# Patient Record
Sex: FEMALE | Race: WHITE
[De-identification: names, ages, dates, MRNs, and addresses within clinical notes are randomized per-mention and may not be internally consistent; named-entity substitution may affect disease eponyms.]

---

## 2017-03-10 PROBLEM — Z00.00 ENCOUNTER FOR PREVENTIVE HEALTH EXAMINATION: Status: ACTIVE | Noted: 2017-03-10

## 2017-03-31 ENCOUNTER — RESULT REVIEW (OUTPATIENT)
Age: 22
End: 2017-03-31

## 2017-03-31 ENCOUNTER — APPOINTMENT (OUTPATIENT)
Dept: ANTEPARTUM | Facility: CLINIC | Age: 22
End: 2017-03-31

## 2017-03-31 VITALS — HEART RATE: 77 BPM | OXYGEN SATURATION: 98 % | TEMPERATURE: 98.4 F

## 2017-03-31 VITALS
WEIGHT: 148.3 LBS | BODY MASS INDEX: 23.83 KG/M2 | HEIGHT: 66 IN | SYSTOLIC BLOOD PRESSURE: 90 MMHG | DIASTOLIC BLOOD PRESSURE: 60 MMHG

## 2017-03-31 DIAGNOSIS — F19.90 OTHER PSYCHOACTIVE SUBSTANCE USE, UNSPECIFIED, UNCOMPLICATED: ICD-10-CM

## 2017-03-31 RX ORDER — AMOXICILLIN 500 MG/1
500 TABLET, FILM COATED ORAL
Qty: 21 | Refills: 0 | Status: COMPLETED | COMMUNITY
Start: 2016-10-27

## 2017-03-31 RX ORDER — METHADONE HYDROCHLORIDE 40 MG/1
TABLET ORAL
Refills: 0 | Status: ACTIVE | COMMUNITY

## 2017-03-31 RX ORDER — IBUPROFEN 800 MG/1
800 TABLET, FILM COATED ORAL
Qty: 21 | Refills: 0 | Status: COMPLETED | COMMUNITY
Start: 2016-10-27

## 2017-04-03 ENCOUNTER — RESULT REVIEW (OUTPATIENT)
Age: 22
End: 2017-04-03

## 2017-04-05 LAB
A VAGINAE DNA VAG NAA+PROBE-LOG#: 7.1
BV SCORE VAG QL NAA+PROBE: ABNORMAL
C GLABRATA DNA VAG QL NAA+PROBE: NOT DETECTED
C PARAP DNA VAG QL NAA+PROBE: NOT DETECTED
C TRACH RRNA SPEC QL NAA+PROBE: NOT DETECTED
C TROPICLS DNA VAG QL NAA+PROBE: NOT DETECTED
CANDIDA DNA VAG QL NAA+PROBE: NOT DETECTED
G VAGINALIS DNA VAG NAA+PROBE-LOG#: >8
LACTOBACILLUS DNA VAG NAA+PROBE-LOG#: NOT DETECTED
MEGASPHAERA SP DNA VAG NAA+PROBE-LOG#: 8
N GONORRHOEA RRNA SPEC QL NAA+PROBE: NOT DETECTED
T VAGINALIS RRNA SPEC QL NAA+PROBE: DETECTED

## 2017-04-07 ENCOUNTER — APPOINTMENT (OUTPATIENT)
Dept: OBGYN | Facility: CLINIC | Age: 22
End: 2017-04-07

## 2017-04-07 ENCOUNTER — APPOINTMENT (OUTPATIENT)
Dept: ANTEPARTUM | Facility: CLINIC | Age: 22
End: 2017-04-07

## 2017-05-12 ENCOUNTER — APPOINTMENT (OUTPATIENT)
Dept: OBGYN | Facility: CLINIC | Age: 22
End: 2017-05-12

## 2017-05-12 ENCOUNTER — APPOINTMENT (OUTPATIENT)
Dept: ANTEPARTUM | Facility: CLINIC | Age: 22
End: 2017-05-12

## 2017-05-12 VITALS — BODY MASS INDEX: 24.21 KG/M2 | SYSTOLIC BLOOD PRESSURE: 100 MMHG | WEIGHT: 150 LBS | DIASTOLIC BLOOD PRESSURE: 60 MMHG

## 2017-05-12 VITALS — TEMPERATURE: 98.2 F | HEART RATE: 80 BPM | OXYGEN SATURATION: 100 %

## 2017-05-12 VITALS — BODY MASS INDEX: 24.04 KG/M2 | WEIGHT: 149.6 LBS | HEIGHT: 66 IN

## 2017-05-12 VITALS — BODY MASS INDEX: 23.95 KG/M2 | HEIGHT: 66 IN | WEIGHT: 149.04 LBS

## 2017-05-12 DIAGNOSIS — A59.9 TRICHOMONIASIS, UNSPECIFIED: ICD-10-CM

## 2017-05-12 LAB
BILIRUB UR QL STRIP: NEGATIVE
BILIRUB UR QL STRIP: NEGATIVE
CLARITY UR: CLEAR
CLARITY UR: CLEAR
COLLECTION METHOD: NORMAL
COLLECTION METHOD: NORMAL
GLUCOSE UR-MCNC: NEGATIVE
GLUCOSE UR-MCNC: NEGATIVE
HCG UR QL: 0.2 EU/DL
HCG UR QL: 0.2 EU/DL
HGB UR QL STRIP.AUTO: NEGATIVE
HGB UR QL STRIP.AUTO: NEGATIVE
KETONES UR-MCNC: NEGATIVE
KETONES UR-MCNC: NEGATIVE
LEUKOCYTE ESTERASE UR QL STRIP: NEGATIVE
LEUKOCYTE ESTERASE UR QL STRIP: NEGATIVE
NITRITE UR QL STRIP: NEGATIVE
NITRITE UR QL STRIP: NEGATIVE
PH UR STRIP: 6
PH UR STRIP: 7.5
PROT UR STRIP-MCNC: NEGATIVE
PROT UR STRIP-MCNC: NEGATIVE
SP GR UR STRIP: 1.02
SP GR UR STRIP: 1.02

## 2017-05-12 RX ORDER — CLINDAMYCIN HYDROCHLORIDE 300 MG/1
300 CAPSULE ORAL
Qty: 28 | Refills: 0 | Status: COMPLETED | COMMUNITY
Start: 2017-04-26

## 2017-05-12 RX ORDER — BUPRENORPHINE HYDROCHLORIDE 8 MG/1
8 TABLET SUBLINGUAL
Qty: 14 | Refills: 0 | Status: COMPLETED | COMMUNITY
Start: 2016-12-05

## 2017-05-12 RX ORDER — METRONIDAZOLE 500 MG/1
500 TABLET ORAL
Qty: 4 | Refills: 0 | Status: DISCONTINUED | COMMUNITY
Start: 2017-04-07 | End: 2017-05-12

## 2017-06-02 ENCOUNTER — INPATIENT (INPATIENT)
Facility: HOSPITAL | Age: 22
LOS: 0 days | Discharge: LEFT BEFORE DISCHARGE | End: 2017-06-03
Attending: INTERNAL MEDICINE

## 2017-06-02 DIAGNOSIS — F31.9 BIPOLAR DISORDER, UNSPECIFIED: ICD-10-CM

## 2017-06-02 DIAGNOSIS — M54.9 DORSALGIA, UNSPECIFIED: ICD-10-CM

## 2017-06-02 DIAGNOSIS — F17.200 NICOTINE DEPENDENCE, UNSPECIFIED, UNCOMPLICATED: ICD-10-CM

## 2017-06-02 DIAGNOSIS — F20.9 SCHIZOPHRENIA, UNSPECIFIED: ICD-10-CM

## 2017-06-02 DIAGNOSIS — F11.20 OPIOID DEPENDENCE, UNCOMPLICATED: ICD-10-CM

## 2017-06-02 DIAGNOSIS — F41.9 ANXIETY DISORDER, UNSPECIFIED: ICD-10-CM

## 2017-06-02 DIAGNOSIS — J45.909 UNSPECIFIED ASTHMA, UNCOMPLICATED: ICD-10-CM

## 2017-06-02 DIAGNOSIS — F13.10 SEDATIVE, HYPNOTIC OR ANXIOLYTIC ABUSE, UNCOMPLICATED: ICD-10-CM

## 2017-06-08 ENCOUNTER — APPOINTMENT (OUTPATIENT)
Dept: ANTEPARTUM | Facility: CLINIC | Age: 22
End: 2017-06-08

## 2017-06-08 VITALS
TEMPERATURE: 97.2 F | HEART RATE: 86 BPM | OXYGEN SATURATION: 100 % | SYSTOLIC BLOOD PRESSURE: 100 MMHG | DIASTOLIC BLOOD PRESSURE: 60 MMHG

## 2017-06-08 VITALS — HEIGHT: 66 IN

## 2017-06-08 LAB
BILIRUB UR QL STRIP: NEGATIVE
CLARITY UR: CLEAR
COLLECTION METHOD: NORMAL
GLUCOSE UR-MCNC: NEGATIVE
HCG UR QL: 0.2 EU/DL
HGB UR QL STRIP.AUTO: NEGATIVE
KETONES UR-MCNC: NEGATIVE
LEUKOCYTE ESTERASE UR QL STRIP: NEGATIVE
NITRITE UR QL STRIP: NEGATIVE
PH UR STRIP: 7.5
PROT UR STRIP-MCNC: NEGATIVE
SP GR UR STRIP: 1.01

## 2017-06-16 ENCOUNTER — APPOINTMENT (OUTPATIENT)
Dept: ANTEPARTUM | Facility: CLINIC | Age: 22
End: 2017-06-16

## 2017-06-16 ENCOUNTER — OUTPATIENT (OUTPATIENT)
Dept: OUTPATIENT SERVICES | Facility: HOSPITAL | Age: 22
LOS: 1 days | Discharge: HOME | End: 2017-06-16

## 2017-06-16 DIAGNOSIS — M54.9 DORSALGIA, UNSPECIFIED: ICD-10-CM

## 2017-06-16 DIAGNOSIS — F13.10 SEDATIVE, HYPNOTIC OR ANXIOLYTIC ABUSE, UNCOMPLICATED: ICD-10-CM

## 2017-06-16 DIAGNOSIS — F20.9 SCHIZOPHRENIA, UNSPECIFIED: ICD-10-CM

## 2017-06-16 DIAGNOSIS — F41.9 ANXIETY DISORDER, UNSPECIFIED: ICD-10-CM

## 2017-06-16 DIAGNOSIS — J45.909 UNSPECIFIED ASTHMA, UNCOMPLICATED: ICD-10-CM

## 2017-06-16 DIAGNOSIS — F11.20 OPIOID DEPENDENCE, UNCOMPLICATED: ICD-10-CM

## 2017-06-16 DIAGNOSIS — F17.200 NICOTINE DEPENDENCE, UNSPECIFIED, UNCOMPLICATED: ICD-10-CM

## 2017-06-16 DIAGNOSIS — F31.9 BIPOLAR DISORDER, UNSPECIFIED: ICD-10-CM

## 2017-06-22 ENCOUNTER — APPOINTMENT (OUTPATIENT)
Dept: ANTEPARTUM | Facility: CLINIC | Age: 22
End: 2017-06-22

## 2017-06-28 DIAGNOSIS — O35.8XX1 MATERNAL CARE FOR OTHER (SUSPECTED) FETAL ABNORMALITY AND DAMAGE, FETUS 1: ICD-10-CM

## 2017-06-28 DIAGNOSIS — O99.322 DRUG USE COMPLICATING PREGNANCY, SECOND TRIMESTER: ICD-10-CM

## 2017-07-01 DIAGNOSIS — B19.20 UNSPECIFIED VIRAL HEPATITIS C WITHOUT HEPATIC COMA: ICD-10-CM

## 2017-07-01 DIAGNOSIS — Z51.89 ENCOUNTER FOR OTHER SPECIFIED AFTERCARE: ICD-10-CM

## 2017-07-01 DIAGNOSIS — F17.210 NICOTINE DEPENDENCE, CIGARETTES, UNCOMPLICATED: ICD-10-CM

## 2017-07-01 DIAGNOSIS — Z33.1 PREGNANT STATE, INCIDENTAL: ICD-10-CM

## 2017-07-01 DIAGNOSIS — F11.20 OPIOID DEPENDENCE, UNCOMPLICATED: ICD-10-CM

## 2017-07-01 DIAGNOSIS — F13.20 SEDATIVE, HYPNOTIC OR ANXIOLYTIC DEPENDENCE, UNCOMPLICATED: ICD-10-CM

## 2017-07-01 DIAGNOSIS — F31.9 BIPOLAR DISORDER, UNSPECIFIED: ICD-10-CM

## 2017-07-26 ENCOUNTER — APPOINTMENT (OUTPATIENT)
Dept: ANTEPARTUM | Facility: CLINIC | Age: 22
End: 2017-07-26

## 2017-07-26 ENCOUNTER — OUTPATIENT (OUTPATIENT)
Dept: OUTPATIENT SERVICES | Facility: HOSPITAL | Age: 22
LOS: 1 days | Discharge: HOME | End: 2017-07-26

## 2017-07-26 VITALS
SYSTOLIC BLOOD PRESSURE: 131 MMHG | DIASTOLIC BLOOD PRESSURE: 67 MMHG | HEIGHT: 66 IN | WEIGHT: 159.8 LBS | BODY MASS INDEX: 25.68 KG/M2

## 2017-07-26 VITALS — TEMPERATURE: 98.4 F | HEART RATE: 100 BPM | OXYGEN SATURATION: 98 %

## 2017-07-26 DIAGNOSIS — F11.20 OPIOID DEPENDENCE, UNCOMPLICATED: ICD-10-CM

## 2017-07-26 DIAGNOSIS — O99.322 DRUG USE COMPLICATING PREGNANCY, SECOND TRIMESTER: ICD-10-CM

## 2017-07-26 DIAGNOSIS — F41.9 ANXIETY DISORDER, UNSPECIFIED: ICD-10-CM

## 2017-07-26 DIAGNOSIS — M54.9 DORSALGIA, UNSPECIFIED: ICD-10-CM

## 2017-07-26 DIAGNOSIS — J45.909 UNSPECIFIED ASTHMA, UNCOMPLICATED: ICD-10-CM

## 2017-07-26 DIAGNOSIS — F13.10 SEDATIVE, HYPNOTIC OR ANXIOLYTIC ABUSE, UNCOMPLICATED: ICD-10-CM

## 2017-07-26 DIAGNOSIS — O99.342 OTHER MENTAL DISORDERS COMPLICATING PREGNANCY, SECOND TRIMESTER: ICD-10-CM

## 2017-07-26 DIAGNOSIS — F17.200 NICOTINE DEPENDENCE, UNSPECIFIED, UNCOMPLICATED: ICD-10-CM

## 2017-07-26 DIAGNOSIS — F31.9 BIPOLAR DISORDER, UNSPECIFIED: ICD-10-CM

## 2017-07-26 DIAGNOSIS — F20.9 SCHIZOPHRENIA, UNSPECIFIED: ICD-10-CM

## 2017-07-26 LAB
BILIRUB UR QL STRIP: NEGATIVE
CLARITY UR: CLEAR
COLLECTION METHOD: NORMAL
GLUCOSE UR-MCNC: NEGATIVE
HCG UR QL: 0.2 EU/DL
HGB UR QL STRIP.AUTO: NEGATIVE
KETONES UR-MCNC: NEGATIVE
LEUKOCYTE ESTERASE UR QL STRIP: NEGATIVE
NITRITE UR QL STRIP: NEGATIVE
PH UR STRIP: 7.5
PROT UR STRIP-MCNC: NEGATIVE
SP GR UR STRIP: 1020
UTERUS FUNDAL HEIGHT TAPE MEASURE: 20

## 2017-07-26 RX ORDER — PRENATAL VIT NO.130/IRON/FOLIC 27MG-0.8MG
27-0.8 TABLET ORAL DAILY
Qty: 30 | Refills: 9 | Status: ACTIVE | COMMUNITY
Start: 2017-07-26 | End: 1900-01-01

## 2017-08-23 ENCOUNTER — APPOINTMENT (OUTPATIENT)
Dept: ANTEPARTUM | Facility: CLINIC | Age: 22
End: 2017-08-23

## 2018-02-26 ENCOUNTER — INPATIENT (INPATIENT)
Facility: HOSPITAL | Age: 23
LOS: 0 days | Discharge: LEFT BEFORE DISCHARGE | End: 2018-02-27
Attending: INTERNAL MEDICINE | Admitting: INTERNAL MEDICINE

## 2018-02-26 VITALS
RESPIRATION RATE: 16 BRPM | WEIGHT: 134.92 LBS | DIASTOLIC BLOOD PRESSURE: 68 MMHG | HEIGHT: 66 IN | TEMPERATURE: 99 F | HEART RATE: 83 BPM | SYSTOLIC BLOOD PRESSURE: 105 MMHG

## 2018-02-26 DIAGNOSIS — F41.1 GENERALIZED ANXIETY DISORDER: ICD-10-CM

## 2018-02-26 DIAGNOSIS — F11.20 OPIOID DEPENDENCE, UNCOMPLICATED: ICD-10-CM

## 2018-02-26 DIAGNOSIS — F31.9 BIPOLAR DISORDER, UNSPECIFIED: ICD-10-CM

## 2018-02-26 DIAGNOSIS — F17.200 NICOTINE DEPENDENCE, UNSPECIFIED, UNCOMPLICATED: ICD-10-CM

## 2018-02-26 LAB
APPEARANCE UR: CLEAR — SIGNIFICANT CHANGE UP
BILIRUB UR-MCNC: NEGATIVE — SIGNIFICANT CHANGE UP
COLOR SPEC: YELLOW — SIGNIFICANT CHANGE UP
DIFF PNL FLD: (no result)
EPI CELLS # UR: (no result) /HPF
GLUCOSE UR QL: NEGATIVE MG/DL — SIGNIFICANT CHANGE UP
HCG UR QL: NEGATIVE — SIGNIFICANT CHANGE UP
KETONES UR-MCNC: NEGATIVE — SIGNIFICANT CHANGE UP
LEUKOCYTE ESTERASE UR-ACNC: NEGATIVE — SIGNIFICANT CHANGE UP
NITRITE UR-MCNC: NEGATIVE — SIGNIFICANT CHANGE UP
PH UR: 7 — SIGNIFICANT CHANGE UP (ref 5–8)
PROT UR-MCNC: NEGATIVE MG/DL — SIGNIFICANT CHANGE UP
RBC CASTS # UR COMP ASSIST: (no result) /HPF
SP GR SPEC: 1.02 — SIGNIFICANT CHANGE UP (ref 1.01–1.03)
UROBILINOGEN FLD QL: 0.2 MG/DL — SIGNIFICANT CHANGE UP (ref 0.2–0.2)
WBC UR QL: SIGNIFICANT CHANGE UP /HPF

## 2018-02-26 RX ORDER — METHADONE HYDROCHLORIDE 40 MG/1
15 TABLET ORAL EVERY 12 HOURS
Qty: 0 | Refills: 0 | Status: DISCONTINUED | OUTPATIENT
Start: 2018-02-27 | End: 2018-02-27

## 2018-02-26 RX ORDER — METHADONE HYDROCHLORIDE 40 MG/1
TABLET ORAL
Qty: 0 | Refills: 0 | Status: DISCONTINUED | OUTPATIENT
Start: 2018-02-27 | End: 2018-02-27

## 2018-02-26 RX ORDER — HYDROXYZINE HCL 10 MG
25 TABLET ORAL EVERY 6 HOURS
Qty: 0 | Refills: 0 | Status: DISCONTINUED | OUTPATIENT
Start: 2018-02-26 | End: 2018-02-27

## 2018-02-26 RX ORDER — METHADONE HYDROCHLORIDE 40 MG/1
10 TABLET ORAL EVERY 12 HOURS
Qty: 0 | Refills: 0 | Status: DISCONTINUED | OUTPATIENT
Start: 2018-02-28 | End: 2018-02-27

## 2018-02-26 RX ORDER — ACETAMINOPHEN 500 MG
650 TABLET ORAL EVERY 6 HOURS
Qty: 0 | Refills: 0 | Status: DISCONTINUED | OUTPATIENT
Start: 2018-02-26 | End: 2018-02-27

## 2018-02-26 RX ORDER — METHADONE HYDROCHLORIDE 40 MG/1
10 TABLET ORAL ONCE
Qty: 0 | Refills: 0 | Status: DISCONTINUED | OUTPATIENT
Start: 2018-02-26 | End: 2018-02-26

## 2018-02-26 RX ORDER — METHADONE HYDROCHLORIDE 40 MG/1
15 TABLET ORAL ONCE
Qty: 0 | Refills: 0 | Status: DISCONTINUED | OUTPATIENT
Start: 2018-02-26 | End: 2018-02-26

## 2018-02-26 RX ORDER — NICOTINE POLACRILEX 2 MG
1 GUM BUCCAL DAILY
Qty: 0 | Refills: 0 | Status: DISCONTINUED | OUTPATIENT
Start: 2018-02-26 | End: 2018-02-27

## 2018-02-26 RX ORDER — GABAPENTIN 400 MG/1
600 CAPSULE ORAL THREE TIMES A DAY
Qty: 0 | Refills: 0 | Status: DISCONTINUED | OUTPATIENT
Start: 2018-02-26 | End: 2018-02-27

## 2018-02-26 RX ORDER — IBUPROFEN 200 MG
400 TABLET ORAL EVERY 6 HOURS
Qty: 0 | Refills: 0 | Status: DISCONTINUED | OUTPATIENT
Start: 2018-02-26 | End: 2018-02-27

## 2018-02-26 RX ORDER — ESCITALOPRAM OXALATE 10 MG/1
10 TABLET, FILM COATED ORAL DAILY
Qty: 0 | Refills: 0 | Status: DISCONTINUED | OUTPATIENT
Start: 2018-02-26 | End: 2018-02-27

## 2018-02-26 RX ORDER — METHADONE HYDROCHLORIDE 40 MG/1
5 TABLET ORAL EVERY 12 HOURS
Qty: 0 | Refills: 0 | Status: CANCELLED | OUTPATIENT
Start: 2018-03-01 | End: 2018-02-27

## 2018-02-26 RX ORDER — TRAZODONE HCL 50 MG
100 TABLET ORAL AT BEDTIME
Qty: 0 | Refills: 0 | Status: DISCONTINUED | OUTPATIENT
Start: 2018-02-26 | End: 2018-02-27

## 2018-02-27 VITALS
HEART RATE: 94 BPM | RESPIRATION RATE: 16 BRPM | TEMPERATURE: 98 F | SYSTOLIC BLOOD PRESSURE: 132 MMHG | DIASTOLIC BLOOD PRESSURE: 57 MMHG

## 2018-02-27 LAB
ALBUMIN SERPL ELPH-MCNC: 4 G/DL — SIGNIFICANT CHANGE UP (ref 3–5.5)
ALP SERPL-CCNC: 62 U/L — SIGNIFICANT CHANGE UP (ref 30–115)
ALT FLD-CCNC: 12 U/L — SIGNIFICANT CHANGE UP (ref 0–41)
AMPHET UR-MCNC: NEGATIVE — SIGNIFICANT CHANGE UP
ANION GAP SERPL CALC-SCNC: 13 MMOL/L — SIGNIFICANT CHANGE UP (ref 7–14)
AST SERPL-CCNC: 20 U/L — SIGNIFICANT CHANGE UP (ref 0–41)
BARBITURATES UR SCN-MCNC: NEGATIVE — SIGNIFICANT CHANGE UP
BENZODIAZ UR-MCNC: POSITIVE
BILIRUB SERPL-MCNC: 0.4 MG/DL — SIGNIFICANT CHANGE UP (ref 0.2–1.2)
BUN SERPL-MCNC: 11 MG/DL — SIGNIFICANT CHANGE UP (ref 10–20)
CALCIUM SERPL-MCNC: 10.1 MG/DL — SIGNIFICANT CHANGE UP (ref 8.5–10.1)
CHLORIDE SERPL-SCNC: 106 MMOL/L — SIGNIFICANT CHANGE UP (ref 98–110)
CO2 SERPL-SCNC: 23 MMOL/L — SIGNIFICANT CHANGE UP (ref 17–32)
COCAINE METAB.OTHER UR-MCNC: POSITIVE
CREAT SERPL-MCNC: 0.6 MG/DL — LOW (ref 0.7–1.5)
DRUG SCREEN 1, URINE RESULT: SIGNIFICANT CHANGE UP
GLUCOSE SERPL-MCNC: 138 MG/DL — HIGH (ref 70–110)
HCT VFR BLD CALC: 43.9 % — SIGNIFICANT CHANGE UP (ref 37–47)
HGB BLD-MCNC: 14.5 G/DL — SIGNIFICANT CHANGE UP (ref 14–18)
MCHC RBC-ENTMCNC: 28 PG — SIGNIFICANT CHANGE UP (ref 27–31)
MCHC RBC-ENTMCNC: 33 G/DL — SIGNIFICANT CHANGE UP (ref 32–37)
MCV RBC AUTO: 84.9 FL — SIGNIFICANT CHANGE UP (ref 81–91)
METHADONE UR-MCNC: NEGATIVE — SIGNIFICANT CHANGE UP
NRBC # BLD: 0 /100 WBCS — SIGNIFICANT CHANGE UP (ref 0–0)
OPIATES UR-MCNC: POSITIVE
PCP UR-MCNC: NEGATIVE — SIGNIFICANT CHANGE UP
PLATELET # BLD AUTO: 194 K/UL — SIGNIFICANT CHANGE UP (ref 130–400)
POTASSIUM SERPL-MCNC: 4.2 MMOL/L — SIGNIFICANT CHANGE UP (ref 3.5–5)
POTASSIUM SERPL-SCNC: 4.2 MMOL/L — SIGNIFICANT CHANGE UP (ref 3.5–5)
PROPOXYPHENE QUALITATIVE URINE RESULT: NEGATIVE — SIGNIFICANT CHANGE UP
PROT SERPL-MCNC: 8 G/DL — SIGNIFICANT CHANGE UP (ref 6–8)
RBC # BLD: 5.17 M/UL — SIGNIFICANT CHANGE UP (ref 4.2–5.4)
RBC # FLD: 12.1 % — SIGNIFICANT CHANGE UP (ref 11.5–14.5)
SODIUM SERPL-SCNC: 142 MMOL/L — SIGNIFICANT CHANGE UP (ref 135–146)
THC UR QL: NEGATIVE — SIGNIFICANT CHANGE UP
WBC # BLD: 5.52 K/UL — SIGNIFICANT CHANGE UP (ref 4.8–10.8)
WBC # FLD AUTO: 5.52 K/UL — SIGNIFICANT CHANGE UP (ref 4.8–10.8)

## 2018-02-27 RX ORDER — METHADONE HYDROCHLORIDE 40 MG/1
5 TABLET ORAL ONCE
Qty: 0 | Refills: 0 | Status: DISCONTINUED | OUTPATIENT
Start: 2018-02-27 | End: 2018-02-27

## 2018-02-27 RX ORDER — HYDROXYZINE HCL 10 MG
1 TABLET ORAL
Qty: 0 | Refills: 0 | COMMUNITY

## 2018-02-27 RX ORDER — TUBERCULIN PURIFIED PROTEIN DERIVATIVE 5 [IU]/.1ML
5 INJECTION, SOLUTION INTRADERMAL ONCE
Qty: 0 | Refills: 0 | Status: COMPLETED | OUTPATIENT
Start: 2018-02-27 | End: 2018-02-27

## 2018-02-27 RX ADMIN — ESCITALOPRAM OXALATE 10 MILLIGRAM(S): 10 TABLET, FILM COATED ORAL at 09:30

## 2018-02-27 RX ADMIN — Medication 100 MILLIGRAM(S): at 20:12

## 2018-02-27 RX ADMIN — METHADONE HYDROCHLORIDE 5 MILLIGRAM(S): 40 TABLET ORAL at 06:15

## 2018-02-27 RX ADMIN — GABAPENTIN 600 MILLIGRAM(S): 400 CAPSULE ORAL at 20:11

## 2018-02-27 RX ADMIN — GABAPENTIN 600 MILLIGRAM(S): 400 CAPSULE ORAL at 13:39

## 2018-02-27 RX ADMIN — METHADONE HYDROCHLORIDE 15 MILLIGRAM(S): 40 TABLET ORAL at 20:11

## 2018-02-27 RX ADMIN — Medication 400 MILLIGRAM(S): at 18:13

## 2018-02-27 RX ADMIN — Medication 25 MILLIGRAM(S): at 20:11

## 2018-02-27 RX ADMIN — METHADONE HYDROCHLORIDE 15 MILLIGRAM(S): 40 TABLET ORAL at 09:30

## 2018-02-27 RX ADMIN — GABAPENTIN 600 MILLIGRAM(S): 400 CAPSULE ORAL at 09:30

## 2018-02-27 RX ADMIN — Medication 0.1 MILLIGRAM(S): at 18:13

## 2018-02-27 RX ADMIN — Medication 1 TABLET(S): at 09:30

## 2018-02-27 RX ADMIN — Medication 1 PATCH: at 09:31

## 2018-02-27 NOTE — CHART NOTE - NSCHARTNOTEFT_GEN_A_CORE
2/27/ : Pt refused PPD placement. Claimed to have received one at her last facility within the last few days. Pt still currently has a mariam from the previous PPD in right forearm with no errythemia or induration.     Josiane Bauer, RRT

## 2018-02-28 LAB
HBV CORE AB SER-ACNC: SIGNIFICANT CHANGE UP
HBV CORE IGM SER-ACNC: SIGNIFICANT CHANGE UP
HBV SURFACE AB SER-ACNC: SIGNIFICANT CHANGE UP
HBV SURFACE AG SER-ACNC: SIGNIFICANT CHANGE UP
HCV AB S/CO SERPL IA: 4.33 S/CO — SIGNIFICANT CHANGE UP
HCV AB SERPL-IMP: (no result)
HIV 1+2 AB+HIV1 P24 AG SERPL QL IA: SIGNIFICANT CHANGE UP

## 2018-03-01 LAB
HCV RNA FLD QL NAA+PROBE: SIGNIFICANT CHANGE UP
HCV RNA SPEC QL PROBE+SIG AMP: SIGNIFICANT CHANGE UP
RPR SERPL-ACNC: SIGNIFICANT CHANGE UP
T PALLIDUM AB TITR SER: POSITIVE
T PALLIDUM IGG SER QL IF: SIGNIFICANT CHANGE UP

## 2018-03-02 DIAGNOSIS — F41.9 ANXIETY DISORDER, UNSPECIFIED: ICD-10-CM

## 2018-03-02 DIAGNOSIS — B19.20 UNSPECIFIED VIRAL HEPATITIS C WITHOUT HEPATIC COMA: ICD-10-CM

## 2018-03-02 DIAGNOSIS — F11.20 OPIOID DEPENDENCE, UNCOMPLICATED: ICD-10-CM

## 2018-03-02 DIAGNOSIS — Z72.0 TOBACCO USE: ICD-10-CM

## 2018-03-02 DIAGNOSIS — F31.9 BIPOLAR DISORDER, UNSPECIFIED: ICD-10-CM

## 2018-03-02 DIAGNOSIS — F11.10 OPIOID ABUSE, UNCOMPLICATED: ICD-10-CM

## 2019-01-07 ENCOUNTER — INPATIENT (INPATIENT)
Facility: HOSPITAL | Age: 24
LOS: 0 days | Discharge: LEFT BEFORE DISCHARGE | End: 2019-01-08
Attending: INTERNAL MEDICINE | Admitting: INTERNAL MEDICINE

## 2019-01-07 VITALS
HEART RATE: 87 BPM | RESPIRATION RATE: 16 BRPM | HEIGHT: 66 IN | WEIGHT: 130.07 LBS | SYSTOLIC BLOOD PRESSURE: 110 MMHG | TEMPERATURE: 98 F | DIASTOLIC BLOOD PRESSURE: 53 MMHG

## 2019-01-07 DIAGNOSIS — F14.10 COCAINE ABUSE, UNCOMPLICATED: ICD-10-CM

## 2019-01-07 DIAGNOSIS — F14.20 COCAINE DEPENDENCE, UNCOMPLICATED: ICD-10-CM

## 2019-01-07 DIAGNOSIS — F11.20 OPIOID DEPENDENCE, UNCOMPLICATED: ICD-10-CM

## 2019-01-07 DIAGNOSIS — F17.200 NICOTINE DEPENDENCE, UNSPECIFIED, UNCOMPLICATED: ICD-10-CM

## 2019-01-07 DIAGNOSIS — B19.20 UNSPECIFIED VIRAL HEPATITIS C WITHOUT HEPATIC COMA: ICD-10-CM

## 2019-01-07 DIAGNOSIS — J45.909 UNSPECIFIED ASTHMA, UNCOMPLICATED: ICD-10-CM

## 2019-01-07 DIAGNOSIS — F41.9 ANXIETY DISORDER, UNSPECIFIED: ICD-10-CM

## 2019-01-07 DIAGNOSIS — F12.20 CANNABIS DEPENDENCE, UNCOMPLICATED: ICD-10-CM

## 2019-01-07 DIAGNOSIS — F10.20 ALCOHOL DEPENDENCE, UNCOMPLICATED: ICD-10-CM

## 2019-01-07 LAB
APPEARANCE UR: CLEAR — SIGNIFICANT CHANGE UP
BACTERIA # UR AUTO: ABNORMAL
BILIRUB UR-MCNC: ABNORMAL
COD CRY URNS QL: ABNORMAL
COLOR SPEC: YELLOW — SIGNIFICANT CHANGE UP
DIFF PNL FLD: ABNORMAL
EPI CELLS # UR: ABNORMAL /HPF
GLUCOSE UR QL: NEGATIVE MG/DL — SIGNIFICANT CHANGE UP
GRAN CASTS # UR COMP ASSIST: NEGATIVE — SIGNIFICANT CHANGE UP
HCG UR QL: NEGATIVE — SIGNIFICANT CHANGE UP
HYALINE CASTS # UR AUTO: NEGATIVE — SIGNIFICANT CHANGE UP
KETONES UR-MCNC: ABNORMAL
LEUKOCYTE ESTERASE UR-ACNC: NEGATIVE — SIGNIFICANT CHANGE UP
NITRITE UR-MCNC: NEGATIVE — SIGNIFICANT CHANGE UP
PH UR: 5.5 — SIGNIFICANT CHANGE UP (ref 5–8)
PROT UR-MCNC: NEGATIVE MG/DL — SIGNIFICANT CHANGE UP
RBC CASTS # UR COMP ASSIST: ABNORMAL /HPF
SP GR SPEC: >=1.03 (ref 1.01–1.03)
TRI-PHOS CRY UR QL COMP ASSIST: NEGATIVE — SIGNIFICANT CHANGE UP
URATE CRY FLD QL MICRO: NEGATIVE — SIGNIFICANT CHANGE UP
UROBILINOGEN FLD QL: 0.2 MG/DL — SIGNIFICANT CHANGE UP (ref 0.2–0.2)
WBC UR QL: SIGNIFICANT CHANGE UP /HPF

## 2019-01-07 RX ORDER — METHADONE HYDROCHLORIDE 40 MG/1
10 TABLET ORAL EVERY 12 HOURS
Qty: 0 | Refills: 0 | Status: DISCONTINUED | OUTPATIENT
Start: 2019-01-08 | End: 2019-01-08

## 2019-01-07 RX ORDER — MAGNESIUM HYDROXIDE 400 MG/1
30 TABLET, CHEWABLE ORAL ONCE
Qty: 0 | Refills: 0 | Status: DISCONTINUED | OUTPATIENT
Start: 2019-01-07 | End: 2019-01-08

## 2019-01-07 RX ORDER — ACETAMINOPHEN 500 MG
650 TABLET ORAL EVERY 4 HOURS
Qty: 0 | Refills: 0 | Status: DISCONTINUED | OUTPATIENT
Start: 2019-01-07 | End: 2019-01-08

## 2019-01-07 RX ORDER — NICOTINE POLACRILEX 2 MG
1 GUM BUCCAL DAILY
Qty: 0 | Refills: 0 | Status: DISCONTINUED | OUTPATIENT
Start: 2019-01-07 | End: 2019-01-08

## 2019-01-07 RX ORDER — TRAZODONE HCL 50 MG
0 TABLET ORAL
Qty: 0 | Refills: 0 | COMMUNITY

## 2019-01-07 RX ORDER — PSEUDOEPHEDRINE HCL 30 MG
60 TABLET ORAL EVERY 6 HOURS
Qty: 0 | Refills: 0 | Status: DISCONTINUED | OUTPATIENT
Start: 2019-01-07 | End: 2019-01-08

## 2019-01-07 RX ORDER — HYDROXYZINE HCL 10 MG
50 TABLET ORAL EVERY 6 HOURS
Qty: 0 | Refills: 0 | Status: DISCONTINUED | OUTPATIENT
Start: 2019-01-07 | End: 2019-01-08

## 2019-01-07 RX ORDER — METHADONE HYDROCHLORIDE 40 MG/1
5 TABLET ORAL EVERY 12 HOURS
Qty: 0 | Refills: 0 | Status: CANCELLED | OUTPATIENT
Start: 2019-01-10 | End: 2019-01-08

## 2019-01-07 RX ORDER — MULTIVIT-MIN/FERROUS GLUCONATE 9 MG/15 ML
1 LIQUID (ML) ORAL DAILY
Qty: 0 | Refills: 0 | Status: DISCONTINUED | OUTPATIENT
Start: 2019-01-07 | End: 2019-01-08

## 2019-01-07 RX ORDER — ESCITALOPRAM OXALATE 10 MG/1
1 TABLET, FILM COATED ORAL
Qty: 0 | Refills: 0 | COMMUNITY

## 2019-01-07 RX ORDER — METHOCARBAMOL 500 MG/1
500 TABLET, FILM COATED ORAL EVERY 6 HOURS
Qty: 0 | Refills: 0 | Status: DISCONTINUED | OUTPATIENT
Start: 2019-01-07 | End: 2019-01-08

## 2019-01-07 RX ORDER — GABAPENTIN 400 MG/1
1 CAPSULE ORAL
Qty: 0 | Refills: 0 | COMMUNITY

## 2019-01-07 RX ORDER — METHADONE HYDROCHLORIDE 40 MG/1
15 TABLET ORAL EVERY 12 HOURS
Qty: 0 | Refills: 0 | Status: DISCONTINUED | OUTPATIENT
Start: 2019-01-07 | End: 2019-01-08

## 2019-01-07 RX ORDER — METHADONE HYDROCHLORIDE 40 MG/1
15 TABLET ORAL ONCE
Qty: 0 | Refills: 0 | Status: DISCONTINUED | OUTPATIENT
Start: 2019-01-07 | End: 2019-01-07

## 2019-01-07 RX ORDER — METHADONE HYDROCHLORIDE 40 MG/1
TABLET ORAL
Qty: 0 | Refills: 0 | Status: DISCONTINUED | OUTPATIENT
Start: 2019-01-07 | End: 2019-01-08

## 2019-01-07 RX ORDER — IBUPROFEN 200 MG
600 TABLET ORAL EVERY 6 HOURS
Qty: 0 | Refills: 0 | Status: DISCONTINUED | OUTPATIENT
Start: 2019-01-07 | End: 2019-01-08

## 2019-01-07 RX ORDER — HYDROXYZINE HCL 10 MG
100 TABLET ORAL AT BEDTIME
Qty: 0 | Refills: 0 | Status: DISCONTINUED | OUTPATIENT
Start: 2019-01-07 | End: 2019-01-08

## 2019-01-07 RX ADMIN — Medication 100 MILLIGRAM(S): at 23:06

## 2019-01-07 RX ADMIN — METHADONE HYDROCHLORIDE 15 MILLIGRAM(S): 40 TABLET ORAL at 18:39

## 2019-01-07 RX ADMIN — METHADONE HYDROCHLORIDE 15 MILLIGRAM(S): 40 TABLET ORAL at 20:54

## 2019-01-07 RX ADMIN — Medication 600 MILLIGRAM(S): at 18:39

## 2019-01-07 RX ADMIN — Medication 50 MILLIGRAM(S): at 18:39

## 2019-01-07 NOTE — H&P ADULT - ATTENDING COMMENTS
Patient interviewed and examined.    Chart reviewed.    PA's H&P noted and modified, as appropriate.    Case discussed on team rounds    Following is my summary of the case.    Admitted for detox: from ____ED, _x__Intake, ____Med/Surg Floor    Alcohol____   Opioid__x___  Benzo_x__ Other_____    Substance amount, duration of use, last usage, and prior attempts at detox or rehabs, are outlined above in the H&P and discussed with patient.    Associated withdrawal symptoms presents.  Comorbid conditions noted. Chronic and Stable.    Past Medical Hx, Psych Hx, family Hx, Social Hx from H&P reviewed and NO changes.    Old medical record and medication Hx. Reviewed    Following items reviewed and addressed:  1. labs  2. EKG  3. Imaging from PACs module    Examination: no change from PA's exam.    Place on following protocol  _____Medically Managed  __X__Medically Supervised    Ciwa_____Librium taper____Ativan taper___Methadone taper__x_ Phenobarb taper____ Suboxone Induction____MMTP____    Narcan Kit Offered    Psych Consult __X__N/A  ___Ordered    Physical Therapy  ___XN/A    ___  Ordered    Aftercare disposition to be addressed by counselors.    Estimated length of stay 3-5 days.

## 2019-01-07 NOTE — H&P ADULT - ASSESSMENT
23y Female presents for detox with continuous Opioid use disorder. Patient admits to abusing heroin 1 gram by IV daily in the past 6 months, reported her last detox was 6 months ago. Patient reports she has DCPNP case from [place] and she is required to go to detox. Patient states she was pregnant and her baby was delivery 2 1/2 months ago. Denies currently on MMTP or Suboxone. Patient c/o feeling anxiety, insomnia, body aches, nausea, poor appetite, hot and chills intermittently.

## 2019-01-07 NOTE — H&P ADULT - HISTORY OF PRESENT ILLNESS
23y Female presents for detox with continuous Opioid use disorder. Patient admits to abusing heroin 1 gram by IV daily in the past 6 months, reported her last detox was 6 months ago. Patient reports she has case of DCPP from [place] and she is required to go to detox. Patient states she was pregnant and her baby was delivery 2 1/2 months ago. Denies currently on MMTP or Suboxone. Patient c/o feeling anxiety, insomnia, body aches, nausea, poor appetite, hot and chills intermittently.  Patient A&Ox3, denies cp, sob, headache, dizziness, bleeding and dysuria. LMP: 5 days ago, , H/O miscarriage x 3.  Patient admits to abusing current substances as follows:  DRUG	AGE OF ONSET (Y.O)	ROUTE	FREQ	AMOUNT	LAST USE	LENGTH OF CURRENT USE	  Heroin	15	IV	Daily	1 gram	17 1 gram	6 months	  THC	8	Smoking 	Rarely	1 hit	17 		  Crack	19	Smoking	Daily	$40	17	1 week	  Klonopin	12	PO	Rarely Once Q 3months	1-2mg	2 days ago		  Denies other drugs abuse							  Last Detox: 6 months ago  Hx of Withdrawal Seizures: Denied  Psyhx: Anxiety and bipolar. H/O Self-Mutilation by cutting wrist. Denied H/O suicidal attempt, only for drawing family attention.   Denies any S/H Ideation or A/V Hallucination  Is patient currently receiving methadone from an MMTP: No  Screening history	Last tested	Result	History of treatment	  HIV	2018	NEG	N/A	  Hepatitis C	2018	NEG	N/A	  Quantiferon GOLD TB test	2018	NEG	N/A	    Immunization	Not Received	Unknown	Received	Date Received 	  Influenza		v			  Pneumococcus		v			  Tetanus	v				  Others					  					  I-Stop:				  This report was requested by: Adarsh Whalen | Reference #: [id number]  There are no results for the search terms that you entered.  Patient Name:	POOJA DIANE	YOB: 1995	  Address:	[address] [place] [address] [place] [id number]	Sex:	Female	  Rx Written	Rx Dispensed	Drug	Strength	Quantity	Days Supply	Prescriber Name	  2018	TRAMADOL HCL 50 MG TABLET		16.0	4	BRENDON BRAGA

## 2019-01-07 NOTE — H&P ADULT - PROBLEM SELECTOR PLAN 1
Admit to detox  \Methadone Taper Protocol  Monitor VS and withdrawal symptoms  supportive care with prn meds  Check Urine Toxicology  Check labs, EKG, Metabolic Panel as routine

## 2019-01-07 NOTE — H&P ADULT - NSHPPHYSICALEXAM_GEN_ALL_CORE
-  Vital Signs:      Temp: 98.5      Pulse: 80        RR:  12      BP:92/70      Physical Exam:              Constitutional: +Anxious A&Ox3, W/N and W/D.  HEENT: NC/AT, PERRLA, EOM Intact, Nares normal, No Sinus tenderness.  Lips, mucosa and tongue normal; Neck supple, No adenopathy  Respiratory: CTAB, no rales, no rhonchi, no wheezes  Cardiovascular: +S1S2, No M/R/G  Gastrointestinal: +BS, soft, non-tender, not distended, No CVAT  Extremities: Atraumatic, no cyanosis, no edema, no calf tenderness,  Vascular: +dorsal pedis and radial pulses, no extremity cyanosis  Neurological: sensation intact, ROM equal B/L, CN II-XII intact, Gait: steady  Skin: no rashes, no lesions, normal turgor, + track marks b/l UE  No Decubiti present  No IV lines present  Rectal/Breasts Exam: Deferred

## 2019-01-08 VITALS
TEMPERATURE: 96 F | SYSTOLIC BLOOD PRESSURE: 108 MMHG | DIASTOLIC BLOOD PRESSURE: 59 MMHG | RESPIRATION RATE: 14 BRPM | HEART RATE: 51 BPM

## 2019-01-08 LAB
AMPHET UR-MCNC: NEGATIVE — SIGNIFICANT CHANGE UP
BARBITURATES UR SCN-MCNC: NEGATIVE — SIGNIFICANT CHANGE UP
BENZODIAZ UR-MCNC: POSITIVE
COCAINE METAB.OTHER UR-MCNC: POSITIVE
DRUG SCREEN 1, URINE RESULT: SIGNIFICANT CHANGE UP
METHADONE UR-MCNC: NEGATIVE — SIGNIFICANT CHANGE UP
OPIATES UR-MCNC: POSITIVE
PCP UR-MCNC: NEGATIVE — SIGNIFICANT CHANGE UP
PROPOXYPHENE QUALITATIVE URINE RESULT: NEGATIVE — SIGNIFICANT CHANGE UP
THC UR QL: POSITIVE

## 2019-01-08 RX ADMIN — Medication 1 TABLET(S): at 09:01

## 2019-01-08 RX ADMIN — Medication 1 PATCH: at 09:06

## 2019-01-08 RX ADMIN — METHADONE HYDROCHLORIDE 15 MILLIGRAM(S): 40 TABLET ORAL at 09:02

## 2019-01-08 NOTE — CHART NOTE - NSCHARTNOTEFT_GEN_A_CORE
Allergies:  buprenorphine (Rash)  Suboxone (Rash)      Diet: Regular    Activity: as tolerated    Follow up with    1. PMD in 2 weeks    Extra Instructions: Go to aftercare      Flu Vaccine given  Yes_____         No______      Diagnosis:  Chemical Dependency   Maintain sobriety  refrain from all use      Patient Signature___________________________________________  Date_________________      Nurse Signature_____________________________________________Date_________________

## 2019-01-10 DIAGNOSIS — F14.19 COCAINE ABUSE WITH UNSPECIFIED COCAINE-INDUCED DISORDER: ICD-10-CM

## 2019-01-10 DIAGNOSIS — F31.9 BIPOLAR DISORDER, UNSPECIFIED: ICD-10-CM

## 2019-01-10 DIAGNOSIS — F11.29 OPIOID DEPENDENCE WITH UNSPECIFIED OPIOID-INDUCED DISORDER: ICD-10-CM

## 2019-01-10 DIAGNOSIS — F11.99 OPIOID USE, UNSPECIFIED WITH UNSPECIFIED OPIOID-INDUCED DISORDER: ICD-10-CM

## 2019-01-10 DIAGNOSIS — J45.909 UNSPECIFIED ASTHMA, UNCOMPLICATED: ICD-10-CM

## 2019-01-10 DIAGNOSIS — F41.8 OTHER SPECIFIED ANXIETY DISORDERS: ICD-10-CM

## 2019-01-10 DIAGNOSIS — Z87.891 PERSONAL HISTORY OF NICOTINE DEPENDENCE: ICD-10-CM

## 2019-01-10 DIAGNOSIS — B19.20 UNSPECIFIED VIRAL HEPATITIS C WITHOUT HEPATIC COMA: ICD-10-CM

## 2019-01-10 DIAGNOSIS — F41.9 ANXIETY DISORDER, UNSPECIFIED: ICD-10-CM

## 2019-01-15 ENCOUNTER — OUTPATIENT (OUTPATIENT)
Dept: OUTPATIENT SERVICES | Facility: HOSPITAL | Age: 24
LOS: 1 days | Discharge: HOME | End: 2019-01-15

## 2019-01-15 DIAGNOSIS — F11.20 OPIOID DEPENDENCE, UNCOMPLICATED: ICD-10-CM

## 2019-01-15 PROBLEM — J45.909 UNSPECIFIED ASTHMA, UNCOMPLICATED: Chronic | Status: ACTIVE | Noted: 2019-01-07

## 2019-01-15 PROBLEM — F41.8 OTHER SPECIFIED ANXIETY DISORDERS: Chronic | Status: ACTIVE | Noted: 2019-01-07

## 2019-01-17 ENCOUNTER — OUTPATIENT (OUTPATIENT)
Dept: OUTPATIENT SERVICES | Facility: HOSPITAL | Age: 24
LOS: 1 days | Discharge: HOME | End: 2019-01-17

## 2019-01-17 DIAGNOSIS — F11.20 OPIOID DEPENDENCE, UNCOMPLICATED: ICD-10-CM

## 2019-05-21 ENCOUNTER — OUTPATIENT (OUTPATIENT)
Dept: OUTPATIENT SERVICES | Facility: HOSPITAL | Age: 24
LOS: 1 days | Discharge: HOME | End: 2019-05-21

## 2019-05-21 DIAGNOSIS — Z00.8 ENCOUNTER FOR OTHER GENERAL EXAMINATION: ICD-10-CM

## 2019-05-22 ENCOUNTER — OUTPATIENT (OUTPATIENT)
Dept: OUTPATIENT SERVICES | Facility: HOSPITAL | Age: 24
LOS: 1 days | Discharge: HOME | End: 2019-05-22

## 2019-05-22 DIAGNOSIS — Z00.8 ENCOUNTER FOR OTHER GENERAL EXAMINATION: ICD-10-CM

## 2019-05-22 DIAGNOSIS — I49.9 CARDIAC ARRHYTHMIA, UNSPECIFIED: ICD-10-CM

## 2019-05-22 LAB
ALBUMIN SERPL ELPH-MCNC: 3.8 G/DL — SIGNIFICANT CHANGE UP (ref 3.5–5.2)
ALP SERPL-CCNC: 266 U/L — HIGH (ref 30–115)
ALT FLD-CCNC: 42 U/L — HIGH (ref 0–41)
ANION GAP SERPL CALC-SCNC: 9 MMOL/L — SIGNIFICANT CHANGE UP (ref 7–14)
APPEARANCE UR: CLEAR — SIGNIFICANT CHANGE UP
AST SERPL-CCNC: 60 U/L — HIGH (ref 0–41)
BACTERIA # UR AUTO: ABNORMAL
BILIRUB SERPL-MCNC: 0.2 MG/DL — SIGNIFICANT CHANGE UP (ref 0.2–1.2)
BILIRUB UR-MCNC: NEGATIVE — SIGNIFICANT CHANGE UP
BUN SERPL-MCNC: 9 MG/DL — LOW (ref 10–20)
CALCIUM SERPL-MCNC: 9.4 MG/DL — SIGNIFICANT CHANGE UP (ref 8.5–10.1)
CHLORIDE SERPL-SCNC: 103 MMOL/L — SIGNIFICANT CHANGE UP (ref 98–110)
CHOLEST SERPL-MCNC: 140 MG/DL — SIGNIFICANT CHANGE UP (ref 100–200)
CO2 SERPL-SCNC: 25 MMOL/L — SIGNIFICANT CHANGE UP (ref 17–32)
COD CRY URNS QL: ABNORMAL
COLOR SPEC: YELLOW — SIGNIFICANT CHANGE UP
CREAT SERPL-MCNC: 0.6 MG/DL — LOW (ref 0.7–1.5)
DIFF PNL FLD: NEGATIVE — SIGNIFICANT CHANGE UP
EPI CELLS # UR: ABNORMAL /HPF
ESTIMATED AVERAGE GLUCOSE: 114 MG/DL — SIGNIFICANT CHANGE UP (ref 68–114)
GLUCOSE SERPL-MCNC: 109 MG/DL — HIGH (ref 70–99)
GLUCOSE UR QL: NEGATIVE MG/DL — SIGNIFICANT CHANGE UP
HBA1C BLD-MCNC: 5.6 % — SIGNIFICANT CHANGE UP (ref 4–5.6)
HCG UR QL: POSITIVE — SIGNIFICANT CHANGE UP
HCT VFR BLD CALC: 36.3 % — LOW (ref 37–47)
HDLC SERPL-MCNC: 30 MG/DL — LOW
HGB BLD-MCNC: 11.6 G/DL — LOW (ref 12–16)
KETONES UR-MCNC: NEGATIVE — SIGNIFICANT CHANGE UP
LEUKOCYTE ESTERASE UR-ACNC: NEGATIVE — SIGNIFICANT CHANGE UP
LIPID PNL WITH DIRECT LDL SERPL: 96 MG/DL — SIGNIFICANT CHANGE UP (ref 4–129)
MAGNESIUM SERPL-MCNC: 1.9 MG/DL — SIGNIFICANT CHANGE UP (ref 1.8–2.4)
MCHC RBC-ENTMCNC: 25.7 PG — LOW (ref 27–31)
MCHC RBC-ENTMCNC: 32 G/DL — SIGNIFICANT CHANGE UP (ref 32–37)
MCV RBC AUTO: 80.5 FL — LOW (ref 81–99)
NITRITE UR-MCNC: NEGATIVE — SIGNIFICANT CHANGE UP
NRBC # BLD: 0 /100 WBCS — SIGNIFICANT CHANGE UP (ref 0–0)
PH UR: 6.5 — SIGNIFICANT CHANGE UP (ref 5–8)
PLATELET # BLD AUTO: 158 K/UL — SIGNIFICANT CHANGE UP (ref 130–400)
POTASSIUM SERPL-MCNC: 4.2 MMOL/L — SIGNIFICANT CHANGE UP (ref 3.5–5)
POTASSIUM SERPL-SCNC: 4.2 MMOL/L — SIGNIFICANT CHANGE UP (ref 3.5–5)
PROT SERPL-MCNC: 7.1 G/DL — SIGNIFICANT CHANGE UP (ref 6–8)
PROT UR-MCNC: 30 MG/DL
RBC # BLD: 4.51 M/UL — SIGNIFICANT CHANGE UP (ref 4.2–5.4)
RBC # FLD: 14.6 % — HIGH (ref 11.5–14.5)
SODIUM SERPL-SCNC: 137 MMOL/L — SIGNIFICANT CHANGE UP (ref 135–146)
SP GR SPEC: 1.02 — SIGNIFICANT CHANGE UP (ref 1.01–1.03)
TOTAL CHOLESTEROL/HDL RATIO MEASUREMENT: 4.7 RATIO — SIGNIFICANT CHANGE UP (ref 4–5.5)
TRIGL SERPL-MCNC: 165 MG/DL — HIGH (ref 10–149)
UROBILINOGEN FLD QL: 0.2 MG/DL — SIGNIFICANT CHANGE UP (ref 0.2–0.2)
WBC # BLD: 3.85 K/UL — LOW (ref 4.8–10.8)
WBC # FLD AUTO: 3.85 K/UL — LOW (ref 4.8–10.8)
WBC UR QL: SIGNIFICANT CHANGE UP /HPF

## 2019-05-23 LAB
HAV IGM SER-ACNC: SIGNIFICANT CHANGE UP
HBV CORE IGM SER-ACNC: SIGNIFICANT CHANGE UP
HBV SURFACE AG SER-ACNC: SIGNIFICANT CHANGE UP
HCG-TM SERPL-ACNC: 9265 MIU/ML — HIGH
HCV AB S/CO SERPL IA: 2.92 S/CO — HIGH (ref 0–0.99)
HCV AB SERPL-IMP: ABNORMAL
HCV RNA FLD QL NAA+PROBE: SIGNIFICANT CHANGE UP
HCV RNA SPEC QL PROBE+SIG AMP: SIGNIFICANT CHANGE UP
T PALLIDUM AB TITR SER: NEGATIVE — SIGNIFICANT CHANGE UP

## 2019-06-10 ENCOUNTER — APPOINTMENT (OUTPATIENT)
Dept: ANTEPARTUM | Facility: CLINIC | Age: 24
End: 2019-06-10
Payer: MEDICAID

## 2019-06-10 ENCOUNTER — NON-APPOINTMENT (OUTPATIENT)
Age: 24
End: 2019-06-10

## 2019-06-10 ENCOUNTER — LABORATORY RESULT (OUTPATIENT)
Age: 24
End: 2019-06-10

## 2019-06-10 ENCOUNTER — RESULT CHARGE (OUTPATIENT)
Age: 24
End: 2019-06-10

## 2019-06-10 ENCOUNTER — OUTPATIENT (OUTPATIENT)
Dept: OUTPATIENT SERVICES | Facility: HOSPITAL | Age: 24
LOS: 1 days | Discharge: HOME | End: 2019-06-10

## 2019-06-10 VITALS
SYSTOLIC BLOOD PRESSURE: 124 MMHG | HEART RATE: 94 BPM | OXYGEN SATURATION: 98 % | DIASTOLIC BLOOD PRESSURE: 69 MMHG | WEIGHT: 139 LBS | TEMPERATURE: 98.3 F

## 2019-06-10 DIAGNOSIS — Z80.3 FAMILY HISTORY OF MALIGNANT NEOPLASM OF BREAST: ICD-10-CM

## 2019-06-10 DIAGNOSIS — Z80.8 FAMILY HISTORY OF MALIGNANT NEOPLASM OF OTHER ORGANS OR SYSTEMS: ICD-10-CM

## 2019-06-10 DIAGNOSIS — Z80.0 FAMILY HISTORY OF MALIGNANT NEOPLASM OF DIGESTIVE ORGANS: ICD-10-CM

## 2019-06-10 DIAGNOSIS — F17.200 NICOTINE DEPENDENCE, UNSPECIFIED, UNCOMPLICATED: ICD-10-CM

## 2019-06-10 DIAGNOSIS — Z34.90 ENCOUNTER FOR SUPERVISION OF NORMAL PREGNANCY, UNSPECIFIED, UNSPECIFIED TRIMESTER: ICD-10-CM

## 2019-06-10 LAB
BILIRUB UR QL STRIP: NEGATIVE
CLARITY UR: CLEAR
COLLECTION METHOD: NORMAL
GLUCOSE UR-MCNC: NEGATIVE
HCG UR QL: 0.2 EU/DL
HGB UR QL STRIP.AUTO: NEGATIVE
KETONES UR-MCNC: NEGATIVE
LEUKOCYTE ESTERASE UR QL STRIP: NEGATIVE
NITRITE UR QL STRIP: NEGATIVE
PH UR STRIP: 6
PROT UR STRIP-MCNC: NORMAL
SP GR UR STRIP: 1.02
URINE ALBUMIN/PROTEIN: NORMAL
URINE GLUCOSE: NEGATIVE
URINE KETONES: NEGATIVE

## 2019-06-10 PROCEDURE — 99215 OFFICE O/P EST HI 40 MIN: CPT | Mod: 25

## 2019-06-10 PROCEDURE — 76817 TRANSVAGINAL US OBSTETRIC: CPT | Mod: 26

## 2019-06-10 RX ORDER — METOCLOPRAMIDE 10 MG/1
10 TABLET ORAL EVERY 6 HOURS
Qty: 4 | Refills: 0 | Status: COMPLETED | COMMUNITY
Start: 2017-04-07 | End: 2019-06-10

## 2019-06-10 RX ORDER — FERROUS SULFATE 325(65) MG
325 (65 FE) TABLET ORAL TWICE DAILY
Qty: 30 | Refills: 6 | Status: COMPLETED | COMMUNITY
Start: 2017-06-08 | End: 2019-06-10

## 2019-06-19 LAB
A VAGINAE DNA VAG QL NAA+PROBE: ABNORMAL
BVAB2 DNA VAG QL NAA+PROBE: ABNORMAL
C KRUSEI DNA VAG QL NAA+PROBE: NEGATIVE
C TRACH RRNA SPEC QL NAA+PROBE: NEGATIVE
MEGA1 DNA VAG QL NAA+PROBE: ABNORMAL
N GONORRHOEA RRNA SPEC QL NAA+PROBE: NEGATIVE
T VAGINALIS RRNA SPEC QL NAA+PROBE: POSITIVE

## 2019-06-21 ENCOUNTER — RESULT REVIEW (OUTPATIENT)
Age: 24
End: 2019-06-21

## 2019-06-25 DIAGNOSIS — F17.200 NICOTINE DEPENDENCE, UNSPECIFIED, UNCOMPLICATED: ICD-10-CM

## 2019-06-25 DIAGNOSIS — Z3A.08 8 WEEKS GESTATION OF PREGNANCY: ICD-10-CM

## 2019-06-25 DIAGNOSIS — B19.20 UNSPECIFIED VIRAL HEPATITIS C WITHOUT HEPATIC COMA: ICD-10-CM

## 2019-06-25 DIAGNOSIS — O99.342 OTHER MENTAL DISORDERS COMPLICATING PREGNANCY, SECOND TRIMESTER: ICD-10-CM

## 2019-06-25 DIAGNOSIS — F31.9 BIPOLAR DISORDER, UNSPECIFIED: ICD-10-CM

## 2019-06-25 DIAGNOSIS — O99.322 DRUG USE COMPLICATING PREGNANCY, SECOND TRIMESTER: ICD-10-CM

## 2019-06-25 DIAGNOSIS — O09.90 SUPERVISION OF HIGH RISK PREGNANCY, UNSPECIFIED, UNSPECIFIED TRIMESTER: ICD-10-CM

## 2019-07-08 ENCOUNTER — OUTPATIENT (OUTPATIENT)
Dept: OUTPATIENT SERVICES | Facility: HOSPITAL | Age: 24
LOS: 1 days | Discharge: HOME | End: 2019-07-08

## 2019-07-08 DIAGNOSIS — I49.9 CARDIAC ARRHYTHMIA, UNSPECIFIED: ICD-10-CM

## 2019-07-09 ENCOUNTER — LABORATORY RESULT (OUTPATIENT)
Age: 24
End: 2019-07-09

## 2019-07-10 ENCOUNTER — APPOINTMENT (OUTPATIENT)
Dept: ANTEPARTUM | Facility: CLINIC | Age: 24
End: 2019-07-10
Payer: MEDICAID

## 2019-07-10 ENCOUNTER — RESULT CHARGE (OUTPATIENT)
Age: 24
End: 2019-07-10

## 2019-07-10 ENCOUNTER — NON-APPOINTMENT (OUTPATIENT)
Age: 24
End: 2019-07-10

## 2019-07-10 ENCOUNTER — OUTPATIENT (OUTPATIENT)
Dept: OUTPATIENT SERVICES | Facility: HOSPITAL | Age: 24
LOS: 1 days | Discharge: HOME | End: 2019-07-10

## 2019-07-10 VITALS
DIASTOLIC BLOOD PRESSURE: 64 MMHG | WEIGHT: 144.25 LBS | TEMPERATURE: 98.9 F | OXYGEN SATURATION: 100 % | SYSTOLIC BLOOD PRESSURE: 130 MMHG | HEART RATE: 108 BPM

## 2019-07-10 DIAGNOSIS — Z36.82 ENCOUNTER FOR ANTENATAL SCREENING FOR NUCHAL TRANSLUCENCY: ICD-10-CM

## 2019-07-10 DIAGNOSIS — Z13.79 ENCOUNTER FOR OTHER SCREENING FOR GENETIC AND CHROMOSOMAL ANOMALIES: ICD-10-CM

## 2019-07-10 LAB
BILIRUB UR QL STRIP: NEGATIVE
CLARITY UR: CLEAR
COLLECTION METHOD: NORMAL
GLUCOSE UR-MCNC: NEGATIVE
HCG UR QL: 0.2 EU/DL
HGB UR QL STRIP.AUTO: NORMAL
KETONES UR-MCNC: NEGATIVE
LEUKOCYTE ESTERASE UR QL STRIP: NEGATIVE
NITRITE UR QL STRIP: NEGATIVE
PH UR STRIP: 7
PROT UR STRIP-MCNC: 1
SCHEDULED VISIT: YES
SP GR UR STRIP: 1.02
URINE ALBUMIN/PROTEIN: 1
URINE GLUCOSE: NEGATIVE
URINE KETONES: NEGATIVE
WEEKS GESTATION: 12.5

## 2019-07-10 PROCEDURE — 99214 OFFICE O/P EST MOD 30 MIN: CPT | Mod: 25

## 2019-07-10 PROCEDURE — 76813 OB US NUCHAL MEAS 1 GEST: CPT | Mod: 26

## 2019-07-10 RX ORDER — METRONIDAZOLE 500 MG/1
500 TABLET ORAL
Qty: 0 | Refills: 0 | Status: COMPLETED | OUTPATIENT
Start: 2019-07-10

## 2019-07-10 RX ADMIN — METRONIDAZOLE 4 MG: 500 TABLET ORAL at 00:00

## 2019-07-11 DIAGNOSIS — Z3A.12 12 WEEKS GESTATION OF PREGNANCY: ICD-10-CM

## 2019-07-11 DIAGNOSIS — O09.90 SUPERVISION OF HIGH RISK PREGNANCY, UNSPECIFIED, UNSPECIFIED TRIMESTER: ICD-10-CM

## 2019-07-11 DIAGNOSIS — O99.342 OTHER MENTAL DISORDERS COMPLICATING PREGNANCY, SECOND TRIMESTER: ICD-10-CM

## 2019-07-11 DIAGNOSIS — O99.322 DRUG USE COMPLICATING PREGNANCY, SECOND TRIMESTER: ICD-10-CM

## 2019-07-11 DIAGNOSIS — F31.9 BIPOLAR DISORDER, UNSPECIFIED: ICD-10-CM

## 2019-07-11 DIAGNOSIS — Z36.3 ENCOUNTER FOR ANTENATAL SCREENING FOR MALFORMATIONS: ICD-10-CM

## 2019-07-11 DIAGNOSIS — F11.10 OPIOID ABUSE, UNCOMPLICATED: ICD-10-CM

## 2019-07-24 ENCOUNTER — APPOINTMENT (OUTPATIENT)
Dept: OBGYN | Facility: CLINIC | Age: 24
End: 2019-07-24

## 2019-08-07 ENCOUNTER — OUTPATIENT (OUTPATIENT)
Dept: OUTPATIENT SERVICES | Facility: HOSPITAL | Age: 24
LOS: 1 days | Discharge: HOME | End: 2019-08-07

## 2019-08-07 ENCOUNTER — NON-APPOINTMENT (OUTPATIENT)
Age: 24
End: 2019-08-07

## 2019-08-07 ENCOUNTER — APPOINTMENT (OUTPATIENT)
Dept: ANTEPARTUM | Facility: CLINIC | Age: 24
End: 2019-08-07
Payer: SELF-PAY

## 2019-08-07 VITALS
HEART RATE: 94 BPM | WEIGHT: 141.06 LBS | SYSTOLIC BLOOD PRESSURE: 130 MMHG | DIASTOLIC BLOOD PRESSURE: 65 MMHG | OXYGEN SATURATION: 100 % | TEMPERATURE: 98 F

## 2019-08-07 DIAGNOSIS — O09.90 SUPERVISION OF HIGH RISK PREGNANCY, UNSPECIFIED, UNSPECIFIED TRIMESTER: ICD-10-CM

## 2019-08-07 LAB
BILIRUB UR QL STRIP: NEGATIVE
CLARITY UR: CLEAR
COLLECTION METHOD: NORMAL
FETAL HEART DESCRIPTION: NORMAL
FETAL HEART RATE (BPM): 148
FETAL MOVEMENT: PRESENT
GLUCOSE UR-MCNC: NEGATIVE
HCG UR QL: 0.2 EU/DL
HGB UR QL STRIP.AUTO: NEGATIVE
KETONES UR-MCNC: NEGATIVE
LEUKOCYTE ESTERASE UR QL STRIP: NEGATIVE
NITRITE UR QL STRIP: NEGATIVE
PH UR STRIP: 5
PROT UR STRIP-MCNC: NEGATIVE
SCHEDULED VISIT: YES
SP GR UR STRIP: 1
URINE ALBUMIN/PROTEIN: NEGATIVE
URINE GLUCOSE: NEGATIVE
URINE KETONES: NEGATIVE
WEEKS GESTATION: 16.5

## 2019-08-07 PROCEDURE — 99214 OFFICE O/P EST MOD 30 MIN: CPT

## 2019-08-07 RX ORDER — PENICILLIN V POTASSIUM 500 MG/1
500 TABLET, FILM COATED ORAL EVERY 6 HOURS
Qty: 28 | Refills: 0 | Status: ACTIVE | COMMUNITY
Start: 2019-08-07 | End: 1900-01-01

## 2019-08-13 DIAGNOSIS — K08.89 OTHER SPECIFIED DISORDERS OF TEETH AND SUPPORTING STRUCTURES: ICD-10-CM

## 2019-08-13 DIAGNOSIS — B19.20 UNSPECIFIED VIRAL HEPATITIS C WITHOUT HEPATIC COMA: ICD-10-CM

## 2019-08-13 DIAGNOSIS — Z3A.16 16 WEEKS GESTATION OF PREGNANCY: ICD-10-CM

## 2019-08-13 DIAGNOSIS — O98.319 OTHER INFECTIONS WITH A PREDOMINANTLY SEXUAL MODE OF TRANSMISSION COMPLICATING PREGNANCY, UNSPECIFIED TRIMESTER: ICD-10-CM

## 2019-08-13 DIAGNOSIS — F31.9 BIPOLAR DISORDER, UNSPECIFIED: ICD-10-CM

## 2019-08-13 DIAGNOSIS — O99.322 DRUG USE COMPLICATING PREGNANCY, SECOND TRIMESTER: ICD-10-CM

## 2019-08-13 DIAGNOSIS — O09.90 SUPERVISION OF HIGH RISK PREGNANCY, UNSPECIFIED, UNSPECIFIED TRIMESTER: ICD-10-CM

## 2019-08-13 DIAGNOSIS — O99.342 OTHER MENTAL DISORDERS COMPLICATING PREGNANCY, SECOND TRIMESTER: ICD-10-CM

## 2019-08-14 LAB
A VAGINAE DNA VAG QL NAA+PROBE: ABNORMAL
BVAB2 DNA VAG QL NAA+PROBE: ABNORMAL
C KRUSEI DNA VAG QL NAA+PROBE: NEGATIVE
C KRUSEI DNA VAG QL NAA+PROBE: POSITIVE
C KRUSEI DNA VAG QL NAA+PROBE: POSITIVE
C TRACH RRNA SPEC QL NAA+PROBE: NEGATIVE
MEGA1 DNA VAG QL NAA+PROBE: ABNORMAL
N GONORRHOEA RRNA SPEC QL NAA+PROBE: NEGATIVE
T VAGINALIS RRNA SPEC QL NAA+PROBE: NEGATIVE

## 2019-09-03 ENCOUNTER — LABORATORY RESULT (OUTPATIENT)
Age: 24
End: 2019-09-03

## 2019-09-03 ENCOUNTER — OUTPATIENT (OUTPATIENT)
Dept: OUTPATIENT SERVICES | Facility: HOSPITAL | Age: 24
LOS: 1 days | Discharge: HOME | End: 2019-09-03

## 2019-09-04 ENCOUNTER — APPOINTMENT (OUTPATIENT)
Dept: OBGYN | Facility: CLINIC | Age: 24
End: 2019-09-04

## 2019-09-04 LAB
APPEARANCE: ABNORMAL
BILIRUBIN URINE: ABNORMAL
BLOOD URINE: NEGATIVE
COLOR: YELLOW
GLUCOSE QUALITATIVE U: NEGATIVE
KETONES URINE: NEGATIVE
LEUKOCYTE ESTERASE URINE: ABNORMAL
NITRITE URINE: NEGATIVE
PH URINE: 7
PROTEIN URINE: ABNORMAL
SPECIFIC GRAVITY URINE: 1.02
UROBILINOGEN URINE: ABNORMAL

## 2019-09-06 ENCOUNTER — OUTPATIENT (OUTPATIENT)
Dept: OUTPATIENT SERVICES | Facility: HOSPITAL | Age: 24
LOS: 1 days | Discharge: HOME | End: 2019-09-06

## 2019-09-06 ENCOUNTER — LABORATORY RESULT (OUTPATIENT)
Age: 24
End: 2019-09-06

## 2019-09-06 ENCOUNTER — APPOINTMENT (OUTPATIENT)
Dept: ANTEPARTUM | Facility: CLINIC | Age: 24
End: 2019-09-06
Payer: MEDICAID

## 2019-09-06 ENCOUNTER — NON-APPOINTMENT (OUTPATIENT)
Age: 24
End: 2019-09-06

## 2019-09-06 VITALS
WEIGHT: 144 LBS | HEART RATE: 98 BPM | SYSTOLIC BLOOD PRESSURE: 123 MMHG | TEMPERATURE: 98.3 F | DIASTOLIC BLOOD PRESSURE: 60 MMHG | OXYGEN SATURATION: 97 %

## 2019-09-06 DIAGNOSIS — Z86.19 PERSONAL HISTORY OF OTHER INFECTIOUS AND PARASITIC DISEASES: ICD-10-CM

## 2019-09-06 DIAGNOSIS — H92.03 OTALGIA, BILATERAL: ICD-10-CM

## 2019-09-06 DIAGNOSIS — K08.89 OTHER SPECIFIED DISORDERS OF TEETH AND SUPPORTING STRUCTURES: ICD-10-CM

## 2019-09-06 LAB
OB COMMENTS: NORMAL
SCHEDULED VISIT: YES
URINE ALBUMIN/PROTEIN: NORMAL
URINE GLUCOSE: NEGATIVE
URINE KETONES: NEGATIVE
WEEKS GESTATION: 21.1

## 2019-09-06 PROCEDURE — 99214 OFFICE O/P EST MOD 30 MIN: CPT | Mod: 25

## 2019-09-06 PROCEDURE — ZZZZZ: CPT

## 2019-09-06 PROCEDURE — 76805 OB US >/= 14 WKS SNGL FETUS: CPT | Mod: 26

## 2019-09-06 RX ORDER — SIMETHICONE 80 MG/1
80 TABLET, CHEWABLE ORAL
Qty: 60 | Refills: 0 | Status: ACTIVE | COMMUNITY
Start: 2019-09-06 | End: 1900-01-01

## 2019-09-06 RX ORDER — DOCUSATE SODIUM 100 MG/1
100 CAPSULE ORAL TWICE DAILY
Qty: 60 | Refills: 2 | Status: ACTIVE | COMMUNITY
Start: 2019-09-06 | End: 1900-01-01

## 2019-09-06 RX ORDER — HYDROCORTISONE 0.5 G/100G
0.5 CREAM TOPICAL TWICE DAILY
Qty: 1 | Refills: 2 | Status: ACTIVE | COMMUNITY
Start: 2019-09-06 | End: 1900-01-01

## 2019-09-11 ENCOUNTER — RESULT REVIEW (OUTPATIENT)
Age: 24
End: 2019-09-11

## 2019-09-11 LAB
ABO + RH PNL BLD: NORMAL
ALBUMIN SERPL ELPH-MCNC: 3.4 G/DL
ALP BLD-CCNC: 612 U/L
ALT SERPL-CCNC: 263 U/L
ANION GAP SERPL CALC-SCNC: 15 MMOL/L
AST SERPL-CCNC: 262 U/L
BACTERIA UR CULT: NORMAL
BASOPHILS # BLD AUTO: 0 K/UL
BASOPHILS NFR BLD AUTO: 0 %
BILIRUB SERPL-MCNC: 1.2 MG/DL
BILIRUB UR QL STRIP: NEGATIVE
BLD GP AB SCN SERPL QL: NORMAL
BUN SERPL-MCNC: 11 MG/DL
CALCIUM SERPL-MCNC: 8.8 MG/DL
CHLORIDE SERPL-SCNC: 96 MMOL/L
CLARITY UR: CLEAR
CO2 SERPL-SCNC: 22 MMOL/L
COLLECTION METHOD: NORMAL
CREAT SERPL-MCNC: 0.6 MG/DL
EOSINOPHIL # BLD AUTO: 0.01 K/UL
EOSINOPHIL NFR BLD AUTO: 0.3 %
GLUCOSE SERPL-MCNC: 110 MG/DL
GLUCOSE UR-MCNC: NEGATIVE
HBV SURFACE AB SER QL: NONREACTIVE
HBV SURFACE AG SER QL: NONREACTIVE
HCG UR QL: 0.2 EU/DL
HCT VFR BLD CALC: 28.6 %
HCV AB SER QL: ABNORMAL
HCV S/CO RATIO: 2.65 S/CO
HGB A MFR BLD: 97.1 %
HGB A2 MFR BLD: 2.9 %
HGB BLD-MCNC: 8.9 G/DL
HGB FRACT BLD-IMP: NORMAL
HGB UR QL STRIP.AUTO: NEGATIVE
HIV1+2 AB SPEC QL IA.RAPID: NONREACTIVE
IMM GRANULOCYTES NFR BLD AUTO: 0.6 %
KETONES UR-MCNC: NEGATIVE
LEAD BLD-MCNC: <1 UG/DL
LEUKOCYTE ESTERASE UR QL STRIP: NEGATIVE
LYMPHOCYTES # BLD AUTO: 0.84 K/UL
LYMPHOCYTES NFR BLD AUTO: 26.5 %
MAN DIFF?: NORMAL
MCHC RBC-ENTMCNC: 25.8 PG
MCHC RBC-ENTMCNC: 31.1 G/DL
MCV RBC AUTO: 82.9 FL
MEV IGG FLD QL IA: >300 AU/ML
MEV IGG+IGM SER-IMP: POSITIVE
MONOCYTES # BLD AUTO: 0.16 K/UL
MONOCYTES NFR BLD AUTO: 5 %
NEUTROPHILS # BLD AUTO: 2.14 K/UL
NEUTROPHILS NFR BLD AUTO: 67.6 %
NITRITE UR QL STRIP: NEGATIVE
PH UR STRIP: 6.5
PLATELET # BLD AUTO: 142 K/UL
POTASSIUM SERPL-SCNC: 3.8 MMOL/L
PROT SERPL-MCNC: 7.3 G/DL
PROT UR STRIP-MCNC: NORMAL
RBC # BLD: 3.45 M/UL
RBC # FLD: 18.2 %
RUBV IGG FLD-ACNC: 2.3 INDEX
RUBV IGG SER-IMP: POSITIVE
SODIUM SERPL-SCNC: 133 MMOL/L
SP GR UR STRIP: 1025
T PALLIDUM AB SER QL IA: NEGATIVE
VZV AB TITR SER: NORMAL
VZV IGG SER IF-ACNC: 137.2 INDEX
WBC # FLD AUTO: 3.17 K/UL

## 2019-09-17 ENCOUNTER — APPOINTMENT (OUTPATIENT)
Dept: ANTEPARTUM | Facility: CLINIC | Age: 24
End: 2019-09-17

## 2019-09-18 DIAGNOSIS — L29.9 PRURITUS, UNSPECIFIED: ICD-10-CM

## 2019-09-18 DIAGNOSIS — O99.342 OTHER MENTAL DISORDERS COMPLICATING PREGNANCY, SECOND TRIMESTER: ICD-10-CM

## 2019-09-18 DIAGNOSIS — Z36.89 ENCOUNTER FOR OTHER SPECIFIED ANTENATAL SCREENING: ICD-10-CM

## 2019-09-18 DIAGNOSIS — O99.323 DRUG USE COMPLICATING PREGNANCY, THIRD TRIMESTER: ICD-10-CM

## 2019-09-18 DIAGNOSIS — H92.03 OTALGIA, BILATERAL: ICD-10-CM

## 2019-09-18 DIAGNOSIS — F31.9 BIPOLAR DISORDER, UNSPECIFIED: ICD-10-CM

## 2019-09-18 DIAGNOSIS — O09.90 SUPERVISION OF HIGH RISK PREGNANCY, UNSPECIFIED, UNSPECIFIED TRIMESTER: ICD-10-CM

## 2019-09-18 DIAGNOSIS — O98.319 OTHER INFECTIONS WITH A PREDOMINANTLY SEXUAL MODE OF TRANSMISSION COMPLICATING PREGNANCY, UNSPECIFIED TRIMESTER: ICD-10-CM

## 2019-09-18 DIAGNOSIS — Z13.89 ENCOUNTER FOR SCREENING FOR OTHER DISORDER: ICD-10-CM

## 2019-09-18 DIAGNOSIS — Z3A.21 21 WEEKS GESTATION OF PREGNANCY: ICD-10-CM

## 2019-09-18 LAB
AR GENE MUT ANL BLD/T: NEGATIVE
CFTR MUT TESTED BLD/T: NEGATIVE

## 2019-10-01 ENCOUNTER — NON-APPOINTMENT (OUTPATIENT)
Age: 24
End: 2019-10-01

## 2019-10-01 ENCOUNTER — RESULT CHARGE (OUTPATIENT)
Age: 24
End: 2019-10-01

## 2019-10-01 ENCOUNTER — APPOINTMENT (OUTPATIENT)
Dept: ANTEPARTUM | Facility: CLINIC | Age: 24
End: 2019-10-01
Payer: MEDICAID

## 2019-10-01 ENCOUNTER — ASOB RESULT (OUTPATIENT)
Age: 24
End: 2019-10-01

## 2019-10-01 ENCOUNTER — OUTPATIENT (OUTPATIENT)
Dept: OUTPATIENT SERVICES | Facility: HOSPITAL | Age: 24
LOS: 1 days | Discharge: HOME | End: 2019-10-01

## 2019-10-01 VITALS
BODY MASS INDEX: 23.95 KG/M2 | HEART RATE: 98 BPM | HEIGHT: 66 IN | WEIGHT: 149 LBS | SYSTOLIC BLOOD PRESSURE: 117 MMHG | OXYGEN SATURATION: 99 % | DIASTOLIC BLOOD PRESSURE: 57 MMHG | TEMPERATURE: 98 F

## 2019-10-01 DIAGNOSIS — B19.20 UNSPECIFIED VIRAL HEPATITIS C W/OUT HEPATIC COMA: ICD-10-CM

## 2019-10-01 PROCEDURE — ZZZZZ: CPT

## 2019-10-01 PROCEDURE — 99214 OFFICE O/P EST MOD 30 MIN: CPT | Mod: 25

## 2019-10-01 PROCEDURE — 76815 OB US LIMITED FETUS(S): CPT | Mod: 26

## 2019-10-01 RX ORDER — CHLORHEXIDINE GLUCONATE 4 %
325 (65 FE) LIQUID (ML) TOPICAL 3 TIMES DAILY
Qty: 90 | Refills: 2 | Status: ACTIVE | COMMUNITY
Start: 2019-10-01 | End: 1900-01-01

## 2019-10-03 DIAGNOSIS — O99.342 OTHER MENTAL DISORDERS COMPLICATING PREGNANCY, SECOND TRIMESTER: ICD-10-CM

## 2019-10-03 DIAGNOSIS — O99.322 DRUG USE COMPLICATING PREGNANCY, SECOND TRIMESTER: ICD-10-CM

## 2019-10-03 DIAGNOSIS — F11.10 OPIOID ABUSE, UNCOMPLICATED: ICD-10-CM

## 2019-10-03 DIAGNOSIS — O98.319 OTHER INFECTIONS WITH A PREDOMINANTLY SEXUAL MODE OF TRANSMISSION COMPLICATING PREGNANCY, UNSPECIFIED TRIMESTER: ICD-10-CM

## 2019-10-03 DIAGNOSIS — Z3A.24 24 WEEKS GESTATION OF PREGNANCY: ICD-10-CM

## 2019-10-03 DIAGNOSIS — O28.1 ABNORMAL BIOCHEMICAL FINDING ON ANTENATAL SCREENING OF MOTHER: ICD-10-CM

## 2019-10-03 DIAGNOSIS — Z36.84 ENCOUNTER FOR ANTENATAL SCREENING FOR FETAL LUNG MATURITY: ICD-10-CM

## 2019-10-03 DIAGNOSIS — O09.90 SUPERVISION OF HIGH RISK PREGNANCY, UNSPECIFIED, UNSPECIFIED TRIMESTER: ICD-10-CM

## 2019-10-03 DIAGNOSIS — O35.8XX0 MATERNAL CARE FOR OTHER (SUSPECTED) FETAL ABNORMALITY AND DAMAGE, NOT APPLICABLE OR UNSPECIFIED: ICD-10-CM

## 2019-10-04 LAB
BILIRUB UR QL STRIP: NORMAL
CLARITY UR: CLEAR
COLLECTION METHOD: NORMAL
GLUCOSE UR-MCNC: NORMAL
HCG UR QL: 1 EU/DL
HGB UR QL STRIP.AUTO: NORMAL
KETONES UR-MCNC: NORMAL
LEUKOCYTE ESTERASE UR QL STRIP: NORMAL
NITRITE UR QL STRIP: NORMAL
PH UR STRIP: 6.5
PROT UR STRIP-MCNC: NORMAL
SP GR UR STRIP: 1.01

## 2019-10-29 ENCOUNTER — APPOINTMENT (OUTPATIENT)
Dept: ANTEPARTUM | Facility: CLINIC | Age: 24
End: 2019-10-29
Payer: MEDICAID

## 2019-10-29 ENCOUNTER — RESULT CHARGE (OUTPATIENT)
Age: 24
End: 2019-10-29

## 2019-10-29 ENCOUNTER — ASOB RESULT (OUTPATIENT)
Age: 24
End: 2019-10-29

## 2019-10-29 ENCOUNTER — NON-APPOINTMENT (OUTPATIENT)
Age: 24
End: 2019-10-29

## 2019-10-29 ENCOUNTER — OUTPATIENT (OUTPATIENT)
Dept: OUTPATIENT SERVICES | Facility: HOSPITAL | Age: 24
LOS: 1 days | Discharge: HOME | End: 2019-10-29

## 2019-10-29 VITALS
HEART RATE: 104 BPM | SYSTOLIC BLOOD PRESSURE: 134 MMHG | WEIGHT: 158.9 LBS | RESPIRATION RATE: 20 BRPM | DIASTOLIC BLOOD PRESSURE: 60 MMHG | OXYGEN SATURATION: 99 % | TEMPERATURE: 97.8 F | BODY MASS INDEX: 25.65 KG/M2

## 2019-10-29 DIAGNOSIS — O98.419 VIRAL HEPATITIS COMPLICATING PREGNANCY, UNSPECIFIED TRIMESTER: ICD-10-CM

## 2019-10-29 DIAGNOSIS — Z3A.28 28 WEEKS GESTATION OF PREGNANCY: ICD-10-CM

## 2019-10-29 DIAGNOSIS — O99.343 OTHER MENTAL DISORDERS COMPLICATING PREGNANCY, THIRD TRIMESTER: ICD-10-CM

## 2019-10-29 DIAGNOSIS — O99.323 DRUG USE COMPLICATING PREGNANCY, THIRD TRIMESTER: ICD-10-CM

## 2019-10-29 LAB
BILIRUB UR QL STRIP: NEGATIVE
BP DIAS: 60 MM HG
BP SYS: 134 MM HG
CLARITY UR: CLEAR
COLLECTION METHOD: NORMAL
FETAL MOVEMENT: PRESENT
GLUCOSE UR-MCNC: NEGATIVE
HCG UR QL: 1 EU/DL
HGB UR QL STRIP.AUTO: NEGATIVE
KETONES UR-MCNC: NEGATIVE
LEUKOCYTE ESTERASE UR QL STRIP: NORMAL
NITRITE UR QL STRIP: NEGATIVE
OB COMMENTS: NORMAL
PH UR STRIP: 6.5
PROT UR STRIP-MCNC: NORMAL
SCHEDULED VISIT: YES
SP GR UR STRIP: 1.01
URINE ALBUMIN/PROTEIN: 1
URINE GLUCOSE: NEGATIVE
URINE KETONES: NEGATIVE
WEEKS GESTATION: 28.5

## 2019-10-29 PROCEDURE — 76816 OB US FOLLOW-UP PER FETUS: CPT | Mod: 26

## 2019-10-29 PROCEDURE — ZZZZZ: CPT

## 2019-10-29 PROCEDURE — 99214 OFFICE O/P EST MOD 30 MIN: CPT | Mod: 25

## 2019-11-05 ENCOUNTER — APPOINTMENT (OUTPATIENT)
Dept: ANTEPARTUM | Facility: CLINIC | Age: 24
End: 2019-11-05

## 2019-11-05 ENCOUNTER — ASOB RESULT (OUTPATIENT)
Age: 24
End: 2019-11-05

## 2019-11-06 ENCOUNTER — OUTPATIENT (OUTPATIENT)
Dept: OUTPATIENT SERVICES | Facility: HOSPITAL | Age: 24
LOS: 1 days | Discharge: HOME | End: 2019-11-06

## 2019-11-06 ENCOUNTER — NON-APPOINTMENT (OUTPATIENT)
Age: 24
End: 2019-11-06

## 2019-11-06 ENCOUNTER — APPOINTMENT (OUTPATIENT)
Dept: ANTEPARTUM | Facility: CLINIC | Age: 24
End: 2019-11-06
Payer: MEDICAID

## 2019-11-06 VITALS
DIASTOLIC BLOOD PRESSURE: 61 MMHG | HEART RATE: 99 BPM | WEIGHT: 160 LBS | OXYGEN SATURATION: 97 % | BODY MASS INDEX: 25.82 KG/M2 | TEMPERATURE: 98 F | SYSTOLIC BLOOD PRESSURE: 121 MMHG

## 2019-11-06 DIAGNOSIS — F31.9 BIPOLAR DISORDER, UNSPECIFIED: ICD-10-CM

## 2019-11-06 DIAGNOSIS — F11.10 OPIOID ABUSE, UNCOMPLICATED: ICD-10-CM

## 2019-11-06 DIAGNOSIS — L29.9 PRURITUS, UNSPECIFIED: ICD-10-CM

## 2019-11-06 LAB
BILIRUB UR QL STRIP: NEGATIVE
CLARITY UR: CLEAR
COLLECTION METHOD: NORMAL
FETAL HEART DESCRIPTION: NORMAL
FETAL HEART RATE (BPM): 138
FETAL MOVEMENT: PRESENT
GLUCOSE UR-MCNC: NEGATIVE
HCG UR QL: 0.2 EU/DL
HGB UR QL STRIP.AUTO: NEGATIVE
KETONES UR-MCNC: NEGATIVE
LEUKOCYTE ESTERASE UR QL STRIP: NEGATIVE
NITRITE UR QL STRIP: NEGATIVE
PH UR STRIP: 6
PROT UR STRIP-MCNC: NORMAL
SCHEDULED VISIT: YES
SP GR UR STRIP: 1.01
URINE ALBUMIN/PROTEIN: NORMAL
URINE GLUCOSE: NEGATIVE
URINE KETONES: NEGATIVE
WEEKS GESTATION: 29.6

## 2019-11-06 PROCEDURE — 99214 OFFICE O/P EST MOD 30 MIN: CPT

## 2019-11-07 ENCOUNTER — EMERGENCY (EMERGENCY)
Facility: HOSPITAL | Age: 24
LOS: 0 days | Discharge: HOME | End: 2019-11-07
Attending: EMERGENCY MEDICINE | Admitting: EMERGENCY MEDICINE
Payer: MEDICAID

## 2019-11-07 VITALS
HEART RATE: 98 BPM | OXYGEN SATURATION: 100 % | TEMPERATURE: 97 F | RESPIRATION RATE: 16 BRPM | DIASTOLIC BLOOD PRESSURE: 53 MMHG | SYSTOLIC BLOOD PRESSURE: 113 MMHG

## 2019-11-07 VITALS
OXYGEN SATURATION: 99 % | HEART RATE: 98 BPM | TEMPERATURE: 98 F | DIASTOLIC BLOOD PRESSURE: 62 MMHG | SYSTOLIC BLOOD PRESSURE: 128 MMHG | RESPIRATION RATE: 18 BRPM

## 2019-11-07 DIAGNOSIS — F31.9 BIPOLAR DISORDER, UNSPECIFIED: ICD-10-CM

## 2019-11-07 DIAGNOSIS — Z88.5 ALLERGY STATUS TO NARCOTIC AGENT: ICD-10-CM

## 2019-11-07 DIAGNOSIS — Z3A.29 29 WEEKS GESTATION OF PREGNANCY: ICD-10-CM

## 2019-11-07 DIAGNOSIS — O99.322 DRUG USE COMPLICATING PREGNANCY, SECOND TRIMESTER: ICD-10-CM

## 2019-11-07 DIAGNOSIS — A59.01 TRICHOMONAL VULVOVAGINITIS: ICD-10-CM

## 2019-11-07 DIAGNOSIS — O23.592 INFECTION OF OTHER PART OF GENITAL TRACT IN PREGNANCY, SECOND TRIMESTER: ICD-10-CM

## 2019-11-07 DIAGNOSIS — O98.412 VIRAL HEPATITIS COMPLICATING PREGNANCY, SECOND TRIMESTER: ICD-10-CM

## 2019-11-07 DIAGNOSIS — O28.8 OTHER ABNORMAL FINDINGS ON ANTENATAL SCREENING OF MOTHER: ICD-10-CM

## 2019-11-07 DIAGNOSIS — O28.1 ABNORMAL BIOCHEMICAL FINDING ON ANTENATAL SCREENING OF MOTHER: ICD-10-CM

## 2019-11-07 DIAGNOSIS — O12.02 GESTATIONAL EDEMA, SECOND TRIMESTER: ICD-10-CM

## 2019-11-07 DIAGNOSIS — F11.10 OPIOID ABUSE, UNCOMPLICATED: ICD-10-CM

## 2019-11-07 DIAGNOSIS — O99.012 ANEMIA COMPLICATING PREGNANCY, SECOND TRIMESTER: ICD-10-CM

## 2019-11-07 DIAGNOSIS — O99.342 OTHER MENTAL DISORDERS COMPLICATING PREGNANCY, SECOND TRIMESTER: ICD-10-CM

## 2019-11-07 DIAGNOSIS — O99.019 ANEMIA COMPLICATING PREGNANCY, UNSPECIFIED TRIMESTER: ICD-10-CM

## 2019-11-07 DIAGNOSIS — M79.89 OTHER SPECIFIED SOFT TISSUE DISORDERS: ICD-10-CM

## 2019-11-07 DIAGNOSIS — L29.9 PRURITUS, UNSPECIFIED: ICD-10-CM

## 2019-11-07 DIAGNOSIS — O09.90 SUPERVISION OF HIGH RISK PREGNANCY, UNSPECIFIED, UNSPECIFIED TRIMESTER: ICD-10-CM

## 2019-11-07 LAB
ALBUMIN SERPL ELPH-MCNC: 2.6 G/DL — LOW (ref 3.5–5.2)
ALP SERPL-CCNC: 671 U/L — HIGH (ref 30–115)
ALT FLD-CCNC: 66 U/L — HIGH (ref 0–41)
ANION GAP SERPL CALC-SCNC: 15 MMOL/L — HIGH (ref 7–14)
APPEARANCE UR: ABNORMAL
APTT BLD: 46 SEC — HIGH (ref 27–39.2)
AST SERPL-CCNC: 72 U/L — HIGH (ref 0–41)
BACTERIA # UR AUTO: ABNORMAL
BASOPHILS # BLD AUTO: 0.01 K/UL — SIGNIFICANT CHANGE UP (ref 0–0.2)
BASOPHILS NFR BLD AUTO: 0.3 % — SIGNIFICANT CHANGE UP (ref 0–1)
BILIRUB SERPL-MCNC: 1.4 MG/DL — HIGH (ref 0.2–1.2)
BILIRUB UR-MCNC: ABNORMAL
BUN SERPL-MCNC: 11 MG/DL — SIGNIFICANT CHANGE UP (ref 10–20)
CALCIUM SERPL-MCNC: 8.4 MG/DL — LOW (ref 8.5–10.1)
CHLORIDE SERPL-SCNC: 102 MMOL/L — SIGNIFICANT CHANGE UP (ref 98–110)
CO2 SERPL-SCNC: 19 MMOL/L — SIGNIFICANT CHANGE UP (ref 17–32)
COLOR SPEC: YELLOW — SIGNIFICANT CHANGE UP
COMMENT - URINE: SIGNIFICANT CHANGE UP
CREAT SERPL-MCNC: 0.7 MG/DL — SIGNIFICANT CHANGE UP (ref 0.7–1.5)
D DIMER BLD IA.RAPID-MCNC: 524 NG/ML DDU — HIGH (ref 0–230)
DIFF PNL FLD: NEGATIVE — SIGNIFICANT CHANGE UP
EOSINOPHIL # BLD AUTO: 0.01 K/UL — SIGNIFICANT CHANGE UP (ref 0–0.7)
EOSINOPHIL NFR BLD AUTO: 0.3 % — SIGNIFICANT CHANGE UP (ref 0–8)
EPI CELLS # UR: 6 /HPF — HIGH (ref 0–5)
GLUCOSE SERPL-MCNC: 98 MG/DL — SIGNIFICANT CHANGE UP (ref 70–99)
GLUCOSE UR QL: NEGATIVE — SIGNIFICANT CHANGE UP
HCG SERPL-ACNC: HIGH MIU/ML
HCT VFR BLD CALC: 29.7 % — LOW (ref 37–47)
HGB BLD-MCNC: 9.1 G/DL — LOW (ref 12–16)
HYALINE CASTS # UR AUTO: 1 /LPF — SIGNIFICANT CHANGE UP (ref 0–7)
IMM GRANULOCYTES NFR BLD AUTO: 0.9 % — HIGH (ref 0.1–0.3)
INR BLD: 0.92 RATIO — SIGNIFICANT CHANGE UP (ref 0.65–1.3)
KETONES UR-MCNC: NEGATIVE — SIGNIFICANT CHANGE UP
LEUKOCYTE ESTERASE UR-ACNC: ABNORMAL
LYMPHOCYTES # BLD AUTO: 0.92 K/UL — LOW (ref 1.2–3.4)
LYMPHOCYTES # BLD AUTO: 27.2 % — SIGNIFICANT CHANGE UP (ref 20.5–51.1)
MCHC RBC-ENTMCNC: 27.6 PG — SIGNIFICANT CHANGE UP (ref 27–31)
MCHC RBC-ENTMCNC: 30.6 G/DL — LOW (ref 32–37)
MCV RBC AUTO: 90 FL — SIGNIFICANT CHANGE UP (ref 81–99)
MONOCYTES # BLD AUTO: 0.2 K/UL — SIGNIFICANT CHANGE UP (ref 0.1–0.6)
MONOCYTES NFR BLD AUTO: 5.9 % — SIGNIFICANT CHANGE UP (ref 1.7–9.3)
NEUTROPHILS # BLD AUTO: 2.21 K/UL — SIGNIFICANT CHANGE UP (ref 1.4–6.5)
NEUTROPHILS NFR BLD AUTO: 65.4 % — SIGNIFICANT CHANGE UP (ref 42.2–75.2)
NITRITE UR-MCNC: NEGATIVE — SIGNIFICANT CHANGE UP
NRBC # BLD: 0 /100 WBCS — SIGNIFICANT CHANGE UP (ref 0–0)
PH UR: 7 — SIGNIFICANT CHANGE UP (ref 5–8)
PLATELET # BLD AUTO: 144 K/UL — SIGNIFICANT CHANGE UP (ref 130–400)
POTASSIUM SERPL-MCNC: 4 MMOL/L — SIGNIFICANT CHANGE UP (ref 3.5–5)
POTASSIUM SERPL-SCNC: 4 MMOL/L — SIGNIFICANT CHANGE UP (ref 3.5–5)
PROT SERPL-MCNC: 6.3 G/DL — SIGNIFICANT CHANGE UP (ref 6–8)
PROT UR-MCNC: ABNORMAL
PROTHROM AB SERPL-ACNC: 10.6 SEC — SIGNIFICANT CHANGE UP (ref 9.95–12.87)
RBC # BLD: 3.3 M/UL — LOW (ref 4.2–5.4)
RBC # FLD: 16.9 % — HIGH (ref 11.5–14.5)
RBC CASTS # UR COMP ASSIST: 6 /HPF — HIGH (ref 0–4)
SODIUM SERPL-SCNC: 136 MMOL/L — SIGNIFICANT CHANGE UP (ref 135–146)
SP GR SPEC: 1.02 — SIGNIFICANT CHANGE UP (ref 1.01–1.02)
UROBILINOGEN FLD QL: ABNORMAL
WBC # BLD: 3.38 K/UL — LOW (ref 4.8–10.8)
WBC # FLD AUTO: 3.38 K/UL — LOW (ref 4.8–10.8)
WBC UR QL: 6 /HPF — HIGH (ref 0–5)

## 2019-11-07 PROCEDURE — 76705 ECHO EXAM OF ABDOMEN: CPT | Mod: 26

## 2019-11-07 PROCEDURE — 99242 OFF/OP CONSLTJ NEW/EST SF 20: CPT

## 2019-11-07 PROCEDURE — 93970 EXTREMITY STUDY: CPT | Mod: 26

## 2019-11-07 PROCEDURE — 99284 EMERGENCY DEPT VISIT MOD MDM: CPT

## 2019-11-07 NOTE — ED ADULT NURSE NOTE - OBJECTIVE STATEMENT
swelling and redness to bilat LE x 3 weeks, pt 29 weeks pregnant. denies any fever, chills, urticaria. sent by OB GYN for duplex

## 2019-11-07 NOTE — ED PROVIDER NOTE - PROVIDER TOKENS
PROVIDER:[TOKEN:[[id number]],FOLLOWUP:[1-3 Days]] PROVIDER:[TOKEN:[[id number]],FOLLOWUP:[1-3 Days]],PROVIDER:[TOKEN:[[id number]],FOLLOWUP:[1-3 Days]]

## 2019-11-07 NOTE — ED PROVIDER NOTE - PROGRESS NOTE DETAILS
OBGYN consulted for pregnant female with edema, proteinuria, anemia, abnormal LFTs OBGYN consulted for pregnant female with edema, as per consult, pt with known findings, has hep c with elevated enzymes as outpt, anemia on iron, rec outpt fu for further management. OBGYN consulted for pregnant female with edema, as per consult, pt with known findings, has hep c with elevated enzymes as outpt, anemia on iron, rec outpt fu for further management. H and PE not c/w cholecystitis, findings on US likely secondary to pt's hepatobiliary disease. Prelim duplex neg. Pts partner encouraged to be tested / treated for trich. OBGYN consulted for pregnant female with edema, as per consult, pt with known findings, has hep c with elevated enzymes as outpt, anemia on iron, rec outpt fu for further management. H and PE not c/w cholecystitis, findings on US likely secondary to pt's hepatobiliary disease. Prelim duplex neg. Pts encouraged to notify her sexual partner and suggest he be tested / treated for trich. PT states that she does not want to wait for OB/GYN to give final recommendations, so strict return precautions given and attempt to sign out AMA was made, however patient left before signing papers. OBGYN consulted for pregnant female with edema, as per consult, pt with known findings, has hep c with elevated enzymes as outpt, anemia on iron, likely rec outpt fu for further management, will d/w OB att physician. H and PE not c/w cholecystitis, findings on US likely secondary to pt's hepatobiliary disease. Prelim duplex neg. Pts encouraged to notify her sexual partner and suggest he be tested / treated for trich. Prior to completion of evaluation pt requested to leave, stating needs to go home and has been in ED for extended period. Patient AO x 3, clear speech and steady gait, is clinically sober, free of distracting injury, appears to have intact reason, insight and judgment. Therefore, in my opinion has medical decision making capacity. The patient was advised that they require further evaluation and management. They were advised of the risks of leaving AMA. The patient was advised that they or their fetus could become critically ill, which could lead to complications including, but not limited to, death and disability for both. The patient indicates they understand these risks and signed AMA. The pt was also encouraged to return to the emergency department at any time or see their doctor as soon as possible. The patient was given discharge instructions, offered the opportunity to ask questions and verbalized that they understand the discharge instructions.

## 2019-11-07 NOTE — ED PROVIDER NOTE - CARE PLAN
Principal Discharge DX:	Lower extremity edema  Secondary Diagnosis:	Proteinuria  Secondary Diagnosis:	Anemia  Secondary Diagnosis:	Abnormal liver function test  Secondary Diagnosis:	Trichomonal vaginitis Principal Discharge DX:	Lower extremity edema  Secondary Diagnosis:	Anemia  Secondary Diagnosis:	Abnormal liver function test  Secondary Diagnosis:	Trichomonal vaginitis Principal Discharge DX:	Lower extremity edema  Secondary Diagnosis:	Anemia  Secondary Diagnosis:	Abnormal liver function test  Secondary Diagnosis:	Trichomonal vaginitis  Secondary Diagnosis:	Hepatitis C

## 2019-11-07 NOTE — ED PROVIDER NOTE - PHYSICAL EXAMINATION
CONSTITUTIONAL: Well-developed; well-nourished; in no acute distress.   SKIN: warm, dry  HEAD: Normocephalic; atraumatic.  EYES: PERRL, EOMI, no conjunctival erythema  ENT: No nasal discharge; airway clear.  NECK: Supple; non tender.  CARD: S1, S2 normal; no murmurs, gallops, or rubs. Regular rate and rhythm.   RESP: No wheezes, rales or rhonchi.  ABD: soft ntnd, no peritoneal signs, no CVA tenderness.  EXT: Normal ROM.  No clubbing, cyanosis or edema.   LYMPH: No acute cervical adenopathy.  NEURO: Alert, oriented, grossly unremarkable  PSYCH: Cooperative, appropriate.

## 2019-11-07 NOTE — ED PROVIDER NOTE - PATIENT PORTAL LINK FT
You can access the FollowMyHealth Patient Portal offered by [facility] by registering at the following website: [email or web address]. By joining [facility]’s FollowMyHealth portal, you will also be able to view your health information using other applications (apps) compatible with our system.

## 2019-11-07 NOTE — ED PROVIDER NOTE - ATTENDING CONTRIBUTION TO CARE
24 y/o female h/o asthma, anxiety, depression, hep c, methadone maintenance, denies sig PSH, approx 29 weeks by dates, followed by OBGYN Dr Childers, now presents with b/l LE edema x 2-3 weeks. Sx are constant, denies modifying factors, denies h/a, SOB, CP or other associated complaints ta present.    Old chart reviewed.  I have reviewed and agree with the nursing note, except as documented in my note.    VSS, awake, alert, non-toxic appearing, lying comfortably in stretcher, in NAD, ears clear, oropharynx clear, mmm, no JVD or bruit, no skin rash or lesions, chest CTAB, non-labored breathing, no w/r/r, +S1/S2, RRR, no m/r/g, abdomen soft, NT, ND, +BS, + B/l Le pitting peripheral edema, alert, clear speech and steady gait.

## 2019-11-07 NOTE — ED PROVIDER NOTE - CARE PROVIDERS DIRECT ADDRESSES
[email or web address]

## 2019-11-07 NOTE — CONSULT NOTE ADULT - ASSESSMENT
-f/u US Abdomen and LE Duplex 24 yo  at 30w GA by 1st trimester sonogram, w/ h/o polysubstance abuse on methadone, asthma, hepatitis C (on 2019 AST//263), bipolar disorder, here for worsening bilateral LE swelling and redness for the past 3 weeks, w/ wheezing on exam, w/ trichomonas on UA,     -f/u US Abdomen and LE Duplex  -Metronidazole 2 gram once for Trichomonas treatment    Dr. Pardo and Dr. Ponce to be made aware. 22 yo  at 30w GA by 1st trimester sonogram, w/ h/o polysubstance abuse on methadone, asthma, hepatitis C (on 2019 AST//263), bipolar disorder, here for worsening bilateral LE swelling and redness for the past 3 weeks, w/ wheezing on exam, w/ trichomonas on UA,     -f/u LE Duplex read  -Metronidazole 2 gram once for Trichomonas treatment  -Needs GCT to be done    Dr. Vargas and Dr. Ponce to be made aware. 22 yo  at 30w GA by 1st trimester sonogram, w/ h/o polysubstance abuse on methadone, asthma, hepatitis C (on 2019 AST//263), bipolar disorder, here for worsening bilateral LE swelling and redness for the past 3 weeks, w/ wheezing on exam, w/ trichomonas on UA,     -f/u LE Duplex read - prelim verbal neg  -Metronidazole 2 gram once for Trichomonas treatment    Dr. Vargas and Dr. Ponce to be made aware.    ADDENDUM:    Incomplete evaluation due to pt eloping, pt left before pelvic exam.     Dr. Vargas and Dr. Ponce aware.

## 2019-11-07 NOTE — CONSULT NOTE ADULT - SUBJECTIVE AND OBJECTIVE BOX
POOJA DIANE   23y   Female   [id number]    CHIEF COMPLAINT: R/o HELLP Syndrome    HPI: 24 yo  at 30w GA by 1st trimester sonogram, w/ h/o polysubstance abuse on methadone, hepatitis C, bipolar disorder, here for worsening bilateral LE swelling and redness for the past 3 weeks. Denies fever/chills, N/V, diarrhea, SOB, chest pain, palpitations, sore throat, cough, runny nose, dysuria, hematuria, frequency, urgency, sick contacts and recent travel. During pregnancy had trichomonas, treated with Metronidazole 2 gram on 7/10/2019, treatment also sent for partner. OTTO done on 19 neg. HGSIL on pap smear done at first prenatal visit, colpo appt missed. On 19 visit pt had 2+ protenuria on UA, then 3+ on 10/1/19 appt. 1:224 risk of Down Syndrome after Sequential II, pt refused further genetic testing.     Prenatal Hx:    Sequential I low risk, Seq II screen with 1:224 risk of DS    Sonos:   6/10/19 at 8w4d: dating done w/ this sono, single IUP, left ovary with cystic masses: [id number], [id number], 04g7y58oh.  7/10/19 at 12w6d: nuchal  19 at 21w1d: anatomy scan    MEDICATIONS (ED):    MEDICATIONS (HOME):    ALLERGIES:   Buprenorphine (Rash)  Suboxone (Rash)    PAST MEDICAL & SURGICAL HISTORY:  Anxiety and depression  Asthma  Hepatitis C  No significant past surgical history    OB/GYN HISTORY:    Gyn: denies history of abnormal pap, STI, ovarian cysts and uterine fibroids  Obstetric:  G  P     FAMILY HISTORY:  No pertinent family history in first degree relatives    SOCIAL HISTORY:   H/o polysubstance abuse on methadone, following with a clinic, last pregnancy dose at delivery 100 mg/day, last heroin use per pt 19,, admits to using heroin and cocaine for the past 10 years, counseled on the effects of these on the pregnancy at [facility]; h/o 1/2 PPD smoking prior to pregnancy, currently smokes 1 cigarette every 1-2 weeks; positive reinforcement given, encouraged to continue cutting down     REVIEW OF SYSTEMS:  Neg unless otherwise indicated in the HPI    Vital Signs Last 24 Hrs  T(C): 36.2 (2019 12:26), Max: 36.2 (2019 12:26)  T(F): 97.1 (2019 12:26), Max: 97.1 (2019 12:)  HR: 98 (2019 12:) (98 - 98)  BP: 113/53 (2019 12:26) (113/53 - 113/53)  RR: 16 (2019 12:) (16 - 16)  SpO2: 100% (2019 12:) (100% - 100%)    PHYSICAL EXAM:  Constitutional: AAOx3, NAD  Respiratory: CTAB  Cardiovascular: NL S1/S2  Gastrointestinal: Soft, nontender, nondistended, no rebound, guarding, or rigidity  Pelvic: Normal vulva, normal vagina, no bleeding, Cervix normal, closed, no bleeding, no abnormal discharge, Uterus anteverted, normal sized, no fundal tenderness, no adnexal masses or tenderness    LABS:                        9.1    3.38  )-----------( 144      ( 2019 13:50 )             29.7     HCG Quantitative, Serum: 34525.0 mIU/mL (19 @ 13:50)        136  |  102  |  11  ----------------------------<  98  4.0   |  19  |  0.7    Ca    8.4<L>      2019 13:50  TPro  6.3  /  Alb  2.6<L>  /  TBili  1.4<H>  /  DBili  x   /  AST  72<H>  /  ALT  66<H>  /  AlkPhos  671<H>    PT/INR - ( 2019 13:50 )   PT: 10.60 sec;   INR: 0.92 ratio    PTT - ( 2019 13:50 )  PTT:46.0 sec    Urinalysis Basic - ( 2019 13:50 )  Color: Yellow / Appearance: Slightly Turbid / S.019 / pH: x  Gluc: x / Ketone: Negative  / Bili: Small / Urobili: 6 mg/dL   Blood: x / Protein: 30 mg/dL / Nitrite: Negative   Leuk Esterase: Small / RBC: 6 /HPF / WBC 6 /HPF   Sq Epi: x / Non Sq Epi: 6 /HPF / Bacteria: Few    RADIOLOGY & ADDITIONAL STUDIES: POOJA DIANE   23y   Female   [id number]    CHIEF COMPLAINT: R/o HELLP Syndrome    HPI: 24 yo  at 30w GA by 1st trimester sonogram, w/ h/o polysubstance abuse on methadone, hepatitis C, bipolar disorder, here for worsening bilateral LE swelling and redness for the past 3 weeks. On 19, pt seen in C for nonscheduled meeting for the same complaint. Pt had bilateral 3+ pitting edema, erythematous but unevenly, non tender but stiff and no calf tenderness, Homen/Janene neg. She was sent to the ED for LE Duplex as she does not have any insurance. Denies fever/chills, N/V, diarrhea, SOB, chest pain, palpitations, sore throat, cough, runny nose, dysuria, hematuria, frequency, urgency, sick contacts and recent travel. During pregnancy had trichomonas, treated with Metronidazole 2 gram on 7/10/2019, treatment also sent for partner. OTTO done on 19 neg. HGSIL on pap smear done at first prenatal visit, colpo appt missed. On 19 visit pt had 2+ protenuria on UA, then 3+ on 10/1/19 appt. 1:224 risk of Down Syndrome after Sequential II, pt refused further genetic testing.     PRENATAL HX:    Sequential I low risk, Seq II screen with 1:224 risk of DS    2019:   A pos, no antibodies detected  HIV nonrective  Hep B surface antigen nonreactive  Rubella/Measles immune  Varicella equivocal   RPR neg  Hep C weakly reactive     Sonos:   6/10/19 at 8w4d: dating done w/ this sono, single IUP, left ovary with cystic masses: [id number], [id number], 25e3l67tm.  7/10/19 at 12w6d: nuchal  19 at 21w1d: anatomy scan    MEDICATIONS (ED):    MEDICATIONS (HOME):  Methadone 130 mg daily  Iron supplement    ALLERGIES:   Buprenorphine (Rash)  Suboxone (Rash)    PAST MEDICAL & SURGICAL HISTORY:  Anxiety and depression  Asthma  Hepatitis C  No significant past surgical history    OB/GYN HISTORY:    Gyn: h/o HGSIL w/ HR E6/E7mRNA detected during first prenatal visit, kept missing her colposcopy appt; h/o Trichomonas treated beginning of pregnancy; denies history of other STIs, ovarian cysts and uterine fibroids  Obstetric: ; SAB x3, 1 w/o D&C, rest w/ D&Cs (2011 x2, 2016); 38w ; 5-15lbs, 10/9/2017, baby in foster care, no complications per pt     FAMILY HISTORY:  Family history of malignant melanoma (mother)     SOCIAL HISTORY:   H/o polysubstance abuse on methadone, following with a clinic, last pregnancy dose at delivery 100 mg/day, last heroin use per pt 19,, admits to using heroin and cocaine for the past 10 years, counseled on the effects of these on the pregnancy at [facility]; h/o 1/2 PPD smoking prior to pregnancy, currently smokes 1 cigarette every 1-2 weeks; positive reinforcement given, encouraged to continue cutting down     REVIEW OF SYSTEMS:  Neg unless otherwise indicated in the HPI    Vital Signs Last 24 Hrs  T(C): 36.2 (2019 12:26), Max: 36.2 (2019 12:26)  T(F): 97.1 (2019 12:26), Max: 97.1 (2019 12:26)  HR: 98 (2019 12:26) (98 - 98)  BP: 113/53 (2019 12:26) (113/53 - 113/53)  RR: 16 (2019 12:26) (16 - 16)  SpO2: 100% (2019 12:26) (100% - 100%)    PHYSICAL EXAM:  Constitutional: AAOx3, NAD  Respiratory: CTAB  Cardiovascular: NL S1/S2  Gastrointestinal: Soft, nontender, nondistended, no rebound, guarding, or rigidity  Pelvic: Normal vulva, normal vagina, no bleeding, Cervix normal, closed, no bleeding, no abnormal discharge, Uterus anteverted, normal sized, no fundal tenderness, no adnexal masses or tenderness    LABS:                        9.1    3.38  )-----------( 144      ( 2019 13:50 )             29.7     HCG Quantitative, Serum: 40587.0 mIU/mL (19 @ 13:50)        136  |  102  |  11  ----------------------------<  98  4.0   |  19  |  0.7    Ca    8.4<L>      2019 13:50  TPro  6.3  /  Alb  2.6<L>  /  TBili  1.4<H>  /  DBili  x   /  AST  72<H>  /  ALT  66<H>  /  AlkPhos  671<H>  11-07  PT/INR - ( 2019 13:50 )   PT: 10.60 sec;   INR: 0.92 ratio    PTT - ( 2019 13:50 )  PTT:46.0 sec    Urinalysis Basic - ( 2019 13:50 )  Color: Yellow / Appearance: Slightly Turbid / S.019 / pH: x  Gluc: x / Ketone: Negative  / Bili: Small / Urobili: 6 mg/dL   Blood: x / Protein: 30 mg/dL / Nitrite: Negative   Leuk Esterase: Small / RBC: 6 /HPF / WBC 6 /HPF   Sq Epi: x / Non Sq Epi: 6 /HPF / Bacteria: Few    RADIOLOGY & ADDITIONAL STUDIES: POOJA DIANE   23y   Female   [id number]    CHIEF COMPLAINT: R/o HELLP Syndrome    HPI: 24 yo  at 30w GA by 1st trimester sonogram, w/ h/o polysubstance abuse on methadone, hepatitis C, bipolar disorder, here for worsening bilateral LE swelling and redness for the past 3 weeks. On 19, pt seen in C for nonscheduled meeting for the same complaint. Pt had bilateral 3+ pitting edema, erythematous but unevenly, non tender but stiff and no calf tenderness, Homen/Janene neg. She was sent to the ED for LE Duplex as she does not have any insurance. Denies fever/chills, N/V, diarrhea, SOB, chest pain, palpitations, sore throat, cough, runny nose, dysuria, hematuria, frequency, urgency, sick contacts and recent travel. During pregnancy had trichomonas, treated with Metronidazole 2 gram on 7/10/2019, treatment also sent for partner. OTTO done on 19 neg. HGSIL on pap smear done at first prenatal visit, colpo appt missed. On 19 visit pt had 2+ protenuria on UA, then 3+ on 10/1/19 appt. 1:224 risk of Down Syndrome after Sequential II, pt refused further genetic testing.     PRENATAL HX:    Sequential I low risk, Seq II screen with 1:224 risk of DS    2019:   A pos, no antibodies detected  HIV nonrective  Hep B surface antigen nonreactive  Rubella/Measles immune  Varicella equivocal   RPR neg  Hep C weakly reactive     Sonos:   6/10/19 at 8w4d: dating done w/ this sono, single IUP, left ovary with cystic masses: [id number], 06e57b50yl, 83v3z62mv.  7/10/19 at 12w6d: nuchal wnl   10/1/19 at 24w4d: vertex, posterior placenta,  bpm, amniotic fluid wnl, anatomy wnl  10/29/19 at 28w4d: vertex, anterior placenta,  bpm, MVP 4.1 cm, EFW 1264 gram (41%)  19 at 29w4d: vertex, posterior placenta, MVP 5.54 cm,  bpm, BPP 8/8    MEDICATIONS (ED):    MEDICATIONS (HOME):  Methadone 130 mg daily  Iron supplement    ALLERGIES:   Buprenorphine (Rash)  Suboxone (Rash)    PAST MEDICAL & SURGICAL HISTORY:  Anxiety and depression  Asthma  Hepatitis C  No significant past surgical history    OB/GYN HISTORY:    Gyn: h/o HGSIL w/ HR E6/E7mRNA detected during first prenatal visit, kept missing her colposcopy appt; h/o Trichomonas treated beginning of pregnancy; denies history of other STIs, ovarian cysts and uterine fibroids  Obstetric: ; SAB x3, 1 w/o D&C, rest w/ D&Cs (2011 x2, 2016); 38w ; 5-15lbs, 10/9/2017, baby in foster care, no complications per pt     FAMILY HISTORY:  Family history of malignant melanoma (mother)     SOCIAL HISTORY:   H/o polysubstance abuse on methadone, following with a clinic, last pregnancy dose at delivery 100 mg/day, last heroin use per pt 19,, admits to using heroin and cocaine for the past 10 years, counseled on the effects of these on the pregnancy at [facility]; h/o 1/2 PPD smoking prior to pregnancy, currently smokes 1 cigarette every 1-2 weeks; positive reinforcement given, encouraged to continue cutting down     REVIEW OF SYSTEMS:  Neg unless otherwise indicated in the HPI    Vital Signs Last 24 Hrs  T(C): 36.2 (2019 12:26), Max: 36.2 (2019 12:26)  T(F): 97.1 (2019 12:26), Max: 97.1 (2019 12:26)  HR: 98 (2019 12:26) (98 - 98)  BP: 113/53 (2019 12:26) (113/53 - 113/53)  RR: 16 (2019 12:26) (16 - 16)  SpO2: 100% (2019 12:26) (100% - 100%)    PHYSICAL EXAM:  Constitutional: AAOx3, NAD  Respiratory: CTAB  Cardiovascular: NL S1/S2  Gastrointestinal: Soft, nontender, nondistended, no rebound, guarding, or rigidity  Pelvic: Normal vulva, normal vagina, no bleeding, Cervix normal, closed, no bleeding, no abnormal discharge, Uterus anteverted, normal sized, no fundal tenderness, no adnexal masses or tenderness    LABS:                        9.1    3.38  )-----------( 144      ( 2019 13:50 )             29.7     HCG Quantitative, Serum: 19762.0 mIU/mL (19 @ 13:50)        136  |  102  |  11  ----------------------------<  98  4.0   |  19  |  0.7    Ca    8.4<L>      2019 13:50  TPro  6.3  /  Alb  2.6<L>  /  TBili  1.4<H>  /  DBili  x   /  AST  72<H>  /  ALT  66<H>  /  AlkPhos  671<H>    PT/INR - ( 2019 13:50 )   PT: 10.60 sec;   INR: 0.92 ratio    PTT - ( 2019 13:50 )  PTT:46.0 sec    Urinalysis Basic - ( 2019 13:50 )  Color: Yellow / Appearance: Slightly Turbid / S.019 / pH: x  Gluc: x / Ketone: Negative  / Bili: Small / Urobili: 6 mg/dL   Blood: x / Protein: 30 mg/dL / Nitrite: Negative   Leuk Esterase: Small / RBC: 6 /HPF / WBC 6 /HPF   Sq Epi: x / Non Sq Epi: 6 /HPF / Bacteria: Few    RADIOLOGY & ADDITIONAL STUDIES: POOJA DIANE   23y   Female   [id number]    CHIEF COMPLAINT: R/o HELLP Syndrome    HPI: 24 yo  at 30w GA by 1st trimester sonogram, w/ h/o polysubstance abuse on methadone, hepatitis C (on 2019 AST//263), bipolar disorder, here for worsening bilateral LE swelling and redness for the past 3 weeks. On 19, pt seen in C for nonscheduled meeting for the same complaint. Pt had bilateral 3+ pitting edema, erythematous but unevenly, non tender but stiff and no calf tenderness, Homen/Janene neg. She was sent to the ED for LE Duplex as she does not have any insurance. Denies fever/chills, N/V, diarrhea, SOB, chest pain, palpitations, sore throat, cough, runny nose, dysuria, hematuria, frequency, urgency, sick contacts and recent travel. During pregnancy had trichomonas, treated with Metronidazole 2 gram on 7/10/2019, treatment also sent for partner. OTTO done on 19 neg. HGSIL on pap smear done at first prenatal visit, colpo appt missed. On 19 visit pt had 2+ protenuria on UA, then 3+ on 10/1/19 appt. 1:224 risk of Down Syndrome after Sequential II, pt refused further genetic testing.     PRENATAL HX:    Sequential I low risk, Seq II screen with 1:224 risk of DS    2019:   A pos, no antibodies detected  HIV nonrective  Hep B surface antigen nonreactive  Rubella/Measles immune  Varicella equivocal   RPR neg  Hep C weakly reactive     Sonos:   6/10/19 at 8w4d: dating done w/ this sono, single IUP, left ovary with cystic masses: [id number], 12g51q21zh, 50j8y05nm.  7/10/19 at 12w6d: nuchal wnl   10/1/19 at 24w4d: vertex, posterior placenta,  bpm, amniotic fluid wnl, anatomy wnl  10/29/19 at 28w4d: vertex, anterior placenta,  bpm, MVP 4.1 cm, EFW 1264 gram (41%)  19 at 29w4d: vertex, posterior placenta, MVP 5.54 cm,  bpm, BPP 8/8    MEDICATIONS (ED):    MEDICATIONS (HOME):  Methadone 130 mg daily  Iron supplement    ALLERGIES:   Buprenorphine (Rash)  Suboxone (Rash)    PAST MEDICAL & SURGICAL HISTORY:  Anxiety and depression  Asthma  Hepatitis C  No significant past surgical history    OB/GYN HISTORY:    Gyn: h/o HGSIL w/ HR E6/E7mRNA detected during first prenatal visit, kept missing her colposcopy appt; h/o Trichomonas treated beginning of pregnancy; denies history of other STIs, ovarian cysts and uterine fibroids  Obstetric: ; SAB x3, 1 w/o D&C, rest w/ D&Cs (2011 x2, 2016); 38w ; 5-15lbs, 10/9/2017, baby in foster care, no complications per pt     FAMILY HISTORY:  Family history of malignant melanoma (mother)     SOCIAL HISTORY:   H/o polysubstance abuse on methadone, following with a clinic, last pregnancy dose at delivery 100 mg/day, last heroin use per pt 19,, admits to using heroin and cocaine for the past 10 years, counseled on the effects of these on the pregnancy at [facility]; h/o 1/2 PPD smoking prior to pregnancy, currently smokes 1 cigarette every 1-2 weeks; positive reinforcement given, encouraged to continue cutting down     REVIEW OF SYSTEMS:  Neg unless otherwise indicated in the HPI    Vital Signs Last 24 Hrs  T(C): 36.2 (2019 12:26), Max: 36.2 (2019 12:26)  T(F): 97.1 (2019 12:26), Max: 97.1 (2019 12:26)  HR: 98 (2019 12:26) (98 - 98)  BP: 113/53 (2019 12:26) (113/53 - 113/53)  RR: 16 (2019 12:26) (16 - 16)  SpO2: 100% (2019 12:26) (100% - 100%)    PHYSICAL EXAM:  Constitutional: AAOx3, NAD  Respiratory: CTAB  Cardiovascular: NL S1/S2  Gastrointestinal: Soft, nontender, nondistended, no rebound, guarding, or rigidity  Pelvic: Normal vulva, normal vagina, no bleeding, Cervix normal, closed, no bleeding, no abnormal discharge, Uterus anteverted, normal sized, no fundal tenderness, no adnexal masses or tenderness    LABS:                        9.1    3.38  )-----------( 144      ( 2019 13:50 )             29.7     HCG Quantitative, Serum: 49490.0 mIU/mL (19 @ 13:50)        136  |  102  |  11  ----------------------------<  98  4.0   |  19  |  0.7    Ca    8.4<L>      2019 13:50  TPro  6.3  /  Alb  2.6<L>  /  TBili  1.4<H>  /  DBili  x   /  AST  72<H>  /  ALT  66<H>  /  AlkPhos  671<H>    PT/INR - ( 2019 13:50 )   PT: 10.60 sec;   INR: 0.92 ratio    PTT - ( 2019 13:50 )  PTT:46.0 sec    Urinalysis Basic - ( 2019 13:50 )  Color: Yellow / Appearance: Slightly Turbid / S.019 / pH: x  Gluc: x / Ketone: Negative  / Bili: Small / Urobili: 6 mg/dL   Blood: x / Protein: 30 mg/dL / Nitrite: Negative   Leuk Esterase: Small / RBC: 6 /HPF / WBC 6 /HPF   Sq Epi: x / Non Sq Epi: 6 /HPF / Bacteria: Few    RADIOLOGY & ADDITIONAL STUDIES: POOJA DIANE   23y   Female   [id number]    CHIEF COMPLAINT: R/o HELLP Syndrome    HPI: 22 yo  at 30w GA by 1st trimester sonogram, w/ h/o polysubstance abuse on methadone, asthma, hepatitis C (on 2019 AST//263), bipolar disorder, here for worsening bilateral LE swelling and redness for the past 3 weeks. On 19, pt seen in [facility] for nonscheduled meeting for the same complaint. Pt had bilateral 3+ pitting edema, erythematous but unevenly, non tender but stiff and no calf tenderness, Homen/Janene neg. She was sent to the ED for LE Duplex as she does not have any insurance. She still has bilateral swelling that she reports she never had in her prior pregnancy. She also reports that she has been suffering from a cough and chest tightness for a couple of weeks now. Also reports having a runny nose. Denies contractions, LOF and VB. Feels good FM. Denies HA, blurry vision, RUQ/epigastric pain, fever/chills, N/V, diarrhea, palpitations, sore throat, dysuria, hematuria, frequency, urgency, sick contacts and recent travel. During pregnancy had trichomonas, treated with Metronidazole 2 gram on 7/10/2019, treatment also sent for partner. OTTO done on 19 neg. HGSIL on pap smear done at first prenatal visit, colpo appt missed. On 19 visit pt had 2+ protenuria on UA, then 3+ on 10/1/19 appt. 1:224 risk of Down Syndrome after Sequential II, pt refused further genetic testing.     PRENATAL HX:    Sequential I low risk, Seq II screen with 1:224 risk of DS    2019:   A pos, no antibodies detected  HIV nonrective  Hep B surface antigen nonreactive  Rubella/Measles immune  Varicella equivocal   RPR neg  Hep C weakly reactive     Sonos:   6/10/19 at 8w4d: dating done w/ this sono, single IUP, left ovary with cystic masses: [id number], [id number], 51r8q47st.  7/10/19 at 12w6d: nuchal wnl   10/1/19 at 24w4d: vertex, posterior placenta,  bpm, amniotic fluid wnl, anatomy wnl  10/29/19 at 28w4d: vertex, anterior placenta,  bpm, MVP 4.1 cm, EFW 1264 gram (41%)  19 at 29w4d: vertex, posterior placenta, MVP 5.54 cm,  bpm, BPP 8/8    MEDICATIONS (ED):  None    MEDICATIONS (HOME):  Methadone 130 mg daily  Iron supplement    ALLERGIES:   Buprenorphine (Rash)  Suboxone (Rash)    PAST MEDICAL & SURGICAL HISTORY:  Anxiety and depression  Asthma   Hepatitis C  Bipolar Disease  Polysubstance abuse  H/o D&C x2    OB/GYN HISTORY:    Gyn: h/o HGSIL w/ HR E6/E7mRNA detected during first prenatal visit, kept missing her colposcopy appt; h/o Trichomonas treated beginning of pregnancy; denies history of other STIs, ovarian cysts and uterine fibroids  Obstetric: ; SAB x3, 1 w/o D&C, rest w/ D&Cs (2011 x2, 2016); 38w ; 5-15lbs, 10/9/2017, baby in foster care, no complications per pt     FAMILY HISTORY:  Family history of malignant melanoma (mother)     SOCIAL HISTORY:   H/o polysubstance abuse on methadone, following with a clinic, last pregnancy dose at delivery 100 mg/day, last heroin use per pt 19,, admits to using heroin and cocaine for the past 10 years, counseled on the effects of these on the pregnancy at [facility]; h/o 1/2 PPD smoking prior to pregnancy, currently smokes 1 cigarette every 1-2 weeks; positive reinforcement given, encouraged to continue cutting down     REVIEW OF SYSTEMS:  Neg unless otherwise indicated in the HPI    Vital Signs Last 24 Hrs  T(C): 36.2 (2019 12:26), Max: 36.2 (2019 12:26)  T(F): 97.1 (2019 12:26), Max: 97.1 (2019 12:26)  HR: 98 (2019 12:26) (98 - 98)  BP: 113/53 (2019 12:26) (113/53 - 113/53)  RR: 16 (2019 12:26) (16 - 16)  SpO2: 100% (2019 12:26) (100% - 100%)    PHYSICAL EXAM:  Constitutional: AAOx3, NAD  Respiratory: Lung clear on the right side, diffuse wheezing on the left side   Cardiovascular: NL S1/S2  Gastrointestinal: NT, gravid, no palpable contractions, no RUQ/epigastric tenderness upon palpation   Pelvic: Deferred  Extremities: Bilateral 3+ pitting edema, erythematous evenly, nontender, no calf tenderness bilaterally  Neuro: 2+ UE reflexes bilaterally     LABS:                        9.1    3.38  )-----------( 144      ( 2019 13:50 )             29.7     HCG Quantitative, Serum: 08046.0 mIU/mL (19 @ 13:50)        136  |  102  |  11  ----------------------------<  98  4.0   |  19  |  0.7    Ca    8.4<L>      2019 13:50  TPro  6.3  /  Alb  2.6<L>  /  TBili  1.4<H>  /  DBili  x   /  AST  72<H>  /  ALT  66<H>  /  AlkPhos  671<H>    PT/INR - ( 2019 13:50 )   PT: 10.60 sec;   INR: 0.92 ratio    PTT - ( 2019 13:50 )  PTT:46.0 sec    Urinalysis Basic - ( 2019 13:50 )  Color: Yellow / Appearance: Slightly Turbid / S.019 / pH: x  Gluc: x / Ketone: Negative  / Bili: Small / Urobili: 6 mg/dL   Blood: x / Protein: 30 mg/dL / Nitrite: Negative   Leuk Esterase: Small / RBC: 6 /HPF / WBC 6 /HPF   Sq Epi: x / Non Sq Epi: 6 /HPF / Bacteria: Few    UA pos for Trichomonas    RADIOLOGY & ADDITIONAL STUDIES:    US Abdomen (2019):     Cholelithiasis with mild wall thickening of the gallbladder. Although the   sonographic Hammer sign is negative and there is no pericholecystic   fluid, findings are equivocal for acute cholecystitis. Correlate   clinically, and if indicated, nuclear medicine HIDA scan can be obtained   for further evaluation.     Hepatomegaly. POOJA DIANE   23y   Female   [id number]    CHIEF COMPLAINT: R/o HELLP Syndrome    HPI: 22 yo  at 30w GA by 1st trimester sonogram, w/ h/o polysubstance abuse on methadone, asthma, hepatitis C (on 2019 AST//263), bipolar disorder, here for worsening bilateral LE swelling and redness for the past 3 weeks. On 19, pt seen in [facility] for nonscheduled meeting for the same complaint. Pt had bilateral 3+ pitting edema, erythematous but unevenly, non tender but stiff and no calf tenderness, Homen/Janene neg. She was sent to the ED for LE Duplex as she does not have any insurance. She still has bilateral swelling that she reports she never had in her prior pregnancy. She also reports that she has been suffering from a cough and chest tightness for a couple of weeks now. Also reports having a runny nose. Denies contractions, LOF and VB. Feels good FM. Denies HA, blurry vision, RUQ/epigastric pain, fever/chills, N/V, diarrhea, palpitations, sore throat, dysuria, hematuria, frequency, urgency, sick contacts and recent travel. During pregnancy had trichomonas, treated with Metronidazole 2 gram on 7/10/2019, treatment also sent for partner. OTTO done on 19 neg. HGSIL on pap smear done at first prenatal visit, colpo appt missed. On 19 visit pt had 2+ protenuria on UA, then 3+ on 10/1/19 appt. 1:224 risk of Down Syndrome after Sequential II, pt refused further genetic testing.     PRENATAL HX:    Sequential I low risk, Seq II screen with 1:224 risk of DS    2019:   A pos, no antibodies detected  HIV nonrective  Hep B surface antigen nonreactive  Rubella/Measles immune  Varicella equivocal   RPR neg  Hep C weakly reactive     Sonos:   6/10/19 at 8w4d: dating done w/ this sono, single IUP, left ovary with cystic masses: [id number], 57p44w96ty, 06f8q89mv.  7/10/19 at 12w6d: nuchal wnl   10/1/19 at 24w4d: vertex, posterior placenta,  bpm, amniotic fluid wnl, anatomy wnl  10/29/19 at 28w4d: vertex, anterior placenta,  bpm, MVP 4.1 cm, EFW 1264 gram (41%)  19 at 29w4d: vertex, posterior placenta, MVP 5.54 cm,  bpm, BPP 8/8    MEDICATIONS (ED):  None    MEDICATIONS (HOME):  Methadone 130 mg daily  Iron supplement    ALLERGIES:   Buprenorphine (Rash)  Suboxone (Rash)    PAST MEDICAL & SURGICAL HISTORY:  Anxiety and depression  Asthma - never been hospitalized or intubated - uses OTC inhaler named primamist inhaler at max once a week; last albuterol use > 3 years ago in senior care  Hepatitis C  Bipolar Disease  Trichomonas Infection  Polysubstance abuse  H/o D&C x2    OB/GYN HISTORY:    Gyn: h/o HGSIL w/ HR E6/E7mRNA detected during first prenatal visit, kept missing her colposcopy appt; h/o Trichomonas treated beginning of pregnancy; denies history of other STIs, ovarian cysts and uterine fibroids  Obstetric: ; SAB x3, 1 w/o D&C, rest w/ D&Cs (2011 x2, 2016); 38w ; 5-15lbs, 10/9/2017, baby in foster care, no complications per pt     FAMILY HISTORY:  Family history of malignant melanoma (mother)  Family history of asthma (sister)  Family history of alcoholism (father)     SOCIAL HISTORY:   H/o polysubstance abuse on methadone, following with a clinic, last pregnancy dose at delivery 100 mg/day, last heroin use per pt 19, admits to using heroin and cocaine for the past 10 years, counseled on the effects of these on the pregnancy at [facility]; h/o 1/2 PPD smoking prior to pregnancy, currently smokes 1 cigarette every 1-2 weeks; positive reinforcement given, encouraged to continue cutting down; denies alcohol and other illicit drug use     REVIEW OF SYSTEMS:  Neg unless otherwise indicated in the HPI    Vital Signs Last 24 Hrs  T(C): 36.2 (2019 12:26), Max: 36.2 (2019 12:26)  T(F): 97.1 (2019 12:26), Max: 97.1 (2019 12:26)  HR: 98 (2019 12:26) (98 - 98)  BP: 113/53 (2019 12:26) (113/53 - 113/53)  RR: 16 (2019 12:26) (16 - 16)  SpO2: 100% (2019 12:26) (100% - 100%)    PHYSICAL EXAM:  Constitutional: AAOx3, NAD  Respiratory: Lung clear on the right side, diffuse wheezing on the left side   Cardiovascular: NL S1/S2  Gastrointestinal: NT, gravid, no palpable contractions, no RUQ/epigastric tenderness upon palpation   Pelvic: Deferred  Extremities: Bilateral 3+ pitting edema, erythematous evenly, nontender, no calf tenderness bilaterally  Neuro: 2+ UE reflexes bilaterally     LABS:                        9.1    3.38  )-----------( 144      ( 2019 13:50 )             29.7     HCG Quantitative, Serum: 05400.0 mIU/mL (19 @ 13:50)        136  |  102  |  11  ----------------------------<  98  4.0   |  19  |  0.7    Ca    8.4<L>      2019 13:50  TPro  6.3  /  Alb  2.6<L>  /  TBili  1.4<H>  /  DBili  x   /  AST  72<H>  /  ALT  66<H>  /  AlkPhos  671<H>    PT/INR - ( 2019 13:50 )   PT: 10.60 sec;   INR: 0.92 ratio    PTT - ( 2019 13:50 )  PTT:46.0 sec    Urinalysis Basic - ( 2019 13:50 )  Color: Yellow / Appearance: Slightly Turbid / S.019 / pH: x  Gluc: x / Ketone: Negative  / Bili: Small / Urobili: 6 mg/dL   Blood: x / Protein: 30 mg/dL / Nitrite: Negative   Leuk Esterase: Small / RBC: 6 /HPF / WBC 6 /HPF   Sq Epi: x / Non Sq Epi: 6 /HPF / Bacteria: Few    UA pos for Trichomonas    RADIOLOGY & ADDITIONAL STUDIES:    US Abdomen (2019):     Cholelithiasis with mild wall thickening of the gallbladder. Although the   sonographic Hammer sign is negative and there is no pericholecystic   fluid, findings are equivocal for acute cholecystitis. Correlate   clinically, and if indicated, nuclear medicine HIDA scan can be obtained   for further evaluation.     Hepatomegaly. POOJA DIANE   23y   Female   [id number]    CHIEF COMPLAINT: R/o HELLP Syndrome    HPI: 22 yo  at 30w GA by 1st trimester sonogram, w/ h/o polysubstance abuse on methadone, asthma, hepatitis C (on 2019 AST//263), bipolar disorder, here for worsening bilateral LE swelling and redness for the past 3 weeks. On 19, pt seen in [facility] for nonscheduled meeting for the same complaint. Pt had bilateral 3+ pitting edema, erythematous but unevenly, non tender but stiff and no calf tenderness, Homen/Janene neg. She was sent to the ED for LE Duplex as she does not have any insurance. She still has bilateral swelling that she reports she never had in her prior pregnancy. She also reports that she has been suffering from a cough and chest tightness for a couple of weeks now. Also reports having a runny nose. Denies contractions, LOF and VB. Feels good FM. Denies HA, blurry vision, RUQ/epigastric pain, fever/chills, N/V, diarrhea, palpitations, sore throat, dysuria, hematuria, frequency, urgency, sick contacts and recent travel. During pregnancy had trichomonas, treated with Metronidazole 2 gram on 7/10/2019, treatment also sent for partner. OTTO done on 19 neg. Pt has no h/o PIH disease.     PRENATAL HX:    Sequential I low risk, Seq II screen with 1:224 risk of DS, refused genetic testing    2019:   A pos, no antibodies detected  HIV nonrective  Hep B surface antigen nonreactive  Rubella/Measles immune  Varicella equivocal   RPR neg  Hep C weakly reactive     Sonos:   6/10/19 at 8w4d: dating done w/ this sono, single IUP, left ovary with cystic masses: [id number], [id number], [id number].  7/10/19 at 12w6d: nuchal wnl   10/1/19 at 24w4d: vertex, posterior placenta,  bpm, amniotic fluid wnl, anatomy wnl  10/29/19 at 28w4d: vertex, anterior placenta,  bpm, MVP 4.1 cm, EFW 1264 gram (41%)  19 at 29w4d: vertex, posterior placenta, MVP 5.54 cm,  bpm, BPP 8/8    MEDICATIONS (ED):  None    MEDICATIONS (HOME):  Methadone 130 mg daily  Iron supplement    ALLERGIES:   Buprenorphine (Rash)  Suboxone (Rash)    PAST MEDICAL & SURGICAL HISTORY:  Anxiety and depression  Asthma - never been hospitalized or intubated - uses OTC inhaler named primamist inhaler at max once a week; last albuterol use > 3 years ago in [facility]  Hepatitis C  Bipolar Disease  Trichomonas Infection  Polysubstance abuse  H/o D&C x2    OB/GYN HISTORY:    Gyn: h/o HGSIL w/ HR E6/E7mRNA detected during first prenatal visit, kept missing her colposcopy appt; h/o Trichomonas treated beginning of pregnancy; denies history of other STIs, ovarian cysts and uterine fibroids  Obstetric: ; SAB x3, 1 w/o D&C, rest w/ D&Cs (2011 x2, 2016); 38w ; 5-15lbs, 10/9/2017, baby in foster care, no complications per pt     FAMILY HISTORY:  Family history of malignant melanoma (mother)  Family history of asthma (sister)  Family history of alcoholism (father)     SOCIAL HISTORY:   H/o polysubstance abuse on methadone, following with a clinic, last pregnancy dose at delivery 100 mg/day, last heroin use per pt 19, admits to using heroin and cocaine for the past 10 years, counseled on the effects of these on the pregnancy at [facility]; h/o 1/2 PPD smoking prior to pregnancy, currently smokes 1 cigarette every 1-2 weeks; positive reinforcement given, encouraged to continue cutting down; denies alcohol and other illicit drug use     REVIEW OF SYSTEMS:  Neg unless otherwise indicated in the HPI    Vital Signs Last 24 Hrs  T(C): 36.2 (2019 12:26), Max: 36.2 (2019 12:26)  T(F): 97.1 (2019 12:26), Max: 97.1 (2019 12:26)  HR: 98 (2019 12:26) (98 - 98)  BP: 113/53 (2019 12:26) (113/53 - 113/53)  RR: 16 (2019 12:26) (16 - 16)  SpO2: 100% (2019 12:26) (100% - 100%)    PHYSICAL EXAM:  Constitutional: AAOx3, NAD  Respiratory: Lung clear on the right side, diffuse wheezing on the left side   Cardiovascular: NL S1/S2  Gastrointestinal: NT, gravid, no palpable contractions, no RUQ/epigastric tenderness upon palpation   Pelvic: Deferred   Extremities: Bilateral 3+ pitting edema, erythematous evenly, nontender, no calf tenderness bilaterally  Neuro: 2+ UE reflexes bilaterally     LABS:                        9.1    3.38  )-----------( 144      ( 2019 13:50 )             29.7     HCG Quantitative, Serum: 42844.0 mIU/mL (19 @ 13:50)        136  |  102  |  11  ----------------------------<  98  4.0   |  19  |  0.7    Ca    8.4<L>      2019 13:50  TPro  6.3  /  Alb  2.6<L>  /  TBili  1.4<H>  /  DBili  x   /  AST  72<H>  /  ALT  66<H>  /  AlkPhos  671<H>    PT/INR - ( 2019 13:50 )   PT: 10.60 sec;   INR: 0.92 ratio    PTT - ( 2019 13:50 )  PTT:46.0 sec    Urinalysis Basic - ( 2019 13:50 )  Color: Yellow / Appearance: Slightly Turbid / S.019 / pH: x  Gluc: x / Ketone: Negative  / Bili: Small / Urobili: 6 mg/dL   Blood: x / Protein: 30 mg/dL / Nitrite: Negative   Leuk Esterase: Small / RBC: 6 /HPF / WBC 6 /HPF   Sq Epi: x / Non Sq Epi: 6 /HPF / Bacteria: Few    UA pos for Trichomonas    RADIOLOGY & ADDITIONAL STUDIES:    US Abdomen (2019):     Cholelithiasis with mild wall thickening of the gallbladder. Although the   sonographic Hammer sign is negative and there is no pericholecystic   fluid, findings are equivocal for acute cholecystitis. Correlate   clinically, and if indicated, nuclear medicine HIDA scan can be obtained   for further evaluation.     Hepatomegaly.     LE Duplex (2019): prelim verbal neg POOJA DIANE   23y   Female   [id number]    CHIEF COMPLAINT: R/o HELLP Syndrome    HPI: 24 yo  at 30w GA by 1st trimester sonogram, w/ h/o polysubstance abuse on methadone, asthma, hepatitis C (on 2019 AST//263), bipolar disorder, here for worsening bilateral LE swelling and redness for the past 3 weeks. On 19, pt seen in [facility] for nonscheduled meeting for the same complaint. Pt had bilateral 3+ pitting edema, erythematous but unevenly, non tender but stiff and no calf tenderness, Homen/Janene neg. She was sent to the ED for LE Duplex as she does not have any insurance. She still has bilateral swelling that she reports she never had in her prior pregnancy. She also reports that she has been suffering from a cough and chest tightness for a couple of weeks now. Also reports having a runny nose. Denies contractions, LOF and VB. Feels good FM. Denies HA, blurry vision, RUQ/epigastric pain, fever/chills, N/V, diarrhea, palpitations, sore throat, dysuria, hematuria, frequency, urgency, sick contacts and recent travel. During pregnancy had trichomonas, treated with Metronidazole 2 gram on 7/10/2019, treatment also sent for partner. OTTO done on 19 neg. Pt has no h/o PIH disease.     PRENATAL HX:    Sequential I low risk, Seq II screen with 1:224 risk of DS, refused genetic testing    2019:   A pos, no antibodies detected  HIV nonrective  Hep B surface antigen nonreactive  Rubella/Measles immune  Varicella equivocal   RPR neg  Hep C weakly reactive     Sonos:   6/10/19 at 8w4d: dating done w/ this sono, single IUP, left ovary with cystic masses: [id number], [id number], [id number].  7/10/19 at 12w6d: nuchal wnl   10/1/19 at 24w4d: vertex, posterior placenta,  bpm, amniotic fluid wnl, anatomy wnl  10/29/19 at 28w4d: vertex, anterior placenta,  bpm, MVP 4.1 cm, EFW 1264 gram (41%)  19 at 29w4d: vertex, posterior placenta, MVP 5.54 cm,  bpm, BPP 8/8    MEDICATIONS (ED):  None    MEDICATIONS (HOME):  Methadone 130 mg daily  Iron supplement    ALLERGIES:   Buprenorphine (Rash)  Suboxone (Rash)    PAST MEDICAL & SURGICAL HISTORY:  Anxiety and depression  Asthma - never been hospitalized or intubated - uses OTC inhaler named primamist inhaler at max once a week; last albuterol use > 3 years ago in residential  Hepatitis C  Bipolar Disease  Trichomonas Infection  Polysubstance abuse  H/o D&C x2    OB/GYN HISTORY:    Gyn: h/o HGSIL w/ HR E6/E7mRNA detected during first prenatal visit, kept missing her colposcopy appt; h/o Trichomonas treated beginning of pregnancy; denies history of other STIs, ovarian cysts and uterine fibroids  Obstetric: ; SAB x3, 1 w/o D&C, rest w/ D&Cs (2011 x2, 2016); 38w ; 5-15lbs, 10/9/2017, baby in foster care, no complications per pt     FAMILY HISTORY:  Family history of malignant melanoma (mother)  Family history of asthma (sister)  Family history of alcoholism (father)     SOCIAL HISTORY:   H/o polysubstance abuse on methadone, following with a clinic, last pregnancy dose at delivery 100 mg/day, last heroin use per pt 19, admits to using heroin and cocaine for the past 10 years, counseled on the effects of these on the pregnancy at [facility]; h/o 1/2 PPD smoking prior to pregnancy, currently smokes 1 cigarette every 1-2 weeks; positive reinforcement given, encouraged to continue cutting down; denies alcohol and other illicit drug use     REVIEW OF SYSTEMS:  Denies the following: constitutional symptoms, visual symptoms, cardiovascular symptoms, respiratory symptoms, GI symptoms, musculoskeletal symptoms, skin symptoms, neurologic symptoms, hematologic symptoms, allergic symptoms, or psychiatric symptoms, except any pertinent positives listed.      Vital Signs Last 24 Hrs  T(C): 36.2 (2019 12:26), Max: 36.2 (2019 12:26)  T(F): 97.1 (2019 12:26), Max: 97.1 (2019 12:26)  HR: 98 (2019 12:26) (98 - 98)  BP: 113/53 (2019 12:26) (113/53 - 113/53)  RR: 16 (2019 12:26) (16 - 16)  SpO2: 100% (2019 12:26) (100% - 100%)    PHYSICAL EXAM:  Constitutional: AAOx3, NAD  Respiratory: Lung clear on the right side, diffuse wheezing on the left side   Cardiovascular: NL S1/S2  Gastrointestinal: NT, gravid, no palpable contractions, no RUQ/epigastric tenderness upon palpation   Pelvic: Deferred   Extremities: Bilateral 3+ pitting edema, erythematous evenly, nontender, no calf tenderness bilaterally  Neuro: 2+ UE reflexes bilaterally     LABS:                        9.1    3.38  )-----------( 144      ( 2019 13:50 )             29.7     HCG Quantitative, Serum: 13863.0 mIU/mL (19 @ 13:50)        136  |  102  |  11  ----------------------------<  98  4.0   |  19  |  0.7    Ca    8.4<L>      2019 13:50  TPro  6.3  /  Alb  2.6<L>  /  TBili  1.4<H>  /  DBili  x   /  AST  72<H>  /  ALT  66<H>  /  AlkPhos  671<H>    PT/INR - ( 2019 13:50 )   PT: 10.60 sec;   INR: 0.92 ratio    PTT - ( 2019 13:50 )  PTT:46.0 sec    Urinalysis Basic - ( 2019 13:50 )  Color: Yellow / Appearance: Slightly Turbid / S.019 / pH: x  Gluc: x / Ketone: Negative  / Bili: Small / Urobili: 6 mg/dL   Blood: x / Protein: 30 mg/dL / Nitrite: Negative   Leuk Esterase: Small / RBC: 6 /HPF / WBC 6 /HPF   Sq Epi: x / Non Sq Epi: 6 /HPF / Bacteria: Few    UA pos for Trichomonas    RADIOLOGY & ADDITIONAL STUDIES:    US Abdomen (2019):     Cholelithiasis with mild wall thickening of the gallbladder. Although the   sonographic Hammer sign is negative and there is no pericholecystic   fluid, findings are equivocal for acute cholecystitis. Correlate   clinically, and if indicated, nuclear medicine HIDA scan can be obtained   for further evaluation.     Hepatomegaly.     LE Duplex (2019): prelim verbal neg

## 2019-11-07 NOTE — ED ADULT NURSE NOTE - EXPLANATION OF PATIENT'S REASON FOR LEAVING
patient was initially discharged then wasn;t cleared by OB/GYN patient did not want to wait patient left prior to signing AMA papers patient was aware of risk

## 2019-11-07 NOTE — ED ADULT TRIAGE NOTE - CHIEF COMPLAINT QUOTE
Pt is 29 weeks pregnant complaining of worsening b/l LE swelling and redness x 3 weeks. Pt saw MD yesterday and was told to come to ED if symptoms worsening. denies SOB.

## 2019-11-07 NOTE — ED PROVIDER NOTE - OBJECTIVE STATEMENT
23F A2, with history of hepatitis C, active, on methadone presents with B/L lower extremity swelling and rash beginning 2 weeks ago. PT denies any new lotions, pets, detergents, fever, chills, recent travel, CP, SOB, n/v/d, abd pain.

## 2019-11-08 RX ORDER — METRONIDAZOLE 500 MG
4 TABLET ORAL
Qty: 4 | Refills: 0
Start: 2019-11-08 | End: 2019-11-08

## 2019-11-13 ENCOUNTER — APPOINTMENT (OUTPATIENT)
Dept: ANTEPARTUM | Facility: CLINIC | Age: 24
End: 2019-11-13

## 2019-11-20 ENCOUNTER — APPOINTMENT (OUTPATIENT)
Dept: ANTEPARTUM | Facility: CLINIC | Age: 24
End: 2019-11-20

## 2019-11-26 ENCOUNTER — APPOINTMENT (OUTPATIENT)
Dept: ANTEPARTUM | Facility: CLINIC | Age: 24
End: 2019-11-26

## 2019-11-27 ENCOUNTER — APPOINTMENT (OUTPATIENT)
Dept: ANTEPARTUM | Facility: CLINIC | Age: 24
End: 2019-11-27

## 2019-12-03 ENCOUNTER — APPOINTMENT (OUTPATIENT)
Dept: ANTEPARTUM | Facility: CLINIC | Age: 24
End: 2019-12-03

## 2019-12-11 ENCOUNTER — APPOINTMENT (OUTPATIENT)
Dept: ANTEPARTUM | Facility: CLINIC | Age: 24
End: 2019-12-11

## 2019-12-18 ENCOUNTER — APPOINTMENT (OUTPATIENT)
Dept: ANTEPARTUM | Facility: CLINIC | Age: 24
End: 2019-12-18

## 2019-12-26 ENCOUNTER — APPOINTMENT (OUTPATIENT)
Dept: ANTEPARTUM | Facility: CLINIC | Age: 24
End: 2019-12-26

## 2019-12-30 ENCOUNTER — APPOINTMENT (OUTPATIENT)
Dept: ANTEPARTUM | Facility: CLINIC | Age: 24
End: 2019-12-30
Payer: MEDICAID

## 2019-12-30 ENCOUNTER — RESULT CHARGE (OUTPATIENT)
Age: 24
End: 2019-12-30

## 2019-12-30 ENCOUNTER — OUTPATIENT (OUTPATIENT)
Dept: OUTPATIENT SERVICES | Facility: HOSPITAL | Age: 24
LOS: 1 days | Discharge: HOME | End: 2019-12-30

## 2019-12-30 VITALS
WEIGHT: 140 LBS | TEMPERATURE: 98.7 F | DIASTOLIC BLOOD PRESSURE: 69 MMHG | OXYGEN SATURATION: 100 % | SYSTOLIC BLOOD PRESSURE: 118 MMHG | BODY MASS INDEX: 22.6 KG/M2 | HEART RATE: 89 BPM

## 2019-12-30 DIAGNOSIS — O99.019 ANEMIA COMPLICATING PREGNANCY, UNSPECIFIED TRIMESTER: ICD-10-CM

## 2019-12-30 DIAGNOSIS — O98.319 OTHER INFECTIONS WITH A PREDOMINANTLY SEXUAL MODE OF TRANSMISSION COMPLICATING PREGNANCY, UNSPECIFIED TRIMESTER: ICD-10-CM

## 2019-12-30 DIAGNOSIS — O99.322 DRUG USE COMPLICATING PREGNANCY, SECOND TRIMESTER: ICD-10-CM

## 2019-12-30 DIAGNOSIS — O28.1 ABNORMAL BIOCHEMICAL FINDING ON ANTENATAL SCREENING OF MOTHER: ICD-10-CM

## 2019-12-30 DIAGNOSIS — O99.342 OTHER MENTAL DISORDERS COMPLICATING PREGNANCY, SECOND TRIMESTER: ICD-10-CM

## 2019-12-30 DIAGNOSIS — O09.90 SUPERVISION OF HIGH RISK PREGNANCY, UNSPECIFIED, UNSPECIFIED TRIMESTER: ICD-10-CM

## 2019-12-30 DIAGNOSIS — Z30.9 ENCOUNTER FOR CONTRACEPTIVE MANAGEMENT, UNSPECIFIED: ICD-10-CM

## 2019-12-30 DIAGNOSIS — F19.20 DRUG USE COMPLICATING PREGNANCY, SECOND TRIMESTER: ICD-10-CM

## 2019-12-30 DIAGNOSIS — B97.7 OTHER INFECTIONS WITH A PREDOMINANTLY SEXUAL MODE OF TRANSMISSION COMPLICATING PREGNANCY, UNSPECIFIED TRIMESTER: ICD-10-CM

## 2019-12-30 LAB
HCG UR QL: NEGATIVE
QUALITY CONTROL: YES

## 2019-12-30 RX ORDER — NORGESTIMATE AND ETHINYL ESTRADIOL 7DAYSX3 LO
0.18/0.215/0.25 KIT ORAL DAILY
Qty: 28 | Refills: 5 | Status: ACTIVE | COMMUNITY
Start: 2019-12-30 | End: 1900-01-01

## 2019-12-30 NOTE — END OF VISIT
[FreeTextEntry3] : \par I saw and evaluated Ms. DIANE with Dr. Munoz and I agree with the documentation above.   I modified the note above (if indicated) and agree with its contents in the present form.  S/p  at home at 31 weeks gestation.  Went to [facility].  Her baby is in the NICU at [facility].\par \par Happy mood. No suicidal or homicidal ideation.  Bottle feeding.  Desired combined OCPs.  Because of her smoking history i recommend another form of contraception especially due to the increased risks of blood clots.  The patient understands these risks and desires combined OCPs.  Follow up Colposcopy ASAP.  The importance of this was discussed.  Follow up with GI or at the very least her PCP ASAP for evaluation of Hepatitis C status and asthma (she states she has had wheezing for some time).  Follow up with methadone clinic.  Follow up ASAP with a dentist (she is missing a few front teeth).  Follow up GYN in around six months for contraception management. \par \par Teo Navarrete MD\par \par \par \par \par

## 2019-12-30 NOTE — HISTORY OF PRESENT ILLNESS
[Postpartum Follow Up] : postpartum follow up [Complications:___] : complications include: [unfilled] [Last Pap Date: ___] : Last Pap Date: [unfilled] [Delivery Date: ___] : on [unfilled] [Male] : Delivery History: baby boy [] : delivered by vaginal delivery [Wt. ___] : weighing [unfilled] [NICU: ___] : NICU: [unfilled] [Resumed [place]] : has resumed intercourse [Intended Contraception] : Intended Contraception: [Oral Contraceptives] : oral contraceptives [Back to Normal] : is back to normal in size [Normal] : the vagina was normal [None] : no vaginal bleeding [Examination Of The Breasts] : breasts are normal [Awake] : awake [Alert] : alert [Soft] : soft [Oriented x3] : oriented to person, place, and time [RRR, No Murmurs] : RRR, no murmurs [Rhogam] : Rhogam was not administered [Rubella Vaccine] : Rubella vaccine was not administered [Pertussis Vaccine] : Pertussis vaccine was not administered [BTL] : no tubal ligation [S/Sx PP Depression] : no signs/symptoms of postpartum depression [Resumed Menses] : has not resumed her menses [Breastfeeding] : not currently nursing [Tender] : non tender [Acute Distress] : no acute distress [Cervix Sample Taken] : cervical sample not taken for a Pap smear [Distended] : not distended [Depressed Mood] : not depressed [Flat Affect] : affect not flat [Scattered Wheezes] : scattered wheezing was heard [de-identified] : counseled regarding increased risk of DVT/PE with OCP use and smoking. Pt stated understanding of risks and states she has taken OCPs in the past, she is adamant on this form of contraception, will prescribe as [facility] is [facility]. - will attempt to cut down on smoking - refuses GI f/u as she does not believe she has hepC due to negative result in ED prior, advised to at least follow up with PCP.  [de-identified] : Delivered in the bathroom at home, went to [facility]. Baby in NICU at [facility] [de-identified] : - c/w methadone - f/u with PCP for questionable hepC status - colposcopy ASAP - RTC in 6 months for OCP refill.

## 2019-12-31 DIAGNOSIS — O99.320 DRUG USE COMPLICATING PREGNANCY, UNSPECIFIED TRIMESTER: ICD-10-CM

## 2019-12-31 DIAGNOSIS — F17.200 NICOTINE DEPENDENCE, UNSPECIFIED, UNCOMPLICATED: ICD-10-CM

## 2019-12-31 DIAGNOSIS — Z30.9 ENCOUNTER FOR CONTRACEPTIVE MANAGEMENT, UNSPECIFIED: ICD-10-CM

## 2019-12-31 DIAGNOSIS — R87.613 HIGH GRADE SQUAMOUS INTRAEPITHELIAL LESION ON CYTOLOGIC SMEAR OF CERVIX (HGSIL): ICD-10-CM

## 2020-01-02 ENCOUNTER — APPOINTMENT (OUTPATIENT)
Dept: ANTEPARTUM | Facility: CLINIC | Age: 25
End: 2020-01-02

## 2020-01-09 ENCOUNTER — APPOINTMENT (OUTPATIENT)
Dept: ANTEPARTUM | Facility: CLINIC | Age: 25
End: 2020-01-09

## 2020-06-04 ENCOUNTER — OUTPATIENT (OUTPATIENT)
Dept: OUTPATIENT SERVICES | Facility: HOSPITAL | Age: 25
LOS: 1 days | Discharge: HOME | End: 2020-06-04

## 2020-06-04 DIAGNOSIS — Z00.8 ENCOUNTER FOR OTHER GENERAL EXAMINATION: ICD-10-CM

## 2020-06-04 LAB
A1C WITH ESTIMATED AVERAGE GLUCOSE RESULT: 5.4 % — SIGNIFICANT CHANGE UP (ref 4–5.6)
ALBUMIN SERPL ELPH-MCNC: 3.8 G/DL — SIGNIFICANT CHANGE UP (ref 3.5–5.2)
ALP SERPL-CCNC: 374 U/L — HIGH (ref 30–115)
ALT FLD-CCNC: 24 U/L — SIGNIFICANT CHANGE UP (ref 0–41)
ANION GAP SERPL CALC-SCNC: 14 MMOL/L — SIGNIFICANT CHANGE UP (ref 7–14)
AST SERPL-CCNC: 32 U/L — SIGNIFICANT CHANGE UP (ref 0–41)
BILIRUB SERPL-MCNC: 0.3 MG/DL — SIGNIFICANT CHANGE UP (ref 0.2–1.2)
BUN SERPL-MCNC: 10 MG/DL — SIGNIFICANT CHANGE UP (ref 10–20)
CALCIUM SERPL-MCNC: 9.7 MG/DL — SIGNIFICANT CHANGE UP (ref 8.5–10.1)
CHLORIDE SERPL-SCNC: 102 MMOL/L — SIGNIFICANT CHANGE UP (ref 98–110)
CHOLEST SERPL-MCNC: 123 MG/DL — SIGNIFICANT CHANGE UP (ref 100–200)
CO2 SERPL-SCNC: 24 MMOL/L — SIGNIFICANT CHANGE UP (ref 17–32)
CREAT SERPL-MCNC: 1 MG/DL — SIGNIFICANT CHANGE UP (ref 0.7–1.5)
ESTIMATED AVERAGE GLUCOSE: 108 MG/DL — SIGNIFICANT CHANGE UP (ref 68–114)
GLUCOSE SERPL-MCNC: 94 MG/DL — SIGNIFICANT CHANGE UP (ref 70–99)
HCT VFR BLD CALC: 37.6 % — SIGNIFICANT CHANGE UP (ref 37–47)
HDLC SERPL-MCNC: 38 MG/DL — LOW
HGB BLD-MCNC: 11.5 G/DL — LOW (ref 12–16)
LIPID PNL WITH DIRECT LDL SERPL: 65 MG/DL — SIGNIFICANT CHANGE UP (ref 4–129)
MAGNESIUM SERPL-MCNC: 2.1 MG/DL — SIGNIFICANT CHANGE UP (ref 1.8–2.4)
MCHC RBC-ENTMCNC: 25.1 PG — LOW (ref 27–31)
MCHC RBC-ENTMCNC: 30.6 G/DL — LOW (ref 32–37)
MCV RBC AUTO: 81.9 FL — SIGNIFICANT CHANGE UP (ref 81–99)
NRBC # BLD: 0 /100 WBCS — SIGNIFICANT CHANGE UP (ref 0–0)
PLATELET # BLD AUTO: 158 K/UL — SIGNIFICANT CHANGE UP (ref 130–400)
POTASSIUM SERPL-MCNC: 4.4 MMOL/L — SIGNIFICANT CHANGE UP (ref 3.5–5)
POTASSIUM SERPL-SCNC: 4.4 MMOL/L — SIGNIFICANT CHANGE UP (ref 3.5–5)
PROT SERPL-MCNC: 7.3 G/DL — SIGNIFICANT CHANGE UP (ref 6–8)
RBC # BLD: 4.59 M/UL — SIGNIFICANT CHANGE UP (ref 4.2–5.4)
RBC # FLD: 17.2 % — HIGH (ref 11.5–14.5)
SODIUM SERPL-SCNC: 140 MMOL/L — SIGNIFICANT CHANGE UP (ref 135–146)
TOTAL CHOLESTEROL/HDL RATIO MEASUREMENT: 3.2 RATIO — LOW (ref 4–5.5)
TRIGL SERPL-MCNC: 86 MG/DL — SIGNIFICANT CHANGE UP (ref 10–149)
WBC # BLD: 3 K/UL — LOW (ref 4.8–10.8)
WBC # FLD AUTO: 3 K/UL — LOW (ref 4.8–10.8)

## 2020-06-05 LAB
APPEARANCE UR: ABNORMAL
BACTERIA # UR AUTO: ABNORMAL
BILIRUB UR-MCNC: NEGATIVE — SIGNIFICANT CHANGE UP
COLOR SPEC: YELLOW — SIGNIFICANT CHANGE UP
DIFF PNL FLD: NEGATIVE — SIGNIFICANT CHANGE UP
EPI CELLS # UR: >27 /HPF — HIGH (ref 0–5)
GLUCOSE UR QL: NEGATIVE — SIGNIFICANT CHANGE UP
HAV IGM SER-ACNC: SIGNIFICANT CHANGE UP
HBV CORE IGM SER-ACNC: SIGNIFICANT CHANGE UP
HBV SURFACE AG SER-ACNC: SIGNIFICANT CHANGE UP
HCG UR QL: NEGATIVE — SIGNIFICANT CHANGE UP
HCV AB S/CO SERPL IA: 2.57 S/CO — HIGH (ref 0–0.99)
HCV AB SERPL-IMP: ABNORMAL
HCV RNA FLD QL NAA+PROBE: SIGNIFICANT CHANGE UP
HCV RNA SPEC QL PROBE+SIG AMP: SIGNIFICANT CHANGE UP
HYALINE CASTS # UR AUTO: 6 /LPF — SIGNIFICANT CHANGE UP (ref 0–7)
KETONES UR-MCNC: NEGATIVE — SIGNIFICANT CHANGE UP
LEUKOCYTE ESTERASE UR-ACNC: NEGATIVE — SIGNIFICANT CHANGE UP
NITRITE UR-MCNC: NEGATIVE — SIGNIFICANT CHANGE UP
PH UR: 8 — SIGNIFICANT CHANGE UP (ref 5–8)
PROT UR-MCNC: SIGNIFICANT CHANGE UP
RBC CASTS # UR COMP ASSIST: 1 /HPF — SIGNIFICANT CHANGE UP (ref 0–4)
SP GR SPEC: 1.01 — SIGNIFICANT CHANGE UP (ref 1.01–1.02)
T PALLIDUM AB TITR SER: NEGATIVE — SIGNIFICANT CHANGE UP
UROBILINOGEN FLD QL: SIGNIFICANT CHANGE UP
WBC UR QL: 12 /HPF — HIGH (ref 0–5)

## 2020-06-06 LAB
GAMMA INTERFERON BACKGROUND BLD IA-ACNC: 0.16 IU/ML — SIGNIFICANT CHANGE UP
M TB IFN-G BLD-IMP: NEGATIVE — SIGNIFICANT CHANGE UP
M TB IFN-G CD4+ BCKGRND COR BLD-ACNC: 0 IU/ML — SIGNIFICANT CHANGE UP
M TB IFN-G CD4+CD8+ BCKGRND COR BLD-ACNC: 0 IU/ML — SIGNIFICANT CHANGE UP
QUANT TB PLUS MITOGEN MINUS NIL: 0.94 IU/ML — SIGNIFICANT CHANGE UP

## 2020-06-10 ENCOUNTER — OUTPATIENT (OUTPATIENT)
Dept: OUTPATIENT SERVICES | Facility: HOSPITAL | Age: 25
LOS: 1 days | Discharge: HOME | End: 2020-06-10

## 2020-06-10 DIAGNOSIS — I49.9 CARDIAC ARRHYTHMIA, UNSPECIFIED: ICD-10-CM

## 2020-09-22 ENCOUNTER — OUTPATIENT (OUTPATIENT)
Dept: OUTPATIENT SERVICES | Facility: HOSPITAL | Age: 25
LOS: 1 days | Discharge: HOME | End: 2020-09-22

## 2021-01-22 ENCOUNTER — OUTPATIENT (OUTPATIENT)
Dept: OUTPATIENT SERVICES | Facility: HOSPITAL | Age: 26
LOS: 1 days | Discharge: HOME | End: 2021-01-22

## 2021-01-22 DIAGNOSIS — I49.9 CARDIAC ARRHYTHMIA, UNSPECIFIED: ICD-10-CM

## 2021-01-22 DIAGNOSIS — Z00.8 ENCOUNTER FOR OTHER GENERAL EXAMINATION: ICD-10-CM

## 2021-01-25 LAB
A1C WITH ESTIMATED AVERAGE GLUCOSE RESULT: 4.8 % — SIGNIFICANT CHANGE UP (ref 4–5.6)
ALBUMIN SERPL ELPH-MCNC: 2.2 G/DL — LOW (ref 3.5–5.2)
ALP SERPL-CCNC: 1789 U/L — HIGH (ref 30–115)
ALT FLD-CCNC: 39 U/L — SIGNIFICANT CHANGE UP (ref 0–41)
ANION GAP SERPL CALC-SCNC: 11 MMOL/L — SIGNIFICANT CHANGE UP (ref 7–14)
ANISOCYTOSIS BLD QL: SIGNIFICANT CHANGE UP
AST SERPL-CCNC: 80 U/L — HIGH (ref 0–41)
BILIRUB SERPL-MCNC: 1.7 MG/DL — HIGH (ref 0.2–1.2)
BUN SERPL-MCNC: 12 MG/DL — SIGNIFICANT CHANGE UP (ref 10–20)
CALCIUM SERPL-MCNC: 7.9 MG/DL — LOW (ref 8.5–10.1)
CHLORIDE SERPL-SCNC: 100 MMOL/L — SIGNIFICANT CHANGE UP (ref 98–110)
CHOLEST SERPL-MCNC: 128 MG/DL — SIGNIFICANT CHANGE UP
CO2 SERPL-SCNC: 20 MMOL/L — SIGNIFICANT CHANGE UP (ref 17–32)
CREAT SERPL-MCNC: 1 MG/DL — SIGNIFICANT CHANGE UP (ref 0.7–1.5)
DACRYOCYTES BLD QL SMEAR: SLIGHT — SIGNIFICANT CHANGE UP
ESTIMATED AVERAGE GLUCOSE: 91 MG/DL — SIGNIFICANT CHANGE UP (ref 68–114)
GLUCOSE SERPL-MCNC: 90 MG/DL — SIGNIFICANT CHANGE UP (ref 70–99)
HCT VFR BLD CALC: 27.2 % — LOW (ref 37–47)
HDLC SERPL-MCNC: 16 MG/DL — LOW
HGB BLD-MCNC: 8.6 G/DL — LOW (ref 12–16)
HYPOCHROMIA BLD QL: SIGNIFICANT CHANGE UP
LG PLATELETS BLD QL AUTO: SLIGHT — SIGNIFICANT CHANGE UP
LIPID PNL WITH DIRECT LDL SERPL: 25 MG/DL — SIGNIFICANT CHANGE UP
MAGNESIUM SERPL-MCNC: 2 MG/DL — SIGNIFICANT CHANGE UP (ref 1.8–2.4)
MANUAL SMEAR VERIFICATION: SIGNIFICANT CHANGE UP
MCHC RBC-ENTMCNC: 26.5 PG — LOW (ref 27–31)
MCHC RBC-ENTMCNC: 31.6 G/DL — LOW (ref 32–37)
MCV RBC AUTO: 84 FL — SIGNIFICANT CHANGE UP (ref 81–99)
MICROCYTES BLD QL: SLIGHT — SIGNIFICANT CHANGE UP
NEUTS BAND # BLD: 8 % — HIGH (ref 0–6)
NON HDL CHOLESTEROL: 112 MG/DL — SIGNIFICANT CHANGE UP
NRBC # BLD: 0 /100 — SIGNIFICANT CHANGE UP (ref 0–0)
NRBC # BLD: SIGNIFICANT CHANGE UP /100 WBCS (ref 0–0)
OVALOCYTES BLD QL SMEAR: SLIGHT — SIGNIFICANT CHANGE UP
PLAT MORPH BLD: ABNORMAL
PLATELET # BLD AUTO: 84 K/UL — LOW (ref 130–400)
POTASSIUM SERPL-MCNC: 4.2 MMOL/L — SIGNIFICANT CHANGE UP (ref 3.5–5)
POTASSIUM SERPL-SCNC: 4.2 MMOL/L — SIGNIFICANT CHANGE UP (ref 3.5–5)
PROT SERPL-MCNC: 6 G/DL — SIGNIFICANT CHANGE UP (ref 6–8)
RBC # BLD: 3.24 M/UL — LOW (ref 4.2–5.4)
RBC # FLD: 24 % — HIGH (ref 11.5–14.5)
RBC BLD AUTO: ABNORMAL
SODIUM SERPL-SCNC: 131 MMOL/L — LOW (ref 135–146)
TARGETS BLD QL SMEAR: SLIGHT — SIGNIFICANT CHANGE UP
TRIGL SERPL-MCNC: 312 MG/DL — HIGH
WBC # BLD: 1.45 K/UL — LOW (ref 4.8–10.8)
WBC # FLD AUTO: 1.45 K/UL — LOW (ref 4.8–10.8)

## 2021-01-26 LAB
APPEARANCE UR: CLEAR — SIGNIFICANT CHANGE UP
BACTERIA # UR AUTO: ABNORMAL
BILIRUB UR-MCNC: ABNORMAL
COLOR SPEC: YELLOW — SIGNIFICANT CHANGE UP
DIFF PNL FLD: NEGATIVE — SIGNIFICANT CHANGE UP
EPI CELLS # UR: ABNORMAL /HPF
GLUCOSE UR QL: NEGATIVE MG/DL — SIGNIFICANT CHANGE UP
HAV IGM SER-ACNC: SIGNIFICANT CHANGE UP
HBV CORE IGM SER-ACNC: SIGNIFICANT CHANGE UP
HBV SURFACE AG SER-ACNC: SIGNIFICANT CHANGE UP
HCG UR QL: NEGATIVE — SIGNIFICANT CHANGE UP
HCV AB S/CO SERPL IA: 2.33 S/CO — HIGH (ref 0–0.99)
HCV AB SERPL-IMP: ABNORMAL
KETONES UR-MCNC: NEGATIVE — SIGNIFICANT CHANGE UP
LEUKOCYTE ESTERASE UR-ACNC: NEGATIVE — SIGNIFICANT CHANGE UP
NITRITE UR-MCNC: NEGATIVE — SIGNIFICANT CHANGE UP
PH UR: 7 — SIGNIFICANT CHANGE UP (ref 5–8)
PROT UR-MCNC: 100 MG/DL
SP GR SPEC: 1.02 — SIGNIFICANT CHANGE UP (ref 1.01–1.03)
T PALLIDUM AB TITR SER: NEGATIVE — SIGNIFICANT CHANGE UP
UROBILINOGEN FLD QL: 4 MG/DL (ref 0.2–0.2)
WBC UR QL: SIGNIFICANT CHANGE UP /HPF

## 2021-01-30 ENCOUNTER — EMERGENCY (EMERGENCY)
Facility: HOSPITAL | Age: 26
LOS: 0 days | Discharge: HOME | End: 2021-01-30
Attending: EMERGENCY MEDICINE | Admitting: EMERGENCY MEDICINE
Payer: MEDICAID

## 2021-01-30 VITALS
DIASTOLIC BLOOD PRESSURE: 64 MMHG | HEART RATE: 123 BPM | RESPIRATION RATE: 18 BRPM | WEIGHT: 145.06 LBS | SYSTOLIC BLOOD PRESSURE: 133 MMHG | OXYGEN SATURATION: 100 % | TEMPERATURE: 101 F | HEIGHT: 66 IN

## 2021-01-30 VITALS
TEMPERATURE: 100 F | SYSTOLIC BLOOD PRESSURE: 127 MMHG | OXYGEN SATURATION: 100 % | RESPIRATION RATE: 18 BRPM | HEART RATE: 107 BPM | DIASTOLIC BLOOD PRESSURE: 62 MMHG

## 2021-01-30 DIAGNOSIS — Z79.899 OTHER LONG TERM (CURRENT) DRUG THERAPY: ICD-10-CM

## 2021-01-30 DIAGNOSIS — J45.909 UNSPECIFIED ASTHMA, UNCOMPLICATED: ICD-10-CM

## 2021-01-30 DIAGNOSIS — F41.8 OTHER SPECIFIED ANXIETY DISORDERS: ICD-10-CM

## 2021-01-30 DIAGNOSIS — F17.200 NICOTINE DEPENDENCE, UNSPECIFIED, UNCOMPLICATED: ICD-10-CM

## 2021-01-30 DIAGNOSIS — Z88.8 ALLERGY STATUS TO OTHER DRUGS, MEDICAMENTS AND BIOLOGICAL SUBSTANCES: ICD-10-CM

## 2021-01-30 DIAGNOSIS — A41.9 SEPSIS, UNSPECIFIED ORGANISM: ICD-10-CM

## 2021-01-30 DIAGNOSIS — Z20.822 CONTACT WITH AND (SUSPECTED) EXPOSURE TO COVID-19: ICD-10-CM

## 2021-01-30 DIAGNOSIS — R35.8 OTHER POLYURIA: ICD-10-CM

## 2021-01-30 DIAGNOSIS — R14.0 ABDOMINAL DISTENSION (GASEOUS): ICD-10-CM

## 2021-01-30 DIAGNOSIS — R60.0 LOCALIZED EDEMA: ICD-10-CM

## 2021-01-30 LAB
ALBUMIN SERPL ELPH-MCNC: 2.4 G/DL — LOW (ref 3.5–5.2)
ALP SERPL-CCNC: 1875 U/L — HIGH (ref 30–115)
ALT FLD-CCNC: 38 U/L — SIGNIFICANT CHANGE UP (ref 0–41)
ANION GAP SERPL CALC-SCNC: 11 MMOL/L — SIGNIFICANT CHANGE UP (ref 7–14)
ANISOCYTOSIS BLD QL: SIGNIFICANT CHANGE UP
APAP SERPL-MCNC: <5 UG/ML — LOW (ref 10–30)
APPEARANCE UR: CLEAR — SIGNIFICANT CHANGE UP
APTT BLD: 36.2 SEC — SIGNIFICANT CHANGE UP (ref 27–39.2)
AST SERPL-CCNC: 79 U/L — HIGH (ref 0–41)
BACTERIA # UR AUTO: ABNORMAL
BASOPHILS # BLD AUTO: 0 K/UL — SIGNIFICANT CHANGE UP (ref 0–0.2)
BASOPHILS NFR BLD AUTO: 0 % — SIGNIFICANT CHANGE UP (ref 0–1)
BILIRUB DIRECT SERPL-MCNC: 1.1 MG/DL — HIGH (ref 0–0.2)
BILIRUB INDIRECT FLD-MCNC: 0.4 MG/DL — SIGNIFICANT CHANGE UP (ref 0.2–1.2)
BILIRUB SERPL-MCNC: 1.5 MG/DL — HIGH (ref 0.2–1.2)
BILIRUB UR-MCNC: ABNORMAL
BLD GP AB SCN SERPL QL: SIGNIFICANT CHANGE UP
BUN SERPL-MCNC: 12 MG/DL — SIGNIFICANT CHANGE UP (ref 10–20)
CALCIUM SERPL-MCNC: 8 MG/DL — LOW (ref 8.5–10.1)
CHLORIDE SERPL-SCNC: 99 MMOL/L — SIGNIFICANT CHANGE UP (ref 98–110)
CO2 SERPL-SCNC: 21 MMOL/L — SIGNIFICANT CHANGE UP (ref 17–32)
COLOR SPEC: YELLOW — SIGNIFICANT CHANGE UP
COMMENT - URINE: SIGNIFICANT CHANGE UP
CREAT SERPL-MCNC: 0.9 MG/DL — SIGNIFICANT CHANGE UP (ref 0.7–1.5)
DACRYOCYTES BLD QL SMEAR: SIGNIFICANT CHANGE UP
DIFF PNL FLD: NEGATIVE — SIGNIFICANT CHANGE UP
EOSINOPHIL # BLD AUTO: 0 K/UL — SIGNIFICANT CHANGE UP (ref 0–0.7)
EOSINOPHIL NFR BLD AUTO: 0 % — SIGNIFICANT CHANGE UP (ref 0–8)
EPI CELLS # UR: ABNORMAL /HPF
ETHANOL SERPL-MCNC: <10 MG/DL — SIGNIFICANT CHANGE UP
GLUCOSE SERPL-MCNC: 106 MG/DL — HIGH (ref 70–99)
GLUCOSE UR QL: NEGATIVE MG/DL — SIGNIFICANT CHANGE UP
GRAN CASTS # UR COMP ASSIST: ABNORMAL /LPF
HCG SERPL QL: NEGATIVE — SIGNIFICANT CHANGE UP
HCT VFR BLD CALC: 25 % — LOW (ref 37–47)
HCV RNA FLD QL NAA+PROBE: SIGNIFICANT CHANGE UP
HGB BLD-MCNC: 7.9 G/DL — LOW (ref 12–16)
IMM GRANULOCYTES NFR BLD AUTO: 0.5 % — HIGH (ref 0.1–0.3)
INR BLD: 1.19 RATIO — SIGNIFICANT CHANGE UP (ref 0.65–1.3)
KETONES UR-MCNC: NEGATIVE — SIGNIFICANT CHANGE UP
LACTATE SERPL-SCNC: 2.4 MMOL/L — HIGH (ref 0.7–2)
LEUKOCYTE ESTERASE UR-ACNC: ABNORMAL
LG PLATELETS BLD QL AUTO: SLIGHT — SIGNIFICANT CHANGE UP
LIDOCAIN IGE QN: 74 U/L — HIGH (ref 7–60)
LYMPHOCYTES # BLD AUTO: 0.67 K/UL — LOW (ref 1.2–3.4)
LYMPHOCYTES # BLD AUTO: 33.2 % — SIGNIFICANT CHANGE UP (ref 20.5–51.1)
MACROCYTES BLD QL: SLIGHT — SIGNIFICANT CHANGE UP
MANUAL SMEAR VERIFICATION: SIGNIFICANT CHANGE UP
MCHC RBC-ENTMCNC: 27.3 PG — SIGNIFICANT CHANGE UP (ref 27–31)
MCHC RBC-ENTMCNC: 31.6 G/DL — LOW (ref 32–37)
MCV RBC AUTO: 86.5 FL — SIGNIFICANT CHANGE UP (ref 81–99)
MICROCYTES BLD QL: SLIGHT — SIGNIFICANT CHANGE UP
MONOCYTES # BLD AUTO: 0.1 K/UL — SIGNIFICANT CHANGE UP (ref 0.1–0.6)
MONOCYTES NFR BLD AUTO: 5 % — SIGNIFICANT CHANGE UP (ref 1.7–9.3)
NEUTROPHILS # BLD AUTO: 1.24 K/UL — LOW (ref 1.4–6.5)
NEUTROPHILS NFR BLD AUTO: 61.3 % — SIGNIFICANT CHANGE UP (ref 42.2–75.2)
NEUTS BAND # BLD: 6 % — SIGNIFICANT CHANGE UP (ref 0–6)
NITRITE UR-MCNC: NEGATIVE — SIGNIFICANT CHANGE UP
NRBC # BLD: 0 /100 WBCS — SIGNIFICANT CHANGE UP (ref 0–0)
NRBC # BLD: 0 /100 — SIGNIFICANT CHANGE UP (ref 0–0)
OVALOCYTES BLD QL SMEAR: SLIGHT — SIGNIFICANT CHANGE UP
PH UR: 6.5 — SIGNIFICANT CHANGE UP (ref 5–8)
PLAT MORPH BLD: ABNORMAL
PLATELET # BLD AUTO: 83 K/UL — LOW (ref 130–400)
POIKILOCYTOSIS BLD QL AUTO: SLIGHT — SIGNIFICANT CHANGE UP
POTASSIUM SERPL-MCNC: 3.3 MMOL/L — LOW (ref 3.5–5)
POTASSIUM SERPL-SCNC: 3.3 MMOL/L — LOW (ref 3.5–5)
PROT SERPL-MCNC: 6.2 G/DL — SIGNIFICANT CHANGE UP (ref 6–8)
PROT UR-MCNC: 100 MG/DL
PROTHROM AB SERPL-ACNC: 13.7 SEC — HIGH (ref 9.95–12.87)
RAPID RVP RESULT: SIGNIFICANT CHANGE UP
RBC # BLD: 2.89 M/UL — LOW (ref 4.2–5.4)
RBC # FLD: 23 % — HIGH (ref 11.5–14.5)
RBC BLD AUTO: ABNORMAL
RBC CASTS # UR COMP ASSIST: SIGNIFICANT CHANGE UP /HPF
SALICYLATES SERPL-MCNC: <0.3 MG/DL — LOW (ref 4–30)
SARS-COV-2 RNA SPEC QL NAA+PROBE: SIGNIFICANT CHANGE UP
SCHISTOCYTES BLD QL AUTO: SLIGHT — SIGNIFICANT CHANGE UP
SODIUM SERPL-SCNC: 131 MMOL/L — LOW (ref 135–146)
SP GR SPEC: 1.02 — SIGNIFICANT CHANGE UP (ref 1.01–1.03)
UROBILINOGEN FLD QL: 4 MG/DL (ref 0.2–0.2)
WBC # BLD: 2.02 K/UL — LOW (ref 4.8–10.8)
WBC # FLD AUTO: 2.02 K/UL — LOW (ref 4.8–10.8)
WBC UR QL: ABNORMAL /HPF

## 2021-01-30 PROCEDURE — 76705 ECHO EXAM OF ABDOMEN: CPT | Mod: 26

## 2021-01-30 PROCEDURE — 99285 EMERGENCY DEPT VISIT HI MDM: CPT

## 2021-01-30 PROCEDURE — 74177 CT ABD & PELVIS W/CONTRAST: CPT | Mod: 26

## 2021-01-30 PROCEDURE — 71045 X-RAY EXAM CHEST 1 VIEW: CPT | Mod: 26

## 2021-01-30 RX ORDER — CEFEPIME 1 G/1
2000 INJECTION, POWDER, FOR SOLUTION INTRAMUSCULAR; INTRAVENOUS ONCE
Refills: 0 | Status: COMPLETED | OUTPATIENT
Start: 2021-01-30 | End: 2021-01-30

## 2021-01-30 RX ORDER — VANCOMYCIN HCL 1 G
1000 VIAL (EA) INTRAVENOUS ONCE
Refills: 0 | Status: COMPLETED | OUTPATIENT
Start: 2021-01-30 | End: 2021-01-30

## 2021-01-30 RX ORDER — IBUPROFEN 200 MG
600 TABLET ORAL ONCE
Refills: 0 | Status: COMPLETED | OUTPATIENT
Start: 2021-01-30 | End: 2021-01-30

## 2021-01-30 RX ORDER — SODIUM CHLORIDE 9 MG/ML
2000 INJECTION, SOLUTION INTRAVENOUS ONCE
Refills: 0 | Status: COMPLETED | OUTPATIENT
Start: 2021-01-30 | End: 2021-01-30

## 2021-01-30 RX ORDER — POTASSIUM CHLORIDE 20 MEQ
40 PACKET (EA) ORAL ONCE
Refills: 0 | Status: COMPLETED | OUTPATIENT
Start: 2021-01-30 | End: 2021-01-30

## 2021-01-30 RX ADMIN — CEFEPIME 100 MILLIGRAM(S): 1 INJECTION, POWDER, FOR SOLUTION INTRAMUSCULAR; INTRAVENOUS at 17:41

## 2021-01-30 RX ADMIN — Medication 40 MILLIEQUIVALENT(S): at 18:36

## 2021-01-30 RX ADMIN — Medication 250 MILLIGRAM(S): at 17:40

## 2021-01-30 RX ADMIN — Medication 600 MILLIGRAM(S): at 17:18

## 2021-01-30 RX ADMIN — SODIUM CHLORIDE 2000 MILLILITER(S): 9 INJECTION, SOLUTION INTRAVENOUS at 17:17

## 2021-01-30 NOTE — ED ADULT NURSE NOTE - NS ED NURSE ELOPE COMMENTS
Patient states she doesn't want to wait for results. Patient educated on importance of remaining in ED, refused to stay despite education. IV removed Patient states she doesn't want to wait for results. Patient educated on importance of remaining in ED, refused to stay despite education. IV removed, MD Kanwal quintero

## 2021-01-30 NOTE — ED ADULT TRIAGE NOTE - CHIEF COMPLAINT QUOTE
pt was at intake at the methadone clinic and was told she has abnormal liver enzymes from bloodwork and to come to ER, pt is IV heroine user, last used today.

## 2021-01-30 NOTE — ED ADULT NURSE NOTE - NSFALLRSKHARMRISK_ED_ALL_ED
:  A couple days ago  Pharmacy name and location:  [facility] on [address], [place]  Any drug allergies:    Comments: Patient is out of town, unable to come in. She is requesting a script be called in for her. Please see pharmacy before sending.   no

## 2021-01-30 NOTE — ED PROVIDER NOTE - PHYSICAL EXAMINATION
VITALS:  I have reviewed the initial vital signs.  GENERAL: Well-developed, well-nourished, in no acute distress. Nontoxic.  HEENT: Sclera clear. No conjunctival pallor. EOMI, PERRLA. MMM.   NECK: supple w FROM. No JVD.  CARDIO: RRR, nl S1 and S2. No murmurs, rubs, or gallops. 2+ radial and pedal pulses bilaterally.  PULM: Normal effort. CTA b/l without wheezes, rales, or rhonchi.  MSK: 2+ pitting edema to b/l lower extremities. FROM to extremities x4. No calf warmth, erythema, or ttp.  GI: Normal bowel sounds. Abdomen soft, distended. Nontender in all four quadrants without rebound or guarding.   : No CVA tenderness b/l.  SKIN: Warm, dry. No pallor. No rash. No jaundice.   NEURO: A&Ox3. Speech clear. No focal deficits.  PSYCH: Calm and cooperative.

## 2021-01-30 NOTE — ED PROVIDER NOTE - ATTENDING CONTRIBUTION TO CARE
I personally evaluated the patient. I reviewed the Resident’s or Physician Assistant’s note (as assigned above), and agree with the findings and plan except as documented in my note.     25 year old female IV heroin user here for abnormal labs , had screening labs at a local methadone clinic and was advised to come to ED for care. Admits to worsening adbominal distention of uncertain etiology.     PMH: known Hep C not being treated    ROS otherwise unremarkable.   Social: substance use disorder, IV type    PE: female in no distress. CV: pulses intact. CHEST: normal work of breathing. ABD: distended, soft. ascites noted. no rebound or guarding. SKIN: normal. EXT: bilateral lower extremity edema noted. NEURO: AAO 3 no focal deficits. coordination and motors intact. HEENT: mucosa normal PSYCH: mood labile. thought content and process intact. memory and cognition appear normal.     Impression: sepsis     Plan: IV labs imaging supportive care and reevaluation

## 2021-01-30 NOTE — ED PROVIDER NOTE - CLINICAL SUMMARY MEDICAL DECISION MAKING FREE TEXT BOX
25 year old substance use disorder patient with hepatitis C and IV drug use here for fever abdominal swelling and abnormal labs, pancytopenic on outpatient labs. Had screening labs imaging medications and reevaluation, plan is for inpatient admission to monitored setting for continued management. Had treatment for sepsis and management of multiorgan dysfunction, patient denied additional care due to desire to be back in her home.  Has decisional capacity. Clear prognosis and abnormalities discussed with patient who understands but still refuses continuation of care. I explained her findings and the medical concerns we have in simple terms and the patient still refuses to continue her care here.

## 2021-01-30 NOTE — ED PROVIDER NOTE - OBJECTIVE STATEMENT
25 year old female w hx of Hep C, IV heroin use (last use today) presents to the ED for evaluation of gradual onset constant worsening abd distention and leg swelling x 2 weeks. Pt states she had outpatient lab work done for the methadone clinic on 1/25, called today and was told to come to the ED as her LFTs were elevated and she is pancytopenic. Pt denies hx of anemia or need for blood transfusion. She denies any other drug or etoh use. Denies known fevers/chills, chest pain, sob, n/v/d, constipation, obstipation, back/flank pain, irritative voiding symptoms, black/bloody stools, abnl vaginal bleeding/discharge. LMP 3 months ago.

## 2021-01-30 NOTE — ED PROVIDER NOTE - NS ED ROS FT
CONSTITUTIONAL: (-) fevers, (-) chills, (-) malaise, (-) decreased appetite  EYES: (-) vision changes, (-) blurry vision, (-) double vision  ENT: (-) congestion, (-) rhinorrhea, (-) sore throat  NECK: (-) neck pain, (-) neck stiffness  CARDIO: (-) chest pain, (-) palpitations, (+) edema  PULM: (-) cough, (-) sputum, (-) chest tightness, (-) shortness of breath, (-) wheezing, (-) hemoptysis, (-) stridor  GI: (-) nausea, (-) vomiting, (-) diarrhea, (-) constipation, (+) abdominal pain, (-) melena, (-) hematochezia, (-) rectal bleeding  : (-) dysuria, (-) hematuria, (-) frequency, (-) urgency, (-) flank pain  ENDO: (+) polyuria, (-) polydipsia, (-) polyphagia  HEME: (-) easy bruising, (-) easy bleeding  MSK: (-) back pain, (-) myalgias  SKIN: (-) rashes, (-) pallor, (-) jaundice  NEURO: (-) headache, (-) dizziness, (-) lightheadedness, (-) weakness, (-) paresthesias, (-) numbness, (-) syncope  PSYCH: (-) suicidal ideation, (-) homicidal ideation    *all other systems negative except as documented above and in the HPI*

## 2021-01-30 NOTE — ED PROVIDER NOTE - NS ED MD EM SELECTION
Prior diagnosis of POAG in left eye.  Using Timolol Maleate 0.5% ophthalmic solution every morning in the left eye for IOP control  Minimal (and less than anticipated) reduction in intraocular pressure with the use of drops in the left eye.    Defer decision to modify treatment regimen for IOP control/reduction in the left eye  Continue Timolol Maleate 0.5%  drops for IOP control n the left eye every morning - no changes made to treatment regimen.  Return in four months for recheck with repeat HVF test (24-2 LEANNA Standard) at that time.  Orders entered.   [id number] Comprehensive

## 2021-02-01 LAB
CULTURE RESULTS: SIGNIFICANT CHANGE UP
SPECIMEN SOURCE: SIGNIFICANT CHANGE UP

## 2021-02-05 ENCOUNTER — TRANSCRIPTION ENCOUNTER (OUTPATIENT)
Age: 26
End: 2021-02-05

## 2021-02-05 ENCOUNTER — INPATIENT (INPATIENT)
Facility: HOSPITAL | Age: 26
LOS: 1 days | Discharge: LEFT BEFORE DISCHARGE | End: 2021-02-07
Attending: HOSPITALIST | Admitting: HOSPITALIST
Payer: MEDICAID

## 2021-02-05 VITALS
SYSTOLIC BLOOD PRESSURE: 129 MMHG | RESPIRATION RATE: 20 BRPM | TEMPERATURE: 100 F | HEART RATE: 110 BPM | OXYGEN SATURATION: 98 % | WEIGHT: 139.99 LBS | DIASTOLIC BLOOD PRESSURE: 78 MMHG | HEIGHT: 66 IN

## 2021-02-05 LAB
ALBUMIN SERPL ELPH-MCNC: 2.1 G/DL — LOW (ref 3.5–5.2)
ALP SERPL-CCNC: >1200 U/L — HIGH (ref 30–115)
ALT FLD-CCNC: 32 U/L — SIGNIFICANT CHANGE UP (ref 0–41)
ANION GAP SERPL CALC-SCNC: 12 MMOL/L — SIGNIFICANT CHANGE UP (ref 7–14)
APTT BLD: 32.8 SEC — SIGNIFICANT CHANGE UP (ref 27–39.2)
AST SERPL-CCNC: 64 U/L — HIGH (ref 0–41)
BASOPHILS # BLD AUTO: 0 K/UL — SIGNIFICANT CHANGE UP (ref 0–0.2)
BASOPHILS NFR BLD AUTO: 0 % — SIGNIFICANT CHANGE UP (ref 0–1)
BILIRUB SERPL-MCNC: 1.9 MG/DL — HIGH (ref 0.2–1.2)
BLD GP AB SCN SERPL QL: SIGNIFICANT CHANGE UP
BUN SERPL-MCNC: 11 MG/DL — SIGNIFICANT CHANGE UP (ref 10–20)
CALCIUM SERPL-MCNC: 7.5 MG/DL — LOW (ref 8.5–10.1)
CHLORIDE SERPL-SCNC: 96 MMOL/L — LOW (ref 98–110)
CO2 SERPL-SCNC: 22 MMOL/L — SIGNIFICANT CHANGE UP (ref 17–32)
CREAT SERPL-MCNC: 0.8 MG/DL — SIGNIFICANT CHANGE UP (ref 0.7–1.5)
CULTURE RESULTS: SIGNIFICANT CHANGE UP
CULTURE RESULTS: SIGNIFICANT CHANGE UP
EOSINOPHIL # BLD AUTO: 0 K/UL — SIGNIFICANT CHANGE UP (ref 0–0.7)
EOSINOPHIL NFR BLD AUTO: 0 % — SIGNIFICANT CHANGE UP (ref 0–8)
GLUCOSE SERPL-MCNC: 114 MG/DL — HIGH (ref 70–99)
HCG SERPL QL: NEGATIVE — SIGNIFICANT CHANGE UP
HCT VFR BLD CALC: 21.1 % — LOW (ref 37–47)
HGB BLD-MCNC: 6.7 G/DL — CRITICAL LOW (ref 12–16)
HIV 1 & 2 AB SERPL IA.RAPID: SIGNIFICANT CHANGE UP
IMM GRANULOCYTES NFR BLD AUTO: 0 % — LOW (ref 0.1–0.3)
INR BLD: 1.17 RATIO — SIGNIFICANT CHANGE UP (ref 0.65–1.3)
LACTATE SERPL-SCNC: 2.2 MMOL/L — HIGH (ref 0.7–2)
LYMPHOCYTES # BLD AUTO: 0.5 K/UL — LOW (ref 1.2–3.4)
LYMPHOCYTES # BLD AUTO: 29.1 % — SIGNIFICANT CHANGE UP (ref 20.5–51.1)
MAGNESIUM SERPL-MCNC: 2.1 MG/DL — SIGNIFICANT CHANGE UP (ref 1.8–2.4)
MCHC RBC-ENTMCNC: 27.5 PG — SIGNIFICANT CHANGE UP (ref 27–31)
MCHC RBC-ENTMCNC: 31.8 G/DL — LOW (ref 32–37)
MCV RBC AUTO: 86.5 FL — SIGNIFICANT CHANGE UP (ref 81–99)
MONOCYTES # BLD AUTO: 0.11 K/UL — SIGNIFICANT CHANGE UP (ref 0.1–0.6)
MONOCYTES NFR BLD AUTO: 6.4 % — SIGNIFICANT CHANGE UP (ref 1.7–9.3)
NEUTROPHILS # BLD AUTO: 1.11 K/UL — LOW (ref 1.4–6.5)
NEUTROPHILS NFR BLD AUTO: 64.5 % — SIGNIFICANT CHANGE UP (ref 42.2–75.2)
NRBC # BLD: 0 /100 WBCS — SIGNIFICANT CHANGE UP (ref 0–0)
NT-PROBNP SERPL-SCNC: 607 PG/ML — HIGH (ref 0–300)
PLATELET # BLD AUTO: 81 K/UL — LOW (ref 130–400)
POTASSIUM SERPL-MCNC: 2.9 MMOL/L — LOW (ref 3.5–5)
POTASSIUM SERPL-SCNC: 2.9 MMOL/L — LOW (ref 3.5–5)
PROT SERPL-MCNC: 5.8 G/DL — LOW (ref 6–8)
PROTHROM AB SERPL-ACNC: 13.5 SEC — HIGH (ref 9.95–12.87)
RAPID RVP RESULT: SIGNIFICANT CHANGE UP
RBC # BLD: 2.44 M/UL — LOW (ref 4.2–5.4)
RBC # FLD: 21.1 % — HIGH (ref 11.5–14.5)
SARS-COV-2 RNA SPEC QL NAA+PROBE: SIGNIFICANT CHANGE UP
SODIUM SERPL-SCNC: 130 MMOL/L — LOW (ref 135–146)
SPECIMEN SOURCE: SIGNIFICANT CHANGE UP
SPECIMEN SOURCE: SIGNIFICANT CHANGE UP
TROPONIN T SERPL-MCNC: <0.01 NG/ML — SIGNIFICANT CHANGE UP
WBC # BLD: 1.72 K/UL — LOW (ref 4.8–10.8)
WBC # FLD AUTO: 1.72 K/UL — LOW (ref 4.8–10.8)

## 2021-02-05 PROCEDURE — 76937 US GUIDE VASCULAR ACCESS: CPT | Mod: 26

## 2021-02-05 PROCEDURE — 99223 1ST HOSP IP/OBS HIGH 75: CPT

## 2021-02-05 PROCEDURE — 99285 EMERGENCY DEPT VISIT HI MDM: CPT | Mod: 25

## 2021-02-05 PROCEDURE — 36000 PLACE NEEDLE IN VEIN: CPT

## 2021-02-05 PROCEDURE — 71046 X-RAY EXAM CHEST 2 VIEWS: CPT | Mod: 26

## 2021-02-05 RX ORDER — MEROPENEM 1 G/30ML
1000 INJECTION INTRAVENOUS EVERY 8 HOURS
Refills: 0 | Status: DISCONTINUED | OUTPATIENT
Start: 2021-02-05 | End: 2021-02-05

## 2021-02-05 RX ORDER — CEFTRIAXONE 500 MG/1
2000 INJECTION, POWDER, FOR SOLUTION INTRAMUSCULAR; INTRAVENOUS EVERY 24 HOURS
Refills: 0 | Status: DISCONTINUED | OUTPATIENT
Start: 2021-02-05 | End: 2021-02-07

## 2021-02-05 RX ORDER — INFLUENZA VIRUS VACCINE 15; 15; 15; 15 UG/.5ML; UG/.5ML; UG/.5ML; UG/.5ML
0.5 SUSPENSION INTRAMUSCULAR ONCE
Refills: 0 | Status: COMPLETED | OUTPATIENT
Start: 2021-02-05 | End: 2021-02-05

## 2021-02-05 RX ORDER — POTASSIUM CHLORIDE 20 MEQ
40 PACKET (EA) ORAL ONCE
Refills: 0 | Status: COMPLETED | OUTPATIENT
Start: 2021-02-05 | End: 2021-02-05

## 2021-02-05 RX ORDER — ACETAMINOPHEN 500 MG
650 TABLET ORAL ONCE
Refills: 0 | Status: COMPLETED | OUTPATIENT
Start: 2021-02-05 | End: 2021-02-05

## 2021-02-05 RX ORDER — VANCOMYCIN HCL 1 G
1000 VIAL (EA) INTRAVENOUS EVERY 12 HOURS
Refills: 0 | Status: DISCONTINUED | OUTPATIENT
Start: 2021-02-05 | End: 2021-02-07

## 2021-02-05 RX ORDER — METHADONE HYDROCHLORIDE 40 MG/1
15 TABLET ORAL EVERY 12 HOURS
Refills: 0 | Status: DISCONTINUED | OUTPATIENT
Start: 2021-02-06 | End: 2021-02-06

## 2021-02-05 RX ORDER — HYDROXYZINE HCL 10 MG
25 TABLET ORAL EVERY 6 HOURS
Refills: 0 | Status: DISCONTINUED | OUTPATIENT
Start: 2021-02-05 | End: 2021-02-07

## 2021-02-05 RX ORDER — SENNA PLUS 8.6 MG/1
2 TABLET ORAL AT BEDTIME
Refills: 0 | Status: DISCONTINUED | OUTPATIENT
Start: 2021-02-05 | End: 2021-02-07

## 2021-02-05 RX ORDER — NICOTINE POLACRILEX 2 MG
1 GUM BUCCAL DAILY
Refills: 0 | Status: DISCONTINUED | OUTPATIENT
Start: 2021-02-05 | End: 2021-02-07

## 2021-02-05 RX ORDER — CEFEPIME 1 G/1
2000 INJECTION, POWDER, FOR SOLUTION INTRAMUSCULAR; INTRAVENOUS ONCE
Refills: 0 | Status: COMPLETED | OUTPATIENT
Start: 2021-02-05 | End: 2021-02-05

## 2021-02-05 RX ORDER — VANCOMYCIN HCL 1 G
1000 VIAL (EA) INTRAVENOUS ONCE
Refills: 0 | Status: COMPLETED | OUTPATIENT
Start: 2021-02-05 | End: 2021-02-05

## 2021-02-05 RX ADMIN — CEFEPIME 100 MILLIGRAM(S): 1 INJECTION, POWDER, FOR SOLUTION INTRAMUSCULAR; INTRAVENOUS at 13:41

## 2021-02-05 RX ADMIN — Medication 650 MILLIGRAM(S): at 15:45

## 2021-02-05 RX ADMIN — Medication 40 MILLIEQUIVALENT(S): at 14:49

## 2021-02-05 RX ADMIN — Medication 250 MILLIGRAM(S): at 14:49

## 2021-02-05 RX ADMIN — CEFEPIME 2000 MILLIGRAM(S): 1 INJECTION, POWDER, FOR SOLUTION INTRAMUSCULAR; INTRAVENOUS at 14:01

## 2021-02-05 NOTE — ED ADULT NURSE REASSESSMENT NOTE - NS ED NURSE REASSESS COMMENT FT1
pt being admitted to the floor. report was given to sandi paul. pt room is being changed. floor will call back once room is available

## 2021-02-05 NOTE — H&P ADULT - NSHPSOCIALHISTORY_GEN_ALL_CORE
current 1 PPD smoker  denies etoh  + heroin use, last use today, 1 gram per day  2 children- 3 & 1 year old

## 2021-02-05 NOTE — H&P ADULT - ASSESSMENT
Patient is a 25 year old female with hx of Hep C and IV drug use previously on methadone presents with abdominal distension, pedal edema, and sob for 3 weeks. Two weeks ago patient presented to methadone clinic to begin maintenance methadone for heroin abuse. Her lab workup at that time revealed elevated liver enzymes and pancytopenia. Pt was sent to ED for evaluation at which point she eloped. She presents now and is agreeable for admission. Patient states she has also been experiencing intermittent chills and fevers over the last several weeks. She has an abscess on her abdomen over injection site that ruptured today. Reports nausea and NBNB vomiting a few times over the last month. Currently uses heroin (1 gram daily) and smokes 1PPD.    Patient denies sore throat, cough, dysuria, melena, hematochezia, hemoptysis, hematuria, vaginal bleeding.    In ED: tachy and febrile, pancytopenic, profound hepatosplenomegaly, elevated LFT, to receive 1 unit PRBC in ED, s/p cefepime & vanc, lact 2.2, K 2.9 s/p 40 meq K,     admit to medicine, case discussed with Dr. Schwartz    # sepsis  # lactic acidosis  # skin abscess  # intermittent fevers  # hepatitis C, never treated  # ascites, ? SBP  - s/p cefepime & vancomycin  - continue vancomycin and meropenem  - FU blood and urine cultures  - ID consult  - GI consult   - IR consult- possible tap  - patient fluid overloaded, will hold IV fluid at this time and encourage PO hydration  - ID testing: CMV, EBV, HIV, TB gold, acute hep panel    # pancytopenia  - s/p 1 unit PRBC in ED  - active t&s  - no evidence of bleed  - heme/onc consult    # heroin use disorder  - addiction medicine consult  - methadone taper  - symptomatic treatment    Diet: reg  Activity: OOB  DVT PPX: SCD  GI PPX: protonix  Code status: FULL CODE  Dispo: acute   Patient is a 25 year old female with hx of Hep C and IV drug use previously on methadone presents with abdominal distension, pedal edema, and sob for 3 weeks. Two weeks ago patient presented to methadone clinic to begin maintenance methadone for heroin abuse. Her lab workup at that time revealed elevated liver enzymes and pancytopenia. Pt was sent to ED for evaluation at which point she eloped. She presents now and is agreeable for admission. Patient states she has also been experiencing intermittent chills and fevers over the last several weeks. She has an abscess on her abdomen over injection site that ruptured today. Reports nausea and NBNB vomiting a few times over the last month. Currently uses heroin (1 gram daily) and smokes 1PPD.    Patient denies sore throat, cough, dysuria, melena, hematochezia, hemoptysis, hematuria, vaginal bleeding.    In ED: tachy and febrile, pancytopenic, profound hepatosplenomegaly, elevated LFT, to receive 1 unit PRBC in ED, s/p cefepime & vanc, lact 2.2, K 2.9 s/p 40 meq K,     admit to medicine, case discussed with Dr. Schwartz    # sepsis  # lactic acidosis  # skin abscess  # intermittent fevers  # hepatitis C, never treated  - s/p cefepime & vancomycin  - continue vancomycin and meropenem  - FU blood and urine cultures  - ID consult  - GI consult   - patient fluid overloaded, will hold IV fluid at this time and encourage PO hydration  - ID testing: CMV, EBV, HIV, TB gold, acute hep panel    # pancytopenia  - s/p 1 unit PRBC in ED  - active t&s  - no evidence of bleed  - heme/onc consult    # heroin use disorder  - addiction medicine consult  - methadone taper  - symptomatic treatment    Diet: reg  Activity: OOB  DVT PPX: SCD  GI PPX: protonix  Code status: FULL CODE  Dispo: acute

## 2021-02-05 NOTE — PATIENT PROFILE ADULT - NSTOBACCOQUITATTEMPT_GEN_A_CORE_SD
Mom said the Metadate 30mg is not working and Sudhakar is very disrespectful at school.  She said Sudhakar's moods change \"like a switch\".  She thinks he might be bipolar.  This writer put Sudhakar on the schedule for tomorrow.  He's only been seen once in November.   none

## 2021-02-05 NOTE — ED PROVIDER NOTE - ATTENDING CONTRIBUTION TO CARE
appears pale.  no JVD  chest is clear.  there is a open wound on anterior chest: no pus noted.  heart no murmur.  abd is NT.  + increased liver and spleen.  +bilateral pedal edema.

## 2021-02-05 NOTE — PATIENT PROFILE ADULT - VISION (WITH CORRECTIVE LENSES IF THE PATIENT USUALLY WEARS THEM):
Normal vision: sees adequately in most situations; can see medication labels, newsprint No excercise

## 2021-02-05 NOTE — ED PROVIDER NOTE - OBJECTIVE STATEMENT
25 y.o F w/ pmhx Hep C, IV drug abuse last used today p/w abd distension, leg swelling, and SOB. Pt went to methadone clinic and found to be pancytopenic and transaminitis so sent to ED 1 week ago. Pt was worked up and was to be admitted but she eloped prior to admission. Pt here for admission today as she was told by her house mate that she cannot live there anymore if she doesn't go to get treated. Pt is amenable for admission today. Symptoms otherwise consistent as 1 week ago. + chills, no CP, + occasional n/v last time today, no abd pain, no dsyuria or burning. 25 y.o F w/ pmhx Hep C, IV drug abuse last used today p/w abd distension, leg swelling, and SOB. Pt went to methadone clinic and found to be pancytopenic and transaminitis so sent to ED 1 week ago. Pt was worked up and was to be admitted but she eloped prior to admission. Pt here for admission today as she was told by her house mate that she cannot live there anymore if she doesn't go to get treated. Pt is amenable for admission today. Symptoms otherwise consistent as 1 week ago. + chills, no CP, + occasional n/v last time today, no abd pain, no dsyuria or burning, no fevers.

## 2021-02-05 NOTE — ED PROVIDER NOTE - NS ED ROS FT
Constitutional:  (-) fever, (+) chills, (-) lethargy  Eyes:  (-) eye pain (-) visual changes  ENMT: (-) nasal discharge, (-) sore throat. (-) neck pain or stiffness  Cardiac: (-) chest pain (-) palpitations  Respiratory:  (-) cough (-) respiratory distress.   GI:  (+) nausea (+) vomiting (-) diarrhea (-) abdominal pain.  :  (-) dysuria (-) frequency (-) burning.  MS:  (-) back pain (-) joint pain.  Neuro:  (-) headache (-) numbness (-) tingling (-) focal weakness  Skin:  (-) rash  Except as documented in the HPI,  all other systems are negative

## 2021-02-05 NOTE — H&P ADULT - NSHPLABSRESULTS_GEN_ALL_CORE
6.7    1.72  )-----------( 81       ( 05 Feb 2021 11:58 )             21.1     02-05    130<L>  |  96<L>  |  11  ----------------------------<  114<H>  2.9<L>   |  22  |  0.8    Ca    7.5<L>      05 Feb 2021 11:58  Mg     2.1     02-05    TPro  5.8<L>  /  Alb  2.1<L>  /  TBili  1.9<H>  /  DBili  x   /  AST  64<H>  /  ALT  32  /  AlkPhos  >1200<H>  02-05          Magnesium, Serum: 2.1 mg/dL (02-05-21 @ 14:40)    PT/INR - ( 05 Feb 2021 11:58 )   PT: 13.50 sec;   INR: 1.17 ratio         PTT - ( 05 Feb 2021 11:58 )  PTT:32.8 sec    Lactate Trend  02-05 @ 11:58 Lactate:2.2       CARDIAC MARKERS ( 05 Feb 2021 11:58 )  x     / <0.01 ng/mL / x     / x     / x        Culture Results:   No Growth Final (01-30 @ 18:45)  Culture Results:   No Growth Final (01-30 @ 18:45)  Culture Results:   <10,000 CFU/mL Normal Urogenital Kelly (01-30 @ 18:15)    < from: Xray Chest 2 Views PA/Lat (02.05.21 @ 14:56) >    Impression:    No radiographic evidence ofacute cardiopulmonary disease.    < end of copied text >    < from: CT Abdomen and Pelvis w/ IV Cont (01.30.21 @ 19:44) >      IMPRESSION:    Massive hepatosplenomegaly for which clinical correlation and further testing recommended.    < end of copied text >    < from: US Abdomen Limited (01.30.21 @ 19:29) >    IMPRESSION:    Marked hepatomegaly.    Cholelithiasis. No gallbladder distention. Reportedly negative sonographic Hammer's sign. Nonspecific gallbladder wall thickening.

## 2021-02-05 NOTE — H&P ADULT - NSHPPHYSICALEXAM_GEN_ALL_CORE
Vital Signs (24 Hrs):  T(C): 37.9 (02-05-21 @ 15:20), Max: 37.9 (02-05-21 @ 15:20)  HR: 101 (02-05-21 @ 15:20) (101 - 110)  BP: 112/55 (02-05-21 @ 15:20) (112/55 - 129/78)  RR: 20 (02-05-21 @ 15:20) (20 - 20)  SpO2: 97% (02-05-21 @ 15:20) (97% - 98%)  Wt(kg): --  Daily Height in cm: 167.64 (05 Feb 2021 11:36)    Daily     I&O's Summary    PHYSICAL EXAM:  GENERAL: NAD, pale  SKIN: + track marks over arms, legs, abdomen; no petechiae   HEAD:  Atraumatic, Normocephalic  EENT: EOMI, PERRLA, conjunctiva and sclera clear, poor dentition, no palatine petechiae, no LAD  CHEST/LUNG: Clear to auscultation bilaterally; No wheeze, no conversational dyspnea  HEART: tachycardia; No murmurs, rubs, or gallops. distal pulses 2+ and equal bilateral, no chest wall tenderness  ABDOMEN: Soft, Nontender, + distended, palpable liver, negative [place]; Bowel sounds present. ruptured abscess over RUQ covered with gauze  EXTREMITIES:  No clubbing, cyanosis. 2+ edema symmetric bilaterally; large area of ecchymosis on R medial upper arm  CNS: AAOx3, no focal deficits

## 2021-02-05 NOTE — ED PROVIDER NOTE - CARE PLAN
Principal Discharge DX:	Sepsis  Secondary Diagnosis:	Hepatosplenomegaly  Secondary Diagnosis:	Pancytopenia  Secondary Diagnosis:	Hypokalemia  Secondary Diagnosis:	Hypocalcemia  Secondary Diagnosis:	Abscess

## 2021-02-05 NOTE — ED PROVIDER NOTE - PROGRESS NOTE DETAILS
BI: Hgb noted. T+S sent. Consent signed and placed in scanner. 1 unit prbc ordered. Fluids held on this patient as she is volume overloaded with distended abd and pedal edema. Pt tolerating PO and hydrating orally. Will continue with that.

## 2021-02-05 NOTE — ED ADULT NURSE NOTE - NSFALLRSKOUTCOME_ED_ALL_ED
5-10 years is provided to the patient in written form: see Patient Monica Martinez was seen today for medicare awv. Diagnoses and all orders for this visit:    Routine general medical examination at a health care facility              Shawna Jo is a 75 y.o. female being evaluated by a Virtual Visit (phone) encounter to address concerns as mentioned above. A caregiver was present when appropriate. Due to this being a TeleHealth encounter (During TDZTE-70 public [facility] emergency), evaluation of the following organ systems was limited: Vitals/Constitutional/EENT/Resp/CV/GI//MS/Neuro/Skin/Heme-Lymph-Imm. Pursuant to the emergency declaration under the [address] [place] [address], [address] [place] [address] authority and the [employer] and [employer] Act, this Virtual Visit was conducted with patient's (and/or legal guardian's) consent, to reduce the patient's risk of exposure to COVID-19 and provide necessary medical care. The patient (and/or legal guardian) has also been advised to contact this office for worsening conditions or problems, and seek emergency medical treatment and/or call 911 if deemed necessary. Patient identification was verified at the start of the visit: Yes    Services were provided through phone to substitute for in-person clinic visit. Patient and provider were located at their individual homes. --JOMAR Mcdermott CNP on 6/19/2020 at 11:34 AM    An electronic signature was used to authenticate this note.
Universal Safety Interventions

## 2021-02-05 NOTE — ED PROVIDER NOTE - CLINICAL SUMMARY MEDICAL DECISION MAKING FREE TEXT BOX
pt needs urgent dx w/u.  needs IV abx and electrolyte tx.  In my opinion, in patient treatment is medically justifiable and appropriate.

## 2021-02-05 NOTE — H&P ADULT - HISTORY OF PRESENT ILLNESS
Patient is a 25 year old female with hx of Hep C and IV drug use previously on methadone presents with abdominal distension, pedal edema, and sob for 3 weeks. Two weeks ago patient presented to methadone clinic to begin maintenance methadone for heroin abuse. Her lab workup at that time revealed elevated liver enzymes and pancytopenia. Pt was sent to ED for evaluation at which point she eloped. She presents now and is agreeable for admission. Patient states she has also been experiencing intermittent chills and fevers over the last several weeks. She has an abscess on her abdomen over injection site that ruptured today. Reports nausea and NBNB vomiting a few times over the last month. Currently uses heroin (1 gram daily) and smokes 1PPD.    Patient denies sore throat, cough, dysuria, melena, hematochezia, hemoptysis, hematuria, vaginal bleeding.    In ED: tachy and febrile, pancytopenic, profound hepatosplenomegaly, elevated LFT, to receive 1 unit PRBC in ED, s/p cefepime & vanc, lact 2.2, K 2.9 s/p 40 meq K,

## 2021-02-05 NOTE — ED PROVIDER NOTE - PHYSICAL EXAMINATION
CONSTITUTIONAL: in NAD  SKIN: Warm dry, normal skin turgor, open abscess on lower sternum.  HEAD: NCAT  EYES: EOMI, PERRLA, no scleral icterus, conjunctiva pink  ENT: normal pharynx with no erythema or exudates  NECK: Supple; non tender. Full ROM.  CARD: tachy, no murmurs.  RESP: clear to ausculation b/l. No crackles or wheezing.  ABD: soft, non-tender, + distended, no rebound or guarding. palpable hepatosplenomegaly, no CVA tenderness.  EXT: Full ROM, no bony tenderness, + b/l pedal edema, no calf tenderness  NEURO: normal motor. normal sensory. Normal gait.  PSYCH: Cooperative, appropriate.

## 2021-02-05 NOTE — H&P ADULT - ATTENDING COMMENTS
Patient seen and examined independently  Agree with medical PA's H&P except where edited by me    Patient is a 25 year old female with hx of Hep C and IV drug use previously on methadone presents with abdominal distension, pedal edema, and sob for 3 weeks. Two weeks ago patient presented to methadone clinic to begin maintenance methadone for heroin abuse. Her lab workup at that time revealed elevated liver enzymes and pancytopenia. Pt was sent to ED for evaluation at which point she eloped. She presents now and is agreeable for admission. Patient states she has also been experiencing intermittent chills and fevers over the last several weeks. She has an abscess on her abdomen over injection site that ruptured today. Reports nausea and NBNB vomiting a few times over the last month. Currently uses heroin (1 gram daily) and smokes 1PPD.    Patient denies sore throat, cough, dysuria, melena, hematochezia, hemoptysis, hematuria, vaginal bleeding.    In ED: tachy and febrile, pancytopenic, profound hepatosplenomegaly, elevated LFT, to receive 1 unit PRBC in ED, s/p cefepime & vanc, lact 2.2, K 2.9 s/p 40 meq K,     Lactic acidosis/SIRS/Fever/Skin abscess  -IV abx - rocephin to cover SBP and vanco  -follow up cultures  -ID consult  -trend lactic acid    # Hepatitic C  - not treated   - s/p cefepime & vancomycin in the ER  - ER didn't tap patient due to lack of ascites as per ER resident   - GI consult   - follow up CMV, EBV, HIV, TB gold, acute hep panel    # pancytopenia  - likely secondary to hepatitis C  - s/p 1 unit PRBC in ED  - maintain active t&s  - no evidence of bleed    # heroin use disorder  - addiction medicine consult  - methadone taper  - symptomatic treatment    # Tobacco addiction  - nicotine patch

## 2021-02-06 DIAGNOSIS — F11.20 OPIOID DEPENDENCE, UNCOMPLICATED: ICD-10-CM

## 2021-02-06 LAB
ALBUMIN SERPL ELPH-MCNC: 2.3 G/DL — LOW (ref 3.5–5.2)
ALP SERPL-CCNC: 1198 U/L — HIGH (ref 30–115)
ALT FLD-CCNC: 31 U/L — SIGNIFICANT CHANGE UP (ref 0–41)
ANION GAP SERPL CALC-SCNC: 7 MMOL/L — SIGNIFICANT CHANGE UP (ref 7–14)
AST SERPL-CCNC: 61 U/L — HIGH (ref 0–41)
BASOPHILS # BLD AUTO: 0.01 K/UL — SIGNIFICANT CHANGE UP (ref 0–0.2)
BASOPHILS NFR BLD AUTO: 0.6 % — SIGNIFICANT CHANGE UP (ref 0–1)
BILIRUB SERPL-MCNC: 1.9 MG/DL — HIGH (ref 0.2–1.2)
BUN SERPL-MCNC: 10 MG/DL — SIGNIFICANT CHANGE UP (ref 10–20)
CALCIUM SERPL-MCNC: 7.8 MG/DL — LOW (ref 8.5–10.1)
CHLORIDE SERPL-SCNC: 104 MMOL/L — SIGNIFICANT CHANGE UP (ref 98–110)
CO2 SERPL-SCNC: 22 MMOL/L — SIGNIFICANT CHANGE UP (ref 17–32)
CREAT SERPL-MCNC: 0.7 MG/DL — SIGNIFICANT CHANGE UP (ref 0.7–1.5)
EOSINOPHIL # BLD AUTO: 0.01 K/UL — SIGNIFICANT CHANGE UP (ref 0–0.7)
EOSINOPHIL NFR BLD AUTO: 0.6 % — SIGNIFICANT CHANGE UP (ref 0–8)
ERYTHROCYTE [SEDIMENTATION RATE] IN BLOOD: 20 MM/HR — SIGNIFICANT CHANGE UP (ref 0–20)
GLUCOSE SERPL-MCNC: 111 MG/DL — HIGH (ref 70–99)
HCT VFR BLD CALC: 26.3 % — LOW (ref 37–47)
HGB BLD-MCNC: 8.5 G/DL — LOW (ref 12–16)
IMM GRANULOCYTES NFR BLD AUTO: 0 % — LOW (ref 0.1–0.3)
LACTATE SERPL-SCNC: 1.2 MMOL/L — SIGNIFICANT CHANGE UP (ref 0.7–2)
LYMPHOCYTES # BLD AUTO: 0.48 K/UL — LOW (ref 1.2–3.4)
LYMPHOCYTES # BLD AUTO: 31 % — SIGNIFICANT CHANGE UP (ref 20.5–51.1)
MAGNESIUM SERPL-MCNC: 2.3 MG/DL — SIGNIFICANT CHANGE UP (ref 1.8–2.4)
MCHC RBC-ENTMCNC: 28.3 PG — SIGNIFICANT CHANGE UP (ref 27–31)
MCHC RBC-ENTMCNC: 32.3 G/DL — SIGNIFICANT CHANGE UP (ref 32–37)
MCV RBC AUTO: 87.7 FL — SIGNIFICANT CHANGE UP (ref 81–99)
MONOCYTES # BLD AUTO: 0.1 K/UL — SIGNIFICANT CHANGE UP (ref 0.1–0.6)
MONOCYTES NFR BLD AUTO: 6.5 % — SIGNIFICANT CHANGE UP (ref 1.7–9.3)
NEUTROPHILS # BLD AUTO: 0.95 K/UL — LOW (ref 1.4–6.5)
NEUTROPHILS NFR BLD AUTO: 61.3 % — SIGNIFICANT CHANGE UP (ref 42.2–75.2)
NRBC # BLD: 0 /100 WBCS — SIGNIFICANT CHANGE UP (ref 0–0)
PLATELET # BLD AUTO: 74 K/UL — LOW (ref 130–400)
POTASSIUM SERPL-MCNC: 3.6 MMOL/L — SIGNIFICANT CHANGE UP (ref 3.5–5)
POTASSIUM SERPL-SCNC: 3.6 MMOL/L — SIGNIFICANT CHANGE UP (ref 3.5–5)
PROT SERPL-MCNC: 5.7 G/DL — LOW (ref 6–8)
RBC # BLD: 3 M/UL — LOW (ref 4.2–5.4)
RBC # FLD: 20.3 % — HIGH (ref 11.5–14.5)
SODIUM SERPL-SCNC: 133 MMOL/L — LOW (ref 135–146)
WBC # BLD: 1.59 K/UL — LOW (ref 4.8–10.8)
WBC # FLD AUTO: 1.59 K/UL — LOW (ref 4.8–10.8)

## 2021-02-06 PROCEDURE — 99233 SBSQ HOSP IP/OBS HIGH 50: CPT

## 2021-02-06 PROCEDURE — 99291 CRITICAL CARE FIRST HOUR: CPT

## 2021-02-06 PROCEDURE — 76705 ECHO EXAM OF ABDOMEN: CPT | Mod: 26

## 2021-02-06 RX ORDER — METHADONE HYDROCHLORIDE 40 MG/1
15 TABLET ORAL ONCE
Refills: 0 | Status: DISCONTINUED | OUTPATIENT
Start: 2021-02-06 | End: 2021-02-06

## 2021-02-06 RX ORDER — METHADONE HYDROCHLORIDE 40 MG/1
30 TABLET ORAL EVERY 24 HOURS
Refills: 0 | Status: DISCONTINUED | OUTPATIENT
Start: 2021-02-07 | End: 2021-02-07

## 2021-02-06 RX ADMIN — Medication 1 PATCH: at 19:41

## 2021-02-06 RX ADMIN — METHADONE HYDROCHLORIDE 15 MILLIGRAM(S): 40 TABLET ORAL at 21:46

## 2021-02-06 RX ADMIN — Medication 250 MILLIGRAM(S): at 04:49

## 2021-02-06 RX ADMIN — Medication 250 MILLIGRAM(S): at 17:26

## 2021-02-06 RX ADMIN — METHADONE HYDROCHLORIDE 15 MILLIGRAM(S): 40 TABLET ORAL at 09:31

## 2021-02-06 RX ADMIN — Medication 1 PATCH: at 09:32

## 2021-02-06 RX ADMIN — CEFTRIAXONE 100 MILLIGRAM(S): 500 INJECTION, POWDER, FOR SOLUTION INTRAMUSCULAR; INTRAVENOUS at 11:24

## 2021-02-06 NOTE — CONSULT NOTE ADULT - PROBLEM SELECTOR RECOMMENDATION 9
After evaluation at this time protocol was stopped and patient placed on maintenance Methadone.  Recommend to increase dose every 2-3 days until blocking dose. Pt will be monitored and supportive care provided  CATCH team involved for aftercare and pt will follow up with aftercare at [facility]. Pt is approved by RUSSELL Huerta for admittance to program after medically stable.  UDS and Methadone ordered.

## 2021-02-06 NOTE — CONSULT NOTE ADULT - SUBJECTIVE AND OBJECTIVE BOX
Pt interviewed, examined and EMR chart reviewed.  Pt admits to using 1-2grams of heroineIV injecting in thighs x 9 months.  Pt has been through multiple detoxes and Pt has been trying to get onto the [facility] program again.    variable periods of sobriety in the past.  Has been in detox before __x___yes,   _____No    SOCIAL HISTORY:    REVIEW OF SYSTEMS:    Constitutional: No fever, weight loss or fatigue  ENT:  No difficulty hearing, tinnitus, vertigo; No sinus or throat pain  Neck: No pain or stiffness  Respiratory: No cough, wheezing, chills or hemoptysis  Cardiovascular: No chest pain, palpitations, shortness of breath, dizziness or leg swelling  Gastrointestinal: No abdominal or epigastric pain. No nausea, vomiting or hematemesis; No diarrhea or constipation. No melena or hematochezia.  Neurological: No headaches, memory loss, loss of strength, numbness or tremors  Musculoskeletal: No joint pain or swelling; No muscle, back or extremity pain  Psychiatric: No depression, anxiety, mood swings or difficulty sleeping    MEDICATIONS  (STANDING):  cefTRIAXone   IVPB 2000 milliGRAM(s) IV Intermittent every 24 hours  methadone    Tablet 15 milliGRAM(s) Oral every 12 hours  nicotine - 21 mG/24Hr(s) Patch 1 patch Transdermal daily  vancomycin  IVPB 1000 milliGRAM(s) IV Intermittent every 12 hours    MEDICATIONS  (PRN):  hydrOXYzine hydrochloride 25 milliGRAM(s) Oral every 6 hours PRN Anxiety  senna 2 Tablet(s) Oral at bedtime PRN Constipation      Vital Signs Last 24 Hrs  T(C): 36.4 (06 Feb 2021 14:31), Max: 36.4 (05 Feb 2021 21:06)  T(F): 97.6 (06 Feb 2021 14:31), Max: 97.6 (05 Feb 2021 21:06)  HR: 97 (06 Feb 2021 14:31) (77 - 97)  BP: 111/73 (06 Feb 2021 14:31) (97/52 - 111/73)  BP(mean): --  RR: 18 (06 Feb 2021 14:31) (18 - 18)  SpO2: --    PHYSICAL EXAM:    Constitutional: NAD, well-groomed, well-developed  HEENT: PERRLA, EOMI, Normal Hearing, MMM  Neck: No LAD, No JVD  Back: Normal spine flexure, No CVA tenderness  Respiratory: CTAB/L  Cardiovascular: S1 and S2, RRR, no M/G/R  Gastrointestinal: BS+, soft, NT/ND  Extremities: No peripheral edema  Vascular: 2+ peripheral pulses  Neurological: A/O x 3, no focal deficits    LABS:                        8.5    1.59  )-----------( 74       ( 06 Feb 2021 08:45 )             26.3     02-06    133<L>  |  104  |  10  ----------------------------<  111<H>  3.6   |  22  |  0.7    Ca    7.8<L>      06 Feb 2021 08:45  Mg     2.3     02-06    TPro  5.7<L>  /  Alb  2.3<L>  /  TBili  1.9<H>  /  DBili  x   /  AST  61<H>  /  ALT  31  /  AlkPhos  1198<H>  02-06    PT/INR - ( 05 Feb 2021 11:58 )   PT: 13.50 sec;   INR: 1.17 ratio         PTT - ( 05 Feb 2021 11:58 )  PTT:32.8 sec    Drug Screen Urine:  Alcohol Level        RADIOLOGY & ADDITIONAL STUDIES:

## 2021-02-06 NOTE — CONSULT NOTE ADULT - SUBJECTIVE AND OBJECTIVE BOX
CINDYPOOJA JOE  25y, Female  Allergy: buprenorphine (Rash)  Suboxone (Rash)      CHIEF COMPLAINT:   sepsis, pancytopenia (05 Feb 2021 16:39)      LOS  1d    HPI  HPI:  Patient is a 25 year old female with hx of Hep C and IV drug use previously on methadone presents with abdominal distension, pedal edema, and sob for 3 weeks. Two weeks ago patient presented to methadone clinic to begin maintenance methadone for heroin abuse. Her lab workup at that time revealed elevated liver enzymes and pancytopenia. Pt was sent to ED for evaluation at which point she eloped. She presents now and is agreeable for admission. Patient states she has also been experiencing intermittent chills and fevers over the last several weeks. She has an abscess on her abdomen over injection site that ruptured today. Reports nausea and NBNB vomiting a few times over the last month. Currently uses heroin (1 gram daily) and smokes 1PPD.    Patient denies sore throat, cough, dysuria, melena, hematochezia, hemoptysis, hematuria, vaginal bleeding.    In ED: tachy and febrile, pancytopenic, profound hepatosplenomegaly, elevated LFT, to receive 1 unit PRBC in ED, s/p cefepime & vanc, lact 2.2, K 2.9 s/p 40 meq K,  (05 Feb 2021 16:39)      INFECTIOUS DISEASE HISTORY:   pending patient interview and exam     PMH  PAST MEDICAL & SURGICAL HISTORY:  Anxiety and depression    Asthma    Hepatitis C    No significant past surgical history        FAMILY HISTORY  No pertinent family history in first degree relatives        SOCIAL HISTORY  Social History:  current 1 PPD smoker  denies etoh  + heroin use, last use today, 1 gram per day  2 children- 3 & 1 year old (05 Feb 2021 16:39)        ROS  ***    VITALS:  T(F): 97.5, Max: 100.2 (02-05-21 @ 15:20)  HR: 84  BP: 98/57  RR: 18Vital Signs Last 24 Hrs  T(C): 36.4 (06 Feb 2021 05:15), Max: 37.9 (05 Feb 2021 15:20)  T(F): 97.5 (06 Feb 2021 05:15), Max: 100.2 (05 Feb 2021 15:20)  HR: 84 (06 Feb 2021 05:15) (77 - 110)  BP: 98/57 (06 Feb 2021 05:15) (97/52 - 129/78)  BP(mean): --  RR: 18 (06 Feb 2021 05:15) (18 - 20)  SpO2: 97% (05 Feb 2021 15:20) (97% - 98%)    PHYSICAL EXAM:  ***    TESTS & MEASUREMENTS:                        6.7    1.72  )-----------( 81       ( 05 Feb 2021 11:58 )             21.1     02-05    130<L>  |  96<L>  |  11  ----------------------------<  114<H>  2.9<L>   |  22  |  0.8    Ca    7.5<L>      05 Feb 2021 11:58  Mg     2.1     02-05    TPro  5.8<L>  /  Alb  2.1<L>  /  TBili  1.9<H>  /  DBili  x   /  AST  64<H>  /  ALT  32  /  AlkPhos  >1200<H>  02-05    eGFR if Non African American: 101 mL/min/1.73M2 (02-05-21 @ 11:58)  eGFR if African American: 117 mL/min/1.73M2 (02-05-21 @ 11:58)    LIVER FUNCTIONS - ( 05 Feb 2021 11:58 )  Alb: 2.1 g/dL / Pro: 5.8 g/dL / ALK PHOS: >1200 U/L / ALT: 32 U/L / AST: 64 U/L / GGT: x               Culture - Blood (collected 01-30-21 @ 18:45)  Source: .Blood Blood  Final Report (02-05-21 @ 01:00):    No Growth Final    Culture - Blood (collected 01-30-21 @ 18:45)  Source: .Blood Blood  Final Report (02-05-21 @ 01:00):    No Growth Final    Culture - Urine (collected 01-30-21 @ 18:15)  Source: .Urine Clean Catch (Midstream)  Final Report (02-01-21 @ 05:37):    <10,000 CFU/mL Normal Urogenital Kelly        Lactate, Blood: 2.2 mmol/L (02-05-21 @ 11:58)      INFECTIOUS DISEASES TESTING  Rapid HIV-1/2 Antibody: Nonreact (02-05-21 @ 17:00)  Rapid RVP Result: NotDetec (02-05-21 @ 11:58)  Rapid RVP Result: NotDetec (01-30-21 @ 17:38)  Hepatitis C Virus Interpretation: Weakly Reactive Hepatitis C AB  S/CO Ratio                        Interpretation  < 1.00                                   Non-Reactive  1.00 - 4.99                         Weakly-Reactive  >= 5.00                                Reactive  Non-Reactive: A person with a non-reactive HCV antibody result is  considered uninfected.  No further action is needed unless recent  infection is suspected.  In these cases, consider repeat testing later to  detect seroconversion..  Weakly-Reactive: HCV antibody test is abnormal, HCV RNA Qualitative test  will follow.  Reactive: HCV antibody test is abnormal, HCV RNA Qualitative test will  follow.  Note: HCV antibody testing is performed on the Abbott  system. (01-25-21 @ 13:21)  Hepatitis B Surface Antigen: Nonreact (01-25-21 @ 13:21)  Hepatitis B Surface Antigen: Nonreact (06-04-20 @ 10:28)  Hepatitis C Virus Interpretation: Weakly Reactive Hepatitis C AB  S/CO Ratio                        Interpretation  < 1.00                                   Non-Reactive  1.00 - 4.99                         Weakly-Reactive  >= 5.00                                Reactive  Non-Reactive: A person with a non-reactive HCV antibody result is  considered uninfected.  No further action is needed unless recent  infection is suspected.  In these cases, consider repeat testing later to  detect seroconversion..  Weakly-Reactive: HCV antibody test is abnormal, HCV RNA Qualitative test  will follow.  Reactive: HCV antibody test is abnormal, HCV RNA Qualitative test will  follow.  Note: HCV antibody testing is performed on the Abbott  system. (06-04-20 @ 10:28)      INFLAMMATORY MARKERS      RADIOLOGY & ADDITIONAL TESTS:  I have personally reviewed the last Chest xray  CXR  Xray Chest 2 Views PA/Lat:   EXAM:  XR CHEST PA LAT 2V            PROCEDURE DATE:  02/05/2021            INTERPRETATION:  Clinical History / Reason for exam: Sepsis.    Comparison : Chest radiograph 1/31/2021.    Technique/Positioning: PA and lateral chest radiographs.    Findings:    Support devices: None.    Cardiac/mediastinum/hilum: Stable    Lung parenchyma/Pleura: No focal consolidation, effusion or pneumothorax.    Skeleton/soft tissues: Stable appearance of the sternum.    Impression:    No radiographic evidence ofacute cardiopulmonary disease.                  AMI GOKLI M.D., ATTENDING RADIOLOGIST  This document has been electronically signed. Feb 5 2021  4:05PM (02-05-21 @ 14:56)      CT      CARDIOLOGY TESTING  12 Lead ECG:   Ventricular Rate 99 BPM    Atrial Rate 99 BPM    P-R Interval 164 ms    QRS Duration 84 ms    Q-T Interval 356 ms    QTC Calculation(Bazett) 456 ms    P Axis 77 degrees    R Axis 67 degrees    T Axis 43 degrees    Diagnosis Line Normal sinus rhythm  Normal ECG    Confirmed by BRANDON BELL MD ([id number]) on 2/5/2021 3:05:02 PM (02-05-21 @ 12:24)      MEDICATIONS  cefTRIAXone   IVPB 2000 IV Intermittent every 24 hours  influenza   Vaccine 0.5 IntraMuscular once  methadone    Tablet 15 Oral every 12 hours  nicotine - 21 mG/24Hr(s) Patch 1 Transdermal daily  vancomycin  IVPB 1000 IV Intermittent every 12 hours      Weight  Weight (kg): 60 (02-05-21 @ 18:45)    ANTIBIOTICS:  cefTRIAXone   IVPB 2000 milliGRAM(s) IV Intermittent every 24 hours  vancomycin  IVPB 1000 milliGRAM(s) IV Intermittent every 12 hours      ALLERGIES:  buprenorphine (Rash)  Suboxone (Rash)       CINDYPOOJA JOE  25y, Female  Allergy: buprenorphine (Rash)  Suboxone (Rash)      CHIEF COMPLAINT:   sepsis, pancytopenia (05 Feb 2021 16:39)      LOS  1d    HPI  HPI:  Patient is a 25 year old female with hx of Hep C and IV drug use previously on methadone presents with abdominal distension, pedal edema, and sob for 3 weeks. Two weeks ago patient presented to methadone clinic to begin maintenance methadone for heroin abuse. Her lab workup at that time revealed elevated liver enzymes and pancytopenia. Pt was sent to ED for evaluation at which point she eloped. She presents now and is agreeable for admission. Patient states she has also been experiencing intermittent chills and fevers over the last several weeks. She has an abscess on her abdomen over injection site that ruptured today. Reports nausea and NBNB vomiting a few times over the last month. Currently uses heroin (1 gram daily) and smokes 1PPD.    Patient denies sore throat, cough, dysuria, melena, hematochezia, hemoptysis, hematuria, vaginal bleeding.    In ED: tachy and febrile, pancytopenic, profound hepatosplenomegaly, elevated LFT, to receive 1 unit PRBC in ED, s/p cefepime & vanc, lact 2.2, K 2.9 s/p 40 meq K,  (05 Feb 2021 16:39)      INFECTIOUS DISEASE HISTORY:  cannot pinpoint when her symptoms began  was here in the ER prior but left AMA  noted LE edema and abd swelling  No fevers or chills or nightsweats  Last IV heroin use yesterday in her abdomen- there is now a deep wound there  No travel  No hiking, tick bites  No sick contacts, stays mostly in home    PMH  PAST MEDICAL & SURGICAL HISTORY:  Anxiety and depression    Asthma    Hepatitis C    No significant past surgical history        FAMILY HISTORY  No pertinent family history in first degree relatives        SOCIAL HISTORY  Social History:  current 1 PPD smoker  denies etoh  + heroin use, last use today, 1 gram per day  2 children- 3 & 1 year old (05 Feb 2021 16:39)        ROS  General: Denies rigors, nightsweats  HEENT: Denies headache, rhinorrhea, sore throat, eye pain  CV: Denies CP, palpitations  PULM: Denies wheezing, hemoptysis  GI: Denies hematemesis, hematochezia, melena  : Denies discharge, hematuria  MSK: Denies arthralgias, myalgias  SKIN: Denies rash, lesions  NEURO: Denies paresthesias, weakness  PSYCH: Denies depression, anxiety     VITALS:  T(F): 97.5, Max: 100.2 (02-05-21 @ 15:20)  HR: 84  BP: 98/57  RR: 18Vital Signs Last 24 Hrs  T(C): 36.4 (06 Feb 2021 05:15), Max: 37.9 (05 Feb 2021 15:20)  T(F): 97.5 (06 Feb 2021 05:15), Max: 100.2 (05 Feb 2021 15:20)  HR: 84 (06 Feb 2021 05:15) (77 - 110)  BP: 98/57 (06 Feb 2021 05:15) (97/52 - 129/78)  BP(mean): --  RR: 18 (06 Feb 2021 05:15) (18 - 20)  SpO2: 97% (05 Feb 2021 15:20) (97% - 98%)    PHYSICAL EXAM:  Gen: NAD, resting in bed  HEENT: Normocephalic, atraumatic  Neck: supple, no lymphadenopathy  CV: Regular rate & regular rhythm  Lungs: decreased BS at bases, no fremitus  Abdomen: distended, HSM, deep wound RUQ with purulence. No RUQ TTP  Ext: Warm, well perfused, 2+ LE edema  Neuro: non focal, awake  Skin: no rash, no erythema  Lines: no phlebitis     TESTS & MEASUREMENTS:                        6.7    1.72  )-----------( 81       ( 05 Feb 2021 11:58 )             21.1     02-05    130<L>  |  96<L>  |  11  ----------------------------<  114<H>  2.9<L>   |  22  |  0.8    Ca    7.5<L>      05 Feb 2021 11:58  Mg     2.1     02-05    TPro  5.8<L>  /  Alb  2.1<L>  /  TBili  1.9<H>  /  DBili  x   /  AST  64<H>  /  ALT  32  /  AlkPhos  >1200<H>  02-05    eGFR if Non African American: 101 mL/min/1.73M2 (02-05-21 @ 11:58)  eGFR if African American: 117 mL/min/1.73M2 (02-05-21 @ 11:58)    LIVER FUNCTIONS - ( 05 Feb 2021 11:58 )  Alb: 2.1 g/dL / Pro: 5.8 g/dL / ALK PHOS: >1200 U/L / ALT: 32 U/L / AST: 64 U/L / GGT: x               Culture - Blood (collected 01-30-21 @ 18:45)  Source: .Blood Blood  Final Report (02-05-21 @ 01:00):    No Growth Final    Culture - Blood (collected 01-30-21 @ 18:45)  Source: .Blood Blood  Final Report (02-05-21 @ 01:00):    No Growth Final    Culture - Urine (collected 01-30-21 @ 18:15)  Source: .Urine Clean Catch (Midstream)  Final Report (02-01-21 @ 05:37):    <10,000 CFU/mL Normal Urogenital Kelly        Lactate, Blood: 2.2 mmol/L (02-05-21 @ 11:58)      INFECTIOUS DISEASES TESTING  Rapid HIV-1/2 Antibody: Nonreact (02-05-21 @ 17:00)  Rapid RVP Result: NotDetec (02-05-21 @ 11:58)  Rapid RVP Result: NotDetec (01-30-21 @ 17:38)  Hepatitis C Virus Interpretation: Weakly Reactive Hepatitis C AB  S/CO Ratio                        Interpretation  < 1.00                                   Non-Reactive  1.00 - 4.99                         Weakly-Reactive  >= 5.00                                Reactive  Non-Reactive: A person with a non-reactive HCV antibody result is  considered uninfected.  No further action is needed unless recent  infection is suspected.  In these cases, consider repeat testing later to  detect seroconversion..  Weakly-Reactive: HCV antibody test is abnormal, HCV RNA Qualitative test  will follow.  Reactive: HCV antibody test is abnormal, HCV RNA Qualitative test will  follow.  Note: HCV antibody testing is performed on the Abbott  system. (01-25-21 @ 13:21)  Hepatitis B Surface Antigen: Nonreact (01-25-21 @ 13:21)  Hepatitis B Surface Antigen: Nonreact (06-04-20 @ 10:28)  Hepatitis C Virus Interpretation: Weakly Reactive Hepatitis C AB  S/CO Ratio                        Interpretation  < 1.00                                   Non-Reactive  1.00 - 4.99                         Weakly-Reactive  >= 5.00                                Reactive  Non-Reactive: A person with a non-reactive HCV antibody result is  considered uninfected.  No further action is needed unless recent  infection is suspected.  In these cases, consider repeat testing later to  detect seroconversion..  Weakly-Reactive: HCV antibody test is abnormal, HCV RNA Qualitative test  will follow.  Reactive: HCV antibody test is abnormal, HCV RNA Qualitative test will  follow.  Note: HCV antibody testing is performed on the [employer] system. (06-04-20 @ 10:28)      INFLAMMATORY MARKERS      RADIOLOGY & ADDITIONAL TESTS:  I have personally reviewed the last Chest xray  CXR  Xray Chest 2 Views PA/Lat:   EXAM:  XR CHEST PA LAT 2V            PROCEDURE DATE:  02/05/2021            INTERPRETATION:  Clinical History / Reason for exam: Sepsis.    Comparison : Chest radiograph 1/31/2021.    Technique/Positioning: PA and lateral chest radiographs.    Findings:    Support devices: None.    Cardiac/mediastinum/hilum: Stable    Lung parenchyma/Pleura: No focal consolidation, effusion or pneumothorax.    Skeleton/soft tissues: Stable appearance of the sternum.    Impression:    No radiographic evidence ofacute cardiopulmonary disease.                  NALLELY MCCALL M.D., ATTENDING RADIOLOGIST  This document has been electronically signed. Feb 5 2021  4:05PM (02-05-21 @ 14:56)      CT      CARDIOLOGY TESTING  12 Lead ECG:   Ventricular Rate 99 BPM    Atrial Rate 99 BPM    P-R Interval 164 ms    QRS Duration 84 ms    Q-T Interval 356 ms    QTC Calculation(Bazett) 456 ms    P Axis 77 degrees    R Axis 67 degrees    T Axis 43 degrees    Diagnosis Line Normal sinus rhythm  Normal ECG    Confirmed by BRANDON BELL MD ([id number]) on 2/5/2021 3:05:02 PM (02-05-21 @ 12:24)      MEDICATIONS  cefTRIAXone   IVPB 2000 IV Intermittent every 24 hours  influenza   Vaccine 0.5 IntraMuscular once  methadone    Tablet 15 Oral every 12 hours  nicotine - 21 mG/24Hr(s) Patch 1 Transdermal daily  vancomycin  IVPB 1000 IV Intermittent every 12 hours      Weight  Weight (kg): 60 (02-05-21 @ 18:45)    ANTIBIOTICS:  cefTRIAXone   IVPB 2000 milliGRAM(s) IV Intermittent every 24 hours  vancomycin  IVPB 1000 milliGRAM(s) IV Intermittent every 12 hours      ALLERGIES:  buprenorphine (Rash)  Suboxone (Rash)

## 2021-02-06 NOTE — PROGRESS NOTE ADULT - ASSESSMENT
Patient is a 25 year old female with hx of Hep C and IV drug use previously on methadone presents with abdominal distension, pedal edema, and sob for 3 weeks. Two weeks ago patient presented to methadone clinic to begin maintenance methadone for heroin abuse. Her lab workup at that time revealed elevated liver enzymes and pancytopenia. Pt was sent to ED for evaluation at which point she eloped. She presents now and is agreeable for admission. Patient states she has also been experiencing intermittent chills and fevers over the last several weeks. She has an abscess on her abdomen over injection site that ruptured today. Reports nausea and NBNB vomiting a few times over the last month. Currently uses heroin (1 gram daily) and smokes 1PPD.    Lactic acidosis/SIRS/Fever/Skin abscess  -IV abx - rocephin to cover SBP and vanco  -follow up cultures  -ID consult appreciated - recommendations noted  -check parasite smear  -lactic acid = 1.2 today    # Hepatitic C  - not treated   - s/p cefepime & vancomycin in the ER  - ER didn't perform paracentesis due to lack of ascites as per ER resident   - GI consult pending   - follow up CMV, EBV, HIV, TB gold, acute hep panel    # pancytopenia  - likely secondary to hepatitis C  - s/p 1 unit PRBC in ED  - maintain active t&s  - no evidence of bleed    # heroin use disorder  - addiction medicine consult  - methadone taper  - symptomatic treatment    # Tobacco addiction  - nicotine patch     Progress Note Handoff  Pending Consults: detox, Gi  Pending Tests: labs  Pending Results: labs, cultures  Family Discussion: Discussed methadone, IV abx and treatment plan with patient - in agreement with plan of care   Disposition: Home__x___/SNF______/Other_____/Unknown at this time_____    Please call me with any questions at extension [phone]

## 2021-02-06 NOTE — PROGRESS NOTE ADULT - SUBJECTIVE AND OBJECTIVE BOX
POOJA DIANE  25y  Female      Patient is a 25y old female who presents with a chief complaint of sepsis, pancytopenia (06 Feb 2021 07:53)      INTERVAL HPI/OVERNIGHT EVENTS:  Patient seen and examined earlier this morning.  Sitting comfortably in bed.  In NAD.  No complaints today.  Feels better with methadone      REVIEW OF SYSTEMS:  CONSTITUTIONAL: No fever, weight loss, or fatigue  EYES: No eye pain, visual disturbances, or discharge  ENMT:  No difficulty hearing, tinnitus, vertigo; No sinus or throat pain  NECK: No pain or stiffness  RESPIRATORY: No cough, wheezing, chills or hemoptysis; No shortness of breath  CARDIOVASCULAR: No chest pain, palpitations, dizziness, or leg swelling  GASTROINTESTINAL: No abdominal or epigastric pain. No nausea, vomiting, or hematemesis; No diarrhea or constipation. No melena or hematochezia.  GENITOURINARY: No dysuria, frequency, hematuria, or incontinence  NEUROLOGICAL: No headaches, memory loss, loss of strength, numbness, or tremors  SKIN: No itching, burning, rashes, or lesions   LYMPH NODES: No enlarged glands  ENDOCRINE: No heat or cold intolerance; No hair loss  MUSCULOSKELETAL: No joint pain or swelling; No muscle, back, or extremity pain  PSYCHIATRIC: No depression, anxiety, mood swings, or difficulty sleeping  HEME/LYMPH: No easy bruising, or bleeding gums  ALLERY AND IMMUNOLOGIC: No hives or eczema      Vital Signs Last 24 Hrs  T(C): 36.4 (06 Feb 2021 05:15), Max: 37.9 (05 Feb 2021 15:20)  T(F): 97.5 (06 Feb 2021 05:15), Max: 100.2 (05 Feb 2021 15:20)  HR: 84 (06 Feb 2021 05:15) (77 - 101)  BP: 98/57 (06 Feb 2021 05:15) (97/52 - 112/55)  BP(mean): --  RR: 18 (06 Feb 2021 05:15) (18 - 20)  SpO2: 97% (05 Feb 2021 15:20) (97% - 97%)      PHYSICAL EXAM:  GENERAL: NAD, well-groomed, well-developed  HEAD:  Atraumatic, Normocephalic  EYES: EOMI, PERRLA, conjunctiva and sclera clear  ENMT: No tonsillar erythema, exudates, or enlargement; Moist mucous membranes, Good dentition, No lesions  NECK: Supple, No JVD, Normal thyroid  NERVOUS SYSTEM:  Alert & Oriented X3, Good concentration; Motor Strength 5/5 B/L upper and lower extremities; DTRs 2+ intact and symmetric  CHEST/LUNG: Clear to percussion bilaterally; No rales, rhonchi, wheezing, or rubs  HEART: Regular rate and rhythm; No murmurs, rubs, or gallops  ABDOMEN: Soft, Nontender, distended; Bowel sounds present,   EXTREMITIES:  2+ Peripheral Pulses, No clubbing, cyanosis, or edema  LYMPH: No lymphadenopathy noted  SKIN: multiple areas of ecchymosis, track lines, small round wound on ruq on abdomen    Consultant(s) Notes Reviewed:  [x ] YES  [ ] NO  Care Discussed with Consultants/Other Providers [ x] YES  [ ] NO    LAB:                        8.5    1.59  )-----------( 74       ( 06 Feb 2021 08:45 )             26.3     02-06    133<L>  |  104  |  10  ----------------------------<  111<H>  3.6   |  22  |  0.7    Ca    7.8<L>      06 Feb 2021 08:45  Mg     2.3     02-06    TPro  5.7<L>  /  Alb  2.3<L>  /  TBili  1.9<H>  /  DBili  x   /  AST  61<H>  /  ALT  31  /  AlkPhos  1198<H>  02-06    LIVER FUNCTIONS - ( 06 Feb 2021 08:45 )  Alb: 2.3 g/dL / Pro: 5.7 g/dL / ALK PHOS: 1198 U/L / ALT: 31 U/L / AST: 61 U/L / GGT: x           CARDIAC MARKERS ( 05 Feb 2021 11:58 )  x     / <0.01 ng/mL / x     / x     / x            Drug Dosing Weight  Height (cm): 167.6 (05 Feb 2021 18:45)  Weight (kg): 60 (05 Feb 2021 18:45)  BMI (kg/m2): 21.4 (05 Feb 2021 18:45)  BSA (m2): 1.68 (05 Feb 2021 18:45)      RADIOLOGY & ADDITIONAL TESTS:  Imaging Personally Reviewed:  [x] YES  [ ] NO        MEDS:  cefTRIAXone   IVPB 2000 milliGRAM(s) IV Intermittent every 24 hours  hydrOXYzine hydrochloride 25 milliGRAM(s) Oral every 6 hours PRN  influenza   Vaccine 0.5 milliLiter(s) IntraMuscular once  methadone    Tablet 15 milliGRAM(s) Oral every 12 hours  nicotine - 21 mG/24Hr(s) Patch 1 patch Transdermal daily  senna 2 Tablet(s) Oral at bedtime PRN  vancomycin  IVPB 1000 milliGRAM(s) IV Intermittent every 12 hours

## 2021-02-06 NOTE — CONSULT NOTE ADULT - ASSESSMENT
ASSESSMENT  25 year old female with hx of Hep C and IV drug use previously on methadone presents with abdominal distension, pedal edema, and sob for 3 weeks. Two weeks ago patient presented to methadone clinic to begin maintenance methadone for heroin abuse. Her lab workup at that time revealed elevated liver enzymes and pancytopenia. Pt was sent to ED for evaluation at which point she eloped. Notes abdominal abscess over injection site    IMPRESSION  #  #Pancytopenia  #Sepsis on admission P>90 WBC <4  #Transaminitis, Cholestasis AlkPhos  >1200    CTAP Massive hepatosplenomegaly   < from: US Abdomen Limited (01.30.21 @ 19:29) >Marked hepatomegaly. Cholelithiasis. No gallbladder distention. Reportedly negative sonographic Hammer's sign. Nonspecific gallbladder wall thickening.  #Hyponatremia / Hypokalemia  #Active IVDU, recent MMTP  #HCV Ab +, RNA UD      Creatinine, Serum: 0.8 (02-05-21 @ 11:58)      RECOMMENDATIONS  This is an incomplete consult note. All recommendations to follow after interview and examination of the patient.     If any questions, please call or send a message on [employer] Teams  Spectra [phone]     ASSESSMENT  25 year old female with hx of Hep C and IV drug use previously on methadone presents with abdominal distension, pedal edema, and sob for 3 weeks. Two weeks ago patient presented to methadone clinic to begin maintenance methadone for heroin abuse. Her lab workup at that time revealed elevated liver enzymes and pancytopenia. Pt was sent to ED for evaluation at which point she eloped. Notes abdominal abscess over injection site    IMPRESSION  #Pancytopenia since at least 1/2021    Afebrile since admission    Could be secondary to Hypersplenism given Hepatosplenomegaly    Differential includes viral, malignancy, liver disease, less likely sepsis as no fever, no risk factors for tickborne or endemic fungi/parasites  #Abdominal injection site wound infection  #Sepsis on admission P>90 WBC <4  #Transaminitis, Cholestasis AlkPhos  >1200    CTAP Massive hepatosplenomegaly   < from: US Abdomen Limited (01.30.21 @ 19:29) >Marked hepatomegaly. Cholelithiasis. No gallbladder distention. Reportedly negative sonographic Hammer's sign. Nonspecific gallbladder wall thickening.  #Hyponatremia / Hypokalemia  #Active IVDU, recent MMTP  #HCV Ab +, RNA UD      Creatinine, Serum: 0.8 (02-05-21 @ 11:58)      RECOMMENDATIONS  - Please send G/S and culture from deep abd wound  - cefTRIAXone   IVPB 2000 milliGRAM(s) IV Intermittent every 24 hours  - vancomycin  IVPB 1000 milliGRAM(s) IV Intermittent every 12 hours  - Please check vanc trough 30 min prior to 4th dose   - BCX  - Parasite smear  - Acute Hepatitis Panel  - HIV Ab/Ag  - EBV panel (IgM/IgG)  - CMV IgM/IgG  - ESR, CRP  - LDH  - GI consult  - Heme/Onc consult   - Abd Duplex rule out PVT    If any questions, please call or send a message on [employer] Teams  Spectra [phone]

## 2021-02-07 VITALS
DIASTOLIC BLOOD PRESSURE: 83 MMHG | TEMPERATURE: 99 F | RESPIRATION RATE: 18 BRPM | HEART RATE: 101 BPM | SYSTOLIC BLOOD PRESSURE: 133 MMHG

## 2021-02-07 LAB
ALBUMIN SERPL ELPH-MCNC: 2.2 G/DL — LOW (ref 3.5–5.2)
ALP SERPL-CCNC: 1055 U/L — HIGH (ref 30–115)
ALT FLD-CCNC: 28 U/L — SIGNIFICANT CHANGE UP (ref 0–41)
ANION GAP SERPL CALC-SCNC: 8 MMOL/L — SIGNIFICANT CHANGE UP (ref 7–14)
AST SERPL-CCNC: 52 U/L — HIGH (ref 0–41)
BILIRUB SERPL-MCNC: 1.3 MG/DL — HIGH (ref 0.2–1.2)
BUN SERPL-MCNC: 10 MG/DL — SIGNIFICANT CHANGE UP (ref 10–20)
CALCIUM SERPL-MCNC: 7.7 MG/DL — LOW (ref 8.5–10.1)
CHLORIDE SERPL-SCNC: 105 MMOL/L — SIGNIFICANT CHANGE UP (ref 98–110)
CMV IGG FLD QL: 5.3 U/ML — HIGH
CMV IGG SERPL-IMP: POSITIVE
CMV IGM FLD-ACNC: 24.5 AU/ML — SIGNIFICANT CHANGE UP
CMV IGM SERPL QL: NEGATIVE — SIGNIFICANT CHANGE UP
CO2 SERPL-SCNC: 21 MMOL/L — SIGNIFICANT CHANGE UP (ref 17–32)
CREAT SERPL-MCNC: 0.8 MG/DL — SIGNIFICANT CHANGE UP (ref 0.7–1.5)
CRP SERPL-MCNC: 1.54 MG/DL — HIGH (ref 0–0.4)
EBV EA AB SER IA-ACNC: <5 U/ML — SIGNIFICANT CHANGE UP
EBV EA AB TITR SER IF: NEGATIVE — SIGNIFICANT CHANGE UP
EBV EA IGG SER-ACNC: NEGATIVE — SIGNIFICANT CHANGE UP
EBV NA IGG SER IA-ACNC: <3 U/ML — SIGNIFICANT CHANGE UP
EBV PATRN SPEC IB-IMP: SIGNIFICANT CHANGE UP
EBV VCA IGG AVIDITY SER QL IA: POSITIVE
EBV VCA IGM SER IA-ACNC: <10 U/ML — SIGNIFICANT CHANGE UP
EBV VCA IGM SER IA-ACNC: >750 U/ML — HIGH
EBV VCA IGM TITR FLD: NEGATIVE — SIGNIFICANT CHANGE UP
GLUCOSE SERPL-MCNC: 108 MG/DL — HIGH (ref 70–99)
HAV IGM SER-ACNC: SIGNIFICANT CHANGE UP
HBV CORE IGM SER-ACNC: SIGNIFICANT CHANGE UP
HBV SURFACE AG SER-ACNC: SIGNIFICANT CHANGE UP
HCT VFR BLD CALC: 23.6 % — LOW (ref 37–47)
HCV AB S/CO SERPL IA: 1.99 S/CO — HIGH (ref 0–0.99)
HCV AB SERPL-IMP: ABNORMAL
HGB BLD-MCNC: 7.6 G/DL — LOW (ref 12–16)
HIV 1+2 AB+HIV1 P24 AG SERPL QL IA: SIGNIFICANT CHANGE UP
MAGNESIUM SERPL-MCNC: 2.3 MG/DL — SIGNIFICANT CHANGE UP (ref 1.8–2.4)
MCHC RBC-ENTMCNC: 28.6 PG — SIGNIFICANT CHANGE UP (ref 27–31)
MCHC RBC-ENTMCNC: 32.2 G/DL — SIGNIFICANT CHANGE UP (ref 32–37)
MCV RBC AUTO: 88.7 FL — SIGNIFICANT CHANGE UP (ref 81–99)
NRBC # BLD: 0 /100 WBCS — SIGNIFICANT CHANGE UP (ref 0–0)
PHOSPHATE SERPL-MCNC: 2.3 MG/DL — SIGNIFICANT CHANGE UP (ref 2.1–4.9)
PLATELET # BLD AUTO: 74 K/UL — LOW (ref 130–400)
POTASSIUM SERPL-MCNC: 3.2 MMOL/L — LOW (ref 3.5–5)
POTASSIUM SERPL-SCNC: 3.2 MMOL/L — LOW (ref 3.5–5)
PROT SERPL-MCNC: 5.4 G/DL — LOW (ref 6–8)
RBC # BLD: 2.66 M/UL — LOW (ref 4.2–5.4)
RBC # FLD: 20.1 % — HIGH (ref 11.5–14.5)
SODIUM SERPL-SCNC: 134 MMOL/L — LOW (ref 135–146)
WBC # BLD: 1.27 K/UL — LOW (ref 4.8–10.8)
WBC # FLD AUTO: 1.27 K/UL — LOW (ref 4.8–10.8)

## 2021-02-07 PROCEDURE — 99233 SBSQ HOSP IP/OBS HIGH 50: CPT

## 2021-02-07 RX ORDER — IBUPROFEN 200 MG
400 TABLET ORAL EVERY 6 HOURS
Refills: 0 | Status: DISCONTINUED | OUTPATIENT
Start: 2021-02-07 | End: 2021-02-07

## 2021-02-07 RX ORDER — POTASSIUM CHLORIDE 20 MEQ
40 PACKET (EA) ORAL EVERY 4 HOURS
Refills: 0 | Status: COMPLETED | OUTPATIENT
Start: 2021-02-07 | End: 2021-02-07

## 2021-02-07 RX ADMIN — Medication 250 MILLIGRAM(S): at 17:02

## 2021-02-07 RX ADMIN — Medication 1 PATCH: at 10:43

## 2021-02-07 RX ADMIN — Medication 40 MILLIEQUIVALENT(S): at 14:32

## 2021-02-07 RX ADMIN — CEFTRIAXONE 100 MILLIGRAM(S): 500 INJECTION, POWDER, FOR SOLUTION INTRAMUSCULAR; INTRAVENOUS at 10:39

## 2021-02-07 RX ADMIN — Medication 1 PATCH: at 07:40

## 2021-02-07 RX ADMIN — Medication 1 PATCH: at 19:33

## 2021-02-07 RX ADMIN — Medication 1 PATCH: at 10:39

## 2021-02-07 RX ADMIN — Medication 250 MILLIGRAM(S): at 05:13

## 2021-02-07 RX ADMIN — METHADONE HYDROCHLORIDE 30 MILLIGRAM(S): 40 TABLET ORAL at 12:00

## 2021-02-07 RX ADMIN — Medication 40 MILLIEQUIVALENT(S): at 17:02

## 2021-02-07 NOTE — CHART NOTE - NSCHARTNOTEFT_GEN_A_CORE
Patient announced that she wants to leave "AMA" Patient announced that she wants to leave "AMA". Both her nurse and myself attempted to change her mind but she would not give any other reason then she just wants to leave. Patient announced that she wants to leave "AMA". Both her nurse and myself attempted to change her mind but she would not give any other reason then she just wants to leave. I informed her of risks of leaving AMA to include but not limited worsening of her condition and that insurance may not pay bills. She did say she will return to ER if she needs to

## 2021-02-07 NOTE — CHART NOTE - NSCHARTNOTEFT_GEN_A_CORE
I reviewed all labwork with patient from today. She understood all that we discussed: CBC, CMP and Magnesium

## 2021-02-07 NOTE — PROGRESS NOTE ADULT - ASSESSMENT
Patient is a 25 year old female with hx of Hep C and IV drug use previously on methadone presents with abdominal distension, pedal edema, and sob for 3 weeks. Two weeks ago patient presented to methadone clinic to begin maintenance methadone for heroin abuse. Her lab workup at that time revealed elevated liver enzymes and pancytopenia. Pt was sent to ED for evaluation at which point she eloped. She presents now and is agreeable for admission. Patient states she has also been experiencing intermittent chills and fevers over the last several weeks. She has an abscess on her abdomen over injection site that ruptured today. Reports nausea and NBNB vomiting a few times over the last month. Currently uses heroin (1 gram daily) and smokes 1PPD.    Lactic acidosis/SIRS/Fever/Skin abscess - improving  -IV abx - rocephin to cover SBP and vanco  -follow up cultures  -ID consult appreciated - recommendations noted  -check parasite smear  -HIV negative    # Hepatitic C  - not treated   - s/p cefepime & vancomycin in the ER  - ER didn't perform paracentesis due to lack of ascites as per ER resident   - GI consult pending   - follow up CMV, EBV, HIV, TB gold, acute hep panel  - Abdominal sono - portal vein patent     # pancytopenia  - likely secondary to hepatitis C  - s/p 1 unit PRBC in ED  - maintain active t&s  - no evidence of bleed  - hematology eval ordered     # heroin use disorder  - addiction medicine consult appreciated   - continue methadone taper  - symptomatic treatment    # Tobacco addiction  - nicotine patch     Progress Note Handoff  Pending Consults: GI  Pending Tests: labs  Pending Results: labs, cultures  Family Discussion: Discussed methadone, IV abx and treatment plan with patient in great detail - in agreement with plan of care   Disposition: Home__x___/SNF______/Other_____/Unknown at this time_____    Please call me with any questions at extension [phone]

## 2021-02-07 NOTE — PROGRESS NOTE ADULT - SUBJECTIVE AND OBJECTIVE BOX
POOJA DIANE  25y  Female      Patient is a 25y old female who presents with a chief complaint of sepsis, pancytopenia (06 Feb 2021 07:53)      INTERVAL HPI/OVERNIGHT EVENTS:  Patient seen and examined earlier this morning.  Ambulating around the room.  In NAD.  Feels better with methadone  Await GI eval        REVIEW OF SYSTEMS:  CONSTITUTIONAL: No fever, weight loss, or fatigue  EYES: No eye pain, visual disturbances, or discharge  ENMT:  No difficulty hearing, tinnitus, vertigo; No sinus or throat pain  NECK: No pain or stiffness  RESPIRATORY: No cough, wheezing, chills or hemoptysis; No shortness of breath  CARDIOVASCULAR: No chest pain, palpitations, dizziness, or leg swelling  GASTROINTESTINAL: No abdominal or epigastric pain. No nausea, vomiting, or hematemesis; No diarrhea or constipation. No melena or hematochezia.  GENITOURINARY: No dysuria, frequency, hematuria, or incontinence  NEUROLOGICAL: No headaches, memory loss, loss of strength, numbness, or tremors  SKIN: No itching, burning, rashes, or lesions   LYMPH NODES: No enlarged glands  ENDOCRINE: No heat or cold intolerance; No hair loss  MUSCULOSKELETAL: No joint pain or swelling; No muscle, back, or extremity pain  PSYCHIATRIC: No depression, anxiety, mood swings, or difficulty sleeping  HEME/LYMPH: No easy bruising, or bleeding gums  ALLERY AND IMMUNOLOGIC: No hives or eczema      Vital Signs Last 24 Hrs  T(C): 35.6 (07 Feb 2021 05:20), Max: 36.4 (06 Feb 2021 14:31)  T(F): 96.1 (07 Feb 2021 05:20), Max: 97.6 (06 Feb 2021 14:31)  HR: 83 (07 Feb 2021 05:36) (80 - 100)  BP: 100/60 (07 Feb 2021 05:36) (79/50 - 111/73)  BP(mean): --  RR: 18 (07 Feb 2021 05:20) (18 - 18)  SpO2: --      PHYSICAL EXAM:  GENERAL: NAD, well-groomed, well-developed  HEAD:  Atraumatic, Normocephalic  EYES: EOMI, PERRLA, conjunctiva and sclera clear  ENMT: No tonsillar erythema, exudates, or enlargement; Moist mucous membranes, Good dentition, No lesions  NECK: Supple, No JVD, Normal thyroid  NERVOUS SYSTEM:  Alert & Oriented X3, Good concentration; Motor Strength 5/5 B/L upper and lower extremities; DTRs 2+ intact and symmetric  CHEST/LUNG: Clear to percussion bilaterally; No rales, rhonchi, wheezing, or rubs  HEART: Regular rate and rhythm; No murmurs, rubs, or gallops  ABDOMEN: Soft, Nontender, distended; Bowel sounds present,   EXTREMITIES:  2+ Peripheral Pulses, No clubbing, cyanosis.  B/L LE edema  LYMPH: No lymphadenopathy noted  SKIN: multiple areas of ecchymosis, track lines, small round wound on ruq on abdomen    Consultant(s) Notes Reviewed:  [x ] YES  [ ] NO  Care Discussed with Consultants/Other Providers [ x] YES  [ ] NO    LAB:                          7.6    1.27  )-----------( 74       ( 07 Feb 2021 09:03 )             23.6     02-07    134<L>  |  105  |  10  ----------------------------<  108<H>  3.2<L>   |  21  |  0.8    Ca    7.7<L>      07 Feb 2021 09:03  Mg     2.3     02-07    TPro  5.4<L>  /  Alb  2.2<L>  /  TBili  1.3<H>  /  DBili  x   /  AST  52<H>  /  ALT  28  /  AlkPhos  1055<H>  02-07        Drug Dosing Weight  Height (cm): 167.6 (05 Feb 2021 18:45)  Weight (kg): 60 (05 Feb 2021 18:45)  BMI (kg/m2): 21.4 (05 Feb 2021 18:45)  BSA (m2): 1.68 (05 Feb 2021 18:45)      RADIOLOGY & ADDITIONAL TESTS:  Imaging Personally Reviewed:  [x] YES  [ ] NO      MEDICATIONS  (STANDING):  cefTRIAXone   IVPB 2000 milliGRAM(s) IV Intermittent every 24 hours  methadone    Tablet 30 milliGRAM(s) Oral every 24 hours  nicotine - 21 mG/24Hr(s) Patch 1 patch Transdermal daily  potassium chloride    Tablet ER 40 milliEquivalent(s) Oral every 4 hours  vancomycin  IVPB 1000 milliGRAM(s) IV Intermittent every 12 hours    MEDICATIONS  (PRN):  hydrOXYzine hydrochloride 25 milliGRAM(s) Oral every 6 hours PRN Anxiety  senna 2 Tablet(s) Oral at bedtime PRN Constipation

## 2021-02-08 LAB
AMPHET UR-MCNC: POSITIVE
BARBITURATES UR SCN-MCNC: NEGATIVE — SIGNIFICANT CHANGE UP
BENZODIAZ UR-MCNC: NEGATIVE — SIGNIFICANT CHANGE UP
COCAINE METAB.OTHER UR-MCNC: NEGATIVE — SIGNIFICANT CHANGE UP
DRUG SCREEN 1, URINE RESULT: SIGNIFICANT CHANGE UP
METHADONE UR-MCNC: NEGATIVE — SIGNIFICANT CHANGE UP
OPIATES UR-MCNC: POSITIVE
PCP UR-MCNC: NEGATIVE — SIGNIFICANT CHANGE UP
PROPOXYPHENE QUALITATIVE URINE RESULT: NEGATIVE — SIGNIFICANT CHANGE UP
THC UR QL: NEGATIVE — SIGNIFICANT CHANGE UP

## 2021-02-08 NOTE — CONSULT NOTE ADULT - SUBJECTIVE AND OBJECTIVE BOX
Patient is a 25y old  Female who presents with a chief complaint of sepsis, pancytopenia (08 Feb 2021 00:00)      HPI:  Patient is a 25 year old female with hx of Hep C and IV drug use previously on methadone presents with abdominal distension, pedal edema, and sob for 3 weeks. Two weeks ago patient presented to methadone clinic to begin maintenance methadone for heroin abuse. Her lab workup at that time revealed elevated liver enzymes and pancytopenia. Pt was sent to ED for evaluation at which point she eloped. She presents now and is agreeable for admission. Patient states she has also been experiencing intermittent chills and fevers over the last several weeks. She has an abscess on her abdomen over injection site that ruptured today. Reports nausea and NBNB vomiting a few times over the last month. Currently uses heroin (1 gram daily) and smokes 1PPD.    Patient denies sore throat, cough, dysuria, melena, hematochezia, hemoptysis, hematuria, vaginal bleeding.    In ED: tachy and febrile, pancytopenic, profound hepatosplenomegaly, elevated LFT, to receive 1 unit PRBC in ED, s/p cefepime & vanc, lact 2.2, K 2.9 s/p 40 meq K,  (05 Feb 2021 16:39)       ROS: as above       PAST MEDICAL & SURGICAL HISTORY:  Anxiety and depression    Asthma    Hepatitis C    No significant past surgical history        SOCIAL HISTORY:    FAMILY HISTORY:  No pertinent family history in first degree relatives        MEDICATIONS  (STANDING):    MEDICATIONS  (PRN):      Allergies    buprenorphine (Rash)  Suboxone (Rash)    Intolerances        Vital Signs Last 24 Hrs  T(C): --  T(F): --  HR: --  BP: --  BP(mean): --  RR: --  SpO2: --    PHYSICAL EXAM  General: adult in NAD  HEENT: clear oropharynx, anicteric sclera, pink conjunctiva  Neck: supple  CV: normal S1/S2 with no murmur rubs or gallops  Lungs: positive air movement b/l ant lungs,clear to auscultation, no wheezes, no rales  Abdomen: soft non-tender non-distended, no hepatosplenomegaly  Ext: no clubbing cyanosis or edema  Skin: no rashes and no petechiae  Neuro: alert and oriented X 4, no focal deficits      LABS:                          7.6    1.27  )-----------( 74       ( 07 Feb 2021 09:03 )             23.6         Mean Cell Volume : 88.7 fL  Mean Cell Hemoglobin : 28.6 pg  Mean Cell Hemoglobin Concentration : 32.2 g/dL  Auto Neutrophil # : x  Auto Lymphocyte # : x  Auto Monocyte # : x  Auto Eosinophil # : x  Auto Basophil # : x  Auto Neutrophil % : x  Auto Lymphocyte % : x  Auto Monocyte % : x  Auto Eosinophil % : x  Auto Basophil % : x      Serial CBC's  02-07 @ 09:03  Hct-23.6 / Hgb-7.6 / Plat-74 / RBC-2.66 / WBC-1.27  Serial CBC's  02-06 @ 08:45  Hct-26.3 / Hgb-8.5 / Plat-74 / RBC-3.00 / WBC-1.59  Serial CBC's  02-05 @ 11:58  Hct-21.1 / Hgb-6.7 / Plat-81 / RBC-2.44 / WBC-1.72      02-07    134<L>  |  105  |  10  ----------------------------<  108<H>  3.2<L>   |  21  |  0.8    Ca    7.7<L>      07 Feb 2021 09:03  Phos  2.3     02-07  Mg     2.3     02-07    TPro  5.4<L>  /  Alb  2.2<L>  /  TBili  1.3<H>  /  DBili  x   /  AST  52<H>  /  ALT  28  /  AlkPhos  1055<H>  02-07                      RADIOLOGY & ADDITIONAL STUDIES:

## 2021-02-09 LAB
GAMMA INTERFERON BACKGROUND BLD IA-ACNC: 0.38 IU/ML — SIGNIFICANT CHANGE UP
M TB IFN-G BLD-IMP: ABNORMAL
M TB IFN-G CD4+ BCKGRND COR BLD-ACNC: 0.01 IU/ML — SIGNIFICANT CHANGE UP
M TB IFN-G CD4+CD8+ BCKGRND COR BLD-ACNC: -0.02 IU/ML — SIGNIFICANT CHANGE UP
QUANT TB PLUS MITOGEN MINUS NIL: 0.14 IU/ML — SIGNIFICANT CHANGE UP
SARS-COV-2 IGG SERPL QL IA: NEGATIVE — SIGNIFICANT CHANGE UP
SARS-COV-2 IGM SERPL IA-ACNC: <3.8 AU/ML — SIGNIFICANT CHANGE UP

## 2021-02-10 LAB
CULTURE RESULTS: SIGNIFICANT CHANGE UP
CULTURE RESULTS: SIGNIFICANT CHANGE UP
PARASITE BLOOD SMEAR RESULT: SIGNIFICANT CHANGE UP
SPECIMEN SOURCE: SIGNIFICANT CHANGE UP
SPECIMEN SOURCE: SIGNIFICANT CHANGE UP

## 2021-02-11 DIAGNOSIS — L02.211 CUTANEOUS ABSCESS OF ABDOMINAL WALL: ICD-10-CM

## 2021-02-11 DIAGNOSIS — A41.9 SEPSIS, UNSPECIFIED ORGANISM: ICD-10-CM

## 2021-02-11 DIAGNOSIS — F32.9 MAJOR DEPRESSIVE DISORDER, SINGLE EPISODE, UNSPECIFIED: ICD-10-CM

## 2021-02-11 DIAGNOSIS — F11.20 OPIOID DEPENDENCE, UNCOMPLICATED: ICD-10-CM

## 2021-02-11 DIAGNOSIS — F17.210 NICOTINE DEPENDENCE, CIGARETTES, UNCOMPLICATED: ICD-10-CM

## 2021-02-11 DIAGNOSIS — E87.2 ACIDOSIS: ICD-10-CM

## 2021-02-11 DIAGNOSIS — R16.2 HEPATOMEGALY WITH SPLENOMEGALY, NOT ELSEWHERE CLASSIFIED: ICD-10-CM

## 2021-02-11 DIAGNOSIS — E83.51 HYPOCALCEMIA: ICD-10-CM

## 2021-02-11 DIAGNOSIS — E87.1 HYPO-OSMOLALITY AND HYPONATREMIA: ICD-10-CM

## 2021-02-11 DIAGNOSIS — E87.6 HYPOKALEMIA: ICD-10-CM

## 2021-02-11 DIAGNOSIS — J45.909 UNSPECIFIED ASTHMA, UNCOMPLICATED: ICD-10-CM

## 2021-02-11 DIAGNOSIS — M79.89 OTHER SPECIFIED SOFT TISSUE DISORDERS: ICD-10-CM

## 2021-02-11 DIAGNOSIS — D61.818 OTHER PANCYTOPENIA: ICD-10-CM

## 2021-02-11 DIAGNOSIS — B18.2 CHRONIC VIRAL HEPATITIS C: ICD-10-CM

## 2021-02-11 DIAGNOSIS — Z88.9 ALLERGY STATUS TO UNSPECIFIED DRUGS, MEDICAMENTS AND BIOLOGICAL SUBSTANCES: ICD-10-CM

## 2021-02-11 DIAGNOSIS — F41.9 ANXIETY DISORDER, UNSPECIFIED: ICD-10-CM

## 2021-02-11 LAB
6-ACETYLMORPHINE, UR RESULT: NEGATIVE NG/ML — SIGNIFICANT CHANGE UP
6MAM UR CFM-MCNC: NEGATIVE NG/ML — SIGNIFICANT CHANGE UP
CODEINE UR CFM-MCNC: NEGATIVE NG/ML — SIGNIFICANT CHANGE UP
CODEINE, UR RESULT: NEGATIVE NG/ML — SIGNIFICANT CHANGE UP
HCV RNA FLD QL NAA+PROBE: SIGNIFICANT CHANGE UP
HYDROCODONE UR QL CFM: NEGATIVE NG/ML — SIGNIFICANT CHANGE UP
HYDROCODONE, UR RESULT: NEGATIVE NG/ML — SIGNIFICANT CHANGE UP
HYDROMORPHONE UR QL CFM: NEGATIVE NG/ML — SIGNIFICANT CHANGE UP
HYDROMORPHONE, UR RESULT: NEGATIVE NG/ML — SIGNIFICANT CHANGE UP
MORPHINE UR QL CFM: 629 NG/ML — SIGNIFICANT CHANGE UP
MORPHINE, UR RESULT: 629 NG/ML — SIGNIFICANT CHANGE UP
NOROXYCODONE (OPIATES), UR RESULT: NEGATIVE NG/ML — SIGNIFICANT CHANGE UP
NOROXYCODONE UR CFM-MCNC: NEGATIVE NG/ML — SIGNIFICANT CHANGE UP
OPIATES IN-HOUSE INTERPRETATION: POSITIVE
OPIATES UR QL CFM: POSITIVE
OXYCODONE (OPIATES), UR RESULT: NEGATIVE NG/ML — SIGNIFICANT CHANGE UP
OXYCODONE UR-MCNC: NEGATIVE NG/ML — SIGNIFICANT CHANGE UP
OXYMORPHONE (OPIATES), UR RESULT: NEGATIVE NG/ML — SIGNIFICANT CHANGE UP
OXYMORPHONE UR CFM-MCNC: NEGATIVE NG/ML — SIGNIFICANT CHANGE UP

## 2021-02-12 LAB
CULTURE RESULTS: SIGNIFICANT CHANGE UP
SPECIMEN SOURCE: SIGNIFICANT CHANGE UP

## 2021-02-17 ENCOUNTER — EMERGENCY (EMERGENCY)
Facility: HOSPITAL | Age: 26
LOS: 0 days | Discharge: HOME | End: 2021-02-17
Attending: EMERGENCY MEDICINE | Admitting: EMERGENCY MEDICINE
Payer: MEDICAID

## 2021-02-17 VITALS
TEMPERATURE: 98 F | WEIGHT: 145.06 LBS | DIASTOLIC BLOOD PRESSURE: 60 MMHG | RESPIRATION RATE: 20 BRPM | SYSTOLIC BLOOD PRESSURE: 115 MMHG | OXYGEN SATURATION: 99 % | HEART RATE: 108 BPM | HEIGHT: 66 IN

## 2021-02-17 DIAGNOSIS — F11.10 OPIOID ABUSE, UNCOMPLICATED: ICD-10-CM

## 2021-02-17 DIAGNOSIS — Z87.891 PERSONAL HISTORY OF NICOTINE DEPENDENCE: ICD-10-CM

## 2021-02-17 DIAGNOSIS — Z88.8 ALLERGY STATUS TO OTHER DRUGS, MEDICAMENTS AND BIOLOGICAL SUBSTANCES: ICD-10-CM

## 2021-02-17 LAB
AMPHET UR QL SCN: NEGATIVE NG/ML — SIGNIFICANT CHANGE UP
AMPHET UR QL SCN: NEGATIVE — SIGNIFICANT CHANGE UP
AMPHETAMINE IN-HOUSE INTERPRETATION: NEGATIVE — SIGNIFICANT CHANGE UP
AMPHETAMINE, UR RESULT: NEGATIVE NG/ML — SIGNIFICANT CHANGE UP
MDA UR QL SCN: NEGATIVE NG/ML — SIGNIFICANT CHANGE UP
MDA, UR RESULT: NEGATIVE NG/ML — SIGNIFICANT CHANGE UP
MDEA, UR RESULT: NEGATIVE NG/ML — SIGNIFICANT CHANGE UP
MDMA UR QL SCN: NEGATIVE NG/ML — SIGNIFICANT CHANGE UP
MDMA, UR RESULT: NEGATIVE NG/ML — SIGNIFICANT CHANGE UP
METHAMPHET UR QL SCN: NEGATIVE NG/ML — SIGNIFICANT CHANGE UP
METHAMPHETAMINE, UR RESULT: NEGATIVE NG/ML — SIGNIFICANT CHANGE UP

## 2021-02-17 PROCEDURE — 99283 EMERGENCY DEPT VISIT LOW MDM: CPT

## 2021-02-17 NOTE — ED ADULT NURSE NOTE - CHIEF COMPLAINT QUOTE
pt states she was admitted a few weeks ago and left AMA, came back today because someone she is living with wanted her to be checked out

## 2021-02-17 NOTE — ED PROVIDER NOTE - ATTENDING CONTRIBUTION TO CARE
I was present for and supervised the key and critical aspects of the procedures performed during the care of the patient. Patient states "I don't want to be here" "I don't want anything done", for an unknown reason left the ED eloped from dept

## 2021-02-17 NOTE — ED PROVIDER NOTE - NS ED ROS FT
Constitutional: See HPI.  Eyes: No visual changes, eye pain or discharge.   ENMT: No hearing changes, pain, discharge or infections.   Cardiac: No SOB or edema. No chest pain with exertion.  Respiratory: No cough or respiratory distress.   GI: No nausea, vomiting, diarrhea or abdominal pain.  : No dysuria, frequency or burning. No Discharge  MS: No myalgia, muscle weakness, joint pain or back pain.  Neuro: No headache or weakness.  Skin: No skin rash.  Except as documented in the HPI, all other systems are negative.

## 2021-02-17 NOTE — ED PROVIDER NOTE - PHYSICAL EXAMINATION
CONST: Well appearing in NAD  EYES: Sclera and conjunctiva clear.  CARD: Normal S1 S2; Normal rate and rhythm  RESP: Equal BS B/L, No wheezes, rhonchi or rales. No distress  GI: Soft, non-tender, non-distended.  MS: Normal ROM in all extremities. No edema of lower extremities, no calf pain, radial pulses 2+ bilaterally  SKIN: Warm, dry, no acute rashes. Good turgor  NEURO: A&Ox3, No focal deficits. Strength 5/5 with no sensory deficits. Steady gait CONST: chronically ill appearing.   EYES: Sclera and conjunctiva clear.  CARD: Normal S1 S2; Normal rate and rhythm  RESP: Equal BS B/L, No wheezes, rhonchi or rales. No distress  GI: Soft, non-tender, mildly distended, + hepatosplenomegaly, non-distended.  MS: Normal ROM in all extremities. No edema of lower extremities, no calf pain, radial pulses 2+ bilaterally  SKIN:  scattered track marks.   NEURO: A&Ox3, No focal deficits. Strength 5/5 with no sensory deficits. Steady gait

## 2021-02-17 NOTE — ED PROVIDER NOTE - OBJECTIVE STATEMENT
25 y.o female w/ hx of IV heroin abuse presents to the ED.  States " The nikki that I lived with told me I have to come here, I don't want to be here."  Pt has no complaints at this time.  Refusing work up.  Denies Si/Hi.  last heroin injection this am. recent admission 2/5 and left AMA.

## 2021-03-01 ENCOUNTER — OUTPATIENT (OUTPATIENT)
Dept: OUTPATIENT SERVICES | Facility: HOSPITAL | Age: 26
LOS: 1 days | End: 2021-03-01
Payer: MEDICAID

## 2021-03-30 DIAGNOSIS — Z71.89 OTHER SPECIFIED COUNSELING: ICD-10-CM

## 2021-08-01 PROCEDURE — G9005: CPT

## 2022-04-20 ENCOUNTER — INPATIENT (INPATIENT)
Facility: HOSPITAL | Age: 27
LOS: 1 days | Discharge: LEFT BEFORE DISCHARGE | End: 2022-04-22
Attending: INTERNAL MEDICINE | Admitting: INTERNAL MEDICINE
Payer: MEDICAID

## 2022-04-20 VITALS
HEIGHT: 66 IN | TEMPERATURE: 96 F | RESPIRATION RATE: 22 BRPM | WEIGHT: 130.07 LBS | SYSTOLIC BLOOD PRESSURE: 143 MMHG | HEART RATE: 89 BPM | DIASTOLIC BLOOD PRESSURE: 109 MMHG | OXYGEN SATURATION: 96 %

## 2022-04-20 DIAGNOSIS — J90 PLEURAL EFFUSION, NOT ELSEWHERE CLASSIFIED: ICD-10-CM

## 2022-04-20 DIAGNOSIS — F11.20 OPIOID DEPENDENCE, UNCOMPLICATED: ICD-10-CM

## 2022-04-20 DIAGNOSIS — E87.70 FLUID OVERLOAD, UNSPECIFIED: ICD-10-CM

## 2022-04-20 DIAGNOSIS — R60.1 GENERALIZED EDEMA: ICD-10-CM

## 2022-04-20 DIAGNOSIS — J45.909 UNSPECIFIED ASTHMA, UNCOMPLICATED: ICD-10-CM

## 2022-04-20 DIAGNOSIS — K74.60 UNSPECIFIED CIRRHOSIS OF LIVER: ICD-10-CM

## 2022-04-20 DIAGNOSIS — F19.10 OTHER PSYCHOACTIVE SUBSTANCE ABUSE, UNCOMPLICATED: ICD-10-CM

## 2022-04-20 LAB
ALBUMIN SERPL ELPH-MCNC: 2.8 G/DL — LOW (ref 3.5–5.2)
ALP SERPL-CCNC: 656 U/L — HIGH (ref 30–115)
ALT FLD-CCNC: 9 U/L — SIGNIFICANT CHANGE UP (ref 0–41)
ANION GAP SERPL CALC-SCNC: 9 MMOL/L — SIGNIFICANT CHANGE UP (ref 7–14)
APTT BLD: 35.9 SEC — SIGNIFICANT CHANGE UP (ref 27–39.2)
AST SERPL-CCNC: 23 U/L — SIGNIFICANT CHANGE UP (ref 0–41)
BASOPHILS # BLD AUTO: 0 K/UL — SIGNIFICANT CHANGE UP (ref 0–0.2)
BASOPHILS NFR BLD AUTO: 0 % — SIGNIFICANT CHANGE UP (ref 0–1)
BILIRUB SERPL-MCNC: 0.5 MG/DL — SIGNIFICANT CHANGE UP (ref 0.2–1.2)
BUN SERPL-MCNC: 13 MG/DL — SIGNIFICANT CHANGE UP (ref 10–20)
CALCIUM SERPL-MCNC: 8.3 MG/DL — LOW (ref 8.5–10.1)
CHLORIDE SERPL-SCNC: 102 MMOL/L — SIGNIFICANT CHANGE UP (ref 98–110)
CO2 SERPL-SCNC: 24 MMOL/L — SIGNIFICANT CHANGE UP (ref 17–32)
CREAT SERPL-MCNC: 0.9 MG/DL — SIGNIFICANT CHANGE UP (ref 0.7–1.5)
EGFR: 89 ML/MIN/1.73M2 — SIGNIFICANT CHANGE UP
EOSINOPHIL # BLD AUTO: 0 K/UL — SIGNIFICANT CHANGE UP (ref 0–0.7)
EOSINOPHIL NFR BLD AUTO: 0 % — SIGNIFICANT CHANGE UP (ref 0–8)
FLUAV AG NPH QL: SIGNIFICANT CHANGE UP
FLUBV AG NPH QL: SIGNIFICANT CHANGE UP
GGT SERPL-CCNC: 227 U/L — HIGH (ref 1–40)
GLUCOSE SERPL-MCNC: 127 MG/DL — HIGH (ref 70–99)
HCG SERPL QL: NEGATIVE — SIGNIFICANT CHANGE UP
HCT VFR BLD CALC: 35.9 % — LOW (ref 37–47)
HGB BLD-MCNC: 10.9 G/DL — LOW (ref 12–16)
IMM GRANULOCYTES NFR BLD AUTO: 0.3 % — SIGNIFICANT CHANGE UP (ref 0.1–0.3)
INR BLD: 1.11 RATIO — SIGNIFICANT CHANGE UP (ref 0.65–1.3)
LYMPHOCYTES # BLD AUTO: 0.47 K/UL — LOW (ref 1.2–3.4)
LYMPHOCYTES # BLD AUTO: 14.4 % — LOW (ref 20.5–51.1)
MAGNESIUM SERPL-MCNC: 2.1 MG/DL — SIGNIFICANT CHANGE UP (ref 1.8–2.4)
MCHC RBC-ENTMCNC: 25.6 PG — LOW (ref 27–31)
MCHC RBC-ENTMCNC: 30.4 G/DL — LOW (ref 32–37)
MCV RBC AUTO: 84.5 FL — SIGNIFICANT CHANGE UP (ref 81–99)
MONOCYTES # BLD AUTO: 0.13 K/UL — SIGNIFICANT CHANGE UP (ref 0.1–0.6)
MONOCYTES NFR BLD AUTO: 4 % — SIGNIFICANT CHANGE UP (ref 1.7–9.3)
NEUTROPHILS # BLD AUTO: 2.65 K/UL — SIGNIFICANT CHANGE UP (ref 1.4–6.5)
NEUTROPHILS NFR BLD AUTO: 81.3 % — HIGH (ref 42.2–75.2)
NRBC # BLD: 0 /100 WBCS — SIGNIFICANT CHANGE UP (ref 0–0)
NT-PROBNP SERPL-SCNC: 8108 PG/ML — HIGH (ref 0–300)
PLATELET # BLD AUTO: 188 K/UL — SIGNIFICANT CHANGE UP (ref 130–400)
POTASSIUM SERPL-MCNC: 4.3 MMOL/L — SIGNIFICANT CHANGE UP (ref 3.5–5)
POTASSIUM SERPL-SCNC: 4.3 MMOL/L — SIGNIFICANT CHANGE UP (ref 3.5–5)
PROT SERPL-MCNC: 5.8 G/DL — LOW (ref 6–8)
PROTHROM AB SERPL-ACNC: 12.8 SEC — SIGNIFICANT CHANGE UP (ref 9.95–12.87)
RBC # BLD: 4.25 M/UL — SIGNIFICANT CHANGE UP (ref 4.2–5.4)
RBC # FLD: 15.1 % — HIGH (ref 11.5–14.5)
RSV RNA NPH QL NAA+NON-PROBE: SIGNIFICANT CHANGE UP
SARS-COV-2 RNA SPEC QL NAA+PROBE: SIGNIFICANT CHANGE UP
SODIUM SERPL-SCNC: 135 MMOL/L — SIGNIFICANT CHANGE UP (ref 135–146)
TROPONIN T SERPL-MCNC: <0.01 NG/ML — SIGNIFICANT CHANGE UP
WBC # BLD: 3.26 K/UL — LOW (ref 4.8–10.8)
WBC # FLD AUTO: 3.26 K/UL — LOW (ref 4.8–10.8)

## 2022-04-20 PROCEDURE — ZZZZZ: CPT

## 2022-04-20 PROCEDURE — 99223 1ST HOSP IP/OBS HIGH 75: CPT

## 2022-04-20 PROCEDURE — 99221 1ST HOSP IP/OBS SF/LOW 40: CPT

## 2022-04-20 PROCEDURE — 93010 ELECTROCARDIOGRAM REPORT: CPT

## 2022-04-20 PROCEDURE — 74177 CT ABD & PELVIS W/CONTRAST: CPT | Mod: 26,MA

## 2022-04-20 PROCEDURE — 36000 PLACE NEEDLE IN VEIN: CPT

## 2022-04-20 PROCEDURE — 99285 EMERGENCY DEPT VISIT HI MDM: CPT | Mod: 25

## 2022-04-20 PROCEDURE — 71045 X-RAY EXAM CHEST 1 VIEW: CPT | Mod: 26

## 2022-04-20 PROCEDURE — 76937 US GUIDE VASCULAR ACCESS: CPT | Mod: 26

## 2022-04-20 PROCEDURE — 99222 1ST HOSP IP/OBS MODERATE 55: CPT

## 2022-04-20 RX ORDER — ONDANSETRON 8 MG/1
4 TABLET, FILM COATED ORAL EVERY 8 HOURS
Refills: 0 | Status: DISCONTINUED | OUTPATIENT
Start: 2022-04-20 | End: 2022-04-22

## 2022-04-20 RX ORDER — METHADONE HYDROCHLORIDE 40 MG/1
15 TABLET ORAL
Refills: 0 | Status: DISCONTINUED | OUTPATIENT
Start: 2022-04-20 | End: 2022-04-21

## 2022-04-20 RX ORDER — FUROSEMIDE 40 MG
40 TABLET ORAL
Refills: 0 | Status: DISCONTINUED | OUTPATIENT
Start: 2022-04-20 | End: 2022-04-20

## 2022-04-20 RX ORDER — NICOTINE POLACRILEX 2 MG
1 GUM BUCCAL DAILY
Refills: 0 | Status: DISCONTINUED | OUTPATIENT
Start: 2022-04-20 | End: 2022-04-22

## 2022-04-20 RX ORDER — BACITRACIN ZINC 500 UNIT/G
1 OINTMENT IN PACKET (EA) TOPICAL DAILY
Refills: 0 | Status: DISCONTINUED | OUTPATIENT
Start: 2022-04-20 | End: 2022-04-22

## 2022-04-20 RX ORDER — METHADONE HYDROCHLORIDE 40 MG/1
TABLET ORAL
Refills: 0 | Status: DISCONTINUED | OUTPATIENT
Start: 2022-04-20 | End: 2022-04-22

## 2022-04-20 RX ORDER — FUROSEMIDE 40 MG
40 TABLET ORAL ONCE
Refills: 0 | Status: COMPLETED | OUTPATIENT
Start: 2022-04-20 | End: 2022-04-20

## 2022-04-20 RX ORDER — ALBUTEROL 90 UG/1
2 AEROSOL, METERED ORAL EVERY 6 HOURS
Refills: 0 | Status: DISCONTINUED | OUTPATIENT
Start: 2022-04-20 | End: 2022-04-22

## 2022-04-20 RX ORDER — METHADONE HYDROCHLORIDE 40 MG/1
0 TABLET ORAL
Qty: 0 | Refills: 0 | DISCHARGE

## 2022-04-20 RX ORDER — CHLORHEXIDINE GLUCONATE 213 G/1000ML
1 SOLUTION TOPICAL
Refills: 0 | Status: DISCONTINUED | OUTPATIENT
Start: 2022-04-20 | End: 2022-04-22

## 2022-04-20 RX ORDER — PANTOPRAZOLE SODIUM 20 MG/1
40 TABLET, DELAYED RELEASE ORAL
Refills: 0 | Status: DISCONTINUED | OUTPATIENT
Start: 2022-04-20 | End: 2022-04-22

## 2022-04-20 RX ORDER — METHADONE HYDROCHLORIDE 40 MG/1
10 TABLET ORAL
Refills: 0 | Status: DISCONTINUED | OUTPATIENT
Start: 2022-04-22 | End: 2022-04-22

## 2022-04-20 RX ADMIN — METHADONE HYDROCHLORIDE 15 MILLIGRAM(S): 40 TABLET ORAL at 17:19

## 2022-04-20 RX ADMIN — Medication 40 MILLIGRAM(S): at 07:23

## 2022-04-20 NOTE — ED ADULT NURSE NOTE - OBJECTIVE STATEMENT
pt has bilateral lower extremity swelling with abdominal distention and shortness of breath.  On last visit she was told she had an enlarged liver and spleen.  Patient states she is an IV drug user, heroin, last done 04/19/2022 @ 1700.

## 2022-04-20 NOTE — CONSULT NOTE ADULT - PROBLEM SELECTOR RECOMMENDATION 9
After evaluation at this time protocol was initiated for methadone protocol. Pt also with concern over nicotine addiction and wants a patch. Pt with significant liver disease and hepatitis C.  Recommend pulmonary, cardiology, GI and IR. Pt will be considered for [facility] if medically stable at discharge will continue to taper methadone at this time.  Pt will be monitored and supportive care provided.    CATCH team involved for aftercare and pt will follow up with aftercare with rehab vs [facility] program.

## 2022-04-20 NOTE — H&P ADULT - NSICDXPASTMEDICALHX_GEN_ALL_CORE_FT
PAST MEDICAL HISTORY:  Anxiety and depression     Asthma     Hepatitis C     IV drug abuse heroine

## 2022-04-20 NOTE — ED PROVIDER NOTE - PHYSICAL EXAMINATION
Physical Exam    Vital Signs: I have reviewed the initial vital signs.  Constitutional: well-nourished, appears stated age, no acute distress  Eyes: Conjunctiva pink, Sclera clear, PERRLA, EOMI.  Cardiovascular: S1 and S2, regular rate, regular rhythm, well-perfused extremities, radial pulses equal and 2+  Respiratory: unlabored respiratory effort, + crackles b/l with decrease breath sounds at abscess  Gastrointestinal: +distended abd, non-tender abdomen, no pulsatile mass, normal bowl sounds   Musculoskeletal: supple neck, + b/l lower extremity edema, no midline tenderness + anasarca  Integumentary: warm, dry, no rash  Neurologic: awake, alert, cranial nerves II-XII grossly intact, extremities’ motor and sensory functions grossly intact  Psychiatric: appropriate mood, appropriate affect

## 2022-04-20 NOTE — PATIENT PROFILE ADULT - FALL HARM RISK - HARM RISK INTERVENTIONS

## 2022-04-20 NOTE — ED PROVIDER NOTE - NS ED ATTENDING STATEMENT MOD
This was a shared visit with the [facility]. I reviewed and verified the documentation and independently performed the documented:

## 2022-04-20 NOTE — H&P ADULT - NSHPSOCIALHISTORY_GEN_ALL_CORE
Denies ETOH use.   1PPD smoker  IV drug abuse - heroine. Uses 1.5g per day. On and off 10y. Last use yesterday night. Hx of [facility] in past, but not currently.   Denies other drug use.

## 2022-04-20 NOTE — H&P ADULT - NSHPLABSRESULTS_GEN_ALL_CORE
10.9   3.26  )-----------( 188      ( 20 Apr 2022 04:40 )             35.9       04-20    135  |  102  |  13  ----------------------------<  127<H>  4.3   |  24  |  0.9    Ca    8.3<L>      20 Apr 2022 04:40  Mg     2.1     04-20    TPro  5.8<L>  /  Alb  2.8<L>  /  TBili  0.5  /  DBili  x   /  AST  23  /  ALT  9   /  AlkPhos  656<H>  04-20          Magnesium, Serum: 2.1 mg/dL (04-20-22 @ 04:40)      PT/INR - ( 20 Apr 2022 04:40 )   PT: 12.80 sec;   INR: 1.11 ratio         PTT - ( 20 Apr 2022 04:40 )  PTT:35.9 sec    Lactate Trend      CARDIAC MARKERS ( 20 Apr 2022 04:40 )  x     / <0.01 ng/mL / x     / x     / x              Serum Pregnancy: Negative (04-20-22 @ 04:40)    < from: CT Abdomen and Pelvis w/ IV Cont (04.20.22 @ 05:53) >    IMPRESSION:    Interval development of right ventricular enlargement. Further evaluation   is recommended.    New moderate ascites, anasarca, bilateral right greater than left pleural   effusions and moderate pericardial effusion. Findings suggestive of fluid   overload.    Redemonstrated hepatosplenomegaly without significant change.        Spoke with HECTOR RANDLE MD on 4/20/2022 6:40 AM with readback.    --- End of Report ---    < end of copied text >

## 2022-04-20 NOTE — H&P ADULT - PROBLEM SELECTOR PLAN 2
-  - Addiction medicine consult - will follow their recommendations for detox   - Last at [facility] 2y ago

## 2022-04-20 NOTE — CONSULT NOTE ADULT - SUBJECTIVE AND OBJECTIVE BOX
HPI:  Pt is a 26F w/ PMH asthma, IVDA, and hep C being admitted for anasarca likely 2/2 liver failure/cirrhosis from untreated hepatitis C. Pt reports generalized edema for many months, which has significantly increased over the past two weeks. Pt states that as edema has increased she is becoming increasingly short of breath. Reports intermittent abdominal pain as well as right hip and knee pain with movement 2/2 edema. Pt reports IV heroine abuse, last use yesterday. Pt reports a hx of being on MMTP, but has been off for awhile. Pt cannot recall when initially diagnosed with Hepatitis C, states unclear diagnosis, and reports that she was never treated for it. Denies HA, dizziness, fever/chills, chest pain, NVD, change in urination and change in BM.    In ED, pt is afebrile w/ WBC: 3.2. BP: 134/88, HR: 76, SPO2 97% on RA. Ca: 8.3, Albumin: 2.8, Alk phos: 656, AST/ALT: 23/9, T bili: 0.5, BNP: 8100. CT abd: New moderate ascites, anasarca, b/l pleural effusions R>L, moderate pericardial effusion, hepatosplenomegaly. Pt was given 40mg IV lasix.  (20 Apr 2022 07:30)        HPI-Cardiology   Pt evaluated at bedside with Dr Sanchez. Currently admitted for eval of generalized edema. Radiology tests and hospital records, were reviewed, as well as previous notes on this patient. Pt states that she has had generalized edema for moths now, but has worsened the last few weeks.       PAST MEDICAL & SURGICAL HISTORY  Hepatitis C    Asthma    Anxiety and depression    IV drug abuse  heroine    No significant past surgical history        FAMILY HISTORY:  FAMILY HISTORY:  No pertinent family history in first degree relatives        SOCIAL HISTORY:  []smoker  []Alcohol  []Drug    ALLERGIES:  buprenorphine (Rash)  Suboxone (Rash)      MEDICATIONS:  MEDICATIONS  (STANDING):  BACItracin   Ointment 1 Application(s) Topical daily  chlorhexidine 4% Liquid 1 Application(s) Topical <User Schedule>  methadone    Tablet   Oral   methadone    Tablet 15 milliGRAM(s) Oral two times a day  nicotine - 21 mG/24Hr(s) Patch 1 patch Transdermal daily  pantoprazole    Tablet 40 milliGRAM(s) Oral before breakfast    MEDICATIONS  (PRN):  ALBUTerol    90 MICROgram(s) HFA Inhaler 2 Puff(s) Inhalation every 6 hours PRN Shortness of Breath and/or Wheezing  ondansetron Injectable 4 milliGRAM(s) IV Push every 8 hours PRN Nausea and/or Vomiting      HOME MEDICATIONS:  Home Medications:      VITALS:   T(F): 96.8 (04-20 @ 14:28), Max: 98 (04-20 @ 08:10)  HR: 80 (04-20 @ 14:28) (75 - 89)  BP: 159/94 (04-20 @ 14:28) (134/88 - 169/93)  BP(mean): --  RR: 18 (04-20 @ 14:28) (18 - 22)  SpO2: 99% (04-20 @ 08:10) (96% - 100%)    I&O's Summary      REVIEW OF SYSTEMS:  CONSTITUTIONAL: No weakness, fevers or chills  EYES: No visual changes  ENT: No vertigo or throat pain   NECK: No pain or stiffness  RESPIRATORY: No cough, wheezing, hemoptysis; No shortness of breath  CARDIOVASCULAR: No chest pain or palpitations  GASTROINTESTINAL: No abdominal or epigastric pain. No nausea, vomiting, or hematemesis; No diarrhea or constipation. No melena or hematochezia.  GENITOURINARY: No dysuria, frequency or hematuria  NEUROLOGICAL: No numbness or weakness  SKIN: No itching, no rashes  MSK: no    PHYSICAL EXAM:  NEURO: patient is awake , alert and oriented  GEN: Not in acute distress  NECK: no thyroid enlargement, no JVD  LUNGS: Clear to auscultation bilaterally   CARDIOVASCULAR: S1/S2 present, RRR , no murmurs or rubs, no carotid bruits,  + PP bilaterally  ABD: Soft, non-tender, non-distended, +BS  EXT: No IFRAH  SKIN: Intact    LABS:                        10.9   3.26  )-----------( 188      ( 20 Apr 2022 04:40 )             35.9     04-20    135  |  102  |  13  ----------------------------<  127<H>  4.3   |  24  |  0.9    Ca    8.3<L>      20 Apr 2022 04:40  Mg     2.1     04-20    TPro  5.8<L>  /  Alb  2.8<L>  /  TBili  0.5  /  DBili  x   /  AST  23  /  ALT  9   /  AlkPhos  656<H>  04-20    PT/INR - ( 20 Apr 2022 04:40 )   PT: 12.80 sec;   INR: 1.11 ratio         PTT - ( 20 Apr 2022 04:40 )  PTT:35.9 sec  Troponin T, Serum: <0.01 ng/mL (04-20-22 @ 04:40)    CARDIAC MARKERS ( 20 Apr 2022 04:40 )  x     / <0.01 ng/mL / x     / x     / x            Troponin trend:    Serum Pro-Brain Natriuretic Peptide: 8108 pg/mL (04-20-22 @ 04:40)          RADIOLOGY:  -CXR:  -TTE:  -CCTA:  -STRESS TEST:  -CATHETERIZATION:    ECG:    TELEMETRY EVENTS:   HPI:  Pt is a 26F w/ PMH asthma, IVDA, and hep C being admitted for anasarca likely 2/2 liver failure/cirrhosis from untreated hepatitis C. Pt reports generalized edema for many months, which has significantly increased over the past two weeks. Pt states that as edema has increased she is becoming increasingly short of breath. Reports intermittent abdominal pain as well as right hip and knee pain with movement 2/2 edema. Pt reports IV heroine abuse, last use yesterday. Pt reports a hx of being on MMTP, but has been off for awhile. Pt cannot recall when initially diagnosed with Hepatitis C, states unclear diagnosis, and reports that she was never treated for it. Denies HA, dizziness, fever/chills, chest pain, NVD, change in urination and change in BM.    In ED, pt is afebrile w/ WBC: 3.2. BP: 134/88, HR: 76, SPO2 97% on RA. Ca: 8.3, Albumin: 2.8, Alk phos: 656, AST/ALT: 23/9, T bili: 0.5, BNP: 8100. CT abd: New moderate ascites, anasarca, b/l pleural effusions R>L, moderate pericardial effusion, hepatosplenomegaly. Pt was given 40mg IV lasix.  (20 Apr 2022 07:30)        HPI-Cardiology   Pt evaluated at bedside with Dr Bell. Currently admitted for eval of generalized edema. Radiology tests and hospital records, were reviewed, as well as previous notes on this patient. Pt states that she has had generalized edema for moths now, but has worsened the last few weeks. Currently denies any CP, but endorses sob with exertion at times. Denies any fevers, chills, diaphoresis, or nausea/vomiting        PAST MEDICAL & SURGICAL HISTORY  Hepatitis C    Asthma    Anxiety and depression    IV drug abuse  heroine    No significant past surgical history        FAMILY HISTORY:  FAMILY HISTORY:  No pertinent family history in first degree relatives        SOCIAL HISTORY:  []smoker-current smoker 1pp/day  []Alcohol-denies  []Drug-uses IV heroin(last use 2 days ago)    ALLERGIES:  buprenorphine (Rash)  Suboxone (Rash)      MEDICATIONS:  MEDICATIONS  (STANDING):  BACItracin   Ointment 1 Application(s) Topical daily  chlorhexidine 4% Liquid 1 Application(s) Topical <User Schedule>  methadone    Tablet   Oral   methadone    Tablet 15 milliGRAM(s) Oral two times a day  nicotine - 21 mG/24Hr(s) Patch 1 patch Transdermal daily  pantoprazole    Tablet 40 milliGRAM(s) Oral before breakfast    MEDICATIONS  (PRN):  ALBUTerol    90 MICROgram(s) HFA Inhaler 2 Puff(s) Inhalation every 6 hours PRN Shortness of Breath and/or Wheezing  ondansetron Injectable 4 milliGRAM(s) IV Push every 8 hours PRN Nausea and/or Vomiting      HOME MEDICATIONS:  Home Medications:      VITALS:   T(F): 96.8 (04-20 @ 14:28), Max: 98 (04-20 @ 08:10)  HR: 80 (04-20 @ 14:28) (75 - 89)  BP: 159/94 (04-20 @ 14:28) (134/88 - 169/93)  BP(mean): --  RR: 18 (04-20 @ 14:28) (18 - 22)  SpO2: 99% (04-20 @ 08:10) (96% - 100%)          REVIEW OF SYSTEMS:  see HPI       PHYSICAL EXAM:  NEURO: patient is awake , alert and oriented  GEN: Not in acute distress  NECK: no thyroid enlargement, no JVD  LUNGS: Clear to auscultation bilaterally   CARDIOVASCULAR: S1/S2 present, RRR , no murmurs or rubs, no carotid bruits,  + PP bilaterally  ABD: Distended, non tender  EXT: LE pitting edema B/L  SKIN: Intact    LABS:                        10.9   3.26  )-----------( 188      ( 20 Apr 2022 04:40 )             35.9     04-20    135  |  102  |  13  ----------------------------<  127<H>  4.3   |  24  |  0.9    Ca    8.3<L>      20 Apr 2022 04:40  Mg     2.1     04-20    TPro  5.8<L>  /  Alb  2.8<L>  /  TBili  0.5  /  DBili  x   /  AST  23  /  ALT  9   /  AlkPhos  656<H>  04-20    PT/INR - ( 20 Apr 2022 04:40 )   PT: 12.80 sec;   INR: 1.11 ratio         PTT - ( 20 Apr 2022 04:40 )  PTT:35.9 sec  Troponin T, Serum: <0.01 ng/mL (04-20-22 @ 04:40)    CARDIAC MARKERS ( 20 Apr 2022 04:40 )  x     / <0.01 ng/mL / x     / x     / x        Serum Pro-Brain Natriuretic Peptide: 8108 pg/mL (04-20-22 @ 04:40)          RADIOLOGY:  -CXR:  < from: Xray Chest 1 View-PORTABLE IMMEDIATE (04.20.22 @ 04:51) >    Impression:    Low lung volumes with left basilar opacity. Enlarged cardiac silhouette.    --- End of Report ---    < end of copied text >    CT    < from: CT Abdomen and Pelvis w/ IV Cont (04.20.22 @ 05:53) >      IMPRESSION:    Interval development of right ventricular enlargement. Further evaluation   is recommended.    New moderate ascites, anasarca, bilateral right greater than left pleural   effusions and moderate pericardial effusion. Findings suggestive of fluid   overload.    Redemonstrated hepatosplenomegaly without significant change.        Spoke with HECTOR RANDLE MD on 4/20/2022 6:40 AM with readback.    < end of copied text >    ECG:  < from: 12 Lead ECG (02.05.21 @ 12:24) >  Normal sinus rhythm  Normal ECG    Confirmed by BRANDON BELL MD ([id number]) on 2/5/2021 3:05:02 PM    < end of copied text >

## 2022-04-20 NOTE — CONSULT NOTE ADULT - CONSULT REASON
Pericardial effusion
ascites possible hepatitis C
diagnostic and therapeutic paracentesis
pleural effusion
Opiate Dependency

## 2022-04-20 NOTE — H&P ADULT - NSHPPHYSICALEXAM_GEN_ALL_CORE
T(C): 36.3 (04-20-22 @ 07:22), Max: 36.3 (04-20-22 @ 07:22)  HR: 83 (04-20-22 @ 07:22) (76 - 89)  BP: 169/93 (04-20-22 @ 07:22) (134/88 - 169/93)  RR: 18 (04-20-22 @ 07:22) (18 - 22)  SpO2: 100% (04-20-22 @ 07:22) (96% - 100%)    PHYSICAL EXAM:  GENERAL: NAD, AOx3, anasarca   HEAD:  Atraumatic, Normocephalic  EYES: PERRLA, conjunctiva and sclera clear  NECK: Supple  CHEST/LUNG: Vesicular breath sounds b/l. No rales, rhonchi or wheezing   HEART: S1,S2 Regular rate and rhythm; No murmurs, rubs, or gallops  ABDOMEN: Soft, nontender, distended abdomen, no rebound tenderness; +BS   EXTREMITIES:  2+ peripheral pulses bilaterally and symmetrically, +UE and LE edema   SKIN: No rashes or lesions

## 2022-04-20 NOTE — H&P ADULT - ASSESSMENT
ASSESSMENT: Pt is a 26F w/ PMH asthma, IVDA, and hep C being admitted for anasarca/SOB likely 2/2 liver failure/cirrhosis from untreated hepatitis C. Pt reports a hx of being on MMTP, but has been off for awhile.   In ED, pt is afebrile w/ WBC: 3.2. BP: 134/88, HR: 76, SPO2 97% on RA. Ca: 8.3, Albumin: 2.8, Alk phos: 656, AST/ALT: 23/9, T bili: 0.5, BNP: 8100. CT abd: New moderate ascites, anasarca, b/l pleural effusions R>L, moderate pericardial effusion, hepatosplenomegaly. Pt was given 40mg IV lasix.       PLAN: Case d/w Dr. Fonseca   - Admit to inpatient level of care - medicine   - Monitor vitals   - AM labs   - Ambulate as tolerated   - CHG bath   - Regular diet   - VTE ppx: not applicable   - GI ppx: protonix

## 2022-04-20 NOTE — H&P ADULT - PROBLEM SELECTOR PLAN 1
# Untreated Hepatitis C   # Elevated alk phos   - s/p 40mg IV lasix x 1   - GI consult   - Continue IV lasix 40mg BID   - TTE   - Cardiology consult   - Pulm Consult   - Supplemental O2 PRN # Untreated Hepatitis C   # Elevated alk phos   # Right ventricular enlargement; pericardial effusion  # B/l pleural effusions; ascites   - s/p 40mg IV lasix x 1   - GI consult   - Continue IV lasix 40mg BID   - TTE   - Cardiology consult   - Pulm Consult   - Supplemental O2 PRN # Untreated Hepatitis C?  # Elevated alk phos   # Right ventricular enlargement; pericardial effusion  # B/l pleural effusions; ascites   - s/p 40mg IV lasix x 1   - GI consult   - will hold lasix for now until SBP ruled out   - TTE   - Cardiology consult   - Pulm Consult   - Supplemental O2 PRN

## 2022-04-20 NOTE — CONSULT NOTE ADULT - ASSESSMENT
ASSESSMENT AND PLAN:      Please call Interventional Radiology with questions or concerns:   - M-F [phone]: [phone]   - All other hours: [phone]

## 2022-04-20 NOTE — CONSULT NOTE ADULT - SUBJECTIVE AND OBJECTIVE BOX
Pt interviewed, examined and EMR chart reviewed.  Pt admits to using opiates 1gram heroin IV in many areas of body arms legs abdomen x  2 years.   Last use yesterday  Positive Hx of withdrawal   variable periods of sobriety in the past.  Has been in detox before __X___yes,   _____No  Pt has been on MMTP multiple times last time was 2020   SOCIAL HISTORY:    REVIEW OF SYSTEMS:    Constitutional: No fever, weight loss or fatigue  ENT:  No difficulty hearing, tinnitus, vertigo; No sinus or throat pain  Neck: No pain or stiffness  Respiratory: No cough, wheezing, chills or hemoptysis  Cardiovascular: positive shortness of breath, positive arm and leg swelling  Gastrointestinal: positive distention and discomfort  Neurological: No headaches, memory loss, loss of strength, numbness or tremors  Musculoskeletal: No joint pain or swelling; No muscle, back or extremity pain  Psychiatric: No depression, anxiety, mood swings or difficulty sleeping    MEDICATIONS  (STANDING):  chlorhexidine 4% Liquid 1 Application(s) Topical <User Schedule>  furosemide   Injectable 40 milliGRAM(s) IV Push two times a day  pantoprazole    Tablet 40 milliGRAM(s) Oral before breakfast    MEDICATIONS  (PRN):  ALBUTerol    90 MICROgram(s) HFA Inhaler 2 Puff(s) Inhalation every 6 hours PRN Shortness of Breath and/or Wheezing  ondansetron Injectable 4 milliGRAM(s) IV Push every 8 hours PRN Nausea and/or Vomiting      Vital Signs Last 24 Hrs  T(C): 36.3 (20 Apr 2022 07:22), Max: 36.3 (20 Apr 2022 07:22)  T(F): 97.3 (20 Apr 2022 07:22), Max: 97.3 (20 Apr 2022 07:22)  HR: 83 (20 Apr 2022 07:22) (76 - 89)  BP: 169/93 (20 Apr 2022 07:22) (134/88 - 169/93)  BP(mean): --  RR: 18 (20 Apr 2022 07:22) (18 - 22)  SpO2: 100% (20 Apr 2022 07:22) (96% - 100%)    PHYSICAL EXAM:    Constitutional: NAD, well-groomed, well-developed  HEENT: PERRLA, EOMI, Normal Hearing, MMM  Neck: No LAD, No JVD  Back: Normal spine flexure, No CVA tenderness  Respiratory: CTAB/L  Cardiovascular: S1 and S2, RRR, no M/G/R  Gastrointestinal: BS+, positive distention tympanic  Extremities: positive peripheral edema upper and lower 2++  Neurological: A/O x 3, no focal deficits    LABS:                        10.9   3.26  )-----------( 188      ( 20 Apr 2022 04:40 )             35.9     04-20    135  |  102  |  13  ----------------------------<  127<H>  4.3   |  24  |  0.9    Ca    8.3<L>      20 Apr 2022 04:40  Mg     2.1     04-20    TPro  5.8<L>  /  Alb  2.8<L>  /  TBili  0.5  /  DBili  x   /  AST  23  /  ALT  9   /  AlkPhos  656<H>  04-20    PT/INR - ( 20 Apr 2022 04:40 )   PT: 12.80 sec;   INR: 1.11 ratio         PTT - ( 20 Apr 2022 04:40 )  PTT:35.9 sec    Drug Screen Urine:  Alcohol Level        RADIOLOGY & ADDITIONAL STUDIES:

## 2022-04-20 NOTE — H&P ADULT - HISTORY OF PRESENT ILLNESS
Pt is a 26F w/ PMH asthma, IVDA, and hep C being admitted for anasarca likely 2/2 liver failure/cirrhosis from untreated hepatitis C. Pt reports generalized edema for many months, which has significantly increased over the past two weeks. Pt states that as edema has increased she is becoming increasingly short of breath. Reports intermittent abdominal pain as well as right hip and knee pain with movement 2/2 edema. Pt reports IV heroine abuse, last use yesterday. Pt reports a hx of being on MMTP, but has been off for awhile. Pt cannot recall when initially diagnosed with Hepatitis C, states unclear diagnosis, and reports that she was never treated for it. Denies HA, dizziness, fever/chills, chest pain, NVD, change in urination and change in BM.    In ED, pt is afebrile w/ WBC: 3.2. BP: 134/88, HR: 76, SPO2 97% on RA. Ca: 8.3, Albumin: 2.8, Alk phos: 656, AST/ALT: 23/9, T bili: 0.5, BNP: 8100. CT abd: New moderate ascites, anasarca, b/l pleural effusions R>L, moderate pericardial effusion, hepatosplenomegaly. Pt was given 40mg IV lasix.

## 2022-04-20 NOTE — ADVANCED PRACTICE NURSE CONSULT - ASSESSMENT
Pt is a 26F w/ PMH asthma, IVDA, and hep C being admitted for anasarca likely 2/2 liver failure/cirrhosis from untreated hepatitis C. Pt reports generalized edema for many months, which has significantly increased over the past two weeks. Pt states that as edema has increased she is becoming increasingly short of breath. Reports intermittent abdominal pain as well as right hip and knee pain with movement 2/2 edema. Pt reports IV heroine abuse, last use yesterday. Pt reports a hx of being on MMTP, but has been off for awhile. Pt cannot recall when initially diagnosed with Hepatitis C, states unclear diagnosis, and reports that she was never treated for it. Denies HA, dizziness, fever/chills, chest pain, NVD, change in urination and change in BM.  In ED, pt is afebrile w/ WBC: 3.2. BP: 134/88, HR: 76, SPO2 97% on RA. Ca: 8.3, Albumin: 2.8, Alk phos: 656, AST/ALT: 23/9, T bili: 0.5, BNP: 8100. CT abd: New moderate ascites, anasarca, b/l pleural effusions R>L, moderate pericardial effusion, hepatosplenomegaly. Pt was given 40mg IV lasix.     PAST MEDICAL & SURGICAL HISTORY:  Hepatitis C  Asthma  Anxiety and depression  IV drug abuse  heroine  No significant past surgical history        Assessment:  Patient received in bed, A/O responds appropriately to verbal command .                      Skin assessed-      Wound #1  Location:  Abdomen  Type-  Healed ulceration with scab- per pt happened with  IV drug abuse - No pus or drainage noted with applied pressure   Size:  ~1x1  Tissue Description :  Dark red and dry   Wound Exudate :  None    Wound Edge :  Intact   Periwound Conditio: Dry

## 2022-04-20 NOTE — CONSULT NOTE ADULT - ASSESSMENT
Impression:  pleural effusion / fluid overload   ?? secondary to liver cirrhosis and failure         Plan:  patient on RA no resp symptoms   ct scan moderate ascites no need for thoracentesis and will need paracentesis prior to thoracentesis   for consider diuresis and work up for the anasarca ?? liver   GI eval   need aggressive diuresis and optimize the liver function   check BNP urine lytes osmolarity   bld cx , procal   dvt prophylaxis   case discussed with hospitalist

## 2022-04-20 NOTE — CONSULT NOTE ADULT - SUBJECTIVE AND OBJECTIVE BOX
Chief complaint/Reason for consult:    HPI:  Pt is a 26F w/ PMH asthma, IVDA, and hep C being admitted for anasarca likely 2/2 liver failure/cirrhosis from untreated hepatitis C. Pt reports generalized edema for many months, which has significantly increased over the past two weeks. Pt states that as edema has increased she is becoming increasingly short of breath. Reports intermittent abdominal pain as well as right hip and knee pain with movement 2/2 edema. Pt reports IV heroine abuse, last use yesterday. Pt reports a hx of being on MMTP, but has been off for awhile. Pt cannot recall when initially diagnosed with Hepatitis C, states unclear diagnosis, and reports that she was never treated for it. Denies HA, dizziness, fever/chills, chest pain, NVD, change in urination and change in BM.    In ED, pt is afebrile w/ WBC: 3.2. BP: 134/88, HR: 76, SPO2 97% on RA. Ca: 8.3, Albumin: 2.8, Alk phos: 656, AST/ALT: 23/9, T bili: 0.5, BNP: 8100. CT abd: New moderate ascites, anasarca, b/l pleural effusions R>L, moderate pericardial effusion, hepatosplenomegaly. Pt was given 40mg IV lasix.  (20 Apr 2022 07:30)    26yFemale      PAST MEDICAL & SURGICAL HISTORY:      Family history:  FAMILY HISTORY:  No pertinent family history in first degree relatives      No GI cancers in first or second degree relatives    Social History: No smoking. No alcohol. No illegal drug use.    Allergies:        MEDICATIONS:        MEDICATIONS  (STANDING):  chlorhexidine 4% Liquid 1 Application(s) Topical <User Schedule>  furosemide   Injectable 40 milliGRAM(s) IV Push two times a day  methadone    Tablet   Oral   methadone    Tablet 15 milliGRAM(s) Oral two times a day  nicotine - 21 mG/24Hr(s) Patch 1 patch Transdermal daily  pantoprazole    Tablet 40 milliGRAM(s) Oral before breakfast    MEDICATIONS  (PRN):  ALBUTerol    90 MICROgram(s) HFA Inhaler 2 Puff(s) Inhalation every 6 hours PRN Shortness of Breath and/or Wheezing  ondansetron Injectable 4 milliGRAM(s) IV Push every 8 hours PRN Nausea and/or Vomiting        REVIEW OF SYSTEMS  General:  No weight loss, fevers, or chills.  Eyes:  No reported pain or visual changes  ENT:  No sore throat or runny nose.  NECK: No stiffness or lymphadenopathy  CV:  No chest pain or palpitations.  Resp:  No shortness of breath, cough, wheezing or hemoptysis  GI:  No abdominal pain, nausea, vomiting, dysphagia, diarrhea or constipation. No rectal bleeding, melena, or hematemesis.  Muscle:  No aches or weakness  Neuro:  No tingling, numbness       VITALS:   T(F): 98 (04-20-22 @ 08:10), Max: 98 (04-20-22 @ 08:10)  HR: 75 (04-20-22 @ 08:10) (75 - 89)  BP: 139/87 (04-20-22 @ 08:10) (134/88 - 169/93)  RR: 18 (04-20-22 @ 07:22) (18 - 22)  SpO2: 99% (04-20-22 @ 08:10) (96% - 100%)    PHYSICAL EXAM:  GENERAL: AAOx3, no acute distress.  HEAD:  Atraumatic, Normocephalic  EYES: conjunctiva and sclera clear  NECK: Supple, No thyromegaly   CHEST/LUNG: Clear to auscultation bilaterally; No wheeze, rhonchi, or rales  HEART: Regular rate and rhythm; normal S1, S2, No murmurs.  ABDOMEN: Soft, nontender, nondistended; Bowel sounds present  NEUROLOGY: No asterixis or tremor  SKIN: Intact, no jaundice          LABS:  04-20    135  |  102  |  13  ----------------------------<  127<H>  4.3   |  24  |  0.9    Ca    8.3<L>      20 Apr 2022 04:40  Mg     2.1     04-20    TPro  5.8<L>  /  Alb  2.8<L>  /  TBili  0.5  /  DBili  x   /  AST  23  /  ALT  9   /  AlkPhos  656<H>  04-20                          10.9   3.26  )-----------( 188      ( 20 Apr 2022 04:40 )             35.9     LIVER FUNCTIONS - ( 20 Apr 2022 04:40 )  Alb: 2.8 g/dL / Pro: 5.8 g/dL / ALK PHOS: 656 U/L / ALT: 9 U/L / AST: 23 U/L / GGT: x           PT/INR - ( 20 Apr 2022 04:40 )   PT: 12.80 sec;   INR: 1.11 ratio         PTT - ( 20 Apr 2022 04:40 )  PTT:35.9 sec    IMAGING:         Chief complaint/Reason for consult: ascites possible hepatitis C    HPI:  Pt is a 26F w/ PMH asthma, IVDA, and hep C being admitted for anasarca likely 2/2 liver failure/cirrhosis from untreated hepatitis C. Pt reports generalized edema for many months, which has significantly increased over the past two weeks. Pt states that as edema has increased she is becoming increasingly short of breath. Reports intermittent abdominal pain as well as right hip and knee pain with movement 2/2 edema. Pt reports IV heroine abuse, last use yesterday. Pt reports a hx of being on MMTP, but has been off for awhile. Pt cannot recall when initially diagnosed with Hepatitis C, states unclear diagnosis, and reports that she was never treated for it. Denies HA, dizziness, fever/chills, chest pain, NVD, change in urination and change in BM.    In ED, pt is afebrile w/ WBC: 3.2. BP: 134/88, HR: 76, SPO2 97% on RA. Ca: 8.3, Albumin: 2.8, Alk phos: 656, AST/ALT: 23/9, T bili: 0.5, BNP: 8100. CT abd: New moderate ascites, anasarca, b/l pleural effusions R>L, moderate pericardial effusion, hepatosplenomegaly. Pt was given 40mg IV lasix.  (20 Apr 2022 07:30)    GI Updates: 26yFemale pmh asthma, IV drug abuse presents for generalized edema, SOB, and increased abdominal girth. Patient denies nausea, vomiting, hematemesis, melena, blood in stool, diarrhea, constipation, abdominal pain.      PAST MEDICAL & SURGICAL HISTORY:   Hepatitis C    Asthma    Anxiety and depression    IV drug abuse  heroine    No significant past surgical history          Family history:  FAMILY HISTORY:  No pertinent family history in first degree relatives      No GI cancers in first or second degree relatives    Social History: No smoking. No alcohol. No illegal drug use.    Allergies:   buprenorphine (Rash)  Suboxone (Rash)            MEDICATIONS  (STANDING):  chlorhexidine 4% Liquid 1 Application(s) Topical <User Schedule>  furosemide   Injectable 40 milliGRAM(s) IV Push two times a day  methadone    Tablet   Oral   methadone    Tablet 15 milliGRAM(s) Oral two times a day  nicotine - 21 mG/24Hr(s) Patch 1 patch Transdermal daily  pantoprazole    Tablet 40 milliGRAM(s) Oral before breakfast    MEDICATIONS  (PRN):  ALBUTerol    90 MICROgram(s) HFA Inhaler 2 Puff(s) Inhalation every 6 hours PRN Shortness of Breath and/or Wheezing  ondansetron Injectable 4 milliGRAM(s) IV Push every 8 hours PRN Nausea and/or Vomiting        REVIEW OF SYSTEMS  General:  No weight loss, fevers, or chills.  Eyes:  No reported pain or visual changes  ENT:  No sore throat or runny nose.  NECK: No stiffness or lymphadenopathy  CV:  No chest pain or palpitations.  Resp:  No shortness of breath, cough, wheezing or hemoptysis  GI:  No abdominal pain, nausea, vomiting, dysphagia, diarrhea or constipation. No rectal bleeding, melena, or hematemesis.  Muscle:  No aches or weakness  Neuro:  No tingling, numbness       VITALS:   T(F): 98 (04-20-22 @ 08:10), Max: 98 (04-20-22 @ 08:10)  HR: 75 (04-20-22 @ 08:10) (75 - 89)  BP: 139/87 (04-20-22 @ 08:10) (134/88 - 169/93)  RR: 18 (04-20-22 @ 07:22) (18 - 22)  SpO2: 99% (04-20-22 @ 08:10) (96% - 100%)    PHYSICAL EXAM:  GENERAL: AAOx3, no acute distress.  HEAD:  Atraumatic, Normocephalic  EYES: conjunctiva and sclera clear  NECK: Supple, No thyromegaly   CHEST/LUNG: Clear to auscultation bilaterally; No wheeze, rhonchi, or rales  HEART: Regular rate and rhythm; normal S1, S2, No murmurs.  ABDOMEN: Soft, nontender, nondistended; Bowel sounds present  NEUROLOGY: No asterixis or tremor  SKIN: Intact, no jaundice          LABS:  04-20    135  |  102  |  13  ----------------------------<  127<H>  4.3   |  24  |  0.9    Ca    8.3<L>      20 Apr 2022 04:40  Mg     2.1     04-20    TPro  5.8<L>  /  Alb  2.8<L>  /  TBili  0.5  /  DBili  x   /  AST  23  /  ALT  9   /  AlkPhos  656<H>  04-20                          10.9   3.26  )-----------( 188      ( 20 Apr 2022 04:40 )             35.9     LIVER FUNCTIONS - ( 20 Apr 2022 04:40 )  Alb: 2.8 g/dL / Pro: 5.8 g/dL / ALK PHOS: 656 U/L / ALT: 9 U/L / AST: 23 U/L / GGT: x           PT/INR - ( 20 Apr 2022 04:40 )   PT: 12.80 sec;   INR: 1.11 ratio         PTT - ( 20 Apr 2022 04:40 )  PTT:35.9 sec    IMAGING:    < from: CT Abdomen and Pelvis w/ IV Cont (04.20.22 @ 05:53) >    ACC: [id number] EXAM:  CT ABDOMEN AND PELVIS IC                          PROCEDURE DATE:  04/20/2022          INTERPRETATION:  CLINICAL STATEMENT: Abdominal pain.    TECHNIQUE: Contiguous axial CT images were obtained from the lower chest   to the pubic symphysis following administration of intravenous contrast.    Oral contrast was not administered..  Reformatted images in the coronal   and sagittal planes were acquired.    COMPARISON: CT ABDOMEN/PELVIS 1/30/2021.    FINDINGS:    LOWER CHEST: Partially imaged moderate right and small left pleural   effusion with adjacent atelectasis. Moderate pericardial effusion, new   interval development of right ventricle enlargement.    HEPATOBILIARY: Hepatomegaly.    SPLEEN: Splenomegaly.    PANCREAS: Unremarkable.    ADRENAL GLANDS: Unremarkable.    KIDNEYS: Symmetric enhancement. No hydronephrosis.    ABDOMINOPELVIC NODES: Unremarkable.    PELVIC ORGANS: Unremarkable.    PERITONEUM/MESENTERY/BOWEL: No bowel obstruction. Moderate ascites. No   pneumoperitoneum.    BONES/SOFT TISSUES: Diffuse anasarca. No acute osseous abnormality..   Normal appendix.    OTHER: Normal caliber aorta.      IMPRESSION:    Interval development of right ventricular enlargement. Further evaluation   is recommended.    New moderate ascites, anasarca, bilateral right greater than left pleural   effusions and moderate pericardial effusion. Findings suggestive of fluid   overload.    Redemonstrated hepatosplenomegaly without significant change.        Spoke with HECTOR RANDLE MD on 4/20/2022 6:40 AM with readback.    --- End of Report ---            KATIE ENNIS MD; Attending Radiologist  This document has been electronically signed. Apr 20 2022  6:40AM    < end of copied text >       Chief complaint/Reason for consult: ascites possible hepatitis C    HPI:  Pt is a 26F w/ PMH asthma, IVDA, and hep C being admitted for anasarca likely 2/2 liver failure/cirrhosis from untreated hepatitis C. Pt reports generalized edema for many months, which has significantly increased over the past two weeks. Pt states that as edema has increased she is becoming increasingly short of breath. Reports intermittent abdominal pain as well as right hip and knee pain with movement 2/2 edema. Pt reports IV heroine abuse, last use yesterday. Pt reports a hx of being on MMTP, but has been off for awhile. Pt cannot recall when initially diagnosed with Hepatitis C, states unclear diagnosis, and reports that she was never treated for it. Denies HA, dizziness, fever/chills, chest pain, NVD, change in urination and change in BM.    In ED, pt is afebrile w/ WBC: 3.2. BP: 134/88, HR: 76, SPO2 97% on RA. Ca: 8.3, Albumin: 2.8, Alk phos: 656, AST/ALT: 23/9, T bili: 0.5, BNP: 8100. CT abd: New moderate ascites, anasarca, b/l pleural effusions R>L, moderate pericardial effusion, hepatosplenomegaly. Pt was given 40mg IV lasix.  (20 Apr 2022 07:30)    GI Updates: 26yFemale pmh asthma, IV drug abuse presents for generalized edema, SOB, and increased abdominal girth. Patient denies nausea, vomiting, hematemesis, melena, blood in stool, diarrhea, constipation, abdominal pain.      PAST MEDICAL & SURGICAL HISTORY:   Hepatitis C    Asthma    Anxiety and depression    IV drug abuse  heroine    No significant past surgical history          Family history:  FAMILY HISTORY:  No pertinent family history in first degree relatives      No GI cancers in first or second degree relatives    Social History: No smoking. No alcohol. No illegal drug use.    Allergies:   buprenorphine (Rash)  Suboxone (Rash)            MEDICATIONS  (STANDING):  chlorhexidine 4% Liquid 1 Application(s) Topical <User Schedule>  furosemide   Injectable 40 milliGRAM(s) IV Push two times a day  methadone    Tablet   Oral   methadone    Tablet 15 milliGRAM(s) Oral two times a day  nicotine - 21 mG/24Hr(s) Patch 1 patch Transdermal daily  pantoprazole    Tablet 40 milliGRAM(s) Oral before breakfast    MEDICATIONS  (PRN):  ALBUTerol    90 MICROgram(s) HFA Inhaler 2 Puff(s) Inhalation every 6 hours PRN Shortness of Breath and/or Wheezing  ondansetron Injectable 4 milliGRAM(s) IV Push every 8 hours PRN Nausea and/or Vomiting        REVIEW OF SYSTEMS  General:  No weight loss, fevers, or chills.  Eyes:  No reported pain or visual changes  ENT:  No sore throat or runny nose.  NECK: No stiffness or lymphadenopathy  CV:  No chest pain or palpitations.  Resp:  No shortness of breath, cough, wheezing or hemoptysis  GI:  No abdominal pain, nausea, vomiting, dysphagia, diarrhea or constipation. No rectal bleeding, melena, or hematemesis.  Neuro:  No tingling, numbness       VITALS:   T(F): 98 (04-20-22 @ 08:10), Max: 98 (04-20-22 @ 08:10)  HR: 75 (04-20-22 @ 08:10) (75 - 89)  BP: 139/87 (04-20-22 @ 08:10) (134/88 - 169/93)  RR: 18 (04-20-22 @ 07:22) (18 - 22)  SpO2: 99% (04-20-22 @ 08:10) (96% - 100%)    PHYSICAL EXAM:  GENERAL: AAOx3, no acute distress.  HEAD:  Atraumatic, Normocephalic  EYES: conjunctiva and sclera clear  NECK: Supple, No thyromegaly   CHEST/LUNG: Clear to auscultation bilaterally; No wheeze, rhonchi, or rales  HEART: Regular rate and rhythm; normal S1, S2, No murmurs.  ABDOMEN: Soft, nontender, +distended; Bowel sounds present  NEUROLOGY: No asterixis or tremor  SKIN: Intact, no jaundice          LABS:  04-20    135  |  102  |  13  ----------------------------<  127<H>  4.3   |  24  |  0.9    Ca    8.3<L>      20 Apr 2022 04:40  Mg     2.1     04-20    TPro  5.8<L>  /  Alb  2.8<L>  /  TBili  0.5  /  DBili  x   /  AST  23  /  ALT  9   /  AlkPhos  656<H>  04-20                          10.9   3.26  )-----------( 188      ( 20 Apr 2022 04:40 )             35.9     LIVER FUNCTIONS - ( 20 Apr 2022 04:40 )  Alb: 2.8 g/dL / Pro: 5.8 g/dL / ALK PHOS: 656 U/L / ALT: 9 U/L / AST: 23 U/L / GGT: x           PT/INR - ( 20 Apr 2022 04:40 )   PT: 12.80 sec;   INR: 1.11 ratio         PTT - ( 20 Apr 2022 04:40 )  PTT:35.9 sec    IMAGING:    < from: CT Abdomen and Pelvis w/ IV Cont (04.20.22 @ 05:53) >    ACC: [id number] EXAM:  CT ABDOMEN AND PELVIS IC                          PROCEDURE DATE:  04/20/2022          INTERPRETATION:  CLINICAL STATEMENT: Abdominal pain.    TECHNIQUE: Contiguous axial CT images were obtained from the lower chest   to the pubic symphysis following administration of intravenous contrast.    Oral contrast was not administered..  Reformatted images in the coronal   and sagittal planes were acquired.    COMPARISON: CT ABDOMEN/PELVIS 1/30/2021.    FINDINGS:    LOWER CHEST: Partially imaged moderate right and small left pleural   effusion with adjacent atelectasis. Moderate pericardial effusion, new   interval development of right ventricle enlargement.    HEPATOBILIARY: Hepatomegaly.    SPLEEN: Splenomegaly.    PANCREAS: Unremarkable.    ADRENAL GLANDS: Unremarkable.    KIDNEYS: Symmetric enhancement. No hydronephrosis.    ABDOMINOPELVIC NODES: Unremarkable.    PELVIC ORGANS: Unremarkable.    PERITONEUM/MESENTERY/BOWEL: No bowel obstruction. Moderate ascites. No   pneumoperitoneum.    BONES/SOFT TISSUES: Diffuse anasarca. No acute osseous abnormality..   Normal appendix.    OTHER: Normal caliber aorta.      IMPRESSION:    Interval development of right ventricular enlargement. Further evaluation   is recommended.    New moderate ascites, anasarca, bilateral right greater than left pleural   effusions and moderate pericardial effusion. Findings suggestive of fluid   overload.    Redemonstrated hepatosplenomegaly without significant change.        Spoke with HECTOR RANDLE MD on 4/20/2022 6:40 AM with readback.    --- End of Report ---            KATIE ENNIS MD; Attending Radiologist  This document has been electronically signed. Apr 20 2022  6:40AM    < end of copied text >       Chief complaint/Reason for consult: ascites possible hepatitis C    HPI:  Pt is a 26F w/ PMH asthma, IVDA, and hep C being admitted for anasarca likely 2/2 liver failure/cirrhosis from untreated hepatitis C. Pt reports generalized edema for many months, which has significantly increased over the past two weeks. Pt states that as edema has increased she is becoming increasingly short of breath. Reports intermittent abdominal pain as well as right hip and knee pain with movement 2/2 edema. Pt reports IV heroine abuse, last use yesterday. Pt reports a hx of being on MMTP, but has been off for awhile. Pt cannot recall when initially diagnosed with Hepatitis C, states unclear diagnosis, and reports that she was never treated for it. Denies HA, dizziness, fever/chills, chest pain, NVD, change in urination and change in BM.    In ED, pt is afebrile w/ WBC: 3.2. BP: 134/88, HR: 76, SPO2 97% on RA. Ca: 8.3, Albumin: 2.8, Alk phos: 656, AST/ALT: 23/9, T bili: 0.5, BNP: 8100. CT abd: New moderate ascites, anasarca, b/l pleural effusions R>L, moderate pericardial effusion, hepatosplenomegaly. Pt was given 40mg IV lasix.  (20 Apr 2022 07:30)    GI Updates: 26yFemale pmh asthma, IV drug abuse presents for generalized edema, SOB, and increased abdominal girth. Patient denies nausea, vomiting, hematemesis, melena, blood in stool, diarrhea, constipation, abdominal pain.      PAST MEDICAL & SURGICAL HISTORY:   Hepatitis C    Asthma    Anxiety and depression    IV drug abuse  heroine    No significant past surgical history          Family history:  FAMILY HISTORY:  No pertinent family history in first degree relatives      No GI cancers in first or second degree relatives    Social History: +cigarette smoking. No alcohol. + illegal drug use.    Allergies:   buprenorphine (Rash)  Suboxone (Rash)            MEDICATIONS  (STANDING):  chlorhexidine 4% Liquid 1 Application(s) Topical <User Schedule>  furosemide   Injectable 40 milliGRAM(s) IV Push two times a day  methadone    Tablet   Oral   methadone    Tablet 15 milliGRAM(s) Oral two times a day  nicotine - 21 mG/24Hr(s) Patch 1 patch Transdermal daily  pantoprazole    Tablet 40 milliGRAM(s) Oral before breakfast    MEDICATIONS  (PRN):  ALBUTerol    90 MICROgram(s) HFA Inhaler 2 Puff(s) Inhalation every 6 hours PRN Shortness of Breath and/or Wheezing  ondansetron Injectable 4 milliGRAM(s) IV Push every 8 hours PRN Nausea and/or Vomiting        REVIEW OF SYSTEMS  General:  No weight loss, fevers, or chills.  Eyes:  No reported pain or visual changes  ENT:  No sore throat or runny nose.  NECK: No stiffness or lymphadenopathy  CV:  No chest pain or palpitations.  Resp:  No shortness of breath, cough, wheezing or hemoptysis  GI:  No abdominal pain, nausea, vomiting, dysphagia, diarrhea or constipation. No rectal bleeding, melena, or hematemesis.  Neuro:  No tingling, numbness       VITALS:   T(F): 98 (04-20-22 @ 08:10), Max: 98 (04-20-22 @ 08:10)  HR: 75 (04-20-22 @ 08:10) (75 - 89)  BP: 139/87 (04-20-22 @ 08:10) (134/88 - 169/93)  RR: 18 (04-20-22 @ 07:22) (18 - 22)  SpO2: 99% (04-20-22 @ 08:10) (96% - 100%)    PHYSICAL EXAM:  GENERAL: AAOx3, no acute distress.  HEAD:  Atraumatic, Normocephalic  EYES: conjunctiva and sclera clear  NECK: Supple, No thyromegaly   CHEST/LUNG: Clear to auscultation bilaterally; No wheeze, rhonchi, or rales  HEART: Regular rate and rhythm; normal S1, S2, No murmurs.  ABDOMEN: Soft, nontender, +distended; Bowel sounds present  NEUROLOGY: No asterixis or tremor  SKIN: Intact, no jaundice          LABS:  04-20    135  |  102  |  13  ----------------------------<  127<H>  4.3   |  24  |  0.9    Ca    8.3<L>      20 Apr 2022 04:40  Mg     2.1     04-20    TPro  5.8<L>  /  Alb  2.8<L>  /  TBili  0.5  /  DBili  x   /  AST  23  /  ALT  9   /  AlkPhos  656<H>  04-20                          10.9   3.26  )-----------( 188      ( 20 Apr 2022 04:40 )             35.9     LIVER FUNCTIONS - ( 20 Apr 2022 04:40 )  Alb: 2.8 g/dL / Pro: 5.8 g/dL / ALK PHOS: 656 U/L / ALT: 9 U/L / AST: 23 U/L / GGT: x           PT/INR - ( 20 Apr 2022 04:40 )   PT: 12.80 sec;   INR: 1.11 ratio         PTT - ( 20 Apr 2022 04:40 )  PTT:35.9 sec    IMAGING:    < from: CT Abdomen and Pelvis w/ IV Cont (04.20.22 @ 05:53) >    ACC: [id number] EXAM:  CT ABDOMEN AND PELVIS IC                          PROCEDURE DATE:  04/20/2022          INTERPRETATION:  CLINICAL STATEMENT: Abdominal pain.    TECHNIQUE: Contiguous axial CT images were obtained from the lower chest   to the pubic symphysis following administration of intravenous contrast.    Oral contrast was not administered..  Reformatted images in the coronal   and sagittal planes were acquired.    COMPARISON: CT ABDOMEN/PELVIS 1/30/2021.    FINDINGS:    LOWER CHEST: Partially imaged moderate right and small left pleural   effusion with adjacent atelectasis. Moderate pericardial effusion, new   interval development of right ventricle enlargement.    HEPATOBILIARY: Hepatomegaly.    SPLEEN: Splenomegaly.    PANCREAS: Unremarkable.    ADRENAL GLANDS: Unremarkable.    KIDNEYS: Symmetric enhancement. No hydronephrosis.    ABDOMINOPELVIC NODES: Unremarkable.    PELVIC ORGANS: Unremarkable.    PERITONEUM/MESENTERY/BOWEL: No bowel obstruction. Moderate ascites. No   pneumoperitoneum.    BONES/SOFT TISSUES: Diffuse anasarca. No acute osseous abnormality..   Normal appendix.    OTHER: Normal caliber aorta.      IMPRESSION:    Interval development of right ventricular enlargement. Further evaluation   is recommended.    New moderate ascites, anasarca, bilateral right greater than left pleural   effusions and moderate pericardial effusion. Findings suggestive of fluid   overload.    Redemonstrated hepatosplenomegaly without significant change.        Spoke with HECTOR RANDLE MD on 4/20/2022 6:40 AM with readback.    --- End of Report ---            KATIE ENNIS MD; Attending Radiologist  This document has been electronically signed. Apr 20 2022  6:40AM    < end of copied text >

## 2022-04-20 NOTE — ADVANCED PRACTICE NURSE CONSULT - RECOMMEDATIONS
Plan: Clean abd wound with saline, pat dry apply bacitracin with dry guaze   Pressure  injury  preventive  measures  skin care   Assess wound and inform primary provider of any changes   Case discussed with primary Rn  Wound/ ostomy specialist  to f/u as needed     Offloading: [ ] Frequent position changes [ ] Devices/Equipment  Cleansing: [ ] Saline [ ] Soap/Water [ ] Other: ______  Topicals: [ ] Barrier Cream [ ] Antimicrobial [ ] Enzymatic Wound Debridement  Dressings: [ ] Dry, sterile [ ] Foam [ ] Absorbant Pads [ ] Collagenase

## 2022-04-20 NOTE — CONSULT NOTE ADULT - SUBJECTIVE AND OBJECTIVE BOX
Patient is a 26y old  Female who presents with a chief complaint of     HPI:  Pt is a 26F w/ PMH asthma, IVDA, and hep C being admitted for anasarca likely 2/2 liver failure/cirrhosis from untreated hepatitis C. Pt reports generalized edema for many months, which has significantly increased over the past two weeks. Pt states that as edema has increased she is becoming increasingly short of breath. Reports intermittent abdominal pain as well as right hip and knee pain with movement 2/2 edema. Pt reports IV heroine abuse, last use yesterday. Pt reports a hx of being on MMTP, but has been off for awhile. Pt cannot recall when initially diagnosed with Hepatitis C, states unclear diagnosis, and reports that she was never treated for it. Denies HA, dizziness, fever/chills, chest pain, NVD, change in urination and change in BM.    In ED, pt is afebrile w/ WBC: 3.2. BP: 134/88, HR: 76, SPO2 97% on RA. Ca: 8.3, Albumin: 2.8, Alk phos: 656, AST/ALT: 23/9, T bili: 0.5, BNP: 8100. CT abd: New moderate ascites, anasarca, b/l pleural effusions R>L, moderate pericardial effusion, hepatosplenomegaly. Pt was given 40mg IV lasix.  (20 Apr 2022 07:30)      PAST MEDICAL & SURGICAL HISTORY:  Hepatitis C    Asthma    Anxiety and depression    IV drug abuse  heroine    No significant past surgical history        FAMILY HISTORY:  No pertinent family history in first degree relatives      Family history: No family cardiovascular system   Occupation:  Alochol: Denied  Smoking: Denied  Drug Use: Denied  Marital Status:           Allergies    buprenorphine (Rash)  Suboxone (Rash)    Intolerances        Home Medications:      ROS: as in HPI; All other systems reviewed are negative        PHYSICAL EXAM:  Vital Signs Last 24 Hrs  T(C): 36.3 (20 Apr 2022 07:22), Max: 36.3 (20 Apr 2022 07:22)  T(F): 97.3 (20 Apr 2022 07:22), Max: 97.3 (20 Apr 2022 07:22)  HR: 83 (20 Apr 2022 07:22) (76 - 89)  BP: 169/93 (20 Apr 2022 07:22) (134/88 - 169/93)  BP(mean): --  RR: 18 (20 Apr 2022 07:22) (18 - 22)  SpO2: 100% (20 Apr 2022 07:22) (96% - 100%)      GENERAL: NAD, well-groomed, well-developed  HEAD:  Atraumatic, Normocephalic  EYES: EOMI, PERRLA, conjunctiva and sclera clear  ENMT: No tonsillar erythema, exudates, or enlargement; Moist mucous membranes, Good dentition, No lesions  NECK: Supple, No JVD, Normal thyroid  NERVOUS SYSTEM:  Alert & Oriented X3, Good concentration; Motor Strength 5/5 B/L upper and lower extremities; DTRs 2+ intact and symmetric  CHEST/LUNG: Clear to percussion bilaterally; No rales, rhonchi, wheezing, or rubs  HEART: Regular rate and rhythm; No murmurs, rubs, or gallops  ABDOMEN: mild distension   EXTREMITIES:  3 edema   LYMPH: No lymphadenopathy noted  SKIN: No rashes or lesions    HOSPITAL MEDICATIONS:  MEDICATIONS  (STANDING):  chlorhexidine 4% Liquid 1 Application(s) Topical <User Schedule>  furosemide   Injectable 40 milliGRAM(s) IV Push two times a day  pantoprazole    Tablet 40 milliGRAM(s) Oral before breakfast    MEDICATIONS  (PRN):  ALBUTerol    90 MICROgram(s) HFA Inhaler 2 Puff(s) Inhalation every 6 hours PRN Shortness of Breath and/or Wheezing  ondansetron Injectable 4 milliGRAM(s) IV Push every 8 hours PRN Nausea and/or Vomiting      LABS:                        10.9   3.26  )-----------( 188      ( 20 Apr 2022 04:40 )             35.9     04-20    135  |  102  |  13  ----------------------------<  127<H>  4.3   |  24  |  0.9    Ca    8.3<L>      20 Apr 2022 04:40  Mg     2.1     04-20    TPro  5.8<L>  /  Alb  2.8<L>  /  TBili  0.5  /  DBili  x   /  AST  23  /  ALT  9   /  AlkPhos  656<H>  04-20    PT/INR - ( 20 Apr 2022 04:40 )   PT: 12.80 sec;   INR: 1.11 ratio         PTT - ( 20 Apr 2022 04:40 )  PTT:35.9 sec              RADIOLOGY: < from: CT Abdomen and Pelvis w/ IV Cont (04.20.22 @ 05:53) >  Interval development of right ventricular enlargement. Further evaluation   is recommended.    New moderate ascites, anasarca, bilateral right greater than left pleural   effusions and moderate pericardial effusion. Findings suggestive of fluid   overload.    Redemonstrated hepatosplenomegaly without significant change.    < end of copied text >    Cxr bug heart not able to visualize effusion   Reviewed and interpreted by me    ECHO:    Point of Care Ultrasound Findings;    PFT:

## 2022-04-20 NOTE — H&P ADULT - NS ATTEND AMEND GEN_ALL_CORE FT
Patient seen at bedside. cardiology, GI and pulmonology consulted. Admitted with abdominal distension and leg swelling. history fo hepatitis c? likely needs paracentesis. patient reports no paracentesis in past. follow GI,Pulm, Card recommendations.

## 2022-04-20 NOTE — CONSULT NOTE ADULT - ASSESSMENT
26yFemale pmh asthma, IV drug abuse presents for generalized edema, SOB, and increased abdominal girth. 26yFemale pmh asthma, IV drug abuse presents for generalized edema, SOB, and increased abdominal girth.    Problem 1-Moderatre ascites  hepatomegaly    elevated ALP  Rec  -HEP C status is vague multiple HEP C RNA qualitative and by PCR negative in past Follow up with our GI office located on [address] [place] [address], Phone number [phone] with Dr. Aldridge  -Obtain GGT, elevated ALP possibly from substance abuse  -Check Smooth Muscle antibody, KRISHNA, Ceruloplasmin, Ferritin, Transferrin Saturation, SPEP, Alpha fetoprotein  -Ultrasound guided diagnostic paracentesis: Obtain serum albumin same day as paracentesis.  -Obtain fluid studies: Cultures, Cell count, albumin, protein, amylase, triglycerides, cytology, AFB smear and culture, Paracentesis Panel   -Further treatment is based on diagnosis.  -2g Sodium diet  -Will consider diuretics when SBP ruled out  -Daily weights  -MELD Score-8      Problem 2-anasarca, bilateral right greater than left pleural   effusions and moderate pericardial effusion. Findings suggestive of fluid   overload. Interval development of right ventricular enlargement. Further evaluation   is recommended.  elevated BNP  Rec  -Cardiology consult  -pulm consult

## 2022-04-20 NOTE — CONSULT NOTE ADULT - SUBJECTIVE AND OBJECTIVE BOX
INTERVENTIONAL RADIOLOGY CONSULT:     HPI:  Pt is a 26F w/ PMH asthma, IVDA, and hep C being admitted for anasarca likely 2/2 liver failure/cirrhosis from untreated hepatitis C. Pt reports generalized edema for many months, which has significantly increased over the past two weeks. Pt states that as edema has increased she is becoming increasingly short of breath. Reports intermittent abdominal pain as well as right hip and knee pain with movement 2/2 edema. Pt reports IV heroine abuse, last use yesterday. Pt reports a hx of being on MMTP, but has been off for awhile. Pt cannot recall when initially diagnosed with Hepatitis C, states unclear diagnosis, and reports that she was never treated for it. Denies HA, dizziness, fever/chills, chest pain, NVD, change in urination and change in BM.    In ED, pt is afebrile w/ WBC: 3.2. BP: 134/88, HR: 76, SPO2 97% on RA. Ca: 8.3, Albumin: 2.8, Alk phos: 656, AST/ALT: 23/9, T bili: 0.5, BNP: 8100. CT abd: New moderate ascites, anasarca, b/l pleural effusions R>L, moderate pericardial effusion, hepatosplenomegaly. Pt was given 40mg IV lasix.  (20 Apr 2022 07:30)    PAST MEDICAL & SURGICAL HISTORY:  Hepatitis C  Asthma  Anxiety and depression  IV drug abuse  heroine    No significant past surgical history    MEDICATIONS  (STANDING):  chlorhexidine 4% Liquid 1 Application(s) Topical <User Schedule>  furosemide   Injectable 40 milliGRAM(s) IV Push two times a day  methadone    Tablet   Oral   methadone    Tablet 15 milliGRAM(s) Oral two times a day  nicotine - 21 mG/24Hr(s) Patch 1 patch Transdermal daily  pantoprazole    Tablet 40 milliGRAM(s) Oral before breakfast    MEDICATIONS  (PRN):  ALBUTerol    90 MICROgram(s) HFA Inhaler 2 Puff(s) Inhalation every 6 hours PRN Shortness of Breath and/or Wheezing  ondansetron Injectable 4 milliGRAM(s) IV Push every 8 hours PRN Nausea and/or Vomiting    Allergies    buprenorphine (Rash)  Suboxone (Rash)    Intolerances    FAMILY HISTORY:  No pertinent family history in first degree relatives    Vital Signs Last 24 Hrs  T(C): 36.7 (20 Apr 2022 08:10), Max: 36.7 (20 Apr 2022 08:10)  T(F): 98 (20 Apr 2022 08:10), Max: 98 (20 Apr 2022 08:10)  HR: 75 (20 Apr 2022 08:10) (75 - 89)  BP: 139/87 (20 Apr 2022 08:10) (134/88 - 169/93)  BP(mean): --  RR: 18 (20 Apr 2022 07:22) (18 - 22)  SpO2: 99% (20 Apr 2022 08:10) (96% - 100%)    Labs:                         10.9   3.26  )-----------( 188      ( 20 Apr 2022 04:40 )             35.9     04-20    135  |  102  |  13  ----------------------------<  127<H>  4.3   |  24  |  0.9    Ca    8.3<L>      20 Apr 2022 04:40  Mg     2.1     04-20    TPro  5.8<L>  /  Alb  2.8<L>  /  TBili  0.5  /  DBili  x   /  AST  23  /  ALT  9   /  AlkPhos  656<H>  04-20    PT/INR - ( 20 Apr 2022 04:40 )   PT: 12.80 sec;   INR: 1.11 ratio       PTT - ( 20 Apr 2022 04:40 )  PTT:35.9 sec    Pertinent labs:                      10.9   3.26  )-----------( 188      ( 20 Apr 2022 04:40 )             35.9     04-20    135  |  102  |  13  ----------------------------<  127<H>  4.3   |  24  |  0.9    Ca    8.3<L>      20 Apr 2022 04:40  Mg     2.1     04-20    TPro  5.8<L>  /  Alb  2.8<L>  /  TBili  0.5  /  DBili  x   /  AST  23  /  ALT  9   /  AlkPhos  656<H>  04-20    PT/INR - ( 20 Apr 2022 04:40 )   PT: 12.80 sec;   INR: 1.11 ratio       PTT - ( 20 Apr 2022 04:40 )  PTT:35.9 sec    Radiology & Additional Studies:     Radiology imaging reviewed.  INTERVENTIONAL RADIOLOGY CONSULT:     Procedure Requested: diagnostic / therapeutic paracentesis     HPI:  Pt is a 26F w/ PMH asthma, IVDA, and hep C being admitted for anasarca likely 2/2 liver failure/cirrhosis from untreated hepatitis C. Pt reports generalized edema for many months, which has significantly increased over the past two weeks. Pt states that as edema has increased she is becoming increasingly short of breath. Reports intermittent abdominal pain as well as right hip and knee pain with movement 2/2 edema. Pt reports IV heroine abuse, last use yesterday. Pt reports a hx of being on MMTP, but has been off for awhile. Pt cannot recall when initially diagnosed with Hepatitis C, states unclear diagnosis, and reports that she was never treated for it. Denies HA, dizziness, fever/chills, chest pain, NVD, change in urination and change in BM.    In ED, pt is afebrile w/ WBC: 3.2. BP: 134/88, HR: 76, SPO2 97% on RA. Ca: 8.3, Albumin: 2.8, Alk phos: 656, AST/ALT: 23/9, T bili: 0.5, BNP: 8100. CT abd: New moderate ascites, anasarca, b/l pleural effusions R>L, moderate pericardial effusion, hepatosplenomegaly. Pt was given 40mg IV lasix.  (20 Apr 2022 07:30)      PAST MEDICAL & SURGICAL HISTORY:  Hepatitis C    Asthma    Anxiety and depression    IV drug abuse  heroine    No significant past surgical history        MEDICATIONS  (STANDING):  chlorhexidine 4% Liquid 1 Application(s) Topical <User Schedule>  furosemide   Injectable 40 milliGRAM(s) IV Push two times a day  methadone    Tablet   Oral   methadone    Tablet 15 milliGRAM(s) Oral two times a day  nicotine - 21 mG/24Hr(s) Patch 1 patch Transdermal daily  pantoprazole    Tablet 40 milliGRAM(s) Oral before breakfast    MEDICATIONS  (PRN):  ALBUTerol    90 MICROgram(s) HFA Inhaler 2 Puff(s) Inhalation every 6 hours PRN Shortness of Breath and/or Wheezing  ondansetron Injectable 4 milliGRAM(s) IV Push every 8 hours PRN Nausea and/or Vomiting      Allergies    buprenorphine (Rash)  Suboxone (Rash)    Intolerances        Social History:   Smoking: Yes [ ]  No [ ]   ______pk yrs  ETOH  Yes [ ]  No [ ]  Social [ ]  DRUGS:  Yes [ ]  No [ ]  if so what______________    FAMILY HISTORY:  No pertinent family history in first degree relatives        Physical Exam:   Vital Signs Last 24 Hrs  T(C): 36.7 (20 Apr 2022 08:10), Max: 36.7 (20 Apr 2022 08:10)  T(F): 98 (20 Apr 2022 08:10), Max: 98 (20 Apr 2022 08:10)  HR: 75 (20 Apr 2022 08:10) (75 - 89)  BP: 139/87 (20 Apr 2022 08:10) (134/88 - 169/93)  BP(mean): --  RR: 18 (20 Apr 2022 07:22) (18 - 22)  SpO2: 99% (20 Apr 2022 08:10) (96% - 100%)      Labs:                         10.9   3.26  )-----------( 188      ( 20 Apr 2022 04:40 )             35.9     04-20    135  |  102  |  13  ----------------------------<  127<H>  4.3   |  24  |  0.9    Ca    8.3<L>      20 Apr 2022 04:40  Mg     2.1     04-20    TPro  5.8<L>  /  Alb  2.8<L>  /  TBili  0.5  /  DBili  x   /  AST  23  /  ALT  9   /  AlkPhos  656<H>  04-20    PT/INR - ( 20 Apr 2022 04:40 )   PT: 12.80 sec;   INR: 1.11 ratio         PTT - ( 20 Apr 2022 04:40 )  PTT:35.9 sec    Pertinent labs:                      10.9   3.26  )-----------( 188      ( 20 Apr 2022 04:40 )             35.9       04-20    135  |  102  |  13  ----------------------------<  127<H>  4.3   |  24  |  0.9    Ca    8.3<L>      20 Apr 2022 04:40  Mg     2.1     04-20    TPro  5.8<L>  /  Alb  2.8<L>  /  TBili  0.5  /  DBili  x   /  AST  23  /  ALT  9   /  AlkPhos  656<H>  04-20      PT/INR - ( 20 Apr 2022 04:40 )   PT: 12.80 sec;   INR: 1.11 ratio         PTT - ( 20 Apr 2022 04:40 )  PTT:35.9 sec    Radiology & Additional Studies:   Radiology imaging reviewed.       ASSESSMENT/ PLAN:   26F w/ PMH asthma, IVDA, and hep C being admitted for anasarca likely 2/2 liver failure/cirrhosis from untreated hepatitis C   - consulted for image guided diagnostic/therapeutic paracentesis d/t accumulation of ascites   - CT abdomen/pelvis shows moderate ascites  - plan for image guided procedure tomorrow 4/21   - please place all diagnostic orders in [facility] prior to procedure  - please place cytopathology form in chart to add with specimen        Thank you for the courtesy of this consult, please call [phone] with any further questions.

## 2022-04-20 NOTE — CONSULT NOTE ADULT - NS ATTEND AMEND GEN_ALL_CORE FT
26F w/ PMH asthma, IVDA, and hep C being admitted for anasarca likely 2/2 liver failure/cirrhosis from untreated hepatitis C. Pt reports generalized edema for many months, which has significantly increased over the past two weeks. On workup was to have moderate pericardial effusion on CT  She needs a echo, Be cautious using diuretics. Watch lytes. Pericardial effusion may be due to low alb secondary to liver disease
Patient with multi organ involvment R/o sarcoidosis check Serum ACE. To perform diagnostic paracentesis.

## 2022-04-20 NOTE — ED PROVIDER NOTE - CLINICAL SUMMARY MEDICAL DECISION MAKING FREE TEXT BOX
25 year old female with hx of Hep C and IV drug use - last used  yesterday  heroin IV - pw  abdominal distention and bl leg swelling  x  months  (admitted in February  but eloped -    Her lab workup at that time revealed elevated liver enzymes and pancytopenia. Pt was sent to ED for evaluation at which point she eloped. Pt here today  as her  swelling  as worsened over the past 1.5 weeks  and now SOB due to abdominal  swelling,  no fever chills  chest pain joint pain.   TOday  wbc 3 ,  plt 188, hgb 10.9   ( feb : wbc 1, hgb 7, plt 74)

## 2022-04-20 NOTE — ED PROVIDER NOTE - OBJECTIVE STATEMENT
26 year old female past medical history of asthma and hep C comes to emergency room for worsening swelling all over her body and abd distention with shortness of breath.

## 2022-04-20 NOTE — PATIENT PROFILE ADULT - DEAF OR HARD OF HEARING?
[FreeTextEntry1] : 83yo male with recent altered bowel habits, hx colon polyps\par \par Increase fiber and fluids, consider stool softener\par No indication for colonoscopy at this time - surveillance next year due for hx large adenoma\par \par Total time spent 36 minutes, including record review, face to face time, and documentation\par 
no

## 2022-04-20 NOTE — CONSULT NOTE ADULT - ASSESSMENT
26F w/ PMH asthma, IVDA, and hep C being admitted for anasarca likely 2/2 liver failure/cirrhosis from untreated hepatitis C. Pt reports generalized edema for many months, which has significantly increased over the past two weeks. On workup was to have moderate pericardial effusion on CT      Impression:  #Pericardial effusion    -Pt asymptomatic and hemodynamically stable  -Likely 2/2 to liver failure/cirrhosis  -Albumin 2.8  -TTE pending  -Scheduled for image guided paracentesis tomorrow at [facility]  -[facility] home meds  -Monitor lytes and replete as needed     Discussed with Dr Sanchez

## 2022-04-21 LAB
ALBUMIN SERPL ELPH-MCNC: 2.5 G/DL — LOW (ref 3.5–5.2)
ALBUMIN SERPL ELPH-MCNC: 2.6 G/DL — LOW (ref 3.5–5.2)
ALP SERPL-CCNC: 544 U/L — HIGH (ref 30–115)
ALT FLD-CCNC: 8 U/L — SIGNIFICANT CHANGE UP (ref 0–41)
ANION GAP SERPL CALC-SCNC: 12 MMOL/L — SIGNIFICANT CHANGE UP (ref 7–14)
AST SERPL-CCNC: 22 U/L — SIGNIFICANT CHANGE UP (ref 0–41)
BILIRUB DIRECT SERPL-MCNC: <0.2 MG/DL — SIGNIFICANT CHANGE UP (ref 0–0.3)
BILIRUB INDIRECT FLD-MCNC: >0.1 MG/DL — LOW (ref 0.2–1.2)
BILIRUB SERPL-MCNC: 0.3 MG/DL — SIGNIFICANT CHANGE UP (ref 0.2–1.2)
BUN SERPL-MCNC: 15 MG/DL — SIGNIFICANT CHANGE UP (ref 10–20)
CALCIUM SERPL-MCNC: 8.1 MG/DL — LOW (ref 8.5–10.1)
CHLORIDE SERPL-SCNC: 104 MMOL/L — SIGNIFICANT CHANGE UP (ref 98–110)
CO2 SERPL-SCNC: 24 MMOL/L — SIGNIFICANT CHANGE UP (ref 17–32)
CREAT ?TM UR-MCNC: 129 MG/DL — SIGNIFICANT CHANGE UP
CREAT SERPL-MCNC: 1 MG/DL — SIGNIFICANT CHANGE UP (ref 0.7–1.5)
EGFR: 79 ML/MIN/1.73M2 — SIGNIFICANT CHANGE UP
GGT SERPL-CCNC: 207 U/L — HIGH (ref 1–40)
GLUCOSE SERPL-MCNC: 110 MG/DL — HIGH (ref 70–99)
HCT VFR BLD CALC: 30.4 % — LOW (ref 37–47)
HGB BLD-MCNC: 9 G/DL — LOW (ref 12–16)
IRON SATN MFR SERPL: 12 % — LOW (ref 15–50)
IRON SATN MFR SERPL: 33 UG/DL — LOW (ref 35–150)
MCHC RBC-ENTMCNC: 25.4 PG — LOW (ref 27–31)
MCHC RBC-ENTMCNC: 29.6 G/DL — LOW (ref 32–37)
MCV RBC AUTO: 85.6 FL — SIGNIFICANT CHANGE UP (ref 81–99)
NIGHT BLUE STAIN TISS: SIGNIFICANT CHANGE UP
NRBC # BLD: 0 /100 WBCS — SIGNIFICANT CHANGE UP (ref 0–0)
NT-PROBNP SERPL-SCNC: 6987 PG/ML — HIGH (ref 0–300)
OSMOLALITY UR: 638 MOS/KG — SIGNIFICANT CHANGE UP (ref 50–1200)
PLATELET # BLD AUTO: 119 K/UL — LOW (ref 130–400)
POTASSIUM SERPL-MCNC: 4 MMOL/L — SIGNIFICANT CHANGE UP (ref 3.5–5)
POTASSIUM SERPL-SCNC: 4 MMOL/L — SIGNIFICANT CHANGE UP (ref 3.5–5)
PROCALCITONIN SERPL-MCNC: 0.07 NG/ML — SIGNIFICANT CHANGE UP (ref 0.02–0.1)
PROT ?TM UR-MCNC: 327 MG/DLG/24H — SIGNIFICANT CHANGE UP
PROT ?TM UR-MCNC: 327 MG/DLG/24H — SIGNIFICANT CHANGE UP
PROT SERPL-MCNC: 5.6 G/DL — LOW (ref 6–8)
PROT/CREAT UR-RTO: 2.5 RATIO — HIGH (ref 0–0.2)
RBC # BLD: 3.55 M/UL — LOW (ref 4.2–5.4)
RBC # FLD: 15.1 % — HIGH (ref 11.5–14.5)
SODIUM SERPL-SCNC: 140 MMOL/L — SIGNIFICANT CHANGE UP (ref 135–146)
SODIUM UR-SCNC: 122 MMOL/L — SIGNIFICANT CHANGE UP
SPECIMEN SOURCE: SIGNIFICANT CHANGE UP
TIBC SERPL-MCNC: 280 UG/DL — SIGNIFICANT CHANGE UP (ref 220–430)
TRANSFERRIN SERPL-MCNC: 234 MG/DL — SIGNIFICANT CHANGE UP (ref 200–360)
UIBC SERPL-MCNC: 247 UG/DL — SIGNIFICANT CHANGE UP (ref 110–370)
UUN UR-MCNC: 517 MG/DL — SIGNIFICANT CHANGE UP
WBC # BLD: 1.67 K/UL — LOW (ref 4.8–10.8)
WBC # FLD AUTO: 1.67 K/UL — LOW (ref 4.8–10.8)

## 2022-04-21 PROCEDURE — 93306 TTE W/DOPPLER COMPLETE: CPT | Mod: 26

## 2022-04-21 PROCEDURE — ZZZZZ: CPT

## 2022-04-21 PROCEDURE — 99233 SBSQ HOSP IP/OBS HIGH 50: CPT

## 2022-04-21 RX ORDER — FUROSEMIDE 40 MG
40 TABLET ORAL
Refills: 0 | Status: DISCONTINUED | OUTPATIENT
Start: 2022-04-21 | End: 2022-04-22

## 2022-04-21 RX ADMIN — CHLORHEXIDINE GLUCONATE 1 APPLICATION(S): 213 SOLUTION TOPICAL at 05:18

## 2022-04-21 RX ADMIN — PANTOPRAZOLE SODIUM 40 MILLIGRAM(S): 20 TABLET, DELAYED RELEASE ORAL at 05:23

## 2022-04-21 RX ADMIN — METHADONE HYDROCHLORIDE 15 MILLIGRAM(S): 40 TABLET ORAL at 18:14

## 2022-04-21 RX ADMIN — METHADONE HYDROCHLORIDE 15 MILLIGRAM(S): 40 TABLET ORAL at 05:23

## 2022-04-21 RX ADMIN — Medication 40 MILLIGRAM(S): at 18:15

## 2022-04-21 RX ADMIN — Medication 1 APPLICATION(S): at 14:23

## 2022-04-21 NOTE — PROGRESS NOTE ADULT - SUBJECTIVE AND OBJECTIVE BOX
26yFemale  Being followed for ascites, elevated ALP   Interval history: Patient denies nausea, vomiting, hematemesis, melena, blood in stool, diarrhea, constipation, abdominal pain. Patient s/p IR attempt at paracentesis.        PAST MEDICAL & SURGICAL HISTORY:   Hepatitis C    Asthma    Anxiety and depression    IV drug abuse  heroine    No significant past surgical history            Social History: No smoking. No alcohol. No illegal drug use.            MEDICATIONS  (STANDING):  BACItracin   Ointment 1 Application(s) Topical daily  chlorhexidine 4% Liquid 1 Application(s) Topical <User Schedule>  furosemide   Injectable 40 milliGRAM(s) IV Push two times a day  methadone    Tablet 15 milliGRAM(s) Oral two times a day  methadone    Tablet   Oral   nicotine - 21 mG/24Hr(s) Patch 1 patch Transdermal daily  pantoprazole    Tablet 40 milliGRAM(s) Oral before breakfast    MEDICATIONS  (PRN):  ALBUTerol    90 MICROgram(s) HFA Inhaler 2 Puff(s) Inhalation every 6 hours PRN Shortness of Breath and/or Wheezing  ondansetron Injectable 4 milliGRAM(s) IV Push every 8 hours PRN Nausea and/or Vomiting      Allergies:  buprenorphine (Rash)  Suboxone (Rash)        REVIEW OF SYSTEMS:  General:  No weight loss, fevers, or chills.  Eyes:  No reported pain or visual changes  ENT:  No sore throat or runny nose.  NECK: No stiffness   CV:  No chest pain or palpitations.  Resp:  No shortness of breath, cough  GI:  No abdominal pain, nausea, vomiting, dysphagia, diarrhea or constipation. No rectal bleeding, melena, or hematemesis.  Neuro:  No tingling, numbness           VITAL SIGNS:   T(F): 98.5 (04-21-22 @ 13:58), Max: 98.5 (04-21-22 @ 13:58)  HR: 72 (04-21-22 @ 13:58) (72 - 81)  BP: 135/61 (04-21-22 @ 13:58) (126/78 - 157/79)  RR: 18 (04-21-22 @ 13:58) (18 - 18)  SpO2: 95% (04-20-22 @ 23:56) (95% - 95%)    PHYSICAL EXAM:  GENERAL: AAOx3, no acute distress.  HEAD:  Atraumatic, Normocephalic  EYES: conjunctiva and sclera clear  NECK: Supple, no JVD or thyromegaly  CHEST/LUNG: Clear to auscultation bilaterally; No wheeze, rhonchi, or rales  HEART: Regular rate and rhythm; normal S1, S2, No murmurs.  ABDOMEN: Soft, nontender, nondistended; Bowel sounds present  NEUROLOGY: No asterixis or tremor.   SKIN: Intact, no jaundice            LABS:                        9.0    1.67  )-----------( 119      ( 21 Apr 2022 07:37 )             30.4     04-21    140  |  104  |  15  ----------------------------<  110<H>  4.0   |  24  |  1.0    Ca    8.1<L>      21 Apr 2022 07:37  Mg     2.1     04-20    TPro  x   /  Alb  2.5<L>  /  TBili  x   /  DBili  x   /  AST  x   /  ALT  x   /  AlkPhos  x   04-21    LIVER FUNCTIONS - ( 21 Apr 2022 10:16 )  Alb: 2.5 g/dL / Pro: x     / ALK PHOS: x     / ALT: x     / AST: x     / GGT: x           PT/INR - ( 20 Apr 2022 04:40 )   PT: 12.80 sec;   INR: 1.11 ratio         PTT - ( 20 Apr 2022 04:40 )  PTT:35.9 sec    IMAGING:    < from: CT Abdomen and Pelvis w/ IV Cont (04.20.22 @ 05:53) >    ACC: [id number] EXAM:  CT ABDOMEN AND PELVIS IC                          PROCEDURE DATE:  04/20/2022          INTERPRETATION:  CLINICAL STATEMENT: Abdominal pain.    TECHNIQUE: Contiguous axial CT images were obtained from the lower chest   to the pubic symphysis following administration of intravenous contrast.    Oral contrast was not administered..  Reformatted images in the coronal   and sagittal planes were acquired.    COMPARISON: CT ABDOMEN/PELVIS 1/30/2021.    FINDINGS:    LOWER CHEST: Partially imaged moderate right and small left pleural   effusion with adjacent atelectasis. Moderate pericardial effusion, new   interval development of right ventricle enlargement.    HEPATOBILIARY: Hepatomegaly.    SPLEEN: Splenomegaly.    PANCREAS: Unremarkable.    ADRENAL GLANDS: Unremarkable.    KIDNEYS: Symmetric enhancement. No hydronephrosis.    ABDOMINOPELVIC NODES: Unremarkable.    PELVIC ORGANS: Unremarkable.    PERITONEUM/MESENTERY/BOWEL: No bowel obstruction. Moderate ascites. No   pneumoperitoneum.    BONES/SOFT TISSUES: Diffuse anasarca. No acute osseous abnormality..   Normal appendix.    OTHER: Normal caliber aorta.      IMPRESSION:    Interval development of right ventricular enlargement. Further evaluation   is recommended.    New moderate ascites, anasarca, bilateral right greater than left pleural   effusions and moderate pericardial effusion. Findings suggestive of fluid   overload.    Redemonstrated hepatosplenomegaly without significant change.        Spoke with HECTOR RANDLE MD on 4/20/2022 6:40 AM with readback.    --- End of Report ---            KATIE ENNIS MD; Attending Radiologist  This document has been electronically signed. Apr 20 2022  6:40AM    < end of copied text >

## 2022-04-21 NOTE — PROGRESS NOTE ADULT - SUBJECTIVE AND OBJECTIVE BOX
POOJA DIANE  26y, Female  Allergy: buprenorphine (Rash)  Suboxone (Rash)    Hospital Day: 1d    Patient seen and examined earlier today.     PMH/PSH:  PAST MEDICAL & SURGICAL HISTORY:  Hepatitis C    Asthma    Anxiety and depression    IV drug abuse  heroine    No significant past surgical history        LAST 24-Hr EVENTS:    VITALS:  T(F): 96.4 (04-21-22 @ 05:42), Max: 96.9 (04-20-22 @ 21:00)  HR: 81 (04-21-22 @ 05:42)  BP: 126/78 (04-21-22 @ 05:42) (126/78 - 159/94)  RR: 18 (04-21-22 @ 05:42)  SpO2: 95% (04-20-22 @ 23:56)          TESTS & MEASUREMENTS:  Weight/BMI  85.6 (04-20-22 @ 08:10)  30.5 (04-20-22 @ 08:10)                          9.0    1.67  )-----------( 119      ( 21 Apr 2022 07:37 )             30.4     PT/INR - ( 20 Apr 2022 04:40 )   PT: 12.80 sec;   INR: 1.11 ratio         PTT - ( 20 Apr 2022 04:40 )  PTT:35.9 sec  INR: 1.11 ratio (04-20-22 @ 04:40)    04-21    140  |  104  |  15  ----------------------------<  110<H>  4.0   |  24  |  1.0    Ca    8.1<L>      21 Apr 2022 07:37  Mg     2.1     04-20    TPro  x   /  Alb  2.5<L>  /  TBili  x   /  DBili  x   /  AST  x   /  ALT  x   /  AlkPhos  x   04-21    LIVER FUNCTIONS - ( 21 Apr 2022 10:16 )  Alb: 2.5 g/dL / Pro: x     / ALK PHOS: x     / ALT: x     / AST: x     / GGT: x           CARDIAC MARKERS ( 20 Apr 2022 04:40 )  x     / <0.01 ng/mL / x     / x     / x              Procalcitonin, Serum: 0.07 ng/mL (04-20-22 @ 12:46)        Serum Pro-Brain Natriuretic Peptide: 6987 pg/mL (04-21-22 @ 07:37)  Serum Pro-Brain Natriuretic Peptide: 8108 pg/mL (04-20-22 @ 04:40)                  RADIOLOGY, ECG, & ADDITIONAL TESTS:  12 Lead ECG:   Ventricular Rate 83 BPM    Atrial Rate 83 BPM    P-R Interval 168 ms    QRS Duration 78 ms    Q-T Interval 344 ms    QTC Calculation(Bazett) 404 ms    P Axis 69 degrees    R Axis 127 degrees    T Axis 93 degrees    Diagnosis Line Normal sinus rhythm  Right axis deviation  Possible Right ventricular hypertrophy  Nonspecific T wave abnormality  Abnormal ECG    Confirmed by BRANDON BELL MD ([id number]) on 4/20/2022 3:58:05 PM (04-20-22 @ 04:34)      RECENT DIAGNOSTIC ORDERS:  NonGYN Cytopathology Order: Routine (04-21-22 @ 08:17)  Culture - Blood: AM Sched. Collection:21-Apr-2022 04:30  Specimen Source: Blood-Peripheral (04-20-22 @ 16:53)  Angiotensin Converting Enzyme, Serum: 19:00 (04-20-22 @ 15:42)  Triglycerides, Fluid: Routine  Specimen Source: Paracentesis Fluid (04-20-22 @ 12:50)  Amylase, Body Fluid: Routine  Specimen Source: Paracentesis Fluid (04-20-22 @ 12:50)  Culture - Acid Fast - Sputum w/Smear: Routine  Specimen Source: Sputum (04-20-22 @ 12:50)  Culture - Fungal, Body Fluid: Routine  Specimen Source: Peritoneal Fluid (04-20-22 @ 12:50)  Protein Total, Fluid: Routine  Specimen Source: Peritoneal Fluid (04-20-22 @ 12:50)  Lactate Dehydrogenase, Fluid: Routine  Specimen Source: Peritoneal Fluid (04-20-22 @ 12:50)  Gram Stain: Routine  Specimen Source: Peritoneal Fluid (04-20-22 @ 12:50)  Glucose, Fluid: Routine  Specimen Source: Peritoneal Fluid (04-20-22 @ 12:50)  NonGYN Cytopathology Order: Routine (04-20-22 @ 12:50)  Culture - Body Fluid with Gram Stain: Routine  Specimen Source: Peritoneal Fluid (04-20-22 @ 12:50)  Albumin, Fluid: Routine  Specimen Source: Peritoneal Fluid (04-20-22 @ 12:50)  Cell Count, Body Fluid: Routine  Specimen Source: Peritoneal Fluid (04-20-22 @ 12:50)  Culture - Blood: AM Sched. Collection:21-Apr-2022 04:30  Specimen Source: Blood-Peripheral (04-20-22 @ 12:44)  Alpha Fetoprotein - Tumor Marker: AM Sched. Collection: 21-Apr-2022 04:30 (04-20-22 @ 12:41)  Protein Electrophoresis, Serum: AM Sched. Collection: 21-Apr-2022 04:30 (04-20-22 @ 12:41)  Ferritin, Serum: AM Sched. Collection: 21-Apr-2022 04:30 (04-20-22 @ 12:41)  Ceruloplasmin, Serum: AM Sched. Collection: 21-Apr-2022 04:30 (04-20-22 @ 12:41)  Anti-Nuclear Antibody: AM Sched. Collection: 21-Apr-2022 04:30 (04-20-22 @ 12:41)  Smooth Muscle Antibody: AM Sched. Collection: 21-Apr-2022 04:30 (04-20-22 @ 12:41)      MEDICATIONS:  MEDICATIONS  (STANDING):  BACItracin   Ointment 1 Application(s) Topical daily  chlorhexidine 4% Liquid 1 Application(s) Topical <User Schedule>  methadone    Tablet   Oral   methadone    Tablet 15 milliGRAM(s) Oral two times a day  nicotine - 21 mG/24Hr(s) Patch 1 patch Transdermal daily  pantoprazole    Tablet 40 milliGRAM(s) Oral before breakfast    MEDICATIONS  (PRN):  ALBUTerol    90 MICROgram(s) HFA Inhaler 2 Puff(s) Inhalation every 6 hours PRN Shortness of Breath and/or Wheezing  ondansetron Injectable 4 milliGRAM(s) IV Push every 8 hours PRN Nausea and/or Vomiting      HOME MEDICATIONS:      PHYSICAL EXAM:  GENERAL: Patient lying on bed, comfoprtable   CHEST/LUNG: NVB, no wehezing   HEART: R1+R2, RRR  ABDOMEN: Soft. non tender, BS positive  EXTREMITIES:  no edema, Bruising  CNS: AAAx4. No cranial nerves deficit.        POOJA DIANE  26y, Female  Allergy: buprenorphine (Rash)  Suboxone (Rash)    Hospital Day: 1d    Planned for paracentesis at [facility]. no acute overnight events.     PMH/PSH:  PAST MEDICAL & SURGICAL HISTORY:  Hepatitis C    Asthma    Anxiety and depression    IV drug abuse  heroine    No significant past surgical history        LAST 24-Hr EVENTS:    VITALS:  T(F): 96.4 (04-21-22 @ 05:42), Max: 96.9 (04-20-22 @ 21:00)  HR: 81 (04-21-22 @ 05:42)  BP: 126/78 (04-21-22 @ 05:42) (126/78 - 159/94)  RR: 18 (04-21-22 @ 05:42)  SpO2: 95% (04-20-22 @ 23:56)          TESTS & MEASUREMENTS:  Weight/BMI  85.6 (04-20-22 @ 08:10)  30.5 (04-20-22 @ 08:10)                          9.0    1.67  )-----------( 119      ( 21 Apr 2022 07:37 )             30.4     PT/INR - ( 20 Apr 2022 04:40 )   PT: 12.80 sec;   INR: 1.11 ratio         PTT - ( 20 Apr 2022 04:40 )  PTT:35.9 sec  INR: 1.11 ratio (04-20-22 @ 04:40)    04-21    140  |  104  |  15  ----------------------------<  110<H>  4.0   |  24  |  1.0    Ca    8.1<L>      21 Apr 2022 07:37  Mg     2.1     04-20    TPro  x   /  Alb  2.5<L>  /  TBili  x   /  DBili  x   /  AST  x   /  ALT  x   /  AlkPhos  x   04-21    LIVER FUNCTIONS - ( 21 Apr 2022 10:16 )  Alb: 2.5 g/dL / Pro: x     / ALK PHOS: x     / ALT: x     / AST: x     / GGT: x           CARDIAC MARKERS ( 20 Apr 2022 04:40 )  x     / <0.01 ng/mL / x     / x     / x              Procalcitonin, Serum: 0.07 ng/mL (04-20-22 @ 12:46)        Serum Pro-Brain Natriuretic Peptide: 6987 pg/mL (04-21-22 @ 07:37)  Serum Pro-Brain Natriuretic Peptide: 8108 pg/mL (04-20-22 @ 04:40)                  RADIOLOGY, ECG, & ADDITIONAL TESTS:  12 Lead ECG:   Ventricular Rate 83 BPM    Atrial Rate 83 BPM    P-R Interval 168 ms    QRS Duration 78 ms    Q-T Interval 344 ms    QTC Calculation(Bazett) 404 ms    P Axis 69 degrees    R Axis 127 degrees    T Axis 93 degrees    Diagnosis Line Normal sinus rhythm  Right axis deviation  Possible Right ventricular hypertrophy  Nonspecific T wave abnormality  Abnormal ECG    Confirmed by BRANDON BELL MD ([id number]) on 4/20/2022 3:58:05 PM (04-20-22 @ 04:34)      RECENT DIAGNOSTIC ORDERS:  NonGYN Cytopathology Order: Routine (04-21-22 @ 08:17)  Culture - Blood: AM Sched. Collection:21-Apr-2022 04:30  Specimen Source: Blood-Peripheral (04-20-22 @ 16:53)  Angiotensin Converting Enzyme, Serum: 19:00 (04-20-22 @ 15:42)  Triglycerides, Fluid: Routine  Specimen Source: Paracentesis Fluid (04-20-22 @ 12:50)  Amylase, Body Fluid: Routine  Specimen Source: Paracentesis Fluid (04-20-22 @ 12:50)  Culture - Acid Fast - Sputum w/Smear: Routine  Specimen Source: Sputum (04-20-22 @ 12:50)  Culture - Fungal, Body Fluid: Routine  Specimen Source: Peritoneal Fluid (04-20-22 @ 12:50)  Protein Total, Fluid: Routine  Specimen Source: Peritoneal Fluid (04-20-22 @ 12:50)  Lactate Dehydrogenase, Fluid: Routine  Specimen Source: Peritoneal Fluid (04-20-22 @ 12:50)  Gram Stain: Routine  Specimen Source: Peritoneal Fluid (04-20-22 @ 12:50)  Glucose, Fluid: Routine  Specimen Source: Peritoneal Fluid (04-20-22 @ 12:50)  NonGYN Cytopathology Order: Routine (04-20-22 @ 12:50)  Culture - Body Fluid with Gram Stain: Routine  Specimen Source: Peritoneal Fluid (04-20-22 @ 12:50)  Albumin, Fluid: Routine  Specimen Source: Peritoneal Fluid (04-20-22 @ 12:50)  Cell Count, Body Fluid: Routine  Specimen Source: Peritoneal Fluid (04-20-22 @ 12:50)  Culture - Blood: AM Sched. Collection:21-Apr-2022 04:30  Specimen Source: Blood-Peripheral (04-20-22 @ 12:44)  Alpha Fetoprotein - Tumor Marker: AM Sched. Collection: 21-Apr-2022 04:30 (04-20-22 @ 12:41)  Protein Electrophoresis, Serum: AM Sched. Collection: 21-Apr-2022 04:30 (04-20-22 @ 12:41)  Ferritin, Serum: AM Sched. Collection: 21-Apr-2022 04:30 (04-20-22 @ 12:41)  Ceruloplasmin, Serum: AM Sched. Collection: 21-Apr-2022 04:30 (04-20-22 @ 12:41)  Anti-Nuclear Antibody: AM Sched. Collection: 21-Apr-2022 04:30 (04-20-22 @ 12:41)  Smooth Muscle Antibody: AM Sched. Collection: 21-Apr-2022 04:30 (04-20-22 @ 12:41)      MEDICATIONS:  MEDICATIONS  (STANDING):  BACItracin   Ointment 1 Application(s) Topical daily  chlorhexidine 4% Liquid 1 Application(s) Topical <User Schedule>  methadone    Tablet   Oral   methadone    Tablet 15 milliGRAM(s) Oral two times a day  nicotine - 21 mG/24Hr(s) Patch 1 patch Transdermal daily  pantoprazole    Tablet 40 milliGRAM(s) Oral before breakfast    MEDICATIONS  (PRN):  ALBUTerol    90 MICROgram(s) HFA Inhaler 2 Puff(s) Inhalation every 6 hours PRN Shortness of Breath and/or Wheezing  ondansetron Injectable 4 milliGRAM(s) IV Push every 8 hours PRN Nausea and/or Vomiting      HOME MEDICATIONS:      PHYSICAL EXAM:  GENERAL: Patient lying on bed, comfoprtable   CHEST/LUNG: NVB, no wehezing   HEART: R1+R2, RRR  ABDOMEN: Soft. non tender, BS positive  EXTREMITIES:  no edema, Bruising  CNS: AAAx4. No cranial nerves deficit.

## 2022-04-21 NOTE — PROGRESS NOTE ADULT - ASSESSMENT
26yFemale pmh asthma, IV drug abuse presents for generalized edema, SOB, and increased abdominal girth.    Problem 1-Moderatre ascites  hepatomegaly    elevated ALP  Rec  -HEP C status is vague multiple HEP C RNA qualitative and by PCR negative in past Follow up with our GI office located on [address] [place] [address], Phone number [phone] with Dr. Aldridge  - elevated ALP possibly from substance abuse  -Check Smooth Muscle antibody, KRISHNA, Ceruloplasmin, Ferritin, Transferrin Saturation, SPEP, Alpha fetoprotein  -IR evaluation appreciated fluid not enough for paracentesis   -2g Sodium diet  -Cardiology f/u  -ECHO  -Daily weights  -MELD Score-8    Problem 2-anasarca, bilateral right greater than left pleural   effusions and moderate pericardial effusion. Findings suggestive of fluid   overload. Interval development of right ventricular enlargement. Further evaluation   is recommended.  elevated BNP  Rec  -Cardiology consult  -pulm consult

## 2022-04-22 ENCOUNTER — TRANSCRIPTION ENCOUNTER (OUTPATIENT)
Age: 27
End: 2022-04-22

## 2022-04-22 VITALS
RESPIRATION RATE: 16 BRPM | SYSTOLIC BLOOD PRESSURE: 147 MMHG | DIASTOLIC BLOOD PRESSURE: 83 MMHG | TEMPERATURE: 97 F | HEART RATE: 72 BPM

## 2022-04-22 LAB
ACE SERPL-CCNC: 108 U/L — HIGH (ref 14–82)
AFP-TM SERPL-MCNC: <1.8 NG/ML — SIGNIFICANT CHANGE UP
BILIRUB SERPL-MCNC: 0.4 MG/DL — SIGNIFICANT CHANGE UP (ref 0.2–1.2)
CERULOPLASMIN SERPL-MCNC: 30 MG/DL — SIGNIFICANT CHANGE UP (ref 16–45)
CREAT SERPL-MCNC: 1 MG/DL — SIGNIFICANT CHANGE UP (ref 0.7–1.5)
EGFR: 79 ML/MIN/1.73M2 — SIGNIFICANT CHANGE UP
FERRITIN SERPL-MCNC: 38 NG/ML — SIGNIFICANT CHANGE UP (ref 15–150)
INR BLD: 1.02 RATIO — SIGNIFICANT CHANGE UP (ref 0.65–1.3)
MELD SCORE WITH DIALYSIS: 20 POINTS — SIGNIFICANT CHANGE UP
MELD SCORE WITHOUT DIALYSIS: 7 POINTS — SIGNIFICANT CHANGE UP
PROT SERPL-MCNC: 5.4 G/DL — LOW (ref 6–8.3)
PROT SERPL-MCNC: 5.4 G/DL — LOW (ref 6–8.3)
PROTHROM AB SERPL-ACNC: 11.7 SEC — SIGNIFICANT CHANGE UP (ref 9.95–12.87)
SODIUM SERPL-SCNC: 140 MMOL/L — SIGNIFICANT CHANGE UP (ref 135–146)

## 2022-04-22 PROCEDURE — 99232 SBSQ HOSP IP/OBS MODERATE 35: CPT

## 2022-04-22 PROCEDURE — 99231 SBSQ HOSP IP/OBS SF/LOW 25: CPT

## 2022-04-22 RX ORDER — METHADONE HYDROCHLORIDE 40 MG/1
10 TABLET ORAL ONCE
Refills: 0 | Status: DISCONTINUED | OUTPATIENT
Start: 2022-04-22 | End: 2022-04-22

## 2022-04-22 RX ORDER — METHADONE HYDROCHLORIDE 40 MG/1
15 TABLET ORAL ONCE
Refills: 0 | Status: COMPLETED | OUTPATIENT
Start: 2022-04-22 | End: 2022-04-22

## 2022-04-22 RX ORDER — METHADONE HYDROCHLORIDE 40 MG/1
15 TABLET ORAL EVERY 12 HOURS
Refills: 0 | Status: DISCONTINUED | OUTPATIENT
Start: 2022-04-23 | End: 2022-04-22

## 2022-04-22 RX ADMIN — Medication 40 MILLIGRAM(S): at 05:28

## 2022-04-22 RX ADMIN — Medication 1 APPLICATION(S): at 11:17

## 2022-04-22 RX ADMIN — PANTOPRAZOLE SODIUM 40 MILLIGRAM(S): 20 TABLET, DELAYED RELEASE ORAL at 05:27

## 2022-04-22 RX ADMIN — METHADONE HYDROCHLORIDE 10 MILLIGRAM(S): 40 TABLET ORAL at 05:28

## 2022-04-22 RX ADMIN — CHLORHEXIDINE GLUCONATE 1 APPLICATION(S): 213 SOLUTION TOPICAL at 05:24

## 2022-04-22 NOTE — PROGRESS NOTE ADULT - ASSESSMENT
26F w/ PMH asthma, IVDA, and hep C being admitted for anasarca likely 2/2 liver failure/cirrhosis from untreated hepatitis C. Pt reports generalized edema for many months, which has significantly increased over the past two weeks. On workup was to have, low -Albumin 2.8, moderate pericardial effusion on CT    Impression:  #Pericardial effusion likely 2/2 to liver failure/cirrhosis and low albumin  -TTE: normal LV size and wall thickness normal systolic/diastolic function LVEF 60-65%  -Pt asymptomatic and hemodynamically stable during my encounter  -s/p attempted IR guided paracentesis 02/21 ascitic fluid not enough for paracentesis   -suggest careful diuresis, consider starting on Ambrose 100mg progressively increased for adequate diuresis, may give w/ low dose lasix 20-40 qd  -Monitor renal function, lytes  - f/u GI recommendations  - Addiction medicine

## 2022-04-22 NOTE — DISCHARGE NOTE PROVIDER - ATTENDING DISCHARGE PHYSICAL EXAMINATION:
received a message from staff that patient left AMA. I tried to convince her to stay and explained her risks of leaving AMA including worsening clinical condition and death. Patient acknowledged the risks but later left AMA. See PA note for details.

## 2022-04-22 NOTE — PROGRESS NOTE ADULT - SUBJECTIVE AND OBJECTIVE BOX
Subjective/Objective:     pt evaluated at bedside, NAD, states she wants additional methadone as she usually gets 100mg daily for her opioid dependence, currently denies CP, sob, palpitation, dizziness,     MEDICATIONS  (STANDING):  BACItracin   Ointment 1 Application(s) Topical daily  chlorhexidine 4% Liquid 1 Application(s) Topical <User Schedule>  furosemide   Injectable 40 milliGRAM(s) IV Push two times a day  methadone    Tablet 15 milliGRAM(s) Oral once  nicotine - 21 mG/24Hr(s) Patch 1 patch Transdermal daily  pantoprazole    Tablet 40 milliGRAM(s) Oral before breakfast    MEDICATIONS  (PRN):  ALBUTerol    90 MICROgram(s) HFA Inhaler 2 Puff(s) Inhalation every 6 hours PRN Shortness of Breath and/or Wheezing  ondansetron Injectable 4 milliGRAM(s) IV Push every 8 hours PRN Nausea and/or Vomiting          Vital Signs Last 24 Hrs  T(C): 36.3 (22 Apr 2022 13:55), Max: 36.6 (21 Apr 2022 21:45)  T(F): 97.3 (22 Apr 2022 13:55), Max: 97.8 (21 Apr 2022 21:45)  HR: 72 (22 Apr 2022 13:55) (72 - 81)  BP: 147/83 (22 Apr 2022 13:55) (127/82 - 147/83)  BP(mean): --  RR: 16 (22 Apr 2022 13:55) (16 - 16)  SpO2: 96% (21 Apr 2022 21:45) (96% - 96%)  I&O's Detail          GENERAL:  27y/o Female NAD, resting comfortably.  HEAD:  Atraumatic, Normocephalic  EYES: EOMI, PERRLA, conjunctiva and sclera clear  NECK: Supple, No JVD, no cervical lymphadenopathy, non-tender  CHEST/LUNG: Clear to auscultation bilaterally; No wheeze, rhonchi, or rales  HEART: Regular rate and rhythm; S1&S2  ABDOMEN: non-tender, grossly distended, diminished Bowel sounds   EXTREMITIES:   Peripheral Pulses Present, + IFRAH, No clubbing, no cyanosis,   PSYCH: AAOx3, cooperative, appropriate  NEUROLOGY: WNL  SKIN: WNL        EKG/ TELEM:    LABS:                          9.0    1.67  )-----------( 119      ( 21 Apr 2022 07:37 )             30.4     PT/INR - ( 22 Apr 2022 06:09 )   PT: 11.70 sec;   INR: 1.02 ratio           22 Apr 2022 06:09    140    |  x      |  x      ----------------------------<  x      x       |  x      |  1.0    21 Apr 2022 07:37    140    |  104    |  15     ----------------------------<  110<H>  4.0     |  24     |  1.0      Ca    8.1<L>      21 Apr 2022 07:37    TPro  x      /  Alb  x      /  TBili  0.4    /  DBili  x      /  AST  x      /  ALT  x      /  AlkPhos  x      22 Apr 2022 06:09  TPro  x      /  Alb  2.5<L>  /  TBili  x      /  DBili  x      /  AST  x      /  ALT  x      /  AlkPhos  x      21 Apr 2022 10:16                      Diagnostic testing:        Assessment and Plan:

## 2022-04-22 NOTE — DISCHARGE NOTE PROVIDER - HOSPITAL COURSE
Patient is a 26 year old Female with a past medical history of asthma, IVDA, and hep C was admitted for anasarca/SOB likely secondary to pleural effusion, liver failure/cirrhosis from untreated hepatitis C. Patient reported a history of being on MMTP, but has been off for awhile.   In ED, pt was afebrile w/ WBC: 3.2. BP: 134/88, HR: 76, SPO2 97% on RA. Ca: 8.3, Albumin: 2.8, Alk phos: 656, AST/ALT: 23/9, T bili: 0.5, BNP: 8100. CT abd: New moderate ascites, anasarca, b/l pleural effusions R>L, moderate pericardial effusion, hepatosplenomegaly. Pt was given 40mg IV lasix. Patient was admitted to medicine. Patient was seen by GI & IR. IR stated that fluid was not enough for paracentesis. Patient was on Methadone taper protocol and addiction medicine was closely following the patient. Patient's methadone was changed to standing order since it was not helping with detoxing. Patient requesting to leave Against Medical Advice. Patient was advised about risks and patient verbalized understanding.  Patient A/O x 3.  Advised Patient to follow up with PCP ASAP upon leaving facility    *** PATIENT IS LEAVING AMA ***

## 2022-04-22 NOTE — CHART NOTE - NSCHARTNOTEFT_GEN_A_CORE
Called in by Addiction team   Patient is very uncomfortable and is in pain while tapering down the methadone   As per Addiction team, jah taper & order the following   - Methadone total of 20mg today   - Methadone total of 30mg starting tomorrow (15mg in AM & 15mg in PM)     Addiction team spoke with [facility] team   Discussed with MD & Ordered the above
Patient seen at bedside. cardiology, GI and pulmonology consulted. Admitted with abdominal distension and leg swelling. history fo hepatitis c? likely needs paracentesis. patient reports no paracentesis in past. follow GI,Pulm, Card recommendations.
*Patient requesting to sign AMA.  Advised Patient about risks and verbalized understanding.  Patient A/O x 3.  Advised Patient to follow up with PCP ASAP upon leaving facility*

## 2022-04-22 NOTE — PROGRESS NOTE ADULT - ASSESSMENT
ASSESSMENT: Pt is a 26F w/ PMH asthma, IVDA, and hep C being admitted for anasarca/SOB likely 2/2 liver failure/cirrhosis from untreated hepatitis C. Pt reports a hx of being on MMTP, but has been off for awhile.   In ED, pt is afebrile w/ WBC: 3.2. BP: 134/88, HR: 76, SPO2 97% on RA. Ca: 8.3, Albumin: 2.8, Alk phos: 656, AST/ALT: 23/9, T bili: 0.5, BNP: 8100. CT abd: New moderate ascites, anasarca, b/l pleural effusions R>L, moderate pericardial effusion, hepatosplenomegaly. Pt was given 40mg IV lasix.       PLAN: Case d/w Dr. Fonseca   - Admit to inpatient level of care - medicine   - Monitor vitals   - AM labs   - Ambulate as tolerated   - CHG bath   - Regular diet   - VTE ppx: not applicable   - GI ppx: protonix     Problem/Plan - 1:  ·  Problem: Anasarca.   Abdominal distension   ·  Plan: # Untreated Hepatitis C?  # Elevated alk phos   # Right ventricular enlargement; pericardial effusion  # B/l pleural effusions; ascites   - s/p 40mg IV lasix x 1   - GI consult   - started on lasix bid 40 iv. Paracentesis was not done.   - TTE   - Cardiology consult , cardiology is going to see patient and will reassess whether to continue lasix bid or cut down on the dose   - Pulm Consult   - Supplemental O2 PRN.  -paracentesis today at [facility]     Problem/Plan - 2:  ·  Problem: IV drug abuse.   ·  Plan: -  - Addiction medicine consult - will follow their recommendations for detox   - Last at [facility] 2y ago.    Problem/Plan - 3:  ·  Problem: Asthma.   ·  Plan: -  - Ventolin PRN  - Monitor spo2.

## 2022-04-22 NOTE — PROGRESS NOTE ADULT - SUBJECTIVE AND OBJECTIVE BOX
26yFemale  Being followed for ascites   Interval history: Patient denies nausea, vomiting, hematemesis, melena, blood in stool, diarrhea, constipation, abdominal pain. Patient feeling better, tolerating diet.      PAST MEDICAL & SURGICAL HISTORY:   Hepatitis C    Asthma    Anxiety and depression    IV drug abuse  heroine    No significant past surgical history            Social History: +cigarette smoking. No alcohol.+ illegal drug use.          MEDICATIONS  (STANDING):  BACItracin   Ointment 1 Application(s) Topical daily  chlorhexidine 4% Liquid 1 Application(s) Topical <User Schedule>  furosemide   Injectable 40 milliGRAM(s) IV Push two times a day  methadone    Tablet 15 milliGRAM(s) Oral once  nicotine - 21 mG/24Hr(s) Patch 1 patch Transdermal daily  pantoprazole    Tablet 40 milliGRAM(s) Oral before breakfast    MEDICATIONS  (PRN):  ALBUTerol    90 MICROgram(s) HFA Inhaler 2 Puff(s) Inhalation every 6 hours PRN Shortness of Breath and/or Wheezing  ondansetron Injectable 4 milliGRAM(s) IV Push every 8 hours PRN Nausea and/or Vomiting      Allergies:   buprenorphine (Rash)  Suboxone (Rash)              REVIEW OF SYSTEMS:  General:  No weight loss, fevers, or chills.  Eyes:  No reported pain or visual changes  ENT:  No sore throat or runny nose.  NECK: No stiffness   CV:  No chest pain or palpitations.  Resp:  No shortness of breath, cough  GI:  No abdominal pain, nausea, vomiting, dysphagia, diarrhea or constipation. No rectal bleeding, melena, or hematemesis.  Neuro:  No tingling, numbness       VITAL SIGNS:   T(F): 97.3 (04-22-22 @ 13:55), Max: 97.8 (04-21-22 @ 21:45)  HR: 72 (04-22-22 @ 13:55) (72 - 81)  BP: 147/83 (04-22-22 @ 13:55) (127/82 - 147/83)  RR: 16 (04-22-22 @ 13:55) (16 - 16)  SpO2: 96% (04-21-22 @ 21:45) (96% - 96%)    PHYSICAL EXAM:  GENERAL: AAOx3, no acute distress.  HEAD:  Atraumatic, Normocephalic  EYES: conjunctiva and sclera clear  NECK: Supple, no JVD or thyromegaly  CHEST/LUNG: Clear to auscultation bilaterally; No wheeze, rhonchi, or rales  HEART: Regular rate and rhythm; normal S1, S2, No murmurs.  ABDOMEN: Soft, nontender, +distended; Bowel sounds present  NEUROLOGY: No asterixis or tremor.   SKIN: Intact, no jaundice            LABS:                        9.0    1.67  )-----------( 119      ( 21 Apr 2022 07:37 )             30.4     04-22    140  |  x   |  x   ----------------------------<  x   x    |  x   |  1.0    Ca    8.1<L>      21 Apr 2022 07:37    TPro  x   /  Alb  x   /  TBili  0.4  /  DBili  x   /  AST  x   /  ALT  x   /  AlkPhos  x   04-22    LIVER FUNCTIONS - ( 21 Apr 2022 10:16 )  Alb: 2.5 g/dL / Pro: x     / ALK PHOS: x     / ALT: x     / AST: x     / GGT: x           PT/INR - ( 22 Apr 2022 06:09 )   PT: 11.70 sec;   INR: 1.02 ratio             IMAGING:    < from: CT Abdomen and Pelvis w/ IV Cont (04.20.22 @ 05:53) >    ACC: [id number] EXAM:  CT ABDOMEN AND PELVIS IC                          PROCEDURE DATE:  04/20/2022          INTERPRETATION:  CLINICAL STATEMENT: Abdominal pain.    TECHNIQUE: Contiguous axial CT images were obtained from the lower chest   to the pubic symphysis following administration of intravenous contrast.    Oral contrast was not administered..  Reformatted images in the coronal   and sagittal planes were acquired.    COMPARISON: CT ABDOMEN/PELVIS 1/30/2021.    FINDINGS:    LOWER CHEST: Partially imaged moderate right and small left pleural   effusion with adjacent atelectasis. Moderate pericardial effusion, new   interval development of right ventricle enlargement.    HEPATOBILIARY: Hepatomegaly.    SPLEEN: Splenomegaly.    PANCREAS: Unremarkable.    ADRENAL GLANDS: Unremarkable.    KIDNEYS: Symmetric enhancement. No hydronephrosis.    ABDOMINOPELVIC NODES: Unremarkable.    PELVIC ORGANS: Unremarkable.    PERITONEUM/MESENTERY/BOWEL: No bowel obstruction. Moderate ascites. No   pneumoperitoneum.    BONES/SOFT TISSUES: Diffuse anasarca. No acute osseous abnormality..   Normal appendix.    OTHER: Normal caliber aorta.      IMPRESSION:    Interval development of right ventricular enlargement. Further evaluation   is recommended.    New moderate ascites, anasarca, bilateral right greater than left pleural   effusions and moderate pericardial effusion. Findings suggestive of fluid   overload.    Redemonstrated hepatosplenomegaly without significant change.        Spoke with HECTOR RANDLE MD on 4/20/2022 6:40 AM with readback.    --- End of Report ---            KATIE ENNIS MD; Attending Radiologist  This document has been electronically signed. Apr 20 2022  6:40AM    < end of copied text >

## 2022-04-22 NOTE — PROGRESS NOTE ADULT - SUBJECTIVE AND OBJECTIVE BOX
POOJA DIANE  26y, Female  Allergy: buprenorphine (Rash)  Suboxone (Rash)    Hospital Day: 1d    patient with no major complaints but was upset that she is getting only 10 mg of methadone. discussed with GI and cardiology regarding iv lasix dose. cardiology going to see patient and will let medical team know if to continue with same regimen or cut down on lasix. Patient also told me that she might leave AMA. I explained her risks of leaving AMA including worsening clinical condition and death. she has clear understanding of all risks     PMH/PSH:  PAST MEDICAL & SURGICAL HISTORY:  Hepatitis C    Asthma    Anxiety and depression    IV drug abuse  heroine    No significant past surgical history        LAST 24-Hr EVENTS:    VITALS:  Vital Signs Last 24 Hrs  T(C): 36.2 (22 Apr 2022 05:11), Max: 36.9 (21 Apr 2022 13:58)  T(F): 97.2 (22 Apr 2022 05:11), Max: 98.5 (21 Apr 2022 13:58)  HR: 81 (22 Apr 2022 05:11) (72 - 81)  BP: 127/82 (22 Apr 2022 05:11) (127/82 - 138/89)  BP(mean): --  RR: 18 (21 Apr 2022 13:58) (18 - 18)  SpO2: 96% (21 Apr 2022 21:45) (96% - 96%)          TESTS & MEASUREMENTS:  Weight/BMI  85.6 (04-20-22 @ 08:10)  30.5 (04-20-22 @ 08:10)      LABS:                        9.0    1.67  )-----------( 119      ( 21 Apr 2022 07:37 )             30.4     04-22    140  |  x   |  x   ----------------------------<  x   x    |  x   |  1.0    Ca    8.1<L>      21 Apr 2022 07:37    TPro  x   /  Alb  x   /  TBili  0.4  /  DBili  x   /  AST  x   /  ALT  x   /  AlkPhos  x   04-22    PT/INR - ( 22 Apr 2022 06:09 )   PT: 11.70 sec;   INR: 1.02 ratio                     Procalcitonin, Serum: 0.07 ng/mL (04-20-22 @ 12:46)        Serum Pro-Brain Natriuretic Peptide: 6987 pg/mL (04-21-22 @ 07:37)  Serum Pro-Brain Natriuretic Peptide: 8108 pg/mL (04-20-22 @ 04:40)                  RADIOLOGY, ECG, & ADDITIONAL TESTS:  12 Lead ECG:   Ventricular Rate 83 BPM    Atrial Rate 83 BPM    P-R Interval 168 ms    QRS Duration 78 ms    Q-T Interval 344 ms    QTC Calculation(Bazett) 404 ms    P Axis 69 degrees    R Axis 127 degrees    T Axis 93 degrees    Diagnosis Line Normal sinus rhythm  Right axis deviation  Possible Right ventricular hypertrophy  Nonspecific T wave abnormality  Abnormal ECG    Confirmed by BRANDON BELL MD ([id number]) on 4/20/2022 3:58:05 PM (04-20-22 @ 04:34)      RECENT DIAGNOSTIC ORDERS:  NonGYN Cytopathology Order: Routine (04-21-22 @ 08:17)  Culture - Blood: AM Sched. Collection:21-Apr-2022 04:30  Specimen Source: Blood-Peripheral (04-20-22 @ 16:53)  Angiotensin Converting Enzyme, Serum: 19:00 (04-20-22 @ 15:42)  Triglycerides, Fluid: Routine  Specimen Source: Paracentesis Fluid (04-20-22 @ 12:50)  Amylase, Body Fluid: Routine  Specimen Source: Paracentesis Fluid (04-20-22 @ 12:50)  Culture - Acid Fast - Sputum w/Smear: Routine  Specimen Source: Sputum (04-20-22 @ 12:50)  Culture - Fungal, Body Fluid: Routine  Specimen Source: Peritoneal Fluid (04-20-22 @ 12:50)  Protein Total, Fluid: Routine  Specimen Source: Peritoneal Fluid (04-20-22 @ 12:50)  Lactate Dehydrogenase, Fluid: Routine  Specimen Source: Peritoneal Fluid (04-20-22 @ 12:50)  Gram Stain: Routine  Specimen Source: Peritoneal Fluid (04-20-22 @ 12:50)  Glucose, Fluid: Routine  Specimen Source: Peritoneal Fluid (04-20-22 @ 12:50)  NonGYN Cytopathology Order: Routine (04-20-22 @ 12:50)  Culture - Body Fluid with Gram Stain: Routine  Specimen Source: Peritoneal Fluid (04-20-22 @ 12:50)  Albumin, Fluid: Routine  Specimen Source: Peritoneal Fluid (04-20-22 @ 12:50)  Cell Count, Body Fluid: Routine  Specimen Source: Peritoneal Fluid (04-20-22 @ 12:50)  Culture - Blood: AM Sched. Collection:21-Apr-2022 04:30  Specimen Source: Blood-Peripheral (04-20-22 @ 12:44)  Alpha Fetoprotein - Tumor Marker: AM Sched. Collection: 21-Apr-2022 04:30 (04-20-22 @ 12:41)  Protein Electrophoresis, Serum: AM Sched. Collection: 21-Apr-2022 04:30 (04-20-22 @ 12:41)  Ferritin, Serum: AM Sched. Collection: 21-Apr-2022 04:30 (04-20-22 @ 12:41)  Ceruloplasmin, Serum: AM Sched. Collection: 21-Apr-2022 04:30 (04-20-22 @ 12:41)  Anti-Nuclear Antibody: AM Sched. Collection: 21-Apr-2022 04:30 (04-20-22 @ 12:41)  Smooth Muscle Antibody: AM Sched. Collection: 21-Apr-2022 04:30 (04-20-22 @ 12:41)      MEDICATIONS:  MEDICATIONS  (STANDING):  BACItracin   Ointment 1 Application(s) Topical daily  chlorhexidine 4% Liquid 1 Application(s) Topical <User Schedule>  methadone    Tablet   Oral   methadone    Tablet 15 milliGRAM(s) Oral two times a day  nicotine - 21 mG/24Hr(s) Patch 1 patch Transdermal daily  pantoprazole    Tablet 40 milliGRAM(s) Oral before breakfast    MEDICATIONS  (PRN):  ALBUTerol    90 MICROgram(s) HFA Inhaler 2 Puff(s) Inhalation every 6 hours PRN Shortness of Breath and/or Wheezing  ondansetron Injectable 4 milliGRAM(s) IV Push every 8 hours PRN Nausea and/or Vomiting      HOME MEDICATIONS:      PHYSICAL EXAM:  GENERAL: Patient lying on bed, comfoprtable   CHEST/LUNG: NVB, no wehezing   HEART: R1+R2, RRR  ABDOMEN: abdominal swelling now improved. non tender, BS positive  EXTREMITIES:  edema positive, Bruising  CNS: AAAx4. No cranial nerves deficit.

## 2022-04-22 NOTE — PROGRESS NOTE ADULT - ASSESSMENT
26yFemale pmh asthma, IV drug abuse presents for generalized edema, SOB, and increased abdominal girth.    Problem 1-Moderatre ascites  hepatomegaly    elevated ALP  Rec  -HEP C status is vague multiple HEP C RNA qualitative and by PCR negative in past Follow up with our GI office located on [address] [place] [address], Phone number [phone] with Dr. Aldridge  - elevated ALP possibly from substance abuse  -Check Smooth Muscle antibody, KRISHNA, Ceruloplasmin, Ferritin, Transferrin Saturation, SPEP, Alpha fetoprotein  -IR evaluation appreciated fluid not enough for paracentesis   -2g Sodium diet  -Cardiology f/u to consider diuretics   -ECHO  -Daily weights  -MELD Score-8    Problem 2-anasarca, bilateral right greater than left pleural   effusions and moderate pericardial effusion. Findings suggestive of fluid   overload. Interval development of right ventricular enlargement. Further evaluation   is recommended.  elevated BNP  Rec  -Cardiology consult  -pulm consult

## 2022-04-24 LAB — SMOOTH MUSCLE AB SER-ACNC: SIGNIFICANT CHANGE UP

## 2022-04-25 LAB — ANA TITR SER: NEGATIVE — SIGNIFICANT CHANGE UP

## 2022-04-26 LAB
CULTURE RESULTS: SIGNIFICANT CHANGE UP
SPECIMEN SOURCE: SIGNIFICANT CHANGE UP

## 2022-04-27 LAB
% ALBUMIN: 43.5 % — SIGNIFICANT CHANGE UP
% ALPHA 1: 8.7 % — SIGNIFICANT CHANGE UP
% ALPHA 2: 10.9 % — SIGNIFICANT CHANGE UP
% BETA: 11.9 % — SIGNIFICANT CHANGE UP
% GAMMA: 25 % — SIGNIFICANT CHANGE UP
% M SPIKE: SIGNIFICANT CHANGE UP
ALBUMIN SERPL ELPH-MCNC: 2.3 G/DL — LOW (ref 3.6–5.5)
ALBUMIN/GLOB SERPL ELPH: 0.7 RATIO — SIGNIFICANT CHANGE UP
ALPHA1 GLOB SERPL ELPH-MCNC: 0.5 G/DL — HIGH (ref 0.1–0.4)
ALPHA2 GLOB SERPL ELPH-MCNC: 0.6 G/DL — SIGNIFICANT CHANGE UP (ref 0.5–1)
B-GLOBULIN SERPL ELPH-MCNC: 0.6 G/DL — SIGNIFICANT CHANGE UP (ref 0.5–1)
GAMMA GLOBULIN: 1.4 G/DL — SIGNIFICANT CHANGE UP (ref 0.6–1.6)
M-SPIKE: SIGNIFICANT CHANGE UP (ref 0–0)
PROT PATTERN SERPL ELPH-IMP: SIGNIFICANT CHANGE UP

## 2022-04-28 DIAGNOSIS — K74.60 UNSPECIFIED CIRRHOSIS OF LIVER: ICD-10-CM

## 2022-04-28 DIAGNOSIS — B19.20 UNSPECIFIED VIRAL HEPATITIS C WITHOUT HEPATIC COMA: ICD-10-CM

## 2022-04-28 DIAGNOSIS — F41.9 ANXIETY DISORDER, UNSPECIFIED: ICD-10-CM

## 2022-04-28 DIAGNOSIS — Z88.8 ALLERGY STATUS TO OTHER DRUGS, MEDICAMENTS AND BIOLOGICAL SUBSTANCES: ICD-10-CM

## 2022-04-28 DIAGNOSIS — J45.909 UNSPECIFIED ASTHMA, UNCOMPLICATED: ICD-10-CM

## 2022-04-28 DIAGNOSIS — F32.A DEPRESSION, UNSPECIFIED: ICD-10-CM

## 2022-04-28 DIAGNOSIS — R18.8 OTHER ASCITES: ICD-10-CM

## 2022-04-28 DIAGNOSIS — R16.2 HEPATOMEGALY WITH SPLENOMEGALY, NOT ELSEWHERE CLASSIFIED: ICD-10-CM

## 2022-04-28 DIAGNOSIS — J90 PLEURAL EFFUSION, NOT ELSEWHERE CLASSIFIED: ICD-10-CM

## 2022-04-28 DIAGNOSIS — F11.20 OPIOID DEPENDENCE, UNCOMPLICATED: ICD-10-CM

## 2022-04-28 DIAGNOSIS — I31.3 PERICARDIAL EFFUSION (NONINFLAMMATORY): ICD-10-CM

## 2022-04-28 DIAGNOSIS — K72.90 HEPATIC FAILURE, UNSPECIFIED WITHOUT COMA: ICD-10-CM

## 2022-04-28 DIAGNOSIS — R06.02 SHORTNESS OF BREATH: ICD-10-CM

## 2022-04-28 DIAGNOSIS — E87.70 FLUID OVERLOAD, UNSPECIFIED: ICD-10-CM

## 2022-06-11 LAB
CULTURE RESULTS: SIGNIFICANT CHANGE UP
SPECIMEN SOURCE: SIGNIFICANT CHANGE UP

## 2022-06-15 ENCOUNTER — INPATIENT (INPATIENT)
Facility: HOSPITAL | Age: 27
LOS: 49 days | Discharge: LEFT BEFORE DISCHARGE | End: 2022-08-04
Attending: INTERNAL MEDICINE | Admitting: INTERNAL MEDICINE
Payer: MEDICAID

## 2022-06-15 VITALS
TEMPERATURE: 98 F | HEIGHT: 66 IN | WEIGHT: 160.06 LBS | HEART RATE: 87 BPM | RESPIRATION RATE: 17 BRPM | DIASTOLIC BLOOD PRESSURE: 87 MMHG | OXYGEN SATURATION: 100 % | SYSTOLIC BLOOD PRESSURE: 158 MMHG

## 2022-06-15 DIAGNOSIS — F32.9 MAJOR DEPRESSIVE DISORDER, SINGLE EPISODE, UNSPECIFIED: ICD-10-CM

## 2022-06-15 DIAGNOSIS — Z91.19 PATIENT'S NONCOMPLIANCE WITH OTHER MEDICAL TREATMENT AND REGIMEN: ICD-10-CM

## 2022-06-15 DIAGNOSIS — Z88.8 ALLERGY STATUS TO OTHER DRUGS, MEDICAMENTS AND BIOLOGICAL SUBSTANCES: ICD-10-CM
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
## 2022-06-15 DIAGNOSIS — R45.1 RESTLESSNESS AND AGITATION: ICD-10-CM

## 2022-06-15 DIAGNOSIS — F11.20 OPIOID DEPENDENCE, UNCOMPLICATED: ICD-10-CM

## 2022-06-15 LAB
ALBUMIN SERPL ELPH-MCNC: 2.4 G/DL — LOW (ref 3.5–5.2)
ALP SERPL-CCNC: 959 U/L — HIGH (ref 30–115)
ALT FLD-CCNC: 9 U/L — SIGNIFICANT CHANGE UP (ref 0–41)
AMMONIA BLD-MCNC: 16 UMOL/L — SIGNIFICANT CHANGE UP (ref 11–55)
ANION GAP SERPL CALC-SCNC: 9 MMOL/L — SIGNIFICANT CHANGE UP (ref 7–14)
APPEARANCE UR: ABNORMAL
APTT BLD: 35.1 SEC — SIGNIFICANT CHANGE UP (ref 27–39.2)
AST SERPL-CCNC: 27 U/L — SIGNIFICANT CHANGE UP (ref 0–41)
BACTERIA # UR AUTO: ABNORMAL
BASOPHILS # BLD AUTO: 0.01 K/UL — SIGNIFICANT CHANGE UP (ref 0–0.2)
BASOPHILS NFR BLD AUTO: 0.4 % — SIGNIFICANT CHANGE UP (ref 0–1)
BILIRUB SERPL-MCNC: 0.5 MG/DL — SIGNIFICANT CHANGE UP (ref 0.2–1.2)
BILIRUB UR-MCNC: ABNORMAL
BUN SERPL-MCNC: 14 MG/DL — SIGNIFICANT CHANGE UP (ref 10–20)
CALCIUM SERPL-MCNC: 8 MG/DL — LOW (ref 8.5–10.1)
CHLORIDE SERPL-SCNC: 104 MMOL/L — SIGNIFICANT CHANGE UP (ref 98–110)
CO2 SERPL-SCNC: 23 MMOL/L — SIGNIFICANT CHANGE UP (ref 17–32)
COLOR SPEC: YELLOW — SIGNIFICANT CHANGE UP
CREAT SERPL-MCNC: 0.8 MG/DL — SIGNIFICANT CHANGE UP (ref 0.7–1.5)
DIFF PNL FLD: ABNORMAL
EGFR: 103 ML/MIN/1.73M2 — SIGNIFICANT CHANGE UP
EOSINOPHIL # BLD AUTO: 0 K/UL — SIGNIFICANT CHANGE UP (ref 0–0.7)
EOSINOPHIL NFR BLD AUTO: 0 % — SIGNIFICANT CHANGE UP (ref 0–8)
EPI CELLS # UR: ABNORMAL /HPF
GLUCOSE SERPL-MCNC: 94 MG/DL — SIGNIFICANT CHANGE UP (ref 70–99)
GLUCOSE UR QL: NEGATIVE MG/DL — SIGNIFICANT CHANGE UP
HCG SERPL QL: NEGATIVE — SIGNIFICANT CHANGE UP
HCT VFR BLD CALC: 36.4 % — LOW (ref 37–47)
HGB BLD-MCNC: 11 G/DL — LOW (ref 12–16)
IMM GRANULOCYTES NFR BLD AUTO: 0.4 % — HIGH (ref 0.1–0.3)
INR BLD: 0.95 RATIO — SIGNIFICANT CHANGE UP (ref 0.65–1.3)
KETONES UR-MCNC: NEGATIVE — SIGNIFICANT CHANGE UP
LACTATE SERPL-SCNC: 0.5 MMOL/L — LOW (ref 0.7–2)
LEUKOCYTE ESTERASE UR-ACNC: ABNORMAL
LIDOCAIN IGE QN: 55 U/L — SIGNIFICANT CHANGE UP (ref 7–60)
LYMPHOCYTES # BLD AUTO: 0.72 K/UL — LOW (ref 1.2–3.4)
LYMPHOCYTES # BLD AUTO: 25.3 % — SIGNIFICANT CHANGE UP (ref 20.5–51.1)
MAGNESIUM SERPL-MCNC: 2 MG/DL — SIGNIFICANT CHANGE UP (ref 1.8–2.4)
MANUAL SMEAR VERIFICATION: SIGNIFICANT CHANGE UP
MCHC RBC-ENTMCNC: 25.7 PG — LOW (ref 27–31)
MCHC RBC-ENTMCNC: 30.2 G/DL — LOW (ref 32–37)
MCV RBC AUTO: 85 FL — SIGNIFICANT CHANGE UP (ref 81–99)
MONOCYTES # BLD AUTO: 0.11 K/UL — SIGNIFICANT CHANGE UP (ref 0.1–0.6)
MONOCYTES NFR BLD AUTO: 3.9 % — SIGNIFICANT CHANGE UP (ref 1.7–9.3)
NEUTROPHILS # BLD AUTO: 2 K/UL — SIGNIFICANT CHANGE UP (ref 1.4–6.5)
NEUTROPHILS NFR BLD AUTO: 70 % — SIGNIFICANT CHANGE UP (ref 42.2–75.2)
NITRITE UR-MCNC: NEGATIVE — SIGNIFICANT CHANGE UP
NRBC # BLD: 0 /100 WBCS — SIGNIFICANT CHANGE UP (ref 0–0)
NRBC # BLD: 0 /100 — SIGNIFICANT CHANGE UP (ref 0–0)
NT-PROBNP SERPL-SCNC: HIGH PG/ML (ref 0–300)
PH UR: 8.5 — SIGNIFICANT CHANGE UP (ref 5–8)
PLAT MORPH BLD: NORMAL — SIGNIFICANT CHANGE UP
PLATELET # BLD AUTO: 152 K/UL — SIGNIFICANT CHANGE UP (ref 130–400)
PLATELET COUNT - ESTIMATE: NORMAL — SIGNIFICANT CHANGE UP
POTASSIUM SERPL-MCNC: 4.5 MMOL/L — SIGNIFICANT CHANGE UP (ref 3.5–5)
POTASSIUM SERPL-SCNC: 4.5 MMOL/L — SIGNIFICANT CHANGE UP (ref 3.5–5)
PROT SERPL-MCNC: 5.3 G/DL — LOW (ref 6–8)
PROT UR-MCNC: >=300 MG/DL
PROTHROM AB SERPL-ACNC: 10.9 SEC — SIGNIFICANT CHANGE UP (ref 9.95–12.87)
RBC # BLD: 4.28 M/UL — SIGNIFICANT CHANGE UP (ref 4.2–5.4)
RBC # FLD: 17.6 % — HIGH (ref 11.5–14.5)
RBC BLD AUTO: NORMAL — SIGNIFICANT CHANGE UP
RBC CASTS # UR COMP ASSIST: ABNORMAL /HPF
SARS-COV-2 RNA SPEC QL NAA+PROBE: SIGNIFICANT CHANGE UP
SODIUM SERPL-SCNC: 136 MMOL/L — SIGNIFICANT CHANGE UP (ref 135–146)
SP GR SPEC: 1.02 — SIGNIFICANT CHANGE UP (ref 1.01–1.03)
UROBILINOGEN FLD QL: 1 MG/DL
WBC # BLD: 2.85 K/UL — LOW (ref 4.8–10.8)
WBC # FLD AUTO: 2.85 K/UL — LOW (ref 4.8–10.8)
WBC UR QL: ABNORMAL /HPF

## 2022-06-15 PROCEDURE — ZZZZZ: CPT

## 2022-06-15 PROCEDURE — 93010 ELECTROCARDIOGRAM REPORT: CPT

## 2022-06-15 PROCEDURE — 99222 1ST HOSP IP/OBS MODERATE 55: CPT

## 2022-06-15 PROCEDURE — 76700 US EXAM ABDOM COMPLETE: CPT | Mod: 26

## 2022-06-15 PROCEDURE — 99285 EMERGENCY DEPT VISIT HI MDM: CPT

## 2022-06-15 PROCEDURE — 71045 X-RAY EXAM CHEST 1 VIEW: CPT | Mod: 26

## 2022-06-15 RX ORDER — ALBUTEROL 90 UG/1
1 AEROSOL, METERED ORAL EVERY 4 HOURS
Refills: 0 | Status: DISCONTINUED | OUTPATIENT
Start: 2022-06-15 | End: 2022-07-20

## 2022-06-15 RX ORDER — FUROSEMIDE 40 MG
20 TABLET ORAL ONCE
Refills: 0 | Status: COMPLETED | OUTPATIENT
Start: 2022-06-15 | End: 2022-06-15

## 2022-06-15 RX ORDER — IBUPROFEN 200 MG
400 TABLET ORAL EVERY 6 HOURS
Refills: 0 | Status: DISCONTINUED | OUTPATIENT
Start: 2022-06-15 | End: 2022-07-20

## 2022-06-15 RX ORDER — METHADONE HYDROCHLORIDE 40 MG/1
15 TABLET ORAL ONCE
Refills: 0 | Status: DISCONTINUED | OUTPATIENT
Start: 2022-06-15 | End: 2022-06-15

## 2022-06-15 RX ORDER — SPIRONOLACTONE 25 MG/1
50 TABLET, FILM COATED ORAL DAILY
Refills: 0 | Status: DISCONTINUED | OUTPATIENT
Start: 2022-06-15 | End: 2022-07-04

## 2022-06-15 RX ORDER — HYDROXYZINE HCL 10 MG
50 TABLET ORAL EVERY 6 HOURS
Refills: 0 | Status: DISCONTINUED | OUTPATIENT
Start: 2022-06-15 | End: 2022-07-20

## 2022-06-15 RX ORDER — METHADONE HYDROCHLORIDE 40 MG/1
10 TABLET ORAL EVERY 12 HOURS
Refills: 0 | Status: DISCONTINUED | OUTPATIENT
Start: 2022-06-16 | End: 2022-06-18

## 2022-06-15 RX ORDER — NICOTINE POLACRILEX 2 MG
1 GUM BUCCAL DAILY
Refills: 0 | Status: DISCONTINUED | OUTPATIENT
Start: 2022-06-15 | End: 2022-07-20

## 2022-06-15 RX ORDER — FUROSEMIDE 40 MG
40 TABLET ORAL
Refills: 0 | Status: DISCONTINUED | OUTPATIENT
Start: 2022-06-15 | End: 2022-06-27

## 2022-06-15 RX ORDER — METHADONE HYDROCHLORIDE 40 MG/1
15 TABLET ORAL EVERY 12 HOURS
Refills: 0 | Status: DISCONTINUED | OUTPATIENT
Start: 2022-06-15 | End: 2022-06-16

## 2022-06-15 RX ORDER — METHADONE HYDROCHLORIDE 40 MG/1
5 TABLET ORAL EVERY 12 HOURS
Refills: 0 | Status: DISCONTINUED | OUTPATIENT
Start: 2022-06-18 | End: 2022-06-20

## 2022-06-15 RX ORDER — METHADONE HYDROCHLORIDE 40 MG/1
TABLET ORAL
Refills: 0 | Status: COMPLETED | OUTPATIENT
Start: 2022-06-15 | End: 2022-06-20

## 2022-06-15 RX ORDER — INFLUENZA VIRUS VACCINE 15; 15; 15; 15 UG/.5ML; UG/.5ML; UG/.5ML; UG/.5ML
0.5 SUSPENSION INTRAMUSCULAR ONCE
Refills: 0 | Status: COMPLETED | OUTPATIENT
Start: 2022-06-15 | End: 2022-06-15

## 2022-06-15 RX ADMIN — Medication 1 PATCH: at 13:15

## 2022-06-15 RX ADMIN — Medication 20 MILLIGRAM(S): at 09:52

## 2022-06-15 RX ADMIN — Medication 1 PATCH: at 19:00

## 2022-06-15 RX ADMIN — METHADONE HYDROCHLORIDE 15 MILLIGRAM(S): 40 TABLET ORAL at 20:02

## 2022-06-15 RX ADMIN — SPIRONOLACTONE 50 MILLIGRAM(S): 25 TABLET, FILM COATED ORAL at 22:06

## 2022-06-15 RX ADMIN — METHADONE HYDROCHLORIDE 15 MILLIGRAM(S): 40 TABLET ORAL at 16:08

## 2022-06-15 RX ADMIN — Medication 40 MILLIGRAM(S): at 20:01

## 2022-06-15 NOTE — PATIENT PROFILE ADULT - NSPROPOAPRESSUREINJURY_GEN_A_NUR
October 2, 2020    Gabrielle Nino  [address] [facility] [address]             [facility] Medicine  Family Medicine  [address] [facility]  [facility] [address]  Phone: [phone]  Fax: [phone]   October 2, 2020     Patient: Gabrielle Nino   YOB: 2009   Date of Visit: 10/2/2020       To Whom it May Concern:    Gabrielle Nino was seen in my clinic on 10/2/2020. She may return to school on 10/5/2020.    Please excuse her from any classes or work missed from 9/28/2020 to 10/2/2020.    If you have any questions or concerns, please don't hesitate to call.    Sincerely,         Jahaira Ogden, DO     
no

## 2022-06-15 NOTE — ED ADULT TRIAGE NOTE - BMI (KG/M2)
Other pt referred to Dr. Yeny Dean.  Mom tried to sched appt but was told pt needs to see pediatric GI       Please see attached message   Patient will need to be seen by pediatric GI    Who do you recommend? Thanks.
25.8

## 2022-06-15 NOTE — H&P ADULT - NS ATTEND AMEND GEN_ALL_CORE FT
pt seen and examined in er  pt is awake, alert, no acute distress  reviewed her chart     I strongly suspect her main issue is cor pulmonale secondary to severe pulmonary hypertension, will diurese with IV lasix and aldactone, not enough ascitic fluid to drain it is mostly around her liver, she will need to see pulmonary htn specialist outpatient. encouraged to quit ivda, to follow up with gi re hep c treatment. I doubt acute cholecystitis, will admit, monitor, consult addiction team pt seen and examined in er  pt is awake, alert, no acute distress  reviewed her chart     I strongly suspect her main issue is chronic cor pulmonale secondary to severe pulmonary hypertension, will diurese with IV lasix and aldactone, not enough ascitic fluid to drain it is mostly around her liver, she will need to see pulmonary htn specialist outpatient. encouraged to quit ivda, to follow up with gi re hep c treatment. I doubt acute cholecystitis, will admit, monitor, consult addiction team

## 2022-06-15 NOTE — ED PROVIDER NOTE - CLINICAL SUMMARY MEDICAL DECISION MAKING FREE TEXT BOX
patient given IV Lasix we obtained labs including ammonia LFTs found to be elevated however not significantly above trend on prior visit April 2022 given her symptoms as well as her past medical history I will admit for further monitoring at this time subsequently patient has no murmurs but is an active IVDA user she has no follow-up no support system at home

## 2022-06-15 NOTE — H&P ADULT - ASSESSMENT
Patient is a 26 year old female with hx of asthma, untreated Hep C, IVDA, ascites presenting with increased dyspnea since yesterday. Patient has been short of breath chronically and has been admitted for fluid overload. Patient describes worsening symptoms yesterday associated with cough. States generalized edema has remained the same. Patient is actively using heroin. Not on any meds or MMTP. Pleural effusion on CXR, BNP 11k, alk phos 959, no leukocytosis, afebrile, VSS. Abdominal exam stable since last presentation, no fever, very low suspicion of SBP.     # fluid overload  # hep C, ascites, IVDA  - anasarca on exam, pleural effusion on cxr  - abdominal usn to eval for ascites and possible para  - Labs: trend liver enzymes, coags, GGT  - lasix 20 mg x1 in ED, continue 40mg IVP bid  - I/O  - Patient had GI workup last admission which revealed elevated ACE level. Patient did not follow up at hepatology clinic. Will reconsult.  - TTE done on prior admission, normal EF. Patient has had BNP between 6-8k on prior presentations.  - addiction medicine consult- actively using heroin, last use one day PTA    # asthma  - off meds, will order prn albuterol    # nicotine dependency  - patch    # positive UA  - will monitor off abx for now as pt is asymptomatic and afebrile  - will follow up urine culture

## 2022-06-15 NOTE — CONSULT NOTE ADULT - ASSESSMENT
26yFemale pmh anxiety and depression, presents for SOB.    Problem 1-Moderate ascites etiology tbd  patient with normal INR, normal platelets and normal liver on ultrasound   elevated ALP  Splenomegaly measuring 20.9 cm.  Rec  -Check Hep A Ab, Hep B SAg, Hep B SAB, Hep C Ab, KRISHNA, Smooth Muscle antibody, KRISHNA, Ceruloplasmin, Ferritin, Transferrin Saturation, SPEP  -Ultrasound guided diagnostic paracentesis: Obtain serum albumin same day as paracentesis.  -Obtain fluid studies: Paracentesis Panel which includes Cultures, Cell count, albumin, total protein, cytology, AFB smear and culture. Separate order of amylase fluid and triglycerides fluid  -Further treatment is based on diagnosis.  -2g Sodium diet  -Daily weights  -MELD Score-6    Problem 2- Asymptomatic Cholelithiasis. Gallbladder wall thickening. Negative sonographic   Hammer's sign. If there is high clinical suspicion for acute   cholecystitis recommend a HIDA scan.  asymptomatic  Rec  -consider surgical consult  -watchful waiting

## 2022-06-15 NOTE — CONSULT NOTE ADULT - SUBJECTIVE AND OBJECTIVE BOX
Chief complaint/Reason for consult: fluid overload, ascites     HPI:  Patient is a 26 year old female with hx of asthma, untreated Hep C, IVDA, anasarca presenting with increased dyspnea since yesterday. Patient has been short of breath chronically and has been admitted for fluid overload. Patient describes worsening symptoms yesterday associated with cough. States generalized edema has remained the same. Patient is actively using heroin. Not on any meds or MMTP.  Denies fever, chills, sore throat, cp, palpitations, abd pain, n/v/d, dysuria, headache, syncope, hemoptysis. + generalized edema (15 Reinaldo 2022 10:03)    26yFemale      PAST MEDICAL & SURGICAL HISTORY:   Hepatitis C      Asthma      Anxiety and depression      IV drug abuse  heroin      No significant past surgical history            Family history:  FAMILY HISTORY:  No pertinent family history in first degree relatives      No GI cancers in first or second degree relatives    Social History: No smoking. No alcohol. No illegal drug use.    Allergies:        MEDICATIONS:        MEDICATIONS  (STANDING):  furosemide   Injectable 40 milliGRAM(s) IV Push two times a day  nicotine -  14 mG/24Hr(s) Patch 1 patch Transdermal daily    MEDICATIONS  (PRN):  ALBUTerol    90 MICROgram(s) HFA Inhaler 1 Puff(s) Inhalation every 4 hours PRN Shortness of Breath and/or Wheezing        REVIEW OF SYSTEMS  General:  No weight loss, fevers, or chills.  Eyes:  No reported pain or visual changes  ENT:  No sore throat or runny nose.  NECK: No stiffness or lymphadenopathy  CV:  No chest pain or palpitations.  Resp:  No shortness of breath, cough, wheezing or hemoptysis  GI:  No abdominal pain, nausea, vomiting, dysphagia, diarrhea or constipation. No rectal bleeding, melena, or hematemesis.  Muscle:  No aches or weakness  Neuro:  No tingling, numbness       VITALS:   T(F): 96.4 (06-15-22 @ 14:14), Max: 98.4 (06-15-22 @ 08:45)  HR: 75 (06-15-22 @ 14:14) (75 - 87)  BP: 152/96 (06-15-22 @ 14:14) (152/96 - 166/98)  RR: 18 (06-15-22 @ 08:45) (17 - 18)  SpO2: 98% (06-15-22 @ 08:45) (98% - 100%)    PHYSICAL EXAM:  GENERAL: AAOx3, no acute distress.  HEAD:  Atraumatic, Normocephalic  EYES: conjunctiva and sclera clear  NECK: Supple, No thyromegaly   CHEST/LUNG: Clear to auscultation bilaterally; No wheeze, rhonchi, or rales  HEART: Regular rate and rhythm; normal S1, S2, No murmurs.  ABDOMEN: Soft, nontender, nondistended; Bowel sounds present  NEUROLOGY: No asterixis or tremor  SKIN: Intact, no jaundice          LABS:  06-15    136  |  104  |  14  ----------------------------<  94  4.5   |  23  |  0.8    Ca    8.0<L>      15 Reinaldo 2022 07:10  Mg     2.0     06-15    TPro  5.3<L>  /  Alb  2.4<L>  /  TBili  0.5  /  DBili  x   /  AST  27  /  ALT  9   /  AlkPhos  959<H>  06-15                          11.0   2.85  )-----------( 152      ( 15 Reinaldo 2022 07:10 )             36.4     LIVER FUNCTIONS - ( 15 Reinaldo 2022 07:10 )  Alb: 2.4 g/dL / Pro: 5.3 g/dL / ALK PHOS: 959 U/L / ALT: 9 U/L / AST: 27 U/L / GGT: x           PT/INR - ( 15 Reinaldo 2022 07:10 )   PT: 10.90 sec;   INR: 0.95 ratio         PTT - ( 15 Reinaldo 2022 07:10 )  PTT:35.1 sec    IMAGING:         Chief complaint/Reason for consult: fluid overload, ascites     HPI:  Patient is a 26 year old female with hx of asthma, untreated Hep C, IVDA, anasarca presenting with increased dyspnea since yesterday. Patient has been short of breath chronically and has been admitted for fluid overload. Patient describes worsening symptoms yesterday associated with cough. States generalized edema has remained the same. Patient is actively using heroin. Not on any meds or MMTP.  Denies fever, chills, sore throat, cp, palpitations, abd pain, n/v/d, dysuria, headache, syncope, hemoptysis. + generalized edema (15 Reinaldo 2022 10:03)    GI Updates: 26y Female pmh anxiety and depression, presents for SOB. Patient denies nausea, vomiting, hematemesis, melena, blood in stool, diarrhea, constipation, abdominal pain.      PAST MEDICAL & SURGICAL HISTORY:   Hepatitis C      Asthma      Anxiety and depression      IV drug abuse  heroin      No significant past surgical history        Family history:  FAMILY HISTORY:  No pertinent family history in first degree relatives      No GI cancers in first or second degree relatives    Social History: No smoking. No alcohol. No illegal drug use.    Allergies:   buprenorphine (Rash)  Suboxone (Rash)      MEDICATIONS  (STANDING):  furosemide   Injectable 40 milliGRAM(s) IV Push two times a day  nicotine -  14 mG/24Hr(s) Patch 1 patch Transdermal daily    MEDICATIONS  (PRN):  ALBUTerol    90 MICROgram(s) HFA Inhaler 1 Puff(s) Inhalation every 4 hours PRN Shortness of Breath and/or Wheezing        REVIEW OF SYSTEMS  General:  No weight loss, fevers, or chills.  Eyes:  No reported pain or visual changes  ENT:  No sore throat or runny nose.  NECK: No stiffness or lymphadenopathy  CV:  No chest pain or palpitations.  Resp:  No shortness of breath, cough, wheezing or hemoptysis  GI:  No abdominal pain, nausea, vomiting, dysphagia, diarrhea or constipation. No rectal bleeding, melena, or hematemesis.  Muscle:  No aches or weakness  Neuro:  No tingling, numbness       VITALS:   T(F): 96.4 (06-15-22 @ 14:14), Max: 98.4 (06-15-22 @ 08:45)  HR: 75 (06-15-22 @ 14:14) (75 - 87)  BP: 152/96 (06-15-22 @ 14:14) (152/96 - 166/98)  RR: 18 (06-15-22 @ 08:45) (17 - 18)  SpO2: 98% (06-15-22 @ 08:45) (98% - 100%)    PHYSICAL EXAM:  GENERAL: AAOx3, no acute distress.  HEAD:  Atraumatic, Normocephalic  EYES: conjunctiva and sclera clear  NECK: Supple, No thyromegaly   CHEST/LUNG: Clear to auscultation bilaterally; No wheeze, rhonchi, or rales  HEART: Regular rate and rhythm; normal S1, S2, No murmurs.  ABDOMEN: Soft, nontender, +distended; Bowel sounds present  NEUROLOGY: No asterixis or tremor  SKIN: Intact, no jaundice          LABS:  06-15    136  |  104  |  14  ----------------------------<  94  4.5   |  23  |  0.8    Ca    8.0<L>      15 Reinaldo 2022 07:10  Mg     2.0     06-15    TPro  5.3<L>  /  Alb  2.4<L>  /  TBili  0.5  /  DBili  x   /  AST  27  /  ALT  9   /  AlkPhos  959<H>  06-15                          11.0   2.85  )-----------( 152      ( 15 Reinaldo 2022 07:10 )             36.4     LIVER FUNCTIONS - ( 15 Reinaldo 2022 07:10 )  Alb: 2.4 g/dL / Pro: 5.3 g/dL / ALK PHOS: 959 U/L / ALT: 9 U/L / AST: 27 U/L / GGT: x           PT/INR - ( 15 Reinaldo 2022 07:10 )   PT: 10.90 sec;   INR: 0.95 ratio         PTT - ( 15 Reinaldo 2022 07:10 )  PTT:35.1 sec    IMAGING:    < from: US Abdomen Complete (US Abdomen Complete .) (06.15.22 @ 11:29) >    ACC: [id number] EXAM:  US ABDOMEN COMPLETE                          PROCEDURE DATE:  06/15/2022          INTERPRETATION:  CLINICAL INFORMATION: Anasarca, IV drug abuse    COMPARISON: None available.    TECHNIQUE: Sonography of the abdomen.    FINDINGS:  Liver: Within normal limits.  Bile ducts: Normal caliber. Common bile duct measures 5 mm.  Gallbladder: Cholelithiasis.. Gallbladder wall thickening to 6 mm.   Negative sonographic Hammer's sign. No particles the fluid.  Pancreas: Visualized portions are within normal limits.  Spleen: 20.9 cm. Within normal limits.  Right kidney: 11.9 cm. No hydronephrosis.  Left kidney: 14.1 cm. No hydronephrosis.  Ascites: Moderate ascites.  Aorta and IVC: Visualized portions are within normal limits.    IMPRESSION:  Cholelithiasis. Gallbladder wall thickening. Negative sonographic   Hammer's sign. If there is high clinical suspicion for acute   cholecystitis recommend a HIDA scan.    Splenomegaly measuring 20.9 cm.    Moderate ascites.        --- End ofReport ---            SHELLI SHER MD; Attending Radiologist  This document has been electronically signed. Reinaldo 15 2022 12:14PM    < end of copied text >

## 2022-06-15 NOTE — ED PROVIDER NOTE - CARE PLAN
1 Principal Discharge DX:	Cirrhosis  Secondary Diagnosis:	Anasarca  Secondary Diagnosis:	Fluid overload  Secondary Diagnosis:	Drug abuse

## 2022-06-15 NOTE — H&P ADULT - NSHPPHYSICALEXAM_GEN_ALL_CORE
Vital Signs (24 Hrs):  T(C): 36.9 (06-15-22 @ 08:45), Max: 36.9 (06-15-22 @ 08:45)  HR: 87 (06-15-22 @ 08:45) (87 - 87)  BP: 156/95 (06-15-22 @ 08:45) (156/95 - 158/87)  RR: 18 (06-15-22 @ 08:45) (17 - 18)  SpO2: 98% (06-15-22 @ 08:45) (98% - 100%)  Daily Height in cm: 167.64 (15 Reinaldo 2022 06:22)      PHYSICAL EXAM:  GENERAL: NAD  SKIN: track marks on extremities  HEAD:  Atraumatic, Normocephalic  EYES: EOMI, PERRLA, conjunctiva and sclera clear  NECK: Supple, No JVD  CHEST/LUNG: R sided crackles  HEART: Regular rate and rhythm; No murmurs, rubs, or gallops. distal pulses 2+ and equal bilateral  ABDOMEN: firm, + BS, nontender  EXTREMITIES:  + edema up to groin, symmetrical  CNS: AAOx3

## 2022-06-15 NOTE — CONSULT NOTE ADULT - SUBJECTIVE AND OBJECTIVE BOX
INTERVENTIONAL RADIOLOGY CONSULT:     Procedure Requested: diagnostic paracentesis     HPI:  Patient is a 26 year old female with hx of asthma, untreated Hep C, IVDA, anasarca presenting with increased dyspnea since yesterday. Patient has been short of breath chronically and has been admitted for fluid overload. Patient describes worsening symptoms yesterday associated with cough. States generalized edema has remained the same. Patient is actively using heroin. Not on any meds or MMTP.  Denies fever, chills, sore throat, cp, palpitations, abd pain, n/v/d, dysuria, headache, syncope, hemoptysis. + generalized edema (15 Reinaldo 2022 10:03)      PAST MEDICAL & SURGICAL HISTORY:  Hepatitis C      Asthma      Anxiety and depression      IV drug abuse  heroin      No significant past surgical history          MEDICATIONS  (STANDING):  furosemide   Injectable 40 milliGRAM(s) IV Push two times a day  methadone    Tablet   Oral   methadone    Tablet 15 milliGRAM(s) Oral once  methadone    Tablet 15 milliGRAM(s) Oral every 12 hours  nicotine -  14 mG/24Hr(s) Patch 1 patch Transdermal daily  spironolactone 50 milliGRAM(s) Oral daily    MEDICATIONS  (PRN):  ALBUTerol    90 MICROgram(s) HFA Inhaler 1 Puff(s) Inhalation every 4 hours PRN Shortness of Breath and/or Wheezing  cloNIDine 0.1 milliGRAM(s) Oral every 6 hours PRN opiate withdrawal  hydrOXYzine hydrochloride 50 milliGRAM(s) Oral every 6 hours PRN Anxiety  ibuprofen  Tablet. 400 milliGRAM(s) Oral every 6 hours PRN Mild Pain (1 - 3)      Allergies    buprenorphine (Rash)  Suboxone (Rash)    Intolerances    FAMILY HISTORY:  No pertinent family history in first degree relatives        Physical Exam:   Vital Signs Last 24 Hrs  T(C): 35.8 (15 Reinaldo 2022 14:14), Max: 36.9 (15 Reinaldo 2022 08:45)  T(F): 96.4 (15 Reinaldo 2022 14:14), Max: 98.4 (15 Reinaldo 2022 08:45)  HR: 75 (15 Reinaldo 2022 14:14) (75 - 87)  BP: 152/96 (15 Reinaldo 2022 14:14) (152/96 - 166/98)  BP(mean): --  RR: 18 (15 Reinaldo 2022 08:45) (17 - 18)  SpO2: 98% (15 Reinaldo 2022 08:45) (98% - 100%)    Labs:                         11.0   2.85  )-----------( 152      ( 15 Reinaldo 2022 07:10 )             36.4     06-15    136  |  104  |  14  ----------------------------<  94  4.5   |  23  |  0.8    Ca    8.0<L>      15 Reinaldo 2022 07:10  Mg     2.0     06-15    TPro  5.3<L>  /  Alb  2.4<L>  /  TBili  0.5  /  DBili  x   /  AST  27  /  ALT  9   /  AlkPhos  959<H>  06-15    PT/INR - ( 15 Reinaldo 2022 07:10 )   PT: 10.90 sec;   INR: 0.95 ratio         PTT - ( 15 Renialdo 2022 07:10 )  PTT:35.1 sec    Pertinent labs:                      11.0   2.85  )-----------( 152      ( 15 Reinaldo 2022 07:10 )             36.4       06-15    136  |  104  |  14  ----------------------------<  94  4.5   |  23  |  0.8    Ca    8.0<L>      15 Reinaldo 2022 07:10  Mg     2.0     06-15    TPro  5.3<L>  /  Alb  2.4<L>  /  TBili  0.5  /  DBili  x   /  AST  27  /  ALT  9   /  AlkPhos  959<H>  06-15      PT/INR - ( 15 Reinaldo 2022 07:10 )   PT: 10.90 sec;   INR: 0.95 ratio         PTT - ( 15 Reinaldo 2022 07:10 )  PTT:35.1 sec    Radiology & Additional Studies:       IMPRESSION:  Cholelithiasis. Gallbladder wall thickening. Negative sonographic   Hammer's sign. If there is high clinical suspicion for acute   cholecystitis recommend a HIDA scan.    Splenomegaly measuring 20.9 cm.    Moderate ascites.      Radiology imaging reviewed.       ASSESSMENT/ PLAN:   - consulted for image guided paracentesis d/t accumulation of ascites   - 4Q US assessed, although documented moderate ascites, very small window to access for drainage   - on last admission, paracentesis was requested, CT imaging confirmed fluid but on IR US evaluation no window for drainage was noted and procedure was cancelled, patient with anasarca, likely main cause of distention at that time  - if diagnostic is needed, we can attempt to scan in IR (at [facility]) and see if a sufficient window is seen after manipulation  - alternately, would recommend waiting for patient to accumulate more fluid for a safer window for sampling     Thank you for the courtesy of this consult, please call [phone] with any further questions.

## 2022-06-15 NOTE — H&P ADULT - HISTORY OF PRESENT ILLNESS
Patient is a 26 year old female with hx of asthma, untreated Hep C, IVDA, anasarca presenting with increased dyspnea since yesterday. Patient has been short of breath chronically and has been admitted for fluid overload. Patient describes worsening symptoms yesterday associated with cough. States generalized edema has remained the same. Patient is actively using heroin. Not on any meds or MMTP.  Denies fever, chills, sore throat, cp, palpitations, abd pain, n/v/d, dysuria, headache, syncope, hemoptysis. + generalized edema

## 2022-06-15 NOTE — PATIENT PROFILE ADULT - FALL HARM RISK - UNIVERSAL INTERVENTIONS
Bed in lowest position, wheels locked, appropriate side rails in place/Call bell, personal items and telephone in reach/Instruct patient to call for assistance before getting out of bed or chair/Non-slip footwear when patient is out of bed/[place] to call system/Physically safe environment - no spills, clutter or unnecessary equipment/Purposeful Proactive Rounding/Room/bathroom lighting operational, light cord in reach

## 2022-06-15 NOTE — CONSULT NOTE ADULT - PROBLEM SELECTOR RECOMMENDATION 9
After evaluation at this time continue on methadone protocol, use of supportive care and PRNS of catapres, nsaids, muscle relaxants. Pt may benefit from hydroxyzine for anxiety/agitation.  Pt will be monitored and supportive care provided.  Pt with concern over nicotine withdrawal. Pt is willing to use patch.   Pt with Hep C and cirrhosis with extensive edema.  Pt is impulsive and left AMA last admission secondary to being uncomfortable with withdrawals.   CATCH team involved for aftercare and pt will follow up with aftercare for possible MMTP vs Rehab.

## 2022-06-15 NOTE — ED PROVIDER NOTE - ATTENDING APP SHARED VISIT CONTRIBUTION OF CARE
I was present for and supervised the key and critical aspects of the procedures performed during the care of the patient. Patient is a 18-year-old female with past medical history of hepatitis C known liver failure in addition has active IVDA last used within 24 hours presenting for evaluation of worsening fluid overload with worsening swelling of the bilateral lower extremities and abdomen she denies any fevers chills vomiting or diarrhea she denies any skin changes or rashes    On physical exam patient is normocephalic atraumatic pupils equal round react light accommodation extraocular muscles intact oropharynx clear she has decreased breath sounds at the bases bilaterally in addition she has evidence of ascites however nontender in nature no guarding no rebound lower extremities reveal pitting edema 3+ up to the level of the knee pedal pulses 2+ cap refill is normal radial pulses 2+ she has no edema of the upper extremities    Assessment plan patient given IV Lasix we obtained labs including ammonia LFTs found to be elevated however not significantly above trend on prior visit April 2022 given her symptoms as well as her past medical history I will admit for further monitoring at this time subsequently patient has no murmurs but is an active IVDA user she has no follow-up no support system at home

## 2022-06-15 NOTE — CONSULT NOTE ADULT - SUBJECTIVE AND OBJECTIVE BOX
Pt interviewed, examined and EMR chart reviewed.  Pt admits to using opiates 2 grams per day IV into her thighs  x years with minimal sobriety. Pt has been on MMTP in 2020. Pt is contemplating going back on program. Pt denies any other substance abuse. Pt states used a xanax for wd about 3 days ago.    Last use last night  Hepatitis C with Cirrhosis no compliant with treatment  Hx of withdrawal   variable periods of sobriety in the past.  Has been in detox before _X____yes,   _____No    SOCIAL HISTORY:    REVIEW OF SYSTEMS:    Constitutional: No fever, weight loss or fatigue  ENT:  No difficulty hearing, tinnitus, vertigo; No sinus or throat pain  Neck: No pain or stiffness  Respiratory: No cough, wheezing, chills or hemoptysis  Cardiovascular: No chest pain, palpitations, shortness of breath, dizziness or leg swelling  Gastrointestinal: No abdominal or epigastric pain. No nausea, vomiting or hematemesis; No diarrhea or constipation. No melena or hematochezia.  Neurological: No headaches, memory loss, loss of strength, numbness or tremors  Musculoskeletal: extensive edema  Psychiatric: No depression, anxiety, mood swings or difficulty sleeping    MEDICATIONS  (STANDING):  furosemide   Injectable 40 milliGRAM(s) IV Push two times a day  nicotine -  14 mG/24Hr(s) Patch 1 patch Transdermal daily    MEDICATIONS  (PRN):  ALBUTerol    90 MICROgram(s) HFA Inhaler 1 Puff(s) Inhalation every 4 hours PRN Shortness of Breath and/or Wheezing      Vital Signs Last 24 Hrs  T(C): 35.8 (15 Reinaldo 2022 14:14), Max: 36.9 (15 Reinaldo 2022 08:45)  T(F): 96.4 (15 Reinaldo 2022 14:14), Max: 98.4 (15 Reinaldo 2022 08:45)  HR: 75 (15 Reinaldo 2022 14:14) (75 - 87)  BP: 152/96 (15 Reinaldo 2022 14:14) (152/96 - 166/98)  BP(mean): --  RR: 18 (15 Reinaldo 2022 08:45) (17 - 18)  SpO2: 98% (15 Reinaldo 2022 08:45) (98% - 100%)    PHYSICAL EXAM:    Constitutional: NAD, well-groomed, well-developed  HEENT: PERRLA, EOMI, Normal Hearing, MMM  Neck: No LAD, No JVD  Back: Normal spine flexure, No CVA tenderness  Respiratory: CTAB/L  Cardiovascular: S1 and S2, RRR, no M/G/R  Gastrointestinal: BS+, soft, NT/ND  Extremities: extensive   Vascular: 2+ peripheral pulses  Neurological: A/O x 3, no focal deficits    LABS:                        11.0   2.85  )-----------( 152      ( 15 Reinaldo 2022 07:10 )             36.4     06-15    136  |  104  |  14  ----------------------------<  94  4.5   |  23  |  0.8    Ca    8.0<L>      15 Reinaldo 2022 07:10  Mg     2.0     06-15    TPro  5.3<L>  /  Alb  2.4<L>  /  TBili  0.5  /  DBili  x   /  AST  27  /  ALT  9   /  AlkPhos  959<H>  06-15    PT/INR - ( 15 Reinaldo 2022 07:10 )   PT: 10.90 sec;   INR: 0.95 ratio         PTT - ( 15 Reinaldo 2022 07:10 )  PTT:35.1 sec  Urinalysis Basic - ( 15 Reinaldo 2022 08:00 )    Color: Yellow / Appearance: Cloudy / S.025 / pH: x  Gluc: x / Ketone: Negative  / Bili: Small / Urobili: 1.0 mg/dL   Blood: x / Protein: >=300 mg/dL / Nitrite: Negative   Leuk Esterase: Small / RBC: 11-25 /HPF / WBC 6-10 /HPF   Sq Epi: x / Non Sq Epi: Occasional /HPF / Bacteria: Few      Drug Screen Urine:  Alcohol Level        RADIOLOGY & ADDITIONAL STUDIES:

## 2022-06-15 NOTE — H&P ADULT - NSICDXPASTMEDICALHX_GEN_ALL_CORE_FT
PAST MEDICAL HISTORY:  Anxiety and depression     Asthma     Hepatitis C     IV drug abuse heroin

## 2022-06-15 NOTE — ED PROVIDER NOTE - OBJECTIVE STATEMENT
patient c/o fluid overload, legs and abdomen swollen, Feel lump in chest when sitting up, no abdominal pain, no fever, H/o active IVDA, hep C with cirrhosis. Admitted april 2022, left ama after 3 days.

## 2022-06-16 LAB
ALBUMIN SERPL ELPH-MCNC: 2 G/DL — LOW (ref 3.5–5.2)
ALBUMIN SERPL ELPH-MCNC: 2.2 G/DL — LOW (ref 3.5–5.2)
ALBUMIN SERPL ELPH-MCNC: 2.6 G/DL — LOW (ref 3.5–5.2)
ALP SERPL-CCNC: 781 U/L — HIGH (ref 30–115)
ALP SERPL-CCNC: 796 U/L — HIGH (ref 30–115)
ALP SERPL-CCNC: 933 U/L — HIGH (ref 30–115)
ALT FLD-CCNC: 10 U/L — SIGNIFICANT CHANGE UP (ref 0–41)
ALT FLD-CCNC: 9 U/L — SIGNIFICANT CHANGE UP (ref 0–41)
ALT FLD-CCNC: 9 U/L — SIGNIFICANT CHANGE UP (ref 0–41)
AMPHET UR-MCNC: NEGATIVE — SIGNIFICANT CHANGE UP
ANION GAP SERPL CALC-SCNC: 11 MMOL/L — SIGNIFICANT CHANGE UP (ref 7–14)
ANION GAP SERPL CALC-SCNC: 14 MMOL/L — SIGNIFICANT CHANGE UP (ref 7–14)
ANION GAP SERPL CALC-SCNC: 14 MMOL/L — SIGNIFICANT CHANGE UP (ref 7–14)
APTT BLD: 31.7 SEC — SIGNIFICANT CHANGE UP (ref 27–39.2)
AST SERPL-CCNC: 28 U/L — SIGNIFICANT CHANGE UP (ref 0–41)
AST SERPL-CCNC: 34 U/L — SIGNIFICANT CHANGE UP (ref 0–41)
AST SERPL-CCNC: 35 U/L — SIGNIFICANT CHANGE UP (ref 0–41)
BARBITURATES UR SCN-MCNC: NEGATIVE — SIGNIFICANT CHANGE UP
BASOPHILS # BLD AUTO: 0.01 K/UL — SIGNIFICANT CHANGE UP (ref 0–0.2)
BASOPHILS # BLD AUTO: 0.02 K/UL — SIGNIFICANT CHANGE UP (ref 0–0.2)
BASOPHILS NFR BLD AUTO: 0.2 % — SIGNIFICANT CHANGE UP (ref 0–1)
BASOPHILS NFR BLD AUTO: 0.3 % — SIGNIFICANT CHANGE UP (ref 0–1)
BENZODIAZ UR-MCNC: NEGATIVE — SIGNIFICANT CHANGE UP
BILIRUB DIRECT SERPL-MCNC: 0.2 MG/DL — SIGNIFICANT CHANGE UP (ref 0–0.3)
BILIRUB INDIRECT FLD-MCNC: 0.2 MG/DL — SIGNIFICANT CHANGE UP (ref 0.2–1.2)
BILIRUB SERPL-MCNC: 0.3 MG/DL — SIGNIFICANT CHANGE UP (ref 0.2–1.2)
BILIRUB SERPL-MCNC: 0.4 MG/DL — SIGNIFICANT CHANGE UP (ref 0.2–1.2)
BILIRUB SERPL-MCNC: 0.4 MG/DL — SIGNIFICANT CHANGE UP (ref 0.2–1.2)
BUN SERPL-MCNC: 15 MG/DL — SIGNIFICANT CHANGE UP (ref 10–20)
BUN SERPL-MCNC: 16 MG/DL — SIGNIFICANT CHANGE UP (ref 10–20)
BUN SERPL-MCNC: 16 MG/DL — SIGNIFICANT CHANGE UP (ref 10–20)
CALCIUM SERPL-MCNC: 7.5 MG/DL — LOW (ref 8.5–10.1)
CALCIUM SERPL-MCNC: 7.6 MG/DL — LOW (ref 8.5–10.1)
CALCIUM SERPL-MCNC: 8.6 MG/DL — SIGNIFICANT CHANGE UP (ref 8.5–10.1)
CHLORIDE SERPL-SCNC: 104 MMOL/L — SIGNIFICANT CHANGE UP (ref 98–110)
CHLORIDE SERPL-SCNC: 105 MMOL/L — SIGNIFICANT CHANGE UP (ref 98–110)
CHLORIDE SERPL-SCNC: 108 MMOL/L — SIGNIFICANT CHANGE UP (ref 98–110)
CK SERPL-CCNC: 495 U/L — HIGH (ref 0–225)
CO2 SERPL-SCNC: 15 MMOL/L — LOW (ref 17–32)
CO2 SERPL-SCNC: 20 MMOL/L — SIGNIFICANT CHANGE UP (ref 17–32)
CO2 SERPL-SCNC: 21 MMOL/L — SIGNIFICANT CHANGE UP (ref 17–32)
COCAINE METAB.OTHER UR-MCNC: NEGATIVE — SIGNIFICANT CHANGE UP
CREAT SERPL-MCNC: 0.9 MG/DL — SIGNIFICANT CHANGE UP (ref 0.7–1.5)
CREAT SERPL-MCNC: 1 MG/DL — SIGNIFICANT CHANGE UP (ref 0.7–1.5)
CREAT SERPL-MCNC: 1.1 MG/DL — SIGNIFICANT CHANGE UP (ref 0.7–1.5)
DRUG SCREEN 1, URINE RESULT: SIGNIFICANT CHANGE UP
EGFR: 70 ML/MIN/1.73M2 — SIGNIFICANT CHANGE UP
EGFR: 79 ML/MIN/1.73M2 — SIGNIFICANT CHANGE UP
EGFR: 89 ML/MIN/1.73M2 — SIGNIFICANT CHANGE UP
EOSINOPHIL # BLD AUTO: 0 K/UL — SIGNIFICANT CHANGE UP (ref 0–0.7)
EOSINOPHIL # BLD AUTO: 0 K/UL — SIGNIFICANT CHANGE UP (ref 0–0.7)
EOSINOPHIL NFR BLD AUTO: 0 % — SIGNIFICANT CHANGE UP (ref 0–8)
EOSINOPHIL NFR BLD AUTO: 0 % — SIGNIFICANT CHANGE UP (ref 0–8)
FENTANYL UR QL: POSITIVE
GAS PNL BLDA: SIGNIFICANT CHANGE UP
GGT SERPL-CCNC: 237 U/L — HIGH (ref 1–40)
GLUCOSE BLDC GLUCOMTR-MCNC: 208 MG/DL — HIGH (ref 70–99)
GLUCOSE BLDC GLUCOMTR-MCNC: 95 MG/DL — SIGNIFICANT CHANGE UP (ref 70–99)
GLUCOSE SERPL-MCNC: 126 MG/DL — HIGH (ref 70–99)
GLUCOSE SERPL-MCNC: 93 MG/DL — SIGNIFICANT CHANGE UP (ref 70–99)
GLUCOSE SERPL-MCNC: 96 MG/DL — SIGNIFICANT CHANGE UP (ref 70–99)
HCT VFR BLD CALC: 38.3 % — SIGNIFICANT CHANGE UP (ref 37–47)
HCT VFR BLD CALC: 39 % — SIGNIFICANT CHANGE UP (ref 37–47)
HGB BLD-MCNC: 11.7 G/DL — LOW (ref 12–16)
HGB BLD-MCNC: 11.8 G/DL — LOW (ref 12–16)
IMM GRANULOCYTES NFR BLD AUTO: 0.3 % — SIGNIFICANT CHANGE UP (ref 0.1–0.3)
IMM GRANULOCYTES NFR BLD AUTO: 0.8 % — HIGH (ref 0.1–0.3)
INR BLD: 0.89 RATIO — SIGNIFICANT CHANGE UP (ref 0.65–1.3)
LACTATE SERPL-SCNC: 1.5 MMOL/L — SIGNIFICANT CHANGE UP (ref 0.7–2)
LACTATE SERPL-SCNC: 2 MMOL/L — SIGNIFICANT CHANGE UP (ref 0.7–2)
LYMPHOCYTES # BLD AUTO: 0.7 K/UL — LOW (ref 1.2–3.4)
LYMPHOCYTES # BLD AUTO: 0.81 K/UL — LOW (ref 1.2–3.4)
LYMPHOCYTES # BLD AUTO: 23.3 % — SIGNIFICANT CHANGE UP (ref 20.5–51.1)
LYMPHOCYTES # BLD AUTO: 7 % — LOW (ref 20.5–51.1)
MAGNESIUM SERPL-MCNC: 2.1 MG/DL — SIGNIFICANT CHANGE UP (ref 1.8–2.4)
MCHC RBC-ENTMCNC: 25.8 PG — LOW (ref 27–31)
MCHC RBC-ENTMCNC: 25.8 PG — LOW (ref 27–31)
MCHC RBC-ENTMCNC: 30.3 G/DL — LOW (ref 32–37)
MCHC RBC-ENTMCNC: 30.5 G/DL — LOW (ref 32–37)
MCV RBC AUTO: 84.5 FL — SIGNIFICANT CHANGE UP (ref 81–99)
MCV RBC AUTO: 85.2 FL — SIGNIFICANT CHANGE UP (ref 81–99)
METHADONE UR-MCNC: POSITIVE
MONOCYTES # BLD AUTO: 0.15 K/UL — SIGNIFICANT CHANGE UP (ref 0.1–0.6)
MONOCYTES # BLD AUTO: 0.59 K/UL — SIGNIFICANT CHANGE UP (ref 0.1–0.6)
MONOCYTES NFR BLD AUTO: 5 % — SIGNIFICANT CHANGE UP (ref 1.7–9.3)
MONOCYTES NFR BLD AUTO: 5.1 % — SIGNIFICANT CHANGE UP (ref 1.7–9.3)
NEUTROPHILS # BLD AUTO: 10.1 K/UL — HIGH (ref 1.4–6.5)
NEUTROPHILS # BLD AUTO: 2.14 K/UL — SIGNIFICANT CHANGE UP (ref 1.4–6.5)
NEUTROPHILS NFR BLD AUTO: 71.1 % — SIGNIFICANT CHANGE UP (ref 42.2–75.2)
NEUTROPHILS NFR BLD AUTO: 86.9 % — HIGH (ref 42.2–75.2)
NRBC # BLD: 0 /100 WBCS — SIGNIFICANT CHANGE UP (ref 0–0)
NRBC # BLD: 0 /100 WBCS — SIGNIFICANT CHANGE UP (ref 0–0)
OPIATES UR-MCNC: POSITIVE
OXYCODONE UR-MCNC: NEGATIVE — SIGNIFICANT CHANGE UP
PCP UR-MCNC: NEGATIVE — SIGNIFICANT CHANGE UP
PLATELET # BLD AUTO: 164 K/UL — SIGNIFICANT CHANGE UP (ref 130–400)
PLATELET # BLD AUTO: 268 K/UL — SIGNIFICANT CHANGE UP (ref 130–400)
POTASSIUM SERPL-MCNC: 4.2 MMOL/L — SIGNIFICANT CHANGE UP (ref 3.5–5)
POTASSIUM SERPL-MCNC: 4.3 MMOL/L — SIGNIFICANT CHANGE UP (ref 3.5–5)
POTASSIUM SERPL-MCNC: 4.6 MMOL/L — SIGNIFICANT CHANGE UP (ref 3.5–5)
POTASSIUM SERPL-SCNC: 4.2 MMOL/L — SIGNIFICANT CHANGE UP (ref 3.5–5)
POTASSIUM SERPL-SCNC: 4.3 MMOL/L — SIGNIFICANT CHANGE UP (ref 3.5–5)
POTASSIUM SERPL-SCNC: 4.6 MMOL/L — SIGNIFICANT CHANGE UP (ref 3.5–5)
PROPOXYPHENE QUALITATIVE URINE RESULT: NEGATIVE — SIGNIFICANT CHANGE UP
PROT SERPL-MCNC: 4.8 G/DL — LOW (ref 6–8)
PROT SERPL-MCNC: 4.8 G/DL — LOW (ref 6–8)
PROT SERPL-MCNC: 5.8 G/DL — LOW (ref 6–8)
PROTHROM AB SERPL-ACNC: 10.3 SEC — SIGNIFICANT CHANGE UP (ref 9.95–12.87)
RBC # BLD: 4.53 M/UL — SIGNIFICANT CHANGE UP (ref 4.2–5.4)
RBC # BLD: 4.58 M/UL — SIGNIFICANT CHANGE UP (ref 4.2–5.4)
RBC # FLD: 17 % — HIGH (ref 11.5–14.5)
RBC # FLD: 17.5 % — HIGH (ref 11.5–14.5)
SODIUM SERPL-SCNC: 136 MMOL/L — SIGNIFICANT CHANGE UP (ref 135–146)
SODIUM SERPL-SCNC: 137 MMOL/L — SIGNIFICANT CHANGE UP (ref 135–146)
SODIUM SERPL-SCNC: 139 MMOL/L — SIGNIFICANT CHANGE UP (ref 135–146)
THC UR QL: NEGATIVE — SIGNIFICANT CHANGE UP
TROPONIN T SERPL-MCNC: 0.04 NG/ML — CRITICAL HIGH
TSH SERPL-MCNC: 4.11 UIU/ML — SIGNIFICANT CHANGE UP (ref 0.27–4.2)
WBC # BLD: 11.61 K/UL — HIGH (ref 4.8–10.8)
WBC # BLD: 3.01 K/UL — LOW (ref 4.8–10.8)
WBC # FLD AUTO: 11.61 K/UL — HIGH (ref 4.8–10.8)
WBC # FLD AUTO: 3.01 K/UL — LOW (ref 4.8–10.8)

## 2022-06-16 PROCEDURE — 99232 SBSQ HOSP IP/OBS MODERATE 35: CPT

## 2022-06-16 PROCEDURE — 70450 CT HEAD/BRAIN W/O DYE: CPT | Mod: 26

## 2022-06-16 PROCEDURE — 99231 SBSQ HOSP IP/OBS SF/LOW 25: CPT

## 2022-06-16 PROCEDURE — 99233 SBSQ HOSP IP/OBS HIGH 50: CPT

## 2022-06-16 PROCEDURE — 99221 1ST HOSP IP/OBS SF/LOW 40: CPT

## 2022-06-16 PROCEDURE — 71045 X-RAY EXAM CHEST 1 VIEW: CPT | Mod: 26

## 2022-06-16 RX ORDER — FENTANYL CITRATE 50 UG/ML
0.5 INJECTION INTRAVENOUS
Qty: 2500 | Refills: 0 | Status: DISCONTINUED | OUTPATIENT
Start: 2022-06-16 | End: 2022-06-17

## 2022-06-16 RX ORDER — PANTOPRAZOLE SODIUM 20 MG/1
40 TABLET, DELAYED RELEASE ORAL
Refills: 0 | Status: DISCONTINUED | OUTPATIENT
Start: 2022-06-16 | End: 2022-06-19

## 2022-06-16 RX ORDER — MIDAZOLAM HYDROCHLORIDE 1 MG/ML
2 INJECTION, SOLUTION INTRAMUSCULAR; INTRAVENOUS ONCE
Refills: 0 | Status: DISCONTINUED | OUTPATIENT
Start: 2022-06-16 | End: 2022-06-16

## 2022-06-16 RX ORDER — HEPARIN SODIUM 5000 [USP'U]/ML
5000 INJECTION INTRAVENOUS; SUBCUTANEOUS EVERY 12 HOURS
Refills: 0 | Status: DISCONTINUED | OUTPATIENT
Start: 2022-06-16 | End: 2022-06-16

## 2022-06-16 RX ORDER — FOLIC ACID 0.8 MG
1 TABLET ORAL DAILY
Refills: 0 | Status: DISCONTINUED | OUTPATIENT
Start: 2022-06-16 | End: 2022-07-20

## 2022-06-16 RX ORDER — NOREPINEPHRINE BITARTRATE/D5W 8 MG/250ML
0.05 PLASTIC BAG, INJECTION (ML) INTRAVENOUS
Qty: 8 | Refills: 0 | Status: DISCONTINUED | OUTPATIENT
Start: 2022-06-16 | End: 2022-07-20

## 2022-06-16 RX ORDER — PROPOFOL 10 MG/ML
1 INJECTION, EMULSION INTRAVENOUS
Qty: 1000 | Refills: 0 | Status: DISCONTINUED | OUTPATIENT
Start: 2022-06-16 | End: 2022-07-08

## 2022-06-16 RX ORDER — THIAMINE MONONITRATE (VIT B1) 100 MG
100 TABLET ORAL DAILY
Refills: 0 | Status: DISCONTINUED | OUTPATIENT
Start: 2022-06-16 | End: 2022-07-20

## 2022-06-16 RX ORDER — DEXMEDETOMIDINE HYDROCHLORIDE IN 0.9% SODIUM CHLORIDE 4 UG/ML
0.2 INJECTION INTRAVENOUS
Qty: 400 | Refills: 0 | Status: DISCONTINUED | OUTPATIENT
Start: 2022-06-16 | End: 2022-06-28

## 2022-06-16 RX ORDER — FENTANYL CITRATE 50 UG/ML
0.5 INJECTION INTRAVENOUS
Qty: 5000 | Refills: 0 | Status: DISCONTINUED | OUTPATIENT
Start: 2022-06-16 | End: 2022-06-16

## 2022-06-16 RX ORDER — METHADONE HYDROCHLORIDE 40 MG/1
10 TABLET ORAL ONCE
Refills: 0 | Status: DISCONTINUED | OUTPATIENT
Start: 2022-06-16 | End: 2022-06-16

## 2022-06-16 RX ADMIN — FENTANYL CITRATE 4.28 MICROGRAM(S)/KG/HR: 50 INJECTION INTRAVENOUS at 16:03

## 2022-06-16 RX ADMIN — DEXMEDETOMIDINE HYDROCHLORIDE IN 0.9% SODIUM CHLORIDE 4.28 MICROGRAM(S)/KG/HR: 4 INJECTION INTRAVENOUS at 18:28

## 2022-06-16 RX ADMIN — Medication 40 MILLIGRAM(S): at 05:28

## 2022-06-16 RX ADMIN — FENTANYL CITRATE 4.28 MICROGRAM(S)/KG/HR: 50 INJECTION INTRAVENOUS at 21:05

## 2022-06-16 RX ADMIN — PROPOFOL 0.51 MICROGRAM(S)/KG/MIN: 10 INJECTION, EMULSION INTRAVENOUS at 18:27

## 2022-06-16 RX ADMIN — Medication 8.03 MICROGRAM(S)/KG/MIN: at 21:05

## 2022-06-16 RX ADMIN — DEXMEDETOMIDINE HYDROCHLORIDE IN 0.9% SODIUM CHLORIDE 4.28 MICROGRAM(S)/KG/HR: 4 INJECTION INTRAVENOUS at 21:06

## 2022-06-16 RX ADMIN — METHADONE HYDROCHLORIDE 15 MILLIGRAM(S): 40 TABLET ORAL at 05:28

## 2022-06-16 RX ADMIN — DEXMEDETOMIDINE HYDROCHLORIDE IN 0.9% SODIUM CHLORIDE 4.28 MICROGRAM(S)/KG/HR: 4 INJECTION INTRAVENOUS at 16:07

## 2022-06-16 RX ADMIN — MIDAZOLAM HYDROCHLORIDE 2 MILLIGRAM(S): 1 INJECTION, SOLUTION INTRAMUSCULAR; INTRAVENOUS at 16:04

## 2022-06-16 RX ADMIN — METHADONE HYDROCHLORIDE 10 MILLIGRAM(S): 40 TABLET ORAL at 12:36

## 2022-06-16 RX ADMIN — Medication 40 MILLIGRAM(S): at 16:04

## 2022-06-16 RX ADMIN — PROPOFOL 0.51 MICROGRAM(S)/KG/MIN: 10 INJECTION, EMULSION INTRAVENOUS at 21:05

## 2022-06-16 RX ADMIN — SPIRONOLACTONE 50 MILLIGRAM(S): 25 TABLET, FILM COATED ORAL at 05:29

## 2022-06-16 RX ADMIN — FENTANYL CITRATE 4.28 MICROGRAM(S)/KG/HR: 50 INJECTION INTRAVENOUS at 23:48

## 2022-06-16 NOTE — CONSULT NOTE ADULT - ASSESSMENT
Patient is a 26 year old female with hx of asthma, untreated Hep C, IVDA, anasarca presenting with increased dyspnea since yesterday. Patient has been short of breath chronically and has been admitted for fluid overload. Patient describes worsening symptoms yesterday associated with cough. States generalized edema has remained the same. Patient is actively using heroin. Not on any meds or MMTP.  Denies fever, chills, sore throat, cp, palpitations, abd pain, n/v/d, dysuria, headache, syncope, hemoptysis. + generalized edema (15 Reinaldo 2022 10:03)    SURGERY:   PT IS S/P RAPID RESPONSE IN LOBBY =- S/P SEIZURE & FALL - SUBSEQUENTLY INTUBATED FOR AIRWAY PROTECTION.  UPON FALL, NOTED TO HAVE BLEEDING FROM POSTERIOR SCALP -SURGERY CONSULTED FOR REPAIR OF LACERATION      PT SEEN BY SURGICAL RESIDENT, DR. MARTINS:  - LACERATION SUTURED CLOSED (5 SUTURES) & HEAD WRAP APPLIED (SEE PROCEDURE NOTE)   - CONT LOCAL DAILY WOUND CARE & WOUND OBSERVATION  - MAY REMOVE SUTURES IN 7 DAYS  -D/W DR. SILVA Patient is a 26 year old female with hx of asthma, untreated Hep C, IVDA, anasarca presenting with increased dyspnea since yesterday. Patient has been short of breath chronically and has been admitted for fluid overload. Patient describes worsening symptoms yesterday associated with cough. States generalized edema has remained the same. Patient is actively using heroin. Not on any meds or MMTP.  Denies fever, chills, sore throat, cp, palpitations, abd pain, n/v/d, dysuria, headache, syncope, hemoptysis. + generalized edema (15 Reinaldo 2022 10:03)    SURGERY:   PT IS S/P RAPID RESPONSE IN LOBBY =- S/P SEIZURE & FALL - SUBSEQUENTLY INTUBATED FOR AIRWAY PROTECTION.  UPON FALL, NOTED TO HAVE BLEEDING FROM POSTERIOR SCALP -SURGERY CONSULTED FOR REPAIR OF LACERATION      PT SEEN W/ SURGICAL RESIDENT, DR. MARTINS:  - LACERATION REPAIRED w/5 SUTURES & HEAD WRAP APPLIED (SEE PROCEDURE NOTE)   - CONT LOCAL DAILY WOUND CARE & WOUND OBSERVATION  - MAY REMOVE SUTURES IN 7 DAYS  -D/W DR. SILVA  -also d/w surgical resident; rec ct neck

## 2022-06-16 NOTE — PROGRESS NOTE ADULT - SUBJECTIVE AND OBJECTIVE BOX
26yFemale  Being followed for abdominal distention   Interval history: Patient denies nausea, vomiting, hematemesis, melena, blood in stool, diarrhea, constipation, abdominal pain. Patient tolerating low sodium diet.      PAST MEDICAL & SURGICAL HISTORY:  Hepatitis C      Asthma      Anxiety and depression      IV drug abuse  heroin      No significant past surgical history              Social History: No smoking. No alcohol. No illegal drug use.            MEDICATIONS  (STANDING):  furosemide   Injectable 40 milliGRAM(s) IV Push two times a day  methadone    Tablet   Oral   methadone    Tablet 10 milliGRAM(s) Oral every 12 hours  nicotine -  14 mG/24Hr(s) Patch 1 patch Transdermal daily  spironolactone 50 milliGRAM(s) Oral daily    MEDICATIONS  (PRN):  ALBUTerol    90 MICROgram(s) HFA Inhaler 1 Puff(s) Inhalation every 4 hours PRN Shortness of Breath and/or Wheezing  cloNIDine 0.1 milliGRAM(s) Oral every 6 hours PRN opiate withdrawal  hydrOXYzine hydrochloride 50 milliGRAM(s) Oral every 6 hours PRN Anxiety  ibuprofen  Tablet. 400 milliGRAM(s) Oral every 6 hours PRN Mild Pain (1 - 3)      Allergies:   buprenorphine (Rash)  Suboxone (Rash)              REVIEW OF SYSTEMS:  General:  No weight loss, fevers, or chills.  Eyes:  No reported pain or visual changes  ENT:  No sore throat or runny nose.  NECK: No stiffness   CV:  No chest pain or palpitations.  Resp:  No shortness of breath, cough  GI:  No abdominal pain, nausea, vomiting, dysphagia, diarrhea or constipation. No rectal bleeding, melena, or hematemesis.  Neuro:  No tingling, numbness           VITAL SIGNS:   T(F): 96.9 (06-16-22 @ 05:00), Max: 96.9 (06-16-22 @ 05:00)  HR: 97 (06-16-22 @ 12:32) (75 - 97)  BP: 182/103 (06-16-22 @ 12:32) (152/96 - 182/103)  RR: 16 (06-16-22 @ 05:00) (16 - 16)  SpO2: 92% (06-16-22 @ 07:50) (92% - 92%)    PHYSICAL EXAM:  GENERAL: AAOx3, no acute distress.  HEAD:  Atraumatic, Normocephalic  EYES: conjunctiva and sclera clear  NECK: Supple, no JVD or thyromegaly  CHEST/LUNG: Clear to auscultation bilaterally; No wheeze, rhonchi, or rales  HEART: Regular rate and rhythm; normal S1, S2, No murmurs.  ABDOMEN: Soft, nontender, +distended; Bowel sounds present  NEUROLOGY: No asterixis or tremor.   SKIN: Intact, no jaundice            LABS:                        11.8   3.01  )-----------( 164      ( 16 Jun 2022 06:56 )             39.0     06-16    139  |  105  |  15  ----------------------------<  93  4.6   |  20  |  0.9    Ca    8.6      16 Jun 2022 06:56  Mg     2.1     06-16    TPro  5.8<L>  /  Alb  2.6<L>  /  TBili  0.4  /  DBili  0.2  /  AST  28  /  ALT  9   /  AlkPhos  933<H>  06-16    LIVER FUNCTIONS - ( 16 Jun 2022 06:56 )  Alb: 2.6 g/dL / Pro: 5.8 g/dL / ALK PHOS: 933 U/L / ALT: 9 U/L / AST: 28 U/L / GGT: 237 U/L       PT/INR - ( 16 Jun 2022 06:56 )   PT: 10.30 sec;   INR: 0.89 ratio         PTT - ( 16 Jun 2022 06:56 )  PTT:31.7 sec    IMAGING:    < from: US Abdomen Complete (US Abdomen Complete .) (06.15.22 @ 11:29) >    ACC: [id number] EXAM:  US ABDOMEN COMPLETE                          PROCEDURE DATE:  06/15/2022          INTERPRETATION:  CLINICAL INFORMATION: Anasarca, IV drug abuse    COMPARISON: None available.    TECHNIQUE: Sonography of the abdomen.    FINDINGS:  Liver: Within normal limits.  Bile ducts: Normal caliber. Common bile duct measures 5 mm.  Gallbladder: Cholelithiasis.. Gallbladder wall thickening to 6 mm.   Negative sonographic Hammer's sign. No particles the fluid.  Pancreas: Visualized portions are within normal limits.  Spleen: 20.9 cm. Within normal limits.  Right kidney: 11.9 cm. No hydronephrosis.  Left kidney: 14.1 cm. No hydronephrosis.  Ascites: Moderate ascites.  Aorta and IVC: Visualized portions are within normal limits.    IMPRESSION:  Cholelithiasis. Gallbladder wall thickening. Negative sonographic   Hammer's sign. If there is high clinical suspicion for acute   cholecystitis recommend a HIDA scan.    Splenomegaly measuring 20.9 cm.    Moderate ascites.        --- End ofReport ---            SHELLI SHER MD; Attending Radiologist  This document has been electronically signed. Reinaldo 15 2022 12:14PM    < end of copied text >

## 2022-06-16 NOTE — PROGRESS NOTE ADULT - ASSESSMENT
Genealized anasarca   -improving, not sob today, able to ambulate without distress or discomfort  -not enough free abdominal fluid to drain by paracentesis   -multifactorial , suspect related to IVDA, history of cirrhosis, chronic cor pulmonale secondary to severe pulmonary hypertension  -she is responding to IV lasix and po aldactone - continue at current dosing  -recommended interventional cardiology evaluation as outpatient ( pt challenged me and said last time I was here cardiology told me I am fine and no issues, I answered that she had no acute cardiac issues ) however I suspect chronic cor pulmoale and pulm htn as above and recommended outpatient interventional cardiology for right heart cath and she seemed receptive and agreed. refer on dc    IVDA  -counseld extensively today, she agreed to get on methadone program, ok to dc tomorrow to start her first day on the program, discussed with addiction team    reported history of hep c  -hep c ab weakly positive in 2021 , followed by hep c pcr which was negative ruling out hep c , however she continued to do IVDA so certainly she could still be at risk for infection  -again recommended to quit ivda and follow up with pcp/gi and she could get tested again outpatient if she wishes to      Hepatitis C RNA Qualitative Interp:   Negative Negative: HCV RNA Not Detected  Performed At:  [facility] [id number]  Giovanni Frias MD Ph:[phone] (02.06.21 @ 08:45)    Hepatitis C Virus Interpretation: Weakly Reactive Hepatitis C AB  S/CO Ratio                        Interpretation  < 1.00                                   Non-Reactive  1.00 - 4.99                         Weakly-Reactive  >= 5.00                                Reactive  Non-Reactive: A person with a non-reactive HCV antibody result is  considered uninfected.  No further action is needed unless recent  infection is suspected.  In these cases, consider repeat testing later to  detect seroconversion..  Weakly-Reactive: HCV antibody test is abnormal, HCV RNA Qualitative test  will follow.  Reactive: HCV antibody test is abnormal, HCV RNA Qualitative test will  follow.  Note: HCV antibody testing is performed on the [employer]  system. (02.06.21 @ 08:45)       # asthma  - no exacerbation, no distress     # nicotine dependency  - recommended cessation    # positive UA  -asymptomatic bacteruria    Disp  improving, possible dc home tomorrow on po lasix and aldactone. will need follow up labs outpatient. limited supply of rx on dc to ensure follow up., continued use without follow up labs could pose risk of electrolyte abnormalities

## 2022-06-16 NOTE — CONSULT NOTE ADULT - SUBJECTIVE AND OBJECTIVE BOX
POOJA DIANE [id number]  26y Female  1d    HPI:  Patient is a 26 year old female with hx of asthma, untreated Hep C, IVDA, anasarca presenting with increased dyspnea since yesterday. Patient has been short of breath chronically and has been admitted for fluid overload. Patient describes worsening symptoms yesterday associated with cough. States generalized edema has remained the same. Patient is actively using heroin. Not on any meds or MMTP.  Denies fever, chills, sore throat, cp, palpitations, abd pain, n/v/d, dysuria, headache, syncope, hemoptysis. + generalized edema (15 Reinaldo 2022 10:03)    SURGERY:   PT IS S/P RAPID RESPONSE IN LOBBY =- S/P SEIZURE & FALL - SUBSEQUENTLY INTUBATED FOR AIRWAY PROTECTION.  UPON FALL, NOTED TO HAVE BLEEDING FROM POSTERIOR SCALP -SURGERY CONSULTED FOR REPAIR OF LACERATION    PAST MEDICAL & SURGICAL HISTORY:  Hepatitis C      Asthma      Anxiety and depression      IV drug abuse  heroin      No significant past surgical history            MEDICATIONS  (STANDING):  dexMEDEtomidine Infusion 0.2 MICROgram(s)/kG/Hr (4.28 mL/Hr) IV Continuous <Continuous>  fentaNYL   Infusion. 0.5 MICROgram(s)/kG/Hr (4.28 mL/Hr) IV Continuous <Continuous>  folic acid 1 milliGRAM(s) Oral daily  furosemide   Injectable 40 milliGRAM(s) IV Push two times a day  methadone    Tablet   Oral   methadone    Tablet 10 milliGRAM(s) Oral every 12 hours  nicotine -  14 mG/24Hr(s) Patch 1 patch Transdermal daily  propofol Infusion 1 MICROgram(s)/kG/Min (0.51 mL/Hr) IV Continuous <Continuous>  spironolactone 50 milliGRAM(s) Oral daily  thiamine 100 milliGRAM(s) Oral daily    MEDICATIONS  (PRN):  ALBUTerol    90 MICROgram(s) HFA Inhaler 1 Puff(s) Inhalation every 4 hours PRN Shortness of Breath and/or Wheezing  cloNIDine 0.1 milliGRAM(s) Oral every 6 hours PRN opiate withdrawal  hydrOXYzine hydrochloride 50 milliGRAM(s) Oral every 6 hours PRN Anxiety  ibuprofen  Tablet. 400 milliGRAM(s) Oral every 6 hours PRN Mild Pain (1 - 3)      Allergies    buprenorphine (Rash)  Suboxone (Rash)    Intolerances        REVIEW OF SYSTEMS    [ ] A ten-point review of systems was otherwise negative except as noted.  [C ] Due to altered mental status/intubation, subjective information were not able to be obtained from the patient. History was obtained, to the extent possible, from review of the chart and collateral sources of information.      Vital Signs Last 24 Hrs  T(C): 35.7 (2022 15:45), Max: 36.1 (2022 05:00)  T(F): 96.3 (2022 15:45), Max: 96.9 (2022 05:00)  HR: 108 (2022 16:30) (81 - 128)  BP: 107/70 (2022 16:30) (107/70 - 182/103)  BP(mean): 83 (2022 16:30) (83 - 121)  RR: 30 (2022 16:30) (16 - 36)  SpO2: 99% (2022 16:30) (92% - 99%)    PHYSICAL EXAM:  GENERAL: INTUBATED  HEENT: APPROX 6 CM LACERATION NOTED TO RIGHT POSTERIOR SCALP W/HEMATOMA - NO ACTIVE BLEEDING    LABS:  Labs:  CAPILLARY BLOOD GLUCOSE      POCT Blood Glucose.: 208 mg/dL (2022 13:59)                          11.8   3.01  )-----------( 164      ( 2022 06:56 )             39.0       Auto Neutrophil %: 71.1 % (22 @ 06:56)  Auto Immature Granulocyte %: 0.3 % (22 @ 06:56)        139  |  105  |  15  ----------------------------<  93  4.6   |  20  |  0.9      Calcium, Total Serum: 8.6 mg/dL (22 @ 06:56)      LFTs:             5.8  | 0.4  | 28       ------------------[933     ( 2022 06:56 )  2.6  | 0.2  | 9           Lipase:x      Amylase:x         Lactate, Blood: 0.5 mmol/L (06-15-22 @ 07:10)      Coags:     10.30  ----< 0.89    ( 2022 06:56 )     31.7            Serum Pro-Brain Natriuretic Peptide: 89852 pg/mL (06-15-22 @ 07:10)      Urinalysis Basic - ( 15 Reinaldo 2022 08:00 )    Color: Yellow / Appearance: Cloudy / S.025 / pH: x  Gluc: x / Ketone: Negative  / Bili: Small / Urobili: 1.0 mg/dL   Blood: x / Protein: >=300 mg/dL / Nitrite: Negative   Leuk Esterase: Small / RBC: 11-25 /HPF / WBC 6-10 /HPF   Sq Epi: x / Non Sq Epi: Occasional /HPF / Bacteria: Few        Culture - Urine (collected 15 Reinaldo 2022 08:00)  Source: Clean Catch Clean Catch (Midstream)  Preliminary Report (2022 15:44):    10,000 - 49,000 CFU/mL Gram positive organisms    <10,000 CFU/ml Normal Urogenital kilo present          RADIOLOGY & ADDITIONAL STUDIES:

## 2022-06-16 NOTE — PROGRESS NOTE ADULT - ASSESSMENT
26yFemale pmh anxiety and depression, presents for SOB.    Problem 1-Moderate ascites etiology tbd  patient with normal INR, normal platelets and normal liver on ultrasound   elevated ALP  Splenomegaly measuring 20.9 cm.  Rec  -can Check Hep A Ab, Hep B SAg, Hep B SAB, Hep C Ab, KRISHNA, Smooth Muscle antibody, KRISHNA, Ceruloplasmin, Ferritin, Transferrin Saturation, SPEP  -appreciate IR if there is enough fluid for paracentesis then patient should have diagnostic paracentesis   -Ultrasound guided diagnostic paracentesis: Obtain serum albumin same day as paracentesis.  -Obtain fluid studies: Paracentesis Panel which includes Cultures, Cell count, albumin, total protein, cytology, AFB smear and culture. Separate order of amylase fluid and triglycerides fluid  -Further treatment is based on diagnosis.  -2g Sodium diet  -Daily weights  -MELD Score-6  - Follow up with our [facility] located at [address] [place] [address]. Phone Number: [phone].     Problem 2- Asymptomatic Cholelithiasis. Gallbladder wall thickening. Negative sonographic   Hammer's sign. If there is high clinical suspicion for acute   cholecystitis recommend a HIDA scan.  asymptomatic  Rec  -consider surgical consult  -watchful waiting

## 2022-06-16 NOTE — CHART NOTE - NSCHARTNOTEFT_GEN_A_CORE
RRT was called for pt in the lobby    patient was found on the floor in ER, gasping for air, short of breath, tachypnea, bleeding profusely from her posterior scalp, was placed in a stretcher, was hypoxic 70s    emergently intubated, ct scan head ordered re scalp bleeding, cta chest r/o pe, septic emboli, pericardial effusion, aspiration pna, dissection, and other acute /subacute conditions, ct abdomen and pelvis also ordered    several syringes, drug paraphelia found on patient clothes (     pt is currently intubated and will get emergent cts and transfer to ICU    please refer to my earlier note from today re history. patient was awake, alert, oriented x 3, no resp distress and was responding to plan of care     I strongly suspect that she injected illicit drug ( abuses IV heroin) and she went into resp failure secondary to heroin overdose

## 2022-06-16 NOTE — PROGRESS NOTE ADULT - SUBJECTIVE AND OBJECTIVE BOX
ROXI POOJA  26y, Female  Allergy: buprenorphine (Rash)  Suboxone (Rash)    Hospital Day: 1d    Patient seen and examined earlier today.   pt is awake, sitting on chair, says she is urinating alot ( educated that this is expected with lasix and aldactone ) reports that her abdomen distension and legs swelling improving    PMH/PSH:  PAST MEDICAL & SURGICAL HISTORY:  Hepatitis C      Asthma      Anxiety and depression      IV drug abuse  heroin      No significant past surgical history          LAST 24-Hr EVENTS:    VITALS:  T(F): 96.9 (22 @ 05:00), Max: 96.9 (22 @ 05:00)  HR: 81 (22 @ 05:00)  BP: 154/97 (22 @ 05:00) (152/96 - 174/100)  RR: 16 (22 @ 05:00)  SpO2: 92% (22 @ 07:50)          TESTS & MEASUREMENTS:  Weight/BMI  85.6 (06-15-22 @ 11:25)  30.5 (06-15-22 @ 11:25)                          11.8   3.01  )-----------( 164      ( 2022 06:56 )             39.0     PT/INR - ( 2022 06:56 )   PT: 10.30 sec;   INR: 0.89 ratio         PTT - ( 2022 06:56 )  PTT:31.7 sec  INR: 0.89 ratio (22 @ 06:56)  INR: 0.95 ratio (06-15-22 @ 07:10)        139  |  105  |  15  ----------------------------<  93  4.6   |  20  |  0.9    Ca    8.6      2022 06:56  Mg     2.1         TPro  5.8<L>  /  Alb  2.6<L>  /  TBili  0.4  /  DBili  0.2  /  AST  28  /  ALT  9   /  AlkPhos  933<H>      LIVER FUNCTIONS - ( 2022 06:56 )  Alb: 2.6 g/dL / Pro: 5.8 g/dL / ALK PHOS: 933 U/L / ALT: 9 U/L / AST: 28 U/L / GGT: 237 U/L             Urinalysis Basic - ( 15 Reinaldo 2022 08:00 )    Color: Yellow / Appearance: Cloudy / S.025 / pH: x  Gluc: x / Ketone: Negative  / Bili: Small / Urobili: 1.0 mg/dL   Blood: x / Protein: >=300 mg/dL / Nitrite: Negative   Leuk Esterase: Small / RBC: 11-25 /HPF / WBC 6-10 /HPF   Sq Epi: x / Non Sq Epi: Occasional /HPF / Bacteria: Few         Serum Pro-Brain Natriuretic Peptide: 23361 pg/mL (06-15-22 @ 07:10)    COVID-19 PCR: NotDetec (06-15-22 @ 07:10)         RADIOLOGY, ECG, & ADDITIONAL TESTS:  12 Lead ECG:   Ventricular Rate 85 BPM    Atrial Rate 85 BPM    P-R Interval 156 ms    QRS Duration 76 ms    Q-T Interval 408 ms    QTC Calculation(Bazett) 485 ms    P Axis 77 degrees    R Axis 124 degrees    T Axis 18 degrees    Diagnosis Line Normal sinus rhythm  Right axis deviation  Low voltage QRS  Nonspecific T wave abnormality  Abnormal ECG    Confirmed by BRANDON BELL MD ([id number]) on 6/15/2022 9:49:33 AM (06-15-22 @ 08:09)      RECENT DIAGNOSTIC ORDERS:      MEDICATIONS:  MEDICATIONS  (STANDING):  furosemide   Injectable 40 milliGRAM(s) IV Push two times a day  methadone    Tablet   Oral   methadone    Tablet 10 milliGRAM(s) Oral every 12 hours  nicotine -  14 mG/24Hr(s) Patch 1 patch Transdermal daily  spironolactone 50 milliGRAM(s) Oral daily    MEDICATIONS  (PRN):  ALBUTerol    90 MICROgram(s) HFA Inhaler 1 Puff(s) Inhalation every 4 hours PRN Shortness of Breath and/or Wheezing  cloNIDine 0.1 milliGRAM(s) Oral every 6 hours PRN opiate withdrawal  hydrOXYzine hydrochloride 50 milliGRAM(s) Oral every 6 hours PRN Anxiety  ibuprofen  Tablet. 400 milliGRAM(s) Oral every 6 hours PRN Mild Pain (1 - 3)      HOME MEDICATIONS:      PHYSICAL EXAM:  GENERAL: awake, alert, oriented x 2  HEENT: poor dental hyegine, teeth decay and caries, no active infection  CHEST/LUNG: symmetrical expansion, no labored breathing  HEART: RRR s1s2  ABDOMEN: soft non tender, mild distension  EXTREMITIES:  edema noted b/l, no cyanosis   Skin: normal temp noted

## 2022-06-17 ENCOUNTER — RESULT REVIEW (OUTPATIENT)
Age: 27
End: 2022-06-17

## 2022-06-17 DIAGNOSIS — J96.00 ACUTE RESPIRATORY FAILURE, UNSPECIFIED WHETHER WITH HYPOXIA OR HYPERCAPNIA: ICD-10-CM

## 2022-06-17 DIAGNOSIS — R56.9 UNSPECIFIED CONVULSIONS: ICD-10-CM

## 2022-06-17 LAB
ALBUMIN SERPL ELPH-MCNC: 2 G/DL — LOW (ref 3.5–5.2)
ALP SERPL-CCNC: 640 U/L — HIGH (ref 30–115)
ALT FLD-CCNC: 9 U/L — SIGNIFICANT CHANGE UP (ref 0–41)
ANION GAP SERPL CALC-SCNC: 11 MMOL/L — SIGNIFICANT CHANGE UP (ref 7–14)
AST SERPL-CCNC: 35 U/L — SIGNIFICANT CHANGE UP (ref 0–41)
B PERT IGG+IGM PNL SER: CLEAR — SIGNIFICANT CHANGE UP
BASOPHILS # BLD AUTO: 0 K/UL — SIGNIFICANT CHANGE UP (ref 0–0.2)
BASOPHILS NFR BLD AUTO: 0 % — SIGNIFICANT CHANGE UP (ref 0–1)
BILIRUB SERPL-MCNC: 0.5 MG/DL — SIGNIFICANT CHANGE UP (ref 0.2–1.2)
BUN SERPL-MCNC: 17 MG/DL — SIGNIFICANT CHANGE UP (ref 10–20)
CALCIUM SERPL-MCNC: 7.6 MG/DL — LOW (ref 8.5–10.1)
CHLORIDE SERPL-SCNC: 107 MMOL/L — SIGNIFICANT CHANGE UP (ref 98–110)
CO2 SERPL-SCNC: 21 MMOL/L — SIGNIFICANT CHANGE UP (ref 17–32)
COLOR FLD: SIGNIFICANT CHANGE UP
CREAT SERPL-MCNC: 1.1 MG/DL — SIGNIFICANT CHANGE UP (ref 0.7–1.5)
CULTURE RESULTS: SIGNIFICANT CHANGE UP
EGFR: 70 ML/MIN/1.73M2 — SIGNIFICANT CHANGE UP
EOSINOPHIL # BLD AUTO: 0.01 K/UL — SIGNIFICANT CHANGE UP (ref 0–0.7)
EOSINOPHIL NFR BLD AUTO: 0.2 % — SIGNIFICANT CHANGE UP (ref 0–8)
FLUID INTAKE SUBSTANCE CLASS: SIGNIFICANT CHANGE UP
GLUCOSE BLDC GLUCOMTR-MCNC: 103 MG/DL — HIGH (ref 70–99)
GLUCOSE BLDC GLUCOMTR-MCNC: 76 MG/DL — SIGNIFICANT CHANGE UP (ref 70–99)
GLUCOSE SERPL-MCNC: 91 MG/DL — SIGNIFICANT CHANGE UP (ref 70–99)
HCT VFR BLD CALC: 31.2 % — LOW (ref 37–47)
HGB BLD-MCNC: 9.5 G/DL — LOW (ref 12–16)
IMM GRANULOCYTES NFR BLD AUTO: 0.2 % — SIGNIFICANT CHANGE UP (ref 0.1–0.3)
LYMPHOCYTES # BLD AUTO: 1.24 K/UL — SIGNIFICANT CHANGE UP (ref 1.2–3.4)
LYMPHOCYTES # BLD AUTO: 25 % — SIGNIFICANT CHANGE UP (ref 20.5–51.1)
LYMPHOCYTES # FLD: 64 % — SIGNIFICANT CHANGE UP
MCHC RBC-ENTMCNC: 25.7 PG — LOW (ref 27–31)
MCHC RBC-ENTMCNC: 30.4 G/DL — LOW (ref 32–37)
MCV RBC AUTO: 84.3 FL — SIGNIFICANT CHANGE UP (ref 81–99)
MONOCYTES # BLD AUTO: 0.19 K/UL — SIGNIFICANT CHANGE UP (ref 0.1–0.6)
MONOCYTES NFR BLD AUTO: 3.8 % — SIGNIFICANT CHANGE UP (ref 1.7–9.3)
MONOS+MACROS # FLD: 29 % — SIGNIFICANT CHANGE UP
MRSA PCR RESULT.: NEGATIVE — SIGNIFICANT CHANGE UP
NEUTROPHILS # BLD AUTO: 3.51 K/UL — SIGNIFICANT CHANGE UP (ref 1.4–6.5)
NEUTROPHILS NFR BLD AUTO: 70.8 % — SIGNIFICANT CHANGE UP (ref 42.2–75.2)
NEUTROPHILS-BODY FLUID: 7 % — SIGNIFICANT CHANGE UP
NRBC # BLD: 0 /100 WBCS — SIGNIFICANT CHANGE UP (ref 0–0)
PLATELET # BLD AUTO: 154 K/UL — SIGNIFICANT CHANGE UP (ref 130–400)
POTASSIUM SERPL-MCNC: 4.1 MMOL/L — SIGNIFICANT CHANGE UP (ref 3.5–5)
POTASSIUM SERPL-SCNC: 4.1 MMOL/L — SIGNIFICANT CHANGE UP (ref 3.5–5)
PROT SERPL-MCNC: 4.4 G/DL — LOW (ref 6–8)
RBC # BLD: 3.7 M/UL — LOW (ref 4.2–5.4)
RBC # FLD: 17.3 % — HIGH (ref 11.5–14.5)
RCV VOL RI: 0 /UL — SIGNIFICANT CHANGE UP (ref 0–0)
SODIUM SERPL-SCNC: 139 MMOL/L — SIGNIFICANT CHANGE UP (ref 135–146)
SPECIMEN SOURCE: SIGNIFICANT CHANGE UP
TOTAL NUCLEATED CELL COUNT, BODY FLUID: 95 /UL — SIGNIFICANT CHANGE UP
TUBE TYPE: SIGNIFICANT CHANGE UP
WBC # BLD: 4.96 K/UL — SIGNIFICANT CHANGE UP (ref 4.8–10.8)
WBC # FLD AUTO: 4.96 K/UL — SIGNIFICANT CHANGE UP (ref 4.8–10.8)

## 2022-06-17 PROCEDURE — 88305 TISSUE EXAM BY PATHOLOGIST: CPT | Mod: 26

## 2022-06-17 PROCEDURE — 88112 CYTOPATH CELL ENHANCE TECH: CPT | Mod: 26

## 2022-06-17 PROCEDURE — 99222 1ST HOSP IP/OBS MODERATE 55: CPT

## 2022-06-17 PROCEDURE — 99233 SBSQ HOSP IP/OBS HIGH 50: CPT

## 2022-06-17 PROCEDURE — 99223 1ST HOSP IP/OBS HIGH 75: CPT

## 2022-06-17 PROCEDURE — 99232 SBSQ HOSP IP/OBS MODERATE 35: CPT

## 2022-06-17 PROCEDURE — 71045 X-RAY EXAM CHEST 1 VIEW: CPT | Mod: 26

## 2022-06-17 RX ORDER — LEVETIRACETAM 250 MG/1
500 TABLET, FILM COATED ORAL EVERY 12 HOURS
Refills: 0 | Status: DISCONTINUED | OUTPATIENT
Start: 2022-06-17 | End: 2022-06-20

## 2022-06-17 RX ORDER — MIDAZOLAM HYDROCHLORIDE 1 MG/ML
2 INJECTION, SOLUTION INTRAMUSCULAR; INTRAVENOUS ONCE
Refills: 0 | Status: DISCONTINUED | OUTPATIENT
Start: 2022-06-17 | End: 2022-06-17

## 2022-06-17 RX ORDER — SODIUM CHLORIDE 9 MG/ML
500 INJECTION INTRAMUSCULAR; INTRAVENOUS; SUBCUTANEOUS ONCE
Refills: 0 | Status: DISCONTINUED | OUTPATIENT
Start: 2022-06-17 | End: 2022-06-18

## 2022-06-17 RX ORDER — HEPARIN SODIUM 5000 [USP'U]/ML
5000 INJECTION INTRAVENOUS; SUBCUTANEOUS EVERY 8 HOURS
Refills: 0 | Status: DISCONTINUED | OUTPATIENT
Start: 2022-06-17 | End: 2022-06-27

## 2022-06-17 RX ORDER — MIDAZOLAM HYDROCHLORIDE 1 MG/ML
0.02 INJECTION, SOLUTION INTRAMUSCULAR; INTRAVENOUS
Qty: 100 | Refills: 0 | Status: DISCONTINUED | OUTPATIENT
Start: 2022-06-17 | End: 2022-06-24

## 2022-06-17 RX ORDER — AMPICILLIN SODIUM AND SULBACTAM SODIUM 250; 125 MG/ML; MG/ML
3 INJECTION, POWDER, FOR SUSPENSION INTRAMUSCULAR; INTRAVENOUS EVERY 6 HOURS
Refills: 0 | Status: DISCONTINUED | OUTPATIENT
Start: 2022-06-17 | End: 2022-06-27

## 2022-06-17 RX ORDER — SODIUM CHLORIDE 9 MG/ML
500 INJECTION INTRAMUSCULAR; INTRAVENOUS; SUBCUTANEOUS ONCE
Refills: 0 | Status: COMPLETED | OUTPATIENT
Start: 2022-06-17 | End: 2022-06-17

## 2022-06-17 RX ADMIN — MIDAZOLAM HYDROCHLORIDE 2 MILLIGRAM(S): 1 INJECTION, SOLUTION INTRAMUSCULAR; INTRAVENOUS at 15:04

## 2022-06-17 RX ADMIN — Medication 40 MILLIGRAM(S): at 05:25

## 2022-06-17 RX ADMIN — SPIRONOLACTONE 50 MILLIGRAM(S): 25 TABLET, FILM COATED ORAL at 05:29

## 2022-06-17 RX ADMIN — DEXMEDETOMIDINE HYDROCHLORIDE IN 0.9% SODIUM CHLORIDE 4.28 MICROGRAM(S)/KG/HR: 4 INJECTION INTRAVENOUS at 08:03

## 2022-06-17 RX ADMIN — FENTANYL CITRATE 4.28 MICROGRAM(S)/KG/HR: 50 INJECTION INTRAVENOUS at 19:31

## 2022-06-17 RX ADMIN — Medication 8.03 MICROGRAM(S)/KG/MIN: at 21:30

## 2022-06-17 RX ADMIN — LEVETIRACETAM 400 MILLIGRAM(S): 250 TABLET, FILM COATED ORAL at 18:32

## 2022-06-17 RX ADMIN — Medication 8.03 MICROGRAM(S)/KG/MIN: at 08:03

## 2022-06-17 RX ADMIN — DEXMEDETOMIDINE HYDROCHLORIDE IN 0.9% SODIUM CHLORIDE 4.28 MICROGRAM(S)/KG/HR: 4 INJECTION INTRAVENOUS at 21:30

## 2022-06-17 RX ADMIN — Medication 100 MILLIGRAM(S): at 14:42

## 2022-06-17 RX ADMIN — Medication 1 PATCH: at 12:55

## 2022-06-17 RX ADMIN — Medication 1 MILLIGRAM(S): at 12:55

## 2022-06-17 RX ADMIN — DEXMEDETOMIDINE HYDROCHLORIDE IN 0.9% SODIUM CHLORIDE 4.28 MICROGRAM(S)/KG/HR: 4 INJECTION INTRAVENOUS at 23:59

## 2022-06-17 RX ADMIN — AMPICILLIN SODIUM AND SULBACTAM SODIUM 200 GRAM(S): 250; 125 INJECTION, POWDER, FOR SUSPENSION INTRAMUSCULAR; INTRAVENOUS at 14:43

## 2022-06-17 RX ADMIN — HEPARIN SODIUM 5000 UNIT(S): 5000 INJECTION INTRAVENOUS; SUBCUTANEOUS at 21:34

## 2022-06-17 RX ADMIN — PROPOFOL 0.51 MICROGRAM(S)/KG/MIN: 10 INJECTION, EMULSION INTRAVENOUS at 08:04

## 2022-06-17 RX ADMIN — FENTANYL CITRATE 4.28 MICROGRAM(S)/KG/HR: 50 INJECTION INTRAVENOUS at 08:04

## 2022-06-17 RX ADMIN — MIDAZOLAM HYDROCHLORIDE 2 MILLIGRAM(S): 1 INJECTION, SOLUTION INTRAMUSCULAR; INTRAVENOUS at 18:32

## 2022-06-17 RX ADMIN — SODIUM CHLORIDE 500 MILLILITER(S): 9 INJECTION INTRAMUSCULAR; INTRAVENOUS; SUBCUTANEOUS at 12:53

## 2022-06-17 RX ADMIN — Medication 1 PATCH: at 19:30

## 2022-06-17 RX ADMIN — MIDAZOLAM HYDROCHLORIDE 1.71 MG/KG/HR: 1 INJECTION, SOLUTION INTRAMUSCULAR; INTRAVENOUS at 21:32

## 2022-06-17 RX ADMIN — PROPOFOL 0.51 MICROGRAM(S)/KG/MIN: 10 INJECTION, EMULSION INTRAVENOUS at 03:38

## 2022-06-17 RX ADMIN — PROPOFOL 0.51 MICROGRAM(S)/KG/MIN: 10 INJECTION, EMULSION INTRAVENOUS at 21:30

## 2022-06-17 RX ADMIN — AMPICILLIN SODIUM AND SULBACTAM SODIUM 200 GRAM(S): 250; 125 INJECTION, POWDER, FOR SUSPENSION INTRAMUSCULAR; INTRAVENOUS at 18:32

## 2022-06-17 RX ADMIN — DEXMEDETOMIDINE HYDROCHLORIDE IN 0.9% SODIUM CHLORIDE 4.28 MICROGRAM(S)/KG/HR: 4 INJECTION INTRAVENOUS at 03:37

## 2022-06-17 NOTE — CONSULT NOTE ADULT - SUBJECTIVE AND OBJECTIVE BOX
Patient is a 26y old  Female who presents with a chief complaint of anasarca (2022 17:09)      HPI:  Patient is a 26 year old female with hx of asthma, untreated Hep C, IVDA, anasarca presenting with increased dyspnea since yesterday. Patient has been short of breath chronically and has been admitted for fluid overload. Patient describes worsening symptoms yesterday associated with cough. States generalized edema has remained the same. Patient is actively using heroin. Not on any meds or MMTP.  Denies fever, chills, sore throat, cp, palpitations, abd pain, n/v/d, dysuria, headache, syncope, hemoptysis. + generalized edema (15 Reinaldo 2022 10:03)  patient left the floor and then while she was in the lobby rapid respond called and patient was seizing and get intubated for airway protection   had scalp laceration   now sedated on 3 sedation   [id number]     PAST MEDICAL & SURGICAL HISTORY:  Hepatitis C      Asthma      Anxiety and depression      IV drug abuse  heroin      No significant past surgical history        Allergies    buprenorphine (Rash)  Suboxone (Rash)    Intolerances      Family history : no cardiovscular family history   Home Medications:    Occupation:  [employer]: Denied  Smoking: Denied  Drug Use: Denied  Marital Status:         ROS: as in HPI; All other systems reviewed are negative    ICU Vital Signs Last 24 Hrs  T(C): 36.6 (2022 07:10), Max: 36.8 (2022 23:00)  T(F): 97.9 (2022 07:10), Max: 98.3 (2022 23:00)  HR: 61 (2022 07:01) (60 - 128)  BP: 101/72 (2022 07:01) (73/47 - 182/103)  BP(mean): 82 (2022 07:01) (56 - 121)  ABP: --  ABP(mean): --  RR: 24 (2022 07:10) (24 - 36)  SpO2: 99% (2022 07:01) (94% - 100%)        Physical Examination:    General: No acute distress.  Alert, oriented, interactive, nonfocal    HEENT: Pupils equal, reactive to light.  Symmetric.    PULM: Clear to auscultation bilaterally, no significant sputum production    CVS: Regular rate and rhythm, no murmurs, rubs, or gallops    ABD: Soft, nondistended, nontender, normoactive bowel sounds, no masses    EXT: No edema, nontender, no clubbing     SKIN: Warm and well perfused, no rashes noted.    Neurology : no motor or sensory deficit     Musculoskeletal : move all extremity     Lymphatic system: no Palpable node       Mode: AC/ CMV (Assist Control/ Continuous Mandatory Ventilation)  RR (machine): 24  TV (machine): 500  FiO2: 40  PEEP: 8  ITime: 1  MAP: 15  PIP: 30      ABG - ( 2022 06:14 )  pH, Arterial: 7.45  pH, Blood: x     /  pCO2: 32    /  pO2: 108   / HCO3: 22    / Base Excess: -1.0  /  SaO2: 99.2                I&O's Detail    2022 07:01  -  2022 07:00  --------------------------------------------------------  IN:    Dexmedetomidine: 492 mL    FentaNYL: 312 mL    Norepinephrine: 25 mL    Propofol: 320.4 mL  Total IN: 1149.4 mL    OUT:    Indwelling Catheter - Urethral (mL): 750 mL    Voided (mL): 370 mL  Total OUT: 1120 mL    Total NET: 29.4 mL      2022 07:01  -  2022 07:57  --------------------------------------------------------  IN:    FentaNYL: 17 mL    Norepinephrine: 3 mL    Propofol: 25 mL  Total IN: 45 mL    OUT:    Indwelling Catheter - Urethral (mL): 500 mL  Total OUT: 500 mL    Total NET: -455 mL            LABS:                        9.5    4.96  )-----------( 154      ( 2022 05:27 )             31.2     2022 17:36    136    |  104    |  16     ----------------------------<  96     4.3     |  21     |  1.1      Ca    7.6        2022 17:36  Mg     2.1       2022 06:56    TPro  4.8    /  Alb  2.2    /  TBili  0.3    /  DBili  x      /  AST  34     /  ALT  10     /  AlkPhos  796    2022 17:36  Amylase x     lipase x          CARDIAC MARKERS ( 2022 17:36 )  x     / x     / 495 U/L / x     / x      CARDIAC MARKERS ( 2022 16:41 )  x     / 0.04 ng/mL / x     / x     / x          CAPILLARY BLOOD GLUCOSE      POCT Blood Glucose.: 103 mg/dL (2022 06:02)    PT/INR - ( 2022 06:56 )   PT: 10.30 sec;   INR: 0.89 ratio         PTT - ( 2022 06:56 )  PTT:31.7 sec  Urinalysis Basic - ( 15 Reinaldo 2022 08:00 )    Color: Yellow / Appearance: Cloudy / S.025 / pH: x  Gluc: x / Ketone: Negative  / Bili: Small / Urobili: 1.0 mg/dL   Blood: x / Protein: >=300 mg/dL / Nitrite: Negative   Leuk Esterase: Small / RBC: 11-25 /HPF / WBC 6-10 /HPF   Sq Epi: x / Non Sq Epi: Occasional /HPF / Bacteria: Few      Culture    Culture - Urine (collected 15 Reinaldo 2022 08:00)  Source: Clean Catch Clean Catch (Midstream)  Preliminary Report (2022 15:44):    10,000 - 49,000 CFU/mL Gram positive organisms    <10,000 CFU/ml Normal Urogenital kilo present      Lactate, Blood: 1.5 mmol/L (22 @ 17:36)  Lactate, Blood: 2.0 mmol/L (22 @ 16:41)  Lactate, Blood: 0.5 mmol/L (06-15-22 @ 07:10)      MEDICATIONS  (STANDING):  dexMEDEtomidine Infusion 0.2 MICROgram(s)/kG/Hr (4.28 mL/Hr) IV Continuous <Continuous>  fentaNYL   Infusion. 0.5 MICROgram(s)/kG/Hr (4.28 mL/Hr) IV Continuous <Continuous>  folic acid 1 milliGRAM(s) Oral daily  furosemide   Injectable 40 milliGRAM(s) IV Push two times a day  methadone    Tablet   Oral   methadone    Tablet 10 milliGRAM(s) Oral every 12 hours  nicotine -  14 mG/24Hr(s) Patch 1 patch Transdermal daily  norepinephrine Infusion 0.05 MICROgram(s)/kG/Min (8.03 mL/Hr) IV Continuous <Continuous>  pantoprazole    Tablet 40 milliGRAM(s) Oral before breakfast  propofol Infusion 1 MICROgram(s)/kG/Min (0.51 mL/Hr) IV Continuous <Continuous>  spironolactone 50 milliGRAM(s) Oral daily  thiamine 100 milliGRAM(s) Oral daily    MEDICATIONS  (PRN):  ALBUTerol    90 MICROgram(s) HFA Inhaler 1 Puff(s) Inhalation every 4 hours PRN Shortness of Breath and/or Wheezing  cloNIDine 0.1 milliGRAM(s) Oral every 6 hours PRN opiate withdrawal  hydrOXYzine hydrochloride 50 milliGRAM(s) Oral every 6 hours PRN Anxiety  ibuprofen  Tablet. 400 milliGRAM(s) Oral every 6 hours PRN Mild Pain (1 - 3)        RADIOLOGY: ***     CXR: compare to  decrease b/l opacity still has more on right   TLC:  OG:  ET tube:        CAM ICU:  ECHO:

## 2022-06-17 NOTE — PROGRESS NOTE ADULT - ASSESSMENT
26 year old female with hx of asthma, untreated Hep C, IVDA, anasarca was admitted to medical floor  with increased SOB and anasarca  Pt admits to using opiates 2 grams per day IV into her thighs  x years with minimal sobriety. Pt has been on MMTP in 2020. Pt is contemplating going back on program. Pt denies any other substance abuse. Pt states used a xanax for wd about 3 days ago.      Hepatitis C with Cirrhosis no compliant with treatment  Hx of withdrawal   variable periods of sobriety in the past    on floor pt was diuresed with improvement of symptoms  when the following events took place    "RRT was called for pt in the [facility]    patient was found on the floor in ER, gasping for air, short of breath, tachypnea, bleeding profusely from her posterior scalp, was placed in a stretcher, was hypoxic 70s    emergently intubated, ct scan head ordered re scalp bleeding    several syringes, drug paraphanellia found on patient clothes     pt  intubated and  transfered to ICU       I strongly suspect that she injected illicit drug ( abuses IV heroin) and she went into resp failure secondary to heroin overdose."      Acute respiratory failure with hypoxemia / suspected drug overdose / head laceration s/p fall in lobby / possible seizure     - head laceration repaired by surgery - CT head negative for intracranial bleed   - paracentesis performed as per GI recommendation   - check EEG result   - Keppra 500mg q12h as per neuro    - on antibiotics - continue and check procal   - DVT and GI prophylaxis

## 2022-06-17 NOTE — PROGRESS NOTE ADULT - ASSESSMENT
26yFemale pmh anxiety and depression, presents for SOB.    Problem 1-Moderate ascites etiology tbd  patient with normal INR, normal platelets and normal liver on ultrasound   elevated ALP  Splenomegaly measuring 20.9 cm.  Rec  -f/u Check Hep A Ab, Hep B SAg, Hep B SAB, Hep C Ab, KRISHNA, Smooth Muscle antibody, KRISHNA, Ceruloplasmin, Ferritin, Transferrin Saturation, SPEP  -f/u paracentesis panel  -Further treatment is based on diagnosis.  -2g Sodium diet  -Daily weights  - Follow up with our [facility] located at [address] [place] [address]. Phone Number: [phone].     Problem 2- Asymptomatic Cholelithiasis. Gallbladder wall thickening. Negative sonographic   Hammer's sign. If there is high clinical suspicion for acute   cholecystitis recommend a HIDA scan.  asymptomatic  Rec  -consider surgical consult  -watchful waiting

## 2022-06-17 NOTE — PROGRESS NOTE ADULT - SUBJECTIVE AND OBJECTIVE BOX
26yFemale  Being followed for abdominal distention  Interval history:  No hematemesis, melena, blood in stool reported.       PAST MEDICAL & SURGICAL HISTORY:   Hepatitis C      Asthma      Anxiety and depression      IV drug abuse  heroin      No significant past surgical history              Social History: No smoking. No alcohol. No illegal drug use.            MEDICATIONS  (STANDING):  ampicillin/sulbactam  IVPB 3 Gram(s) IV Intermittent every 6 hours  dexMEDEtomidine Infusion 0.2 MICROgram(s)/kG/Hr (4.28 mL/Hr) IV Continuous <Continuous>  fentaNYL   Infusion. 0.5 MICROgram(s)/kG/Hr (4.28 mL/Hr) IV Continuous <Continuous>  folic acid 1 milliGRAM(s) Oral daily  furosemide   Injectable 40 milliGRAM(s) IV Push two times a day  methadone    Tablet   Oral   methadone    Tablet 10 milliGRAM(s) Oral every 12 hours  nicotine -  14 mG/24Hr(s) Patch 1 patch Transdermal daily  norepinephrine Infusion 0.05 MICROgram(s)/kG/Min (8.03 mL/Hr) IV Continuous <Continuous>  pantoprazole    Tablet 40 milliGRAM(s) Oral before breakfast  propofol Infusion 1 MICROgram(s)/kG/Min (0.51 mL/Hr) IV Continuous <Continuous>  sodium chloride 0.9% Bolus 500 milliLiter(s) IV Bolus once  spironolactone 50 milliGRAM(s) Oral daily  thiamine 100 milliGRAM(s) Oral daily    MEDICATIONS  (PRN):  ALBUTerol    90 MICROgram(s) HFA Inhaler 1 Puff(s) Inhalation every 4 hours PRN Shortness of Breath and/or Wheezing  cloNIDine 0.1 milliGRAM(s) Oral every 6 hours PRN opiate withdrawal  hydrOXYzine hydrochloride 50 milliGRAM(s) Oral every 6 hours PRN Anxiety  ibuprofen  Tablet. 400 milliGRAM(s) Oral every 6 hours PRN Mild Pain (1 - 3)      Allergies:   buprenorphine (Rash)  Suboxone (Rash)              REVIEW OF SYSTEMS:  unobtainable     VITAL SIGNS:   T(F): 98.8 (06-17-22 @ 15:01), Max: 98.8 (06-17-22 @ 15:01)  HR: 68 (06-17-22 @ 15:00) (60 - 119)  BP: 115/69 (06-17-22 @ 15:00) (73/47 - 136/93)  RR: 32 (06-17-22 @ 15:01) (22 - 36)  SpO2: 96% (06-17-22 @ 15:01) (95% - 100%)    PHYSICAL EXAM:  GENERAL: +intubated   HEAD:  Atraumatic, Normocephalic  EYES: conjunctiva and sclera clear  NECK: Supple, no JVD or thyromegaly  CHEST/LUNG: b/l rhonchi  HEART: Regular rate and rhythm; normal S1, S2, No murmurs.  ABDOMEN: Soft, nontender, +distended; Bowel sounds present  NEUROLOGY: No asterixis or tremor.   SKIN: Intact, no jaundice            LABS:                        9.5    4.96  )-----------( 154      ( 17 Jun 2022 05:27 )             31.2     06-17    139  |  107  |  17  ----------------------------<  91  4.1   |  21  |  1.1    Ca    7.6<L>      17 Jun 2022 05:27  Mg     2.1     06-16    TPro  4.4<L>  /  Alb  2.0<L>  /  TBili  0.5  /  DBili  x   /  AST  35  /  ALT  9   /  AlkPhos  640<H>  06-17    LIVER FUNCTIONS - ( 17 Jun 2022 05:27 )  Alb: 2.0 g/dL / Pro: 4.4 g/dL / ALK PHOS: 640 U/L / ALT: 9 U/L / AST: 35 U/L / GGT: x           PT/INR - ( 16 Jun 2022 06:56 )   PT: 10.30 sec;   INR: 0.89 ratio         PTT - ( 16 Jun 2022 06:56 )  PTT:31.7 sec    IMAGING:    < from: US Abdomen Complete (US Abdomen Complete .) (06.15.22 @ 11:29) >    ACC: [id number] EXAM:  US ABDOMEN COMPLETE                          PROCEDURE DATE:  06/15/2022          INTERPRETATION:  CLINICAL INFORMATION: Anasarca, IV drug abuse    COMPARISON: None available.    TECHNIQUE: Sonography of the abdomen.    FINDINGS:  Liver: Within normal limits.  Bile ducts: Normal caliber. Common bile duct measures 5 mm.  Gallbladder: Cholelithiasis.. Gallbladder wall thickening to 6 mm.   Negative sonographic Hammer's sign. No particles the fluid.  Pancreas: Visualized portions are within normal limits.  Spleen: 20.9 cm. Within normal limits.  Right kidney: 11.9 cm. No hydronephrosis.  Left kidney: 14.1 cm. No hydronephrosis.  Ascites: Moderate ascites.  Aorta and IVC: Visualized portions are within normal limits.    IMPRESSION:  Cholelithiasis. Gallbladder wall thickening. Negative sonographic   Hammer's sign. If there is high clinical suspicion for acute   cholecystitis recommend a HIDA scan.    Splenomegaly measuring 20.9 cm.    Moderate ascites.        --- End ofReport ---            SHELLI SHER MD; Attending Radiologist  This document has been electronically signed. Reinaldo 15 2022 12:14PM    < end of copied text >         26yFemale  Being followed for abdominal distention  Interval history:  No hematemesis, melena, blood in stool reported.       PAST MEDICAL & SURGICAL HISTORY:   Hepatitis C      Asthma      Anxiety and depression      IV drug abuse  heroin      No significant past surgical history              Social History: No smoking. No alcohol. +substance abuse            MEDICATIONS  (STANDING):  ampicillin/sulbactam  IVPB 3 Gram(s) IV Intermittent every 6 hours  dexMEDEtomidine Infusion 0.2 MICROgram(s)/kG/Hr (4.28 mL/Hr) IV Continuous <Continuous>  fentaNYL   Infusion. 0.5 MICROgram(s)/kG/Hr (4.28 mL/Hr) IV Continuous <Continuous>  folic acid 1 milliGRAM(s) Oral daily  furosemide   Injectable 40 milliGRAM(s) IV Push two times a day  methadone    Tablet   Oral   methadone    Tablet 10 milliGRAM(s) Oral every 12 hours  nicotine -  14 mG/24Hr(s) Patch 1 patch Transdermal daily  norepinephrine Infusion 0.05 MICROgram(s)/kG/Min (8.03 mL/Hr) IV Continuous <Continuous>  pantoprazole    Tablet 40 milliGRAM(s) Oral before breakfast  propofol Infusion 1 MICROgram(s)/kG/Min (0.51 mL/Hr) IV Continuous <Continuous>  sodium chloride 0.9% Bolus 500 milliLiter(s) IV Bolus once  spironolactone 50 milliGRAM(s) Oral daily  thiamine 100 milliGRAM(s) Oral daily    MEDICATIONS  (PRN):  ALBUTerol    90 MICROgram(s) HFA Inhaler 1 Puff(s) Inhalation every 4 hours PRN Shortness of Breath and/or Wheezing  cloNIDine 0.1 milliGRAM(s) Oral every 6 hours PRN opiate withdrawal  hydrOXYzine hydrochloride 50 milliGRAM(s) Oral every 6 hours PRN Anxiety  ibuprofen  Tablet. 400 milliGRAM(s) Oral every 6 hours PRN Mild Pain (1 - 3)      Allergies:   buprenorphine (Rash)  Suboxone (Rash)              REVIEW OF SYSTEMS:  unobtainable     VITAL SIGNS:   T(F): 98.8 (06-17-22 @ 15:01), Max: 98.8 (06-17-22 @ 15:01)  HR: 68 (06-17-22 @ 15:00) (60 - 119)  BP: 115/69 (06-17-22 @ 15:00) (73/47 - 136/93)  RR: 32 (06-17-22 @ 15:01) (22 - 36)  SpO2: 96% (06-17-22 @ 15:01) (95% - 100%)    PHYSICAL EXAM:  GENERAL: +intubated   HEAD:  Atraumatic, Normocephalic  EYES: conjunctiva and sclera clear  NECK: Supple, no JVD or thyromegaly  CHEST/LUNG: b/l rhonchi  HEART: Regular rate and rhythm; normal S1, S2, No murmurs.  ABDOMEN: Soft, nontender, +distended; Bowel sounds present  NEUROLOGY: No asterixis or tremor.   SKIN: Intact, no jaundice            LABS:                        9.5    4.96  )-----------( 154      ( 17 Jun 2022 05:27 )             31.2     06-17    139  |  107  |  17  ----------------------------<  91  4.1   |  21  |  1.1    Ca    7.6<L>      17 Jun 2022 05:27  Mg     2.1     06-16    TPro  4.4<L>  /  Alb  2.0<L>  /  TBili  0.5  /  DBili  x   /  AST  35  /  ALT  9   /  AlkPhos  640<H>  06-17    LIVER FUNCTIONS - ( 17 Jun 2022 05:27 )  Alb: 2.0 g/dL / Pro: 4.4 g/dL / ALK PHOS: 640 U/L / ALT: 9 U/L / AST: 35 U/L / GGT: x           PT/INR - ( 16 Jun 2022 06:56 )   PT: 10.30 sec;   INR: 0.89 ratio         PTT - ( 16 Jun 2022 06:56 )  PTT:31.7 sec    IMAGING:    < from: US Abdomen Complete (US Abdomen Complete .) (06.15.22 @ 11:29) >    ACC: [id number] EXAM:  US ABDOMEN COMPLETE                          PROCEDURE DATE:  06/15/2022          INTERPRETATION:  CLINICAL INFORMATION: Anasarca, IV drug abuse    COMPARISON: None available.    TECHNIQUE: Sonography of the abdomen.    FINDINGS:  Liver: Within normal limits.  Bile ducts: Normal caliber. Common bile duct measures 5 mm.  Gallbladder: Cholelithiasis.. Gallbladder wall thickening to 6 mm.   Negative sonographic Hammer's sign. No particles the fluid.  Pancreas: Visualized portions are within normal limits.  Spleen: 20.9 cm. Within normal limits.  Right kidney: 11.9 cm. No hydronephrosis.  Left kidney: 14.1 cm. No hydronephrosis.  Ascites: Moderate ascites.  Aorta and IVC: Visualized portions are within normal limits.    IMPRESSION:  Cholelithiasis. Gallbladder wall thickening. Negative sonographic   Hammer's sign. If there is high clinical suspicion for acute   cholecystitis recommend a HIDA scan.    Splenomegaly measuring 20.9 cm.    Moderate ascites.        --- End ofReport ---            SHELLI SHER MD; Attending Radiologist  This document has been electronically signed. Reinaldo 15 2022 12:14PM    < end of copied text >

## 2022-06-17 NOTE — CONSULT NOTE ADULT - ASSESSMENT
IMPRESSION:  Arf secondary to   alter mental status secondary to   seizure   ?? drug over dose / withdrawal opoid       PLAN:    CNS: VEEG if no seizure activity proceed with SAT to assess mental status   neurology eval   detox and toxicology consult   follow drug screen adn toxicology     HEENT: oral care     PULMONARY: lower rate to 20   lower peep to 5   send DTA   monitor peak and plateau     CARDIOVASCULAR: do cheetah   taper pressors for map 65   echo   EKg   repeat CE    GI: GI prophylaxis.  NG Feeding     RENAL:monitor lytes is and os     INFECTIOUS DISEASE: start unasyn   DTA , nasal mrsa   procal   bld cx   if procal negative possible d/c abx     HEMATOLOGICAL:  DVT prophylaxis. heparin SQ     ENDOCRINE:  Follow up FS.  Insulin protocol if needed.    ICU monitoring         CRITICAL CARE TIME SPENT: *** IMPRESSION:  Arf secondary to   alter mental status secondary to   seizure   ?? drug over dose / withdrawal opoid   liver cirrhosis     PLAN:    CNS: VEEG if no seizure activity proceed with SAT to assess mental status   neurology eval   detox and toxicology consult   follow drug screen adn toxicology     HEENT: oral care     PULMONARY: lower rate to 22   lower peep to 5   lower TV to 450   send DTA   monitor peak and plateau     CARDIOVASCULAR: do cheetah   taper pressors for map 65   echo   EKg   repeat CE    GI: GI prophylaxis.  NG Feeding   GI follow up for paracentesis   RENAL:monitor lytes is and os     INFECTIOUS DISEASE: start unasyn   DTA , nasal mrsa   procal   bld cx   if procal negative possible d/c abx     HEMATOLOGICAL:  DVT prophylaxis. heparin SQ     ENDOCRINE:  Follow up FS.  Insulin protocol if needed.    ICU monitoring         CRITICAL CARE TIME SPENT: ***

## 2022-06-17 NOTE — CONSULT NOTE ADULT - SUBJECTIVE AND OBJECTIVE BOX
Neurology Consult    Patient is a 26y old  Female who presents with a chief complaint of anasarca (17 Jun 2022 07:57)      HPI:  Patient is a 26 year old female with hx of asthma, untreated Hep C, IVDA, anasarca presenting with increased dyspnea since yesterday. Patient has been short of breath chronically and has been admitted for fluid overload. Patient describes worsening symptoms yesterday associated with cough. States generalized edema has remained the same. Patient is actively using heroin. Not on any meds or MMTP.  Denies fever, chills, sore throat, cp, palpitations, abd pain, n/v/d, dysuria, headache, syncope, hemoptysis. + generalized edema (15 Reinaldo 2022 10:03)    Pt was in lobby, sustained a fall, hit head and subsequently seizure witness.  CTH negative except for right parietal scalp lac.    PAST MEDICAL & SURGICAL HISTORY:  Hepatitis C  Asthma  Anxiety and depression    IV drug abuse  heroin      No significant past surgical history        FAMILY HISTORY:  No pertinent family history in first degree relatives        Social History: (-) x 3    Allergies    buprenorphine (Rash)  Suboxone (Rash)    Intolerances        MEDICATIONS  (STANDING):  ampicillin/sulbactam  IVPB 3 Gram(s) IV Intermittent every 6 hours  dexMEDEtomidine Infusion 0.2 MICROgram(s)/kG/Hr (4.28 mL/Hr) IV Continuous <Continuous>  fentaNYL   Infusion. 0.5 MICROgram(s)/kG/Hr (4.28 mL/Hr) IV Continuous <Continuous>  folic acid 1 milliGRAM(s) Oral daily  furosemide   Injectable 40 milliGRAM(s) IV Push two times a day  methadone    Tablet   Oral   methadone    Tablet 10 milliGRAM(s) Oral every 12 hours  nicotine -  14 mG/24Hr(s) Patch 1 patch Transdermal daily  norepinephrine Infusion 0.05 MICROgram(s)/kG/Min (8.03 mL/Hr) IV Continuous <Continuous>  pantoprazole    Tablet 40 milliGRAM(s) Oral before breakfast  propofol Infusion 1 MICROgram(s)/kG/Min (0.51 mL/Hr) IV Continuous <Continuous>  sodium chloride 0.9% Bolus 500 milliLiter(s) IV Bolus once  spironolactone 50 milliGRAM(s) Oral daily  thiamine 100 milliGRAM(s) Oral daily    MEDICATIONS  (PRN):  ALBUTerol    90 MICROgram(s) HFA Inhaler 1 Puff(s) Inhalation every 4 hours PRN Shortness of Breath and/or Wheezing  cloNIDine 0.1 milliGRAM(s) Oral every 6 hours PRN opiate withdrawal  hydrOXYzine hydrochloride 50 milliGRAM(s) Oral every 6 hours PRN Anxiety  ibuprofen  Tablet. 400 milliGRAM(s) Oral every 6 hours PRN Mild Pain (1 - 3)      Review of systems:    Constitutional: No fever, weight loss or fatigue    Eyes: No eye pain or discharge  ENMT:  No difficulty hearing; No sinus or throat pain  Neck: No pain or stiffness  Respiratory: No cough, wheezing, chills or hemoptysis  Cardiovascular: No chest pain, palpitations, shortness of breath, dyspnea on exertion  Gastrointestinal: No abdominal pain, nausea, vomiting or hematemesis; No diarrhea or constipation.   Genitourinary: No dysuria, frequency, hematuria or incontinence  Neurological: As per HPI  Skin: No rashes or lesions   Endocrine: No heat or cold intolerance; No hair loss  Musculoskeletal: No joint pain or swelling  Psychiatric: No depression, anxiety, mood swings  Heme/Lymph: No easy bruising or bleeding gums    Vital Signs Last 24 Hrs  T(C): 36.6 (17 Jun 2022 07:10), Max: 36.8 (16 Jun 2022 23:00)  T(F): 97.9 (17 Jun 2022 07:10), Max: 98.3 (16 Jun 2022 23:00)  HR: 63 (17 Jun 2022 09:00) (60 - 128)  BP: 113/82 (17 Jun 2022 09:00) (73/47 - 182/103)  BP(mean): 93 (17 Jun 2022 09:00) (56 - 121)  RR: 22 (17 Jun 2022 09:00) (22 - 36)  SpO2: 100% (17 Jun 2022 09:00) (94% - 100%)    Neurologic Examination:  Patient is intubated and on propofol and precedex  General:  Appearance is consistent with chronologic age.   General: The patient awake and readily follows commands to look left and right, visual fields intact.  Squeezes hands to commands and wiggles toes to command.   Cranial nerves:  VFF.  EOMI w/o nystagmus.  PERRL.  No ptosis/weakness of eyelid closure.  Facial sensation is normal with normal bite.  No facial asymmetry.  Hearing grossly intact b/l.  Palate elevates midline.  Tongue midline.  Motor examination:   Normal tone, bulk and range of motion.  No tenderness, twitching, tremors or involuntary movements.  Formal Muscle Strength Testing: (MRC grade R/L) 5/5 UE; 5/5 LE.  No observable drift.  Reflexes:   symmetric upper and lower extremities and downgoing toes.  Sensory examination:   Intact to light touch vibration in all extremities.  Coord and Gait not tested.    Labs:   CBC Full  -  ( 17 Jun 2022 05:27 )  WBC Count : 4.96 K/uL  RBC Count : 3.70 M/uL  Hemoglobin : 9.5 g/dL  Hematocrit : 31.2 %  Platelet Count - Automated : 154 K/uL  Mean Cell Volume : 84.3 fL  Mean Cell Hemoglobin : 25.7 pg  Mean Cell Hemoglobin Concentration : 30.4 g/dL  Auto Neutrophil # : 3.51 K/uL  Auto Lymphocyte # : 1.24 K/uL  Auto Monocyte # : 0.19 K/uL  Auto Eosinophil # : 0.01 K/uL  Auto Basophil # : 0.00 K/uL  Auto Neutrophil % : 70.8 %  Auto Lymphocyte % : 25.0 %  Auto Monocyte % : 3.8 %  Auto Eosinophil % : 0.2 %  Auto Basophil % : 0.0 %    06-17    139  |  107  |  17  ----------------------------<  91  4.1   |  21  |  1.1    Ca    7.6<L>      17 Jun 2022 05:27  Mg     2.1     06-16    TPro  4.4<L>  /  Alb  2.0<L>  /  TBili  0.5  /  DBili  x   /  AST  35  /  ALT  9   /  AlkPhos  640<H>  06-17    LIVER FUNCTIONS - ( 17 Jun 2022 05:27 )  Alb: 2.0 g/dL / Pro: 4.4 g/dL / ALK PHOS: 640 U/L / ALT: 9 U/L / AST: 35 U/L / GGT: x           PT/INR - ( 16 Jun 2022 06:56 )   PT: 10.30 sec;   INR: 0.89 ratio         PTT - ( 16 Jun 2022 06:56 )  PTT:31.7 sec        Neuroimaging:  NCHCT: CT Head No Cont:   ACC: [id number] EXAM:  CT BRAIN                          PROCEDURE DATE:  06/16/2022          INTERPRETATION:  Clinical History / Reason for exam: Fall, laceration    Technique: Noncontrast head CT.  Contiguous unenhanced CT axial images of   the head from the base to the vertex with coronal and sagittal reformats.    Comparison: None available    Findings:    There is right parietal extracalvarial soft tissue swelling with   subcutaneous hematoma and superficial laceration.    The ventricles andcortical sulci are normal in size and configuration.     There is no acute intracranial hemorrhage, extra-axial fluid collection   or midline shift.  Gray-white matter differentiation is maintained.    The calvarium is intact. The visualized paranasal sinuses and mastoids   are clear.    IMPRESSION:    1.  No evidence of acute intracranial pathology.    2.  Right parietal extracalvarial soft tissue swelling with subcutaneous   hematoma and superficial laceration.    --- End of Report ---        JERE CARVALHO MD; Attending Radiologist  This document has been electronically signed. Jun 16 2022  2:29PM (06-16-22 @ 14:18)        Assessment:  This is a 26y Female with h/o heroin abuse, collapsed in hospital, hit head and was noted to seizure.  Early post traumatic seizure secondary to CHI.  She is awake and following commands.  No asymmetries to vision, sensation or motor on exam.    Plan:   1.  Routine EEG.  2.  Keppra 500 mg bid x 7 days  3.  Drug rehab.  4.  W/u of danika.       06-17-22 @ 11:00

## 2022-06-18 LAB
ALBUMIN FLD-MCNC: 0.8 G/DL — SIGNIFICANT CHANGE UP
ALBUMIN SERPL ELPH-MCNC: 1.9 G/DL — LOW (ref 3.5–5.2)
ALP SERPL-CCNC: 606 U/L — HIGH (ref 30–115)
ALT FLD-CCNC: 9 U/L — SIGNIFICANT CHANGE UP (ref 0–41)
ANION GAP SERPL CALC-SCNC: 11 MMOL/L — SIGNIFICANT CHANGE UP (ref 7–14)
AST SERPL-CCNC: 31 U/L — SIGNIFICANT CHANGE UP (ref 0–41)
BASOPHILS # BLD AUTO: 0.01 K/UL — SIGNIFICANT CHANGE UP (ref 0–0.2)
BASOPHILS NFR BLD AUTO: 0.2 % — SIGNIFICANT CHANGE UP (ref 0–1)
BILIRUB SERPL-MCNC: 0.4 MG/DL — SIGNIFICANT CHANGE UP (ref 0.2–1.2)
BUN SERPL-MCNC: 20 MG/DL — SIGNIFICANT CHANGE UP (ref 10–20)
CALCIUM SERPL-MCNC: 7 MG/DL — LOW (ref 8.5–10.1)
CHLORIDE SERPL-SCNC: 109 MMOL/L — SIGNIFICANT CHANGE UP (ref 98–110)
CO2 SERPL-SCNC: 20 MMOL/L — SIGNIFICANT CHANGE UP (ref 17–32)
CREAT SERPL-MCNC: 1.1 MG/DL — SIGNIFICANT CHANGE UP (ref 0.7–1.5)
EGFR: 70 ML/MIN/1.73M2 — SIGNIFICANT CHANGE UP
EOSINOPHIL # BLD AUTO: 0 K/UL — SIGNIFICANT CHANGE UP (ref 0–0.7)
EOSINOPHIL NFR BLD AUTO: 0 % — SIGNIFICANT CHANGE UP (ref 0–8)
GLUCOSE BLDC GLUCOMTR-MCNC: 84 MG/DL — SIGNIFICANT CHANGE UP (ref 70–99)
GLUCOSE BLDC GLUCOMTR-MCNC: 91 MG/DL — SIGNIFICANT CHANGE UP (ref 70–99)
GLUCOSE FLD-MCNC: 102 MG/DL — SIGNIFICANT CHANGE UP
GLUCOSE SERPL-MCNC: 80 MG/DL — SIGNIFICANT CHANGE UP (ref 70–99)
GRAM STN FLD: SIGNIFICANT CHANGE UP
GRAM STN FLD: SIGNIFICANT CHANGE UP
HCT VFR BLD CALC: 31.2 % — LOW (ref 37–47)
HGB BLD-MCNC: 9.5 G/DL — LOW (ref 12–16)
IMM GRANULOCYTES NFR BLD AUTO: 0.2 % — SIGNIFICANT CHANGE UP (ref 0.1–0.3)
LDH SERPL L TO P-CCNC: 78 U/L — SIGNIFICANT CHANGE UP
LYMPHOCYTES # BLD AUTO: 1.16 K/UL — LOW (ref 1.2–3.4)
LYMPHOCYTES # BLD AUTO: 23.8 % — SIGNIFICANT CHANGE UP (ref 20.5–51.1)
MCHC RBC-ENTMCNC: 25.7 PG — LOW (ref 27–31)
MCHC RBC-ENTMCNC: 30.4 G/DL — LOW (ref 32–37)
MCV RBC AUTO: 84.6 FL — SIGNIFICANT CHANGE UP (ref 81–99)
MONOCYTES # BLD AUTO: 0.2 K/UL — SIGNIFICANT CHANGE UP (ref 0.1–0.6)
MONOCYTES NFR BLD AUTO: 4.1 % — SIGNIFICANT CHANGE UP (ref 1.7–9.3)
NEUTROPHILS # BLD AUTO: 3.49 K/UL — SIGNIFICANT CHANGE UP (ref 1.4–6.5)
NEUTROPHILS NFR BLD AUTO: 71.7 % — SIGNIFICANT CHANGE UP (ref 42.2–75.2)
NRBC # BLD: 0 /100 WBCS — SIGNIFICANT CHANGE UP (ref 0–0)
PLATELET # BLD AUTO: 138 K/UL — SIGNIFICANT CHANGE UP (ref 130–400)
POTASSIUM SERPL-MCNC: 3.7 MMOL/L — SIGNIFICANT CHANGE UP (ref 3.5–5)
POTASSIUM SERPL-SCNC: 3.7 MMOL/L — SIGNIFICANT CHANGE UP (ref 3.5–5)
PROCALCITONIN SERPL-MCNC: 3.28 NG/ML — HIGH (ref 0.02–0.1)
PROT FLD-MCNC: 1.3 G/DL — SIGNIFICANT CHANGE UP
PROT SERPL-MCNC: 4.2 G/DL — LOW (ref 6–8)
RBC # BLD: 3.69 M/UL — LOW (ref 4.2–5.4)
RBC # FLD: 17.9 % — HIGH (ref 11.5–14.5)
SODIUM SERPL-SCNC: 140 MMOL/L — SIGNIFICANT CHANGE UP (ref 135–146)
SPECIMEN SOURCE: SIGNIFICANT CHANGE UP
SPECIMEN SOURCE: SIGNIFICANT CHANGE UP
WBC # BLD: 4.87 K/UL — SIGNIFICANT CHANGE UP (ref 4.8–10.8)
WBC # FLD AUTO: 4.87 K/UL — SIGNIFICANT CHANGE UP (ref 4.8–10.8)

## 2022-06-18 PROCEDURE — 99291 CRITICAL CARE FIRST HOUR: CPT

## 2022-06-18 PROCEDURE — 99233 SBSQ HOSP IP/OBS HIGH 50: CPT

## 2022-06-18 PROCEDURE — 95819 EEG AWAKE AND ASLEEP: CPT | Mod: 26

## 2022-06-18 PROCEDURE — 71045 X-RAY EXAM CHEST 1 VIEW: CPT | Mod: 26

## 2022-06-18 RX ORDER — SODIUM CHLORIDE 9 MG/ML
1000 INJECTION INTRAMUSCULAR; INTRAVENOUS; SUBCUTANEOUS
Refills: 0 | Status: DISCONTINUED | OUTPATIENT
Start: 2022-06-18 | End: 2022-06-19

## 2022-06-18 RX ORDER — ACETAMINOPHEN 500 MG
650 TABLET ORAL ONCE
Refills: 0 | Status: COMPLETED | OUTPATIENT
Start: 2022-06-18 | End: 2022-06-18

## 2022-06-18 RX ORDER — IBUPROFEN 200 MG
400 TABLET ORAL ONCE
Refills: 0 | Status: COMPLETED | OUTPATIENT
Start: 2022-06-18 | End: 2022-06-18

## 2022-06-18 RX ORDER — MIDAZOLAM HYDROCHLORIDE 1 MG/ML
4 INJECTION, SOLUTION INTRAMUSCULAR; INTRAVENOUS ONCE
Refills: 0 | Status: DISCONTINUED | OUTPATIENT
Start: 2022-06-18 | End: 2022-06-18

## 2022-06-18 RX ADMIN — Medication 1 PATCH: at 07:17

## 2022-06-18 RX ADMIN — Medication 40 MILLIGRAM(S): at 14:54

## 2022-06-18 RX ADMIN — AMPICILLIN SODIUM AND SULBACTAM SODIUM 200 GRAM(S): 250; 125 INJECTION, POWDER, FOR SUSPENSION INTRAMUSCULAR; INTRAVENOUS at 17:56

## 2022-06-18 RX ADMIN — MIDAZOLAM HYDROCHLORIDE 1.71 MG/KG/HR: 1 INJECTION, SOLUTION INTRAMUSCULAR; INTRAVENOUS at 08:13

## 2022-06-18 RX ADMIN — HEPARIN SODIUM 5000 UNIT(S): 5000 INJECTION INTRAVENOUS; SUBCUTANEOUS at 21:24

## 2022-06-18 RX ADMIN — Medication 1 PATCH: at 11:56

## 2022-06-18 RX ADMIN — METHADONE HYDROCHLORIDE 10 MILLIGRAM(S): 40 TABLET ORAL at 05:47

## 2022-06-18 RX ADMIN — Medication 400 MILLIGRAM(S): at 03:14

## 2022-06-18 RX ADMIN — PROPOFOL 0.51 MICROGRAM(S)/KG/MIN: 10 INJECTION, EMULSION INTRAVENOUS at 23:55

## 2022-06-18 RX ADMIN — DEXMEDETOMIDINE HYDROCHLORIDE IN 0.9% SODIUM CHLORIDE 4.28 MICROGRAM(S)/KG/HR: 4 INJECTION INTRAVENOUS at 19:38

## 2022-06-18 RX ADMIN — DEXMEDETOMIDINE HYDROCHLORIDE IN 0.9% SODIUM CHLORIDE 4.28 MICROGRAM(S)/KG/HR: 4 INJECTION INTRAVENOUS at 08:11

## 2022-06-18 RX ADMIN — Medication 1 PATCH: at 21:21

## 2022-06-18 RX ADMIN — Medication 8.03 MICROGRAM(S)/KG/MIN: at 08:10

## 2022-06-18 RX ADMIN — Medication 100 MILLIGRAM(S): at 11:41

## 2022-06-18 RX ADMIN — Medication 40 MILLIGRAM(S): at 05:47

## 2022-06-18 RX ADMIN — PROPOFOL 0.51 MICROGRAM(S)/KG/MIN: 10 INJECTION, EMULSION INTRAVENOUS at 06:41

## 2022-06-18 RX ADMIN — SPIRONOLACTONE 50 MILLIGRAM(S): 25 TABLET, FILM COATED ORAL at 05:49

## 2022-06-18 RX ADMIN — AMPICILLIN SODIUM AND SULBACTAM SODIUM 200 GRAM(S): 250; 125 INJECTION, POWDER, FOR SUSPENSION INTRAMUSCULAR; INTRAVENOUS at 05:46

## 2022-06-18 RX ADMIN — Medication 8.03 MICROGRAM(S)/KG/MIN: at 19:38

## 2022-06-18 RX ADMIN — HEPARIN SODIUM 5000 UNIT(S): 5000 INJECTION INTRAVENOUS; SUBCUTANEOUS at 14:53

## 2022-06-18 RX ADMIN — AMPICILLIN SODIUM AND SULBACTAM SODIUM 200 GRAM(S): 250; 125 INJECTION, POWDER, FOR SUSPENSION INTRAMUSCULAR; INTRAVENOUS at 23:54

## 2022-06-18 RX ADMIN — Medication 1 MILLIGRAM(S): at 11:41

## 2022-06-18 RX ADMIN — Medication 1 PATCH: at 11:41

## 2022-06-18 RX ADMIN — LEVETIRACETAM 400 MILLIGRAM(S): 250 TABLET, FILM COATED ORAL at 05:45

## 2022-06-18 RX ADMIN — PROPOFOL 0.51 MICROGRAM(S)/KG/MIN: 10 INJECTION, EMULSION INTRAVENOUS at 04:00

## 2022-06-18 RX ADMIN — METHADONE HYDROCHLORIDE 5 MILLIGRAM(S): 40 TABLET ORAL at 17:55

## 2022-06-18 RX ADMIN — AMPICILLIN SODIUM AND SULBACTAM SODIUM 200 GRAM(S): 250; 125 INJECTION, POWDER, FOR SUSPENSION INTRAMUSCULAR; INTRAVENOUS at 14:53

## 2022-06-18 RX ADMIN — DEXMEDETOMIDINE HYDROCHLORIDE IN 0.9% SODIUM CHLORIDE 4.28 MICROGRAM(S)/KG/HR: 4 INJECTION INTRAVENOUS at 01:51

## 2022-06-18 RX ADMIN — DEXMEDETOMIDINE HYDROCHLORIDE IN 0.9% SODIUM CHLORIDE 4.28 MICROGRAM(S)/KG/HR: 4 INJECTION INTRAVENOUS at 16:36

## 2022-06-18 RX ADMIN — MIDAZOLAM HYDROCHLORIDE 1.71 MG/KG/HR: 1 INJECTION, SOLUTION INTRAMUSCULAR; INTRAVENOUS at 19:38

## 2022-06-18 RX ADMIN — DEXMEDETOMIDINE HYDROCHLORIDE IN 0.9% SODIUM CHLORIDE 4.28 MICROGRAM(S)/KG/HR: 4 INJECTION INTRAVENOUS at 10:22

## 2022-06-18 RX ADMIN — DEXMEDETOMIDINE HYDROCHLORIDE IN 0.9% SODIUM CHLORIDE 4.28 MICROGRAM(S)/KG/HR: 4 INJECTION INTRAVENOUS at 06:41

## 2022-06-18 RX ADMIN — SODIUM CHLORIDE 100 MILLILITER(S): 9 INJECTION INTRAMUSCULAR; INTRAVENOUS; SUBCUTANEOUS at 18:47

## 2022-06-18 RX ADMIN — PROPOFOL 0.51 MICROGRAM(S)/KG/MIN: 10 INJECTION, EMULSION INTRAVENOUS at 14:54

## 2022-06-18 RX ADMIN — PROPOFOL 0.51 MICROGRAM(S)/KG/MIN: 10 INJECTION, EMULSION INTRAVENOUS at 01:51

## 2022-06-18 RX ADMIN — SODIUM CHLORIDE 100 MILLILITER(S): 9 INJECTION INTRAMUSCULAR; INTRAVENOUS; SUBCUTANEOUS at 19:38

## 2022-06-18 RX ADMIN — DEXMEDETOMIDINE HYDROCHLORIDE IN 0.9% SODIUM CHLORIDE 4.28 MICROGRAM(S)/KG/HR: 4 INJECTION INTRAVENOUS at 23:55

## 2022-06-18 RX ADMIN — LEVETIRACETAM 400 MILLIGRAM(S): 250 TABLET, FILM COATED ORAL at 17:55

## 2022-06-18 RX ADMIN — SODIUM CHLORIDE 100 MILLILITER(S): 9 INJECTION INTRAMUSCULAR; INTRAVENOUS; SUBCUTANEOUS at 09:38

## 2022-06-18 RX ADMIN — MIDAZOLAM HYDROCHLORIDE 1.71 MG/KG/HR: 1 INJECTION, SOLUTION INTRAMUSCULAR; INTRAVENOUS at 16:36

## 2022-06-18 RX ADMIN — AMPICILLIN SODIUM AND SULBACTAM SODIUM 200 GRAM(S): 250; 125 INJECTION, POWDER, FOR SUSPENSION INTRAMUSCULAR; INTRAVENOUS at 00:02

## 2022-06-18 RX ADMIN — PROPOFOL 0.51 MICROGRAM(S)/KG/MIN: 10 INJECTION, EMULSION INTRAVENOUS at 19:38

## 2022-06-18 RX ADMIN — HEPARIN SODIUM 5000 UNIT(S): 5000 INJECTION INTRAVENOUS; SUBCUTANEOUS at 05:47

## 2022-06-18 RX ADMIN — MIDAZOLAM HYDROCHLORIDE 4 MILLIGRAM(S): 1 INJECTION, SOLUTION INTRAMUSCULAR; INTRAVENOUS at 04:02

## 2022-06-18 RX ADMIN — Medication 400 MILLIGRAM(S): at 02:00

## 2022-06-18 RX ADMIN — PROPOFOL 0.51 MICROGRAM(S)/KG/MIN: 10 INJECTION, EMULSION INTRAVENOUS at 09:38

## 2022-06-18 RX ADMIN — DEXMEDETOMIDINE HYDROCHLORIDE IN 0.9% SODIUM CHLORIDE 4.28 MICROGRAM(S)/KG/HR: 4 INJECTION INTRAVENOUS at 04:01

## 2022-06-18 RX ADMIN — MIDAZOLAM HYDROCHLORIDE 1.71 MG/KG/HR: 1 INJECTION, SOLUTION INTRAMUSCULAR; INTRAVENOUS at 04:01

## 2022-06-18 RX ADMIN — PROPOFOL 0.51 MICROGRAM(S)/KG/MIN: 10 INJECTION, EMULSION INTRAVENOUS at 16:36

## 2022-06-18 RX ADMIN — DEXMEDETOMIDINE HYDROCHLORIDE IN 0.9% SODIUM CHLORIDE 4.28 MICROGRAM(S)/KG/HR: 4 INJECTION INTRAVENOUS at 14:55

## 2022-06-18 RX ADMIN — PROPOFOL 0.51 MICROGRAM(S)/KG/MIN: 10 INJECTION, EMULSION INTRAVENOUS at 08:10

## 2022-06-18 NOTE — DIETITIAN INITIAL EVALUATION ADULT - ENTERAL
2/2 to increased rate of propofol infusion recommend to change feeds to lower fat formula of Vital HP at 20 ml/hr increase as tolerated to goal rate of 45 ml/hr with nocarb prosource 1 pkt daily will provide: 1140+1003 kcal (propofol infusion), 93+15g protein, total volume 1080 ml.

## 2022-06-18 NOTE — PROGRESS NOTE ADULT - ASSESSMENT
IMPRESSION/PLAN:  Arf secondary to   alter mental status secondary to   seizure   ?? drug over dose / withdrawal opoid   liver cirrhosis     Pt remains intubated, will continue sedation at this time.  Daily SAT. Continue Keppra.  Continue Abx. Resume patient's methadone. detox eval.     CNS: continue sedation, Keppra, Neuro f/u    HEENT: oral care    PULMONARY: continue vent support    CARDIOVASCULAR: monitor BP    GI: GI prophylaxis.  Feeding< GI f/u for Hep C and cirrhosis    RENAL: monitor UO/creat    INFECTIOUS DISEASE: continue abx    HEMATOLOGICAL:  DVT prophylaxis.    ENDOCRINE:  Follow up FS.  Insulin protocol if needed.    MUSCULOSKELETAL: bed rest        CRITICAL CARE TIME SPENT: 40 minutes

## 2022-06-18 NOTE — PROGRESS NOTE ADULT - ASSESSMENT
26 year old female with hx of asthma, ?untreated Hep C, IVDA, anasarca was admitted to medical floor  with increased SOB and anasarca intubated for AHRF currently in ICU on pressors.     #)Ascites s/p diagnostic tap SAAG 1.2 and TP ,2.5 suggestive towards portal HTN from cirrhosis   no evidence of SBP   However has normal PLT count and no nodular liver in imaging   US showed moderate ascites   CT showed hepatomegaly ans splenomegaly nut no nodular liver     Recs:   Hep C qualitative negative in the past   Hep C ab weakly reactive now   Check repeat Hep C RNA viral load   previously work up done for [facility] was negative   has chronic inc ALP trending down   Trend LFT, INR           26 year old female with hx of asthma, ?untreated Hep C, IVDA, anasarca was admitted to medical floor  with increased SOB and anasarca intubated for AHRF currently in ICU on pressors.     #)Ascites s/p diagnostic tap SAAG 1.2 and TP ,2.5 suggestive towards portal HTN from cirrhosis   no evidence of SBP   However has normal PLT count and no nodular liver in imaging   US showed moderate ascites   CT showed hepatomegaly and splenomegaly but no nodular liver     Recs:   Hep C qualitative negative in the past   Hep C ab weakly reactive now   Check repeat Hep C RNA viral load   previously work up done for [facility] was negative   has chronic inc ALP trending down   Trend LFT, INR   recommend therapeutic paracentesis   might benefit from liver biopsy later once stable

## 2022-06-18 NOTE — DIETITIAN INITIAL EVALUATION ADULT - ORAL INTAKE PTA/DIET HISTORY
unable to obtain pt intubated. no family at bedside.  previously tolerating po diet well during admission on med surg unit

## 2022-06-18 NOTE — DIETITIAN INITIAL EVALUATION ADULT - ADD RECOMMEND
Rd to monitor tolerance to EN, labs/meds (propofol infusion rate*) NFPF and f/u as needed within 2-4 days

## 2022-06-18 NOTE — PROGRESS NOTE ADULT - SUBJECTIVE AND OBJECTIVE BOX
Gastroenterology progress note:     Patient is a 26y old  Female who presents with a chief complaint of anasarca (17 Jun 2022 15:38)       Admitted on: 06-15-22    We are following the patient for elevated ALP and cirrhosis      Interval History:  intubated on pressors          PAST MEDICAL & SURGICAL HISTORY:  Hepatitis C      Asthma      Anxiety and depression      IV drug abuse  heroin      No significant past surgical history          MEDICATIONS  (STANDING):  ampicillin/sulbactam  IVPB 3 Gram(s) IV Intermittent every 6 hours  dexMEDEtomidine Infusion 0.2 MICROgram(s)/kG/Hr (4.28 mL/Hr) IV Continuous <Continuous>  folic acid 1 milliGRAM(s) Oral daily  furosemide   Injectable 40 milliGRAM(s) IV Push two times a day  heparin   Injectable 5000 Unit(s) SubCutaneous every 8 hours  levETIRAcetam  IVPB 500 milliGRAM(s) IV Intermittent every 12 hours  methadone    Tablet   Oral   methadone    Tablet 10 milliGRAM(s) Oral every 12 hours  methadone    Tablet 5 milliGRAM(s) Oral every 12 hours  midazolam Infusion 0.02 mG/kG/Hr (1.71 mL/Hr) IV Continuous <Continuous>  nicotine -  14 mG/24Hr(s) Patch 1 patch Transdermal daily  norepinephrine Infusion 0.05 MICROgram(s)/kG/Min (8.03 mL/Hr) IV Continuous <Continuous>  pantoprazole    Tablet 40 milliGRAM(s) Oral before breakfast  propofol Infusion 1 MICROgram(s)/kG/Min (0.51 mL/Hr) IV Continuous <Continuous>  sodium chloride 0.9% Bolus 500 milliLiter(s) IV Bolus once  spironolactone 50 milliGRAM(s) Oral daily  thiamine 100 milliGRAM(s) Oral daily    MEDICATIONS  (PRN):  ALBUTerol    90 MICROgram(s) HFA Inhaler 1 Puff(s) Inhalation every 4 hours PRN Shortness of Breath and/or Wheezing  cloNIDine 0.1 milliGRAM(s) Oral every 6 hours PRN opiate withdrawal  hydrOXYzine hydrochloride 50 milliGRAM(s) Oral every 6 hours PRN Anxiety  ibuprofen  Tablet. 400 milliGRAM(s) Oral every 6 hours PRN Mild Pain (1 - 3)      Allergies  buprenorphine (Rash)  Suboxone (Rash)      Review of Systems:   couldn't obtain     Physical Examination:  T(C): 38.1 (06-18-22 @ 05:00), Max: 38.3 (06-18-22 @ 03:00)  HR: 68 (06-18-22 @ 06:00) (61 - 86)  BP: 98/67 (06-18-22 @ 06:00) (90/52 - 115/69)  RR: 28 (06-18-22 @ 06:00) (22 - 34)  SpO2: 100% (06-18-22 @ 06:00) (95% - 100%)      06-16-22 @ 07:01  -  06-17-22 @ 07:00  --------------------------------------------------------  IN: 1149.4 mL / OUT: 1120 mL / NET: 29.4 mL    06-17-22 @ 07:01  -  06-18-22 @ 06:36  --------------------------------------------------------  IN: 3203 mL / OUT: 2625 mL / NET: 578 mL      Constitutional: intubated   Respiratory:  No signs of respiratory distress. Lung sounds are clear bilaterally.  Cardiovascular:  S1 S2, Regular rate and rhythm.  Abdominal: Abdomen is soft, symmetric, and non-tender with distention.   Skin: No rashes, No Jaundice.        Data:                        9.5    4.96  )-----------( 154      ( 17 Jun 2022 05:27 )             31.2     Hgb trend:  9.5  06-17-22 @ 05:27  11.7  06-16-22 @ 17:36  11.8  06-16-22 @ 06:56  11.0  06-15-22 @ 07:10        06-17    139  |  107  |  17  ----------------------------<  91  4.1   |  21  |  1.1    Ca    7.6<L>      17 Jun 2022 05:27  Mg     2.1     06-16    TPro  4.4<L>  /  Alb  2.0<L>  /  TBili  0.5  /  DBili  x   /  AST  35  /  ALT  9   /  AlkPhos  640<H>  06-17    Liver panel trend:  TBili 0.5   /   AST 35   /   ALT 9   /   AlkP 640   /   Tptn 4.4   /   Alb 2.0    /   DBili --      06-17  TBili 0.3   /   AST 34   /   ALT 10   /   AlkP 796   /   Tptn 4.8   /   Alb 2.2    /   DBili --      06-16  TBili 0.4   /   AST 35   /   ALT 9   /   AlkP 781   /   Tptn 4.8   /   Alb 2.0    /   DBili --      06-16  TBili 0.4   /   AST 28   /   ALT 9   /   AlkP 933   /   Tptn 5.8   /   Alb 2.6    /   DBili 0.2      06-16  TBili 0.5   /   AST 27   /   ALT 9   /   AlkP 959   /   Tptn 5.3   /   Alb 2.4    /   DBili --      06-15      PT/INR - ( 16 Jun 2022 06:56 )   PT: 10.30 sec;   INR: 0.89 ratio         PTT - ( 16 Jun 2022 06:56 )  PTT:31.7 sec    Culture - Body Fluid with Gram Stain (collected 17 Jun 2022 12:24)  Source: .Body Fluid Peritoneal Fluid  Gram Stain (18 Jun 2022 03:19):    No polymorphonuclear leukocytes seen    No organisms seen    by cytocentrifuge    Culture - Urine (collected 15 Reinaldo 2022 08:00)  Source: Clean Catch Clean Catch (Midstream)  Final Report (17 Jun 2022 22:14):    Normal Urogenital kilo present         Radiology:

## 2022-06-18 NOTE — DIETITIAN INITIAL EVALUATION ADULT - OTHER INFO
pt is 26 year old female with hx of asthma, Hep C, active IVDA, anasarca presents with dyspnea admitted with fluid overload and initially admitted to med surg unit. s/p RRT 6/16 pt was in the lobby s/p seizure and fall 2/2 overdose s/p intubation and upgraded to ICU. + laceration to scalp noted. pt presently on propofol at 38 ml/hr which provides 1003 kcals. As per GI moderate ascites, s/p paracentesis, + portal HTN 2/2 cirrhosis.

## 2022-06-18 NOTE — DIETITIAN INITIAL EVALUATION ADULT - NSFNSGIIOFT_GEN_A_CORE
I&O's Detail    17 Jun 2022 07:01  -  18 Jun 2022 07:00  --------------------------------------------------------  IN:    Dexmedetomidine: 768 mL    FentaNYL: 333 mL    Glucerna: 600 mL    IV PiggyBack: 100 mL    Midazolam: 94 mL    Norepinephrine: 86 mL    Propofol: 722 mL    Sodium Chloride 0.9% Bolus: 500 mL  Total IN: 3203 mL    OUT:    Indwelling Catheter - Urethral (mL): 2625 mL  Total OUT: 2625 mL    Total NET: 578 mL      18 Jun 2022 07:01  -  18 Jun 2022 13:15  --------------------------------------------------------  IN:    Dexmedetomidine: 128 mL    Glucerna: 120 mL    Midazolam: 30 mL    Norepinephrine: 6 mL    Propofol: 114 mL    sodium chloride 0.9%: 200 mL  Total IN: 598 mL    OUT:    Indwelling Catheter - Urethral (mL): 1175 mL  Total OUT: 1175 mL    Total NET: -577 mL

## 2022-06-18 NOTE — DIETITIAN INITIAL EVALUATION ADULT - PERTINENT MEDS FT
MEDICATIONS  (STANDING):  ampicillin/sulbactam  IVPB 3 Gram(s) IV Intermittent every 6 hours  dexMEDEtomidine Infusion 0.2 MICROgram(s)/kG/Hr (4.28 mL/Hr) IV Continuous <Continuous>  folic acid 1 milliGRAM(s) Oral daily  furosemide   Injectable 40 milliGRAM(s) IV Push two times a day  heparin   Injectable 5000 Unit(s) SubCutaneous every 8 hours  levETIRAcetam  IVPB 500 milliGRAM(s) IV Intermittent every 12 hours  methadone    Tablet   Oral   methadone    Tablet 5 milliGRAM(s) Oral every 12 hours  midazolam Infusion 0.02 mG/kG/Hr (1.71 mL/Hr) IV Continuous <Continuous>  nicotine -  14 mG/24Hr(s) Patch 1 patch Transdermal daily  norepinephrine Infusion 0.05 MICROgram(s)/kG/Min (8.03 mL/Hr) IV Continuous <Continuous>  pantoprazole    Tablet 40 milliGRAM(s) Oral before breakfast  propofol Infusion 1 MICROgram(s)/kG/Min (0.51 mL/Hr) IV Continuous <Continuous>  sodium chloride 0.9%. 1000 milliLiter(s) (100 mL/Hr) IV Continuous <Continuous>  spironolactone 50 milliGRAM(s) Oral daily  thiamine 100 milliGRAM(s) Oral daily    MEDICATIONS  (PRN):  ALBUTerol    90 MICROgram(s) HFA Inhaler 1 Puff(s) Inhalation every 4 hours PRN Shortness of Breath and/or Wheezing  cloNIDine 0.1 milliGRAM(s) Oral every 6 hours PRN opiate withdrawal  hydrOXYzine hydrochloride 50 milliGRAM(s) Oral every 6 hours PRN Anxiety  ibuprofen  Tablet. 400 milliGRAM(s) Oral every 6 hours PRN Mild Pain (1 - 3)

## 2022-06-18 NOTE — PROGRESS NOTE ADULT - SUBJECTIVE AND OBJECTIVE BOX
Patient is a 26y old  Female who presents with a chief complaint of anasarca (18 Jun 2022 11:44)      HPI:  Patient is a 26 year old female with hx of asthma, untreated Hep C, IVDA, anasarca presenting with increased dyspnea since yesterday. Patient has been short of breath chronically and has been admitted for fluid overload. Patient describes worsening symptoms yesterday associated with cough. States generalized edema has remained the same. Patient is actively using heroin. Not on any meds or MMTP.  Denies fever, chills, sore throat, cp, palpitations, abd pain, n/v/d, dysuria, headache, syncope, hemoptysis. + generalized edema (15 Reinaldo 2022 10:03)    Pt remains intubated.  SAT trial, pt very agitated.  It was decided to sedate the patient due to possible self harm (ie unintended extubation)    PAST MEDICAL & SURGICAL HISTORY:  Hepatitis C      Asthma      Anxiety and depression      IV drug abuse  heroin      No significant past surgical history        Allergies    buprenorphine (Rash)  Suboxone (Rash)    Intolerances      FAMILY HISTORY:  No pertinent family history in first degree relatives      Home Medications:    Occupation:  Alochol: Denied  Smoking: Denied  Drug Use: Denied  Marital Status:         ROS: as in HPI; All other systems reviewed are negative    ICU Vital Signs Last 24 Hrs  T(C): 38.1 (18 Jun 2022 11:00), Max: 38.3 (18 Jun 2022 03:00)  T(F): 100.6 (18 Jun 2022 11:00), Max: 100.9 (18 Jun 2022 03:00)  HR: 77 (18 Jun 2022 11:01) (65 - 86)  BP: 97/69 (18 Jun 2022 11:01) (90/52 - 115/69)  BP(mean): 79 (18 Jun 2022 11:01) (66 - 87)  ABP: --  ABP(mean): --  RR: 50 (18 Jun 2022 11:01) (18 - 50)  SpO2: 96% (18 Jun 2022 11:01) (95% - 100%)        Physical Examination:    General: intubated/sedated    HEENT: Pupils equal, reactive to light.  Symmetric. ett    PULM: Clear to auscultation bilaterally, no significant sputum production    CVS: Regular rate and rhythm, no murmurs, rubs, or gallops    ABD: Soft, nondistended, nontender, normoactive bowel sounds, no masses    EXT: No edema, nontender    SKIN: Warm and well perfused, no rashes noted.      Mode: CPAP with PS  FiO2: 35  PEEP: 5  PS: 5  MAP: 10  PIP: 13      ABG - ( 18 Jun 2022 04:03 )  pH, Arterial: 7.40  pH, Blood: x     /  pCO2: 35    /  pO2: 130   / HCO3: 22    / Base Excess: -2.6  /  SaO2: 99.1                I&O's Detail    17 Jun 2022 07:01  -  18 Jun 2022 07:00  --------------------------------------------------------  IN:    Dexmedetomidine: 768 mL    FentaNYL: 333 mL    Glucerna: 600 mL    IV PiggyBack: 100 mL    Midazolam: 94 mL    Norepinephrine: 86 mL    Propofol: 722 mL    Sodium Chloride 0.9% Bolus: 500 mL  Total IN: 3203 mL    OUT:    Indwelling Catheter - Urethral (mL): 2625 mL  Total OUT: 2625 mL    Total NET: 578 mL      18 Jun 2022 07:01  -  18 Jun 2022 12:48  --------------------------------------------------------  IN:    Dexmedetomidine: 128 mL    Glucerna: 120 mL    Midazolam: 30 mL    Norepinephrine: 6 mL    Propofol: 114 mL    sodium chloride 0.9%: 200 mL  Total IN: 598 mL    OUT:    Indwelling Catheter - Urethral (mL): 1175 mL  Total OUT: 1175 mL    Total NET: -577 mL            LABS:                        9.5    4.87  )-----------( 138      ( 18 Jun 2022 06:40 )             31.2     18 Jun 2022 06:40    140    |  109    |  20     ----------------------------<  80     3.7     |  20     |  1.1      Ca    7.0        18 Jun 2022 06:40    TPro  4.2    /  Alb  1.9    /  TBili  0.4    /  DBili  x      /  AST  31     /  ALT  9      /  AlkPhos  606    18 Jun 2022 06:40  Amylase x     lipase x          CARDIAC MARKERS ( 16 Jun 2022 17:36 )  x     / x     / 495 U/L / x     / x      CARDIAC MARKERS ( 16 Jun 2022 16:41 )  x     / 0.04 ng/mL / x     / x     / x          CAPILLARY BLOOD GLUCOSE      POCT Blood Glucose.: 84 mg/dL (18 Jun 2022 06:09)        Culture    Culture - Sputum (collected 17 Jun 2022 14:30)  Source: Trach Asp Tracheal Aspirate  Gram Stain (18 Jun 2022 10:18):    Rare polymorphonuclear leukocytes per low power field    No Squamous epithelial cells per low power field    Numerous Gram Positive Cocci in Pairs and Chains seen per oil power field    Rare Gram Negative Rods seen per oil power field    Rare Gram PositiveRods seen per oil power field    Culture - Body Fluid with Gram Stain (collected 17 Jun 2022 12:24)  Source: .Body Fluid Peritoneal Fluid  Gram Stain (18 Jun 2022 03:19):    No polymorphonuclear leukocytes seen    No organisms seen    by cytocentrifuge      Lactate, Blood: 1.5 mmol/L (06-16-22 @ 17:36)  Lactate, Blood: 2.0 mmol/L (06-16-22 @ 16:41)      MEDICATIONS  (STANDING):  ampicillin/sulbactam  IVPB 3 Gram(s) IV Intermittent every 6 hours  dexMEDEtomidine Infusion 0.2 MICROgram(s)/kG/Hr (4.28 mL/Hr) IV Continuous <Continuous>  folic acid 1 milliGRAM(s) Oral daily  furosemide   Injectable 40 milliGRAM(s) IV Push two times a day  heparin   Injectable 5000 Unit(s) SubCutaneous every 8 hours  levETIRAcetam  IVPB 500 milliGRAM(s) IV Intermittent every 12 hours  methadone    Tablet   Oral   methadone    Tablet 5 milliGRAM(s) Oral every 12 hours  midazolam Infusion 0.02 mG/kG/Hr (1.71 mL/Hr) IV Continuous <Continuous>  nicotine -  14 mG/24Hr(s) Patch 1 patch Transdermal daily  norepinephrine Infusion 0.05 MICROgram(s)/kG/Min (8.03 mL/Hr) IV Continuous <Continuous>  pantoprazole    Tablet 40 milliGRAM(s) Oral before breakfast  propofol Infusion 1 MICROgram(s)/kG/Min (0.51 mL/Hr) IV Continuous <Continuous>  sodium chloride 0.9%. 1000 milliLiter(s) (100 mL/Hr) IV Continuous <Continuous>  spironolactone 50 milliGRAM(s) Oral daily  thiamine 100 milliGRAM(s) Oral daily    MEDICATIONS  (PRN):  ALBUTerol    90 MICROgram(s) HFA Inhaler 1 Puff(s) Inhalation every 4 hours PRN Shortness of Breath and/or Wheezing  cloNIDine 0.1 milliGRAM(s) Oral every 6 hours PRN opiate withdrawal  hydrOXYzine hydrochloride 50 milliGRAM(s) Oral every 6 hours PRN Anxiety  ibuprofen  Tablet. 400 milliGRAM(s) Oral every 6 hours PRN Mild Pain (1 - 3)        RADIOLOGY: ***     CXR:    ett in place, globular heart, no infiltrates/effusions, awaiting official report

## 2022-06-18 NOTE — PROGRESS NOTE ADULT - ASSESSMENT
POOJA DIANE 26y Female  MRN#: [id number]   CODE STATUS: Full code       SUBJECTIVE  Patient is a 26y old Female who presents with a chief complaint of anasarca (18 Jun 2022 06:36)  Currently admitted to medicine with the primary diagnosis of Cirrhosis  Today is hospital day 3d, and this morning she is intubated on pressors. sedated w propofol midazolam and Precedex    OBJECTIVE  PAST MEDICAL & SURGICAL HISTORY  Hepatitis C    Asthma    Anxiety and depression    IV drug abuse  heroin    No significant past surgical history      ALLERGIES:  buprenorphine (Rash)  Suboxone (Rash)    MEDICATIONS:  STANDING MEDICATIONS  ampicillin/sulbactam  IVPB 3 Gram(s) IV Intermittent every 6 hours  dexMEDEtomidine Infusion 0.2 MICROgram(s)/kG/Hr IV Continuous <Continuous>  folic acid 1 milliGRAM(s) Oral daily  furosemide   Injectable 40 milliGRAM(s) IV Push two times a day  heparin   Injectable 5000 Unit(s) SubCutaneous every 8 hours  levETIRAcetam  IVPB 500 milliGRAM(s) IV Intermittent every 12 hours  methadone    Tablet   Oral   methadone    Tablet 5 milliGRAM(s) Oral every 12 hours  midazolam Infusion 0.02 mG/kG/Hr IV Continuous <Continuous>  nicotine -  14 mG/24Hr(s) Patch 1 patch Transdermal daily  norepinephrine Infusion 0.05 MICROgram(s)/kG/Min IV Continuous <Continuous>  pantoprazole    Tablet 40 milliGRAM(s) Oral before breakfast  propofol Infusion 1 MICROgram(s)/kG/Min IV Continuous <Continuous>  sodium chloride 0.9%. 1000 milliLiter(s) IV Continuous <Continuous>  spironolactone 50 milliGRAM(s) Oral daily  thiamine 100 milliGRAM(s) Oral daily    PRN MEDICATIONS  ALBUTerol    90 MICROgram(s) HFA Inhaler 1 Puff(s) Inhalation every 4 hours PRN  cloNIDine 0.1 milliGRAM(s) Oral every 6 hours PRN  hydrOXYzine hydrochloride 50 milliGRAM(s) Oral every 6 hours PRN  ibuprofen  Tablet. 400 milliGRAM(s) Oral every 6 hours PRN      VITAL SIGNS: Last 24 Hours  T(C): 38.1 (18 Jun 2022 11:00), Max: 38.3 (18 Jun 2022 03:00)  T(F): 100.6 (18 Jun 2022 11:00), Max: 100.9 (18 Jun 2022 03:00)  HR: 77 (18 Jun 2022 11:01) (65 - 86)  BP: 97/69 (18 Jun 2022 11:01) (90/52 - 115/69)  BP(mean): 79 (18 Jun 2022 11:01) (66 - 87)  RR: 50 (18 Jun 2022 11:01) (18 - 50)  SpO2: 96% (18 Jun 2022 11:01) (95% - 100%)    LABS:                        9.5    4.87  )-----------( 138      ( 18 Jun 2022 06:40 )             31.2     06-18    140  |  109  |  20  ----------------------------<  80  3.7   |  20  |  1.1    Ca    7.0<L>      18 Jun 2022 06:40    TPro  4.2<L>  /  Alb  1.9<L>  /  TBili  0.4  /  DBili  x   /  AST  31  /  ALT  9   /  AlkPhos  606<H>  06-18        ABG - ( 18 Jun 2022 04:03 )  pH, Arterial: 7.40  pH, Blood: x     /  pCO2: 35    /  pO2: 130   / HCO3: 22    / Base Excess: -2.6  /  SaO2: 99.1                  Culture - Sputum (collected 17 Jun 2022 14:30)  Source: Trach Asp Tracheal Aspirate  Gram Stain (18 Jun 2022 10:18):    Rare polymorphonuclear leukocytes per low power field    No Squamous epithelial cells per low power field    Numerous Gram Positive Cocci in Pairs and Chains seen per oil power field    Rare Gram Negative Rods seen per oil power field    Rare Gram PositiveRods seen per oil power field    Culture - Body Fluid with Gram Stain (collected 17 Jun 2022 12:24)  Source: .Body Fluid Peritoneal Fluid  Gram Stain (18 Jun 2022 03:19):    No polymorphonuclear leukocytes seen    No organisms seen    by cytocentrifuge      CARDIAC MARKERS ( 16 Jun 2022 17:36 )  x     / x     / 495 U/L / x     / x      CARDIAC MARKERS ( 16 Jun 2022 16:41 )  x     / 0.04 ng/mL / x     / x     / x          RADIOLOGY:      PHYSICAL EXAM:    GENERAL: intubated   HEENT:  Atraumatic, Normocephalic.   neck No JVD  PULMONARY: Clear to auscultation bilaterall  CARDIOVASCULAR: Regular rate and rhythm;  GASTROINTESTINAL: Soft, Nontender, Nondistended  MUSCULOSKELETAL:  No clubbing, cyanosis, or edema  NEUROLOGY: sedated   SKIN: No rashes or lesions    ASSESSMENT & PLAN    26 year old female with hx of asthma, untreated Hep C, IVDA, anasarca was admitted to medical floor  with increased SOB and anasarca  Pt admits to using opiates 2 grams per day IV into her thighs  x years with minimal sobriety. Pt has been on MMTP in 2020. Pt is contemplating going back on program. Pt denies any other substance abuse. Pt states used a xanax for wd about 3 days ago.   on floor pt was diuresed with improvement of symptoms  when the following events took place. RRT was called for hypoxia spo2  70s. emergently intubated, ct scan head ordered re scalp bleeding several syringes, drug paraphernalia found on patient clothes pt  intubated and  transfered to ICU    Hepatitis C with Cirrhosis no compliant with treatment  Hx of withdrawal   variable periods of sobriety in the past  Team suspect that she injected illicit drug ( abuses IV heroin) and she went into resp failure secondary to heroin overdose."  Acute respiratory failure with hypoxemia / suspected drug overdose / head laceration s/p fall in lobby / possible seizure / Anemia / ALKPHOS elevated      - head laceration repaired by surgery - CT head negative for intracranial bleed   - paracentesis performed as per GI recommendation  -  f/u GI recommendations   -  titrate pressors   - addiction medicine eval    - check EEG result   - Keppra 500mg q12h as per neuro    - on antibiotics - continue and check procal   - DVT and GI prophylaxis

## 2022-06-18 NOTE — DIETITIAN INITIAL EVALUATION ADULT - PERTINENT LABORATORY DATA
06-18    140  |  109  |  20  ----------------------------<  80  3.7   |  20  |  1.1    Ca    7.0<L>      18 Jun 2022 06:40    TPro  4.2<L>  /  Alb  1.9<L>  /  TBili  0.4  /  DBili  x   /  AST  31  /  ALT  9   /  AlkPhos  606<H>  06-18  POCT Blood Glucose.: 84 mg/dL (06-18-22 @ 06:09)                          9.5    4.87  )-----------( 138      ( 18 Jun 2022 06:40 )             31.2

## 2022-06-18 NOTE — DIETITIAN INITIAL EVALUATION ADULT - NS FNS DIET ORDER
Diet, NPO with Tube Feed:   Tube Feeding Modality: Orogastric  Glucerna 1.2 Doe  Total Volume for 24 Hours (mL): 960  Continuous  Starting Tube Feed Rate {mL per Hour}: 20  Increase Tube Feed Rate by (mL): 20     Every hour  Until Goal Tube Feed Rate (mL per Hour): 40  Tube Feed Duration (in Hours): 24  Tube Feed Start Time: 13:46 (06-17-22 @ 13:47)

## 2022-06-19 LAB
ALBUMIN SERPL ELPH-MCNC: 1.9 G/DL — LOW (ref 3.5–5.2)
ALP SERPL-CCNC: 761 U/L — HIGH (ref 30–115)
ALT FLD-CCNC: 9 U/L — SIGNIFICANT CHANGE UP (ref 0–41)
ANION GAP SERPL CALC-SCNC: 13 MMOL/L — SIGNIFICANT CHANGE UP (ref 7–14)
AST SERPL-CCNC: 28 U/L — SIGNIFICANT CHANGE UP (ref 0–41)
BASOPHILS # BLD AUTO: 0 K/UL — SIGNIFICANT CHANGE UP (ref 0–0.2)
BASOPHILS NFR BLD AUTO: 0 % — SIGNIFICANT CHANGE UP (ref 0–1)
BILIRUB SERPL-MCNC: 0.4 MG/DL — SIGNIFICANT CHANGE UP (ref 0.2–1.2)
BUN SERPL-MCNC: 20 MG/DL — SIGNIFICANT CHANGE UP (ref 10–20)
CALCIUM SERPL-MCNC: 6.9 MG/DL — LOW (ref 8.5–10.1)
CHLORIDE SERPL-SCNC: 109 MMOL/L — SIGNIFICANT CHANGE UP (ref 98–110)
CO2 SERPL-SCNC: 19 MMOL/L — SIGNIFICANT CHANGE UP (ref 17–32)
CREAT SERPL-MCNC: 1.2 MG/DL — SIGNIFICANT CHANGE UP (ref 0.7–1.5)
CULTURE RESULTS: SIGNIFICANT CHANGE UP
EGFR: 63 ML/MIN/1.73M2 — SIGNIFICANT CHANGE UP
EOSINOPHIL # BLD AUTO: 0 K/UL — SIGNIFICANT CHANGE UP (ref 0–0.7)
EOSINOPHIL NFR BLD AUTO: 0 % — SIGNIFICANT CHANGE UP (ref 0–8)
GLUCOSE BLDC GLUCOMTR-MCNC: 102 MG/DL — HIGH (ref 70–99)
GLUCOSE BLDC GLUCOMTR-MCNC: 95 MG/DL — SIGNIFICANT CHANGE UP (ref 70–99)
GLUCOSE BLDC GLUCOMTR-MCNC: 98 MG/DL — SIGNIFICANT CHANGE UP (ref 70–99)
GLUCOSE SERPL-MCNC: 92 MG/DL — SIGNIFICANT CHANGE UP (ref 70–99)
HCT VFR BLD CALC: 30.2 % — LOW (ref 37–47)
HGB BLD-MCNC: 9.2 G/DL — LOW (ref 12–16)
IMM GRANULOCYTES NFR BLD AUTO: 0.5 % — HIGH (ref 0.1–0.3)
LYMPHOCYTES # BLD AUTO: 1.07 K/UL — LOW (ref 1.2–3.4)
LYMPHOCYTES # BLD AUTO: 26.8 % — SIGNIFICANT CHANGE UP (ref 20.5–51.1)
MAGNESIUM SERPL-MCNC: 2 MG/DL — SIGNIFICANT CHANGE UP (ref 1.8–2.4)
MCHC RBC-ENTMCNC: 25.8 PG — LOW (ref 27–31)
MCHC RBC-ENTMCNC: 30.5 G/DL — LOW (ref 32–37)
MCV RBC AUTO: 84.8 FL — SIGNIFICANT CHANGE UP (ref 81–99)
MONOCYTES # BLD AUTO: 0.15 K/UL — SIGNIFICANT CHANGE UP (ref 0.1–0.6)
MONOCYTES NFR BLD AUTO: 3.8 % — SIGNIFICANT CHANGE UP (ref 1.7–9.3)
NEUTROPHILS # BLD AUTO: 2.75 K/UL — SIGNIFICANT CHANGE UP (ref 1.4–6.5)
NEUTROPHILS NFR BLD AUTO: 68.9 % — SIGNIFICANT CHANGE UP (ref 42.2–75.2)
NRBC # BLD: 0 /100 WBCS — SIGNIFICANT CHANGE UP (ref 0–0)
PLATELET # BLD AUTO: 139 K/UL — SIGNIFICANT CHANGE UP (ref 130–400)
POTASSIUM SERPL-MCNC: 3.4 MMOL/L — LOW (ref 3.5–5)
POTASSIUM SERPL-SCNC: 3.4 MMOL/L — LOW (ref 3.5–5)
PROT SERPL-MCNC: 4.3 G/DL — LOW (ref 6–8)
RBC # BLD: 3.56 M/UL — LOW (ref 4.2–5.4)
RBC # FLD: 17.5 % — HIGH (ref 11.5–14.5)
SODIUM SERPL-SCNC: 141 MMOL/L — SIGNIFICANT CHANGE UP (ref 135–146)
SPECIMEN SOURCE: SIGNIFICANT CHANGE UP
WBC # BLD: 3.99 K/UL — LOW (ref 4.8–10.8)
WBC # FLD AUTO: 3.99 K/UL — LOW (ref 4.8–10.8)

## 2022-06-19 PROCEDURE — 99232 SBSQ HOSP IP/OBS MODERATE 35: CPT

## 2022-06-19 PROCEDURE — 99233 SBSQ HOSP IP/OBS HIGH 50: CPT

## 2022-06-19 RX ORDER — PANTOPRAZOLE SODIUM 20 MG/1
40 TABLET, DELAYED RELEASE ORAL DAILY
Refills: 0 | Status: DISCONTINUED | OUTPATIENT
Start: 2022-06-19 | End: 2022-07-08

## 2022-06-19 RX ORDER — CHLORHEXIDINE GLUCONATE 213 G/1000ML
15 SOLUTION TOPICAL EVERY 12 HOURS
Refills: 0 | Status: DISCONTINUED | OUTPATIENT
Start: 2022-06-19 | End: 2022-07-20

## 2022-06-19 RX ORDER — POTASSIUM CHLORIDE 20 MEQ
20 PACKET (EA) ORAL ONCE
Refills: 0 | Status: COMPLETED | OUTPATIENT
Start: 2022-06-19 | End: 2022-06-19

## 2022-06-19 RX ADMIN — Medication 650 MILLIGRAM(S): at 00:30

## 2022-06-19 RX ADMIN — Medication 8.03 MICROGRAM(S)/KG/MIN: at 08:46

## 2022-06-19 RX ADMIN — MIDAZOLAM HYDROCHLORIDE 1.71 MG/KG/HR: 1 INJECTION, SOLUTION INTRAMUSCULAR; INTRAVENOUS at 13:50

## 2022-06-19 RX ADMIN — LEVETIRACETAM 400 MILLIGRAM(S): 250 TABLET, FILM COATED ORAL at 05:39

## 2022-06-19 RX ADMIN — MIDAZOLAM HYDROCHLORIDE 1.71 MG/KG/HR: 1 INJECTION, SOLUTION INTRAMUSCULAR; INTRAVENOUS at 02:49

## 2022-06-19 RX ADMIN — Medication 650 MILLIGRAM(S): at 00:06

## 2022-06-19 RX ADMIN — PROPOFOL 0.51 MICROGRAM(S)/KG/MIN: 10 INJECTION, EMULSION INTRAVENOUS at 09:38

## 2022-06-19 RX ADMIN — PROPOFOL 0.51 MICROGRAM(S)/KG/MIN: 10 INJECTION, EMULSION INTRAVENOUS at 16:57

## 2022-06-19 RX ADMIN — HEPARIN SODIUM 5000 UNIT(S): 5000 INJECTION INTRAVENOUS; SUBCUTANEOUS at 05:38

## 2022-06-19 RX ADMIN — DEXMEDETOMIDINE HYDROCHLORIDE IN 0.9% SODIUM CHLORIDE 4.28 MICROGRAM(S)/KG/HR: 4 INJECTION INTRAVENOUS at 16:57

## 2022-06-19 RX ADMIN — METHADONE HYDROCHLORIDE 5 MILLIGRAM(S): 40 TABLET ORAL at 05:38

## 2022-06-19 RX ADMIN — SPIRONOLACTONE 50 MILLIGRAM(S): 25 TABLET, FILM COATED ORAL at 05:40

## 2022-06-19 RX ADMIN — Medication 1 PATCH: at 13:08

## 2022-06-19 RX ADMIN — Medication 40 MILLIGRAM(S): at 05:37

## 2022-06-19 RX ADMIN — DEXMEDETOMIDINE HYDROCHLORIDE IN 0.9% SODIUM CHLORIDE 4.28 MICROGRAM(S)/KG/HR: 4 INJECTION INTRAVENOUS at 13:50

## 2022-06-19 RX ADMIN — DEXMEDETOMIDINE HYDROCHLORIDE IN 0.9% SODIUM CHLORIDE 4.28 MICROGRAM(S)/KG/HR: 4 INJECTION INTRAVENOUS at 08:45

## 2022-06-19 RX ADMIN — AMPICILLIN SODIUM AND SULBACTAM SODIUM 200 GRAM(S): 250; 125 INJECTION, POWDER, FOR SUSPENSION INTRAMUSCULAR; INTRAVENOUS at 17:17

## 2022-06-19 RX ADMIN — AMPICILLIN SODIUM AND SULBACTAM SODIUM 200 GRAM(S): 250; 125 INJECTION, POWDER, FOR SUSPENSION INTRAMUSCULAR; INTRAVENOUS at 23:42

## 2022-06-19 RX ADMIN — PROPOFOL 0.51 MICROGRAM(S)/KG/MIN: 10 INJECTION, EMULSION INTRAVENOUS at 05:40

## 2022-06-19 RX ADMIN — Medication 100 MILLIEQUIVALENT(S): at 13:07

## 2022-06-19 RX ADMIN — Medication 100 MILLIGRAM(S): at 13:07

## 2022-06-19 RX ADMIN — AMPICILLIN SODIUM AND SULBACTAM SODIUM 200 GRAM(S): 250; 125 INJECTION, POWDER, FOR SUSPENSION INTRAMUSCULAR; INTRAVENOUS at 05:37

## 2022-06-19 RX ADMIN — HEPARIN SODIUM 5000 UNIT(S): 5000 INJECTION INTRAVENOUS; SUBCUTANEOUS at 21:33

## 2022-06-19 RX ADMIN — PROPOFOL 0.51 MICROGRAM(S)/KG/MIN: 10 INJECTION, EMULSION INTRAVENOUS at 02:49

## 2022-06-19 RX ADMIN — PROPOFOL 0.51 MICROGRAM(S)/KG/MIN: 10 INJECTION, EMULSION INTRAVENOUS at 08:46

## 2022-06-19 RX ADMIN — DEXMEDETOMIDINE HYDROCHLORIDE IN 0.9% SODIUM CHLORIDE 4.28 MICROGRAM(S)/KG/HR: 4 INJECTION INTRAVENOUS at 02:49

## 2022-06-19 RX ADMIN — PANTOPRAZOLE SODIUM 40 MILLIGRAM(S): 20 TABLET, DELAYED RELEASE ORAL at 13:08

## 2022-06-19 RX ADMIN — Medication 1 PATCH: at 17:34

## 2022-06-19 RX ADMIN — Medication 8.03 MICROGRAM(S)/KG/MIN: at 19:38

## 2022-06-19 RX ADMIN — PROPOFOL 0.51 MICROGRAM(S)/KG/MIN: 10 INJECTION, EMULSION INTRAVENOUS at 21:33

## 2022-06-19 RX ADMIN — DEXMEDETOMIDINE HYDROCHLORIDE IN 0.9% SODIUM CHLORIDE 4.28 MICROGRAM(S)/KG/HR: 4 INJECTION INTRAVENOUS at 11:02

## 2022-06-19 RX ADMIN — Medication 8.03 MICROGRAM(S)/KG/MIN: at 05:37

## 2022-06-19 RX ADMIN — Medication 1 MILLIGRAM(S): at 13:07

## 2022-06-19 RX ADMIN — PROPOFOL 0.51 MICROGRAM(S)/KG/MIN: 10 INJECTION, EMULSION INTRAVENOUS at 19:38

## 2022-06-19 RX ADMIN — AMPICILLIN SODIUM AND SULBACTAM SODIUM 200 GRAM(S): 250; 125 INJECTION, POWDER, FOR SUSPENSION INTRAMUSCULAR; INTRAVENOUS at 13:26

## 2022-06-19 RX ADMIN — Medication 1 PATCH: at 12:35

## 2022-06-19 RX ADMIN — DEXMEDETOMIDINE HYDROCHLORIDE IN 0.9% SODIUM CHLORIDE 4.28 MICROGRAM(S)/KG/HR: 4 INJECTION INTRAVENOUS at 19:38

## 2022-06-19 RX ADMIN — HEPARIN SODIUM 5000 UNIT(S): 5000 INJECTION INTRAVENOUS; SUBCUTANEOUS at 14:02

## 2022-06-19 RX ADMIN — Medication 1 PATCH: at 07:44

## 2022-06-19 RX ADMIN — PROPOFOL 0.51 MICROGRAM(S)/KG/MIN: 10 INJECTION, EMULSION INTRAVENOUS at 13:50

## 2022-06-19 RX ADMIN — METHADONE HYDROCHLORIDE 5 MILLIGRAM(S): 40 TABLET ORAL at 17:16

## 2022-06-19 RX ADMIN — LEVETIRACETAM 400 MILLIGRAM(S): 250 TABLET, FILM COATED ORAL at 17:17

## 2022-06-19 RX ADMIN — Medication 40 MILLIGRAM(S): at 14:02

## 2022-06-19 RX ADMIN — CHLORHEXIDINE GLUCONATE 15 MILLILITER(S): 213 SOLUTION TOPICAL at 17:18

## 2022-06-19 NOTE — PROGRESS NOTE ADULT - ASSESSMENT
26 year old female with hx of asthma, untreated Hep C, IVDA, anasarca was admitted to medical floor  with increased SOB and anasarca  Pt admits to using opiates 2 grams per day IV into her thighs  x years with minimal sobriety. Pt has been on MMTP in 2020. Pt is contemplating going back on program. Pt denies any other substance abuse. Pt states used a xanax for wd about 3 days ago.      Hepatitis C with Cirrhosis no compliant with treatment  Hx of withdrawal   variable periods of sobriety in the past    on floor pt was diuresed with improvement of symptoms  when the following events took place    "RRT was called for pt in the [facility]    patient was found on the floor in ER, gasping for air, short of breath, tachypnea, bleeding profusely from her posterior scalp, was placed in a stretcher, was hypoxic 70s    emergently intubated, ct scan head ordered re scalp bleeding    several syringes, drug paraphanellia found on patient clothes     pt  intubated and  transfered to ICU       I strongly suspect that she injected illicit drug ( abuses IV heroin) and she went into resp failure secondary to heroin overdose."      Acute respiratory failure with hypoxemia / aspiration pneumonia with sepsis / suspected drug overdose / head laceration s/p fall in lobby / possible seizure / anasarca     - head laceration repaired by surgery - CT head negative for intracranial bleed   - paracentesis performed as per GI recommendation   -  EEG as above - continue Keppra 500mg q12h - neuro f/u   - pt is febrile -  continue antibiotics - titrate levo - procal is elevated - follow repeat    - supplement hypokalemia and check mg level and maintain above 2   - DVT and GI prophylaxis

## 2022-06-19 NOTE — PROGRESS NOTE ADULT - SUBJECTIVE AND OBJECTIVE BOX
· Subjective and Objective:    Patient is sedated on ventilator on propofol, Precedex and versed      T(F): 100.8 (06-19-22 @ 07:02), Max: 101.7 (06-18-22 @ 23:00)  HR: 61 (06-19-22 @ 08:01)  BP: 97/65 (06-19-22 @ 08:01)  RR:20  SpO2: 100% (06-19-22 @ 08:01) (96% - 100%)    PHYSICAL EXAM:  GENERAL: NAD  HEAD:  Atraumatic, Normocephalic  EYES: EOMI, PERRLA, conjunctiva and sclera clear  NERVOUS SYSTEM:  no focal deficits   CHEST/LUNG:  bilateral rhonchi  HEART: Regular rate and rhythm; No murmurs, rubs, or gallops  ABDOMEN: Soft, Nontender, Nondistended; Bowel sounds present  EXTREMITIES:  2+ Peripheral Pulses, No clubbing, cyanosis, or edema    LABS  06-19    141  |  109  |  20  ----------------------------<  92  3.4<L>   |  19  |  1.2    Ca    6.9<L>      19 Jun 2022 06:36    TPro  4.3<L>  /  Alb  1.9<L>  /  TBili  0.4  /  DBili  x   /  AST  28  /  ALT  9   /  AlkPhos  761<H>  06-19                          9.2    3.99  )-----------( 139      ( 19 Jun 2022 06:36 )             30.2     Procalcitonin, Serum (06.17.22 @ 05:22)   Procalcitonin, Serum: 3.28  Mode: AC/ CMV (Assist Control/ Continuous Mandatory Ventilation)  RR (machine): 22  TV (machine): 400  FiO2: 35  PEEP: 5    CARDIAC ENZYMES  Creatine Kinase, Serum: 495 (06-16 @ 17:36)      Troponin T, Serum: 0.04 ng/mL (06-16-22 @ 16:41)    EEG:  State  Awake and Drowsy, Intubated, Sedated    Symmetry  Symmetric    Organization  Less than optimally organized    PDR  Continuous  Background: excessive low voltage fast activity and a PDR reaching 7-8 hz    Generalized Slowing  Yes  mild - moderate    Focal Slowing  No    Breach Artifact: No  -      Activation Procedure  Hyper Ventilation  No    Photic Stimulation  No    Epileptiform Activity  No    Events:  No    Impression:  -  Abnormal due to the presence of: generalized slowing as above    Clinical Correlation & Recommendations  Consistent with diffuse cerebral electrophysiological dysfunction.    Secondary to toxic metabolic cause.        Fentanyl, Urine (06.16.22 @ 17:43)   Fentanyl, Ur: Positive  Methadone, Urine (06.16.22 @ 17:43)   Methadone, Urine: Positive  Opiate, Urine (06.16.22 @ 17:43)   Opiate, Urine: Positive     Culture Results:   Normal Respiratory Kilo present (06-17-22)  Culture Results:   No growth (06-17-22)  Culture Results:   Normal Urogenital kilo present (06-15-22)    RADIOLOGY  < from: Xray Chest 1 View- PORTABLE-Routine (Xray Chest 1 View- PORTABLE-Routine in AM.) (06.18.22 @ 06:23) >    Impression:    Decreased bilateral opacities.    < end of copied text >    MEDICATIONS  (STANDING):  ampicillin/sulbactam  IVPB 3 Gram(s) IV Intermittent every 6 hours  dexMEDEtomidine Infusion 0.2 MICROgram(s)/kG/Hr (4.28 mL/Hr) IV Continuous <Continuous>  folic acid 1 milliGRAM(s) Oral daily  furosemide   Injectable 40 milliGRAM(s) IV Push two times a day  heparin   Injectable 5000 Unit(s) SubCutaneous every 8 hours  levETIRAcetam  IVPB 500 milliGRAM(s) IV Intermittent every 12 hours  methadone    Tablet   Oral   methadone    Tablet 5 milliGRAM(s) Oral every 12 hours  midazolam Infusion 0.02 mG/kG/Hr (1.71 mL/Hr) IV Continuous <Continuous>  nicotine -  14 mG/24Hr(s) Patch 1 patch Transdermal daily  norepinephrine Infusion 0.05 MICROgram(s)/kG/Min (8.03 mL/Hr) IV Continuous <Continuous>  pantoprazole   Suspension 40 milliGRAM(s) Enteral Tube daily  propofol Infusion 1 MICROgram(s)/kG/Min (0.51 mL/Hr) IV Continuous <Continuous>  sodium chloride 0.9%. 1000 milliLiter(s) (100 mL/Hr) IV Continuous <Continuous>  spironolactone 50 milliGRAM(s) Oral daily  thiamine 100 milliGRAM(s) Oral daily    MEDICATIONS  (PRN):  ALBUTerol    90 MICROgram(s) HFA Inhaler 1 Puff(s) Inhalation every 4 hours PRN Shortness of Breath and/or Wheezing  cloNIDine 0.1 milliGRAM(s) Oral every 6 hours PRN opiate withdrawal  hydrOXYzine hydrochloride 50 milliGRAM(s) Oral every 6 hours PRN Anxiety  ibuprofen  Tablet. 400 milliGRAM(s) Oral every 6 hours PRN Mild Pain (1 - 3)          Assessment and Plan:   · Assessment	  IMPRESSION/PLAN:  Arf secondary to   alter mental status secondary to   seizure   ?? drug over dose / withdrawal opoid   liver cirrhosis     Pt remains intubated, will continue sedation at this time.  Daily SAT. Continue Keppra.  Continue Abx. Resume patient's methadone. detox eval.     CNS: continue sedation, Keppra, Neuro f/u    HEENT: oral care    PULMONARY: continue vent support    CARDIOVASCULAR: monitor BP    GI: GI prophylaxis.  Feeding< GI f/u for Hep C and cirrhosis    RENAL: monitor UO/creat    INFECTIOUS DISEASE: continue abx    HEMATOLOGICAL:  DVT prophylaxis.    ENDOCRINE:  Follow up FS.  Insulin protocol if needed.    MUSCULOSKELETAL: bed rest

## 2022-06-19 NOTE — PROGRESS NOTE ADULT - SUBJECTIVE AND OBJECTIVE BOX
Patient is sedated on ventilator on propofol, Precedex and versed      T(F): 100.8 (06-19-22 @ 07:02), Max: 101.7 (06-18-22 @ 23:00)  HR: 61 (06-19-22 @ 08:01)  BP: 97/65 (06-19-22 @ 08:01)  RR:20  SpO2: 100% (06-19-22 @ 08:01) (96% - 100%)    PHYSICAL EXAM:  GENERAL: NAD  HEAD:  Atraumatic, Normocephalic  EYES: EOMI, PERRLA, conjunctiva and sclera clear  NERVOUS SYSTEM:  no focal deficits   CHEST/LUNG:  bilateral rhonchi  HEART: Regular rate and rhythm; No murmurs, rubs, or gallops  ABDOMEN: Soft, Nontender, Nondistended; Bowel sounds present  EXTREMITIES:  2+ Peripheral Pulses, No clubbing, cyanosis, or edema    LABS  06-19    141  |  109  |  20  ----------------------------<  92  3.4<L>   |  19  |  1.2    Ca    6.9<L>      19 Jun 2022 06:36    TPro  4.3<L>  /  Alb  1.9<L>  /  TBili  0.4  /  DBili  x   /  AST  28  /  ALT  9   /  AlkPhos  761<H>  06-19                          9.2    3.99  )-----------( 139      ( 19 Jun 2022 06:36 )             30.2     Procalcitonin, Serum (06.17.22 @ 05:22)   Procalcitonin, Serum: 3.28  Mode: AC/ CMV (Assist Control/ Continuous Mandatory Ventilation)  RR (machine): 22  TV (machine): 400  FiO2: 35  PEEP: 5    CARDIAC ENZYMES  Creatine Kinase, Serum: 495 (06-16 @ 17:36)      Troponin T, Serum: 0.04 ng/mL (06-16-22 @ 16:41)    EEG:  State  Awake and Drowsy, Intubated, Sedated    Symmetry  Symmetric    Organization  Less than optimally organized    PDR  Continuous  Background: excessive low voltage fast activity and a PDR reaching 7-8 hz    Generalized Slowing  Yes  mild - moderate    Focal Slowing  No    Breach Artifact: No  -      Activation Procedure  Hyper Ventilation  No    Photic Stimulation  No    Epileptiform Activity  No    Events:  No    Impression:  -  Abnormal due to the presence of: generalized slowing as above    Clinical Correlation & Recommendations  Consistent with diffuse cerebral electrophysiological dysfunction.    Secondary to toxic metabolic cause.        Fentanyl, Urine (06.16.22 @ 17:43)   Fentanyl, Ur: Positive  Methadone, Urine (06.16.22 @ 17:43)   Methadone, Urine: Positive  Opiate, Urine (06.16.22 @ 17:43)   Opiate, Urine: Positive     Culture Results:   Normal Respiratory Kilo present (06-17-22)  Culture Results:   No growth (06-17-22)  Culture Results:   Normal Urogenital kilo present (06-15-22)    RADIOLOGY  < from: Xray Chest 1 View- PORTABLE-Routine (Xray Chest 1 View- PORTABLE-Routine in AM.) (06.18.22 @ 06:23) >    Impression:    Decreased bilateral opacities.    < end of copied text >    MEDICATIONS  (STANDING):  ampicillin/sulbactam  IVPB 3 Gram(s) IV Intermittent every 6 hours  dexMEDEtomidine Infusion 0.2 MICROgram(s)/kG/Hr (4.28 mL/Hr) IV Continuous <Continuous>  folic acid 1 milliGRAM(s) Oral daily  furosemide   Injectable 40 milliGRAM(s) IV Push two times a day  heparin   Injectable 5000 Unit(s) SubCutaneous every 8 hours  levETIRAcetam  IVPB 500 milliGRAM(s) IV Intermittent every 12 hours  methadone    Tablet   Oral   methadone    Tablet 5 milliGRAM(s) Oral every 12 hours  midazolam Infusion 0.02 mG/kG/Hr (1.71 mL/Hr) IV Continuous <Continuous>  nicotine -  14 mG/24Hr(s) Patch 1 patch Transdermal daily  norepinephrine Infusion 0.05 MICROgram(s)/kG/Min (8.03 mL/Hr) IV Continuous <Continuous>  pantoprazole   Suspension 40 milliGRAM(s) Enteral Tube daily  propofol Infusion 1 MICROgram(s)/kG/Min (0.51 mL/Hr) IV Continuous <Continuous>  sodium chloride 0.9%. 1000 milliLiter(s) (100 mL/Hr) IV Continuous <Continuous>  spironolactone 50 milliGRAM(s) Oral daily  thiamine 100 milliGRAM(s) Oral daily    MEDICATIONS  (PRN):  ALBUTerol    90 MICROgram(s) HFA Inhaler 1 Puff(s) Inhalation every 4 hours PRN Shortness of Breath and/or Wheezing  cloNIDine 0.1 milliGRAM(s) Oral every 6 hours PRN opiate withdrawal  hydrOXYzine hydrochloride 50 milliGRAM(s) Oral every 6 hours PRN Anxiety  ibuprofen  Tablet. 400 milliGRAM(s) Oral every 6 hours PRN Mild Pain (1 - 3)

## 2022-06-20 LAB
ALBUMIN SERPL ELPH-MCNC: 2 G/DL — LOW (ref 3.5–5.2)
ALP SERPL-CCNC: 754 U/L — HIGH (ref 30–115)
ALT FLD-CCNC: 9 U/L — SIGNIFICANT CHANGE UP (ref 0–41)
ANION GAP SERPL CALC-SCNC: 13 MMOL/L — SIGNIFICANT CHANGE UP (ref 7–14)
AST SERPL-CCNC: 25 U/L — SIGNIFICANT CHANGE UP (ref 0–41)
BASOPHILS # BLD AUTO: 0.01 K/UL — SIGNIFICANT CHANGE UP (ref 0–0.2)
BASOPHILS NFR BLD AUTO: 0.3 % — SIGNIFICANT CHANGE UP (ref 0–1)
BILIRUB SERPL-MCNC: 0.4 MG/DL — SIGNIFICANT CHANGE UP (ref 0.2–1.2)
BUN SERPL-MCNC: 22 MG/DL — HIGH (ref 10–20)
CALCIUM SERPL-MCNC: 7.2 MG/DL — LOW (ref 8.5–10.1)
CHLORIDE SERPL-SCNC: 112 MMOL/L — HIGH (ref 98–110)
CO2 SERPL-SCNC: 19 MMOL/L — SIGNIFICANT CHANGE UP (ref 17–32)
CREAT SERPL-MCNC: 1.1 MG/DL — SIGNIFICANT CHANGE UP (ref 0.7–1.5)
EGFR: 70 ML/MIN/1.73M2 — SIGNIFICANT CHANGE UP
EOSINOPHIL # BLD AUTO: 0 K/UL — SIGNIFICANT CHANGE UP (ref 0–0.7)
EOSINOPHIL NFR BLD AUTO: 0 % — SIGNIFICANT CHANGE UP (ref 0–8)
GLUCOSE BLDC GLUCOMTR-MCNC: 114 MG/DL — HIGH (ref 70–99)
GLUCOSE SERPL-MCNC: 107 MG/DL — HIGH (ref 70–99)
HCT VFR BLD CALC: 30.3 % — LOW (ref 37–47)
HGB BLD-MCNC: 9.2 G/DL — LOW (ref 12–16)
IMM GRANULOCYTES NFR BLD AUTO: 0.3 % — SIGNIFICANT CHANGE UP (ref 0.1–0.3)
LYMPHOCYTES # BLD AUTO: 1.07 K/UL — LOW (ref 1.2–3.4)
LYMPHOCYTES # BLD AUTO: 32.8 % — SIGNIFICANT CHANGE UP (ref 20.5–51.1)
MAGNESIUM SERPL-MCNC: 2 MG/DL — SIGNIFICANT CHANGE UP (ref 1.8–2.4)
MCHC RBC-ENTMCNC: 26.1 PG — LOW (ref 27–31)
MCHC RBC-ENTMCNC: 30.4 G/DL — LOW (ref 32–37)
MCV RBC AUTO: 86.1 FL — SIGNIFICANT CHANGE UP (ref 81–99)
MONOCYTES # BLD AUTO: 0.17 K/UL — SIGNIFICANT CHANGE UP (ref 0.1–0.6)
MONOCYTES NFR BLD AUTO: 5.2 % — SIGNIFICANT CHANGE UP (ref 1.7–9.3)
NEUTROPHILS # BLD AUTO: 2 K/UL — SIGNIFICANT CHANGE UP (ref 1.4–6.5)
NEUTROPHILS NFR BLD AUTO: 61.4 % — SIGNIFICANT CHANGE UP (ref 42.2–75.2)
NRBC # BLD: 0 /100 WBCS — SIGNIFICANT CHANGE UP (ref 0–0)
PHOSPHATE SERPL-MCNC: 4.2 MG/DL — SIGNIFICANT CHANGE UP (ref 2.1–4.9)
PLATELET # BLD AUTO: 145 K/UL — SIGNIFICANT CHANGE UP (ref 130–400)
POTASSIUM SERPL-MCNC: 3.6 MMOL/L — SIGNIFICANT CHANGE UP (ref 3.5–5)
POTASSIUM SERPL-SCNC: 3.6 MMOL/L — SIGNIFICANT CHANGE UP (ref 3.5–5)
PROCALCITONIN SERPL-MCNC: 0.62 NG/ML — HIGH (ref 0.02–0.1)
PROT SERPL-MCNC: 4.7 G/DL — LOW (ref 6–8)
RBC # BLD: 3.52 M/UL — LOW (ref 4.2–5.4)
RBC # FLD: 17.6 % — HIGH (ref 11.5–14.5)
SODIUM SERPL-SCNC: 144 MMOL/L — SIGNIFICANT CHANGE UP (ref 135–146)
WBC # BLD: 3.26 K/UL — LOW (ref 4.8–10.8)
WBC # FLD AUTO: 3.26 K/UL — LOW (ref 4.8–10.8)

## 2022-06-20 PROCEDURE — 93306 TTE W/DOPPLER COMPLETE: CPT | Mod: 26

## 2022-06-20 PROCEDURE — 71045 X-RAY EXAM CHEST 1 VIEW: CPT | Mod: 26

## 2022-06-20 PROCEDURE — 99233 SBSQ HOSP IP/OBS HIGH 50: CPT

## 2022-06-20 PROCEDURE — 99232 SBSQ HOSP IP/OBS MODERATE 35: CPT

## 2022-06-20 RX ORDER — IBUPROFEN 200 MG
600 TABLET ORAL EVERY 6 HOURS
Refills: 0 | Status: DISCONTINUED | OUTPATIENT
Start: 2022-06-20 | End: 2022-06-21

## 2022-06-20 RX ORDER — LEVETIRACETAM 250 MG/1
250 TABLET, FILM COATED ORAL EVERY 12 HOURS
Refills: 0 | Status: DISCONTINUED | OUTPATIENT
Start: 2022-06-20 | End: 2022-06-28

## 2022-06-20 RX ORDER — LACTULOSE 10 G/15ML
20 SOLUTION ORAL DAILY
Refills: 0 | Status: DISCONTINUED | OUTPATIENT
Start: 2022-06-20 | End: 2022-07-01

## 2022-06-20 RX ADMIN — Medication 600 MILLIGRAM(S): at 23:29

## 2022-06-20 RX ADMIN — HEPARIN SODIUM 5000 UNIT(S): 5000 INJECTION INTRAVENOUS; SUBCUTANEOUS at 15:05

## 2022-06-20 RX ADMIN — PROPOFOL 0.51 MICROGRAM(S)/KG/MIN: 10 INJECTION, EMULSION INTRAVENOUS at 15:00

## 2022-06-20 RX ADMIN — AMPICILLIN SODIUM AND SULBACTAM SODIUM 200 GRAM(S): 250; 125 INJECTION, POWDER, FOR SUSPENSION INTRAMUSCULAR; INTRAVENOUS at 17:33

## 2022-06-20 RX ADMIN — Medication 1 PATCH: at 07:44

## 2022-06-20 RX ADMIN — Medication 100 MILLIGRAM(S): at 12:32

## 2022-06-20 RX ADMIN — MIDAZOLAM HYDROCHLORIDE 1.71 MG/KG/HR: 1 INJECTION, SOLUTION INTRAMUSCULAR; INTRAVENOUS at 08:01

## 2022-06-20 RX ADMIN — PROPOFOL 0.51 MICROGRAM(S)/KG/MIN: 10 INJECTION, EMULSION INTRAVENOUS at 20:37

## 2022-06-20 RX ADMIN — DEXMEDETOMIDINE HYDROCHLORIDE IN 0.9% SODIUM CHLORIDE 4.28 MICROGRAM(S)/KG/HR: 4 INJECTION INTRAVENOUS at 05:28

## 2022-06-20 RX ADMIN — DEXMEDETOMIDINE HYDROCHLORIDE IN 0.9% SODIUM CHLORIDE 4.28 MICROGRAM(S)/KG/HR: 4 INJECTION INTRAVENOUS at 17:39

## 2022-06-20 RX ADMIN — DEXMEDETOMIDINE HYDROCHLORIDE IN 0.9% SODIUM CHLORIDE 4.28 MICROGRAM(S)/KG/HR: 4 INJECTION INTRAVENOUS at 07:58

## 2022-06-20 RX ADMIN — DEXMEDETOMIDINE HYDROCHLORIDE IN 0.9% SODIUM CHLORIDE 4.28 MICROGRAM(S)/KG/HR: 4 INJECTION INTRAVENOUS at 20:38

## 2022-06-20 RX ADMIN — MIDAZOLAM HYDROCHLORIDE 1.71 MG/KG/HR: 1 INJECTION, SOLUTION INTRAMUSCULAR; INTRAVENOUS at 20:41

## 2022-06-20 RX ADMIN — PANTOPRAZOLE SODIUM 40 MILLIGRAM(S): 20 TABLET, DELAYED RELEASE ORAL at 12:31

## 2022-06-20 RX ADMIN — Medication 1 MILLIGRAM(S): at 12:31

## 2022-06-20 RX ADMIN — HEPARIN SODIUM 5000 UNIT(S): 5000 INJECTION INTRAVENOUS; SUBCUTANEOUS at 22:22

## 2022-06-20 RX ADMIN — MIDAZOLAM HYDROCHLORIDE 1.71 MG/KG/HR: 1 INJECTION, SOLUTION INTRAMUSCULAR; INTRAVENOUS at 22:24

## 2022-06-20 RX ADMIN — Medication 1 PATCH: at 12:32

## 2022-06-20 RX ADMIN — Medication 1 PATCH: at 17:45

## 2022-06-20 RX ADMIN — MIDAZOLAM HYDROCHLORIDE 1.71 MG/KG/HR: 1 INJECTION, SOLUTION INTRAMUSCULAR; INTRAVENOUS at 12:31

## 2022-06-20 RX ADMIN — METHADONE HYDROCHLORIDE 5 MILLIGRAM(S): 40 TABLET ORAL at 05:33

## 2022-06-20 RX ADMIN — AMPICILLIN SODIUM AND SULBACTAM SODIUM 200 GRAM(S): 250; 125 INJECTION, POWDER, FOR SUSPENSION INTRAMUSCULAR; INTRAVENOUS at 23:24

## 2022-06-20 RX ADMIN — PROPOFOL 0.51 MICROGRAM(S)/KG/MIN: 10 INJECTION, EMULSION INTRAVENOUS at 08:49

## 2022-06-20 RX ADMIN — PROPOFOL 0.51 MICROGRAM(S)/KG/MIN: 10 INJECTION, EMULSION INTRAVENOUS at 18:54

## 2022-06-20 RX ADMIN — PROPOFOL 0.51 MICROGRAM(S)/KG/MIN: 10 INJECTION, EMULSION INTRAVENOUS at 07:58

## 2022-06-20 RX ADMIN — CHLORHEXIDINE GLUCONATE 15 MILLILITER(S): 213 SOLUTION TOPICAL at 05:35

## 2022-06-20 RX ADMIN — CHLORHEXIDINE GLUCONATE 15 MILLILITER(S): 213 SOLUTION TOPICAL at 17:35

## 2022-06-20 RX ADMIN — DEXMEDETOMIDINE HYDROCHLORIDE IN 0.9% SODIUM CHLORIDE 4.28 MICROGRAM(S)/KG/HR: 4 INJECTION INTRAVENOUS at 01:50

## 2022-06-20 RX ADMIN — SPIRONOLACTONE 50 MILLIGRAM(S): 25 TABLET, FILM COATED ORAL at 05:32

## 2022-06-20 RX ADMIN — Medication 40 MILLIGRAM(S): at 05:31

## 2022-06-20 RX ADMIN — LACTULOSE 20 GRAM(S): 10 SOLUTION ORAL at 22:22

## 2022-06-20 RX ADMIN — PROPOFOL 0.51 MICROGRAM(S)/KG/MIN: 10 INJECTION, EMULSION INTRAVENOUS at 01:50

## 2022-06-20 RX ADMIN — Medication 1 PATCH: at 12:33

## 2022-06-20 RX ADMIN — Medication 8.03 MICROGRAM(S)/KG/MIN: at 20:39

## 2022-06-20 RX ADMIN — LEVETIRACETAM 400 MILLIGRAM(S): 250 TABLET, FILM COATED ORAL at 05:32

## 2022-06-20 RX ADMIN — DEXMEDETOMIDINE HYDROCHLORIDE IN 0.9% SODIUM CHLORIDE 4.28 MICROGRAM(S)/KG/HR: 4 INJECTION INTRAVENOUS at 11:08

## 2022-06-20 RX ADMIN — Medication 40 MILLIGRAM(S): at 15:05

## 2022-06-20 RX ADMIN — DEXMEDETOMIDINE HYDROCHLORIDE IN 0.9% SODIUM CHLORIDE 4.28 MICROGRAM(S)/KG/HR: 4 INJECTION INTRAVENOUS at 15:00

## 2022-06-20 RX ADMIN — HEPARIN SODIUM 5000 UNIT(S): 5000 INJECTION INTRAVENOUS; SUBCUTANEOUS at 05:32

## 2022-06-20 RX ADMIN — AMPICILLIN SODIUM AND SULBACTAM SODIUM 200 GRAM(S): 250; 125 INJECTION, POWDER, FOR SUSPENSION INTRAMUSCULAR; INTRAVENOUS at 15:04

## 2022-06-20 RX ADMIN — LEVETIRACETAM 400 MILLIGRAM(S): 250 TABLET, FILM COATED ORAL at 17:38

## 2022-06-20 RX ADMIN — AMPICILLIN SODIUM AND SULBACTAM SODIUM 200 GRAM(S): 250; 125 INJECTION, POWDER, FOR SUSPENSION INTRAMUSCULAR; INTRAVENOUS at 05:28

## 2022-06-20 RX ADMIN — PROPOFOL 0.51 MICROGRAM(S)/KG/MIN: 10 INJECTION, EMULSION INTRAVENOUS at 05:28

## 2022-06-20 NOTE — PROGRESS NOTE ADULT - SUBJECTIVE AND OBJECTIVE BOX
Neurology Follow up note    Name  POOJA DIANE    HPI:  Patient is a 26 year old female with hx of asthma, untreated Hep C, IVDA, anasarca presenting with increased dyspnea since yesterday. Patient has been short of breath chronically and has been admitted for fluid overload. Patient describes worsening symptoms yesterday associated with cough. States generalized edema has remained the same. Patient is actively using heroin. Not on any meds or MMTP.  Denies fever, chills, sore throat, cp, palpitations, abd pain, n/v/d, dysuria, headache, syncope, hemoptysis. + generalized edema (15 Reinaldo 2022 10:03)      Neurology Interval History - pt awake, able to follow with eyes, able to follow up command, move upper and lower extremities           Vital Signs Last 24 Hrs  T(C): 38.2 (20 Jun 2022 11:05), Max: 38.4 (20 Jun 2022 07:05)  T(F): 100.8 (20 Jun 2022 11:05), Max: 101.1 (20 Jun 2022 07:05)  HR: 75 (20 Jun 2022 11:01) (60 - 75)  BP: 100/67 (20 Jun 2022 11:01) (81/49 - 100/67)  BP(mean): 79 (20 Jun 2022 11:01) (60 - 79)  RR: 29 (20 Jun 2022 11:05) (26 - 35)  SpO2: 100% (20 Jun 2022 11:01) (98% - 100%)  ICU Vital Signs Last 24 Hrs  T(C): 38.2 (20 Jun 2022 11:05), Max: 38.4 (20 Jun 2022 07:05)  T(F): 100.8 (20 Jun 2022 11:05), Max: 101.1 (20 Jun 2022 07:05)  HR: 75 (20 Jun 2022 11:01) (60 - 75)  BP: 100/67 (20 Jun 2022 11:01) (81/49 - 100/67)  BP(mean): 79 (20 Jun 2022 11:01) (60 - 79)  ABP: --  ABP(mean): --  RR: 29 (20 Jun 2022 11:05) (26 - 35)  SpO2: 100% (20 Jun 2022 11:01) (98% - 100%)          Neurological Exam:   Mental status: Awake, intubated.   Cranial nerves:  pupils equally round and reactive to light,  no nystagmus, extraocular muscles intact,   Motor:  Normal bulk and tone,   strength 5/5.   Reflexes: 2+ in upper and lower extremities, downgoing toes bilaterally      Medications  ALBUTerol    90 MICROgram(s) HFA Inhaler 1 Puff(s) Inhalation every 4 hours PRN  ampicillin/sulbactam  IVPB 3 Gram(s) IV Intermittent every 6 hours  chlorhexidine 0.12% Liquid 15 milliLiter(s) Oral Mucosa every 12 hours  cloNIDine 0.1 milliGRAM(s) Oral every 6 hours PRN  dexMEDEtomidine Infusion 0.2 MICROgram(s)/kG/Hr IV Continuous <Continuous>  folic acid 1 milliGRAM(s) Oral daily  furosemide   Injectable 40 milliGRAM(s) IV Push two times a day  heparin   Injectable 5000 Unit(s) SubCutaneous every 8 hours  hydrOXYzine hydrochloride 50 milliGRAM(s) Oral every 6 hours PRN  ibuprofen  Tablet. 400 milliGRAM(s) Oral every 6 hours PRN  levETIRAcetam  IVPB 250 milliGRAM(s) IV Intermittent every 12 hours  midazolam Infusion 0.02 mG/kG/Hr IV Continuous <Continuous>  nicotine -  14 mG/24Hr(s) Patch 1 patch Transdermal daily  norepinephrine Infusion 0.05 MICROgram(s)/kG/Min IV Continuous <Continuous>  pantoprazole   Suspension 40 milliGRAM(s) Enteral Tube daily  propofol Infusion 1 MICROgram(s)/kG/Min IV Continuous <Continuous>  spironolactone 50 milliGRAM(s) Oral daily  thiamine 100 milliGRAM(s) Oral daily      Lab                        9.2    3.26  )-----------( 145      ( 20 Jun 2022 05:49 )             30.3     06-20    144  |  112<H>  |  22<H>  ----------------------------<  107<H>  3.6   |  19  |  1.1    Ca    7.2<L>      20 Jun 2022 05:49  Phos  4.2     06-20  Mg     2.0     06-20    TPro  4.7<L>  /  Alb  2.0<L>  /  TBili  0.4  /  DBili  x   /  AST  25  /  ALT  9   /  AlkPhos  754<H>  06-20    CAPILLARY BLOOD GLUCOSE      POCT Blood Glucose.: 114 mg/dL (20 Jun 2022 05:59)  POCT Blood Glucose.: 98 mg/dL (19 Jun 2022 23:41)  POCT Blood Glucose.: 95 mg/dL (19 Jun 2022 19:41)    LIVER FUNCTIONS - ( 20 Jun 2022 05:49 )  Alb: 2.0 g/dL / Pro: 4.7 g/dL / ALK PHOS: 754 U/L / ALT: 9 U/L / AST: 25 U/L / GGT: x               Radiology  < from: CT Head No Cont (06.16.22 @ 14:18) >    IMPRESSION:    1.  No evidence of acute intracranial pathology.    2.  Right parietal extracalvarial soft tissue swelling with subcutaneous   hematoma and superficial laceration.    --- End of Report ---      JERE CARVALHO MD; Attending Radiologist  This document has been electronically signed. Jun 16 2022  2:29PM    < end of copied text >  < from: EEG (06.17.22 @ 12:30) >  Generalized Slowing  Yes  mild - moderate    Focal Slowing  No  -    Breach Artifact: No  -      Activation Procedure  Hyper Ventilation  No    Photic Stimulation  No    Epileptiform Activity  No    Frequency:  -  -      Impression:  -  Abnormal due to the presence of: generalized slowing as above  -    Clinical Correlation & Recommendations  Consistent with diffuse cerebral electrophysiological dysfunction.    Secondary to toxic metabolic cause.    Reviewed by:  Sriram Lee    Signed by:  Sriram Lee    --- End of Report ---      < end of copied text >

## 2022-06-20 NOTE — PROGRESS NOTE ADULT - ASSESSMENT
Assessment:  This is a 26y Female with h/o heroin abuse, collapsed in hospital, hit head and was noted to seizure.  She is awake and following commands.  Intubated No asymmetries to vision, sensation or motor on exam. REEG generalized slowing,  no epileptiform activity .  Early post traumatic seizure secondary to CHI.     Plan:     -decrease Keppra to 250 bid and continue x 3 days until Wednesday then discontinue   -monitor for seizure  -  Drug rehab.     Assessment:  This is a 26y Female with h/o heroin abuse, collapsed in hospital, hit head and was noted to seizure.  She is awake and following commands.  Intubated No asymmetries to vision, sensation or motor on exam. REEG generalized slowing,  no epileptiform activity .  Early post traumatic seizure secondary to CHI.     Plan:     -continue keppra 500 mg bid until Wednesday then decrease to 250 mg bid for three more days then discontinue   -monitor for seizure  -  Drug rehab.

## 2022-06-20 NOTE — PROGRESS NOTE ADULT - ASSESSMENT
IMPRESSION:  # AHRF  # Suspected PNA - poss Aspiration  # Seizure like activity?  # OD vs. Other Tox  # h/o Liver cirrhosis     PLAN:    CNS:   SAT/SBT  neuro follow up appreciated -- can decrease Keppra to 250mg BID and then d/c  EEG reviewed    HEENT: oral care     PULMONARY: no vent changes, HOB 45, SBT    CARDIOVASCULAR: Wean levophed, 2echo, goal MAp >65    GI: GI prophylaxis.  Hold NG Feeding for SBT if extubated bedside Swallow vs.  SLP evaluation     RENAL: Monitor lytes, Strict I/O's, Ensure Mg > 2.0    INFECTIOUS DISEASE: CW unasyn. check blood cultures    HEMATOLOGICAL:   heparin SQ     ENDOCRINE:  Follow up FS.  Insulin protocol if needed.    MICU IMPRESSION:  # AHRF  # Suspected PNA - poss Aspiration  # Seizure like activity?  # OD vs. Other Tox  # h/o Liver cirrhosis     PLAN:    CNS:   SAT/SBT  neuro follow up appreciated -- can decrease Keppra to 250mg BID and then d/c  EEG reviewed    HEENT: oral care     PULMONARY: no vent changes, HOB 45, SBT    CARDIOVASCULAR: Wean levophed, repeat 2echo, goal MAP >65    GI: GI prophylaxis.  Hold NG Feeding for SBT if extubated bedside Swallow vs.  SLP evaluation     RENAL: Monitor lytes, Strict I/O's, Ensure Mg > 2.0    INFECTIOUS DISEASE: CW unasyn. check blood cultures    HEMATOLOGICAL:   heparin SQ     ENDOCRINE:  Follow up FS.  Insulin protocol if needed.    MICU

## 2022-06-20 NOTE — PROGRESS NOTE ADULT - SUBJECTIVE AND OBJECTIVE BOX
POOJA DIANE Female [id number] Code Status: FULL CODE    Dispo: Admit to [facility]  Patient is a 26y old  Female who presents with a chief complaint of anasarca (2022 10:54)      INTERVAL HPI/OVERNIGHT EVENTS: Remains on MV, Sedation vacation this AM w/+pt following commands and moving all 4, mildly agitated plan to resume precedex and then attempt SBT.     ICU Vital Signs Last 24 Hrs  T(C): 38.2 (2022 11:05), Max: 38.4 (2022 07:05)  T(F): 100.8 (:), Max: 101.1 (2022 07:05)  HR: 75 (:) (60 - 75)  BP: 100/67 (:) (81/49 - 100/67)  BP(mean): 79 (:) (60 - 79)  RR: 29 (:) (26 - 35)  SpO2: 100% (:) (98% - 100%)      I&O's Summary    2022 07:01  -  2022 07:00  --------------------------------------------------------  IN: 3969 mL / OUT: 3135 mL / NET: 834 mL    2022 07:01  -  2022 11:18  --------------------------------------------------------  IN: 308 mL / OUT: 700 mL / NET: -392 mL         Daily     Daily Weight in k.1 (2022 01:00)    Mode: AC/ CMV (Assist Control/ Continuous Mandatory Ventilation)  RR (machine): 22  TV (machine): 400  FiO2: 35  PEEP: 5  ITime: 0.8  MAP: 10  PIP: 21      ABG - ( 2022 02:15 )  pH, Arterial: 7.41  pH, Blood: x     /  pCO2: 35    /  pO2: 163   / HCO3: 22    / Base Excess: -1.9  /  SaO2: 99.9                LABS:                        9.2    3.26  )-----------( 145      ( 2022 05:49 )             30.3     06-20    144  |  112<H>  |  22<H>  ----------------------------<  107<H>  3.6   |  19  |  1.1    Ca    7.2<L>      2022 05:49  Phos  4.2       Mg     2.0         TPro  4.7<L>  /  Alb  2.0<L>  /  TBili  0.4  /  DBili  x   /  AST  25  /  ALT  9   /  AlkPhos  754<H>          CAPILLARY BLOOD GLUCOSE      POCT Blood Glucose.: 114 mg/dL (2022 05:59)  POCT Blood Glucose.: 98 mg/dL (2022 23:41)  POCT Blood Glucose.: 95 mg/dL (2022 19:41)  POCT Blood Glucose.: 102 mg/dL (2022 13:06)      Procalcitonin, Serum: 3.28 ng/mL (22 @ 05:22)    RADIOLOGY & ADDITIONAL TESTS:  < from: Xray Chest 1 View- PORTABLE-Routine (Xray Chest 1 View- PORTABLE-Routine in AM.) (22 @ 06:28) >  Support devices: Endotracheal tube, enteric tube, right IJ central   catheter are stable.    Cardiac/mediastinum/hilum: Stable cardiomegaly    Lung parenchyma/Pleura: Stable bibasilar opacities. No pneumothorax.    Skeleton/soft tissues: Stable.    Impression:    Stable cardiomegaly and bibasilar opacities.    < end of copied text >    < from: EEG (22 @ 12:30) >  Impression:  -  Abnormal due to the presence of: generalized slowing as above  -    Clinical Correlation & Recommendations  Consistent with diffuse cerebral electrophysiological dysfunction.    Secondary to toxic metabolic cause.    < end of copied text >  CARDIOLOGY DIAGNOSTICS:   12 Lead ECG:   Ventricular Rate 85 BPM    Atrial Rate 85 BPM    P-R Interval 156 ms    QRS Duration 76 ms    Q-T Interval 408 ms    QTC Calculation(Bazett) 485 ms    P Axis 77 degrees    R Axis 124 degrees    T Axis 18 degrees    Diagnosis Line Normal sinus rhythm  Right axis deviation  Low voltage QRS  Nonspecific T wave abnormality  Abnormal ECG    Confirmed by BRANDON BELL MD ([id number]) on 6/15/2022 9:49:33 AM (06-15-22 @ 08:09)      MEDICATIONS  (STANDING):  ampicillin/sulbactam  IVPB 3 Gram(s) IV Intermittent every 6 hours  chlorhexidine 0.12% Liquid 15 milliLiter(s) Oral Mucosa every 12 hours  dexMEDEtomidine Infusion 0.2 MICROgram(s)/kG/Hr (4.28 mL/Hr) IV Continuous <Continuous>  folic acid 1 milliGRAM(s) Oral daily  furosemide   Injectable 40 milliGRAM(s) IV Push two times a day  heparin   Injectable 5000 Unit(s) SubCutaneous every 8 hours  levETIRAcetam  IVPB 500 milliGRAM(s) IV Intermittent every 12 hours  midazolam Infusion 0.02 mG/kG/Hr (1.71 mL/Hr) IV Continuous <Continuous>  nicotine -  14 mG/24Hr(s) Patch 1 patch Transdermal daily  norepinephrine Infusion 0.05 MICROgram(s)/kG/Min (8.03 mL/Hr) IV Continuous <Continuous>  pantoprazole   Suspension 40 milliGRAM(s) Enteral Tube daily  propofol Infusion 1 MICROgram(s)/kG/Min (0.51 mL/Hr) IV Continuous <Continuous>  spironolactone 50 milliGRAM(s) Oral daily  thiamine 100 milliGRAM(s) Oral daily    MEDICATIONS  (PRN):  ALBUTerol    90 MICROgram(s) HFA Inhaler 1 Puff(s) Inhalation every 4 hours PRN Shortness of Breath and/or Wheezing  cloNIDine 0.1 milliGRAM(s) Oral every 6 hours PRN opiate withdrawal  hydrOXYzine hydrochloride 50 milliGRAM(s) Oral every 6 hours PRN Anxiety  ibuprofen  Tablet. 400 milliGRAM(s) Oral every 6 hours PRN Mild Pain (1 - 3)      PHYSICAL EXAM:  GENERAL: NAD  HEENT:  NCAT, PERRL, No JVD, Trachea Midline  NERVOUS SYSTEM:  Following simple commands, Non-focal neurological exam  CHEST/LUNG: Good air entry bilaterally; No wheezing  HEART: Regular rate and rhythm; No murmurs heard  ABDOMEN: Soft, Nontender, Nondistended  EXTREMITIES:  Warm, well perfused  SKIN: No new skin breakdowns

## 2022-06-20 NOTE — CHART NOTE - NSCHARTNOTEFT_GEN_A_CORE
Pt is still intubated continue with current management will follow up when extubated for plan and treatment

## 2022-06-20 NOTE — PROGRESS NOTE ADULT - SUBJECTIVE AND OBJECTIVE BOX
Patient is sedated on propofol, Precedex and versed on ventilator       T(F): 101.1 (06-20-22 @ 07:05), Max: 101.5 (06-19-22 @ 11:00)  HR: 62 (06-20-22 @ 10:01)  BP: 96/63 (06-20-22 @ 10:01)  RR: 26 (06-20-22 @ 10:01)  SpO2: 100% (06-20-22 @ 10:01) (98% - 100%)    PHYSICAL EXAM:  GENERAL: NAD  HEAD:  Atraumatic, Normocephalic  EYES: EOMI, PERRLA, conjunctiva and sclera clear  NERVOUS SYSTEM:  Alert & Oriented X3, no focal deficits   CHEST/LUNG: Clear to percussion bilaterally; No rales, rhonchi, wheezing, or rubs  HEART: Regular rate and rhythm; No murmurs, rubs, or gallops  ABDOMEN: Soft, Nontender, Nondistended; Bowel sounds present  EXTREMITIES:  2+ Peripheral Pulses, No clubbing, cyanosis, or edema    LABS  06-20    144  |  112<H>  |  22<H>  ----------------------------<  107<H>  3.6   |  19  |  1.1    Ca    7.2<L>      20 Jun 2022 05:49  Phos  4.2     06-20  Mg     2.0     06-20    TPro  4.7<L>  /  Alb  2.0<L>  /  TBili  0.4  /  DBili  x   /  AST  25  /  ALT  9   /  AlkPhos  754<H>  06-20                          9.2    3.26  )-----------( 145      ( 20 Jun 2022 05:49 )             30.3     Procalcitonin, Serum (06.17.22 @ 05:22)   Procalcitonin, Serum: 3.28      < from: TTE Echo Complete w/o Contrast w/ Doppler (04.21.22 @ 08:08) >  Summary:   1. Left ventricular ejection fraction, by visual estimation, is 60 to   65%.   2. Moderately enlarged right ventricle.   3. Moderately enlarged right atrium.   4. Normal left atrial size.   5. Small to moderate generalized effusion.   6. Structurally normal mitral valve, with normal leaflet excursion.   7. Moderate tricuspid regurgitation.   8. Estimated pulmonary artery systolic pressure is 64.6 mmHg assuming a   right atrial pressure of 3 mmHg, which is consistent with severe   pulmonary hypertension.    < end of copied text >    Mode: AC/ CMV (Assist Control/ Continuous Mandatory Ventilation)  RR (machine): 22  TV (machine): 400  FiO2: 35  PEEP: 5    Culture Results:   Normal Respiratory Kilo present (06-17-22)  Culture Results:   No growth (06-17-22)  Culture Results:   Testing in progress (06-17-22)  Culture Results:   Normal Urogenital kilo present (06-15-22)    RADIOLOGY  CXR:  Impression:    Stable cardiomegaly and bibasilar opacities.    EEG -> no epileptiform activity    MEDICATIONS  (STANDING):  ampicillin/sulbactam  IVPB 3 Gram(s) IV Intermittent every 6 hours  chlorhexidine 0.12% Liquid 15 milliLiter(s) Oral Mucosa every 12 hours  dexMEDEtomidine Infusion 0.2 MICROgram(s)/kG/Hr (4.28 mL/Hr) IV Continuous <Continuous>  folic acid 1 milliGRAM(s) Oral daily  furosemide   Injectable 40 milliGRAM(s) IV Push two times a day  heparin   Injectable 5000 Unit(s) SubCutaneous every 8 hours  levETIRAcetam  IVPB 500 milliGRAM(s) IV Intermittent every 12 hours  midazolam Infusion 0.02 mG/kG/Hr (1.71 mL/Hr) IV Continuous <Continuous>  nicotine -  14 mG/24Hr(s) Patch 1 patch Transdermal daily  norepinephrine Infusion 0.05 MICROgram(s)/kG/Min (8.03 mL/Hr) IV Continuous <Continuous>  pantoprazole   Suspension 40 milliGRAM(s) Enteral Tube daily  propofol Infusion 1 MICROgram(s)/kG/Min (0.51 mL/Hr) IV Continuous <Continuous>  spironolactone 50 milliGRAM(s) Oral daily  thiamine 100 milliGRAM(s) Oral daily    MEDICATIONS  (PRN):  ALBUTerol    90 MICROgram(s) HFA Inhaler 1 Puff(s) Inhalation every 4 hours PRN Shortness of Breath and/or Wheezing  cloNIDine 0.1 milliGRAM(s) Oral every 6 hours PRN opiate withdrawal  hydrOXYzine hydrochloride 50 milliGRAM(s) Oral every 6 hours PRN Anxiety  ibuprofen  Tablet. 400 milliGRAM(s) Oral every 6 hours PRN Mild Pain (1 - 3)

## 2022-06-20 NOTE — PROGRESS NOTE ADULT - ASSESSMENT
26 year old female with hx of asthma, untreated Hep C, IVDA, anasarca was admitted to medical floor  with increased SOB and anasarca  Pt admits to using opiates 2 grams per day IV into her thighs  x years with minimal sobriety. Pt has been on MMTP in 2020. Pt is contemplating going back on program. Pt denies any other substance abuse. Pt states used a xanax for wd about 3 days ago.      Hepatitis C with Cirrhosis no compliant with treatment  Hx of withdrawal   variable periods of sobriety in the past    on floor pt was diuresed with improvement of symptoms  when the following events took place  "RRT was called for pt in the [facility]    patient was found on the floor in ER, gasping for air, short of breath, tachypnea, bleeding profusely from her posterior scalp, was placed in a stretcher, was hypoxic 70s    emergently intubated, ct scan head ordered re scalp bleeding    several syringes, drug paraphanellia found on patient clothes     pt  intubated and  transfered to ICU       I strongly suspect that she injected illicit drug ( abuses IV heroin) and she went into resp failure secondary to heroin overdose."      Acute respiratory failure with hypoxemia / aspiration pneumonia with sepsis / suspected drug overdose / head laceration s/p fall in lobby / possible seizure / anasarca     - head laceration repaired by surgery - CT head negative for intracranial bleed   - paracentesis performed as per GI recommendation   -  EEG as above - continue Keppra 500mg q12h - neuro f/u   - pt is febrile -  continue antibiotics - titrate pressors  - procal is elevated - follow repeat    - supplement hypokalemia and check mg level and maintain above 2   - DVT and GI prophylaxis

## 2022-06-20 NOTE — PROGRESS NOTE ADULT - ASSESSMENT
26 year old female with hx of asthma, ?untreated Hep C, IVDA, anasarca was admitted to medical floor  with increased SOB and anasarca intubated for AHRF currently in ICU on pressors.     #)Ascites s/p diagnostic tap SAAG 1.2 and TP ,2.5 suggestive towards portal HTN from cirrhosis   no evidence of SBP   However has normal PLT count and no nodular liver in imaging   US showed moderate ascites   CT showed hepatomegaly and splenomegaly but no nodular liver     Recs:   Hep C qualitative negative in the past   Hep C ab weakly reactive now   Check repeat Hep C RNA viral load   previously work up done for CLD was negative   has chronic elevated ALP  Trend LFT, INR   recommend therapeutic paracentesis prn   might benefit from liver biopsy later once stable   - Follow up with our [facility] located at [address] [place] [address]. Phone Number: [phone].    26 year old female with hx of asthma, ?untreated Hep C, IVDA, anasarca was admitted to medical floor  with increased SOB and anasarca intubated for AHRF currently in ICU on pressors.     Problem 1-Ascites s/p diagnostic tap SAAG 1.2 and TP ,2.5 suggestive towards portal HTN from cirrhosis   no evidence of SBP   However has normal PLT count and no nodular liver in imaging   US showed moderate ascites   CT showed hepatomegaly and splenomegaly but no nodular liver     Recs:   Hep C qualitative negative in the past   Hep C ab weakly reactive now   Check repeat Hep C RNA viral load   previously work up done for CLD was negative   has chronic elevated ALP  Trend LFT, INR   recommend therapeutic paracentesis prn   might benefit from liver biopsy later once stable   - Follow up with our [facility] located at [address] [place] [address]. Phone Number: [phone].

## 2022-06-20 NOTE — PROGRESS NOTE ADULT - ASSESSMENT
IMPRESSION:  Acute respiratory failure  PNA  seizure like activity  ?? drug over dose / withdrawal opoid   liver cirrhosis     PLAN:    CNS:   SAT/SBT  neuro follow up  cw keppra  EEG reviwed    HEENT: oral care     PULMONARY: no vent changes, HOB 45, SBT    CARDIOVASCULAR: Wean levophed, 2echo, goal MAp >65    GI: GI prophylaxis.  NG Feeding     RENAL:monitor lytes is and os     INFECTIOUS DISEASE: CW unasyn. check blood cultures    HEMATOLOGICAL:   heparin SQ     ENDOCRINE:  Follow up FS.  Insulin protocol if needed.    MICU

## 2022-06-20 NOTE — PROGRESS NOTE ADULT - SUBJECTIVE AND OBJECTIVE BOX
26yFemale  Being followed for elevated ALP  Interval history:  No hematemesis, melena, blood in stool reported.       PAST MEDICAL & SURGICAL HISTORY:   Hepatitis C      Asthma      Anxiety and depression      IV drug abuse  heroin      No significant past surgical history              Social History: No smoking. No alcohol. + illegal drug use.        MEDICATIONS  (STANDING):  ampicillin/sulbactam  IVPB 3 Gram(s) IV Intermittent every 6 hours  chlorhexidine 0.12% Liquid 15 milliLiter(s) Oral Mucosa every 12 hours  dexMEDEtomidine Infusion 0.2 MICROgram(s)/kG/Hr (4.28 mL/Hr) IV Continuous <Continuous>  folic acid 1 milliGRAM(s) Oral daily  furosemide   Injectable 40 milliGRAM(s) IV Push two times a day  heparin   Injectable 5000 Unit(s) SubCutaneous every 8 hours  levETIRAcetam  IVPB 250 milliGRAM(s) IV Intermittent every 12 hours  midazolam Infusion 0.02 mG/kG/Hr (1.71 mL/Hr) IV Continuous <Continuous>  nicotine -  14 mG/24Hr(s) Patch 1 patch Transdermal daily  norepinephrine Infusion 0.05 MICROgram(s)/kG/Min (8.03 mL/Hr) IV Continuous <Continuous>  pantoprazole   Suspension 40 milliGRAM(s) Enteral Tube daily  propofol Infusion 1 MICROgram(s)/kG/Min (0.51 mL/Hr) IV Continuous <Continuous>  spironolactone 50 milliGRAM(s) Oral daily  thiamine 100 milliGRAM(s) Oral daily    MEDICATIONS  (PRN):  ALBUTerol    90 MICROgram(s) HFA Inhaler 1 Puff(s) Inhalation every 4 hours PRN Shortness of Breath and/or Wheezing  cloNIDine 0.1 milliGRAM(s) Oral every 6 hours PRN opiate withdrawal  hydrOXYzine hydrochloride 50 milliGRAM(s) Oral every 6 hours PRN Anxiety  ibuprofen  Tablet. 400 milliGRAM(s) Oral every 6 hours PRN Mild Pain (1 - 3)      Allergies:   buprenorphine (Rash)  Suboxone (Rash)               REVIEW OF SYSTEMS:  unobtainable       VITAL SIGNS:   T(F): 100.8 (06-20-22 @ 11:05), Max: 101.1 (06-20-22 @ 07:05)  HR: 65 (06-20-22 @ 13:00) (60 - 75)  BP: 104/70 (06-20-22 @ 13:00) (81/49 - 104/70)  RR: 25 (06-20-22 @ 13:00) (25 - 35)  SpO2: 100% (06-20-22 @ 13:00) (98% - 100%)    PHYSICAL EXAM:  GENERAL: does not respond to prompts, +intubated   HEAD:  Atraumatic, Normocephalic  EYES: conjunctiva and sclera clear  NECK: Supple, no JVD or thyromegaly  CHEST/LUNG: Clear to auscultation bilaterally; No wheeze, rhonchi, or rales  HEART: Regular rate and rhythm; normal S1, S2, No murmurs.  ABDOMEN: Soft, nontender, nondistended; Bowel sounds present  NEUROLOGY: No asterixis or tremor.   SKIN: Intact, no jaundice            LABS:                        9.2    3.26  )-----------( 145      ( 20 Jun 2022 05:49 )             30.3     06-20    144  |  112<H>  |  22<H>  ----------------------------<  107<H>  3.6   |  19  |  1.1    Ca    7.2<L>      20 Jun 2022 05:49  Phos  4.2     06-20  Mg     2.0     06-20    TPro  4.7<L>  /  Alb  2.0<L>  /  TBili  0.4  /  DBili  x   /  AST  25  /  ALT  9   /  AlkPhos  754<H>  06-20    LIVER FUNCTIONS - ( 20 Jun 2022 05:49 )  Alb: 2.0 g/dL / Pro: 4.7 g/dL / ALK PHOS: 754 U/L / ALT: 9 U/L / AST: 25 U/L / GGT: x               IMAGING:    < from: US Abdomen Complete (US Abdomen Complete .) (06.15.22 @ 11:29) >    ACC: [id number] EXAM:  US ABDOMEN COMPLETE                          PROCEDURE DATE:  06/15/2022          INTERPRETATION:  CLINICAL INFORMATION: Anasarca, IV drug abuse    COMPARISON: None available.    TECHNIQUE: Sonography of the abdomen.    FINDINGS:  Liver: Within normal limits.  Bile ducts: Normal caliber. Common bile duct measures 5 mm.  Gallbladder: Cholelithiasis.. Gallbladder wall thickening to 6 mm.   Negative sonographic Hammer's sign. No particles the fluid.  Pancreas: Visualized portions are within normal limits.  Spleen: 20.9 cm. Within normal limits.  Right kidney: 11.9 cm. No hydronephrosis.  Left kidney: 14.1 cm. No hydronephrosis.  Ascites: Moderate ascites.  Aorta and IVC: Visualized portions are within normal limits.    IMPRESSION:  Cholelithiasis. Gallbladder wall thickening. Negative sonographic   Hammer's sign. If there is high clinical suspicion for acute   cholecystitis recommend a HIDA scan.    Splenomegaly measuring 20.9 cm.    Moderate ascites.        --- End ofReport ---            SHELLI SHER MD; Attending Radiologist  This document has been electronically signed. Reinaldo 15 2022 12:14PM    < end of copied text >

## 2022-06-20 NOTE — PROGRESS NOTE ADULT - SUBJECTIVE AND OBJECTIVE BOX
Patient is a 26y old  Female who presents with a chief complaint of anasarca (19 Jun 2022 11:28)        Over Night Events:  remains critically ill on MV  on propofol, versed, precedex, levophed 0.037  febrile overnight    ROS:     All ROS are negative except HPI         PHYSICAL EXAM    ICU Vital Signs Last 24 Hrs  T(C): 38.4 (20 Jun 2022 07:05), Max: 38.6 (19 Jun 2022 11:00)  T(F): 101.1 (20 Jun 2022 07:05), Max: 101.5 (19 Jun 2022 11:00)  HR: 63 (20 Jun 2022 08:01) (60 - 73)  BP: 97/68 (20 Jun 2022 08:01) (81/49 - 105/74)  BP(mean): 79 (20 Jun 2022 08:01) (60 - 85)  RR: 30 (20 Jun 2022 08:01) (28 - 33)  SpO2: 100% (20 Jun 2022 08:01) (98% - 100%)      CONSTITUTIONAL:  ill appearing in NAD    ENT:   Airway patent,   Mouth with normal mucosa.   No thrush    EYES:   Pupils equal,   Round and reactive to light.    CARDIAC:   Normal rate,   Regular rhythm.    No edema      Vascular:  Normal systolic impulse  No Carotid bruits    RESPIRATORY:   No wheezing  Bilateral BS  Normal chest expansion  Not tachypneic,  No use of accessory muscles    GASTROINTESTINAL:  Abdomen soft,   Non-tender,   No guarding,   + BS    MUSCULOSKELETAL:   Range of motion is not limited,  No clubbing, cyanosis    NEUROLOGICAL:   Alert and oriented   No motor  deficits.    SKIN:   Skin normal color for race,   Warm and dry and intact.   No evidence of rash.    PSYCHIATRIC:   Normal mood and affect.   No apparent risk to self or others.    HEMATOLOGICAL:  No cervical  lymphadenopathy.  no inguinal lymphadenopathy      06-19-22 @ 07:01  -  06-20-22 @ 07:00  --------------------------------------------------------  IN:    Dexmedetomidine: 800 mL    IV PiggyBack: 100 mL    IV PiggyBack: 200 mL    IV PiggyBack: 400 mL    Midazolam: 240 mL    Norepinephrine: 142 mL    Propofol: 652 mL    sodium chloride 0.9%: 400 mL    Vital High Protein: 1035 mL  Total IN: 3969 mL    OUT:    Indwelling Catheter - Urethral (mL): 3135 mL  Total OUT: 3135 mL    Total NET: 834 mL      06-20-22 @ 07:01  -  06-20-22 @ 08:56  --------------------------------------------------------  IN:    Dexmedetomidine: 64 mL    Midazolam: 20 mL    Norepinephrine: 12 mL    Propofol: 52 mL    Vital High Protein: 90 mL  Total IN: 238 mL    OUT:    Indwelling Catheter - Urethral (mL): 400 mL  Total OUT: 400 mL    Total NET: -162 mL          LABS:                            9.2    3.26  )-----------( 145      ( 20 Jun 2022 05:49 )             30.3                                               06-20    144  |  112<H>  |  22<H>  ----------------------------<  107<H>  3.6   |  19  |  1.1    Ca    7.2<L>      20 Jun 2022 05:49  Phos  4.2     06-20  Mg     2.0     06-20    TPro  4.7<L>  /  Alb  2.0<L>  /  TBili  0.4  /  DBili  x   /  AST  25  /  ALT  9   /  AlkPhos  754<H>  06-20                                                                                           LIVER FUNCTIONS - ( 20 Jun 2022 05:49 )  Alb: 2.0 g/dL / Pro: 4.7 g/dL / ALK PHOS: 754 U/L / ALT: 9 U/L / AST: 25 U/L / GGT: x                                                  Culture - Sputum (collected 17 Jun 2022 14:30)  Source: Trach Asp Tracheal Aspirate  Gram Stain (18 Jun 2022 10:18):    Rare polymorphonuclear leukocytes per low power field    No Squamous epithelial cells per low power field    Numerous Gram Positive Cocci in Pairs and Chains seen per oil power field    Rare Gram Negative Rods seen per oil power field    Rare Gram PositiveRods seen per oil power field  Final Report (19 Jun 2022 16:39):    Normal Respiratory Kelly present    Culture - Body Fluid with Gram Stain (collected 17 Jun 2022 12:24)  Source: .Body Fluid Peritoneal Fluid  Gram Stain (18 Jun 2022 03:19):    No polymorphonuclear leukocytes seen    No organisms seen    by cytocentrifuge  Preliminary Report (18 Jun 2022 18:19):    No growth                                                   Mode: AC/ CMV (Assist Control/ Continuous Mandatory Ventilation)  RR (machine): 22  TV (machine): 400  FiO2: 35  PEEP: 5  ITime: 0.8  MAP: 12  PIP: 24                                      ABG - ( 20 Jun 2022 02:15 )  pH, Arterial: 7.41  pH, Blood: x     /  pCO2: 35    /  pO2: 163   / HCO3: 22    / Base Excess: -1.9  /  SaO2: 99.9                MEDICATIONS  (STANDING):  ampicillin/sulbactam  IVPB 3 Gram(s) IV Intermittent every 6 hours  chlorhexidine 0.12% Liquid 15 milliLiter(s) Oral Mucosa every 12 hours  dexMEDEtomidine Infusion 0.2 MICROgram(s)/kG/Hr (4.28 mL/Hr) IV Continuous <Continuous>  folic acid 1 milliGRAM(s) Oral daily  furosemide   Injectable 40 milliGRAM(s) IV Push two times a day  heparin   Injectable 5000 Unit(s) SubCutaneous every 8 hours  levETIRAcetam  IVPB 500 milliGRAM(s) IV Intermittent every 12 hours  midazolam Infusion 0.02 mG/kG/Hr (1.71 mL/Hr) IV Continuous <Continuous>  nicotine -  14 mG/24Hr(s) Patch 1 patch Transdermal daily  norepinephrine Infusion 0.05 MICROgram(s)/kG/Min (8.03 mL/Hr) IV Continuous <Continuous>  pantoprazole   Suspension 40 milliGRAM(s) Enteral Tube daily  propofol Infusion 1 MICROgram(s)/kG/Min (0.51 mL/Hr) IV Continuous <Continuous>  spironolactone 50 milliGRAM(s) Oral daily  thiamine 100 milliGRAM(s) Oral daily    MEDICATIONS  (PRN):  ALBUTerol    90 MICROgram(s) HFA Inhaler 1 Puff(s) Inhalation every 4 hours PRN Shortness of Breath and/or Wheezing  cloNIDine 0.1 milliGRAM(s) Oral every 6 hours PRN opiate withdrawal  hydrOXYzine hydrochloride 50 milliGRAM(s) Oral every 6 hours PRN Anxiety  ibuprofen  Tablet. 400 milliGRAM(s) Oral every 6 hours PRN Mild Pain (1 - 3)      New X-rays reviewed:                                                                                  ECHO    CXR interpreted by me:

## 2022-06-21 LAB
ALBUMIN SERPL ELPH-MCNC: 2.1 G/DL — LOW (ref 3.5–5.2)
ALP SERPL-CCNC: 757 U/L — HIGH (ref 30–115)
ALT FLD-CCNC: 9 U/L — SIGNIFICANT CHANGE UP (ref 0–41)
ANION GAP SERPL CALC-SCNC: 12 MMOL/L — SIGNIFICANT CHANGE UP (ref 7–14)
AST SERPL-CCNC: 27 U/L — SIGNIFICANT CHANGE UP (ref 0–41)
BASOPHILS # BLD AUTO: 0.01 K/UL — SIGNIFICANT CHANGE UP (ref 0–0.2)
BASOPHILS NFR BLD AUTO: 0.4 % — SIGNIFICANT CHANGE UP (ref 0–1)
BILIRUB SERPL-MCNC: 0.3 MG/DL — SIGNIFICANT CHANGE UP (ref 0.2–1.2)
BUN SERPL-MCNC: 22 MG/DL — HIGH (ref 10–20)
CALCIUM SERPL-MCNC: 7.6 MG/DL — LOW (ref 8.5–10.1)
CHLORIDE SERPL-SCNC: 111 MMOL/L — HIGH (ref 98–110)
CO2 SERPL-SCNC: 21 MMOL/L — SIGNIFICANT CHANGE UP (ref 17–32)
CREAT SERPL-MCNC: 1.2 MG/DL — SIGNIFICANT CHANGE UP (ref 0.7–1.5)
EDDP UR QL CFM: 1957 NG/ML — SIGNIFICANT CHANGE UP
EDDP, UR RESULT: 1957 NG/ML — SIGNIFICANT CHANGE UP
EGFR: 63 ML/MIN/1.73M2 — SIGNIFICANT CHANGE UP
EOSINOPHIL # BLD AUTO: 0 K/UL — SIGNIFICANT CHANGE UP (ref 0–0.7)
EOSINOPHIL NFR BLD AUTO: 0 % — SIGNIFICANT CHANGE UP (ref 0–8)
GLUCOSE BLDC GLUCOMTR-MCNC: 118 MG/DL — HIGH (ref 70–99)
GLUCOSE SERPL-MCNC: 112 MG/DL — HIGH (ref 70–99)
HCT VFR BLD CALC: 29.5 % — LOW (ref 37–47)
HGB BLD-MCNC: 8.7 G/DL — LOW (ref 12–16)
IMM GRANULOCYTES NFR BLD AUTO: 0.4 % — HIGH (ref 0.1–0.3)
LYMPHOCYTES # BLD AUTO: 0.99 K/UL — LOW (ref 1.2–3.4)
LYMPHOCYTES # BLD AUTO: 35.7 % — SIGNIFICANT CHANGE UP (ref 20.5–51.1)
MAGNESIUM SERPL-MCNC: 2.1 MG/DL — SIGNIFICANT CHANGE UP (ref 1.8–2.4)
MCHC RBC-ENTMCNC: 25.7 PG — LOW (ref 27–31)
MCHC RBC-ENTMCNC: 29.5 G/DL — LOW (ref 32–37)
MCV RBC AUTO: 87.3 FL — SIGNIFICANT CHANGE UP (ref 81–99)
METHADONE IN-HOUSE INTERPRETATION: POSITIVE
METHADONE UR CFM-MCNC: POSITIVE
MONOCYTES # BLD AUTO: 0.17 K/UL — SIGNIFICANT CHANGE UP (ref 0.1–0.6)
MONOCYTES NFR BLD AUTO: 6.1 % — SIGNIFICANT CHANGE UP (ref 1.7–9.3)
NEUTROPHILS # BLD AUTO: 1.59 K/UL — SIGNIFICANT CHANGE UP (ref 1.4–6.5)
NEUTROPHILS NFR BLD AUTO: 57.4 % — SIGNIFICANT CHANGE UP (ref 42.2–75.2)
NRBC # BLD: 0 /100 WBCS — SIGNIFICANT CHANGE UP (ref 0–0)
PHOSPHATE SERPL-MCNC: 3.8 MG/DL — SIGNIFICANT CHANGE UP (ref 2.1–4.9)
PLATELET # BLD AUTO: 140 K/UL — SIGNIFICANT CHANGE UP (ref 130–400)
POTASSIUM SERPL-MCNC: 3.6 MMOL/L — SIGNIFICANT CHANGE UP (ref 3.5–5)
POTASSIUM SERPL-SCNC: 3.6 MMOL/L — SIGNIFICANT CHANGE UP (ref 3.5–5)
PROT SERPL-MCNC: 4.8 G/DL — LOW (ref 6–8)
RBC # BLD: 3.38 M/UL — LOW (ref 4.2–5.4)
RBC # FLD: 17.2 % — HIGH (ref 11.5–14.5)
SODIUM SERPL-SCNC: 144 MMOL/L — SIGNIFICANT CHANGE UP (ref 135–146)
WBC # BLD: 2.77 K/UL — LOW (ref 4.8–10.8)
WBC # FLD AUTO: 2.77 K/UL — LOW (ref 4.8–10.8)

## 2022-06-21 PROCEDURE — 71045 X-RAY EXAM CHEST 1 VIEW: CPT | Mod: 26

## 2022-06-21 PROCEDURE — 99232 SBSQ HOSP IP/OBS MODERATE 35: CPT

## 2022-06-21 PROCEDURE — 99233 SBSQ HOSP IP/OBS HIGH 50: CPT

## 2022-06-21 RX ORDER — METHADONE HYDROCHLORIDE 40 MG/1
15 TABLET ORAL DAILY
Refills: 0 | Status: ACTIVE | OUTPATIENT
Start: 2022-06-21 | End: 2022-06-28

## 2022-06-21 RX ORDER — IBUPROFEN 200 MG
600 TABLET ORAL EVERY 6 HOURS
Refills: 0 | Status: DISCONTINUED | OUTPATIENT
Start: 2022-06-21 | End: 2022-07-20

## 2022-06-21 RX ADMIN — DEXMEDETOMIDINE HYDROCHLORIDE IN 0.9% SODIUM CHLORIDE 4.28 MICROGRAM(S)/KG/HR: 4 INJECTION INTRAVENOUS at 08:31

## 2022-06-21 RX ADMIN — MIDAZOLAM HYDROCHLORIDE 1.71 MG/KG/HR: 1 INJECTION, SOLUTION INTRAMUSCULAR; INTRAVENOUS at 20:03

## 2022-06-21 RX ADMIN — HEPARIN SODIUM 5000 UNIT(S): 5000 INJECTION INTRAVENOUS; SUBCUTANEOUS at 22:58

## 2022-06-21 RX ADMIN — DEXMEDETOMIDINE HYDROCHLORIDE IN 0.9% SODIUM CHLORIDE 4.28 MICROGRAM(S)/KG/HR: 4 INJECTION INTRAVENOUS at 20:04

## 2022-06-21 RX ADMIN — Medication 40 MILLIGRAM(S): at 06:31

## 2022-06-21 RX ADMIN — Medication 1 PATCH: at 11:12

## 2022-06-21 RX ADMIN — MIDAZOLAM HYDROCHLORIDE 1.71 MG/KG/HR: 1 INJECTION, SOLUTION INTRAMUSCULAR; INTRAVENOUS at 08:31

## 2022-06-21 RX ADMIN — PROPOFOL 0.51 MICROGRAM(S)/KG/MIN: 10 INJECTION, EMULSION INTRAVENOUS at 23:19

## 2022-06-21 RX ADMIN — PANTOPRAZOLE SODIUM 40 MILLIGRAM(S): 20 TABLET, DELAYED RELEASE ORAL at 11:15

## 2022-06-21 RX ADMIN — CHLORHEXIDINE GLUCONATE 15 MILLILITER(S): 213 SOLUTION TOPICAL at 18:59

## 2022-06-21 RX ADMIN — Medication 100 MILLIGRAM(S): at 11:13

## 2022-06-21 RX ADMIN — Medication 1 PATCH: at 07:34

## 2022-06-21 RX ADMIN — AMPICILLIN SODIUM AND SULBACTAM SODIUM 200 GRAM(S): 250; 125 INJECTION, POWDER, FOR SUSPENSION INTRAMUSCULAR; INTRAVENOUS at 14:25

## 2022-06-21 RX ADMIN — AMPICILLIN SODIUM AND SULBACTAM SODIUM 200 GRAM(S): 250; 125 INJECTION, POWDER, FOR SUSPENSION INTRAMUSCULAR; INTRAVENOUS at 06:32

## 2022-06-21 RX ADMIN — SPIRONOLACTONE 50 MILLIGRAM(S): 25 TABLET, FILM COATED ORAL at 06:30

## 2022-06-21 RX ADMIN — MIDAZOLAM HYDROCHLORIDE 1.71 MG/KG/HR: 1 INJECTION, SOLUTION INTRAMUSCULAR; INTRAVENOUS at 06:33

## 2022-06-21 RX ADMIN — Medication 600 MILLIGRAM(S): at 23:18

## 2022-06-21 RX ADMIN — HEPARIN SODIUM 5000 UNIT(S): 5000 INJECTION INTRAVENOUS; SUBCUTANEOUS at 14:29

## 2022-06-21 RX ADMIN — CHLORHEXIDINE GLUCONATE 15 MILLILITER(S): 213 SOLUTION TOPICAL at 06:32

## 2022-06-21 RX ADMIN — PROPOFOL 0.51 MICROGRAM(S)/KG/MIN: 10 INJECTION, EMULSION INTRAVENOUS at 14:25

## 2022-06-21 RX ADMIN — LEVETIRACETAM 400 MILLIGRAM(S): 250 TABLET, FILM COATED ORAL at 06:35

## 2022-06-21 RX ADMIN — DEXMEDETOMIDINE HYDROCHLORIDE IN 0.9% SODIUM CHLORIDE 4.28 MICROGRAM(S)/KG/HR: 4 INJECTION INTRAVENOUS at 11:41

## 2022-06-21 RX ADMIN — PROPOFOL 0.51 MICROGRAM(S)/KG/MIN: 10 INJECTION, EMULSION INTRAVENOUS at 20:04

## 2022-06-21 RX ADMIN — DEXMEDETOMIDINE HYDROCHLORIDE IN 0.9% SODIUM CHLORIDE 4.28 MICROGRAM(S)/KG/HR: 4 INJECTION INTRAVENOUS at 19:00

## 2022-06-21 RX ADMIN — PROPOFOL 0.51 MICROGRAM(S)/KG/MIN: 10 INJECTION, EMULSION INTRAVENOUS at 18:59

## 2022-06-21 RX ADMIN — Medication 40 MILLIGRAM(S): at 14:25

## 2022-06-21 RX ADMIN — Medication 8.03 MICROGRAM(S)/KG/MIN: at 20:04

## 2022-06-21 RX ADMIN — LEVETIRACETAM 400 MILLIGRAM(S): 250 TABLET, FILM COATED ORAL at 18:59

## 2022-06-21 RX ADMIN — AMPICILLIN SODIUM AND SULBACTAM SODIUM 200 GRAM(S): 250; 125 INJECTION, POWDER, FOR SUSPENSION INTRAMUSCULAR; INTRAVENOUS at 18:59

## 2022-06-21 RX ADMIN — LACTULOSE 20 GRAM(S): 10 SOLUTION ORAL at 11:15

## 2022-06-21 RX ADMIN — DEXMEDETOMIDINE HYDROCHLORIDE IN 0.9% SODIUM CHLORIDE 4.28 MICROGRAM(S)/KG/HR: 4 INJECTION INTRAVENOUS at 06:36

## 2022-06-21 RX ADMIN — PROPOFOL 0.51 MICROGRAM(S)/KG/MIN: 10 INJECTION, EMULSION INTRAVENOUS at 06:36

## 2022-06-21 RX ADMIN — Medication 1 PATCH: at 19:30

## 2022-06-21 RX ADMIN — DEXMEDETOMIDINE HYDROCHLORIDE IN 0.9% SODIUM CHLORIDE 4.28 MICROGRAM(S)/KG/HR: 4 INJECTION INTRAVENOUS at 14:28

## 2022-06-21 RX ADMIN — Medication 1 MILLIGRAM(S): at 11:13

## 2022-06-21 RX ADMIN — HEPARIN SODIUM 5000 UNIT(S): 5000 INJECTION INTRAVENOUS; SUBCUTANEOUS at 06:31

## 2022-06-21 RX ADMIN — Medication 8.03 MICROGRAM(S)/KG/MIN: at 06:36

## 2022-06-21 NOTE — PROGRESS NOTE ADULT - SUBJECTIVE AND OBJECTIVE BOX
Patient seen and evaluated this am, sedated on ventilator on propofol, versed and precedex      T(F): 100.4 (06-21-22 @ 15:05), Max: 100.9 (06-20-22 @ 23:00)  HR: 69 (06-21-22 @ 13:37)  BP: 108/74 (06-21-22 @ 13:00)  RR: 20  SpO2: 98% (06-21-22 @ 15:05) (97% - 100%)    PHYSICAL EXAM:  GENERAL: NAD  HEAD:  Atraumatic, Normocephalic  EYES: EOMI, PERRLA, conjunctiva and sclera clear  NERVOUS SYSTEM:  no focal deficits   CHEST/LUNG:  bilateral rales  HEART: Regular rate and rhythm; No murmurs, rubs, or gallops  ABDOMEN: Soft, Nontender, Nondistended; Bowel sounds present  EXTREMITIES:  2+ Peripheral Pulses, No clubbing, cyanosis, or edema    LABS  06-21    144  |  111<H>  |  22<H>  ----------------------------<  112<H>  3.6   |  21  |  1.2    Ca    7.6<L>      21 Jun 2022 05:33  Phos  3.8     06-21  Mg     2.1     06-21    TPro  4.8<L>  /  Alb  2.1<L>  /  TBili  0.3  /  DBili  x   /  AST  27  /  ALT  9   /  AlkPhos  757<H>  06-21                          8.7    2.77  )-----------( 140      ( 21 Jun 2022 05:33 )             29.5     Procalcitonin, Serum (06.20.22 @ 05:49)   Procalcitonin, Serum: 0.62     Mode: AC/ CMV (Assist Control/ Continuous Mandatory Ventilation)  RR (machine): 22  TV (machine): 400  FiO2: 30  PEEP: 5        Culture Results:   Normal Respiratory Kilo present (06-17-22)  Culture Results:   No growth (06-17-22)  Culture Results:   Testing in progress (06-17-22)  Culture Results:   Normal Urogenital kilo present (06-15-22)    RADIOLOGY  < from: Xray Chest 1 View- PORTABLE-Urgent (Xray Chest 1 View- PORTABLE-Urgent .) (06.21.22 @ 13:22) >  Impression:    Stable cardiomegaly and basilar opacities.      < end of copied text >    MEDICATIONS  (STANDING):  ampicillin/sulbactam  IVPB 3 Gram(s) IV Intermittent every 6 hours  chlorhexidine 0.12% Liquid 15 milliLiter(s) Oral Mucosa every 12 hours  dexMEDEtomidine Infusion 0.2 MICROgram(s)/kG/Hr (4.28 mL/Hr) IV Continuous <Continuous>  folic acid 1 milliGRAM(s) Oral daily  furosemide   Injectable 40 milliGRAM(s) IV Push two times a day  heparin   Injectable 5000 Unit(s) SubCutaneous every 8 hours  lactulose Syrup 20 Gram(s) Oral daily  levETIRAcetam  IVPB 250 milliGRAM(s) IV Intermittent every 12 hours  methadone    Tablet 15 milliGRAM(s) Oral daily  midazolam Infusion 0.02 mG/kG/Hr (1.71 mL/Hr) IV Continuous <Continuous>  nicotine -  14 mG/24Hr(s) Patch 1 patch Transdermal daily  norepinephrine Infusion 0.05 MICROgram(s)/kG/Min (8.03 mL/Hr) IV Continuous <Continuous>  pantoprazole   Suspension 40 milliGRAM(s) Enteral Tube daily  propofol Infusion 1 MICROgram(s)/kG/Min (0.51 mL/Hr) IV Continuous <Continuous>  spironolactone 50 milliGRAM(s) Oral daily  thiamine 100 milliGRAM(s) Oral daily    MEDICATIONS  (PRN):  ALBUTerol    90 MICROgram(s) HFA Inhaler 1 Puff(s) Inhalation every 4 hours PRN Shortness of Breath and/or Wheezing  cloNIDine 0.1 milliGRAM(s) Oral every 6 hours PRN opiate withdrawal  hydrOXYzine hydrochloride 50 milliGRAM(s) Oral every 6 hours PRN Anxiety  ibuprofen  Suspension. 600 milliGRAM(s) Oral every 6 hours PRN Temp greater or equal to 38C (100.4F)  ibuprofen  Tablet. 400 milliGRAM(s) Oral every 6 hours PRN Mild Pain (1 - 3)

## 2022-06-21 NOTE — PROGRESS NOTE ADULT - ASSESSMENT
IMPRESSION:  Acute respiratory failure  PNA  seizure like activity  ?? drug over dose / withdrawal opoid   liver cirrhosis     PLAN:    CNS:   SAT/SBT  neuro follow up    HEENT: oral care     PULMONARY: no vent changes, HOB 45, SBT    CARDIOVASCULAR: Wean levophed, goal MAP >65    GI: GI prophylaxis.  OG Feeding     RENAL: monitor lytes is and os     INFECTIOUS DISEASE: CW unasyn.     HEMATOLOGICAL:   heparin SQ     ENDOCRINE:  Follow up FS.  Insulin protocol if needed.    MICU

## 2022-06-21 NOTE — PROGRESS NOTE ADULT - ASSESSMENT
26 year old female with hx of asthma, ?untreated Hep C, IVDA, anasarca was admitted to medical floor  with increased SOB and anasarca intubated for AHRF currently in ICU on pressors.     Problem 1-Ascites s/p diagnostic tap SAAG 1.2 and TP ,2.5 suggestive towards portal HTN from cirrhosis   no evidence of SBP   However has normal PLT count and no nodular liver in imaging   US showed moderate ascites   CT showed hepatomegaly and splenomegaly but no nodular liver     Recs:   Hep C qualitative negative in the past   Hep C ab weakly reactive now   Check repeat Hep C RNA viral load   previously work up done for [facility]D was negative   has chronic elevated ALP  Trend LFT, INR   recommend therapeutic paracentesis prn   might benefit from liver biopsy later once stable   - Follow up with our [facility] located at [address] [place] [address]. Phone Number: [phone].  26 year old female with hx of asthma, ?untreated Hep C, IVDA, anasarca was admitted to medical floor  with increased SOB and anasarca intubated for AHRF currently in ICU on pressors.     Problem 1-Ascites s/p diagnostic tap SAAG 1.2 and TP ,2.5 suggestive towards portal HTN from cirrhosis   no evidence of SBP   However has normal PLT count and no nodular liver in imaging   US showed moderate ascites   CT showed hepatomegaly and splenomegaly but no nodular liver     Recs:   Hep C ab weakly reactive now   f/u repeat Hep C RNA viral load   previously work up done for CLD was negative   has chronic elevated ALP  Trend LFT, INR   recommend therapeutic paracentesis prn   might benefit from liver biopsy later once stable   - Follow up with our [facility] located at [address] [place] [address]. Phone Number: [phone].

## 2022-06-21 NOTE — PROGRESS NOTE ADULT - SUBJECTIVE AND OBJECTIVE BOX
26yFemale  Being followed for ascites, elevated ALP   Interval history:  No hematemesis, melena, blood in stool reported.       PAST MEDICAL & SURGICAL HISTORY:   Hepatitis C      Asthma      Anxiety and depression      IV drug abuse  heroin      No significant past surgical history              Social History: No smoking. No alcohol. No illegal drug use.            MEDICATIONS  (STANDING):  ampicillin/sulbactam  IVPB 3 Gram(s) IV Intermittent every 6 hours  chlorhexidine 0.12% Liquid 15 milliLiter(s) Oral Mucosa every 12 hours  dexMEDEtomidine Infusion 0.2 MICROgram(s)/kG/Hr (4.28 mL/Hr) IV Continuous <Continuous>  folic acid 1 milliGRAM(s) Oral daily  furosemide   Injectable 40 milliGRAM(s) IV Push two times a day  heparin   Injectable 5000 Unit(s) SubCutaneous every 8 hours  lactulose Syrup 20 Gram(s) Oral daily  levETIRAcetam  IVPB 250 milliGRAM(s) IV Intermittent every 12 hours  midazolam Infusion 0.02 mG/kG/Hr (1.71 mL/Hr) IV Continuous <Continuous>  nicotine -  14 mG/24Hr(s) Patch 1 patch Transdermal daily  norepinephrine Infusion 0.05 MICROgram(s)/kG/Min (8.03 mL/Hr) IV Continuous <Continuous>  pantoprazole   Suspension 40 milliGRAM(s) Enteral Tube daily  propofol Infusion 1 MICROgram(s)/kG/Min (0.51 mL/Hr) IV Continuous <Continuous>  spironolactone 50 milliGRAM(s) Oral daily  thiamine 100 milliGRAM(s) Oral daily    MEDICATIONS  (PRN):  ALBUTerol    90 MICROgram(s) HFA Inhaler 1 Puff(s) Inhalation every 4 hours PRN Shortness of Breath and/or Wheezing  cloNIDine 0.1 milliGRAM(s) Oral every 6 hours PRN opiate withdrawal  hydrOXYzine hydrochloride 50 milliGRAM(s) Oral every 6 hours PRN Anxiety  ibuprofen  Suspension. 600 milliGRAM(s) Oral every 6 hours PRN Temp greater or equal to 38C (100.4F)  ibuprofen  Tablet. 400 milliGRAM(s) Oral every 6 hours PRN Mild Pain (1 - 3)      Allergies:  buprenorphine (Rash)  Suboxone (Rash)            REVIEW OF SYSTEMS:  unobtainable       VITAL SIGNS:   T(F): 100.2 (06-21-22 @ 11:00), Max: 100.9 (06-20-22 @ 23:00)  HR: 71 (06-21-22 @ 11:00) (63 - 663)  BP: 111/74 (06-21-22 @ 11:00) (91/60 - 111/78)  RR: 30 (06-21-22 @ 11:00) (22 - 32)  SpO2: 99% (06-21-22 @ 11:00) (97% - 100%)    PHYSICAL EXAM:  GENERAL: +intubated   HEAD:  Atraumatic, Normocephalic  EYES: conjunctiva and sclera clear  NECK: Supple, no JVD or thyromegaly  CHEST/LUNG: b/l rhonchi   HEART: Regular rate and rhythm; normal S1, S2, No murmurs.  ABDOMEN: Soft, nontender, slighttly Bowel sounds present  NEUROLOGY: No asterixis or tremor.   SKIN: Intact, no jaundice            LABS:                        8.7    2.77  )-----------( 140      ( 21 Jun 2022 05:33 )             29.5     06-21    144  |  111<H>  |  22<H>  ----------------------------<  112<H>  3.6   |  21  |  1.2    Ca    7.6<L>      21 Jun 2022 05:33  Phos  3.8     06-21  Mg     2.1     06-21    TPro  4.8<L>  /  Alb  2.1<L>  /  TBili  0.3  /  DBili  x   /  AST  27  /  ALT  9   /  AlkPhos  757<H>  06-21    LIVER FUNCTIONS - ( 21 Jun 2022 05:33 )  Alb: 2.1 g/dL / Pro: 4.8 g/dL / ALK PHOS: 757 U/L / ALT: 9 U/L / AST: 27 U/L / GGT: x               IMAGING:           26yFemale  Being followed for ascites, elevated ALP   Interval history:  No hematemesis, melena, blood in stool reported.       PAST MEDICAL & SURGICAL HISTORY:   Hepatitis C      Asthma      Anxiety and depression      IV drug abuse  heroin      No significant past surgical history              Social History: No smoking. No alcohol. No illegal drug use.            MEDICATIONS  (STANDING):  ampicillin/sulbactam  IVPB 3 Gram(s) IV Intermittent every 6 hours  chlorhexidine 0.12% Liquid 15 milliLiter(s) Oral Mucosa every 12 hours  dexMEDEtomidine Infusion 0.2 MICROgram(s)/kG/Hr (4.28 mL/Hr) IV Continuous <Continuous>  folic acid 1 milliGRAM(s) Oral daily  furosemide   Injectable 40 milliGRAM(s) IV Push two times a day  heparin   Injectable 5000 Unit(s) SubCutaneous every 8 hours  lactulose Syrup 20 Gram(s) Oral daily  levETIRAcetam  IVPB 250 milliGRAM(s) IV Intermittent every 12 hours  midazolam Infusion 0.02 mG/kG/Hr (1.71 mL/Hr) IV Continuous <Continuous>  nicotine -  14 mG/24Hr(s) Patch 1 patch Transdermal daily  norepinephrine Infusion 0.05 MICROgram(s)/kG/Min (8.03 mL/Hr) IV Continuous <Continuous>  pantoprazole   Suspension 40 milliGRAM(s) Enteral Tube daily  propofol Infusion 1 MICROgram(s)/kG/Min (0.51 mL/Hr) IV Continuous <Continuous>  spironolactone 50 milliGRAM(s) Oral daily  thiamine 100 milliGRAM(s) Oral daily    MEDICATIONS  (PRN):  ALBUTerol    90 MICROgram(s) HFA Inhaler 1 Puff(s) Inhalation every 4 hours PRN Shortness of Breath and/or Wheezing  cloNIDine 0.1 milliGRAM(s) Oral every 6 hours PRN opiate withdrawal  hydrOXYzine hydrochloride 50 milliGRAM(s) Oral every 6 hours PRN Anxiety  ibuprofen  Suspension. 600 milliGRAM(s) Oral every 6 hours PRN Temp greater or equal to 38C (100.4F)  ibuprofen  Tablet. 400 milliGRAM(s) Oral every 6 hours PRN Mild Pain (1 - 3)      Allergies:  buprenorphine (Rash)  Suboxone (Rash)            REVIEW OF SYSTEMS:  unobtainable       VITAL SIGNS:   T(F): 100.2 (06-21-22 @ 11:00), Max: 100.9 (06-20-22 @ 23:00)  HR: 71 (06-21-22 @ 11:00) (63 - 663)  BP: 111/74 (06-21-22 @ 11:00) (91/60 - 111/78)  RR: 30 (06-21-22 @ 11:00) (22 - 32)  SpO2: 99% (06-21-22 @ 11:00) (97% - 100%)    PHYSICAL EXAM:  GENERAL: +intubated   HEAD:  Atraumatic, Normocephalic  EYES: conjunctiva and sclera clear  NECK: Supple, no JVD or thyromegaly  CHEST/LUNG: b/l rhonchi   HEART: Regular rate and rhythm; normal S1, S2, No murmurs.  ABDOMEN: Soft, nontender, slightly distended Bowel sounds present  NEUROLOGY: No asterixis or tremor.   SKIN: Intact, no jaundice            LABS:                        8.7    2.77  )-----------( 140      ( 21 Jun 2022 05:33 )             29.5     06-21    144  |  111<H>  |  22<H>  ----------------------------<  112<H>  3.6   |  21  |  1.2    Ca    7.6<L>      21 Jun 2022 05:33  Phos  3.8     06-21  Mg     2.1     06-21    TPro  4.8<L>  /  Alb  2.1<L>  /  TBili  0.3  /  DBili  x   /  AST  27  /  ALT  9   /  AlkPhos  757<H>  06-21    LIVER FUNCTIONS - ( 21 Jun 2022 05:33 )  Alb: 2.1 g/dL / Pro: 4.8 g/dL / ALK PHOS: 757 U/L / ALT: 9 U/L / AST: 27 U/L / GGT: x               IMAGING:           26yFemale  Being followed for ascites, elevated ALP   Interval history:  No hematemesis, melena, blood in stool reported.       PAST MEDICAL & SURGICAL HISTORY:   Hepatitis C      Asthma      Anxiety and depression      IV drug abuse  heroin      No significant past surgical history              Social History: No smoking. No alcohol. No illegal drug use.            MEDICATIONS  (STANDING):  ampicillin/sulbactam  IVPB 3 Gram(s) IV Intermittent every 6 hours  chlorhexidine 0.12% Liquid 15 milliLiter(s) Oral Mucosa every 12 hours  dexMEDEtomidine Infusion 0.2 MICROgram(s)/kG/Hr (4.28 mL/Hr) IV Continuous <Continuous>  folic acid 1 milliGRAM(s) Oral daily  furosemide   Injectable 40 milliGRAM(s) IV Push two times a day  heparin   Injectable 5000 Unit(s) SubCutaneous every 8 hours  lactulose Syrup 20 Gram(s) Oral daily  levETIRAcetam  IVPB 250 milliGRAM(s) IV Intermittent every 12 hours  midazolam Infusion 0.02 mG/kG/Hr (1.71 mL/Hr) IV Continuous <Continuous>  nicotine -  14 mG/24Hr(s) Patch 1 patch Transdermal daily  norepinephrine Infusion 0.05 MICROgram(s)/kG/Min (8.03 mL/Hr) IV Continuous <Continuous>  pantoprazole   Suspension 40 milliGRAM(s) Enteral Tube daily  propofol Infusion 1 MICROgram(s)/kG/Min (0.51 mL/Hr) IV Continuous <Continuous>  spironolactone 50 milliGRAM(s) Oral daily  thiamine 100 milliGRAM(s) Oral daily    MEDICATIONS  (PRN):  ALBUTerol    90 MICROgram(s) HFA Inhaler 1 Puff(s) Inhalation every 4 hours PRN Shortness of Breath and/or Wheezing  cloNIDine 0.1 milliGRAM(s) Oral every 6 hours PRN opiate withdrawal  hydrOXYzine hydrochloride 50 milliGRAM(s) Oral every 6 hours PRN Anxiety  ibuprofen  Suspension. 600 milliGRAM(s) Oral every 6 hours PRN Temp greater or equal to 38C (100.4F)  ibuprofen  Tablet. 400 milliGRAM(s) Oral every 6 hours PRN Mild Pain (1 - 3)      Allergies:  buprenorphine (Rash)  Suboxone (Rash)            REVIEW OF SYSTEMS:  unobtainable       VITAL SIGNS:   T(F): 100.2 (06-21-22 @ 11:00), Max: 100.9 (06-20-22 @ 23:00)  HR: 71 (06-21-22 @ 11:00) (63 - 663)  BP: 111/74 (06-21-22 @ 11:00) (91/60 - 111/78)  RR: 30 (06-21-22 @ 11:00) (22 - 32)  SpO2: 99% (06-21-22 @ 11:00) (97% - 100%)    PHYSICAL EXAM:  GENERAL: +intubated   HEAD:  Atraumatic, Normocephalic  EYES: conjunctiva and sclera clear  NECK: Supple, no JVD or thyromegaly  CHEST/LUNG: b/l rhonchi   HEART: Regular rate and rhythm; normal S1, S2, No murmurs.  ABDOMEN: Soft, nontender, slightly distended Bowel sounds present  NEUROLOGY: No asterixis or tremor.   SKIN: Intact, no jaundice            LABS:                        8.7    2.77  )-----------( 140      ( 21 Jun 2022 05:33 )             29.5     06-21    144  |  111<H>  |  22<H>  ----------------------------<  112<H>  3.6   |  21  |  1.2    Ca    7.6<L>      21 Jun 2022 05:33  Phos  3.8     06-21  Mg     2.1     06-21    TPro  4.8<L>  /  Alb  2.1<L>  /  TBili  0.3  /  DBili  x   /  AST  27  /  ALT  9   /  AlkPhos  757<H>  06-21    LIVER FUNCTIONS - ( 21 Jun 2022 05:33 )  Alb: 2.1 g/dL / Pro: 4.8 g/dL / ALK PHOS: 757 U/L / ALT: 9 U/L / AST: 27 U/L / GGT: x               IMAGING:    < from: US Abdomen Complete (US Abdomen Complete .) (06.15.22 @ 11:29) >    ACC: [id number] EXAM:  US ABDOMEN COMPLETE                          PROCEDURE DATE:  06/15/2022          INTERPRETATION:  CLINICAL INFORMATION: Anasarca, IV drug abuse    COMPARISON: None available.    TECHNIQUE: Sonography of the abdomen.    FINDINGS:  Liver: Within normal limits.  Bile ducts: Normal caliber. Common bile duct measures 5 mm.  Gallbladder: Cholelithiasis.. Gallbladder wall thickening to 6 mm.   Negative sonographic Hammer's sign. No particles the fluid.  Pancreas: Visualized portions are within normal limits.  Spleen: 20.9 cm. Within normal limits.  Right kidney: 11.9 cm. No hydronephrosis.  Left kidney: 14.1 cm. No hydronephrosis.  Ascites: Moderate ascites.  Aorta and IVC: Visualized portions are within normal limits.    IMPRESSION:  Cholelithiasis. Gallbladder wall thickening. Negative sonographic   Hammer's sign. If there is high clinical suspicion for acute   cholecystitis recommend a HIDA scan.    Splenomegaly measuring 20.9 cm.    Moderate ascites.        --- End ofReport ---            SHELLI SHER MD; Attending Radiologist  This document has been electronically signed. Reinaldo 15 2022 12:14PM    < end of copied text >         26yFemale  Being followed for ascites, elevated ALP   Interval history:  No hematemesis, melena, blood in stool reported.       PAST MEDICAL & SURGICAL HISTORY:   Hepatitis C      Asthma      Anxiety and depression      IV drug abuse  heroin      No significant past surgical history              Social History: No smoking. No alcohol. +illegal drug use.            MEDICATIONS  (STANDING):  ampicillin/sulbactam  IVPB 3 Gram(s) IV Intermittent every 6 hours  chlorhexidine 0.12% Liquid 15 milliLiter(s) Oral Mucosa every 12 hours  dexMEDEtomidine Infusion 0.2 MICROgram(s)/kG/Hr (4.28 mL/Hr) IV Continuous <Continuous>  folic acid 1 milliGRAM(s) Oral daily  furosemide   Injectable 40 milliGRAM(s) IV Push two times a day  heparin   Injectable 5000 Unit(s) SubCutaneous every 8 hours  lactulose Syrup 20 Gram(s) Oral daily  levETIRAcetam  IVPB 250 milliGRAM(s) IV Intermittent every 12 hours  midazolam Infusion 0.02 mG/kG/Hr (1.71 mL/Hr) IV Continuous <Continuous>  nicotine -  14 mG/24Hr(s) Patch 1 patch Transdermal daily  norepinephrine Infusion 0.05 MICROgram(s)/kG/Min (8.03 mL/Hr) IV Continuous <Continuous>  pantoprazole   Suspension 40 milliGRAM(s) Enteral Tube daily  propofol Infusion 1 MICROgram(s)/kG/Min (0.51 mL/Hr) IV Continuous <Continuous>  spironolactone 50 milliGRAM(s) Oral daily  thiamine 100 milliGRAM(s) Oral daily    MEDICATIONS  (PRN):  ALBUTerol    90 MICROgram(s) HFA Inhaler 1 Puff(s) Inhalation every 4 hours PRN Shortness of Breath and/or Wheezing  cloNIDine 0.1 milliGRAM(s) Oral every 6 hours PRN opiate withdrawal  hydrOXYzine hydrochloride 50 milliGRAM(s) Oral every 6 hours PRN Anxiety  ibuprofen  Suspension. 600 milliGRAM(s) Oral every 6 hours PRN Temp greater or equal to 38C (100.4F)  ibuprofen  Tablet. 400 milliGRAM(s) Oral every 6 hours PRN Mild Pain (1 - 3)      Allergies:  buprenorphine (Rash)  Suboxone (Rash)            REVIEW OF SYSTEMS:  unobtainable       VITAL SIGNS:   T(F): 100.2 (06-21-22 @ 11:00), Max: 100.9 (06-20-22 @ 23:00)  HR: 71 (06-21-22 @ 11:00) (63 - 663)  BP: 111/74 (06-21-22 @ 11:00) (91/60 - 111/78)  RR: 30 (06-21-22 @ 11:00) (22 - 32)  SpO2: 99% (06-21-22 @ 11:00) (97% - 100%)    PHYSICAL EXAM:  GENERAL: +intubated   HEAD:  Atraumatic, Normocephalic  EYES: conjunctiva and sclera clear  NECK: Supple, no JVD or thyromegaly  CHEST/LUNG: b/l rhonchi   HEART: Regular rate and rhythm; normal S1, S2, No murmurs.  ABDOMEN: Soft, nontender, slightly distended Bowel sounds present  NEUROLOGY: No asterixis or tremor.   SKIN: Intact, no jaundice            LABS:                        8.7    2.77  )-----------( 140      ( 21 Jun 2022 05:33 )             29.5     06-21    144  |  111<H>  |  22<H>  ----------------------------<  112<H>  3.6   |  21  |  1.2    Ca    7.6<L>      21 Jun 2022 05:33  Phos  3.8     06-21  Mg     2.1     06-21    TPro  4.8<L>  /  Alb  2.1<L>  /  TBili  0.3  /  DBili  x   /  AST  27  /  ALT  9   /  AlkPhos  757<H>  06-21    LIVER FUNCTIONS - ( 21 Jun 2022 05:33 )  Alb: 2.1 g/dL / Pro: 4.8 g/dL / ALK PHOS: 757 U/L / ALT: 9 U/L / AST: 27 U/L / GGT: x               IMAGING:    < from: US Abdomen Complete (US Abdomen Complete .) (06.15.22 @ 11:29) >    ACC: [id number] EXAM:  US ABDOMEN COMPLETE                          PROCEDURE DATE:  06/15/2022          INTERPRETATION:  CLINICAL INFORMATION: Anasarca, IV drug abuse    COMPARISON: None available.    TECHNIQUE: Sonography of the abdomen.    FINDINGS:  Liver: Within normal limits.  Bile ducts: Normal caliber. Common bile duct measures 5 mm.  Gallbladder: Cholelithiasis.. Gallbladder wall thickening to 6 mm.   Negative sonographic Hammer's sign. No particles the fluid.  Pancreas: Visualized portions are within normal limits.  Spleen: 20.9 cm. Within normal limits.  Right kidney: 11.9 cm. No hydronephrosis.  Left kidney: 14.1 cm. No hydronephrosis.  Ascites: Moderate ascites.  Aorta and IVC: Visualized portions are within normal limits.    IMPRESSION:  Cholelithiasis. Gallbladder wall thickening. Negative sonographic   Hammer's sign. If there is high clinical suspicion for acute   cholecystitis recommend a HIDA scan.    Splenomegaly measuring 20.9 cm.    Moderate ascites.        --- End ofReport ---            SHELLI SHER MD; Attending Radiologist  This document has been electronically signed. Reinaldo 15 2022 12:14PM    < end of copied text >

## 2022-06-21 NOTE — PROGRESS NOTE ADULT - SUBJECTIVE AND OBJECTIVE BOX
POOJA DIANE Female [id number] Code Status: FULL CODE    Dispo: Admit to [facility]  Patient is a 26y old  Female who presents with a chief complaint of anasarca (2022 13:23)      INTERVAL HPI/OVERNIGHT EVENTS: s/p failed SBT yesterday    ICU Vital Signs Last 24 Hrs  T(C): 38 (2022 06:00), Max: 38.3 (2022 23:00)  T(F): 100.4 (2022 06:00), Max: 100.9 (2022 23:00)  HR: 68 (2022 06:00) (62 - 663)  BP: 94/59 (2022 03:00) (91/60 - 104/70)  BP(mean): 70 (2022 03:00) (70 - 82)  RR: 30 (2022 06:00) (23 - 35)  SpO2: 100% (2022 06:00) (97% - 100%)      I&O's Summary    2022 07:01  -  2022 07:00  --------------------------------------------------------  IN: 2505 mL / OUT: 2355 mL / NET: 150 mL         Daily     Daily Weight in k.6 (2022 05:00)    Mode: AC/ CMV (Assist Control/ Continuous Mandatory Ventilation)  RR (machine): 22  TV (machine): 400  FiO2: 30  PEEP: 5  MAP: 10  PIP: 23      ABG - ( 2022 03:56 )  pH, Arterial: 7.40  pH, Blood: x     /  pCO2: 38    /  pO2: 129   / HCO3: 24    / Base Excess: -1.1  /  SaO2: 99.6          LABS:                        9.2    3.26  )-----------( 145      ( 2022 05:49 )             30.3     06-20    144  |  112<H>  |  22<H>  ----------------------------<  107<H>  3.6   |  19  |  1.1    Ca    7.2<L>      2022 05:49  Phos  4.2       Mg     2.0         TPro  4.7<L>  /  Alb  2.0<L>  /  TBili  0.4  /  DBili  x   /  AST  25  /  ALT  9   /  AlkPhos  754<H>          CAPILLARY BLOOD GLUCOSE      Procalcitonin, Serum: 0.62 ng/mL (22 @ 05:49)  Procalcitonin, Serum: 3.28 ng/mL (22 @ 05:22)        RADIOLOGY & ADDITIONAL TESTS:    CARDIOLOGY DIAGNOSTICS:  < from: TTE Echo Complete w/ Contrast w/ Doppler (22 @ 10:54) >  Summary:   1. Normal global left ventricular systolic function.   2. Left ventricular ejection fraction, by visual estimation, is 55 to   60%.   3. Moderately decreased segmental left ventricular systolic function.   4. Entire apex, mid and apical anterior wall, mid and apical inferior   wall, and mid inferolateral segment are abnormal as described above.   5. Moderately enlarged right ventricle.   6. Mildly increased RV wall thickness.   7. Mildly reduced RV systolic function.   8. Normal left atrial size.   9. Normal right atrial size.  10. Trace mitral valve regurgitation.  11. Mild tricuspid regurgitation.  12. Moderate pulmonic valve regurgitation.  13. Estimated pulmonary artery systolic pressure is 51.7 mmHg assuming a   right atrial pressure of 15 mmHg, which is consistent with moderate   pulmonary hypertension.  14. Small pericardial effusion.    < end of copied text >    12 Lead ECG:   Ventricular Rate 85 BPM    Atrial Rate 85 BPM    P-R Interval 156 ms    QRS Duration 76 ms    Q-T Interval 408 ms    QTC Calculation(Bazett) 485 ms    P Axis 77 degrees    R Axis 124 degrees    T Axis 18 degrees    Diagnosis Line Normal sinus rhythm  Right axis deviation  Low voltage QRS  Nonspecific T wave abnormality  Abnormal ECG    Confirmed by BRANDON BELL MD ([id number]) on 6/15/2022 9:49:33 AM (06-15-22 @ 08:09)      MEDICATIONS  (STANDING):  ampicillin/sulbactam  IVPB 3 Gram(s) IV Intermittent every 6 hours  chlorhexidine 0.12% Liquid 15 milliLiter(s) Oral Mucosa every 12 hours  dexMEDEtomidine Infusion 0.2 MICROgram(s)/kG/Hr (4.28 mL/Hr) IV Continuous <Continuous>  folic acid 1 milliGRAM(s) Oral daily  furosemide   Injectable 40 milliGRAM(s) IV Push two times a day  heparin   Injectable 5000 Unit(s) SubCutaneous every 8 hours  lactulose Syrup 20 Gram(s) Oral daily  levETIRAcetam  IVPB 250 milliGRAM(s) IV Intermittent every 12 hours  midazolam Infusion 0.02 mG/kG/Hr (1.71 mL/Hr) IV Continuous <Continuous>  nicotine -  14 mG/24Hr(s) Patch 1 patch Transdermal daily  norepinephrine Infusion 0.05 MICROgram(s)/kG/Min (8.03 mL/Hr) IV Continuous <Continuous>  pantoprazole   Suspension 40 milliGRAM(s) Enteral Tube daily  propofol Infusion 1 MICROgram(s)/kG/Min (0.51 mL/Hr) IV Continuous <Continuous>  spironolactone 50 milliGRAM(s) Oral daily  thiamine 100 milliGRAM(s) Oral daily    MEDICATIONS  (PRN):  ALBUTerol    90 MICROgram(s) HFA Inhaler 1 Puff(s) Inhalation every 4 hours PRN Shortness of Breath and/or Wheezing  cloNIDine 0.1 milliGRAM(s) Oral every 6 hours PRN opiate withdrawal  hydrOXYzine hydrochloride 50 milliGRAM(s) Oral every 6 hours PRN Anxiety  ibuprofen  Suspension. 600 milliGRAM(s) Oral every 6 hours PRN Temp greater or equal to 38C (100.4F)  ibuprofen  Tablet. 400 milliGRAM(s) Oral every 6 hours PRN Mild Pain (1 - 3)      PHYSICAL EXAM:  GENERAL: NAD  HEENT:  +ETT, +OGT, NCAT, PERRL, No JVD, Trachea Midline  NERVOUS SYSTEM:  Sedated to RASS -1  CHEST/LUNG: Good air entry bilaterally  HEART: Regular rate and rhythm  ABDOMEN: Soft, Nontender, Nondistended  EXTREMITIES:  Warm, well perfused  SKIN: No new skin breakdowns

## 2022-06-21 NOTE — PROGRESS NOTE ADULT - SUBJECTIVE AND OBJECTIVE BOX
Patient is a 26y old  Female who presents with a chief complaint of anasarca (21 Jun 2022 07:11)        Over Night Events:   remains critically ill on MV  on versed, propofol,     ROS:     All ROS are negative except HPI         PHYSICAL EXAM    ICU Vital Signs Last 24 Hrs  T(C): 38.1 (21 Jun 2022 07:01), Max: 38.3 (20 Jun 2022 23:00)  T(F): 100.6 (21 Jun 2022 07:01), Max: 100.9 (20 Jun 2022 23:00)  HR: 66 (21 Jun 2022 08:00) (62 - 663)  BP: 104/72 (21 Jun 2022 08:00) (91/60 - 104/72)  BP(mean): 84 (21 Jun 2022 08:00) (70 - 84)  ABP: --  ABP(mean): --  RR: 28 (21 Jun 2022 08:00) (22 - 35)  SpO2: 100% (21 Jun 2022 08:00) (97% - 100%)      CONSTITUTIONAL:  Well nourished.  NAD    ENT:   Airway patent,   Mouth with normal mucosa.   No thrush    EYES:   Pupils equal,   Round and reactive to light.    CARDIAC:   Normal rate,   Regular rhythm.    No edema      Vascular:  Normal systolic impulse  No Carotid bruits    RESPIRATORY:   No wheezing  Bilateral BS  Normal chest expansion  Not tachypneic,  No use of accessory muscles    GASTROINTESTINAL:  Abdomen soft,   Non-tender,   No guarding,   + BS    MUSCULOSKELETAL:   Range of motion is not limited,  No clubbing, cyanosis    NEUROLOGICAL:   Alert and oriented   No motor  deficits.    SKIN:   Skin normal color for race,   Warm and dry and intact.   No evidence of rash.    PSYCHIATRIC:   Normal mood and affect.   No apparent risk to self or others.    HEMATOLOGICAL:  No cervical  lymphadenopathy.  no inguinal lymphadenopathy      06-20-22 @ 07:01  -  06-21-22 @ 07:00  --------------------------------------------------------  IN:    Dexmedetomidine: 768 mL    Enteral Tube Flush: 130 mL    IV PiggyBack: 300 mL    IV PiggyBack: 200 mL    Midazolam: 210 mL    Norepinephrine: 132 mL    Propofol: 546 mL    Vital High Protein: 945 mL  Total IN: 3231 mL    OUT:    Indwelling Catheter - Urethral (mL): 2180 mL    Voided (mL): 175 mL  Total OUT: 2355 mL    Total NET: 876 mL      06-21-22 @ 07:01  -  06-21-22 @ 09:33  --------------------------------------------------------  IN:    Dexmedetomidine: 32 mL    Midazolam: 10 mL    Norepinephrine: 6 mL    Propofol: 26 mL    Vital High Protein: 45 mL  Total IN: 119 mL    OUT:    Indwelling Catheter - Urethral (mL): 450 mL  Total OUT: 450 mL    Total NET: -331 mL          LABS:                            8.7    2.77  )-----------( 140      ( 21 Jun 2022 05:33 )             29.5                                               06-21    144  |  111<H>  |  22<H>  ----------------------------<  112<H>  3.6   |  21  |  1.2    Ca    7.6<L>      21 Jun 2022 05:33  Phos  3.8     06-21  Mg     2.1     06-21    TPro  4.8<L>  /  Alb  2.1<L>  /  TBili  0.3  /  DBili  x   /  AST  27  /  ALT  9   /  AlkPhos  757<H>  06-21                                                                                           LIVER FUNCTIONS - ( 21 Jun 2022 05:33 )  Alb: 2.1 g/dL / Pro: 4.8 g/dL / ALK PHOS: 757 U/L / ALT: 9 U/L / AST: 27 U/L / GGT: x                                                                                               Mode: AC/ CMV (Assist Control/ Continuous Mandatory Ventilation)  RR (machine): 22  TV (machine): 400  FiO2: 30  PEEP: 5  ITime: 1  MAP: 10  PIP: 22                                      ABG - ( 21 Jun 2022 03:56 )  pH, Arterial: 7.40  pH, Blood: x     /  pCO2: 38    /  pO2: 129   / HCO3: 24    / Base Excess: -1.1  /  SaO2: 99.6                MEDICATIONS  (STANDING):  ampicillin/sulbactam  IVPB 3 Gram(s) IV Intermittent every 6 hours  chlorhexidine 0.12% Liquid 15 milliLiter(s) Oral Mucosa every 12 hours  dexMEDEtomidine Infusion 0.2 MICROgram(s)/kG/Hr (4.28 mL/Hr) IV Continuous <Continuous>  folic acid 1 milliGRAM(s) Oral daily  furosemide   Injectable 40 milliGRAM(s) IV Push two times a day  heparin   Injectable 5000 Unit(s) SubCutaneous every 8 hours  lactulose Syrup 20 Gram(s) Oral daily  levETIRAcetam  IVPB 250 milliGRAM(s) IV Intermittent every 12 hours  midazolam Infusion 0.02 mG/kG/Hr (1.71 mL/Hr) IV Continuous <Continuous>  nicotine -  14 mG/24Hr(s) Patch 1 patch Transdermal daily  norepinephrine Infusion 0.05 MICROgram(s)/kG/Min (8.03 mL/Hr) IV Continuous <Continuous>  pantoprazole   Suspension 40 milliGRAM(s) Enteral Tube daily  propofol Infusion 1 MICROgram(s)/kG/Min (0.51 mL/Hr) IV Continuous <Continuous>  spironolactone 50 milliGRAM(s) Oral daily  thiamine 100 milliGRAM(s) Oral daily    MEDICATIONS  (PRN):  ALBUTerol    90 MICROgram(s) HFA Inhaler 1 Puff(s) Inhalation every 4 hours PRN Shortness of Breath and/or Wheezing  cloNIDine 0.1 milliGRAM(s) Oral every 6 hours PRN opiate withdrawal  hydrOXYzine hydrochloride 50 milliGRAM(s) Oral every 6 hours PRN Anxiety  ibuprofen  Suspension. 600 milliGRAM(s) Oral every 6 hours PRN Temp greater or equal to 38C (100.4F)  ibuprofen  Tablet. 400 milliGRAM(s) Oral every 6 hours PRN Mild Pain (1 - 3)      New X-rays reviewed:                                                                                  ECHO    CXR interpreted by me:       Patient is a 26y old  Female who presents with a chief complaint of anasarca (21 Jun 2022 07:11)        Over Night Events:   remains critically ill on MV  on versed, propofol, precedex  no acute events overnight  febrile    ROS:     All ROS are negative except HPI         PHYSICAL EXAM    ICU Vital Signs Last 24 Hrs  T(C): 38.1 (21 Jun 2022 07:01), Max: 38.3 (20 Jun 2022 23:00)  T(F): 100.6 (21 Jun 2022 07:01), Max: 100.9 (20 Jun 2022 23:00)  HR: 66 (21 Jun 2022 08:00) (62 - 663)  BP: 104/72 (21 Jun 2022 08:00) (91/60 - 104/72)  BP(mean): 84 (21 Jun 2022 08:00) (70 - 84)  RR: 28 (21 Jun 2022 08:00) (22 - 35)  SpO2: 100% (21 Jun 2022 08:00) (97% - 100%)      CONSTITUTIONAL:  Well nourished.  NAD    ENT:   Airway patent,   Mouth with normal mucosa.   No thrush  right IJ    EYES:   Pupils equal,   Round and reactive to light.    CARDIAC:   Normal rate,   Regular rhythm.    No edema      Vascular:  Normal systolic impulse  No Carotid bruits    RESPIRATORY:   No wheezing  Bilateral BS  Normal chest expansion  Not tachypneic,  No use of accessory muscles    GASTROINTESTINAL:  Abdomen soft,   Non-tender,   No guarding,   + BS    MUSCULOSKELETAL:   Range of motion is not limited,  No clubbing, cyanosis    NEUROLOGICAL:   Alert and oriented   No motor  deficits.    SKIN:   Skin normal color for race,   Warm and dry and intact.   No evidence of rash.    PSYCHIATRIC:   Normal mood and affect.   No apparent risk to self or others.    HEMATOLOGICAL:  No cervical  lymphadenopathy.  no inguinal lymphadenopathy      06-20-22 @ 07:01  -  06-21-22 @ 07:00  --------------------------------------------------------  IN:    Dexmedetomidine: 768 mL    Enteral Tube Flush: 130 mL    IV PiggyBack: 300 mL    IV PiggyBack: 200 mL    Midazolam: 210 mL    Norepinephrine: 132 mL    Propofol: 546 mL    Vital High Protein: 945 mL  Total IN: 3231 mL    OUT:    Indwelling Catheter - Urethral (mL): 2180 mL    Voided (mL): 175 mL  Total OUT: 2355 mL    Total NET: 876 mL      06-21-22 @ 07:01  -  06-21-22 @ 09:33  --------------------------------------------------------  IN:    Dexmedetomidine: 32 mL    Midazolam: 10 mL    Norepinephrine: 6 mL    Propofol: 26 mL    Vital High Protein: 45 mL  Total IN: 119 mL    OUT:    Indwelling Catheter - Urethral (mL): 450 mL  Total OUT: 450 mL    Total NET: -331 mL          LABS:                            8.7    2.77  )-----------( 140      ( 21 Jun 2022 05:33 )             29.5                                               06-21    144  |  111<H>  |  22<H>  ----------------------------<  112<H>  3.6   |  21  |  1.2    Ca    7.6<L>      21 Jun 2022 05:33  Phos  3.8     06-21  Mg     2.1     06-21    TPro  4.8<L>  /  Alb  2.1<L>  /  TBili  0.3  /  DBili  x   /  AST  27  /  ALT  9   /  AlkPhos  757<H>  06-21                                                                                           LIVER FUNCTIONS - ( 21 Jun 2022 05:33 )  Alb: 2.1 g/dL / Pro: 4.8 g/dL / ALK PHOS: 757 U/L / ALT: 9 U/L / AST: 27 U/L / GGT: x                                                                                               Mode: AC/ CMV (Assist Control/ Continuous Mandatory Ventilation)  RR (machine): 22  TV (machine): 400  FiO2: 30  PEEP: 5  ITime: 1  MAP: 10  PIP: 22                                      ABG - ( 21 Jun 2022 03:56 )  pH, Arterial: 7.40  pH, Blood: x     /  pCO2: 38    /  pO2: 129   / HCO3: 24    / Base Excess: -1.1  /  SaO2: 99.6                MEDICATIONS  (STANDING):  ampicillin/sulbactam  IVPB 3 Gram(s) IV Intermittent every 6 hours  chlorhexidine 0.12% Liquid 15 milliLiter(s) Oral Mucosa every 12 hours  dexMEDEtomidine Infusion 0.2 MICROgram(s)/kG/Hr (4.28 mL/Hr) IV Continuous <Continuous>  folic acid 1 milliGRAM(s) Oral daily  furosemide   Injectable 40 milliGRAM(s) IV Push two times a day  heparin   Injectable 5000 Unit(s) SubCutaneous every 8 hours  lactulose Syrup 20 Gram(s) Oral daily  levETIRAcetam  IVPB 250 milliGRAM(s) IV Intermittent every 12 hours  midazolam Infusion 0.02 mG/kG/Hr (1.71 mL/Hr) IV Continuous <Continuous>  nicotine -  14 mG/24Hr(s) Patch 1 patch Transdermal daily  norepinephrine Infusion 0.05 MICROgram(s)/kG/Min (8.03 mL/Hr) IV Continuous <Continuous>  pantoprazole   Suspension 40 milliGRAM(s) Enteral Tube daily  propofol Infusion 1 MICROgram(s)/kG/Min (0.51 mL/Hr) IV Continuous <Continuous>  spironolactone 50 milliGRAM(s) Oral daily  thiamine 100 milliGRAM(s) Oral daily    MEDICATIONS  (PRN):  ALBUTerol    90 MICROgram(s) HFA Inhaler 1 Puff(s) Inhalation every 4 hours PRN Shortness of Breath and/or Wheezing  cloNIDine 0.1 milliGRAM(s) Oral every 6 hours PRN opiate withdrawal  hydrOXYzine hydrochloride 50 milliGRAM(s) Oral every 6 hours PRN Anxiety  ibuprofen  Suspension. 600 milliGRAM(s) Oral every 6 hours PRN Temp greater or equal to 38C (100.4F)  ibuprofen  Tablet. 400 milliGRAM(s) Oral every 6 hours PRN Mild Pain (1 - 3)      New X-rays reviewed:                                                                                  ECHO    CXR interpreted by me:

## 2022-06-21 NOTE — PROGRESS NOTE ADULT - ASSESSMENT
IMPRESSION:  # AHRF - on MV  # Suspected PNA - poss Aspiration  # Seizure like activity - now on AEDs  # pHTN WHO Class unclear at this time  # h/o Pericardial Effusion  # OD vs. Other Tox  # h/o Liver cirrhosis d/t HepC    PLAN:    CNS:   - Repeat attempt of SAT/SBT  - c/w Keppra to 250mg BID and then d/c  - Seizure Precautions    HEENT:   - c/w Routine oral care     PULMONARY:   - No vent changes  - HOB 45  - SBT    CARDIOVASCULAR:   - Cardio consult (Laura)  for possible ischemic workup to evaluate etiology of segmental wall abnormalities  - Wean levophed, repeat 2echo, goal MAP >65    GI:   - GI prophylaxis.  - Hold NG Feeding for SBT if extubated bedside Swallow vs.  SLP evaluation   - GI Eval appreciated, requesting outpt f/u and HepC quant/VL    RENAL:   - Monitor lytes  - Strict I/O's  - Ensure Mg > 2.0    INFECTIOUS DISEASE:   - c/w Unasyn (Day#5 today)   - f/u blood cultures    HEMATOLOGICAL:     - DVT PPX w/Heparin SQ     ENDOCRINE:    - Follow up FS.  Insulin protocol PRN    MICU

## 2022-06-21 NOTE — CHART NOTE - NSCHARTNOTEFT_GEN_A_CORE
Registered Dietitian Follow-Up     Patient Profile Reviewed                           Yes [X]   No []     Nutrition History Previously Obtained        Yes []X  No []       Pertinent  Information: pt is 26 year old female with hx of asthma, Hep C, active IVDA, anasarca presents with dyspnea admitted with fluid overload and initially admitted to med surg unit. s/p RRT 6/16 pt was in the lobby s/p seizure and fall 2/2 overdose s/p intubation and upgraded to ICU. + laceration to scalp noted. pt presently on propofol at 26 ml/hr which provides 686 kcals. As per GI moderate ascites, s/p paracentesis, + portal HTN 2/2 cirrhosis. pt tolerating EN at goal rate      Diet, NPO with Tube Feed:   Tube Feeding Modality: Orogastric  Vital High Protein  Total Volume for 24 Hours (mL): 1080  Continuous  Starting Tube Feed Rate {mL per Hour}: 20  Increase Tube Feed Rate by (mL): 5     Every 4 hours  Until Goal Tube Feed Rate (mL per Hour): 45  Tube Feed Duration (in Hours): 24  Tube Feed Start Time: 20:00  No Carb Prosource TF     Qty per Day:  1 (06-18-22 @ 19:34) [Active]       Anthropometrics:  - Ht. 167.6 cm  - Wt. 82.6 kg today vs 79.3 kg upon admission   - %wt change  - BMI   - IBW      Pertinent Lab Data:     Pertinent Meds:     Physical Findings:  - Appearance:  - GI function:  - Tubes:  - Oral/Mouth cavity:  - Skin:      Nutrition Requirements  Weight Used:      Estimated Energy Needs    Continue []  Adjust []  Adjusted Energy Recommendations:   kcal/day        Estimated Protein Needs    Continue []  Adjust []  Adjusted Protein Recommendations:   gm/day        Estimated Fluid Needs        Continue []  Adjust []  Adjusted Fluid Recommendations:   mL/day     Nutrient Intake:        [] Previous Nutrition Diagnosis:            [] Ongoing          [] Resolved    [] No active nutrition diagnosis identified at this time     Nutrition Diagnostic #1  Problem:   Etiology:   Statement:      Nutrition Diagnostic #2  Problem:  Etiology:  Statement:     Nutrition Intervention:     Goal/Expected Outcome:     Indicator/Monitoring: Registered Dietitian Follow-Up     Patient Profile Reviewed                           Yes [X]   No []     Nutrition History Previously Obtained        Yes []X  No []       Pertinent  Information: pt is 26 year old female with hx of asthma, Hep C, active IVDA, anasarca presents with dyspnea admitted with fluid overload and initially admitted to med surg unit. s/p RRT 6/16 pt was in the lobby s/p seizure and fall 2/2 overdose s/p intubation and upgraded to ICU. + laceration to scalp noted. pt presently on propofol at 26 ml/hr which provides 686 kcals. As per GI moderate ascites, s/p paracentesis, + portal HTN 2/2 cirrhosis. pt tolerating EN at goal rate      Diet, NPO with Tube Feed:   Tube Feeding Modality: Orogastric  Vital High Protein  Total Volume for 24 Hours (mL): 1080  Continuous  Starting Tube Feed Rate {mL per Hour}: 20  Increase Tube Feed Rate by (mL): 5     Every 4 hours  Until Goal Tube Feed Rate (mL per Hour): 45  Tube Feed Duration (in Hours): 24  Tube Feed Start Time: 20:00  No Carb Prosource TF     Qty per Day:  1 (06-18-22 @ 19:34) [Active]       Anthropometrics:  - Ht. 167.6 cm  - Wt. 82.6 kg today vs 79.3 kg upon admission   - %wt change  - BMI   - IBW      Pertinent Lab Data:  06-21    144  |  111<H>  |  22<H>  ----------------------------<  112<H>  3.6   |  21  |  1.2    Ca    7.6<L>      21 Jun 2022 05:33  Phos  3.8     06-21  Mg     2.1     06-21    TPro  4.8<L>  /  Alb  2.1<L>  /  TBili  0.3  /  DBili  x   /  AST  27  /  ALT  9   /  AlkPhos  757<H>  06-21                          8.7    2.77  )-----------( 140      ( 21 Jun 2022 05:33 )             29.5          Pertinent Meds:  MEDICATIONS  (STANDING):  ampicillin/sulbactam  IVPB 3 Gram(s) IV Intermittent every 6 hours  chlorhexidine 0.12% Liquid 15 milliLiter(s) Oral Mucosa every 12 hours  dexMEDEtomidine Infusion 0.2 MICROgram(s)/kG/Hr (4.28 mL/Hr) IV Continuous <Continuous>  folic acid 1 milliGRAM(s) Oral daily  furosemide   Injectable 40 milliGRAM(s) IV Push two times a day  heparin   Injectable 5000 Unit(s) SubCutaneous every 8 hours  lactulose Syrup 20 Gram(s) Oral daily  levETIRAcetam  IVPB 250 milliGRAM(s) IV Intermittent every 12 hours  methadone    Tablet 15 milliGRAM(s) Oral daily  midazolam Infusion 0.02 mG/kG/Hr (1.71 mL/Hr) IV Continuous <Continuous>  nicotine -  14 mG/24Hr(s) Patch 1 patch Transdermal daily  norepinephrine Infusion 0.05 MICROgram(s)/kG/Min (8.03 mL/Hr) IV Continuous <Continuous>  pantoprazole   Suspension 40 milliGRAM(s) Enteral Tube daily  propofol Infusion 1 MICROgram(s)/kG/Min (0.51 mL/Hr) IV Continuous <Continuous>  spironolactone 50 milliGRAM(s) Oral daily  thiamine 100 milliGRAM(s) Oral daily    MEDICATIONS  (PRN):  ALBUTerol    90 MICROgram(s) HFA Inhaler 1 Puff(s) Inhalation every 4 hours PRN Shortness of Breath and/or Wheezing  cloNIDine 0.1 milliGRAM(s) Oral every 6 hours PRN opiate withdrawal  hydrOXYzine hydrochloride 50 milliGRAM(s) Oral every 6 hours PRN Anxiety  ibuprofen  Suspension. 600 milliGRAM(s) Oral every 6 hours PRN Temp greater or equal to 38C (100.4F)  ibuprofen  Tablet. 400 milliGRAM(s) Oral every 6 hours PRN Mild Pain (1 - 3)       Physical Findings:  - Appearance: awake, intubated   - GI function: +BS, +BM noted today   - Tubes: OGT   - Oral/Mouth cavity:  - Skin:      Nutrition Requirements  Weight Used: 79.3 kgs      Estimated Energy Needs;  8881-9865 kcals (25-30 kcal/kg/BW) vs 1972 kcal ANNE MARIE state   95-111g protein (1.2-1.4g/kg/BW)  1;1 kcal for estimated fluid needs        Nutrient Intake: present EN regimen provides: 1140+686 kcal (propofol infusion), 93+15g protein and total volume 1080 ml (23 kcal/kg/BW, 1.4g/kg/BW)        [] Previous Nutrition Diagnosis:            [] Ongoing          [X] Resolved    presently receiving >80% of estimated nutrient needs     Nutrition Intervention: maintain on present EN regimen      Goal/Expected Outcome: pt to continue to tolerate feeds and meet >80% of estimated needs      Indicator/Monitoring: RD to monitor tolerance to EN, labs/meds, NFPF and f/u as needed within 2-4 days

## 2022-06-21 NOTE — PROGRESS NOTE ADULT - ASSESSMENT
26 year old female with hx of asthma, untreated Hep C, IVDA, anasarca was admitted to medical floor  with increased SOB and anasarca  Pt admits to using opiates 2 grams per day IV into her thighs  x years with minimal sobriety. Pt has been on [facility]MTP in 2020. Pt is contemplating going back on program. Pt denies any other substance abuse. Pt states used a xanax for wd about 3 days ago.      Hepatitis C with Cirrhosis no compliant with treatment  Hx of withdrawal   variable periods of sobriety in the past    on floor pt was diuresed with improvement of symptoms  when the following events took place  "RRT was called for pt in the [facility]    patient was found on the floor in ER, gasping for air, short of breath, tachypnea, bleeding profusely from her posterior scalp, was placed in a stretcher, was hypoxic 70s    emergently intubated, ct scan head ordered re scalp bleeding    several syringes, drug paraphanellia found on patient clothes     pt  intubated and  transfered to ICU       I strongly suspect that she injected illicit drug ( abuses IV heroin) and she went into resp failure secondary to heroin overdose."      Acute respiratory failure with hypoxemia / aspiration pneumonia with sepsis / suspected drug overdose / head laceration s/p fall in lobby / possible seizure / anasarca     - head laceration repaired by surgery - CT head negative for intracranial bleed   - paracentesis performed as per GI recommendation   -  EEG as above - continue Keppra 500mg q12h - neuro f/u   - pt is febrile -  continue antibiotics - titrate pressors  - procal is improving   - supplement hypokalemia and check mg level and maintain above 2   - DVT and GI prophylaxis

## 2022-06-22 LAB
ALBUMIN SERPL ELPH-MCNC: 2.3 G/DL — LOW (ref 3.5–5.2)
ALP SERPL-CCNC: 756 U/L — HIGH (ref 30–115)
ALT FLD-CCNC: 9 U/L — SIGNIFICANT CHANGE UP (ref 0–41)
ANION GAP SERPL CALC-SCNC: 11 MMOL/L — SIGNIFICANT CHANGE UP (ref 7–14)
APTT BLD: 33.4 SEC — SIGNIFICANT CHANGE UP (ref 27–39.2)
AST SERPL-CCNC: 28 U/L — SIGNIFICANT CHANGE UP (ref 0–41)
B PERT IGG+IGM PNL SER: ABNORMAL
BASOPHILS # BLD AUTO: 0 K/UL — SIGNIFICANT CHANGE UP (ref 0–0.2)
BASOPHILS NFR BLD AUTO: 0 % — SIGNIFICANT CHANGE UP (ref 0–1)
BILIRUB SERPL-MCNC: 0.3 MG/DL — SIGNIFICANT CHANGE UP (ref 0.2–1.2)
BUN SERPL-MCNC: 20 MG/DL — SIGNIFICANT CHANGE UP (ref 10–20)
CALCIUM SERPL-MCNC: 7.7 MG/DL — LOW (ref 8.5–10.1)
CHLORIDE SERPL-SCNC: 112 MMOL/L — HIGH (ref 98–110)
CO2 SERPL-SCNC: 22 MMOL/L — SIGNIFICANT CHANGE UP (ref 17–32)
COLOR FLD: SIGNIFICANT CHANGE UP
CREAT SERPL-MCNC: 1 MG/DL — SIGNIFICANT CHANGE UP (ref 0.7–1.5)
CULTURE RESULTS: SIGNIFICANT CHANGE UP
EGFR: 79 ML/MIN/1.73M2 — SIGNIFICANT CHANGE UP
EOSINOPHIL # BLD AUTO: 0 K/UL — SIGNIFICANT CHANGE UP (ref 0–0.7)
EOSINOPHIL NFR BLD AUTO: 0 % — SIGNIFICANT CHANGE UP (ref 0–8)
FLUID INTAKE SUBSTANCE CLASS: SIGNIFICANT CHANGE UP
GLUCOSE SERPL-MCNC: 126 MG/DL — HIGH (ref 70–99)
HCT VFR BLD CALC: 30.2 % — LOW (ref 37–47)
HGB BLD-MCNC: 9 G/DL — LOW (ref 12–16)
IMM GRANULOCYTES NFR BLD AUTO: 0.4 % — HIGH (ref 0.1–0.3)
INR BLD: 1 RATIO — SIGNIFICANT CHANGE UP (ref 0.65–1.3)
LYMPHOCYTES # BLD AUTO: 0.8 K/UL — LOW (ref 1.2–3.4)
LYMPHOCYTES # BLD AUTO: 34.8 % — SIGNIFICANT CHANGE UP (ref 20.5–51.1)
LYMPHOCYTES # FLD: 68 % — SIGNIFICANT CHANGE UP
MAGNESIUM SERPL-MCNC: 2 MG/DL — SIGNIFICANT CHANGE UP (ref 1.8–2.4)
MCHC RBC-ENTMCNC: 25.9 PG — LOW (ref 27–31)
MCHC RBC-ENTMCNC: 29.8 G/DL — LOW (ref 32–37)
MCV RBC AUTO: 86.8 FL — SIGNIFICANT CHANGE UP (ref 81–99)
MONOCYTES # BLD AUTO: 0.15 K/UL — SIGNIFICANT CHANGE UP (ref 0.1–0.6)
MONOCYTES NFR BLD AUTO: 6.5 % — SIGNIFICANT CHANGE UP (ref 1.7–9.3)
MONOS+MACROS # FLD: 23 % — SIGNIFICANT CHANGE UP
NEUTROPHILS # BLD AUTO: 1.34 K/UL — LOW (ref 1.4–6.5)
NEUTROPHILS NFR BLD AUTO: 58.3 % — SIGNIFICANT CHANGE UP (ref 42.2–75.2)
NEUTROPHILS-BODY FLUID: 9 % — SIGNIFICANT CHANGE UP
NON-GYNECOLOGICAL CYTOLOGY STUDY: SIGNIFICANT CHANGE UP
NRBC # BLD: 0 /100 WBCS — SIGNIFICANT CHANGE UP (ref 0–0)
PHOSPHATE SERPL-MCNC: 3.3 MG/DL — SIGNIFICANT CHANGE UP (ref 2.1–4.9)
PLATELET # BLD AUTO: 139 K/UL — SIGNIFICANT CHANGE UP (ref 130–400)
POTASSIUM SERPL-MCNC: 3.2 MMOL/L — LOW (ref 3.5–5)
POTASSIUM SERPL-SCNC: 3.2 MMOL/L — LOW (ref 3.5–5)
PROT SERPL-MCNC: 5 G/DL — LOW (ref 6–8)
PROTHROM AB SERPL-ACNC: 11.5 SEC — SIGNIFICANT CHANGE UP (ref 9.95–12.87)
RBC # BLD: 3.48 M/UL — LOW (ref 4.2–5.4)
RBC # FLD: 16.9 % — HIGH (ref 11.5–14.5)
RCV VOL RI: HIGH /UL (ref 0–0)
SODIUM SERPL-SCNC: 145 MMOL/L — SIGNIFICANT CHANGE UP (ref 135–146)
SPECIMEN SOURCE: SIGNIFICANT CHANGE UP
TOTAL NUCLEATED CELL COUNT, BODY FLUID: 158 /UL — SIGNIFICANT CHANGE UP
TRIGL SERPL-MCNC: 495 MG/DL — HIGH
TUBE TYPE: SIGNIFICANT CHANGE UP
WBC # BLD: 2.3 K/UL — LOW (ref 4.8–10.8)
WBC # FLD AUTO: 2.3 K/UL — LOW (ref 4.8–10.8)

## 2022-06-22 PROCEDURE — 99233 SBSQ HOSP IP/OBS HIGH 50: CPT

## 2022-06-22 PROCEDURE — 71045 X-RAY EXAM CHEST 1 VIEW: CPT | Mod: 26

## 2022-06-22 PROCEDURE — 99291 CRITICAL CARE FIRST HOUR: CPT

## 2022-06-22 PROCEDURE — 71250 CT THORAX DX C-: CPT | Mod: 26

## 2022-06-22 RX ORDER — POTASSIUM CHLORIDE 20 MEQ
20 PACKET (EA) ORAL ONCE
Refills: 0 | Status: COMPLETED | OUTPATIENT
Start: 2022-06-22 | End: 2022-06-22

## 2022-06-22 RX ORDER — LIDOCAINE HCL 20 MG/ML
20 VIAL (ML) INJECTION ONCE
Refills: 0 | Status: COMPLETED | OUTPATIENT
Start: 2022-06-22 | End: 2022-06-22

## 2022-06-22 RX ORDER — LIDOCAINE HCL 20 MG/ML
10 VIAL (ML) INJECTION ONCE
Refills: 0 | Status: DISCONTINUED | OUTPATIENT
Start: 2022-06-22 | End: 2022-06-22

## 2022-06-22 RX ADMIN — AMPICILLIN SODIUM AND SULBACTAM SODIUM 200 GRAM(S): 250; 125 INJECTION, POWDER, FOR SUSPENSION INTRAMUSCULAR; INTRAVENOUS at 00:28

## 2022-06-22 RX ADMIN — Medication 1 PATCH: at 12:30

## 2022-06-22 RX ADMIN — HEPARIN SODIUM 5000 UNIT(S): 5000 INJECTION INTRAVENOUS; SUBCUTANEOUS at 13:32

## 2022-06-22 RX ADMIN — METHADONE HYDROCHLORIDE 15 MILLIGRAM(S): 40 TABLET ORAL at 12:19

## 2022-06-22 RX ADMIN — AMPICILLIN SODIUM AND SULBACTAM SODIUM 200 GRAM(S): 250; 125 INJECTION, POWDER, FOR SUSPENSION INTRAMUSCULAR; INTRAVENOUS at 17:39

## 2022-06-22 RX ADMIN — Medication 40 MILLIGRAM(S): at 05:46

## 2022-06-22 RX ADMIN — Medication 20 MILLILITER(S): at 11:49

## 2022-06-22 RX ADMIN — CHLORHEXIDINE GLUCONATE 15 MILLILITER(S): 213 SOLUTION TOPICAL at 05:48

## 2022-06-22 RX ADMIN — LEVETIRACETAM 400 MILLIGRAM(S): 250 TABLET, FILM COATED ORAL at 05:24

## 2022-06-22 RX ADMIN — MIDAZOLAM HYDROCHLORIDE 1.71 MG/KG/HR: 1 INJECTION, SOLUTION INTRAMUSCULAR; INTRAVENOUS at 16:29

## 2022-06-22 RX ADMIN — SPIRONOLACTONE 50 MILLIGRAM(S): 25 TABLET, FILM COATED ORAL at 05:46

## 2022-06-22 RX ADMIN — MIDAZOLAM HYDROCHLORIDE 1.71 MG/KG/HR: 1 INJECTION, SOLUTION INTRAMUSCULAR; INTRAVENOUS at 05:47

## 2022-06-22 RX ADMIN — LEVETIRACETAM 400 MILLIGRAM(S): 250 TABLET, FILM COATED ORAL at 17:39

## 2022-06-22 RX ADMIN — DEXMEDETOMIDINE HYDROCHLORIDE IN 0.9% SODIUM CHLORIDE 4.28 MICROGRAM(S)/KG/HR: 4 INJECTION INTRAVENOUS at 21:18

## 2022-06-22 RX ADMIN — PROPOFOL 0.51 MICROGRAM(S)/KG/MIN: 10 INJECTION, EMULSION INTRAVENOUS at 07:32

## 2022-06-22 RX ADMIN — Medication 8.03 MICROGRAM(S)/KG/MIN: at 23:18

## 2022-06-22 RX ADMIN — LACTULOSE 20 GRAM(S): 10 SOLUTION ORAL at 12:18

## 2022-06-22 RX ADMIN — AMPICILLIN SODIUM AND SULBACTAM SODIUM 200 GRAM(S): 250; 125 INJECTION, POWDER, FOR SUSPENSION INTRAMUSCULAR; INTRAVENOUS at 23:35

## 2022-06-22 RX ADMIN — PROPOFOL 0.51 MICROGRAM(S)/KG/MIN: 10 INJECTION, EMULSION INTRAVENOUS at 05:47

## 2022-06-22 RX ADMIN — Medication 1 PATCH: at 12:19

## 2022-06-22 RX ADMIN — Medication 100 MILLIEQUIVALENT(S): at 10:08

## 2022-06-22 RX ADMIN — DEXMEDETOMIDINE HYDROCHLORIDE IN 0.9% SODIUM CHLORIDE 4.28 MICROGRAM(S)/KG/HR: 4 INJECTION INTRAVENOUS at 23:17

## 2022-06-22 RX ADMIN — DEXMEDETOMIDINE HYDROCHLORIDE IN 0.9% SODIUM CHLORIDE 4.28 MICROGRAM(S)/KG/HR: 4 INJECTION INTRAVENOUS at 05:47

## 2022-06-22 RX ADMIN — Medication 1 MILLIGRAM(S): at 12:19

## 2022-06-22 RX ADMIN — MIDAZOLAM HYDROCHLORIDE 1.71 MG/KG/HR: 1 INJECTION, SOLUTION INTRAMUSCULAR; INTRAVENOUS at 23:18

## 2022-06-22 RX ADMIN — CHLORHEXIDINE GLUCONATE 15 MILLILITER(S): 213 SOLUTION TOPICAL at 17:41

## 2022-06-22 RX ADMIN — PROPOFOL 0.51 MICROGRAM(S)/KG/MIN: 10 INJECTION, EMULSION INTRAVENOUS at 10:33

## 2022-06-22 RX ADMIN — Medication 8.03 MICROGRAM(S)/KG/MIN: at 07:32

## 2022-06-22 RX ADMIN — AMPICILLIN SODIUM AND SULBACTAM SODIUM 200 GRAM(S): 250; 125 INJECTION, POWDER, FOR SUSPENSION INTRAMUSCULAR; INTRAVENOUS at 12:20

## 2022-06-22 RX ADMIN — Medication 100 MILLIGRAM(S): at 12:18

## 2022-06-22 RX ADMIN — MIDAZOLAM HYDROCHLORIDE 1.71 MG/KG/HR: 1 INJECTION, SOLUTION INTRAMUSCULAR; INTRAVENOUS at 07:36

## 2022-06-22 RX ADMIN — Medication 8.03 MICROGRAM(S)/KG/MIN: at 21:19

## 2022-06-22 RX ADMIN — HEPARIN SODIUM 5000 UNIT(S): 5000 INJECTION INTRAVENOUS; SUBCUTANEOUS at 05:46

## 2022-06-22 RX ADMIN — HEPARIN SODIUM 5000 UNIT(S): 5000 INJECTION INTRAVENOUS; SUBCUTANEOUS at 21:20

## 2022-06-22 RX ADMIN — Medication 40 MILLIGRAM(S): at 13:32

## 2022-06-22 RX ADMIN — PROPOFOL 0.51 MICROGRAM(S)/KG/MIN: 10 INJECTION, EMULSION INTRAVENOUS at 12:22

## 2022-06-22 RX ADMIN — DEXMEDETOMIDINE HYDROCHLORIDE IN 0.9% SODIUM CHLORIDE 4.28 MICROGRAM(S)/KG/HR: 4 INJECTION INTRAVENOUS at 09:34

## 2022-06-22 RX ADMIN — DEXMEDETOMIDINE HYDROCHLORIDE IN 0.9% SODIUM CHLORIDE 4.28 MICROGRAM(S)/KG/HR: 4 INJECTION INTRAVENOUS at 12:22

## 2022-06-22 RX ADMIN — Medication 8.03 MICROGRAM(S)/KG/MIN: at 05:47

## 2022-06-22 RX ADMIN — PROPOFOL 0.51 MICROGRAM(S)/KG/MIN: 10 INJECTION, EMULSION INTRAVENOUS at 23:17

## 2022-06-22 RX ADMIN — PANTOPRAZOLE SODIUM 40 MILLIGRAM(S): 20 TABLET, DELAYED RELEASE ORAL at 12:19

## 2022-06-22 RX ADMIN — Medication 1 PATCH: at 18:11

## 2022-06-22 RX ADMIN — Medication 1 PATCH: at 09:01

## 2022-06-22 RX ADMIN — DEXMEDETOMIDINE HYDROCHLORIDE IN 0.9% SODIUM CHLORIDE 4.28 MICROGRAM(S)/KG/HR: 4 INJECTION INTRAVENOUS at 07:32

## 2022-06-22 RX ADMIN — AMPICILLIN SODIUM AND SULBACTAM SODIUM 200 GRAM(S): 250; 125 INJECTION, POWDER, FOR SUSPENSION INTRAMUSCULAR; INTRAVENOUS at 05:46

## 2022-06-22 NOTE — PROGRESS NOTE ADULT - SUBJECTIVE AND OBJECTIVE BOX
26yFemale  Being followed for ascites   Interval history:  No hematemesis, melena, blood in stool reported.       PAST MEDICAL & SURGICAL HISTORY:   Hepatitis C      Asthma      Anxiety and depression      IV drug abuse  heroin      No significant past surgical history              Social History: No smoking. No alcohol. No illegal drug use.          MEDICATIONS  (STANDING):  ampicillin/sulbactam  IVPB 3 Gram(s) IV Intermittent every 6 hours  chlorhexidine 0.12% Liquid 15 milliLiter(s) Oral Mucosa every 12 hours  dexMEDEtomidine Infusion 0.2 MICROgram(s)/kG/Hr (4.28 mL/Hr) IV Continuous <Continuous>  folic acid 1 milliGRAM(s) Oral daily  furosemide   Injectable 40 milliGRAM(s) IV Push two times a day  heparin   Injectable 5000 Unit(s) SubCutaneous every 8 hours  lactulose Syrup 20 Gram(s) Oral daily  levETIRAcetam  IVPB 250 milliGRAM(s) IV Intermittent every 12 hours  methadone    Tablet 15 milliGRAM(s) Oral daily  midazolam Infusion 0.02 mG/kG/Hr (1.71 mL/Hr) IV Continuous <Continuous>  nicotine -  14 mG/24Hr(s) Patch 1 patch Transdermal daily  norepinephrine Infusion 0.05 MICROgram(s)/kG/Min (8.03 mL/Hr) IV Continuous <Continuous>  pantoprazole   Suspension 40 milliGRAM(s) Enteral Tube daily  propofol Infusion 1 MICROgram(s)/kG/Min (0.51 mL/Hr) IV Continuous <Continuous>  spironolactone 50 milliGRAM(s) Oral daily  thiamine 100 milliGRAM(s) Oral daily    MEDICATIONS  (PRN):  ALBUTerol    90 MICROgram(s) HFA Inhaler 1 Puff(s) Inhalation every 4 hours PRN Shortness of Breath and/or Wheezing  cloNIDine 0.1 milliGRAM(s) Oral every 6 hours PRN opiate withdrawal  hydrOXYzine hydrochloride 50 milliGRAM(s) Oral every 6 hours PRN Anxiety  ibuprofen  Tablet. 600 milliGRAM(s) Oral every 6 hours PRN Temp greater or equal to 38C (100.4F)  ibuprofen  Tablet. 400 milliGRAM(s) Oral every 6 hours PRN Mild Pain (1 - 3)      Allergies:  buprenorphine (Rash)  Suboxone (Rash)            REVIEW OF SYSTEMS:  unobtainable         VITAL SIGNS:   T(F): 100.8 (06-22-22 @ 13:25), Max: 100.8 (06-22-22 @ 13:25)  HR: 75 (06-22-22 @ 13:25) (67 - 76)  BP: 111/82 (06-22-22 @ 13:25) (99/61 - 124/87)  RR: 25 (06-22-22 @ 13:25) (24 - 33)  SpO2: 100% (06-22-22 @ 13:25) (98% - 100%)    PHYSICAL EXAM:  GENERAL: does not respond to prompts   HEAD:  Atraumatic, Normocephalic  EYES: conjunctiva and sclera clear  NECK: Supple, no JVD or thyromegaly  CHEST/LUNG: b/l rhonchi  HEART: Regular rate and rhythm; normal S1, S2, No murmurs.  ABDOMEN: Soft, nontender, +distended; Bowel sounds present  NEUROLOGY: No asterixis or tremor.   SKIN: Intact, no jaundice            LABS:                        9.0    2.30  )-----------( 139      ( 22 Jun 2022 06:50 )             30.2     06-22    145  |  112<H>  |  20  ----------------------------<  126<H>  3.2<L>   |  22  |  1.0    Ca    7.7<L>      22 Jun 2022 06:50  Phos  3.3     06-22  Mg     2.0     06-22    TPro  5.0<L>  /  Alb  2.3<L>  /  TBili  0.3  /  DBili  x   /  AST  28  /  ALT  9   /  AlkPhos  756<H>  06-22    LIVER FUNCTIONS - ( 22 Jun 2022 06:50 )  Alb: 2.3 g/dL / Pro: 5.0 g/dL / ALK PHOS: 756 U/L / ALT: 9 U/L / AST: 28 U/L / GGT: x           PT/INR - ( 22 Jun 2022 06:50 )   PT: 11.50 sec;   INR: 1.00 ratio         PTT - ( 22 Jun 2022 06:50 )  PTT:33.4 sec    IMAGING:      < from: Xray Chest 1 View-PORTABLE IMMEDIATE (Xray Chest 1 View-PORTABLE IMMEDIATE .) (06.22.22 @ 11:00) >    ACC: [id number] EXAM:  XR CHEST PORTABLE IMMED 1V                          PROCEDURE DATE:  06/22/2022          INTERPRETATION:  Clinical History / Reason for exam: Respiratory failure.    Comparison : Chest radiograph 6/22/2022.    Technique/Positioning: Single AP chest radiograph.    Findings:    Support devices: Endotracheal tube, enteric tube, right IJ central   catheter are stable.    Cardiac/mediastinum/hilum: Stable cardiomegaly.    Lung parenchyma/Pleura: Stable basilar opacities. No pneumothorax.    Skeleton/soft tissues: No acute osseous abnormality.    Impression:    Stable cardiomegaly and basilar opacities.    --- End of Report ---            NALLELY MCCALL MD; Attending Radiologist  This document has been electronically signed. Jun 22 2022  1:42PM    < end of copied text >       26yFemale  Being followed for ascites   Interval history:  No hematemesis, melena, blood in stool reported.       PAST MEDICAL & SURGICAL HISTORY:   Hepatitis C  Asthma  Anxiety and depression  IV drug abuse  heroin      No significant past surgical history    Social History: No smoking. No alcohol. No illegal drug use.          MEDICATIONS  (STANDING):  ampicillin/sulbactam  IVPB 3 Gram(s) IV Intermittent every 6 hours  chlorhexidine 0.12% Liquid 15 milliLiter(s) Oral Mucosa every 12 hours  dexMEDEtomidine Infusion 0.2 MICROgram(s)/kG/Hr (4.28 mL/Hr) IV Continuous <Continuous>  folic acid 1 milliGRAM(s) Oral daily  furosemide   Injectable 40 milliGRAM(s) IV Push two times a day  heparin   Injectable 5000 Unit(s) SubCutaneous every 8 hours  lactulose Syrup 20 Gram(s) Oral daily  levETIRAcetam  IVPB 250 milliGRAM(s) IV Intermittent every 12 hours  methadone    Tablet 15 milliGRAM(s) Oral daily  midazolam Infusion 0.02 mG/kG/Hr (1.71 mL/Hr) IV Continuous <Continuous>  nicotine -  14 mG/24Hr(s) Patch 1 patch Transdermal daily  norepinephrine Infusion 0.05 MICROgram(s)/kG/Min (8.03 mL/Hr) IV Continuous <Continuous>  pantoprazole   Suspension 40 milliGRAM(s) Enteral Tube daily  propofol Infusion 1 MICROgram(s)/kG/Min (0.51 mL/Hr) IV Continuous <Continuous>  spironolactone 50 milliGRAM(s) Oral daily  thiamine 100 milliGRAM(s) Oral daily    MEDICATIONS  (PRN):  ALBUTerol    90 MICROgram(s) HFA Inhaler 1 Puff(s) Inhalation every 4 hours PRN Shortness of Breath and/or Wheezing  cloNIDine 0.1 milliGRAM(s) Oral every 6 hours PRN opiate withdrawal  hydrOXYzine hydrochloride 50 milliGRAM(s) Oral every 6 hours PRN Anxiety  ibuprofen  Tablet. 600 milliGRAM(s) Oral every 6 hours PRN Temp greater or equal to 38C (100.4F)  ibuprofen  Tablet. 400 milliGRAM(s) Oral every 6 hours PRN Mild Pain (1 - 3)      Allergies:  buprenorphine (Rash)  Suboxone (Rash)            REVIEW OF SYSTEMS:  unobtainable         VITAL SIGNS:   T(F): 100.8 (06-22-22 @ 13:25), Max: 100.8 (06-22-22 @ 13:25)  HR: 75 (06-22-22 @ 13:25) (67 - 76)  BP: 111/82 (06-22-22 @ 13:25) (99/61 - 124/87)  RR: 25 (06-22-22 @ 13:25) (24 - 33)  SpO2: 100% (06-22-22 @ 13:25) (98% - 100%)    PHYSICAL EXAM:  GENERAL: does not respond to prompts   HEAD:  Atraumatic, Normocephalic  EYES: conjunctiva and sclera clear  NECK: Supple, no JVD or thyromegaly  CHEST/LUNG: b/l rhonchi  HEART: Regular rate and rhythm; normal S1, S2, No murmurs.  ABDOMEN: Soft, nontender, +distended; Bowel sounds present  NEUROLOGY: No asterixis or tremor.   SKIN: Intact, no jaundice            LABS:                        9.0    2.30  )-----------( 139      ( 22 Jun 2022 06:50 )             30.2     06-22    145  |  112<H>  |  20  ----------------------------<  126<H>  3.2<L>   |  22  |  1.0    Ca    7.7<L>      22 Jun 2022 06:50  Phos  3.3     06-22  Mg     2.0     06-22    TPro  5.0<L>  /  Alb  2.3<L>  /  TBili  0.3  /  DBili  x   /  AST  28  /  ALT  9   /  AlkPhos  756<H>  06-22    LIVER FUNCTIONS - ( 22 Jun 2022 06:50 )  Alb: 2.3 g/dL / Pro: 5.0 g/dL / ALK PHOS: 756 U/L / ALT: 9 U/L / AST: 28 U/L / GGT: x           PT/INR - ( 22 Jun 2022 06:50 )   PT: 11.50 sec;   INR: 1.00 ratio         PTT - ( 22 Jun 2022 06:50 )  PTT:33.4 sec    IMAGING:      < from: Xray Chest 1 View-PORTABLE IMMEDIATE (Xray Chest 1 View-PORTABLE IMMEDIATE .) (06.22.22 @ 11:00) >    ACC: [id number] EXAM:  XR CHEST PORTABLE IMMED 1V                          PROCEDURE DATE:  06/22/2022          INTERPRETATION:  Clinical History / Reason for exam: Respiratory failure.    Comparison : Chest radiograph 6/22/2022.    Technique/Positioning: Single AP chest radiograph.    Findings:    Support devices: Endotracheal tube, enteric tube, right IJ central   catheter are stable.    Cardiac/mediastinum/hilum: Stable cardiomegaly.    Lung parenchyma/Pleura: Stable basilar opacities. No pneumothorax.    Skeleton/soft tissues: No acute osseous abnormality.    Impression:    Stable cardiomegaly and basilar opacities.    --- End of Report ---            NALLELY MCCALL MD; Attending Radiologist  This document has been electronically signed. Jun 22 2022  1:42PM    < end of copied text >

## 2022-06-22 NOTE — PROGRESS NOTE ADULT - ASSESSMENT
IMPRESSION:  Acute respiratory failure  PNA  seizure like activity  ?? drug over dose / withdrawal opoid   liver cirrhosis   ascites    PLAN:    CNS:   aeds per neuro  cw methadone    HEENT: oral care     PULMONARY: no vent changes, HOB 45, SBT. CT chest NC    CARDIOVASCULAR: goal MAP >65    GI: GI prophylaxis.  OG Feeding. Therapeutic paracentesis    RENAL: monitor lytes is and os     INFECTIOUS DISEASE: CW Unasyn.     HEMATOLOGICAL:   heparin SQ     ENDOCRINE:  Follow up FS.  Insulin protocol if needed.    MICU

## 2022-06-22 NOTE — PROGRESS NOTE ADULT - SUBJECTIVE AND OBJECTIVE BOX
Patient is a 26y old  Female who presents with a chief complaint of anasarca (22 Jun 2022 06:52)        Over Night Events:  febrile overnight  remains intubated on precedex, versed, propofol    ROS:     All ROS are negative except HPI         PHYSICAL EXAM    ICU Vital Signs Last 24 Hrs  T(C): 36.9 (22 Jun 2022 07:00), Max: 38.1 (21 Jun 2022 19:05)  T(F): 98.4 (22 Jun 2022 07:00), Max: 100.6 (21 Jun 2022 19:05)  HR: 72 (22 Jun 2022 09:00) (67 - 83)  BP: 109/78 (22 Jun 2022 09:00) (99/61 - 124/87)  BP(mean): 90 (22 Jun 2022 09:00) (74 - 102)  ABP: --  ABP(mean): --  RR: 32 (22 Jun 2022 09:00) (24 - 74)  SpO2: 100% (22 Jun 2022 09:00) (98% - 100%)      CONSTITUTIONAL:  Well nourished.  NAD    ENT:   Airway patent,   Mouth with normal mucosa.   No thrush    EYES:   Pupils equal,   Round and reactive to light.    CARDIAC:   Normal rate,   Regular rhythm.    No edema      Vascular:  Normal systolic impulse  No Carotid bruits    RESPIRATORY:   No wheezing  Bilateral BS  Normal chest expansion  Not tachypneic,  No use of accessory muscles    GASTROINTESTINAL:  Abdomen soft,   Non-tender,   No guarding,   distended    MUSCULOSKELETAL:   Range of motion is not limited,  No clubbing, cyanosis    NEUROLOGICAL:   awake and alert  No motor  deficits.    SKIN:   Skin normal color for race,   Warm and dry and intact.   No evidence of rash.        06-21-22 @ 07:01  -  06-22-22 @ 07:00  --------------------------------------------------------  IN:    Dexmedetomidine: 800 mL    Enteral Tube Flush: 125 mL    IV PiggyBack: 100 mL    IV PiggyBack: 500 mL    Midazolam: 250 mL    Norepinephrine: 156 mL    Propofol: 650 mL    Vital High Protein: 945 mL  Total IN: 3526 mL    OUT:    Indwelling Catheter - Urethral (mL): 3645 mL  Total OUT: 3645 mL    Total NET: -119 mL      06-22-22 @ 07:01  -  06-22-22 @ 09:31  --------------------------------------------------------  IN:    Dexmedetomidine: 32 mL    Midazolam: 10 mL    Norepinephrine: 6 mL    Propofol: 26 mL    Vital High Protein: 45 mL  Total IN: 119 mL    OUT:    Indwelling Catheter - Urethral (mL): 350 mL  Total OUT: 350 mL    Total NET: -231 mL          LABS:                            9.0    2.30  )-----------( 139      ( 22 Jun 2022 06:50 )             30.2                                               06-22    145  |  112<H>  |  20  ----------------------------<  126<H>  3.2<L>   |  22  |  1.0    Ca    7.7<L>      22 Jun 2022 06:50  Phos  3.3     06-22  Mg     2.0     06-22    TPro  5.0<L>  /  Alb  2.3<L>  /  TBili  0.3  /  DBili  x   /  AST  28  /  ALT  9   /  AlkPhos  756<H>  06-22      PT/INR - ( 22 Jun 2022 06:50 )   PT: 11.50 sec;   INR: 1.00 ratio         PTT - ( 22 Jun 2022 06:50 )  PTT:33.4 sec                                                                                     LIVER FUNCTIONS - ( 22 Jun 2022 06:50 )  Alb: 2.3 g/dL / Pro: 5.0 g/dL / ALK PHOS: 756 U/L / ALT: 9 U/L / AST: 28 U/L / GGT: x                                                                                               Mode: AC/ CMV (Assist Control/ Continuous Mandatory Ventilation)  RR (machine): 22  TV (machine): 400  FiO2: 30  PEEP: 5  ITime: 0.8  MAP: 11  PIP: 22                                      ABG - ( 22 Jun 2022 05:26 )  pH, Arterial: 7.43  pH, Blood: x     /  pCO2: 36    /  pO2: 96    / HCO3: 24    / Base Excess: x     /  SaO2: 98.5                MEDICATIONS  (STANDING):  ampicillin/sulbactam  IVPB 3 Gram(s) IV Intermittent every 6 hours  chlorhexidine 0.12% Liquid 15 milliLiter(s) Oral Mucosa every 12 hours  dexMEDEtomidine Infusion 0.2 MICROgram(s)/kG/Hr (4.28 mL/Hr) IV Continuous <Continuous>  folic acid 1 milliGRAM(s) Oral daily  furosemide   Injectable 40 milliGRAM(s) IV Push two times a day  heparin   Injectable 5000 Unit(s) SubCutaneous every 8 hours  lactulose Syrup 20 Gram(s) Oral daily  levETIRAcetam  IVPB 250 milliGRAM(s) IV Intermittent every 12 hours  methadone    Tablet 15 milliGRAM(s) Oral daily  midazolam Infusion 0.02 mG/kG/Hr (1.71 mL/Hr) IV Continuous <Continuous>  nicotine -  14 mG/24Hr(s) Patch 1 patch Transdermal daily  norepinephrine Infusion 0.05 MICROgram(s)/kG/Min (8.03 mL/Hr) IV Continuous <Continuous>  pantoprazole   Suspension 40 milliGRAM(s) Enteral Tube daily  potassium chloride  20 mEq/100 mL IVPB 20 milliEquivalent(s) IV Intermittent once  propofol Infusion 1 MICROgram(s)/kG/Min (0.51 mL/Hr) IV Continuous <Continuous>  spironolactone 50 milliGRAM(s) Oral daily  thiamine 100 milliGRAM(s) Oral daily    MEDICATIONS  (PRN):  ALBUTerol    90 MICROgram(s) HFA Inhaler 1 Puff(s) Inhalation every 4 hours PRN Shortness of Breath and/or Wheezing  cloNIDine 0.1 milliGRAM(s) Oral every 6 hours PRN opiate withdrawal  hydrOXYzine hydrochloride 50 milliGRAM(s) Oral every 6 hours PRN Anxiety  ibuprofen  Tablet. 600 milliGRAM(s) Oral every 6 hours PRN Temp greater or equal to 38C (100.4F)  ibuprofen  Tablet. 400 milliGRAM(s) Oral every 6 hours PRN Mild Pain (1 - 3)      New X-rays reviewed:                                                                                  ECHO    CXR interpreted by me:

## 2022-06-22 NOTE — PROGRESS NOTE ADULT - SUBJECTIVE AND OBJECTIVE BOX
POOJA DIANE Female [id number] Code Status: FULL CODE    Dispo: Admit to [facility]  Patient is a 26y old  Female who presents with a chief complaint of anasarca (2022 16:35)      INTERVAL HPI/OVERNIGHT EVENTS: low grade fever, remains sedated on MV, s/p failed SBT x 2, abdomen is sig more distended this morning she likely needs a therapeutic para today. ETT >7cm above kd on Chest X-Ray.     ICU Vital Signs Last 24 Hrs  T(C): 36.7 (2022 06:19), Max: 38.1 (2022 07:01)  T(F): 98.1 (2022 06:19), Max: 100.6 (2022 07:01)  HR: 69 (:19) (66 - 83)  BP: 110/71 (2022 06:19) (99/61 - 124/87)  BP(mean): 85 (:19) (74 - 102)  RR: 27 (2022 06:19) (22 - 74)  SpO2: 98% (2022 06:19) (98% - 100%)      I&O's Summary    2022 07:01  -  2022 07:00  --------------------------------------------------------  IN: 3231 mL / OUT: 2355 mL / NET: 876 mL    2022 07:01  -  2022 06:53  --------------------------------------------------------  IN: 3407 mL / OUT: 3585 mL / NET: -178 mL         Daily     Daily Weight in k.7 (2022 06:19)    Mode: AC/ CMV (Assist Control/ Continuous Mandatory Ventilation)  RR (machine): 22  TV (machine): 400  FiO2: 30  PEEP: 5  ITime: 1  MAP: 12  PIP: 24      ABG - ( 2022 05:26 )  pH, Arterial: 7.43  pH, Blood: x     /  pCO2: 36    /  pO2: 96    / HCO3: 24    / Base Excess: x     /  SaO2: 98.5                LABS:                        8.7    2.77  )-----------( 140      ( 2022 05:33 )             29.5         144  |  111<H>  |  22<H>  ----------------------------<  112<H>  3.6   |  21  |  1.2    Ca    7.6<L>      2022 05:33  Phos  3.8       Mg     2.1         TPro  4.8<L>  /  Alb  2.1<L>  /  TBili  0.3  /  DBili  x   /  AST  27  /  ALT  9   /  AlkPhos  757<H>          CAPILLARY BLOOD GLUCOSE      POCT Blood Glucose.: 118 mg/dL (2022 11:08)      Procalcitonin, Serum: 0.62 ng/mL (22 @ 05:49)  Procalcitonin, Serum: 3.28 ng/mL (22 @ 05:22)    RADIOLOGY & ADDITIONAL TESTS:    CARDIOLOGY DIAGNOSTICS:   12 Lead ECG:   Ventricular Rate 85 BPM    Atrial Rate 85 BPM    P-R Interval 156 ms    QRS Duration 76 ms    Q-T Interval 408 ms    QTC Calculation(Bazett) 485 ms    P Axis 77 degrees    R Axis 124 degrees    T Axis 18 degrees    Diagnosis Line Normal sinus rhythm  Right axis deviation  Low voltage QRS  Nonspecific T wave abnormality  Abnormal ECG    Confirmed by BRANDON BELL MD ([id number]) on 6/15/2022 9:49:33 AM (06-15-22 @ 08:09)      MEDICATIONS  (STANDING):  ampicillin/sulbactam  IVPB 3 Gram(s) IV Intermittent every 6 hours  chlorhexidine 0.12% Liquid 15 milliLiter(s) Oral Mucosa every 12 hours  dexMEDEtomidine Infusion 0.2 MICROgram(s)/kG/Hr (4.28 mL/Hr) IV Continuous <Continuous>  folic acid 1 milliGRAM(s) Oral daily  furosemide   Injectable 40 milliGRAM(s) IV Push two times a day  heparin   Injectable 5000 Unit(s) SubCutaneous every 8 hours  lactulose Syrup 20 Gram(s) Oral daily  levETIRAcetam  IVPB 250 milliGRAM(s) IV Intermittent every 12 hours  methadone    Tablet 15 milliGRAM(s) Oral daily  midazolam Infusion 0.02 mG/kG/Hr (1.71 mL/Hr) IV Continuous <Continuous>  nicotine -  14 mG/24Hr(s) Patch 1 patch Transdermal daily  norepinephrine Infusion 0.05 MICROgram(s)/kG/Min (8.03 mL/Hr) IV Continuous <Continuous>  pantoprazole   Suspension 40 milliGRAM(s) Enteral Tube daily  propofol Infusion 1 MICROgram(s)/kG/Min (0.51 mL/Hr) IV Continuous <Continuous>  spironolactone 50 milliGRAM(s) Oral daily  thiamine 100 milliGRAM(s) Oral daily    MEDICATIONS  (PRN):  ALBUTerol    90 MICROgram(s) HFA Inhaler 1 Puff(s) Inhalation every 4 hours PRN Shortness of Breath and/or Wheezing  cloNIDine 0.1 milliGRAM(s) Oral every 6 hours PRN opiate withdrawal  hydrOXYzine hydrochloride 50 milliGRAM(s) Oral every 6 hours PRN Anxiety  ibuprofen  Tablet. 600 milliGRAM(s) Oral every 6 hours PRN Temp greater or equal to 38C (100.4F)  ibuprofen  Tablet. 400 milliGRAM(s) Oral every 6 hours PRN Mild Pain (1 - 3)      PHYSICAL EXAM:  GENERAL: NAD  HEENT:  NCAT, PERRL, No JVD, Trachea Midline, +ETT, +OGT  NERVOUS SYSTEM:  Sedated to RASS 0 to -1 opens eyes to my voice and tracks me   CHEST/LUNG: Good air entry bilaterally  HEART: Regular rate and rhythm  ABDOMEN: Soft, Nontender, + distension worse than yesterday  EXTREMITIES:  Warm, well perfused  SKIN: No new skin breakdowns

## 2022-06-22 NOTE — PROGRESS NOTE ADULT - SUBJECTIVE AND OBJECTIVE BOX
Patient is a 26y old  Female who presents with a chief complaint of anasarca (22 Jun 2022 14:03)      T(F): 101.1 (06-22-22 @ 19:01), Max: 101.1 (06-22-22 @ 15:01)  HR: 79 (06-22-22 @ 19:01)  BP: 112/78 (06-22-22 @ 19:01)  RR: 31 (06-22-22 @ 19:01)  SpO2: 100% (06-22-22 @ 19:01) (98% - 100%)    PHYSICAL EXAM:  GENERAL: NAD  HEAD:  Atraumatic, Normocephalic  EYES: EOMI, PERRLA, conjunctiva and sclera clear  NERVOUS SYSTEM:  Alert & Oriented X3, no focal deficits   CHEST/LUNG: Clear to percussion bilaterally; No rales, rhonchi, wheezing, or rubs  HEART: Regular rate and rhythm; No murmurs, rubs, or gallops  ABDOMEN: Soft, Nontender, Nondistended; Bowel sounds present  EXTREMITIES:  2+ Peripheral Pulses, No clubbing, cyanosis, or edema    LABS  06-22    145  |  112<H>  |  20  ----------------------------<  126<H>  3.2<L>   |  22  |  1.0    Ca    7.7<L>      22 Jun 2022 06:50  Phos  3.3     06-22  Mg     2.0     06-22    TPro  5.0<L>  /  Alb  2.3<L>  /  TBili  0.3  /  DBili  x   /  AST  28  /  ALT  9   /  AlkPhos  756<H>  06-22                          9.0    2.30  )-----------( 139      ( 22 Jun 2022 06:50 )             30.2     PT/INR - ( 22 Jun 2022 06:50 )   PT: 11.50 sec;   INR: 1.00 ratio         PTT - ( 22 Jun 2022 06:50 )  PTT:33.4 sec  Mode: AC/ CMV (Assist Control/ Continuous Mandatory Ventilation)  RR (machine): 22  TV (machine): 400  FiO2: 30  PEEP: 5  ITime: 1  MAP: 10  PIP: 19    CARDIAC ENZYMES          Culture Results:   No growth to date. (06-21-22)  Culture Results:   Normal Respiratory Kilo present (06-17-22)  Culture Results:   No growth (06-17-22)  Culture Results:   Testing in progress (06-17-22)  Culture Results:   Normal Urogenital kilo present (06-15-22)    RADIOLOGY  CT chest   IMPRESSION:    Bilateral small pleural effusions. Left greater than right lower lobar   dependent consolidations likely combination of pneumonia and atelectasis.    Dilated main pulmonary artery measuring approximately 3.8 cm can be seen   with pulmonary hypertension.    Partially imaged small ascites.    MEDICATIONS  (STANDING):  ampicillin/sulbactam  IVPB 3 Gram(s) IV Intermittent every 6 hours  chlorhexidine 0.12% Liquid 15 milliLiter(s) Oral Mucosa every 12 hours  dexMEDEtomidine Infusion 0.2 MICROgram(s)/kG/Hr (4.28 mL/Hr) IV Continuous <Continuous>  folic acid 1 milliGRAM(s) Oral daily  furosemide   Injectable 40 milliGRAM(s) IV Push two times a day  heparin   Injectable 5000 Unit(s) SubCutaneous every 8 hours  lactulose Syrup 20 Gram(s) Oral daily  levETIRAcetam  IVPB 250 milliGRAM(s) IV Intermittent every 12 hours  methadone    Tablet 15 milliGRAM(s) Oral daily  midazolam Infusion 0.02 mG/kG/Hr (1.71 mL/Hr) IV Continuous <Continuous>  nicotine -  14 mG/24Hr(s) Patch 1 patch Transdermal daily  norepinephrine Infusion 0.05 MICROgram(s)/kG/Min (8.03 mL/Hr) IV Continuous <Continuous>  pantoprazole   Suspension 40 milliGRAM(s) Enteral Tube daily  propofol Infusion 1 MICROgram(s)/kG/Min (0.51 mL/Hr) IV Continuous <Continuous>  spironolactone 50 milliGRAM(s) Oral daily  thiamine 100 milliGRAM(s) Oral daily    MEDICATIONS  (PRN):  ALBUTerol    90 MICROgram(s) HFA Inhaler 1 Puff(s) Inhalation every 4 hours PRN Shortness of Breath and/or Wheezing  cloNIDine 0.1 milliGRAM(s) Oral every 6 hours PRN opiate withdrawal  hydrOXYzine hydrochloride 50 milliGRAM(s) Oral every 6 hours PRN Anxiety  ibuprofen  Tablet. 600 milliGRAM(s) Oral every 6 hours PRN Temp greater or equal to 38C (100.4F)  ibuprofen  Tablet. 400 milliGRAM(s) Oral every 6 hours PRN Mild Pain (1 - 3)

## 2022-06-22 NOTE — PROGRESS NOTE ADULT - ASSESSMENT
26 year old female with hx of asthma, untreated Hep C, IVDA, anasarca was admitted to medical floor  with increased SOB and anasarca  Pt admits to using opiates 2 grams per day IV into her thighs  x years with minimal sobriety. Pt has been on MMTP in 2020. Pt is contemplating going back on program. Pt denies any other substance abuse. Pt states used a xanax for wd about 3 days ago.      Hepatitis C with Cirrhosis no compliant with treatment  Hx of withdrawal   variable periods of sobriety in the past    on floor pt was diuresed with improvement of symptoms  when the following events took place  "RRT was called for pt in the [facility]    patient was found on the floor in ER, gasping for air, short of breath, tachypnea, bleeding profusely from her posterior scalp, was placed in a stretcher, was hypoxic 70s    emergently intubated, ct scan head ordered re scalp bleeding    several syringes, drug paraphanellia found on patient clothes     pt  intubated and  transfered to ICU       I strongly suspect that she injected illicit drug ( abuses IV heroin) and she went into resp failure secondary to heroin overdose."      Acute respiratory failure with hypoxemia / aspiration pneumonia with sepsis / suspected drug overdose / head laceration s/p fall in lobby / possible seizure / anasarca     - head laceration repaired by surgery - CT head negative for intracranial bleed   - paracentesis performed as per GI recommendation   -  EEG as above - continue Keppra 500mg q12h - neuro f/u   - pt is febrile -  continue antibiotics - titrate pressors  - procal is improving   - supplement hypokalemia and check mg level and maintain above 2   - DVT and GI prophylaxis

## 2022-06-22 NOTE — PROGRESS NOTE ADULT - ASSESSMENT
IMPRESSION:  # AHRF - on MV  # Suspected PNA - poss Aspiration  # Seizure like activity - now on AEDs  # pHTN WHO Class unclear at this time  # h/o Pericardial Effusion  # OD vs. Other Tox  # Ascites w/ h/o Liver cirrhosis d/t HepC    PLAN:    CNS:   - Repeat attempt of SAT/SBT  - c/w Keppra to 250mg BID and then d/c  - Seizure Precautions    HEENT:   - c/w Routine oral care     PULMONARY:   - No vent changes  - HOB 45  - SBT  - Move ETT down 4cm and repeat CXR    CARDIOVASCULAR:   - Discussed echo findings with cardiology, majority apical seg abnormalities potentially stress induced CM, however holding off on eval of this and pHTN until more stable  - Wean levophed, repeat 2echo, goal MAP >65    GI:   - GI prophylaxis.  - Hold NG Feeding for SBT if extubated bedside Swallow vs.  SLP evaluation otherwise c/w OG feeds  - GI Eval appreciated, requesting outpt f/u and HepC quant/VL  - Therapeutic Paracentesis PRIOR to next SBT (might be a reason why pt is dyspneic during SBT though unlikely)     RENAL:   - Monitor lytes  - Strict I/O's  - Ensure Mg > 2.0    INFECTIOUS DISEASE:   - c/w Unasyn (Day#6 today)   - Sputum GPC in Pairs and Chains  - BCx NGTD    HEMATOLOGICAL:     - DVT PPX w/Heparin SQ     ENDOCRINE:    - Follow up FS.  Insulin protocol PRN    MICU

## 2022-06-22 NOTE — PROGRESS NOTE ADULT - ASSESSMENT
26 year old female with hx of asthma, ?untreated Hep C, IVDA, anasarca was admitted to medical floor  with increased SOB and anasarca intubated for AHRF currently in ICU on pressors.     Problem 1-Ascites s/p diagnostic tap SAAG 1.2 and TP ,2.5 suggestive towards portal HTN from cirrhosis   no evidence of SBP   However has normal PLT count and no nodular liver in imaging   US showed moderate ascites   CT showed hepatomegaly and splenomegaly but no nodular liver     Recs:   Hep C ab weakly reactive now   f/u repeat Hep C RNA viral load   previously work up done for CLD was negative   has chronic elevated ALP  Trend LFT, INR   patient here for therapeutic paracentesis   might benefit from liver biopsy later once stable   - Follow up with our [facility] located at [address] [place] [address]. Phone Number: [phone].

## 2022-06-23 LAB
ALBUMIN FLD-MCNC: 0.8 G/DL — SIGNIFICANT CHANGE UP
ALBUMIN SERPL ELPH-MCNC: 2.1 G/DL — LOW (ref 3.5–5.2)
ALP SERPL-CCNC: 705 U/L — HIGH (ref 30–115)
ALT FLD-CCNC: 9 U/L — SIGNIFICANT CHANGE UP (ref 0–41)
ANION GAP SERPL CALC-SCNC: 8 MMOL/L — SIGNIFICANT CHANGE UP (ref 7–14)
APTT BLD: 30 SEC — SIGNIFICANT CHANGE UP (ref 27–39.2)
AST SERPL-CCNC: 25 U/L — SIGNIFICANT CHANGE UP (ref 0–41)
BASOPHILS # BLD AUTO: 0 K/UL — SIGNIFICANT CHANGE UP (ref 0–0.2)
BASOPHILS NFR BLD AUTO: 0 % — SIGNIFICANT CHANGE UP (ref 0–1)
BILIRUB SERPL-MCNC: 0.3 MG/DL — SIGNIFICANT CHANGE UP (ref 0.2–1.2)
BUN SERPL-MCNC: 19 MG/DL — SIGNIFICANT CHANGE UP (ref 10–20)
CALCIUM SERPL-MCNC: 7.5 MG/DL — LOW (ref 8.5–10.1)
CHLORIDE SERPL-SCNC: 111 MMOL/L — HIGH (ref 98–110)
CO2 SERPL-SCNC: 24 MMOL/L — SIGNIFICANT CHANGE UP (ref 17–32)
CREAT SERPL-MCNC: 0.8 MG/DL — SIGNIFICANT CHANGE UP (ref 0.7–1.5)
EGFR: 103 ML/MIN/1.73M2 — SIGNIFICANT CHANGE UP
EOSINOPHIL # BLD AUTO: 0 K/UL — SIGNIFICANT CHANGE UP (ref 0–0.7)
EOSINOPHIL NFR BLD AUTO: 0 % — SIGNIFICANT CHANGE UP (ref 0–8)
ERYTHROCYTE [SEDIMENTATION RATE] IN BLOOD: 65 MM/HR — HIGH (ref 0–20)
GLUCOSE FLD-MCNC: 116 MG/DL — SIGNIFICANT CHANGE UP
GLUCOSE SERPL-MCNC: 113 MG/DL — HIGH (ref 70–99)
GRAM STN FLD: SIGNIFICANT CHANGE UP
HCT VFR BLD CALC: 27 % — LOW (ref 37–47)
HGB BLD-MCNC: 8 G/DL — LOW (ref 12–16)
IMM GRANULOCYTES NFR BLD AUTO: 0.4 % — HIGH (ref 0.1–0.3)
INR BLD: 0.96 RATIO — SIGNIFICANT CHANGE UP (ref 0.65–1.3)
LDH SERPL L TO P-CCNC: 134 U/L — SIGNIFICANT CHANGE UP
LYMPHOCYTES # BLD AUTO: 0.97 K/UL — LOW (ref 1.2–3.4)
LYMPHOCYTES # BLD AUTO: 35.9 % — SIGNIFICANT CHANGE UP (ref 20.5–51.1)
MAGNESIUM SERPL-MCNC: 1.9 MG/DL — SIGNIFICANT CHANGE UP (ref 1.8–2.4)
MCHC RBC-ENTMCNC: 25.8 PG — LOW (ref 27–31)
MCHC RBC-ENTMCNC: 29.6 G/DL — LOW (ref 32–37)
MCV RBC AUTO: 87.1 FL — SIGNIFICANT CHANGE UP (ref 81–99)
MONOCYTES # BLD AUTO: 0.15 K/UL — SIGNIFICANT CHANGE UP (ref 0.1–0.6)
MONOCYTES NFR BLD AUTO: 5.6 % — SIGNIFICANT CHANGE UP (ref 1.7–9.3)
NEUTROPHILS # BLD AUTO: 1.57 K/UL — SIGNIFICANT CHANGE UP (ref 1.4–6.5)
NEUTROPHILS NFR BLD AUTO: 58.1 % — SIGNIFICANT CHANGE UP (ref 42.2–75.2)
NRBC # BLD: 0 /100 WBCS — SIGNIFICANT CHANGE UP (ref 0–0)
PHOSPHATE SERPL-MCNC: 3 MG/DL — SIGNIFICANT CHANGE UP (ref 2.1–4.9)
PLATELET # BLD AUTO: 148 K/UL — SIGNIFICANT CHANGE UP (ref 130–400)
POTASSIUM SERPL-MCNC: 3.1 MMOL/L — LOW (ref 3.5–5)
POTASSIUM SERPL-SCNC: 3.1 MMOL/L — LOW (ref 3.5–5)
PROT FLD-MCNC: 2 G/DL — SIGNIFICANT CHANGE UP
PROT SERPL-MCNC: 4.6 G/DL — LOW (ref 6–8)
PROTHROM AB SERPL-ACNC: 11.1 SEC — SIGNIFICANT CHANGE UP (ref 9.95–12.87)
RBC # BLD: 3.1 M/UL — LOW (ref 4.2–5.4)
RBC # FLD: 16.5 % — HIGH (ref 11.5–14.5)
SODIUM SERPL-SCNC: 143 MMOL/L — SIGNIFICANT CHANGE UP (ref 135–146)
SPECIMEN SOURCE: SIGNIFICANT CHANGE UP
WBC # BLD: 2.7 K/UL — LOW (ref 4.8–10.8)
WBC # FLD AUTO: 2.7 K/UL — LOW (ref 4.8–10.8)

## 2022-06-23 PROCEDURE — 99233 SBSQ HOSP IP/OBS HIGH 50: CPT

## 2022-06-23 PROCEDURE — 99291 CRITICAL CARE FIRST HOUR: CPT

## 2022-06-23 PROCEDURE — 71045 X-RAY EXAM CHEST 1 VIEW: CPT | Mod: 26

## 2022-06-23 RX ORDER — METHADONE HYDROCHLORIDE 40 MG/1
30 TABLET ORAL DAILY
Refills: 0 | Status: DISCONTINUED | OUTPATIENT
Start: 2022-06-23 | End: 2022-06-30

## 2022-06-23 RX ORDER — POTASSIUM CHLORIDE 20 MEQ
20 PACKET (EA) ORAL
Refills: 0 | Status: COMPLETED | OUTPATIENT
Start: 2022-06-23 | End: 2022-06-23

## 2022-06-23 RX ADMIN — PROPOFOL 0.51 MICROGRAM(S)/KG/MIN: 10 INJECTION, EMULSION INTRAVENOUS at 06:20

## 2022-06-23 RX ADMIN — Medication 50 MILLIEQUIVALENT(S): at 13:17

## 2022-06-23 RX ADMIN — MIDAZOLAM HYDROCHLORIDE 1.71 MG/KG/HR: 1 INJECTION, SOLUTION INTRAMUSCULAR; INTRAVENOUS at 07:30

## 2022-06-23 RX ADMIN — DEXMEDETOMIDINE HYDROCHLORIDE IN 0.9% SODIUM CHLORIDE 4.28 MICROGRAM(S)/KG/HR: 4 INJECTION INTRAVENOUS at 19:35

## 2022-06-23 RX ADMIN — AMPICILLIN SODIUM AND SULBACTAM SODIUM 200 GRAM(S): 250; 125 INJECTION, POWDER, FOR SUSPENSION INTRAMUSCULAR; INTRAVENOUS at 13:18

## 2022-06-23 RX ADMIN — HEPARIN SODIUM 5000 UNIT(S): 5000 INJECTION INTRAVENOUS; SUBCUTANEOUS at 05:49

## 2022-06-23 RX ADMIN — DEXMEDETOMIDINE HYDROCHLORIDE IN 0.9% SODIUM CHLORIDE 4.28 MICROGRAM(S)/KG/HR: 4 INJECTION INTRAVENOUS at 10:06

## 2022-06-23 RX ADMIN — Medication 1 PATCH: at 13:18

## 2022-06-23 RX ADMIN — Medication 8.03 MICROGRAM(S)/KG/MIN: at 07:30

## 2022-06-23 RX ADMIN — DEXMEDETOMIDINE HYDROCHLORIDE IN 0.9% SODIUM CHLORIDE 4.28 MICROGRAM(S)/KG/HR: 4 INJECTION INTRAVENOUS at 13:19

## 2022-06-23 RX ADMIN — LEVETIRACETAM 400 MILLIGRAM(S): 250 TABLET, FILM COATED ORAL at 05:48

## 2022-06-23 RX ADMIN — DEXMEDETOMIDINE HYDROCHLORIDE IN 0.9% SODIUM CHLORIDE 4.28 MICROGRAM(S)/KG/HR: 4 INJECTION INTRAVENOUS at 07:34

## 2022-06-23 RX ADMIN — Medication 1 MILLIGRAM(S): at 13:18

## 2022-06-23 RX ADMIN — Medication 100 MILLIGRAM(S): at 13:39

## 2022-06-23 RX ADMIN — DEXMEDETOMIDINE HYDROCHLORIDE IN 0.9% SODIUM CHLORIDE 4.28 MICROGRAM(S)/KG/HR: 4 INJECTION INTRAVENOUS at 22:34

## 2022-06-23 RX ADMIN — DEXMEDETOMIDINE HYDROCHLORIDE IN 0.9% SODIUM CHLORIDE 4.28 MICROGRAM(S)/KG/HR: 4 INJECTION INTRAVENOUS at 03:57

## 2022-06-23 RX ADMIN — MIDAZOLAM HYDROCHLORIDE 1.71 MG/KG/HR: 1 INJECTION, SOLUTION INTRAMUSCULAR; INTRAVENOUS at 13:42

## 2022-06-23 RX ADMIN — Medication 40 MILLIGRAM(S): at 05:49

## 2022-06-23 RX ADMIN — PROPOFOL 0.51 MICROGRAM(S)/KG/MIN: 10 INJECTION, EMULSION INTRAVENOUS at 18:20

## 2022-06-23 RX ADMIN — HEPARIN SODIUM 5000 UNIT(S): 5000 INJECTION INTRAVENOUS; SUBCUTANEOUS at 21:25

## 2022-06-23 RX ADMIN — AMPICILLIN SODIUM AND SULBACTAM SODIUM 200 GRAM(S): 250; 125 INJECTION, POWDER, FOR SUSPENSION INTRAMUSCULAR; INTRAVENOUS at 05:48

## 2022-06-23 RX ADMIN — CHLORHEXIDINE GLUCONATE 15 MILLILITER(S): 213 SOLUTION TOPICAL at 05:50

## 2022-06-23 RX ADMIN — PROPOFOL 0.51 MICROGRAM(S)/KG/MIN: 10 INJECTION, EMULSION INTRAVENOUS at 13:19

## 2022-06-23 RX ADMIN — CHLORHEXIDINE GLUCONATE 15 MILLILITER(S): 213 SOLUTION TOPICAL at 17:28

## 2022-06-23 RX ADMIN — Medication 1 PATCH: at 07:22

## 2022-06-23 RX ADMIN — DEXMEDETOMIDINE HYDROCHLORIDE IN 0.9% SODIUM CHLORIDE 4.28 MICROGRAM(S)/KG/HR: 4 INJECTION INTRAVENOUS at 00:23

## 2022-06-23 RX ADMIN — MIDAZOLAM HYDROCHLORIDE 1.71 MG/KG/HR: 1 INJECTION, SOLUTION INTRAMUSCULAR; INTRAVENOUS at 23:47

## 2022-06-23 RX ADMIN — PROPOFOL 0.51 MICROGRAM(S)/KG/MIN: 10 INJECTION, EMULSION INTRAVENOUS at 22:34

## 2022-06-23 RX ADMIN — Medication 40 MILLIGRAM(S): at 14:27

## 2022-06-23 RX ADMIN — PROPOFOL 0.51 MICROGRAM(S)/KG/MIN: 10 INJECTION, EMULSION INTRAVENOUS at 07:35

## 2022-06-23 RX ADMIN — SPIRONOLACTONE 50 MILLIGRAM(S): 25 TABLET, FILM COATED ORAL at 05:49

## 2022-06-23 RX ADMIN — HEPARIN SODIUM 5000 UNIT(S): 5000 INJECTION INTRAVENOUS; SUBCUTANEOUS at 14:27

## 2022-06-23 RX ADMIN — MIDAZOLAM HYDROCHLORIDE 1.71 MG/KG/HR: 1 INJECTION, SOLUTION INTRAMUSCULAR; INTRAVENOUS at 04:04

## 2022-06-23 RX ADMIN — Medication 50 MILLIEQUIVALENT(S): at 10:09

## 2022-06-23 RX ADMIN — DEXMEDETOMIDINE HYDROCHLORIDE IN 0.9% SODIUM CHLORIDE 4.28 MICROGRAM(S)/KG/HR: 4 INJECTION INTRAVENOUS at 17:27

## 2022-06-23 RX ADMIN — Medication 50 MILLIEQUIVALENT(S): at 11:56

## 2022-06-23 RX ADMIN — AMPICILLIN SODIUM AND SULBACTAM SODIUM 200 GRAM(S): 250; 125 INJECTION, POWDER, FOR SUSPENSION INTRAMUSCULAR; INTRAVENOUS at 17:29

## 2022-06-23 RX ADMIN — LEVETIRACETAM 400 MILLIGRAM(S): 250 TABLET, FILM COATED ORAL at 17:29

## 2022-06-23 NOTE — PROGRESS NOTE ADULT - ASSESSMENT
26 year old female with hx of asthma, untreated Hep C, IVDA, anasarca was admitted to medical floor  with increased SOB and anasarca  Pt admits to using opiates 2 grams per day IV into her thighs  x years with minimal sobriety. Pt has been on MMTP in 2020. Pt is contemplating going back on program. Pt denies any other substance abuse. Pt states used a xanax for wd about 3 days ago.      Hepatitis C with Cirrhosis no compliant with treatment  Hx of withdrawal   variable periods of sobriety in the past    on floor pt was diuresed with improvement of symptoms  when the following events took place  "RRT was called for pt in the [facility]    patient was found on the floor in ER, gasping for air, short of breath, tachypnea, bleeding profusely from her posterior scalp, was placed in a stretcher, was hypoxic 70s    emergently intubated, ct scan head ordered re scalp bleeding    several syringes, drug paraphanellia found on patient clothes     pt  intubated and  transfered to ICU       I strongly suspect that she injected illicit drug ( abuses IV heroin) and she went into resp failure secondary to heroin overdose."      Acute respiratory failure with hypoxemia / aspiration pneumonia with sepsis / suspected drug overdose / head laceration s/p fall in lobby / possible seizure / anasarca     - head laceration repaired by surgery - CT head negative for intracranial bleed   - paracentesis performed as per GI recommendation   - continue Keppra as per  neuro - neuro f/u   - pt is febrile -  continue antibiotics - titrate pressors  - procal is improving   - supplement hypokalemia and check mg level and maintain above 2   - DVT and GI prophylaxis

## 2022-06-23 NOTE — PROGRESS NOTE ADULT - ASSESSMENT
26 year old female with hx of asthma, ?untreated Hep C, IVDA, anasarca was admitted to medical floor  with increased SOB and anasarca intubated for AHRF currently in ICU on pressors.     Problem 1-Ascites s/p diagnostic tap SAAG 1.2 and TP ,2.5 suggestive towards portal HTN from cirrhosis   no evidence of SBP   However has normal PLT count and no nodular liver in imaging   US showed moderate ascites   CT showed hepatomegaly and splenomegaly but no nodular liver     Recs:   -ordered hepatitis C RNA qualitative today  previously work up done for CLD was negative   has chronic elevated ALP  Trend LFT, INR   patient had therapeutic paracentesis   might benefit from liver biopsy later once stable   - Follow up with our [facility] located at [address] [place] [address]. Phone Number: [phone].     problem 2-Cholelithiasis   asymptomatic  Rec  -watchful waiting     Problem 3-anemia no gross GI bleeding  Rec  -Maintain Hemodynamic Stability   -Monitor CBC  -CMP,Optimize Electrolytes  -Transfuse prn to hgb >8  -Two large bore IV lines  -PPI BID  -Monitor Vital Signs  -Monitor Stool For blood, frequency, consistency, melena  -Active Type and Screen

## 2022-06-23 NOTE — PROGRESS NOTE ADULT - SUBJECTIVE AND OBJECTIVE BOX
POOJA DIANE Female [id number] Code Status: FULL CODE    Dispo: Admit to [facility]  Patient is a 26y old  Female who presents with a chief complaint of anasarca (2022 08:48)      INTERVAL HPI/OVERNIGHT EVENTS: s/p para     ICU Vital Signs Last 24 Hrs  T(C): 37.6 (2022 11:01), Max: 38.4 (2022 15:01)  T(F): 99.7 (2022 11:01), Max: 101.1 (2022 15:01)  HR: 103 (2022 11:00) (71 - 103)  BP: 131/93 (2022 11:00) (102/65 - 131/93)  BP(mean): 109 (2022 11:00) (78 - 109)  RR: 34 (2022 11:) (20 - 60)  SpO2: 100% (2022 11:00) (98% - 100%)      I&O's Summary    2022 07:01  -  2022 07:00  --------------------------------------------------------  IN: 3434 mL / OUT: 2135 mL / NET: 1299 mL    2022 07:01  -  2022 12:27  --------------------------------------------------------  IN: 245 mL / OUT: 1175 mL / NET: -930 mL         Daily     Daily Weight in k.4 (2022 00:00)    Mode: AC/ CMV (Assist Control/ Continuous Mandatory Ventilation)  RR (machine): 22  TV (machine): 350  FiO2: 30  PEEP: 5  ITime: 0.8  MAP: 10  PIP: 22      ABG - ( 2022 06:30 )  pH, Arterial: 7.46  pH, Blood: x     /  pCO2: 35    /  pO2: 129   / HCO3: 25    / Base Excess: 1.1   /  SaO2: 99.4      LABS:                        8.0    2.70  )-----------( 148      ( 2022 05:37 )             27.0         143  |  111<H>  |  19  ----------------------------<  113<H>  3.1<L>   |  24  |  0.8    Ca    7.5<L>      2022 05:37  Phos  3.0       Mg     1.9         TPro  4.6<L>  /  Alb  2.1<L>  /  TBili  0.3  /  DBili  x   /  AST  25  /  ALT  9   /  AlkPhos  705<H>      PT/INR - ( 2022 05:37 )   PT: 11.10 sec;   INR: 0.96 ratio         PTT - ( 2022 05:37 )  PTT:30.0 sec    CAPILLARY BLOOD GLUCOSE    Procalcitonin, Serum: 0.62 ng/mL (22 @ 05:49)  Procalcitonin, Serum: 3.28 ng/mL (22 @ 05:22)      RADIOLOGY & ADDITIONAL TESTS:    CARDIOLOGY DIAGNOSTICS:   12 Lead ECG:   Ventricular Rate 85 BPM    Atrial Rate 85 BPM    P-R Interval 156 ms    QRS Duration 76 ms    Q-T Interval 408 ms    QTC Calculation(Bazett) 485 ms    P Axis 77 degrees    R Axis 124 degrees    T Axis 18 degrees    Diagnosis Line Normal sinus rhythm  Right axis deviation  Low voltage QRS  Nonspecific T wave abnormality  Abnormal ECG    Confirmed by BRANDON BELL MD ([id number]) on 6/15/2022 9:49:33 AM (06-15-22 @ 08:09)      MEDICATIONS  (STANDING):  ampicillin/sulbactam  IVPB 3 Gram(s) IV Intermittent every 6 hours  chlorhexidine 0.12% Liquid 15 milliLiter(s) Oral Mucosa every 12 hours  dexMEDEtomidine Infusion 0.2 MICROgram(s)/kG/Hr (4.28 mL/Hr) IV Continuous <Continuous>  folic acid 1 milliGRAM(s) Oral daily  furosemide   Injectable 40 milliGRAM(s) IV Push two times a day  heparin   Injectable 5000 Unit(s) SubCutaneous every 8 hours  lactulose Syrup 20 Gram(s) Oral daily  levETIRAcetam  IVPB 250 milliGRAM(s) IV Intermittent every 12 hours  methadone    Tablet 15 milliGRAM(s) Oral daily  midazolam Infusion 0.02 mG/kG/Hr (1.71 mL/Hr) IV Continuous <Continuous>  nicotine -  14 mG/24Hr(s) Patch 1 patch Transdermal daily  norepinephrine Infusion 0.05 MICROgram(s)/kG/Min (8.03 mL/Hr) IV Continuous <Continuous>  pantoprazole   Suspension 40 milliGRAM(s) Enteral Tube daily  potassium chloride  20 mEq/100 mL IVPB 20 milliEquivalent(s) IV Intermittent every 2 hours  propofol Infusion 1 MICROgram(s)/kG/Min (0.51 mL/Hr) IV Continuous <Continuous>  spironolactone 50 milliGRAM(s) Oral daily  thiamine 100 milliGRAM(s) Oral daily    MEDICATIONS  (PRN):  ALBUTerol    90 MICROgram(s) HFA Inhaler 1 Puff(s) Inhalation every 4 hours PRN Shortness of Breath and/or Wheezing  cloNIDine 0.1 milliGRAM(s) Oral every 6 hours PRN opiate withdrawal  hydrOXYzine hydrochloride 50 milliGRAM(s) Oral every 6 hours PRN Anxiety  ibuprofen  Tablet. 600 milliGRAM(s) Oral every 6 hours PRN Temp greater or equal to 38C (100.4F)  ibuprofen  Tablet. 400 milliGRAM(s) Oral every 6 hours PRN Mild Pain (1 - 3)      PHYSICAL EXAM:  GENERAL: NAD  HEENT:  NCAT, PERRL, No JVD, Trachea Midline  NERVOUS SYSTEM:  A&Ox3, Following simple commands, Good concentration, Non-focal neurological exam  CHEST/LUNG: Good air entry bilaterally; No wheezing  HEART: Regular rate and rhythm; No murmurs heard  ABDOMEN: Soft, Nontender, Nondistended  EXTREMITIES:  Warm, well perfused  SKIN: No new skin breakdowns

## 2022-06-23 NOTE — PROGRESS NOTE ADULT - ASSESSMENT
IMPRESSION:  Acute respiratory failure sp intubation  PNA  seizure like activity  ?? drug over dose / withdrawal opoid   liver cirrhosis   ascites sp paracentesis    PLAN:    CNS:   SAT/SBT  aeds per neuro  cw methadone    HEENT: oral care     PULMONARY: no vent changes, HOB 45, SBT.     CARDIOVASCULAR: goal MAP >65    GI: GI prophylaxis.  OG Feeding.    RENAL: monitor lytes is and os     INFECTIOUS DISEASE: CW Unasyn.     HEMATOLOGICAL:   heparin SQ     ENDOCRINE:  Follow up FS.  Insulin protocol if needed.    MICU  lines: RIJ since 6/16 IMPRESSION:  Acute respiratory failure sp intubation  PNA  seizure like activity  ?? drug over dose / withdrawal opoid   liver cirrhosis   ascites sp paracentesis    PLAN:    CNS:   SAT/SBT  aeds per neuro  increase methadone to 30mg daily    HEENT: oral care     PULMONARY: no vent changes, HOB 45, SBT.     CARDIOVASCULAR: goal MAP >65    GI: GI prophylaxis.  OG Feeding.    RENAL: monitor lytes is and os     INFECTIOUS DISEASE: CW Unasyn.     HEMATOLOGICAL:   heparin SQ     ENDOCRINE:  Follow up FS.  Insulin protocol if needed. Rheum screen    MICU  lines: RIDEONTE since 6/16 IMPRESSION:  Acute respiratory failure sp intubation  PNA  seizure like activity  ?? drug over dose / withdrawal opoid   liver cirrhosis   ascites sp paracentesis    PLAN:    CNS:   SAT/SBT  aeds per neuro  Increase methadone to 30mg daily    HEENT: oral care     PULMONARY: no vent changes, HOB 45, SBT. vent changes: decrease TV to 350    CARDIOVASCULAR: goal MAP >65. diuresis as tolerated    GI: GI prophylaxis.  OG Feeding.    RENAL: monitor lytes is and os     INFECTIOUS DISEASE: BETITO GERONIMO#7.    HEMATOLOGICAL:   heparin SQ     ENDOCRINE:  Follow up FS.  Insulin protocol if needed. Rheum screen    MICU  lines: RIJ since 6/16 IMPRESSION:  Acute respiratory failure sp intubation  PNA  seizure like activity  ?? drug over dose / withdrawal opoid   liver cirrhosis   ascites sp paracentesis    PLAN:    CNS:   SAT/SBT  aeds per neuro  Increase methadone to 30mg daily    HEENT: oral care     PULMONARY: no vent changes, HOB 45, SBT. vent changes: decrease TV to 350    CARDIOVASCULAR: goal MAP >65. diuresis as tolerated    GI: GI prophylaxis.  OG Feeding.    RENAL: monitor lytes is and os     INFECTIOUS DISEASE: BETITO GERONIMO#7.    HEMATOLOGICAL:   heparin SQ     ENDOCRINE:  Follow up FS.  Insulin protocol if needed. Rheum screen    MICU  lines: RIJ since 6/16  TOV

## 2022-06-23 NOTE — PROGRESS NOTE ADULT - SUBJECTIVE AND OBJECTIVE BOX
26yFemale  Being followed for ascites, elevated ALP   Interval history:  No hematemesis, melena, blood in stool reported.       PAST MEDICAL & SURGICAL HISTORY:   Hepatitis C      Asthma      Anxiety and depression      IV drug abuse  heroin      No significant past surgical history               Social History: No smoking. No alcohol. + illegal drug use.            MEDICATIONS  (STANDING):  ampicillin/sulbactam  IVPB 3 Gram(s) IV Intermittent every 6 hours  chlorhexidine 0.12% Liquid 15 milliLiter(s) Oral Mucosa every 12 hours  dexMEDEtomidine Infusion 0.2 MICROgram(s)/kG/Hr (4.28 mL/Hr) IV Continuous <Continuous>  folic acid 1 milliGRAM(s) Oral daily  furosemide   Injectable 40 milliGRAM(s) IV Push two times a day  heparin   Injectable 5000 Unit(s) SubCutaneous every 8 hours  lactulose Syrup 20 Gram(s) Oral daily  levETIRAcetam  IVPB 250 milliGRAM(s) IV Intermittent every 12 hours  methadone    Tablet 30 milliGRAM(s) Oral daily  midazolam Infusion 0.02 mG/kG/Hr (1.71 mL/Hr) IV Continuous <Continuous>  nicotine -  14 mG/24Hr(s) Patch 1 patch Transdermal daily  norepinephrine Infusion 0.05 MICROgram(s)/kG/Min (8.03 mL/Hr) IV Continuous <Continuous>  pantoprazole   Suspension 40 milliGRAM(s) Enteral Tube daily  propofol Infusion 1 MICROgram(s)/kG/Min (0.51 mL/Hr) IV Continuous <Continuous>  spironolactone 50 milliGRAM(s) Oral daily  thiamine 100 milliGRAM(s) Oral daily    MEDICATIONS  (PRN):  ALBUTerol    90 MICROgram(s) HFA Inhaler 1 Puff(s) Inhalation every 4 hours PRN Shortness of Breath and/or Wheezing  cloNIDine 0.1 milliGRAM(s) Oral every 6 hours PRN opiate withdrawal  hydrOXYzine hydrochloride 50 milliGRAM(s) Oral every 6 hours PRN Anxiety  ibuprofen  Tablet. 600 milliGRAM(s) Oral every 6 hours PRN Temp greater or equal to 38C (100.4F)  ibuprofen  Tablet. 400 milliGRAM(s) Oral every 6 hours PRN Mild Pain (1 - 3)      Allergies:   buprenorphine (Rash)  Suboxone (Rash)            REVIEW OF SYSTEMS:  unobtainable       VITAL SIGNS:   T(F): 99.7 (06-23-22 @ 11:01), Max: 101.1 (06-22-22 @ 15:01)  HR: 85 (06-23-22 @ 13:30) (71 - 103)  BP: 114/74 (06-23-22 @ 13:01) (102/65 - 131/93)  RR: 30 (06-23-22 @ 13:01) (20 - 60)  SpO2: 99% (06-23-22 @ 13:30) (96% - 100%)    PHYSICAL EXAM:  GENERAL: +intubated   HEAD:  Atraumatic, Normocephalic  EYES: conjunctiva and sclera clear  NECK: Supple, no JVD or thyromegaly  CHEST/LUNG: +left sided rhonchi  HEART: Regular rate and rhythm; normal S1, S2, No murmurs.  ABDOMEN: Soft, nontender, nondistended; Bowel sounds present  NEUROLOGY: No asterixis or tremor.   SKIN: Intact, no jaundice            LABS:                        8.0    2.70  )-----------( 148      ( 23 Jun 2022 05:37 )             27.0     06-23    143  |  111<H>  |  19  ----------------------------<  113<H>  3.1<L>   |  24  |  0.8    Ca    7.5<L>      23 Jun 2022 05:37  Phos  3.0     06-23  Mg     1.9     06-23    TPro  4.6<L>  /  Alb  2.1<L>  /  TBili  0.3  /  DBili  x   /  AST  25  /  ALT  9   /  AlkPhos  705<H>  06-23    LIVER FUNCTIONS - ( 23 Jun 2022 05:37 )  Alb: 2.1 g/dL / Pro: 4.6 g/dL / ALK PHOS: 705 U/L / ALT: 9 U/L / AST: 25 U/L / GGT: x           PT/INR - ( 23 Jun 2022 05:37 )   PT: 11.10 sec;   INR: 0.96 ratio         PTT - ( 23 Jun 2022 05:37 )  PTT:30.0 sec    IMAGING:  < from: US Abdomen Complete (US Abdomen Complete .) (06.15.22 @ 11:29) >    ACC: [id number] EXAM:  US ABDOMEN COMPLETE                          PROCEDURE DATE:  06/15/2022          INTERPRETATION:  CLINICAL INFORMATION: Anasarca, IV drug abuse    COMPARISON: None available.    TECHNIQUE: Sonography of the abdomen.    FINDINGS:  Liver: Within normal limits.  Bile ducts: Normal caliber. Common bile duct measures 5 mm.  Gallbladder: Cholelithiasis.. Gallbladder wall thickening to 6 mm.   Negative sonographic Hammer's sign. No particles the fluid.  Pancreas: Visualized portions are within normal limits.  Spleen: 20.9 cm. Within normal limits.  Right kidney: 11.9 cm. No hydronephrosis.  Left kidney: 14.1 cm. No hydronephrosis.  Ascites: Moderate ascites.  Aorta and IVC: Visualized portions are within normal limits.    IMPRESSION:  Cholelithiasis. Gallbladder wall thickening. Negative sonographic   Hammer's sign. If there is high clinical suspicion for acute   cholecystitis recommend a HIDA scan.    Splenomegaly measuring 20.9 cm.    Moderate ascites.        --- End ofReport ---            SHELLI SHER MD; Attending Radiologist  This document has been electronically signed. Reinaldo 15 2022 12:14PM    < end of copied text >

## 2022-06-23 NOTE — CHART NOTE - NSCHARTNOTEFT_GEN_A_CORE
Registered Dietitian Follow-Up     Patient Profile Reviewed                           Yes [X]   No []     Nutrition History Previously Obtained        Yes [X]  No []       Pertinent  Information: pt is 26 year old female with hx of asthma, Hep C, active IVDA, anasarca presents with dyspnea admitted with fluid overload and initially admitted to med surg unit. s/p RRT 6/16 pt was in the lobby s/p seizure and fall 2/2 overdose s/p intubation and upgraded to ICU. + laceration to scalp noted. pt presently on propofol at 24 ml/hr which provides 634  kcals. As per GI moderate ascites, s/p paracentesis 1L removed on 6/22., + portal HTN 2/2 cirrhosis. pt tolerating EN at goal rate        Diet, NPO with Tube Feed:   Tube Feeding Modality: Orogastric  Vital High Protein  Total Volume for 24 Hours (mL): 1080  Continuous  Starting Tube Feed Rate {mL per Hour}: 20  Increase Tube Feed Rate by (mL): 5     Every 4 hours  Until Goal Tube Feed Rate (mL per Hour): 45  Tube Feed Duration (in Hours): 24  Tube Feed Start Time: 20:00  No Carb Prosource TF     Qty per Day:  1 (06-18-22 @ 19:34) [Active         Anthropometrics:  - Ht. 167.6 cm   - Wt. 79.4 kg today vs 79.3 upon admission   - %wt change  - BMI   - IBW      Pertinent Lab Data:  06-23    143  |  111<H>  |  19  ----------------------------<  113<H>  3.1<L>   |  24  |  0.8    Ca    7.5<L>      23 Jun 2022 05:37  Phos  3.0     06-23  Mg     1.9     06-23    TPro  4.6<L>  /  Alb  2.1<L>  /  TBili  0.3  /  DBili  x   /  AST  25  /  ALT  9   /  AlkPhos  705<H>  06-23                          8.0    2.70  )-----------( 148      ( 23 Jun 2022 05:37 )             27.0        Pertinent Meds:  MEDICATIONS  (STANDING):  ampicillin/sulbactam  IVPB 3 Gram(s) IV Intermittent every 6 hours  chlorhexidine 0.12% Liquid 15 milliLiter(s) Oral Mucosa every 12 hours  dexMEDEtomidine Infusion 0.2 MICROgram(s)/kG/Hr (4.28 mL/Hr) IV Continuous <Continuous>  folic acid 1 milliGRAM(s) Oral daily  furosemide   Injectable 40 milliGRAM(s) IV Push two times a day  heparin   Injectable 5000 Unit(s) SubCutaneous every 8 hours  lactulose Syrup 20 Gram(s) Oral daily  levETIRAcetam  IVPB 250 milliGRAM(s) IV Intermittent every 12 hours  methadone    Tablet 30 milliGRAM(s) Oral daily  midazolam Infusion 0.02 mG/kG/Hr (1.71 mL/Hr) IV Continuous <Continuous>  nicotine -  14 mG/24Hr(s) Patch 1 patch Transdermal daily  norepinephrine Infusion 0.05 MICROgram(s)/kG/Min (8.03 mL/Hr) IV Continuous <Continuous>  pantoprazole   Suspension 40 milliGRAM(s) Enteral Tube daily  propofol Infusion 1 MICROgram(s)/kG/Min (0.51 mL/Hr) IV Continuous <Continuous>  spironolactone 50 milliGRAM(s) Oral daily  thiamine 100 milliGRAM(s) Oral daily    MEDICATIONS  (PRN):  ALBUTerol    90 MICROgram(s) HFA Inhaler 1 Puff(s) Inhalation every 4 hours PRN Shortness of Breath and/or Wheezing  cloNIDine 0.1 milliGRAM(s) Oral every 6 hours PRN opiate withdrawal  hydrOXYzine hydrochloride 50 milliGRAM(s) Oral every 6 hours PRN Anxiety  ibuprofen  Tablet. 600 milliGRAM(s) Oral every 6 hours PRN Temp greater or equal to 38C (100.4F)  ibuprofen  Tablet. 400 milliGRAM(s) Oral every 6 hours PRN Mild Pain (1 - 3)       Physical Findings:  - Appearance: awake, intubated to vent   - GI function: +BS,, no BM noted since 6/21, consider increasing bowel regimen   - Tubes: OGT  - Oral/Mouth cavity:  - Skin:      Nutrition Requirements  Weight Used: 79.3 kgs     Estimated Energy Needs     1439-2151 kcals (25-30 kcal/kg/BW) vs 1990  kcal ANNE MARIE state   95-111g protein (1.2-1.4g/kg/BW)  1;1 kcal for estimated fluid needs           Nutrient Intake: present EN regimen provides: 1140+634 kcal (propofol infusion), 93+15g protein and total volume 1080 ml (22 kcal/kg/BW, 1.4g/kg/BW)          [] Previous Nutrition Diagnosis:            [] Ongoing          [X] Resolved       Nutrition Intervention: maintain on present feeds     Goal/Expected Outcome: pt to continue to tolerate EN and meet >80% of estimated nutrient needs     Indicator/Monitoring: RD to monitor tolerance to EN, labs/meds, NFPF and f/u as needed within 2-4 days

## 2022-06-23 NOTE — PROGRESS NOTE ADULT - SUBJECTIVE AND OBJECTIVE BOX
Patient seen and evaluated this am, sedated on ventilator on propofol, Precedex and versed      T(F): 99.7 (06-23-22 @ 11:01), Max: 101.1 (06-22-22 @ 15:01)  HR: 103 (06-23-22 @ 11:00)  BP: 131/93 (06-23-22 @ 11:00)  RR: 31 (06-23-22 @ 13:00)  SpO2: 100% (06-23-22 @ 11:00) (98% - 100%)    PHYSICAL EXAM:  GENERAL: NAD  HEAD:  Atraumatic, Normocephalic  EYES: EOMI, PERRLA, conjunctiva and sclera clear  NERVOUS SYSTEM:   no focal deficits   CHEST/LUNG:  bilateral rales  HEART: Regular rate and rhythm; No murmurs, rubs, or gallops  ABDOMEN: Soft, Nontender, Nondistended; Bowel sounds present  EXTREMITIES:  2+ Peripheral Pulses, No clubbing, cyanosis, or edema    LABS  06-23    143  |  111<H>  |  19  ----------------------------<  113<H>  3.1<L>   |  24  |  0.8    Ca    7.5<L>      23 Jun 2022 05:37  Phos  3.0     06-23  Mg     1.9     06-23    TPro  4.6<L>  /  Alb  2.1<L>  /  TBili  0.3  /  DBili  x   /  AST  25  /  ALT  9   /  AlkPhos  705<H>  06-23                          8.0    2.70  )-----------( 148      ( 23 Jun 2022 05:37 )             27.0     PT/INR - ( 23 Jun 2022 05:37 )   PT: 11.10 sec;   INR: 0.96 ratio         PTT - ( 23 Jun 2022 05:37 )  PTT:30.0 sec    Mode: AC/ CMV (Assist Control/ Continuous Mandatory Ventilation)  RR (machine): 22  TV (machine): 350  FiO2: 30  PEEP: 5    Culture Results:   No growth to date. (06-21-22)  Culture Results:   Normal Respiratory Kilo present (06-17-22)  Culture Results:   No growth at 5 days (06-17-22)  Culture Results:   Testing in progress (06-17-22)  Culture Results:   Normal Urogenital kilo present (06-15-22)    RADIOLOGY  < from: CT Chest No Cont (06.22.22 @ 13:21) >  IMPRESSION:    Bilateral small pleural effusions. Left greater than right lower lobar   dependent consolidations likely combination of pneumonia and atelectasis.    Dilated main pulmonary artery measuring approximately 3.8 cm can be seen   with pulmonary hypertension.    Partially imaged small ascites.    < end of copied text >    MEDICATIONS  (STANDING):  ampicillin/sulbactam  IVPB 3 Gram(s) IV Intermittent every 6 hours  chlorhexidine 0.12% Liquid 15 milliLiter(s) Oral Mucosa every 12 hours  dexMEDEtomidine Infusion 0.2 MICROgram(s)/kG/Hr (4.28 mL/Hr) IV Continuous <Continuous>  folic acid 1 milliGRAM(s) Oral daily  furosemide   Injectable 40 milliGRAM(s) IV Push two times a day  heparin   Injectable 5000 Unit(s) SubCutaneous every 8 hours  lactulose Syrup 20 Gram(s) Oral daily  levETIRAcetam  IVPB 250 milliGRAM(s) IV Intermittent every 12 hours  methadone    Tablet 30 milliGRAM(s) Oral daily  midazolam Infusion 0.02 mG/kG/Hr (1.71 mL/Hr) IV Continuous <Continuous>  nicotine -  14 mG/24Hr(s) Patch 1 patch Transdermal daily  norepinephrine Infusion 0.05 MICROgram(s)/kG/Min (8.03 mL/Hr) IV Continuous <Continuous>  pantoprazole   Suspension 40 milliGRAM(s) Enteral Tube daily  potassium chloride  20 mEq/100 mL IVPB 20 milliEquivalent(s) IV Intermittent every 2 hours  propofol Infusion 1 MICROgram(s)/kG/Min (0.51 mL/Hr) IV Continuous <Continuous>  spironolactone 50 milliGRAM(s) Oral daily  thiamine 100 milliGRAM(s) Oral daily    MEDICATIONS  (PRN):  ALBUTerol    90 MICROgram(s) HFA Inhaler 1 Puff(s) Inhalation every 4 hours PRN Shortness of Breath and/or Wheezing  cloNIDine 0.1 milliGRAM(s) Oral every 6 hours PRN opiate withdrawal  hydrOXYzine hydrochloride 50 milliGRAM(s) Oral every 6 hours PRN Anxiety  ibuprofen  Tablet. 600 milliGRAM(s) Oral every 6 hours PRN Temp greater or equal to 38C (100.4F)  ibuprofen  Tablet. 400 milliGRAM(s) Oral every 6 hours PRN Mild Pain (1 - 3)

## 2022-06-23 NOTE — PROGRESS NOTE ADULT - SUBJECTIVE AND OBJECTIVE BOX
Patient is a 26y old  Female who presents with a chief complaint of anasarca (22 Jun 2022 19:29)        Over Night Events:        ROS:     All ROS are negative except HPI         PHYSICAL EXAM    ICU Vital Signs Last 24 Hrs  T(C): 38 (23 Jun 2022 07:10), Max: 38.4 (22 Jun 2022 15:01)  T(F): 100.4 (23 Jun 2022 07:10), Max: 101.1 (22 Jun 2022 15:01)  HR: 80 (23 Jun 2022 08:00) (71 - 83)  BP: 104/69 (23 Jun 2022 08:00) (102/65 - 121/83)  BP(mean): 81 (23 Jun 2022 08:00) (78 - 97)  ABP: --  ABP(mean): --  RR: 31 (23 Jun 2022 08:00) (20 - 32)  SpO2: 100% (23 Jun 2022 08:00) (98% - 100%)      CONSTITUTIONAL:  ill appearing  NAD    ENT:   Airway patent,   Mouth with normal mucosa.   No thrush    EYES:   Pupils equal,   Round and reactive to light.    CARDIAC:   Normal rate,   Regular rhythm.    No edema      Vascular:  Normal systolic impulse  No Carotid bruits    RESPIRATORY:   No wheezing  Bilateral BS  Normal chest expansion  Not tachypneic,  No use of accessory muscles    GASTROINTESTINAL:  Abdomen soft,   Non-tender,   No guarding,   + BS    MUSCULOSKELETAL:   Range of motion is not limited,  No clubbing, cyanosis    NEUROLOGICAL:   Alert and oriented   No motor  deficits.    SKIN:   Skin normal color for race,   Warm and dry and intact.   No evidence of rash.    PSYCHIATRIC:   Normal mood and affect.   No apparent risk to self or others.    HEMATOLOGICAL:  No cervical  lymphadenopathy.  no inguinal lymphadenopathy      06-22-22 @ 07:01  -  06-23-22 @ 07:00  --------------------------------------------------------  IN:    Dexmedetomidine: 768 mL    IV PiggyBack: 300 mL    IV PiggyBack: 100 mL    IV PiggyBack: 200 mL    Midazolam: 240 mL    Norepinephrine: 144 mL    Propofol: 602 mL    Vital High Protein: 1080 mL  Total IN: 3434 mL    OUT:    Indwelling Catheter - Urethral (mL): 2135 mL    Voided (mL): 0 mL  Total OUT: 2135 mL    Total NET: 1299 mL      06-23-22 @ 07:01  -  06-23-22 @ 08:48  --------------------------------------------------------  IN:    Dexmedetomidine: 32 mL    Enteral Tube Flush: 50 mL    Midazolam: 7.5 mL    Norepinephrine: 5.5 mL    Propofol: 18 mL  Total IN: 113 mL    OUT:    Indwelling Catheter - Urethral (mL): 800 mL  Total OUT: 800 mL    Total NET: -687 mL          LABS:                            8.0    2.70  )-----------( 148      ( 23 Jun 2022 05:37 )             27.0                                               06-23    143  |  111<H>  |  19  ----------------------------<  113<H>  3.1<L>   |  24  |  0.8    Ca    7.5<L>      23 Jun 2022 05:37  Phos  3.0     06-23  Mg     1.9     06-23    TPro  4.6<L>  /  Alb  2.1<L>  /  TBili  0.3  /  DBili  x   /  AST  25  /  ALT  9   /  AlkPhos  705<H>  06-23      PT/INR - ( 23 Jun 2022 05:37 )   PT: 11.10 sec;   INR: 0.96 ratio         PTT - ( 23 Jun 2022 05:37 )  PTT:30.0 sec                                                                                     LIVER FUNCTIONS - ( 23 Jun 2022 05:37 )  Alb: 2.1 g/dL / Pro: 4.6 g/dL / ALK PHOS: 705 U/L / ALT: 9 U/L / AST: 25 U/L / GGT: x                                                  Culture - Body Fluid with Gram Stain (collected 22 Jun 2022 01:00)  Source: Peritoneal Peritoneal Fluid  Gram Stain (23 Jun 2022 02:31):    No polymorphonuclear leukocytes seen    No organisms seen    by cytocentrifuge    Culture - Blood (collected 21 Jun 2022 05:33)  Source: .Blood None  Preliminary Report (22 Jun 2022 19:03):    No growth to date.                                                   Mode: AC/ CMV (Assist Control/ Continuous Mandatory Ventilation)  RR (machine): 22  TV (machine): 400  FiO2: 30  PEEP: 5  ITime: 0.8  MAP: 10  PIP: 22                                      ABG - ( 23 Jun 2022 06:30 )  pH, Arterial: 7.46  pH, Blood: x     /  pCO2: 35    /  pO2: 129   / HCO3: 25    / Base Excess: 1.1   /  SaO2: 99.4                MEDICATIONS  (STANDING):  ampicillin/sulbactam  IVPB 3 Gram(s) IV Intermittent every 6 hours  chlorhexidine 0.12% Liquid 15 milliLiter(s) Oral Mucosa every 12 hours  dexMEDEtomidine Infusion 0.2 MICROgram(s)/kG/Hr (4.28 mL/Hr) IV Continuous <Continuous>  folic acid 1 milliGRAM(s) Oral daily  furosemide   Injectable 40 milliGRAM(s) IV Push two times a day  heparin   Injectable 5000 Unit(s) SubCutaneous every 8 hours  lactulose Syrup 20 Gram(s) Oral daily  levETIRAcetam  IVPB 250 milliGRAM(s) IV Intermittent every 12 hours  methadone    Tablet 15 milliGRAM(s) Oral daily  midazolam Infusion 0.02 mG/kG/Hr (1.71 mL/Hr) IV Continuous <Continuous>  nicotine -  14 mG/24Hr(s) Patch 1 patch Transdermal daily  norepinephrine Infusion 0.05 MICROgram(s)/kG/Min (8.03 mL/Hr) IV Continuous <Continuous>  pantoprazole   Suspension 40 milliGRAM(s) Enteral Tube daily  potassium chloride  20 mEq/100 mL IVPB 20 milliEquivalent(s) IV Intermittent every 2 hours  propofol Infusion 1 MICROgram(s)/kG/Min (0.51 mL/Hr) IV Continuous <Continuous>  spironolactone 50 milliGRAM(s) Oral daily  thiamine 100 milliGRAM(s) Oral daily    MEDICATIONS  (PRN):  ALBUTerol    90 MICROgram(s) HFA Inhaler 1 Puff(s) Inhalation every 4 hours PRN Shortness of Breath and/or Wheezing  cloNIDine 0.1 milliGRAM(s) Oral every 6 hours PRN opiate withdrawal  hydrOXYzine hydrochloride 50 milliGRAM(s) Oral every 6 hours PRN Anxiety  ibuprofen  Tablet. 600 milliGRAM(s) Oral every 6 hours PRN Temp greater or equal to 38C (100.4F)  ibuprofen  Tablet. 400 milliGRAM(s) Oral every 6 hours PRN Mild Pain (1 - 3)      New X-rays reviewed:                                                                                  ECHO    CXR interpreted by me:       Patient is a 26y old  Female who presents with a chief complaint of anasarca (22 Jun 2022 19:29)        Over Night Events:  febrile  remains critically ill on MV  remains sedated on propofol, precedex, versed      ROS:     All ROS are negative except HPI         PHYSICAL EXAM    ICU Vital Signs Last 24 Hrs  T(C): 38 (23 Jun 2022 07:10), Max: 38.4 (22 Jun 2022 15:01)  T(F): 100.4 (23 Jun 2022 07:10), Max: 101.1 (22 Jun 2022 15:01)  HR: 80 (23 Jun 2022 08:00) (71 - 83)  BP: 104/69 (23 Jun 2022 08:00) (102/65 - 121/83)  BP(mean): 81 (23 Jun 2022 08:00) (78 - 97)  ABP: --  ABP(mean): --  RR: 31 (23 Jun 2022 08:00) (20 - 32)  SpO2: 100% (23 Jun 2022 08:00) (98% - 100%)      CONSTITUTIONAL:  ill appearing  NAD    ENT:   Airway patent,   Mouth with normal mucosa.   No thrush  +ET    EYES:   Pupils equal,   Round and reactive to light.    CARDIAC:   Normal rate,   Regular rhythm.    No edema      Vascular:  Normal systolic impulse  No Carotid bruits    RESPIRATORY:   No wheezing  Bilateral BS  Normal chest expansion  Not tachypneic,  No use of accessory muscles    GASTROINTESTINAL:  Abdomen soft,   Non-tender,   No guarding,     MUSCULOSKELETAL:   Range of motion is not limited,  No clubbing, cyanosis    NEUROLOGICAL:   awake, alert    SKIN:   Skin normal color for race,   Warm and dry and intact.   No evidence of rash.        06-22-22 @ 07:01  -  06-23-22 @ 07:00  --------------------------------------------------------  IN:    Dexmedetomidine: 768 mL    IV PiggyBack: 300 mL    IV PiggyBack: 100 mL    IV PiggyBack: 200 mL    Midazolam: 240 mL    Norepinephrine: 144 mL    Propofol: 602 mL    Vital High Protein: 1080 mL  Total IN: 3434 mL    OUT:    Indwelling Catheter - Urethral (mL): 2135 mL    Voided (mL): 0 mL  Total OUT: 2135 mL    Total NET: 1299 mL      06-23-22 @ 07:01  -  06-23-22 @ 08:48  --------------------------------------------------------  IN:    Dexmedetomidine: 32 mL    Enteral Tube Flush: 50 mL    Midazolam: 7.5 mL    Norepinephrine: 5.5 mL    Propofol: 18 mL  Total IN: 113 mL    OUT:    Indwelling Catheter - Urethral (mL): 800 mL  Total OUT: 800 mL    Total NET: -687 mL          LABS:                            8.0    2.70  )-----------( 148      ( 23 Jun 2022 05:37 )             27.0                                               06-23    143  |  111<H>  |  19  ----------------------------<  113<H>  3.1<L>   |  24  |  0.8    Ca    7.5<L>      23 Jun 2022 05:37  Phos  3.0     06-23  Mg     1.9     06-23    TPro  4.6<L>  /  Alb  2.1<L>  /  TBili  0.3  /  DBili  x   /  AST  25  /  ALT  9   /  AlkPhos  705<H>  06-23      PT/INR - ( 23 Jun 2022 05:37 )   PT: 11.10 sec;   INR: 0.96 ratio         PTT - ( 23 Jun 2022 05:37 )  PTT:30.0 sec                                                                                     LIVER FUNCTIONS - ( 23 Jun 2022 05:37 )  Alb: 2.1 g/dL / Pro: 4.6 g/dL / ALK PHOS: 705 U/L / ALT: 9 U/L / AST: 25 U/L / GGT: x                                                  Culture - Body Fluid with Gram Stain (collected 22 Jun 2022 01:00)  Source: Peritoneal Peritoneal Fluid  Gram Stain (23 Jun 2022 02:31):    No polymorphonuclear leukocytes seen    No organisms seen    by cytocentrifuge    Culture - Blood (collected 21 Jun 2022 05:33)  Source: .Blood None  Preliminary Report (22 Jun 2022 19:03):    No growth to date.                                                   Mode: AC/ CMV (Assist Control/ Continuous Mandatory Ventilation)  RR (machine): 22  TV (machine): 400  FiO2: 30  PEEP: 5  ITime: 0.8  MAP: 10  PIP: 22                                      ABG - ( 23 Jun 2022 06:30 )  pH, Arterial: 7.46  pH, Blood: x     /  pCO2: 35    /  pO2: 129   / HCO3: 25    / Base Excess: 1.1   /  SaO2: 99.4                MEDICATIONS  (STANDING):  ampicillin/sulbactam  IVPB 3 Gram(s) IV Intermittent every 6 hours  chlorhexidine 0.12% Liquid 15 milliLiter(s) Oral Mucosa every 12 hours  dexMEDEtomidine Infusion 0.2 MICROgram(s)/kG/Hr (4.28 mL/Hr) IV Continuous <Continuous>  folic acid 1 milliGRAM(s) Oral daily  furosemide   Injectable 40 milliGRAM(s) IV Push two times a day  heparin   Injectable 5000 Unit(s) SubCutaneous every 8 hours  lactulose Syrup 20 Gram(s) Oral daily  levETIRAcetam  IVPB 250 milliGRAM(s) IV Intermittent every 12 hours  methadone    Tablet 15 milliGRAM(s) Oral daily  midazolam Infusion 0.02 mG/kG/Hr (1.71 mL/Hr) IV Continuous <Continuous>  nicotine -  14 mG/24Hr(s) Patch 1 patch Transdermal daily  norepinephrine Infusion 0.05 MICROgram(s)/kG/Min (8.03 mL/Hr) IV Continuous <Continuous>  pantoprazole   Suspension 40 milliGRAM(s) Enteral Tube daily  potassium chloride  20 mEq/100 mL IVPB 20 milliEquivalent(s) IV Intermittent every 2 hours  propofol Infusion 1 MICROgram(s)/kG/Min (0.51 mL/Hr) IV Continuous <Continuous>  spironolactone 50 milliGRAM(s) Oral daily  thiamine 100 milliGRAM(s) Oral daily    MEDICATIONS  (PRN):  ALBUTerol    90 MICROgram(s) HFA Inhaler 1 Puff(s) Inhalation every 4 hours PRN Shortness of Breath and/or Wheezing  cloNIDine 0.1 milliGRAM(s) Oral every 6 hours PRN opiate withdrawal  hydrOXYzine hydrochloride 50 milliGRAM(s) Oral every 6 hours PRN Anxiety  ibuprofen  Tablet. 600 milliGRAM(s) Oral every 6 hours PRN Temp greater or equal to 38C (100.4F)  ibuprofen  Tablet. 400 milliGRAM(s) Oral every 6 hours PRN Mild Pain (1 - 3)      New X-rays reviewed:                                                                                  ECHO    CXR interpreted by me:

## 2022-06-24 LAB
6-ACETYLMORPHINE, UR RESULT: NEGATIVE NG/ML — SIGNIFICANT CHANGE UP
6MAM UR CFM-MCNC: NEGATIVE NG/ML — SIGNIFICANT CHANGE UP
ALBUMIN SERPL ELPH-MCNC: 2 G/DL — LOW (ref 3.5–5.2)
ALP SERPL-CCNC: 649 U/L — HIGH (ref 30–115)
ALT FLD-CCNC: 8 U/L — SIGNIFICANT CHANGE UP (ref 0–41)
ANION GAP SERPL CALC-SCNC: 10 MMOL/L — SIGNIFICANT CHANGE UP (ref 7–14)
ANION GAP SERPL CALC-SCNC: 8 MMOL/L — SIGNIFICANT CHANGE UP (ref 7–14)
ANISOCYTOSIS BLD QL: SLIGHT — SIGNIFICANT CHANGE UP
AST SERPL-CCNC: 25 U/L — SIGNIFICANT CHANGE UP (ref 0–41)
BASOPHILS # BLD AUTO: 0 K/UL — SIGNIFICANT CHANGE UP (ref 0–0.2)
BASOPHILS NFR BLD AUTO: 0 % — SIGNIFICANT CHANGE UP (ref 0–1)
BILIRUB SERPL-MCNC: 0.2 MG/DL — SIGNIFICANT CHANGE UP (ref 0.2–1.2)
BLD GP AB SCN SERPL QL: SIGNIFICANT CHANGE UP
BUN SERPL-MCNC: 20 MG/DL — SIGNIFICANT CHANGE UP (ref 10–20)
BUN SERPL-MCNC: 21 MG/DL — HIGH (ref 10–20)
CALCIUM SERPL-MCNC: 7.7 MG/DL — LOW (ref 8.5–10.1)
CALCIUM SERPL-MCNC: 8.3 MG/DL — LOW (ref 8.5–10.1)
CHLORIDE SERPL-SCNC: 110 MMOL/L — SIGNIFICANT CHANGE UP (ref 98–110)
CHLORIDE SERPL-SCNC: 113 MMOL/L — HIGH (ref 98–110)
CO2 SERPL-SCNC: 25 MMOL/L — SIGNIFICANT CHANGE UP (ref 17–32)
CO2 SERPL-SCNC: 26 MMOL/L — SIGNIFICANT CHANGE UP (ref 17–32)
CODEINE UR CFM-MCNC: NEGATIVE NG/ML — SIGNIFICANT CHANGE UP
CODEINE, UR RESULT: NEGATIVE NG/ML — SIGNIFICANT CHANGE UP
CREAT SERPL-MCNC: 0.8 MG/DL — SIGNIFICANT CHANGE UP (ref 0.7–1.5)
CREAT SERPL-MCNC: 0.9 MG/DL — SIGNIFICANT CHANGE UP (ref 0.7–1.5)
DACRYOCYTES BLD QL SMEAR: SLIGHT — SIGNIFICANT CHANGE UP
EGFR: 103 ML/MIN/1.73M2 — SIGNIFICANT CHANGE UP
EGFR: 89 ML/MIN/1.73M2 — SIGNIFICANT CHANGE UP
EOSINOPHIL # BLD AUTO: 0.01 K/UL — SIGNIFICANT CHANGE UP (ref 0–0.7)
EOSINOPHIL NFR BLD AUTO: 0.6 % — SIGNIFICANT CHANGE UP (ref 0–8)
GLUCOSE SERPL-MCNC: 118 MG/DL — HIGH (ref 70–99)
GLUCOSE SERPL-MCNC: 99 MG/DL — SIGNIFICANT CHANGE UP (ref 70–99)
HCT VFR BLD CALC: 23 % — LOW (ref 37–47)
HCT VFR BLD CALC: 25.3 % — LOW (ref 37–47)
HCV RNA FLD QL NAA+PROBE: SIGNIFICANT CHANGE UP
HCV RNA SPEC QL PROBE+SIG AMP: SIGNIFICANT CHANGE UP
HGB BLD-MCNC: 6.8 G/DL — CRITICAL LOW (ref 12–16)
HGB BLD-MCNC: 7.5 G/DL — LOW (ref 12–16)
HYDROCODONE UR QL CFM: NEGATIVE NG/ML — SIGNIFICANT CHANGE UP
HYDROCODONE, UR RESULT: NEGATIVE NG/ML — SIGNIFICANT CHANGE UP
HYDROMORPHONE UR QL CFM: NEGATIVE NG/ML — SIGNIFICANT CHANGE UP
HYDROMORPHONE, UR RESULT: NEGATIVE NG/ML — SIGNIFICANT CHANGE UP
HYPOCHROMIA BLD QL: SLIGHT — SIGNIFICANT CHANGE UP
IMM GRANULOCYTES NFR BLD AUTO: 0.6 % — HIGH (ref 0.1–0.3)
LYMPHOCYTES # BLD AUTO: 0.59 K/UL — LOW (ref 1.2–3.4)
LYMPHOCYTES # BLD AUTO: 36.4 % — SIGNIFICANT CHANGE UP (ref 20.5–51.1)
MAGNESIUM SERPL-MCNC: 1.9 MG/DL — SIGNIFICANT CHANGE UP (ref 1.8–2.4)
MANUAL SMEAR VERIFICATION: SIGNIFICANT CHANGE UP
MCHC RBC-ENTMCNC: 25.8 PG — LOW (ref 27–31)
MCHC RBC-ENTMCNC: 26 PG — LOW (ref 27–31)
MCHC RBC-ENTMCNC: 29.6 G/DL — LOW (ref 32–37)
MCHC RBC-ENTMCNC: 29.6 G/DL — LOW (ref 32–37)
MCV RBC AUTO: 87.1 FL — SIGNIFICANT CHANGE UP (ref 81–99)
MCV RBC AUTO: 87.5 FL — SIGNIFICANT CHANGE UP (ref 81–99)
MICROCYTES BLD QL: SLIGHT — SIGNIFICANT CHANGE UP
MONOCYTES # BLD AUTO: 0.11 K/UL — SIGNIFICANT CHANGE UP (ref 0.1–0.6)
MONOCYTES NFR BLD AUTO: 6.8 % — SIGNIFICANT CHANGE UP (ref 1.7–9.3)
MORPHINE UR QL CFM: 1318 NG/ML — SIGNIFICANT CHANGE UP
MORPHINE, UR RESULT: 1318 NG/ML — SIGNIFICANT CHANGE UP
NEUTROPHILS # BLD AUTO: 0.9 K/UL — LOW (ref 1.4–6.5)
NEUTROPHILS NFR BLD AUTO: 55.6 % — SIGNIFICANT CHANGE UP (ref 42.2–75.2)
NEUTS BAND # BLD: 6 % — SIGNIFICANT CHANGE UP (ref 0–6)
NOROXYCODONE (OPIATES), UR RESULT: NEGATIVE NG/ML — SIGNIFICANT CHANGE UP
NOROXYCODONE UR CFM-MCNC: NEGATIVE NG/ML — SIGNIFICANT CHANGE UP
NRBC # BLD: 0 /100 WBCS — SIGNIFICANT CHANGE UP (ref 0–0)
NRBC # BLD: 0 /100 WBCS — SIGNIFICANT CHANGE UP (ref 0–0)
NRBC # BLD: 0 /100 — SIGNIFICANT CHANGE UP (ref 0–0)
OPIATES IN-HOUSE INTERPRETATION: POSITIVE
OPIATES UR QL CFM: POSITIVE
OXYCODONE (OPIATES), UR RESULT: NEGATIVE NG/ML — SIGNIFICANT CHANGE UP
OXYCODONE UR-MCNC: NEGATIVE NG/ML — SIGNIFICANT CHANGE UP
OXYMORPHONE (OPIATES), UR RESULT: NEGATIVE NG/ML — SIGNIFICANT CHANGE UP
OXYMORPHONE UR CFM-MCNC: NEGATIVE NG/ML — SIGNIFICANT CHANGE UP
PLAT MORPH BLD: NORMAL — SIGNIFICANT CHANGE UP
PLATELET # BLD AUTO: 119 K/UL — LOW (ref 130–400)
PLATELET # BLD AUTO: 91 K/UL — LOW (ref 130–400)
PLATELET CLUMP BLD QL SMEAR: SIGNIFICANT CHANGE UP
PLATELET COUNT - ESTIMATE: ABNORMAL
POTASSIUM SERPL-MCNC: 3.4 MMOL/L — LOW (ref 3.5–5)
POTASSIUM SERPL-MCNC: 3.9 MMOL/L — SIGNIFICANT CHANGE UP (ref 3.5–5)
POTASSIUM SERPL-SCNC: 3.4 MMOL/L — LOW (ref 3.5–5)
POTASSIUM SERPL-SCNC: 3.9 MMOL/L — SIGNIFICANT CHANGE UP (ref 3.5–5)
PROT SERPL-MCNC: 4.7 G/DL — LOW (ref 6–8)
RBC # BLD: 2.64 M/UL — LOW (ref 4.2–5.4)
RBC # BLD: 2.89 M/UL — LOW (ref 4.2–5.4)
RBC # FLD: 16 % — HIGH (ref 11.5–14.5)
RBC # FLD: 16.2 % — HIGH (ref 11.5–14.5)
RBC BLD AUTO: ABNORMAL
SCHISTOCYTES BLD QL AUTO: SLIGHT — SIGNIFICANT CHANGE UP
SODIUM SERPL-SCNC: 143 MMOL/L — SIGNIFICANT CHANGE UP (ref 135–146)
SODIUM SERPL-SCNC: 149 MMOL/L — HIGH (ref 135–146)
WBC # BLD: 1.62 K/UL — LOW (ref 4.8–10.8)
WBC # BLD: 2.16 K/UL — LOW (ref 4.8–10.8)
WBC # FLD AUTO: 1.62 K/UL — LOW (ref 4.8–10.8)
WBC # FLD AUTO: 2.16 K/UL — LOW (ref 4.8–10.8)

## 2022-06-24 PROCEDURE — 93010 ELECTROCARDIOGRAM REPORT: CPT

## 2022-06-24 PROCEDURE — 71045 X-RAY EXAM CHEST 1 VIEW: CPT | Mod: 26

## 2022-06-24 PROCEDURE — 99233 SBSQ HOSP IP/OBS HIGH 50: CPT

## 2022-06-24 PROCEDURE — 99291 CRITICAL CARE FIRST HOUR: CPT

## 2022-06-24 RX ORDER — CLONAZEPAM 1 MG
1 TABLET ORAL THREE TIMES A DAY
Refills: 0 | Status: DISCONTINUED | OUTPATIENT
Start: 2022-06-24 | End: 2022-06-27

## 2022-06-24 RX ORDER — MAGNESIUM SULFATE 500 MG/ML
1 VIAL (ML) INJECTION ONCE
Refills: 0 | Status: COMPLETED | OUTPATIENT
Start: 2022-06-24 | End: 2022-06-24

## 2022-06-24 RX ORDER — POTASSIUM CHLORIDE 20 MEQ
20 PACKET (EA) ORAL
Refills: 0 | Status: COMPLETED | OUTPATIENT
Start: 2022-06-24 | End: 2022-06-24

## 2022-06-24 RX ORDER — CLONAZEPAM 1 MG
1 TABLET ORAL THREE TIMES A DAY
Refills: 0 | Status: DISCONTINUED | OUTPATIENT
Start: 2022-06-24 | End: 2022-06-24

## 2022-06-24 RX ADMIN — Medication 600 MILLIGRAM(S): at 22:00

## 2022-06-24 RX ADMIN — CHLORHEXIDINE GLUCONATE 15 MILLILITER(S): 213 SOLUTION TOPICAL at 05:49

## 2022-06-24 RX ADMIN — Medication 100 GRAM(S): at 19:47

## 2022-06-24 RX ADMIN — Medication 100 MILLIGRAM(S): at 12:57

## 2022-06-24 RX ADMIN — MIDAZOLAM HYDROCHLORIDE 1.71 MG/KG/HR: 1 INJECTION, SOLUTION INTRAMUSCULAR; INTRAVENOUS at 18:46

## 2022-06-24 RX ADMIN — Medication 1 MILLIGRAM(S): at 11:12

## 2022-06-24 RX ADMIN — DEXMEDETOMIDINE HYDROCHLORIDE IN 0.9% SODIUM CHLORIDE 4.28 MICROGRAM(S)/KG/HR: 4 INJECTION INTRAVENOUS at 19:46

## 2022-06-24 RX ADMIN — Medication 1 PATCH: at 14:50

## 2022-06-24 RX ADMIN — Medication 1 PATCH: at 18:19

## 2022-06-24 RX ADMIN — DEXMEDETOMIDINE HYDROCHLORIDE IN 0.9% SODIUM CHLORIDE 4.28 MICROGRAM(S)/KG/HR: 4 INJECTION INTRAVENOUS at 05:00

## 2022-06-24 RX ADMIN — DEXMEDETOMIDINE HYDROCHLORIDE IN 0.9% SODIUM CHLORIDE 4.28 MICROGRAM(S)/KG/HR: 4 INJECTION INTRAVENOUS at 11:11

## 2022-06-24 RX ADMIN — HEPARIN SODIUM 5000 UNIT(S): 5000 INJECTION INTRAVENOUS; SUBCUTANEOUS at 05:49

## 2022-06-24 RX ADMIN — Medication 1 PATCH: at 09:27

## 2022-06-24 RX ADMIN — Medication 50 MILLIEQUIVALENT(S): at 22:36

## 2022-06-24 RX ADMIN — AMPICILLIN SODIUM AND SULBACTAM SODIUM 200 GRAM(S): 250; 125 INJECTION, POWDER, FOR SUSPENSION INTRAMUSCULAR; INTRAVENOUS at 11:12

## 2022-06-24 RX ADMIN — Medication 40 MILLIGRAM(S): at 05:48

## 2022-06-24 RX ADMIN — AMPICILLIN SODIUM AND SULBACTAM SODIUM 200 GRAM(S): 250; 125 INJECTION, POWDER, FOR SUSPENSION INTRAMUSCULAR; INTRAVENOUS at 05:49

## 2022-06-24 RX ADMIN — METHADONE HYDROCHLORIDE 30 MILLIGRAM(S): 40 TABLET ORAL at 11:11

## 2022-06-24 RX ADMIN — MIDAZOLAM HYDROCHLORIDE 1.71 MG/KG/HR: 1 INJECTION, SOLUTION INTRAMUSCULAR; INTRAVENOUS at 09:37

## 2022-06-24 RX ADMIN — LEVETIRACETAM 400 MILLIGRAM(S): 250 TABLET, FILM COATED ORAL at 05:49

## 2022-06-24 RX ADMIN — Medication 50 MILLIEQUIVALENT(S): at 19:00

## 2022-06-24 RX ADMIN — PANTOPRAZOLE SODIUM 40 MILLIGRAM(S): 20 TABLET, DELAYED RELEASE ORAL at 11:12

## 2022-06-24 RX ADMIN — CHLORHEXIDINE GLUCONATE 15 MILLILITER(S): 213 SOLUTION TOPICAL at 17:09

## 2022-06-24 RX ADMIN — DEXMEDETOMIDINE HYDROCHLORIDE IN 0.9% SODIUM CHLORIDE 4.28 MICROGRAM(S)/KG/HR: 4 INJECTION INTRAVENOUS at 09:40

## 2022-06-24 RX ADMIN — AMPICILLIN SODIUM AND SULBACTAM SODIUM 200 GRAM(S): 250; 125 INJECTION, POWDER, FOR SUSPENSION INTRAMUSCULAR; INTRAVENOUS at 17:06

## 2022-06-24 RX ADMIN — Medication 50 MILLIEQUIVALENT(S): at 20:08

## 2022-06-24 RX ADMIN — HEPARIN SODIUM 5000 UNIT(S): 5000 INJECTION INTRAVENOUS; SUBCUTANEOUS at 13:58

## 2022-06-24 RX ADMIN — Medication 8.03 MICROGRAM(S)/KG/MIN: at 09:41

## 2022-06-24 RX ADMIN — LACTULOSE 20 GRAM(S): 10 SOLUTION ORAL at 11:12

## 2022-06-24 RX ADMIN — DEXMEDETOMIDINE HYDROCHLORIDE IN 0.9% SODIUM CHLORIDE 4.28 MICROGRAM(S)/KG/HR: 4 INJECTION INTRAVENOUS at 13:59

## 2022-06-24 RX ADMIN — Medication 1 MILLIGRAM(S): at 14:01

## 2022-06-24 RX ADMIN — Medication 600 MILLIGRAM(S): at 21:07

## 2022-06-24 RX ADMIN — Medication 1 MILLIGRAM(S): at 21:26

## 2022-06-24 RX ADMIN — SPIRONOLACTONE 50 MILLIGRAM(S): 25 TABLET, FILM COATED ORAL at 05:48

## 2022-06-24 RX ADMIN — HEPARIN SODIUM 5000 UNIT(S): 5000 INJECTION INTRAVENOUS; SUBCUTANEOUS at 21:26

## 2022-06-24 RX ADMIN — PROPOFOL 0.51 MICROGRAM(S)/KG/MIN: 10 INJECTION, EMULSION INTRAVENOUS at 09:40

## 2022-06-24 RX ADMIN — PROPOFOL 0.51 MICROGRAM(S)/KG/MIN: 10 INJECTION, EMULSION INTRAVENOUS at 02:39

## 2022-06-24 RX ADMIN — LEVETIRACETAM 400 MILLIGRAM(S): 250 TABLET, FILM COATED ORAL at 17:26

## 2022-06-24 RX ADMIN — PROPOFOL 0.51 MICROGRAM(S)/KG/MIN: 10 INJECTION, EMULSION INTRAVENOUS at 17:26

## 2022-06-24 RX ADMIN — Medication 1 PATCH: at 11:12

## 2022-06-24 RX ADMIN — Medication 40 MILLIGRAM(S): at 13:58

## 2022-06-24 RX ADMIN — AMPICILLIN SODIUM AND SULBACTAM SODIUM 200 GRAM(S): 250; 125 INJECTION, POWDER, FOR SUSPENSION INTRAMUSCULAR; INTRAVENOUS at 00:12

## 2022-06-24 NOTE — PROGRESS NOTE ADULT - SUBJECTIVE AND OBJECTIVE BOX
Patient is lightly sedated on ventilator, following commands       T(F): 97.3 (06-24-22 @ 07:10), Max: 99.7 (06-23-22 @ 11:01)  HR: 90 (06-24-22 @ 10:24)  BP: 102/62 (06-24-22 @ 10:24)  RR: 50 (06-24-22 @ 10:24)  SpO2: 100% (06-24-22 @ 10:24) (96% - 100%)    PHYSICAL EXAM:  GENERAL: NAD  HEAD:  Atraumatic, Normocephalic  EYES: EOMI, PERRLA, conjunctiva and sclera clear  NERVOUS SYSTEM:  no focal deficits   CHEST/LUNG:  bilateral rhonchi  HEART: Regular rate and rhythm; No murmurs, rubs, or gallops  ABDOMEN: Soft, Nontender, Nondistended; Bowel sounds present  EXTREMITIES:  2+ Peripheral Pulses, No clubbing, cyanosis, or edema    LABS  06-24    143  |  110  |  21<H>  ----------------------------<  118<H>  3.9   |  25  |  0.8    Ca    7.7<L>      24 Jun 2022 06:00  Phos  3.0     06-23  Mg     1.9     06-23    TPro  4.7<L>  /  Alb  2.0<L>  /  TBili  0.2  /  DBili  x   /  AST  25  /  ALT  8   /  AlkPhos  649<H>  06-24                          7.5    2.16  )-----------( 119      ( 24 Jun 2022 06:00 )             25.3     PT/INR - ( 23 Jun 2022 05:37 )   PT: 11.10 sec;   INR: 0.96 ratio         PTT - ( 23 Jun 2022 05:37 )  PTT:30.0 sec    Mode: AC/ CMV (Assist Control/ Continuous Mandatory Ventilation)  RR (machine): 22  TV (machine): 350  FiO2: 30  PEEP: 5    Culture Results:   No growth (06-22-22)  Culture Results:   No growth to date. (06-21-22)  Culture Results:   Normal Respiratory Kilo present (06-17-22)  Culture Results:   No growth at 5 days (06-17-22)  Culture Results:   Testing in progress (06-17-22)  Culture Results:   Normal Urogenital kilo present (06-15-22)    RADIOLOGY  IMPRESSION:    Bilateral small pleural effusions. Left greater than right lower lobar   dependent consolidations likely combination of pneumonia and atelectasis.    Dilated main pulmonary artery measuring approximately 3.8 cm can be seen   with pulmonary hypertension.    Partially imaged small ascites.    MEDICATIONS  (STANDING):  ampicillin/sulbactam  IVPB 3 Gram(s) IV Intermittent every 6 hours  chlorhexidine 0.12% Liquid 15 milliLiter(s) Oral Mucosa every 12 hours  clonazePAM  Tablet 1 milliGRAM(s) Oral three times a day  dexMEDEtomidine Infusion 0.2 MICROgram(s)/kG/Hr (4.28 mL/Hr) IV Continuous <Continuous>  folic acid 1 milliGRAM(s) Oral daily  furosemide   Injectable 40 milliGRAM(s) IV Push two times a day  heparin   Injectable 5000 Unit(s) SubCutaneous every 8 hours  lactulose Syrup 20 Gram(s) Oral daily  levETIRAcetam  IVPB 250 milliGRAM(s) IV Intermittent every 12 hours  methadone    Tablet 30 milliGRAM(s) Oral daily  midazolam Infusion 0.02 mG/kG/Hr (1.71 mL/Hr) IV Continuous <Continuous>  nicotine -  14 mG/24Hr(s) Patch 1 patch Transdermal daily  norepinephrine Infusion 0.05 MICROgram(s)/kG/Min (8.03 mL/Hr) IV Continuous <Continuous>  pantoprazole   Suspension 40 milliGRAM(s) Enteral Tube daily  propofol Infusion 1 MICROgram(s)/kG/Min (0.51 mL/Hr) IV Continuous <Continuous>  spironolactone 50 milliGRAM(s) Oral daily  thiamine 100 milliGRAM(s) Oral daily    MEDICATIONS  (PRN):  ALBUTerol    90 MICROgram(s) HFA Inhaler 1 Puff(s) Inhalation every 4 hours PRN Shortness of Breath and/or Wheezing  cloNIDine 0.1 milliGRAM(s) Oral every 6 hours PRN opiate withdrawal  hydrOXYzine hydrochloride 50 milliGRAM(s) Oral every 6 hours PRN Anxiety  ibuprofen  Tablet. 600 milliGRAM(s) Oral every 6 hours PRN Temp greater or equal to 38C (100.4F)  ibuprofen  Tablet. 400 milliGRAM(s) Oral every 6 hours PRN Mild Pain (1 - 3)

## 2022-06-24 NOTE — PROGRESS NOTE ADULT - SUBJECTIVE AND OBJECTIVE BOX
26yFemale  Being followed for ascites, portal HTN  Interval history:  No hematemesis, melena, blood in stool reported.       PAST MEDICAL & SURGICAL HISTORY:  Hepatitis C      Asthma      Anxiety and depression      IV drug abuse  heroin      No significant past surgical history              Social History: No smoking. No alcohol. + illegal drug use.            MEDICATIONS  (STANDING):  ampicillin/sulbactam  IVPB 3 Gram(s) IV Intermittent every 6 hours  chlorhexidine 0.12% Liquid 15 milliLiter(s) Oral Mucosa every 12 hours  clonazePAM  Tablet 1 milliGRAM(s) Oral three times a day  dexMEDEtomidine Infusion 0.2 MICROgram(s)/kG/Hr (4.28 mL/Hr) IV Continuous <Continuous>  folic acid 1 milliGRAM(s) Oral daily  furosemide   Injectable 40 milliGRAM(s) IV Push two times a day  heparin   Injectable 5000 Unit(s) SubCutaneous every 8 hours  lactulose Syrup 20 Gram(s) Oral daily  levETIRAcetam  IVPB 250 milliGRAM(s) IV Intermittent every 12 hours  methadone    Tablet 30 milliGRAM(s) Oral daily  midazolam Infusion 0.02 mG/kG/Hr (1.71 mL/Hr) IV Continuous <Continuous>  nicotine -  14 mG/24Hr(s) Patch 1 patch Transdermal daily  norepinephrine Infusion 0.05 MICROgram(s)/kG/Min (8.03 mL/Hr) IV Continuous <Continuous>  pantoprazole   Suspension 40 milliGRAM(s) Enteral Tube daily  propofol Infusion 1 MICROgram(s)/kG/Min (0.51 mL/Hr) IV Continuous <Continuous>  spironolactone 50 milliGRAM(s) Oral daily  thiamine 100 milliGRAM(s) Oral daily    MEDICATIONS  (PRN):  ALBUTerol    90 MICROgram(s) HFA Inhaler 1 Puff(s) Inhalation every 4 hours PRN Shortness of Breath and/or Wheezing  cloNIDine 0.1 milliGRAM(s) Oral every 6 hours PRN opiate withdrawal  hydrOXYzine hydrochloride 50 milliGRAM(s) Oral every 6 hours PRN Anxiety  ibuprofen  Tablet. 600 milliGRAM(s) Oral every 6 hours PRN Temp greater or equal to 38C (100.4F)  ibuprofen  Tablet. 400 milliGRAM(s) Oral every 6 hours PRN Mild Pain (1 - 3)      Allergies:   buprenorphine (Rash)  Suboxone (Rash)            REVIEW OF SYSTEMS:  unobtainable       VITAL SIGNS:   T(F): 97.3 (06-24-22 @ 07:10), Max: 99.5 (06-23-22 @ 23:00)  HR: 90 (06-24-22 @ 10:24) (75 - 95)  BP: 102/62 (06-24-22 @ 10:24) (95/57 - 126/74)  RR: 50 (06-24-22 @ 10:24) (20 - 51)  SpO2: 100% (06-24-22 @ 10:24) (96% - 100%)    PHYSICAL EXAM:  GENERAL: intubated   HEAD:  Atraumatic, Normocephalic  EYES: conjunctiva and sclera clear  NECK: Supple, no JVD or thyromegaly  CHEST/LUNG: b/l rhonchi  HEART: Regular rate and rhythm; normal S1, S2, No murmurs.  ABDOMEN: Soft, nontender, +distended; Bowel sounds present  NEUROLOGY: No asterixis or tremor.   SKIN: Intact, no jaundice            LABS:                        7.5    2.16  )-----------( 119      ( 24 Jun 2022 06:00 )             25.3     06-24    143  |  110  |  21<H>  ----------------------------<  118<H>  3.9   |  25  |  0.8    Ca    7.7<L>      24 Jun 2022 06:00  Phos  3.0     06-23  Mg     1.9     06-23    TPro  4.7<L>  /  Alb  2.0<L>  /  TBili  0.2  /  DBili  x   /  AST  25  /  ALT  8   /  AlkPhos  649<H>  06-24    LIVER FUNCTIONS - ( 24 Jun 2022 06:00 )  Alb: 2.0 g/dL / Pro: 4.7 g/dL / ALK PHOS: 649 U/L / ALT: 8 U/L / AST: 25 U/L / GGT: x           PT/INR - ( 23 Jun 2022 05:37 )   PT: 11.10 sec;   INR: 0.96 ratio         PTT - ( 23 Jun 2022 05:37 )  PTT:30.0 sec    IMAGING:      < from: US Abdomen Complete (US Abdomen Complete .) (06.15.22 @ 11:29) >    ACC: [id number] EXAM:  US ABDOMEN COMPLETE                          PROCEDURE DATE:  06/15/2022          INTERPRETATION:  CLINICAL INFORMATION: Anasarca, IV drug abuse    COMPARISON: None available.    TECHNIQUE: Sonography of the abdomen.    FINDINGS:  Liver: Within normal limits.  Bile ducts: Normal caliber. Common bile duct measures 5 mm.  Gallbladder: Cholelithiasis.. Gallbladder wall thickening to 6 mm.   Negative sonographic Hammer's sign. No particles the fluid.  Pancreas: Visualized portions are within normal limits.  Spleen: 20.9 cm. Within normal limits.  Right kidney: 11.9 cm. No hydronephrosis.  Left kidney: 14.1 cm. No hydronephrosis.  Ascites: Moderate ascites.  Aorta and IVC: Visualized portions are within normal limits.    IMPRESSION:  Cholelithiasis. Gallbladder wall thickening. Negative sonographic   Hammer's sign. If there is high clinical suspicion for acute   cholecystitis recommend a HIDA scan.    Splenomegaly measuring 20.9 cm.    Moderate ascites.        --- End ofReport ---            SHELLI SHER MD; Attending Radiologist  This document has been electronically signed. Reinaldo 15 2022 12:14PM    < end of copied text >

## 2022-06-24 NOTE — PROGRESS NOTE ADULT - ASSESSMENT
26 year old female with hx of asthma, ?untreated Hep C, IVDA, anasarca was admitted to medical floor  with increased SOB and anasarca intubated for AHRF currently in ICU on pressors.     Problem 1-Ascites s/p diagnostic tap SAAG 1.2 and TP ,2.5 suggestive towards portal HTN from cirrhosis   no evidence of SBP   However has normal PLT count and no nodular liver in imaging   US showed moderate ascites   CT showed hepatomegaly and splenomegaly but no nodular liver     Recs:   -f/u hepatitis C RNA qualitative today  previously work up done for [facility]D was negative   has chronic elevated ALP  Trend LFT, INR   patient had therapeutic paracentesis   might benefit from liver biopsy later once stable   - Follow up with our [facility] located at [address] [place] [address]. Phone Number: [phone].     problem 2-Cholelithiasis   asymptomatic  Rec  -watchful waiting     Problem 3-anemia no gross GI bleeding  Rec  -Maintain Hemodynamic Stability   -Monitor CBC  -CMP,Optimize Electrolytes  -Transfuse prn to hgb >8  -Two large bore IV lines  -PPI BID  -Monitor Vital Signs  -Monitor Stool For blood, frequency, consistency, melena  -Active Type and Screen   26 year old female with hx of asthma, ?untreated Hep C, IVDA, anasarca was admitted to medical floor  with increased SOB and anasarca intubated for AHRF currently in ICU on pressors.     Problem 1-Ascites s/p diagnostic tap SAAG 1.2 and TP ,2.5 suggestive towards portal HTN from cirrhosis   no evidence of SBP   However has normal PLT count and no nodular liver in imaging   US showed moderate ascites   CT showed hepatomegaly and splenomegaly but no nodular liver     Recs:   -hepatitis C RNA qualitative negative  previously work up done for CLD was negative   has chronic elevated ALP  Trend LFT, INR   patient had therapeutic paracentesis   might benefit from liver biopsy later once stable   - Follow up with our [facility] located at [address] [place] [address]. Phone Number: [phone].     problem 2-Cholelithiasis   asymptomatic  Rec  -watchful waiting     Problem 3-anemia no gross GI bleeding  Rec  -Maintain Hemodynamic Stability   -Monitor CBC  -CMP,Optimize Electrolytes  -Transfuse prn to hgb >8  -Two large bore IV lines  -PPI BID  -Monitor Vital Signs  -Monitor Stool For blood, frequency, consistency, melena  -Active Type and Screen

## 2022-06-24 NOTE — PROGRESS NOTE ADULT - SUBJECTIVE AND OBJECTIVE BOX
Patient is a 26y old  Female who presents with a chief complaint of anasarca (23 Jun 2022 14:12)        Over Night Events:  Failed TOV      ROS:     All ROS are negative except HPI         PHYSICAL EXAM    ICU Vital Signs Last 24 Hrs  T(C): 36.3 (24 Jun 2022 07:10), Max: 37.6 (23 Jun 2022 11:01)  T(F): 97.3 (24 Jun 2022 07:10), Max: 99.7 (23 Jun 2022 11:01)  HR: 90 (24 Jun 2022 06:00) (75 - 103)  BP: 105/69 (24 Jun 2022 06:00) (95/57 - 131/93)  BP(mean): 82 (24 Jun 2022 06:00) (69 - 109)  ABP: --  ABP(mean): --  RR: 34 (24 Jun 2022 07:10) (20 - 60)  SpO2: 100% (24 Jun 2022 06:00) (96% - 100%)      CONSTITUTIONAL:  Well nourished.  NAD    ENT:   Airway patent,   Mouth with normal mucosa.   No thrush    EYES:   Pupils equal,   Round and reactive to light.    CARDIAC:   Normal rate,   Regular rhythm.    No edema      RESPIRATORY:   No wheezing  Bilateral BS  Normal chest expansion  Not tachypneic,  No use of accessory muscles    GASTROINTESTINAL:  Abdomen soft,   Non-tender,   No guarding,     MUSCULOSKELETAL:   Range of motion is not limited,  No clubbing, cyanosis    NEUROLOGICAL:   awake and alert        06-23-22 @ 07:01  -  06-24-22 @ 07:00  --------------------------------------------------------  IN:    Dexmedetomidine: 704 mL    Enteral Tube Flush: 200 mL    IV PiggyBack: 300 mL    IV PiggyBack: 200 mL    IV PiggyBack: 350 mL    Midazolam: 185 mL    Norepinephrine: 90.5 mL    Propofol: 450 mL    Vital High Protein: 900 mL  Total IN: 3379.5 mL    OUT:    Indwelling Catheter - Urethral (mL): 2375 mL    Voided (mL): 550 mL  Total OUT: 2925 mL    Total NET: 454.5 mL      06-24-22 @ 07:01 - 06-24-22 @ 07:57  --------------------------------------------------------  IN:  Total IN: 0 mL    OUT:    Indwelling Catheter - Urethral (mL): 500 mL  Total OUT: 500 mL    Total NET: -500 mL          LABS:                            7.5    2.16  )-----------( 119      ( 24 Jun 2022 06:00 )             25.3                                               06-24    143  |  110  |  21<H>  ----------------------------<  118<H>  3.9   |  25  |  0.8    Ca    7.7<L>      24 Jun 2022 06:00  Phos  3.0     06-23  Mg     1.9     06-23    TPro  4.7<L>  /  Alb  2.0<L>  /  TBili  0.2  /  DBili  x   /  AST  25  /  ALT  8   /  AlkPhos  649<H>  06-24      PT/INR - ( 23 Jun 2022 05:37 )   PT: 11.10 sec;   INR: 0.96 ratio         PTT - ( 23 Jun 2022 05:37 )  PTT:30.0 sec                                                                                     LIVER FUNCTIONS - ( 24 Jun 2022 06:00 )  Alb: 2.0 g/dL / Pro: 4.7 g/dL / ALK PHOS: 649 U/L / ALT: 8 U/L / AST: 25 U/L / GGT: x                                                  Culture - Body Fluid with Gram Stain (collected 22 Jun 2022 01:00)  Source: Peritoneal Peritoneal Fluid  Gram Stain (23 Jun 2022 02:31):    No polymorphonuclear leukocytes seen    No organisms seen    by cytocentrifuge  Preliminary Report (23 Jun 2022 18:40):    No growth                                                   Mode: AC/ CMV (Assist Control/ Continuous Mandatory Ventilation)  RR (machine): 22  TV (machine): 350  FiO2: 30  PEEP: 5  MAP: 12  PIP: 28                                      ABG - ( 23 Jun 2022 17:26 )  pH, Arterial: 7.40  pH, Blood: x     /  pCO2: 42    /  pO2: 114   / HCO3: 26    / Base Excess: 1.0   /  SaO2: 99.3                MEDICATIONS  (STANDING):  ampicillin/sulbactam  IVPB 3 Gram(s) IV Intermittent every 6 hours  chlorhexidine 0.12% Liquid 15 milliLiter(s) Oral Mucosa every 12 hours  dexMEDEtomidine Infusion 0.2 MICROgram(s)/kG/Hr (4.28 mL/Hr) IV Continuous <Continuous>  folic acid 1 milliGRAM(s) Oral daily  furosemide   Injectable 40 milliGRAM(s) IV Push two times a day  heparin   Injectable 5000 Unit(s) SubCutaneous every 8 hours  lactulose Syrup 20 Gram(s) Oral daily  levETIRAcetam  IVPB 250 milliGRAM(s) IV Intermittent every 12 hours  methadone    Tablet 30 milliGRAM(s) Oral daily  midazolam Infusion 0.02 mG/kG/Hr (1.71 mL/Hr) IV Continuous <Continuous>  nicotine -  14 mG/24Hr(s) Patch 1 patch Transdermal daily  norepinephrine Infusion 0.05 MICROgram(s)/kG/Min (8.03 mL/Hr) IV Continuous <Continuous>  pantoprazole   Suspension 40 milliGRAM(s) Enteral Tube daily  propofol Infusion 1 MICROgram(s)/kG/Min (0.51 mL/Hr) IV Continuous <Continuous>  spironolactone 50 milliGRAM(s) Oral daily  thiamine 100 milliGRAM(s) Oral daily    MEDICATIONS  (PRN):  ALBUTerol    90 MICROgram(s) HFA Inhaler 1 Puff(s) Inhalation every 4 hours PRN Shortness of Breath and/or Wheezing  cloNIDine 0.1 milliGRAM(s) Oral every 6 hours PRN opiate withdrawal  hydrOXYzine hydrochloride 50 milliGRAM(s) Oral every 6 hours PRN Anxiety  ibuprofen  Tablet. 600 milliGRAM(s) Oral every 6 hours PRN Temp greater or equal to 38C (100.4F)  ibuprofen  Tablet. 400 milliGRAM(s) Oral every 6 hours PRN Mild Pain (1 - 3)      New X-rays reviewed:                                                                                  ECHO    CXR interpreted by me:       Patient is a 26y old  Female who presents with a chief complaint of anasarca (23 Jun 2022 14:12)        Over Night Events:  Failed TOV  remains critically ill on MV  on propofol, fentanyl, precedex  Failed SBT this morning (tachypneic, thick secretions)      ROS:     All ROS are negative except HPI         PHYSICAL EXAM    ICU Vital Signs Last 24 Hrs  T(C): 36.3 (24 Jun 2022 07:10), Max: 37.6 (23 Jun 2022 11:01)  T(F): 97.3 (24 Jun 2022 07:10), Max: 99.7 (23 Jun 2022 11:01)  HR: 90 (24 Jun 2022 06:00) (75 - 103)  BP: 105/69 (24 Jun 2022 06:00) (95/57 - 131/93)  BP(mean): 82 (24 Jun 2022 06:00) (69 - 109)  ABP: --  ABP(mean): --  RR: 34 (24 Jun 2022 07:10) (20 - 60)  SpO2: 100% (24 Jun 2022 06:00) (96% - 100%)      CONSTITUTIONAL:  Well nourished.  NAD    ENT:   Airway patent,   Mouth with normal mucosa.   No thrush    EYES:   Pupils equal,   Round and reactive to light.    CARDIAC:   Normal rate,   Regular rhythm.    No edema      RESPIRATORY:   No wheezing  Bilateral BS  Normal chest expansion  Not tachypneic,  No use of accessory muscles    GASTROINTESTINAL:  Abdomen soft,   Non-tender,   No guarding,     MUSCULOSKELETAL:   Range of motion is not limited,  No clubbing, cyanosis    NEUROLOGICAL:   awake and alert        06-23-22 @ 07:01  -  06-24-22 @ 07:00  --------------------------------------------------------  IN:    Dexmedetomidine: 704 mL    Enteral Tube Flush: 200 mL    IV PiggyBack: 300 mL    IV PiggyBack: 200 mL    IV PiggyBack: 350 mL    Midazolam: 185 mL    Norepinephrine: 90.5 mL    Propofol: 450 mL    Vital High Protein: 900 mL  Total IN: 3379.5 mL    OUT:    Indwelling Catheter - Urethral (mL): 2375 mL    Voided (mL): 550 mL  Total OUT: 2925 mL    Total NET: 454.5 mL      06-24-22 @ 07:01  -  06-24-22 @ 07:57  --------------------------------------------------------  IN:  Total IN: 0 mL    OUT:    Indwelling Catheter - Urethral (mL): 500 mL  Total OUT: 500 mL    Total NET: -500 mL          LABS:                            7.5    2.16  )-----------( 119      ( 24 Jun 2022 06:00 )             25.3                                               06-24    143  |  110  |  21<H>  ----------------------------<  118<H>  3.9   |  25  |  0.8    Ca    7.7<L>      24 Jun 2022 06:00  Phos  3.0     06-23  Mg     1.9     06-23    TPro  4.7<L>  /  Alb  2.0<L>  /  TBili  0.2  /  DBili  x   /  AST  25  /  ALT  8   /  AlkPhos  649<H>  06-24      PT/INR - ( 23 Jun 2022 05:37 )   PT: 11.10 sec;   INR: 0.96 ratio         PTT - ( 23 Jun 2022 05:37 )  PTT:30.0 sec                                                                                     LIVER FUNCTIONS - ( 24 Jun 2022 06:00 )  Alb: 2.0 g/dL / Pro: 4.7 g/dL / ALK PHOS: 649 U/L / ALT: 8 U/L / AST: 25 U/L / GGT: x                                                  Culture - Body Fluid with Gram Stain (collected 22 Jun 2022 01:00)  Source: Peritoneal Peritoneal Fluid  Gram Stain (23 Jun 2022 02:31):    No polymorphonuclear leukocytes seen    No organisms seen    by cytocentrifuge  Preliminary Report (23 Jun 2022 18:40):    No growth                                                   Mode: AC/ CMV (Assist Control/ Continuous Mandatory Ventilation)  RR (machine): 22  TV (machine): 350  FiO2: 30  PEEP: 5  MAP: 12  PIP: 28                                      ABG - ( 23 Jun 2022 17:26 )  pH, Arterial: 7.40  pH, Blood: x     /  pCO2: 42    /  pO2: 114   / HCO3: 26    / Base Excess: 1.0   /  SaO2: 99.3                MEDICATIONS  (STANDING):  ampicillin/sulbactam  IVPB 3 Gram(s) IV Intermittent every 6 hours  chlorhexidine 0.12% Liquid 15 milliLiter(s) Oral Mucosa every 12 hours  dexMEDEtomidine Infusion 0.2 MICROgram(s)/kG/Hr (4.28 mL/Hr) IV Continuous <Continuous>  folic acid 1 milliGRAM(s) Oral daily  furosemide   Injectable 40 milliGRAM(s) IV Push two times a day  heparin   Injectable 5000 Unit(s) SubCutaneous every 8 hours  lactulose Syrup 20 Gram(s) Oral daily  levETIRAcetam  IVPB 250 milliGRAM(s) IV Intermittent every 12 hours  methadone    Tablet 30 milliGRAM(s) Oral daily  midazolam Infusion 0.02 mG/kG/Hr (1.71 mL/Hr) IV Continuous <Continuous>  nicotine -  14 mG/24Hr(s) Patch 1 patch Transdermal daily  norepinephrine Infusion 0.05 MICROgram(s)/kG/Min (8.03 mL/Hr) IV Continuous <Continuous>  pantoprazole   Suspension 40 milliGRAM(s) Enteral Tube daily  propofol Infusion 1 MICROgram(s)/kG/Min (0.51 mL/Hr) IV Continuous <Continuous>  spironolactone 50 milliGRAM(s) Oral daily  thiamine 100 milliGRAM(s) Oral daily    MEDICATIONS  (PRN):  ALBUTerol    90 MICROgram(s) HFA Inhaler 1 Puff(s) Inhalation every 4 hours PRN Shortness of Breath and/or Wheezing  cloNIDine 0.1 milliGRAM(s) Oral every 6 hours PRN opiate withdrawal  hydrOXYzine hydrochloride 50 milliGRAM(s) Oral every 6 hours PRN Anxiety  ibuprofen  Tablet. 600 milliGRAM(s) Oral every 6 hours PRN Temp greater or equal to 38C (100.4F)  ibuprofen  Tablet. 400 milliGRAM(s) Oral every 6 hours PRN Mild Pain (1 - 3)      New X-rays reviewed:                                                                                  ECHO    CXR interpreted by me:

## 2022-06-24 NOTE — PROGRESS NOTE ADULT - ASSESSMENT
IMPRESSION:  Acute respiratory failure sp intubation  PNA  seizure like activity  ?? drug over dose/ withdrawal opoid   liver cirrhosis   Ascites sp paracentesis     PLAN:    CNS:   SAT/SBT  AEDs per neuro  CW methadone 30mg daily    HEENT: oral care     PULMONARY:  HOB 45, SBT. Vent changes: none    CARDIOVASCULAR: goal MAP >65. Diuresis as tolerated    GI: GI prophylaxis.  OG Feeding.    RENAL: monitor lytes is and os     INFECTIOUS DISEASE: CW Unasyn D#8. FU DTA    HEMATOLOGICAL:   Heparin SQ     ENDOCRINE:  Follow up FS.  Insulin protocol if needed.   Check CRP, SSA, SSB, KRISHNA, ANCA, RF, CCP, ACE and lysozyme  Check aPL serologies Lupus Anticoagulant, Beta 2 glycoprotein IgA, IgM, IgG, Anti cardiolipin IgA, IgM, IgG      MICU  lines: RIJ since 6/16 IMPRESSION:  Acute respiratory failure sp intubation  PNA  seizure like activity  ?? drug over dose/ withdrawal opoid   liver cirrhosis   Ascites sp paracentesis   Anemia  thrombocytopenia    PLAN:    CNS:   SAT/SBT  AEDs per neuro  CW methadone 30mg daily    HEENT: oral care     PULMONARY:  HOB 45, SBT. Vent changes: none    CARDIOVASCULAR: goal MAP >65. Diuresis as tolerated    GI: GI prophylaxis.  OG Feeding.    RENAL: monitor lytes is and os     INFECTIOUS DISEASE: CW Unasyn D#8. FU DTA    HEMATOLOGICAL:   Heparin SQ. Check HIT. repeat CBC    ENDOCRINE:  Follow up FS.  Insulin protocol if needed.   Check CRP, SSA, SSB, KRISHNA, ANCA, RF, CCP, ACE and lysozyme  Check aPL serologies Lupus Anticoagulant, Beta 2 glycoprotein IgA, IgM, IgG, Anti cardiolipin IgA, IgM, IgG      MICU  lines: RIJ since 6/16 IMPRESSION:  Acute respiratory failure sp intubation  PNA  seizure like activity  ?? drug over dose/ withdrawal opoid   liver cirrhosis   Ascites sp paracentesis   Anemia  thrombocytopenia    PLAN:    CNS:   AEDs per neuro  CW methadone 30mg daily  Start Clonapin 1mg TID  addiction medicine eval    HEENT: oral care     PULMONARY:  HOB 45,  Vent changes: none    CARDIOVASCULAR: goal MAP >65. Diuresis as tolerated    GI: GI prophylaxis.  OG Feeding.    RENAL: monitor lytes is and os     INFECTIOUS DISEASE: CW Unasyn D#8. FU DTA    HEMATOLOGICAL:   Heparin SQ. Check HIT. Repeat CBC    ENDOCRINE:  Follow up FS.  Insulin protocol if needed.   Check CRP, SSA, SSB, KRISHNA, ANCA, RF, CCP, ACE and lysozyme  Check aPL serologies Lupus Anticoagulant, Beta 2 glycoprotein IgA, IgM, IgG, Anti cardiolipin IgA, IgM, IgG      MICU  lines: RIJ since 6/16

## 2022-06-24 NOTE — PROGRESS NOTE ADULT - ASSESSMENT
Tila Lockwoodjosé miguel [facility] Patient Status:  Inpatient    2/10/1988 MRN [id number]   Location [address] [facility] Attending Gini Morales MD   Hosp Day # 0 PCP None Pcp     Subjective:  Jasmine Bell 26 year old female with hx of asthma, untreated Hep C, IVDA, anasarca was admitted to medical floor  with increased SOB and anasarca  Pt admits to using opiates 2 grams per day IV into her thighs  x years with minimal sobriety. Pt has been on MMTP in 2020. Pt is contemplating going back on program. Pt denies any other substance abuse. Pt states used a xanax for wd about 3 days ago.      Hepatitis C with Cirrhosis no compliant with treatment  Hx of withdrawal   variable periods of sobriety in the past    on floor pt was diuresed with improvement of symptoms  when the following events took place  "RRT was called for pt in the [facility]    patient was found on the floor in ER, gasping for air, short of breath, tachypnea, bleeding profusely from her posterior scalp, was placed in a stretcher, was hypoxic 70s    emergently intubated, ct scan head ordered re scalp bleeding    several syringes, drug paraphanellia found on patient clothes     pt  intubated and  transfered to ICU       I strongly suspect that she injected illicit drug ( abuses IV heroin) and she went into resp failure secondary to heroin overdose."      Acute respiratory failure with hypoxemia / aspiration pneumonia with sepsis / suspected drug overdose / head laceration s/p fall in lobby / possible seizure / anasarca     - head laceration repaired by surgery - CT head negative for intracranial bleed   - SAT->SBT   - continue Keppra as per  neuro - neuro f/u   - complete antibiotics    - supplement hypokalemia and check mg level and maintain above 2   - DVT and GI prophylaxis               insulin  Previous c/s         Risks, benefits, alternatives and possible complications have been discussed in detail with the patient. Pre-admission, admission, and post admission procedures and expectations were discussed in detail.   All questions answer

## 2022-06-25 LAB
ALBUMIN SERPL ELPH-MCNC: 2.6 G/DL — LOW (ref 3.5–5.2)
ALP SERPL-CCNC: 710 U/L — HIGH (ref 30–115)
ALT FLD-CCNC: 11 U/L — SIGNIFICANT CHANGE UP (ref 0–41)
ANA TITR SER: NEGATIVE — SIGNIFICANT CHANGE UP
ANION GAP SERPL CALC-SCNC: 9 MMOL/L — SIGNIFICANT CHANGE UP (ref 7–14)
ANTI-RIBONUCLEAR PROTEIN: 0.2 AI — SIGNIFICANT CHANGE UP
ANTI-RIBONUCLEAR PROTEIN: 0.2 AI — SIGNIFICANT CHANGE UP
AST SERPL-CCNC: 28 U/L — SIGNIFICANT CHANGE UP (ref 0–41)
BILIRUB SERPL-MCNC: 0.3 MG/DL — SIGNIFICANT CHANGE UP (ref 0.2–1.2)
BUN SERPL-MCNC: 21 MG/DL — HIGH (ref 10–20)
CALCIUM SERPL-MCNC: 8.5 MG/DL — SIGNIFICANT CHANGE UP (ref 8.5–10.1)
CENTROMERE AB SER-ACNC: <0.2 AI — SIGNIFICANT CHANGE UP
CHLORIDE SERPL-SCNC: 111 MMOL/L — HIGH (ref 98–110)
CO2 SERPL-SCNC: 26 MMOL/L — SIGNIFICANT CHANGE UP (ref 17–32)
CREAT SERPL-MCNC: 0.9 MG/DL — SIGNIFICANT CHANGE UP (ref 0.7–1.5)
DSDNA AB FLD-ACNC: <0.2 AI — SIGNIFICANT CHANGE UP
EGFR: 89 ML/MIN/1.73M2 — SIGNIFICANT CHANGE UP
ENA JO1 AB SER-ACNC: <0.2 AI — SIGNIFICANT CHANGE UP
ENA SM AB FLD QL: <0.2 AI — SIGNIFICANT CHANGE UP
ENA SS-A AB FLD IA-ACNC: <0.2 AI — SIGNIFICANT CHANGE UP
GAS PNL BLDA: SIGNIFICANT CHANGE UP
GLUCOSE SERPL-MCNC: 111 MG/DL — HIGH (ref 70–99)
GRAM STN FLD: SIGNIFICANT CHANGE UP
HCT VFR BLD CALC: 28.2 % — LOW (ref 37–47)
HGB BLD-MCNC: 8.4 G/DL — LOW (ref 12–16)
MAGNESIUM SERPL-MCNC: 2.2 MG/DL — SIGNIFICANT CHANGE UP (ref 1.8–2.4)
MCHC RBC-ENTMCNC: 25.8 PG — LOW (ref 27–31)
MCHC RBC-ENTMCNC: 29.8 G/DL — LOW (ref 32–37)
MCV RBC AUTO: 86.8 FL — SIGNIFICANT CHANGE UP (ref 81–99)
NRBC # BLD: 0 /100 WBCS — SIGNIFICANT CHANGE UP (ref 0–0)
PHOSPHATE SERPL-MCNC: 3.8 MG/DL — SIGNIFICANT CHANGE UP (ref 2.1–4.9)
PLATELET # BLD AUTO: 141 K/UL — SIGNIFICANT CHANGE UP (ref 130–400)
POTASSIUM SERPL-MCNC: 3.9 MMOL/L — SIGNIFICANT CHANGE UP (ref 3.5–5)
POTASSIUM SERPL-SCNC: 3.9 MMOL/L — SIGNIFICANT CHANGE UP (ref 3.5–5)
PROT SERPL-MCNC: 5.2 G/DL — LOW (ref 6–8)
RBC # BLD: 3.25 M/UL — LOW (ref 4.2–5.4)
RBC # FLD: 15.7 % — HIGH (ref 11.5–14.5)
SODIUM SERPL-SCNC: 146 MMOL/L — SIGNIFICANT CHANGE UP (ref 135–146)
SPECIMEN SOURCE: SIGNIFICANT CHANGE UP
WBC # BLD: 2.05 K/UL — LOW (ref 4.8–10.8)
WBC # FLD AUTO: 2.05 K/UL — LOW (ref 4.8–10.8)

## 2022-06-25 PROCEDURE — 99291 CRITICAL CARE FIRST HOUR: CPT

## 2022-06-25 PROCEDURE — 76770 US EXAM ABDO BACK WALL COMP: CPT | Mod: 26

## 2022-06-25 PROCEDURE — 99232 SBSQ HOSP IP/OBS MODERATE 35: CPT

## 2022-06-25 RX ORDER — MIDAZOLAM HYDROCHLORIDE 1 MG/ML
0.02 INJECTION, SOLUTION INTRAMUSCULAR; INTRAVENOUS
Qty: 100 | Refills: 0 | Status: DISCONTINUED | OUTPATIENT
Start: 2022-06-25 | End: 2022-07-02

## 2022-06-25 RX ADMIN — DEXMEDETOMIDINE HYDROCHLORIDE IN 0.9% SODIUM CHLORIDE 4.28 MICROGRAM(S)/KG/HR: 4 INJECTION INTRAVENOUS at 21:07

## 2022-06-25 RX ADMIN — DEXMEDETOMIDINE HYDROCHLORIDE IN 0.9% SODIUM CHLORIDE 4.28 MICROGRAM(S)/KG/HR: 4 INJECTION INTRAVENOUS at 05:15

## 2022-06-25 RX ADMIN — PROPOFOL 0.51 MICROGRAM(S)/KG/MIN: 10 INJECTION, EMULSION INTRAVENOUS at 01:47

## 2022-06-25 RX ADMIN — Medication 100 MILLIGRAM(S): at 12:11

## 2022-06-25 RX ADMIN — MIDAZOLAM HYDROCHLORIDE 1.71 MG/KG/HR: 1 INJECTION, SOLUTION INTRAMUSCULAR; INTRAVENOUS at 04:14

## 2022-06-25 RX ADMIN — Medication 1 PATCH: at 19:40

## 2022-06-25 RX ADMIN — HEPARIN SODIUM 5000 UNIT(S): 5000 INJECTION INTRAVENOUS; SUBCUTANEOUS at 13:31

## 2022-06-25 RX ADMIN — AMPICILLIN SODIUM AND SULBACTAM SODIUM 200 GRAM(S): 250; 125 INJECTION, POWDER, FOR SUSPENSION INTRAMUSCULAR; INTRAVENOUS at 06:16

## 2022-06-25 RX ADMIN — PANTOPRAZOLE SODIUM 40 MILLIGRAM(S): 20 TABLET, DELAYED RELEASE ORAL at 12:13

## 2022-06-25 RX ADMIN — AMPICILLIN SODIUM AND SULBACTAM SODIUM 200 GRAM(S): 250; 125 INJECTION, POWDER, FOR SUSPENSION INTRAMUSCULAR; INTRAVENOUS at 23:21

## 2022-06-25 RX ADMIN — Medication 40 MILLIGRAM(S): at 13:39

## 2022-06-25 RX ADMIN — Medication 40 MILLIGRAM(S): at 06:14

## 2022-06-25 RX ADMIN — PROPOFOL 0.51 MICROGRAM(S)/KG/MIN: 10 INJECTION, EMULSION INTRAVENOUS at 19:22

## 2022-06-25 RX ADMIN — AMPICILLIN SODIUM AND SULBACTAM SODIUM 200 GRAM(S): 250; 125 INJECTION, POWDER, FOR SUSPENSION INTRAMUSCULAR; INTRAVENOUS at 00:09

## 2022-06-25 RX ADMIN — Medication 1 MILLIGRAM(S): at 21:31

## 2022-06-25 RX ADMIN — LEVETIRACETAM 400 MILLIGRAM(S): 250 TABLET, FILM COATED ORAL at 06:15

## 2022-06-25 RX ADMIN — SPIRONOLACTONE 50 MILLIGRAM(S): 25 TABLET, FILM COATED ORAL at 06:16

## 2022-06-25 RX ADMIN — Medication 1 MILLIGRAM(S): at 12:12

## 2022-06-25 RX ADMIN — DEXMEDETOMIDINE HYDROCHLORIDE IN 0.9% SODIUM CHLORIDE 4.28 MICROGRAM(S)/KG/HR: 4 INJECTION INTRAVENOUS at 01:47

## 2022-06-25 RX ADMIN — Medication 600 MILLIGRAM(S): at 19:37

## 2022-06-25 RX ADMIN — HEPARIN SODIUM 5000 UNIT(S): 5000 INJECTION INTRAVENOUS; SUBCUTANEOUS at 06:16

## 2022-06-25 RX ADMIN — Medication 1 MILLIGRAM(S): at 13:31

## 2022-06-25 RX ADMIN — HEPARIN SODIUM 5000 UNIT(S): 5000 INJECTION INTRAVENOUS; SUBCUTANEOUS at 21:30

## 2022-06-25 RX ADMIN — AMPICILLIN SODIUM AND SULBACTAM SODIUM 200 GRAM(S): 250; 125 INJECTION, POWDER, FOR SUSPENSION INTRAMUSCULAR; INTRAVENOUS at 12:57

## 2022-06-25 RX ADMIN — Medication 1 MILLIGRAM(S): at 07:41

## 2022-06-25 RX ADMIN — AMPICILLIN SODIUM AND SULBACTAM SODIUM 200 GRAM(S): 250; 125 INJECTION, POWDER, FOR SUSPENSION INTRAMUSCULAR; INTRAVENOUS at 17:24

## 2022-06-25 RX ADMIN — Medication 600 MILLIGRAM(S): at 16:21

## 2022-06-25 RX ADMIN — PROPOFOL 0.51 MICROGRAM(S)/KG/MIN: 10 INJECTION, EMULSION INTRAVENOUS at 06:16

## 2022-06-25 RX ADMIN — LACTULOSE 20 GRAM(S): 10 SOLUTION ORAL at 12:22

## 2022-06-25 RX ADMIN — METHADONE HYDROCHLORIDE 30 MILLIGRAM(S): 40 TABLET ORAL at 12:11

## 2022-06-25 RX ADMIN — CHLORHEXIDINE GLUCONATE 15 MILLILITER(S): 213 SOLUTION TOPICAL at 17:24

## 2022-06-25 RX ADMIN — Medication 1 PATCH: at 12:47

## 2022-06-25 RX ADMIN — CHLORHEXIDINE GLUCONATE 15 MILLILITER(S): 213 SOLUTION TOPICAL at 06:15

## 2022-06-25 RX ADMIN — LEVETIRACETAM 400 MILLIGRAM(S): 250 TABLET, FILM COATED ORAL at 16:06

## 2022-06-25 NOTE — PROGRESS NOTE ADULT - ASSESSMENT
26 year old female with hx of asthma, untreated Hep C, IVDA, anasarca   hx of use of opiates 2 grams per day IV into her thighs  x years with minimal sobriety. Pt has been on MMTP in 2020.   Hepatitis C with Cirrhosis no compliant with treatment      s/p episode of AMS with reported drug paraphanellia found on patient clothes, suspected heroin overdose    pt  intubated and  transfered to ICU    Acute respiratory failure with hypoxemia / aspiration pneumonia with sepsis  vent dependant  discussed possibility of trach if fails to wean or if prolonged intubation  family currently not amenable but discussed risks of prolonged ET tube and difficulty with weaning     anasarca   INR <1, appears to have synthetic function    heroin abuse  will need to abstain    Hypokalemia  - replete and recheck in AM    anemia   - s/p 1u PRBC  - recheck CBC in AM  - abd distended, would r/o retroperitoneal hematoma

## 2022-06-25 NOTE — PROGRESS NOTE ADULT - SUBJECTIVE AND OBJECTIVE BOX
Patient is a 26y old  Female who presents with a chief complaint of anasarca (24 Jun 2022 11:22)      Over Night Events:  Patient seen and examined.   on vent on propofol , precedex , fentnayl and levo    ROS:  See HPI    PHYSICAL EXAM    ICU Vital Signs Last 24 Hrs  T(C): 37.6 (25 Jun 2022 04:00), Max: 38 (24 Jun 2022 21:00)  T(F): 99.7 (25 Jun 2022 04:00), Max: 100.4 (24 Jun 2022 21:00)  HR: 81 (25 Jun 2022 04:00) (77 - 103)  BP: 111/75 (25 Jun 2022 04:00) (87/52 - 121/75)  BP(mean): 89 (25 Jun 2022 04:00) (64 - 94)  ABP: --  ABP(mean): --  RR: 34 (25 Jun 2022 04:00) (22 - 57)  SpO2: 100% (25 Jun 2022 04:00) (94% - 100%)      General: on sedation   HEENT:         et tube        Lymph Nodes: NO cervical LN   Lungs: Bilateral BS  Cardiovascular: Regular   Abdomen: Soft, Positive BS  Extremities: No clubbing   Skin: warm   Neurological: positive gag   Musculoskeletal: move all ext     I&O's Detail    23 Jun 2022 07:01  -  24 Jun 2022 07:00  --------------------------------------------------------  IN:    Dexmedetomidine: 704 mL    Enteral Tube Flush: 200 mL    IV PiggyBack: 350 mL    IV PiggyBack: 300 mL    IV PiggyBack: 200 mL    Midazolam: 185 mL    Norepinephrine: 90.5 mL    Propofol: 450 mL    Vital High Protein: 900 mL  Total IN: 3379.5 mL    OUT:    Indwelling Catheter - Urethral (mL): 2375 mL    Voided (mL): 550 mL  Total OUT: 2925 mL    Total NET: 454.5 mL      24 Jun 2022 07:01  -  25 Jun 2022 06:01  --------------------------------------------------------  IN:    Dexmedetomidine: 736 mL    IV PiggyBack: 300 mL    IV PiggyBack: 100 mL    IV PiggyBack: 100 mL    Midazolam: 177.5 mL    Midazolam: 30 mL    Norepinephrine: 78 mL    Propofol: 498 mL    Vital High Protein: 945 mL  Total IN: 2964.5 mL    OUT:    Indwelling Catheter - Urethral (mL): 2735 mL  Total OUT: 2735 mL    Total NET: 229.5 mL          LABS:                          6.8    1.62  )-----------( 91       ( 24 Jun 2022 19:33 )             23.0         24 Jun 2022 17:20    149    |  113    |  20     ----------------------------<  99     3.4     |  26     |  0.9      Ca    8.3        24 Jun 2022 17:20  Mg     1.9       24 Jun 2022 17:20                                                                                                                                                                                               Mode: AC/ CMV (Assist Control/ Continuous Mandatory Ventilation)  RR (machine): 22  TV (machine): 350  FiO2: 30  PEEP: 5  ITime: 1  MAP: 13  PIP: 27                                      ABG - ( 25 Jun 2022 04:30 )  pH, Arterial: 7.41  pH, Blood: x     /  pCO2: 43    /  pO2: 133   / HCO3: 27    / Base Excess: 2.3   /  SaO2: 99.2                MEDICATIONS  (STANDING):  ampicillin/sulbactam  IVPB 3 Gram(s) IV Intermittent every 6 hours  chlorhexidine 0.12% Liquid 15 milliLiter(s) Oral Mucosa every 12 hours  clonazePAM  Tablet 1 milliGRAM(s) Oral three times a day  dexMEDEtomidine Infusion 0.2 MICROgram(s)/kG/Hr (4.28 mL/Hr) IV Continuous <Continuous>  folic acid 1 milliGRAM(s) Oral daily  furosemide   Injectable 40 milliGRAM(s) IV Push two times a day  heparin   Injectable 5000 Unit(s) SubCutaneous every 8 hours  lactulose Syrup 20 Gram(s) Oral daily  levETIRAcetam  IVPB 250 milliGRAM(s) IV Intermittent every 12 hours  methadone    Tablet 30 milliGRAM(s) Oral daily  midazolam Infusion 0.02 mG/kG/Hr (1.71 mL/Hr) IV Continuous <Continuous>  nicotine -  14 mG/24Hr(s) Patch 1 patch Transdermal daily  norepinephrine Infusion 0.05 MICROgram(s)/kG/Min (8.03 mL/Hr) IV Continuous <Continuous>  pantoprazole   Suspension 40 milliGRAM(s) Enteral Tube daily  propofol Infusion 1 MICROgram(s)/kG/Min (0.51 mL/Hr) IV Continuous <Continuous>  spironolactone 50 milliGRAM(s) Oral daily  thiamine 100 milliGRAM(s) Oral daily    MEDICATIONS  (PRN):  ALBUTerol    90 MICROgram(s) HFA Inhaler 1 Puff(s) Inhalation every 4 hours PRN Shortness of Breath and/or Wheezing  cloNIDine 0.1 milliGRAM(s) Oral every 6 hours PRN opiate withdrawal  hydrOXYzine hydrochloride 50 milliGRAM(s) Oral every 6 hours PRN Anxiety  ibuprofen  Tablet. 600 milliGRAM(s) Oral every 6 hours PRN Temp greater or equal to 38C (100.4F)  ibuprofen  Tablet. 400 milliGRAM(s) Oral every 6 hours PRN Mild Pain (1 - 3)          Xrays:  TLC:  OG:  ET tube:                                                                                    no infiltrate    ECHO:  [facility] ICU:

## 2022-06-25 NOTE — PROGRESS NOTE ADULT - ASSESSMENT
IMPRESSION:  Acute respiratory failure sp intubation  PNA  seizure like activity  ?? drug over dose/ withdrawal opoid   liver cirrhosis   Ascites sp paracentesis   Anemia  thrombocytopenia    PLAN:    CNS:   AEDs per neuro  CW methadone 30mg daily check QTC   Start Clonapin 1mg TID  addiction medicine eval  taper fentnayl down   HEENT: oral care     PULMONARY:  HOB 45,  Vent changes: none    CARDIOVASCULAR: goal MAP >65. Diuresis as tolerated    GI: GI prophylaxis.  OG Feeding.    RENAL: monitor lytes is and os     INFECTIOUS DISEASE: CW Unasyn D#8. FU DTA    HEMATOLOGICAL:   Heparin SQ. Check HIT.  follow h/h being transfuse check LDH , hold heparin for today   if not respond adequately with transfuse do ct abd r/o retro bleed   stool guiag lavage     ENDOCRINE:  Follow up FS.  Insulin protocol if needed.   Check CRP, SSA, SSB, KRISHNA, ANCA, RF, CCP, ACE and lysozyme  Check aPL serologies Lupus Anticoagulant, Beta 2 glycoprotein IgA, IgM, IgG, Anti cardiolipin IgA, IgM, IgG      MICU  lines: RIJ since 6/16

## 2022-06-25 NOTE — CHART NOTE - NSCHARTNOTEFT_GEN_A_CORE
Registered Dietitian Follow-Up     Patient Profile Reviewed                           Yes [X   No []     Nutrition History Previously Obtained        Yes [X]  No []       Pertinent  Information: pt is 26 year old female with hx of asthma, Hep C, active IVDA, anasarca presents with dyspnea admitted with fluid overload and initially admitted to med surg unit. s/p RRT 6/16 pt was in the lobby s/p seizure and fall 2/2 overdose s/p intubation and upgraded to ICU. + laceration to scalp noted. pt presently on propofol at 24 ml/hr which provides 634  kcals. As per GI moderate ascites, s/p paracentesis 1L removed on 6/22., + portal HTN 2/2 cirrhosis. pt tolerating EN at goal rate         Diet order:Diet, NPO with Tube Feed:   Tube Feeding Modality: Orogastric  Vital High Protein  Total Volume for 24 Hours (mL): 1080  Continuous  Starting Tube Feed Rate {mL per Hour}: 20  Increase Tube Feed Rate by (mL): 5     Every 4 hours  Until Goal Tube Feed Rate (mL per Hour): 45  Tube Feed Duration (in Hours): 24  Tube Feed Start Time: 20:00  No Carb Prosource TF     Qty per Day:  1 (06-18-22 @ 19:34) [Active]    Anthropometrics:  - Ht. 167.6 cm   - Wt. 76.6kg today vs 79.3 upon admission         Pertinent Lab Data:  06-25    146  |  111<H>  |  21<H>  ----------------------------<  111<H>  3.9   |  26  |  0.9    Ca    8.5      25 Jun 2022 11:20  Phos  3.8     06-25  Mg     2.2     06-25    TPro  5.2<L>  /  Alb  2.6<L>  /  TBili  0.3  /  DBili  x   /  AST  28  /  ALT  11  /  AlkPhos  710<H>  06-25                          8.4    2.05  )-----------( 141      ( 25 Jun 2022 11:20 )             28.2        Pertinent Meds:  MEDICATIONS  (STANDING):  ampicillin/sulbactam  IVPB 3 Gram(s) IV Intermittent every 6 hours  chlorhexidine 0.12% Liquid 15 milliLiter(s) Oral Mucosa every 12 hours  clonazePAM  Tablet 1 milliGRAM(s) Oral three times a day  dexMEDEtomidine Infusion 0.2 MICROgram(s)/kG/Hr (4.28 mL/Hr) IV Continuous <Continuous>  folic acid 1 milliGRAM(s) Oral daily  furosemide   Injectable 40 milliGRAM(s) IV Push two times a day  heparin   Injectable 5000 Unit(s) SubCutaneous every 8 hours  lactulose Syrup 20 Gram(s) Oral daily  levETIRAcetam  IVPB 250 milliGRAM(s) IV Intermittent every 12 hours  methadone    Tablet 30 milliGRAM(s) Oral daily  midazolam Infusion 0.02 mG/kG/Hr (1.71 mL/Hr) IV Continuous <Continuous>  nicotine -  14 mG/24Hr(s) Patch 1 patch Transdermal daily  norepinephrine Infusion 0.05 MICROgram(s)/kG/Min (8.03 mL/Hr) IV Continuous <Continuous>  pantoprazole   Suspension 40 milliGRAM(s) Enteral Tube daily  propofol Infusion 1 MICROgram(s)/kG/Min (0.51 mL/Hr) IV Continuous <Continuous>  spironolactone 50 milliGRAM(s) Oral daily  thiamine 100 milliGRAM(s) Oral daily    MEDICATIONS  (PRN):  ALBUTerol    90 MICROgram(s) HFA Inhaler 1 Puff(s) Inhalation every 4 hours PRN Shortness of Breath and/or Wheezing  cloNIDine 0.1 milliGRAM(s) Oral every 6 hours PRN opiate withdrawal  hydrOXYzine hydrochloride 50 milliGRAM(s) Oral every 6 hours PRN Anxiety  ibuprofen  Tablet. 600 milliGRAM(s) Oral every 6 hours PRN Temp greater or equal to 38C (100.4F)  ibuprofen  Tablet. 400 milliGRAM(s) Oral every 6 hours PRN Mild Pain (1 - 3)    Physical Findings:  - Appearance: intubated to vent   - GI function: +BS, smear noted today with BM noted 6/21  - Tubes: OGT   - Oral/Mouth cavity:  - Skin: intact      Nutrition Requirements  Weight Used: 76.6 kgs     Estimated Energy Needs:  1874 kcals ANNE MARIE state vs 1204-0232 kcals (25-30 kcal/kg/BW)  92-107g protein (1.2-1.4g/kg/BW)         Nutrient Intake:  present EN regimen provides: 1140+634 kcal (propofol infusion), 93+15g protein and total volume 1080 ml (22 kcal/kg/BW, 1.4g/kg/BW)          [] Previous Nutrition Diagnosis:            [] Ongoing          [X] Resolved       Nutrition Intervention: maintain on present EN regimen, consider more aggressive bowel regimen      Goal/Expected Outcome: pt to continue to tolerate EN and meet >80% of estimated nutrient needs     Indicator/Monitoring: RD to monitor tolerance to EN, labs/meds (propofol infusion rate) , NFPF and f/u as needed within 2-4 days

## 2022-06-25 NOTE — PROGRESS NOTE ADULT - SUBJECTIVE AND OBJECTIVE BOX
Pt seen and exmained. Mother and "best friend" at bedside    T(F): , Max: 100.4 (06-24-22 @ 21:00)  HR: 81 (06-25-22 @ 08:15) (77 - 103)  BP: 109/76 (06-25-22 @ 09:00)  RR: 34 (06-25-22 @ 06:00)  SpO2: 100% (06-25-22 @ 08:15)  IN: 3825.5 mL / OUT: 2815 mL / NET: 1010.5 mL    IN: 333 mL / OUT: 1000 mL / NET: -667 mL      General: No apparent distres, opens eyes despite sedation and nods head to simple questions  Cardiovascular: S1, S2  Gastrointestinal: distended, non-tender, tense  Respiratory: Good air entry bilaterally  Musculoskeletal: Moves all extremities  Lymphatic: diffuse pitting edema  Neurologic: opens eyes, shakes head, appears to understand conversation  Dermatologic: Skin dry                          6.8    1.62  )-----------( 91       ( 24 Jun 2022 19:33 )             23.0     06-24    149<H>  |  113<H>  |  20  ----------------------------<  99  3.4<L>   |  26  |  0.9    Ca    8.3<L>      24 Jun 2022 17:20  Mg     1.9     06-24    TPro  4.7<L>  /  Alb  2.0<L>  /  TBili  0.2  /  DBili  x   /  AST  25  /  ALT  8   /  AlkPhos  649<H>  06-24      Culture - Sputum (collected 24 Jun 2022 17:20)  Source: Trach Asp Tracheal Aspirate  Gram Stain (25 Jun 2022 06:29):    Few polymorphonuclear leukocytes per low power field    Rare Squamous epithelial cells per low power field    Moderate Gram Negative Rods seen per oil power field

## 2022-06-26 LAB
-  AMIKACIN: SIGNIFICANT CHANGE UP
-  AMIKACIN: SIGNIFICANT CHANGE UP
-  AMOXICILLIN/CLAVULANIC ACID: SIGNIFICANT CHANGE UP
-  AMOXICILLIN/CLAVULANIC ACID: SIGNIFICANT CHANGE UP
-  AMPICILLIN/SULBACTAM: SIGNIFICANT CHANGE UP
-  AMPICILLIN/SULBACTAM: SIGNIFICANT CHANGE UP
-  AMPICILLIN: SIGNIFICANT CHANGE UP
-  AMPICILLIN: SIGNIFICANT CHANGE UP
-  AZTREONAM: SIGNIFICANT CHANGE UP
-  AZTREONAM: SIGNIFICANT CHANGE UP
-  CEFAZOLIN: SIGNIFICANT CHANGE UP
-  CEFAZOLIN: SIGNIFICANT CHANGE UP
-  CEFEPIME: SIGNIFICANT CHANGE UP
-  CEFEPIME: SIGNIFICANT CHANGE UP
-  CEFOXITIN: SIGNIFICANT CHANGE UP
-  CEFOXITIN: SIGNIFICANT CHANGE UP
-  CEFTRIAXONE: SIGNIFICANT CHANGE UP
-  CEFTRIAXONE: SIGNIFICANT CHANGE UP
-  CIPROFLOXACIN: SIGNIFICANT CHANGE UP
-  CIPROFLOXACIN: SIGNIFICANT CHANGE UP
-  ERTAPENEM: SIGNIFICANT CHANGE UP
-  ERTAPENEM: SIGNIFICANT CHANGE UP
-  GENTAMICIN: SIGNIFICANT CHANGE UP
-  GENTAMICIN: SIGNIFICANT CHANGE UP
-  IMIPENEM: SIGNIFICANT CHANGE UP
-  IMIPENEM: SIGNIFICANT CHANGE UP
-  LEVOFLOXACIN: SIGNIFICANT CHANGE UP
-  LEVOFLOXACIN: SIGNIFICANT CHANGE UP
-  MEROPENEM: SIGNIFICANT CHANGE UP
-  MEROPENEM: SIGNIFICANT CHANGE UP
-  PIPERACILLIN/TAZOBACTAM: SIGNIFICANT CHANGE UP
-  PIPERACILLIN/TAZOBACTAM: SIGNIFICANT CHANGE UP
-  TOBRAMYCIN: SIGNIFICANT CHANGE UP
-  TOBRAMYCIN: SIGNIFICANT CHANGE UP
-  TRIMETHOPRIM/SULFAMETHOXAZOLE: SIGNIFICANT CHANGE UP
-  TRIMETHOPRIM/SULFAMETHOXAZOLE: SIGNIFICANT CHANGE UP
ALBUMIN SERPL ELPH-MCNC: 2.2 G/DL — LOW (ref 3.5–5.2)
ALP SERPL-CCNC: 611 U/L — HIGH (ref 30–115)
ALT FLD-CCNC: 9 U/L — SIGNIFICANT CHANGE UP (ref 0–41)
ANION GAP SERPL CALC-SCNC: 8 MMOL/L — SIGNIFICANT CHANGE UP (ref 7–14)
AST SERPL-CCNC: 24 U/L — SIGNIFICANT CHANGE UP (ref 0–41)
BASOPHILS # BLD AUTO: 0 K/UL — SIGNIFICANT CHANGE UP (ref 0–0.2)
BASOPHILS NFR BLD AUTO: 0 % — SIGNIFICANT CHANGE UP (ref 0–1)
BILIRUB SERPL-MCNC: 0.3 MG/DL — SIGNIFICANT CHANGE UP (ref 0.2–1.2)
BUN SERPL-MCNC: 21 MG/DL — HIGH (ref 10–20)
CALCIUM SERPL-MCNC: 8 MG/DL — LOW (ref 8.5–10.1)
CHLORIDE SERPL-SCNC: 108 MMOL/L — SIGNIFICANT CHANGE UP (ref 98–110)
CO2 SERPL-SCNC: 27 MMOL/L — SIGNIFICANT CHANGE UP (ref 17–32)
CREAT SERPL-MCNC: 0.7 MG/DL — SIGNIFICANT CHANGE UP (ref 0.7–1.5)
CULTURE RESULTS: SIGNIFICANT CHANGE UP
CULTURE RESULTS: SIGNIFICANT CHANGE UP
DRUG SCREEN, SERUM: ABNORMAL
EGFR: 121 ML/MIN/1.73M2 — SIGNIFICANT CHANGE UP
EOSINOPHIL # BLD AUTO: 0 K/UL — SIGNIFICANT CHANGE UP (ref 0–0.7)
EOSINOPHIL NFR BLD AUTO: 0 % — SIGNIFICANT CHANGE UP (ref 0–8)
GAS PNL BLDA: SIGNIFICANT CHANGE UP
GLUCOSE SERPL-MCNC: 98 MG/DL — SIGNIFICANT CHANGE UP (ref 70–99)
HCT VFR BLD CALC: 24.3 % — LOW (ref 37–47)
HGB BLD-MCNC: 7.3 G/DL — LOW (ref 12–16)
IMM GRANULOCYTES NFR BLD AUTO: 0 % — LOW (ref 0.1–0.3)
LYMPHOCYTES # BLD AUTO: 0.51 K/UL — LOW (ref 1.2–3.4)
LYMPHOCYTES # BLD AUTO: 35.2 % — SIGNIFICANT CHANGE UP (ref 20.5–51.1)
MAGNESIUM SERPL-MCNC: 2.1 MG/DL — SIGNIFICANT CHANGE UP (ref 1.8–2.4)
MCHC RBC-ENTMCNC: 26.4 PG — LOW (ref 27–31)
MCHC RBC-ENTMCNC: 30 G/DL — LOW (ref 32–37)
MCV RBC AUTO: 87.7 FL — SIGNIFICANT CHANGE UP (ref 81–99)
METHOD TYPE: SIGNIFICANT CHANGE UP
METHOD TYPE: SIGNIFICANT CHANGE UP
MONOCYTES # BLD AUTO: 0.09 K/UL — LOW (ref 0.1–0.6)
MONOCYTES NFR BLD AUTO: 6.2 % — SIGNIFICANT CHANGE UP (ref 1.7–9.3)
NEUTROPHILS # BLD AUTO: 0.85 K/UL — LOW (ref 1.4–6.5)
NEUTROPHILS NFR BLD AUTO: 58.6 % — SIGNIFICANT CHANGE UP (ref 42.2–75.2)
NRBC # BLD: 0 /100 WBCS — SIGNIFICANT CHANGE UP (ref 0–0)
ORGANISM # SPEC MICROSCOPIC CNT: SIGNIFICANT CHANGE UP
PHOSPHATE SERPL-MCNC: 3.7 MG/DL — SIGNIFICANT CHANGE UP (ref 2.1–4.9)
PLATELET # BLD AUTO: 96 K/UL — LOW (ref 130–400)
POTASSIUM SERPL-MCNC: 3.7 MMOL/L — SIGNIFICANT CHANGE UP (ref 3.5–5)
POTASSIUM SERPL-SCNC: 3.7 MMOL/L — SIGNIFICANT CHANGE UP (ref 3.5–5)
PROT SERPL-MCNC: 4.7 G/DL — LOW (ref 6–8)
RBC # BLD: 2.77 M/UL — LOW (ref 4.2–5.4)
RBC # FLD: 15.8 % — HIGH (ref 11.5–14.5)
SODIUM SERPL-SCNC: 143 MMOL/L — SIGNIFICANT CHANGE UP (ref 135–146)
SPECIMEN SOURCE: SIGNIFICANT CHANGE UP
SPECIMEN SOURCE: SIGNIFICANT CHANGE UP
WBC # BLD: 1.45 K/UL — LOW (ref 4.8–10.8)
WBC # FLD AUTO: 1.45 K/UL — LOW (ref 4.8–10.8)

## 2022-06-26 PROCEDURE — 74177 CT ABD & PELVIS W/CONTRAST: CPT | Mod: 26

## 2022-06-26 PROCEDURE — 71045 X-RAY EXAM CHEST 1 VIEW: CPT | Mod: 26

## 2022-06-26 PROCEDURE — 99232 SBSQ HOSP IP/OBS MODERATE 35: CPT

## 2022-06-26 PROCEDURE — 99291 CRITICAL CARE FIRST HOUR: CPT

## 2022-06-26 RX ORDER — CHLORHEXIDINE GLUCONATE 213 G/1000ML
1 SOLUTION TOPICAL DAILY
Refills: 0 | Status: DISCONTINUED | OUTPATIENT
Start: 2022-06-26 | End: 2022-07-13

## 2022-06-26 RX ADMIN — Medication 600 MILLIGRAM(S): at 21:30

## 2022-06-26 RX ADMIN — Medication 600 MILLIGRAM(S): at 20:33

## 2022-06-26 RX ADMIN — MIDAZOLAM HYDROCHLORIDE 1.71 MG/KG/HR: 1 INJECTION, SOLUTION INTRAMUSCULAR; INTRAVENOUS at 00:18

## 2022-06-26 RX ADMIN — LEVETIRACETAM 400 MILLIGRAM(S): 250 TABLET, FILM COATED ORAL at 05:31

## 2022-06-26 RX ADMIN — Medication 40 MILLIGRAM(S): at 05:31

## 2022-06-26 RX ADMIN — DEXMEDETOMIDINE HYDROCHLORIDE IN 0.9% SODIUM CHLORIDE 4.28 MICROGRAM(S)/KG/HR: 4 INJECTION INTRAVENOUS at 08:02

## 2022-06-26 RX ADMIN — HEPARIN SODIUM 5000 UNIT(S): 5000 INJECTION INTRAVENOUS; SUBCUTANEOUS at 05:31

## 2022-06-26 RX ADMIN — PROPOFOL 0.51 MICROGRAM(S)/KG/MIN: 10 INJECTION, EMULSION INTRAVENOUS at 11:55

## 2022-06-26 RX ADMIN — SPIRONOLACTONE 50 MILLIGRAM(S): 25 TABLET, FILM COATED ORAL at 05:30

## 2022-06-26 RX ADMIN — CHLORHEXIDINE GLUCONATE 15 MILLILITER(S): 213 SOLUTION TOPICAL at 05:30

## 2022-06-26 RX ADMIN — Medication 1 PATCH: at 18:19

## 2022-06-26 RX ADMIN — DEXMEDETOMIDINE HYDROCHLORIDE IN 0.9% SODIUM CHLORIDE 4.28 MICROGRAM(S)/KG/HR: 4 INJECTION INTRAVENOUS at 10:26

## 2022-06-26 RX ADMIN — PROPOFOL 0.51 MICROGRAM(S)/KG/MIN: 10 INJECTION, EMULSION INTRAVENOUS at 19:49

## 2022-06-26 RX ADMIN — Medication 1 PATCH: at 07:37

## 2022-06-26 RX ADMIN — AMPICILLIN SODIUM AND SULBACTAM SODIUM 200 GRAM(S): 250; 125 INJECTION, POWDER, FOR SUSPENSION INTRAMUSCULAR; INTRAVENOUS at 12:46

## 2022-06-26 RX ADMIN — PANTOPRAZOLE SODIUM 40 MILLIGRAM(S): 20 TABLET, DELAYED RELEASE ORAL at 12:43

## 2022-06-26 RX ADMIN — METHADONE HYDROCHLORIDE 30 MILLIGRAM(S): 40 TABLET ORAL at 12:45

## 2022-06-26 RX ADMIN — AMPICILLIN SODIUM AND SULBACTAM SODIUM 200 GRAM(S): 250; 125 INJECTION, POWDER, FOR SUSPENSION INTRAMUSCULAR; INTRAVENOUS at 18:09

## 2022-06-26 RX ADMIN — Medication 1 PATCH: at 12:47

## 2022-06-26 RX ADMIN — Medication 1 MILLIGRAM(S): at 22:11

## 2022-06-26 RX ADMIN — LACTULOSE 20 GRAM(S): 10 SOLUTION ORAL at 12:46

## 2022-06-26 RX ADMIN — DEXMEDETOMIDINE HYDROCHLORIDE IN 0.9% SODIUM CHLORIDE 4.28 MICROGRAM(S)/KG/HR: 4 INJECTION INTRAVENOUS at 06:57

## 2022-06-26 RX ADMIN — PROPOFOL 0.51 MICROGRAM(S)/KG/MIN: 10 INJECTION, EMULSION INTRAVENOUS at 00:18

## 2022-06-26 RX ADMIN — Medication 1 MILLIGRAM(S): at 12:43

## 2022-06-26 RX ADMIN — Medication 100 MILLIGRAM(S): at 12:43

## 2022-06-26 RX ADMIN — Medication 1 PATCH: at 12:43

## 2022-06-26 RX ADMIN — DEXMEDETOMIDINE HYDROCHLORIDE IN 0.9% SODIUM CHLORIDE 4.28 MICROGRAM(S)/KG/HR: 4 INJECTION INTRAVENOUS at 19:48

## 2022-06-26 RX ADMIN — DEXMEDETOMIDINE HYDROCHLORIDE IN 0.9% SODIUM CHLORIDE 4.28 MICROGRAM(S)/KG/HR: 4 INJECTION INTRAVENOUS at 00:18

## 2022-06-26 RX ADMIN — PROPOFOL 0.51 MICROGRAM(S)/KG/MIN: 10 INJECTION, EMULSION INTRAVENOUS at 23:16

## 2022-06-26 RX ADMIN — DEXMEDETOMIDINE HYDROCHLORIDE IN 0.9% SODIUM CHLORIDE 4.28 MICROGRAM(S)/KG/HR: 4 INJECTION INTRAVENOUS at 13:55

## 2022-06-26 RX ADMIN — PROPOFOL 0.51 MICROGRAM(S)/KG/MIN: 10 INJECTION, EMULSION INTRAVENOUS at 06:00

## 2022-06-26 RX ADMIN — Medication 1 MILLIGRAM(S): at 05:30

## 2022-06-26 RX ADMIN — MIDAZOLAM HYDROCHLORIDE 1.71 MG/KG/HR: 1 INJECTION, SOLUTION INTRAMUSCULAR; INTRAVENOUS at 08:11

## 2022-06-26 RX ADMIN — Medication 40 MILLIGRAM(S): at 13:56

## 2022-06-26 RX ADMIN — DEXMEDETOMIDINE HYDROCHLORIDE IN 0.9% SODIUM CHLORIDE 4.28 MICROGRAM(S)/KG/HR: 4 INJECTION INTRAVENOUS at 18:07

## 2022-06-26 RX ADMIN — PROPOFOL 0.51 MICROGRAM(S)/KG/MIN: 10 INJECTION, EMULSION INTRAVENOUS at 18:07

## 2022-06-26 RX ADMIN — PROPOFOL 0.51 MICROGRAM(S)/KG/MIN: 10 INJECTION, EMULSION INTRAVENOUS at 09:21

## 2022-06-26 RX ADMIN — CHLORHEXIDINE GLUCONATE 15 MILLILITER(S): 213 SOLUTION TOPICAL at 18:10

## 2022-06-26 RX ADMIN — PROPOFOL 0.51 MICROGRAM(S)/KG/MIN: 10 INJECTION, EMULSION INTRAVENOUS at 08:02

## 2022-06-26 RX ADMIN — AMPICILLIN SODIUM AND SULBACTAM SODIUM 200 GRAM(S): 250; 125 INJECTION, POWDER, FOR SUSPENSION INTRAMUSCULAR; INTRAVENOUS at 23:17

## 2022-06-26 RX ADMIN — HEPARIN SODIUM 5000 UNIT(S): 5000 INJECTION INTRAVENOUS; SUBCUTANEOUS at 13:56

## 2022-06-26 RX ADMIN — Medication 8.03 MICROGRAM(S)/KG/MIN: at 00:19

## 2022-06-26 RX ADMIN — DEXMEDETOMIDINE HYDROCHLORIDE IN 0.9% SODIUM CHLORIDE 4.28 MICROGRAM(S)/KG/HR: 4 INJECTION INTRAVENOUS at 23:16

## 2022-06-26 RX ADMIN — LEVETIRACETAM 400 MILLIGRAM(S): 250 TABLET, FILM COATED ORAL at 18:09

## 2022-06-26 RX ADMIN — AMPICILLIN SODIUM AND SULBACTAM SODIUM 200 GRAM(S): 250; 125 INJECTION, POWDER, FOR SUSPENSION INTRAMUSCULAR; INTRAVENOUS at 05:31

## 2022-06-26 RX ADMIN — HEPARIN SODIUM 5000 UNIT(S): 5000 INJECTION INTRAVENOUS; SUBCUTANEOUS at 22:11

## 2022-06-26 RX ADMIN — Medication 1 MILLIGRAM(S): at 13:55

## 2022-06-26 RX ADMIN — DEXMEDETOMIDINE HYDROCHLORIDE IN 0.9% SODIUM CHLORIDE 4.28 MICROGRAM(S)/KG/HR: 4 INJECTION INTRAVENOUS at 03:33

## 2022-06-26 RX ADMIN — MIDAZOLAM HYDROCHLORIDE 1.71 MG/KG/HR: 1 INJECTION, SOLUTION INTRAMUSCULAR; INTRAVENOUS at 10:35

## 2022-06-26 NOTE — PROGRESS NOTE ADULT - ASSESSMENT
IMPRESSION:  Acute respiratory failure sp intubation  PNA  seizure like activity  ?? drug over dose/ withdrawal opoid   liver cirrhosis   Ascites sp paracentesis   Anemia  thrombocytopenia    PLAN:    CNS: do SAT   AEDs per neuro  CW methadone 30mg daily check QTC   Start Clonapin 1mg TID  addiction medicine eval  taper fentnayl down   HEENT: oral care     PULMONARY:  HOB 45,  Vent changes: none try SAT and possinle SBT     CARDIOVASCULAR: goal MAP >65. Diuresis as tolerated    GI: GI prophylaxis.  OG Feeding.    RENAL: monitor lytes is and os     INFECTIOUS DISEASE: CW Unasyn D#8. FU DTA start zosyn follow sensitvity   ID consult     HEMATOLOGICAL:   Heparin SQ. Check HIT.  follow h/h being transfuse check LDH , hold heparin for today   if not respond adequately with transfuse do ct abd r/o retro bleed   stool guiag lavage     ENDOCRINE:  Follow up FS.  Insulin protocol if needed.   Check CRP, SSA, SSB, KRISHNA, ANCA, RF, CCP, ACE and lysozyme  Check aPL serologies Lupus Anticoagulant, Beta 2 glycoprotein IgA, IgM, IgG, Anti cardiolipin IgA, IgM, IgG      MICU  lines: RIJ since 6/16

## 2022-06-26 NOTE — PROGRESS NOTE ADULT - ASSESSMENT
26 year old female with hx of asthma, untreated Hep C, IVDA, anasarca   hx of use of opiates 2 grams per day IV into her thighs  x years with minimal sobriety. Pt has been on MMTP in 2020.   Hepatitis C with Cirrhosis no compliant with treatment    s/p episode of AMS with reported drug paraphanellia found on patient clothes, suspected heroin overdose      Acute respiratory failure with hypoxemia / aspiration pneumonia with sepsis    anasarca   INR <1, appears to have adequate synthetic function    heroin abuse  will need to abstain    Hypokalemia  - resolved    anemia   - s/p 1u PRBC  - CBC dropped rapidly again after 1u PRBC  - U/S performed but limited organs scanned?  - CT/abd/pelvis r/o hematoma

## 2022-06-26 NOTE — PROGRESS NOTE ADULT - SUBJECTIVE AND OBJECTIVE BOX
Pt seen and examined. Nurse Petra at bedside. No overnight events.    T(F): , Max: 102.7 (06-26-22 @ 20:30)  HR: 98 (06-26-22 @ 21:06) (78 - 102)  BP: 114/73 (06-26-22 @ 21:06)  RR: 34 (06-26-22 @ 21:06)  SpO2: 99% (06-26-22 @ 21:06)  IN: 3049 mL / OUT: 3665 mL / NET: -616 mL    IN: 1625 mL / OUT: 2640 mL / NET: -1015 mL      General: No apparent distress  Cardiovascular: S1, S2  Gastrointestinal: Soft, Non-tender, Non-distended  Respiratory: ETT in place  Musculoskeletal: opens eyes  Lymphatic: No edema  Neurologic: shakes head to simple questions  Dermatologic: Skin dry                          7.3    1.45  )-----------( 96       ( 26 Jun 2022 05:05 )             24.3     06-26    143  |  108  |  21<H>  ----------------------------<  98  3.7   |  27  |  0.7    Ca    8.0<L>      26 Jun 2022 05:05  Phos  3.7     06-26  Mg     2.1     06-26    TPro  4.7<L>  /  Alb  2.2<L>  /  TBili  0.3  /  DBili  x   /  AST  24  /  ALT  9   /  AlkPhos  611<H>  06-26      Culture - Sputum (collected 24 Jun 2022 17:20)  Source: Trach Asp Tracheal Aspirate  Gram Stain (25 Jun 2022 06:29):    Few polymorphonuclear leukocytes per low power field    Rare Squamous epithelial cells per low power field    Moderate Gram Negative Rods seen per oil power field  Final Report (26 Jun 2022 17:00):    Moderate Klebsiella oxytoca/Raoutella ornithinolytica    Moderate Enterobacter cloacae complex    Normal Respiratory Kelly absent  Organism: Klebsiella oxytoca /Raoutella ornithinolytica  Enterobacter cloacae complex (26 Jun 2022 17:00)  Organism: Enterobacter cloacae complex (26 Jun 2022 17:00)  Organism: Klebsiella oxytoca /Raoutella ornithinolytica (26 Jun 2022 17:00)

## 2022-06-26 NOTE — PROGRESS NOTE ADULT - SUBJECTIVE AND OBJECTIVE BOX
Patient is a 26y old  Female who presents with a chief complaint of anasarca (25 Jun 2022 10:51)      Over Night Events:  Patient seen and examined.   awake on 3 sedation   [id number]     ROS:  See HPI    PHYSICAL EXAM    ICU Vital Signs Last 24 Hrs  T(C): 37.8 (26 Jun 2022 05:01), Max: 38.4 (25 Jun 2022 15:12)  T(F): 100 (26 Jun 2022 05:01), Max: 101.1 (25 Jun 2022 15:12)  HR: 90 (26 Jun 2022 05:00) (78 - 93)  BP: 106/72 (26 Jun 2022 05:00) (99/59 - 119/81)  BP(mean): 83 (26 Jun 2022 05:00) (72 - 94)  ABP: --  ABP(mean): --  RR: 31 (26 Jun 2022 05:00) (30 - 38)  SpO2: 98% (26 Jun 2022 05:00) (97% - 100%)      General: awake follow command   HEENT:           et tube      Lymph Nodes: NO cervical LN   Lungs: Bilateral BS  Cardiovascular: Regular   Abdomen: Soft, Positive BS  Extremities: No clubbing   Skin: warm   Neurological: no focal   Musculoskeletal: move all ext     I&O's Detail    24 Jun 2022 07:01  -  25 Jun 2022 07:00  --------------------------------------------------------  IN:    Dexmedetomidine: 800 mL    IV PiggyBack: 200 mL    IV PiggyBack: 300 mL    IV PiggyBack: 200 mL    IV PiggyBack: 100 mL    Midazolam: 50 mL    Midazolam: 177.5 mL    Norepinephrine: 86 mL    PRBCs (Packed Red Blood Cells): 331 mL    Propofol: 546 mL    Vital High Protein: 1035 mL  Total IN: 3825.5 mL    OUT:    Indwelling Catheter - Urethral (mL): 2815 mL  Total OUT: 2815 mL    Total NET: 1010.5 mL      25 Jun 2022 07:01  -  26 Jun 2022 06:11  --------------------------------------------------------  IN:    Dexmedetomidine: 706 mL    IV PiggyBack: 200 mL    IV PiggyBack: 200 mL    Midazolam: 230 mL    Norepinephrine: 60 mL    Propofol: 552 mL    Vital High Protein: 990 mL  Total IN: 2938 mL    OUT:    Indwelling Catheter - Urethral (mL): 3665 mL  Total OUT: 3665 mL    Total NET: -727 mL          LABS:                          8.4    2.05  )-----------( 141      ( 25 Jun 2022 11:20 )             28.2         25 Jun 2022 11:20    146    |  111    |  21     ----------------------------<  111    3.9     |  26     |  0.9      Ca    8.5        25 Jun 2022 11:20  Phos  3.8       25 Jun 2022 11:20  Mg     2.2       25 Jun 2022 11:20    TPro  5.2    /  Alb  2.6    /  TBili  0.3    /  DBili  x      /  AST  28     /  ALT  11     /  AlkPhos  710    25 Jun 2022 11:20  Amylase x     lipase x                                                                                                                                                    Culture - Sputum (collected 24 Jun 2022 17:20)  Source: Trach Asp Tracheal Aspirate  Gram Stain (25 Jun 2022 06:29):    Few polymorphonuclear leukocytes per low power field    Rare Squamous epithelial cells per low power field    Moderate Gram Negative Rods seen per oil power field  Preliminary Report (25 Jun 2022 20:58):    Moderate Klebsiella oxytoca/Raoutella ornithinolytica    Moderate Enterobacter cloacae complex    Normal Respiratory Kelly absent                                                   Mode: AC/ CMV (Assist Control/ Continuous Mandatory Ventilation)  RR (machine): 22  TV (machine): 350  FiO2: 30  PEEP: 5  ITime: 1  MAP: 12  PIP: 21                                      ABG - ( 26 Jun 2022 04:30 )  pH, Arterial: 7.41  pH, Blood: x     /  pCO2: 43    /  pO2: 115   / HCO3: 27    / Base Excess: 2.4   /  SaO2: 99.2                MEDICATIONS  (STANDING):  ampicillin/sulbactam  IVPB 3 Gram(s) IV Intermittent every 6 hours  chlorhexidine 0.12% Liquid 15 milliLiter(s) Oral Mucosa every 12 hours  clonazePAM  Tablet 1 milliGRAM(s) Oral three times a day  dexMEDEtomidine Infusion 0.2 MICROgram(s)/kG/Hr (4.28 mL/Hr) IV Continuous <Continuous>  folic acid 1 milliGRAM(s) Oral daily  furosemide   Injectable 40 milliGRAM(s) IV Push two times a day  heparin   Injectable 5000 Unit(s) SubCutaneous every 8 hours  lactulose Syrup 20 Gram(s) Oral daily  levETIRAcetam  IVPB 250 milliGRAM(s) IV Intermittent every 12 hours  methadone    Tablet 30 milliGRAM(s) Oral daily  midazolam Infusion 0.02 mG/kG/Hr (1.71 mL/Hr) IV Continuous <Continuous>  nicotine -  14 mG/24Hr(s) Patch 1 patch Transdermal daily  norepinephrine Infusion 0.05 MICROgram(s)/kG/Min (8.03 mL/Hr) IV Continuous <Continuous>  pantoprazole   Suspension 40 milliGRAM(s) Enteral Tube daily  propofol Infusion 1 MICROgram(s)/kG/Min (0.51 mL/Hr) IV Continuous <Continuous>  spironolactone 50 milliGRAM(s) Oral daily  thiamine 100 milliGRAM(s) Oral daily    MEDICATIONS  (PRN):  ALBUTerol    90 MICROgram(s) HFA Inhaler 1 Puff(s) Inhalation every 4 hours PRN Shortness of Breath and/or Wheezing  cloNIDine 0.1 milliGRAM(s) Oral every 6 hours PRN opiate withdrawal  hydrOXYzine hydrochloride 50 milliGRAM(s) Oral every 6 hours PRN Anxiety  ibuprofen  Tablet. 600 milliGRAM(s) Oral every 6 hours PRN Temp greater or equal to 38C (100.4F)  ibuprofen  Tablet. 400 milliGRAM(s) Oral every 6 hours PRN Mild Pain (1 - 3)          Xrays:  TLC:  OG:  ET tube:                                                                                    ?? retrocardiac opacity small left effusion    ECHO:  CAM ICU:

## 2022-06-27 LAB
ALBUMIN SERPL ELPH-MCNC: 2.1 G/DL — LOW (ref 3.5–5.2)
ALP SERPL-CCNC: 706 U/L — HIGH (ref 30–115)
ALT FLD-CCNC: 10 U/L — SIGNIFICANT CHANGE UP (ref 0–41)
ANION GAP SERPL CALC-SCNC: 12 MMOL/L — SIGNIFICANT CHANGE UP (ref 7–14)
APPEARANCE UR: CLEAR — SIGNIFICANT CHANGE UP
AST SERPL-CCNC: 29 U/L — SIGNIFICANT CHANGE UP (ref 0–41)
BASOPHILS # BLD AUTO: 0 K/UL — SIGNIFICANT CHANGE UP (ref 0–0.2)
BASOPHILS NFR BLD AUTO: 0 % — SIGNIFICANT CHANGE UP (ref 0–1)
BILIRUB SERPL-MCNC: 0.4 MG/DL — SIGNIFICANT CHANGE UP (ref 0.2–1.2)
BILIRUB UR-MCNC: NEGATIVE — SIGNIFICANT CHANGE UP
BUN SERPL-MCNC: 22 MG/DL — HIGH (ref 10–20)
CALCIUM SERPL-MCNC: 7.8 MG/DL — LOW (ref 8.5–10.1)
CHLORIDE SERPL-SCNC: 108 MMOL/L — SIGNIFICANT CHANGE UP (ref 98–110)
CO2 SERPL-SCNC: 25 MMOL/L — SIGNIFICANT CHANGE UP (ref 17–32)
COLOR SPEC: YELLOW — SIGNIFICANT CHANGE UP
CREAT SERPL-MCNC: 0.8 MG/DL — SIGNIFICANT CHANGE UP (ref 0.7–1.5)
CULTURE RESULTS: SIGNIFICANT CHANGE UP
DIFF PNL FLD: ABNORMAL
DSDNA AB SER-ACNC: <12 IU/ML — SIGNIFICANT CHANGE UP
EGFR: 103 ML/MIN/1.73M2 — SIGNIFICANT CHANGE UP
EOSINOPHIL # BLD AUTO: 0 K/UL — SIGNIFICANT CHANGE UP (ref 0–0.7)
EOSINOPHIL NFR BLD AUTO: 0 % — SIGNIFICANT CHANGE UP (ref 0–8)
GLUCOSE SERPL-MCNC: 120 MG/DL — HIGH (ref 70–99)
GLUCOSE UR QL: NEGATIVE MG/DL — SIGNIFICANT CHANGE UP
HCT VFR BLD CALC: 23 % — LOW (ref 37–47)
HCT VFR BLD CALC: 25.2 % — LOW (ref 37–47)
HGB BLD-MCNC: 6.9 G/DL — CRITICAL LOW (ref 12–16)
HGB BLD-MCNC: 7.8 G/DL — LOW (ref 12–16)
IMM GRANULOCYTES NFR BLD AUTO: 0.5 % — HIGH (ref 0.1–0.3)
KETONES UR-MCNC: NEGATIVE — SIGNIFICANT CHANGE UP
LDH SERPL L TO P-CCNC: 173 — SIGNIFICANT CHANGE UP (ref 50–242)
LEUKOCYTE ESTERASE UR-ACNC: NEGATIVE — SIGNIFICANT CHANGE UP
LYMPHOCYTES # BLD AUTO: 0.47 K/UL — LOW (ref 1.2–3.4)
LYMPHOCYTES # BLD AUTO: 22.1 % — SIGNIFICANT CHANGE UP (ref 20.5–51.1)
MAGNESIUM SERPL-MCNC: 1.8 MG/DL — SIGNIFICANT CHANGE UP (ref 1.8–2.4)
MCHC RBC-ENTMCNC: 26.3 PG — LOW (ref 27–31)
MCHC RBC-ENTMCNC: 27.4 PG — SIGNIFICANT CHANGE UP (ref 27–31)
MCHC RBC-ENTMCNC: 30 G/DL — LOW (ref 32–37)
MCHC RBC-ENTMCNC: 31 G/DL — LOW (ref 32–37)
MCV RBC AUTO: 87.8 FL — SIGNIFICANT CHANGE UP (ref 81–99)
MCV RBC AUTO: 88.4 FL — SIGNIFICANT CHANGE UP (ref 81–99)
MONOCYTES # BLD AUTO: 0.09 K/UL — LOW (ref 0.1–0.6)
MONOCYTES NFR BLD AUTO: 4.2 % — SIGNIFICANT CHANGE UP (ref 1.7–9.3)
NEUTROPHILS # BLD AUTO: 1.56 K/UL — SIGNIFICANT CHANGE UP (ref 1.4–6.5)
NEUTROPHILS NFR BLD AUTO: 73.2 % — SIGNIFICANT CHANGE UP (ref 42.2–75.2)
NITRITE UR-MCNC: NEGATIVE — SIGNIFICANT CHANGE UP
NRBC # BLD: 0 /100 WBCS — SIGNIFICANT CHANGE UP (ref 0–0)
NRBC # BLD: 0 /100 WBCS — SIGNIFICANT CHANGE UP (ref 0–0)
PH UR: 6.5 — SIGNIFICANT CHANGE UP (ref 5–8)
PHOSPHATE SERPL-MCNC: 3.8 MG/DL — SIGNIFICANT CHANGE UP (ref 2.1–4.9)
PLATELET # BLD AUTO: 101 K/UL — LOW (ref 130–400)
PLATELET # BLD AUTO: 120 K/UL — LOW (ref 130–400)
POTASSIUM SERPL-MCNC: 3 MMOL/L — LOW (ref 3.5–5)
POTASSIUM SERPL-SCNC: 3 MMOL/L — LOW (ref 3.5–5)
PROT SERPL-MCNC: 4.7 G/DL — LOW (ref 6–8)
PROT UR-MCNC: >=300 MG/DL
RBC # BLD: 2.62 M/UL — LOW (ref 4.2–5.4)
RBC # BLD: 2.62 M/UL — LOW (ref 4.2–5.4)
RBC # BLD: 2.85 M/UL — LOW (ref 4.2–5.4)
RBC # FLD: 15.8 % — HIGH (ref 11.5–14.5)
RBC # FLD: 15.9 % — HIGH (ref 11.5–14.5)
RETICS #: 55.8 K/UL — SIGNIFICANT CHANGE UP (ref 25–125)
RETICS/RBC NFR: 2.1 % — HIGH (ref 0.5–1.5)
SODIUM SERPL-SCNC: 145 MMOL/L — SIGNIFICANT CHANGE UP (ref 135–146)
SP GR SPEC: >=1.03 (ref 1.01–1.03)
SPECIMEN SOURCE: SIGNIFICANT CHANGE UP
UROBILINOGEN FLD QL: 1 MG/DL
WBC # BLD: 2.13 K/UL — LOW (ref 4.8–10.8)
WBC # BLD: 3.97 K/UL — LOW (ref 4.8–10.8)
WBC # FLD AUTO: 2.13 K/UL — LOW (ref 4.8–10.8)
WBC # FLD AUTO: 3.97 K/UL — LOW (ref 4.8–10.8)

## 2022-06-27 PROCEDURE — 99233 SBSQ HOSP IP/OBS HIGH 50: CPT

## 2022-06-27 PROCEDURE — 99223 1ST HOSP IP/OBS HIGH 75: CPT

## 2022-06-27 PROCEDURE — 99291 CRITICAL CARE FIRST HOUR: CPT

## 2022-06-27 PROCEDURE — 99221 1ST HOSP IP/OBS SF/LOW 40: CPT

## 2022-06-27 RX ORDER — FUROSEMIDE 40 MG
40 TABLET ORAL
Refills: 0 | Status: DISCONTINUED | OUTPATIENT
Start: 2022-06-27 | End: 2022-06-27

## 2022-06-27 RX ORDER — MAGNESIUM SULFATE 500 MG/ML
2 VIAL (ML) INJECTION ONCE
Refills: 0 | Status: COMPLETED | OUTPATIENT
Start: 2022-06-27 | End: 2022-06-27

## 2022-06-27 RX ORDER — CLONAZEPAM 1 MG
2 TABLET ORAL THREE TIMES A DAY
Refills: 0 | Status: DISCONTINUED | OUTPATIENT
Start: 2022-06-27 | End: 2022-07-04

## 2022-06-27 RX ORDER — FUROSEMIDE 40 MG
40 TABLET ORAL DAILY
Refills: 0 | Status: DISCONTINUED | OUTPATIENT
Start: 2022-06-28 | End: 2022-07-03

## 2022-06-27 RX ORDER — POTASSIUM CHLORIDE 20 MEQ
20 PACKET (EA) ORAL
Refills: 0 | Status: COMPLETED | OUTPATIENT
Start: 2022-06-27 | End: 2022-06-27

## 2022-06-27 RX ADMIN — PROPOFOL 0.51 MICROGRAM(S)/KG/MIN: 10 INJECTION, EMULSION INTRAVENOUS at 05:26

## 2022-06-27 RX ADMIN — Medication 600 MILLIGRAM(S): at 03:52

## 2022-06-27 RX ADMIN — Medication 1 MILLIGRAM(S): at 05:25

## 2022-06-27 RX ADMIN — MIDAZOLAM HYDROCHLORIDE 1.71 MG/KG/HR: 1 INJECTION, SOLUTION INTRAMUSCULAR; INTRAVENOUS at 13:33

## 2022-06-27 RX ADMIN — Medication 600 MILLIGRAM(S): at 05:15

## 2022-06-27 RX ADMIN — LEVETIRACETAM 400 MILLIGRAM(S): 250 TABLET, FILM COATED ORAL at 17:46

## 2022-06-27 RX ADMIN — DEXMEDETOMIDINE HYDROCHLORIDE IN 0.9% SODIUM CHLORIDE 4.28 MICROGRAM(S)/KG/HR: 4 INJECTION INTRAVENOUS at 19:09

## 2022-06-27 RX ADMIN — Medication 600 MILLIGRAM(S): at 13:33

## 2022-06-27 RX ADMIN — Medication 100 MILLIGRAM(S): at 11:29

## 2022-06-27 RX ADMIN — MIDAZOLAM HYDROCHLORIDE 1.71 MG/KG/HR: 1 INJECTION, SOLUTION INTRAMUSCULAR; INTRAVENOUS at 22:16

## 2022-06-27 RX ADMIN — METHADONE HYDROCHLORIDE 30 MILLIGRAM(S): 40 TABLET ORAL at 11:29

## 2022-06-27 RX ADMIN — DEXMEDETOMIDINE HYDROCHLORIDE IN 0.9% SODIUM CHLORIDE 4.28 MICROGRAM(S)/KG/HR: 4 INJECTION INTRAVENOUS at 03:52

## 2022-06-27 RX ADMIN — Medication 50 MILLIEQUIVALENT(S): at 14:41

## 2022-06-27 RX ADMIN — SPIRONOLACTONE 50 MILLIGRAM(S): 25 TABLET, FILM COATED ORAL at 05:25

## 2022-06-27 RX ADMIN — Medication 1 PATCH: at 07:30

## 2022-06-27 RX ADMIN — PANTOPRAZOLE SODIUM 40 MILLIGRAM(S): 20 TABLET, DELAYED RELEASE ORAL at 11:29

## 2022-06-27 RX ADMIN — Medication 1 PATCH: at 12:00

## 2022-06-27 RX ADMIN — MIDAZOLAM HYDROCHLORIDE 1.71 MG/KG/HR: 1 INJECTION, SOLUTION INTRAMUSCULAR; INTRAVENOUS at 04:36

## 2022-06-27 RX ADMIN — Medication 1 PATCH: at 11:29

## 2022-06-27 RX ADMIN — AMPICILLIN SODIUM AND SULBACTAM SODIUM 200 GRAM(S): 250; 125 INJECTION, POWDER, FOR SUSPENSION INTRAMUSCULAR; INTRAVENOUS at 05:24

## 2022-06-27 RX ADMIN — Medication 50 MILLIEQUIVALENT(S): at 13:11

## 2022-06-27 RX ADMIN — CHLORHEXIDINE GLUCONATE 15 MILLILITER(S): 213 SOLUTION TOPICAL at 17:46

## 2022-06-27 RX ADMIN — LACTULOSE 20 GRAM(S): 10 SOLUTION ORAL at 11:30

## 2022-06-27 RX ADMIN — Medication 40 MILLIGRAM(S): at 05:25

## 2022-06-27 RX ADMIN — Medication 2 MILLIGRAM(S): at 21:10

## 2022-06-27 RX ADMIN — PROPOFOL 0.51 MICROGRAM(S)/KG/MIN: 10 INJECTION, EMULSION INTRAVENOUS at 02:15

## 2022-06-27 RX ADMIN — CHLORHEXIDINE GLUCONATE 15 MILLILITER(S): 213 SOLUTION TOPICAL at 05:25

## 2022-06-27 RX ADMIN — LEVETIRACETAM 400 MILLIGRAM(S): 250 TABLET, FILM COATED ORAL at 05:24

## 2022-06-27 RX ADMIN — MIDAZOLAM HYDROCHLORIDE 1.71 MG/KG/HR: 1 INJECTION, SOLUTION INTRAMUSCULAR; INTRAVENOUS at 07:30

## 2022-06-27 RX ADMIN — Medication 600 MILLIGRAM(S): at 14:03

## 2022-06-27 RX ADMIN — DEXMEDETOMIDINE HYDROCHLORIDE IN 0.9% SODIUM CHLORIDE 4.28 MICROGRAM(S)/KG/HR: 4 INJECTION INTRAVENOUS at 22:15

## 2022-06-27 RX ADMIN — Medication 600 MILLIGRAM(S): at 21:12

## 2022-06-27 RX ADMIN — DEXMEDETOMIDINE HYDROCHLORIDE IN 0.9% SODIUM CHLORIDE 4.28 MICROGRAM(S)/KG/HR: 4 INJECTION INTRAVENOUS at 07:30

## 2022-06-27 RX ADMIN — HEPARIN SODIUM 5000 UNIT(S): 5000 INJECTION INTRAVENOUS; SUBCUTANEOUS at 05:24

## 2022-06-27 RX ADMIN — PROPOFOL 0.51 MICROGRAM(S)/KG/MIN: 10 INJECTION, EMULSION INTRAVENOUS at 19:09

## 2022-06-27 RX ADMIN — Medication 25 GRAM(S): at 10:03

## 2022-06-27 RX ADMIN — Medication 1 PATCH: at 19:30

## 2022-06-27 RX ADMIN — Medication 1 MILLIGRAM(S): at 11:29

## 2022-06-27 RX ADMIN — Medication 2 MILLIGRAM(S): at 13:46

## 2022-06-27 RX ADMIN — MIDAZOLAM HYDROCHLORIDE 1.71 MG/KG/HR: 1 INJECTION, SOLUTION INTRAMUSCULAR; INTRAVENOUS at 19:11

## 2022-06-27 RX ADMIN — Medication 50 MILLIEQUIVALENT(S): at 10:02

## 2022-06-27 RX ADMIN — Medication 8.03 MICROGRAM(S)/KG/MIN: at 07:30

## 2022-06-27 RX ADMIN — PROPOFOL 0.51 MICROGRAM(S)/KG/MIN: 10 INJECTION, EMULSION INTRAVENOUS at 07:30

## 2022-06-27 NOTE — CONSULT NOTE ADULT - ASSESSMENT
Pancytopenia, multifactorial, including her liver disease, splenomegaly and hospital course and clinical sepsis given also the fever. Difficult to tell whether the medications that she received could also be having a role in the hemogram abnormalities. She had a baseline leukopenia and anemia but the acute changes are more suggestive of hospital acquired deterioration and possibly poor marrow reserves (but this is less likely). Peripheral destruction more likely rather than an acute aplastic crisis.  To make sure that there is no underlying hemolytic process given the acute decreases of the H/H since bleeding has been ruled out, recommend obtaining reticulocyte count, LDH, haptoglobin, indirect bilirubin.  In the meantime, continue the present management with antibiotherapy, pressors, transfusions as needed.   If the general condition improves but not the hematologic picture, bone marrow studies may be required at that time.  Will follow as needed.

## 2022-06-27 NOTE — PROGRESS NOTE ADULT - ASSESSMENT
IMPRESSION:  # AHRF - on MV  # Aspiration PNA s/p Unasyn Course  # Persistent Fevers  # Anemia  # Pancytopenia  # Seizure like activity - now on AEDs  # pHTN WHO Class unclear at this time  # h/o Pericardial Effusion  # OD vs. Other Tox  # Ascites w/ h/o Liver cirrhosis d/t HepC    PLAN:    CNS:   - Repeat attempt of SAT/SBT  - c/w Keppra to 250mg BID  - Seizure Precautions  - Increase Clonapin to 2mg TID  - Wean Prop + Versed as tolerated  - Addiction Medicine consult    HEENT:   - c/w Routine oral care     PULMONARY:   - No vent changes  - HOB 45  - SBT    CARDIOVASCULAR:   - Reduce Lasix to 40g IVP QD    GI:   - GI prophylaxis.  - NG Feeds  - Repeat TG level tomorrow AM (remains on propofol)    RENAL:   - Monitor lytes  - Strict I/O's  - Ensure Mg > 2.0  - Replete Potassium    INFECTIOUS DISEASE:   - s/p Unasyn x 10 days  - Repeat Procal  - Repeat BCx Repeat UA Repeat DTA  - ID Consult    HEMATOLOGICAL:     - s/p CT A/P no RPH noted  - Hold Heparin (thrombocytopenia)  - 1U PRBC this AM  - 3pm CBC  - Retic Ct. + %, LDH, Haptoglobin  - Check fecal occult immuno  - Heme/Onc consult     ENDOCRINE:    - Follow up FS.  Insulin protocol PRN    MICU

## 2022-06-27 NOTE — CONSULT NOTE ADULT - ASSESSMENT
Assessment and plan :    Patient received in bed,  sedated and vented . Mostly bedbound,  previous pressor usage ( d/c  today )    Bs 12, incontinence to stool ,  + Salmeron on enteral feeding.  Current h/h 6.9/23, s/p  1 unit PRBC    Increase risk for developing  or progression of pressure injury     Skin assessed-      #Pressure injury  #1  Location and type:  ?? DTPI  to coccyx   Size:  0.5x0.5  Wound Exudate : None   Wound Edge: Intact   Goldie wound -  Dry     Plan: Clean coccyx with soap and water, pat dry apply triad with Allevyn foam dressing - monitor DTPI for          progression   Pressure  injury preventive  measures  skin and incontinence  care   Assess skin  and inform primary provider of any changes   Case discussed with primary Rn   Wound/ ostomy specialist  to f/u as needed     Offloading: [ x] Frequent position changes [ ] Devices/Equipment  Cleansing: [ ] Saline [x ] Soap/Water [ ] Other: ______  Topicals: [x ] Barrier Cream [ ] Antimicrobial [ ] Enzymatic Wound Debridement  Dressings: [ ] Dry, sterile [ ] Foam [ ] Absorbant Pads [ ] Collagenase    #   Anemia   - s/p 1u PRBC  - CBC dropped rapidly again after 1u PRBC  - U/S performed but limited organs scanned?  - CT/abd/pelvis r/o hematoma  # Nutrition   Diet order:Diet, NPO with Tube Feed:   Tube Feeding Modality: Orogastric  Vital High Protein  Total Volume for 24 Hours (mL): 1080  Continuous  Starting Tube Feed Rate {mL per Hour}: 20  Increase Tube Feed Rate by (mL): 5     Every 4 hours  Until Goal Tube Feed Rate (mL per Hour): 45  Tube Feed Duration (in Hours): 24  Tube Feed Start Time: 20:00  No Carb Prosource TF     Qty per Day:  1          Total time at beside for patient assessment, chart review and discussing plan with primary time more than 35 minutes

## 2022-06-27 NOTE — CONSULT NOTE ADULT - SUBJECTIVE AND OBJECTIVE BOX
Chief Complaint:  Generalized edema    History of Present Illness:  Pt is a 26F w/ PMH asthma, IVDA, and hep C being admitted for anasarca likely 2/2 liver failure/cirrhosis from untreated hepatitis C. Pt reports generalized edema for many months, which has significantly increased over the past two weeks. Pt states that as edema has increased she is becoming increasingly short of breath. Reports intermittent abdominal pain as well as right hip and knee pain with movement 2/2 edema. Pt reports IV heroine abuse, last use yesterday. Pt reports a hx of being on MMTP, but has been off for awhile. Pt cannot recall when initially diagnosed with Hepatitis C, states unclear diagnosis, and reports that she was never treated for it. Denies HA, dizziness, fever/chills, chest pain, NVD, change in urination and change in BM.    In ED, pt is afebrile w/ WBC: 3.2. BP: 134/88, HR: 76, SPO2 97% on RA. Ca: 8.3, Albumin: 2.8, Alk phos: 656, AST/ALT: 23/9, T bili: 0.5, BNP: 8100. CT abd: New moderate ascites, anasarca, b/l pleural effusions R>L, moderate pericardial effusion, hepatosplenomegaly. Pt was given 40mg IV lasix.     Review of Systems:    All other review of systems negative, except as noted in HPI      Allergies and Intolerances:        Allergies:  	buprenorphine: Drug, Rash  	Suboxone: Drug, Rash    Home Medications:   * No Current Medications as of 20-Apr-2022 07:30 documented in Structured Notes      PAST MEDICAL & SURGICAL HISTORY:  Hepatitis C  Asthma  Anxiety and depression  IV drug abuse  heroin  No significant past surgical history    MEDICATIONS  (STANDING):  ampicillin/sulbactam  IVPB 3 Gram(s) IV Intermittent every 6 hours  chlorhexidine 0.12% Liquid 15 milliLiter(s) Oral Mucosa every 12 hours  chlorhexidine 4% Liquid 1 Application(s) Topical daily  clonazePAM  Tablet 2 milliGRAM(s) Oral three times a day  dexMEDEtomidine Infusion 0.2 MICROgram(s)/kG/Hr (4.28 mL/Hr) IV Continuous <Continuous>  folic acid 1 milliGRAM(s) Oral daily  lactulose Syrup 20 Gram(s) Oral daily  levETIRAcetam  IVPB 250 milliGRAM(s) IV Intermittent every 12 hours  methadone    Tablet 30 milliGRAM(s) Oral daily  midazolam Infusion 0.02 mG/kG/Hr (1.71 mL/Hr) IV Continuous <Continuous>  nicotine -  14 mG/24Hr(s) Patch 1 patch Transdermal daily  norepinephrine Infusion 0.05 MICROgram(s)/kG/Min (8.03 mL/Hr) IV Continuous <Continuous>  pantoprazole   Suspension 40 milliGRAM(s) Enteral Tube daily  potassium chloride  20 mEq/100 mL IVPB 20 milliEquivalent(s) IV Intermittent every 2 hours  propofol Infusion 1 MICROgram(s)/kG/Min (0.51 mL/Hr) IV Continuous <Continuous>  spironolactone 50 milliGRAM(s) Oral daily  thiamine 100 milliGRAM(s) Oral daily    MEDICATIONS  (PRN):  ALBUTerol    90 MICROgram(s) HFA Inhaler 1 Puff(s) Inhalation every 4 hours PRN Shortness of Breath and/or Wheezing  cloNIDine 0.1 milliGRAM(s) Oral every 6 hours PRN opiate withdrawal  hydrOXYzine hydrochloride 50 milliGRAM(s) Oral every 6 hours PRN Anxiety  ibuprofen  Tablet. 600 milliGRAM(s) Oral every 6 hours PRN Temp greater or equal to 38C (100.4F)  ibuprofen  Tablet. 400 milliGRAM(s) Oral every 6 hours PRN Mild Pain (1 - 3)      FAMILY HISTORY:  No pertinent family history in first degree relatives        Social Hx Social History : Denies ETOH use.   1PPD smoker  IV drug abuse - heroine. Uses 1.5g per day. On and off 10y. Last use yesterday night. Hx of MMTP in past, but not currently.       PHYSICAL EXAM-  Vital Signs Last 24 Hrs  T(C): 37.9 (27 Jun 2022 09:11), Max: 39.3 (26 Jun 2022 20:30)  T(F): 100.2 (27 Jun 2022 09:11), Max: 102.7 (26 Jun 2022 20:30)  HR: 91 (27 Jun 2022 10:11) (75 - 102)  BP: 108/71 (27 Jun 2022 10:11) (101/60 - 115/77)  BP(mean): 85 (27 Jun 2022 10:11) (75 - 91)  RR: 34 (27 Jun 2022 10:11) (26 - 37)  SpO2: 98% (27 Jun 2022 10:11) (96% - 100%)    GENERAL:  sedated , vented   HEAD:  Atraumatic, Normocephalic  EYES: PERRLA, conjunctiva and sclera clear  NECK: Supple  CHEST/LUNG: Vesicular breath sounds b/l. No rales, rhonchi or wheezing   HEART: S1,S2 Regular rate and rhythm; No murmurs, rubs, or gallops  ABDOMEN: Soft, nontender, distended abdomen, no rebound tenderness; +BS   EXTREMITIES:  2+ peripheral pulses bilaterally and symmetrically, +UE and LE edema   Skin : bruising     Labs                       6.9    2.13  )-----------( 101      ( 27 Jun 2022 06:47 )             23.0     06-27    145  |  108  |  22<H>  ----------------------------<  120<H>  3.0<L>   |  25  |  0.8    Ca    7.8<L>      27 Jun 2022 06:47  Phos  3.8     06-27  Mg     1.8     06-27    TPro  4.7<L>  /  Alb  2.1<L>  /  TBili  0.4  /  DBili  x   /  AST  29  /  ALT  10  /  AlkPhos  706<H>  06-27

## 2022-06-27 NOTE — PROGRESS NOTE ADULT - SUBJECTIVE AND OBJECTIVE BOX
POOJA DIANE Female [id number] Code Status: FULL CODE    Dispo: Admit to [facility]  Patient is a 26y old  Female who presents with a chief complaint of anasarca (2022 08:04)      INTERVAL HPI/OVERNIGHT EVENTS: Remains intubated + Sedated on MV, Hgb trending down despite 1U PRBC , pancytopenic, continues to have feveres (Tmax 102.7) despite 10 days of Unasyn. ID to be recalled, Heme/Onc to be consulted. SAT today. Addictionmedicine consult placed. s/p CT A/P no RPH.     ICU Vital Signs Last 24 Hrs  T(C): 37.9 (2022 09:11), Max: 39.3 (2022 20:30)  T(F): 100.2 (2022 09:11), Max: 102.7 (2022 20:30)  HR: 91 (2022 10:11) (75 - 102)  BP: 108/71 (2022 10:11) (101/60 - 115/77)  BP(mean): 85 (2022 10:11) (75 - 91)  RR: 34 (2022 10:11) (26 - 37)  SpO2: 98% (2022 10:11) (96% - 100%)      I&O's Summary    2022 07:  -  2022 07:00  --------------------------------------------------------  IN: 3325 mL / OUT: 4040 mL / NET: -715 mL    2022 07:01  -  2022 10:39  --------------------------------------------------------  IN: 374 mL / OUT: 475 mL / NET: -101 mL         Daily     Daily Weight in k.3 (2022 00:00)    Mode: AC/ CMV (Assist Control/ Continuous Mandatory Ventilation)  RR (machine): 22  TV (machine): 350  FiO2: 30  PEEP: 5  ITime: 0.8  MAP: 10  PIP: 21      ABG - ( 2022 03:42 )  pH, Arterial: 7.43  pH, Blood: x     /  pCO2: 41    /  pO2: 90    / HCO3: 27    / Base Excess: 2.6   /  SaO2: 98.8      LABS:                        6.9    2.13  )-----------( 101      ( 2022 06:47 )             23.0         145  |  108  |  22<H>  ----------------------------<  120<H>  3.0<L>   |  25  |  0.8    Ca    7.8<L>      2022 06:47  Phos  3.8       Mg     1.8         TPro  4.7<L>  /  Alb  2.1<L>  /  TBili  0.4  /  DBili  x   /  AST  29  /  ALT  10  /  AlkPhos  706<H>          CAPILLARY BLOOD GLUCOSE          Procalcitonin, Serum: 0.62 ng/mL (22 @ 05:49)  Procalcitonin, Serum: 3.28 ng/mL (22 @ 05:22)          Sedimentation Rate, Erythrocyte: 65 mm/Hr ()    RADIOLOGY & ADDITIONAL TESTS:  < from: CT Abdomen and Pelvis w/ IV Cont (22 @ 17:27) >  IMPRESSION:    No evidence of retroperitoneal hematoma.    Small bilateral pleural effusions and left basilar consolidation.    Moderate ascites redemonstrated.    Moderate pericardial effusion.    Hepatosplenomegaly redemonstrated.    < end of copied text >    CARDIOLOGY DIAGNOSTICS:   12 Lead ECG:   Ventricular Rate 89 BPM    Atrial Rate 89 BPM    P-R Interval 180 ms    QRS Duration 76 ms    Q-T Interval 328 ms    QTC Calculation(Bazett) 399 ms    P Axis 67 degrees    R Axis 102 degrees    T Axis -2 degrees    Diagnosis Line Normal sinus rhythm  Rightward axis  Low voltage QRS  Nonspecific T wave abnormality  Abnormal ECG    Confirmed by BRANDON BELL MD ([id number]) on 2022 11:21:39 AM (22 @ 08:33)      MEDICATIONS  (STANDING):  chlorhexidine 0.12% Liquid 15 milliLiter(s) Oral Mucosa every 12 hours  chlorhexidine 4% Liquid 1 Application(s) Topical daily  clonazePAM  Tablet 2 milliGRAM(s) Oral three times a day  dexMEDEtomidine Infusion 0.2 MICROgram(s)/kG/Hr (4.28 mL/Hr) IV Continuous <Continuous>  folic acid 1 milliGRAM(s) Oral daily  lactulose Syrup 20 Gram(s) Oral daily  levETIRAcetam  IVPB 250 milliGRAM(s) IV Intermittent every 12 hours  methadone    Tablet 30 milliGRAM(s) Oral daily  midazolam Infusion 0.02 mG/kG/Hr (1.71 mL/Hr) IV Continuous <Continuous>  nicotine -  14 mG/24Hr(s) Patch 1 patch Transdermal daily  norepinephrine Infusion 0.05 MICROgram(s)/kG/Min (8.03 mL/Hr) IV Continuous <Continuous>  pantoprazole   Suspension 40 milliGRAM(s) Enteral Tube daily  potassium chloride  20 mEq/100 mL IVPB 20 milliEquivalent(s) IV Intermittent every 2 hours  propofol Infusion 1 MICROgram(s)/kG/Min (0.51 mL/Hr) IV Continuous <Continuous>  spironolactone 50 milliGRAM(s) Oral daily  thiamine 100 milliGRAM(s) Oral daily    MEDICATIONS  (PRN):  ALBUTerol    90 MICROgram(s) HFA Inhaler 1 Puff(s) Inhalation every 4 hours PRN Shortness of Breath and/or Wheezing  cloNIDine 0.1 milliGRAM(s) Oral every 6 hours PRN opiate withdrawal  hydrOXYzine hydrochloride 50 milliGRAM(s) Oral every 6 hours PRN Anxiety  ibuprofen  Tablet. 600 milliGRAM(s) Oral every 6 hours PRN Temp greater or equal to 38C (100.4F)  ibuprofen  Tablet. 400 milliGRAM(s) Oral every 6 hours PRN Mild Pain (1 - 3)    PHYSICAL EXAM:  GENERAL: NAD  HEENT:  NCAT, PERRL, No JVD, Trachea Midline, +ETT, +OGT  NERVOUS SYSTEM:  Sedated to RASS 0 to -1 opens eyes to my voice and tracks me   CHEST/LUNG: Good air entry bilaterally  HEART: Regular rate and rhythm  ABDOMEN: Soft, Nontender, + distension   EXTREMITIES:  Warm, well perfused  SKIN: No new skin breakdowns       POOJA DIANE Female [id number] Code Status: FULL CODE    Dispo: Admit to [facility]  Patient is a 26y old  Female who presents with a chief complaint of anasarca (2022 08:04)      INTERVAL HPI/OVERNIGHT EVENTS: Remains intubated + Sedated on MV, Hgb trending down despite 1U PRBC , pancytopenic, continues to have feveres (Tmax 102.7) despite 10 days of Unasyn. ID to be recalled, Heme/Onc to be consulted. SAT today. Addictionmedicine consult placed. s/p CT A/P no RPH.     Attending note: Pt seen and examined at bedside. Vented and sedated.  Hgb trended down overnight      ICU Vital Signs Last 24 Hrs  T(C): 37.9 (2022 09:11), Max: 39.3 (2022 20:30)  T(F): 100.2 (2022 09:11), Max: 102.7 (2022 20:30)  HR: 91 (2022 10:11) (75 - 102)  BP: 108/71 (2022 10:11) (101/60 - 115/77)  BP(mean): 85 (2022 10:11) (75 - 91)  RR: 34 (2022 10:11) (26 - 37)  SpO2: 98% (2022 10:11) (96% - 100%)      I&O's Summary    2022 07:  -  2022 07:00  --------------------------------------------------------  IN: 3325 mL / OUT: 4040 mL / NET: -715 mL    2022 07:01  -  2022 10:39  --------------------------------------------------------  IN: 374 mL / OUT: 475 mL / NET: -101 mL         Daily     Daily Weight in k.3 (2022 00:00)    Mode: AC/ CMV (Assist Control/ Continuous Mandatory Ventilation)  RR (machine): 22  TV (machine): 350  FiO2: 30  PEEP: 5  ITime: 0.8  MAP: 10  PIP: 21      ABG - ( 2022 03:42 )  pH, Arterial: 7.43  pH, Blood: x     /  pCO2: 41    /  pO2: 90    / HCO3: 27    / Base Excess: 2.6   /  SaO2: 98.8      LABS:                        6.9    2.13  )-----------( 101      ( 2022 06:47 )             23.0         145  |  108  |  22<H>  ----------------------------<  120<H>  3.0<L>   |  25  |  0.8    Ca    7.8<L>      2022 06:47  Phos  3.8       Mg     1.8         TPro  4.7<L>  /  Alb  2.1<L>  /  TBili  0.4  /  DBili  x   /  AST  29  /  ALT  10  /  AlkPhos  706<H>          CAPILLARY BLOOD GLUCOSE          Procalcitonin, Serum: 0.62 ng/mL (22 @ 05:49)  Procalcitonin, Serum: 3.28 ng/mL (22 @ 05:22)          Sedimentation Rate, Erythrocyte: 65 mm/Hr ()    RADIOLOGY & ADDITIONAL TESTS:  < from: CT Abdomen and Pelvis w/ IV Cont (22 @ 17:27) >  IMPRESSION:    No evidence of retroperitoneal hematoma.    Small bilateral pleural effusions and left basilar consolidation.    Moderate ascites redemonstrated.    Moderate pericardial effusion.    Hepatosplenomegaly redemonstrated.    < end of copied text >    CARDIOLOGY DIAGNOSTICS:   12 Lead ECG:   Ventricular Rate 89 BPM    Atrial Rate 89 BPM    P-R Interval 180 ms    QRS Duration 76 ms    Q-T Interval 328 ms    QTC Calculation(Bazett) 399 ms    P Axis 67 degrees    R Axis 102 degrees    T Axis -2 degrees    Diagnosis Line Normal sinus rhythm  Rightward axis  Low voltage QRS  Nonspecific T wave abnormality  Abnormal ECG    Confirmed by BRANDON BELL MD ([id number]) on 2022 11:21:39 AM (22 @ 08:33)      MEDICATIONS  (STANDING):  chlorhexidine 0.12% Liquid 15 milliLiter(s) Oral Mucosa every 12 hours  chlorhexidine 4% Liquid 1 Application(s) Topical daily  clonazePAM  Tablet 2 milliGRAM(s) Oral three times a day  dexMEDEtomidine Infusion 0.2 MICROgram(s)/kG/Hr (4.28 mL/Hr) IV Continuous <Continuous>  folic acid 1 milliGRAM(s) Oral daily  lactulose Syrup 20 Gram(s) Oral daily  levETIRAcetam  IVPB 250 milliGRAM(s) IV Intermittent every 12 hours  methadone    Tablet 30 milliGRAM(s) Oral daily  midazolam Infusion 0.02 mG/kG/Hr (1.71 mL/Hr) IV Continuous <Continuous>  nicotine -  14 mG/24Hr(s) Patch 1 patch Transdermal daily  norepinephrine Infusion 0.05 MICROgram(s)/kG/Min (8.03 mL/Hr) IV Continuous <Continuous>  pantoprazole   Suspension 40 milliGRAM(s) Enteral Tube daily  potassium chloride  20 mEq/100 mL IVPB 20 milliEquivalent(s) IV Intermittent every 2 hours  propofol Infusion 1 MICROgram(s)/kG/Min (0.51 mL/Hr) IV Continuous <Continuous>  spironolactone 50 milliGRAM(s) Oral daily  thiamine 100 milliGRAM(s) Oral daily    MEDICATIONS  (PRN):  ALBUTerol    90 MICROgram(s) HFA Inhaler 1 Puff(s) Inhalation every 4 hours PRN Shortness of Breath and/or Wheezing  cloNIDine 0.1 milliGRAM(s) Oral every 6 hours PRN opiate withdrawal  hydrOXYzine hydrochloride 50 milliGRAM(s) Oral every 6 hours PRN Anxiety  ibuprofen  Tablet. 600 milliGRAM(s) Oral every 6 hours PRN Temp greater or equal to 38C (100.4F)  ibuprofen  Tablet. 400 milliGRAM(s) Oral every 6 hours PRN Mild Pain (1 - 3)    PHYSICAL EXAM:  GENERAL: NAD  HEENT:  NCAT, PERRL, No JVD, Trachea Midline, +ETT, +OGT  NERVOUS SYSTEM:  Sedated to RASS 0 to -1 opens eyes to my voice and tracks me   CHEST/LUNG: Good air entry bilaterally  HEART: Regular rate and rhythm  ABDOMEN: Soft, Nontender, + distension   EXTREMITIES:  Warm, well perfused  SKIN: No new skin breakdowns      < from: CT Abdomen and Pelvis w/ IV Cont (22 @ 17:27) >      IMPRESSION:    No evidence of retroperitoneal hematoma.    Small bilateral pleural effusions and left basilar consolidation.    Moderate ascites redemonstrated.    Moderate pericardial effusion.    Hepatosplenomegaly redemonstrated.    < end of copied text >

## 2022-06-27 NOTE — PROGRESS NOTE ADULT - SUBJECTIVE AND OBJECTIVE BOX
Patient is a 26y old  Female who presents with a chief complaint of anasarca (26 Jun 2022 21:34)        Over Night Events:  anemia sp 1 unit PRBC  febrile overnight    ROS:     All ROS are negative except HPI         PHYSICAL EXAM    ICU Vital Signs Last 24 Hrs  T(C): 37.9 (27 Jun 2022 07:02), Max: 39.3 (26 Jun 2022 20:30)  T(F): 100.2 (27 Jun 2022 07:02), Max: 102.7 (26 Jun 2022 20:30)  HR: 79 (27 Jun 2022 05:11) (77 - 102)  BP: 110/75 (27 Jun 2022 05:11) (101/60 - 115/77)  BP(mean): 86 (27 Jun 2022 05:11) (75 - 91)  ABP: --  ABP(mean): --  RR: 26 (27 Jun 2022 07:02) (26 - 37)  SpO2: 100% (27 Jun 2022 05:11) (96% - 100%)      CONSTITUTIONAL:  Well nourished.  NAD    ENT:   Airway patent,   Mouth with normal mucosa.   No thrush    EYES:   Pupils equal,   Round and reactive to light.    CARDIAC:   Normal rate,   Regular rhythm.    No edema      Vascular:  Normal systolic impulse  No Carotid bruits    RESPIRATORY:   No wheezing  Bilateral BS  Normal chest expansion  Not tachypneic,  No use of accessory muscles    GASTROINTESTINAL:  Abdomen soft,   Non-tender,   No guarding,   + BS    MUSCULOSKELETAL:   Range of motion is not limited,  No clubbing, cyanosis    NEUROLOGICAL:   Alert and oriented   No motor  deficits.    SKIN:   Skin normal color for race,   Warm and dry and intact.   No evidence of rash.    PSYCHIATRIC:   Normal mood and affect.   No apparent risk to self or others.    HEMATOLOGICAL:  No cervical  lymphadenopathy.  no inguinal lymphadenopathy      06-26-22 @ 07:01  -  06-27-22 @ 07:00  --------------------------------------------------------  IN:    Dexmedetomidine: 742 mL    Enteral Tube Flush: 140 mL    IV PiggyBack: 200 mL    IV PiggyBack: 400 mL    Midazolam: 262 mL    Norepinephrine: 48 mL    Propofol: 813 mL    Vital High Protein: 720 mL  Total IN: 3325 mL    OUT:    Indwelling Catheter - Urethral (mL): 4040 mL  Total OUT: 4040 mL    Total NET: -715 mL      06-27-22 @ 07:01  -  06-27-22 @ 08:05  --------------------------------------------------------  IN:  Total IN: 0 mL    OUT:    Indwelling Catheter - Urethral (mL): 150 mL  Total OUT: 150 mL    Total NET: -150 mL          LABS:                            6.9    2.13  )-----------( 101      ( 27 Jun 2022 06:47 )             23.0                                               06-27    145  |  108  |  22<H>  ----------------------------<  120<H>  3.0<L>   |  25  |  0.8    Ca    7.8<L>      27 Jun 2022 06:47  Phos  3.8     06-27  Mg     1.8     06-27    TPro  4.7<L>  /  Alb  2.1<L>  /  TBili  0.4  /  DBili  x   /  AST  29  /  ALT  10  /  AlkPhos  706<H>  06-27                                                                                           LIVER FUNCTIONS - ( 27 Jun 2022 06:47 )  Alb: 2.1 g/dL / Pro: 4.7 g/dL / ALK PHOS: 706 U/L / ALT: 10 U/L / AST: 29 U/L / GGT: x                                                  Culture - Sputum (collected 24 Jun 2022 17:20)  Source: Trach Asp Tracheal Aspirate  Gram Stain (25 Jun 2022 06:29):    Few polymorphonuclear leukocytes per low power field    Rare Squamous epithelial cells per low power field    Moderate Gram Negative Rods seen per oil power field  Final Report (26 Jun 2022 17:00):    Moderate Klebsiella oxytoca/Raoutella ornithinolytica    Moderate Enterobacter cloacae complex    Normal Respiratory Kelly absent  Organism: Klebsiella oxytoca /Raoutella ornithinolytica  Enterobacter cloacae complex (26 Jun 2022 17:00)  Organism: Enterobacter cloacae complex (26 Jun 2022 17:00)  Organism: Klebsiella oxytoca /Raoutella ornithinolytica (26 Jun 2022 17:00)                                                   Mode: AC/ CMV (Assist Control/ Continuous Mandatory Ventilation)  RR (machine): 22  TV (machine): 350  FiO2: 30  PEEP: 5  ITime: 1  MAP: 11  PIP: 23                                      ABG - ( 27 Jun 2022 03:42 )  pH, Arterial: 7.43  pH, Blood: x     /  pCO2: 41    /  pO2: 90    / HCO3: 27    / Base Excess: 2.6   /  SaO2: 98.8                MEDICATIONS  (STANDING):  ampicillin/sulbactam  IVPB 3 Gram(s) IV Intermittent every 6 hours  chlorhexidine 0.12% Liquid 15 milliLiter(s) Oral Mucosa every 12 hours  chlorhexidine 4% Liquid 1 Application(s) Topical daily  clonazePAM  Tablet 1 milliGRAM(s) Oral three times a day  dexMEDEtomidine Infusion 0.2 MICROgram(s)/kG/Hr (4.28 mL/Hr) IV Continuous <Continuous>  folic acid 1 milliGRAM(s) Oral daily  furosemide   Injectable 40 milliGRAM(s) IV Push two times a day  heparin   Injectable 5000 Unit(s) SubCutaneous every 8 hours  lactulose Syrup 20 Gram(s) Oral daily  levETIRAcetam  IVPB 250 milliGRAM(s) IV Intermittent every 12 hours  methadone    Tablet 30 milliGRAM(s) Oral daily  midazolam Infusion 0.02 mG/kG/Hr (1.71 mL/Hr) IV Continuous <Continuous>  nicotine -  14 mG/24Hr(s) Patch 1 patch Transdermal daily  norepinephrine Infusion 0.05 MICROgram(s)/kG/Min (8.03 mL/Hr) IV Continuous <Continuous>  pantoprazole   Suspension 40 milliGRAM(s) Enteral Tube daily  propofol Infusion 1 MICROgram(s)/kG/Min (0.51 mL/Hr) IV Continuous <Continuous>  spironolactone 50 milliGRAM(s) Oral daily  thiamine 100 milliGRAM(s) Oral daily    MEDICATIONS  (PRN):  ALBUTerol    90 MICROgram(s) HFA Inhaler 1 Puff(s) Inhalation every 4 hours PRN Shortness of Breath and/or Wheezing  cloNIDine 0.1 milliGRAM(s) Oral every 6 hours PRN opiate withdrawal  hydrOXYzine hydrochloride 50 milliGRAM(s) Oral every 6 hours PRN Anxiety  ibuprofen  Tablet. 600 milliGRAM(s) Oral every 6 hours PRN Temp greater or equal to 38C (100.4F)  ibuprofen  Tablet. 400 milliGRAM(s) Oral every 6 hours PRN Mild Pain (1 - 3)      New X-rays reviewed:                                                                                  ECHO    CXR interpreted by me:       Patient is a 26y old  Female who presents with a chief complaint of anasarca (26 Jun 2022 21:34)        Over Night Events:  Anemia sp 1 unit PRBC  Febrile overnight  Remains critically ill on MV  on precedex, versed, propofol      ROS:     All ROS are negative except HPI         PHYSICAL EXAM    ICU Vital Signs Last 24 Hrs  T(C): 37.9 (27 Jun 2022 07:02), Max: 39.3 (26 Jun 2022 20:30)  T(F): 100.2 (27 Jun 2022 07:02), Max: 102.7 (26 Jun 2022 20:30)  HR: 79 (27 Jun 2022 05:11) (77 - 102)  BP: 110/75 (27 Jun 2022 05:11) (101/60 - 115/77)  BP(mean): 86 (27 Jun 2022 05:11) (75 - 91)  RR: 26 (27 Jun 2022 07:02) (26 - 37)  SpO2: 100% (27 Jun 2022 05:11) (96% - 100%)      CONSTITUTIONAL:  Well nourished.  NAD    ENT:   Airway patent,   Mouth with normal mucosa.   No thrush  +ETT    EYES:   Pupils equal,   Round and reactive to light.    CARDIAC:   Normal rate,   Regular rhythm.    No edema      RESPIRATORY:   No wheezing  Bilateral BS  Normal chest expansion  Not tachypneic,  No use of accessory muscles    GASTROINTESTINAL:  Abdomen soft,   Non-tender,   No guarding,     NEUROLOGICAL:   awake, alert    SKIN:   Skin normal color for race,   Warm and dry and intact.   No evidence of rash.        06-26-22 @ 07:01  -  06-27-22 @ 07:00  --------------------------------------------------------  IN:    Dexmedetomidine: 742 mL    Enteral Tube Flush: 140 mL    IV PiggyBack: 200 mL    IV PiggyBack: 400 mL    Midazolam: 262 mL    Norepinephrine: 48 mL    Propofol: 813 mL    Vital High Protein: 720 mL  Total IN: 3325 mL    OUT:    Indwelling Catheter - Urethral (mL): 4040 mL  Total OUT: 4040 mL    Total NET: -715 mL      06-27-22 @ 07:01  -  06-27-22 @ 08:05  --------------------------------------------------------  IN:  Total IN: 0 mL    OUT:    Indwelling Catheter - Urethral (mL): 150 mL  Total OUT: 150 mL    Total NET: -150 mL          LABS:                            6.9    2.13  )-----------( 101      ( 27 Jun 2022 06:47 )             23.0                                               06-27    145  |  108  |  22<H>  ----------------------------<  120<H>  3.0<L>   |  25  |  0.8    Ca    7.8<L>      27 Jun 2022 06:47  Phos  3.8     06-27  Mg     1.8     06-27    TPro  4.7<L>  /  Alb  2.1<L>  /  TBili  0.4  /  DBili  x   /  AST  29  /  ALT  10  /  AlkPhos  706<H>  06-27                                                                                           LIVER FUNCTIONS - ( 27 Jun 2022 06:47 )  Alb: 2.1 g/dL / Pro: 4.7 g/dL / ALK PHOS: 706 U/L / ALT: 10 U/L / AST: 29 U/L / GGT: x                                                  Culture - Sputum (collected 24 Jun 2022 17:20)  Source: Trach Asp Tracheal Aspirate  Gram Stain (25 Jun 2022 06:29):    Few polymorphonuclear leukocytes per low power field    Rare Squamous epithelial cells per low power field    Moderate Gram Negative Rods seen per oil power field  Final Report (26 Jun 2022 17:00):    Moderate Klebsiella oxytoca/Raoutella ornithinolytica    Moderate Enterobacter cloacae complex    Normal Respiratory Kelly absent  Organism: Klebsiella oxytoca /Raoutella ornithinolytica  Enterobacter cloacae complex (26 Jun 2022 17:00)  Organism: Enterobacter cloacae complex (26 Jun 2022 17:00)  Organism: Klebsiella oxytoca /Raoutella ornithinolytica (26 Jun 2022 17:00)                                                   Mode: AC/ CMV (Assist Control/ Continuous Mandatory Ventilation)  RR (machine): 22  TV (machine): 350  FiO2: 30  PEEP: 5  ITime: 1  MAP: 11  PIP: 23                                      ABG - ( 27 Jun 2022 03:42 )  pH, Arterial: 7.43  pH, Blood: x     /  pCO2: 41    /  pO2: 90    / HCO3: 27    / Base Excess: 2.6   /  SaO2: 98.8                MEDICATIONS  (STANDING):  ampicillin/sulbactam  IVPB 3 Gram(s) IV Intermittent every 6 hours  chlorhexidine 0.12% Liquid 15 milliLiter(s) Oral Mucosa every 12 hours  chlorhexidine 4% Liquid 1 Application(s) Topical daily  clonazePAM  Tablet 1 milliGRAM(s) Oral three times a day  dexMEDEtomidine Infusion 0.2 MICROgram(s)/kG/Hr (4.28 mL/Hr) IV Continuous <Continuous>  folic acid 1 milliGRAM(s) Oral daily  furosemide   Injectable 40 milliGRAM(s) IV Push two times a day  heparin   Injectable 5000 Unit(s) SubCutaneous every 8 hours  lactulose Syrup 20 Gram(s) Oral daily  levETIRAcetam  IVPB 250 milliGRAM(s) IV Intermittent every 12 hours  methadone    Tablet 30 milliGRAM(s) Oral daily  midazolam Infusion 0.02 mG/kG/Hr (1.71 mL/Hr) IV Continuous <Continuous>  nicotine -  14 mG/24Hr(s) Patch 1 patch Transdermal daily  norepinephrine Infusion 0.05 MICROgram(s)/kG/Min (8.03 mL/Hr) IV Continuous <Continuous>  pantoprazole   Suspension 40 milliGRAM(s) Enteral Tube daily  propofol Infusion 1 MICROgram(s)/kG/Min (0.51 mL/Hr) IV Continuous <Continuous>  spironolactone 50 milliGRAM(s) Oral daily  thiamine 100 milliGRAM(s) Oral daily    MEDICATIONS  (PRN):  ALBUTerol    90 MICROgram(s) HFA Inhaler 1 Puff(s) Inhalation every 4 hours PRN Shortness of Breath and/or Wheezing  cloNIDine 0.1 milliGRAM(s) Oral every 6 hours PRN opiate withdrawal  hydrOXYzine hydrochloride 50 milliGRAM(s) Oral every 6 hours PRN Anxiety  ibuprofen  Tablet. 600 milliGRAM(s) Oral every 6 hours PRN Temp greater or equal to 38C (100.4F)  ibuprofen  Tablet. 400 milliGRAM(s) Oral every 6 hours PRN Mild Pain (1 - 3)      New X-rays reviewed:                                                                                  ECHO    CXR interpreted by me:

## 2022-06-27 NOTE — CONSULT NOTE ADULT - SUBJECTIVE AND OBJECTIVE BOX
The patient is a 26 year old female with multiple medical problems including asthma, untreated hepatitis C, anxiety, depression, IVDA, ?Cor Pulmonale?, admitted with progressively worsening shortness of breath in addition to chronic shortness of breath, cough and increasing abdominal girdle, anasarca. She was treated with diuretics and bronchodilators. Her BNP was 11 K.  She has been an active user of heroine. She would inject up to 2g in her thighs.   The admission was about 12 days ago.  Since then, a few couple of days after admission, the patient had experienced a fall in the lobby of the hospital while having seizures, apparently. Although she had a hematoma at the right parietal area, no intracerebral hemorrhage was noted by CT scan. She was then intubated and put on Keppra. She is presently sedated and remains on the respirator. She is on Propofol, Precedex and Versed.    Hematology consultation was called because of pancytopenia. On admission, her WBC count was 2.85, Hgb 11 with normal MCV and platelets of 152 K. During the hospitalization, her counts have fluctuated up and down but eventually deteriorated gradually. Over the last few days, her Hgb has dropped to as low as 6.8, with the WBC count also down to 1.62 and platelets to 91 K. In the meantime, the patient had also developed fever and hypotension requiring pressors and antibiotics were started. She was transfused with PRBC but Hgb had dropped again.   No bleeding was noted or documented.   Abdominal sonogram showed an enlarged spleen at 20.9 cm, ascites, cholelithiasis without cholecystitis. CT scan of the abdomen and pelvis failed to reveal any retroperitoneal bleeding. CXR had shown originally right pleural effusion and about 2 months ago pericardial effusion was also documented. Her chemistries showed an alkaline phosphatase as high as 959.    Her medications have included furosemide, maintenance methadone and clonidine to avoid withdrawal symptoms ans signs, spironolactone, albuterol, hydroxyzine, ibruprofen PRN.  She has been a smoker of 1 ppd for some time now, in addition to the recreational drugs abuse.    On Physical examination:  Intubated and sedated.  HEENT: Eyes directed superiorly and to the left.    Lungs: Rhonchi and crackles, transmitted sounds from respirator.  Heart: RR  Abdomen: Distended, somewhat tense.  Extremities: Mild clubbing and trace edema.    Labs: Relevant values mentioned above.    Radiological findings: Summarized above.

## 2022-06-27 NOTE — PROGRESS NOTE ADULT - ASSESSMENT
IMPRESSION:  Acute respiratory failure sp intubation  PNA  seizure like activity  ?? drug over dose/ withdrawal opoid   liver cirrhosis   Ascites sp paracentesis   Pancytopenia    PLAN:    CNS: do SAT   AEDs per neuro  CW methadone 30mg daily  Increase Clonapin to 2mg TID  addiction medicine eval       HEENT: oral care     PULMONARY:  HOB 45,  Vent changes: decrease RR to 18, SBT    CARDIOVASCULAR: goal MAP >65. Diuresis as tolerated    GI: GI prophylaxis.  OG Feeding.    RENAL: monitor lytes is and os     INFECTIOUS DISEASE: completed 10 days of unasyn. FU DTA,    ID consult   Check Procal  [place] culture    HEMATOLOGICAL:   Hold  Hep SQ, transfuse 1 unit PRBC, FU HIT, monitor CBC, FU CT A/P  stool guaiac, OG lavage  Heme eval     ENDOCRINE:  Follow up FS.  Insulin protocol if needed.       MICU  lines: HARSH since 6/16 IMPRESSION:  Acute respiratory failure sp intubation  PNA  seizure like activity  ?? drug over dose/ withdrawal opoid   liver cirrhosis   Ascites sp paracentesis   Pancytopenia    PLAN:    CNS: do SAT   AEDs per neuro  CW methadone 30mg daily  Increase Clonapin to 2mg TID  addiction medicine eval       HEENT: oral care     PULMONARY:  HOB 45,  Vent changes: decrease RR to 18, SBT    CARDIOVASCULAR: goal MAP >65. Diuresis as tolerated. decrease lasix to qd    GI: GI prophylaxis.  OG Feeding.    RENAL: monitor lytes is and os     INFECTIOUS DISEASE: completed 10 days of unasyn. FU DTA,    ID consult   Check Procal  [place] culture    HEMATOLOGICAL:   Hold  Hep SQ, transfuse 1 unit PRBC, FU HIT, monitor CBC, CT A/P reviewed  stool guaiac, OG lavage  Heme eval     ENDOCRINE:  Follow up FS.  Insulin protocol if needed.     MICU  lines: RIJ since 6/16  Failed TOV IMPRESSION:  Acute respiratory failure sp intubation  PNA  seizure like activity  ?? drug over dose/ withdrawal opoid   liver cirrhosis   Ascites sp paracentesis   Pancytopenia    PLAN:    CNS: do SAT SBT as tolerated  AEDs per neuro  CW methadone 30mg daily  Increase Clonapin to 2mg TID  addiction medicine eval       HEENT: oral care     PULMONARY:  HOB 45,  Vent changes: decrease RR to 18, SBT    CARDIOVASCULAR: goal MAP >65. Diuresis as tolerated. decrease lasix to qd    GI: GI prophylaxis.  OG Feeding.    RENAL: monitor lytes is and os     INFECTIOUS DISEASE: completed 10 days of unasyn. FU DTA,    ID consult   Check Procal  [place] culture    HEMATOLOGICAL:   Hold  Hep SQ, transfuse 1 unit PRBC, FU HIT, monitor CBC, CT A/P reviewed  stool guaiac, OG lavage  Heme eval     ENDOCRINE:  Follow up FS.  Insulin protocol if needed.     MICU  lines: RIJ since 6/16  Failed TOV

## 2022-06-27 NOTE — PROGRESS NOTE ADULT - ASSESSMENT
26 year old female with hx of asthma, ?untreated Hep C, IVDA, anasarca was admitted to medical floor  with increased SOB and anasarca intubated for AHRF currently in ICU on pressors.     Problem 1-Ascites s/p diagnostic tap SAAG 1.2 and TP ,2.5 suggestive towards portal HTN from cirrhosis   no evidence of SBP   However has normal PLT count and no nodular liver in imaging   US showed moderate ascites   CT showed hepatomegaly and splenomegaly but no nodular liver     Recs:   -therapeutic paracentesis prn  -hepatitis C RNA qualitative negative  previously work up done for CLD was negative   has chronic elevated ALP  Trend LFT, INR   might benefit from liver biopsy later once stable   - Follow up with our [facility] located at [address] [place] [address]. Phone Number: [phone].     Problem 2-anemia no gross GI bleeding  Rec  -Maintain Hemodynamic Stability   -Monitor CBC  -CMP,Optimize Electrolytes  -Transfuse prn to hgb >8  -Two large bore IV lines  -PPI BID  -Monitor Vital Signs  -Monitor Stool For blood, frequency, consistency, melena  -Active Type and Screen    problem 3-Cholelithiasis   asymptomatic  Rec  -watchful waiting        26 year old female with hx of asthma, ?untreated Hep C, IVDA, anasarca was admitted to medical floor  with increased SOB and anasarca intubated for AHRF currently in ICU on pressors.     Problem 1-Ascites s/p diagnostic tap SAAG 1.2 and TP ,2.5 suggestive towards portal HTN from cirrhosis   no evidence of SBP   However has normal PLT count and no nodular liver in imaging   -repeat CT showed moderate ascites   CT showed hepatomegaly and splenomegaly but no nodular liver     Recs:   -therapeutic paracentesis prn  -hepatitis C RNA qualitative negative  previously work up done for CLD was negative   has chronic elevated ALP  Trend LFT, INR   might benefit from liver biopsy later once stable   - Follow up with our [facility] located at [address] [place] [address]. Phone Number: [phone].     Problem 2-anemia no gross GI bleeding  Rec  -Maintain Hemodynamic Stability   -Monitor CBC  -CMP,Optimize Electrolytes  -Transfuse prn to hgb >8  -Two large bore IV lines  -PPI BID  -Monitor Vital Signs  -Monitor Stool For blood, frequency, consistency, melena  -Active Type and Screen    problem 3-Cholelithiasis   asymptomatic  Rec  -watchful waiting

## 2022-06-27 NOTE — PROGRESS NOTE ADULT - SUBJECTIVE AND OBJECTIVE BOX
26yFemale  Being followed for ascites   Interval history:  No hematemesis, melena, blood in stool reported.       PAST MEDICAL & SURGICAL HISTORY:   Hepatitis C      Asthma      Anxiety and depression      IV drug abuse  heroin      No significant past surgical history              Social History: No smoking. No alcohol. + illegal drug use.          MEDICATIONS  (STANDING):  chlorhexidine 0.12% Liquid 15 milliLiter(s) Oral Mucosa every 12 hours  chlorhexidine 4% Liquid 1 Application(s) Topical daily  clonazePAM  Tablet 2 milliGRAM(s) Oral three times a day  dexMEDEtomidine Infusion 0.2 MICROgram(s)/kG/Hr (4.28 mL/Hr) IV Continuous <Continuous>  folic acid 1 milliGRAM(s) Oral daily  lactulose Syrup 20 Gram(s) Oral daily  levETIRAcetam  IVPB 250 milliGRAM(s) IV Intermittent every 12 hours  methadone    Tablet 30 milliGRAM(s) Oral daily  midazolam Infusion 0.02 mG/kG/Hr (1.71 mL/Hr) IV Continuous <Continuous>  nicotine -  14 mG/24Hr(s) Patch 1 patch Transdermal daily  norepinephrine Infusion 0.05 MICROgram(s)/kG/Min (8.03 mL/Hr) IV Continuous <Continuous>  pantoprazole   Suspension 40 milliGRAM(s) Enteral Tube daily  potassium chloride  20 mEq/100 mL IVPB 20 milliEquivalent(s) IV Intermittent every 2 hours  propofol Infusion 1 MICROgram(s)/kG/Min (0.51 mL/Hr) IV Continuous <Continuous>  spironolactone 50 milliGRAM(s) Oral daily  thiamine 100 milliGRAM(s) Oral daily    MEDICATIONS  (PRN):  ALBUTerol    90 MICROgram(s) HFA Inhaler 1 Puff(s) Inhalation every 4 hours PRN Shortness of Breath and/or Wheezing  cloNIDine 0.1 milliGRAM(s) Oral every 6 hours PRN opiate withdrawal  hydrOXYzine hydrochloride 50 milliGRAM(s) Oral every 6 hours PRN Anxiety  ibuprofen  Tablet. 600 milliGRAM(s) Oral every 6 hours PRN Temp greater or equal to 38C (100.4F)  ibuprofen  Tablet. 400 milliGRAM(s) Oral every 6 hours PRN Mild Pain (1 - 3)      Allergies:   buprenorphine (Rash)  Suboxone (Rash)              REVIEW OF SYSTEMS:  unobtainable       VITAL SIGNS:   T(F): 100.2 (06-27-22 @ 11:00), Max: 102.7 (06-26-22 @ 20:30)  HR: 91 (06-27-22 @ 10:11) (75 - 102)  BP: 108/71 (06-27-22 @ 10:11) (101/60 - 115/77)  RR: 35 (06-27-22 @ 11:00) (26 - 36)  SpO2: 98% (06-27-22 @ 10:11) (96% - 100%)    PHYSICAL EXAM:  GENERAL: +intubated  HEAD:  Atraumatic, Normocephalic  EYES: conjunctiva and sclera clear  NECK: Supple, no JVD or thyromegaly  CHEST/LUNG: b/l rhonchi  HEART: Regular rate and rhythm; normal S1, S2, No murmurs.  ABDOMEN: Soft, nontender, +distended; Bowel sounds present  NEUROLOGY: No asterixis or tremor.   SKIN: Intact, no jaundice            LABS:                        6.9    2.13  )-----------( 101      ( 27 Jun 2022 06:47 )             23.0     06-27    145  |  108  |  22<H>  ----------------------------<  120<H>  3.0<L>   |  25  |  0.8    Ca    7.8<L>      27 Jun 2022 06:47  Phos  3.8     06-27  Mg     1.8     06-27    TPro  4.7<L>  /  Alb  2.1<L>  /  TBili  0.4  /  DBili  x   /  AST  29  /  ALT  10  /  AlkPhos  706<H>  06-27    LIVER FUNCTIONS - ( 27 Jun 2022 06:47 )  Alb: 2.1 g/dL / Pro: 4.7 g/dL / ALK PHOS: 706 U/L / ALT: 10 U/L / AST: 29 U/L / GGT: x               IMAGING:    < from: CT Abdomen and Pelvis w/ IV Cont (06.26.22 @ 17:27) >    ACC: [id number] EXAM:  CT ABDOMEN AND PELVIS IC                          PROCEDURE DATE:  06/26/2022          INTERPRETATION:  CLINICAL HISTORY: Anemia. Evaluation for hematoma.    TECHNIQUE: Contiguous axial CT images were obtained from the lower chest   to the pubic symphysis following administration of Optiray intravenous   contrast. Oral contrast was not administered. Reformatted images in the   coronal and sagittal planes were acquired.    COMPARISON: CT abdomen pelvis dated 4/20/2022.      FINDINGS:    LOWER CHEST: Small bilateral pleural effusions and left basilar   consolidation. Cardiomegaly. Moderate pericardial effusion.    HEPATOBILIARY: Hepatomegaly. Nonspecific mild gallbladder wall edema.    SPLEEN: Splenomegaly    PANCREAS: Unremarkable.    ADRENAL GLANDS: Unremarkable.    KIDNEYS: No hydronephrosis    ABDOMINOPELVIC NODES: Nonspecific increased number of subcentimeter in   short axis retroperitoneal lymph nodes. Mildly prominent periportal lymph   nodes, nonspecific, without significant change from 2011.    PELVIC ORGANS: Salmeron catheter within bladder. Intraluminal gas likely   related to instrumentation.    PERITONEUM/MESENTERY/BOWEL:  Feeding tube tip in stomach. Moderate   ascites redemonstrated. Rectal catheter/probe. No evidence of bowel   obstruction or free air. Unremarkable appendix. No evidence of   retroperitoneal hematoma.    BONES/SOFT TISSUES: Anasarca.          IMPRESSION:    No evidence of retroperitoneal hematoma.    Small bilateral pleural effusions and left basilar consolidation.    Moderate ascites redemonstrated.    Moderate pericardial effusion.    Hepatosplenomegaly redemonstrated.    --- End of Report ---            BELKIS NOE MD; Attending Radiologist  This document has been electronically signed. Jun 27 2022  8:41AM    < end of copied text >         26yFemale  Being followed for ascites   Interval history:  No hematemesis, melena, blood in stool reported.       PAST MEDICAL & SURGICAL HISTORY:   Hepatitis C      Asthma      Anxiety and depression      IV drug abuse  heroin      No significant past surgical history              Social History: No smoking. No alcohol. + illegal drug use.          MEDICATIONS  (STANDING):  chlorhexidine 0.12% Liquid 15 milliLiter(s) Oral Mucosa every 12 hours  chlorhexidine 4% Liquid 1 Application(s) Topical daily  clonazePAM  Tablet 2 milliGRAM(s) Oral three times a day  dexMEDEtomidine Infusion 0.2 MICROgram(s)/kG/Hr (4.28 mL/Hr) IV Continuous <Continuous>  folic acid 1 milliGRAM(s) Oral daily  lactulose Syrup 20 Gram(s) Oral daily  levETIRAcetam  IVPB 250 milliGRAM(s) IV Intermittent every 12 hours  methadone    Tablet 30 milliGRAM(s) Oral daily  midazolam Infusion 0.02 mG/kG/Hr (1.71 mL/Hr) IV Continuous <Continuous>  nicotine -  14 mG/24Hr(s) Patch 1 patch Transdermal daily  norepinephrine Infusion 0.05 MICROgram(s)/kG/Min (8.03 mL/Hr) IV Continuous <Continuous>  pantoprazole   Suspension 40 milliGRAM(s) Enteral Tube daily  potassium chloride  20 mEq/100 mL IVPB 20 milliEquivalent(s) IV Intermittent every 2 hours  propofol Infusion 1 MICROgram(s)/kG/Min (0.51 mL/Hr) IV Continuous <Continuous>  spironolactone 50 milliGRAM(s) Oral daily  thiamine 100 milliGRAM(s) Oral daily    MEDICATIONS  (PRN):  ALBUTerol    90 MICROgram(s) HFA Inhaler 1 Puff(s) Inhalation every 4 hours PRN Shortness of Breath and/or Wheezing  cloNIDine 0.1 milliGRAM(s) Oral every 6 hours PRN opiate withdrawal  hydrOXYzine hydrochloride 50 milliGRAM(s) Oral every 6 hours PRN Anxiety  ibuprofen  Tablet. 600 milliGRAM(s) Oral every 6 hours PRN Temp greater or equal to 38C (100.4F)  ibuprofen  Tablet. 400 milliGRAM(s) Oral every 6 hours PRN Mild Pain (1 - 3)      Allergies:   buprenorphine (Rash)  Suboxone (Rash)              REVIEW OF SYSTEMS:  unobtainable       VITAL SIGNS:   T(F): 100.2 (06-27-22 @ 11:00), Max: 102.7 (06-26-22 @ 20:30)  HR: 91 (06-27-22 @ 10:11) (75 - 102)  BP: 108/71 (06-27-22 @ 10:11) (101/60 - 115/77)  RR: 35 (06-27-22 @ 11:00) (26 - 36)  SpO2: 98% (06-27-22 @ 10:11) (96% - 100%)    PHYSICAL EXAM:  GENERAL: +intubated  HEAD:  Atraumatic, Normocephalic  EYES: conjunctiva and sclera clear  NECK: Supple, no JVD or thyromegaly  CHEST/LUNG: b/l rhonchi  HEART: Regular rate and rhythm; normal S1, S2, No murmurs.  ABDOMEN: Soft, nontender, +distended; Bowel sounds present  NEUROLOGY: No asterixis or tremor.   SKIN: Intact, no jaundice            LABS:                        6.9    2.13  )-----------( 101      ( 27 Jun 2022 06:47 )             23.0     06-27    145  |  108  |  22<H>  ----------------------------<  120<H>  3.0<L>   |  25  |  0.8    Ca    7.8<L>      27 Jun 2022 06:47  Phos  3.8     06-27  Mg     1.8     06-27    TPro  4.7<L>  /  Alb  2.1<L>  /  TBili  0.4  /  DBili  x   /  AST  29  /  ALT  10  /  AlkPhos  706<H>  06-27    LIVER FUNCTIONS - ( 27 Jun 2022 06:47 )  Alb: 2.1 g/dL / Pro: 4.7 g/dL / ALK PHOS: 706 U/L / ALT: 10 U/L / AST: 29 U/L / GGT: x               IMAGING:    < from: CT Abdomen and Pelvis w/ IV Cont (06.26.22 @ 17:27) >    ACC: [id number] EXAM:  CT ABDOMEN AND PELVIS IC                          PROCEDURE DATE:  06/26/2022          INTERPRETATION:  CLINICAL HISTORY: Anemia. Evaluation for hematoma.    TECHNIQUE: Contiguous axial CT images were obtained from the lower chest   to the pubic symphysis following administration of Optiray intravenous   contrast. Oral contrast was not administered. Reformatted images in the   coronal and sagittal planes were acquired.    COMPARISON: CT abdomen pelvis dated 4/20/2022.      FINDINGS:    LOWER CHEST: Small bilateral pleural effusions and left basilar   consolidation. Cardiomegaly. Moderate pericardial effusion.    HEPATOBILIARY: Hepatomegaly. Nonspecific mild gallbladder wall edema.    SPLEEN: Splenomegaly    PANCREAS: Unremarkable.    ADRENAL GLANDS: Unremarkable.    KIDNEYS: No hydronephrosis    ABDOMINOPELVIC NODES: Nonspecific increased number of subcentimeter in   short axis retroperitoneal lymph nodes. Mildly prominent periportal lymph   nodes, nonspecific, without significant change from 2011.    PELVIC ORGANS: Salmeron catheter within bladder. Intraluminal gas likely   related to instrumentation.    PERITONEUM/MESENTERY/BOWEL:  Feeding tube tip in stomach. Moderate   ascites redemonstrated. Rectal catheter/probe. No evidence of bowel   obstruction or free air. Unremarkable appendix. No evidence of   retroperitoneal hematoma.    BONES/SOFT TISSUES: Anasarca.          IMPRESSION:    No evidence of retroperitoneal hematoma.    Small bilateral pleural effusions and left basilar consolidation.    Moderate ascites redemonstrated.    Moderate pericardial effusion.    Hepatosplenomegaly redemonstrated.    --- End of Report ---            BELKIS NOE MD; Attending Radiologist  This document has been electronically signed. Jun 27 2022  8:41AM    < end of copied text >      < from: CT Abdomen and Pelvis w/ IV Cont (06.26.22 @ 17:27) >  ACC: [id number] EXAM:  CT ABDOMEN AND PELVIS IC                          PROCEDURE DATE:  06/26/2022          INTERPRETATION:  CLINICAL HISTORY: Anemia. Evaluation for hematoma.    TECHNIQUE: Contiguous axial CT images were obtained from the lower chest   to the pubic symphysis following administration of Optiray intravenous   contrast. Oral contrast was not administered. Reformatted images in the   coronal and sagittal planes were acquired.    COMPARISON: CT abdomen pelvis dated 4/20/2022.      FINDINGS:    LOWER CHEST: Small bilateral pleural effusions and left basilar   consolidation. Cardiomegaly. Moderate pericardial effusion.    HEPATOBILIARY: Hepatomegaly. Nonspecific mild gallbladder wall edema.    SPLEEN: Splenomegaly    PANCREAS: Unremarkable.    ADRENAL GLANDS: Unremarkable.    KIDNEYS: No hydronephrosis    ABDOMINOPELVIC NODES: Nonspecific increased number of subcentimeter in   short axis retroperitoneal lymph nodes. Mildly prominent periportal lymph   nodes, nonspecific, without significant change from 2011.    PELVIC ORGANS: Salmeron catheter within bladder. Intraluminal gas likely   related to instrumentation.    PERITONEUM/MESENTERY/BOWEL:  Feeding tube tip in stomach. Moderate   ascites redemonstrated. Rectal catheter/probe. No evidence of bowel   obstruction or free air. Unremarkable appendix. No evidence of   retroperitoneal hematoma.    BONES/SOFT TISSUES: Anasarca.          IMPRESSION:    No evidence of retroperitoneal hematoma.    Small bilateral pleural effusions and left basilar consolidation.    Moderate ascites redemonstrated.    Moderate pericardial effusion.    Hepatosplenomegaly redemonstrated.    --- End of Report ---            BELKIS NOE MD; Attending Radiologist  This document has been electronically signed. Jun 27 2022  8:41AM    < end of copied text >

## 2022-06-28 LAB
ALBUMIN SERPL ELPH-MCNC: 2.1 G/DL — LOW (ref 3.5–5.2)
ALP SERPL-CCNC: 674 U/L — HIGH (ref 30–115)
ALT FLD-CCNC: 11 U/L — SIGNIFICANT CHANGE UP (ref 0–41)
AMYLASE P1 CFR SERPL: 100 U/L — SIGNIFICANT CHANGE UP (ref 25–115)
ANION GAP SERPL CALC-SCNC: 12 MMOL/L — SIGNIFICANT CHANGE UP (ref 7–14)
AST SERPL-CCNC: 27 U/L — SIGNIFICANT CHANGE UP (ref 0–41)
BILIRUB SERPL-MCNC: 0.5 MG/DL — SIGNIFICANT CHANGE UP (ref 0.2–1.2)
BUN SERPL-MCNC: 25 MG/DL — HIGH (ref 10–20)
CALCIUM SERPL-MCNC: 7.8 MG/DL — LOW (ref 8.5–10.1)
CHLORIDE SERPL-SCNC: 110 MMOL/L — SIGNIFICANT CHANGE UP (ref 98–110)
CO2 SERPL-SCNC: 23 MMOL/L — SIGNIFICANT CHANGE UP (ref 17–32)
CREAT SERPL-MCNC: 0.8 MG/DL — SIGNIFICANT CHANGE UP (ref 0.7–1.5)
EGFR: 103 ML/MIN/1.73M2 — SIGNIFICANT CHANGE UP
FERRITIN SERPL-MCNC: 94 NG/ML — SIGNIFICANT CHANGE UP (ref 15–150)
GLUCOSE SERPL-MCNC: 99 MG/DL — SIGNIFICANT CHANGE UP (ref 70–99)
GRAM STN FLD: SIGNIFICANT CHANGE UP
HAPTOGLOB SERPL-MCNC: 135 MG/DL — SIGNIFICANT CHANGE UP (ref 34–200)
HCT VFR BLD CALC: 27.4 % — LOW (ref 37–47)
HGB BLD-MCNC: 8.4 G/DL — LOW (ref 12–16)
LIDOCAIN IGE QN: 87 U/L — HIGH (ref 7–60)
MAGNESIUM SERPL-MCNC: 2.1 MG/DL — SIGNIFICANT CHANGE UP (ref 1.8–2.4)
MCHC RBC-ENTMCNC: 26.8 PG — LOW (ref 27–31)
MCHC RBC-ENTMCNC: 30.7 G/DL — LOW (ref 32–37)
MCV RBC AUTO: 87.3 FL — SIGNIFICANT CHANGE UP (ref 81–99)
NRBC # BLD: 0 /100 WBCS — SIGNIFICANT CHANGE UP (ref 0–0)
PHOSPHATE SERPL-MCNC: 4.3 MG/DL — SIGNIFICANT CHANGE UP (ref 2.1–4.9)
PLATELET # BLD AUTO: 123 K/UL — LOW (ref 130–400)
POTASSIUM SERPL-MCNC: 3.6 MMOL/L — SIGNIFICANT CHANGE UP (ref 3.5–5)
POTASSIUM SERPL-SCNC: 3.6 MMOL/L — SIGNIFICANT CHANGE UP (ref 3.5–5)
PROCALCITONIN SERPL-MCNC: 0.11 NG/ML — HIGH (ref 0.02–0.1)
PROCALCITONIN SERPL-MCNC: 0.11 NG/ML — HIGH (ref 0.02–0.1)
PROT SERPL-MCNC: 4.8 G/DL — LOW (ref 6–8)
RBC # BLD: 3.14 M/UL — LOW (ref 4.2–5.4)
RBC # FLD: 16 % — HIGH (ref 11.5–14.5)
SODIUM SERPL-SCNC: 145 MMOL/L — SIGNIFICANT CHANGE UP (ref 135–146)
SPECIMEN SOURCE: SIGNIFICANT CHANGE UP
TRIGL SERPL-MCNC: 512 MG/DL — HIGH
WBC # BLD: 3.39 K/UL — LOW (ref 4.8–10.8)
WBC # FLD AUTO: 3.39 K/UL — LOW (ref 4.8–10.8)

## 2022-06-28 PROCEDURE — 99291 CRITICAL CARE FIRST HOUR: CPT

## 2022-06-28 PROCEDURE — 99223 1ST HOSP IP/OBS HIGH 75: CPT

## 2022-06-28 PROCEDURE — 99232 SBSQ HOSP IP/OBS MODERATE 35: CPT

## 2022-06-28 PROCEDURE — 71045 X-RAY EXAM CHEST 1 VIEW: CPT | Mod: 26

## 2022-06-28 RX ORDER — PROPOFOL 10 MG/ML
100 INJECTION, EMULSION INTRAVENOUS ONCE
Refills: 0 | Status: COMPLETED | OUTPATIENT
Start: 2022-06-28 | End: 2022-06-28

## 2022-06-28 RX ORDER — CEFEPIME 1 G/1
1000 INJECTION, POWDER, FOR SOLUTION INTRAMUSCULAR; INTRAVENOUS ONCE
Refills: 0 | Status: DISCONTINUED | OUTPATIENT
Start: 2022-06-28 | End: 2022-06-28

## 2022-06-28 RX ORDER — CEFEPIME 1 G/1
1000 INJECTION, POWDER, FOR SOLUTION INTRAMUSCULAR; INTRAVENOUS EVERY 12 HOURS
Refills: 0 | Status: DISCONTINUED | OUTPATIENT
Start: 2022-06-28 | End: 2022-06-28

## 2022-06-28 RX ORDER — CEFEPIME 1 G/1
1000 INJECTION, POWDER, FOR SOLUTION INTRAMUSCULAR; INTRAVENOUS EVERY 8 HOURS
Refills: 0 | Status: DISCONTINUED | OUTPATIENT
Start: 2022-06-28 | End: 2022-07-04

## 2022-06-28 RX ORDER — OLANZAPINE 15 MG/1
10 TABLET, FILM COATED ORAL ONCE
Refills: 0 | Status: COMPLETED | OUTPATIENT
Start: 2022-06-28 | End: 2022-06-28

## 2022-06-28 RX ORDER — CEFEPIME 1 G/1
INJECTION, POWDER, FOR SOLUTION INTRAMUSCULAR; INTRAVENOUS
Refills: 0 | Status: DISCONTINUED | OUTPATIENT
Start: 2022-06-28 | End: 2022-06-28

## 2022-06-28 RX ORDER — FENTANYL CITRATE 50 UG/ML
0.5 INJECTION INTRAVENOUS
Qty: 2500 | Refills: 0 | Status: DISCONTINUED | OUTPATIENT
Start: 2022-06-28 | End: 2022-07-05

## 2022-06-28 RX ADMIN — Medication 600 MILLIGRAM(S): at 00:24

## 2022-06-28 RX ADMIN — Medication 8.03 MICROGRAM(S)/KG/MIN: at 01:00

## 2022-06-28 RX ADMIN — MIDAZOLAM HYDROCHLORIDE 1.71 MG/KG/HR: 1 INJECTION, SOLUTION INTRAMUSCULAR; INTRAVENOUS at 19:46

## 2022-06-28 RX ADMIN — FENTANYL CITRATE 4.28 MICROGRAM(S)/KG/HR: 50 INJECTION INTRAVENOUS at 19:48

## 2022-06-28 RX ADMIN — PROPOFOL 0.51 MICROGRAM(S)/KG/MIN: 10 INJECTION, EMULSION INTRAVENOUS at 05:13

## 2022-06-28 RX ADMIN — Medication 1 PATCH: at 11:07

## 2022-06-28 RX ADMIN — FENTANYL CITRATE 4.28 MICROGRAM(S)/KG/HR: 50 INJECTION INTRAVENOUS at 09:54

## 2022-06-28 RX ADMIN — Medication 2 MILLIGRAM(S): at 13:03

## 2022-06-28 RX ADMIN — CEFEPIME 100 MILLIGRAM(S): 1 INJECTION, POWDER, FOR SOLUTION INTRAMUSCULAR; INTRAVENOUS at 13:03

## 2022-06-28 RX ADMIN — CHLORHEXIDINE GLUCONATE 15 MILLILITER(S): 213 SOLUTION TOPICAL at 17:14

## 2022-06-28 RX ADMIN — Medication 40 MILLIGRAM(S): at 05:27

## 2022-06-28 RX ADMIN — Medication 2 MILLIGRAM(S): at 05:29

## 2022-06-28 RX ADMIN — Medication 100 MILLIGRAM(S): at 11:08

## 2022-06-28 RX ADMIN — Medication 1 PATCH: at 07:55

## 2022-06-28 RX ADMIN — METHADONE HYDROCHLORIDE 30 MILLIGRAM(S): 40 TABLET ORAL at 11:16

## 2022-06-28 RX ADMIN — CEFEPIME 100 MILLIGRAM(S): 1 INJECTION, POWDER, FOR SOLUTION INTRAMUSCULAR; INTRAVENOUS at 21:24

## 2022-06-28 RX ADMIN — DEXMEDETOMIDINE HYDROCHLORIDE IN 0.9% SODIUM CHLORIDE 4.28 MICROGRAM(S)/KG/HR: 4 INJECTION INTRAVENOUS at 05:13

## 2022-06-28 RX ADMIN — LEVETIRACETAM 400 MILLIGRAM(S): 250 TABLET, FILM COATED ORAL at 05:29

## 2022-06-28 RX ADMIN — PROPOFOL 0.51 MICROGRAM(S)/KG/MIN: 10 INJECTION, EMULSION INTRAVENOUS at 03:38

## 2022-06-28 RX ADMIN — MIDAZOLAM HYDROCHLORIDE 1.71 MG/KG/HR: 1 INJECTION, SOLUTION INTRAMUSCULAR; INTRAVENOUS at 15:04

## 2022-06-28 RX ADMIN — PROPOFOL 0.51 MICROGRAM(S)/KG/MIN: 10 INJECTION, EMULSION INTRAVENOUS at 11:08

## 2022-06-28 RX ADMIN — CHLORHEXIDINE GLUCONATE 1 APPLICATION(S): 213 SOLUTION TOPICAL at 05:26

## 2022-06-28 RX ADMIN — PANTOPRAZOLE SODIUM 40 MILLIGRAM(S): 20 TABLET, DELAYED RELEASE ORAL at 11:07

## 2022-06-28 RX ADMIN — Medication 2 MILLIGRAM(S): at 21:25

## 2022-06-28 RX ADMIN — PROPOFOL 0.51 MICROGRAM(S)/KG/MIN: 10 INJECTION, EMULSION INTRAVENOUS at 18:47

## 2022-06-28 RX ADMIN — Medication 1 PATCH: at 19:27

## 2022-06-28 RX ADMIN — CHLORHEXIDINE GLUCONATE 15 MILLILITER(S): 213 SOLUTION TOPICAL at 05:27

## 2022-06-28 RX ADMIN — Medication 8.03 MICROGRAM(S)/KG/MIN: at 19:46

## 2022-06-28 RX ADMIN — SPIRONOLACTONE 50 MILLIGRAM(S): 25 TABLET, FILM COATED ORAL at 05:28

## 2022-06-28 RX ADMIN — Medication 1 PATCH: at 11:01

## 2022-06-28 RX ADMIN — PROPOFOL 100 MILLIGRAM(S): 10 INJECTION, EMULSION INTRAVENOUS at 21:23

## 2022-06-28 RX ADMIN — Medication 1 MILLIGRAM(S): at 11:08

## 2022-06-28 RX ADMIN — LACTULOSE 20 GRAM(S): 10 SOLUTION ORAL at 11:07

## 2022-06-28 RX ADMIN — OLANZAPINE 10 MILLIGRAM(S): 15 TABLET, FILM COATED ORAL at 11:08

## 2022-06-28 RX ADMIN — DEXMEDETOMIDINE HYDROCHLORIDE IN 0.9% SODIUM CHLORIDE 4.28 MICROGRAM(S)/KG/HR: 4 INJECTION INTRAVENOUS at 03:38

## 2022-06-28 RX ADMIN — PROPOFOL 0.51 MICROGRAM(S)/KG/MIN: 10 INJECTION, EMULSION INTRAVENOUS at 15:03

## 2022-06-28 RX ADMIN — PROPOFOL 0.51 MICROGRAM(S)/KG/MIN: 10 INJECTION, EMULSION INTRAVENOUS at 21:23

## 2022-06-28 RX ADMIN — PROPOFOL 0.51 MICROGRAM(S)/KG/MIN: 10 INJECTION, EMULSION INTRAVENOUS at 19:46

## 2022-06-28 RX ADMIN — MIDAZOLAM HYDROCHLORIDE 1.71 MG/KG/HR: 1 INJECTION, SOLUTION INTRAMUSCULAR; INTRAVENOUS at 05:13

## 2022-06-28 NOTE — PROGRESS NOTE ADULT - ASSESSMENT
IMPRESSION:  # AHRF - on MV  # Aspiration PNA s/p Unasyn Course  # Persistent Fevers  # Anemia  # Pancytopenia  # Seizure like activity - now on AEDs  # pHTN WHO Class unclear at this time  # h/o Pericardial Effusion  # OD vs. Other Tox  # Ascites w/ h/o Liver cirrhosis d/t HepC    PLAN:    CNS:   - Repeat attempt of SAT/SBT  - d/c Keppra  - Seizure Precautions  - Clonapin 2mg TID  - Wean Prop + Versed as tolerated  - d/c Precedex  - Addiction Medicine consult  - Add Zyprexa 10mg    HEENT:   - c/w Routine oral care     PULMONARY:   - No vent changes  - HOB 45  - SBT    CARDIOVASCULAR:   - Lasix to 40g IVP QD    GI:   - GI prophylaxis  - NG Feeds  - f/u Repeat TG level  - Check Amylase/Lipase     RENAL:   - Monitor lytes  - Strict I/O's  - Ensure Mg > 2.0  - Replete Potassium    INFECTIOUS DISEASE:   - s/p Unasyn x 10 days  - Repeat Procal  - Repeat BCx Repeat UA Repeat DTA  - Cefepime 1g q8hrs    HEMATOLOGICAL:     - s/p CT A/P no RPH noted  - Hold Heparin (thrombocytopenia)  - 1U PRBC 6/26 + 1U PRBC 6/27   - Retic Ct. + %, LDH, Haptoglobin  - f/u fecal occult immuno  - Heme/Onc consult     ENDOCRINE:    - Follow up FS.  Insulin protocol PRN    MICU

## 2022-06-28 NOTE — CHART NOTE - NSCHARTNOTEFT_GEN_A_CORE
Registered Dietitian Follow-Up     Patient Profile Reviewed                           Yes [X]   No []     Nutrition History Previously Obtained        Yes [X]  No []       Pertinent  Information: pt is 26 year old female with hx of asthma, Hep C, active IVDA, anasarca presents with dyspnea admitted with fluid overload and initially admitted to med surg unit. s/p RRT 6/16 pt was in the lobby s/p seizure and fall 2/2 overdose s/p intubation and upgraded to ICU. + laceration to scalp noted. pt presently on propofol at 35 ml/hr which provides 924  kcals. As per GI moderate ascites, s/p paracentesis 1L removed on 6/22., + portal HTN 2/2 cirrhosis. pt tolerating EN at goal rate Elevated Triglycerides noted, sedation to be changed.    Diet, NPO with Tube Feed:   Tube Feeding Modality: Orogastric  Vital High Protein  Total Volume for 24 Hours (mL): 1080  Continuous  Starting Tube Feed Rate {mL per Hour}: 20  Increase Tube Feed Rate by (mL): 5     Every 4 hours  Until Goal Tube Feed Rate (mL per Hour): 45  Tube Feed Duration (in Hours): 24  Tube Feed Start Time: 20:00  No Carb Prosource TF     Qty per Day:  1 (06-18-22 @ 19:34) [Active]    Anthropometrics:  - Ht. 167.6 cm   - Wt. 75.8 kg today vs 79.3 upon admission           Pertinent Lab Data:  06-28    145  |  110  |  25<H>  ----------------------------<  99  3.6   |  23  |  0.8    Ca    7.8<L>      28 Jun 2022 06:00  Phos  4.3     06-28  Mg     2.1     06-28    TPro  4.8<L>  /  Alb  2.1<L>  /  TBili  0.5  /  DBili  x   /  AST  27  /  ALT  11  /  AlkPhos  674<H>  06-28                          8.4    3.39  )-----------( 123      ( 28 Jun 2022 07:40 )             27.4     6/28/22 triglycerides 512H     Pertinent Meds:  MEDICATIONS  (STANDING):  cefepime   IVPB 1000 milliGRAM(s) IV Intermittent every 8 hours  chlorhexidine 0.12% Liquid 15 milliLiter(s) Oral Mucosa every 12 hours  chlorhexidine 4% Liquid 1 Application(s) Topical daily  clonazePAM  Tablet 2 milliGRAM(s) Oral three times a day  fentaNYL   Infusion. 0.5 MICROgram(s)/kG/Hr (4.28 mL/Hr) IV Continuous <Continuous>  folic acid 1 milliGRAM(s) Oral daily  furosemide   Injectable 40 milliGRAM(s) IV Push daily  lactulose Syrup 20 Gram(s) Oral daily  methadone    Tablet 30 milliGRAM(s) Oral daily  midazolam Infusion 0.02 mG/kG/Hr (1.71 mL/Hr) IV Continuous <Continuous>  nicotine -  14 mG/24Hr(s) Patch 1 patch Transdermal daily  norepinephrine Infusion 0.05 MICROgram(s)/kG/Min (8.03 mL/Hr) IV Continuous <Continuous>  pantoprazole   Suspension 40 milliGRAM(s) Enteral Tube daily  propofol Infusion 1 MICROgram(s)/kG/Min (0.51 mL/Hr) IV Continuous <Continuous>  spironolactone 50 milliGRAM(s) Oral daily  thiamine 100 milliGRAM(s) Oral daily    MEDICATIONS  (PRN):  ALBUTerol    90 MICROgram(s) HFA Inhaler 1 Puff(s) Inhalation every 4 hours PRN Shortness of Breath and/or Wheezing  cloNIDine 0.1 milliGRAM(s) Oral every 6 hours PRN opiate withdrawal  hydrOXYzine hydrochloride 50 milliGRAM(s) Oral every 6 hours PRN Anxiety  ibuprofen  Tablet. 600 milliGRAM(s) Oral every 6 hours PRN Temp greater or equal to 38C (100.4F)  ibuprofen  Tablet. 400 milliGRAM(s) Oral every 6 hours PRN Mild Pain (1 - 3)       Physical Findings:  - Appearance: sedated, intubated to vent   - GI function: +BS, large BM noted 6/26/22, abd remains distended with hypoactive BS  - Tubes: OGT   - Oral/Mouth cavity:  - Skin:      Nutrition Requirements  Weight Used: 75.8 kgs      Estimated Energy Needs:  4075-9904 kcals (25-30 kcal/kg/BW) vs 1871 kcals [place]  91-106g protein (1.2-1.4g/kg/BW)  1:1 kcal for estimated fluid needs            Nutrient Intake: present EN regimen provides: 1140+924 kcal (propofol infusion), 93+15g protein and total volume 1080 ml (27 kcal/kg/BW, 1.4g/kg/BW)          [] Previous Nutrition Diagnosis:            [] Ongoing          [X] Resolved        Nutrition Intervention:  maintain on above feeds     Goal/Expected Outcome: pt to continue to tolerate EN and meet >80% of estimated nutrient needs      Indicator/Monitoring: RD to monitor tolerance to EN, sedation (adjust feeds as needed once propofol is tapered down, continue with lower fat regimen 2/2 elevated TG's) NFPF and f/u as needed within 2-4 days

## 2022-06-28 NOTE — PROGRESS NOTE ADULT - SUBJECTIVE AND OBJECTIVE BOX
Patient is a 26y old  Female who presents with a chief complaint of anasarca (27 Jun 2022 17:09)        Over Night Events:  Febrile  remains critically ill on MV  remains on  on cooling blanket      ROS:     All ROS are negative except HPI         PHYSICAL EXAM    ICU Vital Signs Last 24 Hrs  T(C): 37.4 (28 Jun 2022 07:10), Max: 39.9 (27 Jun 2022 15:00)  T(F): 99.3 (28 Jun 2022 07:10), Max: 103.8 (27 Jun 2022 15:00)  HR: 78 (28 Jun 2022 06:00) (72 - 112)  BP: 91/55 (28 Jun 2022 06:00) (85/51 - 122/85)  BP(mean): 66 (28 Jun 2022 06:00) (63 - 98)  ABP: --  ABP(mean): --  RR: 19 (28 Jun 2022 07:10) (19 - 39)  SpO2: 98% (28 Jun 2022 06:00) (90% - 100%)      CONSTITUTIONAL:  Well nourished.  NAD    ENT:   Airway patent,   Mouth with normal mucosa.   No thrush    EYES:   Pupils equal,   Round and reactive to light.    CARDIAC:   Normal rate,   Regular rhythm.    No edema      Vascular:  Normal systolic impulse  No Carotid bruits    RESPIRATORY:   No wheezing  Bilateral BS  Normal chest expansion  Not tachypneic,  No use of accessory muscles    GASTROINTESTINAL:  Abdomen distended, soft,   Non-tender,   No guarding,         NEUROLOGICAL:   sedated    SKIN:   Skin normal color for race,   Warm and dry and intact.   No evidence of rash.    PSYCHIATRIC:   Normal mood and affect.   No apparent risk to self or others.    HEMATOLOGICAL:  No cervical  lymphadenopathy.  no inguinal lymphadenopathy      06-27-22 @ 07:01  -  06-28-22 @ 07:00  --------------------------------------------------------  IN:    Dexmedetomidine: 768 mL    IV PiggyBack: 50 mL    IV PiggyBack: 300 mL    Midazolam: 276 mL    Norepinephrine: 38 mL    PRBCs (Packed Red Blood Cells): 308 mL    Propofol: 817 mL    Vital High Protein: 835 mL  Total IN: 3392 mL    OUT:    Indwelling Catheter - Urethral (mL): 1390 mL  Total OUT: 1390 mL    Total NET: 2002 mL      06-28-22 @ 07:01  -  06-28-22 @ 08:09  --------------------------------------------------------  IN:  Total IN: 0 mL    OUT:    Indwelling Catheter - Urethral (mL): 225 mL  Total OUT: 225 mL    Total NET: -225 mL          LABS:                            8.4    3.39  )-----------( 123      ( 28 Jun 2022 07:40 )             27.4                                               06-28    145  |  110  |  25<H>  ----------------------------<  99  3.6   |  23  |  0.8    Ca    7.8<L>      28 Jun 2022 06:00  Phos  4.3     06-28  Mg     2.1     06-28    TPro  4.8<L>  /  Alb  2.1<L>  /  TBili  0.5  /  DBili  x   /  AST  27  /  ALT  11  /  AlkPhos  674<H>  06-28                                             Urinalysis Basic - ( 27 Jun 2022 14:00 )    Color: Yellow / Appearance: Clear / SG: >=1.030 / pH: x  Gluc: x / Ketone: Negative  / Bili: Negative / Urobili: 1.0 mg/dL   Blood: x / Protein: >=300 mg/dL / Nitrite: Negative   Leuk Esterase: Negative / RBC: 11-25 /HPF / WBC 3-5 /HPF   Sq Epi: x / Non Sq Epi: Few /HPF / Bacteria: Moderate                                                  LIVER FUNCTIONS - ( 28 Jun 2022 06:00 )  Alb: 2.1 g/dL / Pro: 4.8 g/dL / ALK PHOS: 674 U/L / ALT: 11 U/L / AST: 27 U/L / GGT: x                                                  Culture - Sputum (collected 27 Jun 2022 14:00)  Source: Trach Asp Tracheal Aspirate  Gram Stain (28 Jun 2022 03:15):    Moderate polymorphonuclear leukocytes per low power field    Few Squamous epithelial cells per low power field    Moderate Gram positive cocci in pairs seen per oil power field    Few Gram Variable Rods seen per oil power field                                                   Mode: AC/ CMV (Assist Control/ Continuous Mandatory Ventilation)  RR (machine): 22  TV (machine): 350  FiO2: 30  PEEP: 5  ITime: 0.8  MAP: 9  PIP: 18                                      ABG - ( 28 Jun 2022 03:57 )  pH, Arterial: 7.45  pH, Blood: x     /  pCO2: 37    /  pO2: 71    / HCO3: 26    / Base Excess: 1.7   /  SaO2: 97.0                MEDICATIONS  (STANDING):  chlorhexidine 0.12% Liquid 15 milliLiter(s) Oral Mucosa every 12 hours  chlorhexidine 4% Liquid 1 Application(s) Topical daily  clonazePAM  Tablet 2 milliGRAM(s) Oral three times a day  dexMEDEtomidine Infusion 0.2 MICROgram(s)/kG/Hr (4.28 mL/Hr) IV Continuous <Continuous>  folic acid 1 milliGRAM(s) Oral daily  furosemide   Injectable 40 milliGRAM(s) IV Push daily  lactulose Syrup 20 Gram(s) Oral daily  levETIRAcetam  IVPB 250 milliGRAM(s) IV Intermittent every 12 hours  methadone    Tablet 30 milliGRAM(s) Oral daily  midazolam Infusion 0.02 mG/kG/Hr (1.71 mL/Hr) IV Continuous <Continuous>  nicotine -  14 mG/24Hr(s) Patch 1 patch Transdermal daily  norepinephrine Infusion 0.05 MICROgram(s)/kG/Min (8.03 mL/Hr) IV Continuous <Continuous>  pantoprazole   Suspension 40 milliGRAM(s) Enteral Tube daily  propofol Infusion 1 MICROgram(s)/kG/Min (0.51 mL/Hr) IV Continuous <Continuous>  spironolactone 50 milliGRAM(s) Oral daily  thiamine 100 milliGRAM(s) Oral daily    MEDICATIONS  (PRN):  ALBUTerol    90 MICROgram(s) HFA Inhaler 1 Puff(s) Inhalation every 4 hours PRN Shortness of Breath and/or Wheezing  cloNIDine 0.1 milliGRAM(s) Oral every 6 hours PRN opiate withdrawal  hydrOXYzine hydrochloride 50 milliGRAM(s) Oral every 6 hours PRN Anxiety  ibuprofen  Tablet. 600 milliGRAM(s) Oral every 6 hours PRN Temp greater or equal to 38C (100.4F)  ibuprofen  Tablet. 400 milliGRAM(s) Oral every 6 hours PRN Mild Pain (1 - 3)      New X-rays reviewed:                                                                                  ECHO    CXR interpreted by me:       Patient is a 26y old  Female who presents with a chief complaint of anasarca (27 Jun 2022 17:09)        Over Night Events:  Febrile  remains critically ill on MV  remains on propofol, versed, precedex  on levophed 0.03      ROS:     All ROS are negative except HPI         PHYSICAL EXAM    ICU Vital Signs Last 24 Hrs  T(C): 37.4 (28 Jun 2022 07:10), Max: 39.9 (27 Jun 2022 15:00)  T(F): 99.3 (28 Jun 2022 07:10), Max: 103.8 (27 Jun 2022 15:00)  HR: 78 (28 Jun 2022 06:00) (72 - 112)  BP: 91/55 (28 Jun 2022 06:00) (85/51 - 122/85)  BP(mean): 66 (28 Jun 2022 06:00) (63 - 98)  ABP: --  ABP(mean): --  RR: 19 (28 Jun 2022 07:10) (19 - 39)  SpO2: 98% (28 Jun 2022 06:00) (90% - 100%)      CONSTITUTIONAL:  Well nourished.  NAD    ENT:   Airway patent,   Mouth with normal mucosa.   No thrush    EYES:   Pupils equal,   Round and reactive to light.    CARDIAC:   Normal rate,   Regular rhythm.    No edema      Vascular:  Normal systolic impulse  No Carotid bruits    RESPIRATORY:   No wheezing  Bilateral BS  Normal chest expansion  Not tachypneic,  No use of accessory muscles    GASTROINTESTINAL:  Abdomen distended, soft,   Non-tender,   No guarding,         NEUROLOGICAL:   sedated    SKIN:   Skin normal color for race,   Warm and dry and intact.   No evidence of rash.        06-27-22 @ 07:01  -  06-28-22 @ 07:00  --------------------------------------------------------  IN:    Dexmedetomidine: 768 mL    IV PiggyBack: 50 mL    IV PiggyBack: 300 mL    Midazolam: 276 mL    Norepinephrine: 38 mL    PRBCs (Packed Red Blood Cells): 308 mL    Propofol: 817 mL    Vital High Protein: 835 mL  Total IN: 3392 mL    OUT:    Indwelling Catheter - Urethral (mL): 1390 mL  Total OUT: 1390 mL    Total NET: 2002 mL      06-28-22 @ 07:01  -  06-28-22 @ 08:09  --------------------------------------------------------  IN:  Total IN: 0 mL    OUT:    Indwelling Catheter - Urethral (mL): 225 mL  Total OUT: 225 mL    Total NET: -225 mL          LABS:                            8.4    3.39  )-----------( 123      ( 28 Jun 2022 07:40 )             27.4                                               06-28    145  |  110  |  25<H>  ----------------------------<  99  3.6   |  23  |  0.8    Ca    7.8<L>      28 Jun 2022 06:00  Phos  4.3     06-28  Mg     2.1     06-28    TPro  4.8<L>  /  Alb  2.1<L>  /  TBili  0.5  /  DBili  x   /  AST  27  /  ALT  11  /  AlkPhos  674<H>  06-28                                             Urinalysis Basic - ( 27 Jun 2022 14:00 )    Color: Yellow / Appearance: Clear / SG: >=1.030 / pH: x  Gluc: x / Ketone: Negative  / Bili: Negative / Urobili: 1.0 mg/dL   Blood: x / Protein: >=300 mg/dL / Nitrite: Negative   Leuk Esterase: Negative / RBC: 11-25 /HPF / WBC 3-5 /HPF   Sq Epi: x / Non Sq Epi: Few /HPF / Bacteria: Moderate                                                  LIVER FUNCTIONS - ( 28 Jun 2022 06:00 )  Alb: 2.1 g/dL / Pro: 4.8 g/dL / ALK PHOS: 674 U/L / ALT: 11 U/L / AST: 27 U/L / GGT: x                                                  Culture - Sputum (collected 27 Jun 2022 14:00)  Source: Trach Asp Tracheal Aspirate  Gram Stain (28 Jun 2022 03:15):    Moderate polymorphonuclear leukocytes per low power field    Few Squamous epithelial cells per low power field    Moderate Gram positive cocci in pairs seen per oil power field    Few Gram Variable Rods seen per oil power field                                                   Mode: AC/ CMV (Assist Control/ Continuous Mandatory Ventilation)  RR (machine): 22  TV (machine): 350  FiO2: 30  PEEP: 5  ITime: 0.8  MAP: 9  PIP: 18                                      ABG - ( 28 Jun 2022 03:57 )  pH, Arterial: 7.45  pH, Blood: x     /  pCO2: 37    /  pO2: 71    / HCO3: 26    / Base Excess: 1.7   /  SaO2: 97.0                MEDICATIONS  (STANDING):  chlorhexidine 0.12% Liquid 15 milliLiter(s) Oral Mucosa every 12 hours  chlorhexidine 4% Liquid 1 Application(s) Topical daily  clonazePAM  Tablet 2 milliGRAM(s) Oral three times a day  dexMEDEtomidine Infusion 0.2 MICROgram(s)/kG/Hr (4.28 mL/Hr) IV Continuous <Continuous>  folic acid 1 milliGRAM(s) Oral daily  furosemide   Injectable 40 milliGRAM(s) IV Push daily  lactulose Syrup 20 Gram(s) Oral daily  levETIRAcetam  IVPB 250 milliGRAM(s) IV Intermittent every 12 hours  methadone    Tablet 30 milliGRAM(s) Oral daily  midazolam Infusion 0.02 mG/kG/Hr (1.71 mL/Hr) IV Continuous <Continuous>  nicotine -  14 mG/24Hr(s) Patch 1 patch Transdermal daily  norepinephrine Infusion 0.05 MICROgram(s)/kG/Min (8.03 mL/Hr) IV Continuous <Continuous>  pantoprazole   Suspension 40 milliGRAM(s) Enteral Tube daily  propofol Infusion 1 MICROgram(s)/kG/Min (0.51 mL/Hr) IV Continuous <Continuous>  spironolactone 50 milliGRAM(s) Oral daily  thiamine 100 milliGRAM(s) Oral daily    MEDICATIONS  (PRN):  ALBUTerol    90 MICROgram(s) HFA Inhaler 1 Puff(s) Inhalation every 4 hours PRN Shortness of Breath and/or Wheezing  cloNIDine 0.1 milliGRAM(s) Oral every 6 hours PRN opiate withdrawal  hydrOXYzine hydrochloride 50 milliGRAM(s) Oral every 6 hours PRN Anxiety  ibuprofen  Tablet. 600 milliGRAM(s) Oral every 6 hours PRN Temp greater or equal to 38C (100.4F)  ibuprofen  Tablet. 400 milliGRAM(s) Oral every 6 hours PRN Mild Pain (1 - 3)      New X-rays reviewed:                                                                                  ECHO    CXR interpreted by me:

## 2022-06-28 NOTE — PROGRESS NOTE ADULT - SUBJECTIVE AND OBJECTIVE BOX
POOJA DIANE Female [id number] Code Status: FULL CODE    Dispo: Admit to [facility]  Patient is a 26y old  Female who presents with a chief complaint of anasarca (2022 17:09)      INTERVAL HPI/OVERNIGHT EVENTS: Continues to spike fevers, remains critically ill intubated and sedated.       ICU Vital Signs Last 24 Hrs  T(C): 37.4 (2022 07:10), Max: 39.9 (2022 15:00)  T(F): 99.3 (2022 07:10), Max: 103.8 (2022 15:00)  HR: 78 (2022 06:00) (72 - 112)  BP: 91/55 (2022 06:00) (85/51 - 122/85)  BP(mean): 66 (2022 06:00) (63 - 98)  RR: 19 (2022 07:10) (19 - 39)  SpO2: 98% (2022 06:00) (90% - 100%)      I&O's Summary    2022 07:01  -  2022 07:00  --------------------------------------------------------  IN: 3392 mL / OUT: 1390 mL / NET: 2002 mL    2022 07:01  -  2022 08:16  --------------------------------------------------------  IN: 0 mL / OUT: 225 mL / NET: -225 mL         Daily     Daily Weight in k.8 (2022 05:00)    Mode: AC/ CMV (Assist Control/ Continuous Mandatory Ventilation)  RR (machine): 22  TV (machine): 350  FiO2: 30  PEEP: 5  ITime: 0.8  MAP: 9  PIP: 18      ABG - ( 2022 03:57 )  pH, Arterial: 7.45  pH, Blood: x     /  pCO2: 37    /  pO2: 71    / HCO3: 26    / Base Excess: 1.7   /  SaO2: 97.0          LABS:                        8.4    3.39  )-----------( 123      ( 2022 07:40 )             27.4     Reticulocyte Index: 0.58 <L>  Abs Reticulocytes: 1.2        145  |  110  |  25<H>  ----------------------------<  99  3.6   |  23  |  0.8    Ca    7.8<L>      2022 06:00  Phos  4.3       Mg     2.1         TPro  4.8<L>  /  Alb  2.1<L>  /  TBili  0.5  /  DBili  x   /  AST  27  /  ALT  11  /  AlkPhos  674<H>        Urinalysis Basic - ( 2022 14:00 )    Color: Yellow / Appearance: Clear / SG: >=1.030 / pH: x  Gluc: x / Ketone: Negative  / Bili: Negative / Urobili: 1.0 mg/dL   Blood: x / Protein: >=300 mg/dL / Nitrite: Negative   Leuk Esterase: Negative / RBC: 11-25 /HPF / WBC 3-5 /HPF   Sq Epi: x / Non Sq Epi: Few /HPF / Bacteria: Moderate      CAPILLARY BLOOD GLUCOSE    Culture - Sputum (collected 22 @ 14:00)  Source: Trach Asp Tracheal Aspirate  Gram Stain (22 @ 03:15):    Moderate polymorphonuclear leukocytes per low power field    Few Squamous epithelial cells per low power field    Moderate Gram positive cocci in pairs seen per oil power field    Few Gram Variable Rods seen per oil power field      Procalcitonin, Serum: 0.11 ng/mL (22 @ 15:46)  Procalcitonin, Serum: 0.11 ng/mL (22 @ 06:47)  Procalcitonin, Serum: 0.62 ng/mL (22 @ 05:49)  Procalcitonin, Serum: 3.28 ng/mL (22 @ 05:22)        Ferritin, Serum: 94 ng/mL ()    Sedimentation Rate, Erythrocyte: 65 mm/Hr ()      RADIOLOGY & ADDITIONAL TESTS:  < from: CT Abdomen and Pelvis w/ IV Cont (22 @ 17:27) >  IMPRESSION:    No evidence of retroperitoneal hematoma.    Small bilateral pleural effusions and left basilar consolidation.    Moderate ascites redemonstrated.    Moderate pericardial effusion.    Hepatosplenomegaly redemonstrated.    --- End of Report ---    < end of copied text >    CARDIOLOGY DIAGNOSTICS:   12 Lead ECG:   Ventricular Rate 89 BPM    Atrial Rate 89 BPM    P-R Interval 180 ms    QRS Duration 76 ms    Q-T Interval 328 ms    QTC Calculation(Bazett) 399 ms    P Axis 67 degrees    R Axis 102 degrees    T Axis -2 degrees    Diagnosis Line Normal sinus rhythm  Rightward axis  Low voltage QRS  Nonspecific T wave abnormality  Abnormal ECG    Confirmed by BRANDON BELL MD ([id number]) on 2022 11:21:39 AM (22 @ 08:33)      MEDICATIONS  (STANDING):  chlorhexidine 0.12% Liquid 15 milliLiter(s) Oral Mucosa every 12 hours  chlorhexidine 4% Liquid 1 Application(s) Topical daily  clonazePAM  Tablet 2 milliGRAM(s) Oral three times a day  dexMEDEtomidine Infusion 0.2 MICROgram(s)/kG/Hr (4.28 mL/Hr) IV Continuous <Continuous>  folic acid 1 milliGRAM(s) Oral daily  furosemide   Injectable 40 milliGRAM(s) IV Push daily  lactulose Syrup 20 Gram(s) Oral daily  levETIRAcetam  IVPB 250 milliGRAM(s) IV Intermittent every 12 hours  methadone    Tablet 30 milliGRAM(s) Oral daily  midazolam Infusion 0.02 mG/kG/Hr (1.71 mL/Hr) IV Continuous <Continuous>  nicotine -  14 mG/24Hr(s) Patch 1 patch Transdermal daily  norepinephrine Infusion 0.05 MICROgram(s)/kG/Min (8.03 mL/Hr) IV Continuous <Continuous>  pantoprazole   Suspension 40 milliGRAM(s) Enteral Tube daily  propofol Infusion 1 MICROgram(s)/kG/Min (0.51 mL/Hr) IV Continuous <Continuous>  spironolactone 50 milliGRAM(s) Oral daily  thiamine 100 milliGRAM(s) Oral daily    MEDICATIONS  (PRN):  ALBUTerol    90 MICROgram(s) HFA Inhaler 1 Puff(s) Inhalation every 4 hours PRN Shortness of Breath and/or Wheezing  cloNIDine 0.1 milliGRAM(s) Oral every 6 hours PRN opiate withdrawal  hydrOXYzine hydrochloride 50 milliGRAM(s) Oral every 6 hours PRN Anxiety  ibuprofen  Tablet. 600 milliGRAM(s) Oral every 6 hours PRN Temp greater or equal to 38C (100.4F)  ibuprofen  Tablet. 400 milliGRAM(s) Oral every 6 hours PRN Mild Pain (1 - 3)      PHYSICAL EXAM:  GENERAL: NAD  HEENT:  NCAT, PERRL, No JVD, Trachea Midline, +ETT, +OGT  NERVOUS SYSTEM:  Sedated to RASS 0 to -1 opens eyes to my voice and tracks me   CHEST/LUNG: Good air entry bilaterally  HEART: Regular rate and rhythm  ABDOMEN: Soft, Nontender, + distension   EXTREMITIES:  Warm, well perfused  SKIN: No new skin breakdowns       POOJA DIANE Female [id number] Code Status: FULL CODE    Dispo: Admit to [facility]  Patient is a 26y old  Female who presents with a chief complaint of anasarca (2022 17:09)      INTERVAL HPI/OVERNIGHT EVENTS: Afebrile overnight, remains critically ill intubated and sedated.       ICU Vital Signs Last 24 Hrs  T(C): 37.4 (2022 07:10), Max: 39.9 (2022 15:00)  T(F): 99.3 (2022 07:10), Max: 103.8 (2022 15:00)  HR: 78 (2022 06:00) (72 - 112)  BP: 91/55 (2022 06:00) (85/51 - 122/85)  BP(mean): 66 (2022 06:00) (63 - 98)  RR: 19 (2022 07:10) (19 - 39)  SpO2: 98% (2022 06:00) (90% - 100%)      I&O's Summary    2022 07:01  -  2022 07:00  --------------------------------------------------------  IN: 3392 mL / OUT: 1390 mL / NET: 2002 mL    2022 07:01  -  2022 08:16  --------------------------------------------------------  IN: 0 mL / OUT: 225 mL / NET: -225 mL         Daily     Daily Weight in k.8 (2022 05:00)    Mode: AC/ CMV (Assist Control/ Continuous Mandatory Ventilation)  RR (machine): 22  TV (machine): 350  FiO2: 30  PEEP: 5  ITime: 0.8  MAP: 9  PIP: 18      ABG - ( 2022 03:57 )  pH, Arterial: 7.45  pH, Blood: x     /  pCO2: 37    /  pO2: 71    / HCO3: 26    / Base Excess: 1.7   /  SaO2: 97.0          LABS:                        8.4    3.39  )-----------( 123      ( 2022 07:40 )             27.4     Reticulocyte Index: 0.58 <L>  Abs Reticulocytes: 1.2        145  |  110  |  25<H>  ----------------------------<  99  3.6   |  23  |  0.8    Ca    7.8<L>      2022 06:00  Phos  4.3       Mg     2.1         TPro  4.8<L>  /  Alb  2.1<L>  /  TBili  0.5  /  DBili  x   /  AST  27  /  ALT  11  /  AlkPhos  674<H>        Urinalysis Basic - ( 2022 14:00 )    Color: Yellow / Appearance: Clear / SG: >=1.030 / pH: x  Gluc: x / Ketone: Negative  / Bili: Negative / Urobili: 1.0 mg/dL   Blood: x / Protein: >=300 mg/dL / Nitrite: Negative   Leuk Esterase: Negative / RBC: 11-25 /HPF / WBC 3-5 /HPF   Sq Epi: x / Non Sq Epi: Few /HPF / Bacteria: Moderate      CAPILLARY BLOOD GLUCOSE    Culture - Sputum (collected 22 @ 14:00)  Source: Trach Asp Tracheal Aspirate  Gram Stain (22 @ 03:15):    Moderate polymorphonuclear leukocytes per low power field    Few Squamous epithelial cells per low power field    Moderate Gram positive cocci in pairs seen per oil power field    Few Gram Variable Rods seen per oil power field    Organism: Klebsiella oxytoca /Raoutella ornithinolytica (22 @ 17:20)   Organism: Enterobacter cloacae complex (22 @ 17:20)   Procalcitonin, Serum: 0.11 ng/mL (22 @ 15:46)  Procalcitonin, Serum: 0.11 ng/mL (22 @ 06:47)  Procalcitonin, Serum: 0.62 ng/mL (22 @ 05:49)  Procalcitonin, Serum: 3.28 ng/mL (22 @ 05:22)    Ferritin, Serum: 94 ng/mL ()    Sedimentation Rate, Erythrocyte: 65 mm/Hr ()      RADIOLOGY & ADDITIONAL TESTS:  < from: CT Abdomen and Pelvis w/ IV Cont (22 @ 17:27) >  IMPRESSION:    No evidence of retroperitoneal hematoma.    Small bilateral pleural effusions and left basilar consolidation.    Moderate ascites redemonstrated.    Moderate pericardial effusion.    Hepatosplenomegaly redemonstrated.    --- End of Report ---    < end of copied text >    CARDIOLOGY DIAGNOSTICS:   12 Lead ECG:   Ventricular Rate 89 BPM    Atrial Rate 89 BPM    P-R Interval 180 ms    QRS Duration 76 ms    Q-T Interval 328 ms    QTC Calculation(Bazett) 399 ms    P Axis 67 degrees    R Axis 102 degrees    T Axis -2 degrees    Diagnosis Line Normal sinus rhythm  Rightward axis  Low voltage QRS  Nonspecific T wave abnormality  Abnormal ECG    Confirmed by BRANDON BELL MD ([id number]) on 2022 11:21:39 AM (22 @ 08:33)      MEDICATIONS  (STANDING):  chlorhexidine 0.12% Liquid 15 milliLiter(s) Oral Mucosa every 12 hours  chlorhexidine 4% Liquid 1 Application(s) Topical daily  clonazePAM  Tablet 2 milliGRAM(s) Oral three times a day  dexMEDEtomidine Infusion 0.2 MICROgram(s)/kG/Hr (4.28 mL/Hr) IV Continuous <Continuous>  folic acid 1 milliGRAM(s) Oral daily  furosemide   Injectable 40 milliGRAM(s) IV Push daily  lactulose Syrup 20 Gram(s) Oral daily  levETIRAcetam  IVPB 250 milliGRAM(s) IV Intermittent every 12 hours  methadone    Tablet 30 milliGRAM(s) Oral daily  midazolam Infusion 0.02 mG/kG/Hr (1.71 mL/Hr) IV Continuous <Continuous>  nicotine -  14 mG/24Hr(s) Patch 1 patch Transdermal daily  norepinephrine Infusion 0.05 MICROgram(s)/kG/Min (8.03 mL/Hr) IV Continuous <Continuous>  pantoprazole   Suspension 40 milliGRAM(s) Enteral Tube daily  propofol Infusion 1 MICROgram(s)/kG/Min (0.51 mL/Hr) IV Continuous <Continuous>  spironolactone 50 milliGRAM(s) Oral daily  thiamine 100 milliGRAM(s) Oral daily    MEDICATIONS  (PRN):  ALBUTerol    90 MICROgram(s) HFA Inhaler 1 Puff(s) Inhalation every 4 hours PRN Shortness of Breath and/or Wheezing  cloNIDine 0.1 milliGRAM(s) Oral every 6 hours PRN opiate withdrawal  hydrOXYzine hydrochloride 50 milliGRAM(s) Oral every 6 hours PRN Anxiety  ibuprofen  Tablet. 600 milliGRAM(s) Oral every 6 hours PRN Temp greater or equal to 38C (100.4F)  ibuprofen  Tablet. 400 milliGRAM(s) Oral every 6 hours PRN Mild Pain (1 - 3)      PHYSICAL EXAM:  GENERAL: NAD  HEENT:  NCAT, PERRL, No JVD, Trachea Midline, +ETT, +OGT  NERVOUS SYSTEM:  Sedated to RASS 0 to -1 opens eyes to my voice and tracks me   CHEST/LUNG: Good air entry bilaterally  HEART: Regular rate and rhythm  ABDOMEN: Soft, Nontender, + distension   EXTREMITIES:  Warm, well perfused  SKIN: No new skin breakdowns

## 2022-06-28 NOTE — PROGRESS NOTE ADULT - ASSESSMENT
IMPRESSION:  Acute respiratory failure sp intubation  PNA  seizure like activity  ?? drug over dose/ withdrawal opoid   liver cirrhosis   Ascites sp paracentesis   Pancytopenia    PLAN:    CNS: do SAT SBT as tolerated  AEDs per neuro  CW methadone 30mg daily  CW Clonapin to 2mg TID  DC precedex       HEENT: oral care     PULMONARY:  HOB 45,  Vent changes: none, SBT    CARDIOVASCULAR: goal MAP >65. Diuresis as tolerated. decrease lasix to qd    GI: GI prophylaxis.  OG Feeding.    RENAL: monitor lytes is and os     INFECTIOUS DISEASE: completed 10 days of unasyn. FU DTA,    ID consult       HEMATOLOGICAL:   Hold  Hep SQ, transfuse 1 unit PRBC, FU HIT, monitor CBC, CT A/P reviewed  stool guaiac, OG lavage  Heme eval     ENDOCRINE:  Follow up FS.  Insulin protocol if needed.     MICU  lines: [facility] since 6/16  Failed TOV IMPRESSION:  Acute respiratory failure sp intubation  PNA SP Abx  seizure like activity  ?? drug over dose/ withdrawal opoid   liver cirrhosis   Ascites sp paracentesis   Pancytopenia    PLAN:    CNS: do SAT SBT as tolerated  AEDs per neuro  CW methadone 30mg daily  CW Clonapin to 2mg TID  DC precedex  Fentanyl PRN       HEENT: oral care     PULMONARY:  HOB 45,  Vent changes: none, SBT    CARDIOVASCULAR: goal MAP >65. Diuresis as tolerated. CW lasix qd    GI: GI prophylaxis.  OG Feeding.    RENAL: monitor lytes is and os     INFECTIOUS DISEASE: completed 10 days of unasyn. FU DTA,    ID consult     HEMATOLOGICAL:   Hold  Hep SQ, transfuse 1 unit PRBC, FU HIT, monitor CBC  Heme FU    ENDOCRINE:  Follow up FS.  Insulin protocol if needed.     MICU  lines: [facility] since 6/16  Failed TOV

## 2022-06-28 NOTE — PROGRESS NOTE ADULT - ASSESSMENT
Patient is 26 year old female with hx of asthma,  IVDA, anasarca hx of use of opiates 2 grams per day IV into her thighs  x years with minimal sobriety. Pt has been on MMTP in 2020.  presenting with       # AHRF - on MV  # Aspiration PNA s/p Unasyn Course  # Persistent Fevers  # Anemia  # Pancytopenia  # pHTN WHO Class unclear at this time  # h/o Pericardial Effusion  # OD vs. Other Tox  # Ascites w/ h/o Liver cirrhosis d/t HepC    PLAN:     - Repeat attempt of SAT/SBT  - Seizure Precautions  - Clonapin 2mg TID  - Wean Prop + Versed as tolerated  - d/c Precedex  - Addiction Medicine consult  - Add Zyprexa 10mg  -SBT  - Lasix to 40g IVP QD  -NG tube Feeds  - f/u Repeat TG level  - Check Amylase/Lipase   - s/p Unasyn x 10 days  - Repeat Procal  - Repeat BCx Repeat UA Repeat DTA  - Cefepime 1g q8hrs  - s/p CT A/P no RPH noted  - Hold Heparin (thrombocytopenia)  - 1U PRBC 6/26 + 1U PRBC 6/27   - Retic Ct. + %, LDH, Haptoglobin  - f/u fecal occult immuno  - Heme/Onc consult       further care as per critical care team Patient is 26 year old female with hx of asthma,  IVDA, anasarca hx of use of opiates 2 grams per day IV into her thighs  x years with minimal sobriety. Pt has been on MMTP in 2020.  presenting with       # AHRF - on MV  # Aspiration PNA s/p Unasyn Course  # Persistent Fevers  # Anemia  # Pancytopenia  # pHTN WHO Class unclear at this time  # h/o Pericardial Effusion  # OD vs. Other Tox  # Ascites w/ h/o Liver cirrhosis d/t HepC    PLAN:     - Repeat attempt of SAT/SBT  - Seizure Precautions  - Clonapin 2mg TID  - Wean Prop + Versed as tolerated  - d/c Precedex  - Addiction Medicine consult  - Add Zyprexa 10mg  -SBT  - Lasix to 40g IVP QD  -NG tube Feeds  - f/u Repeat TG level  - Check Amylase/Lipase   - s/p Unasyn x 10 days  - Repeat Procal  - Repeat BCx Repeat UA Repeat DTA  - Cefepime 1g q8hrs  - Hold Heparin (thrombocytopenia)  - 1U PRBC 6/26 + 1U PRBC 6/27   - Retic Ct. + %, LDH, Haptoglobin  - f/u fecal occult immuno  - Heme/Onc consult       further care as per critical care team

## 2022-06-28 NOTE — CONSULT NOTE ADULT - ASSESSMENT
ASSESSMENT  Patient is a 26 year old female with hx of asthma, untreated Hep C, IVDA, anasarca presenting with increased dyspnea since yesterday    IMPRESSION  #Acute respiratory failure s/p intubation 6/16  #Pneumonia -   - CT Chest 6/22 - left greater than right lower lobe consolidations   - sputum Cx 6/24 Klebsiella oxytoca, Enterobacter cloacae complex    #Fevers  - Ferritin, Serum: 94 ng/mL (06.27.22 @ 06:47)  - Procalcitonin, Serum: 0.11 (06.27.22 @ 15:46)  - CT Abd/pelvis 6/26 - moderated ascites moderate pericardial effusion     #Drug overdose vs withdrawal?  #Hepatosplenomegaly - present since CT Abd/pelvis 1/30/2021  #Pancytopenia  #Hx of Untreated Hep C   #IVDA   #Obesity BMI (kg/m2): 30.5  #DM   #Abx allergy: buprenorphine (Rash)  Suboxone (Rash)    Recommendations   - increase cefepime to 1g q 8 hours for Enterobacter cloacae, Klebsiella   - screen HIV   - trend fever curve  - if fevers not improving with antibiotics, autoimmune conditions also in differential for fevers, splenomegaly, cytopenia, with Hep C     Please call or message on [employer] Teams if with any questions.  Spectra [phone]

## 2022-06-28 NOTE — PROGRESS NOTE ADULT - SUBJECTIVE AND OBJECTIVE BOX
26yFemale  Being followed for ascites, elevated ALP  Interval history:  No hematemesis, melena, blood in stool reported.       PAST MEDICAL & SURGICAL HISTORY:   Hepatitis C      Asthma      Anxiety and depression      IV drug abuse  heroin      No significant past surgical history              Social History: No smoking. No alcohol. + illegal drug use.            MEDICATIONS  (STANDING):  cefepime   IVPB 1000 milliGRAM(s) IV Intermittent every 8 hours  chlorhexidine 0.12% Liquid 15 milliLiter(s) Oral Mucosa every 12 hours  chlorhexidine 4% Liquid 1 Application(s) Topical daily  clonazePAM  Tablet 2 milliGRAM(s) Oral three times a day  fentaNYL   Infusion. 0.5 MICROgram(s)/kG/Hr (4.28 mL/Hr) IV Continuous <Continuous>  folic acid 1 milliGRAM(s) Oral daily  furosemide   Injectable 40 milliGRAM(s) IV Push daily  lactulose Syrup 20 Gram(s) Oral daily  methadone    Tablet 30 milliGRAM(s) Oral daily  midazolam Infusion 0.02 mG/kG/Hr (1.71 mL/Hr) IV Continuous <Continuous>  nicotine -  14 mG/24Hr(s) Patch 1 patch Transdermal daily  norepinephrine Infusion 0.05 MICROgram(s)/kG/Min (8.03 mL/Hr) IV Continuous <Continuous>  pantoprazole   Suspension 40 milliGRAM(s) Enteral Tube daily  propofol Infusion 1 MICROgram(s)/kG/Min (0.51 mL/Hr) IV Continuous <Continuous>  spironolactone 50 milliGRAM(s) Oral daily  thiamine 100 milliGRAM(s) Oral daily    MEDICATIONS  (PRN):  ALBUTerol    90 MICROgram(s) HFA Inhaler 1 Puff(s) Inhalation every 4 hours PRN Shortness of Breath and/or Wheezing  cloNIDine 0.1 milliGRAM(s) Oral every 6 hours PRN opiate withdrawal  hydrOXYzine hydrochloride 50 milliGRAM(s) Oral every 6 hours PRN Anxiety  ibuprofen  Tablet. 600 milliGRAM(s) Oral every 6 hours PRN Temp greater or equal to 38C (100.4F)  ibuprofen  Tablet. 400 milliGRAM(s) Oral every 6 hours PRN Mild Pain (1 - 3)      Allergies:   buprenorphine (Rash)  Suboxone (Rash)            REVIEW OF SYSTEMS:  unobtainable         VITAL SIGNS:   T(F): 99.3 (06-28-22 @ 11:01), Max: 103.8 (06-27-22 @ 15:00)  HR: 85 (06-28-22 @ 13:00) (72 - 112)  BP: 107/66 (06-28-22 @ 13:00) (85/51 - 119/77)  RR: 25 (06-28-22 @ 13:00) (19 - 40)  SpO2: 97% (06-28-22 @ 13:00) (90% - 100%)    PHYSICAL EXAM:  GENERAL: +intubated  HEAD:  Atraumatic, Normocephalic  EYES: conjunctiva and sclera clear  NECK: Supple, no JVD or thyromegaly  CHEST/LUNG: +left sided rhonchi  HEART: Regular rate and rhythm; normal S1, S2, No murmurs.  ABDOMEN: Soft, +mildly distended; Bowel sounds present  NEUROLOGY: No asterixis or tremor.   SKIN: Intact, no jaundice            LABS:                        8.4    3.39  )-----------( 123      ( 28 Jun 2022 07:40 )             27.4     06-28    145  |  110  |  25<H>  ----------------------------<  99  3.6   |  23  |  0.8    Ca    7.8<L>      28 Jun 2022 06:00  Phos  4.3     06-28  Mg     2.1     06-28    TPro  4.8<L>  /  Alb  2.1<L>  /  TBili  0.5  /  DBili  x   /  AST  27  /  ALT  11  /  AlkPhos  674<H>  06-28    LIVER FUNCTIONS - ( 28 Jun 2022 06:00 )  Alb: 2.1 g/dL / Pro: 4.8 g/dL / ALK PHOS: 674 U/L / ALT: 11 U/L / AST: 27 U/L / GGT: x               IMAGING:    < from: CT Abdomen and Pelvis w/ IV Cont (06.26.22 @ 17:27) >    ACC: [id number] EXAM:  CT ABDOMEN AND PELVIS IC                          PROCEDURE DATE:  06/26/2022          INTERPRETATION:  CLINICAL HISTORY: Anemia. Evaluation for hematoma.    TECHNIQUE: Contiguous axial CT images were obtained from the lower chest   to the pubic symphysis following administration of Optiray intravenous   contrast. Oral contrast was not administered. Reformatted images in the   coronal and sagittal planes were acquired.    COMPARISON: CT abdomen pelvis dated 4/20/2022.      FINDINGS:    LOWER CHEST: Small bilateral pleural effusions and left basilar   consolidation. Cardiomegaly. Moderate pericardial effusion.    HEPATOBILIARY: Hepatomegaly. Nonspecific mild gallbladder wall edema.    SPLEEN: Splenomegaly    PANCREAS: Unremarkable.    ADRENAL GLANDS: Unremarkable.    KIDNEYS: No hydronephrosis    ABDOMINOPELVIC NODES: Nonspecific increased number of subcentimeter in   short axis retroperitoneal lymph nodes. Mildly prominent periportal lymph   nodes, nonspecific, without significant change from 2011.    PELVIC ORGANS: Salmeron catheter within bladder. Intraluminal gas likely   related to instrumentation.    PERITONEUM/MESENTERY/BOWEL:  Feeding tube tip in stomach. Moderate   ascites redemonstrated. Rectal catheter/probe. No evidence of bowel   obstruction or free air. Unremarkable appendix. No evidence of   retroperitoneal hematoma.    BONES/SOFT TISSUES: Anasarca.          IMPRESSION:    No evidence of retroperitoneal hematoma.    Small bilateral pleural effusions and left basilar consolidation.    Moderate ascites redemonstrated.    Moderate pericardial effusion.    Hepatosplenomegaly redemonstrated.    --- End of Report ---            BELKIS NOE MD; Attending Radiologist  This document has been electronically signed. Jun 27 2022  8:41AM    < end of copied text >

## 2022-06-28 NOTE — PROGRESS NOTE ADULT - SUBJECTIVE AND OBJECTIVE BOX
CC.  anasarcmaryann  HPI.  Patient is intubated/vented  unable to obtain ROS/HX         Vital Signs Last 24 Hrs  T(C): 37.4 (06-28-22 @ 11:01), Max: 39.9 (06-27-22 @ 15:00)  T(F): 99.3 (06-28-22 @ 11:01), Max: 103.8 (06-27-22 @ 15:00)  HR: 86 (06-28-22 @ 12:00) (72 - 112)  BP: 102/62 (06-28-22 @ 12:00) (85/51 - 119/77)  BP(mean): 75 (06-28-22 @ 12:00) (63 - 93)  RR: 36 (06-28-22 @ 12:00) (19 - 40)  SpO2: 97% (06-28-22 @ 12:00) (90% - 100%)        PHYSICAL EXAM-  GENERAL: NAD   HEAD:  Atraumatic, Normocephalic  EYES: EOMI, PERRLA, conjunctiva and sclera clear  NECK: Supple, No JVD, Normal thyroid  NERVOUS SYSTEM:  Sedated  CHEST/LUNG:  Rhonchi with good air entry  HEART: Regular rate and rhythm; No murmurs, rubs, or gallops  ABDOMEN: Soft, Nontender, Nondistended; Bowel sounds present  EXTREMITIES:  No clubbing, cyanosis, or edema  SKIN: No rashes or lesions                                  8.4    3.39  )-----------( 123      ( 28 Jun 2022 07:40 )             27.4     06-28    145  |  110  |  25<H>  ----------------------------<  99  3.6   |  23  |  0.8    Ca    7.8<L>      28 Jun 2022 06:00  Phos  4.3     06-28  Mg     2.1     06-28    TPro  4.8<L>  /  Alb  2.1<L>  /  TBili  0.5  /  DBili  x   /  AST  27  /  ALT  11  /  AlkPhos  674<H>  06-28          Urinalysis Basic - ( 27 Jun 2022 14:00 )    Color: Yellow / Appearance: Clear / SG: >=1.030 / pH: x  Gluc: x / Ketone: Negative  / Bili: Negative / Urobili: 1.0 mg/dL   Blood: x / Protein: >=300 mg/dL / Nitrite: Negative   Leuk Esterase: Negative / RBC: 11-25 /HPF / WBC 3-5 /HPF   Sq Epi: x / Non Sq Epi: Few /HPF / Bacteria: Moderate              MEDICATIONS  (STANDING):  cefepime   IVPB 1000 milliGRAM(s) IV Intermittent every 8 hours  chlorhexidine 0.12% Liquid 15 milliLiter(s) Oral Mucosa every 12 hours  chlorhexidine 4% Liquid 1 Application(s) Topical daily  clonazePAM  Tablet 2 milliGRAM(s) Oral three times a day  fentaNYL   Infusion. 0.5 MICROgram(s)/kG/Hr (4.28 mL/Hr) IV Continuous <Continuous>  folic acid 1 milliGRAM(s) Oral daily  furosemide   Injectable 40 milliGRAM(s) IV Push daily  lactulose Syrup 20 Gram(s) Oral daily  methadone    Tablet 30 milliGRAM(s) Oral daily  midazolam Infusion 0.02 mG/kG/Hr (1.71 mL/Hr) IV Continuous <Continuous>  nicotine -  14 mG/24Hr(s) Patch 1 patch Transdermal daily  norepinephrine Infusion 0.05 MICROgram(s)/kG/Min (8.03 mL/Hr) IV Continuous <Continuous>  pantoprazole   Suspension 40 milliGRAM(s) Enteral Tube daily  propofol Infusion 1 MICROgram(s)/kG/Min (0.51 mL/Hr) IV Continuous <Continuous>  spironolactone 50 milliGRAM(s) Oral daily  thiamine 100 milliGRAM(s) Oral daily    MEDICATIONS  (PRN):  ALBUTerol    90 MICROgram(s) HFA Inhaler 1 Puff(s) Inhalation every 4 hours PRN Shortness of Breath and/or Wheezing  cloNIDine 0.1 milliGRAM(s) Oral every 6 hours PRN opiate withdrawal  hydrOXYzine hydrochloride 50 milliGRAM(s) Oral every 6 hours PRN Anxiety  ibuprofen  Tablet. 600 milliGRAM(s) Oral every 6 hours PRN Temp greater or equal to 38C (100.4F)  ibuprofen  Tablet. 400 milliGRAM(s) Oral every 6 hours PRN Mild Pain (1 - 3)        Imaging Personally Reviewed:     [x ] YES  [ ] NO    Consultant(s) Notes Reviewed:  [x ] YES  [ ] NO    Care Discussed with Consultants/Other Providers [x ] YES  [ ] NO

## 2022-06-28 NOTE — CONSULT NOTE ADULT - SUBJECTIVE AND OBJECTIVE BOX
CINDYPOOJA JOE  26y, Female  Allergy: buprenorphine (Rash)  Suboxone (Rash)      CHIEF COMPLAINT: anasarca (28 Jun 2022 08:15)      LOS  13d    HPI:  Patient is a 26 year old female with hx of asthma, untreated Hep C, IVDA, anasarca presenting with increased dyspnea since yesterday. Patient has been short of breath chronically and has been admitted for fluid overload. Patient describes worsening symptoms yesterday associated with cough. States generalized edema has remained the same. Patient is actively using heroin. Not on any meds or MMTP.  Denies fever, chills, sore throat, cp, palpitations, abd pain, n/v/d, dysuria, headache, syncope, hemoptysis. + generalized edema (15 Reinaldo 2022 10:03)      INFECTIOUS DISEASE HISTORY:  History as above.       PAST MEDICAL & SURGICAL HISTORY:  Hepatitis C      Asthma      Anxiety and depression      IV drug abuse  heroin      No significant past surgical history          FAMILY HISTORY  No pertinent family history in first degree relatives    No pertinent family history in first degree relatives        SOCIAL HISTORY  Social History:  + 1 PPD smoking  + heroin  denies etoh (15 Reinaldo 2022 10:03)        ROS  General: Denies rigors, nightsweats  HEENT: Denies headache, rhinorrhea, sore throat, eye pain  CV: Denies CP, palpitations  PULM: Denies wheezing, hemoptysis  GI: Denies hematemesis, hematochezia, melena  : Denies discharge, hematuria  MSK: Denies arthralgias, myalgias  SKIN: Denies rash, lesions  NEURO: Denies paresthesias, weakness  PSYCH: Denies depression, anxiety    VITALS:  T(F): 99.3, Max: 103.8 (06-27-22 @ 15:00)  HR: 77  BP: 103/67  RR: 40Vital Signs Last 24 Hrs  T(C): 37.4 (28 Jun 2022 07:10), Max: 39.9 (27 Jun 2022 15:00)  T(F): 99.3 (28 Jun 2022 07:10), Max: 103.8 (27 Jun 2022 15:00)  HR: 77 (28 Jun 2022 10:00) (72 - 112)  BP: 103/67 (28 Jun 2022 10:00) (85/51 - 122/85)  BP(mean): 80 (28 Jun 2022 10:00) (63 - 98)  RR: 40 (28 Jun 2022 10:00) (19 - 40)  SpO2: 97% (28 Jun 2022 10:00) (90% - 100%)    PHYSICAL EXAM:  Gen: NAD, resting in bed  HEENT: Normocephalic, atraumatic  Neck: supple, no lymphadenopathy  CV: Regular rate & regular rhythm  Lungs: decreased BS at bases, no fremitus  Abdomen: Soft, BS present  Ext: Warm, well perfused  Neuro: non focal, awake  Skin: no rash, no erythema  Lines: no phlebitis    TESTS & MEASUREMENTS:                        8.4    3.39  )-----------( 123      ( 28 Jun 2022 07:40 )             27.4     06-28    145  |  110  |  25<H>  ----------------------------<  99  3.6   |  23  |  0.8    Ca    7.8<L>      28 Jun 2022 06:00  Phos  4.3     06-28  Mg     2.1     06-28    TPro  4.8<L>  /  Alb  2.1<L>  /  TBili  0.5  /  DBili  x   /  AST  27  /  ALT  11  /  AlkPhos  674<H>  06-28      LIVER FUNCTIONS - ( 28 Jun 2022 06:00 )  Alb: 2.1 g/dL / Pro: 4.8 g/dL / ALK PHOS: 674 U/L / ALT: 11 U/L / AST: 27 U/L / GGT: x           Urinalysis Basic - ( 27 Jun 2022 14:00 )    Color: Yellow / Appearance: Clear / SG: >=1.030 / pH: x  Gluc: x / Ketone: Negative  / Bili: Negative / Urobili: 1.0 mg/dL   Blood: x / Protein: >=300 mg/dL / Nitrite: Negative   Leuk Esterase: Negative / RBC: 11-25 /HPF / WBC 3-5 /HPF   Sq Epi: x / Non Sq Epi: Few /HPF / Bacteria: Moderate        Culture - Sputum (collected 06-27-22 @ 14:00)  Source: Trach Asp Tracheal Aspirate  Gram Stain (06-28-22 @ 03:15):    Moderate polymorphonuclear leukocytes per low power field    Few Squamous epithelial cells per low power field    Moderate Gram positive cocci in pairs seen per oil power field    Few Gram Variable Rods seen per oil power field    Culture - Sputum (collected 06-24-22 @ 17:20)  Source: Trach Asp Tracheal Aspirate  Gram Stain (06-25-22 @ 06:29):    Few polymorphonuclear leukocytes per low power field    Rare Squamous epithelial cells per low power field    Moderate Gram Negative Rods seen per oil power field  Final Report (06-26-22 @ 17:00):    Moderate Klebsiella oxytoca/Raoutella ornithinolytica    Moderate Enterobacter cloacae complex    Normal Respiratory Kilo absent  Organism: Klebsiella oxytoca /Raoutella ornithinolytica  Enterobacter cloacae complex (06-26-22 @ 17:00)  Organism: Enterobacter cloacae complex (06-26-22 @ 17:00)      -  Amikacin: S <=16      -  Amoxicillin/Clavulanic Acid: R >16/8      -  Ampicillin: R >16 These ampicillin results predict results for amoxicillin      -  Ampicillin/Sulbactam: R >16/8 Enterobacter, Klebsiella aerogenes, Citrobacter, and Serratia may develop resistance during prolonged therapy (3-4 days)      -  Aztreonam: S <=4      -  Cefazolin: R >16 Enterobacter, Klebsiella aerogenes, Citrobacter, and Serratia may develop resistance during prolonged therapy (3-4 days)      -  Cefepime: S <=2      -  Cefoxitin: R >16      -  Ceftriaxone: S <=1 Enterobacter, Klebsiella aerogenes, Citrobacter, and Serratia may develop resistance during prolonged therapy      -  Ciprofloxacin: S <=0.25      -  Ertapenem: S <=0.5      -  Gentamicin: S <=2      -  Imipenem: S <=1      -  Levofloxacin: S <=0.5      -  Meropenem: S <=1      -  Piperacillin/Tazobactam: S <=8      -  Tobramycin: S <=2      -  Trimethoprim/Sulfamethoxazole: S <=0.5/9.5      Method Type: AL  Organism: Klebsiella oxytoca /Raoutella ornithinolytica (06-26-22 @ 17:00)      -  Amikacin: S <=16      -  Amoxicillin/Clavulanic Acid: S <=8/4      -  Ampicillin: R 16 These ampicillin results predict results for amoxicillin      -  Ampicillin/Sulbactam: S <=4/2 Enterobacter, Klebsiella aerogenes, Citrobacter, and Serratia may develop resistance during prolonged therapy (3-4 days)      -  Aztreonam: S <=4      -  Cefazolin: S <=2 Enterobacter, Klebsiella aerogenes, Citrobacter, and Serratia may develop resistance during prolonged therapy (3-4 days)      -  Cefepime: S <=2      -  Cefoxitin: S <=8      -  Ceftriaxone: S <=1 Enterobacter, Klebsiella aerogenes, Citrobacter, and Serratia may develop resistance during prolonged therapy      -  Ciprofloxacin: S <=0.25      -  Ertapenem: S <=0.5      -  Gentamicin: S <=2      -  Imipenem: S <=1      -  Levofloxacin: S <=0.5      -  Meropenem: S <=1      -  Piperacillin/Tazobactam: S <=8      -  Tobramycin: S <=2      -  Trimethoprim/Sulfamethoxazole: S <=0.5/9.5      Method Type: AL    Culture - Body Fluid with Gram Stain (collected 06-22-22 @ 01:00)  Source: Peritoneal Peritoneal Fluid  Gram Stain (06-23-22 @ 02:31):    No polymorphonuclear leukocytes seen    No organisms seen    by cytocentrifuge  Final Report (06-27-22 @ 19:13):    No growth at 5 days    Culture - Blood (collected 06-21-22 @ 05:33)  Source: .Blood None  Final Report (06-26-22 @ 19:00):    No Growth Final    Culture - Sputum (collected 06-17-22 @ 14:30)  Source: Trach Asp Tracheal Aspirate  Gram Stain (06-18-22 @ 10:18):    Rare polymorphonuclear leukocytes per low power field    No Squamous epithelial cells per low power field    Numerous Gram Positive Cocci in Pairs and Chains seen per oil power field    Rare Gram Negative Rods seen per oil power field    Rare Gram PositiveRods seen per oil power field  Final Report (06-19-22 @ 16:39):    Normal Respiratory Kilo present    Culture - Fungal, Body Fluid (collected 06-17-22 @ 12:24)  Source: .Body Fluid Peritoneal Fluid  Preliminary Report (06-25-22 @ 15:02):    No growth    Culture - Body Fluid with Gram Stain (collected 06-17-22 @ 12:24)  Source: .Body Fluid Peritoneal Fluid  Gram Stain (06-18-22 @ 03:19):    No polymorphonuclear leukocytes seen    No organisms seen    by cytocentrifuge  Final Report (06-22-22 @ 20:34):    No growth at 5 days    Culture - Urine (collected 06-15-22 @ 08:00)  Source: Clean Catch Clean Catch (Midstream)  Final Report (06-17-22 @ 22:14):    Normal Urogenital kilo present    Culture - Blood (collected 04-21-22 @ 07:37)  Source: .Blood Blood-Peripheral  Final Report (04-26-22 @ 19:00):    No Growth Final    Culture - Acid Fast - Sputum w/Smear (collected 04-21-22 @ 07:00)  Source: .Sputum Sputum  Final Report (06-11-22 @ 15:02):    No acid fast bacilli isolated after 6 weeks.            INFECTIOUS DISEASES TESTING  Procalcitonin, Serum: 0.11 ng/mL (06-27-22 @ 15:46)  Procalcitonin, Serum: 0.11 ng/mL (06-27-22 @ 06:47)  Procalcitonin, Serum: 0.62 ng/mL (06-20-22 @ 05:49)  MRSA PCR Result.: Negative (06-17-22 @ 14:30)  Procalcitonin, Serum: 3.28 ng/mL (06-17-22 @ 05:22)  COVID-19 PCR: NotDetec (06-15-22 @ 07:10)  Procalcitonin, Serum: 0.07 ng/mL (04-20-22 @ 12:46)      RADIOLOGY & ADDITIONAL TESTS:  I have personally reviewed the last Chest xray  CXR      CT  CT Abdomen and Pelvis w/ IV Cont:   ACC: [id number] EXAM:  CT ABDOMEN AND PELVIS IC                          PROCEDURE DATE:  06/26/2022          INTERPRETATION:  CLINICAL HISTORY: Anemia. Evaluation for hematoma.    TECHNIQUE: Contiguous axial CT images were obtained from the lower chest   to the pubic symphysis following administration of Optiray intravenous   contrast. Oral contrast was not administered. Reformatted images in the   coronal and sagittal planes were acquired.    COMPARISON: CT abdomen pelvis dated 4/20/2022.      FINDINGS:    LOWER CHEST: Small bilateral pleural effusions and left basilar   consolidation. Cardiomegaly. Moderate pericardial effusion.    HEPATOBILIARY: Hepatomegaly. Nonspecific mild gallbladder wall edema.    SPLEEN: Splenomegaly    PANCREAS: Unremarkable.    ADRENAL GLANDS: Unremarkable.    KIDNEYS: No hydronephrosis    ABDOMINOPELVIC NODES: Nonspecific increased number of subcentimeter in   short axis retroperitoneal lymph nodes. Mildly prominent periportal lymph   nodes, nonspecific, without significant change from 2011.    PELVIC ORGANS: Salmeron catheter within bladder. Intraluminal gas likely   related to instrumentation.    PERITONEUM/MESENTERY/BOWEL:  Feeding tube tip in stomach. Moderate   ascites redemonstrated. Rectal catheter/probe. No evidence of bowel   obstruction or free air. Unremarkable appendix. No evidence of   retroperitoneal hematoma.    BONES/SOFT TISSUES: Anasarca.          IMPRESSION:    No evidence of retroperitoneal hematoma.    Small bilateral pleural effusions and left basilar consolidation.    Moderate ascites redemonstrated.    Moderate pericardial effusion.    Hepatosplenomegaly redemonstrated.    --- End of Report ---            BELKIS NOE MD; Attending Radiologist  This document has been electronically signed. Jun 27 2022  8:41AM (06-26-22 @ 17:27)      CARDIOLOGY TESTING  12 Lead ECG:   Ventricular Rate 89 BPM    Atrial Rate 89 BPM    P-R Interval 180 ms    QRS Duration 76 ms    Q-T Interval 328 ms    QTC Calculation(Bazett) 399 ms    P Axis 67 degrees    R Axis 102 degrees    T Axis -2 degrees    Diagnosis Line Normal sinus rhythm  Rightward axis  Low voltage QRS  Nonspecific T wave abnormality  Abnormal ECG    Confirmed by BRANDON BELL MD ([id number]) on 6/24/2022 11:21:39 AM (06-24-22 @ 08:33)  12 Lead ECG:   Ventricular Rate 85 BPM    Atrial Rate 85 BPM    P-R Interval 156 ms    QRS Duration 76 ms    Q-T Interval 408 ms    QTC Calculation(Bazett) 485 ms    P Axis 77 degrees    R Axis 124 degrees    T Axis 18 degrees    Diagnosis Line Normal sinus rhythm  Right axis deviation  Low voltage QRS  Nonspecific T wave abnormality  Abnormal ECG    Confirmed by BRANDON BELL MD ([id number]) on 6/15/2022 9:49:33 AM (06-15-22 @ 08:09)      MEDICATIONS  cefepime   IVPB     cefepime   IVPB 1000 IV Intermittent once  cefepime   IVPB 1000 IV Intermittent every 12 hours  chlorhexidine 0.12% Liquid 15 Oral Mucosa every 12 hours  chlorhexidine 4% Liquid 1 Topical daily  clonazePAM  Tablet 2 Oral three times a day  fentaNYL   Infusion. 0.5 IV Continuous <Continuous>  folic acid 1 Oral daily  furosemide   Injectable 40 IV Push daily  lactulose Syrup 20 Oral daily  methadone    Tablet 30 Oral daily  midazolam Infusion 0.02 IV Continuous <Continuous>  nicotine -  14 mG/24Hr(s) Patch 1 Transdermal daily  norepinephrine Infusion 0.05 IV Continuous <Continuous>  OLANZapine Injectable 10 IntraMuscular once  pantoprazole   Suspension 40 Enteral Tube daily  propofol Infusion 1 IV Continuous <Continuous>  spironolactone 50 Oral daily  thiamine 100 Oral daily      Weight  Weight (kg): 85.6 (06-15-22 @ 11:25)    ANTIBIOTICS:  cefepime   IVPB      cefepime   IVPB 1000 milliGRAM(s) IV Intermittent once  cefepime   IVPB 1000 milliGRAM(s) IV Intermittent every 12 hours      ALLERGIES:  buprenorphine (Rash)  Suboxone (Rash)

## 2022-06-28 NOTE — PROGRESS NOTE ADULT - ASSESSMENT
26 year old female with hx of asthma, ?untreated Hep C, IVDA, anasarca was admitted to medical floor  with increased SOB and anasarca intubated for AHRF currently in ICU     Problem 1-Ascites s/p diagnostic tap SAAG 1.2 and TP ,2.5 suggestive towards portal HTN from cirrhosis   no evidence of SBP   However has normal PLT count and no nodular liver in imaging   -repeat CT showed moderate ascites   CT showed hepatomegaly and splenomegaly but no nodular liver     Recs:   -therapeutic paracentesis prn  -hepatitis C RNA qualitative negative  previously work up done for [facility]D was negative   has chronic elevated ALP  Trend LFT, INR   might benefit from liver biopsy later once stable   - Follow up with our [facility] located at [address] [place] [address]. Phone Number: [phone].     Problem 2-anemia no gross GI bleeding  Rec  -Maintain Hemodynamic Stability   -Monitor CBC  -CMP,Optimize Electrolytes  -Transfuse prn to hgb >8  -Two large bore IV lines  -PPI BID  -Monitor Vital Signs  -Monitor Stool For blood, frequency, consistency, melena  -Active Type and Screen    problem 3-Cholelithiasis   asymptomatic  Rec  -watchful waiting

## 2022-06-29 LAB
ALBUMIN SERPL ELPH-MCNC: 2.1 G/DL — LOW (ref 3.5–5.2)
ALP SERPL-CCNC: 650 U/L — HIGH (ref 30–115)
ALT FLD-CCNC: 10 U/L — SIGNIFICANT CHANGE UP (ref 0–41)
ANION GAP SERPL CALC-SCNC: 10 MMOL/L — SIGNIFICANT CHANGE UP (ref 7–14)
APPEARANCE UR: ABNORMAL
AST SERPL-CCNC: 23 U/L — SIGNIFICANT CHANGE UP (ref 0–41)
BACTERIA # UR AUTO: ABNORMAL
BASOPHILS # BLD AUTO: 0 K/UL — SIGNIFICANT CHANGE UP (ref 0–0.2)
BASOPHILS NFR BLD AUTO: 0 % — SIGNIFICANT CHANGE UP (ref 0–1)
BILIRUB SERPL-MCNC: 0.5 MG/DL — SIGNIFICANT CHANGE UP (ref 0.2–1.2)
BILIRUB UR-MCNC: NEGATIVE — SIGNIFICANT CHANGE UP
BLD GP AB SCN SERPL QL: SIGNIFICANT CHANGE UP
BUN SERPL-MCNC: 27 MG/DL — HIGH (ref 10–20)
CALCIUM SERPL-MCNC: 8.2 MG/DL — LOW (ref 8.5–10.1)
CHLORIDE SERPL-SCNC: 105 MMOL/L — SIGNIFICANT CHANGE UP (ref 98–110)
CO2 SERPL-SCNC: 23 MMOL/L — SIGNIFICANT CHANGE UP (ref 17–32)
COLOR SPEC: YELLOW — SIGNIFICANT CHANGE UP
CREAT SERPL-MCNC: 1.1 MG/DL — SIGNIFICANT CHANGE UP (ref 0.7–1.5)
DIFF PNL FLD: ABNORMAL
EGFR: 70 ML/MIN/1.73M2 — SIGNIFICANT CHANGE UP
EOSINOPHIL # BLD AUTO: 0 K/UL — SIGNIFICANT CHANGE UP (ref 0–0.7)
EOSINOPHIL NFR BLD AUTO: 0 % — SIGNIFICANT CHANGE UP (ref 0–8)
EPI CELLS # UR: ABNORMAL /HPF
GLUCOSE SERPL-MCNC: 89 MG/DL — SIGNIFICANT CHANGE UP (ref 70–99)
GLUCOSE UR QL: NEGATIVE MG/DL — SIGNIFICANT CHANGE UP
GRAN CASTS # UR COMP ASSIST: ABNORMAL /LPF
HCT VFR BLD CALC: 24.2 % — LOW (ref 37–47)
HCT VFR BLD CALC: 25.3 % — LOW (ref 37–47)
HGB BLD-MCNC: 7.2 G/DL — LOW (ref 12–16)
HGB BLD-MCNC: 7.3 G/DL — LOW (ref 12–16)
IMM GRANULOCYTES NFR BLD AUTO: 0.6 % — HIGH (ref 0.1–0.3)
KETONES UR-MCNC: ABNORMAL
LEUKOCYTE ESTERASE UR-ACNC: NEGATIVE — SIGNIFICANT CHANGE UP
LYMPHOCYTES # BLD AUTO: 0.38 K/UL — LOW (ref 1.2–3.4)
LYMPHOCYTES # BLD AUTO: 23.5 % — SIGNIFICANT CHANGE UP (ref 20.5–51.1)
MAGNESIUM SERPL-MCNC: 2.3 MG/DL — SIGNIFICANT CHANGE UP (ref 1.8–2.4)
MCHC RBC-ENTMCNC: 26.4 PG — LOW (ref 27–31)
MCHC RBC-ENTMCNC: 27 PG — SIGNIFICANT CHANGE UP (ref 27–31)
MCHC RBC-ENTMCNC: 28.9 G/DL — LOW (ref 32–37)
MCHC RBC-ENTMCNC: 29.8 G/DL — LOW (ref 32–37)
MCV RBC AUTO: 90.6 FL — SIGNIFICANT CHANGE UP (ref 81–99)
MCV RBC AUTO: 91.3 FL — SIGNIFICANT CHANGE UP (ref 81–99)
MONOCYTES # BLD AUTO: 0.09 K/UL — LOW (ref 0.1–0.6)
MONOCYTES NFR BLD AUTO: 5.6 % — SIGNIFICANT CHANGE UP (ref 1.7–9.3)
NEUTROPHILS # BLD AUTO: 1.14 K/UL — LOW (ref 1.4–6.5)
NEUTROPHILS NFR BLD AUTO: 70.3 % — SIGNIFICANT CHANGE UP (ref 42.2–75.2)
NITRITE UR-MCNC: NEGATIVE — SIGNIFICANT CHANGE UP
NRBC # BLD: 0 /100 WBCS — SIGNIFICANT CHANGE UP (ref 0–0)
NRBC # BLD: 0 /100 WBCS — SIGNIFICANT CHANGE UP (ref 0–0)
PH UR: 6 — SIGNIFICANT CHANGE UP (ref 5–8)
PHOSPHATE SERPL-MCNC: 5.6 MG/DL — HIGH (ref 2.1–4.9)
PLATELET # BLD AUTO: 121 K/UL — LOW (ref 130–400)
PLATELET # BLD AUTO: 90 K/UL — LOW (ref 130–400)
POTASSIUM SERPL-MCNC: 3.5 MMOL/L — SIGNIFICANT CHANGE UP (ref 3.5–5)
POTASSIUM SERPL-SCNC: 3.5 MMOL/L — SIGNIFICANT CHANGE UP (ref 3.5–5)
PROT SERPL-MCNC: 5 G/DL — LOW (ref 6–8)
PROT UR-MCNC: >=300 MG/DL
RBC # BLD: 2.67 M/UL — LOW (ref 4.2–5.4)
RBC # BLD: 2.77 M/UL — LOW (ref 4.2–5.4)
RBC # FLD: 16.4 % — HIGH (ref 11.5–14.5)
RBC # FLD: 16.5 % — HIGH (ref 11.5–14.5)
RBC CASTS # UR COMP ASSIST: ABNORMAL /HPF
SODIUM SERPL-SCNC: 138 MMOL/L — SIGNIFICANT CHANGE UP (ref 135–146)
SP GR SPEC: 1.02 — SIGNIFICANT CHANGE UP (ref 1.01–1.03)
UROBILINOGEN FLD QL: 1 MG/DL
WBC # BLD: 1.26 K/UL — LOW (ref 4.8–10.8)
WBC # BLD: 1.62 K/UL — LOW (ref 4.8–10.8)
WBC # FLD AUTO: 1.26 K/UL — LOW (ref 4.8–10.8)
WBC # FLD AUTO: 1.62 K/UL — LOW (ref 4.8–10.8)
WBC UR QL: SIGNIFICANT CHANGE UP /HPF

## 2022-06-29 PROCEDURE — 93010 ELECTROCARDIOGRAM REPORT: CPT

## 2022-06-29 PROCEDURE — 99233 SBSQ HOSP IP/OBS HIGH 50: CPT

## 2022-06-29 PROCEDURE — 99222 1ST HOSP IP/OBS MODERATE 55: CPT

## 2022-06-29 PROCEDURE — 99223 1ST HOSP IP/OBS HIGH 75: CPT

## 2022-06-29 PROCEDURE — 99232 SBSQ HOSP IP/OBS MODERATE 35: CPT

## 2022-06-29 PROCEDURE — 71045 X-RAY EXAM CHEST 1 VIEW: CPT | Mod: 26

## 2022-06-29 PROCEDURE — 71275 CT ANGIOGRAPHY CHEST: CPT | Mod: 26

## 2022-06-29 RX ORDER — QUETIAPINE FUMARATE 200 MG/1
50 TABLET, FILM COATED ORAL
Refills: 0 | Status: DISCONTINUED | OUTPATIENT
Start: 2022-06-29 | End: 2022-06-30

## 2022-06-29 RX ADMIN — Medication 1 MILLIGRAM(S): at 12:35

## 2022-06-29 RX ADMIN — PROPOFOL 0.51 MICROGRAM(S)/KG/MIN: 10 INJECTION, EMULSION INTRAVENOUS at 17:52

## 2022-06-29 RX ADMIN — PROPOFOL 0.51 MICROGRAM(S)/KG/MIN: 10 INJECTION, EMULSION INTRAVENOUS at 16:39

## 2022-06-29 RX ADMIN — CEFEPIME 100 MILLIGRAM(S): 1 INJECTION, POWDER, FOR SOLUTION INTRAMUSCULAR; INTRAVENOUS at 13:13

## 2022-06-29 RX ADMIN — SPIRONOLACTONE 50 MILLIGRAM(S): 25 TABLET, FILM COATED ORAL at 05:21

## 2022-06-29 RX ADMIN — Medication 1 PATCH: at 19:30

## 2022-06-29 RX ADMIN — Medication 1 PATCH: at 12:34

## 2022-06-29 RX ADMIN — Medication 400 MILLIGRAM(S): at 10:30

## 2022-06-29 RX ADMIN — CEFEPIME 100 MILLIGRAM(S): 1 INJECTION, POWDER, FOR SOLUTION INTRAMUSCULAR; INTRAVENOUS at 05:20

## 2022-06-29 RX ADMIN — QUETIAPINE FUMARATE 50 MILLIGRAM(S): 200 TABLET, FILM COATED ORAL at 17:55

## 2022-06-29 RX ADMIN — FENTANYL CITRATE 4.28 MICROGRAM(S)/KG/HR: 50 INJECTION INTRAVENOUS at 22:06

## 2022-06-29 RX ADMIN — MIDAZOLAM HYDROCHLORIDE 1.71 MG/KG/HR: 1 INJECTION, SOLUTION INTRAMUSCULAR; INTRAVENOUS at 17:54

## 2022-06-29 RX ADMIN — LACTULOSE 20 GRAM(S): 10 SOLUTION ORAL at 12:35

## 2022-06-29 RX ADMIN — Medication 600 MILLIGRAM(S): at 22:09

## 2022-06-29 RX ADMIN — CHLORHEXIDINE GLUCONATE 15 MILLILITER(S): 213 SOLUTION TOPICAL at 05:20

## 2022-06-29 RX ADMIN — Medication 40 MILLIGRAM(S): at 05:20

## 2022-06-29 RX ADMIN — METHADONE HYDROCHLORIDE 30 MILLIGRAM(S): 40 TABLET ORAL at 12:36

## 2022-06-29 RX ADMIN — CEFEPIME 100 MILLIGRAM(S): 1 INJECTION, POWDER, FOR SOLUTION INTRAMUSCULAR; INTRAVENOUS at 22:06

## 2022-06-29 RX ADMIN — Medication 2 MILLIGRAM(S): at 22:05

## 2022-06-29 RX ADMIN — PROPOFOL 0.51 MICROGRAM(S)/KG/MIN: 10 INJECTION, EMULSION INTRAVENOUS at 06:12

## 2022-06-29 RX ADMIN — MIDAZOLAM HYDROCHLORIDE 1.71 MG/KG/HR: 1 INJECTION, SOLUTION INTRAMUSCULAR; INTRAVENOUS at 00:09

## 2022-06-29 RX ADMIN — CHLORHEXIDINE GLUCONATE 1 APPLICATION(S): 213 SOLUTION TOPICAL at 05:21

## 2022-06-29 RX ADMIN — Medication 400 MILLIGRAM(S): at 09:33

## 2022-06-29 RX ADMIN — MIDAZOLAM HYDROCHLORIDE 1.71 MG/KG/HR: 1 INJECTION, SOLUTION INTRAMUSCULAR; INTRAVENOUS at 19:34

## 2022-06-29 RX ADMIN — PROPOFOL 0.51 MICROGRAM(S)/KG/MIN: 10 INJECTION, EMULSION INTRAVENOUS at 19:33

## 2022-06-29 RX ADMIN — PROPOFOL 0.51 MICROGRAM(S)/KG/MIN: 10 INJECTION, EMULSION INTRAVENOUS at 00:09

## 2022-06-29 RX ADMIN — PROPOFOL 0.51 MICROGRAM(S)/KG/MIN: 10 INJECTION, EMULSION INTRAVENOUS at 22:05

## 2022-06-29 RX ADMIN — Medication 2 MILLIGRAM(S): at 09:19

## 2022-06-29 RX ADMIN — PROPOFOL 0.51 MICROGRAM(S)/KG/MIN: 10 INJECTION, EMULSION INTRAVENOUS at 05:22

## 2022-06-29 RX ADMIN — FENTANYL CITRATE 4.28 MICROGRAM(S)/KG/HR: 50 INJECTION INTRAVENOUS at 05:19

## 2022-06-29 RX ADMIN — Medication 100 MILLIGRAM(S): at 12:34

## 2022-06-29 RX ADMIN — Medication 600 MILLIGRAM(S): at 22:30

## 2022-06-29 RX ADMIN — Medication 8.03 MICROGRAM(S)/KG/MIN: at 05:20

## 2022-06-29 RX ADMIN — Medication 1 PATCH: at 08:29

## 2022-06-29 RX ADMIN — FENTANYL CITRATE 4.28 MICROGRAM(S)/KG/HR: 50 INJECTION INTRAVENOUS at 19:35

## 2022-06-29 RX ADMIN — PANTOPRAZOLE SODIUM 40 MILLIGRAM(S): 20 TABLET, DELAYED RELEASE ORAL at 12:35

## 2022-06-29 RX ADMIN — CHLORHEXIDINE GLUCONATE 15 MILLILITER(S): 213 SOLUTION TOPICAL at 17:55

## 2022-06-29 NOTE — CONSULT NOTE ADULT - SUBJECTIVE AND OBJECTIVE BOX
HPI:  Patient is a 26 year old female with hx of asthma, untreated Hep C, IVDA, anasarca presenting with increased dyspnea since yesterday. Patient has been short of breath chronically and has been admitted for fluid overload. Patient describes worsening symptoms yesterday associated with cough. States generalized edema has remained the same. Patient is actively using heroin. Not on any meds or MMTP.  Denies fever, chills, sore throat, cp, palpitations, abd pain, n/v/d, dysuria, headache, syncope, hemoptysis. + generalized edema (15 Reinaldo 2022 10:03)    HPI-Cardiology   Pt evaluated at bedside. Intubated, sedated but responsive to touch and voice by eyes opening. Pt came with c/o worsening dyspnea and was admitted for anasarca to [facility]. 6/16 RRT was called in the lobby for respiratory failure and possible overdose (drug paraphernalia was found in pt's pockets). Got intubated and transferred to ICU. 6/22 paracentesis, 1L fluid removed. Now on Fentanyl, noerepi, midazolam and propofol gtts. Tv 350, PEEP 8, FiO2 40%, RR25. Consult called for abnormal TTE. Radiology tests and hospital records, were reviewed, as well as previous notes on this patient.   Febrile today      PAST MEDICAL & SURGICAL HISTORY  Hepatitis C  Asthma  Anxiety and depression  IV drug abuse heroin  No significant past surgical history      FAMILY HISTORY:  No pertinent family history in first degree relatives    SOCIAL HISTORY:  []smoker - unable to obtain  []Alcohol - unable to obtain  []Drug - active heroin user    ALLERGIES:  buprenorphine (Rash)  Suboxone (Rash)      MEDICATIONS:  MEDICATIONS  (STANDING):  cefepime   IVPB 1000 milliGRAM(s) IV Intermittent every 8 hours  chlorhexidine 0.12% Liquid 15 milliLiter(s) Oral Mucosa every 12 hours  chlorhexidine 4% Liquid 1 Application(s) Topical daily  clonazePAM  Tablet 2 milliGRAM(s) Oral three times a day  fentaNYL   Infusion. 0.5 MICROgram(s)/kG/Hr (4.28 mL/Hr) IV Continuous <Continuous>  folic acid 1 milliGRAM(s) Oral daily  furosemide   Injectable 40 milliGRAM(s) IV Push daily  lactulose Syrup 20 Gram(s) Oral daily  methadone    Tablet 30 milliGRAM(s) Oral daily  midazolam Infusion 0.02 mG/kG/Hr (1.71 mL/Hr) IV Continuous <Continuous>  nicotine -  14 mG/24Hr(s) Patch 1 patch Transdermal daily  norepinephrine Infusion 0.05 MICROgram(s)/kG/Min (8.03 mL/Hr) IV Continuous <Continuous>  pantoprazole   Suspension 40 milliGRAM(s) Enteral Tube daily  propofol Infusion 1 MICROgram(s)/kG/Min (0.51 mL/Hr) IV Continuous <Continuous>  QUEtiapine 50 milliGRAM(s) Oral two times a day  spironolactone 50 milliGRAM(s) Oral daily  thiamine 100 milliGRAM(s) Oral daily  MEDICATIONS  (PRN):  ALBUTerol    90 MICROgram(s) HFA Inhaler 1 Puff(s) Inhalation every 4 hours PRN Shortness of Breath and/or Wheezing  cloNIDine 0.1 milliGRAM(s) Oral every 6 hours PRN opiate withdrawal  hydrOXYzine hydrochloride 50 milliGRAM(s) Oral every 6 hours PRN Anxiety  ibuprofen  Tablet. 600 milliGRAM(s) Oral every 6 hours PRN Temp greater or equal to 38C (100.4F)  ibuprofen  Tablet. 400 milliGRAM(s) Oral every 6 hours PRN Mild Pain (1 - 3)      HOME MEDICATIONS:      VITALS:   T(F): 99.5 (06-29 @ 15:00), Max: 103.8 (06-27 @ 15:00)  HR: 109 (06-29 @ 16:00) (64 - 138)  BP: 97/53 (06-29 @ 16:00) (85/51 - 153/100)  BP(mean): 71 (06-29 @ 16:00) (63 - 121)  RR: 25 (06-29 @ 16:00) (19 - 50)  SpO2: 98% (06-29 @ 16:00) (89% - 100%)  I&O's Summary  28 Jun 2022 07:01  -  29 Jun 2022 07:00  --------------------------------------------------------  IN: 2924.2 mL / OUT: 2080 mL / NET: 844.2 mL  29 Jun 2022 07:01  -  29 Jun 2022 16:57  --------------------------------------------------------  IN: 663 mL / OUT: 1530 mL / NET: -867 mL      REVIEW OF SYSTEMS:  unable to obtain, intubated, sedated      PHYSICAL EXAM:  GEN: sedated, Not in acute distress, resting comfortably  NECK: right IJ, no thyroid enlargement, no cervical adenopathy  LUNGS: ETT to vent, generalized rhonchi, no retractions, no nasal flaring  CARDIOVASCULAR: tachycardia, heaves, S1/S2 present, RRR , no murmurs or rubs, no carotid bruits, no JVD,  + PP bilaterally  GI: anasarca, distended abdomen, hypoactive BS, NGT  : doss draining yellow urine  NEURO: sedated, response to touch and touch, facial drooping  Vascular: anasarca, pedal pulses +1  SKIN: multiple drug injection track marks    LABS:                        7.3    1.62  )-----------( 90       ( 29 Jun 2022 05:27 )             25.3   06-29  138  |  105  |  27<H>  ----------------------------<  89  3.5   |  23  |  1.1  Ca    8.2<L>      29 Jun 2022 05:27  Phos  5.6     06-29  Mg     2.3     06-29  TPro  5.0<L>  /  Alb  2.1<L>  /  TBili  0.5  /  DBili  x   /  AST  23  /  ALT  10  /  AlkPhos  650<H>  06-29 06-28 Chol -- LDL -- HDL -- Trig 512<H>, 06-22 Chol -- LDL -- HDL -- Trig 495<H>      RADIOLOGY:  ecg< from: 12 Lead ECG (06.29.22 @ 09:55) >  Diagnosis Line Sinus tachycardia  Right axis deviation  Possible Right ventricular hypertrophy  Nonspecific T wave abnormality  Abnormal ECG  < end of copied text >    tte< from: TTE Echo Complete w/ Contrast w/ Doppler (06.20.22 @ 10:54) >  Summary:   1. Normal global left ventricular systolic function.   2. Left ventricular ejection fraction, by visual estimation, is 55 to   60%.   3. Moderately decreased segmental left ventricular systolic function.   4. Entire apex, mid and apical anterior wall, mid and apical inferior   wall, and mid inferolateral segment are abnormal as described above.   5. Moderately enlarged right ventricle.   6. Mildly increased RV wall thickness.   7. Mildly reduced RV systolic function.   8. Normal left atrial size.   9. Normal right atrial size.  10. Trace mitral valve regurgitation.  11. Mild tricuspid regurgitation.  12. Moderate pulmonic valve regurgitation.  13. Estimated pulmonary artery systolic pressure is 51.7 mmHg assuming a   right atrial pressure of 15 mmHg, which is consistent with moderate   pulmonary hypertension.  14. Small pericardial effusion.  < end of copied text >    cxr< from: Xray Chest 1 View- PORTABLE-Routine (Xray Chest 1 View- PORTABLE-Routine in AM.) (06.28.22 @ 05:28) >  Impression:  Stable cardiomegaly and left basilar opacity. Stable support devices.  --- End of Report ---  < end of copied text >    ctap< from: CT Abdomen and Pelvis w/ IV Cont (06.26.22 @ 17:27) >  IMPRESSION:  No evidence of retroperitoneal hematoma.  Small bilateral pleural effusions and left basilar consolidation.  Moderate ascites redemonstrated.  Moderate pericardial effusion.  Hepatosplenomegaly redemonstrated.  --- End of Report ---  < end of copied text >      TELEMETRY EVENTS:  episodes on non sustained VT on 6/17, 6/24, 6/27, 6/28

## 2022-06-29 NOTE — PROGRESS NOTE ADULT - ASSESSMENT
Patient is a 26 year old female with hx of asthma, untreated Hep C, IVDA, anasarca presenting with increased dyspnea since yesterday    IMPRESSION  #Acute respiratory failure s/p intubation 6/16  #Pneumonia -   - CT Chest 6/22 - left greater than right lower lobe consolidations   - sputum Cx 6/24 Klebsiella oxytoca, Enterobacter cloacae complex- The Sputum culture from 6/27 grew Numerous Mixed gram negative rods and Moderate Staphylococcus aureus.    #Fevers  - Ferritin, Serum: 94 ng/mL (06.27.22 @ 06:47)  - Procalcitonin, Serum: 0.11 (06.27.22 @ 15:46)  - CT Abd/pelvis 6/26 - moderated ascites moderate pericardial effusion     #Drug overdose vs withdrawal?  #Hepatosplenomegaly - present since CT Abd/pelvis 1/30/2021  #Pancytopenia  #Hx of Untreated Hep C   #IVDA   #Abx allergy: buprenorphine (Rash)  Suboxone (Rash)    would recommend:    1. Add IV Vancomycin 15 mg/kg q 12hours until Sputum culture from 6/27 finalized the sensitivities of Staphylococcus aureus.  2. Continue Cefepime to 1g q 8 hours for Enterobacter cloacae, Klebsiella   3. Follow up final sputum culture from 6/27 for sensitivities of Staphylococcus aureus  4. Monitor Temp. and c/w supportive care  5. Management of Vent. as per ICU protocol  6. Aspiration precaution    d/w ICU team      Atending Attestation:  Spent more than 45 minutes on total encounter, more than 50 % of the visit was spent counseling and/or coordinating care by the Attending physician.

## 2022-06-29 NOTE — PROGRESS NOTE ADULT - SUBJECTIVE AND OBJECTIVE BOX
CC.  steven  HPI.  Patient is intubated/vented  fever noticed again this am  unable to obtain ROS/HX         Vital Signs Last 24 Hrs  T(C): 38.3 (29 Jun 2022 14:00), Max: 39 (29 Jun 2022 11:00)  T(F): 100.9 (29 Jun 2022 14:00), Max: 102.2 (29 Jun 2022 11:00)  HR: 122 (29 Jun 2022 13:34) (64 - 138)  BP: 126/69 (29 Jun 2022 13:34) (89/51 - 153/100)  BP(mean): 91 (29 Jun 2022 13:34) (64 - 121)  RR: 35 (29 Jun 2022 13:34) (20 - 50)  SpO2: 97% (29 Jun 2022 13:34) (89% - 100%)      PHYSICAL EXAM-  GENERAL: NAD   HEAD:  Atraumatic, Normocephalic  EYES: EOMI, PERRLA, conjunctiva and sclera clear  NECK: Supple, No JVD, Normal thyroid  NERVOUS SYSTEM:  Sedated  CHEST/LUNG:  Rhonchi with good air entry  HEART: Regular rate and rhythm; No murmurs, rubs, or gallops  ABDOMEN: Soft, Nontender, Nondistended; Bowel sounds present  EXTREMITIES:  No clubbing, cyanosis, or edema  SKIN: No rashes or lesions                            7.3    1.62  )-----------( 90       ( 29 Jun 2022 05:27 )             25.3     06-29    138  |  105  |  27<H>  ----------------------------<  89  3.5   |  23  |  1.1    Ca    8.2<L>      29 Jun 2022 05:27  Phos  5.6     06-29  Mg     2.3     06-29    TPro  5.0<L>  /  Alb  2.1<L>  /  TBili  0.5  /  DBili  x   /  AST  23  /  ALT  10  /  AlkPhos  650<H>  06-29                                     MEDICATIONS  (STANDING):  cefepime   IVPB 1000 milliGRAM(s) IV Intermittent every 8 hours  chlorhexidine 0.12% Liquid 15 milliLiter(s) Oral Mucosa every 12 hours  chlorhexidine 4% Liquid 1 Application(s) Topical daily  clonazePAM  Tablet 2 milliGRAM(s) Oral three times a day  fentaNYL   Infusion. 0.5 MICROgram(s)/kG/Hr (4.28 mL/Hr) IV Continuous <Continuous>  folic acid 1 milliGRAM(s) Oral daily  furosemide   Injectable 40 milliGRAM(s) IV Push daily  lactulose Syrup 20 Gram(s) Oral daily  methadone    Tablet 30 milliGRAM(s) Oral daily  midazolam Infusion 0.02 mG/kG/Hr (1.71 mL/Hr) IV Continuous <Continuous>  nicotine -  14 mG/24Hr(s) Patch 1 patch Transdermal daily  norepinephrine Infusion 0.05 MICROgram(s)/kG/Min (8.03 mL/Hr) IV Continuous <Continuous>  pantoprazole   Suspension 40 milliGRAM(s) Enteral Tube daily  propofol Infusion 1 MICROgram(s)/kG/Min (0.51 mL/Hr) IV Continuous <Continuous>  spironolactone 50 milliGRAM(s) Oral daily  thiamine 100 milliGRAM(s) Oral daily    MEDICATIONS  (PRN):  ALBUTerol    90 MICROgram(s) HFA Inhaler 1 Puff(s) Inhalation every 4 hours PRN Shortness of Breath and/or Wheezing  cloNIDine 0.1 milliGRAM(s) Oral every 6 hours PRN opiate withdrawal  hydrOXYzine hydrochloride 50 milliGRAM(s) Oral every 6 hours PRN Anxiety  ibuprofen  Tablet. 600 milliGRAM(s) Oral every 6 hours PRN Temp greater or equal to 38C (100.4F)  ibuprofen  Tablet. 400 milliGRAM(s) Oral every 6 hours PRN Mild Pain (1 - 3)        Imaging Personally Reviewed:     [x ] YES  [ ] NO    Consultant(s) Notes Reviewed:  [x ] YES  [ ] NO    Care Discussed with Consultants/Other Providers [x ] YES  [ ] NO      CC.  anasakaterin  HPI.  Patient is intubated/vented  fever noticed again this am  unable to obtain ROS/HX         Vital Signs Last 24 Hrs  T(C): 38.3 (29 Jun 2022 14:00), Max: 39 (29 Jun 2022 11:00)  T(F): 100.9 (29 Jun 2022 14:00), Max: 102.2 (29 Jun 2022 11:00)  HR: 122 (29 Jun 2022 13:34) (64 - 138)  BP: 126/69 (29 Jun 2022 13:34) (89/51 - 153/100)  BP(mean): 91 (29 Jun 2022 13:34) (64 - 121)  RR: 35 (29 Jun 2022 13:34) (20 - 50)  SpO2: 97% (29 Jun 2022 13:34) (89% - 100%)      PHYSICAL EXAM-  GENERAL: NAD   HEAD:  Atraumatic, Normocephalic  EYES: EOMI, PERRLA, conjunctiva and sclera clear  NECK: Supple, No JVD, Normal thyroid  NERVOUS SYSTEM:  Sedated  CHEST/LUNG:  Rhonchi with good air entry  HEART: Regular rate and rhythm; No murmurs, rubs, or gallops  ABDOMEN: Soft, Nontender, Nondistended; Bowel sounds present  EXTREMITIES:  No clubbing, cyanosis, or edema  SKIN: No rashes or lesions                            7.3    1.62  )-----------( 90       ( 29 Jun 2022 05:27 )             25.3     06-29    138  |  105  |  27<H>  ----------------------------<  89  3.5   |  23  |  1.1    Ca    8.2<L>      29 Jun 2022 05:27  Phos  5.6     06-29  Mg     2.3     06-29    TPro  5.0<L>  /  Alb  2.1<L>  /  TBili  0.5  /  DBili  x   /  AST  23  /  ALT  10  /  AlkPhos  650<H>  06-29    rheumatology recommended CTA to r/o PE/septic emboli.  ok as per pulmnonary.  will obtain Chest CTA.  intensivist to followup                                  MEDICATIONS  (STANDING):  cefepime   IVPB 1000 milliGRAM(s) IV Intermittent every 8 hours  chlorhexidine 0.12% Liquid 15 milliLiter(s) Oral Mucosa every 12 hours  chlorhexidine 4% Liquid 1 Application(s) Topical daily  clonazePAM  Tablet 2 milliGRAM(s) Oral three times a day  fentaNYL   Infusion. 0.5 MICROgram(s)/kG/Hr (4.28 mL/Hr) IV Continuous <Continuous>  folic acid 1 milliGRAM(s) Oral daily  furosemide   Injectable 40 milliGRAM(s) IV Push daily  lactulose Syrup 20 Gram(s) Oral daily  methadone    Tablet 30 milliGRAM(s) Oral daily  midazolam Infusion 0.02 mG/kG/Hr (1.71 mL/Hr) IV Continuous <Continuous>  nicotine -  14 mG/24Hr(s) Patch 1 patch Transdermal daily  norepinephrine Infusion 0.05 MICROgram(s)/kG/Min (8.03 mL/Hr) IV Continuous <Continuous>  pantoprazole   Suspension 40 milliGRAM(s) Enteral Tube daily  propofol Infusion 1 MICROgram(s)/kG/Min (0.51 mL/Hr) IV Continuous <Continuous>  spironolactone 50 milliGRAM(s) Oral daily  thiamine 100 milliGRAM(s) Oral daily    MEDICATIONS  (PRN):  ALBUTerol    90 MICROgram(s) HFA Inhaler 1 Puff(s) Inhalation every 4 hours PRN Shortness of Breath and/or Wheezing  cloNIDine 0.1 milliGRAM(s) Oral every 6 hours PRN opiate withdrawal  hydrOXYzine hydrochloride 50 milliGRAM(s) Oral every 6 hours PRN Anxiety  ibuprofen  Tablet. 600 milliGRAM(s) Oral every 6 hours PRN Temp greater or equal to 38C (100.4F)  ibuprofen  Tablet. 400 milliGRAM(s) Oral every 6 hours PRN Mild Pain (1 - 3)        Imaging Personally Reviewed:     [x ] YES  [ ] NO    Consultant(s) Notes Reviewed:  [x ] YES  [ ] NO    Care Discussed with Consultants/Other Providers [x ] YES  [ ] NO

## 2022-06-29 NOTE — CONSULT NOTE ADULT - NS ATTEND AMEND GEN_ALL_CORE FT
Patient is a 26 year old female with hx of asthma, untreated Hep C, IVDA, anasarca.  6/16 RRT was called in the lobby for respiratory failure and possible overdose (drug paraphernalia was found in pt's pockets). Got intubated and transferred to ICU. 6/22. Now being sedated. Echo reviewed . EF normal. Wall motion abnormality probably secondary effusion and pulmonary hypertension.  Can consider in several weeks as out patient repeat echo . Rx infection. Prognosis guarded

## 2022-06-29 NOTE — CHART NOTE - NSCHARTNOTEFT_GEN_A_CORE
called the patient's mother Joyce on  and the emergency contact  on  for ct scan pe protocol consent and no one answered.

## 2022-06-29 NOTE — CONSULT NOTE ADULT - ASSESSMENT
Persistent fevers: 25 y/o woman with h/o IV heroin use admitted with acute exacerbation of chronic dyspnea on exertion, s/p fall in the hospital with seizure-like activity and intubation, with persistent fevers during her hospitalization which have not been explained. Workup so far has demonstrated a UA with large protein and large blood, pancytopenia, slightly elevated ACE level which is a non-specific finding, a CT abdomen/pelvis with IV contrast with right ventricular enlargement, ascites, b/l R>L pleural effusions, and moderate pericardial effusion. Also found to have hepatosplenomegaly on imaging. With reported h/o hepatitis C? Although pt's hepatitis C testing was negative on RNA testing during this admission. In terms of differential for pt's symptoms, I would consider possible PE given pt's RV enlargement. Would also consider ANCA vasculitis given pt's pulmonary and renal manifestations. Cocaine use can be a/w small-vessel vasculitis but pt has no known h/o cocaine use and her toxicology testing during this admission was negative for cocaine. Also would consider large-vessel vasculitis on the differential for a patient with persistent fevers with concern for autoimmune etiology. Given persistent fevers and h/o IV drug use, would also consider endocarditis on the differential, although pt's blood cultures have been negative and TTE did not demonstrate vegetations.  Would also consider hemophagocytic lymphohistiocytosis given pt's pancytopenia, fevers, splenomegaly, and elevated triglycerides, although pt's ferritin was only 94. From a rheum standpoint, pulmonary hypertension can be a/w systemic connective tissue diseases, including mixed connective tissue disease, inflammatory myopathy, systemic sclerosis, RA, Sjogren's syndrome, although pt had rheum testing with negative testing for SLE, negative RNP, negative Deyanira-1, centromere, and Sjogren's testing so far.    - Would recommend sending ANCA MPO and PR3 antibodies, rheumatoid factor, cyclic citrullinated peptide, myomarker panel, Scl70, RNA polymerase III, HIV, creatine kinase  - Would recommend CTA chest to evaluate for pulmonary embolism, as well as to evaluate for possible large vessel vasculitis  - Consider nephrology consult to evaluate for possible renal biopsy  given large blood and large protein seen on two UAs during this admission  - Consider possible evaluation for ROSLYN to further evaluate for endocarditis given h/o IV drug use and persistent fevers

## 2022-06-29 NOTE — CONSULT NOTE ADULT - ASSESSMENT
27yo female with hx hep c, anxiety/depression, active heroin user presents to ed for worsening dyspnea and was admitted for anasarca to [facility]. 6/16 RRT was called in the lobby for respiratory failure and possible overdose (drug paraphernalia was found in pt's pockets). Got intubated and transferred to ICU. 6/22 paracentesis, 1L fluid removed. Now on Fentanyl, noerepi, midazolam and propofol gtts. Tv 350, PEEP 8, FiO2 40%, RR25.    Impression  *hypertriglyceridemia  *tachycardia  *respiratory failure 2/2 drug overdose  *anasarca  *anemia    TTE: Ef 55-60% (compared to E 60% in April 2022); entire apex, mid and apical anterior wall, mid and apical inferior   wall, and mid inferolateral segment are abnormal, moderate pulmonary hypertension; small pericardial effusion.  ecg: sinus tachycardia, non spec Tw abnormality  ctap: redemostration of moderate ascites and hepatosplenomegaly; moderate pericardial effusion  hgb 8.4 -> 7.3  Cr 1.1  net output -867ml  trig 495 -> 512    Plan:  will need ischemic workup when stable: ROSLYN; Lexiscan stress test (when stable and off vent)  - ecg in am  hypertriglyceridemia likely 2/2 Propofol use, titrate down and wean off when possible; watch lipase and triglyc  tachycardia 2/2 fever and anemia  - watch cbc;   - c/w antibiotics; ID eval  - c/w Lasix and Spironolactone, maintain negative balance  - redemonstration of ascites, f/u GI recs, albumin infusion???  - wean off pressors and vent when possible

## 2022-06-29 NOTE — CONSULT NOTE ADULT - SUBJECTIVE AND OBJECTIVE BOX
Rheumatology initial evaluation    History of present illness: 25 y/o woman with h/o IV heroin use and chronic anasarca admitted with increased dyspnea x 1 day. Of note, information obtained from chart as pt is currently intubated and sedated. Pt reportedly has had long-standing volume overload and shortness of breath with no identifiable cause. 1 day prior to admission, her SOB acutely worsening, prompting her to present to [facility]. Pt reportedly had a fall followed by seizure-like activity while in the lobby of the hospital. She was emergently intubated and sedated.     Physical exam: GEN: Intubated, sedated woman lying in hospital bed  VS: Tm 101.5, BP 90s-140s/60s-100s, P 120s-130s  SKIN: + Track marks, no rashes or petechiae noted  PULM: Coarse breath sounds b/l in anterior   CV: + Tachycardic, no murmurs  MSK: Limited exam due to anasarca and pt sedation  Shoulders: Unable to assess due to pt sedation  Elbows: Limited ROM assessment due to pt sedation  Wrists: Full ROM b/l, limited effusion assessment as pt is sedated  Hands: + diffuse edema, no clear e/o synovitis  Hips: limited exam due to pt sedation  Knees: No effusions, limited ROM exam due to pt sedation  Ankles: No effusions, full ROM b/l  Feet: + Diffuse edema b/l  EXT: Normal nailfold capillaries, no nail changes

## 2022-06-29 NOTE — PROGRESS NOTE ADULT - ASSESSMENT
Patient is 26 year old female with hx of asthma,  IVDA, anasarca hx of use of opiates 2 grams per day IV into her thighs  x years with minimal sobriety. Pt has been on MMTP in 2020.  presenting with       # AHRF - on MV  # Aspiration PNA s/p Unasyn Course  # Persistent Fevers  # Anemia  # Pancytopenia  # pHTN WHO Class unclear at this time  # h/o Pericardial Effusion  # OD vs. Other Tox  # Ascites w/ h/o Liver cirrhosis d/t HepC  #Abnormal TTE      PLAN:     - Repeat attempt of SAT/SBT  - Seizure Precautions  - Clonopin 2mg TID  - Wean Prop + Versed as tolerated  - Addiction Medicine consult  - Add Zyprexa 10mg  - Lasix to 40g IVP QD  -NG tube Feeds  -continue with cefipime  as per ID.  Repeat cultures, CXR,, viral panel.    -Obtain jered, esr, crp, and rheumatology consult   - Hold Heparin (thrombocytopenia)  - 1U PRBC 6/26 + 1U PRBC 6/27   -Mild drop in H/H noticed.  repeat H/H in pm and transfuse if needed  - f/u fecal occult immuno  - Heme/Onc to follow up   -Cardiology consult       further care as per critical care team

## 2022-06-29 NOTE — PROGRESS NOTE ADULT - ASSESSMENT
Patient is a 26 year old female with hx of asthma, untreated Hep C, IVDA, anasarca presenting with increased dyspnea since yesterday    IMPRESSION  #Acute respiratory failure s/p intubation 6/16  #Pneumonia -   - CT Chest 6/22 - left greater than right lower lobe consolidations   - sputum Cx 6/24 Klebsiella oxytoca, Enterobacter cloacae complex- The Sputum culture from 6/27 grew Numerous Mixed gram negative rods and Moderate Staphylococcus aureus.    #Fevers  - Ferritin, Serum: 94 ng/mL (06.27.22 @ 06:47)  - Procalcitonin, Serum: 0.11 (06.27.22 @ 15:46)  - CT Abd/pelvis 6/26 - moderated ascites moderate pericardial effusion     #Drug overdose vs withdrawal?  #Hepatosplenomegaly - present since CT Abd/pelvis 1/30/2021  #Pancytopenia  #Hx of Untreated Hep C   #IVDA   #Abx allergy: buprenorphine (Rash)  Suboxone (Rash)    would recommend:    1. Monitor Temp. and c/w supportive care   2. Continue Cefepime to 1g q 8 hours for Enterobacter cloacae, Klebsiella and MSSA  3. Management of Vent. as per ICU protocol  4. Aspiration precaution    d/w ICU team      Atending Attestation:  Spent more than 45 minutes on total encounter, more than 50 % of the visit was spent counseling and/or coordinating care by the Attending physician.

## 2022-06-29 NOTE — PROGRESS NOTE ADULT - ASSESSMENT
IMPRESSION:  Acute respiratory failure sp intubation  PNA SP Abx  seizure like activity  ?? drug over dose/ withdrawal opoid   liver cirrhosis   Ascites sp paracentesis   Pancytopenia    PLAN:    CNS: do SAT/SBT as tolerated  CW methadone 30mg daily  CW Clonapin to 2mg TID       HEENT: oral care     PULMONARY:  HOB 45,  Vent changes: none, SBT    CARDIOVASCULAR: goal MAP >65. Diuresis as tolerated. CW lasix qd    GI: GI prophylaxis.  OG Feeding.    RENAL: monitor lytes is and os     INFECTIOUS DISEASE: completed 10 days of unasyn. Abx per ID    HEMATOLOGICAL:  monitor CBC, Heme FU    ENDOCRINE:  Follow up FS.  Insulin protocol if needed.     MICU  lines: RIJ since 6/16  Failed TOV IMPRESSION:  Acute respiratory failure sp intubation  PNA SP Abx  seizure like activity  ?? drug over dose/ withdrawal opoid   liver cirrhosis   Ascites sp paracentesis   Pancytopenia    PLAN:    CNS: do SAT/SBT as tolerated  CW methadone 30mg daily  CW Clonapin to 2mg TID  Start Seroquel 50 BID if QTc is okay     HEENT: oral care     PULMONARY:  HOB 45,  Vent changes: none, SBT    CARDIOVASCULAR: goal MAP >65. Diuresis as tolerated. CW lasix qd    GI: GI prophylaxis.  OG Feeding.    RENAL: monitor lytes is and os     INFECTIOUS DISEASE: completed 10 days of unasyn. Abx per ID. Check HIV    HEMATOLOGICAL:  monitor CBC, Heme FU    ENDOCRINE:  Follow up FS.  Insulin protocol if needed.     MICU  lines: RIJ since 6/16  Failed TOV

## 2022-06-29 NOTE — PROGRESS NOTE ADULT - SUBJECTIVE AND OBJECTIVE BOX
Patient is seen and examined at the bed side, is febrile .      REVIEW OF SYSTEMS: Unable to obtain due to mental status      ALLERGIES: buprenorphine (Rash)  Suboxone (Rash)      ICU Vital Signs Last 24 Hrs  T(C): 38.8 (2022 19:00), Max: 38.8 (2022 19:00)  T(F): 101.8 (2022 19:00), Max: 101.8 (2022 19:00)  HR: 122 (2022 18:06) (112 - 126)  BP: 153/97 (2022 18:06) (128/83 - 167/107)  BP(mean): 119 (2022 18:06) (100 - 130)  ABP: --  ABP(mean): --  RR: 29 (2022 19:00) (25 - 48)  SpO2: 100% (:00) (95% - 100%)      PHYSICAL EXAM:  GENERAL: Intubated/vented  CHEST/LUNG: Not using accessory muscles   HEART: s1 and s2 present  ABDOMEN:  Nontender and  Nondistended  EXTREMITIES: No pedal  edema  CNS:  Intubated/vented      LABS:                        9.2    3.55  )-----------( 202      ( 2022 16:17 )             31.0                           7.2    1.26  )-----------( 121      ( 2022 20:10 )             24.2       07-02    144  |  108  |  30<H>  ----------------------------<  139<H>  3.9   |  25  |  1.2    Ca    9.9      2022 06:56  Phos  3.4     07-02  Mg     2.6     07-02    TPro  6.3  /  Alb  2.8<L>  /  TBili  0.6  /  DBili  x   /  AST  21  /  ALT  10  /  AlkPhos  834<H>  07-    138  |  105  |  27<H>  ----------------------------<  89  3.5   |  23  |  1.1    Ca    8.2<L>      2022 05:27  Phos  5.6     -  Mg     2.3     -    TPro  5.0<L>  /  Alb  2.1<L>  /  TBili  0.5  /  DBili  x   /  AST  23  /  ALT  10  /  AlkPhos  650<H>          ABG - ( 2022 03:14 )  pH, Arterial: 7.30  pH, Blood: x     /  pCO2: 51    /  pO2: 111   / HCO3: 25    / Base Excess: -1.7  /  SaO2: 99.0          Urinalysis Basic - ( 2022 19:06 )  Color: Yellow / Appearance: Slightly Cloudy / S.025 / pH: x  Gluc: x / Ketone: Trace  / Bili: Negative / Urobili: 1.0 mg/dL   Blood: x / Protein: >=300 mg/dL / Nitrite: Negative   Leuk Esterase: Negative / RBC: 26-50 /HPF / WBC 3-5 /HPF   Sq Epi: x / Non Sq Epi: Occasional /HPF / Bacteria: Moderate        MEDICATIONS  (STANDING):    cefepime   IVPB 1000 milliGRAM(s) IV Intermittent every 8 hours  chlorhexidine 0.12% Liquid 15 milliLiter(s) Oral Mucosa every 12 hours  chlorhexidine 4% Liquid 1 Application(s) Topical daily  clonazePAM  Tablet 2 milliGRAM(s) Oral three times a day  enoxaparin Injectable 40 milliGRAM(s) SubCutaneous every 24 hours  fentaNYL   Infusion. 0.5 MICROgram(s)/kG/Hr (4.28 mL/Hr) IV Continuous <Continuous>  folic acid 1 milliGRAM(s) Oral daily  furosemide   Injectable 40 milliGRAM(s) IV Push daily  ketamine Infusion. 0.2 mG/kG/Hr (1.71 mL/Hr) IV Continuous <Continuous>  lactulose Syrup 20 Gram(s) Oral every 6 hours  midazolam Infusion 0.02 mG/kG/Hr (1.71 mL/Hr) IV Continuous <Continuous>  nicotine -  14 mG/24Hr(s) Patch 1 patch Transdermal daily  norepinephrine Infusion 0.05 MICROgram(s)/kG/Min (8.03 mL/Hr) IV Continuous <Continuous>  pantoprazole   Suspension 40 milliGRAM(s) Enteral Tube daily  polyethylene glycol 3350 17 Gram(s) Oral two times a day  propofol Infusion 1 MICROgram(s)/kG/Min (0.51 mL/Hr) IV Continuous <Continuous>  QUEtiapine 100 milliGRAM(s) Oral two times a day  scopolamine 1 mG/72 Hr(s) Patch 1 Patch Transdermal every 72 hours  senna 2 Tablet(s) Oral at bedtime  spironolactone 50 milliGRAM(s) Oral daily  thiamine 100 milliGRAM(s) Oral daily      RADIOLOGY & ADDITIONAL TESTS:    < from: CT Angio Chest PE Protocol w/ IV Cont (22 @ 21:14) >    No pulmonary embolus.    Near complete consolidation/collapse of the left lower lobe, increased as   compared with prior. Mildly increased right lower lobe patchy   consolidative opacities-atelectasis. Adjacent bilateral small pleural   effusions. Findings compatible with likely mixed pneumonia and   atelectasis.    Redemonstration of a dilated main pulmonary artery measuring up to 3.8 cm   compatible with pulmonary hypertension.    < from: Xray Chest 1 View- PORTABLE-Routine (Xray Chest 1 View- PORTABLE-Routine in AM.) (22 @ 05:28) >  Stable cardiomegaly and left basilar opacity. Stable support devices.    < end of copied text >  < from: CT Abdomen and Pelvis w/ IV Cont (22 @ 17:27) >  No evidence of retroperitoneal hematoma.    Small bilateral pleural effusions and left basilar consolidation. Moderate ascites redemonstrated.    Moderate pericardial effusion.  Hepatosplenomegaly redemonstrated.        MICROBIOLOGY DATA:    MRSA/MSSA PCR (22 @ 10:34)   MRSA PCR Result.: Negative    Respiratory Viral Panel with COVID-19 by BROOKE (22 @ 09:19)   Rapid RVP Result: NotDetec   SARS-CoV-2: NotDetec:    Culture - Blood (22 @ 20:31)   Specimen Source: .Blood Blood-Peripheral   Culture Results:   No growth to date.     Culture - Blood (22 @ 20:31)   Specimen Source: .Blood Blood   Culture Results:   No growth to date.     Culture - Sputum . (22 @ 14:00)   - Oxacillin: S <=0.25 Oxacillin predicts susceptibility for dicloxacillin, methicillin, and nafcillin   - Cefazolin: S <=4   - Erythromycin: S <=0.25   - Gentamicin: S <=1 Should not be used as monotherapy   - Clindamycin: S <=0.25   - Rifampin: S <=1 Should not be used as monotherapy   - Tetra/Doxy: S <=1   - Trimethoprim/Sulfamethoxazole: S <=0.5/9.5   - Vancomycin: S 2   Gram Stain:   Moderate polymorphonuclear leukocytes per low power field   Few Squamous epithelial cells per low power field   Moderate Gram positive cocci in pairs seen per oil power field   Few Gram Variable Rods seen per oil power field   - Ampicillin/Sulbactam: S <=8/4   Specimen Source: Trach Asp Tracheal Aspirate   Culture Results:   Numerous Mixed gram negative rods   Moderate Staphylococcus aureus   Normal Respiratory Kelly present   Organism Identification: Staphylococcus aureus   Organism: Staphylococcus aureus   Culture - Blood in AM (22 @ 06:00)   Specimen Source: .Blood Blood   Culture Results: No growth to date.     Culture - Sputum . (22 @ 14:00)   Gram Stain:   Moderate polymorphonuclear leukocytes per low power field   Few Squamous epithelial cells per low power field   Moderate Gram positive cocci in pairs seen per oil power field   Few Gram Variable Rods seen per oil power field   Specimen Source: Trach Asp Tracheal Aspirate   Culture Results:   Numerous Mixed gram negative rods   Moderate Staphylococcus aureus Susceptibility to follow.   Normal Respiratory Kelly absent     Culture - Sputum . (22 @ 17:20)   Gram Stain:   Few polymorphonuclear leukocytes per low power field   Rare Squamous epithelial cells per low power field   Moderate Gram Negative Rods seen per oil power field   - Amikacin: S <=16   - Amikacin: S <=16   - Amoxicillin/Clavulanic Acid: R >16/8   - Amoxicillin/Clavulanic Acid: S <=8/4   - Ampicillin: R 16 These ampicillin results predict results for amoxicillin   - Ampicillin: R >16 These ampicillin results predict results for amoxicillin   - Ampicillin/Sulbactam: R >16/8 Enterobacter, Klebsiella aerogenes, Citrobacter, and Serratia may develop resistance during prolonged therapy (3-4 days)   - Ampicillin/Sulbactam: S <=4/2 Enterobacter, Klebsiella aerogenes, Citrobacter, and Serratia may develop resistance during prolonged therapy (3-4 days)   - Aztreonam: S <=4   - Aztreonam: S <=4   - Cefazolin: R >16 Enterobacter, Klebsiella aerogenes, Citrobacter, and Serratia may develop resistance during prolonged therapy (3-4 days)   - Cefazolin: S <=2 Enterobacter, Klebsiella aerogenes, Citrobacter, and Serratia may develop resistance during prolonged therapy (3-4 days)   - Cefepime: S <=2   - Cefepime: S <=2   - Cefoxitin: R >16   - Cefoxitin: S <=8   - Ceftriaxone: S <=1 Enterobacter, Klebsiella aerogenes, Citrobacter, and Serratia may develop resistance during prolonged therapy   - Ceftriaxone: S <=1 Enterobacter, Klebsiella aerogenes, Citrobacter, and Serratia may develop resistance during prolonged therapy   - Ciprofloxacin: S <=0.25   - Ciprofloxacin: S <=0.25   - Ertapenem: S <=0.5   - Ertapenem: S <=0.5   - Gentamicin: S <=2   - Gentamicin: S <=2   - Imipenem: S <=1   - Imipenem: S <=1   - Levofloxacin: S <=0.5   - Levofloxacin: S <=0.5   - Meropenem: S <=1   - Meropenem: S <=1   - Piperacillin/Tazobactam: S <=8   - Piperacillin/Tazobactam: S <=8   - Tobramycin: S <=2   - Tobramycin: S <=2   - Trimethoprim/Sulfamethoxazole: S <=0.5/9.5   - Trimethoprim/Sulfamethoxazole: S <=0.5/9.5   Specimen Source: Trach Asp Tracheal Aspirate   Culture Results:   Moderate Klebsiella oxytoca/Raoutella ornithinolytica   Moderate Enterobacter cloacae complex   Normal Respiratory Kelly absent   Organism Identification: Klebsiella oxytoca /Raoutella ornithinolytica   Enterobacter cloacae complex   Organism: Klebsiella oxytoca /Raoutella ornithinolytica   Organism: Enterobacter cloacae complex COVID-19 PCR (06.15.22 @ 07:10)   COVID-19 PCR: NotDetec: Culture - Acid Fast - Sputum w/Smear (22 @ 07:00)   Specimen Source: .Sputum Sputum   Acid Fast Bacilli Smear:   No acid fast bacilli seen by fluorochrome stain   Culture Results:   No acid fast bacilli isolated after 6 weeks.

## 2022-06-29 NOTE — PROGRESS NOTE ADULT - SUBJECTIVE AND OBJECTIVE BOX
Patient is seen and examined at the bed side, is afebrile.      REVIEW OF SYSTEMS: All other review systems are negative      buprenorphine (Rash)  Suboxone (Rash)        ICU Vital Signs Last 24 Hrs  T(C): 37.2 (2022 18:00), Max: 39 (2022 11:00)  T(F): 99 (2022 18:00), Max: 102.2 (2022 11:00)  HR: 120 (2022 20:02) (64 - 138)  BP: 104/62 (2022 18:00) (89/51 - 153/100)  BP(mean): 76 (2022 18:00) (64 - 121)  ABP: --  ABP(mean): --  RR: 30 (2022 18:00) (20 - 50)  SpO2: 92% (2022 20:02) (92% - 100%)        PHYSICAL EXAM:  GENERAL: Not in distress   CHEST/LUNG:  Aire ntry bilaterally  HEART: s1 and s2 present  ABDOMEN:  Nontender and  Nondistended  EXTREMITIES: No pedal  edema  CNS: Awake and Alert      LABS:                        7.2    1.26  )-----------( 121      ( 2022 20:10 )             24.2     -    138  |  105  |  27<H>  ----------------------------<  89  3.5   |  23  |  1.1    Ca    8.2<L>      2022 05:27  Phos  5.6       Mg     2.3         TPro  5.0<L>  /  Alb  2.1<L>  /  TBili  0.5  /  DBili  x   /  AST  23  /  ALT  10  /  AlkPhos  650<H>  -      Urinalysis Basic - ( 2022 19:06 )    Color: Yellow / Appearance: Slightly Cloudy / S.025 / pH: x  Gluc: x / Ketone: Trace  / Bili: Negative / Urobili: 1.0 mg/dL   Blood: x / Protein: >=300 mg/dL / Nitrite: Negative   Leuk Esterase: Negative / RBC: 26-50 /HPF / WBC 3-5 /HPF   Sq Epi: x / Non Sq Epi: Occasional /HPF / Bacteria: Moderate      CAPILLARY BLOOD GLUCOSE        ABG - ( 2022 03:14 )  pH, Arterial: 7.30  pH, Blood: x     /  pCO2: 51    /  pO2: 111   / HCO3: 25    / Base Excess: -1.7  /  SaO2: 99.0                Urinalysis Basic - ( 2022 19:06 )    Color: Yellow / Appearance: Slightly Cloudy / S.025 / pH: x  Gluc: x / Ketone: Trace  / Bili: Negative / Urobili: 1.0 mg/dL   Blood: x / Protein: >=300 mg/dL / Nitrite: Negative   Leuk Esterase: Negative / RBC: 26-50 /HPF / WBC 3-5 /HPF   Sq Epi: x / Non Sq Epi: Occasional /HPF / Bacteria: Moderate        MEDICATIONS  (STANDING):  cefepime   IVPB 1000 milliGRAM(s) IV Intermittent every 8 hours  chlorhexidine 0.12% Liquid 15 milliLiter(s) Oral Mucosa every 12 hours  chlorhexidine 4% Liquid 1 Application(s) Topical daily  clonazePAM  Tablet 2 milliGRAM(s) Oral three times a day  fentaNYL   Infusion. 0.5 MICROgram(s)/kG/Hr (4.28 mL/Hr) IV Continuous <Continuous>  folic acid 1 milliGRAM(s) Oral daily  furosemide   Injectable 40 milliGRAM(s) IV Push daily  lactulose Syrup 20 Gram(s) Oral daily  methadone    Tablet 30 milliGRAM(s) Oral daily  midazolam Infusion 0.02 mG/kG/Hr (1.71 mL/Hr) IV Continuous <Continuous>  nicotine -  14 mG/24Hr(s) Patch 1 patch Transdermal daily  norepinephrine Infusion 0.05 MICROgram(s)/kG/Min (8.03 mL/Hr) IV Continuous <Continuous>  pantoprazole   Suspension 40 milliGRAM(s) Enteral Tube daily  propofol Infusion 1 MICROgram(s)/kG/Min (0.51 mL/Hr) IV Continuous <Continuous>  QUEtiapine 50 milliGRAM(s) Oral two times a day  spironolactone 50 milliGRAM(s) Oral daily  thiamine 100 milliGRAM(s) Oral daily    MEDICATIONS  (PRN):  ALBUTerol    90 MICROgram(s) HFA Inhaler 1 Puff(s) Inhalation every 4 hours PRN Shortness of Breath and/or Wheezing  cloNIDine 0.1 milliGRAM(s) Oral every 6 hours PRN opiate withdrawal  hydrOXYzine hydrochloride 50 milliGRAM(s) Oral every 6 hours PRN Anxiety  ibuprofen  Tablet. 600 milliGRAM(s) Oral every 6 hours PRN Temp greater or equal to 38C (100.4F)  ibuprofen  Tablet. 400 milliGRAM(s) Oral every 6 hours PRN Mild Pain (1 - 3)          RADIOLOGY & ADDITIONAL TESTS:                 Patient is seen and examined at the bed side, is afebrile now, spiked fever earlier. She remains intubated/vented. The Sputum culture from  grew Numerous Mixed gram negative rods and Moderate Staphylococcus aureus.      REVIEW OF SYSTEMS: Unable to obtain due to mental status      ALLERGIES: buprenorphine (Rash)  Suboxone (Rash)        ICU Vital Signs Last 24 Hrs  T(C): 37.2 (2022 18:00), Max: 39 (2022 11:00)  T(F): 99 (2022 18:00), Max: 102.2 (2022 11:00)  HR: 120 (2022 20:02) (64 - 138)  BP: 104/62 (2022 18:00) (89/51 - 153/100)  BP(mean): 76 (2022 18:00) (64 - 121)  ABP: --  ABP(mean): --  RR: 30 (2022 18:00) (20 - 50)  SpO2: 92% (2022 20:02) (92% - 100%)        PHYSICAL EXAM:  GENERAL: Intubated/vented  CHEST/LUNG: Not using accessory muscles   HEART: s1 and s2 present  ABDOMEN:  Nontender and  Nondistended  EXTREMITIES: No pedal  edema  CNS:  Intubated/vented      LABS:                        7.2    1.26  )-----------( 121      ( 2022 20:10 )             24.2           138  |  105  |  27<H>  ----------------------------<  89  3.5   |  23  |  1.1    Ca    8.2<L>      2022 05:27  Phos  5.6       Mg     2.3         TPro  5.0<L>  /  Alb  2.1<L>  /  TBili  0.5  /  DBili  x   /  AST  23  /  ALT  10  /  AlkPhos  650<H>          ABG - ( 2022 03:14 )  pH, Arterial: 7.30  pH, Blood: x     /  pCO2: 51    /  pO2: 111   / HCO3: 25    / Base Excess: -1.7  /  SaO2: 99.0          Urinalysis Basic - ( 2022 19:06 )  Color: Yellow / Appearance: Slightly Cloudy / S.025 / pH: x  Gluc: x / Ketone: Trace  / Bili: Negative / Urobili: 1.0 mg/dL   Blood: x / Protein: >=300 mg/dL / Nitrite: Negative   Leuk Esterase: Negative / RBC: 26-50 /HPF / WBC 3-5 /HPF   Sq Epi: x / Non Sq Epi: Occasional /HPF / Bacteria: Moderate        MEDICATIONS  (STANDING):  cefepime   IVPB 1000 milliGRAM(s) IV Intermittent every 8 hours  chlorhexidine 0.12% Liquid 15 milliLiter(s) Oral Mucosa every 12 hours  chlorhexidine 4% Liquid 1 Application(s) Topical daily  clonazePAM  Tablet 2 milliGRAM(s) Oral three times a day  fentaNYL   Infusion. 0.5 MICROgram(s)/kG/Hr (4.28 mL/Hr) IV Continuous <Continuous>  folic acid 1 milliGRAM(s) Oral daily  furosemide   Injectable 40 milliGRAM(s) IV Push daily  lactulose Syrup 20 Gram(s) Oral daily  methadone    Tablet 30 milliGRAM(s) Oral daily  midazolam Infusion 0.02 mG/kG/Hr (1.71 mL/Hr) IV Continuous <Continuous>  nicotine -  14 mG/24Hr(s) Patch 1 patch Transdermal daily  norepinephrine Infusion 0.05 MICROgram(s)/kG/Min (8.03 mL/Hr) IV Continuous <Continuous>  pantoprazole   Suspension 40 milliGRAM(s) Enteral Tube daily  propofol Infusion 1 MICROgram(s)/kG/Min (0.51 mL/Hr) IV Continuous <Continuous>  QUEtiapine 50 milliGRAM(s) Oral two times a day  spironolactone 50 milliGRAM(s) Oral daily  thiamine 100 milliGRAM(s) Oral daily    MEDICATIONS  (PRN):  ALBUTerol    90 MICROgram(s) HFA Inhaler 1 Puff(s) Inhalation every 4 hours PRN Shortness of Breath and/or Wheezing  cloNIDine 0.1 milliGRAM(s) Oral every 6 hours PRN opiate withdrawal  hydrOXYzine hydrochloride 50 milliGRAM(s) Oral every 6 hours PRN Anxiety  ibuprofen  Tablet. 600 milliGRAM(s) Oral every 6 hours PRN Temp greater or equal to 38C (100.4F)  ibuprofen  Tablet. 400 milliGRAM(s) Oral every 6 hours PRN Mild Pain (1 - 3)        RADIOLOGY & ADDITIONAL TESTS:    < from: CT Angio Chest PE Protocol w/ IV Cont (22 @ 21:14) >    No pulmonary embolus.    Near complete consolidation/collapse of the left lower lobe, increased as   compared with prior. Mildly increased right lower lobe patchy   consolidative opacities-atelectasis. Adjacent bilateral small pleural   effusions. Findings compatible with likely mixed pneumonia and   atelectasis.    Redemonstration of a dilated main pulmonary artery measuring up to 3.8 cm   compatible with pulmonary hypertension.    < from: Xray Chest 1 View- PORTABLE-Routine (Xray Chest 1 View- PORTABLE-Routine in AM.) (22 @ 05:28) >  Stable cardiomegaly and left basilar opacity. Stable support devices.    < end of copied text >  < from: CT Abdomen and Pelvis w/ IV Cont (22 @ 17:27) >  No evidence of retroperitoneal hematoma.    Small bilateral pleural effusions and left basilar consolidation. Moderate ascites redemonstrated.    Moderate pericardial effusion.  Hepatosplenomegaly redemonstrated.        MICROBIOLOGY DATA:    Culture - Blood in AM (22 @ 06:00)   Specimen Source: .Blood Blood   Culture Results: No growth to date.     Culture - Sputum . (22 @ 14:00)   Gram Stain:   Moderate polymorphonuclear leukocytes per low power field   Few Squamous epithelial cells per low power field   Moderate Gram positive cocci in pairs seen per oil power field   Few Gram Variable Rods seen per oil power field   Specimen Source: Trach Asp Tracheal Aspirate   Culture Results:   Numerous Mixed gram negative rods   Moderate Staphylococcus aureus Susceptibility to follow.   Normal Respiratory Kelly absent     Culture - Sputum . (22 @ 17:20)   Gram Stain:   Few polymorphonuclear leukocytes per low power field   Rare Squamous epithelial cells per low power field   Moderate Gram Negative Rods seen per oil power field   - Amikacin: S <=16   - Amikacin: S <=16   - Amoxicillin/Clavulanic Acid: R >16/8   - Amoxicillin/Clavulanic Acid: S <=8/4   - Ampicillin: R 16 These ampicillin results predict results for amoxicillin   - Ampicillin: R >16 These ampicillin results predict results for amoxicillin   - Ampicillin/Sulbactam: R >16/8 Enterobacter, Klebsiella aerogenes, Citrobacter, and Serratia may develop resistance during prolonged therapy (3-4 days)   - Ampicillin/Sulbactam: S <=4/2 Enterobacter, Klebsiella aerogenes, Citrobacter, and Serratia may develop resistance during prolonged therapy (3-4 days)   - Aztreonam: S <=4   - Aztreonam: S <=4   - Cefazolin: R >16 Enterobacter, Klebsiella aerogenes, Citrobacter, and Serratia may develop resistance during prolonged therapy (3-4 days)   - Cefazolin: S <=2 Enterobacter, Klebsiella aerogenes, Citrobacter, and Serratia may develop resistance during prolonged therapy (3-4 days)   - Cefepime: S <=2   - Cefepime: S <=2   - Cefoxitin: R >16   - Cefoxitin: S <=8   - Ceftriaxone: S <=1 Enterobacter, Klebsiella aerogenes, Citrobacter, and Serratia may develop resistance during prolonged therapy   - Ceftriaxone: S <=1 Enterobacter, Klebsiella aerogenes, Citrobacter, and Serratia may develop resistance during prolonged therapy   - Ciprofloxacin: S <=0.25   - Ciprofloxacin: S <=0.25   - Ertapenem: S <=0.5   - Ertapenem: S <=0.5   - Gentamicin: S <=2   - Gentamicin: S <=2   - Imipenem: S <=1   - Imipenem: S <=1   - Levofloxacin: S <=0.5   - Levofloxacin: S <=0.5   - Meropenem: S <=1   - Meropenem: S <=1   - Piperacillin/Tazobactam: S <=8   - Piperacillin/Tazobactam: S <=8   - Tobramycin: S <=2   - Tobramycin: S <=2   - Trimethoprim/Sulfamethoxazole: S <=0.5/9.5   - Trimethoprim/Sulfamethoxazole: S <=0.5/9.5   Specimen Source: Trach Asp Tracheal Aspirate   Culture Results:   Moderate Klebsiella oxytoca/Raoutella ornithinolytica   Moderate Enterobacter cloacae complex   Normal Respiratory Kelly absent   Organism Identification: Klebsiella oxytoca /Raoutella ornithinolytica   Enterobacter cloacae complex   Organism: Klebsiella oxytoca /Raoutella ornithinolytica   Organism: Enterobacter cloacae complex COVID-19 PCR (06.15.22 @ 07:10)   COVID-19 PCR: NotDetec: Culture - Acid Fast - Sputum w/Smear (22 @ 07:00)   Specimen Source: .Sputum Sputum   Acid Fast Bacilli Smear:   No acid fast bacilli seen by fluorochrome stain   Culture Results:   No acid fast bacilli isolated after 6 weeks.

## 2022-06-29 NOTE — PROGRESS NOTE ADULT - ASSESSMENT
IMPRESSION:  # AHRF - on MV  # Aspiration PNA s/p Unasyn Course  # Persistent Fevers  # Anemia  # Pancytopenia  # Seizure like activity - now on AEDs  # pHTN WHO Class unclear at this time  # h/o Pericardial Effusion  # OD vs. Other Tox  # Ascites w/ h/o Liver cirrhosis d/t HepC    PLAN:    CNS:   - Repeat attempt of SAT/SBT  - Seizure Precautions  - Clonapin 2mg TID  - Wean Prop + Versed as tolerated  - Start seroquel 50mgBID    HEENT:   - c/w Routine oral care     PULMONARY:   - No vent changes  - HOB 45  - SBT    CARDIOVASCULAR:   - Lasix to 40g IVP QD  - Daily ECG for QTc monitoring    GI:   - GI prophylaxis  - NG Feeds  - Repeat    - Check Amylase/Lipase     RENAL:   - Monitor lytes  - Strict I/O's  - Ensure Mg > 2.0  - Replete Potassium    INFECTIOUS DISEASE:   - s/p Unasyn x 10 days  - Repeat Procal 0.11  - Repeat BCx Repeat UA Repeat DTA  - Cefepime 1g q8hrs    HEMATOLOGICAL:     - s/p CT A/P no RPH noted  - Hold Heparin (thrombocytopenia)  - 1U PRBC 6/26 + 1U PRBC 6/27   - Retic Index Hypoproliferative 1.8%  - Heme/Onc consult appreciated    ENDOCRINE:    - Follow up FS.  Insulin protocol PRN    MICU

## 2022-06-29 NOTE — PROGRESS NOTE ADULT - SUBJECTIVE AND OBJECTIVE BOX
POOJA DIANE Female [id number] Code Status: FULL CODE    Dispo: Admit to [facility]  Patient is a 26y old  Female who presents with a chief complaint of anasarca (2022 08:18)      INTERVAL HPI/OVERNIGHT EVENTS: Failed SBT this AM.     ICU Vital Signs Last 24 Hrs  T(C): 38.6 (2022 09:40), Max: 38.6 (2022 09:40)  T(F): 101.5 (2022 09:40), Max: 101.5 (2022 09:40)  HR: 132 (2022 09:40) (64 - 135)  BP: 148/86 (2022 09:00) (89/51 - 148/86)  BP(mean): 109 (2022 09:00) (64 - 109)  RR: 37 (2022 09:40) (20 - 40)  SpO2: 100% (2022 09:40) (89% - 100%)      I&O's Summary    2022 07:01  -  2022 07:00  --------------------------------------------------------  IN: 2924.2 mL / OUT: 2080 mL / NET: 844.2 mL    2022 07:01  -  2022 10:19  --------------------------------------------------------  IN: 234 mL / OUT: 0 mL / NET: 234 mL         Daily     Daily Weight in k.7 (2022 03:00)    Mode: AC/ CMV (Assist Control/ Continuous Mandatory Ventilation)  RR (machine): 22  TV (machine): 350  FiO2: 40  PEEP: 8  ITime: 0.8  MAP: 10  PIP: 23      ABG - ( 2022 03:14 )  pH, Arterial: 7.30  pH, Blood: x     /  pCO2: 51    /  pO2: 111   / HCO3: 25    / Base Excess: -1.7  /  SaO2: 99.0                LABS:                        7.3    1.62  )-----------( 90       ( 2022 05:27 )             25.3         138  |  105  |  27<H>  ----------------------------<  89  3.5   |  23  |  1.1    Ca    8.2<L>      2022 05:27  Phos  5.6       Mg     2.3         TPro  5.0<L>  /  Alb  2.1<L>  /  TBili  0.5  /  DBili  x   /  AST  23  /  ALT  10  /  AlkPhos  650<H>        Urinalysis Basic - ( 2022 14:00 )    Color: Yellow / Appearance: Clear / SG: >=1.030 / pH: x  Gluc: x / Ketone: Negative  / Bili: Negative / Urobili: 1.0 mg/dL   Blood: x / Protein: >=300 mg/dL / Nitrite: Negative   Leuk Esterase: Negative / RBC: 11-25 /HPF / WBC 3-5 /HPF   Sq Epi: x / Non Sq Epi: Few /HPF / Bacteria: Moderate      CAPILLARY BLOOD GLUCOSE          Procalcitonin, Serum: 0.11 ng/mL (22 @ 15:46)  Procalcitonin, Serum: 0.11 ng/mL (22 @ 06:47)  Procalcitonin, Serum: 0.62 ng/mL (22 @ 05:49)  Procalcitonin, Serum: 3.28 ng/mL (22 @ 05:22)        Ferritin, Serum: 94 ng/mL ()    Sedimentation Rate, Erythrocyte: 65 mm/Hr ()    Culture Results:   Moderate Klebsiella oxytoca/Raoutella ornithinolytica   Moderate Enterobacter cloacae complex   Normal Respiratory Kelly absent   Organism Identification: Klebsiella oxytoca /Raoutella ornithinolytica   Enterobacter cloacae complex   Organism: Klebsiella oxytoca /Raoutella ornithinolytica   Organism: Enterobacter cloacae complex         RADIOLOGY & ADDITIONAL TESTS:  < from: CT Abdomen and Pelvis w/ IV Cont (22 @ 17:27) >  IMPRESSION:    No evidence of retroperitoneal hematoma.    Small bilateral pleural effusions and left basilar consolidation.    Moderate ascites redemonstrated.    Moderate pericardial effusion.    Hepatosplenomegaly redemonstrated.    --- End of Report ---    < end of copied text >    CARDIOLOGY DIAGNOSTICS:   < from: TTE Echo Complete w/ Contrast w/ Doppler (22 @ 10:54) >  Summary:   1. Normal global left ventricular systolic function.   2. Left ventricular ejection fraction, by visual estimation, is 55 to   60%.   3. Moderately decreased segmental left ventricular systolic function.   4. Entire apex, mid and apical anterior wall, mid and apical inferior   wall, and mid inferolateral segment are abnormal as described above.   5. Moderately enlarged right ventricle.   6. Mildly increased RV wall thickness.   7. Mildly reduced RV systolic function.   8. Normal left atrial size.   9. Normal right atrial size.  10. Trace mitral valve regurgitation.  11. Mild tricuspid regurgitation.  12. Moderate pulmonic valve regurgitation.  13. Estimated pulmonary artery systolic pressure is 51.7 mmHg assuming a   right atrial pressure of 15 mmHg, which is consistent with moderate   pulmonary hypertension.  14. Small pericardial effusion.    < end of copied text >    12 Lead ECG:   Ventricular Rate 138 BPM    Atrial Rate 138 BPM    P-R Interval 144 ms    QRS Duration 66 ms    Q-T Interval 242 ms    QTC Calculation(Bazett) 366 ms    P Axis 69 degrees    R Axis 115 degrees    T Axis 24 degrees    Diagnosis Line Sinus tachycardia  Right axis deviation  Possible Right ventricular hypertrophy  Nonspecific T wave abnormality  Abnormal ECG    Confirmed by BRANDON BELL MD ([id number]) on 2022 10:01:31 AM (22 @ 09:55)      MEDICATIONS  (STANDING):  cefepime   IVPB 1000 milliGRAM(s) IV Intermittent every 8 hours  chlorhexidine 0.12% Liquid 15 milliLiter(s) Oral Mucosa every 12 hours  chlorhexidine 4% Liquid 1 Application(s) Topical daily  clonazePAM  Tablet 2 milliGRAM(s) Oral three times a day  fentaNYL   Infusion. 0.5 MICROgram(s)/kG/Hr (4.28 mL/Hr) IV Continuous <Continuous>  folic acid 1 milliGRAM(s) Oral daily  furosemide   Injectable 40 milliGRAM(s) IV Push daily  lactulose Syrup 20 Gram(s) Oral daily  methadone    Tablet 30 milliGRAM(s) Oral daily  midazolam Infusion 0.02 mG/kG/Hr (1.71 mL/Hr) IV Continuous <Continuous>  nicotine -  14 mG/24Hr(s) Patch 1 patch Transdermal daily  norepinephrine Infusion 0.05 MICROgram(s)/kG/Min (8.03 mL/Hr) IV Continuous <Continuous>  pantoprazole   Suspension 40 milliGRAM(s) Enteral Tube daily  propofol Infusion 1 MICROgram(s)/kG/Min (0.51 mL/Hr) IV Continuous <Continuous>  spironolactone 50 milliGRAM(s) Oral daily  thiamine 100 milliGRAM(s) Oral daily    MEDICATIONS  (PRN):  ALBUTerol    90 MICROgram(s) HFA Inhaler 1 Puff(s) Inhalation every 4 hours PRN Shortness of Breath and/or Wheezing  cloNIDine 0.1 milliGRAM(s) Oral every 6 hours PRN opiate withdrawal  hydrOXYzine hydrochloride 50 milliGRAM(s) Oral every 6 hours PRN Anxiety  ibuprofen  Tablet. 600 milliGRAM(s) Oral every 6 hours PRN Temp greater or equal to 38C (100.4F)  ibuprofen  Tablet. 400 milliGRAM(s) Oral every 6 hours PRN Mild Pain (1 - 3)    PHYSICAL EXAM:  GENERAL: NAD  HEENT:  NCAT, PERRL, No JVD, Trachea Midline, +ETT, +OGT  NERVOUS SYSTEM:  Sedated to RASS 0 to -1 opens eyes to my voice and tracks me   CHEST/LUNG: Good air entry bilaterally  HEART: Regular rate and rhythm  ABDOMEN: Soft, Nontender, + distension   EXTREMITIES:  Warm, well perfused  SKIN: No new skin breakdowns

## 2022-06-29 NOTE — PROGRESS NOTE ADULT - SUBJECTIVE AND OBJECTIVE BOX
Patient is a 26y old  Female who presents with a chief complaint of anasarca (28 Jun 2022 13:56)        Over Night Events:  Spiked fever overnight      ROS:     All ROS are negative except HPI         PHYSICAL EXAM    ICU Vital Signs Last 24 Hrs  T(C): 37.3 (29 Jun 2022 07:24), Max: 38.4 (28 Jun 2022 20:00)  T(F): 99.1 (29 Jun 2022 07:24), Max: 101.2 (28 Jun 2022 20:00)  HR: 115 (29 Jun 2022 08:10) (64 - 135)  BP: 91/50 (29 Jun 2022 07:24) (89/51 - 126/79)  BP(mean): 76 (29 Jun 2022 06:08) (65 - 96)  RR: 25 (29 Jun 2022 07:24) (20 - 40)  SpO2: 100% (29 Jun 2022 08:10) (89% - 100%)      CONSTITUTIONAL:  Well nourished.  NAD    ENT:   Airway patent,   Mouth with normal mucosa.   No thrush    EYES:   Pupils equal,   Round and reactive to light.    CARDIAC:   Normal rate,   Regular rhythm.    No edema      Vascular:  Normal systolic impulse  No Carotid bruits    RESPIRATORY:   No wheezing  Bilateral BS  Normal chest expansion  Not tachypneic,  No use of accessory muscles    GASTROINTESTINAL:  Abdomen soft, distended   Non-tender,   No guarding,       NEUROLOGICAL:   Awake    SKIN:   Skin normal color for race,   Warm and dry and intact.   No evidence of rash.        06-28-22 @ 07:01  -  06-29-22 @ 07:00  --------------------------------------------------------  IN:    Dexmedetomidine: 64 mL    Enteral Tube Flush: 250 mL    FentaNYL: 493.3 mL    IV PiggyBack: 50 mL    Midazolam: 234.9 mL    Norepinephrine: 60.8 mL    Propofol: 691.2 mL    Vital High Protein: 1080 mL  Total IN: 2924.2 mL    OUT:    Indwelling Catheter - Urethral (mL): 2080 mL  Total OUT: 2080 mL    Total NET: 844.2 mL          LABS:                            7.3    1.62  )-----------( 90       ( 29 Jun 2022 05:27 )             25.3                                               06-29    138  |  105  |  27<H>  ----------------------------<  89  3.5   |  23  |  1.1    Ca    8.2<L>      29 Jun 2022 05:27  Phos  5.6     06-29  Mg     2.3     06-29    TPro  5.0<L>  /  Alb  2.1<L>  /  TBili  0.5  /  DBili  x   /  AST  23  /  ALT  10  /  AlkPhos  650<H>  06-29                                             Urinalysis Basic - ( 27 Jun 2022 14:00 )    Color: Yellow / Appearance: Clear / SG: >=1.030 / pH: x  Gluc: x / Ketone: Negative  / Bili: Negative / Urobili: 1.0 mg/dL   Blood: x / Protein: >=300 mg/dL / Nitrite: Negative   Leuk Esterase: Negative / RBC: 11-25 /HPF / WBC 3-5 /HPF   Sq Epi: x / Non Sq Epi: Few /HPF / Bacteria: Moderate                                                  LIVER FUNCTIONS - ( 29 Jun 2022 05:27 )  Alb: 2.1 g/dL / Pro: 5.0 g/dL / ALK PHOS: 650 U/L / ALT: 10 U/L / AST: 23 U/L / GGT: x                                                  Culture - Sputum (collected 27 Jun 2022 14:00)  Source: Trach Asp Tracheal Aspirate  Gram Stain (28 Jun 2022 03:15):    Moderate polymorphonuclear leukocytes per low power field    Few Squamous epithelial cells per low power field    Moderate Gram positive cocci in pairs seen per oil power field    Few Gram Variable Rods seen per oil power field  Preliminary Report (28 Jun 2022 19:34):    Numerous Mixed gram negative rods                                                   Mode: AC/ CMV (Assist Control/ Continuous Mandatory Ventilation)  RR (machine): 22  TV (machine): 350  FiO2: 40  PEEP: 8  ITime: 0.8  MAP: 10  PIP: 23                                      ABG - ( 29 Jun 2022 03:14 )  pH, Arterial: 7.30  pH, Blood: x     /  pCO2: 51    /  pO2: 111   / HCO3: 25    / Base Excess: -1.7  /  SaO2: 99.0                MEDICATIONS  (STANDING):  cefepime   IVPB 1000 milliGRAM(s) IV Intermittent every 8 hours  chlorhexidine 0.12% Liquid 15 milliLiter(s) Oral Mucosa every 12 hours  chlorhexidine 4% Liquid 1 Application(s) Topical daily  clonazePAM  Tablet 2 milliGRAM(s) Oral three times a day  fentaNYL   Infusion. 0.5 MICROgram(s)/kG/Hr (4.28 mL/Hr) IV Continuous <Continuous>  folic acid 1 milliGRAM(s) Oral daily  furosemide   Injectable 40 milliGRAM(s) IV Push daily  lactulose Syrup 20 Gram(s) Oral daily  methadone    Tablet 30 milliGRAM(s) Oral daily  midazolam Infusion 0.02 mG/kG/Hr (1.71 mL/Hr) IV Continuous <Continuous>  nicotine -  14 mG/24Hr(s) Patch 1 patch Transdermal daily  norepinephrine Infusion 0.05 MICROgram(s)/kG/Min (8.03 mL/Hr) IV Continuous <Continuous>  pantoprazole   Suspension 40 milliGRAM(s) Enteral Tube daily  propofol Infusion 1 MICROgram(s)/kG/Min (0.51 mL/Hr) IV Continuous <Continuous>  spironolactone 50 milliGRAM(s) Oral daily  thiamine 100 milliGRAM(s) Oral daily    MEDICATIONS  (PRN):  ALBUTerol    90 MICROgram(s) HFA Inhaler 1 Puff(s) Inhalation every 4 hours PRN Shortness of Breath and/or Wheezing  cloNIDine 0.1 milliGRAM(s) Oral every 6 hours PRN opiate withdrawal  hydrOXYzine hydrochloride 50 milliGRAM(s) Oral every 6 hours PRN Anxiety  ibuprofen  Tablet. 600 milliGRAM(s) Oral every 6 hours PRN Temp greater or equal to 38C (100.4F)  ibuprofen  Tablet. 400 milliGRAM(s) Oral every 6 hours PRN Mild Pain (1 - 3)      New X-rays reviewed:                                                                                  ECHO    CXR interpreted by me:       Patient is a 26y old  Female who presents with a chief complaint of anasarca (28 Jun 2022 13:56)        Over Night Events:  Spiked fever overnight  tachycardic overnight  aggitated      ROS:     All ROS are negative except HPI         PHYSICAL EXAM    ICU Vital Signs Last 24 Hrs  T(C): 37.3 (29 Jun 2022 07:24), Max: 38.4 (28 Jun 2022 20:00)  T(F): 99.1 (29 Jun 2022 07:24), Max: 101.2 (28 Jun 2022 20:00)  HR: 115 (29 Jun 2022 08:10) (64 - 135)  BP: 91/50 (29 Jun 2022 07:24) (89/51 - 126/79)  BP(mean): 76 (29 Jun 2022 06:08) (65 - 96)  RR: 25 (29 Jun 2022 07:24) (20 - 40)  SpO2: 100% (29 Jun 2022 08:10) (89% - 100%)      CONSTITUTIONAL:  Well nourished.  NAD    ENT:   Airway patent,   Mouth with normal mucosa.   No thrush  +ETT    EYES:   Pupils equal,   Round and reactive to light.    CARDIAC:   Normal rate,   Regular rhythm.    No edema      Vascular:  Normal systolic impulse  No Carotid bruits    RESPIRATORY:   No wheezing  Bilateral BS  Normal chest expansion  Not tachypneic,  No use of accessory muscles    GASTROINTESTINAL:  Abdomen soft, distended   Non-tender,   No guarding,       NEUROLOGICAL:   Awake    SKIN:   Skin normal color for race,   Warm and dry and intact.   No evidence of rash.        06-28-22 @ 07:01  -  06-29-22 @ 07:00  --------------------------------------------------------  IN:    Dexmedetomidine: 64 mL    Enteral Tube Flush: 250 mL    FentaNYL: 493.3 mL    IV PiggyBack: 50 mL    Midazolam: 234.9 mL    Norepinephrine: 60.8 mL    Propofol: 691.2 mL    Vital High Protein: 1080 mL  Total IN: 2924.2 mL    OUT:    Indwelling Catheter - Urethral (mL): 2080 mL  Total OUT: 2080 mL    Total NET: 844.2 mL          LABS:                            7.3    1.62  )-----------( 90       ( 29 Jun 2022 05:27 )             25.3                                               06-29    138  |  105  |  27<H>  ----------------------------<  89  3.5   |  23  |  1.1    Ca    8.2<L>      29 Jun 2022 05:27  Phos  5.6     06-29  Mg     2.3     06-29    TPro  5.0<L>  /  Alb  2.1<L>  /  TBili  0.5  /  DBili  x   /  AST  23  /  ALT  10  /  AlkPhos  650<H>  06-29                                             Urinalysis Basic - ( 27 Jun 2022 14:00 )    Color: Yellow / Appearance: Clear / SG: >=1.030 / pH: x  Gluc: x / Ketone: Negative  / Bili: Negative / Urobili: 1.0 mg/dL   Blood: x / Protein: >=300 mg/dL / Nitrite: Negative   Leuk Esterase: Negative / RBC: 11-25 /HPF / WBC 3-5 /HPF   Sq Epi: x / Non Sq Epi: Few /HPF / Bacteria: Moderate                                                  LIVER FUNCTIONS - ( 29 Jun 2022 05:27 )  Alb: 2.1 g/dL / Pro: 5.0 g/dL / ALK PHOS: 650 U/L / ALT: 10 U/L / AST: 23 U/L / GGT: x                                                  Culture - Sputum (collected 27 Jun 2022 14:00)  Source: Trach Asp Tracheal Aspirate  Gram Stain (28 Jun 2022 03:15):    Moderate polymorphonuclear leukocytes per low power field    Few Squamous epithelial cells per low power field    Moderate Gram positive cocci in pairs seen per oil power field    Few Gram Variable Rods seen per oil power field  Preliminary Report (28 Jun 2022 19:34):    Numerous Mixed gram negative rods                                                   Mode: AC/ CMV (Assist Control/ Continuous Mandatory Ventilation)  RR (machine): 22  TV (machine): 350  FiO2: 40  PEEP: 8  ITime: 0.8  MAP: 10  PIP: 23                                      ABG - ( 29 Jun 2022 03:14 )  pH, Arterial: 7.30  pH, Blood: x     /  pCO2: 51    /  pO2: 111   / HCO3: 25    / Base Excess: -1.7  /  SaO2: 99.0                MEDICATIONS  (STANDING):  cefepime   IVPB 1000 milliGRAM(s) IV Intermittent every 8 hours  chlorhexidine 0.12% Liquid 15 milliLiter(s) Oral Mucosa every 12 hours  chlorhexidine 4% Liquid 1 Application(s) Topical daily  clonazePAM  Tablet 2 milliGRAM(s) Oral three times a day  fentaNYL   Infusion. 0.5 MICROgram(s)/kG/Hr (4.28 mL/Hr) IV Continuous <Continuous>  folic acid 1 milliGRAM(s) Oral daily  furosemide   Injectable 40 milliGRAM(s) IV Push daily  lactulose Syrup 20 Gram(s) Oral daily  methadone    Tablet 30 milliGRAM(s) Oral daily  midazolam Infusion 0.02 mG/kG/Hr (1.71 mL/Hr) IV Continuous <Continuous>  nicotine -  14 mG/24Hr(s) Patch 1 patch Transdermal daily  norepinephrine Infusion 0.05 MICROgram(s)/kG/Min (8.03 mL/Hr) IV Continuous <Continuous>  pantoprazole   Suspension 40 milliGRAM(s) Enteral Tube daily  propofol Infusion 1 MICROgram(s)/kG/Min (0.51 mL/Hr) IV Continuous <Continuous>  spironolactone 50 milliGRAM(s) Oral daily  thiamine 100 milliGRAM(s) Oral daily    MEDICATIONS  (PRN):  ALBUTerol    90 MICROgram(s) HFA Inhaler 1 Puff(s) Inhalation every 4 hours PRN Shortness of Breath and/or Wheezing  cloNIDine 0.1 milliGRAM(s) Oral every 6 hours PRN opiate withdrawal  hydrOXYzine hydrochloride 50 milliGRAM(s) Oral every 6 hours PRN Anxiety  ibuprofen  Tablet. 600 milliGRAM(s) Oral every 6 hours PRN Temp greater or equal to 38C (100.4F)  ibuprofen  Tablet. 400 milliGRAM(s) Oral every 6 hours PRN Mild Pain (1 - 3)      New X-rays reviewed:                                                                                  ECHO    CXR interpreted by me:

## 2022-06-30 LAB
-  AMPICILLIN/SULBACTAM: SIGNIFICANT CHANGE UP
-  CEFAZOLIN: SIGNIFICANT CHANGE UP
-  CLINDAMYCIN: SIGNIFICANT CHANGE UP
-  ERYTHROMYCIN: SIGNIFICANT CHANGE UP
-  GENTAMICIN: SIGNIFICANT CHANGE UP
-  OXACILLIN: SIGNIFICANT CHANGE UP
-  RIFAMPIN: SIGNIFICANT CHANGE UP
-  TETRACYCLINE: SIGNIFICANT CHANGE UP
-  TRIMETHOPRIM/SULFAMETHOXAZOLE: SIGNIFICANT CHANGE UP
-  VANCOMYCIN: SIGNIFICANT CHANGE UP
ALBUMIN SERPL ELPH-MCNC: 2.5 G/DL — LOW (ref 3.5–5.2)
ALP SERPL-CCNC: 778 U/L — HIGH (ref 30–115)
ALT FLD-CCNC: 10 U/L — SIGNIFICANT CHANGE UP (ref 0–41)
ANION GAP SERPL CALC-SCNC: 11 MMOL/L — SIGNIFICANT CHANGE UP (ref 7–14)
AST SERPL-CCNC: 23 U/L — SIGNIFICANT CHANGE UP (ref 0–41)
BASOPHILS # BLD AUTO: 0.01 K/UL — SIGNIFICANT CHANGE UP (ref 0–0.2)
BASOPHILS NFR BLD AUTO: 0.6 % — SIGNIFICANT CHANGE UP (ref 0–1)
BILIRUB SERPL-MCNC: 0.5 MG/DL — SIGNIFICANT CHANGE UP (ref 0.2–1.2)
BUN SERPL-MCNC: 32 MG/DL — HIGH (ref 10–20)
CALCIUM SERPL-MCNC: 8.4 MG/DL — LOW (ref 8.5–10.1)
CHLORIDE SERPL-SCNC: 107 MMOL/L — SIGNIFICANT CHANGE UP (ref 98–110)
CK SERPL-CCNC: 28 U/L — SIGNIFICANT CHANGE UP (ref 0–225)
CO2 SERPL-SCNC: 22 MMOL/L — SIGNIFICANT CHANGE UP (ref 17–32)
CREAT SERPL-MCNC: 1.4 MG/DL — SIGNIFICANT CHANGE UP (ref 0.7–1.5)
CULTURE RESULTS: SIGNIFICANT CHANGE UP
EGFR: 53 ML/MIN/1.73M2 — LOW
EOSINOPHIL # BLD AUTO: 0.01 K/UL — SIGNIFICANT CHANGE UP (ref 0–0.7)
EOSINOPHIL NFR BLD AUTO: 0.6 % — SIGNIFICANT CHANGE UP (ref 0–8)
ERYTHROCYTE [SEDIMENTATION RATE] IN BLOOD: 86 MM/HR — HIGH (ref 0–20)
GLUCOSE SERPL-MCNC: 117 MG/DL — HIGH (ref 70–99)
HCT VFR BLD CALC: 24.8 % — LOW (ref 37–47)
HCT VFR BLD CALC: 25.5 % — LOW (ref 37–47)
HGB BLD-MCNC: 7.1 G/DL — LOW (ref 12–16)
HGB BLD-MCNC: 7.4 G/DL — LOW (ref 12–16)
HIV 1+2 AB+HIV1 P24 AG SERPL QL IA: SIGNIFICANT CHANGE UP
IMM GRANULOCYTES NFR BLD AUTO: 0.6 % — HIGH (ref 0.1–0.3)
LYMPHOCYTES # BLD AUTO: 0.3 K/UL — LOW (ref 1.2–3.4)
LYMPHOCYTES # BLD AUTO: 17 % — LOW (ref 20.5–51.1)
MAGNESIUM SERPL-MCNC: 2.5 MG/DL — HIGH (ref 1.8–2.4)
MCHC RBC-ENTMCNC: 26.4 PG — LOW (ref 27–31)
MCHC RBC-ENTMCNC: 26.4 PG — LOW (ref 27–31)
MCHC RBC-ENTMCNC: 28.6 G/DL — LOW (ref 32–37)
MCHC RBC-ENTMCNC: 29 G/DL — LOW (ref 32–37)
MCV RBC AUTO: 91.1 FL — SIGNIFICANT CHANGE UP (ref 81–99)
MCV RBC AUTO: 92.2 FL — SIGNIFICANT CHANGE UP (ref 81–99)
METHOD TYPE: SIGNIFICANT CHANGE UP
MONOCYTES # BLD AUTO: 0.1 K/UL — SIGNIFICANT CHANGE UP (ref 0.1–0.6)
MONOCYTES NFR BLD AUTO: 5.7 % — SIGNIFICANT CHANGE UP (ref 1.7–9.3)
MRSA PCR RESULT.: NEGATIVE — SIGNIFICANT CHANGE UP
NEUTROPHILS # BLD AUTO: 1.33 K/UL — LOW (ref 1.4–6.5)
NEUTROPHILS NFR BLD AUTO: 75.5 % — HIGH (ref 42.2–75.2)
NRBC # BLD: 0 /100 WBCS — SIGNIFICANT CHANGE UP (ref 0–0)
NRBC # BLD: 0 /100 WBCS — SIGNIFICANT CHANGE UP (ref 0–0)
ORGANISM # SPEC MICROSCOPIC CNT: SIGNIFICANT CHANGE UP
ORGANISM # SPEC MICROSCOPIC CNT: SIGNIFICANT CHANGE UP
PHOSPHATE SERPL-MCNC: 5.4 MG/DL — HIGH (ref 2.1–4.9)
PLATELET # BLD AUTO: 83 K/UL — LOW (ref 130–400)
PLATELET # BLD AUTO: 94 K/UL — LOW (ref 130–400)
POTASSIUM SERPL-MCNC: 3.6 MMOL/L — SIGNIFICANT CHANGE UP (ref 3.5–5)
POTASSIUM SERPL-SCNC: 3.6 MMOL/L — SIGNIFICANT CHANGE UP (ref 3.5–5)
PROT SERPL-MCNC: 5.2 G/DL — LOW (ref 6–8)
RBC # BLD: 2.69 M/UL — LOW (ref 4.2–5.4)
RBC # BLD: 2.8 M/UL — LOW (ref 4.2–5.4)
RBC # FLD: 16.2 % — HIGH (ref 11.5–14.5)
RBC # FLD: 16.6 % — HIGH (ref 11.5–14.5)
SODIUM SERPL-SCNC: 140 MMOL/L — SIGNIFICANT CHANGE UP (ref 135–146)
SPECIMEN SOURCE: SIGNIFICANT CHANGE UP
WBC # BLD: 1.2 K/UL — LOW (ref 4.8–10.8)
WBC # BLD: 1.76 K/UL — LOW (ref 4.8–10.8)
WBC # FLD AUTO: 1.2 K/UL — LOW (ref 4.8–10.8)
WBC # FLD AUTO: 1.76 K/UL — LOW (ref 4.8–10.8)

## 2022-06-30 PROCEDURE — 99232 SBSQ HOSP IP/OBS MODERATE 35: CPT

## 2022-06-30 PROCEDURE — 71045 X-RAY EXAM CHEST 1 VIEW: CPT | Mod: 26

## 2022-06-30 PROCEDURE — 99233 SBSQ HOSP IP/OBS HIGH 50: CPT

## 2022-06-30 RX ORDER — VANCOMYCIN HCL 1 G
1500 VIAL (EA) INTRAVENOUS ONCE
Refills: 0 | Status: COMPLETED | OUTPATIENT
Start: 2022-06-30 | End: 2022-06-30

## 2022-06-30 RX ORDER — SODIUM CHLORIDE 9 MG/ML
10 INJECTION INTRAMUSCULAR; INTRAVENOUS; SUBCUTANEOUS
Refills: 0 | Status: DISCONTINUED | OUTPATIENT
Start: 2022-06-30 | End: 2022-07-20

## 2022-06-30 RX ORDER — VANCOMYCIN HCL 1 G
VIAL (EA) INTRAVENOUS
Refills: 0 | Status: DISCONTINUED | OUTPATIENT
Start: 2022-06-30 | End: 2022-07-01

## 2022-06-30 RX ORDER — VANCOMYCIN HCL 1 G
1500 VIAL (EA) INTRAVENOUS EVERY 12 HOURS
Refills: 0 | Status: DISCONTINUED | OUTPATIENT
Start: 2022-06-30 | End: 2022-07-01

## 2022-06-30 RX ORDER — POTASSIUM CHLORIDE 20 MEQ
20 PACKET (EA) ORAL
Refills: 0 | Status: COMPLETED | OUTPATIENT
Start: 2022-06-30 | End: 2022-06-30

## 2022-06-30 RX ORDER — QUETIAPINE FUMARATE 200 MG/1
100 TABLET, FILM COATED ORAL
Refills: 0 | Status: DISCONTINUED | OUTPATIENT
Start: 2022-06-30 | End: 2022-07-20

## 2022-06-30 RX ORDER — SCOPALAMINE 1 MG/3D
1 PATCH, EXTENDED RELEASE TRANSDERMAL
Refills: 0 | Status: DISCONTINUED | OUTPATIENT
Start: 2022-06-30 | End: 2022-07-20

## 2022-06-30 RX ADMIN — SPIRONOLACTONE 50 MILLIGRAM(S): 25 TABLET, FILM COATED ORAL at 05:10

## 2022-06-30 RX ADMIN — PROPOFOL 0.51 MICROGRAM(S)/KG/MIN: 10 INJECTION, EMULSION INTRAVENOUS at 14:22

## 2022-06-30 RX ADMIN — FENTANYL CITRATE 4.28 MICROGRAM(S)/KG/HR: 50 INJECTION INTRAVENOUS at 20:20

## 2022-06-30 RX ADMIN — Medication 1 MILLIGRAM(S): at 11:37

## 2022-06-30 RX ADMIN — Medication 1 PATCH: at 11:36

## 2022-06-30 RX ADMIN — SCOPALAMINE 1 PATCH: 1 PATCH, EXTENDED RELEASE TRANSDERMAL at 19:48

## 2022-06-30 RX ADMIN — Medication 40 MILLIGRAM(S): at 05:10

## 2022-06-30 RX ADMIN — CHLORHEXIDINE GLUCONATE 1 APPLICATION(S): 213 SOLUTION TOPICAL at 05:11

## 2022-06-30 RX ADMIN — Medication 600 MILLIGRAM(S): at 06:22

## 2022-06-30 RX ADMIN — METHADONE HYDROCHLORIDE 30 MILLIGRAM(S): 40 TABLET ORAL at 14:24

## 2022-06-30 RX ADMIN — Medication 300 MILLIGRAM(S): at 05:08

## 2022-06-30 RX ADMIN — CHLORHEXIDINE GLUCONATE 15 MILLILITER(S): 213 SOLUTION TOPICAL at 17:13

## 2022-06-30 RX ADMIN — PROPOFOL 0.51 MICROGRAM(S)/KG/MIN: 10 INJECTION, EMULSION INTRAVENOUS at 05:07

## 2022-06-30 RX ADMIN — Medication 600 MILLIGRAM(S): at 18:46

## 2022-06-30 RX ADMIN — Medication 50 MILLIEQUIVALENT(S): at 15:56

## 2022-06-30 RX ADMIN — Medication 100 MILLIGRAM(S): at 11:37

## 2022-06-30 RX ADMIN — Medication 2 MILLIGRAM(S): at 05:09

## 2022-06-30 RX ADMIN — CEFEPIME 100 MILLIGRAM(S): 1 INJECTION, POWDER, FOR SOLUTION INTRAMUSCULAR; INTRAVENOUS at 21:32

## 2022-06-30 RX ADMIN — Medication 2 MILLIGRAM(S): at 14:24

## 2022-06-30 RX ADMIN — Medication 1 PATCH: at 11:35

## 2022-06-30 RX ADMIN — MIDAZOLAM HYDROCHLORIDE 1.71 MG/KG/HR: 1 INJECTION, SOLUTION INTRAMUSCULAR; INTRAVENOUS at 15:57

## 2022-06-30 RX ADMIN — PANTOPRAZOLE SODIUM 40 MILLIGRAM(S): 20 TABLET, DELAYED RELEASE ORAL at 11:37

## 2022-06-30 RX ADMIN — QUETIAPINE FUMARATE 100 MILLIGRAM(S): 200 TABLET, FILM COATED ORAL at 17:13

## 2022-06-30 RX ADMIN — CEFEPIME 100 MILLIGRAM(S): 1 INJECTION, POWDER, FOR SOLUTION INTRAMUSCULAR; INTRAVENOUS at 05:08

## 2022-06-30 RX ADMIN — Medication 600 MILLIGRAM(S): at 06:21

## 2022-06-30 RX ADMIN — PROPOFOL 0.51 MICROGRAM(S)/KG/MIN: 10 INJECTION, EMULSION INTRAVENOUS at 20:17

## 2022-06-30 RX ADMIN — LACTULOSE 20 GRAM(S): 10 SOLUTION ORAL at 11:37

## 2022-06-30 RX ADMIN — QUETIAPINE FUMARATE 50 MILLIGRAM(S): 200 TABLET, FILM COATED ORAL at 05:10

## 2022-06-30 RX ADMIN — CEFEPIME 100 MILLIGRAM(S): 1 INJECTION, POWDER, FOR SOLUTION INTRAMUSCULAR; INTRAVENOUS at 14:24

## 2022-06-30 RX ADMIN — Medication 50 MILLIEQUIVALENT(S): at 11:40

## 2022-06-30 RX ADMIN — Medication 300 MILLIGRAM(S): at 17:13

## 2022-06-30 RX ADMIN — Medication 600 MILLIGRAM(S): at 17:14

## 2022-06-30 RX ADMIN — Medication 50 MILLIEQUIVALENT(S): at 14:22

## 2022-06-30 RX ADMIN — Medication 1 PATCH: at 19:48

## 2022-06-30 RX ADMIN — SCOPALAMINE 1 PATCH: 1 PATCH, EXTENDED RELEASE TRANSDERMAL at 11:33

## 2022-06-30 RX ADMIN — CHLORHEXIDINE GLUCONATE 15 MILLILITER(S): 213 SOLUTION TOPICAL at 05:10

## 2022-06-30 RX ADMIN — MIDAZOLAM HYDROCHLORIDE 1.71 MG/KG/HR: 1 INJECTION, SOLUTION INTRAMUSCULAR; INTRAVENOUS at 20:20

## 2022-06-30 RX ADMIN — MIDAZOLAM HYDROCHLORIDE 1.71 MG/KG/HR: 1 INJECTION, SOLUTION INTRAMUSCULAR; INTRAVENOUS at 05:07

## 2022-06-30 RX ADMIN — FENTANYL CITRATE 4.28 MICROGRAM(S)/KG/HR: 50 INJECTION INTRAVENOUS at 14:22

## 2022-06-30 RX ADMIN — Medication 1 PATCH: at 08:44

## 2022-06-30 RX ADMIN — Medication 2 MILLIGRAM(S): at 21:32

## 2022-06-30 NOTE — PROGRESS NOTE ADULT - ASSESSMENT
IMPRESSION:  Acute respiratory failure sp intubation  PNA  seizure like activity  ?? drug over dose/ withdrawal opoid   liver cirrhosis   Ascites sp paracentesis   Pancytopenia- worsening    PLAN:    CNS: do SAT/SBT as tolerated  CW methadone 30mg daily  CW Clonapin to 2mg TID  CW Seroquel 50 BID     HEENT: oral care     PULMONARY:  HOB 45,  Vent changes: wean O2 as tolerated    CARDIOVASCULAR: goal MAP >65. Diuresis as tolerated. CW lasix qd. Monitor QTc daily    GI: GI prophylaxis.  OG Feeding.    RENAL: monitor lytes is and os     INFECTIOUS DISEASE: Abx per ID.    HEMATOLOGICAL:  monitor CBC, Heme FU    ENDOCRINE:  Follow up FS.  Insulin protocol if needed.     MICU  lines: Change TLC  Failed TOV IMPRESSION:  Acute respiratory failure sp intubation  PNA  seizure like activity  ?? drug over dose/ withdrawal opoid   liver cirrhosis   Ascites sp paracentesis   Pancytopenia- worsening  RENETTA     PLAN:    CNS: do SAT/SBT as tolerated  CW methadone 30mg daily  CW Clonapin to 2mg TID  Increase Seroquel 100mg BID     HEENT: oral care     PULMONARY:  HOB 45,  Vent changes: wean O2 as tolerated, increase TV to 400    CARDIOVASCULAR: goal MAP >65. Diuresis as tolerated. CW lasix qd. Monitor QTc daily    GI: GI prophylaxis.  OG Feeding.    RENAL: monitor lytes is and os     INFECTIOUS DISEASE: Abx per ID. Check procol, fungitell    HEMATOLOGICAL:  monitor CBC, Heme FU    ENDOCRINE:  Follow up FS.  Insulin protocol if needed.     MICU  lines: Change TLC  Failed TOV IMPRESSION:  Acute respiratory failure sp intubation  PNA  seizure like activity  ?? drug over dose/ withdrawal opoid   liver cirrhosis   Ascites sp paracentesis   Pancytopenia- worsening  RENETTA     PLAN:    CNS: do SAT/SBT as tolerated  CW methadone 30mg daily  CW Clonapin to 2mg TID  Increase Seroquel 100mg BID     HEENT: oral care     PULMONARY:  HOB 45,  Vent changes: wean O2 as tolerated, increase TV to 400    CARDIOVASCULAR: goal MAP >65. Diuresis as tolerated. CW lasix qd. Monitor QTc daily    GI: GI prophylaxis.  OG Feeding.    RENAL: monitor lytes is and os     INFECTIOUS DISEASE: Abx per ID. Check procal, fungitell, change TLC    HEMATOLOGICAL:  monitor CBC, Heme FU    ENDOCRINE:  Follow up FS.  Insulin protocol if needed.     MICU  lines: Change TLC  Failed TOV

## 2022-06-30 NOTE — PROGRESS NOTE ADULT - ASSESSMENT
Patient is 26 year old female with hx of asthma,  IVDA, anasarca hx of use of opiates 2 grams per day IV into her thighs  x years with minimal sobriety. Pt has been on MMTP in 2020.  presenting with       # AHRF - on MV  # Aspiration PNA s/p Unasyn Course  # Persistent Fevers  # Anemia  # Pancytopenia  # pHTN WHO Class unclear at this time  # h/o Pericardial Effusion  # OD vs. Other Tox  # Ascites w/ h/o Liver cirrhosis d/t HepC  #Abnormal TTE      PLAN:     - Repeat attempt of SAT/SBT  - Seizure Precautions  - Clonopin 2mg TID  -continue with Seroquel   - Lasix to 40g IVP QD  -NG tube Feeds  -continue with cefipime and vanco pending repeat Culture.  viral panel pending.  ID to follow up    -Further rheumatological workup as per Rheumatology    - Hold Heparin (thrombocytopenia)  - 1U PRBC 6/26 + 1U PRBC 6/27   -Mild drop in H/H noticed.  No sign of bleeding.  ICU want to hold if there is drop below 7  - Heme/Onc to follow up   -Cardiology recommended further cardiac workup when stable       further care as per ID, cardiology, rheumatology, and  critical care team

## 2022-06-30 NOTE — CHART NOTE - NSCHARTNOTEFT_GEN_A_CORE
Registered Dietitian Follow-Up     Patient Profile Reviewed                           Yes [X]   No []     Nutrition History Previously Obtained        Yes [X]  No []       Pertinent  Information:   pt is 26 year old female with hx of asthma, Hep C, active IVDA, anasarca presents with dyspnea admitted with fluid overload and initially admitted to med surg unit. s/p RRT 6/16 pt was in the lobby s/p seizure and fall 2/2 overdose s/p intubation and upgraded to ICU. + laceration to scalp noted. pt presently on propofol at 15 ml/hr which provides 396  kcals. As per GI moderate ascites, s/p paracentesis 1L removed on 6/22., + portal HTN 2/2 cirrhosis. pt tolerating EN at goal rate Elevated Triglycerides noted, propofol to be tapered down.      Diet, NPO with Tube Feed:   Tube Feeding Modality: Orogastric  Vital High Protein  Total Volume for 24 Hours (mL): 1080  Continuous  Starting Tube Feed Rate {mL per Hour}: 20  Increase Tube Feed Rate by (mL): 5     Every 4 hours  Until Goal Tube Feed Rate (mL per Hour): 45  Tube Feed Duration (in Hours): 24  Tube Feed Start Time: 20:00  No Carb Prosource TF     Qty per Day:  1 (06-18-22 @ 19:34) [Active]      Anthropometrics:  - Ht. 167.6 cm   - Wt. 74.6 kg today vs 79.3 upon admission           Pertinent Lab Data:   06-30    140  |  107  |  32<H>  ----------------------------<  117<H>  3.6   |  22  |  1.4    Ca    8.4<L>      30 Jun 2022 05:46  Phos  5.4     06-30  Mg     2.5     06-30    TPro  5.2<L>  /  Alb  2.5<L>  /  TBili  0.5  /  DBili  x   /  AST  23  /  ALT  10  /  AlkPhos  778<H>  06-30                          7.1    1.76  )-----------( 94       ( 30 Jun 2022 05:46 )             24.8         Pertinent Meds:  MEDICATIONS  (STANDING):  cefepime   IVPB 1000 milliGRAM(s) IV Intermittent every 8 hours  chlorhexidine 0.12% Liquid 15 milliLiter(s) Oral Mucosa every 12 hours  chlorhexidine 4% Liquid 1 Application(s) Topical daily  clonazePAM  Tablet 2 milliGRAM(s) Oral three times a day  fentaNYL   Infusion. 0.5 MICROgram(s)/kG/Hr (4.28 mL/Hr) IV Continuous <Continuous>  folic acid 1 milliGRAM(s) Oral daily  furosemide   Injectable 40 milliGRAM(s) IV Push daily  lactulose Syrup 20 Gram(s) Oral daily  midazolam Infusion 0.02 mG/kG/Hr (1.71 mL/Hr) IV Continuous <Continuous>  nicotine -  14 mG/24Hr(s) Patch 1 patch Transdermal daily  norepinephrine Infusion 0.05 MICROgram(s)/kG/Min (8.03 mL/Hr) IV Continuous <Continuous>  pantoprazole   Suspension 40 milliGRAM(s) Enteral Tube daily  potassium chloride  20 mEq/100 mL IVPB 20 milliEquivalent(s) IV Intermittent every 2 hours  propofol Infusion 1 MICROgram(s)/kG/Min (0.51 mL/Hr) IV Continuous <Continuous>  QUEtiapine 100 milliGRAM(s) Oral two times a day  scopolamine 1 mG/72 Hr(s) Patch 1 Patch Transdermal every 72 hours  spironolactone 50 milliGRAM(s) Oral daily  thiamine 100 milliGRAM(s) Oral daily  vancomycin  IVPB      vancomycin  IVPB 1500 milliGRAM(s) IV Intermittent every 12 hours    MEDICATIONS  (PRN):  ALBUTerol    90 MICROgram(s) HFA Inhaler 1 Puff(s) Inhalation every 4 hours PRN Shortness of Breath and/or Wheezing  cloNIDine 0.1 milliGRAM(s) Oral every 6 hours PRN opiate withdrawal  hydrOXYzine hydrochloride 50 milliGRAM(s) Oral every 6 hours PRN Anxiety  ibuprofen  Tablet. 400 milliGRAM(s) Oral every 6 hours PRN Mild Pain (1 - 3)  ibuprofen  Tablet. 600 milliGRAM(s) Oral every 6 hours PRN Temp greater or equal to 38C (100.4F)  sodium chloride 0.9% lock flush 10 milliLiter(s) IV Push every 1 hour PRN Pre/post blood products, medications, blood draw, and to maintain line patency       Physical Findings:  - Appearance: sedated to vent   - GI function: +BS, BM noted 6/28/22  - Tubes: OGT   - Oral/Mouth cavity:  - Skin:      Nutrition Requirements  Weight Used: 75.8 kgs      Estimated Energy Needs:  2905-1296 kcals (25-30 kcal/kg/BW) vs 2069 kcals [facility]  91-106g protein (1.2-1.4g/kg/BW)  1:1 kcal for estimated fluid needs      Nutrient Intake: present EN regimen provides 1140+396 kcal (propofol infusion), 93+15g protein        [] Previous Nutrition Diagnosis:            [] Ongoing          [] Resolved    [] No active nutrition diagnosis identified at this time     Nutrition Diagnostic #1  Problem:   Etiology:   Statement:      Nutrition Diagnostic #2  Problem:  Etiology:  Statement:     Nutrition Intervention:     Goal/Expected Outcome:     Indicator/Monitoring: Registered Dietitian Follow-Up     Patient Profile Reviewed                           Yes [X]   No []     Nutrition History Previously Obtained        Yes [X]  No []       Pertinent  Information:   pt is 26 year old female with hx of asthma, Hep C, active IVDA, anasarca presents with dyspnea admitted with fluid overload and initially admitted to med surg unit. s/p RRT 6/16 pt was in the lobby s/p seizure and fall 2/2 overdose s/p intubation and upgraded to ICU. + laceration to scalp noted. pt presently on propofol at 15 ml/hr which provides 396  kcals. As per GI moderate ascites, s/p paracentesis 1L removed on 6/22., + portal HTN 2/2 cirrhosis. pt tolerating EN at goal rate Elevated Triglycerides noted, propofol being tapered down.      Diet, NPO with Tube Feed:   Tube Feeding Modality: Orogastric  Vital High Protein  Total Volume for 24 Hours (mL): 1080  Continuous  Starting Tube Feed Rate {mL per Hour}: 20  Increase Tube Feed Rate by (mL): 5     Every 4 hours  Until Goal Tube Feed Rate (mL per Hour): 45  Tube Feed Duration (in Hours): 24  Tube Feed Start Time: 20:00  No Carb Prosource TF     Qty per Day:  1 (06-18-22 @ 19:34) [Active]      Anthropometrics:  - Ht. 167.6 cm   - Wt. 74.6 kg today vs 79.3 upon admission           Pertinent Lab Data:   06-30    140  |  107  |  32<H>  ----------------------------<  117<H>  3.6   |  22  |  1.4    Ca    8.4<L>      30 Jun 2022 05:46  Phos  5.4     06-30  Mg     2.5     06-30    TPro  5.2<L>  /  Alb  2.5<L>  /  TBili  0.5  /  DBili  x   /  AST  23  /  ALT  10  /  AlkPhos  778<H>  06-30                          7.1    1.76  )-----------( 94       ( 30 Jun 2022 05:46 )             24.8         Pertinent Meds:  MEDICATIONS  (STANDING):  cefepime   IVPB 1000 milliGRAM(s) IV Intermittent every 8 hours  chlorhexidine 0.12% Liquid 15 milliLiter(s) Oral Mucosa every 12 hours  chlorhexidine 4% Liquid 1 Application(s) Topical daily  clonazePAM  Tablet 2 milliGRAM(s) Oral three times a day  fentaNYL   Infusion. 0.5 MICROgram(s)/kG/Hr (4.28 mL/Hr) IV Continuous <Continuous>  folic acid 1 milliGRAM(s) Oral daily  furosemide   Injectable 40 milliGRAM(s) IV Push daily  lactulose Syrup 20 Gram(s) Oral daily  midazolam Infusion 0.02 mG/kG/Hr (1.71 mL/Hr) IV Continuous <Continuous>  nicotine -  14 mG/24Hr(s) Patch 1 patch Transdermal daily  norepinephrine Infusion 0.05 MICROgram(s)/kG/Min (8.03 mL/Hr) IV Continuous <Continuous>  pantoprazole   Suspension 40 milliGRAM(s) Enteral Tube daily  potassium chloride  20 mEq/100 mL IVPB 20 milliEquivalent(s) IV Intermittent every 2 hours  propofol Infusion 1 MICROgram(s)/kG/Min (0.51 mL/Hr) IV Continuous <Continuous>  QUEtiapine 100 milliGRAM(s) Oral two times a day  scopolamine 1 mG/72 Hr(s) Patch 1 Patch Transdermal every 72 hours  spironolactone 50 milliGRAM(s) Oral daily  thiamine 100 milliGRAM(s) Oral daily  vancomycin  IVPB      vancomycin  IVPB 1500 milliGRAM(s) IV Intermittent every 12 hours    MEDICATIONS  (PRN):  ALBUTerol    90 MICROgram(s) HFA Inhaler 1 Puff(s) Inhalation every 4 hours PRN Shortness of Breath and/or Wheezing  cloNIDine 0.1 milliGRAM(s) Oral every 6 hours PRN opiate withdrawal  hydrOXYzine hydrochloride 50 milliGRAM(s) Oral every 6 hours PRN Anxiety  ibuprofen  Tablet. 400 milliGRAM(s) Oral every 6 hours PRN Mild Pain (1 - 3)  ibuprofen  Tablet. 600 milliGRAM(s) Oral every 6 hours PRN Temp greater or equal to 38C (100.4F)  sodium chloride 0.9% lock flush 10 milliLiter(s) IV Push every 1 hour PRN Pre/post blood products, medications, blood draw, and to maintain line patency       Physical Findings:  - Appearance: sedated to vent   - GI function: +BS, BM noted 6/28/22  - Tubes: OGT   - Oral/Mouth cavity:  - Skin:      Nutrition Requirements  Weight Used: 75.8 kgs      Estimated Energy Needs:  0489-1824 kcals (25-30 kcal/kg/BW) vs 2069 kcals [facility]  91-106g protein (1.2-1.4g/kg/BW)  1:1 kcal for estimated fluid needs      Nutrient Intake: present EN regimen provides 1140+396 kcal (propofol infusion), 93+15g protein meeting >80% of estimated nutrient needs        [] Previous Nutrition Diagnosis:            [] Ongoing          [] Resolved       Nutrition Intervention: maintain on present feeds     Goal/Expected Outcome: pt to continue to tolerate EN and meet at least 80% of estimated nutrient needs     Indicator/Monitoring: RD to continue to monitor tolerance to feeds, labs/meds (propofol infusion), NFPF and f/u as needed within 2-4 days

## 2022-06-30 NOTE — PROGRESS NOTE ADULT - SUBJECTIVE AND OBJECTIVE BOX
CC.  anasarcmaryann  HPI.  Patient is intubated/vented  fever noticed again this am  unable to obtain ROS/HX       Vital Signs Last 24 Hrs  T(C): 38.2 (2022 15:07), Max: 38.6 (2022 00:06)  T(F): 100.8 (2022 15:07), Max: 101.5 (2022 00:06)  HR: 119 (2022 14:03) (96 - 130)  BP: 114/72 (2022 14:03) (90/53 - 128/81)  BP(mean): 87 (2022 14:03) (66 - 96)  RR: 22 (2022 15:07) (22 - 119)  SpO2: 95% (2022 14:03) (90% - 100%)      PHYSICAL EXAM-  GENERAL: NAD   HEAD:  Atraumatic, Normocephalic  EYES: EOMI, PERRLA, conjunctiva and sclera clear  NECK: Supple, No JVD, Normal thyroid  NERVOUS SYSTEM:  Sedated  CHEST/LUNG:  Rhonchi with good air entry  HEART: Regular rate and rhythm; No murmurs, rubs, or gallops  ABDOMEN: Soft, Nontender, Nondistended; Bowel sounds present  EXTREMITIES:  No clubbing, cyanosis, or edema  SKIN: No rashes or lesions                                7.4    1.20  )-----------( 83       ( 2022 15:45 )             25.5     06-30    140  |  107  |  32<H>  ----------------------------<  117<H>  3.6   |  22  |  1.4    Ca    8.4<L>      2022 05:46  Phos  5.4     06-30  Mg     2.5     06-30    TPro  5.2<L>  /  Alb  2.5<L>  /  TBili  0.5  /  DBili  x   /  AST  23  /  ALT  10  /  AlkPhos  778<H>  06-30    CARDIAC MARKERS ( 2022 09:07 )  x     / x     / 28 U/L / x     / x            Urinalysis Basic - ( 2022 19:06 )    Color: Yellow / Appearance: Slightly Cloudy / S.025 / pH: x  Gluc: x / Ketone: Trace  / Bili: Negative / Urobili: 1.0 mg/dL   Blood: x / Protein: >=300 mg/dL / Nitrite: Negative   Leuk Esterase: Negative / RBC: 26-50 /HPF / WBC 3-5 /HPF   Sq Epi: x / Non Sq Epi: Occasional /HPF / Bacteria: Moderate       MEDICATIONS  (STANDING):  cefepime   IVPB 1000 milliGRAM(s) IV Intermittent every 8 hours  chlorhexidine 0.12% Liquid 15 milliLiter(s) Oral Mucosa every 12 hours  chlorhexidine 4% Liquid 1 Application(s) Topical daily  clonazePAM  Tablet 2 milliGRAM(s) Oral three times a day  fentaNYL   Infusion. 0.5 MICROgram(s)/kG/Hr (4.28 mL/Hr) IV Continuous <Continuous>  folic acid 1 milliGRAM(s) Oral daily  furosemide   Injectable 40 milliGRAM(s) IV Push daily  lactulose Syrup 20 Gram(s) Oral daily  midazolam Infusion 0.02 mG/kG/Hr (1.71 mL/Hr) IV Continuous <Continuous>  nicotine -  14 mG/24Hr(s) Patch 1 patch Transdermal daily  norepinephrine Infusion 0.05 MICROgram(s)/kG/Min (8.03 mL/Hr) IV Continuous <Continuous>  pantoprazole   Suspension 40 milliGRAM(s) Enteral Tube daily  propofol Infusion 1 MICROgram(s)/kG/Min (0.51 mL/Hr) IV Continuous <Continuous>  QUEtiapine 100 milliGRAM(s) Oral two times a day  scopolamine 1 mG/72 Hr(s) Patch 1 Patch Transdermal every 72 hours  spironolactone 50 milliGRAM(s) Oral daily  thiamine 100 milliGRAM(s) Oral daily  vancomycin  IVPB      vancomycin  IVPB 1500 milliGRAM(s) IV Intermittent every 12 hours    MEDICATIONS  (PRN):  ALBUTerol    90 MICROgram(s) HFA Inhaler 1 Puff(s) Inhalation every 4 hours PRN Shortness of Breath and/or Wheezing  cloNIDine 0.1 milliGRAM(s) Oral every 6 hours PRN opiate withdrawal  hydrOXYzine hydrochloride 50 milliGRAM(s) Oral every 6 hours PRN Anxiety  ibuprofen  Tablet. 400 milliGRAM(s) Oral every 6 hours PRN Mild Pain (1 - 3)  ibuprofen  Tablet. 600 milliGRAM(s) Oral every 6 hours PRN Temp greater or equal to 38C (100.4F)  sodium chloride 0.9% lock flush 10 milliLiter(s) IV Push every 1 hour PRN Pre/post blood products, medications, blood draw, and to maintain line patency              Imaging Personally Reviewed:     [x ] YES  [ ] NO    Consultant(s) Notes Reviewed:  [x ] YES  [ ] NO    Care Discussed with Consultants/Other Providers [x ] YES  [ ] NO

## 2022-06-30 NOTE — PROGRESS NOTE ADULT - ASSESSMENT
IMPRESSION:  # AHRF - on MV  # Aspiration PNA s/p Unasyn Course  # Persistent Fevers  # Anemia  # Pancytopenia  # Seizure like activity - now on AEDs  # pHTN WHO Class unclear at this time  # h/o Pericardial Effusion  # OD vs. Other Tox  # Ascites w/ h/o Liver cirrhosis d/t HepC    PLAN:    CNS:   - Repeat attempt of SAT/SBT  - Seizure Precautions  - Clonapin 2mg TID  - Wean Prop + Versed as tolerated  - Increase Seroquel to 100mg BID  - Clonidine 0.1mg Q12hrs for Precedex withdrawal    HEENT:   - c/w Routine oral care     PULMONARY:   - Vt increased to 370 given inc in PCO2 + pH  - HOB 45  - SBT  - Turn pt on R side to allow for inflation of LLL  - Scopolamine Patch      CARDIOVASCULAR:   - Lasix to 40g IVP QD  - Daily ECG for QTc monitoring    GI:   - GI prophylaxis  - NG Feeds  - Repeat        RENAL:   - Monitor lytes  - Strict I/O's  - Ensure Mg > 2.0  - Replete Potassium + Mg  - Renal Consult for possible kidney biopsy    INFECTIOUS DISEASE:   - s/p Unasyn x 10 days   - On Cefepime and Vanco now   - Repeat Procal 0.11  - Repeat BCx Repeat UA Repeat DTA  - Cefepime 1g q8hrs  - Fungitell  - Repeat Procal  - Change R IJ TL CVC  - MRSA Nares    HEMATOLOGICAL:     - s/p CT A/P no RPH noted  - Hold Heparin (thrombocytopenia)  - 1U PRBC 6/26 + 1U PRBC 6/27   - Retic Index Hypoproliferative 1.8%  - Heme/Onc consult appreciated  - f/u 3pm CBC if below 7.0 transfuse 1U PRBC    ENDOCRINE/RHEUMATOLOGICAL  - Rheum Consult appreciated, workup sent as requested    - Follow up FS.  Insulin protocol PRN    MICU

## 2022-06-30 NOTE — PROGRESS NOTE ADULT - SUBJECTIVE AND OBJECTIVE BOX
Patient is a 26y old  Female who presents with a chief complaint of anasarca (2022 22:07)        Over Night Events:  febrile overnight  sp ct chest      ROS:     All ROS are negative except HPI         PHYSICAL EXAM    ICU Vital Signs Last 24 Hrs  T(C): 38 (2022 07:10), Max: 39 (2022 11:00)  T(F): 100.4 (2022 07:10), Max: 102.2 (2022 11:00)  HR: 118 (2022 06:06) (104 - 138)  BP: 103/55 (2022 06:06) (90/53 - 153/100)  BP(mean): 74 (2022 06:06) (66 - 121)  RR: 30 (2022 07:10) (25 - 50)  SpO2: 97% (2022 06:06) (90% - 100%)      CONSTITUTIONAL:  ill appearing  NAD    ENT:   Airway patent,   Mouth with normal mucosa.   No thrush  +ETT    EYES:   Pupils equal,   Round and reactive to light.    CARDIAC:   tachycardic,   Regular rhythm.    No edema    RESPIRATORY:   No wheezing  Bilateral BS  Normal chest expansion  Not tachypneic,  No use of accessory muscles    GASTROINTESTINAL:  Abdomen soft,   Non-tender,   No guarding,     MUSCULOSKELETAL:   Range of motion is not limited,  No clubbing, cyanosis    NEUROLOGICAL:   awake    SKIN:   Skin normal color for race,   Warm and dry and intact.   No evidence of rash.          22 @ 07:  -  22 @ 07:00  --------------------------------------------------------  IN:    Enteral Tube Flush: 450 mL    FentaNYL: 439 mL    IV PiggyBack: 50 mL    Midazolam: 105 mL    Norepinephrine: 4 mL    Propofol: 537 mL    Vital High Protein: 900 mL  Total IN: 2485 mL    OUT:    Indwelling Catheter - Urethral (mL): 2000 mL  Total OUT: 2000 mL    Total NET: 485 mL      22 @ 07:01  -  22 @ 08:14  --------------------------------------------------------  IN:  Total IN: 0 mL    OUT:    Indwelling Catheter - Urethral (mL): 350 mL  Total OUT: 350 mL    Total NET: -350 mL          LABS:                            7.1    1.76  )-----------( x        ( 2022 05:46 )             24.8                                               0630    140  |  107  |  32<H>  ----------------------------<  117<H>  3.6   |  22  |  1.4    Ca    8.4<L>      2022 05:46  Phos  5.4       Mg     2.5         TPro  5.2<L>  /  Alb  2.5<L>  /  TBili  0.5  /  DBili  x   /  AST  23  /  ALT  10  /  AlkPhos  778<H>  30                                             Urinalysis Basic - ( 2022 19:06 )    Color: Yellow / Appearance: Slightly Cloudy / S.025 / pH: x  Gluc: x / Ketone: Trace  / Bili: Negative / Urobili: 1.0 mg/dL   Blood: x / Protein: >=300 mg/dL / Nitrite: Negative   Leuk Esterase: Negative / RBC: 26-50 /HPF / WBC 3-5 /HPF   Sq Epi: x / Non Sq Epi: Occasional /HPF / Bacteria: Moderate                                                  LIVER FUNCTIONS - ( 2022 05:46 )  Alb: 2.5 g/dL / Pro: 5.2 g/dL / ALK PHOS: 778 U/L / ALT: 10 U/L / AST: 23 U/L / GGT: x                                                  Culture - Blood (collected 2022 06:00)  Source: .Blood Blood  Preliminary Report (2022 23:01):    No growth to date.    Culture - Sputum (collected 2022 14:00)  Source: Trach Asp Tracheal Aspirate  Gram Stain (2022 03:15):    Moderate polymorphonuclear leukocytes per low power field    Few Squamous epithelial cells per low power field    Moderate Gram positive cocci in pairs seen per oil power field    Few Gram Variable Rods seen per oil power field  Preliminary Report (2022 16:00):    Numerous Mixed gram negative rods    Moderate Staphylococcus aureus Susceptibility to follow.    Normal Respiratory Kelly absent                                                   Mode: AC/ CMV (Assist Control/ Continuous Mandatory Ventilation)  RR (machine): 25  TV (machine): 370  FiO2: 50  PEEP: 8  MAP: 12  PIP: 19                                      ABG - ( 2022 03:52 )  pH, Arterial: 7.24  pH, Blood: x     /  pCO2: 59    /  pO2: 208   / HCO3: 25    / Base Excess: -2.3  /  SaO2: 100.0               MEDICATIONS  (STANDING):  cefepime   IVPB 1000 milliGRAM(s) IV Intermittent every 8 hours  chlorhexidine 0.12% Liquid 15 milliLiter(s) Oral Mucosa every 12 hours  chlorhexidine 4% Liquid 1 Application(s) Topical daily  clonazePAM  Tablet 2 milliGRAM(s) Oral three times a day  fentaNYL   Infusion. 0.5 MICROgram(s)/kG/Hr (4.28 mL/Hr) IV Continuous <Continuous>  folic acid 1 milliGRAM(s) Oral daily  furosemide   Injectable 40 milliGRAM(s) IV Push daily  lactulose Syrup 20 Gram(s) Oral daily  methadone    Tablet 30 milliGRAM(s) Oral daily  midazolam Infusion 0.02 mG/kG/Hr (1.71 mL/Hr) IV Continuous <Continuous>  nicotine -  14 mG/24Hr(s) Patch 1 patch Transdermal daily  norepinephrine Infusion 0.05 MICROgram(s)/kG/Min (8.03 mL/Hr) IV Continuous <Continuous>  pantoprazole   Suspension 40 milliGRAM(s) Enteral Tube daily  propofol Infusion 1 MICROgram(s)/kG/Min (0.51 mL/Hr) IV Continuous <Continuous>  QUEtiapine 50 milliGRAM(s) Oral two times a day  spironolactone 50 milliGRAM(s) Oral daily  thiamine 100 milliGRAM(s) Oral daily  vancomycin  IVPB      vancomycin  IVPB 1500 milliGRAM(s) IV Intermittent every 12 hours    MEDICATIONS  (PRN):  ALBUTerol    90 MICROgram(s) HFA Inhaler 1 Puff(s) Inhalation every 4 hours PRN Shortness of Breath and/or Wheezing  cloNIDine 0.1 milliGRAM(s) Oral every 6 hours PRN opiate withdrawal  hydrOXYzine hydrochloride 50 milliGRAM(s) Oral every 6 hours PRN Anxiety  ibuprofen  Tablet. 600 milliGRAM(s) Oral every 6 hours PRN Temp greater or equal to 38C (100.4F)  ibuprofen  Tablet. 400 milliGRAM(s) Oral every 6 hours PRN Mild Pain (1 - 3)      New X-rays reviewed:                                                                                  ECHO    CXR interpreted by me:       Patient is a 26y old  Female who presents with a chief complaint of anasarca (2022 22:07)        Over Night Events:  febrile overnight  sp ct chest  remains critically ill on MV  remains on propofol, versed, fentanyl      ROS:     All ROS are negative except HPI         PHYSICAL EXAM    ICU Vital Signs Last 24 Hrs  T(C): 38 (2022 07:10), Max: 39 (2022 11:00)  T(F): 100.4 (2022 07:10), Max: 102.2 (2022 11:00)  HR: 118 (2022 06:06) (104 - 138)  BP: 103/55 (2022 06:06) (90/53 - 153/100)  BP(mean): 74 (2022 06:06) (66 - 121)  RR: 30 (2022 07:10) (25 - 50)  SpO2: 97% (2022 06:06) (90% - 100%)      CONSTITUTIONAL:  ill appearing  NAD    ENT:   Airway patent,   Mouth with normal mucosa.   No thrush  +ETT    EYES:   Pupils equal,   Round and reactive to light.    CARDIAC:   tachycardic,   Regular rhythm.    No edema    RESPIRATORY:   No wheezing  Bilateral BS  Normal chest expansion  Not tachypneic,  No use of accessory muscles    GASTROINTESTINAL:  Abdomen soft,   Non-tender,   No guarding,     MUSCULOSKELETAL:   Range of motion is not limited,  No clubbing, cyanosis    NEUROLOGICAL:   awake    SKIN:   Skin normal color for race,   Warm and dry and intact.   No evidence of rash.          22 @ 07:01  -  22 @ 07:00  --------------------------------------------------------  IN:    Enteral Tube Flush: 450 mL    FentaNYL: 439 mL    IV PiggyBack: 50 mL    Midazolam: 105 mL    Norepinephrine: 4 mL    Propofol: 537 mL    Vital High Protein: 900 mL  Total IN: 2485 mL    OUT:    Indwelling Catheter - Urethral (mL): 2000 mL  Total OUT: 2000 mL    Total NET: 485 mL      22 @ 07:01  -  22 @ 08:14  --------------------------------------------------------  IN:  Total IN: 0 mL    OUT:    Indwelling Catheter - Urethral (mL): 350 mL  Total OUT: 350 mL    Total NET: -350 mL          LABS:                            7.1    1.76  )-----------( x        ( 2022 05:46 )             24.8                                               06-30    140  |  107  |  32<H>  ----------------------------<  117<H>  3.6   |  22  |  1.4    Ca    8.4<L>      2022 05:46  Phos  5.4     30  Mg     2.5     30    TPro  5.2<L>  /  Alb  2.5<L>  /  TBili  0.5  /  DBili  x   /  AST  23  /  ALT  10  /  AlkPhos  778<H>  30                                             Urinalysis Basic - ( 2022 19:06 )    Color: Yellow / Appearance: Slightly Cloudy / S.025 / pH: x  Gluc: x / Ketone: Trace  / Bili: Negative / Urobili: 1.0 mg/dL   Blood: x / Protein: >=300 mg/dL / Nitrite: Negative   Leuk Esterase: Negative / RBC: 26-50 /HPF / WBC 3-5 /HPF   Sq Epi: x / Non Sq Epi: Occasional /HPF / Bacteria: Moderate                                                  LIVER FUNCTIONS - ( 2022 05:46 )  Alb: 2.5 g/dL / Pro: 5.2 g/dL / ALK PHOS: 778 U/L / ALT: 10 U/L / AST: 23 U/L / GGT: x                                                  Culture - Blood (collected 2022 06:00)  Source: .Blood Blood  Preliminary Report (2022 23:01):    No growth to date.    Culture - Sputum (collected 2022 14:00)  Source: Trach Asp Tracheal Aspirate  Gram Stain (2022 03:15):    Moderate polymorphonuclear leukocytes per low power field    Few Squamous epithelial cells per low power field    Moderate Gram positive cocci in pairs seen per oil power field    Few Gram Variable Rods seen per oil power field  Preliminary Report (2022 16:00):    Numerous Mixed gram negative rods    Moderate Staphylococcus aureus Susceptibility to follow.    Normal Respiratory Kelly absent                                                   Mode: AC/ CMV (Assist Control/ Continuous Mandatory Ventilation)  RR (machine): 25  TV (machine): 370  FiO2: 50  PEEP: 8  MAP: 12  PIP: 19                                      ABG - ( 2022 03:52 )  pH, Arterial: 7.24  pH, Blood: x     /  pCO2: 59    /  pO2: 208   / HCO3: 25    / Base Excess: -2.3  /  SaO2: 100.0               MEDICATIONS  (STANDING):  cefepime   IVPB 1000 milliGRAM(s) IV Intermittent every 8 hours  chlorhexidine 0.12% Liquid 15 milliLiter(s) Oral Mucosa every 12 hours  chlorhexidine 4% Liquid 1 Application(s) Topical daily  clonazePAM  Tablet 2 milliGRAM(s) Oral three times a day  fentaNYL   Infusion. 0.5 MICROgram(s)/kG/Hr (4.28 mL/Hr) IV Continuous <Continuous>  folic acid 1 milliGRAM(s) Oral daily  furosemide   Injectable 40 milliGRAM(s) IV Push daily  lactulose Syrup 20 Gram(s) Oral daily  methadone    Tablet 30 milliGRAM(s) Oral daily  midazolam Infusion 0.02 mG/kG/Hr (1.71 mL/Hr) IV Continuous <Continuous>  nicotine -  14 mG/24Hr(s) Patch 1 patch Transdermal daily  norepinephrine Infusion 0.05 MICROgram(s)/kG/Min (8.03 mL/Hr) IV Continuous <Continuous>  pantoprazole   Suspension 40 milliGRAM(s) Enteral Tube daily  propofol Infusion 1 MICROgram(s)/kG/Min (0.51 mL/Hr) IV Continuous <Continuous>  QUEtiapine 50 milliGRAM(s) Oral two times a day  spironolactone 50 milliGRAM(s) Oral daily  thiamine 100 milliGRAM(s) Oral daily  vancomycin  IVPB      vancomycin  IVPB 1500 milliGRAM(s) IV Intermittent every 12 hours    MEDICATIONS  (PRN):  ALBUTerol    90 MICROgram(s) HFA Inhaler 1 Puff(s) Inhalation every 4 hours PRN Shortness of Breath and/or Wheezing  cloNIDine 0.1 milliGRAM(s) Oral every 6 hours PRN opiate withdrawal  hydrOXYzine hydrochloride 50 milliGRAM(s) Oral every 6 hours PRN Anxiety  ibuprofen  Tablet. 600 milliGRAM(s) Oral every 6 hours PRN Temp greater or equal to 38C (100.4F)  ibuprofen  Tablet. 400 milliGRAM(s) Oral every 6 hours PRN Mild Pain (1 - 3)      New X-rays reviewed:                                                                                  ECHO    CXR interpreted by me:

## 2022-06-30 NOTE — PROGRESS NOTE ADULT - SUBJECTIVE AND OBJECTIVE BOX
POOJA DIANE Female [id number] Code Status: FULL CODE    Dispo: Admit to [facility]  Patient is a 26y old  Female who presents with a chief complaint of anasarca (2022 08:13)      INTERVAL HPI/OVERNIGHT EVENTS: Continued fevers, tachy, s/p CT chest, s/p rheum consult.     ICU Vital Signs Last 24 Hrs  T(C): 36.9 (2022 09:00), Max: 39 (2022 11:00)  T(F): 98.5 (2022 09:00), Max: 102.2 (2022 11:00)  HR: 120 (2022 08:03) (104 - 138)  BP: 115/70 (2022 09:00) (90/53 - 144/92)  BP(mean): 86 (2022 09:00) (66 - 111)  RR: 35 (2022 09:00) (25 - 41)  SpO2: 99% (2022 08:03) (90% - 100%)      I&O's Summary    2022 07:01  -  2022 07:00  --------------------------------------------------------  IN: 2485 mL / OUT: 2000 mL / NET: 485 mL    2022 07:01  -  2022 10:02  --------------------------------------------------------  IN: 0 mL / OUT: 530 mL / NET: -530 mL         Daily     Daily Weight in k.6 (2022 06:06)    Mode: AC/ CMV (Assist Control/ Continuous Mandatory Ventilation)  RR (machine): 25  TV (machine): 370  FiO2: 50  PEEP: 8  ITime: 0.8  MAP: 10  PIP: 24      ABG - ( 2022 03:52 )  pH, Arterial: 7.24  pH, Blood: x     /  pCO2: 59    /  pO2: 208   / HCO3: 25    / Base Excess: -2.3  /  SaO2: 100.0               LABS:                        7.1    1.76  )-----------( 94       ( 2022 05:46 )             24.8         140  |  107  |  32<H>  ----------------------------<  117<H>  3.6   |  22  |  1.4    Ca    8.4<L>      2022 05:46  Phos  5.4       Mg     2.5         TPro  5.2<L>  /  Alb  2.5<L>  /  TBili  0.5  /  DBili  x   /  AST  23  /  ALT  10  /  AlkPhos  778<H>        Urinalysis Basic - ( 2022 19:06 )    Color: Yellow / Appearance: Slightly Cloudy / S.025 / pH: x  Gluc: x / Ketone: Trace  / Bili: Negative / Urobili: 1.0 mg/dL   Blood: x / Protein: >=300 mg/dL / Nitrite: Negative   Leuk Esterase: Negative / RBC: 26-50 /HPF / WBC 3-5 /HPF   Sq Epi: x / Non Sq Epi: Occasional /HPF / Bacteria: Moderate      CAPILLARY BLOOD GLUCOSE          Procalcitonin, Serum: 0.11 ng/mL (22 @ 15:46)  Procalcitonin, Serum: 0.11 ng/mL (22 @ 06:47)  Procalcitonin, Serum: 0.62 ng/mL (22 @ 05:49)  Procalcitonin, Serum: 3.28 ng/mL (22 @ 05:22)        Ferritin, Serum: 94 ng/mL ()    Sedimentation Rate, Erythrocyte: 86 mm/Hr ()  Sedimentation Rate, Erythrocyte: 65 mm/Hr ()  Culture Results:   Numerous Mixed gram negative rods   Moderate Staphylococcus aureus Susceptibility to follow.   Normal Respiratory Kelly absent (22 @ 14:00)   Culture Results:   Moderate Klebsiella oxytoca/Raoutella ornithinolytica   Moderate Enterobacter cloacae complex   Normal Respiratory Kelly absent   Organism Identification: Klebsiella oxytoca /Raoutella ornithinolytica   Enterobacter cloacae complex   Organism: Klebsiella oxytoca /Raoutella ornithinolytica   Organism: Enterobacter cloacae complex     RADIOLOGY & ADDITIONAL TESTS:  < from: CT Angio Chest PE Protocol w/ IV Cont (22 @ 21:14) >  IMPRESSION:      No pulmonary embolus.    Near complete consolidation/collapse of the left lower lobe, increased as   compared with prior. Mildly increased right lower lobe patchy   consolidative opacities-atelectasis. Adjacent bilateral small pleural   effusions. Findings compatible with likely mixed pneumonia and   atelectasis.    Redemonstration of a dilated main pulmonary artery measuring up to 3.8 cm   compatible with pulmonary hypertension.    Redemonstration of partially imaged splenomegaly. Additional chronic   findings as above.    --- End of Report ---    < end of copied text >    CARDIOLOGY DIAGNOSTICS:   12 Lead ECG:   Ventricular Rate 138 BPM    Atrial Rate 138 BPM    P-R Interval 144 ms    QRS Duration 66 ms    Q-T Interval 242 ms    QTC Calculation(Bazett) 366 ms    P Axis 69 degrees    R Axis 115 degrees    T Axis 24 degrees    Diagnosis Line Sinus tachycardia  Right axis deviation  Possible Right ventricular hypertrophy  Nonspecific T wave abnormality  Abnormal ECG    Confirmed by BRANDON BELL MD ([id number]) on 2022 10:01:31 AM (22 @ 09:55)      MEDICATIONS  (STANDING):  cefepime   IVPB 1000 milliGRAM(s) IV Intermittent every 8 hours  chlorhexidine 0.12% Liquid 15 milliLiter(s) Oral Mucosa every 12 hours  chlorhexidine 4% Liquid 1 Application(s) Topical daily  clonazePAM  Tablet 2 milliGRAM(s) Oral three times a day  fentaNYL   Infusion. 0.5 MICROgram(s)/kG/Hr (4.28 mL/Hr) IV Continuous <Continuous>  folic acid 1 milliGRAM(s) Oral daily  furosemide   Injectable 40 milliGRAM(s) IV Push daily  lactulose Syrup 20 Gram(s) Oral daily  methadone    Tablet 30 milliGRAM(s) Oral daily  midazolam Infusion 0.02 mG/kG/Hr (1.71 mL/Hr) IV Continuous <Continuous>  nicotine -  14 mG/24Hr(s) Patch 1 patch Transdermal daily  norepinephrine Infusion 0.05 MICROgram(s)/kG/Min (8.03 mL/Hr) IV Continuous <Continuous>  pantoprazole   Suspension 40 milliGRAM(s) Enteral Tube daily  potassium chloride  20 mEq/100 mL IVPB 20 milliEquivalent(s) IV Intermittent every 2 hours  propofol Infusion 1 MICROgram(s)/kG/Min (0.51 mL/Hr) IV Continuous <Continuous>  QUEtiapine 100 milliGRAM(s) Oral two times a day  scopolamine 1 mG/72 Hr(s) Patch 1 Patch Transdermal every 72 hours  spironolactone 50 milliGRAM(s) Oral daily  thiamine 100 milliGRAM(s) Oral daily  vancomycin  IVPB      vancomycin  IVPB 1500 milliGRAM(s) IV Intermittent every 12 hours    MEDICATIONS  (PRN):  ALBUTerol    90 MICROgram(s) HFA Inhaler 1 Puff(s) Inhalation every 4 hours PRN Shortness of Breath and/or Wheezing  cloNIDine 0.1 milliGRAM(s) Oral every 6 hours PRN opiate withdrawal  hydrOXYzine hydrochloride 50 milliGRAM(s) Oral every 6 hours PRN Anxiety  ibuprofen  Tablet. 600 milliGRAM(s) Oral every 6 hours PRN Temp greater or equal to 38C (100.4F)  ibuprofen  Tablet. 400 milliGRAM(s) Oral every 6 hours PRN Mild Pain (1 - 3)    PHYSICAL EXAM:  GENERAL: NAD  HEENT:  NCAT, PERRL, No JVD, Trachea Midline, +ETT, +OGT  NERVOUS SYSTEM:  Sedated to RASS 0 to -1 opens eyes to my voice and tracks me   CHEST/LUNG: Good air entry bilaterally  HEART: Regular rate and rhythm  ABDOMEN: Soft, Nontender, + distension   EXTREMITIES:  Warm, well perfused  SKIN: No new skin breakdowns

## 2022-07-01 LAB
ALBUMIN SERPL ELPH-MCNC: 2.4 G/DL — LOW (ref 3.5–5.2)
ALP SERPL-CCNC: 773 U/L — HIGH (ref 30–115)
ALT FLD-CCNC: 9 U/L — SIGNIFICANT CHANGE UP (ref 0–41)
ANION GAP SERPL CALC-SCNC: 8 MMOL/L — SIGNIFICANT CHANGE UP (ref 7–14)
AST SERPL-CCNC: 19 U/L — SIGNIFICANT CHANGE UP (ref 0–41)
B BURGDOR C6 AB SER-ACNC: NEGATIVE — SIGNIFICANT CHANGE UP
B BURGDOR IGG+IGM SER-ACNC: 0.23 INDEX — SIGNIFICANT CHANGE UP (ref 0.01–0.89)
BASOPHILS # BLD AUTO: 0 K/UL — SIGNIFICANT CHANGE UP (ref 0–0.2)
BASOPHILS NFR BLD AUTO: 0 % — SIGNIFICANT CHANGE UP (ref 0–1)
BILIRUB SERPL-MCNC: 0.4 MG/DL — SIGNIFICANT CHANGE UP (ref 0.2–1.2)
BUN SERPL-MCNC: 32 MG/DL — HIGH (ref 10–20)
CALCIUM SERPL-MCNC: 9 MG/DL — SIGNIFICANT CHANGE UP (ref 8.5–10.1)
CCP IGG SERPL-ACNC: <8 UNITS — SIGNIFICANT CHANGE UP
CHLORIDE SERPL-SCNC: 107 MMOL/L — SIGNIFICANT CHANGE UP (ref 98–110)
CO2 SERPL-SCNC: 25 MMOL/L — SIGNIFICANT CHANGE UP (ref 17–32)
CREAT SERPL-MCNC: 1.4 MG/DL — SIGNIFICANT CHANGE UP (ref 0.7–1.5)
DSDNA AB SER-ACNC: <12 IU/ML — SIGNIFICANT CHANGE UP
EGFR: 53 ML/MIN/1.73M2 — LOW
ENA SCL70 AB SER-ACNC: <0.2 AI — SIGNIFICANT CHANGE UP
EOSINOPHIL # BLD AUTO: 0 K/UL — SIGNIFICANT CHANGE UP (ref 0–0.7)
EOSINOPHIL NFR BLD AUTO: 0 % — SIGNIFICANT CHANGE UP (ref 0–8)
GAS PNL BLDA: SIGNIFICANT CHANGE UP
GLUCOSE SERPL-MCNC: 107 MG/DL — HIGH (ref 70–99)
HCT VFR BLD CALC: 24.7 % — LOW (ref 37–47)
HGB BLD-MCNC: 7.1 G/DL — LOW (ref 12–16)
IMM GRANULOCYTES NFR BLD AUTO: 0 % — LOW (ref 0.1–0.3)
LYMPHOCYTES # BLD AUTO: 0.24 K/UL — LOW (ref 1.2–3.4)
LYMPHOCYTES # BLD AUTO: 21.6 % — SIGNIFICANT CHANGE UP (ref 20.5–51.1)
MAGNESIUM SERPL-MCNC: 2.6 MG/DL — HIGH (ref 1.8–2.4)
MCHC RBC-ENTMCNC: 26.3 PG — LOW (ref 27–31)
MCHC RBC-ENTMCNC: 28.7 G/DL — LOW (ref 32–37)
MCV RBC AUTO: 91.5 FL — SIGNIFICANT CHANGE UP (ref 81–99)
MONOCYTES # BLD AUTO: 0.08 K/UL — LOW (ref 0.1–0.6)
MONOCYTES NFR BLD AUTO: 7.2 % — SIGNIFICANT CHANGE UP (ref 1.7–9.3)
NEUTROPHILS # BLD AUTO: 0.79 K/UL — LOW (ref 1.4–6.5)
NEUTROPHILS NFR BLD AUTO: 71.2 % — SIGNIFICANT CHANGE UP (ref 42.2–75.2)
NRBC # BLD: 0 /100 WBCS — SIGNIFICANT CHANGE UP (ref 0–0)
PHOSPHATE SERPL-MCNC: 4 MG/DL — SIGNIFICANT CHANGE UP (ref 2.1–4.9)
PLATELET # BLD AUTO: 105 K/UL — LOW (ref 130–400)
POTASSIUM SERPL-MCNC: 3.8 MMOL/L — SIGNIFICANT CHANGE UP (ref 3.5–5)
POTASSIUM SERPL-SCNC: 3.8 MMOL/L — SIGNIFICANT CHANGE UP (ref 3.5–5)
PROCALCITONIN SERPL-MCNC: 0.36 NG/ML — HIGH (ref 0.02–0.1)
PROT SERPL-MCNC: 5.4 G/DL — LOW (ref 6–8)
RAPID RVP RESULT: SIGNIFICANT CHANGE UP
RBC # BLD: 2.7 M/UL — LOW (ref 4.2–5.4)
RBC # FLD: 16.4 % — HIGH (ref 11.5–14.5)
RF+CCP IGG SER-IMP: NEGATIVE — SIGNIFICANT CHANGE UP
RHEUMATOID FACT SERPL-ACNC: 13 IU/ML — SIGNIFICANT CHANGE UP (ref 0–13)
SARS-COV-2 RNA SPEC QL NAA+PROBE: SIGNIFICANT CHANGE UP
SODIUM SERPL-SCNC: 140 MMOL/L — SIGNIFICANT CHANGE UP (ref 135–146)
WBC # BLD: 1.11 K/UL — LOW (ref 4.8–10.8)
WBC # FLD AUTO: 1.11 K/UL — LOW (ref 4.8–10.8)

## 2022-07-01 PROCEDURE — 99232 SBSQ HOSP IP/OBS MODERATE 35: CPT

## 2022-07-01 PROCEDURE — 99233 SBSQ HOSP IP/OBS HIGH 50: CPT

## 2022-07-01 PROCEDURE — 71045 X-RAY EXAM CHEST 1 VIEW: CPT | Mod: 26

## 2022-07-01 PROCEDURE — 74018 RADEX ABDOMEN 1 VIEW: CPT | Mod: 26

## 2022-07-01 PROCEDURE — 93010 ELECTROCARDIOGRAM REPORT: CPT

## 2022-07-01 RX ORDER — SODIUM CHLORIDE 9 MG/ML
500 INJECTION, SOLUTION INTRAVENOUS ONCE
Refills: 0 | Status: COMPLETED | OUTPATIENT
Start: 2022-07-01 | End: 2022-07-01

## 2022-07-01 RX ORDER — SODIUM CHLORIDE 9 MG/ML
500 INJECTION, SOLUTION INTRAVENOUS
Refills: 0 | Status: DISCONTINUED | OUTPATIENT
Start: 2022-07-01 | End: 2022-07-01

## 2022-07-01 RX ORDER — POLYETHYLENE GLYCOL 3350 17 G/17G
17 POWDER, FOR SOLUTION ORAL
Refills: 0 | Status: DISCONTINUED | OUTPATIENT
Start: 2022-07-01 | End: 2022-07-04

## 2022-07-01 RX ORDER — KETAMINE HYDROCHLORIDE 100 MG/ML
0.2 INJECTION INTRAMUSCULAR; INTRAVENOUS
Qty: 1000 | Refills: 0 | Status: DISCONTINUED | OUTPATIENT
Start: 2022-07-01 | End: 2022-07-07

## 2022-07-01 RX ORDER — LACTULOSE 10 G/15ML
20 SOLUTION ORAL
Refills: 0 | Status: DISCONTINUED | OUTPATIENT
Start: 2022-07-01 | End: 2022-07-02

## 2022-07-01 RX ORDER — ENOXAPARIN SODIUM 100 MG/ML
40 INJECTION SUBCUTANEOUS EVERY 24 HOURS
Refills: 0 | Status: DISCONTINUED | OUTPATIENT
Start: 2022-07-01 | End: 2022-07-05

## 2022-07-01 RX ADMIN — CHLORHEXIDINE GLUCONATE 1 APPLICATION(S): 213 SOLUTION TOPICAL at 05:52

## 2022-07-01 RX ADMIN — Medication 600 MILLIGRAM(S): at 17:47

## 2022-07-01 RX ADMIN — Medication 1 PATCH: at 07:25

## 2022-07-01 RX ADMIN — Medication 2 MILLIGRAM(S): at 05:57

## 2022-07-01 RX ADMIN — SODIUM CHLORIDE 500 MILLILITER(S): 9 INJECTION, SOLUTION INTRAVENOUS at 21:54

## 2022-07-01 RX ADMIN — KETAMINE HYDROCHLORIDE 1.71 MG/KG/HR: 100 INJECTION INTRAMUSCULAR; INTRAVENOUS at 20:59

## 2022-07-01 RX ADMIN — Medication 1 PATCH: at 11:23

## 2022-07-01 RX ADMIN — CHLORHEXIDINE GLUCONATE 15 MILLILITER(S): 213 SOLUTION TOPICAL at 17:17

## 2022-07-01 RX ADMIN — PROPOFOL 0.51 MICROGRAM(S)/KG/MIN: 10 INJECTION, EMULSION INTRAVENOUS at 20:59

## 2022-07-01 RX ADMIN — PROPOFOL 0.51 MICROGRAM(S)/KG/MIN: 10 INJECTION, EMULSION INTRAVENOUS at 00:11

## 2022-07-01 RX ADMIN — Medication 1 PATCH: at 20:07

## 2022-07-01 RX ADMIN — MIDAZOLAM HYDROCHLORIDE 1.71 MG/KG/HR: 1 INJECTION, SOLUTION INTRAMUSCULAR; INTRAVENOUS at 20:59

## 2022-07-01 RX ADMIN — CEFEPIME 100 MILLIGRAM(S): 1 INJECTION, POWDER, FOR SOLUTION INTRAMUSCULAR; INTRAVENOUS at 05:51

## 2022-07-01 RX ADMIN — CHLORHEXIDINE GLUCONATE 15 MILLILITER(S): 213 SOLUTION TOPICAL at 05:53

## 2022-07-01 RX ADMIN — PANTOPRAZOLE SODIUM 40 MILLIGRAM(S): 20 TABLET, DELAYED RELEASE ORAL at 11:24

## 2022-07-01 RX ADMIN — QUETIAPINE FUMARATE 100 MILLIGRAM(S): 200 TABLET, FILM COATED ORAL at 17:18

## 2022-07-01 RX ADMIN — CEFEPIME 100 MILLIGRAM(S): 1 INJECTION, POWDER, FOR SOLUTION INTRAMUSCULAR; INTRAVENOUS at 14:16

## 2022-07-01 RX ADMIN — Medication 1 MILLIGRAM(S): at 11:23

## 2022-07-01 RX ADMIN — FENTANYL CITRATE 4.28 MICROGRAM(S)/KG/HR: 50 INJECTION INTRAVENOUS at 02:35

## 2022-07-01 RX ADMIN — Medication 2 MILLIGRAM(S): at 14:18

## 2022-07-01 RX ADMIN — SCOPALAMINE 1 PATCH: 1 PATCH, EXTENDED RELEASE TRANSDERMAL at 07:25

## 2022-07-01 RX ADMIN — Medication 40 MILLIGRAM(S): at 05:53

## 2022-07-01 RX ADMIN — SCOPALAMINE 1 PATCH: 1 PATCH, EXTENDED RELEASE TRANSDERMAL at 20:07

## 2022-07-01 RX ADMIN — CEFEPIME 100 MILLIGRAM(S): 1 INJECTION, POWDER, FOR SOLUTION INTRAMUSCULAR; INTRAVENOUS at 21:44

## 2022-07-01 RX ADMIN — SPIRONOLACTONE 50 MILLIGRAM(S): 25 TABLET, FILM COATED ORAL at 05:52

## 2022-07-01 RX ADMIN — QUETIAPINE FUMARATE 100 MILLIGRAM(S): 200 TABLET, FILM COATED ORAL at 05:52

## 2022-07-01 RX ADMIN — Medication 300 MILLIGRAM(S): at 05:56

## 2022-07-01 RX ADMIN — Medication 600 MILLIGRAM(S): at 06:41

## 2022-07-01 RX ADMIN — ENOXAPARIN SODIUM 40 MILLIGRAM(S): 100 INJECTION SUBCUTANEOUS at 11:23

## 2022-07-01 RX ADMIN — POLYETHYLENE GLYCOL 3350 17 GRAM(S): 17 POWDER, FOR SOLUTION ORAL at 17:47

## 2022-07-01 RX ADMIN — KETAMINE HYDROCHLORIDE 1.71 MG/KG/HR: 100 INJECTION INTRAMUSCULAR; INTRAVENOUS at 10:27

## 2022-07-01 RX ADMIN — Medication 2 MILLIGRAM(S): at 21:41

## 2022-07-01 RX ADMIN — Medication 600 MILLIGRAM(S): at 06:11

## 2022-07-01 RX ADMIN — LACTULOSE 20 GRAM(S): 10 SOLUTION ORAL at 17:17

## 2022-07-01 RX ADMIN — Medication 100 MILLIGRAM(S): at 11:24

## 2022-07-01 RX ADMIN — Medication 600 MILLIGRAM(S): at 17:17

## 2022-07-01 NOTE — PROGRESS NOTE ADULT - SUBJECTIVE AND OBJECTIVE BOX
CINDYPOOJA JOE  26y, Female  Allergy: buprenorphine (Rash)  Suboxone (Rash)      LOS  16d    CHIEF COMPLAINT: anasarca (2022 16:24)      INTERVAL EVENTS/HPI  - No acute events overnight  - T(F): , Max: 100.9 (22 @ 17:03)  - remains febrile     - WBC Count: 1.11 (22 @ 05:31)  WBC Count: 1.20 (22 @ 15:45)     - Creatinine, Serum: 1.4 (22 @ 05:46)       ROS  General: Denies rigors, nightsweats  HEENT: Denies headache, rhinorrhea, sore throat, eye pain  CV: Denies CP, palpitations  PULM: Denies wheezing, hemoptysis  GI: Denies hematemesis, hematochezia, melena  : Denies discharge, hematuria  MSK: Denies arthralgias, myalgias  SKIN: Denies rash, lesions  NEURO: Denies paresthesias, weakness  PSYCH: Denies depression, anxiety    VITALS:  T(F): 100.9, Max: 100.9 (22 @ 17:03)  HR: 122  BP: 117/70  RR: 29Vital Signs Last 24 Hrs  T(C): 38.3 (2022 07:02), Max: 38.3 (2022 17:03)  T(F): 100.9 (2022 07:02), Max: 100.9 (2022 17:03)  HR: 122 (2022 07:52) (96 - 130)  BP: 117/70 (2022 07:02) (94/54 - 128/81)  BP(mean): 86 (2022 07:02) (68 - 101)  RR: 29 (2022 07:02) (20 - 119)  SpO2: 100% (2022 07:52) (95% - 100%)    PHYSICAL EXAM:  Gen: NAD, resting in bed  HEENT: Normocephalic, atraumatic  Neck: supple, no lymphadenopathy  CV: Regular rate & regular rhythm  Lungs: decreased BS at bases, no fremitus  Abdomen: Soft, BS present  Ext: Warm, well perfused  Neuro: non focal, awake  Skin: no rash, no erythema  Lines: no phlebitis    FH: Non-contributory  Social Hx: Non-contributory    TESTS & MEASUREMENTS:                        7.1    1.11  )-----------( 105      ( 2022 05:31 )             24.7         140  |  107  |  32<H>  ----------------------------<  117<H>  3.6   |  22  |  1.4    Ca    8.4<L>      2022 05:46  Phos  5.4       Mg     2.5         TPro  5.2<L>  /  Alb  2.5<L>  /  TBili  0.5  /  DBili  x   /  AST  23  /  ALT  10  /  AlkPhos  778<H>        LIVER FUNCTIONS - ( 2022 05:46 )  Alb: 2.5 g/dL / Pro: 5.2 g/dL / ALK PHOS: 778 U/L / ALT: 10 U/L / AST: 23 U/L / GGT: x           Urinalysis Basic - ( 2022 19:06 )    Color: Yellow / Appearance: Slightly Cloudy / S.025 / pH: x  Gluc: x / Ketone: Trace  / Bili: Negative / Urobili: 1.0 mg/dL   Blood: x / Protein: >=300 mg/dL / Nitrite: Negative   Leuk Esterase: Negative / RBC: 26-50 /HPF / WBC 3-5 /HPF   Sq Epi: x / Non Sq Epi: Occasional /HPF / Bacteria: Moderate        Culture - Blood (collected 22 @ 06:00)  Source: .Blood Blood  Preliminary Report (22 @ 23:01):    No growth to date.    Culture - Sputum (collected 22 @ 14:00)  Source: Trach Asp Tracheal Aspirate  Gram Stain (22 @ 03:15):    Moderate polymorphonuclear leukocytes per low power field    Few Squamous epithelial cells per low power field    Moderate Gram positive cocci in pairs seen per oil power field    Few Gram Variable Rods seen per oil power field  Final Report (22 @ 16:33):    Numerous Mixed gram negative rods    Moderate Staphylococcus aureus    Normal Respiratory Kilo present  Organism: Staphylococcus aureus (22 @ 16:33)  Organism: Staphylococcus aureus (22 @ 16:33)      -  Ampicillin/Sulbactam: S <=8/4      -  Cefazolin: S <=4      -  Clindamycin: S <=0.25      -  Erythromycin: S <=0.25      -  Gentamicin: S <=1 Should not be used as monotherapy      -  Oxacillin: S <=0.25 Oxacillin predicts susceptibility for dicloxacillin, methicillin, and nafcillin      -  Rifampin: S <=1 Should not be used as monotherapy      -  Tetra/Doxy: S <=1      -  Trimethoprim/Sulfamethoxazole: S <=0.5/9.5      -  Vancomycin: S 2      Method Type: AL    Culture - Sputum (collected 22 @ 17:20)  Source: Trach Asp Tracheal Aspirate  Gram Stain (22 @ 06:29):    Few polymorphonuclear leukocytes per low power field    Rare Squamous epithelial cells per low power field    Moderate Gram Negative Rods seen per oil power field  Final Report (22 @ 17:00):    Moderate Klebsiella oxytoca/Raoutella ornithinolytica    Moderate Enterobacter cloacae complex    Normal Respiratory Kilo absent  Organism: Klebsiella oxytoca /Raoutella ornithinolytica  Enterobacter cloacae complex (22 @ 17:00)  Organism: Enterobacter cloacae complex (22 @ 17:00)      -  Amikacin: S <=16      -  Amoxicillin/Clavulanic Acid: R >16/8      -  Ampicillin: R >16 These ampicillin results predict results for amoxicillin      -  Ampicillin/Sulbactam: R >16/8 Enterobacter, Klebsiella aerogenes, Citrobacter, and Serratia may develop resistance during prolonged therapy (3-4 days)      -  Aztreonam: S <=4      -  Cefazolin: R >16 Enterobacter, Klebsiella aerogenes, Citrobacter, and Serratia may develop resistance during prolonged therapy (3-4 days)      -  Cefepime: S <=2      -  Cefoxitin: R >16      -  Ceftriaxone: S <=1 Enterobacter, Klebsiella aerogenes, Citrobacter, and Serratia may develop resistance during prolonged therapy      -  Ciprofloxacin: S <=0.25      -  Ertapenem: S <=0.5      -  Gentamicin: S <=2      -  Imipenem: S <=1      -  Levofloxacin: S <=0.5      -  Meropenem: S <=1      -  Piperacillin/Tazobactam: S <=8      -  Tobramycin: S <=2      -  Trimethoprim/Sulfamethoxazole: S <=0.5/9.5      Method Type: AL  Organism: Klebsiella oxytoca /Raoutella ornithinolytica (22 @ 17:00)      -  Amikacin: S <=16      -  Amoxicillin/Clavulanic Acid: S <=8/4      -  Ampicillin: R 16 These ampicillin results predict results for amoxicillin      -  Ampicillin/Sulbactam: S <=4/2 Enterobacter, Klebsiella aerogenes, Citrobacter, and Serratia may develop resistance during prolonged therapy (3-4 days)      -  Aztreonam: S <=4      -  Cefazolin: S <=2 Enterobacter, Klebsiella aerogenes, Citrobacter, and Serratia may develop resistance during prolonged therapy (3-4 days)      -  Cefepime: S <=2      -  Cefoxitin: S <=8      -  Ceftriaxone: S <=1 Enterobacter, Klebsiella aerogenes, Citrobacter, and Serratia may develop resistance during prolonged therapy      -  Ciprofloxacin: S <=0.25      -  Ertapenem: S <=0.5      -  Gentamicin: S <=2      -  Imipenem: S <=1      -  Levofloxacin: S <=0.5      -  Meropenem: S <=1      -  Piperacillin/Tazobactam: S <=8      -  Tobramycin: S <=2      -  Trimethoprim/Sulfamethoxazole: S <=0.5/9.5      Method Type: AL    Culture - Body Fluid with Gram Stain (collected 22 @ 01:00)  Source: Peritoneal Peritoneal Fluid  Gram Stain (22 @ 02:31):    No polymorphonuclear leukocytes seen    No organisms seen    by cytocentrifuge  Final Report (22 @ 19:13):    No growth at 5 days    Culture - Blood (collected 22 @ 05:33)  Source: .Blood None  Final Report (22 @ 19:00):    No Growth Final    Culture - Sputum (collected 22 @ 14:30)  Source: Trach Asp Tracheal Aspirate  Gram Stain (22 @ 10:18):    Rare polymorphonuclear leukocytes per low power field    No Squamous epithelial cells per low power field    Numerous Gram Positive Cocci in Pairs and Chains seen per oil power field    Rare Gram Negative Rods seen per oil power field    Rare Gram PositiveRods seen per oil power field  Final Report (22 @ 16:39):    Normal Respiratory Kilo present    Culture - Fungal, Body Fluid (collected 22 @ 12:24)  Source: .Body Fluid Peritoneal Fluid  Preliminary Report (22 @ 15:02):    No growth    Culture - Body Fluid with Gram Stain (collected 22 @ 12:24)  Source: .Body Fluid Peritoneal Fluid  Gram Stain (22 @ 03:19):    No polymorphonuclear leukocytes seen    No organisms seen    by cytocentrifuge  Final Report (22 @ 20:34):    No growth at 5 days    Culture - Urine (collected 06-15-22 @ 08:00)  Source: Clean Catch Clean Catch (Midstream)  Final Report (22 @ 22:14):    Normal Urogenital kilo present            INFECTIOUS DISEASES TESTING  MRSA PCR Result.: Negative (22 @ 10:34)  Procalcitonin, Serum: 0.36 (22 @ 10:30)  Rapid RVP Result: NotDetec (22 @ 09:19)  HIV-1/2 Combo Result: Nonreact (22 @ 10:29)  Procalcitonin, Serum: 0.11 (22 @ 15:46)  Procalcitonin, Serum: 0.11 (22 @ 06:47)  Procalcitonin, Serum: 0.62 (22 @ 05:49)  MRSA PCR Result.: Negative (22 @ 14:30)  Procalcitonin, Serum: 3.28 (22 @ 05:22)  COVID-19 PCR: NotDetec (06-15-22 @ 07:10)  Procalcitonin, Serum: 0.07 (22 @ 12:46)      INFLAMMATORY MARKERS  Sedimentation Rate, Erythrocyte: 86 mm/Hr (22 @ 05:46)  Sedimentation Rate, Erythrocyte: 65 mm/Hr (22 @ 16:00)      RADIOLOGY & ADDITIONAL TESTS:  I have personally reviewed the last available Chest xray  CXR      CT  CT Angio Chest PE Protocol w/ IV Cont:   ACC: [id number] EXAM:  CT ANGIO CHEST PULM ART [facility]                          PROCEDURE DATE:  2022          INTERPRETATION:  Clinical History / Reason for exam: Hypertension and   respiratory failure. Pulmonary embolus.    TECHNIQUE: Multislice helical sections were obtained from the thoracic   inlet to the lung bases during rapid administration of 60cc Optiray 320   intravenous contrast using a CTA protocol. Thin sections were   reconstructed through the pulmonary vasculature. Sagittal and coronal   reformatted images were acquired, as well as MIP reconstructed images.    COMPARISON CT: CT chest dated 2022. Correlation is also made with CT   abdomen and pelvis dated 2022    FINDINGS:    PULMONARY EMBOLUS: No pulmonary embolus.    LUNGS: Endotracheal tube terminates within the mid trachea. Central   airways are patent. with near complete consolidation/collapse of the left   lower lobe, increased as compared with prior. Mildly increased right   lower lobe patchy consolidative opacities/atelectasis. Again noted are   trace bilateral pleural effusion. No pneumothorax.    HEART/GREAT VESSELS: Cardiomegaly. Again noted is a small to moderate   pericardial effusion. The aorta is of normal caliber. Dilated main   pulmonary artery measures up to 3.8 cm, unchanged.    MEDIASTINUM/THORACIC NODES: Redemonstration of prominent and mildly   enlarged abdominal lymph nodes. Bilateral subcentimeter short axis   cervical clavicular lymph nodes. Visualized portions of the thyroid gland   are symmetric.    VISUALIZED UPPER ABDOMEN: Partially imaged splenomegaly. Periportal   edema, nonspecific. Enteric tube courses below the diaphragm. Fullness of   the para-aortic is portacaval region consistent with prominent lymph   nodes that are better evaluated on recent CT abdomen and pelvis and   similar as compared with CT abdomen and pelvis dated 2021    BONES/SOFT TISSUES: No acute osseous abnormality. Anasarca.      IMPRESSION:      No pulmonary embolus.    Near complete consolidation/collapse of the left lower lobe, increased as   compared with prior. Mildly increased right lower lobe patchy   consolidative opacities-atelectasis. Adjacent bilateral small pleural   effusions. Findings compatible with likely mixed pneumonia and   atelectasis.    Redemonstration of a dilated main pulmonary artery measuring up to 3.8 cm   compatible with pulmonary hypertension.    Redemonstration of partially imaged splenomegaly. Additional chronic   findings as above.    --- End of Report ---          ARIS CUMMINGS MD; Resident Radiologist  This document has been electronically signed.  DOMONIQUE CABRERA MD; Attending Radiologist  This document has been electronically signed. 2022 11:17PM (06-29-22 @ 21:14)      CARDIOLOGY TESTING  12 Lead ECG:   Ventricular Rate 138 BPM    Atrial Rate 138 BPM    P-R Interval 144 ms    QRS Duration 66 ms    Q-T Interval 242 ms    QTC Calculation(Bazett) 366 ms    P Axis 69 degrees    R Axis 115 degrees    T Axis 24 degrees    Diagnosis Line Sinus tachycardia  Right axis deviation  Possible Right ventricular hypertrophy  Nonspecific T wave abnormality  Abnormal ECG    Confirmed by BRANDON BELL MD ([id number]) on 2022 10:01:31 AM (22 @ 09:55)  12 Lead ECG:   Ventricular Rate 138 BPM    Atrial Rate 138 BPM    P-R Interval 148 ms    QRS Duration 66 ms    Q-T Interval 244 ms    QTC Calculation(Bazett) 369 ms    P Axis 70 degrees    R Axis 112 degrees    T Axis 17 degrees    Diagnosis Line Sinus tachycardia  Right axis deviation  Possible Right ventricular hypertrophy  Nonspecific T wave abnormality  Abnormal ECG    Confirmed by BRANDON BELL MD ([id number]) on 2022 10:01:24 AM (22 @ 09:55)      MEDICATIONS  cefepime   IVPB 1000 IV Intermittent every 8 hours  chlorhexidine 0.12% Liquid 15 Oral Mucosa every 12 hours  chlorhexidine 4% Liquid 1 Topical daily  clonazePAM  Tablet 2 Oral three times a day  fentaNYL   Infusion. 0.5 IV Continuous <Continuous>  folic acid 1 Oral daily  furosemide   Injectable 40 IV Push daily  lactulose Syrup 20 Oral daily  midazolam Infusion 0.02 IV Continuous <Continuous>  nicotine -  14 mG/24Hr(s) Patch 1 Transdermal daily  norepinephrine Infusion 0.05 IV Continuous <Continuous>  pantoprazole   Suspension 40 Enteral Tube daily  propofol Infusion 1 IV Continuous <Continuous>  QUEtiapine 100 Oral two times a day  scopolamine 1 mG/72 Hr(s) Patch 1 Transdermal every 72 hours  spironolactone 50 Oral daily  thiamine 100 Oral daily  vancomycin  IVPB     vancomycin  IVPB 1500 IV Intermittent every 12 hours      WEIGHT  Weight (kg): 85.6 (06-15-22 @ 11:25)      ANTIBIOTICS:  cefepime   IVPB 1000 milliGRAM(s) IV Intermittent every 8 hours  vancomycin  IVPB      vancomycin  IVPB 1500 milliGRAM(s) IV Intermittent every 12 hours      All available historical records have been reviewed       CINDYON, POOJA  26y, Female  Allergy: buprenorphine (Rash)  Suboxone (Rash)      LOS  16d    CHIEF COMPLAINT: anasarca (2022 16:24)      INTERVAL EVENTS/HPI  - No acute events overnight  - T(F): , Max: 100.9 (22 @ 17:03)  - remains febrile   - WBC Count: 1.11 (22 @ 05:31)  WBC Count: 1.20 (22 @ 15:45)     - Creatinine, Serum: 1.4 (22 @ 05:46)       ROS  unable to obtain history secondary to patient's mental status and/or sedation      VITALS:  T(F): 100.9, Max: 100.9 (22 @ 17:03)  HR: 122  BP: 117/70  RR: 29Vital Signs Last 24 Hrs  T(C): 38.3 (2022 07:02), Max: 38.3 (2022 17:03)  T(F): 100.9 (2022 07:02), Max: 100.9 (2022 17:03)  HR: 122 (2022 07:52) (96 - 130)  BP: 117/70 (2022 07:02) (94/54 - 128/81)  BP(mean): 86 (2022 07:02) (68 - 101)  RR: 29 (2022 07:02) (20 - 119)  SpO2: 100% (2022 07:52) (95% - 100%)    PHYSICAL EXAM:  Gen: NAD, resting in bed  HEENT: Normocephalic, atraumatic  Neck: supple, no lymphadenopathy  CV: Regular rate & regular rhythm  Lungs: decreased BS at bases, no fremitus  Abdomen: Soft, BS present  Ext: Warm, well perfused  Neuro: non focal, awake  Skin: no rash, no erythema  Lines: no phlebitis    FH: Non-contributory  Social Hx: Non-contributory    TESTS & MEASUREMENTS:                        7.1    1.11  )-----------( 105      ( 2022 05:31 )             24.7     06-30    140  |  107  |  32<H>  ----------------------------<  117<H>  3.6   |  22  |  1.4    Ca    8.4<L>      2022 05:46  Phos  5.4       Mg     2.5         TPro  5.2<L>  /  Alb  2.5<L>  /  TBili  0.5  /  DBili  x   /  AST  23  /  ALT  10  /  AlkPhos  778<H>        LIVER FUNCTIONS - ( 2022 05:46 )  Alb: 2.5 g/dL / Pro: 5.2 g/dL / ALK PHOS: 778 U/L / ALT: 10 U/L / AST: 23 U/L / GGT: x           Urinalysis Basic - ( 2022 19:06 )    Color: Yellow / Appearance: Slightly Cloudy / S.025 / pH: x  Gluc: x / Ketone: Trace  / Bili: Negative / Urobili: 1.0 mg/dL   Blood: x / Protein: >=300 mg/dL / Nitrite: Negative   Leuk Esterase: Negative / RBC: 26-50 /HPF / WBC 3-5 /HPF   Sq Epi: x / Non Sq Epi: Occasional /HPF / Bacteria: Moderate        Culture - Blood (collected 22 @ 06:00)  Source: .Blood Blood  Preliminary Report (22 @ 23:01):    No growth to date.    Culture - Sputum (collected 22 @ 14:00)  Source: Trach Asp Tracheal Aspirate  Gram Stain (22 @ 03:15):    Moderate polymorphonuclear leukocytes per low power field    Few Squamous epithelial cells per low power field    Moderate Gram positive cocci in pairs seen per oil power field    Few Gram Variable Rods seen per oil power field  Final Report (22 @ 16:33):    Numerous Mixed gram negative rods    Moderate Staphylococcus aureus    Normal Respiratory Kilo present  Organism: Staphylococcus aureus (22 @ 16:33)  Organism: Staphylococcus aureus (22 @ 16:33)      -  Ampicillin/Sulbactam: S <=8/4      -  Cefazolin: S <=4      -  Clindamycin: S <=0.25      -  Erythromycin: S <=0.25      -  Gentamicin: S <=1 Should not be used as monotherapy      -  Oxacillin: S <=0.25 Oxacillin predicts susceptibility for dicloxacillin, methicillin, and nafcillin      -  Rifampin: S <=1 Should not be used as monotherapy      -  Tetra/Doxy: S <=1      -  Trimethoprim/Sulfamethoxazole: S <=0.5/9.5      -  Vancomycin: S 2      Method Type: AL    Culture - Sputum (collected 22 @ 17:20)  Source: Trach Asp Tracheal Aspirate  Gram Stain (22 @ 06:29):    Few polymorphonuclear leukocytes per low power field    Rare Squamous epithelial cells per low power field    Moderate Gram Negative Rods seen per oil power field  Final Report (22 @ 17:00):    Moderate Klebsiella oxytoca/Raoutella ornithinolytica    Moderate Enterobacter cloacae complex    Normal Respiratory Kilo absent  Organism: Klebsiella oxytoca /Raoutella ornithinolytica  Enterobacter cloacae complex (22 @ 17:00)  Organism: Enterobacter cloacae complex (22 @ 17:00)      -  Amikacin: S <=16      -  Amoxicillin/Clavulanic Acid: R >16/8      -  Ampicillin: R >16 These ampicillin results predict results for amoxicillin      -  Ampicillin/Sulbactam: R >16/8 Enterobacter, Klebsiella aerogenes, Citrobacter, and Serratia may develop resistance during prolonged therapy (3-4 days)      -  Aztreonam: S <=4      -  Cefazolin: R >16 Enterobacter, Klebsiella aerogenes, Citrobacter, and Serratia may develop resistance during prolonged therapy (3-4 days)      -  Cefepime: S <=2      -  Cefoxitin: R >16      -  Ceftriaxone: S <=1 Enterobacter, Klebsiella aerogenes, Citrobacter, and Serratia may develop resistance during prolonged therapy      -  Ciprofloxacin: S <=0.25      -  Ertapenem: S <=0.5      -  Gentamicin: S <=2      -  Imipenem: S <=1      -  Levofloxacin: S <=0.5      -  Meropenem: S <=1      -  Piperacillin/Tazobactam: S <=8      -  Tobramycin: S <=2      -  Trimethoprim/Sulfamethoxazole: S <=0.5/9.5      Method Type: AL  Organism: Klebsiella oxytoca /Raoutella ornithinolytica (22 @ 17:00)      -  Amikacin: S <=16      -  Amoxicillin/Clavulanic Acid: S <=8/4      -  Ampicillin: R 16 These ampicillin results predict results for amoxicillin      -  Ampicillin/Sulbactam: S <=4/2 Enterobacter, Klebsiella aerogenes, Citrobacter, and Serratia may develop resistance during prolonged therapy (3-4 days)      -  Aztreonam: S <=4      -  Cefazolin: S <=2 Enterobacter, Klebsiella aerogenes, Citrobacter, and Serratia may develop resistance during prolonged therapy (3-4 days)      -  Cefepime: S <=2      -  Cefoxitin: S <=8      -  Ceftriaxone: S <=1 Enterobacter, Klebsiella aerogenes, Citrobacter, and Serratia may develop resistance during prolonged therapy      -  Ciprofloxacin: S <=0.25      -  Ertapenem: S <=0.5      -  Gentamicin: S <=2      -  Imipenem: S <=1      -  Levofloxacin: S <=0.5      -  Meropenem: S <=1      -  Piperacillin/Tazobactam: S <=8      -  Tobramycin: S <=2      -  Trimethoprim/Sulfamethoxazole: S <=0.5/9.5      Method Type: AL    Culture - Body Fluid with Gram Stain (collected 22 @ 01:00)  Source: Peritoneal Peritoneal Fluid  Gram Stain (22 @ 02:31):    No polymorphonuclear leukocytes seen    No organisms seen    by cytocentrifuge  Final Report (22 @ 19:13):    No growth at 5 days    Culture - Blood (collected 22 @ 05:33)  Source: .Blood None  Final Report (22 @ 19:00):    No Growth Final    Culture - Sputum (collected 22 @ 14:30)  Source: Trach Asp Tracheal Aspirate  Gram Stain (22 @ 10:18):    Rare polymorphonuclear leukocytes per low power field    No Squamous epithelial cells per low power field    Numerous Gram Positive Cocci in Pairs and Chains seen per oil power field    Rare Gram Negative Rods seen per oil power field    Rare Gram PositiveRods seen per oil power field  Final Report (22 @ 16:39):    Normal Respiratory Kilo present    Culture - Fungal, Body Fluid (collected 22 @ 12:24)  Source: .Body Fluid Peritoneal Fluid  Preliminary Report (22 @ 15:02):    No growth    Culture - Body Fluid with Gram Stain (collected 22 @ 12:24)  Source: .Body Fluid Peritoneal Fluid  Gram Stain (22 @ 03:19):    No polymorphonuclear leukocytes seen    No organisms seen    by cytocentrifuge  Final Report (22 @ 20:34):    No growth at 5 days    Culture - Urine (collected 06-15-22 @ 08:00)  Source: Clean Catch Clean Catch (Midstream)  Final Report (22 @ 22:14):    Normal Urogenital kilo present            INFECTIOUS DISEASES TESTING  MRSA PCR Result.: Negative (22 @ 10:34)  Procalcitonin, Serum: 0.36 (22 @ 10:30)  Rapid RVP Result: NotDetec (22 @ 09:19)  HIV-1/2 Combo Result: Nonreact (22 @ 10:29)  Procalcitonin, Serum: 0.11 (22 @ 15:46)  Procalcitonin, Serum: 0.11 (22 @ 06:47)  Procalcitonin, Serum: 0.62 (22 @ 05:49)  MRSA PCR Result.: Negative (22 @ 14:30)  Procalcitonin, Serum: 3.28 (22 @ 05:22)  COVID-19 PCR: NotDetec (06-15-22 @ 07:10)  Procalcitonin, Serum: 0.07 (22 @ 12:46)      INFLAMMATORY MARKERS  Sedimentation Rate, Erythrocyte: 86 mm/Hr (22 @ 05:46)  Sedimentation Rate, Erythrocyte: 65 mm/Hr (22 @ 16:00)      RADIOLOGY & ADDITIONAL TESTS:  I have personally reviewed the last available Chest xray  CXR      CT  CT Angio Chest PE Protocol w/ IV Cont:   ACC: [id number] EXAM:  CT ANGIO CHEST PULM ART [facility]                          PROCEDURE DATE:  2022          INTERPRETATION:  Clinical History / Reason for exam: Hypertension and   respiratory failure. Pulmonary embolus.    TECHNIQUE: Multislice helical sections were obtained from the thoracic   inlet to the lung bases during rapid administration of 60cc Optiray 320   intravenous contrast using a CTA protocol. Thin sections were   reconstructed through the pulmonary vasculature. Sagittal and coronal   reformatted images were acquired, as well as MIP reconstructed images.    COMPARISON CT: CT chest dated 2022. Correlation is also made with CT   abdomen and pelvis dated 2022    FINDINGS:    PULMONARY EMBOLUS: No pulmonary embolus.    LUNGS: Endotracheal tube terminates within the mid trachea. Central   airways are patent. with near complete consolidation/collapse of the left   lower lobe, increased as compared with prior. Mildly increased right   lower lobe patchy consolidative opacities/atelectasis. Again noted are   trace bilateral pleural effusion. No pneumothorax.    HEART/GREAT VESSELS: Cardiomegaly. Again noted is a small to moderate   pericardial effusion. The aorta is of normal caliber. Dilated main   pulmonary artery measures up to 3.8 cm, unchanged.    MEDIASTINUM/THORACIC NODES: Redemonstration of prominent and mildly   enlarged abdominal lymph nodes. Bilateral subcentimeter short axis   cervical clavicular lymph nodes. Visualized portions of the thyroid gland   are symmetric.    VISUALIZED UPPER ABDOMEN: Partially imaged splenomegaly. Periportal   edema, nonspecific. Enteric tube courses below the diaphragm. Fullness of   the para-aortic is portacaval region consistent with prominent lymph   nodes that are better evaluated on recent CT abdomen and pelvis and   similar as compared with CT abdomen and pelvis dated 2021    BONES/SOFT TISSUES: No acute osseous abnormality. Anasarca.      IMPRESSION:      No pulmonary embolus.    Near complete consolidation/collapse of the left lower lobe, increased as   compared with prior. Mildly increased right lower lobe patchy   consolidative opacities-atelectasis. Adjacent bilateral small pleural   effusions. Findings compatible with likely mixed pneumonia and   atelectasis.    Redemonstration of a dilated main pulmonary artery measuring up to 3.8 cm   compatible with pulmonary hypertension.    Redemonstration of partially imaged splenomegaly. Additional chronic   findings as above.    --- End of Report ---          ARIS CUMMINGS MD; Resident Radiologist  This document has been electronically signed.  DOMONIQUE CABRERA MD; Attending Radiologist  This document has been electronically signed. 2022 11:17PM (22 @ 21:14)      CARDIOLOGY TESTING  12 Lead ECG:   Ventricular Rate 138 BPM    Atrial Rate 138 BPM    P-R Interval 144 ms    QRS Duration 66 ms    Q-T Interval 242 ms    QTC Calculation(Bazett) 366 ms    P Axis 69 degrees    R Axis 115 degrees    T Axis 24 degrees    Diagnosis Line Sinus tachycardia  Right axis deviation  Possible Right ventricular hypertrophy  Nonspecific T wave abnormality  Abnormal ECG    Confirmed by BRANDON BELL MD ([id number]) on 2022 10:01:31 AM (22 @ 09:55)  12 Lead ECG:   Ventricular Rate 138 BPM    Atrial Rate 138 BPM    P-R Interval 148 ms    QRS Duration 66 ms    Q-T Interval 244 ms    QTC Calculation(Bazett) 369 ms    P Axis 70 degrees    R Axis 112 degrees    T Axis 17 degrees    Diagnosis Line Sinus tachycardia  Right axis deviation  Possible Right ventricular hypertrophy  Nonspecific T wave abnormality  Abnormal ECG    Confirmed by BRANDON BELL MD ([id number]) on 2022 10:01:24 AM (22 @ 09:55)      MEDICATIONS  cefepime   IVPB 1000 IV Intermittent every 8 hours  chlorhexidine 0.12% Liquid 15 Oral Mucosa every 12 hours  chlorhexidine 4% Liquid 1 Topical daily  clonazePAM  Tablet 2 Oral three times a day  fentaNYL   Infusion. 0.5 IV Continuous <Continuous>  folic acid 1 Oral daily  furosemide   Injectable 40 IV Push daily  lactulose Syrup 20 Oral daily  midazolam Infusion 0.02 IV Continuous <Continuous>  nicotine -  14 mG/24Hr(s) Patch 1 Transdermal daily  norepinephrine Infusion 0.05 IV Continuous <Continuous>  pantoprazole   Suspension 40 Enteral Tube daily  propofol Infusion 1 IV Continuous <Continuous>  QUEtiapine 100 Oral two times a day  scopolamine 1 mG/72 Hr(s) Patch 1 Transdermal every 72 hours  spironolactone 50 Oral daily  thiamine 100 Oral daily  vancomycin  IVPB     vancomycin  IVPB 1500 IV Intermittent every 12 hours      WEIGHT  Weight (kg): 85.6 (06-15-22 @ 11:25)      ANTIBIOTICS:  cefepime   IVPB 1000 milliGRAM(s) IV Intermittent every 8 hours  vancomycin  IVPB      vancomycin  IVPB 1500 milliGRAM(s) IV Intermittent every 12 hours      All available historical records have been reviewed

## 2022-07-01 NOTE — PROGRESS NOTE ADULT - SUBJECTIVE AND OBJECTIVE BOX
26yFemale  Being followed for ascites, cholelithiasis, elevated ALP  Interval history:  No hematemesis, melena, blood in stool reported.       PAST MEDICAL & SURGICAL HISTORY:  Hepatitis C      Asthma      Anxiety and depression      IV drug abuse  heroin      No significant past surgical history              Social History: No smoking. No alcohol. No illegal drug use.          MEDICATIONS  (STANDING):  cefepime   IVPB 1000 milliGRAM(s) IV Intermittent every 8 hours  chlorhexidine 0.12% Liquid 15 milliLiter(s) Oral Mucosa every 12 hours  chlorhexidine 4% Liquid 1 Application(s) Topical daily  clonazePAM  Tablet 2 milliGRAM(s) Oral three times a day  enoxaparin Injectable 40 milliGRAM(s) SubCutaneous every 24 hours  fentaNYL   Infusion. 0.5 MICROgram(s)/kG/Hr (4.28 mL/Hr) IV Continuous <Continuous>  folic acid 1 milliGRAM(s) Oral daily  furosemide   Injectable 40 milliGRAM(s) IV Push daily  ketamine Infusion. 0.2 mG/kG/Hr (1.71 mL/Hr) IV Continuous <Continuous>  lactated ringers. 500 milliLiter(s) (250 mL/Hr) IV Continuous <Continuous>  lactulose Syrup 20 Gram(s) Oral two times a day  midazolam Infusion 0.02 mG/kG/Hr (1.71 mL/Hr) IV Continuous <Continuous>  nicotine -  14 mG/24Hr(s) Patch 1 patch Transdermal daily  norepinephrine Infusion 0.05 MICROgram(s)/kG/Min (8.03 mL/Hr) IV Continuous <Continuous>  pantoprazole   Suspension 40 milliGRAM(s) Enteral Tube daily  polyethylene glycol 3350 17 Gram(s) Oral two times a day  propofol Infusion 1 MICROgram(s)/kG/Min (0.51 mL/Hr) IV Continuous <Continuous>  QUEtiapine 100 milliGRAM(s) Oral two times a day  scopolamine 1 mG/72 Hr(s) Patch 1 Patch Transdermal every 72 hours  spironolactone 50 milliGRAM(s) Oral daily  thiamine 100 milliGRAM(s) Oral daily    MEDICATIONS  (PRN):  ALBUTerol    90 MICROgram(s) HFA Inhaler 1 Puff(s) Inhalation every 4 hours PRN Shortness of Breath and/or Wheezing  cloNIDine 0.1 milliGRAM(s) Oral every 6 hours PRN opiate withdrawal  hydrOXYzine hydrochloride 50 milliGRAM(s) Oral every 6 hours PRN Anxiety  ibuprofen  Tablet. 400 milliGRAM(s) Oral every 6 hours PRN Mild Pain (1 - 3)  ibuprofen  Tablet. 600 milliGRAM(s) Oral every 6 hours PRN Temp greater or equal to 38C (100.4F)  sodium chloride 0.9% lock flush 10 milliLiter(s) IV Push every 1 hour PRN Pre/post blood products, medications, blood draw, and to maintain line patency       Allergies:   buprenorphine (Rash)  Suboxone (Rash)              REVIEW OF SYSTEMS:  unobtainable       VITAL SIGNS:   T(F): 99.3 (07-01-22 @ 11:00), Max: 100.9 (06-30-22 @ 17:03)  HR: 120 (07-01-22 @ 13:28) (104 - 122)  BP: 103/56 (07-01-22 @ 13:02) (94/54 - 127/85)  RR: 28 (07-01-22 @ 13:02) (20 - 33)  SpO2: 100% (07-01-22 @ 13:28) (95% - 100%)    PHYSICAL EXAM:  GENERAL: +intubated  HEAD:  Atraumatic, Normocephalic  EYES: conjunctiva and sclera clear  NECK: Supple, no JVD or thyromegaly  CHEST/LUNG: +left sided rhonchi  HEART: Regular rate and rhythm; normal S1, S2, No murmurs.  ABDOMEN: Soft, nontender, nondistended; Bowel sounds present  NEUROLOGY: No asterixis or tremor.   SKIN: Intact, no jaundice            LABS:                        7.1    1.11  )-----------( 105      ( 01 Jul 2022 05:31 )             24.7     07-01    140  |  107  |  32<H>  ----------------------------<  107<H>  3.8   |  25  |  1.4    Ca    9.0      01 Jul 2022 05:31  Phos  4.0     07-01  Mg     2.6     07-01    TPro  5.4<L>  /  Alb  2.4<L>  /  TBili  0.4  /  DBili  x   /  AST  19  /  ALT  9   /  AlkPhos  773<H>  07-01    LIVER FUNCTIONS - ( 01 Jul 2022 05:31 )  Alb: 2.4 g/dL / Pro: 5.4 g/dL / ALK PHOS: 773 U/L / ALT: 9 U/L / AST: 19 U/L / GGT: x               IMAGING:    < from: Xray Kidney Ureter Bladder (07.01.22 @ 10:09) >    ACC: [id number] EXAM:  XR KUB 1 VIEW                          PROCEDURE DATE:  07/01/2022          INTERPRETATION:  Indication: Constipation    Technique: 2 radiographs of the abdomen    Comparison: CT dated 6/26/2022    Findings/  impression:    Feeding tube tip overlying the left upper abdomen. Nonobstructive bowel   gas pattern. Probe/catheter overlying the pelvis.    --- End of Report ---            BELKIS NOE MD; Attending Radiologist  This document has been electronically signed. Jul 1 2022 10:52AM    < end of copied text >

## 2022-07-01 NOTE — CONSULT NOTE ADULT - SUBJECTIVE AND OBJECTIVE BOX
[place] [facility] INITIAL CONSULT NOTE  --------------------------------------------------------------------------------  HPI: obtained  from mother at bedside  Patient is a 26 year old female with hx of asthma, untreated ?Hep C, IVDA, anasarca presenting with increased dyspnea since yesterday. Patient has been short of breath chronically and admitted for fluid overload. Intubated. Treated with IV furosemide. Received IV contrast on June 26 and 29. Patient was actively using IV heroin prior to admission. Also has history of prostitution.    PAST HISTORY  --------------------------------------------------------------------------------  PAST MEDICAL & SURGICAL HISTORY:  Hepatitis C  Asthma  Anxiety and depression  IV drug abuse, heroin  No significant past surgical history    FAMILY HISTORY:  No pertinent family history in first degree relatives    SOCIAL HISTORY:  IV drug use. Prostitution.   ALLERGIES & MEDICATIONS  --------------------------------------------------------------------------------  Allergies    buprenorphine (Rash)  Suboxone (Rash)    Standing Inpatient Medications  cefepime   IVPB 1000 milliGRAM(s) IV Intermittent every 8 hours  chlorhexidine 0.12% Liquid 15 milliLiter(s) Oral Mucosa every 12 hours  chlorhexidine 4% Liquid 1 Application(s) Topical daily  clonazePAM  Tablet 2 milliGRAM(s) Oral three times a day  enoxaparin Injectable 40 milliGRAM(s) SubCutaneous every 24 hours  fentaNYL   Infusion. 0.5 MICROgram(s)/kG/Hr IV Continuous <Continuous>  folic acid 1 milliGRAM(s) Oral daily  furosemide   Injectable 40 milliGRAM(s) IV Push daily  ketamine Infusion. 0.2 mG/kG/Hr IV Continuous <Continuous>  lactated ringers. 500 milliLiter(s) IV Continuous <Continuous>  lactulose Syrup 20 Gram(s) Oral two times a day  midazolam Infusion 0.02 mG/kG/Hr IV Continuous <Continuous>  nicotine -  14 mG/24Hr(s) Patch 1 patch Transdermal daily  norepinephrine Infusion 0.05 MICROgram(s)/kG/Min IV Continuous <Continuous>  pantoprazole   Suspension 40 milliGRAM(s) Enteral Tube daily  polyethylene glycol 3350 17 Gram(s) Oral two times a day  propofol Infusion 1 MICROgram(s)/kG/Min IV Continuous <Continuous>  QUEtiapine 100 milliGRAM(s) Oral two times a day  scopolamine 1 mG/72 Hr(s) Patch 1 Patch Transdermal every 72 hours  spironolactone 50 milliGRAM(s) Oral daily  thiamine 100 milliGRAM(s) Oral daily    PRN Inpatient Medications  ALBUTerol    90 MICROgram(s) HFA Inhaler 1 Puff(s) Inhalation every 4 hours PRN  cloNIDine 0.1 milliGRAM(s) Oral every 6 hours PRN  hydrOXYzine hydrochloride 50 milliGRAM(s) Oral every 6 hours PRN  ibuprofen  Tablet. 400 milliGRAM(s) Oral every 6 hours PRN  ibuprofen  Tablet. 600 milliGRAM(s) Oral every 6 hours PRN  sodium chloride 0.9% lock flush 10 milliLiter(s) IV Push every 1 hour PRN    REVIEW OF SYSTEMS  --------------------------------------------------------------------------------  Unable to review, patient is intubated    VITALS/PHYSICAL EXAM  --------------------------------------------------------------------------------  T(C): 37.4 (07-01-22 @ 11:00), Max: 38.3 (06-30-22 @ 17:03)  HR: 100 (07-01-22 @ 15:02) (100 - 122)  BP: 105/64 (07-01-22 @ 15:02) (94/54 - 127/85)  RR: 28 (07-01-22 @ 15:02) (20 - 33)  SpO2: 100% (07-01-22 @ 15:02) (98% - 100%)    06-30-22 @ 07:01  -  07-01-22 @ 07:00  --------------------------------------------------------  IN: 3263 mL / OUT: 2425 mL / NET: 838 mL    07-01-22 @ 07:01  -  07-01-22 @ 15:25  --------------------------------------------------------  IN: 650.2 mL / OUT: 1395 mL / NET: -744.8 mL    Physical Exam:  	Gen: intubated  	Pulm: B/L decreased breath sounds  	CV: RRR, S1S2  	Back: No CVA tenderness; no sacral edema  	Abd: +BS, soft, nondistended  	: Jaron  	LE: Warm, edema  	Neuro: No focal deficits, intact gait    LABS/STUDIES  --------------------------------------------------------------------------------              7.1    1.11  >-----------<  105      [07-01-22 @ 05:31]              24.7     140  |  107  |  32  ----------------------------<  107      [07-01-22 @ 05:31]  3.8   |  25  |  1.4        Ca     9.0     [07-01-22 @ 05:31]      Mg     2.6     [07-01-22 @ 05:31]      Phos  4.0     [07-01-22 @ 05:31]    TPro  5.4  /  Alb  2.4  /  TBili  0.4  /  DBili  x   /  AST  19  /  ALT  9   /  AlkPhos  773  [07-01-22 @ 05:31]    CK 28      [06-30-22 @ 09:07]    Creatinine Trend:  SCr 1.4 [07-01 @ 05:31]  SCr 1.4 [06-30 @ 05:46]  SCr 1.1 [06-29 @ 05:27]  SCr 0.8 [06-28 @ 06:00]  SCr 0.8 [06-27 @ 06:47]    Urinalysis - [06-29-22 @ 19:06]      Color Yellow / Appearance Slightly Cloudy / SG 1.025 / pH 6.0      Gluc Negative / Ketone Trace  / Bili Negative / Urobili 1.0       Blood Large / Protein >=300 / Leuk Est Negative / Nitrite Negative      RBC 26-50 / WBC 3-5 / Hyaline  / Gran 6-10 / Sq Epi  / Non Sq Epi Occasional / Bacteria Moderate    Iron 33, TIBC 280, %sat 12      [04-21-22 @ 07:39]  Ferritin 94      [06-27-22 @ 06:47]  HbA1c 5.6      [05-22-19 @ 11:36]  TSH 4.11      [06-16-22 @ 18:11]  Lipid: chol --, , HDL --, LDL --      [06-28-22 @ 06:00]    HBsAb Nonreact      [02-27-18 @ 17:40]  HBsAg Nonreact      [02-06-21 @ 08:45]  HBcAb Nonreact      [02-27-18 @ 17:40]  HCV 1.99, Weakly Reactive Hepatitis C AB  S/CO Ratio                        Interpretation  < 1.00                                   Non-Reactive  1.00 - 4.99                         Weakly-Reactive  >= 5.00                                Reactive  Non-Reactive: A person with a non-reactive HCV antibody result is  considered uninfected.  No further action is needed unless recent  infection is suspected.  In these cases, consider repeat testing later to  detect seroconversion..  Weakly-Reactive: HCV antibody test is abnormal, HCV RNA Qualitative test  will follow.  Reactive: HCV antibody test is abnormal, HCV RNA Qualitative test will  follow.  Note: HCV antibody testing is performed on the [employer]  system.      [02-06-21 @ 08:45]  HIV Nonreact      [06-29-22 @ 10:29]  HIV Nonreact      [02-05-21 @ 17:00]    KRISHNA: titer Negative, pattern --      [06-23-22 @ 16:00]  dsDNA <12      [06-23-22 @ 16:00]  Rheumatoid Factor 13      [06-30-22 @ 09:07]  Syphilis Screen (Treponema Pallidum Ab) Negative      [01-25-21 @ 13:21]  Immunofixation Serum:   Weak IgG Lambda Band Identified    Reference Range: None Detected      [04-21-22 @ 07:37]  SPEP Interpretation: Weak Gamma-Migrating Paraprotein Identified      [04-21-22 @ 07:37]

## 2022-07-01 NOTE — PROGRESS NOTE ADULT - NS ATTEND AMEND GEN_ALL_CORE FT
As described above, we are consulted to evaluate the patient for abnormal liver functions, nodularity of liver, with ascites, no sbp.  See above for details  I have reviewed  the above note and agree with the impressions and findings and recommendations
Diagnostic Paracentesis. F/U with Dr Aldridge for fibroscan of Liver and spleen
I edited the note
I edited the note
As described above, we are consulted to evaluate the patient for ascities, paracentesis and serologies are pending  See above for details  I have reviewed  the above note and agree with the impressions and findings and recommendations
I edited the note
I edited the note
Patient examined this morning and readily followed commands.  No focal motor or sensory abnormalities for me.    May decrease Keppra to 250 mg bid on Wednesday then discontinue 3 days after that.
Ascitic fluid suggestive of portal hypertension. Will need Liver bx as outpatient.
I edited the note
I edited the note

## 2022-07-01 NOTE — PROGRESS NOTE ADULT - SUBJECTIVE AND OBJECTIVE BOX
CC.  jeredsakaterin  HPI.  Patient is intubated/vented  appears to be comfortable   unable to obtain ROS/HX       Vital Signs Last 24 Hrs  T(C): 37.4 (2022 11:00), Max: 38.3 (2022 17:03)  T(F): 99.3 (2022 11:00), Max: 100.9 (2022 17:03)  HR: 120 (2022 13:28) (104 - 122)  BP: 103/56 (2022 13:02) (94/54 - 127/85)  BP(mean): 73 (2022 13:02) (68 - 101)  RR: 28 (2022 13:02) (20 - 33)  SpO2: 100% (:) (97% - 100%)      PHYSICAL EXAM-  GENERAL: NAD   HEAD:  Atraumatic, Normocephalic  EYES: EOMI, PERRLA, conjunctiva and sclera clear  NECK: Supple, No JVD, Normal thyroid  NERVOUS SYSTEM:  Sedated  CHEST/LUNG:  Rhonchi with good air entry  HEART: Regular rate and rhythm; No murmurs, rubs, or gallops  ABDOMEN: Soft, Nontender, Nondistended; Bowel sounds present  EXTREMITIES:  No clubbing, cyanosis, or edema  SKIN: No rashes or lesions                               7.1    1.11  )-----------( 105      ( 2022 05:31 )             24.7     07-01    140  |  107  |  32<H>  ----------------------------<  107<H>  3.8   |  25  |  1.4    Ca    9.0      2022 05:31  Phos  4.0     07-01  Mg     2.6     07-01    TPro  5.4<L>  /  Alb  2.4<L>  /  TBili  0.4  /  DBili  x   /  AST  19  /  ALT  9   /  AlkPhos  773<H>  07-01    CARDIAC MARKERS ( 2022 09:07 )  x     / x     / 28 U/L / x     / x      Urinalysis Basic - ( 2022 19:06 )    Color: Yellow / Appearance: Slightly Cloudy / S.025 / pH: x  Gluc: x / Ketone: Trace  / Bili: Negative / Urobili: 1.0 mg/dL   Blood: x / Protein: >=300 mg/dL / Nitrite: Negative   Leuk Esterase: Negative / RBC: 26-50 /HPF / WBC 3-5 /HPF   Sq Epi: x / Non Sq Epi: Occasional /HPF / Bacteria: Moderate      MEDICATIONS  (STANDING):  cefepime   IVPB 1000 milliGRAM(s) IV Intermittent every 8 hours  chlorhexidine 0.12% Liquid 15 milliLiter(s) Oral Mucosa every 12 hours  chlorhexidine 4% Liquid 1 Application(s) Topical daily  clonazePAM  Tablet 2 milliGRAM(s) Oral three times a day  enoxaparin Injectable 40 milliGRAM(s) SubCutaneous every 24 hours  fentaNYL   Infusion. 0.5 MICROgram(s)/kG/Hr (4.28 mL/Hr) IV Continuous <Continuous>  folic acid 1 milliGRAM(s) Oral daily  furosemide   Injectable 40 milliGRAM(s) IV Push daily  ketamine Infusion. 0.2 mG/kG/Hr (1.71 mL/Hr) IV Continuous <Continuous>  lactated ringers. 500 milliLiter(s) (250 mL/Hr) IV Continuous <Continuous>  lactulose Syrup 20 Gram(s) Oral two times a day  midazolam Infusion 0.02 mG/kG/Hr (1.71 mL/Hr) IV Continuous <Continuous>  nicotine -  14 mG/24Hr(s) Patch 1 patch Transdermal daily  norepinephrine Infusion 0.05 MICROgram(s)/kG/Min (8.03 mL/Hr) IV Continuous <Continuous>  pantoprazole   Suspension 40 milliGRAM(s) Enteral Tube daily  polyethylene glycol 3350 17 Gram(s) Oral two times a day  propofol Infusion 1 MICROgram(s)/kG/Min (0.51 mL/Hr) IV Continuous <Continuous>  QUEtiapine 100 milliGRAM(s) Oral two times a day  scopolamine 1 mG/72 Hr(s) Patch 1 Patch Transdermal every 72 hours  spironolactone 50 milliGRAM(s) Oral daily  thiamine 100 milliGRAM(s) Oral daily    MEDICATIONS  (PRN):  ALBUTerol    90 MICROgram(s) HFA Inhaler 1 Puff(s) Inhalation every 4 hours PRN Shortness of Breath and/or Wheezing  cloNIDine 0.1 milliGRAM(s) Oral every 6 hours PRN opiate withdrawal  hydrOXYzine hydrochloride 50 milliGRAM(s) Oral every 6 hours PRN Anxiety  ibuprofen  Tablet. 400 milliGRAM(s) Oral every 6 hours PRN Mild Pain (1 - 3)  ibuprofen  Tablet. 600 milliGRAM(s) Oral every 6 hours PRN Temp greater or equal to 38C (100.4F)  sodium chloride 0.9% lock flush 10 milliLiter(s) IV Push every 1 hour PRN Pre/post blood products, medications, blood draw, and to maintain line patency            Imaging Personally Reviewed:     [x ] YES  [ ] NO    Consultant(s) Notes Reviewed:  [x ] YES  [ ] NO    Care Discussed with Consultants/Other Providers [x ] YES  [ ] NO

## 2022-07-01 NOTE — PROGRESS NOTE ADULT - ASSESSMENT
IMPRESSION:  Acute respiratory failure sp intubation  PNA  MSSA pna  seizure like activity  ?? drug over dose/ withdrawal opoid   liver cirrhosis   Ascites sp paracentesis   Pancytopenia- worsening  RENETTA     PLAN:    CNS: do SAT/SBT as tolerated  CW methadone 30mg daily  CW Clonapin to 2mg TID  CW Seroquel 100mg BID  start ketamine     HEENT: oral care     PULMONARY:  HOB 45,  Vent changes: wean O2 as tolerated, increase TV to 400    CARDIOVASCULAR: goal MAP >65.  Monitor QTc daily. 500 cc bolus. hold lasix for today.    GI: GI prophylaxis.  OG Feeding.  miralax, senna. increase lactulose to BID for now. KUB    RENAL: monitor lytes is and os     INFECTIOUS DISEASE: Abx per ID. stop vanco    HEMATOLOGICAL:  monitor CBC, Heme FU. restart dvt ppx. check d-dimer    ENDOCRINE:  Follow up FS.  Insulin protocol if needed.     MICU  lines: Left IJ  Salmeron - failed TOV IMPRESSION:  Acute respiratory failure sp intubation  PNA  MSSA pna  seizure like activity  ?? drug over dose/ withdrawal opoid   liver cirrhosis   Ascites sp paracentesis   Pancytopenia- worsening  RENETTA     PLAN:    CNS: do SAT/SBT as tolerated  CW methadone 30mg daily  CW Clonapin to 2mg TID  CW Seroquel 100mg BID  Start ketamine     HEENT: oral care     PULMONARY:  HOB 45,  Vent changes: wean O2 as tolerated, increase TV to 400. Keep on right side    CARDIOVASCULAR: goal MAP >65.  Monitor QTc daily. 500 cc bolus. hold lasix for today.    GI: GI prophylaxis.  OG Feeding.  miralax, senna. increase lactulose to BID for now. KUB    RENAL: monitor lytes is and os     INFECTIOUS DISEASE: Abx per ID.     HEMATOLOGICAL:  monitor CBC, Heme FU. restart dvt ppx. check d-dimer    ENDOCRINE:  Follow up FS.  Insulin protocol if needed.     MICU  lines: Left IJ  Salmeron - failed TOV

## 2022-07-01 NOTE — CONSULT NOTE ADULT - ASSESSMENT
Acute respiratory failure / intubated / PNA  IV heroin use  RENETTA in setting of diuresis and IV contrast  Hematuria  Subnephrotic range proteinuria  Persistent Fevers  ?Hep C  Anemia / Pancytopenia  Pulmonary hypertension  Pericardial effusion  Hepatomegaly / splenomegaly    Plan:    Rheum input noted  While there is no uniform histologic presentation of kidney disease associated with heroin use, the most commonly described are FSGS and MPGN  Treatment is usually supportive  I doubt a kidney biopsy would be of great utility, however, we can consider it once the patient is more stable  If creatinine increase any further, hold diuretics  Avoid NSAIDs  Antibiotic as per ID    Followup cultures  Will follow with you

## 2022-07-01 NOTE — PROGRESS NOTE ADULT - ASSESSMENT
Patient is 26 year old female with hx of asthma,  IVDA, anasarca hx of use of opiates 2 grams per day IV into her thighs  x years with minimal sobriety. Pt has been on MMTP in 2020.  presenting with       # AHRF - on MV  # Aspiration PNA s/p Unasyn Course  # Persistent Fevers  # Anemia  # Pancytopenia  # pHTN WHO Class unclear at this time  # h/o Pericardial Effusion  # OD vs. Other Tox  # Ascites w/ h/o Liver cirrhosis d/t HepC  #Abnormal TTE      PLAN:     - attempt of SAT/SBT  - Seizure Precautions  - Lasix to 40g IVP QD  -NG tube Feeds  -continue with current antibiotic as per ID.  Follow up on cultures  -Further rheumatological workup as per Rheumatology    - Hold Heparin (thrombocytopenia)  - 1U PRBC 6/26 + 1U PRBC 6/27   -Mild drop in H/H noticed.  No sign of bleeding.  ICU want to hold if there is drop below 7  - Heme/Onc to follow up   -Cardiology recommended further cardiac workup when stable       further care as per ID, cardiology, rheumatology, and  critical care team

## 2022-07-01 NOTE — PROGRESS NOTE ADULT - ASSESSMENT
26 year old female with hx of asthma, ?untreated Hep C, IVDA, anasarca was admitted to medical floor  with increased SOB and anasarca intubated for AHRF  in ICU     Problem 1-Ascites s/p diagnostic tap SAAG 1.2 and TP ,2.5 suggestive towards portal HTN from cirrhosis   no evidence of SBP   However has normal PLT count and no nodular liver in imaging   -repeat CT showed moderate ascites   CT showed hepatomegaly and splenomegaly but no nodular liver     Recs:   -appreciate KUB non-obstructive pattern  -therapeutic paracentesis prn  -hepatitis C RNA qualitative negative  -previously work up done for CLD was negative   -has chronic elevated ALP  -Trend LFT, INR   -might benefit from liver biopsy later when medically feasible  - Follow up with our [facility] located at [address] [place] [address]. Phone Number: [phone].     Problem 2-Anemia no gross GI bleeding  Rec  -Maintain Hemodynamic Stability   -Monitor CBC  -CMP,Optimize Electrolytes  -Transfuse prn to hgb >8  -Two large bore IV lines  -PPI BID  -Monitor Vital Signs  -Monitor Stool For blood, frequency, consistency, melena  -Active Type and Screen    problem 3-Cholelithiasis   asymptomatic  Rec  -watchful waiting

## 2022-07-01 NOTE — PROGRESS NOTE ADULT - ASSESSMENT
ASSESSMENT  Patient is a 26 year old female with hx of asthma, untreated Hep C, IVDA, anasarca presenting with increased dyspnea since yesterday    IMPRESSION  #Acute respiratory failure s/p intubation 6/16  #Pneumonia -   - CT Chest 6/22 - left greater than right lower lobe consolidations   - sputum Cx 6/24 Klebsiella oxytoca, Enterobacter cloacae complex    #Fevers  - Ferritin, Serum: 94 ng/mL (06.27.22 @ 06:47)  - Procalcitonin, Serum: 0.11 (06.27.22 @ 15:46)  - Lactate Dehydrogenase, Serum: 173: Hemolyzed. Interpret with caution (06.27.22 @ 06:47)  - Sedimentation Rate, Erythrocyte: 86 mm/Hr (06.30.22 @ 05:46)  - Rheumatoid Factor Quant, Serum or Plasma: 13 IU/mL (06.30.22 @ 09:07)  - HIV-1/2 Combo Result: Nonreact  (06.29.22 @ 10:29)  - TTE 6/20 - entire apex, mid, apicla inferior wall and mid inferolateral segment are abnormal increased RV, moderate Pulmonary HTN, no significant valvulopathy   - CT Abd/pelvis 6/26 - moderated ascites moderate pericardial effusion   - CT Angio Chest PE Protocol w/ IV Cont (06.29.22 @ 21:14): No pulmonary embolus. Near complete consolidation/collapse of the left lower lobe, increased as   compared with prior. Mildly increased right lower lobe patchy  consolidative opacities-atelectasis. Adjacent bilateral small pleural  effusions. Findings compatible with likely mixed pneumonia and  atelectasis. Redemonstration of a dilated main pulmonary artery measuring up to 3.8 cm  compatible with pulmonary hypertension. Redemonstration of partially imaged splenomegaly. Additional chronic findings as above.  - appreciate Rheum consult 6/29      #Drug overdose vs withdrawal?  #Hepatosplenomegaly - present since CT Abd/pelvis 1/30/2021  #Pancytopenia    #Hx of Untreated Hep C   - Hepatitis C Virus Interpretation: Weakly Reactive Hepatitis C (02.06.21 @ 08:45)  - Hepatitis C RNA, Qualitative: NotDetec (06.23.22 @ 16:00)    #IVDA   #Obesity BMI (kg/m2): 30.5  #DM   #Abx allergy: buprenorphine (Rash)  Suboxone (Rash)    Recommendations   This is a preliminary incomplete pended note, all final recommendations to follow after interview and examination of the patient.    Please call or message on Microsoft Teams if with any questions.  Spectra [phone]    ASSESSMENT  Patient is a 26 year old female with hx of asthma, untreated Hep C, IVDA, anasarca presenting with increased dyspnea since yesterday    IMPRESSION  #Acute respiratory failure s/p intubation 6/16  #Pneumonia -   - CT Chest 6/22 - left greater than right lower lobe consolidations   - sputum Cx 6/24 Klebsiella oxytoca, Enterobacter cloacae complex    #FUO  - Ferritin, Serum: 94 ng/mL (06.27.22 @ 06:47)  - Procalcitonin, Serum: 0.11 (06.27.22 @ 15:46)  - Lactate Dehydrogenase, Serum: 173: Hemolyzed. Interpret with caution (06.27.22 @ 06:47)  - Sedimentation Rate, Erythrocyte: 86 mm/Hr (06.30.22 @ 05:46)  - Rheumatoid Factor Quant, Serum or Plasma: 13 IU/mL (06.30.22 @ 09:07)  - autoimmune panel so far negative   - HIV-1/2 Combo Result: Nonreact  (06.29.22 @ 10:29)  - TTE 6/20 - entire apex, mid, apicla inferior wall and mid inferolateral segment are abnormal increased RV, moderate Pulmonary HTN, no significant valvulopathy   - US Abdomen Complete (US Abdomen Complete .) (06.15.22 @ 11:29): Cholelithiasis. Gallbladder wall thickening. Negative sonographic  Hammer's sign. If there is high clinical suspicion for acute  cholecystitis recommend a HIDA scan. Splenomegaly measuring 20.9 cm. Moderate ascites.  - s/p paracentesis 6/19 - cytology negative  - CT Abd/pelvis 6/26 - moderated ascites moderate pericardial effusion   - CT Angio Chest PE Protocol w/ IV Cont (06.29.22 @ 21:14): No pulmonary embolus. Near complete consolidation/collapse of the left lower lobe, increased as   compared with prior. Mildly increased right lower lobe patchy  consolidative opacities-atelectasis. Adjacent bilateral small pleural  effusions. Findings compatible with likely mixed pneumonia and  atelectasis. Redemonstration of a dilated main pulmonary artery measuring up to 3.8 cm  compatible with pulmonary hypertension. Redemonstration of partially imaged splenomegaly. Additional chronic findings as above.  - appreciate Rheum consult 6/29    #Drug overdose vs withdrawal?  #Hepatosplenomegaly - present since CT Abd/pelvis 1/30/2021  #Pancytopenia    #Hx of Untreated Hep C   - Hepatitis C Virus Interpretation: Weakly Reactive Hepatitis C (02.06.21 @ 08:45)  - Hepatitis C RNA, Qualitative: NotDetec (06.23.22 @ 16:00)    #IVDA   #Obesity BMI (kg/m2): 30.5  #DM   #Abx allergy: buprenorphine (Rash)  Suboxone (Rash)    Recommendations   - etiology of fevers remains unclear -- noted RUQ US from 6/15 -- with thickened gallbladder with chronic alk phos elevation   - check GGT -- if postive, consider HIDA scan for further work-up of fevers  - check Lower extremity US   - check bartonella-ab   - may need bone marrow biopsy for FUO   - continue cefepime 1g q 8 hours   - can stop vancomycin as Staph is MSSA      Please call or message on Microsoft Teams if with any questions.  Spectra [phone]

## 2022-07-01 NOTE — PROGRESS NOTE ADULT - SUBJECTIVE AND OBJECTIVE BOX
Patient is a 26y old  Female who presents with a chief complaint of anasarca (2022 07:58)        Over Night Events:  febrile  remains tachy  continue to be sedated on propofol, fentanyl, versed      ROS:     All ROS are negative except HPI         PHYSICAL EXAM    ICU Vital Signs Last 24 Hrs  T(C): 38.3 (2022 07:02), Max: 38.3 (2022 17:03)  T(F): 100.9 (2022 07:02), Max: 100.9 (2022 17:03)  HR: 122 (2022 07:52) (96 - 130)  BP: 117/70 (2022 07:02) (94/54 - 128/81)  BP(mean): 86 (2022 07:02) (68 - 101)  ABP: --  ABP(mean): --  RR: 32 (2022 09:00) (20 - 119)  SpO2: 100% (2022 07:52) (95% - 100%)      CONSTITUTIONAL:  ill appearing  NAD    ENT:   Airway patent,   Mouth with normal mucosa.   No thrush  +ET    EYES:   Pupils equal,   Round and reactive to light.    CARDIAC:   tachy,   Regular rhythm.    No edema        RESPIRATORY:   No wheezing  Bilateral BS  Normal chest expansion  Not tachypneic,  No use of accessory muscles    GASTROINTESTINAL:  Abdomen soft,   Non-tender,   No guarding,   distended      NEUROLOGICAL:   sedated    SKIN:   Skin normal color for race,   Warm and dry and intact.   No evidence of rash.          22 @ 07:01  -  22 @ 07:00  --------------------------------------------------------  IN:    Enteral Tube Flush: 240 mL    FentaNYL: 360 mL    IV PiggyBack: 1450 mL    Midazolam: 36 mL    Propofol: 232 mL    Vital High Protein: 945 mL  Total IN: 3263 mL    OUT:    Indwelling Catheter - Urethral (mL): 2425 mL  Total OUT: 2425 mL    Total NET: 838 mL      22 @ 07:01  -  22 @ 09:08  --------------------------------------------------------  IN:  Total IN: 0 mL    OUT:    Indwelling Catheter - Urethral (mL): 900 mL  Total OUT: 900 mL    Total NET: -900 mL          LABS:                            7.1    1.11  )-----------( 105      ( 2022 05:31 )             24.7                                               07-    140  |  107  |  32<H>  ----------------------------<  107<H>  3.8   |  25  |  1.4    Ca    9.0      2022 05:31  Phos  4.0       Mg     2.6         TPro  5.4<L>  /  Alb  2.4<L>  /  TBili  0.4  /  DBili  x   /  AST  19  /  ALT  9   /  AlkPhos  773<H>                                               Urinalysis Basic - ( 2022 19:06 )    Color: Yellow / Appearance: Slightly Cloudy / S.025 / pH: x  Gluc: x / Ketone: Trace  / Bili: Negative / Urobili: 1.0 mg/dL   Blood: x / Protein: >=300 mg/dL / Nitrite: Negative   Leuk Esterase: Negative / RBC: 26-50 /HPF / WBC 3-5 /HPF   Sq Epi: x / Non Sq Epi: Occasional /HPF / Bacteria: Moderate        CARDIAC MARKERS ( 2022 09:07 )  x     / x     / 28 U/L / x     / x                                                LIVER FUNCTIONS - ( 2022 05:31 )  Alb: 2.4 g/dL / Pro: 5.4 g/dL / ALK PHOS: 773 U/L / ALT: 9 U/L / AST: 19 U/L / GGT: x                                                                                               Mode: AC/ CMV (Assist Control/ Continuous Mandatory Ventilation)  RR (machine): 28  TV (machine): 370  FiO2: 40  PEEP: 8  ITime: 1  MAP: 10  PIP: 21                                      ABG - ( 2022 03:28 )  pH, Arterial: 7.29  pH, Blood: x     /  pCO2: 56    /  pO2: 171   / HCO3: 27    / Base Excess: -0.5  /  SaO2: 100.0               MEDICATIONS  (STANDING):  cefepime   IVPB 1000 milliGRAM(s) IV Intermittent every 8 hours  chlorhexidine 0.12% Liquid 15 milliLiter(s) Oral Mucosa every 12 hours  chlorhexidine 4% Liquid 1 Application(s) Topical daily  clonazePAM  Tablet 2 milliGRAM(s) Oral three times a day  fentaNYL   Infusion. 0.5 MICROgram(s)/kG/Hr (4.28 mL/Hr) IV Continuous <Continuous>  folic acid 1 milliGRAM(s) Oral daily  furosemide   Injectable 40 milliGRAM(s) IV Push daily  lactulose Syrup 20 Gram(s) Oral daily  midazolam Infusion 0.02 mG/kG/Hr (1.71 mL/Hr) IV Continuous <Continuous>  nicotine -  14 mG/24Hr(s) Patch 1 patch Transdermal daily  norepinephrine Infusion 0.05 MICROgram(s)/kG/Min (8.03 mL/Hr) IV Continuous <Continuous>  pantoprazole   Suspension 40 milliGRAM(s) Enteral Tube daily  propofol Infusion 1 MICROgram(s)/kG/Min (0.51 mL/Hr) IV Continuous <Continuous>  QUEtiapine 100 milliGRAM(s) Oral two times a day  scopolamine 1 mG/72 Hr(s) Patch 1 Patch Transdermal every 72 hours  spironolactone 50 milliGRAM(s) Oral daily  thiamine 100 milliGRAM(s) Oral daily  vancomycin  IVPB      vancomycin  IVPB 1500 milliGRAM(s) IV Intermittent every 12 hours    MEDICATIONS  (PRN):  ALBUTerol    90 MICROgram(s) HFA Inhaler 1 Puff(s) Inhalation every 4 hours PRN Shortness of Breath and/or Wheezing  cloNIDine 0.1 milliGRAM(s) Oral every 6 hours PRN opiate withdrawal  hydrOXYzine hydrochloride 50 milliGRAM(s) Oral every 6 hours PRN Anxiety  ibuprofen  Tablet. 600 milliGRAM(s) Oral every 6 hours PRN Temp greater or equal to 38C (100.4F)  ibuprofen  Tablet. 400 milliGRAM(s) Oral every 6 hours PRN Mild Pain (1 - 3)  sodium chloride 0.9% lock flush 10 milliLiter(s) IV Push every 1 hour PRN Pre/post blood products, medications, blood draw, and to maintain line patency      New X-rays reviewed:                                                                                  ECHO    CXR interpreted by me:

## 2022-07-01 NOTE — PROGRESS NOTE ADULT - NS ATTEND OPT1 GEN_ALL_CORE
I independently performed the documented:
I attest my time as attending is greater than 50% of the total combined time spent on qualifying patient care activities by the PA/NP and attending.

## 2022-07-02 LAB
ALBUMIN SERPL ELPH-MCNC: 2.8 G/DL — LOW (ref 3.5–5.2)
ALP SERPL-CCNC: 834 U/L — HIGH (ref 30–115)
ALT FLD-CCNC: 10 U/L — SIGNIFICANT CHANGE UP (ref 0–41)
ANION GAP SERPL CALC-SCNC: 11 MMOL/L — SIGNIFICANT CHANGE UP (ref 7–14)
AST SERPL-CCNC: 21 U/L — SIGNIFICANT CHANGE UP (ref 0–41)
BASOPHILS # BLD AUTO: 0.01 K/UL — SIGNIFICANT CHANGE UP (ref 0–0.2)
BASOPHILS NFR BLD AUTO: 0.3 % — SIGNIFICANT CHANGE UP (ref 0–1)
BILIRUB SERPL-MCNC: 0.6 MG/DL — SIGNIFICANT CHANGE UP (ref 0.2–1.2)
BUN SERPL-MCNC: 30 MG/DL — HIGH (ref 10–20)
CALCIUM SERPL-MCNC: 9.9 MG/DL — SIGNIFICANT CHANGE UP (ref 8.5–10.1)
CHLORIDE SERPL-SCNC: 108 MMOL/L — SIGNIFICANT CHANGE UP (ref 98–110)
CO2 SERPL-SCNC: 25 MMOL/L — SIGNIFICANT CHANGE UP (ref 17–32)
CREAT SERPL-MCNC: 1.2 MG/DL — SIGNIFICANT CHANGE UP (ref 0.7–1.5)
EGFR: 63 ML/MIN/1.73M2 — SIGNIFICANT CHANGE UP
EOSINOPHIL # BLD AUTO: 0 K/UL — SIGNIFICANT CHANGE UP (ref 0–0.7)
EOSINOPHIL NFR BLD AUTO: 0 % — SIGNIFICANT CHANGE UP (ref 0–8)
GLUCOSE SERPL-MCNC: 139 MG/DL — HIGH (ref 70–99)
HCT VFR BLD CALC: 31 % — LOW (ref 37–47)
HCT VFR BLD CALC: 32.4 % — LOW (ref 37–47)
HGB BLD-MCNC: 9.2 G/DL — LOW (ref 12–16)
HGB BLD-MCNC: 9.4 G/DL — LOW (ref 12–16)
IMM GRANULOCYTES NFR BLD AUTO: 0.6 % — HIGH (ref 0.1–0.3)
LYMPHOCYTES # BLD AUTO: 0.36 K/UL — LOW (ref 1.2–3.4)
LYMPHOCYTES # BLD AUTO: 10 % — LOW (ref 20.5–51.1)
MAGNESIUM SERPL-MCNC: 2.6 MG/DL — HIGH (ref 1.8–2.4)
MCHC RBC-ENTMCNC: 26.1 PG — LOW (ref 27–31)
MCHC RBC-ENTMCNC: 26.7 PG — LOW (ref 27–31)
MCHC RBC-ENTMCNC: 29 G/DL — LOW (ref 32–37)
MCHC RBC-ENTMCNC: 29.7 G/DL — LOW (ref 32–37)
MCV RBC AUTO: 89.9 FL — SIGNIFICANT CHANGE UP (ref 81–99)
MCV RBC AUTO: 90 FL — SIGNIFICANT CHANGE UP (ref 81–99)
MONOCYTES # BLD AUTO: 0.24 K/UL — SIGNIFICANT CHANGE UP (ref 0.1–0.6)
MONOCYTES NFR BLD AUTO: 6.6 % — SIGNIFICANT CHANGE UP (ref 1.7–9.3)
NEUTROPHILS # BLD AUTO: 2.98 K/UL — SIGNIFICANT CHANGE UP (ref 1.4–6.5)
NEUTROPHILS NFR BLD AUTO: 82.5 % — HIGH (ref 42.2–75.2)
NRBC # BLD: 0 /100 WBCS — SIGNIFICANT CHANGE UP (ref 0–0)
NRBC # BLD: 0 /100 WBCS — SIGNIFICANT CHANGE UP (ref 0–0)
PHOSPHATE SERPL-MCNC: 3.4 MG/DL — SIGNIFICANT CHANGE UP (ref 2.1–4.9)
PLATELET # BLD AUTO: 192 K/UL — SIGNIFICANT CHANGE UP (ref 130–400)
PLATELET # BLD AUTO: 202 K/UL — SIGNIFICANT CHANGE UP (ref 130–400)
POTASSIUM SERPL-MCNC: 3.9 MMOL/L — SIGNIFICANT CHANGE UP (ref 3.5–5)
POTASSIUM SERPL-SCNC: 3.9 MMOL/L — SIGNIFICANT CHANGE UP (ref 3.5–5)
PROT SERPL-MCNC: 6.3 G/DL — SIGNIFICANT CHANGE UP (ref 6–8)
RBC # BLD: 3.45 M/UL — LOW (ref 4.2–5.4)
RBC # BLD: 3.6 M/UL — LOW (ref 4.2–5.4)
RBC # FLD: 16.1 % — HIGH (ref 11.5–14.5)
RBC # FLD: 16.1 % — HIGH (ref 11.5–14.5)
SODIUM SERPL-SCNC: 144 MMOL/L — SIGNIFICANT CHANGE UP (ref 135–146)
WBC # BLD: 3.55 K/UL — LOW (ref 4.8–10.8)
WBC # BLD: 3.61 K/UL — LOW (ref 4.8–10.8)
WBC # FLD AUTO: 3.55 K/UL — LOW (ref 4.8–10.8)
WBC # FLD AUTO: 3.61 K/UL — LOW (ref 4.8–10.8)

## 2022-07-02 PROCEDURE — 93970 EXTREMITY STUDY: CPT | Mod: 26

## 2022-07-02 PROCEDURE — 74018 RADEX ABDOMEN 1 VIEW: CPT | Mod: 26

## 2022-07-02 PROCEDURE — 93010 ELECTROCARDIOGRAM REPORT: CPT

## 2022-07-02 PROCEDURE — 99233 SBSQ HOSP IP/OBS HIGH 50: CPT

## 2022-07-02 PROCEDURE — 99232 SBSQ HOSP IP/OBS MODERATE 35: CPT

## 2022-07-02 PROCEDURE — 71045 X-RAY EXAM CHEST 1 VIEW: CPT | Mod: 26

## 2022-07-02 RX ORDER — SODIUM CHLORIDE 9 MG/ML
1000 INJECTION INTRAMUSCULAR; INTRAVENOUS; SUBCUTANEOUS ONCE
Refills: 0 | Status: COMPLETED | OUTPATIENT
Start: 2022-07-02 | End: 2022-07-02

## 2022-07-02 RX ORDER — METHADONE HYDROCHLORIDE 40 MG/1
30 TABLET ORAL DAILY
Refills: 0 | Status: DISCONTINUED | OUTPATIENT
Start: 2022-07-02 | End: 2022-07-02

## 2022-07-02 RX ORDER — LACTULOSE 10 G/15ML
20 SOLUTION ORAL EVERY 6 HOURS
Refills: 0 | Status: DISCONTINUED | OUTPATIENT
Start: 2022-07-02 | End: 2022-07-06

## 2022-07-02 RX ORDER — SENNA PLUS 8.6 MG/1
2 TABLET ORAL AT BEDTIME
Refills: 0 | Status: DISCONTINUED | OUTPATIENT
Start: 2022-07-02 | End: 2022-07-06

## 2022-07-02 RX ORDER — SODIUM CHLORIDE 9 MG/ML
250 INJECTION, SOLUTION INTRAVENOUS ONCE
Refills: 0 | Status: COMPLETED | OUTPATIENT
Start: 2022-07-02 | End: 2022-07-02

## 2022-07-02 RX ADMIN — Medication 2 MILLIGRAM(S): at 22:17

## 2022-07-02 RX ADMIN — KETAMINE HYDROCHLORIDE 1.71 MG/KG/HR: 100 INJECTION INTRAMUSCULAR; INTRAVENOUS at 22:22

## 2022-07-02 RX ADMIN — Medication 600 MILLIGRAM(S): at 03:42

## 2022-07-02 RX ADMIN — CEFEPIME 100 MILLIGRAM(S): 1 INJECTION, POWDER, FOR SOLUTION INTRAMUSCULAR; INTRAVENOUS at 05:15

## 2022-07-02 RX ADMIN — SENNA PLUS 2 TABLET(S): 8.6 TABLET ORAL at 22:20

## 2022-07-02 RX ADMIN — Medication 1 PATCH: at 11:10

## 2022-07-02 RX ADMIN — LACTULOSE 20 GRAM(S): 10 SOLUTION ORAL at 05:11

## 2022-07-02 RX ADMIN — Medication 600 MILLIGRAM(S): at 18:33

## 2022-07-02 RX ADMIN — Medication 1 PATCH: at 18:26

## 2022-07-02 RX ADMIN — CEFEPIME 100 MILLIGRAM(S): 1 INJECTION, POWDER, FOR SOLUTION INTRAMUSCULAR; INTRAVENOUS at 13:20

## 2022-07-02 RX ADMIN — POLYETHYLENE GLYCOL 3350 17 GRAM(S): 17 POWDER, FOR SOLUTION ORAL at 05:12

## 2022-07-02 RX ADMIN — POLYETHYLENE GLYCOL 3350 17 GRAM(S): 17 POWDER, FOR SOLUTION ORAL at 17:02

## 2022-07-02 RX ADMIN — Medication 600 MILLIGRAM(S): at 10:41

## 2022-07-02 RX ADMIN — CEFEPIME 100 MILLIGRAM(S): 1 INJECTION, POWDER, FOR SOLUTION INTRAMUSCULAR; INTRAVENOUS at 22:16

## 2022-07-02 RX ADMIN — ENOXAPARIN SODIUM 40 MILLIGRAM(S): 100 INJECTION SUBCUTANEOUS at 10:35

## 2022-07-02 RX ADMIN — PANTOPRAZOLE SODIUM 40 MILLIGRAM(S): 20 TABLET, DELAYED RELEASE ORAL at 11:06

## 2022-07-02 RX ADMIN — PROPOFOL 0.51 MICROGRAM(S)/KG/MIN: 10 INJECTION, EMULSION INTRAVENOUS at 22:22

## 2022-07-02 RX ADMIN — Medication 600 MILLIGRAM(S): at 04:12

## 2022-07-02 RX ADMIN — CHLORHEXIDINE GLUCONATE 15 MILLILITER(S): 213 SOLUTION TOPICAL at 05:13

## 2022-07-02 RX ADMIN — PROPOFOL 0.51 MICROGRAM(S)/KG/MIN: 10 INJECTION, EMULSION INTRAVENOUS at 17:02

## 2022-07-02 RX ADMIN — SODIUM CHLORIDE 250 MILLILITER(S): 9 INJECTION, SOLUTION INTRAVENOUS at 11:56

## 2022-07-02 RX ADMIN — CHLORHEXIDINE GLUCONATE 1 APPLICATION(S): 213 SOLUTION TOPICAL at 05:13

## 2022-07-02 RX ADMIN — SPIRONOLACTONE 50 MILLIGRAM(S): 25 TABLET, FILM COATED ORAL at 05:12

## 2022-07-02 RX ADMIN — QUETIAPINE FUMARATE 100 MILLIGRAM(S): 200 TABLET, FILM COATED ORAL at 17:01

## 2022-07-02 RX ADMIN — Medication 2 MILLIGRAM(S): at 05:12

## 2022-07-02 RX ADMIN — LACTULOSE 20 GRAM(S): 10 SOLUTION ORAL at 17:02

## 2022-07-02 RX ADMIN — Medication 1 PATCH: at 19:45

## 2022-07-02 RX ADMIN — Medication 1 MILLIGRAM(S): at 11:05

## 2022-07-02 RX ADMIN — QUETIAPINE FUMARATE 100 MILLIGRAM(S): 200 TABLET, FILM COATED ORAL at 05:12

## 2022-07-02 RX ADMIN — SODIUM CHLORIDE 1000 MILLILITER(S): 9 INJECTION INTRAMUSCULAR; INTRAVENOUS; SUBCUTANEOUS at 06:51

## 2022-07-02 RX ADMIN — Medication 600 MILLIGRAM(S): at 19:51

## 2022-07-02 RX ADMIN — KETAMINE HYDROCHLORIDE 1.71 MG/KG/HR: 100 INJECTION INTRAMUSCULAR; INTRAVENOUS at 08:05

## 2022-07-02 RX ADMIN — Medication 2 MILLIGRAM(S): at 13:20

## 2022-07-02 RX ADMIN — Medication 100 MILLIGRAM(S): at 11:05

## 2022-07-02 RX ADMIN — SCOPALAMINE 1 PATCH: 1 PATCH, EXTENDED RELEASE TRANSDERMAL at 18:27

## 2022-07-02 RX ADMIN — CHLORHEXIDINE GLUCONATE 15 MILLILITER(S): 213 SOLUTION TOPICAL at 17:01

## 2022-07-02 NOTE — PROGRESS NOTE ADULT - SUBJECTIVE AND OBJECTIVE BOX
Patient is a 26y old  Female who presents with a chief complaint of anasarca (01 Jul 2022 15:17)        HPI:  Patient is a 26 year old female with hx of asthma, untreated Hep C, IVDA, anasarca presenting with increased dyspnea since yesterday. Patient has been short of breath chronically and has been admitted for fluid overload. Patient describes worsening symptoms yesterday associated with cough. States generalized edema has remained the same. Patient is actively using heroin. Not on any meds or MMTP.  Denies fever, chills, sore throat, cp, palpitations, abd pain, n/v/d, dysuria, headache, syncope, hemoptysis. + generalized edema (15 Reinaldo 2022 10:03)      Pt evaluated on rounds.  I reviewed the radiology tests and hospital record.    I reviewed previous notes on this patient.    Interval Events: No overnight events.      CAM:    SAT:    SBT:      REVIEW OF SYSTEMS:   see HPI      OBJECTIVE:  ICU Vital Signs Last 24 Hrs  T(C): 38.4 (01 Jul 2022 23:00), Max: 38.4 (01 Jul 2022 23:00)  T(F): 101.1 (01 Jul 2022 23:00), Max: 101.1 (01 Jul 2022 23:00)  HR: 119 (02 Jul 2022 03:06) (100 - 130)  BP: 160/101 (02 Jul 2022 03:06) (101/56 - 167/107)  BP(mean): 124 (02 Jul 2022 03:06) (73 - 130)  ABP: --  ABP(mean): --  RR: 29 (02 Jul 2022 03:06) (25 - 35)  SpO2: 100% (02 Jul 2022 03:06) (91% - 100%)    Mode: AC/ CMV (Assist Control/ Continuous Mandatory Ventilation), RR (machine): 28, TV (machine): 400, FiO2: 50, PEEP: 8, MAP: 19, PIP: 34    06-30 @ 07:01  -  07-01 @ 07:00  --------------------------------------------------------  IN: 3263 mL / OUT: 2425 mL / NET: 838 mL    07-01 @ 07:01  - 07-02 @ 05:54  --------------------------------------------------------  IN: 1872.9 mL / OUT: 2310 mL / NET: -437.1 mL      CAPILLARY BLOOD GLUCOSE            PHYSICAL EXAM:       · ENMT:   Airway patent,   Nasal mucosa clear.  Mouth with normal mucosa.   No thrush    · EYES:   Clear bilaterally,   pupils equal,   round and reactive to light.    · CARDIAC:   Normal rate,   regular rhythm.    Heart sounds S1, S2.   No murmurs, no rubs or gallops on auscultation  no edema        CAROTID:   normal systolic impulse  no bruits    · RESPIRATORY:   rales  normal chest expansion  no retractions or use of accessory muscles  palpation of chest is normal with no fremitus  percussion of chest demonstrates no hyperresonance or dullness    · GASTROINTESTINAL:  Abdomen soft,   non-tender,   + BS  liver/spleen not palpable    · MUSCULOSKELETAL:   no clubbing, cyanosis      · SKIN:   Skin normal color for race,   warm, dry   No evidence of rash.        · HEME LYMPH:   no splenomegaly.  No cervical  lymphadenopathy.  no inguinal lymphadenopathy    HOSPITAL MEDICATIONS:  MEDICATIONS  (STANDING):  cefepime   IVPB 1000 milliGRAM(s) IV Intermittent every 8 hours  chlorhexidine 0.12% Liquid 15 milliLiter(s) Oral Mucosa every 12 hours  chlorhexidine 4% Liquid 1 Application(s) Topical daily  clonazePAM  Tablet 2 milliGRAM(s) Oral three times a day  enoxaparin Injectable 40 milliGRAM(s) SubCutaneous every 24 hours  fentaNYL   Infusion. 0.5 MICROgram(s)/kG/Hr (4.28 mL/Hr) IV Continuous <Continuous>  folic acid 1 milliGRAM(s) Oral daily  furosemide   Injectable 40 milliGRAM(s) IV Push daily  ketamine Infusion. 0.2 mG/kG/Hr (1.71 mL/Hr) IV Continuous <Continuous>  lactulose Syrup 20 Gram(s) Oral two times a day  midazolam Infusion 0.02 mG/kG/Hr (1.71 mL/Hr) IV Continuous <Continuous>  nicotine -  14 mG/24Hr(s) Patch 1 patch Transdermal daily  norepinephrine Infusion 0.05 MICROgram(s)/kG/Min (8.03 mL/Hr) IV Continuous <Continuous>  pantoprazole   Suspension 40 milliGRAM(s) Enteral Tube daily  polyethylene glycol 3350 17 Gram(s) Oral two times a day  propofol Infusion 1 MICROgram(s)/kG/Min (0.51 mL/Hr) IV Continuous <Continuous>  QUEtiapine 100 milliGRAM(s) Oral two times a day  scopolamine 1 mG/72 Hr(s) Patch 1 Patch Transdermal every 72 hours  spironolactone 50 milliGRAM(s) Oral daily  thiamine 100 milliGRAM(s) Oral daily    MEDICATIONS  (PRN):  ALBUTerol    90 MICROgram(s) HFA Inhaler 1 Puff(s) Inhalation every 4 hours PRN Shortness of Breath and/or Wheezing  cloNIDine 0.1 milliGRAM(s) Oral every 6 hours PRN opiate withdrawal  hydrOXYzine hydrochloride 50 milliGRAM(s) Oral every 6 hours PRN Anxiety  ibuprofen  Tablet. 400 milliGRAM(s) Oral every 6 hours PRN Mild Pain (1 - 3)  ibuprofen  Tablet. 600 milliGRAM(s) Oral every 6 hours PRN Temp greater or equal to 38C (100.4F)  sodium chloride 0.9% lock flush 10 milliLiter(s) IV Push every 1 hour PRN Pre/post blood products, medications, blood draw, and to maintain line patency    lactated ringers Bolus:   500 milliLiter(s), IV Bolus, once, infuse over 1 Hr, Stop After 1 Doses  Provider's Contact #: [phone]  lactated ringers.: Solution, 500 milliLiter(s) infuse at 250 mL/Hr  sodium chloride 0.9%.: Solution, 1000 milliLiter(s) infuse at 100 mL/Hr  sodium chloride 0.9% Bolus:   500 milliLiter(s), IV Bolus, once, infuse over 60 Minute(s), Stop After 1 Doses  Special Instructions: for cheetah  sodium chloride 0.9% Bolus:   500 milliLiter(s), IV Bolus, once, infuse over 60 Minute(s), Stop After 1 Doses      LABS:                        7.1    1.11  )-----------( 105      ( 01 Jul 2022 05:31 )             24.7     07-01    140  |  107  |  32<H>  ----------------------------<  107<H>  3.8   |  25  |  1.4    Ca    9.0      01 Jul 2022 05:31  Phos  4.0     07-01  Mg     2.6     07-01    TPro  5.4<L>  /  Alb  2.4<L>  /  TBili  0.4  /  DBili  x   /  AST  19  /  ALT  9   /  AlkPhos  773<H>  07-01        Arterial Blood Gas:  07-02 @ 03:05  7.37/47/80/27/97.7/1.4  ABG lactate: --  Arterial Blood Gas:  07-01 @ 03:28  7.29/56/171/27/100.0/-0.5  ABG lactate: --      Mode: AC/ CMV (Assist Control/ Continuous Mandatory Ventilation), RR (machine): 28, TV (machine): 400, FiO2: 50, PEEP: 8, MAP: 19, PIP: 34  CARDIAC MARKERS ( 30 Jun 2022 09:07 )  x     / x     / 28 U/L / x     / x          SARS-CoV-2: NotDetec (30 Jun 2022 09:19)  COVID-19 PCR: NotDetec (15 Reinaldo 2022 07:10)    Mode: AC/ CMV (Assist Control/ Continuous Mandatory Ventilation)  RR (machine): 28  TV (machine): 400  FiO2: 50  PEEP: 8  MAP: 19  PIP: 34      ABG - ( 02 Jul 2022 03:05 )  pH, Arterial: 7.37  pH, Blood: x     /  pCO2: 47    /  pO2: 80    / HCO3: 27    / Base Excess: 1.4   /  SaO2: 97.7                RADIOLOGY: Today I personally interpreted the latest CXR and other pertinent films.

## 2022-07-02 NOTE — PROGRESS NOTE ADULT - ASSESSMENT
IMPRESSION:  Acute respiratory failure sp intubation  PNA  MSSA pna  seizure like activity  ?? drug over dose/ withdrawal opoid   liver cirrhosis   Ascites sp paracentesis   Pancytopenia- worsening  RENETTA     PLAN:    CNS: do SAT/SBT as tolerated  CW methadone 30mg daily  CW Clonapin to 2mg TID  CW Seroquel 100mg BID  ketamine     HEENT: oral care     PULMONARY:  HOB 45,  Vent changes: wean O2 as tolerated, increase TV to 400. Keep on right side    CARDIOVASCULAR: goal MAP >65.  Monitor QTc daily. 500 cc bolus. hold lasix for today.    GI: GI prophylaxis.  OG Feeding.  miralax, senna. increase lactulose to BID for now. KUB    RENAL: monitor lytes is and os     INFECTIOUS DISEASE: Abx per ID.     HEMATOLOGICAL:  monitor CBC, Heme FU. restart dvt ppx. check d-dimer    ENDOCRINE:  Follow up FS.  Insulin protocol if needed.     MICU  lines: Left IJ  [place] - failed TOV

## 2022-07-02 NOTE — PROGRESS NOTE ADULT - ASSESSMENT
Patient is 26 year old female with hx of asthma,  IVDA, anasarca hx of use of opiates 2 grams per day IV into her thighs  x years with minimal sobriety. Pt has been on MMTP in 2020.  presenting with       # AHRF - on MV  # Aspiration PNA s/p Unasyn Course  # Persistent Fevers  # Anemia  # Pancytopenia  # pHTN WHO Class unclear at this time  # h/o Pericardial Effusion  # OD vs. Other Tox  # Ascites w/ h/o Liver cirrhosis d/t HepC  #Abnormal TTE      PLAN:     - attempt of SAT/SBT  - Seizure Precautions  - Hold lasix   -NG tube Feeds  -continue with current antibiotic as per ID.  Follow up on cultures  -Further rheumatological workup as per Rheumatology    - Hold Heparin (thrombocytopenia)  - 1U PRBC 6/26 + 1U PRBC 6/27   -H/H stable  No sign of bleeding.  ICU want to hold if there is drop below 7  - Heme/Onc to follow up   -Cardiology recommended further cardiac workup when stable       further care as per ID, cardiology, rheumatology, and  critical care team

## 2022-07-02 NOTE — CHART NOTE - NSCHARTNOTEFT_GEN_A_CORE
Registered Dietitian Follow-Up     Patient Profile Reviewed                           Yes [X]   No []     Nutrition History Previously Obtained        Yes [X]  No []       Pertinent  Information: pt is 26 year old female with hx of asthma, Hep C, active IVDA, anasarca presents with dyspnea admitted with fluid overload and initially admitted to med surg unit. s/p RRT 6/16 pt was in the lobby s/p seizure and fall 2/2 overdose s/p intubation and upgraded to ICU. + laceration to scalp noted. pt presently on propofol at 18 ml/hr which provides 475  kcals. As per GI moderate ascites, s/p paracentesis 1L removed on 6/22., + portal HTN 2/2 cirrhosis. pt tolerating EN at goal rate Elevated Triglycerides noted, propofol being tapered down. NO BM for a few days, bowel regimen to be increased. + abd distention as per RN.        Diet, NPO with Tube Feed:   Tube Feeding Modality: Orogastric  Vital High Protein  Total Volume for 24 Hours (mL): 1080  Continuous  Starting Tube Feed Rate {mL per Hour}: 20  Increase Tube Feed Rate by (mL): 5     Every 4 hours  Until Goal Tube Feed Rate (mL per Hour): 45  Tube Feed Duration (in Hours): 24  Tube Feed Start Time: 20:00  No Carb Prosource TF     Qty per Day:  1 (06-18-22 @ 19:34) [Active]    Anthropometrics:  - Ht. 167.6 cm   - Wt. 73.4 kg today vs 79.3 upon admission           Pertinent Lab Data:  07-02    144  |  108  |  30<H>  ----------------------------<  139<H>  3.9   |  25  |  1.2    Ca    9.9      02 Jul 2022 06:56  Phos  3.4     07-02  Mg     2.6     07-02    TPro  6.3  /  Alb  2.8<L>  /  TBili  0.6  /  DBili  x   /  AST  21  /  ALT  10  /  AlkPhos  834<H>  07-02                          9.4    3.61  )-----------( 192      ( 02 Jul 2022 06:56 )             32.4        Pertinent Meds:  MEDICATIONS  (STANDING):  cefepime   IVPB 1000 milliGRAM(s) IV Intermittent every 8 hours  chlorhexidine 0.12% Liquid 15 milliLiter(s) Oral Mucosa every 12 hours  chlorhexidine 4% Liquid 1 Application(s) Topical daily  clonazePAM  Tablet 2 milliGRAM(s) Oral three times a day  enoxaparin Injectable 40 milliGRAM(s) SubCutaneous every 24 hours  fentaNYL   Infusion. 0.5 MICROgram(s)/kG/Hr (4.28 mL/Hr) IV Continuous <Continuous>  folic acid 1 milliGRAM(s) Oral daily  furosemide   Injectable 40 milliGRAM(s) IV Push daily  ketamine Infusion. 0.2 mG/kG/Hr (1.71 mL/Hr) IV Continuous <Continuous>  lactulose Syrup 20 Gram(s) Oral every 6 hours  midazolam Infusion 0.02 mG/kG/Hr (1.71 mL/Hr) IV Continuous <Continuous>  nicotine -  14 mG/24Hr(s) Patch 1 patch Transdermal daily  norepinephrine Infusion 0.05 MICROgram(s)/kG/Min (8.03 mL/Hr) IV Continuous <Continuous>  pantoprazole   Suspension 40 milliGRAM(s) Enteral Tube daily  polyethylene glycol 3350 17 Gram(s) Oral two times a day  propofol Infusion 1 MICROgram(s)/kG/Min (0.51 mL/Hr) IV Continuous <Continuous>  QUEtiapine 100 milliGRAM(s) Oral two times a day  scopolamine 1 mG/72 Hr(s) Patch 1 Patch Transdermal every 72 hours  senna 2 Tablet(s) Oral at bedtime  spironolactone 50 milliGRAM(s) Oral daily  thiamine 100 milliGRAM(s) Oral daily    MEDICATIONS  (PRN):  ALBUTerol    90 MICROgram(s) HFA Inhaler 1 Puff(s) Inhalation every 4 hours PRN Shortness of Breath and/or Wheezing  cloNIDine 0.1 milliGRAM(s) Oral every 6 hours PRN opiate withdrawal  hydrOXYzine hydrochloride 50 milliGRAM(s) Oral every 6 hours PRN Anxiety  ibuprofen  Tablet. 400 milliGRAM(s) Oral every 6 hours PRN Mild Pain (1 - 3)  ibuprofen  Tablet. 600 milliGRAM(s) Oral every 6 hours PRN Temp greater or equal to 38C (100.4F)  sodium chloride 0.9% lock flush 10 milliLiter(s) IV Push every 1 hour PRN Pre/post blood products, medications, blood draw, and to maintain line patency       Physical Findings:  - Appearance: sedated, intubated to vent   - GI function: +BS, abd distented, last BM 6/28/22, more aggressive bowel regiment to be initiated  - Tubes: OGT  - Oral/Mouth cavity:  - Skin:      Nutrition Requirements  Weight Used: 73.4 kgs      Estimated Energy Needs:           Nutrient Intake:        [] Previous Nutrition Diagnosis:            [] Ongoing          [] Resolved    [] No active nutrition diagnosis identified at this time     Nutrition Diagnostic #1  Problem:   Etiology:   Statement:      Nutrition Diagnostic #2  Problem:  Etiology:  Statement:     Nutrition Intervention:     Goal/Expected Outcome:     Indicator/Monitoring: Registered Dietitian Follow-Up     Patient Profile Reviewed                           Yes [X]   No []     Nutrition History Previously Obtained        Yes [X]  No []       Pertinent  Information: pt is 26 year old female with hx of asthma, Hep C, active IVDA, anasarca presents with dyspnea admitted with fluid overload and initially admitted to med surg unit. s/p RRT 6/16 pt was in the lobby s/p seizure and fall 2/2 overdose s/p intubation and upgraded to ICU. + laceration to scalp noted. pt presently on propofol at 18 ml/hr which provides 475  kcals. As per GI moderate ascites, s/p paracentesis 1L removed on 6/22., + portal HTN 2/2 cirrhosis. pt tolerating EN at goal rate Elevated Triglycerides noted, propofol being tapered down. NO BM for a few days, bowel regimen to be increased. + abd distention as per RN.        Diet, NPO with Tube Feed:   Tube Feeding Modality: Orogastric  Vital High Protein  Total Volume for 24 Hours (mL): 1080  Continuous  Starting Tube Feed Rate {mL per Hour}: 20  Increase Tube Feed Rate by (mL): 5     Every 4 hours  Until Goal Tube Feed Rate (mL per Hour): 45  Tube Feed Duration (in Hours): 24  Tube Feed Start Time: 20:00  No Carb Prosource TF     Qty per Day:  1 (06-18-22 @ 19:34) [Active]    Anthropometrics:  - Ht. 167.6 cm   - Wt. 73.4 kg today vs 79.3 upon admission           Pertinent Lab Data:  07-02    144  |  108  |  30<H>  ----------------------------<  139<H>  3.9   |  25  |  1.2    Ca    9.9      02 Jul 2022 06:56  Phos  3.4     07-02  Mg     2.6     07-02    TPro  6.3  /  Alb  2.8<L>  /  TBili  0.6  /  DBili  x   /  AST  21  /  ALT  10  /  AlkPhos  834<H>  07-02                          9.4    3.61  )-----------( 192      ( 02 Jul 2022 06:56 )             32.4        Pertinent Meds:  MEDICATIONS  (STANDING):  cefepime   IVPB 1000 milliGRAM(s) IV Intermittent every 8 hours  chlorhexidine 0.12% Liquid 15 milliLiter(s) Oral Mucosa every 12 hours  chlorhexidine 4% Liquid 1 Application(s) Topical daily  clonazePAM  Tablet 2 milliGRAM(s) Oral three times a day  enoxaparin Injectable 40 milliGRAM(s) SubCutaneous every 24 hours  fentaNYL   Infusion. 0.5 MICROgram(s)/kG/Hr (4.28 mL/Hr) IV Continuous <Continuous>  folic acid 1 milliGRAM(s) Oral daily  furosemide   Injectable 40 milliGRAM(s) IV Push daily  ketamine Infusion. 0.2 mG/kG/Hr (1.71 mL/Hr) IV Continuous <Continuous>  lactulose Syrup 20 Gram(s) Oral every 6 hours  midazolam Infusion 0.02 mG/kG/Hr (1.71 mL/Hr) IV Continuous <Continuous>  nicotine -  14 mG/24Hr(s) Patch 1 patch Transdermal daily  norepinephrine Infusion 0.05 MICROgram(s)/kG/Min (8.03 mL/Hr) IV Continuous <Continuous>  pantoprazole   Suspension 40 milliGRAM(s) Enteral Tube daily  polyethylene glycol 3350 17 Gram(s) Oral two times a day  propofol Infusion 1 MICROgram(s)/kG/Min (0.51 mL/Hr) IV Continuous <Continuous>  QUEtiapine 100 milliGRAM(s) Oral two times a day  scopolamine 1 mG/72 Hr(s) Patch 1 Patch Transdermal every 72 hours  senna 2 Tablet(s) Oral at bedtime  spironolactone 50 milliGRAM(s) Oral daily  thiamine 100 milliGRAM(s) Oral daily    MEDICATIONS  (PRN):  ALBUTerol    90 MICROgram(s) HFA Inhaler 1 Puff(s) Inhalation every 4 hours PRN Shortness of Breath and/or Wheezing  cloNIDine 0.1 milliGRAM(s) Oral every 6 hours PRN opiate withdrawal  hydrOXYzine hydrochloride 50 milliGRAM(s) Oral every 6 hours PRN Anxiety  ibuprofen  Tablet. 400 milliGRAM(s) Oral every 6 hours PRN Mild Pain (1 - 3)  ibuprofen  Tablet. 600 milliGRAM(s) Oral every 6 hours PRN Temp greater or equal to 38C (100.4F)  sodium chloride 0.9% lock flush 10 milliLiter(s) IV Push every 1 hour PRN Pre/post blood products, medications, blood draw, and to maintain line patency       Physical Findings:  - Appearance: sedated, intubated to vent   - GI function: +BS, abd distented, last BM 6/28/22, more aggressive bowel regiment to be initiated  - Tubes: OGT  - Oral/Mouth cavity:  - Skin:      Nutrition Requirements  Weight Used: 73.4 kgs      Estimated Energy Needs:  4542-5082 kcals (25-30 kcal/kg/BW) vs 2055 kcals [facility]  88-103g protein (1.2-1.4g/kg/BW)  1;1 kcal for estimated fluid needs           Nutrient Intake:  present EN regimen provides 1140+475 kcal (propofol infusion), 93+15g protein meeting >80% of estimated nutrient needs      Nutrition Intervention: maintain on present EN regimen      Goal/Expected Outcome: pt to continue to tolerate EN and meet >80% of estimated nutrient needs     Indicator/Monitoring: Rd to monitor tolerance to EN, labs/meds (propofol rate) bowel fxn, NFPF and f/u as needed within 2-4 days.

## 2022-07-02 NOTE — PROGRESS NOTE ADULT - SUBJECTIVE AND OBJECTIVE BOX
Patient is seen and examined at the bed side, is febrile .    REVIEW OF SYSTEMS: Unable to obtain due to mental status      ALLERGIES: buprenorphine (Rash)  Suboxone (Rash)      ICU Vital Signs Last 24 Hrs  T(C): 38.8 (2022 19:00), Max: 38.8 (2022 19:00)  T(F): 101.8 (2022 19:00), Max: 101.8 (2022 19:00)  HR: 122 (2022 18:06) (112 - 126)  BP: 153/97 (2022 18:06) (128/83 - 167/107)  BP(mean): 119 (2022 18:06) (100 - 130)  ABP: --  ABP(mean): --  RR: 29 (2022 19:00) (25 - 48)  SpO2: 100% (:00) (95% - 100%)      PHYSICAL EXAM:  GENERAL: Intubated/vented  CHEST/LUNG: Not using accessory muscles   HEART: s1 and s2 present  ABDOMEN:  Nontender and  Nondistended  EXTREMITIES: No pedal  edema  CNS:  Intubated/vented      LABS:                        9.2    3.55  )-----------( 202      ( 2022 16:17 )             31.0                           7.2    1.26  )-----------( 121      ( 2022 20:10 )             24.2       07-02    144  |  108  |  30<H>  ----------------------------<  139<H>  3.9   |  25  |  1.2    Ca    9.9      2022 06:56  Phos  3.4     07-02  Mg     2.6     07-02    TPro  6.3  /  Alb  2.8<L>  /  TBili  0.6  /  DBili  x   /  AST  21  /  ALT  10  /  AlkPhos  834<H>  07-    138  |  105  |  27<H>  ----------------------------<  89  3.5   |  23  |  1.1    Ca    8.2<L>      2022 05:27  Phos  5.6     -  Mg     2.3     -    TPro  5.0<L>  /  Alb  2.1<L>  /  TBili  0.5  /  DBili  x   /  AST  23  /  ALT  10  /  AlkPhos  650<H>          ABG - ( 2022 03:14 )  pH, Arterial: 7.30  pH, Blood: x     /  pCO2: 51    /  pO2: 111   / HCO3: 25    / Base Excess: -1.7  /  SaO2: 99.0          Urinalysis Basic - ( 2022 19:06 )  Color: Yellow / Appearance: Slightly Cloudy / S.025 / pH: x  Gluc: x / Ketone: Trace  / Bili: Negative / Urobili: 1.0 mg/dL   Blood: x / Protein: >=300 mg/dL / Nitrite: Negative   Leuk Esterase: Negative / RBC: 26-50 /HPF / WBC 3-5 /HPF   Sq Epi: x / Non Sq Epi: Occasional /HPF / Bacteria: Moderate        MEDICATIONS  (STANDING):    cefepime   IVPB 1000 milliGRAM(s) IV Intermittent every 8 hours  chlorhexidine 0.12% Liquid 15 milliLiter(s) Oral Mucosa every 12 hours  chlorhexidine 4% Liquid 1 Application(s) Topical daily  clonazePAM  Tablet 2 milliGRAM(s) Oral three times a day  enoxaparin Injectable 40 milliGRAM(s) SubCutaneous every 24 hours  fentaNYL   Infusion. 0.5 MICROgram(s)/kG/Hr (4.28 mL/Hr) IV Continuous <Continuous>  folic acid 1 milliGRAM(s) Oral daily  furosemide   Injectable 40 milliGRAM(s) IV Push daily  ketamine Infusion. 0.2 mG/kG/Hr (1.71 mL/Hr) IV Continuous <Continuous>  lactulose Syrup 20 Gram(s) Oral every 6 hours  midazolam Infusion 0.02 mG/kG/Hr (1.71 mL/Hr) IV Continuous <Continuous>  nicotine -  14 mG/24Hr(s) Patch 1 patch Transdermal daily  norepinephrine Infusion 0.05 MICROgram(s)/kG/Min (8.03 mL/Hr) IV Continuous <Continuous>  pantoprazole   Suspension 40 milliGRAM(s) Enteral Tube daily  polyethylene glycol 3350 17 Gram(s) Oral two times a day  propofol Infusion 1 MICROgram(s)/kG/Min (0.51 mL/Hr) IV Continuous <Continuous>  QUEtiapine 100 milliGRAM(s) Oral two times a day  scopolamine 1 mG/72 Hr(s) Patch 1 Patch Transdermal every 72 hours  senna 2 Tablet(s) Oral at bedtime  spironolactone 50 milliGRAM(s) Oral daily  thiamine 100 milliGRAM(s) Oral daily      RADIOLOGY & ADDITIONAL TESTS:    < from: Xray Kidney Ureter Bladder (22 @ 13:25) >    FINDINGS: Enteric feeding tube is stable, with tip in the left upper   quadrant.  There is a non-obstructive bowel gas pattern.  No abnormal   masses or calcifications are seen.  Stable probe/catheter projects over   the lower pelvis.    < from: CT Angio Chest PE Protocol w/ IV Cont (22 @ 21:14) >    No pulmonary embolus.    Near complete consolidation/collapse of the left lower lobe, increased as   compared with prior. Mildly increased right lower lobe patchy   consolidative opacities-atelectasis. Adjacent bilateral small pleural   effusions. Findings compatible with likely mixed pneumonia and   atelectasis.    Redemonstration of a dilated main pulmonary artery measuring up to 3.8 cm   compatible with pulmonary hypertension.    < from: Xray Chest 1 View- PORTABLE-Routine (Xray Chest 1 View- PORTABLE-Routine in AM.) (22 @ 05:28) >  Stable cardiomegaly and left basilar opacity. Stable support devices.    < end of copied text >  < from: CT Abdomen and Pelvis w/ IV Cont (22 @ 17:27) >  No evidence of retroperitoneal hematoma.    Small bilateral pleural effusions and left basilar consolidation. Moderate ascites redemonstrated.    Moderate pericardial effusion.  Hepatosplenomegaly redemonstrated.        MICROBIOLOGY DATA:    MRSA/MSSA PCR (22 @ 10:34)   MRSA PCR Result.: Negative    Respiratory Viral Panel with COVID-19 by BROOKE (22 @ 09:19)   Rapid RVP Result: Fernanda   SARS-CoV-2: Jamalte:    Culture - Blood (22 @ 20:31)   Specimen Source: .Blood Blood-Peripheral   Culture Results:   No growth to date.     Culture - Blood (22 @ 20:31)   Specimen Source: .Blood Blood   Culture Results:   No growth to date.     Culture - Sputum . (22 @ 14:00)   - Oxacillin: S <=0.25 Oxacillin predicts susceptibility for dicloxacillin, methicillin, and nafcillin   - Cefazolin: S <=4   - Erythromycin: S <=0.25   - Gentamicin: S <=1 Should not be used as monotherapy   - Clindamycin: S <=0.25   - Rifampin: S <=1 Should not be used as monotherapy   - Tetra/Doxy: S <=1   - Trimethoprim/Sulfamethoxazole: S <=0.5/9.5   - Vancomycin: S 2   Gram Stain:   Moderate polymorphonuclear leukocytes per low power field   Few Squamous epithelial cells per low power field   Moderate Gram positive cocci in pairs seen per oil power field   Few Gram Variable Rods seen per oil power field   - Ampicillin/Sulbactam: S <=8/4   Specimen Source: Trach Asp Tracheal Aspirate   Culture Results:   Numerous Mixed gram negative rods   Moderate Staphylococcus aureus   Normal Respiratory Kelly present   Organism Identification: Staphylococcus aureus   Organism: Staphylococcus aureus   Culture - Blood in AM (22 @ 06:00)   Specimen Source: .Blood Blood   Culture Results: No growth to date.     Culture - Sputum . (22 @ 14:00)   Gram Stain:   Moderate polymorphonuclear leukocytes per low power field   Few Squamous epithelial cells per low power field   Moderate Gram positive cocci in pairs seen per oil power field   Few Gram Variable Rods seen per oil power field   Specimen Source: Trach Asp Tracheal Aspirate   Culture Results:   Numerous Mixed gram negative rods   Moderate Staphylococcus aureus Susceptibility to follow.   Normal Respiratory Kelly absent     Culture - Sputum . (22 @ 17:20)   Gram Stain:   Few polymorphonuclear leukocytes per low power field   Rare Squamous epithelial cells per low power field   Moderate Gram Negative Rods seen per oil power field   - Amikacin: S <=16   - Amikacin: S <=16   - Amoxicillin/Clavulanic Acid: R >16/8   - Amoxicillin/Clavulanic Acid: S <=8/4   - Ampicillin: R 16 These ampicillin results predict results for amoxicillin   - Ampicillin: R >16 These ampicillin results predict results for amoxicillin   - Ampicillin/Sulbactam: R >16/8 Enterobacter, Klebsiella aerogenes, Citrobacter, and Serratia may develop resistance during prolonged therapy (3-4 days)   - Ampicillin/Sulbactam: S <=4/2 Enterobacter, Klebsiella aerogenes, Citrobacter, and Serratia may develop resistance during prolonged therapy (3-4 days)   - Aztreonam: S <=4   - Aztreonam: S <=4   - Cefazolin: R >16 Enterobacter, Klebsiella aerogenes, Citrobacter, and Serratia may develop resistance during prolonged therapy (3-4 days)   - Cefazolin: S <=2 Enterobacter, Klebsiella aerogenes, Citrobacter, and Serratia may develop resistance during prolonged therapy (3-4 days)   - Cefepime: S <=2   - Cefepime: S <=2   - Cefoxitin: R >16   - Cefoxitin: S <=8   - Ceftriaxone: S <=1 Enterobacter, Klebsiella aerogenes, Citrobacter, and Serratia may develop resistance during prolonged therapy   - Ceftriaxone: S <=1 Enterobacter, Klebsiella aerogenes, Citrobacter, and Serratia may develop resistance during prolonged therapy   - Ciprofloxacin: S <=0.25   - Ciprofloxacin: S <=0.25   - Ertapenem: S <=0.5   - Ertapenem: S <=0.5   - Gentamicin: S <=2   - Gentamicin: S <=2   - Imipenem: S <=1   - Imipenem: S <=1   - Levofloxacin: S <=0.5   - Levofloxacin: S <=0.5   - Meropenem: S <=1   - Meropenem: S <=1   - Piperacillin/Tazobactam: S <=8   - Piperacillin/Tazobactam: S <=8   - Tobramycin: S <=2   - Tobramycin: S <=2   - Trimethoprim/Sulfamethoxazole: S <=0.5/9.5   - Trimethoprim/Sulfamethoxazole: S <=0.5/9.5   Specimen Source: Trach Asp Tracheal Aspirate   Culture Results:   Moderate Klebsiella oxytoca/Raoutella ornithinolytica   Moderate Enterobacter cloacae complex   Normal Respiratory Kelly absent   Organism Identification: Klebsiella oxytoca /Raoutella ornithinolytica   Enterobacter cloacae complex   Organism: Klebsiella oxytoca /Raoutella ornithinolytica   Organism: Enterobacter cloacae complex COVID-19 PCR (06.15.22 @ 07:10)   COVID-19 PCR: NotDetec: Culture - Acid Fast - Sputum w/Smear (22 @ 07:00)   Specimen Source: .Sputum Sputum   Acid Fast Bacilli Smear:   No acid fast bacilli seen by fluorochrome stain   Culture Results:   No acid fast bacilli isolated after 6 weeks.                  Patient is seen and examined at the bed side, is febrile . She remains intubated/vented.     REVIEW OF SYSTEMS: Unable to obtain due to mental status      ALLERGIES: buprenorphine (Rash)  Suboxone (Rash)      ICU Vital Signs Last 24 Hrs  T(C): 38.8 (2022 19:00), Max: 38.8 (2022 19:00)  T(F): 101.8 (2022 19:00), Max: 101.8 (2022 19:00)  HR: 122 (2022 18:06) (112 - 126)  BP: 153/97 (2022 18:06) (128/83 - 167/107)  BP(mean): 119 (2022 18:06) (100 - 130)  ABP: --  ABP(mean): --  RR: 29 (2022 19:00) (25 - 48)  SpO2: 100% (:00) (95% - 100%)      PHYSICAL EXAM:  GENERAL: Intubated/vented  CHEST/LUNG: Not using accessory muscles   HEART: s1 and s2 present  ABDOMEN:  Nontender and  Nondistended  EXTREMITIES: No pedal  edema  CNS:  Intubated/vented      LABS:                        9.2    3.55  )-----------( 202      ( 2022 16:17 )             31.0                           7.2    1.26  )-----------( 121      ( 2022 20:10 )             24.2       07-02    144  |  108  |  30<H>  ----------------------------<  139<H>  3.9   |  25  |  1.2    Ca    9.9      2022 06:56  Phos  3.4     07-02  Mg     2.6     07-02    TPro  6.3  /  Alb  2.8<L>  /  TBili  0.6  /  DBili  x   /  AST  21  /  ALT  10  /  AlkPhos  834<H>  07-    138  |  105  |  27<H>  ----------------------------<  89  3.5   |  23  |  1.1    Ca    8.2<L>      2022 05:27  Phos  5.6     -  Mg     2.3     -    TPro  5.0<L>  /  Alb  2.1<L>  /  TBili  0.5  /  DBili  x   /  AST  23  /  ALT  10  /  AlkPhos  650<H>          ABG - ( 2022 03:14 )  pH, Arterial: 7.30  pH, Blood: x     /  pCO2: 51    /  pO2: 111   / HCO3: 25    / Base Excess: -1.7  /  SaO2: 99.0          Urinalysis Basic - ( 2022 19:06 )  Color: Yellow / Appearance: Slightly Cloudy / S.025 / pH: x  Gluc: x / Ketone: Trace  / Bili: Negative / Urobili: 1.0 mg/dL   Blood: x / Protein: >=300 mg/dL / Nitrite: Negative   Leuk Esterase: Negative / RBC: 26-50 /HPF / WBC 3-5 /HPF   Sq Epi: x / Non Sq Epi: Occasional /HPF / Bacteria: Moderate        MEDICATIONS  (STANDING):    cefepime   IVPB 1000 milliGRAM(s) IV Intermittent every 8 hours  chlorhexidine 0.12% Liquid 15 milliLiter(s) Oral Mucosa every 12 hours  chlorhexidine 4% Liquid 1 Application(s) Topical daily  clonazePAM  Tablet 2 milliGRAM(s) Oral three times a day  enoxaparin Injectable 40 milliGRAM(s) SubCutaneous every 24 hours  fentaNYL   Infusion. 0.5 MICROgram(s)/kG/Hr (4.28 mL/Hr) IV Continuous <Continuous>  folic acid 1 milliGRAM(s) Oral daily  furosemide   Injectable 40 milliGRAM(s) IV Push daily  ketamine Infusion. 0.2 mG/kG/Hr (1.71 mL/Hr) IV Continuous <Continuous>  lactulose Syrup 20 Gram(s) Oral every 6 hours  midazolam Infusion 0.02 mG/kG/Hr (1.71 mL/Hr) IV Continuous <Continuous>  nicotine -  14 mG/24Hr(s) Patch 1 patch Transdermal daily  norepinephrine Infusion 0.05 MICROgram(s)/kG/Min (8.03 mL/Hr) IV Continuous <Continuous>  pantoprazole   Suspension 40 milliGRAM(s) Enteral Tube daily  polyethylene glycol 3350 17 Gram(s) Oral two times a day  propofol Infusion 1 MICROgram(s)/kG/Min (0.51 mL/Hr) IV Continuous <Continuous>  QUEtiapine 100 milliGRAM(s) Oral two times a day  scopolamine 1 mG/72 Hr(s) Patch 1 Patch Transdermal every 72 hours  senna 2 Tablet(s) Oral at bedtime  spironolactone 50 milliGRAM(s) Oral daily  thiamine 100 milliGRAM(s) Oral daily      RADIOLOGY & ADDITIONAL TESTS:    < from: Xray Kidney Ureter Bladder (22 @ 13:25) >    FINDINGS: Enteric feeding tube is stable, with tip in the left upper   quadrant.  There is a non-obstructive bowel gas pattern.  No abnormal   masses or calcifications are seen.  Stable probe/catheter projects over   the lower pelvis.    < from: CT Angio Chest PE Protocol w/ IV Cont (22 @ 21:14) >    No pulmonary embolus.    Near complete consolidation/collapse of the left lower lobe, increased as   compared with prior. Mildly increased right lower lobe patchy   consolidative opacities-atelectasis. Adjacent bilateral small pleural   effusions. Findings compatible with likely mixed pneumonia and   atelectasis.    Redemonstration of a dilated main pulmonary artery measuring up to 3.8 cm   compatible with pulmonary hypertension.    < from: Xray Chest 1 View- PORTABLE-Routine (Xray Chest 1 View- PORTABLE-Routine in AM.) (22 @ 05:28) >  Stable cardiomegaly and left basilar opacity. Stable support devices.    < end of copied text >  < from: CT Abdomen and Pelvis w/ IV Cont (22 @ 17:27) >  No evidence of retroperitoneal hematoma.    Small bilateral pleural effusions and left basilar consolidation. Moderate ascites redemonstrated.    Moderate pericardial effusion.  Hepatosplenomegaly redemonstrated.        MICROBIOLOGY DATA:    MRSA/MSSA PCR (22 @ 10:34)   MRSA PCR Result.: Negative    Respiratory Viral Panel with COVID-19 by BROOKE (22 @ 09:19)   Rapid RVP Result: Fernanda   SARS-CoV-2: Jamalte:    Culture - Blood (22 @ 20:31)   Specimen Source: .Blood Blood-Peripheral   Culture Results:   No growth to date.     Culture - Blood (22 @ 20:31)   Specimen Source: .Blood Blood   Culture Results:   No growth to date.     Culture - Sputum . (22 @ 14:00)   - Oxacillin: S <=0.25 Oxacillin predicts susceptibility for dicloxacillin, methicillin, and nafcillin   - Cefazolin: S <=4   - Erythromycin: S <=0.25   - Gentamicin: S <=1 Should not be used as monotherapy   - Clindamycin: S <=0.25   - Rifampin: S <=1 Should not be used as monotherapy   - Tetra/Doxy: S <=1   - Trimethoprim/Sulfamethoxazole: S <=0.5/9.5   - Vancomycin: S 2   Gram Stain:   Moderate polymorphonuclear leukocytes per low power field   Few Squamous epithelial cells per low power field   Moderate Gram positive cocci in pairs seen per oil power field   Few Gram Variable Rods seen per oil power field   - Ampicillin/Sulbactam: S <=8/4   Specimen Source: Trach Asp Tracheal Aspirate   Culture Results:   Numerous Mixed gram negative rods   Moderate Staphylococcus aureus   Normal Respiratory Kelly present   Organism Identification: Staphylococcus aureus   Organism: Staphylococcus aureus   Culture - Blood in AM (22 @ 06:00)   Specimen Source: .Blood Blood   Culture Results: No growth to date.     Culture - Sputum . (22 @ 14:00)   Gram Stain:   Moderate polymorphonuclear leukocytes per low power field   Few Squamous epithelial cells per low power field   Moderate Gram positive cocci in pairs seen per oil power field   Few Gram Variable Rods seen per oil power field   Specimen Source: Trach Asp Tracheal Aspirate   Culture Results:   Numerous Mixed gram negative rods   Moderate Staphylococcus aureus Susceptibility to follow.   Normal Respiratory Kelly absent     Culture - Sputum . (22 @ 17:20)   Gram Stain:   Few polymorphonuclear leukocytes per low power field   Rare Squamous epithelial cells per low power field   Moderate Gram Negative Rods seen per oil power field   - Amikacin: S <=16   - Amikacin: S <=16   - Amoxicillin/Clavulanic Acid: R >16/8   - Amoxicillin/Clavulanic Acid: S <=8/4   - Ampicillin: R 16 These ampicillin results predict results for amoxicillin   - Ampicillin: R >16 These ampicillin results predict results for amoxicillin   - Ampicillin/Sulbactam: R >16/8 Enterobacter, Klebsiella aerogenes, Citrobacter, and Serratia may develop resistance during prolonged therapy (3-4 days)   - Ampicillin/Sulbactam: S <=4/2 Enterobacter, Klebsiella aerogenes, Citrobacter, and Serratia may develop resistance during prolonged therapy (3-4 days)   - Aztreonam: S <=4   - Aztreonam: S <=4   - Cefazolin: R >16 Enterobacter, Klebsiella aerogenes, Citrobacter, and Serratia may develop resistance during prolonged therapy (3-4 days)   - Cefazolin: S <=2 Enterobacter, Klebsiella aerogenes, Citrobacter, and Serratia may develop resistance during prolonged therapy (3-4 days)   - Cefepime: S <=2   - Cefepime: S <=2   - Cefoxitin: R >16   - Cefoxitin: S <=8   - Ceftriaxone: S <=1 Enterobacter, Klebsiella aerogenes, Citrobacter, and Serratia may develop resistance during prolonged therapy   - Ceftriaxone: S <=1 Enterobacter, Klebsiella aerogenes, Citrobacter, and Serratia may develop resistance during prolonged therapy   - Ciprofloxacin: S <=0.25   - Ciprofloxacin: S <=0.25   - Ertapenem: S <=0.5   - Ertapenem: S <=0.5   - Gentamicin: S <=2   - Gentamicin: S <=2   - Imipenem: S <=1   - Imipenem: S <=1   - Levofloxacin: S <=0.5   - Levofloxacin: S <=0.5   - Meropenem: S <=1   - Meropenem: S <=1   - Piperacillin/Tazobactam: S <=8   - Piperacillin/Tazobactam: S <=8   - Tobramycin: S <=2   - Tobramycin: S <=2   - Trimethoprim/Sulfamethoxazole: S <=0.5/9.5   - Trimethoprim/Sulfamethoxazole: S <=0.5/9.5   Specimen Source: Trach Asp Tracheal Aspirate   Culture Results:   Moderate Klebsiella oxytoca/Raoutella ornithinolytica   Moderate Enterobacter cloacae complex   Normal Respiratory Kelly absent   Organism Identification: Klebsiella oxytoca /Raoutella ornithinolytica   Enterobacter cloacae complex   Organism: Klebsiella oxytoca /Raoutella ornithinolytica   Organism: Enterobacter cloacae complex COVID-19 PCR (06.15.22 @ 07:10)   COVID-19 PCR: NotDetec: Culture - Acid Fast - Sputum w/Smear (22 @ 07:00)   Specimen Source: .Sputum Sputum   Acid Fast Bacilli Smear:   No acid fast bacilli seen by fluorochrome stain   Culture Results:   No acid fast bacilli isolated after 6 weeks.

## 2022-07-02 NOTE — PROGRESS NOTE ADULT - ASSESSMENT
Acute respiratory failure / intubated / PNA  IV heroin use  RENETTA in setting of diuresis and IV contrast  Hematuria  Subnephrotic range proteinuria  Persistent Fevers  ?Hep C  Anemia / Pancytopenia  Pulmonary hypertension  Pericardial effusion  Hepatomegaly / splenomegaly    Plan:    Rheum input noted  While there is no uniform histologic presentation of kidney disease associated with heroin use, the most commonly described are FSGS and MPGN  Treatment is usually supportive  I doubt a kidney biopsy would be of great utility, however, we can consider it once the patient is more stable  Would aim to keep patient in negative fluid balance  Avoid NSAIDs  Antibiotic as per ID    Followup cultures  Will follow with you

## 2022-07-02 NOTE — PROGRESS NOTE ADULT - SUBJECTIVE AND OBJECTIVE BOX
[facility] FOLLOW UP NOTE  --------------------------------------------------------------------------------  24 hour events/subjective: Patient examined. Intubated.    PAST HISTORY  --------------------------------------------------------------------------------  No significant changes to PMH, PSH, FHx, SHx, unless otherwise noted    ALLERGIES & MEDICATIONS  --------------------------------------------------------------------------------  Allergies    buprenorphine (Rash)  Suboxone (Rash)    Standing Inpatient Medications  cefepime   IVPB 1000 milliGRAM(s) IV Intermittent every 8 hours  chlorhexidine 0.12% Liquid 15 milliLiter(s) Oral Mucosa every 12 hours  chlorhexidine 4% Liquid 1 Application(s) Topical daily  clonazePAM  Tablet 2 milliGRAM(s) Oral three times a day  enoxaparin Injectable 40 milliGRAM(s) SubCutaneous every 24 hours  fentaNYL   Infusion. 0.5 MICROgram(s)/kG/Hr IV Continuous <Continuous>  folic acid 1 milliGRAM(s) Oral daily  furosemide   Injectable 40 milliGRAM(s) IV Push daily  ketamine Infusion. 0.2 mG/kG/Hr IV Continuous <Continuous>  lactulose Syrup 20 Gram(s) Oral every 6 hours  midazolam Infusion 0.02 mG/kG/Hr IV Continuous <Continuous>  nicotine -  14 mG/24Hr(s) Patch 1 patch Transdermal daily  norepinephrine Infusion 0.05 MICROgram(s)/kG/Min IV Continuous <Continuous>  pantoprazole   Suspension 40 milliGRAM(s) Enteral Tube daily  polyethylene glycol 3350 17 Gram(s) Oral two times a day  propofol Infusion 1 MICROgram(s)/kG/Min IV Continuous <Continuous>  QUEtiapine 100 milliGRAM(s) Oral two times a day  scopolamine 1 mG/72 Hr(s) Patch 1 Patch Transdermal every 72 hours  senna 2 Tablet(s) Oral at bedtime  spironolactone 50 milliGRAM(s) Oral daily  thiamine 100 milliGRAM(s) Oral daily    PRN Inpatient Medications  ALBUTerol    90 MICROgram(s) HFA Inhaler 1 Puff(s) Inhalation every 4 hours PRN  cloNIDine 0.1 milliGRAM(s) Oral every 6 hours PRN  hydrOXYzine hydrochloride 50 milliGRAM(s) Oral every 6 hours PRN  ibuprofen  Tablet. 400 milliGRAM(s) Oral every 6 hours PRN  ibuprofen  Tablet. 600 milliGRAM(s) Oral every 6 hours PRN  sodium chloride 0.9% lock flush 10 milliLiter(s) IV Push every 1 hour PRN    VITALS/PHYSICAL EXAM  --------------------------------------------------------------------------------  T(C): 38.6 (07-02-22 @ 11:00), Max: 38.6 (07-02-22 @ 11:00)  HR: 117 (07-02-22 @ 12:35) (100 - 130)  BP: 166/107 (07-02-22 @ 11:06) (105/64 - 167/107)  RR: 31 (07-02-22 @ 13:00) (25 - 47)  SpO2: 100% (07-02-22 @ 12:35) (91% - 100%)    07-01-22 @ 07:01  -  07-02-22 @ 07:00  --------------------------------------------------------  IN: 3091.1 mL / OUT: 2620 mL / NET: 471.1 mL    07-02-22 @ 07:01  -  07-02-22 @ 14:19  --------------------------------------------------------  IN: 838.7 mL / OUT: 490 mL / NET: 348.7 mL    Physical Exam:  	Gen: intubated  	Pulm: CTA B/L  	CV: RRR, S1S2  	Abd: +BS, soft, nondistended  	: Jaron  	LE: Warm,  edema    LABS/STUDIES  --------------------------------------------------------------------------------              9.4    3.61  >-----------<  192      [07-02-22 @ 06:56]              32.4     144  |  108  |  30  ----------------------------<  139      [07-02-22 @ 06:56]  3.9   |  25  |  1.2        Ca     9.9     [07-02-22 @ 06:56]      Mg     2.6     [07-02-22 @ 06:56]      Phos  3.4     [07-02-22 @ 06:56]    TPro  6.3  /  Alb  2.8  /  TBili  0.6  /  DBili  x   /  AST  21  /  ALT  10  /  AlkPhos  834  [07-02-22 @ 06:56]    Creatinine Trend:  SCr 1.2 [07-02 @ 06:56]  SCr 1.4 [07-01 @ 05:31]  SCr 1.4 [06-30 @ 05:46]  SCr 1.1 [06-29 @ 05:27]  SCr 0.8 [06-28 @ 06:00]    Urinalysis - [06-29-22 @ 19:06]      Color Yellow / Appearance Slightly Cloudy / SG 1.025 / pH 6.0      Gluc Negative / Ketone Trace  / Bili Negative / Urobili 1.0       Blood Large / Protein >=300 / Leuk Est Negative / Nitrite Negative      RBC 26-50 / WBC 3-5 / Hyaline  / Gran 6-10 / Sq Epi  / Non Sq Epi Occasional / Bacteria Moderate    Iron 33, TIBC 280, %sat 12      [04-21-22 @ 07:39]  Ferritin 94      [06-27-22 @ 06:47]  HbA1c 5.6      [05-22-19 @ 11:36]  TSH 4.11      [06-16-22 @ 18:11]  Lipid: chol --, , HDL --, LDL --      [06-28-22 @ 06:00]    HIV Nonreact      [06-29-22 @ 10:29]    KRISHNA: titer Negative, pattern --      [06-23-22 @ 16:00]  dsDNA <12      [06-30-22 @ 05:46]  Rheumatoid Factor 13      [06-30-22 @ 09:07]

## 2022-07-02 NOTE — PROGRESS NOTE ADULT - SUBJECTIVE AND OBJECTIVE BOX
CC.  steven  HPI.  Patient is intubated/vented  appears to be comfortable   unable to obtain ROS/HX       Vital Signs Last 24 Hrs  T(C): 38.1 (02 Jul 2022 08:00), Max: 38.4 (01 Jul 2022 23:00)  T(F): 100.6 (02 Jul 2022 08:00), Max: 101.1 (01 Jul 2022 23:00)  HR: 119 (02 Jul 2022 07:12) (100 - 130)  BP: 145/90 (02 Jul 2022 06:06) (101/56 - 167/107)  BP(mean): 112 (02 Jul 2022 06:06) (73 - 130)  RR: 25 (02 Jul 2022 07:06) (25 - 47)  SpO2: 100% (02 Jul 2022 07:12) (91% - 100%)      PHYSICAL EXAM-  GENERAL: NAD   HEAD:  Atraumatic, Normocephalic  EYES: EOMI, PERRLA, conjunctiva and sclera clear  NECK: Supple, No JVD, Normal thyroid  NERVOUS SYSTEM:  Sedated  CHEST/LUNG:  Rhonchi with good air entry  HEART: Regular rate and rhythm; No murmurs, rubs, or gallops  ABDOMEN: Soft, Nontender, Nondistended; Bowel sounds present  EXTREMITIES:  No clubbing, cyanosis, or edema  SKIN: No rashes or lesions       MEDICATIONS  (STANDING):  cefepime   IVPB 1000 milliGRAM(s) IV Intermittent every 8 hours  chlorhexidine 0.12% Liquid 15 milliLiter(s) Oral Mucosa every 12 hours  chlorhexidine 4% Liquid 1 Application(s) Topical daily  clonazePAM  Tablet 2 milliGRAM(s) Oral three times a day  enoxaparin Injectable 40 milliGRAM(s) SubCutaneous every 24 hours  fentaNYL   Infusion. 0.5 MICROgram(s)/kG/Hr (4.28 mL/Hr) IV Continuous <Continuous>  folic acid 1 milliGRAM(s) Oral daily  furosemide   Injectable 40 milliGRAM(s) IV Push daily  ketamine Infusion. 0.2 mG/kG/Hr (1.71 mL/Hr) IV Continuous <Continuous>  lactated ringers Bolus 250 milliLiter(s) IV Bolus once  lactulose Syrup 20 Gram(s) Oral two times a day  midazolam Infusion 0.02 mG/kG/Hr (1.71 mL/Hr) IV Continuous <Continuous>  nicotine -  14 mG/24Hr(s) Patch 1 patch Transdermal daily  norepinephrine Infusion 0.05 MICROgram(s)/kG/Min (8.03 mL/Hr) IV Continuous <Continuous>  pantoprazole   Suspension 40 milliGRAM(s) Enteral Tube daily  polyethylene glycol 3350 17 Gram(s) Oral two times a day  propofol Infusion 1 MICROgram(s)/kG/Min (0.51 mL/Hr) IV Continuous <Continuous>  QUEtiapine 100 milliGRAM(s) Oral two times a day  scopolamine 1 mG/72 Hr(s) Patch 1 Patch Transdermal every 72 hours  spironolactone 50 milliGRAM(s) Oral daily  thiamine 100 milliGRAM(s) Oral daily    MEDICATIONS  (PRN):  ALBUTerol    90 MICROgram(s) HFA Inhaler 1 Puff(s) Inhalation every 4 hours PRN Shortness of Breath and/or Wheezing  cloNIDine 0.1 milliGRAM(s) Oral every 6 hours PRN opiate withdrawal  hydrOXYzine hydrochloride 50 milliGRAM(s) Oral every 6 hours PRN Anxiety  ibuprofen  Tablet. 400 milliGRAM(s) Oral every 6 hours PRN Mild Pain (1 - 3)  ibuprofen  Tablet. 600 milliGRAM(s) Oral every 6 hours PRN Temp greater or equal to 38C (100.4F)  sodium chloride 0.9% lock flush 10 milliLiter(s) IV Push every 1 hour PRN Pre/post blood products, medications, blood draw, and to maintain line patency                                  9.4    3.61  )-----------( 192      ( 02 Jul 2022 06:56 )             32.4     07-02    144  |  108  |  30<H>  ----------------------------<  139<H>  3.9   |  25  |  1.2    Ca    9.9      02 Jul 2022 06:56  Phos  3.4     07-02  Mg     2.6     07-02    TPro  6.3  /  Alb  2.8<L>  /  TBili  0.6  /  DBili  x   /  AST  21  /  ALT  10  /  AlkPhos  834<H>  07-02                                            Imaging Personally Reviewed:     [x ] YES  [ ] NO    Consultant(s) Notes Reviewed:  [x ] YES  [ ] NO    Care Discussed with Consultants/Other Providers [x ] YES  [ ] NO

## 2022-07-03 LAB
ALBUMIN SERPL ELPH-MCNC: 3 G/DL — LOW (ref 3.5–5.2)
ALP SERPL-CCNC: 721 U/L — HIGH (ref 30–115)
ALT FLD-CCNC: 9 U/L — SIGNIFICANT CHANGE UP (ref 0–41)
ANA TITR SER: NEGATIVE — SIGNIFICANT CHANGE UP
ANION GAP SERPL CALC-SCNC: 11 MMOL/L — SIGNIFICANT CHANGE UP (ref 7–14)
AST SERPL-CCNC: 18 U/L — SIGNIFICANT CHANGE UP (ref 0–41)
BASOPHILS # BLD AUTO: 0.01 K/UL — SIGNIFICANT CHANGE UP (ref 0–0.2)
BASOPHILS NFR BLD AUTO: 0.2 % — SIGNIFICANT CHANGE UP (ref 0–1)
BILIRUB SERPL-MCNC: 0.4 MG/DL — SIGNIFICANT CHANGE UP (ref 0.2–1.2)
BUN SERPL-MCNC: 27 MG/DL — HIGH (ref 10–20)
CALCIUM SERPL-MCNC: 9.5 MG/DL — SIGNIFICANT CHANGE UP (ref 8.5–10.1)
CHLORIDE SERPL-SCNC: 112 MMOL/L — HIGH (ref 98–110)
CO2 SERPL-SCNC: 25 MMOL/L — SIGNIFICANT CHANGE UP (ref 17–32)
CREAT SERPL-MCNC: 0.8 MG/DL — SIGNIFICANT CHANGE UP (ref 0.7–1.5)
CULTURE RESULTS: SIGNIFICANT CHANGE UP
D DIMER BLD IA.RAPID-MCNC: 898 NG/ML DDU — HIGH (ref 0–230)
EGFR: 103 ML/MIN/1.73M2 — SIGNIFICANT CHANGE UP
EOSINOPHIL # BLD AUTO: 0 K/UL — SIGNIFICANT CHANGE UP (ref 0–0.7)
EOSINOPHIL NFR BLD AUTO: 0 % — SIGNIFICANT CHANGE UP (ref 0–8)
GGT SERPL-CCNC: 506 U/L — HIGH (ref 1–40)
GLUCOSE SERPL-MCNC: 125 MG/DL — HIGH (ref 70–99)
HCT VFR BLD CALC: 29.8 % — LOW (ref 37–47)
HGB BLD-MCNC: 8.8 G/DL — LOW (ref 12–16)
IMM GRANULOCYTES NFR BLD AUTO: 0.6 % — HIGH (ref 0.1–0.3)
LYMPHOCYTES # BLD AUTO: 0.45 K/UL — LOW (ref 1.2–3.4)
LYMPHOCYTES # BLD AUTO: 9.1 % — LOW (ref 20.5–51.1)
MAGNESIUM SERPL-MCNC: 2.4 MG/DL — SIGNIFICANT CHANGE UP (ref 1.8–2.4)
MCHC RBC-ENTMCNC: 26.5 PG — LOW (ref 27–31)
MCHC RBC-ENTMCNC: 29.5 G/DL — LOW (ref 32–37)
MCV RBC AUTO: 89.8 FL — SIGNIFICANT CHANGE UP (ref 81–99)
MONOCYTES # BLD AUTO: 0.5 K/UL — SIGNIFICANT CHANGE UP (ref 0.1–0.6)
MONOCYTES NFR BLD AUTO: 10.1 % — HIGH (ref 1.7–9.3)
MPO AB + PR3 PNL SER: SIGNIFICANT CHANGE UP
NEUTROPHILS # BLD AUTO: 3.94 K/UL — SIGNIFICANT CHANGE UP (ref 1.4–6.5)
NEUTROPHILS NFR BLD AUTO: 80 % — HIGH (ref 42.2–75.2)
NRBC # BLD: 0 /100 WBCS — SIGNIFICANT CHANGE UP (ref 0–0)
PHOSPHATE SERPL-MCNC: 3.2 MG/DL — SIGNIFICANT CHANGE UP (ref 2.1–4.9)
PLATELET # BLD AUTO: 179 K/UL — SIGNIFICANT CHANGE UP (ref 130–400)
POTASSIUM SERPL-MCNC: 3.6 MMOL/L — SIGNIFICANT CHANGE UP (ref 3.5–5)
POTASSIUM SERPL-SCNC: 3.6 MMOL/L — SIGNIFICANT CHANGE UP (ref 3.5–5)
PROT SERPL-MCNC: 6 G/DL — SIGNIFICANT CHANGE UP (ref 6–8)
RBC # BLD: 3.32 M/UL — LOW (ref 4.2–5.4)
RBC # FLD: 16.2 % — HIGH (ref 11.5–14.5)
RNAP III AB SER-ACNC: 6 UNITS — SIGNIFICANT CHANGE UP (ref 0–19)
SODIUM SERPL-SCNC: 148 MMOL/L — HIGH (ref 135–146)
SPECIMEN SOURCE: SIGNIFICANT CHANGE UP
WBC # BLD: 4.93 K/UL — SIGNIFICANT CHANGE UP (ref 4.8–10.8)
WBC # FLD AUTO: 4.93 K/UL — SIGNIFICANT CHANGE UP (ref 4.8–10.8)

## 2022-07-03 PROCEDURE — 71045 X-RAY EXAM CHEST 1 VIEW: CPT | Mod: 26

## 2022-07-03 PROCEDURE — 99232 SBSQ HOSP IP/OBS MODERATE 35: CPT

## 2022-07-03 PROCEDURE — 74018 RADEX ABDOMEN 1 VIEW: CPT | Mod: 26

## 2022-07-03 PROCEDURE — 99233 SBSQ HOSP IP/OBS HIGH 50: CPT

## 2022-07-03 PROCEDURE — 93010 ELECTROCARDIOGRAM REPORT: CPT

## 2022-07-03 RX ORDER — MIDAZOLAM HYDROCHLORIDE 1 MG/ML
0.02 INJECTION, SOLUTION INTRAMUSCULAR; INTRAVENOUS
Qty: 100 | Refills: 0 | Status: DISCONTINUED | OUTPATIENT
Start: 2022-07-03 | End: 2022-07-08

## 2022-07-03 RX ORDER — METHADONE HYDROCHLORIDE 40 MG/1
30 TABLET ORAL DAILY
Refills: 0 | Status: DISCONTINUED | OUTPATIENT
Start: 2022-07-03 | End: 2022-07-05

## 2022-07-03 RX ADMIN — QUETIAPINE FUMARATE 100 MILLIGRAM(S): 200 TABLET, FILM COATED ORAL at 18:01

## 2022-07-03 RX ADMIN — Medication 600 MILLIGRAM(S): at 00:49

## 2022-07-03 RX ADMIN — Medication 100 MILLIGRAM(S): at 12:34

## 2022-07-03 RX ADMIN — Medication 1 PATCH: at 12:35

## 2022-07-03 RX ADMIN — Medication 40 MILLIGRAM(S): at 06:22

## 2022-07-03 RX ADMIN — Medication 600 MILLIGRAM(S): at 13:43

## 2022-07-03 RX ADMIN — SCOPALAMINE 1 PATCH: 1 PATCH, EXTENDED RELEASE TRANSDERMAL at 21:26

## 2022-07-03 RX ADMIN — PROPOFOL 0.51 MICROGRAM(S)/KG/MIN: 10 INJECTION, EMULSION INTRAVENOUS at 20:09

## 2022-07-03 RX ADMIN — PROPOFOL 0.51 MICROGRAM(S)/KG/MIN: 10 INJECTION, EMULSION INTRAVENOUS at 00:50

## 2022-07-03 RX ADMIN — POLYETHYLENE GLYCOL 3350 17 GRAM(S): 17 POWDER, FOR SOLUTION ORAL at 06:24

## 2022-07-03 RX ADMIN — Medication 600 MILLIGRAM(S): at 01:41

## 2022-07-03 RX ADMIN — SCOPALAMINE 1 PATCH: 1 PATCH, EXTENDED RELEASE TRANSDERMAL at 12:40

## 2022-07-03 RX ADMIN — FENTANYL CITRATE 4.28 MICROGRAM(S)/KG/HR: 50 INJECTION INTRAVENOUS at 06:35

## 2022-07-03 RX ADMIN — PROPOFOL 0.51 MICROGRAM(S)/KG/MIN: 10 INJECTION, EMULSION INTRAVENOUS at 18:03

## 2022-07-03 RX ADMIN — MIDAZOLAM HYDROCHLORIDE 1.71 MG/KG/HR: 1 INJECTION, SOLUTION INTRAMUSCULAR; INTRAVENOUS at 21:58

## 2022-07-03 RX ADMIN — PANTOPRAZOLE SODIUM 40 MILLIGRAM(S): 20 TABLET, DELAYED RELEASE ORAL at 12:35

## 2022-07-03 RX ADMIN — METHADONE HYDROCHLORIDE 30 MILLIGRAM(S): 40 TABLET ORAL at 12:41

## 2022-07-03 RX ADMIN — PROPOFOL 0.51 MICROGRAM(S)/KG/MIN: 10 INJECTION, EMULSION INTRAVENOUS at 23:30

## 2022-07-03 RX ADMIN — ENOXAPARIN SODIUM 40 MILLIGRAM(S): 100 INJECTION SUBCUTANEOUS at 12:35

## 2022-07-03 RX ADMIN — QUETIAPINE FUMARATE 100 MILLIGRAM(S): 200 TABLET, FILM COATED ORAL at 06:23

## 2022-07-03 RX ADMIN — SENNA PLUS 2 TABLET(S): 8.6 TABLET ORAL at 21:36

## 2022-07-03 RX ADMIN — CEFEPIME 100 MILLIGRAM(S): 1 INJECTION, POWDER, FOR SOLUTION INTRAMUSCULAR; INTRAVENOUS at 13:44

## 2022-07-03 RX ADMIN — CHLORHEXIDINE GLUCONATE 1 APPLICATION(S): 213 SOLUTION TOPICAL at 06:23

## 2022-07-03 RX ADMIN — LACTULOSE 20 GRAM(S): 10 SOLUTION ORAL at 00:45

## 2022-07-03 RX ADMIN — MIDAZOLAM HYDROCHLORIDE 1.71 MG/KG/HR: 1 INJECTION, SOLUTION INTRAMUSCULAR; INTRAVENOUS at 18:04

## 2022-07-03 RX ADMIN — Medication 600 MILLIGRAM(S): at 14:15

## 2022-07-03 RX ADMIN — LACTULOSE 20 GRAM(S): 10 SOLUTION ORAL at 06:17

## 2022-07-03 RX ADMIN — Medication 1 PATCH: at 21:26

## 2022-07-03 RX ADMIN — LACTULOSE 20 GRAM(S): 10 SOLUTION ORAL at 18:01

## 2022-07-03 RX ADMIN — Medication 2 MILLIGRAM(S): at 13:44

## 2022-07-03 RX ADMIN — LACTULOSE 20 GRAM(S): 10 SOLUTION ORAL at 12:35

## 2022-07-03 RX ADMIN — SPIRONOLACTONE 50 MILLIGRAM(S): 25 TABLET, FILM COATED ORAL at 06:23

## 2022-07-03 RX ADMIN — CHLORHEXIDINE GLUCONATE 15 MILLILITER(S): 213 SOLUTION TOPICAL at 18:01

## 2022-07-03 RX ADMIN — CEFEPIME 100 MILLIGRAM(S): 1 INJECTION, POWDER, FOR SOLUTION INTRAMUSCULAR; INTRAVENOUS at 06:16

## 2022-07-03 RX ADMIN — Medication 1 MILLIGRAM(S): at 12:35

## 2022-07-03 RX ADMIN — Medication 600 MILLIGRAM(S): at 21:36

## 2022-07-03 RX ADMIN — Medication 2 MILLIGRAM(S): at 21:36

## 2022-07-03 RX ADMIN — Medication 2 MILLIGRAM(S): at 06:22

## 2022-07-03 RX ADMIN — Medication 600 MILLIGRAM(S): at 21:38

## 2022-07-03 RX ADMIN — CHLORHEXIDINE GLUCONATE 15 MILLILITER(S): 213 SOLUTION TOPICAL at 06:18

## 2022-07-03 RX ADMIN — MIDAZOLAM HYDROCHLORIDE 1.71 MG/KG/HR: 1 INJECTION, SOLUTION INTRAMUSCULAR; INTRAVENOUS at 01:07

## 2022-07-03 RX ADMIN — Medication 1 PATCH: at 12:33

## 2022-07-03 RX ADMIN — FENTANYL CITRATE 4.28 MICROGRAM(S)/KG/HR: 50 INJECTION INTRAVENOUS at 18:04

## 2022-07-03 RX ADMIN — CEFEPIME 100 MILLIGRAM(S): 1 INJECTION, POWDER, FOR SOLUTION INTRAMUSCULAR; INTRAVENOUS at 21:37

## 2022-07-03 RX ADMIN — POLYETHYLENE GLYCOL 3350 17 GRAM(S): 17 POWDER, FOR SOLUTION ORAL at 18:01

## 2022-07-03 NOTE — PROGRESS NOTE ADULT - SUBJECTIVE AND OBJECTIVE BOX
Patient is a 26y old  Female who presents with a chief complaint of anasarca (02 Jul 2022 19:28)        HPI:  Patient is a 26 year old female with hx of asthma, untreated Hep C, IVDA, anasarca presenting with increased dyspnea since yesterday. Patient has been short of breath chronically and has been admitted for fluid overload. Patient describes worsening symptoms yesterday associated with cough. States generalized edema has remained the same. Patient is actively using heroin. Not on any meds or MMTP.  Denies fever, chills, sore throat, cp, palpitations, abd pain, n/v/d, dysuria, headache, syncope, hemoptysis. + generalized edema (15 Reinaldo 2022 10:03)      Pt evaluated on rounds.  I reviewed the radiology tests and hospital record.    I reviewed previous notes on this patient.    Interval Events: No overnight events.      CAM:    SAT:    SBT:      REVIEW OF SYSTEMS:   see HPI      OBJECTIVE:  ICU Vital Signs Last 24 Hrs  T(C): 37.7 (03 Jul 2022 04:10), Max: 38.9 (02 Jul 2022 20:09)  T(F): 99.9 (03 Jul 2022 04:10), Max: 102 (02 Jul 2022 20:09)  HR: 108 (03 Jul 2022 04:10) (107 - 125)  BP: 165/106 (03 Jul 2022 04:10) (145/90 - 166/107)  BP(mean): 129 (03 Jul 2022 04:10) (112 - 130)  ABP: --  ABP(mean): --  RR: 30 (03 Jul 2022 04:10) (25 - 64)  SpO2: 100% (03 Jul 2022 04:10) (98% - 100%)    Mode: AC/ CMV (Assist Control/ Continuous Mandatory Ventilation), RR (machine): 28, TV (machine): 400, FiO2: 40, PEEP: 8, MAP: 15, PIP: 33    07-01 @ 07:01  -  07-02 @ 07:00  --------------------------------------------------------  IN: 3091.1 mL / OUT: 2620 mL / NET: 471.1 mL    07-02 @ 07:01 - 07-03 @ 05:23  --------------------------------------------------------  IN: 2131.4 mL / OUT: 1955 mL / NET: 176.4 mL      CAPILLARY BLOOD GLUCOSE            PHYSICAL EXAM:       · ENMT:   Airway patent,   Nasal mucosa clear.  Mouth with normal mucosa.   No thrush    · EYES:   Clear bilaterally,   pupils equal,   round and reactive to light.    · CARDIAC:   Normal rate,   regular rhythm.    Heart sounds S1, S2.   No murmurs, no rubs or gallops on auscultation  no edema        CAROTID:   normal systolic impulse  no bruits    · RESPIRATORY:   rales  normal chest expansion  no retractions or use of accessory muscles  palpation of chest is normal with no fremitus  percussion of chest demonstrates no hyperresonance or dullness    · GASTROINTESTINAL:  Abdomen soft,   non-tender,   + BS  liver/spleen not palpable    · MUSCULOSKELETAL:   no clubbing, cyanosis      · SKIN:   Skin normal color for race,   warm, dry   No evidence of rash.        · HEME LYMPH:   no splenomegaly.  No cervical  lymphadenopathy.  no inguinal lymphadenopathy    HOSPITAL MEDICATIONS:  MEDICATIONS  (STANDING):  cefepime   IVPB 1000 milliGRAM(s) IV Intermittent every 8 hours  chlorhexidine 0.12% Liquid 15 milliLiter(s) Oral Mucosa every 12 hours  chlorhexidine 4% Liquid 1 Application(s) Topical daily  clonazePAM  Tablet 2 milliGRAM(s) Oral three times a day  enoxaparin Injectable 40 milliGRAM(s) SubCutaneous every 24 hours  fentaNYL   Infusion. 0.5 MICROgram(s)/kG/Hr (4.28 mL/Hr) IV Continuous <Continuous>  folic acid 1 milliGRAM(s) Oral daily  furosemide   Injectable 40 milliGRAM(s) IV Push daily  ketamine Infusion. 0.2 mG/kG/Hr (1.71 mL/Hr) IV Continuous <Continuous>  lactulose Syrup 20 Gram(s) Oral every 6 hours  midazolam Infusion 0.02 mG/kG/Hr (1.71 mL/Hr) IV Continuous <Continuous>  nicotine -  14 mG/24Hr(s) Patch 1 patch Transdermal daily  norepinephrine Infusion 0.05 MICROgram(s)/kG/Min (8.03 mL/Hr) IV Continuous <Continuous>  pantoprazole   Suspension 40 milliGRAM(s) Enteral Tube daily  polyethylene glycol 3350 17 Gram(s) Oral two times a day  propofol Infusion 1 MICROgram(s)/kG/Min (0.51 mL/Hr) IV Continuous <Continuous>  QUEtiapine 100 milliGRAM(s) Oral two times a day  scopolamine 1 mG/72 Hr(s) Patch 1 Patch Transdermal every 72 hours  senna 2 Tablet(s) Oral at bedtime  spironolactone 50 milliGRAM(s) Oral daily  thiamine 100 milliGRAM(s) Oral daily    MEDICATIONS  (PRN):  ALBUTerol    90 MICROgram(s) HFA Inhaler 1 Puff(s) Inhalation every 4 hours PRN Shortness of Breath and/or Wheezing  cloNIDine 0.1 milliGRAM(s) Oral every 6 hours PRN opiate withdrawal  hydrOXYzine hydrochloride 50 milliGRAM(s) Oral every 6 hours PRN Anxiety  ibuprofen  Tablet. 400 milliGRAM(s) Oral every 6 hours PRN Mild Pain (1 - 3)  ibuprofen  Tablet. 600 milliGRAM(s) Oral every 6 hours PRN Temp greater or equal to 38C (100.4F)  sodium chloride 0.9% lock flush 10 milliLiter(s) IV Push every 1 hour PRN Pre/post blood products, medications, blood draw, and to maintain line patency    lactated ringers Bolus:   250 milliLiter(s), IV Bolus, once, infuse over 60 Minute(s), Stop After 1 Doses  Special Instructions: please do CHEETAH  sodium chloride 0.9% Bolus:   1000 milliLiter(s), IV Bolus, once, infuse over 60 Minute(s), Stop After 1 Doses  Provider's Contact #: [phone]  lactated ringers Bolus:   500 milliLiter(s), IV Bolus, once, infuse over 1 Hr, Stop After 1 Doses  Provider's Contact #: [phone]  lactated ringers.: Solution, 500 milliLiter(s) infuse at 250 mL/Hr  sodium chloride 0.9%.: Solution, 1000 milliLiter(s) infuse at 100 mL/Hr  sodium chloride 0.9% Bolus:   500 milliLiter(s), IV Bolus, once, infuse over 60 Minute(s), Stop After 1 Doses  Special Instructions: for cheetah  sodium chloride 0.9% Bolus:   500 milliLiter(s), IV Bolus, once, infuse over 60 Minute(s), Stop After 1 Doses      LABS:                        9.2    3.55  )-----------( 202      ( 02 Jul 2022 16:17 )             31.0     07-02    144  |  108  |  30<H>  ----------------------------<  139<H>  3.9   |  25  |  1.2    Ca    9.9      02 Jul 2022 06:56  Phos  3.4     07-02  Mg     2.6     07-02    TPro  6.3  /  Alb  2.8<L>  /  TBili  0.6  /  DBili  x   /  AST  21  /  ALT  10  /  AlkPhos  834<H>  07-02        Arterial Blood Gas:  07-03 @ 04:28  7.36/47/180/27/100.0/0.8  ABG lactate: --  Arterial Blood Gas:  07-02 @ 03:05  7.37/47/80/27/97.7/1.4  ABG lactate: --      Mode: AC/ CMV (Assist Control/ Continuous Mandatory Ventilation), RR (machine): 28, TV (machine): 400, FiO2: 40, PEEP: 8, MAP: 15, PIP: 33      SARS-CoV-2: NotDetec (30 Jun 2022 09:19)  COVID-19 PCR: NotDete (15 Reinaldo 2022 07:10)    Mode: AC/ CMV (Assist Control/ Continuous Mandatory Ventilation)  RR (machine): 28  TV (machine): 400  FiO2: 40  PEEP: 8  MAP: 15  PIP: 33      ABG - ( 03 Jul 2022 04:28 )  pH, Arterial: 7.36  pH, Blood: x     /  pCO2: 47    /  pO2: 180   / HCO3: 27    / Base Excess: 0.8   /  SaO2: 100.0               RADIOLOGY: Today I personally interpreted the latest CXR and other pertinent films.

## 2022-07-03 NOTE — PROGRESS NOTE ADULT - ASSESSMENT
Acute respiratory failure / intubated / PNA  IV heroin use  RENETTA in setting of diuresis and IV contrast  Hematuria  Subnephrotic range proteinuria  Persistent Fevers  ?Hep C  Anemia / Pancytopenia  Pulmonary hypertension  Pericardial effusion  Hepatomegaly / splenomegaly    Plan:    Rheum input noted  While there is no uniform histologic presentation of kidney disease associated with heroin use, the most commonly described are FSGS and MPGN  Treatment is usually supportive  I doubt a kidney biopsy would be of great utility, however, we can consider it once the patient is more stable  Keep patient in negative fluid balance  Avoid NSAIDs  Antibiotic as per ID    Followup cultures  Will follow with you

## 2022-07-03 NOTE — PROGRESS NOTE ADULT - ASSESSMENT
Patient is a 26 year old female with hx of asthma, untreated Hep C, IVDA, anasarca presenting with increased dyspnea since yesterday    IMPRESSION  #Acute respiratory failure s/p intubation 6/16  #Pneumonia -   - CT Chest 6/22 - left greater than right lower lobe consolidations   - sputum Cx 6/24 Klebsiella oxytoca, Enterobacter cloacae complex- The Sputum culture from 6/27 grew Numerous Mixed gram negative rods and Moderate Staphylococcus aureus.    #Fevers  - Ferritin, Serum: 94 ng/mL (06.27.22 @ 06:47)  - Procalcitonin, Serum: 0.11 (06.27.22 @ 15:46)  - CT Abd/pelvis 6/26 - moderated ascites moderate pericardial effusion     #Drug overdose vs withdrawal?  #Hepatosplenomegaly - present since CT Abd/pelvis 1/30/2021  #Pancytopenia  #Hx of Untreated Hep C   #IVDA   #Abx allergy: buprenorphine (Rash)  Suboxone (Rash)    would recommend:    1. Monitor Temp. and c/w supportive care   2. Continue Cefepime to 1g q 8 hours for Enterobacter cloacae, Klebsiella and MSSA  3. Management of Vent. as per ICU protocol  4. Aspiration precaution    d/w ICU team      Atending Attestation:  Spent more than 45 minutes on total encounter, more than 50 % of the visit was spent counseling and/or coordinating care by the Attending physician.   Patient is a 26 year old female with hx of asthma, untreated Hep C, IVDA, anasarca presenting with increased dyspnea since yesterday    IMPRESSION  #Acute respiratory failure s/p intubation 6/16  #Pneumonia -   - CT Chest 6/22 - left greater than right lower lobe consolidations   - sputum Cx 6/24 Klebsiella oxytoca, Enterobacter cloacae complex- The Sputum culture from 6/27 grew Numerous Mixed gram negative rods and Moderate Staphylococcus aureus.    #Fevers  - Ferritin, Serum: 94 ng/mL (06.27.22 @ 06:47)  - Procalcitonin, Serum: 0.11 (06.27.22 @ 15:46)  - CT Abd/pelvis 6/26 - moderated ascites moderate pericardial effusion     #Drug overdose vs withdrawal?  #Hepatosplenomegaly - present since CT Abd/pelvis 1/30/2021  #Pancytopenia  #Hx of Untreated Hep C   #IVDA   #Abx allergy: buprenorphine (Rash)  Suboxone (Rash)    would recommend:    1. Please change Cefepime to Meropenem 1 g q 8 hours since still febrile on cefepime  2. Monitor Temp. and c/w supportive care, if still stay febrile , please consider WBC scan   3. Management of Vent. as per ICU protocol  4. Aspiration precaution    d/w ICU team and Dr. Guaman     Attending  Attestation:   Spent more than 35 minutes on total encounter, more than 50 % of the visit was spent counseling and/or coordinating care by the Attending physician.

## 2022-07-03 NOTE — PROGRESS NOTE ADULT - SUBJECTIVE AND OBJECTIVE BOX
Patient is seen and examined at the bed side, is febrile . She remains intubated/vented.     REVIEW OF SYSTEMS: Unable to obtain due to mental status      ALLERGIES: buprenorphine (Rash)  Suboxone (Rash)      ICU Vital Signs Last 24 Hrs  T(C): 39.2 (2022 21:05), Max: 39.2 (2022 21:05)  T(F): 102.6 (2022 21:05), Max: 102.6 (2022 21:05)  HR: 121 (2022 21:12) (105 - 121)  BP: 149/90 (2022 20:05) (128/71 - 168/106)  BP(mean): 113 (2022 20:05) (90 - 130)  ABP: --  ABP(mean): --  RR: 31 (2022 21:05) (26 - 64)  SpO2: 100% (2022 21:12) (98% - 100%)      PHYSICAL EXAM:  GENERAL: Intubated/vented  CHEST/LUNG: Not using accessory muscles   HEART: s1 and s2 present  ABDOMEN:  Nontender and  Nondistended  EXTREMITIES: No pedal  edema  CNS:  Intubated/vented      LABS:                        8.8    4.93  )-----------( 179      ( 2022 06:59 )             29.8                           9.2    3.55  )-----------( 202      ( 2022 16:17 )             31.0                           7.2    1.26  )-----------( 121      ( 2022 20:10 )             24.2         07-03    148<H>  |  112<H>  |  27<H>  ----------------------------<  125<H>  3.6   |  25  |  0.8    Ca    9.5      2022 06:59  Phos  3.2     07-03  Mg     2.4     07-03    TPro  6.0  /  Alb  3.0<L>  /  TBili  0.4  /  DBili  x   /  AST  18  /  ALT  9   /  AlkPhos  721<H>  07    07-02    144  |  108  |  30<H>  ----------------------------<  139<H>  3.9   |  25  |  1.2    Ca    9.9      2022 06:56  Phos  3.4       Mg     2.6         TPro  6.3  /  Alb  2.8<L>  /  TBili  0.6  /  DBili  x   /  AST  21  /  ALT  10  /  AlkPhos  834<H>          ABG - ( 2022 03:14 )  pH, Arterial: 7.30  pH, Blood: x     /  pCO2: 51    /  pO2: 111   / HCO3: 25    / Base Excess: -1.7  /  SaO2: 99.0          Urinalysis Basic - ( 2022 19:06 )  Color: Yellow / Appearance: Slightly Cloudy / S.025 / pH: x  Gluc: x / Ketone: Trace  / Bili: Negative / Urobili: 1.0 mg/dL   Blood: x / Protein: >=300 mg/dL / Nitrite: Negative   Leuk Esterase: Negative / RBC: 26-50 /HPF / WBC 3-5 /HPF   Sq Epi: x / Non Sq Epi: Occasional /HPF / Bacteria: Moderate        MEDICATIONS  (STANDING):    cefepime   IVPB 1000 milliGRAM(s) IV Intermittent every 8 hours  chlorhexidine 0.12% Liquid 15 milliLiter(s) Oral Mucosa every 12 hours  chlorhexidine 4% Liquid 1 Application(s) Topical daily  clonazePAM  Tablet 2 milliGRAM(s) Oral three times a day  enoxaparin Injectable 40 milliGRAM(s) SubCutaneous every 24 hours  fentaNYL   Infusion. 0.5 MICROgram(s)/kG/Hr (4.28 mL/Hr) IV Continuous <Continuous>  folic acid 1 milliGRAM(s) Oral daily  ketamine Infusion. 0.2 mG/kG/Hr (1.71 mL/Hr) IV Continuous <Continuous>  lactulose Syrup 20 Gram(s) Oral every 6 hours  methadone    Tablet 30 milliGRAM(s) Oral daily  midazolam Infusion 0.02 mG/kG/Hr (1.71 mL/Hr) IV Continuous <Continuous>  nicotine -  14 mG/24Hr(s) Patch 1 patch Transdermal daily  norepinephrine Infusion 0.05 MICROgram(s)/kG/Min (8.03 mL/Hr) IV Continuous <Continuous>  pantoprazole   Suspension 40 milliGRAM(s) Enteral Tube daily  polyethylene glycol 3350 17 Gram(s) Oral two times a day  propofol Infusion 1 MICROgram(s)/kG/Min (0.51 mL/Hr) IV Continuous <Continuous>  QUEtiapine 100 milliGRAM(s) Oral two times a day  scopolamine 1 mG/72 Hr(s) Patch 1 Patch Transdermal every 72 hours  senna 2 Tablet(s) Oral at bedtime  spironolactone 50 milliGRAM(s) Oral daily  thiamine 100 milliGRAM(s) Oral daily        RADIOLOGY & ADDITIONAL TESTS:    < from: Xray Kidney Ureter Bladder (22 @ 13:25) >    FINDINGS: Enteric feeding tube is stable, with tip in the left upper   quadrant.  There is a non-obstructive bowel gas pattern.  No abnormal   masses or calcifications are seen.  Stable probe/catheter projects over   the lower pelvis.    < from: CT Angio Chest PE Protocol w/ IV Cont (22 @ 21:14) >    No pulmonary embolus.    Near complete consolidation/collapse of the left lower lobe, increased as   compared with prior. Mildly increased right lower lobe patchy   consolidative opacities-atelectasis. Adjacent bilateral small pleural   effusions. Findings compatible with likely mixed pneumonia and   atelectasis.    Redemonstration of a dilated main pulmonary artery measuring up to 3.8 cm   compatible with pulmonary hypertension.    < from: Xray Chest 1 View- PORTABLE-Routine (Xray Chest 1 View- PORTABLE-Routine in AM.) (22 @ 05:28) >  Stable cardiomegaly and left basilar opacity. Stable support devices.    < end of copied text >  < from: CT Abdomen and Pelvis w/ IV Cont (22 @ 17:27) >  No evidence of retroperitoneal hematoma.    Small bilateral pleural effusions and left basilar consolidation. Moderate ascites redemonstrated.    Moderate pericardial effusion.  Hepatosplenomegaly redemonstrated.        MICROBIOLOGY DATA:    MRSA/MSSA PCR (22 @ 10:34)   MRSA PCR Result.: Negative    Respiratory Viral Panel with COVID-19 by BROOKE (22 @ 09:19)   Rapid RVP Result: NotDete   SARS-CoV-2: NotDetec:    Culture - Blood (22 @ 20:31)   Specimen Source: .Blood Blood-Peripheral   Culture Results:   No growth to date.     Culture - Blood (22 @ 20:31)   Specimen Source: .Blood Blood   Culture Results:   No growth to date.     Culture - Sputum . (22 @ 14:00)   - Oxacillin: S <=0.25 Oxacillin predicts susceptibility for dicloxacillin, methicillin, and nafcillin   - Cefazolin: S <=4   - Erythromycin: S <=0.25   - Gentamicin: S <=1 Should not be used as monotherapy   - Clindamycin: S <=0.25   - Rifampin: S <=1 Should not be used as monotherapy   - Tetra/Doxy: S <=1   - Trimethoprim/Sulfamethoxazole: S <=0.5/9.5   - Vancomycin: S 2   Gram Stain:   Moderate polymorphonuclear leukocytes per low power field   Few Squamous epithelial cells per low power field   Moderate Gram positive cocci in pairs seen per oil power field   Few Gram Variable Rods seen per oil power field   - Ampicillin/Sulbactam: S <=8/4   Specimen Source: Trach Asp Tracheal Aspirate   Culture Results:   Numerous Mixed gram negative rods   Moderate Staphylococcus aureus   Normal Respiratory Kelly present   Organism Identification: Staphylococcus aureus   Organism: Staphylococcus aureus   Culture - Blood in AM (22 @ 06:00)   Specimen Source: .Blood Blood   Culture Results: No growth to date.     Culture - Sputum . (22 @ 14:00)   Gram Stain:   Moderate polymorphonuclear leukocytes per low power field   Few Squamous epithelial cells per low power field   Moderate Gram positive cocci in pairs seen per oil power field   Few Gram Variable Rods seen per oil power field   Specimen Source: Trach Asp Tracheal Aspirate   Culture Results:   Numerous Mixed gram negative rods   Moderate Staphylococcus aureus Susceptibility to follow.   Normal Respiratory Kelly absent     Culture - Sputum . (22 @ 17:20)   Gram Stain:   Few polymorphonuclear leukocytes per low power field   Rare Squamous epithelial cells per low power field   Moderate Gram Negative Rods seen per oil power field   - Amikacin: S <=16   - Amikacin: S <=16   - Amoxicillin/Clavulanic Acid: R >16/8   - Amoxicillin/Clavulanic Acid: S <=8/4   - Ampicillin: R 16 These ampicillin results predict results for amoxicillin   - Ampicillin: R >16 These ampicillin results predict results for amoxicillin   - Ampicillin/Sulbactam: R >16/8 Enterobacter, Klebsiella aerogenes, Citrobacter, and Serratia may develop resistance during prolonged therapy (3-4 days)   - Ampicillin/Sulbactam: S <=4/2 Enterobacter, Klebsiella aerogenes, Citrobacter, and Serratia may develop resistance during prolonged therapy (3-4 days)   - Aztreonam: S <=4   - Aztreonam: S <=4   - Cefazolin: R >16 Enterobacter, Klebsiella aerogenes, Citrobacter, and Serratia may develop resistance during prolonged therapy (3-4 days)   - Cefazolin: S <=2 Enterobacter, Klebsiella aerogenes, Citrobacter, and Serratia may develop resistance during prolonged therapy (3-4 days)   - Cefepime: S <=2   - Cefepime: S <=2   - Cefoxitin: R >16   - Cefoxitin: S <=8   - Ceftriaxone: S <=1 Enterobacter, Klebsiella aerogenes, Citrobacter, and Serratia may develop resistance during prolonged therapy   - Ceftriaxone: S <=1 Enterobacter, Klebsiella aerogenes, Citrobacter, and Serratia may develop resistance during prolonged therapy   - Ciprofloxacin: S <=0.25   - Ciprofloxacin: S <=0.25   - Ertapenem: S <=0.5   - Ertapenem: S <=0.5   - Gentamicin: S <=2   - Gentamicin: S <=2   - Imipenem: S <=1   - Imipenem: S <=1   - Levofloxacin: S <=0.5   - Levofloxacin: S <=0.5   - Meropenem: S <=1   - Meropenem: S <=1   - Piperacillin/Tazobactam: S <=8   - Piperacillin/Tazobactam: S <=8   - Tobramycin: S <=2   - Tobramycin: S <=2   - Trimethoprim/Sulfamethoxazole: S <=0.5/9.5   - Trimethoprim/Sulfamethoxazole: S <=0.5/9.5   Specimen Source: Trach Asp Tracheal Aspirate   Culture Results:   Moderate Klebsiella oxytoca/Raoutella ornithinolytica   Moderate Enterobacter cloacae complex   Normal Respiratory Kelly absent   Organism Identification: Klebsiella oxytoca /Raoutella ornithinolytica   Enterobacter cloacae complex   Organism: Klebsiella oxytoca /Raoutella ornithinolytica   Organism: Enterobacter cloacae complex COVID-19 PCR (06.15.22 @ 07:10)   COVID-19 PCR: NotDetec: Culture - Acid Fast - Sputum w/Smear (22 @ 07:00)   Specimen Source: .Sputum Sputum   Acid Fast Bacilli Smear:   No acid fast bacilli seen by fluorochrome stain   Culture Results:   No acid fast bacilli isolated after 6 weeks.            Patient is seen and examined at the bed side, remains febrile. The WBC count and kidney function normal.    REVIEW OF SYSTEMS: Unable to obtain due to mental status      ALLERGIES: buprenorphine (Rash)  Suboxone (Rash)      ICU Vital Signs Last 24 Hrs  T(C): 39.2 (2022 21:05), Max: 39.2 (2022 21:05)  T(F): 102.6 (2022 21:05), Max: 102.6 (2022 21:05)  HR: 121 (2022 21:12) (105 - 121)  BP: 149/90 (2022 20:05) (128/71 - 168/106)  BP(mean): 113 (2022 20:05) (90 - 130)  ABP: --  ABP(mean): --  RR: 31 (2022 21:05) (26 - 64)  SpO2: 100% (2022 21:12) (98% - 100%)      PHYSICAL EXAM:  GENERAL: Intubated/vented, on cooling blanket  CHEST/LUNG: Not using accessory muscles   HEART: s1 and s2 present  ABDOMEN:  Nontender and  Nondistended  EXTREMITIES: No pedal  edema  CNS:  Intubated/vented      LABS:                        8.8    4.93  )-----------( 179      ( 2022 06:59 )             29.8                           9.2    3.55  )-----------( 202      ( 2022 16:17 )             31.0                           7.2    1.26  )-----------( 121      ( 2022 20:10 )             24.2         07-03    148<H>  |  112<H>  |  27<H>  ----------------------------<  125<H>  3.6   |  25  |  0.8    Ca    9.5      2022 06:59  Phos  3.2     07-03  Mg     2.4     07-03    TPro  6.0  /  Alb  3.0<L>  /  TBili  0.4  /  DBili  x   /  AST  18  /  ALT  9   /  AlkPhos  721<H>  07    07-02    144  |  108  |  30<H>  ----------------------------<  139<H>  3.9   |  25  |  1.2    Ca    9.9      2022 06:56  Phos  3.4     07-  Mg     2.6         TPro  6.3  /  Alb  2.8<L>  /  TBili  0.6  /  DBili  x   /  AST  21  /  ALT  10  /  AlkPhos  834<H>          ABG - ( 2022 03:14 )  pH, Arterial: 7.30  pH, Blood: x     /  pCO2: 51    /  pO2: 111   / HCO3: 25    / Base Excess: -1.7  /  SaO2: 99.0          Urinalysis Basic - ( 2022 19:06 )  Color: Yellow / Appearance: Slightly Cloudy / S.025 / pH: x  Gluc: x / Ketone: Trace  / Bili: Negative / Urobili: 1.0 mg/dL   Blood: x / Protein: >=300 mg/dL / Nitrite: Negative   Leuk Esterase: Negative / RBC: 26-50 /HPF / WBC 3-5 /HPF   Sq Epi: x / Non Sq Epi: Occasional /HPF / Bacteria: Moderate        MEDICATIONS  (STANDING):    cefepime   IVPB 1000 milliGRAM(s) IV Intermittent every 8 hours  chlorhexidine 0.12% Liquid 15 milliLiter(s) Oral Mucosa every 12 hours  chlorhexidine 4% Liquid 1 Application(s) Topical daily  clonazePAM  Tablet 2 milliGRAM(s) Oral three times a day  enoxaparin Injectable 40 milliGRAM(s) SubCutaneous every 24 hours  fentaNYL   Infusion. 0.5 MICROgram(s)/kG/Hr (4.28 mL/Hr) IV Continuous <Continuous>  folic acid 1 milliGRAM(s) Oral daily  ketamine Infusion. 0.2 mG/kG/Hr (1.71 mL/Hr) IV Continuous <Continuous>  lactulose Syrup 20 Gram(s) Oral every 6 hours  methadone    Tablet 30 milliGRAM(s) Oral daily  midazolam Infusion 0.02 mG/kG/Hr (1.71 mL/Hr) IV Continuous <Continuous>  nicotine -  14 mG/24Hr(s) Patch 1 patch Transdermal daily  norepinephrine Infusion 0.05 MICROgram(s)/kG/Min (8.03 mL/Hr) IV Continuous <Continuous>  pantoprazole   Suspension 40 milliGRAM(s) Enteral Tube daily  polyethylene glycol 3350 17 Gram(s) Oral two times a day  propofol Infusion 1 MICROgram(s)/kG/Min (0.51 mL/Hr) IV Continuous <Continuous>  QUEtiapine 100 milliGRAM(s) Oral two times a day  scopolamine 1 mG/72 Hr(s) Patch 1 Patch Transdermal every 72 hours  senna 2 Tablet(s) Oral at bedtime  spironolactone 50 milliGRAM(s) Oral daily  thiamine 100 milliGRAM(s) Oral daily        RADIOLOGY & ADDITIONAL TESTS:    < from: Xray Kidney Ureter Bladder (22 @ 13:25) >    FINDINGS: Enteric feeding tube is stable, with tip in the left upper   quadrant.  There is a non-obstructive bowel gas pattern.  No abnormal   masses or calcifications are seen.  Stable probe/catheter projects over   the lower pelvis.    < from: CT Angio Chest PE Protocol w/ IV Cont (22 @ 21:14) >    No pulmonary embolus.    Near complete consolidation/collapse of the left lower lobe, increased as   compared with prior. Mildly increased right lower lobe patchy   consolidative opacities-atelectasis. Adjacent bilateral small pleural   effusions. Findings compatible with likely mixed pneumonia and   atelectasis.    Redemonstration of a dilated main pulmonary artery measuring up to 3.8 cm   compatible with pulmonary hypertension.    < from: Xray Chest 1 View- PORTABLE-Routine (Xray Chest 1 View- PORTABLE-Routine in AM.) (22 @ 05:28) >  Stable cardiomegaly and left basilar opacity. Stable support devices.    < end of copied text >  < from: CT Abdomen and Pelvis w/ IV Cont (22 @ 17:27) >  No evidence of retroperitoneal hematoma.    Small bilateral pleural effusions and left basilar consolidation. Moderate ascites redemonstrated.    Moderate pericardial effusion.  Hepatosplenomegaly redemonstrated.        MICROBIOLOGY DATA:    MRSA/MSSA PCR (22 @ 10:34)   MRSA PCR Result.: Negative    Respiratory Viral Panel with COVID-19 by [facility]OO[facility] (22 @ 09:19)   Rapid RVP Result: NotDete   SARS-CoV-2: NotDetec:    Culture - Blood (22 @ 20:31)   Specimen Source: .Blood Blood-Peripheral   Culture Results:   No growth to date.     Culture - Blood (22 @ 20:31)   Specimen Source: .Blood Blood   Culture Results:   No growth to date.     Culture - Sputum . (22 @ 14:00)   - Oxacillin: S <=0.25 Oxacillin predicts susceptibility for dicloxacillin, methicillin, and nafcillin   - Cefazolin: S <=4   - Erythromycin: S <=0.25   - Gentamicin: S <=1 Should not be used as monotherapy   - Clindamycin: S <=0.25   - Rifampin: S <=1 Should not be used as monotherapy   - Tetra/Doxy: S <=1   - Trimethoprim/Sulfamethoxazole: S <=0.5/9.5   - Vancomycin: S 2   Gram Stain:   Moderate polymorphonuclear leukocytes per low power field   Few Squamous epithelial cells per low power field   Moderate Gram positive cocci in pairs seen per oil power field   Few Gram Variable Rods seen per oil power field   - Ampicillin/Sulbactam: S <=8/4   Specimen Source: Trach Asp Tracheal Aspirate   Culture Results:   Numerous Mixed gram negative rods   Moderate Staphylococcus aureus   Normal Respiratory Kelly present   Organism Identification: Staphylococcus aureus   Organism: Staphylococcus aureus   Culture - Blood in AM (22 @ 06:00)   Specimen Source: .Blood Blood   Culture Results: No growth to date.     Culture - Sputum . (22 @ 14:00)   Gram Stain:   Moderate polymorphonuclear leukocytes per low power field   Few Squamous epithelial cells per low power field   Moderate Gram positive cocci in pairs seen per oil power field   Few Gram Variable Rods seen per oil power field   Specimen Source: Trach Asp Tracheal Aspirate   Culture Results:   Numerous Mixed gram negative rods   Moderate Staphylococcus aureus Susceptibility to follow.   Normal Respiratory Kelly absent     Culture - Sputum . (22 @ 17:20)   Gram Stain:   Few polymorphonuclear leukocytes per low power field   Rare Squamous epithelial cells per low power field   Moderate Gram Negative Rods seen per oil power field   - Amikacin: S <=16   - Amikacin: S <=16   - Amoxicillin/Clavulanic Acid: R >16/8   - Amoxicillin/Clavulanic Acid: S <=8/4   - Ampicillin: R 16 These ampicillin results predict results for amoxicillin   - Ampicillin: R >16 These ampicillin results predict results for amoxicillin   - Ampicillin/Sulbactam: R >16/8 Enterobacter, Klebsiella aerogenes, Citrobacter, and Serratia may develop resistance during prolonged therapy (3-4 days)   - Ampicillin/Sulbactam: S <=4/2 Enterobacter, Klebsiella aerogenes, Citrobacter, and Serratia may develop resistance during prolonged therapy (3-4 days)   - Aztreonam: S <=4   - Aztreonam: S <=4   - Cefazolin: R >16 Enterobacter, Klebsiella aerogenes, Citrobacter, and Serratia may develop resistance during prolonged therapy (3-4 days)   - Cefazolin: S <=2 Enterobacter, Klebsiella aerogenes, Citrobacter, and Serratia may develop resistance during prolonged therapy (3-4 days)   - Cefepime: S <=2   - Cefepime: S <=2   - Cefoxitin: R >16   - Cefoxitin: S <=8   - Ceftriaxone: S <=1 Enterobacter, Klebsiella aerogenes, Citrobacter, and Serratia may develop resistance during prolonged therapy   - Ceftriaxone: S <=1 Enterobacter, Klebsiella aerogenes, Citrobacter, and Serratia may develop resistance during prolonged therapy   - Ciprofloxacin: S <=0.25   - Ciprofloxacin: S <=0.25   - Ertapenem: S <=0.5   - Ertapenem: S <=0.5   - Gentamicin: S <=2   - Gentamicin: S <=2   - Imipenem: S <=1   - Imipenem: S <=1   - Levofloxacin: S <=0.5   - Levofloxacin: S <=0.5   - Meropenem: S <=1   - Meropenem: S <=1   - Piperacillin/Tazobactam: S <=8   - Piperacillin/Tazobactam: S <=8   - Tobramycin: S <=2   - Tobramycin: S <=2   - Trimethoprim/Sulfamethoxazole: S <=0.5/9.5   - Trimethoprim/Sulfamethoxazole: S <=0.5/9.5   Specimen Source: Trach Asp Tracheal Aspirate   Culture Results:   Moderate Klebsiella oxytoca/Raoutella ornithinolytica   Moderate Enterobacter cloacae complex   Normal Respiratory Kelly absent   Organism Identification: Klebsiella oxytoca /Raoutella ornithinolytica   Enterobacter cloacae complex   Organism: Klebsiella oxytoca /Raoutella ornithinolytica   Organism: Enterobacter cloacae complex COVID-19 PCR (06.15.22 @ 07:10)   COVID-19 PCR: NotDetec: Culture - Acid Fast - Sputum w/Smear (22 @ 07:00)   Specimen Source: .Sputum Sputum   Acid Fast Bacilli Smear:   No acid fast bacilli seen by fluorochrome stain   Culture Results:   No acid fast bacilli isolated after 6 weeks.

## 2022-07-03 NOTE — PROGRESS NOTE ADULT - ASSESSMENT
Patient is 26 year old female with hx of asthma,  IVDA, anasarca hx of use of opiates 2 grams per day IV into her thighs  x years with minimal sobriety. Pt has been on MMTP in 2020.  presenting with       # AHRF - on MV  # Aspiration PNA s/p Unasyn Course  # Persistent Fevers  # Anemia  # Pancytopenia  # pHTN WHO Class unclear at this time  # h/o Pericardial Effusion  # OD vs. Other Tox  # Ascites w/ h/o Liver cirrhosis d/t HepC  #Abnormal TTE      PLAN:     - Seizure Precautions  - Hold lasix   -NG tube Feeds  -continue with current antibiotic as per ID.  Follow up on cultures  -Further rheumatological workup as per Rheumatology    -S/P 2 units of RBC transfusion  -H/H stable   - Heme/Onc to follow up   -Cardiology recommended further cardiac workup when stable   -Vent management as per pulmonary      further care as per ID, cardiology, rheumatology, and  critical care team

## 2022-07-03 NOTE — PROGRESS NOTE ADULT - SUBJECTIVE AND OBJECTIVE BOX
CC.  steven  HPI.  Patient is intubated/vented  appears to be comfortable   unable to obtain ROS/HX       Vital Signs Last 24 Hrs  T(C): 38.1 (03 Jul 2022 07:10), Max: 38.9 (02 Jul 2022 20:09)  T(F): 100.6 (03 Jul 2022 07:10), Max: 102 (02 Jul 2022 20:09)  HR: 113 (03 Jul 2022 09:05) (107 - 125)  BP: 148/99 (03 Jul 2022 09:05) (147/92 - 168/106)  BP(mean): 118 (03 Jul 2022 09:05) (112 - 130)  RR: 26 (03 Jul 2022 09:05) (26 - 64)  SpO2: 100% (03 Jul 2022 09:05) (98% - 100%)      PHYSICAL EXAM-  GENERAL: NAD   HEAD:  Atraumatic, Normocephalic  EYES: EOMI, PERRLA, conjunctiva and sclera clear  NECK: Supple, No JVD, Normal thyroid  NERVOUS SYSTEM:  Sedated  CHEST/LUNG:  Rhonchi with good air entry  HEART: Regular rate and rhythm; No murmurs, rubs, or gallops  ABDOMEN: Soft, Nontender, Nondistended; Bowel sounds present  EXTREMITIES:  No clubbing, cyanosis, or edema  SKIN: No rashes or lesions        MEDICATIONS  (STANDING):  cefepime   IVPB 1000 milliGRAM(s) IV Intermittent every 8 hours  chlorhexidine 0.12% Liquid 15 milliLiter(s) Oral Mucosa every 12 hours  chlorhexidine 4% Liquid 1 Application(s) Topical daily  clonazePAM  Tablet 2 milliGRAM(s) Oral three times a day  enoxaparin Injectable 40 milliGRAM(s) SubCutaneous every 24 hours  fentaNYL   Infusion. 0.5 MICROgram(s)/kG/Hr (4.28 mL/Hr) IV Continuous <Continuous>  folic acid 1 milliGRAM(s) Oral daily  furosemide   Injectable 40 milliGRAM(s) IV Push daily  ketamine Infusion. 0.2 mG/kG/Hr (1.71 mL/Hr) IV Continuous <Continuous>  lactulose Syrup 20 Gram(s) Oral every 6 hours  methadone    Tablet 30 milliGRAM(s) Oral daily  midazolam Infusion 0.02 mG/kG/Hr (1.71 mL/Hr) IV Continuous <Continuous>  nicotine -  14 mG/24Hr(s) Patch 1 patch Transdermal daily  norepinephrine Infusion 0.05 MICROgram(s)/kG/Min (8.03 mL/Hr) IV Continuous <Continuous>  pantoprazole   Suspension 40 milliGRAM(s) Enteral Tube daily  polyethylene glycol 3350 17 Gram(s) Oral two times a day  propofol Infusion 1 MICROgram(s)/kG/Min (0.51 mL/Hr) IV Continuous <Continuous>  QUEtiapine 100 milliGRAM(s) Oral two times a day  scopolamine 1 mG/72 Hr(s) Patch 1 Patch Transdermal every 72 hours  senna 2 Tablet(s) Oral at bedtime  spironolactone 50 milliGRAM(s) Oral daily  thiamine 100 milliGRAM(s) Oral daily    MEDICATIONS  (PRN):  ALBUTerol    90 MICROgram(s) HFA Inhaler 1 Puff(s) Inhalation every 4 hours PRN Shortness of Breath and/or Wheezing  cloNIDine 0.1 milliGRAM(s) Oral every 6 hours PRN opiate withdrawal  hydrOXYzine hydrochloride 50 milliGRAM(s) Oral every 6 hours PRN Anxiety  ibuprofen  Tablet. 600 milliGRAM(s) Oral every 6 hours PRN Temp greater or equal to 38C (100.4F)  ibuprofen  Tablet. 400 milliGRAM(s) Oral every 6 hours PRN Mild Pain (1 - 3)  sodium chloride 0.9% lock flush 10 milliLiter(s) IV Push every 1 hour PRN Pre/post blood products, medications, blood draw, and to maintain line patency                              8.8    4.93  )-----------( 179      ( 03 Jul 2022 06:59 )             29.8     07-03    148<H>  |  112<H>  |  27<H>  ----------------------------<  125<H>  3.6   |  25  |  0.8    Ca    9.5      03 Jul 2022 06:59  Phos  3.2     07-03  Mg     2.4     07-03    TPro  6.0  /  Alb  3.0<L>  /  TBili  0.4  /  DBili  x   /  AST  18  /  ALT  9   /  AlkPhos  721<H>  07-03                        Imaging Personally Reviewed:     [x ] YES  [ ] NO    Consultant(s) Notes Reviewed:  [x ] YES  [ ] NO    Care Discussed with Consultants/Other Providers [x ] YES  [ ] NO

## 2022-07-03 NOTE — PROGRESS NOTE ADULT - SUBJECTIVE AND OBJECTIVE BOX
[place] NEPHROLOGY FOLLOW UP NOTE  --------------------------------------------------------------------------------  24 hour events/subjective: Patient examined. Intubated.    PAST HISTORY  --------------------------------------------------------------------------------  No significant changes to PMH, PSH, FHx, SHx, unless otherwise noted    ALLERGIES & MEDICATIONS  --------------------------------------------------------------------------------  Allergies    buprenorphine (Rash)  Suboxone (Rash)    Standing Inpatient Medications  cefepime   IVPB 1000 milliGRAM(s) IV Intermittent every 8 hours  chlorhexidine 0.12% Liquid 15 milliLiter(s) Oral Mucosa every 12 hours  chlorhexidine 4% Liquid 1 Application(s) Topical daily  clonazePAM  Tablet 2 milliGRAM(s) Oral three times a day  enoxaparin Injectable 40 milliGRAM(s) SubCutaneous every 24 hours  fentaNYL   Infusion. 0.5 MICROgram(s)/kG/Hr IV Continuous <Continuous>  folic acid 1 milliGRAM(s) Oral daily  ketamine Infusion. 0.2 mG/kG/Hr IV Continuous <Continuous>  lactulose Syrup 20 Gram(s) Oral every 6 hours  methadone    Tablet 30 milliGRAM(s) Oral daily  midazolam Infusion 0.02 mG/kG/Hr IV Continuous <Continuous>  nicotine -  14 mG/24Hr(s) Patch 1 patch Transdermal daily  norepinephrine Infusion 0.05 MICROgram(s)/kG/Min IV Continuous <Continuous>  pantoprazole   Suspension 40 milliGRAM(s) Enteral Tube daily  polyethylene glycol 3350 17 Gram(s) Oral two times a day  propofol Infusion 1 MICROgram(s)/kG/Min IV Continuous <Continuous>  QUEtiapine 100 milliGRAM(s) Oral two times a day  scopolamine 1 mG/72 Hr(s) Patch 1 Patch Transdermal every 72 hours  senna 2 Tablet(s) Oral at bedtime  spironolactone 50 milliGRAM(s) Oral daily  thiamine 100 milliGRAM(s) Oral daily    PRN Inpatient Medications  ALBUTerol    90 MICROgram(s) HFA Inhaler 1 Puff(s) Inhalation every 4 hours PRN  cloNIDine 0.1 milliGRAM(s) Oral every 6 hours PRN  hydrOXYzine hydrochloride 50 milliGRAM(s) Oral every 6 hours PRN  ibuprofen  Tablet. 600 milliGRAM(s) Oral every 6 hours PRN  ibuprofen  Tablet. 400 milliGRAM(s) Oral every 6 hours PRN  sodium chloride 0.9% lock flush 10 milliLiter(s) IV Push every 1 hour PRN    VITALS/PHYSICAL EXAM  --------------------------------------------------------------------------------  T(C): 38.2 (07-03-22 @ 11:05), Max: 38.9 (07-02-22 @ 20:09)  HR: 121 (07-03-22 @ 13:05) (107 - 125)  BP: 160/100 (07-03-22 @ 11:05) (147/92 - 168/106)  RR: 29 (07-03-22 @ 13:05) (26 - 64)  SpO2: 100% (07-03-22 @ 13:05) (98% - 100%)    07-02-22 @ 07:01  -  07-03-22 @ 07:00  --------------------------------------------------------  IN: 2381.4 mL / OUT: 2055 mL / NET: 326.4 mL    07-03-22 @ 07:01  -  07-03-22 @ 14:18  --------------------------------------------------------  IN: 220 mL / OUT: 950 mL / NET: -730 mL    Physical Exam:  	Gen: intubated  	Pulm: CTA B/L  	CV: RRR, S1S2  	Abd: +BS, soft, nondistended  	: Jaron  	LE: Warm, edema    LABS/STUDIES  --------------------------------------------------------------------------------              8.8    4.93  >-----------<  179      [07-03-22 @ 06:59]              29.8     148  |  112  |  27  ----------------------------<  125      [07-03-22 @ 06:59]  3.6   |  25  |  0.8        Ca     9.5     [07-03-22 @ 06:59]      Mg     2.4     [07-03-22 @ 06:59]      Phos  3.2     [07-03-22 @ 06:59]    TPro  6.0  /  Alb  3.0  /  TBili  0.4  /  DBili  x   /  AST  18  /  ALT  9   /  AlkPhos  721  [07-03-22 @ 06:59]    Creatinine Trend:  SCr 0.8 [07-03 @ 06:59]  SCr 1.2 [07-02 @ 06:56]  SCr 1.4 [07-01 @ 05:31]  SCr 1.4 [06-30 @ 05:46]  SCr 1.1 [06-29 @ 05:27]    Urinalysis - [06-29-22 @ 19:06]      Color Yellow / Appearance Slightly Cloudy / SG 1.025 / pH 6.0      Gluc Negative / Ketone Trace  / Bili Negative / Urobili 1.0       Blood Large / Protein >=300 / Leuk Est Negative / Nitrite Negative      RBC 26-50 / WBC 3-5 / Hyaline  / Gran 6-10 / Sq Epi  / Non Sq Epi Occasional / Bacteria Moderate    Iron 33, TIBC 280, %sat 12      [04-21-22 @ 07:39]  Ferritin 94      [06-27-22 @ 06:47]  HbA1c 5.6      [05-22-19 @ 11:36]  TSH 4.11      [06-16-22 @ 18:11]  Lipid: chol --, , HDL --, LDL --      [06-28-22 @ 06:00]    HIV Nonreact      [06-29-22 @ 10:29]    KRISHNA: titer Negative, pattern --      [06-30-22 @ 05:46]  dsDNA <12      [06-30-22 @ 05:46]  Rheumatoid Factor 13      [06-30-22 @ 09:07]

## 2022-07-04 LAB
ALBUMIN SERPL ELPH-MCNC: 2.9 G/DL — LOW (ref 3.5–5.2)
ALP SERPL-CCNC: 602 U/L — HIGH (ref 30–115)
ALT FLD-CCNC: 8 U/L — SIGNIFICANT CHANGE UP (ref 0–41)
ANION GAP SERPL CALC-SCNC: 6 MMOL/L — LOW (ref 7–14)
AST SERPL-CCNC: 17 U/L — SIGNIFICANT CHANGE UP (ref 0–41)
BASOPHILS # BLD AUTO: 0.01 K/UL — SIGNIFICANT CHANGE UP (ref 0–0.2)
BASOPHILS NFR BLD AUTO: 0.3 % — SIGNIFICANT CHANGE UP (ref 0–1)
BILIRUB SERPL-MCNC: 0.3 MG/DL — SIGNIFICANT CHANGE UP (ref 0.2–1.2)
BUN SERPL-MCNC: 27 MG/DL — HIGH (ref 10–20)
CALCIUM SERPL-MCNC: 9 MG/DL — SIGNIFICANT CHANGE UP (ref 8.5–10.1)
CHLORIDE SERPL-SCNC: 109 MMOL/L — SIGNIFICANT CHANGE UP (ref 98–110)
CO2 SERPL-SCNC: 31 MMOL/L — SIGNIFICANT CHANGE UP (ref 17–32)
CREAT SERPL-MCNC: 0.9 MG/DL — SIGNIFICANT CHANGE UP (ref 0.7–1.5)
EGFR: 89 ML/MIN/1.73M2 — SIGNIFICANT CHANGE UP
EOSINOPHIL # BLD AUTO: 0 K/UL — SIGNIFICANT CHANGE UP (ref 0–0.7)
EOSINOPHIL NFR BLD AUTO: 0 % — SIGNIFICANT CHANGE UP (ref 0–8)
GLUCOSE SERPL-MCNC: 120 MG/DL — HIGH (ref 70–99)
HCT VFR BLD CALC: 28.9 % — LOW (ref 37–47)
HGB BLD-MCNC: 8.6 G/DL — LOW (ref 12–16)
IMM GRANULOCYTES NFR BLD AUTO: 0.3 % — SIGNIFICANT CHANGE UP (ref 0.1–0.3)
LYMPHOCYTES # BLD AUTO: 0.51 K/UL — LOW (ref 1.2–3.4)
LYMPHOCYTES # BLD AUTO: 17.2 % — LOW (ref 20.5–51.1)
MAGNESIUM SERPL-MCNC: 2.4 MG/DL — SIGNIFICANT CHANGE UP (ref 1.8–2.4)
MCHC RBC-ENTMCNC: 26.9 PG — LOW (ref 27–31)
MCHC RBC-ENTMCNC: 29.8 G/DL — LOW (ref 32–37)
MCV RBC AUTO: 90.3 FL — SIGNIFICANT CHANGE UP (ref 81–99)
MONOCYTES # BLD AUTO: 0.26 K/UL — SIGNIFICANT CHANGE UP (ref 0.1–0.6)
MONOCYTES NFR BLD AUTO: 8.8 % — SIGNIFICANT CHANGE UP (ref 1.7–9.3)
NEUTROPHILS # BLD AUTO: 2.17 K/UL — SIGNIFICANT CHANGE UP (ref 1.4–6.5)
NEUTROPHILS NFR BLD AUTO: 73.4 % — SIGNIFICANT CHANGE UP (ref 42.2–75.2)
NRBC # BLD: 0 /100 WBCS — SIGNIFICANT CHANGE UP (ref 0–0)
PHOSPHATE SERPL-MCNC: 2.6 MG/DL — SIGNIFICANT CHANGE UP (ref 2.1–4.9)
PLATELET # BLD AUTO: 164 K/UL — SIGNIFICANT CHANGE UP (ref 130–400)
POTASSIUM SERPL-MCNC: 3.2 MMOL/L — LOW (ref 3.5–5)
POTASSIUM SERPL-SCNC: 3.2 MMOL/L — LOW (ref 3.5–5)
PROT SERPL-MCNC: 6.1 G/DL — SIGNIFICANT CHANGE UP (ref 6–8)
RBC # BLD: 3.2 M/UL — LOW (ref 4.2–5.4)
RBC # FLD: 16.5 % — HIGH (ref 11.5–14.5)
SODIUM SERPL-SCNC: 146 MMOL/L — SIGNIFICANT CHANGE UP (ref 135–146)
WBC # BLD: 2.96 K/UL — LOW (ref 4.8–10.8)
WBC # FLD AUTO: 2.96 K/UL — LOW (ref 4.8–10.8)

## 2022-07-04 PROCEDURE — 71045 X-RAY EXAM CHEST 1 VIEW: CPT | Mod: 26

## 2022-07-04 PROCEDURE — 99232 SBSQ HOSP IP/OBS MODERATE 35: CPT

## 2022-07-04 PROCEDURE — 99233 SBSQ HOSP IP/OBS HIGH 50: CPT

## 2022-07-04 PROCEDURE — 93010 ELECTROCARDIOGRAM REPORT: CPT

## 2022-07-04 RX ORDER — SPIRONOLACTONE 25 MG/1
50 TABLET, FILM COATED ORAL DAILY
Refills: 0 | Status: COMPLETED | OUTPATIENT
Start: 2022-07-04 | End: 2022-07-04

## 2022-07-04 RX ORDER — MEROPENEM 1 G/30ML
1000 INJECTION INTRAVENOUS EVERY 8 HOURS
Refills: 0 | Status: DISCONTINUED | OUTPATIENT
Start: 2022-07-04 | End: 2022-07-08

## 2022-07-04 RX ORDER — FUROSEMIDE 40 MG
20 TABLET ORAL ONCE
Refills: 0 | Status: COMPLETED | OUTPATIENT
Start: 2022-07-04 | End: 2022-07-04

## 2022-07-04 RX ORDER — POTASSIUM CHLORIDE 20 MEQ
20 PACKET (EA) ORAL
Refills: 0 | Status: COMPLETED | OUTPATIENT
Start: 2022-07-04 | End: 2022-07-04

## 2022-07-04 RX ORDER — SPIRONOLACTONE 25 MG/1
100 TABLET, FILM COATED ORAL DAILY
Refills: 0 | Status: DISCONTINUED | OUTPATIENT
Start: 2022-07-05 | End: 2022-07-20

## 2022-07-04 RX ADMIN — FENTANYL CITRATE 4.28 MICROGRAM(S)/KG/HR: 50 INJECTION INTRAVENOUS at 00:49

## 2022-07-04 RX ADMIN — MEROPENEM 100 MILLIGRAM(S): 1 INJECTION INTRAVENOUS at 22:55

## 2022-07-04 RX ADMIN — SCOPALAMINE 1 PATCH: 1 PATCH, EXTENDED RELEASE TRANSDERMAL at 19:14

## 2022-07-04 RX ADMIN — SPIRONOLACTONE 50 MILLIGRAM(S): 25 TABLET, FILM COATED ORAL at 05:10

## 2022-07-04 RX ADMIN — QUETIAPINE FUMARATE 100 MILLIGRAM(S): 200 TABLET, FILM COATED ORAL at 17:44

## 2022-07-04 RX ADMIN — CHLORHEXIDINE GLUCONATE 15 MILLILITER(S): 213 SOLUTION TOPICAL at 17:51

## 2022-07-04 RX ADMIN — PANTOPRAZOLE SODIUM 40 MILLIGRAM(S): 20 TABLET, DELAYED RELEASE ORAL at 11:29

## 2022-07-04 RX ADMIN — ENOXAPARIN SODIUM 40 MILLIGRAM(S): 100 INJECTION SUBCUTANEOUS at 11:28

## 2022-07-04 RX ADMIN — QUETIAPINE FUMARATE 100 MILLIGRAM(S): 200 TABLET, FILM COATED ORAL at 05:11

## 2022-07-04 RX ADMIN — SPIRONOLACTONE 50 MILLIGRAM(S): 25 TABLET, FILM COATED ORAL at 11:47

## 2022-07-04 RX ADMIN — Medication 2 MILLIGRAM(S): at 13:59

## 2022-07-04 RX ADMIN — SENNA PLUS 2 TABLET(S): 8.6 TABLET ORAL at 22:55

## 2022-07-04 RX ADMIN — Medication 50 MILLIEQUIVALENT(S): at 13:13

## 2022-07-04 RX ADMIN — Medication 2 MILLIGRAM(S): at 05:10

## 2022-07-04 RX ADMIN — CHLORHEXIDINE GLUCONATE 15 MILLILITER(S): 213 SOLUTION TOPICAL at 05:11

## 2022-07-04 RX ADMIN — Medication 600 MILLIGRAM(S): at 14:00

## 2022-07-04 RX ADMIN — Medication 400 MILLIGRAM(S): at 22:54

## 2022-07-04 RX ADMIN — Medication 600 MILLIGRAM(S): at 04:08

## 2022-07-04 RX ADMIN — Medication 1 PATCH: at 07:25

## 2022-07-04 RX ADMIN — Medication 50 MILLIEQUIVALENT(S): at 11:30

## 2022-07-04 RX ADMIN — METHADONE HYDROCHLORIDE 30 MILLIGRAM(S): 40 TABLET ORAL at 11:29

## 2022-07-04 RX ADMIN — MIDAZOLAM HYDROCHLORIDE 1.71 MG/KG/HR: 1 INJECTION, SOLUTION INTRAMUSCULAR; INTRAVENOUS at 07:29

## 2022-07-04 RX ADMIN — PROPOFOL 0.51 MICROGRAM(S)/KG/MIN: 10 INJECTION, EMULSION INTRAVENOUS at 12:00

## 2022-07-04 RX ADMIN — Medication 1 PATCH: at 11:32

## 2022-07-04 RX ADMIN — MIDAZOLAM HYDROCHLORIDE 1.71 MG/KG/HR: 1 INJECTION, SOLUTION INTRAMUSCULAR; INTRAVENOUS at 17:42

## 2022-07-04 RX ADMIN — Medication 600 MILLIGRAM(S): at 13:10

## 2022-07-04 RX ADMIN — CHLORHEXIDINE GLUCONATE 1 APPLICATION(S): 213 SOLUTION TOPICAL at 05:12

## 2022-07-04 RX ADMIN — SCOPALAMINE 1 PATCH: 1 PATCH, EXTENDED RELEASE TRANSDERMAL at 07:26

## 2022-07-04 RX ADMIN — POLYETHYLENE GLYCOL 3350 17 GRAM(S): 17 POWDER, FOR SOLUTION ORAL at 05:11

## 2022-07-04 RX ADMIN — Medication 50 MILLIEQUIVALENT(S): at 15:32

## 2022-07-04 RX ADMIN — PROPOFOL 0.51 MICROGRAM(S)/KG/MIN: 10 INJECTION, EMULSION INTRAVENOUS at 21:10

## 2022-07-04 RX ADMIN — LACTULOSE 20 GRAM(S): 10 SOLUTION ORAL at 18:00

## 2022-07-04 RX ADMIN — Medication 1 PATCH: at 11:29

## 2022-07-04 RX ADMIN — LACTULOSE 20 GRAM(S): 10 SOLUTION ORAL at 11:28

## 2022-07-04 RX ADMIN — Medication 20 MILLIGRAM(S): at 11:27

## 2022-07-04 RX ADMIN — Medication 1 MILLIGRAM(S): at 11:28

## 2022-07-04 RX ADMIN — LACTULOSE 20 GRAM(S): 10 SOLUTION ORAL at 05:11

## 2022-07-04 RX ADMIN — Medication 100 MILLIGRAM(S): at 11:30

## 2022-07-04 RX ADMIN — Medication 2 MILLIGRAM(S): at 22:53

## 2022-07-04 RX ADMIN — MEROPENEM 100 MILLIGRAM(S): 1 INJECTION INTRAVENOUS at 05:11

## 2022-07-04 RX ADMIN — Medication 1 PATCH: at 19:00

## 2022-07-04 RX ADMIN — FENTANYL CITRATE 4.28 MICROGRAM(S)/KG/HR: 50 INJECTION INTRAVENOUS at 21:10

## 2022-07-04 RX ADMIN — LACTULOSE 20 GRAM(S): 10 SOLUTION ORAL at 00:49

## 2022-07-04 RX ADMIN — MEROPENEM 100 MILLIGRAM(S): 1 INJECTION INTRAVENOUS at 13:59

## 2022-07-04 NOTE — PROGRESS NOTE ADULT - ASSESSMENT
IMPRESSION:  Acute respiratory failure sp intubation  PNA  MSSA pna  seizure like activity  ?? drug over dose/ withdrawal opoid   liver cirrhosis   Ascites sp paracentesis   Pancytopenia- worsening  RENETTA     PLAN:    CNS: do SAT/SBT as tolerated  CW methadone 30mg daily  CW Clonapin to 2mg TID  CW Seroquel 100mg BID  ketamine     HEENT: oral care     PULMONARY:  HOB 45,  Vent changes: wean O2 as tolerated, TV to 400. Keep on right side    CARDIOVASCULAR: goal MAP >65.  Monitor QTc daily. 500 cc bolus.   hold lasix     GI: GI prophylaxis.  OG Feeding.    KUB reviewed    RENAL: monitor lytes is and os     INFECTIOUS DISEASE: Abx vanco celestine pend sensi  worsening R infiltrate    HEMATOLOGICAL:  monitor CBC, Heme FU.   dvt ppx   check d-dimer    ENDOCRINE:  Follow up FS.  Insulin protocol if needed.     MICU  lines: Left IJ  [place] - failed TOV

## 2022-07-04 NOTE — PROGRESS NOTE ADULT - SUBJECTIVE AND OBJECTIVE BOX
CC.  jeredsakaterin  HPI.  Patient is intubated/vented  appears to be comfortable   unable to obtain ROS/HX       Vital Signs Last 24 Hrs  T(C): 38.9 (04 Jul 2022 07:01), Max: 39.3 (04 Jul 2022 01:00)  T(F): 102 (04 Jul 2022 07:01), Max: 102.7 (04 Jul 2022 01:00)  HR: 117 (04 Jul 2022 07:05) (105 - 121)  BP: 151/89 (04 Jul 2022 07:05) (128/71 - 165/93)  BP(mean): 113 (04 Jul 2022 07:05) (90 - 124)  RR: 33 (04 Jul 2022 07:05) (26 - 47)  SpO2: 100% (04 Jul 2022 07:05) (98% - 100%)      PHYSICAL EXAM-  GENERAL: NAD   HEAD:  Atraumatic, Normocephalic  EYES: EOMI, PERRLA, conjunctiva and sclera clear  NECK: Supple, No JVD, Normal thyroid  NERVOUS SYSTEM:  Sedated  CHEST/LUNG:  Rhonchi with good air entry  HEART: Regular rate and rhythm; No murmurs, rubs, or gallops  ABDOMEN: Soft, Nontender, Nondistended; Bowel sounds present  EXTREMITIES:  No clubbing, cyanosis, or edema  SKIN: No rashes or lesions        MEDICATIONS  (STANDING):  cefepime   IVPB 1000 milliGRAM(s) IV Intermittent every 8 hours  chlorhexidine 0.12% Liquid 15 milliLiter(s) Oral Mucosa every 12 hours  chlorhexidine 4% Liquid 1 Application(s) Topical daily  clonazePAM  Tablet 2 milliGRAM(s) Oral three times a day  enoxaparin Injectable 40 milliGRAM(s) SubCutaneous every 24 hours  fentaNYL   Infusion. 0.5 MICROgram(s)/kG/Hr (4.28 mL/Hr) IV Continuous <Continuous>  folic acid 1 milliGRAM(s) Oral daily  furosemide   Injectable 40 milliGRAM(s) IV Push daily  ketamine Infusion. 0.2 mG/kG/Hr (1.71 mL/Hr) IV Continuous <Continuous>  lactulose Syrup 20 Gram(s) Oral every 6 hours  methadone    Tablet 30 milliGRAM(s) Oral daily  midazolam Infusion 0.02 mG/kG/Hr (1.71 mL/Hr) IV Continuous <Continuous>  nicotine -  14 mG/24Hr(s) Patch 1 patch Transdermal daily  norepinephrine Infusion 0.05 MICROgram(s)/kG/Min (8.03 mL/Hr) IV Continuous <Continuous>  pantoprazole   Suspension 40 milliGRAM(s) Enteral Tube daily  polyethylene glycol 3350 17 Gram(s) Oral two times a day  propofol Infusion 1 MICROgram(s)/kG/Min (0.51 mL/Hr) IV Continuous <Continuous>  QUEtiapine 100 milliGRAM(s) Oral two times a day  scopolamine 1 mG/72 Hr(s) Patch 1 Patch Transdermal every 72 hours  senna 2 Tablet(s) Oral at bedtime  spironolactone 50 milliGRAM(s) Oral daily  thiamine 100 milliGRAM(s) Oral daily    MEDICATIONS  (PRN):  ALBUTerol    90 MICROgram(s) HFA Inhaler 1 Puff(s) Inhalation every 4 hours PRN Shortness of Breath and/or Wheezing  cloNIDine 0.1 milliGRAM(s) Oral every 6 hours PRN opiate withdrawal  hydrOXYzine hydrochloride 50 milliGRAM(s) Oral every 6 hours PRN Anxiety  ibuprofen  Tablet. 600 milliGRAM(s) Oral every 6 hours PRN Temp greater or equal to 38C (100.4F)  ibuprofen  Tablet. 400 milliGRAM(s) Oral every 6 hours PRN Mild Pain (1 - 3)  sodium chloride 0.9% lock flush 10 milliLiter(s) IV Push every 1 hour PRN Pre/post blood products, medications, blood draw, and to maintain line patency                              8.6    2.96  )-----------( 164      ( 04 Jul 2022 06:11 )             28.9     07-04    146  |  109  |  27<H>  ----------------------------<  120<H>  3.2<L>   |  31  |  0.9    Ca    9.0      04 Jul 2022 06:11  Phos  2.6     07-04  Mg     2.4     07-04    TPro  6.1  /  Alb  2.9<L>  /  TBili  0.3  /  DBili  x   /  AST  17  /  ALT  8   /  AlkPhos  602<H>  07-04                                           Imaging Personally Reviewed:     [x ] YES  [ ] NO    Consultant(s) Notes Reviewed:  [x ] YES  [ ] NO    Care Discussed with Consultants/Other Providers [x ] YES  [ ] NO

## 2022-07-04 NOTE — PROGRESS NOTE ADULT - SUBJECTIVE AND OBJECTIVE BOX
Patient is a 26y old  Female who presents with a chief complaint of anasarca (03 Jul 2022 21:20)        HPI:  Patient is a 26 year old female with hx of asthma, untreated Hep C, IVDA, anasarca presenting with increased dyspnea since yesterday. Patient has been short of breath chronically and has been admitted for fluid overload. Patient describes worsening symptoms yesterday associated with cough. States generalized edema has remained the same. Patient is actively using heroin. Not on any meds or MMTP.  Denies fever, chills, sore throat, cp, palpitations, abd pain, n/v/d, dysuria, headache, syncope, hemoptysis. + generalized edema (15 Reinaldo 2022 10:03)      Pt evaluated on rounds.  I reviewed the radiology tests and hospital record.    I reviewed previous notes on this patient.    Interval Events: No overnight events.      CAM:+    SAT:+    SBT:n      REVIEW OF SYSTEMS:   see HPI      OBJECTIVE:  ICU Vital Signs Last 24 Hrs  T(C): 38.7 (04 Jul 2022 05:00), Max: 39.3 (04 Jul 2022 01:00)  T(F): 101.7 (04 Jul 2022 05:00), Max: 102.7 (04 Jul 2022 01:00)  HR: 109 (04 Jul 2022 05:05) (105 - 121)  BP: 165/93 (04 Jul 2022 03:05) (128/71 - 165/93)  BP(mean): 121 (04 Jul 2022 03:05) (90 - 125)    RR: 32 (04 Jul 2022 03:05) (26 - 47)  SpO2: 100% (04 Jul 2022 05:05) (98% - 100%)    Mode: AC/ CMV (Assist Control/ Continuous Mandatory Ventilation), RR (machine): 28, TV (machine): 400, FiO2: 40, PEEP: 8, MAP: 15, PIP: 31    07-02 @ 07:01  -  07-03 @ 07:00  --------------------------------------------------------  IN: 2391.4 mL / OUT: 2055 mL / NET: 336.4 mL    07-03 @ 07:01 - 07-04 @ 05:34  --------------------------------------------------------  IN: 2470 mL / OUT: 1710 mL / NET: 760 mL      CAPILLARY BLOOD GLUCOSE            PHYSICAL EXAM:       · ENMT:   Airway patent,   Nasal mucosa clear.  Mouth with normal mucosa.   No thrush    · EYES:   Clear bilaterally,   pupils equal,   round and reactive to light.    · CARDIAC:   Normal rate,   regular rhythm.    Heart sounds S1, S2.   No murmurs, no rubs or gallops on auscultation  no edema        CAROTID:   normal systolic impulse  no bruits    · RESPIRATORY:   rales  normal chest expansion  no retractions or use of accessory muscles  palpation of chest is normal with no fremitus  percussion of chest demonstrates no hyperresonance or dullness    · GASTROINTESTINAL:  Abdomen soft,   non-tender,   + BS  liver/spleen not palpable    · MUSCULOSKELETAL:   no clubbing, cyanosis      · SKIN:   Skin normal color for race,   warm, dry   No evidence of rash.        · HEME LYMPH:   no splenomegaly.  No cervical  lymphadenopathy.  no inguinal lymphadenopathy    HOSPITAL MEDICATIONS:  MEDICATIONS  (STANDING):  chlorhexidine 0.12% Liquid 15 milliLiter(s) Oral Mucosa every 12 hours  chlorhexidine 4% Liquid 1 Application(s) Topical daily  clonazePAM  Tablet 2 milliGRAM(s) Oral three times a day  enoxaparin Injectable 40 milliGRAM(s) SubCutaneous every 24 hours  fentaNYL   Infusion. 0.5 MICROgram(s)/kG/Hr (4.28 mL/Hr) IV Continuous <Continuous>  folic acid 1 milliGRAM(s) Oral daily  ketamine Infusion. 0.2 mG/kG/Hr (1.71 mL/Hr) IV Continuous <Continuous>  lactulose Syrup 20 Gram(s) Oral every 6 hours  meropenem  IVPB 1000 milliGRAM(s) IV Intermittent every 8 hours  methadone    Tablet 30 milliGRAM(s) Oral daily  midazolam Infusion 0.02 mG/kG/Hr (1.71 mL/Hr) IV Continuous <Continuous>  nicotine -  14 mG/24Hr(s) Patch 1 patch Transdermal daily  norepinephrine Infusion 0.05 MICROgram(s)/kG/Min (8.03 mL/Hr) IV Continuous <Continuous>  pantoprazole   Suspension 40 milliGRAM(s) Enteral Tube daily  polyethylene glycol 3350 17 Gram(s) Oral two times a day  propofol Infusion 1 MICROgram(s)/kG/Min (0.51 mL/Hr) IV Continuous <Continuous>  QUEtiapine 100 milliGRAM(s) Oral two times a day  scopolamine 1 mG/72 Hr(s) Patch 1 Patch Transdermal every 72 hours  senna 2 Tablet(s) Oral at bedtime  spironolactone 50 milliGRAM(s) Oral daily  thiamine 100 milliGRAM(s) Oral daily    MEDICATIONS  (PRN):  ALBUTerol    90 MICROgram(s) HFA Inhaler 1 Puff(s) Inhalation every 4 hours PRN Shortness of Breath and/or Wheezing  cloNIDine 0.1 milliGRAM(s) Oral every 6 hours PRN opiate withdrawal  hydrOXYzine hydrochloride 50 milliGRAM(s) Oral every 6 hours PRN Anxiety  ibuprofen  Tablet. 600 milliGRAM(s) Oral every 6 hours PRN Temp greater or equal to 38C (100.4F)  ibuprofen  Tablet. 400 milliGRAM(s) Oral every 6 hours PRN Mild Pain (1 - 3)  sodium chloride 0.9% lock flush 10 milliLiter(s) IV Push every 1 hour PRN Pre/post blood products, medications, blood draw, and to maintain line patency    lactated ringers Bolus:   250 milliLiter(s), IV Bolus, once, infuse over 60 Minute(s), Stop After 1 Doses  Special Instructions: please do CHEETAH  sodium chloride 0.9% Bolus:   1000 milliLiter(s), IV Bolus, once, infuse over 60 Minute(s), Stop After 1 Doses  Provider's Contact #: [phone]  lactated ringers Bolus:   500 milliLiter(s), IV Bolus, once, infuse over 1 Hr, Stop After 1 Doses  Provider's Contact #: [phone]  lactated ringers.: Solution, 500 milliLiter(s) infuse at 250 mL/Hr  sodium chloride 0.9%.: Solution, 1000 milliLiter(s) infuse at 100 mL/Hr  sodium chloride 0.9% Bolus:   500 milliLiter(s), IV Bolus, once, infuse over 60 Minute(s), Stop After 1 Doses  Special Instructions: for cheetah  sodium chloride 0.9% Bolus:   500 milliLiter(s), IV Bolus, once, infuse over 60 Minute(s), Stop After 1 Doses      LABS:                        8.8    4.93  )-----------( 179      ( 03 Jul 2022 06:59 )             29.8     07-03    148<H>  |  112<H>  |  27<H>  ----------------------------<  125<H>  3.6   |  25  |  0.8    Ca    9.5      03 Jul 2022 06:59  Phos  3.2     07-03  Mg     2.4     07-03    TPro  6.0  /  Alb  3.0<L>  /  TBili  0.4  /  DBili  x   /  AST  18  /  ALT  9   /  AlkPhos  721<H>  07-03        Arterial Blood Gas:  07-04 @ 04:59  7.46/39/163/28/99.3/3.6  ABG lactate: --  Arterial Blood Gas:  07-03 @ 04:28  7.36/47/180/27/100.0/0.8  ABG lactate: --      Mode: AC/ CMV (Assist Control/ Continuous Mandatory Ventilation), RR (machine): 28, TV (machine): 400, FiO2: 40, PEEP: 8, MAP: 15, PIP: 31      SARS-CoV-2: NotDetec (30 Jun 2022 09:19)  [facility] Reinaldo 2022 07:10)    Mode: AC/ CMV (Assist Control/ Continuous Mandatory Ventilation)  RR (machine): 28  TV (machine): 400  FiO2: 40  PEEP: 8  MAP: 15  PIP: 31      ABG - ( 04 Jul 2022 04:59 )  pH, Arterial: 7.46  pH, Blood: x     /  pCO2: 39    /  pO2: 163   / HCO3: 28    / Base Excess: 3.6   /  SaO2: 99.3                RADIOLOGY: Today I personally interpreted the latest CXR and other pertinent films.

## 2022-07-04 NOTE — PROGRESS NOTE ADULT - SUBJECTIVE AND OBJECTIVE BOX
[place] NEPHROLOGY FOLLOW UP NOTE  --------------------------------------------------------------------------------  24 hour events/subjective: Patient examined. Intubated.    PAST HISTORY  --------------------------------------------------------------------------------  No significant changes to PMH, PSH, FHx, SHx, unless otherwise noted    ALLERGIES & MEDICATIONS  --------------------------------------------------------------------------------  Allergies    buprenorphine (Rash)  Suboxone (Rash)    Standing Inpatient Medications  chlorhexidine 0.12% Liquid 15 milliLiter(s) Oral Mucosa every 12 hours  chlorhexidine 4% Liquid 1 Application(s) Topical daily  clonazePAM  Tablet 2 milliGRAM(s) Oral three times a day  enoxaparin Injectable 40 milliGRAM(s) SubCutaneous every 24 hours  fentaNYL   Infusion. 0.5 MICROgram(s)/kG/Hr IV Continuous <Continuous>  folic acid 1 milliGRAM(s) Oral daily  ketamine Infusion. 0.2 mG/kG/Hr IV Continuous <Continuous>  lactulose Syrup 20 Gram(s) Oral every 6 hours  meropenem  IVPB 1000 milliGRAM(s) IV Intermittent every 8 hours  methadone    Tablet 30 milliGRAM(s) Oral daily  midazolam Infusion 0.02 mG/kG/Hr IV Continuous <Continuous>  nicotine -  14 mG/24Hr(s) Patch 1 patch Transdermal daily  norepinephrine Infusion 0.05 MICROgram(s)/kG/Min IV Continuous <Continuous>  pantoprazole   Suspension 40 milliGRAM(s) Enteral Tube daily  polyethylene glycol 3350 17 Gram(s) Oral two times a day  propofol Infusion 1 MICROgram(s)/kG/Min IV Continuous <Continuous>  QUEtiapine 100 milliGRAM(s) Oral two times a day  scopolamine 1 mG/72 Hr(s) Patch 1 Patch Transdermal every 72 hours  senna 2 Tablet(s) Oral at bedtime  spironolactone 50 milliGRAM(s) Oral daily  thiamine 100 milliGRAM(s) Oral daily    PRN Inpatient Medications  ALBUTerol    90 MICROgram(s) HFA Inhaler 1 Puff(s) Inhalation every 4 hours PRN  cloNIDine 0.1 milliGRAM(s) Oral every 6 hours PRN  hydrOXYzine hydrochloride 50 milliGRAM(s) Oral every 6 hours PRN  ibuprofen  Tablet. 600 milliGRAM(s) Oral every 6 hours PRN  ibuprofen  Tablet. 400 milliGRAM(s) Oral every 6 hours PRN  sodium chloride 0.9% lock flush 10 milliLiter(s) IV Push every 1 hour PRN    VITALS/PHYSICAL EXAM  --------------------------------------------------------------------------------  T(C): 38.9 (07-04-22 @ 07:01), Max: 39.3 (07-04-22 @ 01:00)  HR: 115 (07-04-22 @ 09:10) (105 - 121)  BP: 161/98 (07-04-22 @ 09:10) (128/71 - 165/103)  RR: 56 (07-04-22 @ 09:10) (26 - 56)  SpO2: 100% (07-04-22 @ 09:10) (98% - 100%)    07-03-22 @ 07:01  -  07-04-22 @ 07:00  --------------------------------------------------------  IN: 2704 mL / OUT: 1710 mL / NET: 994 mL    07-04-22 @ 07:01  -  07-04-22 @ 10:04  --------------------------------------------------------  IN: 184 mL / OUT: 0 mL / NET: 184 mL    Physical Exam:  	Gen: intubated  	Pulm: CTA B/L  	CV: RRR, S1S2  	Abd: +BS, soft, nondistended  	: Jaron  	LE: Warm,  edema    LABS/STUDIES  --------------------------------------------------------------------------------              8.6    2.96  >-----------<  164      [07-04-22 @ 06:11]              28.9     146  |  109  |  27  ----------------------------<  120      [07-04-22 @ 06:11]  3.2   |  31  |  0.9        Ca     9.0     [07-04-22 @ 06:11]      Mg     2.4     [07-04-22 @ 06:11]      Phos  2.6     [07-04-22 @ 06:11]    TPro  6.1  /  Alb  2.9  /  TBili  0.3  /  DBili  x   /  AST  17  /  ALT  8   /  AlkPhos  602  [07-04-22 @ 06:11]    Creatinine Trend:  SCr 0.9 [07-04 @ 06:11]  SCr 0.8 [07-03 @ 06:59]  SCr 1.2 [07-02 @ 06:56]  SCr 1.4 [07-01 @ 05:31]  SCr 1.4 [06-30 @ 05:46]    Urinalysis - [06-29-22 @ 19:06]      Color Yellow / Appearance Slightly Cloudy / SG 1.025 / pH 6.0      Gluc Negative / Ketone Trace  / Bili Negative / Urobili 1.0       Blood Large / Protein >=300 / Leuk Est Negative / Nitrite Negative      RBC 26-50 / WBC 3-5 / Hyaline  / Gran 6-10 / Sq Epi  / Non Sq Epi Occasional / Bacteria Moderate    Iron 33, TIBC 280, %sat 12      [04-21-22 @ 07:39]  Ferritin 94      [06-27-22 @ 06:47]  HbA1c 5.6      [05-22-19 @ 11:36]  TSH 4.11      [06-16-22 @ 18:11]  Lipid: chol --, , HDL --, LDL --      [06-28-22 @ 06:00]    HIV Nonreact      [06-29-22 @ 10:29]    KRISHNA: titer Negative, pattern --      [06-30-22 @ 05:46]  dsDNA <12      [06-30-22 @ 05:46]  Rheumatoid Factor 13      [06-30-22 @ 09:07]

## 2022-07-04 NOTE — PROGRESS NOTE ADULT - ASSESSMENT
Acute respiratory failure / intubated / PNA  IV heroin use  RENETTA in setting of diuresis and IV contrast  Hematuria  Subnephrotic range proteinuria  Persistent Fevers  ?Hep C  Anemia / Pancytopenia  Pulmonary hypertension  Pericardial effusion  Hepatomegaly / splenomegaly    Plan:    Rheum input noted  While there is no uniform histologic presentation of kidney disease associated with heroin use, the most commonly described are FSGS and MPGN  Treatment is usually supportive  I doubt a kidney biopsy would be of great utility, however, we can consider it once the patient is more stable  Put patient in negative fluid balance  Avoid NSAIDs  Antibiotic as per ID    Followup cultures  Will follow with you

## 2022-07-05 LAB
ALBUMIN SERPL ELPH-MCNC: 2.5 G/DL — LOW (ref 3.5–5.2)
ALP SERPL-CCNC: 470 U/L — HIGH (ref 30–115)
ALT FLD-CCNC: 9 U/L — SIGNIFICANT CHANGE UP (ref 0–41)
ANION GAP SERPL CALC-SCNC: 8 MMOL/L — SIGNIFICANT CHANGE UP (ref 7–14)
AST SERPL-CCNC: 19 U/L — SIGNIFICANT CHANGE UP (ref 0–41)
BASOPHILS # BLD AUTO: 0.01 K/UL — SIGNIFICANT CHANGE UP (ref 0–0.2)
BASOPHILS NFR BLD AUTO: 0.8 % — SIGNIFICANT CHANGE UP (ref 0–1)
BILIRUB SERPL-MCNC: 0.3 MG/DL — SIGNIFICANT CHANGE UP (ref 0.2–1.2)
BLD GP AB SCN SERPL QL: SIGNIFICANT CHANGE UP
BUN SERPL-MCNC: 28 MG/DL — HIGH (ref 10–20)
C DIFF BY PCR RESULT: POSITIVE
C DIFF TOX GENS STL QL NAA+PROBE: SIGNIFICANT CHANGE UP
CALCIUM SERPL-MCNC: 8.6 MG/DL — SIGNIFICANT CHANGE UP (ref 8.5–10.1)
CHLORIDE SERPL-SCNC: 108 MMOL/L — SIGNIFICANT CHANGE UP (ref 98–110)
CO2 SERPL-SCNC: 27 MMOL/L — SIGNIFICANT CHANGE UP (ref 17–32)
CREAT SERPL-MCNC: 0.8 MG/DL — SIGNIFICANT CHANGE UP (ref 0.7–1.5)
CULTURE RESULTS: SIGNIFICANT CHANGE UP
CULTURE RESULTS: SIGNIFICANT CHANGE UP
D DIMER BLD IA.RAPID-MCNC: 1322 NG/ML DDU — HIGH (ref 0–230)
DSDNA AB SER QL CLIF: NEGATIVE — SIGNIFICANT CHANGE UP
EGFR: 103 ML/MIN/1.73M2 — SIGNIFICANT CHANGE UP
EOSINOPHIL # BLD AUTO: 0.01 K/UL — SIGNIFICANT CHANGE UP (ref 0–0.7)
EOSINOPHIL NFR BLD AUTO: 0.8 % — SIGNIFICANT CHANGE UP (ref 0–8)
GAS PNL BLDA: SIGNIFICANT CHANGE UP
GLUCOSE SERPL-MCNC: 104 MG/DL — HIGH (ref 70–99)
GRAM STN FLD: SIGNIFICANT CHANGE UP
HCT VFR BLD CALC: 23 % — LOW (ref 37–47)
HCT VFR BLD CALC: 23.8 % — LOW (ref 37–47)
HGB BLD-MCNC: 6.8 G/DL — CRITICAL LOW (ref 12–16)
HGB BLD-MCNC: 7.1 G/DL — LOW (ref 12–16)
IMM GRANULOCYTES NFR BLD AUTO: 0.8 % — HIGH (ref 0.1–0.3)
LYMPHOCYTES # BLD AUTO: 0.41 K/UL — LOW (ref 1.2–3.4)
LYMPHOCYTES # BLD AUTO: 34.2 % — SIGNIFICANT CHANGE UP (ref 20.5–51.1)
MAGNESIUM SERPL-MCNC: 2 MG/DL — SIGNIFICANT CHANGE UP (ref 1.8–2.4)
MCHC RBC-ENTMCNC: 26.9 PG — LOW (ref 27–31)
MCHC RBC-ENTMCNC: 26.9 PG — LOW (ref 27–31)
MCHC RBC-ENTMCNC: 29.6 G/DL — LOW (ref 32–37)
MCHC RBC-ENTMCNC: 29.8 G/DL — LOW (ref 32–37)
MCV RBC AUTO: 90.2 FL — SIGNIFICANT CHANGE UP (ref 81–99)
MCV RBC AUTO: 90.9 FL — SIGNIFICANT CHANGE UP (ref 81–99)
MONOCYTES # BLD AUTO: 0.08 K/UL — LOW (ref 0.1–0.6)
MONOCYTES NFR BLD AUTO: 6.7 % — SIGNIFICANT CHANGE UP (ref 1.7–9.3)
NEUTROPHILS # BLD AUTO: 0.68 K/UL — LOW (ref 1.4–6.5)
NEUTROPHILS NFR BLD AUTO: 56.7 % — SIGNIFICANT CHANGE UP (ref 42.2–75.2)
NRBC # BLD: 0 /100 WBCS — SIGNIFICANT CHANGE UP (ref 0–0)
NRBC # BLD: 0 /100 WBCS — SIGNIFICANT CHANGE UP (ref 0–0)
PHOSPHATE SERPL-MCNC: 2.6 MG/DL — SIGNIFICANT CHANGE UP (ref 2.1–4.9)
PLATELET # BLD AUTO: 101 K/UL — LOW (ref 130–400)
PLATELET # BLD AUTO: 115 K/UL — LOW (ref 130–400)
POTASSIUM SERPL-MCNC: 3.2 MMOL/L — LOW (ref 3.5–5)
POTASSIUM SERPL-SCNC: 3.2 MMOL/L — LOW (ref 3.5–5)
PROT SERPL-MCNC: 5.1 G/DL — LOW (ref 6–8)
RBC # BLD: 2.53 M/UL — LOW (ref 4.2–5.4)
RBC # BLD: 2.64 M/UL — LOW (ref 4.2–5.4)
RBC # FLD: 15.8 % — HIGH (ref 11.5–14.5)
RBC # FLD: 16.6 % — HIGH (ref 11.5–14.5)
SODIUM SERPL-SCNC: 143 MMOL/L — SIGNIFICANT CHANGE UP (ref 135–146)
SPECIMEN SOURCE: SIGNIFICANT CHANGE UP
WBC # BLD: 1.2 K/UL — LOW (ref 4.8–10.8)
WBC # BLD: 1.81 K/UL — LOW (ref 4.8–10.8)
WBC # FLD AUTO: 1.2 K/UL — LOW (ref 4.8–10.8)
WBC # FLD AUTO: 1.81 K/UL — LOW (ref 4.8–10.8)

## 2022-07-05 PROCEDURE — 93010 ELECTROCARDIOGRAM REPORT: CPT

## 2022-07-05 PROCEDURE — 99233 SBSQ HOSP IP/OBS HIGH 50: CPT

## 2022-07-05 PROCEDURE — 74018 RADEX ABDOMEN 1 VIEW: CPT | Mod: 26

## 2022-07-05 PROCEDURE — 99232 SBSQ HOSP IP/OBS MODERATE 35: CPT

## 2022-07-05 PROCEDURE — 71045 X-RAY EXAM CHEST 1 VIEW: CPT | Mod: 26

## 2022-07-05 RX ORDER — POTASSIUM CHLORIDE 20 MEQ
20 PACKET (EA) ORAL
Refills: 0 | Status: DISCONTINUED | OUTPATIENT
Start: 2022-07-05 | End: 2022-07-05

## 2022-07-05 RX ORDER — POTASSIUM CHLORIDE 20 MEQ
10 PACKET (EA) ORAL
Refills: 0 | Status: DISCONTINUED | OUTPATIENT
Start: 2022-07-05 | End: 2022-07-05

## 2022-07-05 RX ORDER — POTASSIUM CHLORIDE 20 MEQ
20 PACKET (EA) ORAL
Refills: 0 | Status: COMPLETED | OUTPATIENT
Start: 2022-07-05 | End: 2022-07-05

## 2022-07-05 RX ORDER — VANCOMYCIN HCL 1 G
125 VIAL (EA) INTRAVENOUS EVERY 6 HOURS
Refills: 0 | Status: DISCONTINUED | OUTPATIENT
Start: 2022-07-05 | End: 2022-07-06

## 2022-07-05 RX ADMIN — Medication 100 MILLIEQUIVALENT(S): at 19:29

## 2022-07-05 RX ADMIN — PANTOPRAZOLE SODIUM 40 MILLIGRAM(S): 20 TABLET, DELAYED RELEASE ORAL at 11:41

## 2022-07-05 RX ADMIN — FENTANYL CITRATE 4.28 MICROGRAM(S)/KG/HR: 50 INJECTION INTRAVENOUS at 07:41

## 2022-07-05 RX ADMIN — Medication 1 PATCH: at 05:20

## 2022-07-05 RX ADMIN — CHLORHEXIDINE GLUCONATE 15 MILLILITER(S): 213 SOLUTION TOPICAL at 05:55

## 2022-07-05 RX ADMIN — Medication 600 MILLIGRAM(S): at 21:51

## 2022-07-05 RX ADMIN — MIDAZOLAM HYDROCHLORIDE 1.71 MG/KG/HR: 1 INJECTION, SOLUTION INTRAMUSCULAR; INTRAVENOUS at 03:51

## 2022-07-05 RX ADMIN — Medication 1 PATCH: at 19:22

## 2022-07-05 RX ADMIN — SPIRONOLACTONE 100 MILLIGRAM(S): 25 TABLET, FILM COATED ORAL at 05:54

## 2022-07-05 RX ADMIN — Medication 1 MILLIGRAM(S): at 11:41

## 2022-07-05 RX ADMIN — QUETIAPINE FUMARATE 100 MILLIGRAM(S): 200 TABLET, FILM COATED ORAL at 05:53

## 2022-07-05 RX ADMIN — LACTULOSE 20 GRAM(S): 10 SOLUTION ORAL at 11:42

## 2022-07-05 RX ADMIN — MEROPENEM 100 MILLIGRAM(S): 1 INJECTION INTRAVENOUS at 05:54

## 2022-07-05 RX ADMIN — MEROPENEM 100 MILLIGRAM(S): 1 INJECTION INTRAVENOUS at 21:22

## 2022-07-05 RX ADMIN — PROPOFOL 0.51 MICROGRAM(S)/KG/MIN: 10 INJECTION, EMULSION INTRAVENOUS at 07:40

## 2022-07-05 RX ADMIN — Medication 100 MILLIGRAM(S): at 11:41

## 2022-07-05 RX ADMIN — QUETIAPINE FUMARATE 100 MILLIGRAM(S): 200 TABLET, FILM COATED ORAL at 18:22

## 2022-07-05 RX ADMIN — LACTULOSE 20 GRAM(S): 10 SOLUTION ORAL at 05:54

## 2022-07-05 RX ADMIN — Medication 100 MILLIEQUIVALENT(S): at 18:22

## 2022-07-05 RX ADMIN — MIDAZOLAM HYDROCHLORIDE 1.71 MG/KG/HR: 1 INJECTION, SOLUTION INTRAMUSCULAR; INTRAVENOUS at 18:19

## 2022-07-05 RX ADMIN — Medication 600 MILLIGRAM(S): at 12:59

## 2022-07-05 RX ADMIN — MIDAZOLAM HYDROCHLORIDE 1.71 MG/KG/HR: 1 INJECTION, SOLUTION INTRAMUSCULAR; INTRAVENOUS at 07:41

## 2022-07-05 RX ADMIN — CHLORHEXIDINE GLUCONATE 1 APPLICATION(S): 213 SOLUTION TOPICAL at 05:54

## 2022-07-05 RX ADMIN — LACTULOSE 20 GRAM(S): 10 SOLUTION ORAL at 00:53

## 2022-07-05 RX ADMIN — CHLORHEXIDINE GLUCONATE 15 MILLILITER(S): 213 SOLUTION TOPICAL at 19:27

## 2022-07-05 RX ADMIN — Medication 1 PATCH: at 11:43

## 2022-07-05 RX ADMIN — MEROPENEM 100 MILLIGRAM(S): 1 INJECTION INTRAVENOUS at 12:58

## 2022-07-05 RX ADMIN — Medication 1 PATCH: at 11:41

## 2022-07-05 RX ADMIN — SCOPALAMINE 1 PATCH: 1 PATCH, EXTENDED RELEASE TRANSDERMAL at 19:22

## 2022-07-05 RX ADMIN — Medication 600 MILLIGRAM(S): at 19:00

## 2022-07-05 RX ADMIN — SCOPALAMINE 1 PATCH: 1 PATCH, EXTENDED RELEASE TRANSDERMAL at 05:20

## 2022-07-05 NOTE — PROGRESS NOTE ADULT - SUBJECTIVE AND OBJECTIVE BOX
CC.  steven  HPI.  Patient is intubated/vented  appears to be comfortable   unable to obtain ROS/HX       Vital Signs Last 24 Hrs  T(C): 37.7 (05 Jul 2022 07:06), Max: 39.4 (04 Jul 2022 11:00)  T(F): 99.9 (05 Jul 2022 07:06), Max: 102.9 (04 Jul 2022 11:00)  HR: 98 (05 Jul 2022 09:06) (95 - 112)  BP: 103/57 (05 Jul 2022 09:06) (103/57 - 155/93)  BP(mean): 74 (05 Jul 2022 09:06) (74 - 105)  RR: 29 (05 Jul 2022 09:06) (29 - 52)  SpO2: 100% (05 Jul 2022 09:06) (89% - 100%)      PHYSICAL EXAM-  GENERAL: NAD   HEAD:  Atraumatic, Normocephalic  EYES: EOMI, PERRLA, conjunctiva and sclera clear  NECK: Supple, No JVD, Normal thyroid  NERVOUS SYSTEM:  Sedated  CHEST/LUNG:  Rhonchi with good air entry  HEART: Regular rate and rhythm; No murmurs, rubs, or gallops  ABDOMEN: Soft, Nontender, Nondistended; Bowel sounds present  EXTREMITIES:  No clubbing, cyanosis, or edema  SKIN: No rashes or lesions                                  6.8    1.20  )-----------( 101      ( 05 Jul 2022 05:36 )             23.0     07-05    143  |  108  |  28<H>  ----------------------------<  104<H>  3.2<L>   |  27  |  0.8    Ca    8.6      05 Jul 2022 05:36  Phos  2.6     07-05  Mg     2.0     07-05    TPro  5.1<L>  /  Alb  2.5<L>  /  TBili  0.3  /  DBili  x   /  AST  19  /  ALT  9   /  AlkPhos  470<H>  07-05                                          MEDICATIONS  (STANDING):  chlorhexidine 0.12% Liquid 15 milliLiter(s) Oral Mucosa every 12 hours  chlorhexidine 4% Liquid 1 Application(s) Topical daily  enoxaparin Injectable 40 milliGRAM(s) SubCutaneous every 24 hours  fentaNYL   Infusion. 0.5 MICROgram(s)/kG/Hr (4.28 mL/Hr) IV Continuous <Continuous>  folic acid 1 milliGRAM(s) Oral daily  ketamine Infusion. 0.2 mG/kG/Hr (1.71 mL/Hr) IV Continuous <Continuous>  lactulose Syrup 20 Gram(s) Oral every 6 hours  meropenem  IVPB 1000 milliGRAM(s) IV Intermittent every 8 hours  midazolam Infusion 0.02 mG/kG/Hr (1.71 mL/Hr) IV Continuous <Continuous>  nicotine -  14 mG/24Hr(s) Patch 1 patch Transdermal daily  norepinephrine Infusion 0.05 MICROgram(s)/kG/Min (8.03 mL/Hr) IV Continuous <Continuous>  pantoprazole   Suspension 40 milliGRAM(s) Enteral Tube daily  propofol Infusion 1 MICROgram(s)/kG/Min (0.51 mL/Hr) IV Continuous <Continuous>  QUEtiapine 100 milliGRAM(s) Oral two times a day  scopolamine 1 mG/72 Hr(s) Patch 1 Patch Transdermal every 72 hours  senna 2 Tablet(s) Oral at bedtime  spironolactone 100 milliGRAM(s) Oral daily  thiamine 100 milliGRAM(s) Oral daily    MEDICATIONS  (PRN):  ALBUTerol    90 MICROgram(s) HFA Inhaler 1 Puff(s) Inhalation every 4 hours PRN Shortness of Breath and/or Wheezing  cloNIDine 0.1 milliGRAM(s) Oral every 6 hours PRN opiate withdrawal  hydrOXYzine hydrochloride 50 milliGRAM(s) Oral every 6 hours PRN Anxiety  ibuprofen  Tablet. 600 milliGRAM(s) Oral every 6 hours PRN Temp greater or equal to 38C (100.4F)  ibuprofen  Tablet. 400 milliGRAM(s) Oral every 6 hours PRN Mild Pain (1 - 3)  sodium chloride 0.9% lock flush 10 milliLiter(s) IV Push every 1 hour PRN Pre/post blood products, medications, blood draw, and to maintain line patency    Imaging Personally Reviewed:     [x ] YES  [ ] NO    Consultant(s) Notes Reviewed:  [x ] YES  [ ] NO    Care Discussed with Consultants/Other Providers [x ] YES  [ ] NO

## 2022-07-05 NOTE — PROGRESS NOTE ADULT - SUBJECTIVE AND OBJECTIVE BOX
POOJA DIANE  26y, Female  Allergy: buprenorphine (Rash)  Suboxone (Rash)      LOS  20d    CHIEF COMPLAINT: anasarca (05 Jul 2022 08:24)      INTERVAL EVENTS/HPI  - No acute events overnight  - T(F): , Max: 102.9 (07-04-22 @ 11:00)  - remains febrile   - WBC Count: 1.20 (07-05-22 @ 05:36)  WBC Count: 2.96 (07-04-22 @ 06:11)     - Creatinine, Serum: 0.8 (07-05-22 @ 05:36)  Creatinine, Serum: 0.9 (07-04-22 @ 06:11)       ROS  unable to obtain history secondary to patient's mental status and/or sedation    VITALS:  T(F): 99.9, Max: 102.9 (07-04-22 @ 11:00)  HR: 98  BP: 106/55  RR: 31Vital Signs Last 24 Hrs  T(C): 37.7 (05 Jul 2022 07:06), Max: 39.4 (04 Jul 2022 11:00)  T(F): 99.9 (05 Jul 2022 07:06), Max: 102.9 (04 Jul 2022 11:00)  HR: 98 (05 Jul 2022 08:25) (95 - 112)  BP: 106/55 (05 Jul 2022 07:06) (106/55 - 155/93)  BP(mean): 74 (05 Jul 2022 07:06) (74 - 105)  RR: 31 (05 Jul 2022 07:06) (29 - 52)  SpO2: 100% (05 Jul 2022 08:25) (89% - 100%)    PHYSICAL EXAM:  Gen: intubated  HEENT: Normocephalic, atraumatic  Neck: supple, no lymphadenopathy  CV: Regular rate & regular rhythm  Lungs: decreased BS at bases, no fremitus  Abdomen: Soft, BS present  Ext: Warm, well perfused  Neuro: non focal, seadted  Skin: no rash, no erythema  Lines: no phlebitis    FH: Non-contributory  Social Hx: Non-contributory    TESTS & MEASUREMENTS:                        6.8    1.20  )-----------( 101      ( 05 Jul 2022 05:36 )             23.0     07-05    143  |  108  |  28<H>  ----------------------------<  104<H>  3.2<L>   |  27  |  0.8    Ca    8.6      05 Jul 2022 05:36  Phos  2.6     07-05  Mg     2.0     07-05    TPro  5.1<L>  /  Alb  2.5<L>  /  TBili  0.3  /  DBili  x   /  AST  19  /  ALT  9   /  AlkPhos  470<H>  07-05      LIVER FUNCTIONS - ( 05 Jul 2022 05:36 )  Alb: 2.5 g/dL / Pro: 5.1 g/dL / ALK PHOS: 470 U/L / ALT: 9 U/L / AST: 19 U/L / GGT: x               Culture - Sputum (collected 07-04-22 @ 13:39)  Source: ET Tube ET Tube  Gram Stain (07-05-22 @ 08:48):    Few polymorphonuclear leukocytes per low power field    No Squamous epithelial cells per low power field    Numerous Gram Negative Coccobacilli seen per oil power field    Culture - Blood (collected 06-29-22 @ 20:31)  Source: .Blood Blood-Peripheral  Preliminary Report (07-01-22 @ 20:01):    No growth to date.    Culture - Blood (collected 06-29-22 @ 20:31)  Source: .Blood Blood  Preliminary Report (07-01-22 @ 20:01):    No growth to date.    Culture - Blood (collected 06-28-22 @ 06:00)  Source: .Blood Blood  Final Report (07-03-22 @ 23:00):    No Growth Final    Culture - Sputum (collected 06-27-22 @ 14:00)  Source: Trach Asp Tracheal Aspirate  Gram Stain (06-28-22 @ 03:15):    Moderate polymorphonuclear leukocytes per low power field    Few Squamous epithelial cells per low power field    Moderate Gram positive cocci in pairs seen per oil power field    Few Gram Variable Rods seen per oil power field  Final Report (06-30-22 @ 16:33):    Numerous Mixed gram negative rods    Moderate Staphylococcus aureus    Normal Respiratory Kilo present  Organism: Staphylococcus aureus (06-30-22 @ 16:33)  Organism: Staphylococcus aureus (06-30-22 @ 16:33)      -  Ampicillin/Sulbactam: S <=8/4      -  Cefazolin: S <=4      -  Clindamycin: S <=0.25      -  Erythromycin: S <=0.25      -  Gentamicin: S <=1 Should not be used as monotherapy      -  Oxacillin: S <=0.25 Oxacillin predicts susceptibility for dicloxacillin, methicillin, and nafcillin      -  Rifampin: S <=1 Should not be used as monotherapy      -  Tetra/Doxy: S <=1      -  Trimethoprim/Sulfamethoxazole: S <=0.5/9.5      -  Vancomycin: S 2      Method Type: AL    Culture - Sputum (collected 06-24-22 @ 17:20)  Source: Trach Asp Tracheal Aspirate  Gram Stain (06-25-22 @ 06:29):    Few polymorphonuclear leukocytes per low power field    Rare Squamous epithelial cells per low power field    Moderate Gram Negative Rods seen per oil power field  Final Report (06-26-22 @ 17:00):    Moderate Klebsiella oxytoca/Raoutella ornithinolytica    Moderate Enterobacter cloacae complex    Normal Respiratory Kilo absent  Organism: Klebsiella oxytoca /Raoutella ornithinolytica  Enterobacter cloacae complex (06-26-22 @ 17:00)  Organism: Enterobacter cloacae complex (06-26-22 @ 17:00)      -  Amikacin: S <=16      -  Amoxicillin/Clavulanic Acid: R >16/8      -  Ampicillin: R >16 These ampicillin results predict results for amoxicillin      -  Ampicillin/Sulbactam: R >16/8 Enterobacter, Klebsiella aerogenes, Citrobacter, and Serratia may develop resistance during prolonged therapy (3-4 days)      -  Aztreonam: S <=4      -  Cefazolin: R >16 Enterobacter, Klebsiella aerogenes, Citrobacter, and Serratia may develop resistance during prolonged therapy (3-4 days)      -  Cefepime: S <=2      -  Cefoxitin: R >16      -  Ceftriaxone: S <=1 Enterobacter, Klebsiella aerogenes, Citrobacter, and Serratia may develop resistance during prolonged therapy      -  Ciprofloxacin: S <=0.25      -  Ertapenem: S <=0.5      -  Gentamicin: S <=2      -  Imipenem: S <=1      -  Levofloxacin: S <=0.5      -  Meropenem: S <=1      -  Piperacillin/Tazobactam: S <=8      -  Tobramycin: S <=2      -  Trimethoprim/Sulfamethoxazole: S <=0.5/9.5      Method Type: AL  Organism: Klebsiella oxytoca /Raoutella ornithinolytica (06-26-22 @ 17:00)      -  Amikacin: S <=16      -  Amoxicillin/Clavulanic Acid: S <=8/4      -  Ampicillin: R 16 These ampicillin results predict results for amoxicillin      -  Ampicillin/Sulbactam: S <=4/2 Enterobacter, Klebsiella aerogenes, Citrobacter, and Serratia may develop resistance during prolonged therapy (3-4 days)      -  Aztreonam: S <=4      -  Cefazolin: S <=2 Enterobacter, Klebsiella aerogenes, Citrobacter, and Serratia may develop resistance during prolonged therapy (3-4 days)      -  Cefepime: S <=2      -  Cefoxitin: S <=8      -  Ceftriaxone: S <=1 Enterobacter, Klebsiella aerogenes, Citrobacter, and Serratia may develop resistance during prolonged therapy      -  Ciprofloxacin: S <=0.25      -  Ertapenem: S <=0.5      -  Gentamicin: S <=2      -  Imipenem: S <=1      -  Levofloxacin: S <=0.5      -  Meropenem: S <=1      -  Piperacillin/Tazobactam: S <=8      -  Tobramycin: S <=2      -  Trimethoprim/Sulfamethoxazole: S <=0.5/9.5      Method Type: AL    Culture - Body Fluid with Gram Stain (collected 06-22-22 @ 01:00)  Source: Peritoneal Peritoneal Fluid  Gram Stain (06-23-22 @ 02:31):    No polymorphonuclear leukocytes seen    No organisms seen    by cytocentrifuge  Final Report (06-27-22 @ 19:13):    No growth at 5 days    Culture - Blood (collected 06-21-22 @ 05:33)  Source: .Blood None  Final Report (06-26-22 @ 19:00):    No Growth Final    Culture - Sputum (collected 06-17-22 @ 14:30)  Source: Trach Asp Tracheal Aspirate  Gram Stain (06-18-22 @ 10:18):    Rare polymorphonuclear leukocytes per low power field    No Squamous epithelial cells per low power field    Numerous Gram Positive Cocci in Pairs and Chains seen per oil power field    Rare Gram Negative Rods seen per oil power field    Rare Gram PositiveRods seen per oil power field  Final Report (06-19-22 @ 16:39):    Normal Respiratory Kilo present    Culture - Fungal, Body Fluid (collected 06-17-22 @ 12:24)  Source: .Body Fluid Peritoneal Fluid  Preliminary Report (06-25-22 @ 15:02):    No growth    Culture - Body Fluid with Gram Stain (collected 06-17-22 @ 12:24)  Source: .Body Fluid Peritoneal Fluid  Gram Stain (06-18-22 @ 03:19):    No polymorphonuclear leukocytes seen    No organisms seen    by cytocentrifuge  Final Report (06-22-22 @ 20:34):    No growth at 5 days    Culture - Urine (collected 06-15-22 @ 08:00)  Source: Clean Catch Clean Catch (Midstream)  Final Report (06-17-22 @ 22:14):    Normal Urogenital kilo present            INFECTIOUS DISEASES TESTING  MRSA PCR Result.: Negative (06-30-22 @ 10:34)  Procalcitonin, Serum: 0.36 (06-30-22 @ 10:30)  Rapid RVP Result: NotDetec (06-30-22 @ 09:19)  HIV-1/2 Combo Result: Nonreact (06-29-22 @ 10:29)  Procalcitonin, Serum: 0.11 (06-27-22 @ 15:46)  Procalcitonin, Serum: 0.11 (06-27-22 @ 06:47)  Procalcitonin, Serum: 0.62 (06-20-22 @ 05:49)  MRSA PCR Result.: Negative (06-17-22 @ 14:30)  Procalcitonin, Serum: 3.28 (06-17-22 @ 05:22)  COVID-19 PCR: NotDetec (06-15-22 @ 07:10)  Procalcitonin, Serum: 0.07 (04-20-22 @ 12:46)      INFLAMMATORY MARKERS  Sedimentation Rate, Erythrocyte: 86 mm/Hr (06-30-22 @ 05:46)  Sedimentation Rate, Erythrocyte: 65 mm/Hr (06-23-22 @ 16:00)      RADIOLOGY & ADDITIONAL TESTS:  I have personally reviewed the last available Chest xray  CXR      CT      CARDIOLOGY TESTING  12 Lead ECG:   Ventricular Rate 116 BPM    Atrial Rate 116 BPM    P-R Interval 150 ms    QRS Duration 80 ms    Q-T Interval 332 ms    QTC Calculation(Bazett) 461 ms    P Axis 83 degrees    R Axis 132 degrees    T Axis 206 degrees    Diagnosis Line Sinus tachycardia  Right axis deviation  Possible Right ventricular hypertrophy  T wave abnormality, consider lateral ischemia  Abnormal ECG    Confirmed by BRANDON BELL MD ([id number]) on 7/4/2022 9:19:15 AM (07-04-22 @ 07:43)  12 Lead ECG:   Ventricular Rate 114 BPM    Atrial Rate 114 BPM    P-R Interval 148 ms    QRS Duration 70 ms    Q-T Interval 292 ms    QTC Calculation(Bazett) 402 ms    P Axis 81 degrees    R Axis 111 degrees    T Axis 72 degrees    Diagnosis Line Sinus tachycardia  Right axis deviation  Possible Right ventricular hypertrophy  Nonspecific T wave abnormality  Abnormal ECG    Confirmed by BRANDON BELL MD ([id number]) on 7/4/2022 9:18:22 AM (07-03-22 @ 08:58)      MEDICATIONS  chlorhexidine 0.12% Liquid 15 Oral Mucosa every 12 hours  chlorhexidine 4% Liquid 1 Topical daily  enoxaparin Injectable 40 SubCutaneous every 24 hours  fentaNYL   Infusion. 0.5 IV Continuous <Continuous>  folic acid 1 Oral daily  ketamine Infusion. 0.2 IV Continuous <Continuous>  lactulose Syrup 20 Oral every 6 hours  meropenem  IVPB 1000 IV Intermittent every 8 hours  methadone    Tablet 30 Oral daily  midazolam Infusion 0.02 IV Continuous <Continuous>  nicotine -  14 mG/24Hr(s) Patch 1 Transdermal daily  norepinephrine Infusion 0.05 IV Continuous <Continuous>  pantoprazole   Suspension 40 Enteral Tube daily  propofol Infusion 1 IV Continuous <Continuous>  QUEtiapine 100 Oral two times a day  scopolamine 1 mG/72 Hr(s) Patch 1 Transdermal every 72 hours  senna 2 Oral at bedtime  spironolactone 100 Oral daily  thiamine 100 Oral daily      WEIGHT  Weight (kg): 85.6 (04-20-22 @ 08:10)  Creatinine, Serum: 0.8 mg/dL (07-05-22 @ 05:36)      ANTIBIOTICS:  meropenem  IVPB 1000 milliGRAM(s) IV Intermittent every 8 hours      All available historical records have been reviewed

## 2022-07-05 NOTE — PROGRESS NOTE ADULT - ASSESSMENT
ASSESSMENT  Patient is a 26 year old female with hx of asthma, untreated Hep C, IVDA, anasarca presenting with increased dyspnea since yesterday    IMPRESSION  #Acute respiratory failure s/p intubation 6/16  #Pneumonia -   - CT Chest 6/22 - left greater than right lower lobe consolidations   - sputum Cx 6/24 Klebsiella oxytoca, Enterobacter cloacae complex    #FUO  - Ferritin, Serum: 94 ng/mL (06.27.22 @ 06:47)  - Procalcitonin, Serum: 0.11 (06.27.22 @ 15:46)  - Lactate Dehydrogenase, Serum: 173: Hemolyzed. Interpret with caution (06.27.22 @ 06:47)  - Sedimentation Rate, Erythrocyte: 86 mm/Hr (06.30.22 @ 05:46)  - Rheumatoid Factor Quant, Serum or Plasma: 13 IU/mL (06.30.22 @ 09:07)  - autoimmune panel so far negative   - HIV-1/2 Combo Result: Nonreact  (06.29.22 @ 10:29)  - TTE 6/20 - entire apex, mid, apicla inferior wall and mid inferolateral segment are abnormal increased RV, moderate Pulmonary HTN, no significant valvulopathy   - US Abdomen Complete (US Abdomen Complete .) (06.15.22 @ 11:29): Cholelithiasis. Gallbladder wall thickening. Negative sonographic  Hammer's sign. If there is high clinical suspicion for acute  cholecystitis recommend a HIDA scan. Splenomegaly measuring 20.9 cm. Moderate ascites.  - s/p paracentesis 6/19 - cytology negative  - CT Abd/pelvis 6/26 - moderated ascites moderate pericardial effusion   - CT Angio Chest PE Protocol w/ IV Cont (06.29.22 @ 21:14): No pulmonary embolus. Near complete consolidation/collapse of the left lower lobe, increased as   compared with prior. Mildly increased right lower lobe patchy  consolidative opacities-atelectasis. Adjacent bilateral small pleural  effusions. Findings compatible with likely mixed pneumonia and  atelectasis. Redemonstration of a dilated main pulmonary artery measuring up to 3.8 cm  compatible with pulmonary hypertension. Redemonstration of partially imaged splenomegaly. Additional chronic findings as above.  - appreciate Rheum consult 6/29    #Drug overdose vs withdrawal?  #Hepatosplenomegaly - present since CT Abd/pelvis 1/30/2021  #Pancytopenia    #Hx of Untreated Hep C   - Hepatitis C Virus Interpretation: Weakly Reactive Hepatitis C (02.06.21 @ 08:45)  - Hepatitis C RNA, Qualitative: NotDetec (06.23.22 @ 16:00)    #IVDA   #Obesity BMI (kg/m2): 30.5  #DM   #Abx allergy: buprenorphine (Rash)  Suboxone (Rash)    Recommendations   - check fungitell   - follow-up bartonella-ab  - continue meropenem 1g q 8 hours   - follow-up sputum cx 7/4  - etiology of fevers remains unclear - may need bone marrow biopsy/liver biopsy  for FUO       Please call or message on [employer] Teams if with any questions.  Spectra [id number]

## 2022-07-05 NOTE — PROGRESS NOTE ADULT - ASSESSMENT
Patient is 26 year old female with hx of asthma,  IVDA, anasarca hx of use of opiates 2 grams per day IV into her thighs  x years with minimal sobriety. Pt has been on MMTP in 2020.  presenting with       # AHRF - on MV  # Aspiration PNA s/p Unasyn Course  # Persistent Fevers  # Anemia  # Pancytopenia  # pHTN WHO Class unclear at this time  # h/o Pericardial Effusion  # OD vs. Other Tox  # Ascites w/ h/o Liver cirrhosis d/t HepC  #Abnormal TTE        PLAN:     - Seizure Precautions  - Hold lasix   -NG tube Feeds  -continue with current antibiotic as per ID.  Follow up on cultures  -Further rheumatological workup as per Rheumatology    -S/P 2 units of RBC transfusion.  Being transfused one unit of RBC today due to drop in H/H  - Heme/Onc to follow up   -Cardiology recommended further cardiac workup when stable   -Vent management as per pulmonary  -Repeat TTE      further care as per ID, cardiology, rheumatology, and  critical care team

## 2022-07-05 NOTE — PROGRESS NOTE ADULT - SUBJECTIVE AND OBJECTIVE BOX
Patient is a 26y old  Female who presents with a chief complaint of anasarca (04 Jul 2022 10:03)        Over Night Events: Fentanyl , versed and Propofol, Fentanyl 2.5 , Propofol 40 mc. one unit of PRBC in 24 hrs.         ROS:     All ROS are negative except HPI         PHYSICAL EXAM    ICU Vital Signs Last 24 Hrs  T(C): 37.7 (05 Jul 2022 07:06), Max: 39.4 (04 Jul 2022 11:00)  T(F): 99.9 (05 Jul 2022 07:06), Max: 102.9 (04 Jul 2022 11:00)  HR: 101 (05 Jul 2022 07:06) (95 - 116)  BP: 106/55 (05 Jul 2022 07:06) (106/55 - 165/103)  BP(mean): 74 (05 Jul 2022 07:06) (74 - 126)  RR: 31 (05 Jul 2022 07:06) (29 - 56)  SpO2: 99% (05 Jul 2022 07:06) (89% - 100%)      CONSTITUTIONAL:  ill appearing     ENT:   Airway patent,   Mouth with normal mucosa.   ET tube +ve          CARDIAC:   Normal rate,   Regular rhythm.    LE edema +ve ?          RESPIRATORY:   Decreased air entry     GASTROINTESTINAL:  Abdomen soft,   Non-tender,   No guarding,   + BS         NEUROLOGICAL:   Agitated     SKIN:   DTI          07-04-22 @ 07:01  -  07-05-22 @ 07:00  --------------------------------------------------------  IN:    Enteral Tube Flush: 240 mL    FentaNYL: 365 mL    IV PiggyBack: 300 mL    IV PiggyBack: 150 mL    Midazolam: 230 mL    Propofol: 575 mL    Vital High Protein: 1035 mL  Total IN: 2895 mL    OUT:    Indwelling Catheter - Urethral (mL): 2310 mL  Total OUT: 2310 mL    Total NET: 585 mL      07-05-22 @ 07:01  -  07-05-22 @ 08:24  --------------------------------------------------------  IN:  Total IN: 0 mL    OUT:    Indwelling Catheter - Urethral (mL): 90 mL  Total OUT: 90 mL    Total NET: -90 mL          LABS:                            6.8    1.20  )-----------( 101      ( 05 Jul 2022 05:36 )             23.0                                               07-05    143  |  108  |  28<H>  ----------------------------<  104<H>  3.2<L>   |  27  |  0.8    Ca    8.6      05 Jul 2022 05:36  Phos  2.6     07-05  Mg     2.0     07-05    TPro  5.1<L>  /  Alb  2.5<L>  /  TBili  0.3  /  DBili  x   /  AST  19  /  ALT  9   /  AlkPhos  470<H>  07-05                                                                                           LIVER FUNCTIONS - ( 05 Jul 2022 05:36 )  Alb: 2.5 g/dL / Pro: 5.1 g/dL / ALK PHOS: 470 U/L / ALT: 9 U/L / AST: 19 U/L / GGT: x                                                                                               Mode: AC/ CMV (Assist Control/ Continuous Mandatory Ventilation)  RR (machine): 28  TV (machine): 400  FiO2: 30  PEEP: 8  ITime: 1  MAP: 11  PIP: 21                                      ABG - ( 05 Jul 2022 04:30 )  pH, Arterial: 7.40  pH, Blood: x     /  pCO2: 46    /  pO2: 104   / HCO3: 28    / Base Excess: 3.3   /  SaO2: 99.6  / lactate 0.1               MEDICATIONS  (STANDING):  chlorhexidine 0.12% Liquid 15 milliLiter(s) Oral Mucosa every 12 hours  chlorhexidine 4% Liquid 1 Application(s) Topical daily  enoxaparin Injectable 40 milliGRAM(s) SubCutaneous every 24 hours  fentaNYL   Infusion. 0.5 MICROgram(s)/kG/Hr (4.28 mL/Hr) IV Continuous <Continuous>  folic acid 1 milliGRAM(s) Oral daily  ketamine Infusion. 0.2 mG/kG/Hr (1.71 mL/Hr) IV Continuous <Continuous>  lactulose Syrup 20 Gram(s) Oral every 6 hours  meropenem  IVPB 1000 milliGRAM(s) IV Intermittent every 8 hours  methadone    Tablet 30 milliGRAM(s) Oral daily  midazolam Infusion 0.02 mG/kG/Hr (1.71 mL/Hr) IV Continuous <Continuous>  nicotine -  14 mG/24Hr(s) Patch 1 patch Transdermal daily  norepinephrine Infusion 0.05 MICROgram(s)/kG/Min (8.03 mL/Hr) IV Continuous <Continuous>  pantoprazole   Suspension 40 milliGRAM(s) Enteral Tube daily  propofol Infusion 1 MICROgram(s)/kG/Min (0.51 mL/Hr) IV Continuous <Continuous>  QUEtiapine 100 milliGRAM(s) Oral two times a day  scopolamine 1 mG/72 Hr(s) Patch 1 Patch Transdermal every 72 hours  senna 2 Tablet(s) Oral at bedtime  spironolactone 100 milliGRAM(s) Oral daily  thiamine 100 milliGRAM(s) Oral daily    MEDICATIONS  (PRN):  ALBUTerol    90 MICROgram(s) HFA Inhaler 1 Puff(s) Inhalation every 4 hours PRN Shortness of Breath and/or Wheezing  cloNIDine 0.1 milliGRAM(s) Oral every 6 hours PRN opiate withdrawal  hydrOXYzine hydrochloride 50 milliGRAM(s) Oral every 6 hours PRN Anxiety  ibuprofen  Tablet. 600 milliGRAM(s) Oral every 6 hours PRN Temp greater or equal to 38C (100.4F)  ibuprofen  Tablet. 400 milliGRAM(s) Oral every 6 hours PRN Mild Pain (1 - 3)  sodium chloride 0.9% lock flush 10 milliLiter(s) IV Push every 1 hour PRN Pre/post blood products, medications, blood draw, and to maintain line patency           CXR interpreted by me:  ET tube ok , OG tube ok.      Patient is a 26y old  Female who presents with a chief complaint of anasarca (04 Jul 2022 10:03)        Over Night Events: Fentanyl , versed and Propofol, Fentanyl 2.5 , Propofol 40 mc. one unit of PRBC in 24 hrs.         ROS:     All ROS are negative except HPI         PHYSICAL EXAM    ICU Vital Signs Last 24 Hrs  T(C): 37.7 (05 Jul 2022 07:06), Max: 39.4 (04 Jul 2022 11:00)  T(F): 99.9 (05 Jul 2022 07:06), Max: 102.9 (04 Jul 2022 11:00)  HR: 101 (05 Jul 2022 07:06) (95 - 116)  BP: 106/55 (05 Jul 2022 07:06) (106/55 - 165/103)  BP(mean): 74 (05 Jul 2022 07:06) (74 - 126)  RR: 31 (05 Jul 2022 07:06) (29 - 56)  SpO2: 99% (05 Jul 2022 07:06) (89% - 100%)      CONSTITUTIONAL:  ill appearing     ENT:   Airway patent,   Mouth with normal mucosa.   ET tube +ve          CARDIAC:   Normal rate,   Regular rhythm.    LE edema +ve ?          RESPIRATORY:   Decreased air entry     GASTROINTESTINAL:  Abdomen soft,   Non-tender,   No guarding,   + BS      NEUROLOGICAL:   Agitated     SKIN:   DTI          07-04-22 @ 07:01  -  07-05-22 @ 07:00  --------------------------------------------------------  IN:    Enteral Tube Flush: 240 mL    FentaNYL: 365 mL    IV PiggyBack: 300 mL    IV PiggyBack: 150 mL    Midazolam: 230 mL    Propofol: 575 mL    Vital High Protein: 1035 mL  Total IN: 2895 mL    OUT:    Indwelling Catheter - Urethral (mL): 2310 mL  Total OUT: 2310 mL    Total NET: 585 mL      07-05-22 @ 07:01  -  07-05-22 @ 08:24  --------------------------------------------------------  IN:  Total IN: 0 mL    OUT:    Indwelling Catheter - Urethral (mL): 90 mL  Total OUT: 90 mL    Total NET: -90 mL          LABS:                            6.8    1.20  )-----------( 101      ( 05 Jul 2022 05:36 )             23.0                                               07-05    143  |  108  |  28<H>  ----------------------------<  104<H>  3.2<L>   |  27  |  0.8    Ca    8.6      05 Jul 2022 05:36  Phos  2.6     07-05  Mg     2.0     07-05    TPro  5.1<L>  /  Alb  2.5<L>  /  TBili  0.3  /  DBili  x   /  AST  19  /  ALT  9   /  AlkPhos  470<H>  07-05                                                                                           LIVER FUNCTIONS - ( 05 Jul 2022 05:36 )  Alb: 2.5 g/dL / Pro: 5.1 g/dL / ALK PHOS: 470 U/L / ALT: 9 U/L / AST: 19 U/L / GGT: x                                                                                               Mode: AC/ CMV (Assist Control/ Continuous Mandatory Ventilation)  RR (machine): 28  TV (machine): 400  FiO2: 30  PEEP: 8  ITime: 1  MAP: 11  PIP: 21                                      ABG - ( 05 Jul 2022 04:30 )  pH, Arterial: 7.40  pH, Blood: x     /  pCO2: 46    /  pO2: 104   / HCO3: 28    / Base Excess: 3.3   /  SaO2: 99.6  / lactate 0.1       MEDICATIONS  (STANDING):  chlorhexidine 0.12% Liquid 15 milliLiter(s) Oral Mucosa every 12 hours  chlorhexidine 4% Liquid 1 Application(s) Topical daily  enoxaparin Injectable 40 milliGRAM(s) SubCutaneous every 24 hours  fentaNYL   Infusion. 0.5 MICROgram(s)/kG/Hr (4.28 mL/Hr) IV Continuous <Continuous>  folic acid 1 milliGRAM(s) Oral daily  ketamine Infusion. 0.2 mG/kG/Hr (1.71 mL/Hr) IV Continuous <Continuous>  lactulose Syrup 20 Gram(s) Oral every 6 hours  meropenem  IVPB 1000 milliGRAM(s) IV Intermittent every 8 hours  methadone    Tablet 30 milliGRAM(s) Oral daily  midazolam Infusion 0.02 mG/kG/Hr (1.71 mL/Hr) IV Continuous <Continuous>  nicotine -  14 mG/24Hr(s) Patch 1 patch Transdermal daily  norepinephrine Infusion 0.05 MICROgram(s)/kG/Min (8.03 mL/Hr) IV Continuous <Continuous>  pantoprazole   Suspension 40 milliGRAM(s) Enteral Tube daily  propofol Infusion 1 MICROgram(s)/kG/Min (0.51 mL/Hr) IV Continuous <Continuous>  QUEtiapine 100 milliGRAM(s) Oral two times a day  scopolamine 1 mG/72 Hr(s) Patch 1 Patch Transdermal every 72 hours  senna 2 Tablet(s) Oral at bedtime  spironolactone 100 milliGRAM(s) Oral daily  thiamine 100 milliGRAM(s) Oral daily    MEDICATIONS  (PRN):  ALBUTerol    90 MICROgram(s) HFA Inhaler 1 Puff(s) Inhalation every 4 hours PRN Shortness of Breath and/or Wheezing  cloNIDine 0.1 milliGRAM(s) Oral every 6 hours PRN opiate withdrawal  hydrOXYzine hydrochloride 50 milliGRAM(s) Oral every 6 hours PRN Anxiety  ibuprofen  Tablet. 600 milliGRAM(s) Oral every 6 hours PRN Temp greater or equal to 38C (100.4F)  ibuprofen  Tablet. 400 milliGRAM(s) Oral every 6 hours PRN Mild Pain (1 - 3)  sodium chloride 0.9% lock flush 10 milliLiter(s) IV Push every 1 hour PRN Pre/post blood products, medications, blood draw, and to maintain line patency           CXR interpreted by me:  ET tube ok , OG tube ok.

## 2022-07-05 NOTE — PROGRESS NOTE ADULT - ASSESSMENT
IMPRESSION:  Acute respiratory failure sp intubation  PNA  MSSA pna  seizure like activity  ?? drug over dose/ withdrawal opoid   liver cirrhosis   Ascites sp paracentesis   Pancytopenia- worsening  RENETTA     PLAN:    CNS: do SAT/SBT as tolerated  CW methadone 30mg daily  CW Clonapin to 2mg TID  CW Seroquel 100mg BID  ketamine     HEENT: oral care     PULMONARY:  HOB 45,  Vent changes: wean O2 as tolerated, TV to 400. Keep on right side    CARDIOVASCULAR: goal MAP >65.  Monitor QTc daily. 500 cc bolus.   hold lasix     GI: GI prophylaxis.  OG Feeding.    KUB reviewed    RENAL: monitor lytes is and os     INFECTIOUS DISEASE: Abx vanco celestine pend sensi  worsening R infiltrate. Fungitell , Glactomannan. Repeat Procal.      HEMATOLOGICAL:  monitor CBC, Heme FU.   dvt ppx   check d-dimer , HIT abx.     ENDOCRINE:  Follow up FS.  Insulin protocol if needed.     MICU  lines: Left IJ 6/30   [facility] - failed TOV 6/24 Change.  IMPRESSION:  Acute respiratory failure sp intubation  PNA  MSSA pna  seizure like activity  ?? drug over dose/ withdrawal opoid   liver cirrhosis   Ascites sp paracentesis   Pancytopenia- worsening  RENETTA     PLAN:    CNS: do SAT/SBT as tolerated  CW methadone 30mg daily  CW Clonapin to 2mg TID  CW Seroquel 100mg BID  ketamine     HEENT: oral care     PULMONARY:  HOB 45,  Vent changes: wean O2 as tolerated, TV to 400. Keep on right side    CARDIOVASCULAR: goal MAP >65.  Monitor QTc daily. 500 cc bolus.   hold lasix   repeat echo r/o purulent pericarditis    GI: GI prophylaxis.  OG Feeding.    KUB reviewed    RENAL: monitor lytes is and os     INFECTIOUS DISEASE: Abx vanco celestine pend sensi  worsening R infiltrate. Fungitell , Glactomannan. Repeat Procal.      HEMATOLOGICAL:    monitor CBC, Heme FU.   transfuse 1 unit  reticulocyte count  dvt ppx  check d-dimer , HIT abx.     ENDOCRINE:  Follow up FS.  Insulin protocol if needed.     MICU  lines: Left IJ 6/30   [facility] - failed TOV 6/24 Change.       I spoke to cardiology today regarding the [place] cardial effusion.

## 2022-07-05 NOTE — CHART NOTE - NSCHARTNOTEFT_GEN_A_CORE
Registered Dietitian Follow-Up     Patient Profile Reviewed                           Yes [X]   No []     Nutrition History Previously Obtained        Yes [X]  No []       Pertinent  Information:   pt is 26 year old female with hx of asthma, Hep C, active IVDA, anasarca presents with dyspnea admitted with fluid overload and initially admitted to med surg unit. s/p RRT 6/16 pt was in the lobby s/p seizure and fall 2/2 overdose s/p intubation and upgraded to ICU. + laceration to scalp noted. pt presently on propofol at 12 ml/hr which provides 317  kcals. As per GI moderate ascites, s/p paracentesis 1L removed on 6/22., + portal HTN 2/2 cirrhosis. SPiked temp 7/4/22.  Failed SBT trial today. pt tolerating EN at goal rate.  Elevated Triglycerides noted, propofol being tapered down.      Diet, NPO with Tube Feed:   Tube Feeding Modality: Orogastric  Vital High Protein  Total Volume for 24 Hours (mL): 1080  Continuous  Starting Tube Feed Rate {mL per Hour}: 20  Increase Tube Feed Rate by (mL): 5     Every 4 hours  Until Goal Tube Feed Rate (mL per Hour): 45  Tube Feed Duration (in Hours): 24  Tube Feed Start Time: 20:00  No Carb Prosource TF     Qty per Day:  1 (06-18-22 @ 19:34) [Active]  Diet, NPO with Tube Feed:         Anthropometrics:  - Ht. 167.6 cm   - Wt. 70.2  kg today vs 79.3 upon admission           Pertinent Lab Data:  07-05    143  |  108  |  28<H>  ----------------------------<  104<H>  3.2<L>   |  27  |  0.8    Ca    8.6      05 Jul 2022 05:36  Phos  2.6     07-05  Mg     2.0     07-05    TPro  5.1<L>  /  Alb  2.5<L>  /  TBili  0.3  /  DBili  x   /  AST  19  /  ALT  9   /  AlkPhos  470<H>  07-05                            6.8    1.20  )-----------( 101      ( 05 Jul 2022 05:36 )             23.0        Pertinent Meds:   MEDICATIONS  (STANDING):  chlorhexidine 0.12% Liquid 15 milliLiter(s) Oral Mucosa every 12 hours  chlorhexidine 4% Liquid 1 Application(s) Topical daily  fentaNYL   Infusion. 0.5 MICROgram(s)/kG/Hr (4.28 mL/Hr) IV Continuous <Continuous>  folic acid 1 milliGRAM(s) Oral daily  ketamine Infusion. 0.2 mG/kG/Hr (1.71 mL/Hr) IV Continuous <Continuous>  lactulose Syrup 20 Gram(s) Oral every 6 hours  meropenem  IVPB 1000 milliGRAM(s) IV Intermittent every 8 hours  midazolam Infusion 0.02 mG/kG/Hr (1.71 mL/Hr) IV Continuous <Continuous>  nicotine -  14 mG/24Hr(s) Patch 1 patch Transdermal daily  norepinephrine Infusion 0.05 MICROgram(s)/kG/Min (8.03 mL/Hr) IV Continuous <Continuous>  pantoprazole   Suspension 40 milliGRAM(s) Enteral Tube daily  potassium chloride  20 mEq/100 mL IVPB 20 milliEquivalent(s) IV Intermittent every 2 hours  propofol Infusion 1 MICROgram(s)/kG/Min (0.51 mL/Hr) IV Continuous <Continuous>  QUEtiapine 100 milliGRAM(s) Oral two times a day  scopolamine 1 mG/72 Hr(s) Patch 1 Patch Transdermal every 72 hours  senna 2 Tablet(s) Oral at bedtime  spironolactone 100 milliGRAM(s) Oral daily  thiamine 100 milliGRAM(s) Oral daily    MEDICATIONS  (PRN):  ALBUTerol    90 MICROgram(s) HFA Inhaler 1 Puff(s) Inhalation every 4 hours PRN Shortness of Breath and/or Wheezing  cloNIDine 0.1 milliGRAM(s) Oral every 6 hours PRN opiate withdrawal  hydrOXYzine hydrochloride 50 milliGRAM(s) Oral every 6 hours PRN Anxiety  ibuprofen  Tablet. 600 milliGRAM(s) Oral every 6 hours PRN Temp greater or equal to 38C (100.4F)  ibuprofen  Tablet. 400 milliGRAM(s) Oral every 6 hours PRN Mild Pain (1 - 3)  sodium chloride 0.9% lock flush 10 milliLiter(s) IV Push every 1 hour PRN Pre/post blood products, medications, blood draw, and to maintain line patency    Physical Findings:  - Appearance: awake, intubated to vent   - GI function: +BS, s/p bowel regimen large BM noted 7/3 and 7/4  - Tubes: OGT   - Oral/Mouth cavity:  - Skin:      Nutrition Requirements  Weight Used: 70.2 kgs      Estimated Energy Needs:  3736-2667 kcals (25-30 kcal/kg/BW) vs 1946 kcals [facility]  84-98g protein (1.2-1.4g/kg/bw)  1;1 kcal for estimated fluid needs       Nutrient Intake: present EN regimen provides 1140+317 kcal (propofol infusion), 93+15g protein meeting >80% of estimated nutrient needs       Nutrition Intervention: maintain on present EN regimen     Goal/Expected Outcome: pt to tolerate EN and continue to meet >80% of estimated nutrient needs.     Indicator/Monitoring: Rd to continue to monitor tolerance to EN, labs/meds, NFPF and f/u within 4 days

## 2022-07-05 NOTE — PROGRESS NOTE ADULT - SUBJECTIVE AND OBJECTIVE BOX
Patient is a 26y old  Female who presents with a chief complaint of anasarca (05 Jul 2022 09:20)      INTERVAL HPI/OVERNIGHT EVENTS:   Remains intubated and sedated on fentanyl, versed and Propofol; hgb dropped to 6.8 to receive one unit of PRBC in 24 hrs.   ICU Vital Signs Last 24 Hrs  T(C): 37.7 (05 Jul 2022 07:06), Max: 39.4 (04 Jul 2022 11:00)  T(F): 99.9 (05 Jul 2022 07:06), Max: 102.9 (04 Jul 2022 11:00)  HR: 98 (05 Jul 2022 09:06) (95 - 112)  BP: 103/57 (05 Jul 2022 09:06) (103/57 - 155/93)  BP(mean): 74 (05 Jul 2022 09:06) (74 - 105)  ABP: --  ABP(mean): --  RR: 29 (05 Jul 2022 09:06) (29 - 52)  SpO2: 100% (05 Jul 2022 09:06) (89% - 100%)    I&O's Summary    04 Jul 2022 07:01  -  05 Jul 2022 07:00  --------------------------------------------------------  IN: 2995 mL / OUT: 2310 mL / NET: 685 mL    05 Jul 2022 07:01  -  05 Jul 2022 10:35  --------------------------------------------------------  IN: 200 mL / OUT: 135 mL / NET: 65 mL      Mode: AC/ CMV (Assist Control/ Continuous Mandatory Ventilation)  RR (machine): 28  TV (machine): 400  FiO2: 30  PEEP: 8  ITime: 0.8  MAP: 11  PIP: 21      LABS:                        6.8    1.20  )-----------( 101      ( 05 Jul 2022 05:36 )             23.0     07-05    143  |  108  |  28<H>  ----------------------------<  104<H>  3.2<L>   |  27  |  0.8    Ca    8.6      05 Jul 2022 05:36  Phos  2.6     07-05  Mg     2.0     07-05    TPro  5.1<L>  /  Alb  2.5<L>  /  TBili  0.3  /  DBili  x   /  AST  19  /  ALT  9   /  AlkPhos  470<H>  07-05        CAPILLARY BLOOD GLUCOSE        ABG - ( 05 Jul 2022 04:30 )  pH, Arterial: 7.40  pH, Blood: x     /  pCO2: 46    /  pO2: 104   / HCO3: 28    / Base Excess: 3.3   /  SaO2: 99.6                RADIOLOGY & ADDITIONAL TESTS:    Consultant(s) Notes Reviewed:  [x ] YES  [ ] NO    MEDICATIONS  (STANDING):  chlorhexidine 0.12% Liquid 15 milliLiter(s) Oral Mucosa every 12 hours  chlorhexidine 4% Liquid 1 Application(s) Topical daily  enoxaparin Injectable 40 milliGRAM(s) SubCutaneous every 24 hours  fentaNYL   Infusion. 0.5 MICROgram(s)/kG/Hr (4.28 mL/Hr) IV Continuous <Continuous>  folic acid 1 milliGRAM(s) Oral daily  ketamine Infusion. 0.2 mG/kG/Hr (1.71 mL/Hr) IV Continuous <Continuous>  lactulose Syrup 20 Gram(s) Oral every 6 hours  meropenem  IVPB 1000 milliGRAM(s) IV Intermittent every 8 hours  midazolam Infusion 0.02 mG/kG/Hr (1.71 mL/Hr) IV Continuous <Continuous>  nicotine -  14 mG/24Hr(s) Patch 1 patch Transdermal daily  norepinephrine Infusion 0.05 MICROgram(s)/kG/Min (8.03 mL/Hr) IV Continuous <Continuous>  pantoprazole   Suspension 40 milliGRAM(s) Enteral Tube daily  propofol Infusion 1 MICROgram(s)/kG/Min (0.51 mL/Hr) IV Continuous <Continuous>  QUEtiapine 100 milliGRAM(s) Oral two times a day  scopolamine 1 mG/72 Hr(s) Patch 1 Patch Transdermal every 72 hours  senna 2 Tablet(s) Oral at bedtime  spironolactone 100 milliGRAM(s) Oral daily  thiamine 100 milliGRAM(s) Oral daily    MEDICATIONS  (PRN):  ALBUTerol    90 MICROgram(s) HFA Inhaler 1 Puff(s) Inhalation every 4 hours PRN Shortness of Breath and/or Wheezing  cloNIDine 0.1 milliGRAM(s) Oral every 6 hours PRN opiate withdrawal  hydrOXYzine hydrochloride 50 milliGRAM(s) Oral every 6 hours PRN Anxiety  ibuprofen  Tablet. 600 milliGRAM(s) Oral every 6 hours PRN Temp greater or equal to 38C (100.4F)  ibuprofen  Tablet. 400 milliGRAM(s) Oral every 6 hours PRN Mild Pain (1 - 3)  sodium chloride 0.9% lock flush 10 milliLiter(s) IV Push every 1 hour PRN Pre/post blood products, medications, blood draw, and to maintain line patency      PHYSICAL EXAM:  GENERAL: NAD  HEENT:  NCAT, PERRL, No JVD, Trachea Midline, +ETT, +OGT  NERVOUS SYSTEM:  Sedated to RASS 0 to -1 opens eyes to my voice and tracks me   CHEST/LUNG: Good air entry bilaterally  HEART: Regular rate and rhythm  ABDOMEN: Soft, Nontender, + distension   EXTREMITIES:  Warm, well perfused  SKIN: No new skin breakdowns      Care Discussed with Consultants/Other Providers [ x] YES  [ ] NO

## 2022-07-05 NOTE — PROGRESS NOTE ADULT - ASSESSMENT
IMPRESSION:  Acute respiratory failure sp intubation  PNA  MSSA pna  seizure like activity  ?? drug over dose/ withdrawal opoid   liver cirrhosis   Ascites sp paracentesis   Pancytopenia- worsening  RENETTA     PLAN:    CNS: do SAT/SBT as tolerated  D/c methadone 30mg daily for now as patient on fentanyl (psych recommended)  CW Clonapin to 2mg TID  CW Seroquel 100mg BID  ketamine     HEENT: oral care     PULMONARY:  HOB 45, Vent changes: wean O2 as tolerated, TV to 400. Keep on right side    CARDIOVASCULAR: goal MAP >65.  Monitor QTc daily. 500 cc bolus.   hold lasix   repeat echo r/o purulent pericarditis    GI: GI prophylaxis.  OG Feeding.    KUB   GI follow up     RENAL: monitor lytes is and os     INFECTIOUS DISEASE: Abx vanco celestine pend sensi  worsening R infiltrate. Fungitell , Glactomannan. Repeat Procal.      HEMATOLOGICAL:    monitor CBC, Heme FU.   transfuse 1 unit; f/u cbc at 3pm  reticulocyte count  dvt ppx  check d-dimer, HIT antibody     ENDOCRINE:  Follow up FS.  Insulin protocol if needed.     MICU  lines: Left IJ 6/30   [place] - failed TOV 6/24 Change.

## 2022-07-06 LAB
ALBUMIN SERPL ELPH-MCNC: 2.5 G/DL — LOW (ref 3.5–5.2)
ALP SERPL-CCNC: 522 U/L — HIGH (ref 30–115)
ALT FLD-CCNC: 17 U/L — SIGNIFICANT CHANGE UP (ref 0–41)
ANION GAP SERPL CALC-SCNC: 8 MMOL/L — SIGNIFICANT CHANGE UP (ref 7–14)
AST SERPL-CCNC: 37 U/L — SIGNIFICANT CHANGE UP (ref 0–41)
B HENSELAE IGG SER-ACNC: NEGATIVE TITER — SIGNIFICANT CHANGE UP
B HENSELAE IGM SER-ACNC: NEGATIVE TITER — SIGNIFICANT CHANGE UP
B QUINTANA IGG SERPL-ACNC: NEGATIVE TITER — SIGNIFICANT CHANGE UP
B QUINTANA IGM SER-ACNC: NEGATIVE TITER — SIGNIFICANT CHANGE UP
BASE EXCESS BLDA CALC-SCNC: 4.3 MMOL/L — HIGH (ref -2–3)
BASOPHILS # BLD AUTO: 0.01 K/UL — SIGNIFICANT CHANGE UP (ref 0–0.2)
BASOPHILS # BLD AUTO: 0.01 K/UL — SIGNIFICANT CHANGE UP (ref 0–0.2)
BASOPHILS NFR BLD AUTO: 0.6 % — SIGNIFICANT CHANGE UP (ref 0–1)
BASOPHILS NFR BLD AUTO: 0.6 % — SIGNIFICANT CHANGE UP (ref 0–1)
BILIRUB SERPL-MCNC: 0.3 MG/DL — SIGNIFICANT CHANGE UP (ref 0.2–1.2)
BUN SERPL-MCNC: 32 MG/DL — HIGH (ref 10–20)
CALCIUM SERPL-MCNC: 8.8 MG/DL — SIGNIFICANT CHANGE UP (ref 8.5–10.1)
CHLORIDE SERPL-SCNC: 109 MMOL/L — SIGNIFICANT CHANGE UP (ref 98–110)
CO2 SERPL-SCNC: 27 MMOL/L — SIGNIFICANT CHANGE UP (ref 17–32)
CREAT SERPL-MCNC: 0.7 MG/DL — SIGNIFICANT CHANGE UP (ref 0.7–1.5)
EGFR: 121 ML/MIN/1.73M2 — SIGNIFICANT CHANGE UP
EOSINOPHIL # BLD AUTO: 0 K/UL — SIGNIFICANT CHANGE UP (ref 0–0.7)
EOSINOPHIL # BLD AUTO: 0 K/UL — SIGNIFICANT CHANGE UP (ref 0–0.7)
EOSINOPHIL NFR BLD AUTO: 0 % — SIGNIFICANT CHANGE UP (ref 0–8)
EOSINOPHIL NFR BLD AUTO: 0 % — SIGNIFICANT CHANGE UP (ref 0–8)
GAS PNL BLDA: SIGNIFICANT CHANGE UP
GLUCOSE SERPL-MCNC: 91 MG/DL — SIGNIFICANT CHANGE UP (ref 70–99)
HCO3 BLDA-SCNC: 30 MMOL/L — HIGH (ref 21–28)
HCT VFR BLD CALC: 27.2 % — LOW (ref 37–47)
HCT VFR BLD CALC: 29 % — LOW (ref 37–47)
HEPARIN-PF4 AB RESULT: <0.6 U/ML — SIGNIFICANT CHANGE UP (ref 0–0.9)
HGB BLD-MCNC: 8.1 G/DL — LOW (ref 12–16)
HGB BLD-MCNC: 8.8 G/DL — LOW (ref 12–16)
HOROWITZ INDEX BLDA+IHG-RTO: 30 — SIGNIFICANT CHANGE UP
IMM GRANULOCYTES NFR BLD AUTO: 0.6 % — HIGH (ref 0.1–0.3)
IMM GRANULOCYTES NFR BLD AUTO: 1.3 % — HIGH (ref 0.1–0.3)
LYMPHOCYTES # BLD AUTO: 0.44 K/UL — LOW (ref 1.2–3.4)
LYMPHOCYTES # BLD AUTO: 0.46 K/UL — LOW (ref 1.2–3.4)
LYMPHOCYTES # BLD AUTO: 28.6 % — SIGNIFICANT CHANGE UP (ref 20.5–51.1)
LYMPHOCYTES # BLD AUTO: 29.1 % — SIGNIFICANT CHANGE UP (ref 20.5–51.1)
MAGNESIUM SERPL-MCNC: 1.9 MG/DL — SIGNIFICANT CHANGE UP (ref 1.8–2.4)
MCHC RBC-ENTMCNC: 26.6 PG — LOW (ref 27–31)
MCHC RBC-ENTMCNC: 26.9 PG — LOW (ref 27–31)
MCHC RBC-ENTMCNC: 29.8 G/DL — LOW (ref 32–37)
MCHC RBC-ENTMCNC: 30.3 G/DL — LOW (ref 32–37)
MCV RBC AUTO: 88.7 FL — SIGNIFICANT CHANGE UP (ref 81–99)
MCV RBC AUTO: 89.2 FL — SIGNIFICANT CHANGE UP (ref 81–99)
MONOCYTES # BLD AUTO: 0.09 K/UL — LOW (ref 0.1–0.6)
MONOCYTES # BLD AUTO: 0.1 K/UL — SIGNIFICANT CHANGE UP (ref 0.1–0.6)
MONOCYTES NFR BLD AUTO: 5.7 % — SIGNIFICANT CHANGE UP (ref 1.7–9.3)
MONOCYTES NFR BLD AUTO: 6.5 % — SIGNIFICANT CHANGE UP (ref 1.7–9.3)
NEUTROPHILS # BLD AUTO: 0.97 K/UL — LOW (ref 1.4–6.5)
NEUTROPHILS # BLD AUTO: 1.01 K/UL — LOW (ref 1.4–6.5)
NEUTROPHILS NFR BLD AUTO: 63 % — SIGNIFICANT CHANGE UP (ref 42.2–75.2)
NEUTROPHILS NFR BLD AUTO: 64 % — SIGNIFICANT CHANGE UP (ref 42.2–75.2)
NRBC # BLD: 0 /100 WBCS — SIGNIFICANT CHANGE UP (ref 0–0)
NRBC # BLD: 0 /100 WBCS — SIGNIFICANT CHANGE UP (ref 0–0)
PCO2 BLDA: 47 MMHG — SIGNIFICANT CHANGE UP (ref 35–48)
PF4 HEPARIN CMPLX AB SER-ACNC: NEGATIVE — SIGNIFICANT CHANGE UP
PH BLDA: 7.41 — SIGNIFICANT CHANGE UP (ref 7.35–7.45)
PHOSPHATE SERPL-MCNC: 3.4 MG/DL — SIGNIFICANT CHANGE UP (ref 2.1–4.9)
PLATELET # BLD AUTO: 132 K/UL — SIGNIFICANT CHANGE UP (ref 130–400)
PLATELET # BLD AUTO: 133 K/UL — SIGNIFICANT CHANGE UP (ref 130–400)
PO2 BLDA: 97 MMHG — SIGNIFICANT CHANGE UP (ref 83–108)
POTASSIUM SERPL-MCNC: 3.2 MMOL/L — LOW (ref 3.5–5)
POTASSIUM SERPL-SCNC: 3.2 MMOL/L — LOW (ref 3.5–5)
PROCALCITONIN SERPL-MCNC: 0.14 NG/ML — HIGH (ref 0.02–0.1)
PROT SERPL-MCNC: 5.4 G/DL — LOW (ref 6–8)
RBC # BLD: 2.99 M/UL — LOW (ref 4.2–5.4)
RBC # BLD: 3.05 M/UL — LOW (ref 4.2–5.4)
RBC # BLD: 3.27 M/UL — LOW (ref 4.2–5.4)
RBC # FLD: 16 % — HIGH (ref 11.5–14.5)
RBC # FLD: 16 % — HIGH (ref 11.5–14.5)
RETICS #: 68.5 K/UL — SIGNIFICANT CHANGE UP (ref 25–125)
RETICS/RBC NFR: 2.3 % — HIGH (ref 0.5–1.5)
SAO2 % BLDA: 98.6 % — HIGH (ref 94–98)
SODIUM SERPL-SCNC: 144 MMOL/L — SIGNIFICANT CHANGE UP (ref 135–146)
WBC # BLD: 1.54 K/UL — LOW (ref 4.8–10.8)
WBC # BLD: 1.58 K/UL — LOW (ref 4.8–10.8)
WBC # FLD AUTO: 1.54 K/UL — LOW (ref 4.8–10.8)
WBC # FLD AUTO: 1.58 K/UL — LOW (ref 4.8–10.8)

## 2022-07-06 PROCEDURE — 93306 TTE W/DOPPLER COMPLETE: CPT | Mod: 26

## 2022-07-06 PROCEDURE — 93010 ELECTROCARDIOGRAM REPORT: CPT

## 2022-07-06 PROCEDURE — 99233 SBSQ HOSP IP/OBS HIGH 50: CPT

## 2022-07-06 PROCEDURE — 71045 X-RAY EXAM CHEST 1 VIEW: CPT | Mod: 26,77

## 2022-07-06 PROCEDURE — 71045 X-RAY EXAM CHEST 1 VIEW: CPT | Mod: 26

## 2022-07-06 RX ORDER — POTASSIUM CHLORIDE 20 MEQ
20 PACKET (EA) ORAL
Refills: 0 | Status: COMPLETED | OUTPATIENT
Start: 2022-07-06 | End: 2022-07-06

## 2022-07-06 RX ORDER — FUROSEMIDE 40 MG
40 TABLET ORAL ONCE
Refills: 0 | Status: COMPLETED | OUTPATIENT
Start: 2022-07-06 | End: 2022-07-06

## 2022-07-06 RX ORDER — FENTANYL CITRATE 50 UG/ML
50 INJECTION INTRAVENOUS ONCE
Refills: 0 | Status: DISCONTINUED | OUTPATIENT
Start: 2022-07-06 | End: 2022-07-06

## 2022-07-06 RX ORDER — VANCOMYCIN HCL 1 G
250 VIAL (EA) INTRAVENOUS EVERY 6 HOURS
Refills: 0 | Status: DISCONTINUED | OUTPATIENT
Start: 2022-07-06 | End: 2022-07-20

## 2022-07-06 RX ORDER — LABETALOL HCL 100 MG
10 TABLET ORAL ONCE
Refills: 0 | Status: COMPLETED | OUTPATIENT
Start: 2022-07-06 | End: 2022-07-06

## 2022-07-06 RX ORDER — FENTANYL CITRATE 50 UG/ML
0.5 INJECTION INTRAVENOUS
Qty: 2500 | Refills: 0 | Status: DISCONTINUED | OUTPATIENT
Start: 2022-07-06 | End: 2022-07-08

## 2022-07-06 RX ADMIN — Medication 600 MILLIGRAM(S): at 01:50

## 2022-07-06 RX ADMIN — CHLORHEXIDINE GLUCONATE 15 MILLILITER(S): 213 SOLUTION TOPICAL at 17:47

## 2022-07-06 RX ADMIN — CHLORHEXIDINE GLUCONATE 1 APPLICATION(S): 213 SOLUTION TOPICAL at 05:02

## 2022-07-06 RX ADMIN — Medication 1 PATCH: at 17:41

## 2022-07-06 RX ADMIN — Medication 1 PATCH: at 12:59

## 2022-07-06 RX ADMIN — Medication 250 MILLIGRAM(S): at 13:00

## 2022-07-06 RX ADMIN — Medication 250 MILLIGRAM(S): at 23:16

## 2022-07-06 RX ADMIN — Medication 1 PATCH: at 11:00

## 2022-07-06 RX ADMIN — SCOPALAMINE 1 PATCH: 1 PATCH, EXTENDED RELEASE TRANSDERMAL at 17:34

## 2022-07-06 RX ADMIN — Medication 125 MILLIGRAM(S): at 05:01

## 2022-07-06 RX ADMIN — Medication 250 MILLIGRAM(S): at 17:03

## 2022-07-06 RX ADMIN — MEROPENEM 100 MILLIGRAM(S): 1 INJECTION INTRAVENOUS at 05:01

## 2022-07-06 RX ADMIN — QUETIAPINE FUMARATE 100 MILLIGRAM(S): 200 TABLET, FILM COATED ORAL at 05:01

## 2022-07-06 RX ADMIN — PROPOFOL 0.51 MICROGRAM(S)/KG/MIN: 10 INJECTION, EMULSION INTRAVENOUS at 19:34

## 2022-07-06 RX ADMIN — PANTOPRAZOLE SODIUM 40 MILLIGRAM(S): 20 TABLET, DELAYED RELEASE ORAL at 13:00

## 2022-07-06 RX ADMIN — SCOPALAMINE 1 PATCH: 1 PATCH, EXTENDED RELEASE TRANSDERMAL at 12:00

## 2022-07-06 RX ADMIN — Medication 600 MILLIGRAM(S): at 17:47

## 2022-07-06 RX ADMIN — QUETIAPINE FUMARATE 100 MILLIGRAM(S): 200 TABLET, FILM COATED ORAL at 17:47

## 2022-07-06 RX ADMIN — MEROPENEM 100 MILLIGRAM(S): 1 INJECTION INTRAVENOUS at 21:12

## 2022-07-06 RX ADMIN — FENTANYL CITRATE 4.28 MICROGRAM(S)/KG/HR: 50 INJECTION INTRAVENOUS at 19:35

## 2022-07-06 RX ADMIN — Medication 100 MILLIEQUIVALENT(S): at 16:51

## 2022-07-06 RX ADMIN — SPIRONOLACTONE 100 MILLIGRAM(S): 25 TABLET, FILM COATED ORAL at 05:01

## 2022-07-06 RX ADMIN — MIDAZOLAM HYDROCHLORIDE 1.71 MG/KG/HR: 1 INJECTION, SOLUTION INTRAMUSCULAR; INTRAVENOUS at 19:34

## 2022-07-06 RX ADMIN — Medication 100 MILLIEQUIVALENT(S): at 15:06

## 2022-07-06 RX ADMIN — SCOPALAMINE 1 PATCH: 1 PATCH, EXTENDED RELEASE TRANSDERMAL at 14:22

## 2022-07-06 RX ADMIN — Medication 1 MILLIGRAM(S): at 12:58

## 2022-07-06 RX ADMIN — SCOPALAMINE 1 PATCH: 1 PATCH, EXTENDED RELEASE TRANSDERMAL at 07:25

## 2022-07-06 RX ADMIN — Medication 100 MILLIGRAM(S): at 12:59

## 2022-07-06 RX ADMIN — CHLORHEXIDINE GLUCONATE 15 MILLILITER(S): 213 SOLUTION TOPICAL at 05:01

## 2022-07-06 RX ADMIN — Medication 1 PATCH: at 07:24

## 2022-07-06 RX ADMIN — MEROPENEM 100 MILLIGRAM(S): 1 INJECTION INTRAVENOUS at 14:21

## 2022-07-06 RX ADMIN — Medication 40 MILLIGRAM(S): at 11:00

## 2022-07-06 RX ADMIN — Medication 125 MILLIGRAM(S): at 00:38

## 2022-07-06 NOTE — PROGRESS NOTE ADULT - SUBJECTIVE AND OBJECTIVE BOX
Patient is a 26y old  Female who presents with a chief complaint of anasarca (06 Jul 2022 08:20)        Over Night Events: Pt is febrile , C diff came back positive. Pt failed SBT. Fentanyl , propofol and versed 0.08.       ROS:     All ROS are negative except HPI         PHYSICAL EXAM    ICU Vital Signs Last 24 Hrs  T(C): 37.9 (06 Jul 2022 07:00), Max: 38.5 (05 Jul 2022 20:00)  T(F): 100.2 (06 Jul 2022 07:00), Max: 101.3 (05 Jul 2022 20:00)  HR: 97 (06 Jul 2022 09:00) (90 - 108)  BP: 127/76 (06 Jul 2022 09:00) (95/51 - 158/92)  BP(mean): 95 (06 Jul 2022 09:00) (67 - 119)  RR: 29 (06 Jul 2022 09:00) (22 - 37)  SpO2: 98% (06 Jul 2022 09:00) (98% - 100%)      CONSTITUTIONAL:  Ill appearing     ENT:   Airway patent,   Mouth with normal mucosa.   ET tube    EYES:   Pupils equal,   Round and reactive to light.    CARDIAC:   Normal rate,   Regular rhythm.    Anasarca       RESPIRATORY:   No wheezing  Bilateral BS  Normal chest expansion  Not tachypneic,  No use of accessory muscles    GASTROINTESTINAL:  Abdomen soft,   Non-tender,   No guarding,   3 BM +ve       NEUROLOGICAL:   Follows commands  Awake      SKIN:   DTI sacrum          07-05-22 @ 07:01  -  07-06-22 @ 07:00  --------------------------------------------------------  IN:    FentaNYL: 518.4 mL    IV PiggyBack: 50 mL    IV PiggyBack: 200 mL    Midazolam: 141 mL    PRBCs (Packed Red Blood Cells): 1 mL    Propofol: 453.4 mL    Vital High Protein: 990 mL  Total IN: 2353.8 mL    OUT:    Indwelling Catheter - Urethral (mL): 965 mL  Total OUT: 965 mL    Total NET: 1388.8 mL          LABS:                            8.1    1.58  )-----------( 132      ( 06 Jul 2022 07:20 )             27.2                                               07-06    144  |  109  |  32<H>  ----------------------------<  91  3.2<L>   |  27  |  0.7    Ca    8.8      06 Jul 2022 07:20  Phos  3.4     07-06  Mg     1.9     07-06    TPro  5.4<L>  /  Alb  2.5<L>  /  TBili  0.3  /  DBili  x   /  AST  37  /  ALT  17  /  AlkPhos  522<H>  07-06                                                                                           LIVER FUNCTIONS - ( 06 Jul 2022 07:20 )  Alb: 2.5 g/dL / Pro: 5.4 g/dL / ALK PHOS: 522 U/L / ALT: 17 U/L / AST: 37 U/L / GGT: x                                                  Culture - Sputum (collected 04 Jul 2022 13:39)  Source: ET Tube ET Tube  Gram Stain (05 Jul 2022 08:48):    Few polymorphonuclear leukocytes per low power field    No Squamous epithelial cells per low power field    Numerous Gram Negative Coccobacilli seen per oil power field                                                   Mode: AC/ CMV (Assist Control/ Continuous Mandatory Ventilation)  RR (machine): 28  TV (machine): 400  FiO2: 30  PEEP: 5  ITime: 0.8  MAP: 10  PIP: 21                                      ABG - ( 06 Jul 2022 02:55 )  pH, Arterial: 7.46  pH, Blood: x     /  pCO2: 37    /  pO2: 130   / HCO3: 26    / Base Excess: 2.4   /  SaO2: 99.6  / lactate 0.3               MEDICATIONS  (STANDING):  chlorhexidine 0.12% Liquid 15 milliLiter(s) Oral Mucosa every 12 hours  chlorhexidine 4% Liquid 1 Application(s) Topical daily  fentaNYL   Infusion. 0.5 MICROgram(s)/kG/Hr (4.28 mL/Hr) IV Continuous <Continuous>  folic acid 1 milliGRAM(s) Oral daily  ketamine Infusion. 0.2 mG/kG/Hr (1.71 mL/Hr) IV Continuous <Continuous>  lactulose Syrup 20 Gram(s) Oral every 6 hours  meropenem  IVPB 1000 milliGRAM(s) IV Intermittent every 8 hours  midazolam Infusion 0.02 mG/kG/Hr (1.71 mL/Hr) IV Continuous <Continuous>  nicotine -  14 mG/24Hr(s) Patch 1 patch Transdermal daily  norepinephrine Infusion 0.05 MICROgram(s)/kG/Min (8.03 mL/Hr) IV Continuous <Continuous>  pantoprazole   Suspension 40 milliGRAM(s) Enteral Tube daily  propofol Infusion 1 MICROgram(s)/kG/Min (0.51 mL/Hr) IV Continuous <Continuous>  QUEtiapine 100 milliGRAM(s) Oral two times a day  scopolamine 1 mG/72 Hr(s) Patch 1 Patch Transdermal every 72 hours  senna 2 Tablet(s) Oral at bedtime  spironolactone 100 milliGRAM(s) Oral daily  thiamine 100 milliGRAM(s) Oral daily  vancomycin    Solution 125 milliGRAM(s) Oral every 6 hours    MEDICATIONS  (PRN):  ALBUTerol    90 MICROgram(s) HFA Inhaler 1 Puff(s) Inhalation every 4 hours PRN Shortness of Breath and/or Wheezing  cloNIDine 0.1 milliGRAM(s) Oral every 6 hours PRN opiate withdrawal  hydrOXYzine hydrochloride 50 milliGRAM(s) Oral every 6 hours PRN Anxiety  ibuprofen  Tablet. 600 milliGRAM(s) Oral every 6 hours PRN Temp greater or equal to 38C (100.4F)  ibuprofen  Tablet. 400 milliGRAM(s) Oral every 6 hours PRN Mild Pain (1 - 3)  sodium chloride 0.9% lock flush 10 milliLiter(s) IV Push every 1 hour PRN Pre/post blood products, medications, blood draw, and to maintain line patency       CXR interpreted by me:  No consolidation. ET tube , push 2 cm

## 2022-07-06 NOTE — PROGRESS NOTE ADULT - ASSESSMENT
Acute respiratory failure sp intubation  PNA  MSSA pna  seizure like activity  ?? drug over dose/ withdrawal opoid   liver cirrhosis   Ascites sp paracentesis   Pancytopenia- worsening  RENETTA   C. Diff     PLAN:    CNS: do SAT/SBT as tolerated  D/c methadone 30mg daily for now as patient on fentanyl (psych recommended)  CW Clonapin to 2mg TID  CW Seroquel 100mg BID  ketamine     HEENT: oral care     PULMONARY:  HOB 45, Vent changes: wean O2 as tolerated, TV to 400. Keep on right side    CARDIOVASCULAR: goal MAP >65.  Monitor QTc daily. 500 cc bolus.   hold lasix   pending repeat echo r/o purulent pericarditis    GI: GI prophylaxis.  OG Feeding.   KUB 5/5: nonobstructive gas pattern  GI follow up     RENAL: monitor lytes is and os     INFECTIOUS DISEASE: Abx vanco celestine pend sensi  worsening R infiltrate. Fungitell , Glactomannan.  Repeat procal 0.14 from 0.36 (6/30)    HEMATOLOGICAL:    monitor CBC, Heme FU.   f/u CBC @ 11  D-dimer 1322, HIT antibody negative  pending reticulocyte count     ENDOCRINE:  Follow up FS.  Insulin protocol if needed.     MICU  lines: Left IJ 6/30   [facility] - failed TOV 6/24 Change.  Acute respiratory failure sp intubation  PNA  MSSA pna  seizure like activity  ?? drug over dose/ withdrawal opoid   liver cirrhosis   Ascites sp paracentesis   Pancytopenia- worsening  RENETTA   C. Diff     PLAN:    CNS: do SAT/SBT as tolerated  D/c methadone 30mg daily for now as patient on fentanyl (psych recommended)  CW Clonapin to 2mg TID  CW Seroquel 100mg BID  ketamine     HEENT: oral care     PULMONARY:  HOB 45, Vent changes: wean O2 as tolerated, , TV to 350, RR 25. Keep on right side    CARDIOVASCULAR: goal MAP >65.  Monitor QTc daily.  s/p 1 dose lasix 40 mg IV  pending repeat echo   f/u cardiology recommendations     GI: GI prophylaxis.  OG Feeding.   KUB 5/5: nonobstructive gas pattern    RENAL: monitor lytes    INFECTIOUS DISEASE: Abx vanco celestine pend sensi  worsening R infiltrate. Fungitell , Glactomannan.  Repeat procal 0.14 from 0.36 (6/30)  If concern for fulminant colitis add flagyl     HEMATOLOGICAL:    monitor CBC, Heme FU.   f/u CBC @ 11  D-dimer 1322, HIT antibody negative  pending reticulocyte count     ENDOCRINE:  Follow up FS.  Insulin protocol if needed.     MICU  lines: right IJ 7/6   [place] - 7/5

## 2022-07-06 NOTE — PROCEDURE NOTE - NSINDICATIONS_GEN_A_CORE
ascites
ascites
critical illness/venous access
critical illness/venous access/volume resuscitation
critical illness/hypertonic/irritant infusion

## 2022-07-06 NOTE — PROCEDURE NOTE - NSPROCDETAILS_GEN_ALL_CORE
guidewire recovered/lumen(s) aspirated and flushed/sterile dressing applied/sterile technique, catheter placed/ultrasound guidance with use of sterile gel and probe cove
Position of guidewire within L IJV confirmed via US prior to vessel dilation/guidewire recovered/lumen(s) aspirated and flushed/sterile dressing applied/sterile technique, catheter placed/ultrasound guidance with use of sterile gel and probe cove
location identified, sterile technique used, catheter introduced, fluid drained/Seldinger technique/sterile dressing applied
guidewire recovered/lumen(s) aspirated and flushed/sterile dressing applied/sterile technique, catheter placed/ultrasound guidance with use of sterile gel and probe cove
location identified, sterile technique used, catheter introduced, fluid drained/Seldinger technique/sterile dressing applied

## 2022-07-06 NOTE — PROGRESS NOTE ADULT - SUBJECTIVE AND OBJECTIVE BOX
Patient seen and evaluated this am, sedated on ventilator on propofol, fentanyl and versed      T(F): 100.6 (07-06-22 @ 14:00), Max: 101.3 (07-05-22 @ 20:00)  HR: 100 (07-06-22 @ 14:32)  BP: 117/72 (07-06-22 @ 14:00)  RR: 27 (07-06-22 @ 14:00)  SpO2: 99% (07-06-22 @ 14:32) (97% - 100%)    PHYSICAL EXAM:  GENERAL: NAD  HEAD:  L neck erythema in area of L IJ  EYES: EOMI, PERRLA, conjunctiva and sclera clear  NERVOUS SYSTEM: no focal deficits   CHEST/LUNG:  bilateral rhonchi  HEART: Regular rate and rhythm; No murmurs, rubs, or gallops  ABDOMEN: Soft, Nontender, Nondistended; Bowel sounds present  EXTREMITIES:  2+ Peripheral Pulses, No clubbing, cyanosis, or edema    LABS  07-06    144  |  109  |  32<H>  ----------------------------<  91  3.2<L>   |  27  |  0.7    Ca    8.8      06 Jul 2022 07:20  Phos  3.4     07-06  Mg     1.9     07-06    TPro  5.4<L>  /  Alb  2.5<L>  /  TBili  0.3  /  DBili  x   /  AST  37  /  ALT  17  /  AlkPhos  522<H>  07-06                          8.1    1.58  )-----------( 132      ( 06 Jul 2022 07:20 )             27.2     Procalcitonin, Serum (07.05.22 @ 11:23)   Procalcitonin, Serum: 0.14  Clostridium difficile Toxin by PCR (07.05.22 @ 19:15)   Detected - Clostridium difficile toxin B detected by amplified DNA PCR   Mode: AC/ CMV (Assist Control/ Continuous Mandatory Ventilation)  RR (machine): 25  TV (machine): 350  FiO2: 30  PEEP: 5      Culture Results:   Moderate Acinetobacter baumannii/nosocomialis group  Normal Respiratory Kelly absent (07-04-22)  Culture Results:   No Growth Final (06-29-22)  Culture Results:   No Growth Final (06-29-22)  Culture Results:   No Growth Final (06-28-22)  Culture Results:   Numerous Mixed gram negative rods  Moderate Staphylococcus aureus  Normal Respiratory Kelly present (06-27-22)  Culture Results:   Moderate Klebsiella oxytoca/Raoutella ornithinolytica  Moderate Enterobacter cloacae complex  Normal Respiratory Kelly absent (06-24-22)  Culture Results:   No growth at 5 days (06-22-22)  Culture Results:   No Growth Final (06-21-22)  Culture Results:   Normal Respiratory Kelly present (06-17-22)  Culture Results:   No growth at 5 days (06-17-22)    RADIOLOGY  < from: Xray Chest 1 View-PORTABLE IMMEDIATE (Xray Chest 1 View-PORTABLE IMMEDIATE .) (07.06.22 @ 13:52) >    Impression:    Persistent leftbasilar opacity/effusion      < end of copied text >    MEDICATIONS  (STANDING):  chlorhexidine 0.12% Liquid 15 milliLiter(s) Oral Mucosa every 12 hours  chlorhexidine 4% Liquid 1 Application(s) Topical daily  fentaNYL   Infusion. 0.5 MICROgram(s)/kG/Hr (4.28 mL/Hr) IV Continuous <Continuous>  folic acid 1 milliGRAM(s) Oral daily  ketamine Infusion. 0.2 mG/kG/Hr (1.71 mL/Hr) IV Continuous <Continuous>  meropenem  IVPB 1000 milliGRAM(s) IV Intermittent every 8 hours  midazolam Infusion 0.02 mG/kG/Hr (1.71 mL/Hr) IV Continuous <Continuous>  nicotine -  14 mG/24Hr(s) Patch 1 patch Transdermal daily  norepinephrine Infusion 0.05 MICROgram(s)/kG/Min (8.03 mL/Hr) IV Continuous <Continuous>  pantoprazole   Suspension 40 milliGRAM(s) Enteral Tube daily  potassium chloride  20 mEq/100 mL IVPB 20 milliEquivalent(s) IV Intermittent every 2 hours  propofol Infusion 1 MICROgram(s)/kG/Min (0.51 mL/Hr) IV Continuous <Continuous>  QUEtiapine 100 milliGRAM(s) Oral two times a day  scopolamine 1 mG/72 Hr(s) Patch 1 Patch Transdermal every 72 hours  spironolactone 100 milliGRAM(s) Oral daily  thiamine 100 milliGRAM(s) Oral daily  vancomycin    Solution 250 milliGRAM(s) Oral every 6 hours    MEDICATIONS  (PRN):  ALBUTerol    90 MICROgram(s) HFA Inhaler 1 Puff(s) Inhalation every 4 hours PRN Shortness of Breath and/or Wheezing  cloNIDine 0.1 milliGRAM(s) Oral every 6 hours PRN opiate withdrawal  hydrOXYzine hydrochloride 50 milliGRAM(s) Oral every 6 hours PRN Anxiety  ibuprofen  Tablet. 600 milliGRAM(s) Oral every 6 hours PRN Temp greater or equal to 38C (100.4F)  ibuprofen  Tablet. 400 milliGRAM(s) Oral every 6 hours PRN Mild Pain (1 - 3)  sodium chloride 0.9% lock flush 10 milliLiter(s) IV Push every 1 hour PRN Pre/post blood products, medications, blood draw, and to maintain line patency

## 2022-07-06 NOTE — PROGRESS NOTE ADULT - ASSESSMENT
IMPRESSION:  Acute respiratory failure sp intubation  PNA  MSSA pna  seizure like activity  ?? drug over dose/ withdrawal opoid   liver cirrhosis   Ascites sp paracentesis   Pancytopenia- worsening  RENETTA   C diff +ve     PLAN:    CNS: do SAT/SBT as tolerated  CW methadone 30mg daily  CW Clonapin to 2mg TID  CW Seroquel 100mg BID  ketamine     HEENT: oral care     PULMONARY:  HOB 45,  Vent changes: wean O2 as tolerated, TV to 350, RR 25  . Repeat 1 hr     CARDIOVASCULAR: goal MAP >65.  Monitor QTc daily.    hold lasix   rPericarditis management per cardio. discussed     GI: GI prophylaxis.  OG Feeding.    KUB reviewed    RENAL: monitor lytes is and os     INFECTIOUS DISEASE: Abx vanco celestine pend sensi. oral vanco     Fungitell , Glactomannan. Repeat Procal.      HEMATOLOGICAL:    monitor CBC, Heme FU.   transfuse 2  unit  reticulocyte count  dvt ppx  check d-dimer  reviewed , HIT abx.  LE duplex to be repeated in 7 days     ENDOCRINE:  Follow up FS.  Insulin protocol if needed.     MICU  lines: will change multi lumen to right   [place] - failed TOV 6/24 Change.

## 2022-07-06 NOTE — PROCEDURE NOTE - NSPOSTPRCRAD_GEN_A_CORE
wire in IJ with sonogram
central line located in the/central line located in the superior vena cava/depth of insertion/line adjusted to depth of insertion/no pneumothorax/post-procedure radiography performed
Pending Post Procedure Radiograph

## 2022-07-06 NOTE — PROGRESS NOTE ADULT - SUBJECTIVE AND OBJECTIVE BOX
Patient is seen and examined at the bed side, remains febrile. The WBC count and kidney function normal.    REVIEW OF SYSTEMS: Unable to obtain due to mental status      ALLERGIES: buprenorphine (Rash)  Suboxone (Rash)      ICU Vital Signs Last 24 Hrs  T(C): 38.4 (2022 19:00), Max: 38.5 (2022 21:15)  T(F): 101.1 (2022 19:00), Max: 101.3 (2022 21:15)  HR: 103 (2022 20:23) (90 - 111)  BP: 89/53 (2022 20:00) (89/53 - 132/85)  BP(mean): 69 (2022 20:00) (67 - 101)  ABP: --  ABP(mean): --  RR: 26 (2022 20:00) (26 - 35)  SpO2: 99% (2022 20:23) (90% - 100%)      PHYSICAL EXAM:  GENERAL: Intubated/vented, on cooling blanket  CHEST/LUNG: Not using accessory muscles   HEART: s1 and s2 present  ABDOMEN:  Nontender and  Nondistended  EXTREMITIES: No pedal  edema  CNS:  Intubated/vented      LABS:                        8.8    1.54  )-----------( 133      ( 2022 17:05 )             29.0                           8.8    4.93  )-----------( 179      ( 2022 06:59 )             29.8           07-06    144  |  109  |  32<H>  ----------------------------<  91  3.2<L>   |  27  |  0.7    Ca    8.8      2022 07:20  Phos  3.4     07-06  Mg     1.9     07-06    TPro  5.4<L>  /  Alb  2.5<L>  /  TBili  0.3  /  DBili  x   /  AST  37  /  ALT  17  /  AlkPhos  522<H>  07-    07-03    148<H>  |  112<H>  |  27<H>  ----------------------------<  125<H>  3.6   |  25  |  0.8    Ca    9.5      2022 06:59  Phos  3.2     07-03  Mg     2.4     07-03    TPro  6.0  /  Alb  3.0<L>  /  TBili  0.4  /  DBili  x   /  AST  18  /  ALT  9   /  AlkPhos  721<H>  07-03        ABG - ( 2022 03:14 )  pH, Arterial: 7.30  pH, Blood: x     /  pCO2: 51    /  pO2: 111   / HCO3: 25    / Base Excess: -1.7  /  SaO2: 99.0          Urinalysis Basic - ( 2022 19:06 )  Color: Yellow / Appearance: Slightly Cloudy / S.025 / pH: x  Gluc: x / Ketone: Trace  / Bili: Negative / Urobili: 1.0 mg/dL   Blood: x / Protein: >=300 mg/dL / Nitrite: Negative   Leuk Esterase: Negative / RBC: 26-50 /HPF / WBC 3-5 /HPF   Sq Epi: x / Non Sq Epi: Occasional /HPF / Bacteria: Moderate        MEDICATIONS  (STANDING):    chlorhexidine 0.12% Liquid 15 milliLiter(s) Oral Mucosa every 12 hours  chlorhexidine 4% Liquid 1 Application(s) Topical daily  fentaNYL   Infusion. 0.5 MICROgram(s)/kG/Hr (4.28 mL/Hr) IV Continuous <Continuous>  folic acid 1 milliGRAM(s) Oral daily  ketamine Infusion. 0.2 mG/kG/Hr (1.71 mL/Hr) IV Continuous <Continuous>  meropenem  IVPB 1000 milliGRAM(s) IV Intermittent every 8 hours  midazolam Infusion 0.02 mG/kG/Hr (1.71 mL/Hr) IV Continuous <Continuous>  nicotine -  14 mG/24Hr(s) Patch 1 patch Transdermal daily  norepinephrine Infusion 0.05 MICROgram(s)/kG/Min (8.03 mL/Hr) IV Continuous <Continuous>  pantoprazole   Suspension 40 milliGRAM(s) Enteral Tube daily  propofol Infusion 1 MICROgram(s)/kG/Min (0.51 mL/Hr) IV Continuous <Continuous>  QUEtiapine 100 milliGRAM(s) Oral two times a day  scopolamine 1 mG/72 Hr(s) Patch 1 Patch Transdermal every 72 hours  spironolactone 100 milliGRAM(s) Oral daily  thiamine 100 milliGRAM(s) Oral daily  vancomycin    Solution 250 milliGRAM(s) Oral every 6 hours        RADIOLOGY & ADDITIONAL TESTS:    < from: Xray Kidney Ureter Bladder (22 @ 13:25) >    FINDINGS: Enteric feeding tube is stable, with tip in the left upper   quadrant.  There is a non-obstructive bowel gas pattern.  No abnormal   masses or calcifications are seen.  Stable probe/catheter projects over   the lower pelvis.    < from: CT Angio Chest PE Protocol w/ IV Cont (22 @ 21:14) >    No pulmonary embolus.    Near complete consolidation/collapse of the left lower lobe, increased as   compared with prior. Mildly increased right lower lobe patchy   consolidative opacities-atelectasis. Adjacent bilateral small pleural   effusions. Findings compatible with likely mixed pneumonia and   atelectasis.    Redemonstration of a dilated main pulmonary artery measuring up to 3.8 cm   compatible with pulmonary hypertension.    < from: Xray Chest 1 View- PORTABLE-Routine (Xray Chest 1 View- PORTABLE-Routine in AM.) (22 @ 05:28) >  Stable cardiomegaly and left basilar opacity. Stable support devices.    < end of copied text >  < from: CT Abdomen and Pelvis w/ IV Cont (22 @ 17:27) >  No evidence of retroperitoneal hematoma.    Small bilateral pleural effusions and left basilar consolidation. Moderate ascites redemonstrated.    Moderate pericardial effusion.  Hepatosplenomegaly redemonstrated.        MICROBIOLOGY DATA:    MRSA/MSSA PCR (22 @ 10:34)   MRSA PCR Result.: Negative    Respiratory Viral Panel with COVID-19 by BROOKE (22 @ 09:19)   Rapid RVP Result: NotDete   SARS-CoV-2: NotDete:    Culture - Blood (22 @ 20:31)   Specimen Source: .Blood Blood-Peripheral   Culture Results:   No growth to date.     Culture - Blood (22 @ 20:31)   Specimen Source: .Blood Blood   Culture Results:   No growth to date.     Culture - Sputum . (22 @ 14:00)   - Oxacillin: S <=0.25 Oxacillin predicts susceptibility for dicloxacillin, methicillin, and nafcillin   - Cefazolin: S <=4   - Erythromycin: S <=0.25   - Gentamicin: S <=1 Should not be used as monotherapy   - Clindamycin: S <=0.25   - Rifampin: S <=1 Should not be used as monotherapy   - Tetra/Doxy: S <=1   - Trimethoprim/Sulfamethoxazole: S <=0.5/9.5   - Vancomycin: S 2   Gram Stain:   Moderate polymorphonuclear leukocytes per low power field   Few Squamous epithelial cells per low power field   Moderate Gram positive cocci in pairs seen per oil power field   Few Gram Variable Rods seen per oil power field   - Ampicillin/Sulbactam: S <=8/4   Specimen Source: Trach Asp Tracheal Aspirate   Culture Results:   Numerous Mixed gram negative rods   Moderate Staphylococcus aureus   Normal Respiratory Kelly present   Organism Identification: Staphylococcus aureus   Organism: Staphylococcus aureus   Culture - Blood in AM (22 @ 06:00)   Specimen Source: .Blood Blood   Culture Results: No growth to date.     Culture - Sputum . (22 @ 14:00)   Gram Stain:   Moderate polymorphonuclear leukocytes per low power field   Few Squamous epithelial cells per low power field   Moderate Gram positive cocci in pairs seen per oil power field   Few Gram Variable Rods seen per oil power field   Specimen Source: Trach Asp Tracheal Aspirate   Culture Results:   Numerous Mixed gram negative rods   Moderate Staphylococcus aureus Susceptibility to follow.   Normal Respiratory Kelly absent     Culture - Sputum . (22 @ 17:20)   Gram Stain:   Few polymorphonuclear leukocytes per low power field   Rare Squamous epithelial cells per low power field   Moderate Gram Negative Rods seen per oil power field   - Amikacin: S <=16   - Amikacin: S <=16   - Amoxicillin/Clavulanic Acid: R >16/8   - Amoxicillin/Clavulanic Acid: S <=8/4   - Ampicillin: R 16 These ampicillin results predict results for amoxicillin   - Ampicillin: R >16 These ampicillin results predict results for amoxicillin   - Ampicillin/Sulbactam: R >16/8 Enterobacter, Klebsiella aerogenes, Citrobacter, and Serratia may develop resistance during prolonged therapy (3-4 days)   - Ampicillin/Sulbactam: S <=4/2 Enterobacter, Klebsiella aerogenes, Citrobacter, and Serratia may develop resistance during prolonged therapy (3-4 days)   - Aztreonam: S <=4   - Aztreonam: S <=4   - Cefazolin: R >16 Enterobacter, Klebsiella aerogenes, Citrobacter, and Serratia may develop resistance during prolonged therapy (3-4 days)   - Cefazolin: S <=2 Enterobacter, Klebsiella aerogenes, Citrobacter, and Serratia may develop resistance during prolonged therapy (3-4 days)   - Cefepime: S <=2   - Cefepime: S <=2   - Cefoxitin: R >16   - Cefoxitin: S <=8   - Ceftriaxone: S <=1 Enterobacter, Klebsiella aerogenes, Citrobacter, and Serratia may develop resistance during prolonged therapy   - Ceftriaxone: S <=1 Enterobacter, Klebsiella aerogenes, Citrobacter, and Serratia may develop resistance during prolonged therapy   - Ciprofloxacin: S <=0.25   - Ciprofloxacin: S <=0.25   - Ertapenem: S <=0.5   - Ertapenem: S <=0.5   - Gentamicin: S <=2   - Gentamicin: S <=2   - Imipenem: S <=1   - Imipenem: S <=1   - Levofloxacin: S <=0.5   - Levofloxacin: S <=0.5   - Meropenem: S <=1   - Meropenem: S <=1   - Piperacillin/Tazobactam: S <=8   - Piperacillin/Tazobactam: S <=8   - Tobramycin: S <=2   - Tobramycin: S <=2   - Trimethoprim/Sulfamethoxazole: S <=0.5/9.5   - Trimethoprim/Sulfamethoxazole: S <=0.5/9.5   Specimen Source: Trach Asp Tracheal Aspirate   Culture Results:   Moderate Klebsiella oxytoca/Raoutella ornithinolytica   Moderate Enterobacter cloacae complex   Normal Respiratory Kelly absent   Organism Identification: Klebsiella oxytoca /Raoutella ornithinolytica   Enterobacter cloacae complex   Organism: Klebsiella oxytoca /Raoutella ornithinolytica   Organism: Enterobacter cloacae complex COVID-19 PCR (06.15.22 @ 07:10)   COVID-19 PCR: NotDetec: Culture - Acid Fast - Sputum w/Smear (22 @ 07:00)   Specimen Source: .Sputum Sputum   Acid Fast Bacilli Smear:   No acid fast bacilli seen by fluorochrome stain   Culture Results:   No acid fast bacilli isolated after 6 weeks.            Patient is seen and examined at the bed side, remains febrile and intubated.    REVIEW OF SYSTEMS: Unable to obtain due to mental status      ALLERGIES: buprenorphine (Rash)  Suboxone (Rash)      ICU Vital Signs Last 24 Hrs  T(C): 38.4 (2022 19:00), Max: 38.5 (2022 21:15)  T(F): 101.1 (2022 19:00), Max: 101.3 (2022 21:15)  HR: 103 (2022 20:23) (90 - 111)  BP: 89/53 (2022 20:00) (89/53 - 132/85)  BP(mean): 69 (2022 20:00) (67 - 101)  ABP: --  ABP(mean): --  RR: 26 (2022 20:00) (26 - 35)  SpO2: 99% (2022 20:23) (90% - 100%)      PHYSICAL EXAM:  GENERAL: Intubated/vented  CHEST/LUNG: Not using accessory muscles   HEART: s1 and s2 present  ABDOMEN:  Nontender and  Nondistended  EXTREMITIES: No pedal  edema  CNS:  Intubated/vented      LABS:                        8.8    1.54  )-----------( 133      ( 2022 17:05 )             29.0                           8.8    4.93  )-----------( 179      ( 2022 06:59 )             29.8           07-06    144  |  109  |  32<H>  ----------------------------<  91  3.2<L>   |  27  |  0.7    Ca    8.8      2022 07:20  Phos  3.4     07-06  Mg     1.9     07-06    TPro  5.4<L>  /  Alb  2.5<L>  /  TBili  0.3  /  DBili  x   /  AST  37  /  ALT  17  /  AlkPhos  522<H>  07    07-03    148<H>  |  112<H>  |  27<H>  ----------------------------<  125<H>  3.6   |  25  |  0.8    Ca    9.5      2022 06:59  Phos  3.2     07-03  Mg     2.4     07-03    TPro  6.0  /  Alb  3.0<L>  /  TBili  0.4  /  DBili  x   /  AST  18  /  ALT  9   /  AlkPhos  721<H>  07-        ABG - ( 2022 03:14 )  pH, Arterial: 7.30  pH, Blood: x     /  pCO2: 51    /  pO2: 111   / HCO3: 25    / Base Excess: -1.7  /  SaO2: 99.0          Urinalysis Basic - ( 2022 19:06 )  Color: Yellow / Appearance: Slightly Cloudy / S.025 / pH: x  Gluc: x / Ketone: Trace  / Bili: Negative / Urobili: 1.0 mg/dL   Blood: x / Protein: >=300 mg/dL / Nitrite: Negative   Leuk Esterase: Negative / RBC: 26-50 /HPF / WBC 3-5 /HPF   Sq Epi: x / Non Sq Epi: Occasional /HPF / Bacteria: Moderate        MEDICATIONS  (STANDING):    chlorhexidine 0.12% Liquid 15 milliLiter(s) Oral Mucosa every 12 hours  chlorhexidine 4% Liquid 1 Application(s) Topical daily  fentaNYL   Infusion. 0.5 MICROgram(s)/kG/Hr (4.28 mL/Hr) IV Continuous <Continuous>  folic acid 1 milliGRAM(s) Oral daily  ketamine Infusion. 0.2 mG/kG/Hr (1.71 mL/Hr) IV Continuous <Continuous>  meropenem  IVPB 1000 milliGRAM(s) IV Intermittent every 8 hours  midazolam Infusion 0.02 mG/kG/Hr (1.71 mL/Hr) IV Continuous <Continuous>  nicotine -  14 mG/24Hr(s) Patch 1 patch Transdermal daily  norepinephrine Infusion 0.05 MICROgram(s)/kG/Min (8.03 mL/Hr) IV Continuous <Continuous>  pantoprazole   Suspension 40 milliGRAM(s) Enteral Tube daily  propofol Infusion 1 MICROgram(s)/kG/Min (0.51 mL/Hr) IV Continuous <Continuous>  QUEtiapine 100 milliGRAM(s) Oral two times a day  scopolamine 1 mG/72 Hr(s) Patch 1 Patch Transdermal every 72 hours  spironolactone 100 milliGRAM(s) Oral daily  thiamine 100 milliGRAM(s) Oral daily  vancomycin    Solution 250 milliGRAM(s) Oral every 6 hours        RADIOLOGY & ADDITIONAL TESTS:    < from: Xray Kidney Ureter Bladder (22 @ 13:25) >    FINDINGS: Enteric feeding tube is stable, with tip in the left upper quadrant.  There is a non-obstructive bowel gas pattern.  No abnormal   masses or calcifications are seen.  Stable probe/catheter projects over  the lower pelvis.    < from: CT Angio Chest PE Protocol w/ IV Cont (22 @ 21:14) >    No pulmonary embolus.    Near complete consolidation/collapse of the left lower lobe, increased as  compared with prior. Mildly increased right lower lobe patchy   consolidative opacities-atelectasis. Adjacent bilateral small pleural effusions. Findings compatible with likely mixed pneumonia and   atelectasis.    Redemonstration of a dilated main pulmonary artery measuring up to 3.8 cm  compatible with pulmonary hypertension.    < from: Xray Chest 1 View- PORTABLE-Routine (Xray Chest 1 View- PORTABLE-Routine in AM.) (22 @ 05:28) >  Stable cardiomegaly and left basilar opacity. Stable support devices.    < end of copied text >  < from: CT Abdomen and Pelvis w/ IV Cont (22 @ 17:27) >  No evidence of retroperitoneal hematoma.    Small bilateral pleural effusions and left basilar consolidation. Moderate ascites redemonstrated.    Moderate pericardial effusion.  Hepatosplenomegaly redemonstrated.        MICROBIOLOGY DATA:  Clostridium difficile Toxin by PCR (22 @ 19:15)   Clostridium difficile Toxin by PCR: RESULT INTERPRETATION:  Detected     Culture - Sputum . (22 @ 13:39)   Gram Stain: Few polymorphonuclear leukocytes per low power field   No Squamous epithelial cells per low power field   Numerous Gram Negative Coccobacilli seen per oil power field   Specimen Source: ET Tube ET Tube   Culture Results:   Moderate Acinetobacter baumannii/nosocomialis group   Normal Respiratory Kelly absent     MRSA/MSSA PCR (22 @ 10:34)   MRSA PCR Result.: Negative    Respiratory Viral Panel with COVID-19 by [facility]OOKE (22 @ 09:19)   Rapid RVP Result: NotDetec   SARS-CoV-2: NotDetec:    Culture - Blood (22 @ 20:31)   Specimen Source: .Blood Blood-Peripheral   Culture Results:   No growth to date.     Culture - Blood (22 @ 20:31)   Specimen Source: .Blood Blood   Culture Results:   No growth to date.     Culture - Sputum . (22 @ 14:00)   - Oxacillin: S <=0.25 Oxacillin predicts susceptibility for dicloxacillin, methicillin, and nafcillin   - Cefazolin: S <=4   - Erythromycin: S <=0.25   - Gentamicin: S <=1 Should not be used as monotherapy   - Clindamycin: S <=0.25   - Rifampin: S <=1 Should not be used as monotherapy   - Tetra/Doxy: S <=1   - Trimethoprim/Sulfamethoxazole: S <=0.5/9.5   - Vancomycin: S 2   Gram Stain:   Moderate polymorphonuclear leukocytes per low power field   Few Squamous epithelial cells per low power field   Moderate Gram positive cocci in pairs seen per oil power field   Few Gram Variable Rods seen per oil power field   - Ampicillin/Sulbactam: S <=8/4   Specimen Source: Trach Asp Tracheal Aspirate   Culture Results:   Numerous Mixed gram negative rods   Moderate Staphylococcus aureus   Normal Respiratory Kelly present   Organism Identification: Staphylococcus aureus   Organism: Staphylococcus aureus   Culture - Blood in AM (22 @ 06:00)   Specimen Source: .Blood Blood   Culture Results: No growth to date.     Culture - Sputum . (22 @ 14:00)   Gram Stain:   Moderate polymorphonuclear leukocytes per low power field   Few Squamous epithelial cells per low power field   Moderate Gram positive cocci in pairs seen per oil power field   Few Gram Variable Rods seen per oil power field   Specimen Source: Trach Asp Tracheal Aspirate   Culture Results:   Numerous Mixed gram negative rods   Moderate Staphylococcus aureus Susceptibility to follow.   Normal Respiratory Kelly absent     Culture - Sputum . (22 @ 17:20)   Gram Stain:   Few polymorphonuclear leukocytes per low power field   Rare Squamous epithelial cells per low power field   Moderate Gram Negative Rods seen per oil power field   - Amikacin: S <=16   - Amikacin: S <=16   - Amoxicillin/Clavulanic Acid: R >16/8   - Amoxicillin/Clavulanic Acid: S <=8/4   - Ampicillin: R 16 These ampicillin results predict results for amoxicillin   - Ampicillin: R >16 These ampicillin results predict results for amoxicillin   - Ampicillin/Sulbactam: R >16/8 Enterobacter, Klebsiella aerogenes, Citrobacter, and Serratia may develop resistance during prolonged therapy (3-4 days)   - Ampicillin/Sulbactam: S <=4/2 Enterobacter, Klebsiella aerogenes, Citrobacter, and Serratia may develop resistance during prolonged therapy (3-4 days)   - Aztreonam: S <=4   - Aztreonam: S <=4   - Cefazolin: R >16 Enterobacter, Klebsiella aerogenes, Citrobacter, and Serratia may develop resistance during prolonged therapy (3-4 days)   - Cefazolin: S <=2 Enterobacter, Klebsiella aerogenes, Citrobacter, and Serratia may develop resistance during prolonged therapy (3-4 days)   - Cefepime: S <=2   - Cefepime: S <=2   - Cefoxitin: R >16   - Cefoxitin: S <=8   - Ceftriaxone: S <=1 Enterobacter, Klebsiella aerogenes, Citrobacter, and Serratia may develop resistance during prolonged therapy   - Ceftriaxone: S <=1 Enterobacter, Klebsiella aerogenes, Citrobacter, and Serratia may develop resistance during prolonged therapy   - Ciprofloxacin: S <=0.25   - Ciprofloxacin: S <=0.25   - Ertapenem: S <=0.5   - Ertapenem: S <=0.5   - Gentamicin: S <=2   - Gentamicin: S <=2   - Imipenem: S <=1   - Imipenem: S <=1   - Levofloxacin: S <=0.5   - Levofloxacin: S <=0.5   - Meropenem: S <=1   - Meropenem: S <=1   - Piperacillin/Tazobactam: S <=8   - Piperacillin/Tazobactam: S <=8   - Tobramycin: S <=2   - Tobramycin: S <=2   - Trimethoprim/Sulfamethoxazole: S <=0.5/9.5   - Trimethoprim/Sulfamethoxazole: S <=0.5/9.5   Specimen Source: Trach Asp Tracheal Aspirate   Culture Results:   Moderate Klebsiella oxytoca/Raoutella ornithinolytica   Moderate Enterobacter cloacae complex   Normal Respiratory Kelly absent   Organism Identification: Klebsiella oxytoca /Raoutella ornithinolytica   Enterobacter cloacae complex   Organism: Klebsiella oxytoca /Raoutella ornithinolytica   Organism: Enterobacter cloacae complex COVID-19 PCR (06.15.22 @ 07:10)   COVID-19 PCR: NotDetec: Culture - Acid Fast - Sputum w/Smear (22 @ 07:00)   Specimen Source: .Sputum Sputum   Acid Fast Bacilli Smear:   No acid fast bacilli seen by fluorochrome stain   Culture Results:   No acid fast bacilli isolated after 6 weeks.

## 2022-07-06 NOTE — PROGRESS NOTE ADULT - ASSESSMENT
26 year old female with hx of asthma, untreated Hep C, IVDA, anasarca was admitted to medical floor  with increased SOB and anasarca  Pt admits to using opiates 2 grams per day IV into her thighs  x years with minimal sobriety. Pt has been on [facility]MTP in 2020. Pt is contemplating going back on program. Pt denies any other substance abuse. Pt states used a xanax for wd about 3 days ago.      Hepatitis C with Cirrhosis no compliant with treatment  Hx of withdrawal   variable periods of sobriety in the past    on floor pt was diuresed with improvement of symptoms  when the following events took place  "RRT was called for pt in the lobby    patient was found on the floor in ER, gasping for air, short of breath, tachypnea, bleeding profusely from her posterior scalp, was placed in a stretcher, was hypoxic 70s    emergently intubated, ct scan head ordered re scalp bleeding    several syringes, drug paraphanellia found on patient clothes     pt  intubated and  transferred to ICU       I strongly suspect that she injected illicit drug ( abuses IV heroin) and she went into resp failure secondary to heroin overdose."      Acute respiratory failure with hypoxemia / aspiration pneumonia with sepsis / suspected drug overdose / head laceration s/p fall in lobby / possible seizure / anasarca     - now with c-diff colitis - continue oral vanco   - head laceration repaired by surgery - surgery f/u for suture removal   - Keppra weaned and DC'd as per neuro   - EEG was negative     - antibiotics as per ID   - central line changed today   - supplement hypokalemia and check mg level and maintain above 2   - DVT and GI prophylaxis

## 2022-07-06 NOTE — PROGRESS NOTE ADULT - ASSESSMENT
Patient is a 26 year old female with hx of asthma, untreated Hep C, IVDA, anasarca presenting with increased dyspnea since yesterday    IMPRESSION  #Acute respiratory failure s/p intubation 6/16  #Pneumonia -   - CT Chest 6/22 - left greater than right lower lobe consolidations   - sputum Cx 6/24 Klebsiella oxytoca, Enterobacter cloacae complex- The Sputum culture from 6/27 grew Numerous Mixed gram negative rods and Moderate Staphylococcus aureus.    #Fevers  - Ferritin, Serum: 94 ng/mL (06.27.22 @ 06:47)  - Procalcitonin, Serum: 0.11 (06.27.22 @ 15:46)  - CT Abd/pelvis 6/26 - moderated ascites moderate pericardial effusion     #Drug overdose vs withdrawal?  #Hepatosplenomegaly - present since CT Abd/pelvis 1/30/2021  #Pancytopenia  #Hx of Untreated Hep C   #IVDA   #Abx allergy: buprenorphine (Rash)  Suboxone (Rash)    would recommend:    1.  Monitor Temp. and c/w supportive care, and  please consider WBC scan since remains febrile  2.  Continue Meropenem 1 g q 8 hours since still febrile on cefepime  3. Management of Vent. as per ICU protocol  4. Aspiration precaution    d/w ICU team    Attending  Attestation:   Spent more than 35 minutes on total encounter, more than 50 % of the visit was spent counseling and/or coordinating care by the Attending physician.     Patient is a 26 year old female with hx of asthma, untreated Hep C, IVDA, anasarca presenting with increased dyspnea since yesterday    IMPRESSION  #Acute respiratory failure s/p intubation 6/16  #Pneumonia -   - CT Chest 6/22 - left greater than right lower lobe consolidations   - sputum Cx 6/24 Klebsiella oxytoca, Enterobacter cloacae complex- The Sputum culture from 6/27 grew Numerous Mixed gram negative rods and Moderate Staphylococcus aureus.    #Fevers  - Ferritin, Serum: 94 ng/mL (06.27.22 @ 06:47),  Procalcitonin, Serum: 0.11 (06.27.22 @ 15:46)  - CT Abd/pelvis 6/26 - moderated ascites moderate pericardial effusion     #Drug overdose vs withdrawal?  #Hepatosplenomegaly - present since CT Abd/pelvis 1/30/2021  #Pancytopenia  #Hx of Untreated Hep C   #IVDA   #Abx allergy: buprenorphine (Rash), Suboxone (Rash)  # C.difficille infection - 7/5/22    would recommend:    1.  Monitor Temp. and c/w supportive care  2. Continue Oral Vancomycin to cover C.diff  3.  Continue Meropenem 1 g q 8 hours for now  4. Management of Vent. as per ICU protocol  5. Aspiration precaution    d/w ICU team    Attending  Attestation:   Spent more than 35 minutes on total encounter, more than 50 % of the visit was spent counseling and/or coordinating care by the Attending physician.

## 2022-07-06 NOTE — PROGRESS NOTE ADULT - SUBJECTIVE AND OBJECTIVE BOX
Patient is a 26y old  Female who presents with a chief complaint of anasarca (05 Jul 2022 10:47)      INTERVAL HPI/OVERNIGHT EVENTS:   Remains intubated and sedated; was found to be positive for c.diff  received a total of 2 packed red blood cells yesterday  T. Max 103 F    ICU Vital Signs Last 24 Hrs  T(C): 37.9 (06 Jul 2022 07:00), Max: 38.5 (05 Jul 2022 20:00)  T(F): 100.2 (06 Jul 2022 07:00), Max: 101.3 (05 Jul 2022 20:00)  HR: 101 (06 Jul 2022 07:50) (90 - 108)  BP: 124/69 (06 Jul 2022 07:00) (95/51 - 158/92)  BP(mean): 90 (06 Jul 2022 07:00) (67 - 119)  ABP: --  ABP(mean): --  RR: 28 (06 Jul 2022 07:00) (22 - 37)  SpO2: 100% (06 Jul 2022 07:50) (98% - 100%)    I&O's Summary    05 Jul 2022 07:01  -  06 Jul 2022 07:00  --------------------------------------------------------  IN: 2353.8 mL / OUT: 965 mL / NET: 1388.8 mL      Mode: AC/ CMV (Assist Control/ Continuous Mandatory Ventilation)  RR (machine): 28  TV (machine): 400  FiO2: 30  PEEP: 5  ITime: 0.8  MAP: 10  PIP: 21      LABS:                        8.1    1.58  )-----------( 132      ( 06 Jul 2022 07:20 )             27.2     07-06    144  |  109  |  32<H>  ----------------------------<  91  3.2<L>   |  27  |  0.7    Ca    8.8      06 Jul 2022 07:20  Phos  3.4     07-06  Mg     1.9     07-06    TPro  5.4<L>  /  Alb  2.5<L>  /  TBili  0.3  /  DBili  x   /  AST  37  /  ALT  17  /  AlkPhos  522<H>  07-06        CAPILLARY BLOOD GLUCOSE        ABG - ( 06 Jul 2022 02:55 )  pH, Arterial: 7.46  pH, Blood: x     /  pCO2: 37    /  pO2: 130   / HCO3: 26    / Base Excess: 2.4   /  SaO2: 99.6                RADIOLOGY & ADDITIONAL TESTS:    Consultant(s) Notes Reviewed:  [x ] YES  [ ] NO    MEDICATIONS  (STANDING):  chlorhexidine 0.12% Liquid 15 milliLiter(s) Oral Mucosa every 12 hours  chlorhexidine 4% Liquid 1 Application(s) Topical daily  fentaNYL   Infusion. 0.5 MICROgram(s)/kG/Hr (4.28 mL/Hr) IV Continuous <Continuous>  folic acid 1 milliGRAM(s) Oral daily  ketamine Infusion. 0.2 mG/kG/Hr (1.71 mL/Hr) IV Continuous <Continuous>  lactulose Syrup 20 Gram(s) Oral every 6 hours  meropenem  IVPB 1000 milliGRAM(s) IV Intermittent every 8 hours  midazolam Infusion 0.02 mG/kG/Hr (1.71 mL/Hr) IV Continuous <Continuous>  nicotine -  14 mG/24Hr(s) Patch 1 patch Transdermal daily  norepinephrine Infusion 0.05 MICROgram(s)/kG/Min (8.03 mL/Hr) IV Continuous <Continuous>  pantoprazole   Suspension 40 milliGRAM(s) Enteral Tube daily  propofol Infusion 1 MICROgram(s)/kG/Min (0.51 mL/Hr) IV Continuous <Continuous>  QUEtiapine 100 milliGRAM(s) Oral two times a day  scopolamine 1 mG/72 Hr(s) Patch 1 Patch Transdermal every 72 hours  senna 2 Tablet(s) Oral at bedtime  spironolactone 100 milliGRAM(s) Oral daily  thiamine 100 milliGRAM(s) Oral daily  vancomycin    Solution 125 milliGRAM(s) Oral every 6 hours    MEDICATIONS  (PRN):  ALBUTerol    90 MICROgram(s) HFA Inhaler 1 Puff(s) Inhalation every 4 hours PRN Shortness of Breath and/or Wheezing  cloNIDine 0.1 milliGRAM(s) Oral every 6 hours PRN opiate withdrawal  hydrOXYzine hydrochloride 50 milliGRAM(s) Oral every 6 hours PRN Anxiety  ibuprofen  Tablet. 600 milliGRAM(s) Oral every 6 hours PRN Temp greater or equal to 38C (100.4F)  ibuprofen  Tablet. 400 milliGRAM(s) Oral every 6 hours PRN Mild Pain (1 - 3)  sodium chloride 0.9% lock flush 10 milliLiter(s) IV Push every 1 hour PRN Pre/post blood products, medications, blood draw, and to maintain line patency      PHYSICAL EXAM:  GENERAL: intbuated, sedated   HEENT:  NCAT, PERRL, No JVD, Trachea Midline, +ETT, +OGT  NERVOUS SYSTEM:  Sedated to RASS 0 to -1 opens eyes to my voice and tracks me   CHEST/LUNG: Good air entry bilaterally  HEART: Regular rate and rhythm  ABDOMEN: Soft, Nontender, + distension   EXTREMITIES:  Warm, well perfused  SKIN: No new skin breakdowns      Care Discussed with Consultants/Other Providers [ x] YES  [ ] NO Patient is a 26y old  Female who presents with a chief complaint of anasarca (05 Jul 2022 10:47)      INTERVAL HPI/OVERNIGHT EVENTS:   Remains intubated and sedated; was found to be positive for c.diff  received a total of 2 packed red blood cells yesterday  T. Max 103 F  On fentanyl, versed and propofol     ICU Vital Signs Last 24 Hrs  T(C): 37.9 (06 Jul 2022 07:00), Max: 38.5 (05 Jul 2022 20:00)  T(F): 100.2 (06 Jul 2022 07:00), Max: 101.3 (05 Jul 2022 20:00)  HR: 101 (06 Jul 2022 07:50) (90 - 108)  BP: 124/69 (06 Jul 2022 07:00) (95/51 - 158/92)  BP(mean): 90 (06 Jul 2022 07:00) (67 - 119)  ABP: --  ABP(mean): --  RR: 28 (06 Jul 2022 07:00) (22 - 37)  SpO2: 100% (06 Jul 2022 07:50) (98% - 100%)    I&O's Summary    05 Jul 2022 07:01  -  06 Jul 2022 07:00  --------------------------------------------------------  IN: 2353.8 mL / OUT: 965 mL / NET: 1388.8 mL      Mode: AC/ CMV (Assist Control/ Continuous Mandatory Ventilation)  RR (machine): 28  TV (machine): 400  FiO2: 30  PEEP: 5  ITime: 0.8  MAP: 10  PIP: 21      LABS:                        8.1    1.58  )-----------( 132      ( 06 Jul 2022 07:20 )             27.2     07-06    144  |  109  |  32<H>  ----------------------------<  91  3.2<L>   |  27  |  0.7    Ca    8.8      06 Jul 2022 07:20  Phos  3.4     07-06  Mg     1.9     07-06    TPro  5.4<L>  /  Alb  2.5<L>  /  TBili  0.3  /  DBili  x   /  AST  37  /  ALT  17  /  AlkPhos  522<H>  07-06        CAPILLARY BLOOD GLUCOSE        ABG - ( 06 Jul 2022 02:55 )  pH, Arterial: 7.46  pH, Blood: x     /  pCO2: 37    /  pO2: 130   / HCO3: 26    / Base Excess: 2.4   /  SaO2: 99.6                RADIOLOGY & ADDITIONAL TESTS:    Consultant(s) Notes Reviewed:  [x ] YES  [ ] NO    MEDICATIONS  (STANDING):  chlorhexidine 0.12% Liquid 15 milliLiter(s) Oral Mucosa every 12 hours  chlorhexidine 4% Liquid 1 Application(s) Topical daily  fentaNYL   Infusion. 0.5 MICROgram(s)/kG/Hr (4.28 mL/Hr) IV Continuous <Continuous>  folic acid 1 milliGRAM(s) Oral daily  ketamine Infusion. 0.2 mG/kG/Hr (1.71 mL/Hr) IV Continuous <Continuous>  lactulose Syrup 20 Gram(s) Oral every 6 hours  meropenem  IVPB 1000 milliGRAM(s) IV Intermittent every 8 hours  midazolam Infusion 0.02 mG/kG/Hr (1.71 mL/Hr) IV Continuous <Continuous>  nicotine -  14 mG/24Hr(s) Patch 1 patch Transdermal daily  norepinephrine Infusion 0.05 MICROgram(s)/kG/Min (8.03 mL/Hr) IV Continuous <Continuous>  pantoprazole   Suspension 40 milliGRAM(s) Enteral Tube daily  propofol Infusion 1 MICROgram(s)/kG/Min (0.51 mL/Hr) IV Continuous <Continuous>  QUEtiapine 100 milliGRAM(s) Oral two times a day  scopolamine 1 mG/72 Hr(s) Patch 1 Patch Transdermal every 72 hours  senna 2 Tablet(s) Oral at bedtime  spironolactone 100 milliGRAM(s) Oral daily  thiamine 100 milliGRAM(s) Oral daily  vancomycin    Solution 125 milliGRAM(s) Oral every 6 hours    MEDICATIONS  (PRN):  ALBUTerol    90 MICROgram(s) HFA Inhaler 1 Puff(s) Inhalation every 4 hours PRN Shortness of Breath and/or Wheezing  cloNIDine 0.1 milliGRAM(s) Oral every 6 hours PRN opiate withdrawal  hydrOXYzine hydrochloride 50 milliGRAM(s) Oral every 6 hours PRN Anxiety  ibuprofen  Tablet. 600 milliGRAM(s) Oral every 6 hours PRN Temp greater or equal to 38C (100.4F)  ibuprofen  Tablet. 400 milliGRAM(s) Oral every 6 hours PRN Mild Pain (1 - 3)  sodium chloride 0.9% lock flush 10 milliLiter(s) IV Push every 1 hour PRN Pre/post blood products, medications, blood draw, and to maintain line patency      PHYSICAL EXAM:  GENERAL: intbuated, sedated   HEENT:  NCAT, PERRL, No JVD, Trachea Midline, +ETT, +OGT  NERVOUS SYSTEM:  Sedated to RASS 0 to -1 opens eyes to my voice and tracks me   CHEST/LUNG: Good air entry bilaterally  HEART: Regular rate and rhythm  ABDOMEN: Soft, Nontender, + distension   EXTREMITIES:  Warm, well perfused  SKIN: No new skin breakdowns      Care Discussed with Consultants/Other Providers [ x] YES  [ ] NO

## 2022-07-07 LAB
-  AMIKACIN: SIGNIFICANT CHANGE UP
-  AMPICILLIN/SULBACTAM: SIGNIFICANT CHANGE UP
-  CEFEPIME: SIGNIFICANT CHANGE UP
-  CEFTAZIDIME: SIGNIFICANT CHANGE UP
-  CIPROFLOXACIN: SIGNIFICANT CHANGE UP
-  GENTAMICIN: SIGNIFICANT CHANGE UP
-  IMIPENEM: SIGNIFICANT CHANGE UP
-  LEVOFLOXACIN: SIGNIFICANT CHANGE UP
-  MEROPENEM: SIGNIFICANT CHANGE UP
-  PIPERACILLIN/TAZOBACTAM: SIGNIFICANT CHANGE UP
-  TOBRAMYCIN: SIGNIFICANT CHANGE UP
-  TRIMETHOPRIM/SULFAMETHOXAZOLE: SIGNIFICANT CHANGE UP
ALBUMIN SERPL ELPH-MCNC: 2.6 G/DL — LOW (ref 3.5–5.2)
ALP SERPL-CCNC: 586 U/L — HIGH (ref 30–115)
ALT FLD-CCNC: 26 U/L — SIGNIFICANT CHANGE UP (ref 0–41)
ANION GAP SERPL CALC-SCNC: 10 MMOL/L — SIGNIFICANT CHANGE UP (ref 7–14)
AST SERPL-CCNC: 50 U/L — HIGH (ref 0–41)
BASOPHILS # BLD AUTO: 0.01 K/UL — SIGNIFICANT CHANGE UP (ref 0–0.2)
BASOPHILS NFR BLD AUTO: 0.6 % — SIGNIFICANT CHANGE UP (ref 0–1)
BILIRUB SERPL-MCNC: 0.3 MG/DL — SIGNIFICANT CHANGE UP (ref 0.2–1.2)
BUN SERPL-MCNC: 31 MG/DL — HIGH (ref 10–20)
CALCIUM SERPL-MCNC: 8.8 MG/DL — SIGNIFICANT CHANGE UP (ref 8.5–10.1)
CHLORIDE SERPL-SCNC: 107 MMOL/L — SIGNIFICANT CHANGE UP (ref 98–110)
CO2 SERPL-SCNC: 27 MMOL/L — SIGNIFICANT CHANGE UP (ref 17–32)
CREAT SERPL-MCNC: 0.7 MG/DL — SIGNIFICANT CHANGE UP (ref 0.7–1.5)
CULTURE RESULTS: SIGNIFICANT CHANGE UP
EGFR: 121 ML/MIN/1.73M2 — SIGNIFICANT CHANGE UP
EOSINOPHIL # BLD AUTO: 0 K/UL — SIGNIFICANT CHANGE UP (ref 0–0.7)
EOSINOPHIL NFR BLD AUTO: 0 % — SIGNIFICANT CHANGE UP (ref 0–8)
FUNGITELL: <31 PG/ML — SIGNIFICANT CHANGE UP
FUNGITELL: SIGNIFICANT CHANGE UP PG/ML
GAS PNL BLDA: SIGNIFICANT CHANGE UP
GLUCOSE SERPL-MCNC: 94 MG/DL — SIGNIFICANT CHANGE UP (ref 70–99)
HCT VFR BLD CALC: 29.1 % — LOW (ref 37–47)
HGB BLD-MCNC: 8.8 G/DL — LOW (ref 12–16)
IMM GRANULOCYTES NFR BLD AUTO: 0.6 % — HIGH (ref 0.1–0.3)
LYMPHOCYTES # BLD AUTO: 0.57 K/UL — LOW (ref 1.2–3.4)
LYMPHOCYTES # BLD AUTO: 33.7 % — SIGNIFICANT CHANGE UP (ref 20.5–51.1)
MAGNESIUM SERPL-MCNC: 1.8 MG/DL — SIGNIFICANT CHANGE UP (ref 1.8–2.4)
MCHC RBC-ENTMCNC: 27.2 PG — SIGNIFICANT CHANGE UP (ref 27–31)
MCHC RBC-ENTMCNC: 30.2 G/DL — LOW (ref 32–37)
MCV RBC AUTO: 90.1 FL — SIGNIFICANT CHANGE UP (ref 81–99)
METHOD TYPE: SIGNIFICANT CHANGE UP
METHOD TYPE: SIGNIFICANT CHANGE UP
MONOCYTES # BLD AUTO: 0.08 K/UL — LOW (ref 0.1–0.6)
MONOCYTES NFR BLD AUTO: 4.7 % — SIGNIFICANT CHANGE UP (ref 1.7–9.3)
NEUTROPHILS # BLD AUTO: 1.02 K/UL — LOW (ref 1.4–6.5)
NEUTROPHILS NFR BLD AUTO: 60.4 % — SIGNIFICANT CHANGE UP (ref 42.2–75.2)
NRBC # BLD: 0 /100 WBCS — SIGNIFICANT CHANGE UP (ref 0–0)
ORGANISM # SPEC MICROSCOPIC CNT: SIGNIFICANT CHANGE UP
PHOSPHATE SERPL-MCNC: 5 MG/DL — HIGH (ref 2.1–4.9)
PLATELET # BLD AUTO: 135 K/UL — SIGNIFICANT CHANGE UP (ref 130–400)
POTASSIUM SERPL-MCNC: 3.5 MMOL/L — SIGNIFICANT CHANGE UP (ref 3.5–5)
POTASSIUM SERPL-SCNC: 3.5 MMOL/L — SIGNIFICANT CHANGE UP (ref 3.5–5)
PROT SERPL-MCNC: 5.4 G/DL — LOW (ref 6–8)
RBC # BLD: 3.23 M/UL — LOW (ref 4.2–5.4)
RBC # FLD: 16 % — HIGH (ref 11.5–14.5)
SODIUM SERPL-SCNC: 144 MMOL/L — SIGNIFICANT CHANGE UP (ref 135–146)
SPECIMEN SOURCE: SIGNIFICANT CHANGE UP
WBC # BLD: 1.69 K/UL — LOW (ref 4.8–10.8)
WBC # FLD AUTO: 1.69 K/UL — LOW (ref 4.8–10.8)

## 2022-07-07 PROCEDURE — 71045 X-RAY EXAM CHEST 1 VIEW: CPT | Mod: 26

## 2022-07-07 PROCEDURE — 93010 ELECTROCARDIOGRAM REPORT: CPT

## 2022-07-07 PROCEDURE — 99233 SBSQ HOSP IP/OBS HIGH 50: CPT

## 2022-07-07 RX ORDER — SODIUM CHLORIDE 9 MG/ML
250 INJECTION INTRAMUSCULAR; INTRAVENOUS; SUBCUTANEOUS ONCE
Refills: 0 | Status: COMPLETED | OUTPATIENT
Start: 2022-07-07 | End: 2022-07-07

## 2022-07-07 RX ADMIN — MIDAZOLAM HYDROCHLORIDE 1.71 MG/KG/HR: 1 INJECTION, SOLUTION INTRAMUSCULAR; INTRAVENOUS at 20:01

## 2022-07-07 RX ADMIN — FENTANYL CITRATE 4.28 MICROGRAM(S)/KG/HR: 50 INJECTION INTRAVENOUS at 05:11

## 2022-07-07 RX ADMIN — FENTANYL CITRATE 4.28 MICROGRAM(S)/KG/HR: 50 INJECTION INTRAVENOUS at 20:02

## 2022-07-07 RX ADMIN — Medication 1 PATCH: at 11:27

## 2022-07-07 RX ADMIN — Medication 1 PATCH: at 11:57

## 2022-07-07 RX ADMIN — MIDAZOLAM HYDROCHLORIDE 1.71 MG/KG/HR: 1 INJECTION, SOLUTION INTRAMUSCULAR; INTRAVENOUS at 02:59

## 2022-07-07 RX ADMIN — PROPOFOL 0.51 MICROGRAM(S)/KG/MIN: 10 INJECTION, EMULSION INTRAVENOUS at 02:59

## 2022-07-07 RX ADMIN — Medication 100 MILLIGRAM(S): at 11:36

## 2022-07-07 RX ADMIN — FENTANYL CITRATE 4.28 MICROGRAM(S)/KG/HR: 50 INJECTION INTRAVENOUS at 02:59

## 2022-07-07 RX ADMIN — MEROPENEM 100 MILLIGRAM(S): 1 INJECTION INTRAVENOUS at 05:13

## 2022-07-07 RX ADMIN — CHLORHEXIDINE GLUCONATE 1 APPLICATION(S): 213 SOLUTION TOPICAL at 05:12

## 2022-07-07 RX ADMIN — Medication 1 PATCH: at 20:45

## 2022-07-07 RX ADMIN — SODIUM CHLORIDE 1000 MILLILITER(S): 9 INJECTION INTRAMUSCULAR; INTRAVENOUS; SUBCUTANEOUS at 19:00

## 2022-07-07 RX ADMIN — Medication 600 MILLIGRAM(S): at 01:49

## 2022-07-07 RX ADMIN — CHLORHEXIDINE GLUCONATE 15 MILLILITER(S): 213 SOLUTION TOPICAL at 05:12

## 2022-07-07 RX ADMIN — SPIRONOLACTONE 100 MILLIGRAM(S): 25 TABLET, FILM COATED ORAL at 05:15

## 2022-07-07 RX ADMIN — Medication 1 MILLIGRAM(S): at 11:36

## 2022-07-07 RX ADMIN — PROPOFOL 0.51 MICROGRAM(S)/KG/MIN: 10 INJECTION, EMULSION INTRAVENOUS at 05:10

## 2022-07-07 RX ADMIN — SCOPALAMINE 1 PATCH: 1 PATCH, EXTENDED RELEASE TRANSDERMAL at 11:28

## 2022-07-07 RX ADMIN — Medication 250 MILLIGRAM(S): at 11:35

## 2022-07-07 RX ADMIN — QUETIAPINE FUMARATE 100 MILLIGRAM(S): 200 TABLET, FILM COATED ORAL at 05:14

## 2022-07-07 RX ADMIN — Medication 250 MILLIGRAM(S): at 05:13

## 2022-07-07 RX ADMIN — PROPOFOL 0.51 MICROGRAM(S)/KG/MIN: 10 INJECTION, EMULSION INTRAVENOUS at 20:01

## 2022-07-07 RX ADMIN — CHLORHEXIDINE GLUCONATE 15 MILLILITER(S): 213 SOLUTION TOPICAL at 17:45

## 2022-07-07 RX ADMIN — MEROPENEM 100 MILLIGRAM(S): 1 INJECTION INTRAVENOUS at 14:00

## 2022-07-07 RX ADMIN — Medication 1 PATCH: at 11:36

## 2022-07-07 RX ADMIN — PANTOPRAZOLE SODIUM 40 MILLIGRAM(S): 20 TABLET, DELAYED RELEASE ORAL at 11:36

## 2022-07-07 RX ADMIN — Medication 600 MILLIGRAM(S): at 17:54

## 2022-07-07 RX ADMIN — SCOPALAMINE 1 PATCH: 1 PATCH, EXTENDED RELEASE TRANSDERMAL at 20:45

## 2022-07-07 RX ADMIN — MIDAZOLAM HYDROCHLORIDE 1.71 MG/KG/HR: 1 INJECTION, SOLUTION INTRAMUSCULAR; INTRAVENOUS at 19:00

## 2022-07-07 RX ADMIN — Medication 250 MILLIGRAM(S): at 17:45

## 2022-07-07 RX ADMIN — QUETIAPINE FUMARATE 100 MILLIGRAM(S): 200 TABLET, FILM COATED ORAL at 17:44

## 2022-07-07 RX ADMIN — MEROPENEM 100 MILLIGRAM(S): 1 INJECTION INTRAVENOUS at 21:02

## 2022-07-07 NOTE — PROGRESS NOTE ADULT - ASSESSMENT
Acute respiratory failure sp intubation  PNA CT Chest 6/22 - left greater than right lower lobe consolidations; sputum Cx 6/24 Klebsiella oxytoca, Enterobacter cloacae complex-   MSSA pna  seizure like activity  ?? 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 Diff on oral vancomycin     PLAN:    CNS: do SAT/SBT as tolerated  D/c methadone 30mg daily for now as patient on fentanyl (psych recommended)  CW Clonapin to 2mg TID  CW Seroquel 100mg BID  ketamine     HEENT: oral care     PULMONARY:  HOB 45, Vent changes: wean O2 as tolerated, , TV to 350, RR 25. Keep on right side    CARDIOVASCULAR: goal MAP >65.  Monitor QTc daily.  s/p 1 dose lasix 40 mg IV  ECHO 7/6: Small to mod generalized efuusion. No tamponade . Small mod effusion   present by report on echo 4/21/22.  f/u cardiology recommendations     GI: GI prophylaxis.  OG Feeding.   KUB 5/5: nonobstructive gas pattern  hold laxatives     RENAL: monitor lytes    INFECTIOUS DISEASE: Abx vanco celestine pend sensi  worsening R infiltrate. Fungitell , Glactomannan.  Repeat procal 0.14 from 0.36 (6/30)  If concern for fulminant colitis add flagyl     HEMATOLOGICAL:    monitor CBC  D-dimer 1322, HIT antibody negative      ENDOCRINE:  Follow up FS.  Insulin protocol if needed.     MICU  lines: right IJ 7/6   [place] - 7/5   Acute respiratory failure sp intubation  PNA CT Chest 6/22 - left greater than right lower lobe consolidations; sputum Cx 6/24 Klebsiella oxytoca, Enterobacter cloacae complex-   MSSA pna  seizure like activity  ?? 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 Diff on oral vancomycin     PLAN:    CNS: do SAT/SBT as tolerated  D/c methadone 30mg daily for now as patient on fentanyl (psych recommended)  CW Clonapin to 2mg TID  CW Seroquel 100mg BID     HEENT: oral care     PULMONARY:  HOB 45, Vent changes: wean O2 as tolerated, , TV to 350, RR 25. Keep on right side    CARDIOVASCULAR: goal MAP >65.  Monitor QTc daily  ECHO 7/6: Small to mod generalized efuusion. No tamponade . Small mod effusion   present by report on echo 4/21/22  f/u cardiology recommendations     GI: GI prophylaxis.  OG Feeding.   KUB 5/5: nonobstructive gas pattern  hold laxatives     RENAL: monitor lytes    INFECTIOUS DISEASE: Abx vanco celestine pend sensi  worsening R infiltrate. Fungitell , Glactomannan.  Repeat procal 0.14 from 0.36 (6/30)  If concern for fulminant colitis add flagyl     HEMATOLOGICAL:    monitor CBC  D-dimer 1322, HIT antibody negative    ENDOCRINE:  Follow up FS.  Insulin protocol if needed.     MICU  lines: right IJ 7/6   [place] - 7/5  Will discuss with family for concern for trach & PEG

## 2022-07-07 NOTE — PROGRESS NOTE ADULT - SUBJECTIVE AND OBJECTIVE BOX
Patient is a 26y old  Female who presents with a chief complaint of anasarca (07 Jul 2022 07:59)        Over Night Events: Blood pressure was soft. febrile , Propofol, fentanyl and versed.        ROS:     All ROS are negative except HPI         PHYSICAL EXAM    ICU Vital Signs Last 24 Hrs  T(C): 38.6 (07 Jul 2022 07:00), Max: 38.7 (07 Jul 2022 01:35)  T(F): 101.5 (07 Jul 2022 07:00), Max: 101.7 (07 Jul 2022 01:35)  HR: 103 (07 Jul 2022 07:35) (91 - 111)  BP: 91/55 (07 Jul 2022 07:35) (85/46 - 148/87)  BP(mean): 68 (07 Jul 2022 07:35) (60 - 105)  RR: 25 (07 Jul 2022 07:35) (18 - 35)  SpO2: 99% (07 Jul 2022 07:35) (90% - 99%)      CONSTITUTIONAL:  ill appearing      ENT:   Airway patent,   Mouth with normal mucosa.   Et tube +ve     CARDIAC:   tachycardiac  Anasarca        RESPIRATORY:   No wheezing  Bilateral BS  Normal chest expansion  Not tachypneic,  No use of accessory muscles    GASTROINTESTINAL:  Distented.        NEUROLOGICAL:   becomes agitated off sedation     SKIN:   DTI   wound care on board           07-06-22 @ 07:01  -  07-07-22 @ 07:00  --------------------------------------------------------  IN:    FentaNYL: 716 mL    IV PiggyBack: 100 mL    IV PiggyBack: 50 mL    Midazolam: 165 mL    Propofol: 445 mL    Vital High Protein: 900 mL  Total IN: 2376 mL    OUT:    Indwelling Catheter - Urethral (mL): 1165 mL    Ketamine: 0 mL    Norepinephrine: 0 mL  Total OUT: 1165 mL    Total NET: 1211 mL      07-07-22 @ 07:01  -  07-07-22 @ 08:44  --------------------------------------------------------  IN:  Total IN: 0 mL    OUT:    Indwelling Catheter - Urethral (mL): 20 mL  Total OUT: 20 mL    Total NET: -20 mL          LABS:                            8.8    1.69  )-----------( 135      ( 07 Jul 2022 05:19 )             29.1                                               07-07    144  |  107  |  31<H>  ----------------------------<  94  3.5   |  27  |  0.7    Ca    8.8      07 Jul 2022 05:19  Phos  5.0     07-07  Mg     1.8     07-07    TPro  5.4<L>  /  Alb  2.6<L>  /  TBili  0.3  /  DBili  x   /  AST  50<H>  /  ALT  26  /  AlkPhos  586<H>  07-07                                                                                           LIVER FUNCTIONS - ( 07 Jul 2022 05:19 )  Alb: 2.6 g/dL / Pro: 5.4 g/dL / ALK PHOS: 586 U/L / ALT: 26 U/L / AST: 50 U/L / GGT: x                                                  Culture - Sputum (collected 04 Jul 2022 13:39)  Source: ET Tube ET Tube  Gram Stain (05 Jul 2022 08:48):    Few polymorphonuclear leukocytes per low power field    No Squamous epithelial cells per low power field    Numerous Gram Negative Coccobacilli seen per oil power field  Preliminary Report (06 Jul 2022 10:44):    Moderate Acinetobacter baumannii/nosocomialis group    Normal Respiratory Kelly absent                                                   Mode: AC/ CMV (Assist Control/ Continuous Mandatory Ventilation)  RR (machine): 25  TV (machine): 350  FiO2: 30  PEEP: 5  ITime: 1  MAP: 7  PIP: 18                                      ABG - ( 07 Jul 2022 04:30 )  pH, Arterial: 7.36  pH, Blood: x     /  pCO2: 49    /  pO2: 71    / HCO3: 28    / Base Excess: 1.6   /  SaO2: 95.5                MEDICATIONS  (STANDING):  chlorhexidine 0.12% Liquid 15 milliLiter(s) Oral Mucosa every 12 hours  chlorhexidine 4% Liquid 1 Application(s) Topical daily  fentaNYL   Infusion. 0.5 MICROgram(s)/kG/Hr (4.28 mL/Hr) IV Continuous <Continuous>  folic acid 1 milliGRAM(s) Oral daily  ketamine Infusion. 0.2 mG/kG/Hr (1.71 mL/Hr) IV Continuous <Continuous>  meropenem  IVPB 1000 milliGRAM(s) IV Intermittent every 8 hours  midazolam Infusion 0.02 mG/kG/Hr (1.71 mL/Hr) IV Continuous <Continuous>  nicotine -  14 mG/24Hr(s) Patch 1 patch Transdermal daily  norepinephrine Infusion 0.05 MICROgram(s)/kG/Min (8.03 mL/Hr) IV Continuous <Continuous>  pantoprazole   Suspension 40 milliGRAM(s) Enteral Tube daily  propofol Infusion 1 MICROgram(s)/kG/Min (0.51 mL/Hr) IV Continuous <Continuous>  QUEtiapine 100 milliGRAM(s) Oral two times a day  scopolamine 1 mG/72 Hr(s) Patch 1 Patch Transdermal every 72 hours  spironolactone 100 milliGRAM(s) Oral daily  thiamine 100 milliGRAM(s) Oral daily  vancomycin    Solution 250 milliGRAM(s) Oral every 6 hours    MEDICATIONS  (PRN):  ALBUTerol    90 MICROgram(s) HFA Inhaler 1 Puff(s) Inhalation every 4 hours PRN Shortness of Breath and/or Wheezing  cloNIDine 0.1 milliGRAM(s) Oral every 6 hours PRN opiate withdrawal  hydrOXYzine hydrochloride 50 milliGRAM(s) Oral every 6 hours PRN Anxiety  ibuprofen  Tablet. 600 milliGRAM(s) Oral every 6 hours PRN Temp greater or equal to 38C (100.4F)  ibuprofen  Tablet. 400 milliGRAM(s) Oral every 6 hours PRN Mild Pain (1 - 3)  sodium chloride 0.9% lock flush 10 milliLiter(s) IV Push every 1 hour PRN Pre/post blood products, medications, blood draw, and to maintain line patency         CXR interpreted by me:  CXr reviewed , ET ok. TLC ok

## 2022-07-07 NOTE — PROGRESS NOTE ADULT - ASSESSMENT
IMPRESSION:  Acute respiratory failure sp intubation  PNA  MSSA pna  seizure like activity  ?? drug over dose/ withdrawal opoid   liver cirrhosis   Ascites sp paracentesis   Pancytopenia- worsening  RENETTA improved  C diff +ve     PLAN:    CNS: do SAT/SBT as tolerated  CW methadone 30mg daily  CW Clonapin to 2mg TID  CW Seroquel 100mg BID  ketamine     HEENT: oral care     PULMONARY:  HOB 45,  Vent changes: wean O2 as tolerated, TV to 375, RR 25  . Repeat 1 hr     CARDIOVASCULAR: goal MAP >65.  Monitor QTc daily.    hold lasix   Pericarditis management per cardio. discussed     GI: GI prophylaxis.  OG Feeding.    KUB reviewed    RENAL: monitor lytes is and os     INFECTIOUS DISEASE: Abx vanco celestine pend sensi. oral vanco     Fungitell , Glactomannan. Repeat Procal.      HEMATOLOGICAL:    monitor CBC, Heme FU.   reticulocyte count  dvt ppx  check d-dimer  reviewed , HIT abx.  LE duplex to be repeated in 7 days     ENDOCRINE:  Follow up FS.  Insulin protocol if needed.     MICU  lines: will change multi lumen to right   [place] - failed TOV 6/24 Change.      IMPRESSION:  Acute respiratory failure sp intubation  PNA  MSSA pna  seizure like activity  ?? drug over dose/ withdrawal opoid   liver cirrhosis   Ascites sp paracentesis   Pancytopenia- worsening  RENETTA improved  C diff +ve     PLAN:    CNS: do SAT/SBT as tolerated  CW methadone 30mg daily  CW Clonapin to 2mg TID  CW Seroquel 100mg BID  ketamine     HEENT: oral care     PULMONARY:  HOB 45,  Vent changes: wean O2 as tolerated, TV to 375, RR 25  . Repeat 1 hr   surg consult for trach    CARDIOVASCULAR: goal MAP >65.  Monitor QTc daily.    hold lasix   Pericarditis management per cardio. discussed     GI: GI prophylaxis.  OG Feeding.    KUB reviewed    RENAL: monitor lytes is and os     INFECTIOUS DISEASE: Abx vanco celestine pend sensi. oral vanco     Fungitell , Glactomannan. Repeat Procal.      HEMATOLOGICAL:    monitor CBC, Heme FU.   reticulocyte count  dvt ppx  check d-dimer  reviewed , HIT abx.  LE duplex to be repeated in 7 days     ENDOCRINE:  Follow up FS.  Insulin protocol if needed.     MICU  lines: will change multi lumen to right   [place] - failed TOV 6/24 Change.

## 2022-07-07 NOTE — PROGRESS NOTE ADULT - SUBJECTIVE AND OBJECTIVE BOX
Patient seen and evaluated this am, sedated on fentanyl, versed, propofol       T(F): 101.5 (07-07-22 @ 07:00), Max: 101.7 (07-07-22 @ 01:35)  HR: 100 (07-07-22 @ 09:00)  BP: 91/55 (07-07-22 @ 07:35)  RR: 25 (07-07-22 @ 07:35)  SpO2: 95% (07-07-22 @ 09:00) (90% - 99%)    PHYSICAL EXAM:  GENERAL: NAD  HEAD:  Atraumatic, Normocephalic  EYES: EOMI, PERRLA, conjunctiva and sclera clear  NERVOUS SYSTEM:  no focal deficits   CHEST/LUNG:  bilateral rhonchi  HEART: Regular rate and rhythm; No murmurs, rubs, or gallops  ABDOMEN: Soft, Nontender, Nondistended; Bowel sounds present  EXTREMITIES:  2+ Peripheral Pulses, No clubbing, cyanosis, or edema    LABS  07-07    144  |  107  |  31<H>  ----------------------------<  94  3.5   |  27  |  0.7    Ca    8.8      07 Jul 2022 05:19  Phos  5.0     07-07  Mg     1.8     07-07    TPro  5.4<L>  /  Alb  2.6<L>  /  TBili  0.3  /  DBili  x   /  AST  50<H>  /  ALT  26  /  AlkPhos  586<H>  07-07                          8.8    1.69  )-----------( 135      ( 07 Jul 2022 05:19 )             29.1       Mode: CPAP with PS  FiO2: 30  PEEP: 5  PS: 10      Culture Results:   Moderate Acinetobacter baumannii/nosocom group (Carbapenem Resistant)  Normal Respiratory Kelly absent (07-04-22)  Culture Results:   No Growth Final (06-29-22)  Culture Results:   No Growth Final (06-29-22)  Culture Results:   No Growth Final (06-28-22)  Culture Results:   Numerous Mixed gram negative rods  Moderate Staphylococcus aureus  Normal Respiratory Kelly present (06-27-22)  Culture Results:   Moderate Klebsiella oxytoca/Raoutella ornithinolytica  Moderate Enterobacter cloacae complex  Normal Respiratory Kelly absent (06-24-22)  Culture Results:   No growth at 5 days (06-22-22)  Culture Results:   No Growth Final (06-21-22)  Culture Results:   Normal Respiratory Kelly present (06-17-22)  Culture Results:   No growth at 5 days (06-17-22)    RADIOLOGY  < from: Xray Chest 1 View- PORTABLE-Routine (Xray Chest 1 View- PORTABLE-Routine in AM.) (07.07.22 @ 06:18) >  Impression:    Increasing right lower lobe infiltrate. Stable left basilar   opacity/effusion.    < end of copied text >    MEDICATIONS  (STANDING):  chlorhexidine 0.12% Liquid 15 milliLiter(s) Oral Mucosa every 12 hours  chlorhexidine 4% Liquid 1 Application(s) Topical daily  fentaNYL   Infusion. 0.5 MICROgram(s)/kG/Hr (4.28 mL/Hr) IV Continuous <Continuous>  folic acid 1 milliGRAM(s) Oral daily  meropenem  IVPB 1000 milliGRAM(s) IV Intermittent every 8 hours  midazolam Infusion 0.02 mG/kG/Hr (1.71 mL/Hr) IV Continuous <Continuous>  nicotine -  14 mG/24Hr(s) Patch 1 patch Transdermal daily  norepinephrine Infusion 0.05 MICROgram(s)/kG/Min (8.03 mL/Hr) IV Continuous <Continuous>  pantoprazole   Suspension 40 milliGRAM(s) Enteral Tube daily  propofol Infusion 1 MICROgram(s)/kG/Min (0.51 mL/Hr) IV Continuous <Continuous>  QUEtiapine 100 milliGRAM(s) Oral two times a day  scopolamine 1 mG/72 Hr(s) Patch 1 Patch Transdermal every 72 hours  sodium chloride 0.9% Bolus 250 milliLiter(s) IV Bolus once  spironolactone 100 milliGRAM(s) Oral daily  thiamine 100 milliGRAM(s) Oral daily  vancomycin    Solution 250 milliGRAM(s) Oral every 6 hours    MEDICATIONS  (PRN):  ALBUTerol    90 MICROgram(s) HFA Inhaler 1 Puff(s) Inhalation every 4 hours PRN Shortness of Breath and/or Wheezing  cloNIDine 0.1 milliGRAM(s) Oral every 6 hours PRN opiate withdrawal  hydrOXYzine hydrochloride 50 milliGRAM(s) Oral every 6 hours PRN Anxiety  ibuprofen  Tablet. 600 milliGRAM(s) Oral every 6 hours PRN Temp greater or equal to 38C (100.4F)  ibuprofen  Tablet. 400 milliGRAM(s) Oral every 6 hours PRN Mild Pain (1 - 3)  sodium chloride 0.9% lock flush 10 milliLiter(s) IV Push every 1 hour PRN Pre/post blood products, medications, blood draw, and to maintain line patency

## 2022-07-07 NOTE — PROGRESS NOTE ADULT - SUBJECTIVE AND OBJECTIVE BOX
Patient is a 26y old  Female who presents with a chief complaint of anasarca (06 Jul 2022 20:40)      INTERVAL HPI/OVERNIGHT EVENTS:   Remains intubated and sedated; was found to be positive for c.diff  On fentanyl, versed and propofol     ICU Vital Signs Last 24 Hrs  T(C): 38.6 (07 Jul 2022 07:00), Max: 38.7 (07 Jul 2022 01:35)  T(F): 101.5 (07 Jul 2022 07:00), Max: 101.7 (07 Jul 2022 01:35)  HR: 107 (07 Jul 2022 07:00) (91 - 111)  BP: 95/51 (07 Jul 2022 07:00) (85/46 - 148/87)  BP(mean): 73 (07 Jul 2022 06:35) (60 - 105)  ABP: --  ABP(mean): --  RR: 24 (07 Jul 2022 07:00) (18 - 35)  SpO2: 99% (07 Jul 2022 07:00) (90% - 99%)    I&O's Summary    06 Jul 2022 07:01  -  07 Jul 2022 07:00  --------------------------------------------------------  IN: 2376 mL / OUT: 1165 mL / NET: 1211 mL    07 Jul 2022 07:01  -  07 Jul 2022 08:00  --------------------------------------------------------  IN: 0 mL / OUT: 20 mL / NET: -20 mL      Mode: AC/ CMV (Assist Control/ Continuous Mandatory Ventilation)  RR (machine): 25  TV (machine): 350  FiO2: 30  PEEP: 5  ITime: 1  MAP: 7  PIP: 18      LABS:                        8.8    1.69  )-----------( 135      ( 07 Jul 2022 05:19 )             29.1     07-07    144  |  107  |  31<H>  ----------------------------<  94  3.5   |  27  |  0.7    Ca    8.8      07 Jul 2022 05:19  Phos  5.0     07-07  Mg     1.8     07-07    TPro  5.4<L>  /  Alb  2.6<L>  /  TBili  0.3  /  DBili  x   /  AST  50<H>  /  ALT  26  /  AlkPhos  586<H>  07-07        CAPILLARY BLOOD GLUCOSE        ABG - ( 07 Jul 2022 04:30 )  pH, Arterial: 7.36  pH, Blood: x     /  pCO2: 49    /  pO2: 71    / HCO3: 28    / Base Excess: 1.6   /  SaO2: 95.5                RADIOLOGY & ADDITIONAL TESTS:    Consultant(s) Notes Reviewed:  [x ] YES  [ ] NO    MEDICATIONS  (STANDING):  chlorhexidine 0.12% Liquid 15 milliLiter(s) Oral Mucosa every 12 hours  chlorhexidine 4% Liquid 1 Application(s) Topical daily  fentaNYL   Infusion. 0.5 MICROgram(s)/kG/Hr (4.28 mL/Hr) IV Continuous <Continuous>  folic acid 1 milliGRAM(s) Oral daily  ketamine Infusion. 0.2 mG/kG/Hr (1.71 mL/Hr) IV Continuous <Continuous>  meropenem  IVPB 1000 milliGRAM(s) IV Intermittent every 8 hours  midazolam Infusion 0.02 mG/kG/Hr (1.71 mL/Hr) IV Continuous <Continuous>  nicotine -  14 mG/24Hr(s) Patch 1 patch Transdermal daily  norepinephrine Infusion 0.05 MICROgram(s)/kG/Min (8.03 mL/Hr) IV Continuous <Continuous>  pantoprazole   Suspension 40 milliGRAM(s) Enteral Tube daily  propofol Infusion 1 MICROgram(s)/kG/Min (0.51 mL/Hr) IV Continuous <Continuous>  QUEtiapine 100 milliGRAM(s) Oral two times a day  scopolamine 1 mG/72 Hr(s) Patch 1 Patch Transdermal every 72 hours  spironolactone 100 milliGRAM(s) Oral daily  thiamine 100 milliGRAM(s) Oral daily  vancomycin    Solution 250 milliGRAM(s) Oral every 6 hours    MEDICATIONS  (PRN):  ALBUTerol    90 MICROgram(s) HFA Inhaler 1 Puff(s) Inhalation every 4 hours PRN Shortness of Breath and/or Wheezing  cloNIDine 0.1 milliGRAM(s) Oral every 6 hours PRN opiate withdrawal  hydrOXYzine hydrochloride 50 milliGRAM(s) Oral every 6 hours PRN Anxiety  ibuprofen  Tablet. 600 milliGRAM(s) Oral every 6 hours PRN Temp greater or equal to 38C (100.4F)  ibuprofen  Tablet. 400 milliGRAM(s) Oral every 6 hours PRN Mild Pain (1 - 3)  sodium chloride 0.9% lock flush 10 milliLiter(s) IV Push every 1 hour PRN Pre/post blood products, medications, blood draw, and to maintain line patency      PHYSICAL EXAM:  GENERAL: intbuated, sedated   HEENT:  NCAT, PERRL, No JVD, Trachea Midline, +ETT, +OGT  NERVOUS SYSTEM:  Sedated to RASS 0 to -1 opens eyes to my voice and tracks me   CHEST/LUNG: Good air entry bilaterally  HEART: Regular rate and rhythm  ABDOMEN: Soft, Nontender, + distension   EXTREMITIES:  Warm, well perfused  SKIN: No new skin breakdowns   Patient is a 26y old  Female who presents with a chief complaint of anasarca (06 Jul 2022 20:40)      INTERVAL HPI/OVERNIGHT EVENTS:   Remains intubated and sedated; was found to be positive for c.diff  On fentanyl, versed and propofol   Persistently febrile tmax 101.7 F     ICU Vital Signs Last 24 Hrs  T(C): 38.6 (07 Jul 2022 07:00), Max: 38.7 (07 Jul 2022 01:35)  T(F): 101.5 (07 Jul 2022 07:00), Max: 101.7 (07 Jul 2022 01:35)  HR: 107 (07 Jul 2022 07:00) (91 - 111)  BP: 95/51 (07 Jul 2022 07:00) (85/46 - 148/87)  BP(mean): 73 (07 Jul 2022 06:35) (60 - 105)  ABP: --  ABP(mean): --  RR: 24 (07 Jul 2022 07:00) (18 - 35)  SpO2: 99% (07 Jul 2022 07:00) (90% - 99%)    I&O's Summary    06 Jul 2022 07:01  -  07 Jul 2022 07:00  --------------------------------------------------------  IN: 2376 mL / OUT: 1165 mL / NET: 1211 mL    07 Jul 2022 07:01  -  07 Jul 2022 08:00  --------------------------------------------------------  IN: 0 mL / OUT: 20 mL / NET: -20 mL      Mode: AC/ CMV (Assist Control/ Continuous Mandatory Ventilation)  RR (machine): 25  TV (machine): 350  FiO2: 30  PEEP: 5  ITime: 1  MAP: 7  PIP: 18      LABS:                        8.8    1.69  )-----------( 135      ( 07 Jul 2022 05:19 )             29.1     07-07    144  |  107  |  31<H>  ----------------------------<  94  3.5   |  27  |  0.7    Ca    8.8      07 Jul 2022 05:19  Phos  5.0     07-07  Mg     1.8     07-07    TPro  5.4<L>  /  Alb  2.6<L>  /  TBili  0.3  /  DBili  x   /  AST  50<H>  /  ALT  26  /  AlkPhos  586<H>  07-07        CAPILLARY BLOOD GLUCOSE        ABG - ( 07 Jul 2022 04:30 )  pH, Arterial: 7.36  pH, Blood: x     /  pCO2: 49    /  pO2: 71    / HCO3: 28    / Base Excess: 1.6   /  SaO2: 95.5                RADIOLOGY & ADDITIONAL TESTS:    Consultant(s) Notes Reviewed:  [x ] YES  [ ] NO    MEDICATIONS  (STANDING):  chlorhexidine 0.12% Liquid 15 milliLiter(s) Oral Mucosa every 12 hours  chlorhexidine 4% Liquid 1 Application(s) Topical daily  fentaNYL   Infusion. 0.5 MICROgram(s)/kG/Hr (4.28 mL/Hr) IV Continuous <Continuous>  folic acid 1 milliGRAM(s) Oral daily  ketamine Infusion. 0.2 mG/kG/Hr (1.71 mL/Hr) IV Continuous <Continuous>  meropenem  IVPB 1000 milliGRAM(s) IV Intermittent every 8 hours  midazolam Infusion 0.02 mG/kG/Hr (1.71 mL/Hr) IV Continuous <Continuous>  nicotine -  14 mG/24Hr(s) Patch 1 patch Transdermal daily  norepinephrine Infusion 0.05 MICROgram(s)/kG/Min (8.03 mL/Hr) IV Continuous <Continuous>  pantoprazole   Suspension 40 milliGRAM(s) Enteral Tube daily  propofol Infusion 1 MICROgram(s)/kG/Min (0.51 mL/Hr) IV Continuous <Continuous>  QUEtiapine 100 milliGRAM(s) Oral two times a day  scopolamine 1 mG/72 Hr(s) Patch 1 Patch Transdermal every 72 hours  spironolactone 100 milliGRAM(s) Oral daily  thiamine 100 milliGRAM(s) Oral daily  vancomycin    Solution 250 milliGRAM(s) Oral every 6 hours    MEDICATIONS  (PRN):  ALBUTerol    90 MICROgram(s) HFA Inhaler 1 Puff(s) Inhalation every 4 hours PRN Shortness of Breath and/or Wheezing  cloNIDine 0.1 milliGRAM(s) Oral every 6 hours PRN opiate withdrawal  hydrOXYzine hydrochloride 50 milliGRAM(s) Oral every 6 hours PRN Anxiety  ibuprofen  Tablet. 600 milliGRAM(s) Oral every 6 hours PRN Temp greater or equal to 38C (100.4F)  ibuprofen  Tablet. 400 milliGRAM(s) Oral every 6 hours PRN Mild Pain (1 - 3)  sodium chloride 0.9% lock flush 10 milliLiter(s) IV Push every 1 hour PRN Pre/post blood products, medications, blood draw, and to maintain line patency      PHYSICAL EXAM:  GENERAL: intbuated, sedated   HEENT:  NCAT, PERRL, No JVD, Trachea Midline, +ETT, +OGT  NERVOUS SYSTEM:  Sedated to RASS 0 to -1 opens eyes to my voice and tracks me   CHEST/LUNG: Good air entry bilaterally  HEART: Regular rate and rhythm  ABDOMEN: Soft, Nontender, + distension   EXTREMITIES:  Warm, well perfused  SKIN: No new skin breakdowns

## 2022-07-07 NOTE — PROGRESS NOTE ADULT - ASSESSMENT
26 year old female with hx of asthma, untreated Hep C, IVDA, anasarca was admitted to medical floor  with increased SOB and anasarca  Pt admits to using opiates 2 grams per day IV into her thighs  x years with minimal sobriety. Pt has been on MMTP in 2020. Pt is contemplating going back on program. Pt denies any other substance abuse. Pt states used a xanax for wd about 3 days ago.      Hepatitis C with Cirrhosis no compliant with treatment  Hx of withdrawal   variable periods of sobriety in the past    on floor pt was diuresed with improvement of symptoms  when the following events took place  "RRT was called for pt in the lobby    patient was found on the floor in ER, gasping for air, short of breath, tachypnea, bleeding profusely from her posterior scalp, was placed in a stretcher, was hypoxic 70s    emergently intubated, ct scan head ordered re scalp bleeding    several syringes, drug paraphanellia found on patient clothes     pt  intubated and  transferred to ICU       I strongly suspect that she injected illicit drug ( abuses IV heroin) and she went into resp failure secondary to heroin overdose."      Acute respiratory failure with hypoxemia / aspiration pneumonia with sepsis / suspected drug overdose / head laceration s/p fall in lobby / possible seizure / anasarca     - now with c-diff colitis - continue oral vanco   - head laceration repaired by surgery - surgery f/u for suture removal   - Keppra weaned and DC'd as per neuro   - EEG was negative     - antibiotics as per ID   - central line changed    - supplement hypokalemia and check mg level and maintain above 2   - DVT and GI prophylaxis

## 2022-07-08 LAB
ALBUMIN SERPL ELPH-MCNC: 2.3 G/DL — LOW (ref 3.5–5.2)
ALP SERPL-CCNC: 523 U/L — HIGH (ref 30–115)
ALT FLD-CCNC: 24 U/L — SIGNIFICANT CHANGE UP (ref 0–41)
ANION GAP SERPL CALC-SCNC: 9 MMOL/L — SIGNIFICANT CHANGE UP (ref 7–14)
AST SERPL-CCNC: 38 U/L — SIGNIFICANT CHANGE UP (ref 0–41)
B QUINTANA DNA SPEC QL NAA+PROBE: NEGATIVE — SIGNIFICANT CHANGE UP
BASOPHILS # BLD AUTO: 0.01 K/UL — SIGNIFICANT CHANGE UP (ref 0–0.2)
BASOPHILS NFR BLD AUTO: 0.5 % — SIGNIFICANT CHANGE UP (ref 0–1)
BILIRUB SERPL-MCNC: 0.3 MG/DL — SIGNIFICANT CHANGE UP (ref 0.2–1.2)
BUN SERPL-MCNC: 31 MG/DL — HIGH (ref 10–20)
CALCIUM SERPL-MCNC: 8.3 MG/DL — LOW (ref 8.5–10.1)
CHLORIDE SERPL-SCNC: 108 MMOL/L — SIGNIFICANT CHANGE UP (ref 98–110)
CO2 SERPL-SCNC: 27 MMOL/L — SIGNIFICANT CHANGE UP (ref 17–32)
CREAT SERPL-MCNC: 0.7 MG/DL — SIGNIFICANT CHANGE UP (ref 0.7–1.5)
EGFR: 121 ML/MIN/1.73M2 — SIGNIFICANT CHANGE UP
EOSINOPHIL # BLD AUTO: 0.01 K/UL — SIGNIFICANT CHANGE UP (ref 0–0.7)
EOSINOPHIL NFR BLD AUTO: 0.5 % — SIGNIFICANT CHANGE UP (ref 0–8)
GAS PNL BLDA: SIGNIFICANT CHANGE UP
GAS PNL BLDA: SIGNIFICANT CHANGE UP
GLUCOSE SERPL-MCNC: 88 MG/DL — SIGNIFICANT CHANGE UP (ref 70–99)
HCT VFR BLD CALC: 29.9 % — LOW (ref 37–47)
HGB BLD-MCNC: 9.1 G/DL — LOW (ref 12–16)
IMM GRANULOCYTES NFR BLD AUTO: 0.5 % — HIGH (ref 0.1–0.3)
LYMPHOCYTES # BLD AUTO: 0.59 K/UL — LOW (ref 1.2–3.4)
LYMPHOCYTES # BLD AUTO: 32.4 % — SIGNIFICANT CHANGE UP (ref 20.5–51.1)
MAGNESIUM SERPL-MCNC: 1.7 MG/DL — LOW (ref 1.8–2.4)
MCHC RBC-ENTMCNC: 27.1 PG — SIGNIFICANT CHANGE UP (ref 27–31)
MCHC RBC-ENTMCNC: 30.4 G/DL — LOW (ref 32–37)
MCV RBC AUTO: 89 FL — SIGNIFICANT CHANGE UP (ref 81–99)
MONOCYTES # BLD AUTO: 0.09 K/UL — LOW (ref 0.1–0.6)
MONOCYTES NFR BLD AUTO: 4.9 % — SIGNIFICANT CHANGE UP (ref 1.7–9.3)
NEUTROPHILS # BLD AUTO: 1.11 K/UL — LOW (ref 1.4–6.5)
NEUTROPHILS NFR BLD AUTO: 61.2 % — SIGNIFICANT CHANGE UP (ref 42.2–75.2)
NRBC # BLD: 0 /100 WBCS — SIGNIFICANT CHANGE UP (ref 0–0)
PHOSPHATE SERPL-MCNC: 4.2 MG/DL — SIGNIFICANT CHANGE UP (ref 2.1–4.9)
PLATELET # BLD AUTO: 131 K/UL — SIGNIFICANT CHANGE UP (ref 130–400)
POTASSIUM SERPL-MCNC: 3.3 MMOL/L — LOW (ref 3.5–5)
POTASSIUM SERPL-SCNC: 3.3 MMOL/L — LOW (ref 3.5–5)
PROT SERPL-MCNC: 5.2 G/DL — LOW (ref 6–8)
RBC # BLD: 3.36 M/UL — LOW (ref 4.2–5.4)
RBC # FLD: 15.8 % — HIGH (ref 11.5–14.5)
SODIUM SERPL-SCNC: 144 MMOL/L — SIGNIFICANT CHANGE UP (ref 135–146)
WBC # BLD: 1.82 K/UL — LOW (ref 4.8–10.8)
WBC # FLD AUTO: 1.82 K/UL — LOW (ref 4.8–10.8)

## 2022-07-08 PROCEDURE — 71045 X-RAY EXAM CHEST 1 VIEW: CPT | Mod: 26

## 2022-07-08 PROCEDURE — 71045 X-RAY EXAM CHEST 1 VIEW: CPT | Mod: 26,77

## 2022-07-08 PROCEDURE — 99291 CRITICAL CARE FIRST HOUR: CPT

## 2022-07-08 PROCEDURE — 99233 SBSQ HOSP IP/OBS HIGH 50: CPT

## 2022-07-08 PROCEDURE — 93010 ELECTROCARDIOGRAM REPORT: CPT

## 2022-07-08 RX ORDER — DEXMEDETOMIDINE HYDROCHLORIDE IN 0.9% SODIUM CHLORIDE 4 UG/ML
0.2 INJECTION INTRAVENOUS
Qty: 400 | Refills: 0 | Status: DISCONTINUED | OUTPATIENT
Start: 2022-07-08 | End: 2022-07-17

## 2022-07-08 RX ORDER — SODIUM CHLORIDE 9 MG/ML
500 INJECTION INTRAMUSCULAR; INTRAVENOUS; SUBCUTANEOUS ONCE
Refills: 0 | Status: COMPLETED | OUTPATIENT
Start: 2022-07-08 | End: 2022-07-08

## 2022-07-08 RX ORDER — HEPARIN SODIUM 5000 [USP'U]/ML
5000 INJECTION INTRAVENOUS; SUBCUTANEOUS EVERY 12 HOURS
Refills: 0 | Status: DISCONTINUED | OUTPATIENT
Start: 2022-07-08 | End: 2022-07-12

## 2022-07-08 RX ORDER — POTASSIUM CHLORIDE 20 MEQ
20 PACKET (EA) ORAL
Refills: 0 | Status: COMPLETED | OUTPATIENT
Start: 2022-07-08 | End: 2022-07-08

## 2022-07-08 RX ORDER — ACETAMINOPHEN 500 MG
650 TABLET ORAL EVERY 6 HOURS
Refills: 0 | Status: DISCONTINUED | OUTPATIENT
Start: 2022-07-08 | End: 2022-07-20

## 2022-07-08 RX ORDER — PANTOPRAZOLE SODIUM 20 MG/1
40 TABLET, DELAYED RELEASE ORAL DAILY
Refills: 0 | Status: DISCONTINUED | OUTPATIENT
Start: 2022-07-08 | End: 2022-07-20

## 2022-07-08 RX ORDER — MAGNESIUM SULFATE 500 MG/ML
2 VIAL (ML) INJECTION ONCE
Refills: 0 | Status: COMPLETED | OUTPATIENT
Start: 2022-07-08 | End: 2022-07-08

## 2022-07-08 RX ORDER — CEFIDEROCOL SULFATE TOSYLATE 1 G/10ML
2000 INJECTION, POWDER, FOR SOLUTION INTRAVENOUS EVERY 6 HOURS
Refills: 0 | Status: DISCONTINUED | OUTPATIENT
Start: 2022-07-08 | End: 2022-07-15

## 2022-07-08 RX ORDER — FUROSEMIDE 40 MG
40 TABLET ORAL ONCE
Refills: 0 | Status: COMPLETED | OUTPATIENT
Start: 2022-07-08 | End: 2022-07-08

## 2022-07-08 RX ORDER — KETOROLAC TROMETHAMINE 30 MG/ML
15 SYRINGE (ML) INJECTION ONCE
Refills: 0 | Status: DISCONTINUED | OUTPATIENT
Start: 2022-07-08 | End: 2022-07-08

## 2022-07-08 RX ORDER — DEXMEDETOMIDINE HYDROCHLORIDE IN 0.9% SODIUM CHLORIDE 4 UG/ML
0.2 INJECTION INTRAVENOUS
Qty: 200 | Refills: 0 | Status: DISCONTINUED | OUTPATIENT
Start: 2022-07-08 | End: 2022-07-08

## 2022-07-08 RX ADMIN — Medication 15 MILLIGRAM(S): at 14:31

## 2022-07-08 RX ADMIN — Medication 50 MILLIEQUIVALENT(S): at 15:11

## 2022-07-08 RX ADMIN — Medication 650 MILLIGRAM(S): at 15:45

## 2022-07-08 RX ADMIN — Medication 600 MILLIGRAM(S): at 05:23

## 2022-07-08 RX ADMIN — Medication 15 MILLIGRAM(S): at 13:20

## 2022-07-08 RX ADMIN — PROPOFOL 0.51 MICROGRAM(S)/KG/MIN: 10 INJECTION, EMULSION INTRAVENOUS at 07:49

## 2022-07-08 RX ADMIN — Medication 2 MILLIGRAM(S): at 20:10

## 2022-07-08 RX ADMIN — Medication 650 MILLIGRAM(S): at 16:15

## 2022-07-08 RX ADMIN — Medication 250 MILLIGRAM(S): at 02:27

## 2022-07-08 RX ADMIN — Medication 1 PATCH: at 12:09

## 2022-07-08 RX ADMIN — Medication 1 PATCH: at 12:05

## 2022-07-08 RX ADMIN — Medication 1 PATCH: at 19:59

## 2022-07-08 RX ADMIN — PROPOFOL 0.51 MICROGRAM(S)/KG/MIN: 10 INJECTION, EMULSION INTRAVENOUS at 02:28

## 2022-07-08 RX ADMIN — MIDAZOLAM HYDROCHLORIDE 1.71 MG/KG/HR: 1 INJECTION, SOLUTION INTRAMUSCULAR; INTRAVENOUS at 07:57

## 2022-07-08 RX ADMIN — Medication 600 MILLIGRAM(S): at 05:22

## 2022-07-08 RX ADMIN — CEFIDEROCOL SULFATE TOSYLATE 33.33 MILLIGRAM(S): 1 INJECTION, POWDER, FOR SOLUTION INTRAVENOUS at 19:07

## 2022-07-08 RX ADMIN — SCOPALAMINE 1 PATCH: 1 PATCH, EXTENDED RELEASE TRANSDERMAL at 20:47

## 2022-07-08 RX ADMIN — Medication 1 PATCH: at 10:52

## 2022-07-08 RX ADMIN — Medication 650 MILLIGRAM(S): at 23:06

## 2022-07-08 RX ADMIN — CHLORHEXIDINE GLUCONATE 1 APPLICATION(S): 213 SOLUTION TOPICAL at 05:21

## 2022-07-08 RX ADMIN — Medication 250 MILLIGRAM(S): at 05:21

## 2022-07-08 RX ADMIN — Medication 650 MILLIGRAM(S): at 23:36

## 2022-07-08 RX ADMIN — DEXMEDETOMIDINE HYDROCHLORIDE IN 0.9% SODIUM CHLORIDE 3.52 MICROGRAM(S)/KG/HR: 4 INJECTION INTRAVENOUS at 20:12

## 2022-07-08 RX ADMIN — SODIUM CHLORIDE 1000 MILLILITER(S): 9 INJECTION INTRAMUSCULAR; INTRAVENOUS; SUBCUTANEOUS at 12:12

## 2022-07-08 RX ADMIN — MEROPENEM 100 MILLIGRAM(S): 1 INJECTION INTRAVENOUS at 05:21

## 2022-07-08 RX ADMIN — Medication 25 GRAM(S): at 09:10

## 2022-07-08 RX ADMIN — SPIRONOLACTONE 100 MILLIGRAM(S): 25 TABLET, FILM COATED ORAL at 05:22

## 2022-07-08 RX ADMIN — HEPARIN SODIUM 5000 UNIT(S): 5000 INJECTION INTRAVENOUS; SUBCUTANEOUS at 19:08

## 2022-07-08 RX ADMIN — MIDAZOLAM HYDROCHLORIDE 1.71 MG/KG/HR: 1 INJECTION, SOLUTION INTRAMUSCULAR; INTRAVENOUS at 05:20

## 2022-07-08 RX ADMIN — CHLORHEXIDINE GLUCONATE 15 MILLILITER(S): 213 SOLUTION TOPICAL at 05:22

## 2022-07-08 RX ADMIN — QUETIAPINE FUMARATE 100 MILLIGRAM(S): 200 TABLET, FILM COATED ORAL at 05:22

## 2022-07-08 RX ADMIN — FENTANYL CITRATE 4.28 MICROGRAM(S)/KG/HR: 50 INJECTION INTRAVENOUS at 05:20

## 2022-07-08 RX ADMIN — Medication 2 MILLIGRAM(S): at 23:05

## 2022-07-08 RX ADMIN — FENTANYL CITRATE 4.28 MICROGRAM(S)/KG/HR: 50 INJECTION INTRAVENOUS at 07:56

## 2022-07-08 RX ADMIN — Medication 40 MILLIGRAM(S): at 19:07

## 2022-07-08 RX ADMIN — Medication 50 MILLIEQUIVALENT(S): at 12:08

## 2022-07-08 RX ADMIN — Medication 50 MILLIEQUIVALENT(S): at 19:07

## 2022-07-08 RX ADMIN — SCOPALAMINE 1 PATCH: 1 PATCH, EXTENDED RELEASE TRANSDERMAL at 07:00

## 2022-07-08 NOTE — PROGRESS NOTE ADULT - ASSESSMENT
26 year old female with hx of asthma, untreated Hep C, IVDA, anasarca was admitted to medical floor  with increased SOB and anasarca  Pt admits to using opiates 2 grams per day IV into her thighs  x years with minimal sobriety. Pt has been on MMTP in 2020. Pt is contemplating going back on program. Pt denies any other substance abuse. Pt states used a xanax for wd about 3 days ago.      Hepatitis C with Cirrhosis no compliant with treatment  Hx of withdrawal   variable periods of sobriety in the past    on floor pt was diuresed with improvement of symptoms  when the following events took place  "RRT was called for pt in the lobby    patient was found on the floor in ER, gasping for air, short of breath, tachypnea, bleeding profusely from her posterior scalp, was placed in a stretcher, was hypoxic 70s    emergently intubated, ct scan head ordered re scalp bleeding    several syringes, drug paraphanellia found on patient clothes     pt  intubated and  transferred to ICU       I strongly suspect that she injected illicit drug ( abuses IV heroin) and she went into resp failure secondary to heroin overdose."      Acute respiratory failure with hypoxemia / aspiration pneumonia with sepsis / suspected drug overdose / head laceration s/p fall in lobby / possible seizure / anasarca     - now with c-diff colitis - continue oral vanco   - head laceration repaired by surgery - sutures  removed today   - Keppra weaned and DC'd as per neuro   - EEG was negative     - antibiotics as per ID   - central line changed    - supplement hypokalemia and check mg level and maintain above 2   - SAT -> SBT   - extubated -> PT and speech and swallow eval

## 2022-07-08 NOTE — CHART NOTE - NSCHARTNOTEFT_GEN_A_CORE
Registered Dietitian Follow-Up     Patient Profile Reviewed                           Yes [X]   No []     Nutrition History Previously Obtained        Yes [X]  No []       Pertinent Information: pt is 26 year old female with hx of asthma, Hep C, active IVDA, anasarca presents with dyspnea admitted with fluid overload and initially admitted to med surg unit. s/p RRT 6/16 pt was in the lobby s/p seizure and fall 2/2 overdose s/p intubation and upgraded to ICU. + laceration to scalp noted. As per GI moderate ascites, s/p paracentesis 1L removed on 6/22., + portal HTN 2/2 cirrhosis. SPiked temp 7/4/22.  Failed SBT trial today. pt tolerating EN at goal rate.  Elevated Triglycerides noted, propofol being tapered down.             Diet order:     Anthropometrics:  - Ht.   - Wt.   - %wt change  - BMI   - IBW      Pertinent Lab Data:     Pertinent Meds:     Physical Findings:  - Appearance:  - GI function:  - Tubes:  - Oral/Mouth cavity:  - Skin:      Nutrition Requirements  Weight Used:      Estimated Energy Needs    Continue []  Adjust []  Adjusted Energy Recommendations:   kcal/day        Estimated Protein Needs    Continue []  Adjust []  Adjusted Protein Recommendations:   gm/day        Estimated Fluid Needs        Continue []  Adjust []  Adjusted Fluid Recommendations:   mL/day     Nutrient Intake:        [] Previous Nutrition Diagnosis:            [] Ongoing          [] Resolved    [] No active nutrition diagnosis identified at this time     Nutrition Diagnostic #1  Problem:   Etiology:   Statement:      Nutrition Diagnostic #2  Problem:  Etiology:  Statement:     Nutrition Intervention:     Goal/Expected Outcome:     Indicator/Monitoring: Registered Dietitian Follow-Up     Patient Profile Reviewed                           Yes [X]   No []     Nutrition History Previously Obtained        Yes [X]  No []       Pertinent Information: pt is 26 year old female with hx of asthma, Hep C, active IVDA, anasarca presents with dyspnea admitted with fluid overload and initially admitted to med surg unit. s/p RRT 6/16 pt was in the lobby s/p seizure and fall 2/2 overdose s/p intubation and upgraded to ICU. + laceration to scalp noted. As per GI moderate ascites, s/p paracentesis 1L removed on 6/22., + portal HTN 2/2 cirrhosis. SPiked temp 7/4/22, + cdiff colitis on 7/5. pt presently extubated, off all sedation awaiting SLP eval         Diet, NPO (07-08-22 @ 12:39) [Active]  Diet, NPO with Tube Feed:        Anthropometrics:  - Ht. 167.6 cm  - Wt. 70.4 kgs vs 79.3 kgs  - %wt change  - BMI   - IBW      Pertinent Lab Data:  07-08    144  |  108  |  31<H>  ----------------------------<  88  3.3<L>   |  27  |  0.7    Ca    8.3<L>      08 Jul 2022 05:26  Phos  4.2     07-08  Mg     1.7     07-08    TPro  5.2<L>  /  Alb  2.3<L>  /  TBili  0.3  /  DBili  x   /  AST  38  /  ALT  24  /  AlkPhos  523<H>  07-08                          9.1    1.82  )-----------( 131      ( 08 Jul 2022 05:26 )             29.9        Pertinent Meds:  MEDICATIONS  (STANDING):  chlorhexidine 0.12% Liquid 15 milliLiter(s) Oral Mucosa every 12 hours  chlorhexidine 4% Liquid 1 Application(s) Topical daily  dexMEDEtomidine Infusion 0.2 MICROgram(s)/kG/Hr (3.52 mL/Hr) IV Continuous <Continuous>  folic acid 1 milliGRAM(s) Oral daily  heparin   Injectable 5000 Unit(s) SubCutaneous every 12 hours  midazolam Infusion 0.02 mG/kG/Hr (1.71 mL/Hr) IV Continuous <Continuous>  nicotine -  14 mG/24Hr(s) Patch 1 patch Transdermal daily  norepinephrine Infusion 0.05 MICROgram(s)/kG/Min (8.03 mL/Hr) IV Continuous <Continuous>  pantoprazole   Suspension 40 milliGRAM(s) Enteral Tube daily  potassium chloride  20 mEq/100 mL IVPB 20 milliEquivalent(s) IV Intermittent every 2 hours  QUEtiapine 100 milliGRAM(s) Oral two times a day  scopolamine 1 mG/72 Hr(s) Patch 1 Patch Transdermal every 72 hours  spironolactone 100 milliGRAM(s) Oral daily  thiamine 100 milliGRAM(s) Oral daily  vancomycin    Solution 250 milliGRAM(s) Oral every 6 hours    MEDICATIONS  (PRN):  ALBUTerol    90 MICROgram(s) HFA Inhaler 1 Puff(s) Inhalation every 4 hours PRN Shortness of Breath and/or Wheezing  cloNIDine 0.1 milliGRAM(s) Oral every 6 hours PRN opiate withdrawal  hydrOXYzine hydrochloride 50 milliGRAM(s) Oral every 6 hours PRN Anxiety  ibuprofen  Tablet. 600 milliGRAM(s) Oral every 6 hours PRN Temp greater or equal to 38C (100.4F)  ibuprofen  Tablet. 400 milliGRAM(s) Oral every 6 hours PRN Mild Pain (1 - 3)  sodium chloride 0.9% lock flush 10 milliLiter(s) IV Push every 1 hour PRN Pre/post blood products, medications, blood draw, and to maintain line patency       Physical Findings:  - Appearance: alert, sitting up in bed, requesting to eat  - GI function: +BS   - Tubes: n/a   - Oral/Mouth cavity:  - Skin:      Nutrition Requirements  Weight Used: 70.4 kgs   [id number] kcals (25-30 kcal/kg/BW)  70.4-85g protein (1.0-1.2g/kg/BW)  1;1 kcal for estimated fluid needs         Nutrient Intake: presently NPO pending SLP eval         Nutrition Intervention: once cleared by SLP recommend low fat low fiber diet restriction      Goal/Expected Outcome: pt to tolerate po diet and meet at least 50-75% of estimated nutrient needs.     Indicator/Monitoring: RD to monitor tolerance to po diet, labs/meds, NFPF and f/u as needed within 4 days.

## 2022-07-08 NOTE — PROGRESS NOTE ADULT - SUBJECTIVE AND OBJECTIVE BOX
ROXI POOJA  26y, Female  Allergy: buprenorphine (Rash)  Suboxone (Rash)      LOS  23d    CHIEF COMPLAINT: anasarca (08 Jul 2022 07:46)      INTERVAL EVENTS/HPI  - No acute events overnight  - T(F): , Max: 101.5 (07-07-22 @ 19:05)  - Denies any worsening symptoms  - Tolerating medication  - WBC Count: 1.82 (07-08-22 @ 05:26)  WBC Count: 1.69 (07-07-22 @ 05:19)     - Creatinine, Serum: 0.7 (07-08-22 @ 05:26)  Creatinine, Serum: 0.7 (07-07-22 @ 05:19)       ROS  General: Denies rigors, nightsweats  HEENT: Denies headache, rhinorrhea, sore throat, eye pain  CV: Denies CP, palpitations  PULM: Denies wheezing, hemoptysis  GI: Denies hematemesis, hematochezia, melena  : Denies discharge, hematuria  MSK: Denies arthralgias, myalgias  SKIN: Denies rash, lesions  NEURO: Denies paresthesias, weakness  PSYCH: Denies depression, anxiety    VITALS:  T(F): 100.8, Max: 101.5 (07-07-22 @ 19:05)  HR: 89  BP: 96/54  RR: 25Vital Signs Last 24 Hrs  T(C): 38.2 (08 Jul 2022 07:35), Max: 38.6 (07 Jul 2022 19:05)  T(F): 100.8 (08 Jul 2022 07:35), Max: 101.5 (07 Jul 2022 19:05)  HR: 89 (08 Jul 2022 07:35) (89 - 118)  BP: 96/54 (08 Jul 2022 07:35) (88/53 - 165/99)  BP(mean): 68 (08 Jul 2022 07:35) (67 - 125)  RR: 25 (08 Jul 2022 07:35) (22 - 36)  SpO2: 100% (08 Jul 2022 07:35) (95% - 100%)    Parameters below as of 08 Jul 2022 07:35  Patient On (Oxygen Delivery Method): ventilator    O2 Concentration (%): 30    PHYSICAL EXAM:  Gen: NAD, resting in bed  HEENT: Normocephalic, atraumatic  Neck: supple, no lymphadenopathy  CV: Regular rate & regular rhythm  Lungs: decreased BS at bases, no fremitus  Abdomen: Soft, BS present  Ext: Warm, well perfused  Neuro: non focal, awake  Skin: no rash, no erythema  Lines: no phlebitis    FH: Non-contributory  Social Hx: Non-contributory    TESTS & MEASUREMENTS:                        9.1    1.82  )-----------( 131      ( 08 Jul 2022 05:26 )             29.9     07-08    144  |  108  |  31<H>  ----------------------------<  88  3.3<L>   |  27  |  0.7    Ca    8.3<L>      08 Jul 2022 05:26  Phos  4.2     07-08  Mg     1.7     07-08    TPro  5.2<L>  /  Alb  2.3<L>  /  TBili  0.3  /  DBili  x   /  AST  38  /  ALT  24  /  AlkPhos  523<H>  07-08      LIVER FUNCTIONS - ( 08 Jul 2022 05:26 )  Alb: 2.3 g/dL / Pro: 5.2 g/dL / ALK PHOS: 523 U/L / ALT: 24 U/L / AST: 38 U/L / GGT: x               Culture - Blood (collected 07-06-22 @ 07:20)  Source: .Blood None  Preliminary Report (07-07-22 @ 19:01):    No growth to date.    Culture - Blood (collected 07-06-22 @ 07:20)  Source: .Blood None  Preliminary Report (07-07-22 @ 19:01):    No growth to date.    Culture - Sputum (collected 07-04-22 @ 13:39)  Source: ET Tube ET Tube  Gram Stain (07-05-22 @ 08:48):    Few polymorphonuclear leukocytes per low power field    No Squamous epithelial cells per low power field    Numerous Gram Negative Coccobacilli seen per oil power field  Final Report (07-07-22 @ 10:52):    Moderate Acinetobacter baumannii/nosocom group (Carbapenem Resistant)    Normal Respiratory Kilo absent  Organism: Acinetobacter baumannii/nosocom group (Carbapenem Resistant)  Acinetobacter baumannii/nosocom group (Carbapenem Resistant) (07-07-22 @ 10:52)  Organism: Acinetobacter baumannii/nosocom group (Carbapenem Resistant) (07-07-22 @ 10:52)      -  Imipenem: R      -  Piperacillin/Tazobactam: R      Method Type:   Organism: Acinetobacter baumannii/nosocom group (Carbapenem Resistant) (07-07-22 @ 10:52)      -  Amikacin: R >32      -  Ampicillin/Sulbactam: R >16/8      -  Cefepime: R >16      -  Ceftazidime: R >16      -  Ciprofloxacin: R >2      -  Gentamicin: R >8      -  Levofloxacin: R >4      -  Meropenem: R >8      -  Tobramycin: R >8      -  Trimethoprim/Sulfamethoxazole: R >2/38      Method Type: AL    Culture - Blood (collected 06-29-22 @ 20:31)  Source: .Blood Blood-Peripheral  Final Report (07-05-22 @ 20:00):    No Growth Final    Culture - Blood (collected 06-29-22 @ 20:31)  Source: .Blood Blood  Final Report (07-05-22 @ 20:00):    No Growth Final    Culture - Blood (collected 06-28-22 @ 06:00)  Source: .Blood Blood  Final Report (07-03-22 @ 23:00):    No Growth Final    Culture - Sputum (collected 06-27-22 @ 14:00)  Source: Trach Asp Tracheal Aspirate  Gram Stain (06-28-22 @ 03:15):    Moderate polymorphonuclear leukocytes per low power field    Few Squamous epithelial cells per low power field    Moderate Gram positive cocci in pairs seen per oil power field    Few Gram Variable Rods seen per oil power field  Final Report (06-30-22 @ 16:33):    Numerous Mixed gram negative rods    Moderate Staphylococcus aureus    Normal Respiratory Kilo present  Organism: Staphylococcus aureus (06-30-22 @ 16:33)  Organism: Staphylococcus aureus (06-30-22 @ 16:33)      -  Ampicillin/Sulbactam: S <=8/4      -  Cefazolin: S <=4      -  Clindamycin: S <=0.25      -  Erythromycin: S <=0.25      -  Gentamicin: S <=1 Should not be used as monotherapy      -  Oxacillin: S <=0.25 Oxacillin predicts susceptibility for dicloxacillin, methicillin, and nafcillin      -  Rifampin: S <=1 Should not be used as monotherapy      -  Tetra/Doxy: S <=1      -  Trimethoprim/Sulfamethoxazole: S <=0.5/9.5      -  Vancomycin: S 2      Method Type: AL    Culture - Sputum (collected 06-24-22 @ 17:20)  Source: Trach Asp Tracheal Aspirate  Gram Stain (06-25-22 @ 06:29):    Few polymorphonuclear leukocytes per low power field    Rare Squamous epithelial cells per low power field    Moderate Gram Negative Rods seen per oil power field  Final Report (06-26-22 @ 17:00):    Moderate Klebsiella oxytoca/Raoutella ornithinolytica    Moderate Enterobacter cloacae complex    Normal Respiratory Kilo absent  Organism: Klebsiella oxytoca /Raoutella ornithinolytica  Enterobacter cloacae complex (06-26-22 @ 17:00)  Organism: Enterobacter cloacae complex (06-26-22 @ 17:00)      -  Amikacin: S <=16      -  Amoxicillin/Clavulanic Acid: R >16/8      -  Ampicillin: R >16 These ampicillin results predict results for amoxicillin      -  Ampicillin/Sulbactam: R >16/8 Enterobacter, Klebsiella aerogenes, Citrobacter, and Serratia may develop resistance during prolonged therapy (3-4 days)      -  Aztreonam: S <=4      -  Cefazolin: R >16 Enterobacter, Klebsiella aerogenes, Citrobacter, and Serratia may develop resistance during prolonged therapy (3-4 days)      -  Cefepime: S <=2      -  Cefoxitin: R >16      -  Ceftriaxone: S <=1 Enterobacter, Klebsiella aerogenes, Citrobacter, and Serratia may develop resistance during prolonged therapy      -  Ciprofloxacin: S <=0.25      -  Ertapenem: S <=0.5      -  Gentamicin: S <=2      -  Imipenem: S <=1      -  Levofloxacin: S <=0.5      -  Meropenem: S <=1      -  Piperacillin/Tazobactam: S <=8      -  Tobramycin: S <=2      -  Trimethoprim/Sulfamethoxazole: S <=0.5/9.5      Method Type: AL  Organism: Klebsiella oxytoca /Raoutella ornithinolytica (06-26-22 @ 17:00)      -  Amikacin: S <=16      -  Amoxicillin/Clavulanic Acid: S <=8/4      -  Ampicillin: R 16 These ampicillin results predict results for amoxicillin      -  Ampicillin/Sulbactam: S <=4/2 Enterobacter, Klebsiella aerogenes, Citrobacter, and Serratia may develop resistance during prolonged therapy (3-4 days)      -  Aztreonam: S <=4      -  Cefazolin: S <=2 Enterobacter, Klebsiella aerogenes, Citrobacter, and Serratia may develop resistance during prolonged therapy (3-4 days)      -  Cefepime: S <=2      -  Cefoxitin: S <=8      -  Ceftriaxone: S <=1 Enterobacter, Klebsiella aerogenes, Citrobacter, and Serratia may develop resistance during prolonged therapy      -  Ciprofloxacin: S <=0.25      -  Ertapenem: S <=0.5      -  Gentamicin: S <=2      -  Imipenem: S <=1      -  Levofloxacin: S <=0.5      -  Meropenem: S <=1      -  Piperacillin/Tazobactam: S <=8      -  Tobramycin: S <=2      -  Trimethoprim/Sulfamethoxazole: S <=0.5/9.5      Method Type: AL    Culture - Body Fluid with Gram Stain (collected 06-22-22 @ 01:00)  Source: Peritoneal Peritoneal Fluid  Gram Stain (06-23-22 @ 02:31):    No polymorphonuclear leukocytes seen    No organisms seen    by cytocentrifuge  Final Report (06-27-22 @ 19:13):    No growth at 5 days    Culture - Blood (collected 06-21-22 @ 05:33)  Source: .Blood None  Final Report (06-26-22 @ 19:00):    No Growth Final    Culture - Sputum (collected 06-17-22 @ 14:30)  Source: Trach Asp Tracheal Aspirate  Gram Stain (06-18-22 @ 10:18):    Rare polymorphonuclear leukocytes per low power field    No Squamous epithelial cells per low power field    Numerous Gram Positive Cocci in Pairs and Chains seen per oil power field    Rare Gram Negative Rods seen per oil power field    Rare Gram PositiveRods seen per oil power field  Final Report (06-19-22 @ 16:39):    Normal Respiratory Kilo present    Culture - Fungal, Body Fluid (collected 06-17-22 @ 12:24)  Source: .Body Fluid Peritoneal Fluid  Preliminary Report (06-25-22 @ 15:02):    No growth    Culture - Body Fluid with Gram Stain (collected 06-17-22 @ 12:24)  Source: .Body Fluid Peritoneal Fluid  Gram Stain (06-18-22 @ 03:19):    No polymorphonuclear leukocytes seen    No organisms seen    by cytocentrifuge  Final Report (06-22-22 @ 20:34):    No growth at 5 days    Culture - Urine (collected 06-15-22 @ 08:00)  Source: Clean Catch Clean Catch (Midstream)  Final Report (06-17-22 @ 22:14):    Normal Urogenital kilo present            INFECTIOUS DISEASES TESTING  Fungitell: <31 (07-05-22 @ 11:49)  Procalcitonin, Serum: 0.14 (07-05-22 @ 11:23)  MRSA PCR Result.: Negative (06-30-22 @ 10:34)  Fungitell: See Comment** **RESULTS OF FUNGITELL INCONCLUSIVE DUE TO THE PRESENCE OF UNKNOWN  INTERFERING SUBSTANCE. PLEASE SUBMIT ANOTHER SPECIMEN FOR TESTING.  PLEASE CONTACT [employer] WITH QUESTIONS.  Interpretation: The Fungitell assay does not detectcertain fungal  species such as the genus Cryptococcus (Anudrea et al. 1991) which  produces very low levels of (1-3)-Beta-D-Glucan. The assay also does  not detect the Zygomycetes such as Absidia, Mucor and Rhizopus  (Carol et al. 1994) which are not known to produce  (1-3)-Beta-D-Glucan. In addition, the yeast phase of Blastomyces  dermatitidis produces little (1-3)-Beta-D-Glucan and may not be  detected by the assay (Anila et al. 2007).  Reference Range:  Less than 60 pg/mL. Glucan values of less than 60 pg/mL are  interpreted as negative.  Glucan values of 60 to 79 pg/mL are interpreted as indeterminate,  and suggest a possible fungal infection. Additional sampling and  testing of sera is required to interpret the results.  Glucan values of greater than or equal to 80 pg/mL are interpreted  as positive.  Due to the potential for environmental contamination when  transferred to pour-off tubes, which can lead to false positive  results, interpret positive results from samples provided in  pour-off tubes with caution.  Results should be used in conjunction  with clinical findings, and should not form the sole basis for a  diagnosis or treatment decision. The Fungitell test is approved or  cleared for in vitro diagnostic use by the [place] Food and Drug  Administration. Modifications to the approved package insert have  been made and the performance characteristics for these  modifications were determined by [employer].  If sample result is greater than 500 pg/mL, physician may order a  titer of the sample. Please contact [facility] if you would  like to order a retest of this sample to obtain an actual value.  Samples are held for 1 week after initial testing date.  ____________________________________________________________  Performed at:  [facility]  [address] [place] [address]  : Kirk Newberry Ph.D., BCLD (ABB)  CLIA#: [id number]  Phone: [phone] (06-30-22 @ 10:30)  Procalcitonin, Serum: 0.36 (06-30-22 @ 10:30)  Rapid RVP Result: NotDetec (06-30-22 @ 09:19)  HIV-1/2 Combo Result: Nonreact (06-29-22 @ 10:29)  Procalcitonin, Serum: 0.11 (06-27-22 @ 15:46)  Procalcitonin, Serum: 0.11 (06-27-22 @ 06:47)  Procalcitonin, Serum: 0.62 (06-20-22 @ 05:49)  MRSA PCR Result.: Negative (06-17-22 @ 14:30)  Procalcitonin, Serum: 3.28 (06-17-22 @ 05:22)  COVID-19 PCR: NotDetec (06-15-22 @ 07:10)  Procalcitonin, Serum: 0.07 (04-20-22 @ 12:46)      INFLAMMATORY MARKERS  Sedimentation Rate, Erythrocyte: 86 mm/Hr (06-30-22 @ 05:46)  Sedimentation Rate, Erythrocyte: 65 mm/Hr (06-23-22 @ 16:00)      RADIOLOGY & ADDITIONAL TESTS:  I have personally reviewed the last available Chest xray  CXR  Xray Chest 1 View- PORTABLE-Urgent:   ACC: [id number] EXAM:  XR CHEST PORTABLE URGENT 1V                          PROCEDURE DATE:  07/06/2022          INTERPRETATION:  Clinical History / Reason for exam: Pneumonia.    Comparison: Chest radiograph 7/6/2022.    Technique/Positioning: Single AP chest radiograph.    Findings:    Support devices: Endotracheal tube, enteric tube, left IJ central   catheter are stable.    Cardiac/mediastinum/hilum: Stable.    Lung parenchyma/Pleura: Left basilar opacity/effusion, slightly   increased. No pneumothorax. The right lung is clear.    Impression:    Left basilar opacity/effusion, slightly increased.    Stable support devices.    --- End of Report ---            NALLELY MCCALL MD; Attending Radiologist  This document has been electronically signed.Jul 6 2022  9:45AM (07-06-22 @ 09:38)      CT      CARDIOLOGY TESTING  12 Lead ECG:   Ventricular Rate 97 BPM    Atrial Rate 97 BPM    P-R Interval 158 ms    QRS Duration 80 ms    Q-T Interval 366 ms    QTC Calculation(Bazett) 464 ms    P Axis 63 degrees    R Axis 123 degrees    T Axis 182 degrees    Diagnosis Line Normal sinus rhythm  Right axis deviation  Possible Right ventricular hypertrophy  T wave abnormality, consider lateral ischemia  Abnormal ECG    Confirmed by BRANDON BELL MD ([id number]) on 7/7/2022 11:38:56 AM (07-07-22 @ 08:37)  12 Lead ECG:   Ventricular Rate 103 BPM    Atrial Rate 103 BPM    P-R Interval 160 ms    QRS Duration 76 ms    Q-T Interval 362 ms    QTC Calculation(Bazett) 474 ms    P Axis 60 degrees    R Axis 112 degrees    T Axis 182 degrees    Diagnosis Line Sinus tachycardia  Possible Left atrial enlargement  Right axis deviation  T wave abnormality, consider inferolateral ischemia  Abnormal ECG    Confirmed by BRANDON BELL MD ([id number]) on 7/6/2022 9:42:32 AM (07-06-22 @ 07:40)      MEDICATIONS  chlorhexidine 0.12% Liquid 15 Oral Mucosa every 12 hours  chlorhexidine 4% Liquid 1 Topical daily  fentaNYL   Infusion. 0.5 IV Continuous <Continuous>  folic acid 1 Oral daily  meropenem  IVPB 1000 IV Intermittent every 8 hours  midazolam Infusion 0.02 IV Continuous <Continuous>  nicotine -  14 mG/24Hr(s) Patch 1 Transdermal daily  norepinephrine Infusion 0.05 IV Continuous <Continuous>  pantoprazole   Suspension 40 Enteral Tube daily  propofol Infusion 1 IV Continuous <Continuous>  QUEtiapine 100 Oral two times a day  scopolamine 1 mG/72 Hr(s) Patch 1 Transdermal every 72 hours  spironolactone 100 Oral daily  thiamine 100 Oral daily  vancomycin    Solution 250 Oral every 6 hours      WEIGHT  Weight (kg): 70.4 (07-07-22 @ 07:00)  Creatinine, Serum: 0.7 mg/dL (07-08-22 @ 05:26)      ANTIBIOTICS:  meropenem  IVPB 1000 milliGRAM(s) IV Intermittent every 8 hours  vancomycin    Solution 250 milliGRAM(s) Oral every 6 hours      All available historical records have been reviewed       CINDYON, POOJA  26y, Female  Allergy: buprenorphine (Rash)  Suboxone (Rash)      LOS  23d    CHIEF COMPLAINT: anasarca (08 Jul 2022 07:46)      INTERVAL EVENTS/HPI  - No acute events overnight  - T(F): , Max: 101.5 (07-07-22 @ 19:05)    - WBC Count: 1.82 (07-08-22 @ 05:26)  WBC Count: 1.69 (07-07-22 @ 05:19)     - Creatinine, Serum: 0.7 (07-08-22 @ 05:26)  Creatinine, Serum: 0.7 (07-07-22 @ 05:19)       ROS  General: Denies rigors, nightsweats  HEENT: Denies headache, rhinorrhea, sore throat, eye pain  CV: Denies CP, palpitations  PULM: Denies wheezing, hemoptysis  GI: Denies hematemesis, hematochezia, melena  : Denies discharge, hematuria  MSK: Denies arthralgias, myalgias  SKIN: Denies rash, lesions  NEURO: Denies paresthesias, weakness  PSYCH: Denies depression, anxiety    VITALS:  T(F): 100.8, Max: 101.5 (07-07-22 @ 19:05)  HR: 89  BP: 96/54  RR: 25Vital Signs Last 24 Hrs  T(C): 38.2 (08 Jul 2022 07:35), Max: 38.6 (07 Jul 2022 19:05)  T(F): 100.8 (08 Jul 2022 07:35), Max: 101.5 (07 Jul 2022 19:05)  HR: 89 (08 Jul 2022 07:35) (89 - 118)  BP: 96/54 (08 Jul 2022 07:35) (88/53 - 165/99)  BP(mean): 68 (08 Jul 2022 07:35) (67 - 125)  RR: 25 (08 Jul 2022 07:35) (22 - 36)  SpO2: 100% (08 Jul 2022 07:35) (95% - 100%)    Parameters below as of 08 Jul 2022 07:35  Patient On (Oxygen Delivery Method): ventilator    O2 Concentration (%): 30    PHYSICAL EXAM:  Gen: NAD, resting in bed  HEENT: Normocephalic, atraumatic  Neck: supple, no lymphadenopathy  CV: Regular rate & regular rhythm  Lungs: decreased BS at bases, no fremitus  Abdomen: Soft, BS present  Ext: Warm, well perfused  Neuro: non focal, awake  Skin: no rash, no erythema  Lines: no phlebitis    FH: Non-contributory  Social Hx: Non-contributory    TESTS & MEASUREMENTS:                        9.1    1.82  )-----------( 131      ( 08 Jul 2022 05:26 )             29.9     07-08    144  |  108  |  31<H>  ----------------------------<  88  3.3<L>   |  27  |  0.7    Ca    8.3<L>      08 Jul 2022 05:26  Phos  4.2     07-08  Mg     1.7     07-08    TPro  5.2<L>  /  Alb  2.3<L>  /  TBili  0.3  /  DBili  x   /  AST  38  /  ALT  24  /  AlkPhos  523<H>  07-08      LIVER FUNCTIONS - ( 08 Jul 2022 05:26 )  Alb: 2.3 g/dL / Pro: 5.2 g/dL / ALK PHOS: 523 U/L / ALT: 24 U/L / AST: 38 U/L / GGT: x               Culture - Blood (collected 07-06-22 @ 07:20)  Source: .Blood None  Preliminary Report (07-07-22 @ 19:01):    No growth to date.    Culture - Blood (collected 07-06-22 @ 07:20)  Source: .Blood None  Preliminary Report (07-07-22 @ 19:01):    No growth to date.    Culture - Sputum (collected 07-04-22 @ 13:39)  Source: ET Tube ET Tube  Gram Stain (07-05-22 @ 08:48):    Few polymorphonuclear leukocytes per low power field    No Squamous epithelial cells per low power field    Numerous Gram Negative Coccobacilli seen per oil power field  Final Report (07-07-22 @ 10:52):    Moderate Acinetobacter baumannii/nosocom group (Carbapenem Resistant)    Normal Respiratory Kilo absent  Organism: Acinetobacter baumannii/nosocom group (Carbapenem Resistant)  Acinetobacter baumannii/nosocom group (Carbapenem Resistant) (07-07-22 @ 10:52)  Organism: Acinetobacter baumannii/nosocom group (Carbapenem Resistant) (07-07-22 @ 10:52)      -  Imipenem: R      -  Piperacillin/Tazobactam: R      Method Type:   Organism: Acinetobacter baumannii/nosocom group (Carbapenem Resistant) (07-07-22 @ 10:52)      -  Amikacin: R >32      -  Ampicillin/Sulbactam: R >16/8      -  Cefepime: R >16      -  Ceftazidime: R >16      -  Ciprofloxacin: R >2      -  Gentamicin: R >8      -  Levofloxacin: R >4      -  Meropenem: R >8      -  Tobramycin: R >8      -  Trimethoprim/Sulfamethoxazole: R >2/38      Method Type: AL    Culture - Blood (collected 06-29-22 @ 20:31)  Source: .Blood Blood-Peripheral  Final Report (07-05-22 @ 20:00):    No Growth Final    Culture - Blood (collected 06-29-22 @ 20:31)  Source: .Blood Blood  Final Report (07-05-22 @ 20:00):    No Growth Final    Culture - Blood (collected 06-28-22 @ 06:00)  Source: .Blood Blood  Final Report (07-03-22 @ 23:00):    No Growth Final    Culture - Sputum (collected 06-27-22 @ 14:00)  Source: Trach Asp Tracheal Aspirate  Gram Stain (06-28-22 @ 03:15):    Moderate polymorphonuclear leukocytes per low power field    Few Squamous epithelial cells per low power field    Moderate Gram positive cocci in pairs seen per oil power field    Few Gram Variable Rods seen per oil power field  Final Report (06-30-22 @ 16:33):    Numerous Mixed gram negative rods    Moderate Staphylococcus aureus    Normal Respiratory Kilo present  Organism: Staphylococcus aureus (06-30-22 @ 16:33)  Organism: Staphylococcus aureus (06-30-22 @ 16:33)      -  Ampicillin/Sulbactam: S <=8/4      -  Cefazolin: S <=4      -  Clindamycin: S <=0.25      -  Erythromycin: S <=0.25      -  Gentamicin: S <=1 Should not be used as monotherapy      -  Oxacillin: S <=0.25 Oxacillin predicts susceptibility for dicloxacillin, methicillin, and nafcillin      -  Rifampin: S <=1 Should not be used as monotherapy      -  Tetra/Doxy: S <=1      -  Trimethoprim/Sulfamethoxazole: S <=0.5/9.5      -  Vancomycin: S 2      Method Type: AL    Culture - Sputum (collected 06-24-22 @ 17:20)  Source: Trach Asp Tracheal Aspirate  Gram Stain (06-25-22 @ 06:29):    Few polymorphonuclear leukocytes per low power field    Rare Squamous epithelial cells per low power field    Moderate Gram Negative Rods seen per oil power field  Final Report (06-26-22 @ 17:00):    Moderate Klebsiella oxytoca/Raoutella ornithinolytica    Moderate Enterobacter cloacae complex    Normal Respiratory Kilo absent  Organism: Klebsiella oxytoca /Raoutella ornithinolytica  Enterobacter cloacae complex (06-26-22 @ 17:00)  Organism: Enterobacter cloacae complex (06-26-22 @ 17:00)      -  Amikacin: S <=16      -  Amoxicillin/Clavulanic Acid: R >16/8      -  Ampicillin: R >16 These ampicillin results predict results for amoxicillin      -  Ampicillin/Sulbactam: R >16/8 Enterobacter, Klebsiella aerogenes, Citrobacter, and Serratia may develop resistance during prolonged therapy (3-4 days)      -  Aztreonam: S <=4      -  Cefazolin: R >16 Enterobacter, Klebsiella aerogenes, Citrobacter, and Serratia may develop resistance during prolonged therapy (3-4 days)      -  Cefepime: S <=2      -  Cefoxitin: R >16      -  Ceftriaxone: S <=1 Enterobacter, Klebsiella aerogenes, Citrobacter, and Serratia may develop resistance during prolonged therapy      -  Ciprofloxacin: S <=0.25      -  Ertapenem: S <=0.5      -  Gentamicin: S <=2      -  Imipenem: S <=1      -  Levofloxacin: S <=0.5      -  Meropenem: S <=1      -  Piperacillin/Tazobactam: S <=8      -  Tobramycin: S <=2      -  Trimethoprim/Sulfamethoxazole: S <=0.5/9.5      Method Type: AL  Organism: Klebsiella oxytoca /Raoutella ornithinolytica (06-26-22 @ 17:00)      -  Amikacin: S <=16      -  Amoxicillin/Clavulanic Acid: S <=8/4      -  Ampicillin: R 16 These ampicillin results predict results for amoxicillin      -  Ampicillin/Sulbactam: S <=4/2 Enterobacter, Klebsiella aerogenes, Citrobacter, and Serratia may develop resistance during prolonged therapy (3-4 days)      -  Aztreonam: S <=4      -  Cefazolin: S <=2 Enterobacter, Klebsiella aerogenes, Citrobacter, and Serratia may develop resistance during prolonged therapy (3-4 days)      -  Cefepime: S <=2      -  Cefoxitin: S <=8      -  Ceftriaxone: S <=1 Enterobacter, Klebsiella aerogenes, Citrobacter, and Serratia may develop resistance during prolonged therapy      -  Ciprofloxacin: S <=0.25      -  Ertapenem: S <=0.5      -  Gentamicin: S <=2      -  Imipenem: S <=1      -  Levofloxacin: S <=0.5      -  Meropenem: S <=1      -  Piperacillin/Tazobactam: S <=8      -  Tobramycin: S <=2      -  Trimethoprim/Sulfamethoxazole: S <=0.5/9.5      Method Type: AL    Culture - Body Fluid with Gram Stain (collected 06-22-22 @ 01:00)  Source: Peritoneal Peritoneal Fluid  Gram Stain (06-23-22 @ 02:31):    No polymorphonuclear leukocytes seen    No organisms seen    by cytocentrifuge  Final Report (06-27-22 @ 19:13):    No growth at 5 days    Culture - Blood (collected 06-21-22 @ 05:33)  Source: .Blood None  Final Report (06-26-22 @ 19:00):    No Growth Final    Culture - Sputum (collected 06-17-22 @ 14:30)  Source: Trach Asp Tracheal Aspirate  Gram Stain (06-18-22 @ 10:18):    Rare polymorphonuclear leukocytes per low power field    No Squamous epithelial cells per low power field    Numerous Gram Positive Cocci in Pairs and Chains seen per oil power field    Rare Gram Negative Rods seen per oil power field    Rare Gram PositiveRods seen per oil power field  Final Report (06-19-22 @ 16:39):    Normal Respiratory Kilo present    Culture - Fungal, Body Fluid (collected 06-17-22 @ 12:24)  Source: .Body Fluid Peritoneal Fluid  Preliminary Report (06-25-22 @ 15:02):    No growth    Culture - Body Fluid with Gram Stain (collected 06-17-22 @ 12:24)  Source: .Body Fluid Peritoneal Fluid  Gram Stain (06-18-22 @ 03:19):    No polymorphonuclear leukocytes seen    No organisms seen    by cytocentrifuge  Final Report (06-22-22 @ 20:34):    No growth at 5 days    Culture - Urine (collected 06-15-22 @ 08:00)  Source: Clean Catch Clean Catch (Midstream)  Final Report (06-17-22 @ 22:14):    Normal Urogenital kilo present            INFECTIOUS DISEASES TESTING  Fungitell: <31 (07-05-22 @ 11:49)  Procalcitonin, Serum: 0.14 (07-05-22 @ 11:23)  MRSA PCR Result.: Negative (06-30-22 @ 10:34)  Fungitell: See Comment** **RESULTS OF FUNGITELL INCONCLUSIVE DUE TO THE PRESENCE OF UNKNOWN  INTERFERING SUBSTANCE. PLEASE SUBMIT ANOTHER SPECIMEN FOR TESTING.  PLEASE CONTACT [facility] WITH QUESTIONS.  Interpretation: The Fungitell assay does not detectcertain fungal  species such as the genus Cryptococcus (Aundrea et al. 1991) which  produces very low levels of (1-3)-Beta-D-Glucan. The assay also does  not detect the Zygomycetes such as Absidia, Mucor and Rhizopus  (Carol et al. 1994) which are not known to produce  (1-3)-Beta-D-Glucan. In addition, the yeast phase of Blastomyces  dermatitidis produces little (1-3)-Beta-D-Glucan and may not be  detected by the assay (Anila et al. 2007).  Reference Range:  Less than 60 pg/mL. Glucan values of less than 60 pg/mL are  interpreted as negative.  Glucan values of 60 to 79 pg/mL are interpreted as indeterminate,  and suggest a possible fungal infection. Additional sampling and  testing of sera is required to interpret the results.  Glucan values of greater than or equal to 80 pg/mL are interpreted  as positive.  Due to the potential for environmental contamination when  transferred to pour-off tubes, which can lead to false positive  results, interpret positive results from samples provided in  pour-off tubes with caution.  Results should be used in conjunction  with clinical findings, and should not form the sole basis for a  diagnosis or treatment decision. The Fungitell test is approved or  cleared for in vitro diagnostic use by the U.[place] Food and Drug  Administration. Modifications to the approved package insert have  been made and the performance characteristics for these  modifications were determined by [facility].  If sample result is greater than 500 pg/mL, physician may order a  titer of the sample. Please contact [facility] if you would  like to order a retest of this sample to obtain an actual value.  Samples are held for 1 week after initial testing date.  ____________________________________________________________  Performed at:  [facility]  [address] [place] [address]  : Kirk Newberry Ph.D., BCLD (ABB)  CLIA#: [id number]  Phone: [phone] (06-30-22 @ 10:30)  Procalcitonin, Serum: 0.36 (06-30-22 @ 10:30)  Rapid RVP Result: NotDetec (06-30-22 @ 09:19)  HIV-1/2 Combo Result: Nonreact (06-29-22 @ 10:29)  Procalcitonin, Serum: 0.11 (06-27-22 @ 15:46)  Procalcitonin, Serum: 0.11 (06-27-22 @ 06:47)  Procalcitonin, Serum: 0.62 (06-20-22 @ 05:49)  MRSA PCR Result.: Negative (06-17-22 @ 14:30)  Procalcitonin, Serum: 3.28 (06-17-22 @ 05:22)  COVID-19 PCR: NotDetec (06-15-22 @ 07:10)  Procalcitonin, Serum: 0.07 (04-20-22 @ 12:46)      INFLAMMATORY MARKERS  Sedimentation Rate, Erythrocyte: 86 mm/Hr (06-30-22 @ 05:46)  Sedimentation Rate, Erythrocyte: 65 mm/Hr (06-23-22 @ 16:00)      RADIOLOGY & ADDITIONAL TESTS:  I have personally reviewed the last available Chest xray  CXR  Xray Chest 1 View- PORTABLE-Urgent:   ACC: [id number] EXAM:  XR CHEST PORTABLE URGENT 1V                          PROCEDURE DATE:  07/06/2022          INTERPRETATION:  Clinical History / Reason for exam: Pneumonia.    Comparison: Chest radiograph 7/6/2022.    Technique/Positioning: Single AP chest radiograph.    Findings:    Support devices: Endotracheal tube, enteric tube, left IJ central   catheter are stable.    Cardiac/mediastinum/hilum: Stable.    Lung parenchyma/Pleura: Left basilar opacity/effusion, slightly   increased. No pneumothorax. The right lung is clear.    Impression:    Left basilar opacity/effusion, slightly increased.    Stable support devices.    --- End of Report ---            NALLELY MCCALL MD; Attending Radiologist  This document has been electronically signed.Jul 6 2022  9:45AM (07-06-22 @ 09:38)      CT      CARDIOLOGY TESTING  12 Lead ECG:   Ventricular Rate 97 BPM    Atrial Rate 97 BPM    P-R Interval 158 ms    QRS Duration 80 ms    Q-T Interval 366 ms    QTC Calculation(Bazett) 464 ms    P Axis 63 degrees    R Axis 123 degrees    T Axis 182 degrees    Diagnosis Line Normal sinus rhythm  Right axis deviation  Possible Right ventricular hypertrophy  T wave abnormality, consider lateral ischemia  Abnormal ECG    Confirmed by BRANDON BELL MD ([id number]) on 7/7/2022 11:38:56 AM (07-07-22 @ 08:37)  12 Lead ECG:   Ventricular Rate 103 BPM    Atrial Rate 103 BPM    P-R Interval 160 ms    QRS Duration 76 ms    Q-T Interval 362 ms    QTC Calculation(Bazett) 474 ms    P Axis 60 degrees    R Axis 112 degrees    T Axis 182 degrees    Diagnosis Line Sinus tachycardia  Possible Left atrial enlargement  Right axis deviation  T wave abnormality, consider inferolateral ischemia  Abnormal ECG    Confirmed by BRANDON BELL MD ([id number]) on 7/6/2022 9:42:32 AM (07-06-22 @ 07:40)      MEDICATIONS  chlorhexidine 0.12% Liquid 15 Oral Mucosa every 12 hours  chlorhexidine 4% Liquid 1 Topical daily  fentaNYL   Infusion. 0.5 IV Continuous <Continuous>  folic acid 1 Oral daily  meropenem  IVPB 1000 IV Intermittent every 8 hours  midazolam Infusion 0.02 IV Continuous <Continuous>  nicotine -  14 mG/24Hr(s) Patch 1 Transdermal daily  norepinephrine Infusion 0.05 IV Continuous <Continuous>  pantoprazole   Suspension 40 Enteral Tube daily  propofol Infusion 1 IV Continuous <Continuous>  QUEtiapine 100 Oral two times a day  scopolamine 1 mG/72 Hr(s) Patch 1 Transdermal every 72 hours  spironolactone 100 Oral daily  thiamine 100 Oral daily  vancomycin    Solution 250 Oral every 6 hours      WEIGHT  Weight (kg): 70.4 (07-07-22 @ 07:00)  Creatinine, Serum: 0.7 mg/dL (07-08-22 @ 05:26)      ANTIBIOTICS:  meropenem  IVPB 1000 milliGRAM(s) IV Intermittent every 8 hours  vancomycin    Solution 250 milliGRAM(s) Oral every 6 hours      All available historical records have been reviewed

## 2022-07-08 NOTE — PROGRESS NOTE ADULT - ASSESSMENT
Patient is a 26 year old female with hx of asthma, untreated Hep C, IVDA, anasarca presenting with increased dyspnea since yesterday    IMPRESSION  #Acute respiratory failure s/p intubation 6/16  #Pneumonia -   - CT Chest 6/22 - left greater than right lower lobe consolidations   - sputum Cx 6/24 Klebsiella oxytoca, Enterobacter cloacae complex- The Sputum culture from 6/27 grew Numerous Mixed gram negative rods and Moderate Staphylococcus aureus.    #Fevers  - Ferritin, Serum: 94 ng/mL (06.27.22 @ 06:47),  Procalcitonin, Serum: 0.11 (06.27.22 @ 15:46)  - CT Abd/pelvis 6/26 - moderated ascites moderate pericardial effusion     #Drug overdose vs withdrawal?  #Hepatosplenomegaly - present since CT Abd/pelvis 1/30/2021  #Pancytopenia  #Hx of Untreated Hep C   #IVDA   #Abx allergy: buprenorphine (Rash), Suboxone (Rash)  # C.difficille infection - 7/5/22    Recommendations  This is a preliminary incomplete pended note, all final recommendations to follow after interview and examination of the patient.    Please call or message on [employer] if with any questions.  Spectra [phone]   Patient is a 26 year old female with hx of asthma, untreated Hep C, IVDA, anasarca presenting with increased dyspnea since yesterday    IMPRESSION  #Acute respiratory failure s/p intubation 6/16, extubated 7/7     #VAP  - Xray Chest 1 View- PORTABLE-Routine (Xray Chest 1 View- PORTABLE-Routine in AM.) (07.03.22 @ 06:10): Increasing right basilar opacity.  - sputum Cx 67/4 MDR Acinetobacter baumanii     # C.difficille infection - 7/5/22    #Pneumonia -   - CT Chest 6/22 - left greater than right lower lobe consolidations   - sputum Cx 6/24 Klebsiella oxytoca, Enterobacter cloacae complex- The Sputum culture from 6/27 grew Numerous Mixed gram negative rods and Moderate Staphylococcus aureus.  - treated with course of cefepime     #Persistent Fevers + Pancytopenia + Splenomegaly   - Ferritin, Serum: 94 ng/mL (06.27.22 @ 06:47),  Procalcitonin, Serum: 0.11 (06.27.22 @ 15:46)  - CT Abd/pelvis 6/26 - moderated ascites moderate pericardial effusion   - Hepatosplenomegaly - present since CT Abd/pelvis 1/30/2021    #Drug overdose vs withdrawal?  #Hx of Untreated Hep C   #IVDA   #Abx allergy: buprenorphine (Rash), Suboxone (Rash)    Recommendations  - given CXR with developement of RLL infiltrates since 7/3, will treat Acinetobacter buamnii in sputum cx, although unclear if this will improve fever curve -- etiology of splenomegaly and pancytopenia is not fully clear -- discussed bone marrow biopsy with family as well   - continue PO vancomycin 250 mg q 6 hours   - stop meropenem, change to Cefiderocol 2g q 6 hours (non-formulary)   - will call lab for susceptibilities   - trend fever curve  - aspiration precautions     Please call or message on [employer] if with any questions.  Spectra [phone]

## 2022-07-08 NOTE — SWALLOW BEDSIDE ASSESSMENT ADULT - ADDITIONAL RECOMMENDATIONS
Allow for single ice chips after oral care for comfort with oral suctioning afterwards. Pt in agreement.

## 2022-07-08 NOTE — CHART NOTE - NSCHARTNOTEFT_GEN_A_CORE
Called by medicine attending for suture removal.  Patient seen and examined at bedside, wound to posterior scalp assessed.  5 sutures removed with some difficulty 2/2 matted hair.  Continue with local wound care

## 2022-07-08 NOTE — PROGRESS NOTE ADULT - SUBJECTIVE AND OBJECTIVE BOX
Patient is a 26y old  Female who presents with a chief complaint of anasarca (08 Jul 2022 07:29)      Over Night Events:  Patient seen and examined.   spiking temp   on sedation     ROS:  See HPI    PHYSICAL EXAM    ICU Vital Signs Last 24 Hrs  T(C): 38.3 (08 Jul 2022 06:35), Max: 38.6 (07 Jul 2022 19:05)  T(F): 100.9 (08 Jul 2022 06:35), Max: 101.5 (07 Jul 2022 19:05)  HR: 89 (08 Jul 2022 07:35) (89 - 118)  BP: 103/58 (08 Jul 2022 06:35) (88/53 - 165/99)  BP(mean): 74 (08 Jul 2022 06:35) (67 - 125)  ABP: --  ABP(mean): --  RR: 25 (08 Jul 2022 06:35) (22 - 36)  SpO2: 100% (08 Jul 2022 07:35) (95% - 100%)    O2 Parameters below as of 08 Jul 2022 06:35  Patient On (Oxygen Delivery Method): ventilator            General: on sedation   HEENT:    et tube             Lymph Nodes: NO cervical LN   Lungs: Bilateral BS  Cardiovascular: Regular   Abdomen: Soft, Positive BS  Extremities: No clubbing   Skin: warm   Neurological: no focal   Musculoskeletal: move all ext     I&O's Detail    07 Jul 2022 07:01  -  08 Jul 2022 07:00  --------------------------------------------------------  IN:    FentaNYL: 809 mL    IV PiggyBack: 50 mL    Midazolam: 152 mL    Propofol: 401 mL    Vital High Protein: 365 mL  Total IN: 1777 mL    OUT:    Indwelling Catheter - Urethral (mL): 1315 mL    Norepinephrine: 0 mL  Total OUT: 1315 mL    Total NET: 462 mL          LABS:                          9.1    1.82  )-----------( 131      ( 08 Jul 2022 05:26 )             29.9         08 Jul 2022 05:26    144    |  108    |  31     ----------------------------<  88     3.3     |  27     |  0.7      Ca    8.3        08 Jul 2022 05:26  Phos  4.2       08 Jul 2022 05:26  Mg     1.7       08 Jul 2022 05:26    TPro  5.2    /  Alb  2.3    /  TBili  0.3    /  DBili  x      /  AST  38     /  ALT  24     /  AlkPhos  523    08 Jul 2022 05:26  Amylase x     lipase x                                                                                                                                                    Culture - Blood (collected 06 Jul 2022 07:20)  Source: .Blood None  Preliminary Report (07 Jul 2022 19:01):    No growth to date.    Culture - Blood (collected 06 Jul 2022 07:20)  Source: .Blood None  Preliminary Report (07 Jul 2022 19:01):    No growth to date.                                                   Mode: AC/ CMV (Assist Control/ Continuous Mandatory Ventilation)  RR (machine): 25  TV (machine): 370  FiO2: 30  PEEP: 5  ITime: 0.8  MAP: 9  PIP: 16                                      ABG - ( 08 Jul 2022 04:00 )  pH, Arterial: 7.40  pH, Blood: x     /  pCO2: 44    /  pO2: 100   / HCO3: 27    / Base Excess: 2.2   /  SaO2: 98.7                MEDICATIONS  (STANDING):  chlorhexidine 0.12% Liquid 15 milliLiter(s) Oral Mucosa every 12 hours  chlorhexidine 4% Liquid 1 Application(s) Topical daily  fentaNYL   Infusion. 0.5 MICROgram(s)/kG/Hr (4.28 mL/Hr) IV Continuous <Continuous>  folic acid 1 milliGRAM(s) Oral daily  meropenem  IVPB 1000 milliGRAM(s) IV Intermittent every 8 hours  midazolam Infusion 0.02 mG/kG/Hr (1.71 mL/Hr) IV Continuous <Continuous>  nicotine -  14 mG/24Hr(s) Patch 1 patch Transdermal daily  norepinephrine Infusion 0.05 MICROgram(s)/kG/Min (8.03 mL/Hr) IV Continuous <Continuous>  pantoprazole   Suspension 40 milliGRAM(s) Enteral Tube daily  propofol Infusion 1 MICROgram(s)/kG/Min (0.51 mL/Hr) IV Continuous <Continuous>  QUEtiapine 100 milliGRAM(s) Oral two times a day  scopolamine 1 mG/72 Hr(s) Patch 1 Patch Transdermal every 72 hours  spironolactone 100 milliGRAM(s) Oral daily  thiamine 100 milliGRAM(s) Oral daily  vancomycin    Solution 250 milliGRAM(s) Oral every 6 hours    MEDICATIONS  (PRN):  ALBUTerol    90 MICROgram(s) HFA Inhaler 1 Puff(s) Inhalation every 4 hours PRN Shortness of Breath and/or Wheezing  cloNIDine 0.1 milliGRAM(s) Oral every 6 hours PRN opiate withdrawal  hydrOXYzine hydrochloride 50 milliGRAM(s) Oral every 6 hours PRN Anxiety  ibuprofen  Tablet. 600 milliGRAM(s) Oral every 6 hours PRN Temp greater or equal to 38C (100.4F)  ibuprofen  Tablet. 400 milliGRAM(s) Oral every 6 hours PRN Mild Pain (1 - 3)  sodium chloride 0.9% lock flush 10 milliLiter(s) IV Push every 1 hour PRN Pre/post blood products, medications, blood draw, and to maintain line patency          Xrays:  TLC:  OG:  ET tube:                                                                                    mild b/l effusion    ECHO:  [facility] ICU:

## 2022-07-08 NOTE — SWALLOW BEDSIDE ASSESSMENT ADULT - SWALLOW EVAL: FUNCTIONAL LEVEL AT TIME OF EVAL
s/p extubation today at 10am. Now tolerating 4L via NC with O2 sats in the mid 90s however is tachypneic

## 2022-07-08 NOTE — PROGRESS NOTE ADULT - ASSESSMENT
IMPRESSION:  Acute respiratory failure sp intubation  PNA  MSSA pna  seizure like activity  ?? drug over dose/ withdrawal opoid   liver cirrhosis   Ascites sp paracentesis   Pancytopenia- worsening  RENETTA improved  C diff +ve     PLAN:    CNS: do SAT/SBT as tolerated  CW methadone 30mg daily  CW Clonapin to 2mg TID  CW Seroquel 100mg BID  ketamine   taper off IV drip   HEENT: oral care     PULMONARY:  HOB 45,  Vent changes: wean O2 as tolerated, TV to 375, RR 25  . Repeat 1 hr   surg consult for trach  do weaning trial while off iv sedation     CARDIOVASCULAR: goal MAP >65.  Monitor QTc daily.    hold lasix keep is = os reassess lien   Pericarditis management per cardio. discussed     GI: GI prophylaxis.  OG Feeding.    KUB reviewed    RENAL: monitor lytes is and os     INFECTIOUS DISEASE: celestine pend sensi. oral vanco     Fungitell , Glactomannan. Repeat Procal.    follow ID     HEMATOLOGICAL:    monitor CBC, Heme FU.   reticulocyte count  dvt ppx  check d-dimer  reviewed , HIT antibody neg resume heparin .     ENDOCRINE:  Follow up FS.  Insulin protocol if needed.     MICU  TLC 7/6 RIJ  [facility] - failed TOV 6/24 Change.      IMPRESSION:  Acute respiratory failure sp intubation  PNA  MSSA pna  seizure like activity  ?? drug over dose/ withdrawal opoid   liver cirrhosis   Ascites sp paracentesis   Pancytopenia- worsening  RENETTA improved  C diff +ve     PLAN:    CNS: do SAT/SBT as tolerated  CW methadone 30mg daily  CW Clonapin to 2mg TID  CW Seroquel 100mg BID  ketamine   taper off IV drip   start precedex   HEENT: oral care     PULMONARY:  HOB 45,  Vent changes: wean O2 as tolerated, TV to 375, RR 25  . Repeat 1 hr   surg consult for trach  do weaning trial while off iv sedation     CARDIOVASCULAR: goal MAP >65.  Monitor QTc daily.    hold lasix keep is = os reassess lien   Pericarditis management per cardio. discussed   do ekg follow QtC  GI: GI prophylaxis.  OG Feeding.    KUB reviewed    RENAL: monitor lytes is and os     INFECTIOUS DISEASE: celestine pend sensi. oral vanco     Fungitell , Glactomannan. Repeat Procal.    follow ID     HEMATOLOGICAL:    monitor CBC, Heme FU.   reticulocyte count  dvt ppx  check d-dimer  reviewed , HIT antibody neg resume heparin .     ENDOCRINE:  Follow up FS.  Insulin protocol if needed.     MICU  TLC 7/6 RIDEONTE  [facility] - failed TOV 6/24 Change.

## 2022-07-08 NOTE — PROGRESS NOTE ADULT - SUBJECTIVE AND OBJECTIVE BOX
Patient is a 26y old  Female who presents with a chief complaint of anasarca (07 Jul 2022 15:54)      INTERVAL HPI/OVERNIGHT EVENTS:   No overnight events   Remains Intubated and sedated    ICU Vital Signs Last 24 Hrs  T(C): 38.3 (08 Jul 2022 06:35), Max: 38.6 (07 Jul 2022 19:05)  T(F): 100.9 (08 Jul 2022 06:35), Max: 101.5 (07 Jul 2022 19:05)  HR: 94 (08 Jul 2022 06:35) (89 - 118)  BP: 103/58 (08 Jul 2022 06:35) (88/53 - 165/99)  BP(mean): 74 (08 Jul 2022 06:35) (67 - 125)  ABP: --  ABP(mean): --  RR: 25 (08 Jul 2022 06:35) (22 - 36)  SpO2: 99% (08 Jul 2022 06:35) (95% - 100%)    O2 Parameters below as of 08 Jul 2022 06:35  Patient On (Oxygen Delivery Method): ventilator          I&O's Summary    07 Jul 2022 07:01  -  08 Jul 2022 07:00  --------------------------------------------------------  IN: 1777 mL / OUT: 1315 mL / NET: 462 mL      Mode: AC/ CMV (Assist Control/ Continuous Mandatory Ventilation)  RR (machine): 25  TV (machine): 370  FiO2: 30  PEEP: 5  ITime: 0.8  MAP: 8  PIP: 19      LABS:                        9.1    1.82  )-----------( 131      ( 08 Jul 2022 05:26 )             29.9     07-08    144  |  108  |  31<H>  ----------------------------<  88  3.3<L>   |  27  |  0.7    Ca    8.3<L>      08 Jul 2022 05:26  Phos  4.2     07-08  Mg     1.7     07-08    TPro  5.2<L>  /  Alb  2.3<L>  /  TBili  0.3  /  DBili  x   /  AST  38  /  ALT  24  /  AlkPhos  523<H>  07-08        CAPILLARY BLOOD GLUCOSE        ABG - ( 08 Jul 2022 04:00 )  pH, Arterial: 7.40  pH, Blood: x     /  pCO2: 44    /  pO2: 100   / HCO3: 27    / Base Excess: 2.2   /  SaO2: 98.7                RADIOLOGY & ADDITIONAL TESTS:    Consultant(s) Notes Reviewed:  [x ] YES  [ ] NO    MEDICATIONS  (STANDING):  chlorhexidine 0.12% Liquid 15 milliLiter(s) Oral Mucosa every 12 hours  chlorhexidine 4% Liquid 1 Application(s) Topical daily  fentaNYL   Infusion. 0.5 MICROgram(s)/kG/Hr (4.28 mL/Hr) IV Continuous <Continuous>  folic acid 1 milliGRAM(s) Oral daily  meropenem  IVPB 1000 milliGRAM(s) IV Intermittent every 8 hours  midazolam Infusion 0.02 mG/kG/Hr (1.71 mL/Hr) IV Continuous <Continuous>  nicotine -  14 mG/24Hr(s) Patch 1 patch Transdermal daily  norepinephrine Infusion 0.05 MICROgram(s)/kG/Min (8.03 mL/Hr) IV Continuous <Continuous>  pantoprazole   Suspension 40 milliGRAM(s) Enteral Tube daily  propofol Infusion 1 MICROgram(s)/kG/Min (0.51 mL/Hr) IV Continuous <Continuous>  QUEtiapine 100 milliGRAM(s) Oral two times a day  scopolamine 1 mG/72 Hr(s) Patch 1 Patch Transdermal every 72 hours  spironolactone 100 milliGRAM(s) Oral daily  thiamine 100 milliGRAM(s) Oral daily  vancomycin    Solution 250 milliGRAM(s) Oral every 6 hours    MEDICATIONS  (PRN):  ALBUTerol    90 MICROgram(s) HFA Inhaler 1 Puff(s) Inhalation every 4 hours PRN Shortness of Breath and/or Wheezing  cloNIDine 0.1 milliGRAM(s) Oral every 6 hours PRN opiate withdrawal  hydrOXYzine hydrochloride 50 milliGRAM(s) Oral every 6 hours PRN Anxiety  ibuprofen  Tablet. 600 milliGRAM(s) Oral every 6 hours PRN Temp greater or equal to 38C (100.4F)  ibuprofen  Tablet. 400 milliGRAM(s) Oral every 6 hours PRN Mild Pain (1 - 3)  sodium chloride 0.9% lock flush 10 milliLiter(s) IV Push every 1 hour PRN Pre/post blood products, medications, blood draw, and to maintain line patency      PHYSICAL EXAM:  GENERAL: intbuated, sedated   HEENT:  NCAT, PERRL, No JVD, Trachea Midline, +ETT, +OGT  NERVOUS SYSTEM:  Sedated to RASS 0 to -1 opens eyes to my voice and tracks me   CHEST/LUNG: Good air entry bilaterally  HEART: Regular rate and rhythm  ABDOMEN: Soft, Nontender, + distension   EXTREMITIES:  Warm, well perfused  SKIN: No new skin breakdowns   Patient is a 26y old  Female who presents with a chief complaint of anasarca (07 Jul 2022 15:54)      INTERVAL HPI/OVERNIGHT EVENTS:   No overnight events   extubated today; appears in no acute distress     ICU Vital Signs Last 24 Hrs  T(C): 38.3 (08 Jul 2022 06:35), Max: 38.6 (07 Jul 2022 19:05)  T(F): 100.9 (08 Jul 2022 06:35), Max: 101.5 (07 Jul 2022 19:05)  HR: 94 (08 Jul 2022 06:35) (89 - 118)  BP: 103/58 (08 Jul 2022 06:35) (88/53 - 165/99)  BP(mean): 74 (08 Jul 2022 06:35) (67 - 125)  ABP: --  ABP(mean): --  RR: 25 (08 Jul 2022 06:35) (22 - 36)  SpO2: 99% (08 Jul 2022 06:35) (95% - 100%)    O2 Parameters below as of 08 Jul 2022 06:35  Patient On (Oxygen Delivery Method): ventilator          I&O's Summary    07 Jul 2022 07:01  -  08 Jul 2022 07:00  --------------------------------------------------------  IN: 1777 mL / OUT: 1315 mL / NET: 462 mL      Mode: AC/ CMV (Assist Control/ Continuous Mandatory Ventilation)  RR (machine): 25  TV (machine): 370  FiO2: 30  PEEP: 5  ITime: 0.8  MAP: 8  PIP: 19      LABS:                        9.1    1.82  )-----------( 131      ( 08 Jul 2022 05:26 )             29.9     07-08    144  |  108  |  31<H>  ----------------------------<  88  3.3<L>   |  27  |  0.7    Ca    8.3<L>      08 Jul 2022 05:26  Phos  4.2     07-08  Mg     1.7     07-08    TPro  5.2<L>  /  Alb  2.3<L>  /  TBili  0.3  /  DBili  x   /  AST  38  /  ALT  24  /  AlkPhos  523<H>  07-08        CAPILLARY BLOOD GLUCOSE        ABG - ( 08 Jul 2022 04:00 )  pH, Arterial: 7.40  pH, Blood: x     /  pCO2: 44    /  pO2: 100   / HCO3: 27    / Base Excess: 2.2   /  SaO2: 98.7                RADIOLOGY & ADDITIONAL TESTS:    Consultant(s) Notes Reviewed:  [x ] YES  [ ] NO    MEDICATIONS  (STANDING):  chlorhexidine 0.12% Liquid 15 milliLiter(s) Oral Mucosa every 12 hours  chlorhexidine 4% Liquid 1 Application(s) Topical daily  fentaNYL   Infusion. 0.5 MICROgram(s)/kG/Hr (4.28 mL/Hr) IV Continuous <Continuous>  folic acid 1 milliGRAM(s) Oral daily  meropenem  IVPB 1000 milliGRAM(s) IV Intermittent every 8 hours  midazolam Infusion 0.02 mG/kG/Hr (1.71 mL/Hr) IV Continuous <Continuous>  nicotine -  14 mG/24Hr(s) Patch 1 patch Transdermal daily  norepinephrine Infusion 0.05 MICROgram(s)/kG/Min (8.03 mL/Hr) IV Continuous <Continuous>  pantoprazole   Suspension 40 milliGRAM(s) Enteral Tube daily  propofol Infusion 1 MICROgram(s)/kG/Min (0.51 mL/Hr) IV Continuous <Continuous>  QUEtiapine 100 milliGRAM(s) Oral two times a day  scopolamine 1 mG/72 Hr(s) Patch 1 Patch Transdermal every 72 hours  spironolactone 100 milliGRAM(s) Oral daily  thiamine 100 milliGRAM(s) Oral daily  vancomycin    Solution 250 milliGRAM(s) Oral every 6 hours    MEDICATIONS  (PRN):  ALBUTerol    90 MICROgram(s) HFA Inhaler 1 Puff(s) Inhalation every 4 hours PRN Shortness of Breath and/or Wheezing  cloNIDine 0.1 milliGRAM(s) Oral every 6 hours PRN opiate withdrawal  hydrOXYzine hydrochloride 50 milliGRAM(s) Oral every 6 hours PRN Anxiety  ibuprofen  Tablet. 600 milliGRAM(s) Oral every 6 hours PRN Temp greater or equal to 38C (100.4F)  ibuprofen  Tablet. 400 milliGRAM(s) Oral every 6 hours PRN Mild Pain (1 - 3)  sodium chloride 0.9% lock flush 10 milliLiter(s) IV Push every 1 hour PRN Pre/post blood products, medications, blood draw, and to maintain line patency      PHYSICAL EXAM:  GENERAL: awake and alert; NAD   HEENT:  NCAT, EOMI, s/p suture removal in head   CHEST/LUNG: Good air entry bilaterally  HEART: Regular rate and rhythm  ABDOMEN: Soft, Nontender, + distension   EXTREMITIES:  Warm, well perfused  SKIN: No new skin breakdowns; diffuse marks on legs

## 2022-07-08 NOTE — PROGRESS NOTE ADULT - ASSESSMENT
Acute respiratory failure sp intubation  PNA CT Chest 6/22 - left greater than right lower lobe consolidations; sputum Cx 6/24 Klebsiella oxytoca, Enterobacter cloacae complex-   MSSA pna  seizure like activity  ?? 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 Diff on oral vancomycin     PLAN:    CNS: do SAT/SBT as tolerated  D/c methadone 30mg daily for now as patient on fentanyl (psych recommended)  CW Clonapin to 2mg TID  CW Seroquel 100mg BID     HEENT: oral care     PULMONARY:  HOB 45, Vent changes: wean O2 as tolerated, , TV to 350, RR 25. Keep on right side    CARDIOVASCULAR: goal MAP >65.  Monitor QTc daily  ECHO 7/6: Small to mod generalized efuusion. No tamponade . Small mod effusion   present by report on echo 4/21/22  f/u cardiology recommendations     GI: GI prophylaxis.  OG Feeding.   KUB 5/5: nonobstructive gas pattern  hold laxatives given excessive BM    RENAL: monitor lytes    INFECTIOUS DISEASE: Abx vanco celestine pend sensi  worsening R infiltrate. Fungitell <31 7/5 , Glactomannan   Repeat procal 0.14 from 0.36 (6/30)  If concern for fulminant colitis add flagyl     HEMATOLOGICAL:    monitor CBC  D-dimer 1322, HIT antibody negative    ENDOCRINE:  Follow up FS.  Insulin protocol if needed.     MICU  lines: right IJ 7/6   [place] - 7/5  Will discuss with family for concern for trach & PEG     Acute respiratory failure sp intubation and extubation 7/8  PNA CT Chest 6/22 - left greater than right lower lobe consolidations; sputum Cx 6/24 Klebsiella oxytoca, Enterobacter cloacae complex-   MSSA pna  seizure like activity  ?? 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 Diff on oral vancomycin   Hypokalemia/hypomagnesemia      PLAN:    CNS: will restart methadone given off fentanyl   Speech and swallow eval; if passes will restart methadone 30mg daily for now as patient off fentanyl  CW Clonapin to 2mg TID  CW Seroquel 100mg BID     HEENT: oral care     PULMONARY:  HOB 45,  wean O2 as tolerated,     CARDIOVASCULAR: goal MAP >65.  Monitor QTc daily  ECHO 7/6: Small to mod generalized effusion. No tamponade . Small mod effusion   present by report on echo 4/21/22  f/u cardiology recommendations     GI: GI prophylaxis.  OG Feeding.   KUB 5/5: nonobstructive gas pattern  hold laxatives given excessive BM    RENAL: monitor lytes  f/u repeat BMP and replete K and mag as needed     INFECTIOUS DISEASE: Abx vanco celestine pend sensi  worsening R infiltrate. Fungitell <31 7/5 , Glactomannan   Repeat procal 0.14 from 0.36 (6/30)  If concern for fulminant colitis add flagyl     HEMATOLOGICAL:    monitor CBC  D-dimer 1322, HIT antibody negative  Restarted DVT ppx (heparin sc)    ENDOCRINE:  Follow up FS.  Insulin protocol if needed.     MICU  lines: right IJ 7/6   [place] - 7/5     Acute respiratory failure sp intubation and extubation 7/8  PNA CT Chest 6/22 - left greater than right lower lobe consolidations; sputum Cx 6/24 Klebsiella oxytoca, Enterobacter cloacae complex-   MSSA pna  seizure like activity  ?? 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 Diff on oral vancomycin   Hypokalemia/hypomagnesemia      PLAN:    CNS:   Speech and swallow eval; if passes will restart methadone 30mg daily for now as patient off fentanyl  CW Clonapin to 2mg TID  CW Seroquel 100mg BID     HEENT: oral care     PULMONARY:  HOB 45,  wean O2 as tolerated,     CARDIOVASCULAR: goal MAP >65.  Monitor QTc daily  ECHO 7/6: Small to mod generalized effusion. No tamponade . Small mod effusion   present by report on echo 4/21/22  f/u cardiology recommendations     GI: GI prophylaxis.  OG Feeding.   KUB 5/5: nonobstructive gas pattern  hold laxatives given excessive BM    RENAL: monitor lytes  f/u repeat BMP and replete K and mag as needed     INFECTIOUS DISEASE:   worsening R infiltrate. Fungitell <31 7/5 , Glactomannan pending   Repeat procal 0.14 from 0.36 (6/30)   Acinetobacter buamnii in sputum cx -> switch celestine to cefiderocol 2gm q 6 (nonformulary)  - Discussed possible BM biopsy for splenomegaly and pancytopenia  - continue PO vancomycin 250 mg q 6 hours         HEMATOLOGICAL:    monitor CBC  D-dimer 1322, HIT antibody negative  Restarted DVT ppx (heparin sc)    ENDOCRINE:  Follow up FS.  Insulin protocol if needed.     MICU  lines: right IJ 7/6   [place] - 7/5     Acute respiratory failure sp intubation and extubation 7/8  PNA CT Chest 6/22 - left greater than right lower lobe consolidations; sputum Cx 6/24 Klebsiella oxytoca, Enterobacter cloacae complex-   MSSA pna  seizure like activity  ?? 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 Diff on oral vancomycin   Hypokalemia/hypomagnesemia      PLAN:    CNS:   Speech and swallow eval; if passes will restart methadone 30mg daily for now as patient off fentanyl  CW Clonapin to 2mg TID  CW Seroquel 100mg BID     HEENT: oral care     PULMONARY:  HOB 45,  wean O2 as tolerated,     CARDIOVASCULAR: goal MAP >65.  Monitor QTc daily  ECHO 7/6: Small to mod generalized effusion. No tamponade . Small mod effusion   present by report on echo 4/21/22  f/u cardiology recommendations     GI: GI prophylaxis.  OG Feeding.   KUB 5/5: nonobstructive gas pattern  hold laxatives given excessive BM    RENAL: monitor lytes  f/u repeat BMP and replete K and mag as needed     INFECTIOUS DISEASE:   worsening R infiltrate. Fungitell <31 7/5 , Glactomannan pending   Repeat procal 0.14 from 0.36 (6/30)   Acinetobacter buamnii in sputum cx -> switch celestine to cefiderocol 2gm q 6 (nonformulary filled and sent to pharmacy)  - Discussed possible BM biopsy for splenomegaly and pancytopenia  - continue PO vancomycin 250 mg q 6 hours       HEMATOLOGICAL:    monitor CBC  D-dimer 1322, HIT antibody negative  Restarted DVT ppx (heparin sc)    ENDOCRINE:  Follow up FS.  Insulin protocol if needed.     MICU  lines: right IJ 7/6   [place] - 7/5

## 2022-07-08 NOTE — PROGRESS NOTE ADULT - SUBJECTIVE AND OBJECTIVE BOX
Patient seen and evaluated this am, awake on ventilator       T(F): 100.8 (07-08-22 @ 07:35), Max: 101.5 (07-07-22 @ 19:05)  HR: 103 (07-08-22 @ 11:35)  BP: 155/102 (07-08-22 @ 11:35)  RR: 20  SpO2: 96% (07-08-22 @ 11:35) (95% - 100%)    PHYSICAL EXAM:  GENERAL: NAD  HEAD:  Atraumatic, Normocephalic  EYES: EOMI, PERRLA, conjunctiva and sclera clear  NERVOUS SYSTEM:  no focal deficits   CHEST/LUNG:  bilateral rhonchi  HEART: Regular rate and rhythm; No murmurs, rubs, or gallops  ABDOMEN: Soft, Nontender, Nondistended; Bowel sounds present  EXTREMITIES:  2+ Peripheral Pulses, No clubbing, cyanosis, or edema    LABS  07-08    144  |  108  |  31<H>  ----------------------------<  88  3.3<L>   |  27  |  0.7    Ca    8.3<L>      08 Jul 2022 05:26  Phos  4.2     07-08  Mg     1.7     07-08    TPro  5.2<L>  /  Alb  2.3<L>  /  TBili  0.3  /  DBili  x   /  AST  38  /  ALT  24  /  AlkPhos  523<H>  07-08                          9.1    1.82  )-----------( 131      ( 08 Jul 2022 05:26 )             29.9       Culture Results:   No growth to date. (07-06-22)  Culture Results:   No growth to date. (07-06-22)  Culture Results:   Moderate Acinetobacter baumannii/nosocom group (Carbapenem Resistant)  Normal Respiratory Kelly absent (07-04-22)  Culture Results:   No Growth Final (06-29-22)  Culture Results:   No Growth Final (06-29-22)  Culture Results:   No Growth Final (06-28-22)  Culture Results:   Numerous Mixed gram negative rods  Moderate Staphylococcus aureus  Normal Respiratory Kelly present (06-27-22)  Culture Results:   Moderate Klebsiella oxytoca/Raoutella ornithinolytica  Moderate Enterobacter cloacae complex  Normal Respiratory Kelly absent (06-24-22)  Culture Results:   No growth at 5 days (06-22-22)  Culture Results:   No Growth Final (06-21-22)    RADIOLOGY  < from: Xray Chest 1 View- PORTABLE-Routine (Xray Chest 1 View- PORTABLE-Routine in AM.) (07.07.22 @ 06:18) >  Impression:    Increasing right lower lobe infiltrate. Stable left basilar   opacity/effusion.      < end of copied text >    MEDICATIONS  (STANDING):  chlorhexidine 0.12% Liquid 15 milliLiter(s) Oral Mucosa every 12 hours  chlorhexidine 4% Liquid 1 Application(s) Topical daily  dexMEDEtomidine Infusion 0.2 MICROgram(s)/kG/Hr (3.52 mL/Hr) IV Continuous <Continuous>  fentaNYL   Infusion. 0.5 MICROgram(s)/kG/Hr (4.28 mL/Hr) IV Continuous <Continuous>  folic acid 1 milliGRAM(s) Oral daily  heparin   Injectable 5000 Unit(s) SubCutaneous every 12 hours  ketorolac   Injectable 15 milliGRAM(s) IV Push once  midazolam Infusion 0.02 mG/kG/Hr (1.71 mL/Hr) IV Continuous <Continuous>  nicotine -  14 mG/24Hr(s) Patch 1 patch Transdermal daily  pantoprazole   Suspension 40 milliGRAM(s) Enteral Tube daily  potassium chloride  20 mEq/100 mL IVPB 20 milliEquivalent(s) IV Intermittent every 2 hours  propofol Infusion 1 MICROgram(s)/kG/Min (0.51 mL/Hr) IV Continuous <Continuous>  QUEtiapine 100 milliGRAM(s) Oral two times a day  scopolamine 1 mG/72 Hr(s) Patch 1 Patch Transdermal every 72 hours  spironolactone 100 milliGRAM(s) Oral daily  thiamine 100 milliGRAM(s) Oral daily  vancomycin    Solution 250 milliGRAM(s) Oral every 6 hours    MEDICATIONS  (PRN):  ALBUTerol    90 MICROgram(s) HFA Inhaler 1 Puff(s) Inhalation every 4 hours PRN Shortness of Breath and/or Wheezing  cloNIDine 0.1 milliGRAM(s) Oral every 6 hours PRN opiate withdrawal  hydrOXYzine hydrochloride 50 milliGRAM(s) Oral every 6 hours PRN Anxiety  ibuprofen  Tablet. 600 milliGRAM(s) Oral every 6 hours PRN Temp greater or equal to 38C (100.4F)  ibuprofen  Tablet. 400 milliGRAM(s) Oral every 6 hours PRN Mild Pain (1 - 3)  sodium chloride 0.9% lock flush 10 milliLiter(s) IV Push every 1 hour PRN Pre/post blood products, medications, blood draw, and to maintain line patency      Patient seen and evaluated this am, awake on ventilator       T(F): 100.8 (07-08-22 @ 07:35), Max: 101.5 (07-07-22 @ 19:05)  HR: 103 (07-08-22 @ 11:35)  BP: 155/102 (07-08-22 @ 11:35)  RR: 20  SpO2: 96% (07-08-22 @ 11:35) (95% - 100%)    PHYSICAL EXAM:  GENERAL: NAD  HEAD:  Atraumatic, Normocephalic  EYES: EOMI, PERRLA, conjunctiva and sclera clear  NERVOUS SYSTEM:  no focal deficits   CHEST/LUNG:  bilateral rhonchi  HEART: Regular rate and rhythm; No murmurs, rubs, or gallops  ABDOMEN: Soft, Nontender, Nondistended; Bowel sounds present  EXTREMITIES:  2+ Peripheral Pulses, No clubbing, cyanosis, or edema    LABS  07-08    144  |  108  |  31<H>  ----------------------------<  88  3.3<L>   |  27  |  0.7    Ca    8.3<L>      08 Jul 2022 05:26  Phos  4.2     07-08  Mg     1.7     07-08    TPro  5.2<L>  /  Alb  2.3<L>  /  TBili  0.3  /  DBili  x   /  AST  38  /  ALT  24  /  AlkPhos  523<H>  07-08                          9.1    1.82  )-----------( 131      ( 08 Jul 2022 05:26 )             29.9       Culture Results:   No growth to date. (07-06-22)  Culture Results:   No growth to date. (07-06-22)  Culture Results:   Moderate Acinetobacter baumannii/nosocom group (Carbapenem Resistant)  Normal Respiratory Kelly absent (07-04-22)  Culture Results:   No Growth Final (06-29-22)  Culture Results:   No Growth Final (06-29-22)  Culture Results:   No Growth Final (06-28-22)  Culture Results:   Numerous Mixed gram negative rods  Moderate Staphylococcus aureus  Normal Respiratory Kelly present (06-27-22)  Culture Results:   Moderate Klebsiella oxytoca/Raoutella ornithinolytica  Moderate Enterobacter cloacae complex  Normal Respiratory Kelly absent (06-24-22)  Culture Results:   No growth at 5 days (06-22-22)  Culture Results:   No Growth Final (06-21-22)    RADIOLOGY  < from: Xray Chest 1 View- PORTABLE-Routine (Xray Chest 1 View- PORTABLE-Routine in AM.) (07.07.22 @ 06:18) >  Impression:    Increasing right lower lobe infiltrate. Stable left basilar   opacity/effusion.      < end of copied text >    MEDICATIONS  (STANDING):  chlorhexidine 0.12% Liquid 15 milliLiter(s) Oral Mucosa every 12 hours  chlorhexidine 4% Liquid 1 Application(s) Topical daily  dexMEDEtomidine Infusion 0.2 MICROgram(s)/kG/Hr (3.52 mL/Hr) IV Continuous <Continuous>  folic acid 1 milliGRAM(s) Oral daily  heparin   Injectable 5000 Unit(s) SubCutaneous every 12 hours  ketorolac   Injectable 15 milliGRAM(s) IV Push once  nicotine -  14 mG/24Hr(s) Patch 1 patch Transdermal daily  pantoprazole   Suspension 40 milliGRAM(s) Enteral Tube daily  potassium chloride  20 mEq/100 mL IVPB 20 milliEquivalent(s) IV Intermittent every 2 hours  QUEtiapine 100 milliGRAM(s) Oral two times a day  scopolamine 1 mG/72 Hr(s) Patch 1 Patch Transdermal every 72 hours  spironolactone 100 milliGRAM(s) Oral daily  thiamine 100 milliGRAM(s) Oral daily  vancomycin    Solution 250 milliGRAM(s) Oral every 6 hours    MEDICATIONS  (PRN):  ALBUTerol    90 MICROgram(s) HFA Inhaler 1 Puff(s) Inhalation every 4 hours PRN Shortness of Breath and/or Wheezing  cloNIDine 0.1 milliGRAM(s) Oral every 6 hours PRN opiate withdrawal  hydrOXYzine hydrochloride 50 milliGRAM(s) Oral every 6 hours PRN Anxiety  ibuprofen  Tablet. 600 milliGRAM(s) Oral every 6 hours PRN Temp greater or equal to 38C (100.4F)  ibuprofen  Tablet. 400 milliGRAM(s) Oral every 6 hours PRN Mild Pain (1 - 3)  sodium chloride 0.9% lock flush 10 milliLiter(s) IV Push every 1 hour PRN Pre/post blood products, medications, blood draw, and to maintain line patency

## 2022-07-09 LAB
ALBUMIN SERPL ELPH-MCNC: 2.5 G/DL — LOW (ref 3.5–5.2)
ALP SERPL-CCNC: 510 U/L — HIGH (ref 30–115)
ALT FLD-CCNC: 21 U/L — SIGNIFICANT CHANGE UP (ref 0–41)
ANION GAP SERPL CALC-SCNC: 12 MMOL/L — SIGNIFICANT CHANGE UP (ref 7–14)
APPEARANCE UR: ABNORMAL
AST SERPL-CCNC: 30 U/L — SIGNIFICANT CHANGE UP (ref 0–41)
BACTERIA # UR AUTO: ABNORMAL
BASOPHILS # BLD AUTO: 0.01 K/UL — SIGNIFICANT CHANGE UP (ref 0–0.2)
BASOPHILS NFR BLD AUTO: 0.4 % — SIGNIFICANT CHANGE UP (ref 0–1)
BILIRUB SERPL-MCNC: 0.5 MG/DL — SIGNIFICANT CHANGE UP (ref 0.2–1.2)
BILIRUB UR-MCNC: ABNORMAL
BUN SERPL-MCNC: 36 MG/DL — HIGH (ref 10–20)
CALCIUM SERPL-MCNC: 8.7 MG/DL — SIGNIFICANT CHANGE UP (ref 8.5–10.1)
CHLORIDE SERPL-SCNC: 111 MMOL/L — HIGH (ref 98–110)
CO2 SERPL-SCNC: 25 MMOL/L — SIGNIFICANT CHANGE UP (ref 17–32)
COLOR SPEC: ABNORMAL
COMMENT - URINE: SIGNIFICANT CHANGE UP
CREAT SERPL-MCNC: 0.8 MG/DL — SIGNIFICANT CHANGE UP (ref 0.7–1.5)
DIFF PNL FLD: ABNORMAL
EGFR: 103 ML/MIN/1.73M2 — SIGNIFICANT CHANGE UP
EOSINOPHIL # BLD AUTO: 0 K/UL — SIGNIFICANT CHANGE UP (ref 0–0.7)
EOSINOPHIL NFR BLD AUTO: 0 % — SIGNIFICANT CHANGE UP (ref 0–8)
EPI CELLS # UR: ABNORMAL /HPF
GAS PNL BLDA: SIGNIFICANT CHANGE UP
GAS PNL BLDA: SIGNIFICANT CHANGE UP
GLUCOSE SERPL-MCNC: 100 MG/DL — HIGH (ref 70–99)
GLUCOSE UR QL: NEGATIVE MG/DL — SIGNIFICANT CHANGE UP
HCT VFR BLD CALC: 29.6 % — LOW (ref 37–47)
HCT VFR BLD CALC: 31 % — LOW (ref 37–47)
HGB BLD-MCNC: 9.1 G/DL — LOW (ref 12–16)
HGB BLD-MCNC: 9.4 G/DL — LOW (ref 12–16)
IMM GRANULOCYTES NFR BLD AUTO: 0.4 % — HIGH (ref 0.1–0.3)
KETONES UR-MCNC: ABNORMAL
LEUKOCYTE ESTERASE UR-ACNC: NEGATIVE — SIGNIFICANT CHANGE UP
LYMPHOCYTES # BLD AUTO: 0.7 K/UL — LOW (ref 1.2–3.4)
LYMPHOCYTES # BLD AUTO: 27.9 % — SIGNIFICANT CHANGE UP (ref 20.5–51.1)
MAGNESIUM SERPL-MCNC: 2 MG/DL — SIGNIFICANT CHANGE UP (ref 1.8–2.4)
MCHC RBC-ENTMCNC: 26.6 PG — LOW (ref 27–31)
MCHC RBC-ENTMCNC: 27 PG — SIGNIFICANT CHANGE UP (ref 27–31)
MCHC RBC-ENTMCNC: 30.3 G/DL — LOW (ref 32–37)
MCHC RBC-ENTMCNC: 30.7 G/DL — LOW (ref 32–37)
MCV RBC AUTO: 87.6 FL — SIGNIFICANT CHANGE UP (ref 81–99)
MCV RBC AUTO: 87.8 FL — SIGNIFICANT CHANGE UP (ref 81–99)
MONOCYTES # BLD AUTO: 0.15 K/UL — SIGNIFICANT CHANGE UP (ref 0.1–0.6)
MONOCYTES NFR BLD AUTO: 6 % — SIGNIFICANT CHANGE UP (ref 1.7–9.3)
NEUTROPHILS # BLD AUTO: 1.64 K/UL — SIGNIFICANT CHANGE UP (ref 1.4–6.5)
NEUTROPHILS NFR BLD AUTO: 65.3 % — SIGNIFICANT CHANGE UP (ref 42.2–75.2)
NITRITE UR-MCNC: NEGATIVE — SIGNIFICANT CHANGE UP
NRBC # BLD: 0 /100 WBCS — SIGNIFICANT CHANGE UP (ref 0–0)
NRBC # BLD: 0 /100 WBCS — SIGNIFICANT CHANGE UP (ref 0–0)
PH UR: 6 — SIGNIFICANT CHANGE UP (ref 5–8)
PHOSPHATE SERPL-MCNC: 4 MG/DL — SIGNIFICANT CHANGE UP (ref 2.1–4.9)
PLATELET # BLD AUTO: 123 K/UL — LOW (ref 130–400)
POTASSIUM SERPL-MCNC: 4 MMOL/L — SIGNIFICANT CHANGE UP (ref 3.5–5)
POTASSIUM SERPL-SCNC: 4 MMOL/L — SIGNIFICANT CHANGE UP (ref 3.5–5)
PROT SERPL-MCNC: 5.8 G/DL — LOW (ref 6–8)
PROT UR-MCNC: >=300 MG/DL
RBC # BLD: 3.37 M/UL — LOW (ref 4.2–5.4)
RBC # BLD: 3.54 M/UL — LOW (ref 4.2–5.4)
RBC # FLD: 15.7 % — HIGH (ref 11.5–14.5)
RBC # FLD: 15.7 % — HIGH (ref 11.5–14.5)
RBC CASTS # UR COMP ASSIST: >50 /HPF
SODIUM SERPL-SCNC: 148 MMOL/L — HIGH (ref 135–146)
SP GR SPEC: >=1.03 (ref 1.01–1.03)
UROBILINOGEN FLD QL: 0.2 MG/DL — SIGNIFICANT CHANGE UP
WBC # BLD: 2.25 K/UL — LOW (ref 4.8–10.8)
WBC # BLD: 2.51 K/UL — LOW (ref 4.8–10.8)
WBC # FLD AUTO: 2.25 K/UL — LOW (ref 4.8–10.8)
WBC # FLD AUTO: 2.51 K/UL — LOW (ref 4.8–10.8)
WBC UR QL: SIGNIFICANT CHANGE UP /HPF

## 2022-07-09 PROCEDURE — 99291 CRITICAL CARE FIRST HOUR: CPT

## 2022-07-09 PROCEDURE — 93010 ELECTROCARDIOGRAM REPORT: CPT

## 2022-07-09 PROCEDURE — 71045 X-RAY EXAM CHEST 1 VIEW: CPT | Mod: 26

## 2022-07-09 RX ORDER — PROPOFOL 10 MG/ML
0.1 INJECTION, EMULSION INTRAVENOUS
Qty: 1000 | Refills: 0 | Status: DISCONTINUED | OUTPATIENT
Start: 2022-07-09 | End: 2022-07-17

## 2022-07-09 RX ORDER — PROPOFOL 10 MG/ML
0.1 INJECTION, EMULSION INTRAVENOUS
Qty: 500 | Refills: 0 | Status: DISCONTINUED | OUTPATIENT
Start: 2022-07-09 | End: 2022-07-09

## 2022-07-09 RX ORDER — ACETAMINOPHEN 500 MG
1000 TABLET ORAL ONCE
Refills: 0 | Status: COMPLETED | OUTPATIENT
Start: 2022-07-09 | End: 2022-07-09

## 2022-07-09 RX ADMIN — Medication 650 MILLIGRAM(S): at 06:51

## 2022-07-09 RX ADMIN — Medication 400 MILLIGRAM(S): at 15:10

## 2022-07-09 RX ADMIN — DEXMEDETOMIDINE HYDROCHLORIDE IN 0.9% SODIUM CHLORIDE 3.52 MICROGRAM(S)/KG/HR: 4 INJECTION INTRAVENOUS at 23:58

## 2022-07-09 RX ADMIN — PROPOFOL 0.04 MICROGRAM(S)/KG/MIN: 10 INJECTION, EMULSION INTRAVENOUS at 01:50

## 2022-07-09 RX ADMIN — PROPOFOL 0.04 MICROGRAM(S)/KG/MIN: 10 INJECTION, EMULSION INTRAVENOUS at 23:58

## 2022-07-09 RX ADMIN — SCOPALAMINE 1 PATCH: 1 PATCH, EXTENDED RELEASE TRANSDERMAL at 11:39

## 2022-07-09 RX ADMIN — PROPOFOL 0.04 MICROGRAM(S)/KG/MIN: 10 INJECTION, EMULSION INTRAVENOUS at 20:27

## 2022-07-09 RX ADMIN — SCOPALAMINE 1 PATCH: 1 PATCH, EXTENDED RELEASE TRANSDERMAL at 18:04

## 2022-07-09 RX ADMIN — HEPARIN SODIUM 5000 UNIT(S): 5000 INJECTION INTRAVENOUS; SUBCUTANEOUS at 06:03

## 2022-07-09 RX ADMIN — Medication 1000 MILLIGRAM(S): at 16:20

## 2022-07-09 RX ADMIN — Medication 1 PATCH: at 11:10

## 2022-07-09 RX ADMIN — CHLORHEXIDINE GLUCONATE 1 APPLICATION(S): 213 SOLUTION TOPICAL at 06:48

## 2022-07-09 RX ADMIN — Medication 1 PATCH: at 18:02

## 2022-07-09 RX ADMIN — Medication 650 MILLIGRAM(S): at 12:00

## 2022-07-09 RX ADMIN — Medication 650 MILLIGRAM(S): at 19:04

## 2022-07-09 RX ADMIN — Medication 650 MILLIGRAM(S): at 07:23

## 2022-07-09 RX ADMIN — Medication 1 PATCH: at 07:23

## 2022-07-09 RX ADMIN — Medication 650 MILLIGRAM(S): at 18:10

## 2022-07-09 RX ADMIN — PROPOFOL 0.04 MICROGRAM(S)/KG/MIN: 10 INJECTION, EMULSION INTRAVENOUS at 06:02

## 2022-07-09 RX ADMIN — CEFIDEROCOL SULFATE TOSYLATE 33.33 MILLIGRAM(S): 1 INJECTION, POWDER, FOR SOLUTION INTRAVENOUS at 12:23

## 2022-07-09 RX ADMIN — CHLORHEXIDINE GLUCONATE 15 MILLILITER(S): 213 SOLUTION TOPICAL at 17:43

## 2022-07-09 RX ADMIN — PANTOPRAZOLE SODIUM 40 MILLIGRAM(S): 20 TABLET, DELAYED RELEASE ORAL at 11:10

## 2022-07-09 RX ADMIN — SCOPALAMINE 1 PATCH: 1 PATCH, EXTENDED RELEASE TRANSDERMAL at 11:10

## 2022-07-09 RX ADMIN — Medication 1 PATCH: at 11:39

## 2022-07-09 RX ADMIN — HEPARIN SODIUM 5000 UNIT(S): 5000 INJECTION INTRAVENOUS; SUBCUTANEOUS at 17:43

## 2022-07-09 RX ADMIN — SCOPALAMINE 1 PATCH: 1 PATCH, EXTENDED RELEASE TRANSDERMAL at 07:23

## 2022-07-09 RX ADMIN — CHLORHEXIDINE GLUCONATE 15 MILLILITER(S): 213 SOLUTION TOPICAL at 06:03

## 2022-07-09 RX ADMIN — DEXMEDETOMIDINE HYDROCHLORIDE IN 0.9% SODIUM CHLORIDE 3.52 MICROGRAM(S)/KG/HR: 4 INJECTION INTRAVENOUS at 12:24

## 2022-07-09 RX ADMIN — CEFIDEROCOL SULFATE TOSYLATE 33.33 MILLIGRAM(S): 1 INJECTION, POWDER, FOR SOLUTION INTRAVENOUS at 17:43

## 2022-07-09 RX ADMIN — DEXMEDETOMIDINE HYDROCHLORIDE IN 0.9% SODIUM CHLORIDE 3.52 MICROGRAM(S)/KG/HR: 4 INJECTION INTRAVENOUS at 20:26

## 2022-07-09 RX ADMIN — CEFIDEROCOL SULFATE TOSYLATE 33.33 MILLIGRAM(S): 1 INJECTION, POWDER, FOR SOLUTION INTRAVENOUS at 00:49

## 2022-07-09 RX ADMIN — Medication 650 MILLIGRAM(S): at 12:22

## 2022-07-09 RX ADMIN — DEXMEDETOMIDINE HYDROCHLORIDE IN 0.9% SODIUM CHLORIDE 3.52 MICROGRAM(S)/KG/HR: 4 INJECTION INTRAVENOUS at 06:02

## 2022-07-09 NOTE — PROGRESS NOTE ADULT - ASSESSMENT
Patient is a 26 year old female with hx of asthma, untreated Hep C, IVDA, anasarca presenting with increased dyspnea since yesterday    IMPRESSION  #Acute respiratory failure s/p intubation 6/16, extubated 7/7     #VAP  - Xray Chest 1 View- PORTABLE-Routine (Xray Chest 1 View- PORTABLE-Routine in AM.) (07.03.22 @ 06:10): Increasing right basilar opacity.  - sputum Cx 7/4 MDR Acinetobacter baumanii CRE    # C.dificille infection - 7/5/22    #Pneumonia -   - CT Chest 6/22 - left greater than right lower lobe consolidations   - sputum Cx 6/24 Klebsiella oxytoca, Enterobacter cloacae complex- The Sputum culture from 6/27 grew Numerous Mixed gram negative rods and Moderate Staphylococcus aureus.  - treated with course of cefepime     #Persistent Fevers + Pancytopenia + Splenomegaly - pancytopenia since at least 4/2022  - Ferritin, Serum: 94 ng/mL (06.27.22 @ 06:47),  Procalcitonin, Serum: 0.11 (06.27.22 @ 15:46)  - CT Abd/pelvis 6/26 - moderated ascites moderate pericardial effusion   - Hepatosplenomegaly - present since CT Abd/pelvis 1/30/2021  - weak IgG Lamda band  - fungitell NEGATIVE   - Anti Nuclear Factor Titer: Negative: Antinuclear AB (KRISHNA), IFA Method (06.30.22 @ 05:46)      #Isolated elevated Alk Ph   Gamma Glutamyl Transferase, Serum: 506 U/L (07.03.22 @ 06:59)    #Drug overdose vs withdrawal?  #HCV, VL UD  HIV-1/2 Combo Result: Nonreact: (06.29.22 @ 10:29)  Hepatitis C RNA, Qualitative: NotDetec (06.23.22 @ 16:00)  #IVDA   #Abx allergy: buprenorphine (Rash), Suboxone (Rash)    Recommendations  - persistent high fevers and pancytopenia- since at least 4/2022- for completeness send parasite smear and babesia PCR (doubt given chronicity of findings)  - Recommend bone marrow biopsy  - continue PO vancomycin 250 mg q 6 hours   - Cefiderocol 2g q 6 hours (non-formulary) 7/8-   - trend fever curve  - trend Alk Ph/GGT and CBC  - aspiration precautions     Please call or message on [employer] Teams if with any questions.

## 2022-07-09 NOTE — PROGRESS NOTE ADULT - ASSESSMENT
26 year old female with hx of asthma, untreated Hep C, IVDA, anasarca was admitted to medical floor  with increased SOB and anasarca  Pt admits to using opiates 2 grams per day IV into her thighs  x years with minimal sobriety. Pt has been on [facility] in 2020. Pt is contemplating going back on program. Pt denies any other substance abuse. Pt states used a xanax for wd about 3 days ago.      Hepatitis C with Cirrhosis no compliant with treatment  Hx of withdrawal       Acute respiratory failure with hypoxemia 2/2 suspected overdose / aspiration pneumonia with sepsis /  head laceration s/p fall in lobby / possible seizure / anasarca     - c-diff colitis - continue oral vanco   - Keppra weaned and DC'd as per neuro   - antibiotics as per ID   - reintubated last night due to tachypnea

## 2022-07-09 NOTE — PROGRESS NOTE ADULT - SUBJECTIVE AND OBJECTIVE BOX
CINDYON, POOJA  26y, Female  Allergy: buprenorphine (Rash)  Suboxone (Rash)      LOS  24d    CHIEF COMPLAINT: anasarca (08 Jul 2022 12:40)      INTERVAL EVENTS/HPI  - T(F): , Max: 104 (07-08-22 @ 22:00)- fever  - WBC Count: 2.51 (07-09-22 @ 06:32)  WBC Count: 1.82 (07-08-22 @ 05:26)     - Creatinine, Serum: 0.8 (07-09-22 @ 06:32)  Creatinine, Serum: 0.7 (07-08-22 @ 05:26)     -   -   -     ROS  cannot obtain secondary to patient's sedation and/or mental status    VITALS:  T(F): 102.6, Max: 104 (07-08-22 @ 22:00)  HR: 85  BP: 130/87  RR: 24Vital Signs Last 24 Hrs  T(C): 39.2 (09 Jul 2022 06:26), Max: 40 (08 Jul 2022 22:00)  T(F): 102.6 (09 Jul 2022 06:26), Max: 104 (08 Jul 2022 22:00)  HR: 85 (09 Jul 2022 09:13) (81 - 122)  BP: 130/87 (09 Jul 2022 07:00) (119/80 - 175/113)  BP(mean): 105 (09 Jul 2022 07:00) (96 - 139)  RR: 24 (09 Jul 2022 07:00) (8 - 49)  SpO2: 100% (09 Jul 2022 09:13) (92% - 100%)    Parameters below as of 09 Jul 2022 07:00  Patient On (Oxygen Delivery Method): ventilator        PHYSICAL EXAM:  Gen: Intubated  CV: RRR  Lungs: Decreased BS at bases  Abd: Soft  Neuro: opens eyes  Skin UE/ LE track marks, L neck blistering  Lines clean, no phlebitis    FH: Non-contributory  Social Hx: Non-contributory    TESTS & MEASUREMENTS:                        9.4    2.51  )-----------( 123      ( 09 Jul 2022 06:32 )             31.0     07-09    148<H>  |  111<H>  |  36<H>  ----------------------------<  100<H>  4.0   |  25  |  0.8    Ca    8.7      09 Jul 2022 06:32  Phos  4.0     07-09  Mg     2.0     07-09    TPro  5.8<L>  /  Alb  2.5<L>  /  TBili  0.5  /  DBili  x   /  AST  30  /  ALT  21  /  AlkPhos  510<H>  07-09      LIVER FUNCTIONS - ( 09 Jul 2022 06:32 )  Alb: 2.5 g/dL / Pro: 5.8 g/dL / ALK PHOS: 510 U/L / ALT: 21 U/L / AST: 30 U/L / GGT: x               Culture - Blood (collected 07-06-22 @ 07:20)  Source: .Blood None  Preliminary Report (07-07-22 @ 19:01):    No growth to date.    Culture - Blood (collected 07-06-22 @ 07:20)  Source: .Blood None  Preliminary Report (07-07-22 @ 19:01):    No growth to date.    Culture - Sputum (collected 07-04-22 @ 13:39)  Source: ET Tube ET Tube  Gram Stain (07-05-22 @ 08:48):    Few polymorphonuclear leukocytes per low power field    No Squamous epithelial cells per low power field    Numerous Gram Negative Coccobacilli seen per oil power field  Final Report (07-07-22 @ 10:52):    Moderate Acinetobacter baumannii/nosocom group (Carbapenem Resistant)    Normal Respiratory Kilo absent  Organism: Acinetobacter baumannii/nosocom group (Carbapenem Resistant)  Acinetobacter baumannii/nosocom group (Carbapenem Resistant) (07-07-22 @ 10:52)  Organism: Acinetobacter baumannii/nosocom group (Carbapenem Resistant) (07-07-22 @ 10:52)      -  Imipenem: R      -  Piperacillin/Tazobactam: R      Method Type: KB  Organism: Acinetobacter baumannii/nosocom group (Carbapenem Resistant) (07-07-22 @ 10:52)      -  Amikacin: R >32      -  Ampicillin/Sulbactam: R >16/8      -  Cefepime: R >16      -  Ceftazidime: R >16      -  Ciprofloxacin: R >2      -  Gentamicin: R >8      -  Levofloxacin: R >4      -  Meropenem: R >8      -  Tobramycin: R >8      -  Trimethoprim/Sulfamethoxazole: R >2/38      Method Type: AL    Culture - Blood (collected 06-29-22 @ 20:31)  Source: .Blood Blood-Peripheral  Final Report (07-05-22 @ 20:00):    No Growth Final    Culture - Blood (collected 06-29-22 @ 20:31)  Source: .Blood Blood  Final Report (07-05-22 @ 20:00):    No Growth Final    Culture - Blood (collected 06-28-22 @ 06:00)  Source: .Blood Blood  Final Report (07-03-22 @ 23:00):    No Growth Final    Culture - Sputum (collected 06-27-22 @ 14:00)  Source: Trach Asp Tracheal Aspirate  Gram Stain (06-28-22 @ 03:15):    Moderate polymorphonuclear leukocytes per low power field    Few Squamous epithelial cells per low power field    Moderate Gram positive cocci in pairs seen per oil power field    Few Gram Variable Rods seen per oil power field  Final Report (06-30-22 @ 16:33):    Numerous Mixed gram negative rods    Moderate Staphylococcus aureus    Normal Respiratory Kilo present  Organism: Staphylococcus aureus (06-30-22 @ 16:33)  Organism: Staphylococcus aureus (06-30-22 @ 16:33)      -  Ampicillin/Sulbactam: S <=8/4      -  Cefazolin: S <=4      -  Clindamycin: S <=0.25      -  Erythromycin: S <=0.25      -  Gentamicin: S <=1 Should not be used as monotherapy      -  Oxacillin: S <=0.25 Oxacillin predicts susceptibility for dicloxacillin, methicillin, and nafcillin      -  Rifampin: S <=1 Should not be used as monotherapy      -  Tetra/Doxy: S <=1      -  Trimethoprim/Sulfamethoxazole: S <=0.5/9.5      -  Vancomycin: S 2      Method Type: AL    Culture - Sputum (collected 06-24-22 @ 17:20)  Source: Trach Asp Tracheal Aspirate  Gram Stain (06-25-22 @ 06:29):    Few polymorphonuclear leukocytes per low power field    Rare Squamous epithelial cells per low power field    Moderate Gram Negative Rods seen per oil power field  Final Report (06-26-22 @ 17:00):    Moderate Klebsiella oxytoca/Raoutella ornithinolytica    Moderate Enterobacter cloacae complex    Normal Respiratory Kilo absent  Organism: Klebsiella oxytoca /Raoutella ornithinolytica  Enterobacter cloacae complex (06-26-22 @ 17:00)  Organism: Enterobacter cloacae complex (06-26-22 @ 17:00)      -  Amikacin: S <=16      -  Amoxicillin/Clavulanic Acid: R >16/8      -  Ampicillin: R >16 These ampicillin results predict results for amoxicillin      -  Ampicillin/Sulbactam: R >16/8 Enterobacter, Klebsiella aerogenes, Citrobacter, and Serratia may develop resistance during prolonged therapy (3-4 days)      -  Aztreonam: S <=4      -  Cefazolin: R >16 Enterobacter, Klebsiella aerogenes, Citrobacter, and Serratia may develop resistance during prolonged therapy (3-4 days)      -  Cefepime: S <=2      -  Cefoxitin: R >16      -  Ceftriaxone: S <=1 Enterobacter, Klebsiella aerogenes, Citrobacter, and Serratia may develop resistance during prolonged therapy      -  Ciprofloxacin: S <=0.25      -  Ertapenem: S <=0.5      -  Gentamicin: S <=2      -  Imipenem: S <=1      -  Levofloxacin: S <=0.5      -  Meropenem: S <=1      -  Piperacillin/Tazobactam: S <=8      -  Tobramycin: S <=2      -  Trimethoprim/Sulfamethoxazole: S <=0.5/9.5      Method Type: AL  Organism: Klebsiella oxytoca /Raoutella ornithinolytica (06-26-22 @ 17:00)      -  Amikacin: S <=16      -  Amoxicillin/Clavulanic Acid: S <=8/4      -  Ampicillin: R 16 These ampicillin results predict results for amoxicillin      -  Ampicillin/Sulbactam: S <=4/2 Enterobacter, Klebsiella aerogenes, Citrobacter, and Serratia may develop resistance during prolonged therapy (3-4 days)      -  Aztreonam: S <=4      -  Cefazolin: S <=2 Enterobacter, Klebsiella aerogenes, Citrobacter, and Serratia may develop resistance during prolonged therapy (3-4 days)      -  Cefepime: S <=2      -  Cefoxitin: S <=8      -  Ceftriaxone: S <=1 Enterobacter, Klebsiella aerogenes, Citrobacter, and Serratia may develop resistance during prolonged therapy      -  Ciprofloxacin: S <=0.25      -  Ertapenem: S <=0.5      -  Gentamicin: S <=2      -  Imipenem: S <=1      -  Levofloxacin: S <=0.5      -  Meropenem: S <=1      -  Piperacillin/Tazobactam: S <=8      -  Tobramycin: S <=2      -  Trimethoprim/Sulfamethoxazole: S <=0.5/9.5      Method Type: AL    Culture - Body Fluid with Gram Stain (collected 06-22-22 @ 01:00)  Source: Peritoneal Peritoneal Fluid  Gram Stain (06-23-22 @ 02:31):    No polymorphonuclear leukocytes seen    No organisms seen    by cytocentrifuge  Final Report (06-27-22 @ 19:13):    No growth at 5 days    Culture - Blood (collected 06-21-22 @ 05:33)  Source: .Blood None  Final Report (06-26-22 @ 19:00):    No Growth Final    Culture - Sputum (collected 06-17-22 @ 14:30)  Source: Trach Asp Tracheal Aspirate  Gram Stain (06-18-22 @ 10:18):    Rare polymorphonuclear leukocytes per low power field    No Squamous epithelial cells per low power field    Numerous Gram Positive Cocci in Pairs and Chains seen per oil power field    Rare Gram Negative Rods seen per oil power field    Rare Gram PositiveRods seen per oil power field  Final Report (06-19-22 @ 16:39):    Normal Respiratory Kilo present    Culture - Fungal, Body Fluid (collected 06-17-22 @ 12:24)  Source: .Body Fluid Peritoneal Fluid  Preliminary Report (06-25-22 @ 15:02):    No growth    Culture - Body Fluid with Gram Stain (collected 06-17-22 @ 12:24)  Source: .Body Fluid Peritoneal Fluid  Gram Stain (06-18-22 @ 03:19):    No polymorphonuclear leukocytes seen    No organisms seen    by cytocentrifuge  Final Report (06-22-22 @ 20:34):    No growth at 5 days    Culture - Urine (collected 06-15-22 @ 08:00)  Source: Clean Catch Clean Catch (Midstream)  Final Report (06-17-22 @ 22:14):    Normal Urogenital kilo present            INFECTIOUS DISEASES TESTING  Fungitell: <31 (07-05-22 @ 11:49)  Procalcitonin, Serum: 0.14 (07-05-22 @ 11:23)  MRSA PCR Result.: Negative (06-30-22 @ 10:34)  Fungitell: See Comment** **RESULTS OF FUNGITELL INCONCLUSIVE DUE TO THE PRESENCE OF UNKNOWN  INTERFERING SUBSTANCE. PLEASE SUBMIT ANOTHER SPECIMEN FOR TESTING.  PLEASE CONTACT [facility] WITH QUESTIONS.  Interpretation: The Fungitell assay does not detectcertain fungal  species such as the genus Cryptococcus (Aundrea et al. 1991) which  produces very low levels of (1-3)-Beta-D-Glucan. The assay also does  not detect the Zygomycetes such as Absidia, Mucor and Rhizopus  (Carol et al. 1994) which are not known to produce  (1-3)-Beta-D-Glucan. In addition, the yeast phase of Blastomyces  dermatitidis produces little (1-3)-Beta-D-Glucan and may not be  detected by the assay (Anila et al. 2007).  Reference Range:  Less than 60 pg/mL. Glucan values of less than 60 pg/mL are  interpreted as negative.  Glucan values of 60 to 79 pg/mL are interpreted as indeterminate,  and suggest a possible fungal infection. Additional sampling and  testing of sera is required to interpret the results.  Glucan values of greater than or equal to 80 pg/mL are interpreted  as positive.  Due to the potential for environmental contamination when  transferred to pour-off tubes, which can lead to false positive  results, interpret positive results from samples provided in  pour-off tubes with caution.  Results should be used in conjunction  with clinical findings, and should not form the sole basis for a  diagnosis or treatment decision. The Fungitell test is approved or  cleared for in vitro diagnostic use by the [place] Food and Drug  Administration. Modifications to the approved package insert have  been made and the performance characteristics for these  modifications were determined by [facility].  If sample result is greater than 500 pg/mL, physician may order a  titer of the sample. Please contact [facility] if you would  like to order a retest of this sample to obtain an actual value.  Samples are held for 1 week after initial testing date.  ____________________________________________________________  Performed at:  [facility]  [address] [place] [address]  : Kirk Newberry Ph.D., BCLGRAZYNA (ABB)  CLIA#: [id number]  Phone: [phone] (06-30-22 @ 10:30)  Procalcitonin, Serum: 0.36 (06-30-22 @ 10:30)  Rapid RVP Result: NotDetec (06-30-22 @ 09:19)  HIV-1/2 Combo Result: Nonreact (06-29-22 @ 10:29)  Procalcitonin, Serum: 0.11 (06-27-22 @ 15:46)  Procalcitonin, Serum: 0.11 (06-27-22 @ 06:47)  Procalcitonin, Serum: 0.62 (06-20-22 @ 05:49)  MRSA PCR Result.: Negative (06-17-22 @ 14:30)  Procalcitonin, Serum: 3.28 (06-17-22 @ 05:22)  COVID-19 PCR: NotDetec (06-15-22 @ 07:10)  Procalcitonin, Serum: 0.07 (04-20-22 @ 12:46)      INFLAMMATORY MARKERS  Sedimentation Rate, Erythrocyte: 86 mm/Hr (06-30-22 @ 05:46)  Sedimentation Rate, Erythrocyte: 65 mm/Hr (06-23-22 @ 16:00)      RADIOLOGY & ADDITIONAL TESTS:  I have personally reviewed the last available Chest xray  CXR  Xray Chest 1 View- PORTABLE-Urgent:   ACC: [id number] EXAM:  XR CHEST PORTABLE URGENT 1V                          PROCEDURE DATE:  07/06/2022          INTERPRETATION:  Clinical History / Reason for exam: Pneumonia.    Comparison: Chest radiograph 7/6/2022.    Technique/Positioning: Single AP chest radiograph.    Findings:    Support devices: Endotracheal tube, enteric tube, left IJ central   catheter are stable.    Cardiac/mediastinum/hilum: Stable.    Lung parenchyma/Pleura: Left basilar opacity/effusion, slightly   increased. No pneumothorax. The right lung is clear.    Impression:    Left basilar opacity/effusion, slightly increased.    Stable support devices.    --- End of Report ---            NALLELY MCCALL MD; Attending Radiologist  This document has been electronically signed.Jul 6 2022  9:45AM (07-06-22 @ 09:38)      CT      CARDIOLOGY TESTING  12 Lead ECG:   Ventricular Rate 92 BPM    Atrial Rate 92 BPM    P-R Interval 162 ms    QRS Duration 72 ms    Q-T Interval 398 ms    QTC Calculation(Bazett) 492 ms    P Axis 65 degrees    R Axis 118 degrees    T Axis 167 degrees    Diagnosis Line Normal sinus rhythm  Right axis deviation  Possible Right ventricular hypertrophy  Nonspecific ST and T wave abnormality  Abnormal ECG    Confirmed by BRANDON BELL MD ([id number]) on 7/8/2022 9:23:58 AM (07-08-22 @ 08:18)  12 Lead ECG:   Ventricular Rate 97 BPM    Atrial Rate 97 BPM    P-R Interval 158 ms    QRS Duration 80 ms    Q-T Interval 366 ms    QTC Calculation(Bazett) 464 ms    P Axis 63 degrees    R Axis 123 degrees    T Axis 182 degrees    Diagnosis Line Normal sinus rhythm  Right axis deviation  Possible Right ventricular hypertrophy  T wave abnormality, consider lateral ischemia  Abnormal ECG    Confirmed by BRANDON BELL MD ([id number]) on 7/7/2022 11:38:56 AM (07-07-22 @ 08:37)      MEDICATIONS  cefiderocol IVPB 2000  chlorhexidine 0.12% Liquid 15  chlorhexidine 4% Liquid 1  dexMEDEtomidine Infusion 0.2  folic acid 1  heparin   Injectable 5000  nicotine -  14 mG/24Hr(s) Patch 1  norepinephrine Infusion 0.05  pantoprazole  Injectable 40  propofol Infusion 0.1  QUEtiapine 100  scopolamine 1 mG/72 Hr(s) Patch 1  spironolactone 100  thiamine 100  vancomycin    Solution 250      WEIGHT  Weight (kg): 70.4 (07-07-22 @ 07:00)  Creatinine, Serum: 0.8 mg/dL (07-09-22 @ 06:32)      ANTIBIOTICS:  cefiderocol IVPB 2000 milliGRAM(s) IV Intermittent every 6 hours  vancomycin    Solution 250 milliGRAM(s) Oral every 6 hours      All available historical records have been reviewed

## 2022-07-09 NOTE — CHART NOTE - NSCHARTNOTEFT_GEN_A_CORE
Patient was extubated earlier during the day on 7/8. She has been tachypneic in 40-to-60 breath per min since then. She has been hypertensive 150s/110s, afebrile to 104F despite cooling blanket, tylenol and toradol were given. Bipap was started at around 3pm. Pre-bipap and 2 hr after bi-pap abg were unchanged, showing respiratory alkalosis ph 7.5. Ativan 2mg IV push were given 2x without improvement. She continues to be tachypneic 50s to 60s, temp continued to rise and patient became more lethargic appearing and less responsive to verbal and painful stimuli. Case was discussed with attending physician throughout the course. Decision was made to intubate the patient and she was intubated at approximately 2355 by anesthesia without complication.

## 2022-07-09 NOTE — PROGRESS NOTE ADULT - SUBJECTIVE AND OBJECTIVE BOX
Patient is a 26y old  Female who presents with a chief complaint of anasarca (09 Jul 2022 09:18)      HPI:  Patient is a 26 year old female with hx of asthma, untreated Hep C, IVDA, anasarca presenting with increased dyspnea since yesterday. Patient has been short of breath chronically and has been admitted for fluid overload. Patient describes worsening symptoms yesterday associated with cough. States generalized edema has remained the same. Patient is actively using heroin. Not on any meds or MMTP.  Denies fever, chills, sore throat, cp, palpitations, abd pain, n/v/d, dysuria, headache, syncope, hemoptysis. + generalized edema (15 Reinaldo 2022 10:03)    Pt extubated yesterday, remained febrile and tchypnic.  It was decided to reintubate the patient overnight.  She remains febrile.    PAST MEDICAL & SURGICAL HISTORY:  Hepatitis C      Asthma      Anxiety and depression      IV drug abuse  heroin      No significant past surgical history        Allergies    buprenorphine (Rash)  Suboxone (Rash)    Intolerances      FAMILY HISTORY:  No pertinent family history in first degree relatives      Home Medications:    Occupation:  [employer]: Denied  Smoking: Denied  Drug Use: Denied  Marital Status:         ROS: as in HPI; All other systems reviewed are negative    ICU Vital Signs Last 24 Hrs  T(C): 39.8 (09 Jul 2022 09:26), Max: 40 (08 Jul 2022 22:00)  T(F): 103.6 (09 Jul 2022 09:26), Max: 104 (08 Jul 2022 22:00)  HR: 87 (09 Jul 2022 09:26) (81 - 122)  BP: 132/90 (09 Jul 2022 09:26) (119/80 - 175/113)  BP(mean): 107 (09 Jul 2022 09:26) (96 - 139)  ABP: --  ABP(mean): --  RR: 26 (09 Jul 2022 09:26) (8 - 49)  SpO2: 100% (09 Jul 2022 09:26) (92% - 100%)    O2 Parameters below as of 09 Jul 2022 07:00  Patient On (Oxygen Delivery Method): ventilator              Physical Examination:    General: intubated/sedated    HEENT: Pupils equal, reactive to light.  Symmetric., ETT in place    PULM: Clear to auscultation bilaterally, no significant sputum production    CVS: Regular rate and rhythm, no murmurs, rubs, or gallops    ABD: Soft, nondistended, nontender, normoactive bowel sounds, no masses    EXT: No edema, nontender    SKIN: Warm and well perfused, no rashes noted.      Mode: AC/ CMV (Assist Control/ Continuous Mandatory Ventilation)  RR (machine): 25  TV (machine): 370  FiO2: 35  PEEP: 5  ITime: 1  MAP: 11  PIP: 23      ABG - ( 09 Jul 2022 04:20 )  pH, Arterial: 7.46  pH, Blood: x     /  pCO2: 33    /  pO2: 138   / HCO3: 24    / Base Excess: 0.1   /  SaO2: 99.1                I&O's Detail    08 Jul 2022 07:01  -  09 Jul 2022 07:00  --------------------------------------------------------  IN:    Dexmedetomidine: 48 mL    Dexmedetomidine: 602 mL    FentaNYL: 40 mL    IV PiggyBack: 750 mL    IV PiggyBack: 300 mL    Midazolam: 8 mL    Propofol: 20 mL    Propofol: 133 mL    Vital High Protein: 45 mL  Total IN: 1946 mL    OUT:    Indwelling Catheter - Urethral (mL): 1861 mL    Nasogastric/Oral tube (mL): 250 mL    Norepinephrine: 0 mL  Total OUT: 2111 mL    Total NET: -165 mL      09 Jul 2022 07:01  -  09 Jul 2022 10:15  --------------------------------------------------------  IN:  Total IN: 0 mL    OUT:    Indwelling Catheter - Urethral (mL): 95 mL  Total OUT: 95 mL    Total NET: -95 mL            LABS:                        9.4    2.51  )-----------( 123      ( 09 Jul 2022 06:32 )             31.0     09 Jul 2022 06:32    148    |  111    |  36     ----------------------------<  100    4.0     |  25     |  0.8      Ca    8.7        09 Jul 2022 06:32  Phos  4.0       09 Jul 2022 06:32  Mg     2.0       09 Jul 2022 06:32    TPro  5.8    /  Alb  2.5    /  TBili  0.5    /  DBili  x      /  AST  30     /  ALT  21     /  AlkPhos  510    09 Jul 2022 06:32  Amylase x     lipase x              CAPILLARY BLOOD GLUCOSE            Culture        MEDICATIONS  (STANDING):  cefiderocol IVPB 2000 milliGRAM(s) IV Intermittent every 6 hours  chlorhexidine 0.12% Liquid 15 milliLiter(s) Oral Mucosa every 12 hours  chlorhexidine 4% Liquid 1 Application(s) Topical daily  dexMEDEtomidine Infusion 0.2 MICROgram(s)/kG/Hr (3.52 mL/Hr) IV Continuous <Continuous>  folic acid 1 milliGRAM(s) Oral daily  heparin   Injectable 5000 Unit(s) SubCutaneous every 12 hours  nicotine -  14 mG/24Hr(s) Patch 1 patch Transdermal daily  norepinephrine Infusion 0.05 MICROgram(s)/kG/Min (8.03 mL/Hr) IV Continuous <Continuous>  pantoprazole  Injectable 40 milliGRAM(s) IV Push daily  propofol Infusion 0.1 MICROgram(s)/kG/Min (0.04 mL/Hr) IV Continuous <Continuous>  QUEtiapine 100 milliGRAM(s) Oral two times a day  scopolamine 1 mG/72 Hr(s) Patch 1 Patch Transdermal every 72 hours  spironolactone 100 milliGRAM(s) Oral daily  thiamine 100 milliGRAM(s) Oral daily  vancomycin    Solution 250 milliGRAM(s) Oral every 6 hours    MEDICATIONS  (PRN):  acetaminophen  Suppository .. 650 milliGRAM(s) Rectal every 6 hours PRN Temp greater or equal to 38C (100.4F), Mild Pain (1 - 3)  ALBUTerol    90 MICROgram(s) HFA Inhaler 1 Puff(s) Inhalation every 4 hours PRN Shortness of Breath and/or Wheezing  cloNIDine 0.1 milliGRAM(s) Oral every 6 hours PRN opiate withdrawal  hydrOXYzine hydrochloride 50 milliGRAM(s) Oral every 6 hours PRN Anxiety  ibuprofen  Tablet. 600 milliGRAM(s) Oral every 6 hours PRN Temp greater or equal to 38C (100.4F)  ibuprofen  Tablet. 400 milliGRAM(s) Oral every 6 hours PRN Mild Pain (1 - 3)  sodium chloride 0.9% lock flush 10 milliLiter(s) IV Push every 1 hour PRN Pre/post blood products, medications, blood draw, and to maintain line patency        RADIOLOGY: ***     CXR:  ETT in place, r effusion

## 2022-07-09 NOTE — PROGRESS NOTE ADULT - ASSESSMENT
IMPRESSION/PLAN:  sepsis  acute hypoxemic respiratory failure  C-diff  polysubstance abuse  ascites      pt reintubated for tachypnea and persistent fevers.  Pt on external cooling for temperature control.  ID following and abx adjusted.  Pt has greenish secretions from OGT.  Will obtain ct chest/abd/pelvis. Overall prognosis is guarded.      CNS: continue sedation for vent synchronization    HEENT: pulm toilet    PULMONARY: vent support    CARDIOVASCULAR: monitor BP    GI: GI prophylaxis.  Feeding as tolerated    RENAL: monitor UO    INFECTIOUS DISEASE: continue abx as per ID    HEMATOLOGICAL:  DVT prophylaxis.    ENDOCRINE:  Follow up FS.  Insulin protocol if needed.    MUSCULOSKELETAL: bed rest        CRITICAL CARE TIME SPENT: 35 minutes

## 2022-07-10 LAB
ALBUMIN SERPL ELPH-MCNC: 2.7 G/DL — LOW (ref 3.5–5.2)
ALP SERPL-CCNC: 435 U/L — HIGH (ref 30–115)
ALT FLD-CCNC: 49 U/L — HIGH (ref 0–41)
ANION GAP SERPL CALC-SCNC: 11 MMOL/L — SIGNIFICANT CHANGE UP (ref 7–14)
ANISOCYTOSIS BLD QL: SLIGHT — SIGNIFICANT CHANGE UP
AST SERPL-CCNC: 89 U/L — HIGH (ref 0–41)
BASOPHILS # BLD AUTO: 0.01 K/UL — SIGNIFICANT CHANGE UP (ref 0–0.2)
BASOPHILS NFR BLD AUTO: 0.4 % — SIGNIFICANT CHANGE UP (ref 0–1)
BILIRUB SERPL-MCNC: 0.7 MG/DL — SIGNIFICANT CHANGE UP (ref 0.2–1.2)
BUN SERPL-MCNC: 34 MG/DL — HIGH (ref 10–20)
CALCIUM SERPL-MCNC: 8.2 MG/DL — LOW (ref 8.5–10.1)
CHLORIDE SERPL-SCNC: 109 MMOL/L — SIGNIFICANT CHANGE UP (ref 98–110)
CO2 SERPL-SCNC: 24 MMOL/L — SIGNIFICANT CHANGE UP (ref 17–32)
CREAT SERPL-MCNC: 0.8 MG/DL — SIGNIFICANT CHANGE UP (ref 0.7–1.5)
DACRYOCYTES BLD QL SMEAR: SLIGHT — SIGNIFICANT CHANGE UP
EGFR: 103 ML/MIN/1.73M2 — SIGNIFICANT CHANGE UP
EOSINOPHIL # BLD AUTO: 0 K/UL — SIGNIFICANT CHANGE UP (ref 0–0.7)
EOSINOPHIL NFR BLD AUTO: 0 % — SIGNIFICANT CHANGE UP (ref 0–8)
FERRITIN SERPL-MCNC: 376 NG/ML — HIGH (ref 15–150)
GLUCOSE SERPL-MCNC: 116 MG/DL — HIGH (ref 70–99)
HCT VFR BLD CALC: 28.7 % — LOW (ref 37–47)
HGB BLD-MCNC: 8.8 G/DL — LOW (ref 12–16)
IMM GRANULOCYTES NFR BLD AUTO: 0.4 % — HIGH (ref 0.1–0.3)
LYMPHOCYTES # BLD AUTO: 0.62 K/UL — LOW (ref 1.2–3.4)
LYMPHOCYTES # BLD AUTO: 27 % — SIGNIFICANT CHANGE UP (ref 20.5–51.1)
MAGNESIUM SERPL-MCNC: 2 MG/DL — SIGNIFICANT CHANGE UP (ref 1.8–2.4)
MANUAL SMEAR VERIFICATION: SIGNIFICANT CHANGE UP
MCHC RBC-ENTMCNC: 27.1 PG — SIGNIFICANT CHANGE UP (ref 27–31)
MCHC RBC-ENTMCNC: 30.7 G/DL — LOW (ref 32–37)
MCV RBC AUTO: 88.3 FL — SIGNIFICANT CHANGE UP (ref 81–99)
MONOCYTES # BLD AUTO: 0.15 K/UL — SIGNIFICANT CHANGE UP (ref 0.1–0.6)
MONOCYTES NFR BLD AUTO: 6.5 % — SIGNIFICANT CHANGE UP (ref 1.7–9.3)
NEUTROPHILS # BLD AUTO: 1.51 K/UL — SIGNIFICANT CHANGE UP (ref 1.4–6.5)
NEUTROPHILS NFR BLD AUTO: 65.7 % — SIGNIFICANT CHANGE UP (ref 42.2–75.2)
NEUTS BAND # BLD: 1 % — SIGNIFICANT CHANGE UP (ref 0–6)
NRBC # BLD: 0 /100 WBCS — SIGNIFICANT CHANGE UP (ref 0–0)
NRBC # BLD: 0 /100 — SIGNIFICANT CHANGE UP (ref 0–0)
PHOSPHATE SERPL-MCNC: 4.1 MG/DL — SIGNIFICANT CHANGE UP (ref 2.1–4.9)
PLAT MORPH BLD: NORMAL — SIGNIFICANT CHANGE UP
PLATELET # BLD AUTO: 104 K/UL — LOW (ref 130–400)
PLATELET # BLD AUTO: 85 K/UL — LOW (ref 130–400)
PLATELET CLUMP BLD QL SMEAR: SIGNIFICANT CHANGE UP
PLATELET COUNT - ESTIMATE: ABNORMAL
POTASSIUM SERPL-MCNC: 3.4 MMOL/L — LOW (ref 3.5–5)
POTASSIUM SERPL-SCNC: 3.4 MMOL/L — LOW (ref 3.5–5)
PROCALCITONIN SERPL-MCNC: 0.12 NG/ML — HIGH (ref 0.02–0.1)
PROT SERPL-MCNC: 5.7 G/DL — LOW (ref 6–8)
RBC # BLD: 3.25 M/UL — LOW (ref 4.2–5.4)
RBC # FLD: 15.6 % — HIGH (ref 11.5–14.5)
RBC BLD AUTO: NORMAL — SIGNIFICANT CHANGE UP
SODIUM SERPL-SCNC: 144 MMOL/L — SIGNIFICANT CHANGE UP (ref 135–146)
WBC # BLD: 2.3 K/UL — LOW (ref 4.8–10.8)
WBC # FLD AUTO: 2.3 K/UL — LOW (ref 4.8–10.8)

## 2022-07-10 PROCEDURE — 99232 SBSQ HOSP IP/OBS MODERATE 35: CPT

## 2022-07-10 PROCEDURE — 71045 X-RAY EXAM CHEST 1 VIEW: CPT | Mod: 26

## 2022-07-10 PROCEDURE — 99233 SBSQ HOSP IP/OBS HIGH 50: CPT

## 2022-07-10 PROCEDURE — 93010 ELECTROCARDIOGRAM REPORT: CPT

## 2022-07-10 RX ORDER — FENTANYL CITRATE 50 UG/ML
50 INJECTION INTRAVENOUS ONCE
Refills: 0 | Status: DISCONTINUED | OUTPATIENT
Start: 2022-07-10 | End: 2022-07-10

## 2022-07-10 RX ORDER — FENTANYL CITRATE 50 UG/ML
0.5 INJECTION INTRAVENOUS
Qty: 2500 | Refills: 0 | Status: DISCONTINUED | OUTPATIENT
Start: 2022-07-10 | End: 2022-07-17

## 2022-07-10 RX ADMIN — CEFIDEROCOL SULFATE TOSYLATE 33.33 MILLIGRAM(S): 1 INJECTION, POWDER, FOR SOLUTION INTRAVENOUS at 00:21

## 2022-07-10 RX ADMIN — CHLORHEXIDINE GLUCONATE 15 MILLILITER(S): 213 SOLUTION TOPICAL at 06:26

## 2022-07-10 RX ADMIN — FENTANYL CITRATE 50 MICROGRAM(S): 50 INJECTION INTRAVENOUS at 11:43

## 2022-07-10 RX ADMIN — Medication 650 MILLIGRAM(S): at 09:57

## 2022-07-10 RX ADMIN — QUETIAPINE FUMARATE 100 MILLIGRAM(S): 200 TABLET, FILM COATED ORAL at 17:34

## 2022-07-10 RX ADMIN — PANTOPRAZOLE SODIUM 40 MILLIGRAM(S): 20 TABLET, DELAYED RELEASE ORAL at 12:53

## 2022-07-10 RX ADMIN — Medication 250 MILLIGRAM(S): at 17:34

## 2022-07-10 RX ADMIN — DEXMEDETOMIDINE HYDROCHLORIDE IN 0.9% SODIUM CHLORIDE 3.52 MICROGRAM(S)/KG/HR: 4 INJECTION INTRAVENOUS at 01:32

## 2022-07-10 RX ADMIN — PROPOFOL 0.04 MICROGRAM(S)/KG/MIN: 10 INJECTION, EMULSION INTRAVENOUS at 06:29

## 2022-07-10 RX ADMIN — CHLORHEXIDINE GLUCONATE 15 MILLILITER(S): 213 SOLUTION TOPICAL at 17:34

## 2022-07-10 RX ADMIN — Medication 1 PATCH: at 19:24

## 2022-07-10 RX ADMIN — CEFIDEROCOL SULFATE TOSYLATE 33.33 MILLIGRAM(S): 1 INJECTION, POWDER, FOR SOLUTION INTRAVENOUS at 05:30

## 2022-07-10 RX ADMIN — SCOPALAMINE 1 PATCH: 1 PATCH, EXTENDED RELEASE TRANSDERMAL at 19:25

## 2022-07-10 RX ADMIN — DEXMEDETOMIDINE HYDROCHLORIDE IN 0.9% SODIUM CHLORIDE 3.52 MICROGRAM(S)/KG/HR: 4 INJECTION INTRAVENOUS at 05:29

## 2022-07-10 RX ADMIN — DEXMEDETOMIDINE HYDROCHLORIDE IN 0.9% SODIUM CHLORIDE 3.52 MICROGRAM(S)/KG/HR: 4 INJECTION INTRAVENOUS at 19:46

## 2022-07-10 RX ADMIN — Medication 1 PATCH: at 17:44

## 2022-07-10 RX ADMIN — HEPARIN SODIUM 5000 UNIT(S): 5000 INJECTION INTRAVENOUS; SUBCUTANEOUS at 17:34

## 2022-07-10 RX ADMIN — CHLORHEXIDINE GLUCONATE 1 APPLICATION(S): 213 SOLUTION TOPICAL at 05:31

## 2022-07-10 RX ADMIN — PROPOFOL 0.04 MICROGRAM(S)/KG/MIN: 10 INJECTION, EMULSION INTRAVENOUS at 19:46

## 2022-07-10 RX ADMIN — Medication 250 MILLIGRAM(S): at 05:31

## 2022-07-10 RX ADMIN — Medication 250 MILLIGRAM(S): at 13:13

## 2022-07-10 RX ADMIN — PROPOFOL 0.04 MICROGRAM(S)/KG/MIN: 10 INJECTION, EMULSION INTRAVENOUS at 02:42

## 2022-07-10 RX ADMIN — CEFIDEROCOL SULFATE TOSYLATE 33.33 MILLIGRAM(S): 1 INJECTION, POWDER, FOR SOLUTION INTRAVENOUS at 12:53

## 2022-07-10 RX ADMIN — Medication 1 PATCH: at 12:54

## 2022-07-10 RX ADMIN — HEPARIN SODIUM 5000 UNIT(S): 5000 INJECTION INTRAVENOUS; SUBCUTANEOUS at 05:30

## 2022-07-10 RX ADMIN — FENTANYL CITRATE 50 MICROGRAM(S): 50 INJECTION INTRAVENOUS at 13:04

## 2022-07-10 RX ADMIN — CEFIDEROCOL SULFATE TOSYLATE 33.33 MILLIGRAM(S): 1 INJECTION, POWDER, FOR SOLUTION INTRAVENOUS at 19:14

## 2022-07-10 NOTE — PROGRESS NOTE ADULT - SUBJECTIVE AND OBJECTIVE BOX
CINDYON, POOJA  26y, Female  Allergy: buprenorphine (Rash)  Suboxone (Rash)      LOS  25d    CHIEF COMPLAINT: anasarca (09 Jul 2022 23:19)      INTERVAL EVENTS/HPI  - T(F): , Max: 104 (07-09-22 @ 13:26), still febrile  - WBC Count: 2.30 (07-10-22 @ 05:42)  WBC Count: 2.25 (07-09-22 @ 18:49)     - Creatinine, Serum: 0.8 (07-10-22 @ 05:42)  Creatinine, Serum: 0.8 (07-09-22 @ 06:32)     -   -   -     ROS  cannot obtain secondary to patient's sedation and/or mental status    VITALS:  T(F): 101.8, Max: 104 (07-09-22 @ 13:26)  HR: 74  BP: 133/91  RR: 33Vital Signs Last 24 Hrs  T(C): 38.8 (10 Jul 2022 07:01), Max: 40 (09 Jul 2022 13:26)  T(F): 101.8 (10 Jul 2022 07:01), Max: 104 (09 Jul 2022 13:26)  HR: 74 (10 Jul 2022 05:26) (74 - 87)  BP: 133/91 (10 Jul 2022 07:01) (122/87 - 133/94)  BP(mean): 98 (10 Jul 2022 05:26) (98 - 109)  RR: 33 (10 Jul 2022 05:26) (27 - 35)  SpO2: 100% (10 Jul 2022 05:26) (100% - 100%)    Parameters below as of 10 Jul 2022 05:26  Patient On (Oxygen Delivery Method): ventilator    O2 Concentration (%): 35    PHYSICAL EXAM:  Gen: Intubated  CV: RRR  Lungs: Decreased BS at bases  Abd: Soft  Neuro: Sedated  Lines clean, no phlebitis    FH: Non-contributory  Social Hx: Non-contributory    TESTS & MEASUREMENTS:                        8.8    2.30  )-----------( 85       ( 10 Jul 2022 05:42 )             28.7     07-10    144  |  109  |  34<H>  ----------------------------<  116<H>  3.4<L>   |  24  |  0.8    Ca    8.2<L>      10 Jul 2022 05:42  Phos  4.1     07-10  Mg     2.0     07-10    TPro  5.7<L>  /  Alb  2.7<L>  /  TBili  0.7  /  DBili  x   /  AST  89<H>  /  ALT  49<H>  /  AlkPhos  435<H>  07-10      LIVER FUNCTIONS - ( 10 Jul 2022 05:42 )  Alb: 2.7 g/dL / Pro: 5.7 g/dL / ALK PHOS: 435 U/L / ALT: 49 U/L / AST: 89 U/L / GGT: x           Urinalysis Basic - ( 09 Jul 2022 19:25 )    Color: Orange / Appearance: Cloudy / SG: >=1.030 / pH: x  Gluc: x / Ketone: Trace  / Bili: Small / Urobili: 0.2 mg/dL   Blood: x / Protein: >=300 mg/dL / Nitrite: Negative   Leuk Esterase: Negative / RBC: >50 /HPF / WBC 1-2 /HPF   Sq Epi: x / Non Sq Epi: Few /HPF / Bacteria: Few        Culture - Blood (collected 07-08-22 @ 05:26)  Source: .Blood None  Preliminary Report (07-09-22 @ 19:02):    No growth to date.    Culture - Blood (collected 07-06-22 @ 07:20)  Source: .Blood None  Preliminary Report (07-07-22 @ 19:01):    No growth to date.    Culture - Blood (collected 07-06-22 @ 07:20)  Source: .Blood None  Preliminary Report (07-07-22 @ 19:01):    No growth to date.    Culture - Sputum (collected 07-04-22 @ 13:39)  Source: ET Tube ET Tube  Gram Stain (07-05-22 @ 08:48):    Few polymorphonuclear leukocytes per low power field    No Squamous epithelial cells per low power field    Numerous Gram Negative Coccobacilli seen per oil power field  Final Report (07-07-22 @ 10:52):    Moderate Acinetobacter baumannii/nosocom group (Carbapenem Resistant)    Normal Respiratory Kilo absent  Organism: Acinetobacter baumannii/nosocom group (Carbapenem Resistant)  Acinetobacter baumannii/nosocom group (Carbapenem Resistant) (07-07-22 @ 10:52)  Organism: Acinetobacter baumannii/nosocom group (Carbapenem Resistant) (07-07-22 @ 10:52)      -  Imipenem: R      -  Piperacillin/Tazobactam: R      Method Type:   Organism: Acinetobacter baumannii/nosocom group (Carbapenem Resistant) (07-07-22 @ 10:52)      -  Amikacin: R >32      -  Ampicillin/Sulbactam: R >16/8      -  Cefepime: R >16      -  Ceftazidime: R >16      -  Ciprofloxacin: R >2      -  Gentamicin: R >8      -  Levofloxacin: R >4      -  Meropenem: R >8      -  Tobramycin: R >8      -  Trimethoprim/Sulfamethoxazole: R >2/38      Method Type: AL    Culture - Blood (collected 06-29-22 @ 20:31)  Source: .Blood Blood-Peripheral  Final Report (07-05-22 @ 20:00):    No Growth Final    Culture - Blood (collected 06-29-22 @ 20:31)  Source: .Blood Blood  Final Report (07-05-22 @ 20:00):    No Growth Final    Culture - Blood (collected 06-28-22 @ 06:00)  Source: .Blood Blood  Final Report (07-03-22 @ 23:00):    No Growth Final    Culture - Sputum (collected 06-27-22 @ 14:00)  Source: Trach Asp Tracheal Aspirate  Gram Stain (06-28-22 @ 03:15):    Moderate polymorphonuclear leukocytes per low power field    Few Squamous epithelial cells per low power field    Moderate Gram positive cocci in pairs seen per oil power field    Few Gram Variable Rods seen per oil power field  Final Report (06-30-22 @ 16:33):    Numerous Mixed gram negative rods    Moderate Staphylococcus aureus    Normal Respiratory Kilo present  Organism: Staphylococcus aureus (06-30-22 @ 16:33)  Organism: Staphylococcus aureus (06-30-22 @ 16:33)      -  Ampicillin/Sulbactam: S <=8/4      -  Cefazolin: S <=4      -  Clindamycin: S <=0.25      -  Erythromycin: S <=0.25      -  Gentamicin: S <=1 Should not be used as monotherapy      -  Oxacillin: S <=0.25 Oxacillin predicts susceptibility for dicloxacillin, methicillin, and nafcillin      -  Rifampin: S <=1 Should not be used as monotherapy      -  Tetra/Doxy: S <=1      -  Trimethoprim/Sulfamethoxazole: S <=0.5/9.5      -  Vancomycin: S 2      Method Type: AL    Culture - Sputum (collected 06-24-22 @ 17:20)  Source: Trach Asp Tracheal Aspirate  Gram Stain (06-25-22 @ 06:29):    Few polymorphonuclear leukocytes per low power field    Rare Squamous epithelial cells per low power field    Moderate Gram Negative Rods seen per oil power field  Final Report (06-26-22 @ 17:00):    Moderate Klebsiella oxytoca/Raoutella ornithinolytica    Moderate Enterobacter cloacae complex    Normal Respiratory Kilo absent  Organism: Klebsiella oxytoca /Raoutella ornithinolytica  Enterobacter cloacae complex (06-26-22 @ 17:00)  Organism: Enterobacter cloacae complex (06-26-22 @ 17:00)      -  Amikacin: S <=16      -  Amoxicillin/Clavulanic Acid: R >16/8      -  Ampicillin: R >16 These ampicillin results predict results for amoxicillin      -  Ampicillin/Sulbactam: R >16/8 Enterobacter, Klebsiella aerogenes, Citrobacter, and Serratia may develop resistance during prolonged therapy (3-4 days)      -  Aztreonam: S <=4      -  Cefazolin: R >16 Enterobacter, Klebsiella aerogenes, Citrobacter, and Serratia may develop resistance during prolonged therapy (3-4 days)      -  Cefepime: S <=2      -  Cefoxitin: R >16      -  Ceftriaxone: S <=1 Enterobacter, Klebsiella aerogenes, Citrobacter, and Serratia may develop resistance during prolonged therapy      -  Ciprofloxacin: S <=0.25      -  Ertapenem: S <=0.5      -  Gentamicin: S <=2      -  Imipenem: S <=1      -  Levofloxacin: S <=0.5      -  Meropenem: S <=1      -  Piperacillin/Tazobactam: S <=8      -  Tobramycin: S <=2      -  Trimethoprim/Sulfamethoxazole: S <=0.5/9.5      Method Type: AL  Organism: Klebsiella oxytoca /Raoutella ornithinolytica (06-26-22 @ 17:00)      -  Amikacin: S <=16      -  Amoxicillin/Clavulanic Acid: S <=8/4      -  Ampicillin: R 16 These ampicillin results predict results for amoxicillin      -  Ampicillin/Sulbactam: S <=4/2 Enterobacter, Klebsiella aerogenes, Citrobacter, and Serratia may develop resistance during prolonged therapy (3-4 days)      -  Aztreonam: S <=4      -  Cefazolin: S <=2 Enterobacter, Klebsiella aerogenes, Citrobacter, and Serratia may develop resistance during prolonged therapy (3-4 days)      -  Cefepime: S <=2      -  Cefoxitin: S <=8      -  Ceftriaxone: S <=1 Enterobacter, Klebsiella aerogenes, Citrobacter, and Serratia may develop resistance during prolonged therapy      -  Ciprofloxacin: S <=0.25      -  Ertapenem: S <=0.5      -  Gentamicin: S <=2      -  Imipenem: S <=1      -  Levofloxacin: S <=0.5      -  Meropenem: S <=1      -  Piperacillin/Tazobactam: S <=8      -  Tobramycin: S <=2      -  Trimethoprim/Sulfamethoxazole: S <=0.5/9.5      Method Type: AL    Culture - Body Fluid with Gram Stain (collected 06-22-22 @ 01:00)  Source: Peritoneal Peritoneal Fluid  Gram Stain (06-23-22 @ 02:31):    No polymorphonuclear leukocytes seen    No organisms seen    by cytocentrifuge  Final Report (06-27-22 @ 19:13):    No growth at 5 days    Culture - Blood (collected 06-21-22 @ 05:33)  Source: .Blood None  Final Report (06-26-22 @ 19:00):    No Growth Final    Culture - Sputum (collected 06-17-22 @ 14:30)  Source: Trach Asp Tracheal Aspirate  Gram Stain (06-18-22 @ 10:18):    Rare polymorphonuclear leukocytes per low power field    No Squamous epithelial cells per low power field    Numerous Gram Positive Cocci in Pairs and Chains seen per oil power field    Rare Gram Negative Rods seen per oil power field    Rare Gram PositiveRods seen per oil power field  Final Report (06-19-22 @ 16:39):    Normal Respiratory Kilo present    Culture - Fungal, Body Fluid (collected 06-17-22 @ 12:24)  Source: .Body Fluid Peritoneal Fluid  Preliminary Report (06-25-22 @ 15:02):    No growth    Culture - Body Fluid with Gram Stain (collected 06-17-22 @ 12:24)  Source: .Body Fluid Peritoneal Fluid  Gram Stain (06-18-22 @ 03:19):    No polymorphonuclear leukocytes seen    No organisms seen    by cytocentrifuge  Final Report (06-22-22 @ 20:34):    No growth at 5 days    Culture - Urine (collected 06-15-22 @ 08:00)  Source: Clean Catch Clean Catch (Midstream)  Final Report (06-17-22 @ 22:14):    Normal Urogenital kilo present            INFECTIOUS DISEASES TESTING  Fungitell: <31 (07-05-22 @ 11:49)  Procalcitonin, Serum: 0.14 (07-05-22 @ 11:23)  MRSA PCR Result.: Negative (06-30-22 @ 10:34)  Fungitell: See Comment** **RESULTS OF FUNGITELL INCONCLUSIVE DUE TO THE PRESENCE OF UNKNOWN  INTERFERING SUBSTANCE. PLEASE SUBMIT ANOTHER SPECIMEN FOR TESTING.  PLEASE CONTACT [employer] WITH QUESTIONS.  Interpretation: The Fungitell assay does not detectcertain fungal  species such as the genus Cryptococcus (Aundrea et al. 1991) which  produces very low levels of (1-3)-Beta-D-Glucan. The assay also does  not detect the Zygomycetes such as Absidia, Mucor and Rhizopus  (Carol et al. 1994) which are not known to produce  (1-3)-Beta-D-Glucan. In addition, the yeast phase of Blastomyces  dermatitidis produces little (1-3)-Beta-D-Glucan and may not be  detected by the assay (Anila et al. 2007).  Reference Range:  Less than 60 pg/mL. Glucan values of less than 60 pg/mL are  interpreted as negative.  Glucan values of 60 to 79 pg/mL are interpreted as indeterminate,  and suggest a possible fungal infection. Additional sampling and  testing of sera is required to interpret the results.  Glucan values of greater than or equal to 80 pg/mL are interpreted  as positive.  Due to the potential for environmental contamination when  transferred to pour-off tubes, which can lead to false positive  results, interpret positive results from samples provided in  pour-off tubes with caution.  Results should be used in conjunction  with clinical findings, and should not form the sole basis for a  diagnosis or treatment decision. The Fungitell test is approved or  cleared for in vitro diagnostic use by the U.[place] Food and Drug  Administration. Modifications to the approved package insert have  been made and the performance characteristics for these  modifications were determined by [facility].  If sample result is greater than 500 pg/mL, physician may order a  titer of the sample. Please contact [employer] if you would  like to order a retest of this sample to obtain an actual value.  Samples are held for 1 week after initial testing date.  ____________________________________________________________  Performed at:  [facility]  [address] [place] [address]  : Kirk Newberry Ph.D., BCLD (ABB)  CLIA#: [id number]  Phone: [phone] (06-30-22 @ 10:30)  Procalcitonin, Serum: 0.36 (06-30-22 @ 10:30)  Rapid RVP Result: NotDetec (06-30-22 @ 09:19)  HIV-1/2 Combo Result: Nonreact (06-29-22 @ 10:29)  Procalcitonin, Serum: 0.11 (06-27-22 @ 15:46)  Procalcitonin, Serum: 0.11 (06-27-22 @ 06:47)  Procalcitonin, Serum: 0.62 (06-20-22 @ 05:49)  MRSA PCR Result.: Negative (06-17-22 @ 14:30)  Procalcitonin, Serum: 3.28 (06-17-22 @ 05:22)  COVID-19 PCR: NotDetec (06-15-22 @ 07:10)  Procalcitonin, Serum: 0.07 (04-20-22 @ 12:46)      INFLAMMATORY MARKERS  Sedimentation Rate, Erythrocyte: 86 mm/Hr (06-30-22 @ 05:46)  Sedimentation Rate, Erythrocyte: 65 mm/Hr (06-23-22 @ 16:00)      RADIOLOGY & ADDITIONAL TESTS:  I have personally reviewed the last available Chest xray  CXR  Xray Chest 1 View AP/PA:   ACC: [id number] EXAM:  XR CHEST 1 VIEW                          PROCEDURE DATE:  07/08/2022          INTERPRETATION:  CLINICAL HISTORY / REASON FOR EXAM: Tachypnea.    COMPARISON: Chest radiograph from July 8, 2022 at 12:56 AM.    TECHNIQUE/POSITIONING: Satisfactory. Single image, AP portable chest   radiograph.    FINDINGS:    SUPPORT DEVICES: Right IJ central venous catheter with distal tip   overlying the SVC.    CARDIAC/MEDIASTINUM/HILUM: Unchanged cardiac silhouette.    LUNG PARENCHYMA/PLEURA: Bilateral lower lobe opacities. No pneumothorax.    SKELETON/SOFT TISSUES: Unchanged.      IMPRESSION:    Interval removal of endotracheal and enteric tubes.    Unchanged bilateral lower lobe opacities.    --- End of Report ---            HUSSEIN SCOTT MD; Attending Radiologist  This document has been electronically signed. Jul 9 2022  6:05PM (07-08-22 @ 15:49)      CT      CARDIOLOGY TESTING  12 Lead ECG:   Ventricular Rate 78 BPM    Atrial Rate 78 BPM    P-R Interval 156 ms    QRS Duration 68 ms    Q-T Interval 410 ms    QTC Calculation(Bazett) 467 ms    P Axis 80 degrees    R Axis 112 degrees    T Axis 185 degrees    Diagnosis Line Normal sinus rhythm  Right axis deviation  Possible Right ventricular hypertrophy  ST & T wave abnormality, consider inferior ischemia  Prolonged QT  Abnormal ECG    Confirmed by EDWAR MONTERO MD ([id number]) on 7/10/2022 9:24:41 AM (07-10-22 @ 08:49)  12 Lead ECG:   Ventricular Rate 86 BPM    Atrial Rate 86 BPM    P-R Interval 168 ms    QRS Duration 64 ms    Q-T Interval 378 ms    QTC Calculation(Bazett) 452 ms    P Axis 74 degrees    R Axis 111 degrees    T Axis 213 degrees    Diagnosis Line Sinus rhythm withFusion complexes  Right axis deviation  Possible Right ventricular hypertrophy  ST & T wave abnormality, consider inferior ischemia  Abnormal ECG    Confirmed by EDWAR MONTERO MD ([id number]) on 7/9/2022 4:32:06 PM (07-09-22 @ 11:03)      MEDICATIONS  cefiderocol IVPB 2000  chlorhexidine 0.12% Liquid 15  chlorhexidine 4% Liquid 1  dexMEDEtomidine Infusion 0.2  folic acid 1  heparin   Injectable 5000  nicotine -  14 mG/24Hr(s) Patch 1  norepinephrine Infusion 0.05  pantoprazole  Injectable 40  propofol Infusion 0.1  QUEtiapine 100  scopolamine 1 mG/72 Hr(s) Patch 1  spironolactone 100  thiamine 100  vancomycin    Solution 250      WEIGHT  Weight (kg): 70.4 (07-07-22 @ 07:00)  Creatinine, Serum: 0.8 mg/dL (07-10-22 @ 05:42)      ANTIBIOTICS:  cefiderocol IVPB 2000 milliGRAM(s) IV Intermittent every 6 hours  vancomycin    Solution 250 milliGRAM(s) Oral every 6 hours      All available historical records have been reviewed

## 2022-07-10 NOTE — PROGRESS NOTE ADULT - ASSESSMENT
IMPRESSION/PLAN:  sepsis  acute hypoxemic respiratory failure  C-diff  polysubstance abuse  ascites      pt reintubated for tachypnea and persistent fevers.  Pt on external cooling for temperature control.  ID following and abx adjusted.  Pt has greenish secretions from OGT.  Will obtain ct chest/abd/pelvis. Overall prognosis is guarded.      CNS: continue sedation for vent synchronization    HEENT: pulm toilet    PULMONARY: vent support    CARDIOVASCULAR: monitor BP    GI: GI prophylaxis.  Feeding as tolerated    RENAL: monitor UO    INFECTIOUS DISEASE: continue abx as per ID    HEMATOLOGICAL:  DVT prophylaxis.    ENDOCRINE:  Follow up FS.  Insulin protocol if needed.    MUSCULOSKELETAL: bed rest

## 2022-07-10 NOTE — PROGRESS NOTE ADULT - SUBJECTIVE AND OBJECTIVE BOX
Patient is a 26y old  Female who presents with a chief complaint of anasarca (10 Jul 2022 09:31)      T(F): 102.9 (07-10-22 @ 11:00), Max: 104 (07-09-22 @ 13:26)  HR: 80 (07-10-22 @ 10:27)  BP: 140/95 (07-10-22 @ 11:00)  RR: 32 (07-10-22 @ 10:27)  SpO2: 100% (07-10-22 @ 10:27) (100% - 100%)    PHYSICAL EXAM:  GENERAL: NAD  HEAD:  Atraumatic, Normocephalic  EYES: EOMI, PERRLA, conjunctiva and sclera clear  NERVOUS SYSTEM:  Alert & Oriented X3, no focal deficits   CHEST/LUNG: Clear to percussion bilaterally; No rales, rhonchi, wheezing, or rubs  HEART: Regular rate and rhythm; No murmurs, rubs, or gallops  ABDOMEN: Soft, Nontender, Nondistended; Bowel sounds present  EXTREMITIES:  2+ Peripheral Pulses, No clubbing, cyanosis, or edema    LABS  07-10    144  |  109  |  34<H>  ----------------------------<  116<H>  3.4<L>   |  24  |  0.8    Ca    8.2<L>      10 Jul 2022 05:42  Phos  4.1     07-10  Mg     2.0     07-10    TPro  5.7<L>  /  Alb  2.7<L>  /  TBili  0.7  /  DBili  x   /  AST  89<H>  /  ALT  49<H>  /  AlkPhos  435<H>  07-10                          8.8    2.30  )-----------( 85       ( 10 Jul 2022 05:42 )             28.7       Mode: AC/ CMV (Assist Control/ Continuous Mandatory Ventilation)  RR (machine): 25  TV (machine): 370  FiO2: 35  PEEP: 5    Culture Results:   No growth to date. (07-08-22)  Culture Results:   No growth to date. (07-06-22)  Culture Results:   No growth to date. (07-06-22)  Culture Results:   Moderate Acinetobacter baumannii/nosocom group (Carbapenem Resistant)  Normal Respiratory Kelly absent (07-04-22)  Culture Results:   No Growth Final (06-29-22)  Culture Results:   No Growth Final (06-29-22)  Culture Results:   No Growth Final (06-28-22)  Culture Results:   Numerous Mixed gram negative rods  Moderate Staphylococcus aureus  Normal Respiratory Kelly present (06-27-22)  Culture Results:   Moderate Klebsiella oxytoca/Raoutella ornithinolytica  Moderate Enterobacter cloacae complex  Normal Respiratory Kelly absent (06-24-22)  Culture Results:   No growth at 5 days (06-22-22)    RADIOLOGY  < from: Xray Chest 1 View- PORTABLE-Routine (Xray Chest 1 View- PORTABLE-Routine in AM.) (07.10.22 @ 05:50) >  IMPRESSION:    Unchanged bilateral opacities.    < end of copied text >    MEDICATIONS  (STANDING):  cefiderocol IVPB 2000 milliGRAM(s) IV Intermittent every 6 hours  chlorhexidine 0.12% Liquid 15 milliLiter(s) Oral Mucosa every 12 hours  chlorhexidine 4% Liquid 1 Application(s) Topical daily  dexMEDEtomidine Infusion 0.2 MICROgram(s)/kG/Hr (3.52 mL/Hr) IV Continuous <Continuous>  fentaNYL   Infusion. 0.5 MICROgram(s)/kG/Hr (3.52 mL/Hr) IV Continuous <Continuous>  folic acid 1 milliGRAM(s) Oral daily  heparin   Injectable 5000 Unit(s) SubCutaneous every 12 hours  nicotine -  14 mG/24Hr(s) Patch 1 patch Transdermal daily  norepinephrine Infusion 0.05 MICROgram(s)/kG/Min (8.03 mL/Hr) IV Continuous <Continuous>  pantoprazole  Injectable 40 milliGRAM(s) IV Push daily  propofol Infusion 0.1 MICROgram(s)/kG/Min (0.04 mL/Hr) IV Continuous <Continuous>  QUEtiapine 100 milliGRAM(s) Oral two times a day  scopolamine 1 mG/72 Hr(s) Patch 1 Patch Transdermal every 72 hours  spironolactone 100 milliGRAM(s) Oral daily  thiamine 100 milliGRAM(s) Oral daily  vancomycin    Solution 250 milliGRAM(s) Oral every 6 hours    MEDICATIONS  (PRN):  acetaminophen  Suppository .. 650 milliGRAM(s) Rectal every 6 hours PRN Temp greater or equal to 38C (100.4F), Mild Pain (1 - 3)  ALBUTerol    90 MICROgram(s) HFA Inhaler 1 Puff(s) Inhalation every 4 hours PRN Shortness of Breath and/or Wheezing  cloNIDine 0.1 milliGRAM(s) Oral every 6 hours PRN opiate withdrawal  hydrOXYzine hydrochloride 50 milliGRAM(s) Oral every 6 hours PRN Anxiety  ibuprofen  Tablet. 600 milliGRAM(s) Oral every 6 hours PRN Temp greater or equal to 38C (100.4F)  ibuprofen  Tablet. 400 milliGRAM(s) Oral every 6 hours PRN Mild Pain (1 - 3)  sodium chloride 0.9% lock flush 10 milliLiter(s) IV Push every 1 hour PRN Pre/post blood products, medications, blood draw, and to maintain line patency

## 2022-07-10 NOTE — PROGRESS NOTE ADULT - ASSESSMENT
26 year old female with hx of asthma, untreated Hep C, IVDA, anasarca was admitted to medical floor  with increased SOB and anasarca  Pt admits to using opiates 2 grams per day IV into her thighs  x years with minimal sobriety. Pt has been on [facility]MTP in 2020. Pt is contemplating going back on program. Pt denies any other substance abuse. Pt states used a xanax for wd about 3 days ago.      Hepatitis C with Cirrhosis no compliant with treatment  Hx of withdrawal   variable periods of sobriety in the past    on floor pt was diuresed with improvement of symptoms  when the following events took place  "RRT was called for pt in the [facility]    patient was found on the floor in ER, gasping for air, short of breath, tachypnea, bleeding profusely from her posterior scalp, was placed in a stretcher, was hypoxic 70s    emergently intubated, ct scan head ordered re scalp bleeding    several syringes, drug paraphanellia found on patient clothes     pt  intubated and  transferred to ICU       I strongly suspect that she injected illicit drug ( abuses IV heroin) and she went into resp failure secondary to heroin overdose."      Acute respiratory failure with hypoxemia / aspiration pneumonia with sepsis / suspected drug overdose / head laceration s/p fall in lobby / possible seizure / anasarca  pancytopenia with continued fever     - now with c-diff colitis - continue oral vanco   - w/u and antibiotics as per ID   - Keppra weaned and DC'd as per neur   - central line changed    - supplement hypokalemia and check mg level and maintain above 2   - re intubated -> surgical consult for trach

## 2022-07-10 NOTE — PROGRESS NOTE ADULT - ASSESSMENT
Patient is a 26 year old female with hx of asthma, untreated Hep C, IVDA, anasarca presenting with increased dyspnea since yesterday    IMPRESSION  #Persistent Fevers + Pancytopenia + Splenomegaly - pancytopenia since at least 4/2022  - 7/8 BCX NGTD   - Ferritin, Serum: 94 ng/mL (06.27.22 @ 06:47),  Procalcitonin, Serum: 0.11 (06.27.22 @ 15:46)  - CT Abd/pelvis 6/26 - moderated ascites moderate pericardial effusion   - Hepatosplenomegaly - present since CT Abd/pelvis 1/30/2021  - weak IgG Lamda band  - Bartontella NEGATIVE   - Fungitell: <31 (07-05-22 @ 11:49)  - HIV-1/2 Combo Result: Nonreact (06-29-22 @ 10:29)  - Anti Nuclear Factor Titer: Negative: Antinuclear AB (KRISHNA), IFA Method (06.30.22 @ 05:46)    #Acute respiratory failure s/p intubation 6/16, extubated 7/7   #VAP  - Xray Chest 1 View- PORTABLE-Routine (Xray Chest 1 View- PORTABLE-Routine in AM.) (07.03.22 @ 06:10): Increasing right basilar opacity.  - sputum Cx 7/4 MDR Acinetobacter baumanii CRE  # C.diff infection - 7/5/22  #Pneumonia -   - CT Chest 6/22 - left greater than right lower lobe consolidations   - sputum Cx 6/24 Klebsiella oxytoca, Enterobacter cloacae complex- The Sputum culture from 6/27 grew Numerous Mixed gram negative rods and Moderate Staphylococcus aureus.  - treated with course of cefepime     #Isolated elevated Alk Ph   Gamma Glutamyl Transferase, Serum: 506 U/L (07.03.22 @ 06:59)    #Drug overdose vs withdrawal?  #HCV, VL UD  HIV-1/2 Combo Result: Nonreact: (06.29.22 @ 10:29)  Hepatitis C RNA, Qualitative: NotDetec (06.23.22 @ 16:00)  #IVDA   #Abx allergy: buprenorphine (Rash), Suboxone (Rash)    Recommendations  - persistent high fevers and pancytopenia- since at least 4/2022- for completeness send parasite smear and babesia PCR (doubt given chronicity of findings), EBV serologies, CMV IgM/IgG, HIV Viral Load, repeat ferritin  - Recommend bone marrow biopsy  - continue PO vancomycin 250 mg q 6 hours   - Cefiderocol 2g q 6 hours (non-formulary) 7/8-    - Please call [facility] [phone] ext1 to add on sensitivities for cefiderocol to the acinetobacter  - if hemodynamic compromise, ADD tigecycline 100mg then 50mg q12h IV for acinetobacter coverage- though does not appear to be driving infectious picture  - trend fever curve  - trend Alk Ph/GGT and CBC  - aspiration precautions     Please call or message on [employer] Teams if with any questions.

## 2022-07-10 NOTE — PROGRESS NOTE ADULT - SUBJECTIVE AND OBJECTIVE BOX
· Subjective and Objective:    Patient is a 26y old  Female who presents with a chief complaint of anasarca (10 Jul 2022 09:31)      T(F): 102.9 (07-10-22 @ 11:00), Max: 104 (07-09-22 @ 13:26)  HR: 80 (07-10-22 @ 10:27)  BP: 140/95 (07-10-22 @ 11:00)  RR: 32 (07-10-22 @ 10:27)  SpO2: 100% (07-10-22 @ 10:27) (100% - 100%)    PHYSICAL EXAM:  GENERAL: NAD  HEAD:  Atraumatic, Normocephalic  EYES: EOMI, PERRLA, conjunctiva and sclera clear  NERVOUS SYSTEM:  Alert & Oriented X3, no focal deficits   CHEST/LUNG: Clear to percussion bilaterally; No rales, rhonchi, wheezing, or rubs  HEART: Regular rate and rhythm; No murmurs, rubs, or gallops  ABDOMEN: Soft, Nontender, Nondistended; Bowel sounds present  EXTREMITIES:  2+ Peripheral Pulses, No clubbing, cyanosis, or edema    LABS  07-10    144  |  109  |  34<H>  ----------------------------<  116<H>  3.4<L>   |  24  |  0.8    Ca    8.2<L>      10 Jul 2022 05:42  Phos  4.1     07-10  Mg     2.0     07-10    TPro  5.7<L>  /  Alb  2.7<L>  /  TBili  0.7  /  DBili  x   /  AST  89<H>  /  ALT  49<H>  /  AlkPhos  435<H>  07-10                          8.8    2.30  )-----------( 85       ( 10 Jul 2022 05:42 )             28.7       Mode: AC/ CMV (Assist Control/ Continuous Mandatory Ventilation)  RR (machine): 25  TV (machine): 370  FiO2: 35  PEEP: 5    Culture Results:   No growth to date. (07-08-22)  Culture Results:   No growth to date. (07-06-22)  Culture Results:   No growth to date. (07-06-22)  Culture Results:   Moderate Acinetobacter baumannii/nosocom group (Carbapenem Resistant)  Normal Respiratory Kelly absent (07-04-22)  Culture Results:   No Growth Final (06-29-22)  Culture Results:   No Growth Final (06-29-22)  Culture Results:   No Growth Final (06-28-22)  Culture Results:   Numerous Mixed gram negative rods  Moderate Staphylococcus aureus  Normal Respiratory Kelly present (06-27-22)  Culture Results:   Moderate Klebsiella oxytoca/Raoutella ornithinolytica  Moderate Enterobacter cloacae complex  Normal Respiratory Kelly absent (06-24-22)  Culture Results:   No growth at 5 days (06-22-22)    RADIOLOGY  < from: Xray Chest 1 View- PORTABLE-Routine (Xray Chest 1 View- PORTABLE-Routine in AM.) (07.10.22 @ 05:50) >  IMPRESSION:    Unchanged bilateral opacities.    < end of copied text >    MEDICATIONS  (STANDING):  cefiderocol IVPB 2000 milliGRAM(s) IV Intermittent every 6 hours  chlorhexidine 0.12% Liquid 15 milliLiter(s) Oral Mucosa every 12 hours  chlorhexidine 4% Liquid 1 Application(s) Topical daily  dexMEDEtomidine Infusion 0.2 MICROgram(s)/kG/Hr (3.52 mL/Hr) IV Continuous <Continuous>  fentaNYL   Infusion. 0.5 MICROgram(s)/kG/Hr (3.52 mL/Hr) IV Continuous <Continuous>  folic acid 1 milliGRAM(s) Oral daily  heparin   Injectable 5000 Unit(s) SubCutaneous every 12 hours  nicotine -  14 mG/24Hr(s) Patch 1 patch Transdermal daily  norepinephrine Infusion 0.05 MICROgram(s)/kG/Min (8.03 mL/Hr) IV Continuous <Continuous>  pantoprazole  Injectable 40 milliGRAM(s) IV Push daily  propofol Infusion 0.1 MICROgram(s)/kG/Min (0.04 mL/Hr) IV Continuous <Continuous>  QUEtiapine 100 milliGRAM(s) Oral two times a day  scopolamine 1 mG/72 Hr(s) Patch 1 Patch Transdermal every 72 hours  spironolactone 100 milliGRAM(s) Oral daily  thiamine 100 milliGRAM(s) Oral daily  vancomycin    Solution 250 milliGRAM(s) Oral every 6 hours    MEDICATIONS  (PRN):  acetaminophen  Suppository .. 650 milliGRAM(s) Rectal every 6 hours PRN Temp greater or equal to 38C (100.4F), Mild Pain (1 - 3)  ALBUTerol    90 MICROgram(s) HFA Inhaler 1 Puff(s) Inhalation every 4 hours PRN Shortness of Breath and/or Wheezing  cloNIDine 0.1 milliGRAM(s) Oral every 6 hours PRN opiate withdrawal  hydrOXYzine hydrochloride 50 milliGRAM(s) Oral every 6 hours PRN Anxiety  ibuprofen  Tablet. 600 milliGRAM(s) Oral every 6 hours PRN Temp greater or equal to 38C (100.4F)  ibuprofen  Tablet. 400 milliGRAM(s) Oral every 6 hours PRN Mild Pain (1 - 3)  sodium chloride 0.9% lock flush 10 milliLiter(s) IV Push every 1 hour PRN Pre/post blood products, medications, blood draw, and to maintain line patency

## 2022-07-11 LAB
-  CEFIDEROCOL: SIGNIFICANT CHANGE UP
ALBUMIN SERPL ELPH-MCNC: 2.8 G/DL — LOW (ref 3.5–5.2)
ALP SERPL-CCNC: 373 U/L — HIGH (ref 30–115)
ALT FLD-CCNC: 58 U/L — HIGH (ref 0–41)
ANION GAP SERPL CALC-SCNC: 12 MMOL/L — SIGNIFICANT CHANGE UP (ref 7–14)
AST SERPL-CCNC: 89 U/L — HIGH (ref 0–41)
BASOPHILS # BLD AUTO: 0.01 K/UL — SIGNIFICANT CHANGE UP (ref 0–0.2)
BASOPHILS NFR BLD AUTO: 0.4 % — SIGNIFICANT CHANGE UP (ref 0–1)
BILIRUB SERPL-MCNC: 0.6 MG/DL — SIGNIFICANT CHANGE UP (ref 0.2–1.2)
BUN SERPL-MCNC: 32 MG/DL — HIGH (ref 10–20)
CALCIUM SERPL-MCNC: 8.1 MG/DL — LOW (ref 8.5–10.1)
CHLORIDE SERPL-SCNC: 110 MMOL/L — SIGNIFICANT CHANGE UP (ref 98–110)
CMV IGG FLD QL: 8.3 U/ML — HIGH
CMV IGG SERPL-IMP: POSITIVE
CMV IGM FLD-ACNC: 15.6 AU/ML — SIGNIFICANT CHANGE UP
CMV IGM SERPL QL: NEGATIVE — SIGNIFICANT CHANGE UP
CO2 SERPL-SCNC: 22 MMOL/L — SIGNIFICANT CHANGE UP (ref 17–32)
CREAT SERPL-MCNC: 0.7 MG/DL — SIGNIFICANT CHANGE UP (ref 0.7–1.5)
CULTURE RESULTS: SIGNIFICANT CHANGE UP
EBV EA AB SER IA-ACNC: <5 U/ML — SIGNIFICANT CHANGE UP
EBV EA AB TITR SER IF: NEGATIVE — SIGNIFICANT CHANGE UP
EBV EA IGG SER-ACNC: NEGATIVE — SIGNIFICANT CHANGE UP
EBV NA IGG SER IA-ACNC: <3 U/ML — SIGNIFICANT CHANGE UP
EBV PATRN SPEC IB-IMP: SIGNIFICANT CHANGE UP
EBV VCA IGG AVIDITY SER QL IA: POSITIVE
EBV VCA IGM SER IA-ACNC: 20.1 U/ML — SIGNIFICANT CHANGE UP
EBV VCA IGM SER IA-ACNC: 408 U/ML — HIGH
EBV VCA IGM TITR FLD: NEGATIVE — SIGNIFICANT CHANGE UP
EGFR: 121 ML/MIN/1.73M2 — SIGNIFICANT CHANGE UP
EOSINOPHIL # BLD AUTO: 0 K/UL — SIGNIFICANT CHANGE UP (ref 0–0.7)
EOSINOPHIL NFR BLD AUTO: 0 % — SIGNIFICANT CHANGE UP (ref 0–8)
GGT SERPL-CCNC: 403 U/L — HIGH (ref 1–40)
GLUCOSE SERPL-MCNC: 96 MG/DL — SIGNIFICANT CHANGE UP (ref 70–99)
HCT VFR BLD CALC: 27.5 % — LOW (ref 37–47)
HGB BLD-MCNC: 8.5 G/DL — LOW (ref 12–16)
IMM GRANULOCYTES NFR BLD AUTO: 0.4 % — HIGH (ref 0.1–0.3)
LYMPHOCYTES # BLD AUTO: 0.36 K/UL — LOW (ref 1.2–3.4)
LYMPHOCYTES # BLD AUTO: 15.2 % — LOW (ref 20.5–51.1)
MAGNESIUM SERPL-MCNC: 1.9 MG/DL — SIGNIFICANT CHANGE UP (ref 1.8–2.4)
MCHC RBC-ENTMCNC: 27.5 PG — SIGNIFICANT CHANGE UP (ref 27–31)
MCHC RBC-ENTMCNC: 30.9 G/DL — LOW (ref 32–37)
MCV RBC AUTO: 89 FL — SIGNIFICANT CHANGE UP (ref 81–99)
MONOCYTES # BLD AUTO: 0.15 K/UL — SIGNIFICANT CHANGE UP (ref 0.1–0.6)
MONOCYTES NFR BLD AUTO: 6.3 % — SIGNIFICANT CHANGE UP (ref 1.7–9.3)
NEUTROPHILS # BLD AUTO: 1.84 K/UL — SIGNIFICANT CHANGE UP (ref 1.4–6.5)
NEUTROPHILS NFR BLD AUTO: 77.7 % — HIGH (ref 42.2–75.2)
NRBC # BLD: 0 /100 WBCS — SIGNIFICANT CHANGE UP (ref 0–0)
ORGANISM # SPEC MICROSCOPIC CNT: SIGNIFICANT CHANGE UP
PARASITE BLOOD SMEAR RESULT: SIGNIFICANT CHANGE UP
PHOSPHATE SERPL-MCNC: 3.6 MG/DL — SIGNIFICANT CHANGE UP (ref 2.1–4.9)
PLATELET # BLD AUTO: 58 K/UL — LOW (ref 130–400)
POTASSIUM SERPL-MCNC: 2.8 MMOL/L — LOW (ref 3.5–5)
POTASSIUM SERPL-SCNC: 2.8 MMOL/L — LOW (ref 3.5–5)
PROT SERPL-MCNC: 5.6 G/DL — LOW (ref 6–8)
RBC # BLD: 3.09 M/UL — LOW (ref 4.2–5.4)
RBC # FLD: 15.3 % — HIGH (ref 11.5–14.5)
SODIUM SERPL-SCNC: 144 MMOL/L — SIGNIFICANT CHANGE UP (ref 135–146)
SPECIMEN SOURCE: SIGNIFICANT CHANGE UP
WBC # BLD: 2.37 K/UL — LOW (ref 4.8–10.8)
WBC # FLD AUTO: 2.37 K/UL — LOW (ref 4.8–10.8)

## 2022-07-11 PROCEDURE — 71045 X-RAY EXAM CHEST 1 VIEW: CPT | Mod: 26

## 2022-07-11 PROCEDURE — 99233 SBSQ HOSP IP/OBS HIGH 50: CPT

## 2022-07-11 PROCEDURE — 93010 ELECTROCARDIOGRAM REPORT: CPT

## 2022-07-11 PROCEDURE — 99291 CRITICAL CARE FIRST HOUR: CPT

## 2022-07-11 RX ORDER — POTASSIUM CHLORIDE 20 MEQ
20 PACKET (EA) ORAL
Refills: 0 | Status: COMPLETED | OUTPATIENT
Start: 2022-07-11 | End: 2022-07-11

## 2022-07-11 RX ADMIN — CEFIDEROCOL SULFATE TOSYLATE 33.33 MILLIGRAM(S): 1 INJECTION, POWDER, FOR SOLUTION INTRAVENOUS at 00:11

## 2022-07-11 RX ADMIN — SPIRONOLACTONE 100 MILLIGRAM(S): 25 TABLET, FILM COATED ORAL at 05:27

## 2022-07-11 RX ADMIN — Medication 1 PATCH: at 20:34

## 2022-07-11 RX ADMIN — CHLORHEXIDINE GLUCONATE 15 MILLILITER(S): 213 SOLUTION TOPICAL at 05:27

## 2022-07-11 RX ADMIN — Medication 250 MILLIGRAM(S): at 00:11

## 2022-07-11 RX ADMIN — DEXMEDETOMIDINE HYDROCHLORIDE IN 0.9% SODIUM CHLORIDE 3.52 MICROGRAM(S)/KG/HR: 4 INJECTION INTRAVENOUS at 01:31

## 2022-07-11 RX ADMIN — DEXMEDETOMIDINE HYDROCHLORIDE IN 0.9% SODIUM CHLORIDE 3.52 MICROGRAM(S)/KG/HR: 4 INJECTION INTRAVENOUS at 17:41

## 2022-07-11 RX ADMIN — Medication 250 MILLIGRAM(S): at 17:41

## 2022-07-11 RX ADMIN — Medication 1 MILLIGRAM(S): at 12:04

## 2022-07-11 RX ADMIN — PROPOFOL 0.04 MICROGRAM(S)/KG/MIN: 10 INJECTION, EMULSION INTRAVENOUS at 20:32

## 2022-07-11 RX ADMIN — SCOPALAMINE 1 PATCH: 1 PATCH, EXTENDED RELEASE TRANSDERMAL at 11:01

## 2022-07-11 RX ADMIN — DEXMEDETOMIDINE HYDROCHLORIDE IN 0.9% SODIUM CHLORIDE 3.52 MICROGRAM(S)/KG/HR: 4 INJECTION INTRAVENOUS at 13:48

## 2022-07-11 RX ADMIN — QUETIAPINE FUMARATE 100 MILLIGRAM(S): 200 TABLET, FILM COATED ORAL at 05:27

## 2022-07-11 RX ADMIN — Medication 50 MILLIEQUIVALENT(S): at 20:41

## 2022-07-11 RX ADMIN — Medication 50 MILLIEQUIVALENT(S): at 17:43

## 2022-07-11 RX ADMIN — DEXMEDETOMIDINE HYDROCHLORIDE IN 0.9% SODIUM CHLORIDE 3.52 MICROGRAM(S)/KG/HR: 4 INJECTION INTRAVENOUS at 20:32

## 2022-07-11 RX ADMIN — CEFIDEROCOL SULFATE TOSYLATE 33.33 MILLIGRAM(S): 1 INJECTION, POWDER, FOR SOLUTION INTRAVENOUS at 21:22

## 2022-07-11 RX ADMIN — Medication 1 PATCH: at 12:05

## 2022-07-11 RX ADMIN — PROPOFOL 0.04 MICROGRAM(S)/KG/MIN: 10 INJECTION, EMULSION INTRAVENOUS at 07:45

## 2022-07-11 RX ADMIN — DEXMEDETOMIDINE HYDROCHLORIDE IN 0.9% SODIUM CHLORIDE 3.52 MICROGRAM(S)/KG/HR: 4 INJECTION INTRAVENOUS at 07:41

## 2022-07-11 RX ADMIN — HEPARIN SODIUM 5000 UNIT(S): 5000 INJECTION INTRAVENOUS; SUBCUTANEOUS at 05:28

## 2022-07-11 RX ADMIN — SCOPALAMINE 1 PATCH: 1 PATCH, EXTENDED RELEASE TRANSDERMAL at 20:34

## 2022-07-11 RX ADMIN — PROPOFOL 0.04 MICROGRAM(S)/KG/MIN: 10 INJECTION, EMULSION INTRAVENOUS at 12:29

## 2022-07-11 RX ADMIN — DEXMEDETOMIDINE HYDROCHLORIDE IN 0.9% SODIUM CHLORIDE 3.52 MICROGRAM(S)/KG/HR: 4 INJECTION INTRAVENOUS at 12:30

## 2022-07-11 RX ADMIN — PANTOPRAZOLE SODIUM 40 MILLIGRAM(S): 20 TABLET, DELAYED RELEASE ORAL at 12:04

## 2022-07-11 RX ADMIN — HEPARIN SODIUM 5000 UNIT(S): 5000 INJECTION INTRAVENOUS; SUBCUTANEOUS at 17:43

## 2022-07-11 RX ADMIN — Medication 1 PATCH: at 11:00

## 2022-07-11 RX ADMIN — QUETIAPINE FUMARATE 100 MILLIGRAM(S): 200 TABLET, FILM COATED ORAL at 17:42

## 2022-07-11 RX ADMIN — CEFIDEROCOL SULFATE TOSYLATE 33.33 MILLIGRAM(S): 1 INJECTION, POWDER, FOR SOLUTION INTRAVENOUS at 12:03

## 2022-07-11 RX ADMIN — Medication 100 MILLIGRAM(S): at 12:04

## 2022-07-11 RX ADMIN — Medication 250 MILLIGRAM(S): at 12:04

## 2022-07-11 RX ADMIN — PROPOFOL 0.04 MICROGRAM(S)/KG/MIN: 10 INJECTION, EMULSION INTRAVENOUS at 04:14

## 2022-07-11 RX ADMIN — CEFIDEROCOL SULFATE TOSYLATE 33.33 MILLIGRAM(S): 1 INJECTION, POWDER, FOR SOLUTION INTRAVENOUS at 05:26

## 2022-07-11 RX ADMIN — DEXMEDETOMIDINE HYDROCHLORIDE IN 0.9% SODIUM CHLORIDE 3.52 MICROGRAM(S)/KG/HR: 4 INJECTION INTRAVENOUS at 07:45

## 2022-07-11 RX ADMIN — Medication 1 PATCH: at 12:07

## 2022-07-11 RX ADMIN — Medication 50 MILLIEQUIVALENT(S): at 22:48

## 2022-07-11 RX ADMIN — CHLORHEXIDINE GLUCONATE 1 APPLICATION(S): 213 SOLUTION TOPICAL at 05:31

## 2022-07-11 RX ADMIN — CHLORHEXIDINE GLUCONATE 15 MILLILITER(S): 213 SOLUTION TOPICAL at 17:41

## 2022-07-11 RX ADMIN — DEXMEDETOMIDINE HYDROCHLORIDE IN 0.9% SODIUM CHLORIDE 3.52 MICROGRAM(S)/KG/HR: 4 INJECTION INTRAVENOUS at 06:00

## 2022-07-11 RX ADMIN — Medication 250 MILLIGRAM(S): at 05:26

## 2022-07-11 NOTE — CONSULT NOTE ADULT - ASSESSMENT
Patient is a 26 year old female with hx of asthma, untreated Hep C, IVDA, anasarca presenting with increased dyspnea since yesterday. Patient has been short of breath chronically and has been admitted for fluid overload. Patient was intubated 6/16/22 after episode of seizure and unresponsiveness. Recently extubated 7/8, reintubated 7/9 after increased WOB and tachypnea. General surgery consulted for tracheostomy after failed SBT today.    Plan    Wean vent settings, currently 35/5  Monitor for pressor support  Was febrile this morning to 100.6F  Will discuss with Dr. Moyer  Will need within 72 hours of OR: COVID, Type and Screen, Coags    [id number]

## 2022-07-11 NOTE — PROGRESS NOTE ADULT - ASSESSMENT
IMPRESSION:  Acute respiratory failure sp intubation  PNA  MSSA pna  seizure like activity  ?? drug over dose/ withdrawal opoid   liver cirrhosis   Ascites sp paracentesis   Pancytopenia- worsening  RENETTA improved  C diff +ve     PLAN:    CNS: do SAT/SBT as tolerated  CW methadone 30mg daily  CW Clonapin to 2mg TID  CW Seroquel 100mg BID  SAT    precedex   HEENT: oral care     PULMONARY:  HOB 45,  Vent changes: wean O2 as tolerated, proceed with SBT if failed recall G sx for trach   if passed then placed NG tube to continue her meds     CARDIOVASCULAR: goal MAP >65.  Monitor QTc daily.     keep is = os  Pericarditis management per cardio. discussed   do ekg follow QtC  GI: GI prophylaxis.  OG Feeding.    KUB reviewed    RENAL: monitor lytes is and os     INFECTIOUS DISEASE:follow Id recommendation   continue abx   heme for BMB    HEMATOLOGICAL:  recall hematology for BMB  monitor CBC, Heme FU.   reticulocyte count  dvt ppx  check d-dimer  reviewed , HIT antibody neg resume heparin .     ENDOCRINE:  Follow up FS.  Insulin protocol if needed.     MICU  TLC 7/6 HARSH Salmeron - failed TOV 6/24 Change.

## 2022-07-11 NOTE — PROGRESS NOTE ADULT - ASSESSMENT
Acute respiratory failure sp intubation and extubation 7/8 followed by reintubation  PNA CT Chest 6/22 - left greater than right lower lobe consolidations; sputum Cx 6/24 Klebsiella oxytoca, Enterobacter cloacae complex- and Acinetobacter buamnii  MSSA pna  seizure like activity  ?? 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 Diff on oral vancomycin   Hypokalemia/hypomagnesemia      PLAN:    CNS:   c/w with sedation (hold methadone dose)  CW Clonapin to 2mg TID  CW Seroquel 100mg BID     HEENT: oral care     PULMONARY:  HOB 45,  wean O2 as tolerated,     CARDIOVASCULAR: goal MAP >65.  Monitor QTc daily  ECHO 7/6: Small to mod generalized effusion. No tamponade . Small mod effusion   present by report on echo 4/21/22  f/u cardiology recommendations     GI: GI prophylaxis.  OG Feeding.   KUB 5/5: nonobstructive gas pattern  hold laxatives given excessive BM    RENAL: monitor lytes  f/u repeat BMP and replete K and mag as needed     INFECTIOUS DISEASE:   worsening R infiltrate. Fungitell <31 7/5 , Glactomannan pending   Repeat procal 0.14 from 0.36 (6/30)   Acinetobacter buamnii in sputum cx -> switch celestine to cefiderocol 2gm q 6 (nonformulary filled and sent to pharmacy)  -f/u heme onc for BM biopsy for splenomegaly, pancytopenia, thrombocytopenia and persistent fevers   - continue PO vancomycin 250 mg q 6 hours   - f/u parasite smear and babesia PCR (doubt given chronicity of findings), EBV serologies, CMV IgM/IgG, HIV Viral Load, repeat ferritin  -f/u sensitivities for cefiderocol to the acinetobacter  - if hemodynamic compromise, ADD tigecycline 100mg then 50mg q12h IV for acinetobacter coverage  - f/u ID recommendations     HEMATOLOGICAL:    monitor CBC  D-dimer 1322, HIT antibody negative  Restarted DVT ppx (heparin sc)  -f/u heme onc for BM biopsy for splenomegaly, pancytopenia, thrombocytopenia and persistent fevers     ENDOCRINE:  Follow up FS.  Insulin protocol if needed.     MICU  lines: right IJ 7/6   Salmeron - 7/5   Acute respiratory failure sp intubation and extubation 7/8 followed by reintubation  PNA CT Chest 6/22 - left greater than right lower lobe consolidations; sputum Cx 6/24 Klebsiella oxytoca, Enterobacter cloacae complex- and Acinetobacter buamnii  MSSA pna  seizure like activity  ?? 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 Diff on oral vancomycin   Hypokalemia/hypomagnesemia      PLAN:    CNS:    (hold methadone dose)  CW Clonapin to 2mg TID  CW Seroquel 100mg BID  SBT      HEENT: oral care     PULMONARY:  HOB 45,  wean O2 as tolerated,   f/u surgery for trach eval     CARDIOVASCULAR: goal MAP >65.  Monitor QTc daily  ECHO 7/6: Small to mod generalized effusion. No tamponade . Small mod effusion   present by report on echo 4/21/22  f/u cardiology recommendations; no urgency in diagnostic tap       GI: GI prophylaxis.  OG Feeding.   laxative prn   f/u repeat kub     RENAL: monitor lytes  f/u repeat BMP and replete K and mag as needed     INFECTIOUS DISEASE:   worsening R infiltrate. Fungitell <31 7/5 , Glactomannan pending   Repeat procal 0.14 from 0.36 (6/30)   Acinetobacter buamnii in sputum cx -> switch celestine to cefiderocol 2gm q 6 (nonformulary filled and sent to pharmacy)  -f/u heme onc for BM biopsy for splenomegaly, pancytopenia, thrombocytopenia and persistent fevers -> heme/onc requesting IR eval for BM biopsy  - IR consult placed: f/u recommendations   - continue PO vancomycin 250 mg q 6 hours   - f/u parasite smear and babesia PCR (doubt given chronicity of findings), EBV serologies, CMV IgM/IgG, HIV Viral Load, repeat ferritin  -f/u sensitivities for cefiderocol to the acinetobacter  - if hemodynamic compromise, ADD tigecycline 100mg then 50mg q12h IV for acinetobacter coverage  - f/u ID recommendations     HEMATOLOGICAL:    monitor CBC  D-dimer 1322, HIT antibody negative  hold heparin for now   -f/u heme onc for BM biopsy for splenomegaly, pancytopenia, thrombocytopenia and persistent fevers > heme/onc requesting IR eval for BM biopsy  - IR consult placed: f/u recommendations     ENDOCRINE:  Follow up FS.  Insulin protocol if needed.     MICU  lines: right IJ 7/6   Salmeron - 7/5

## 2022-07-11 NOTE — PROGRESS NOTE ADULT - SUBJECTIVE AND OBJECTIVE BOX
Patient is a 26y old  Female who presents with a chief complaint of anasarca (11 Jul 2022 07:50)      INTERVAL HPI/OVERNIGHT EVENTS:   Patient extubated on 7/8 however reintubated later that night due to persistent tachypnea on bipap  currently intubated and sedated  tmax 101.7 with external cooling blanket  will consult surgery today for trach eval     ICU Vital Signs Last 24 Hrs  T(C): 37.5 (11 Jul 2022 07:00), Max: 39.4 (10 Jul 2022 11:00)  T(F): 99.5 (11 Jul 2022 07:00), Max: 102.9 (10 Jul 2022 11:00)  HR: 73 (11 Jul 2022 08:26) (61 - 80)  BP: 151/90 (11 Jul 2022 08:26) (132/76 - 153/90)  BP(mean): 116 (11 Jul 2022 08:26) (97 - 120)  ABP: --  ABP(mean): --  RR: 36 (11 Jul 2022 08:26) (26 - 36)  SpO2: 100% (11 Jul 2022 08:26) (95% - 100%)    O2 Parameters below as of 11 Jul 2022 08:01  Patient On (Oxygen Delivery Method): ventilator    O2 Concentration (%): 35      I&O's Summary    10 Jul 2022 07:01  -  11 Jul 2022 07:00  --------------------------------------------------------  IN: 1384 mL / OUT: 2070 mL / NET: -686 mL    11 Jul 2022 07:01  -  11 Jul 2022 10:07  --------------------------------------------------------  IN: 128.6 mL / OUT: 145 mL / NET: -16.4 mL      Mode: AC/ CMV (Assist Control/ Continuous Mandatory Ventilation)  RR (machine): 25  TV (machine): 370  FiO2: 35  PEEP: 5  ITime: 1  MAP: 10  PIP: 24      LABS:                        8.5    2.37  )-----------( 58       ( 11 Jul 2022 05:58 )             27.5     07-11    144  |  110  |  32<H>  ----------------------------<  96  2.8<L>   |  22  |  0.7    Ca    8.1<L>      11 Jul 2022 05:58  Phos  3.6     07-11  Mg     1.9     07-11    TPro  5.6<L>  /  Alb  2.8<L>  /  TBili  0.6  /  DBili  x   /  AST  89<H>  /  ALT  58<H>  /  AlkPhos  373<H>  07-11      Urinalysis Basic - ( 09 Jul 2022 19:25 )    Color: Orange / Appearance: Cloudy / SG: >=1.030 / pH: x  Gluc: x / Ketone: Trace  / Bili: Small / Urobili: 0.2 mg/dL   Blood: x / Protein: >=300 mg/dL / Nitrite: Negative   Leuk Esterase: Negative / RBC: >50 /HPF / WBC 1-2 /HPF   Sq Epi: x / Non Sq Epi: Few /HPF / Bacteria: Few      CAPILLARY BLOOD GLUCOSE        ABG - ( 11 Jul 2022 04:43 )  pH, Arterial: 7.40  pH, Blood: x     /  pCO2: 35    /  pO2: 123   / HCO3: 22    / Base Excess: -2.5  /  SaO2: 99.7                RADIOLOGY & ADDITIONAL TESTS:    Consultant(s) Notes Reviewed:  [x ] YES  [ ] NO    MEDICATIONS  (STANDING):  cefiderocol IVPB 2000 milliGRAM(s) IV Intermittent every 6 hours  chlorhexidine 0.12% Liquid 15 milliLiter(s) Oral Mucosa every 12 hours  chlorhexidine 4% Liquid 1 Application(s) Topical daily  dexMEDEtomidine Infusion 0.2 MICROgram(s)/kG/Hr (3.52 mL/Hr) IV Continuous <Continuous>  fentaNYL   Infusion. 0.5 MICROgram(s)/kG/Hr (3.52 mL/Hr) IV Continuous <Continuous>  folic acid 1 milliGRAM(s) Oral daily  heparin   Injectable 5000 Unit(s) SubCutaneous every 12 hours  nicotine -  14 mG/24Hr(s) Patch 1 patch Transdermal daily  norepinephrine Infusion 0.05 MICROgram(s)/kG/Min (8.03 mL/Hr) IV Continuous <Continuous>  pantoprazole  Injectable 40 milliGRAM(s) IV Push daily  propofol Infusion 0.1 MICROgram(s)/kG/Min (0.04 mL/Hr) IV Continuous <Continuous>  QUEtiapine 100 milliGRAM(s) Oral two times a day  scopolamine 1 mG/72 Hr(s) Patch 1 Patch Transdermal every 72 hours  spironolactone 100 milliGRAM(s) Oral daily  thiamine 100 milliGRAM(s) Oral daily  vancomycin    Solution 250 milliGRAM(s) Oral every 6 hours    MEDICATIONS  (PRN):  acetaminophen  Suppository .. 650 milliGRAM(s) Rectal every 6 hours PRN Temp greater or equal to 38C (100.4F), Mild Pain (1 - 3)  ALBUTerol    90 MICROgram(s) HFA Inhaler 1 Puff(s) Inhalation every 4 hours PRN Shortness of Breath and/or Wheezing  cloNIDine 0.1 milliGRAM(s) Oral every 6 hours PRN opiate withdrawal  hydrOXYzine hydrochloride 50 milliGRAM(s) Oral every 6 hours PRN Anxiety  ibuprofen  Tablet. 600 milliGRAM(s) Oral every 6 hours PRN Temp greater or equal to 38C (100.4F)  ibuprofen  Tablet. 400 milliGRAM(s) Oral every 6 hours PRN Mild Pain (1 - 3)  sodium chloride 0.9% lock flush 10 milliLiter(s) IV Push every 1 hour PRN Pre/post blood products, medications, blood draw, and to maintain line patency    PHYSICAL EXAM:  GENERAL: intbuated, sedated   HEENT:  NCAT, PERRL, No JVD, Trachea Midline, +ETT, +OGT  NERVOUS SYSTEM:  Sedated to RASS 0 to -1 opens eyes to my voice and tracks me   CHEST/LUNG: Good air entry bilaterally  HEART: Regular rate and rhythm  ABDOMEN: Soft, Nontender, + distension   EXTREMITIES:  Warm, well perfused  SKIN: No new skin breakdowns      Care Discussed with Consultants/Other Providers [ x] YES  [ ] NO

## 2022-07-11 NOTE — CONSULT NOTE ADULT - SUBJECTIVE AND OBJECTIVE BOX
GENERAL SURGERY CONSULT NOTE    Patient: POOJA DIANE , 26y ([birth year])Female   MRN: [id number]  Location: [address]- [address]  Visit: 06-15-22 Inpatient  Date: 07-11-22 @ 14:55    HPI:  Patient is a 26 year old female with hx of asthma, untreated Hep C, IVDA, anasarca presenting with increased dyspnea since yesterday. Patient has been short of breath chronically and has been admitted for fluid overload. Patient describes worsening symptoms yesterday associated with cough. States generalized edema has remained the same. Patient is actively using heroin. Not on any meds or MMTP.  Denies fever, chills, sore throat, cp, palpitations, abd pain, n/v/d, dysuria, headache, syncope, hemoptysis. + generalized edema (15 Reinaldo 2022 10:03)    Patient was intubated 6/16/22 after episode of seizure and unresponsiveness. Recently extubated 7/8, reintubated 7/9 after increased WOB and tachypnea. General surgery consulted for tracheostomy after failed SBT today.    PAST MEDICAL & SURGICAL HISTORY:  Hepatitis C      Asthma      Anxiety and depression      IV drug abuse  heroin      No significant past surgical history          Home Medications:        VITALS:  T(F): 99.5 (07-11-22 @ 07:00), Max: 101.7 (07-10-22 @ 15:00)  HR: 63 (07-11-22 @ 13:26) (61 - 76)  BP: 144/90 (07-11-22 @ 13:26) (132/76 - 153/90)  RR: 29 (07-11-22 @ 13:26) (26 - 48)  SpO2: 100% (07-11-22 @ 13:26) (95% - 100%)    PHYSICAL EXAM:  General: Intubated, sedated  HEENT: NCAT, EOMI, Trachea ML, Neck supple  Cardiac: RRR S1, S2  Respiratory: CTAB  Abdomen: Soft, non-distended    MEDICATIONS  (STANDING):  cefiderocol IVPB 2000 milliGRAM(s) IV Intermittent every 6 hours  chlorhexidine 0.12% Liquid 15 milliLiter(s) Oral Mucosa every 12 hours  chlorhexidine 4% Liquid 1 Application(s) Topical daily  dexMEDEtomidine Infusion 0.2 MICROgram(s)/kG/Hr (3.52 mL/Hr) IV Continuous <Continuous>  fentaNYL   Infusion. 0.5 MICROgram(s)/kG/Hr (3.52 mL/Hr) IV Continuous <Continuous>  folic acid 1 milliGRAM(s) Oral daily  heparin   Injectable 5000 Unit(s) SubCutaneous every 12 hours  nicotine -  14 mG/24Hr(s) Patch 1 patch Transdermal daily  norepinephrine Infusion 0.05 MICROgram(s)/kG/Min (8.03 mL/Hr) IV Continuous <Continuous>  pantoprazole  Injectable 40 milliGRAM(s) IV Push daily  propofol Infusion 0.1 MICROgram(s)/kG/Min (0.04 mL/Hr) IV Continuous <Continuous>  QUEtiapine 100 milliGRAM(s) Oral two times a day  scopolamine 1 mG/72 Hr(s) Patch 1 Patch Transdermal every 72 hours  spironolactone 100 milliGRAM(s) Oral daily  thiamine 100 milliGRAM(s) Oral daily  vancomycin    Solution 250 milliGRAM(s) Oral every 6 hours    MEDICATIONS  (PRN):  acetaminophen  Suppository .. 650 milliGRAM(s) Rectal every 6 hours PRN Temp greater or equal to 38C (100.4F), Mild Pain (1 - 3)  ALBUTerol    90 MICROgram(s) HFA Inhaler 1 Puff(s) Inhalation every 4 hours PRN Shortness of Breath and/or Wheezing  cloNIDine 0.1 milliGRAM(s) Oral every 6 hours PRN opiate withdrawal  hydrOXYzine hydrochloride 50 milliGRAM(s) Oral every 6 hours PRN Anxiety  ibuprofen  Tablet. 600 milliGRAM(s) Oral every 6 hours PRN Temp greater or equal to 38C (100.4F)  ibuprofen  Tablet. 400 milliGRAM(s) Oral every 6 hours PRN Mild Pain (1 - 3)  sodium chloride 0.9% lock flush 10 milliLiter(s) IV Push every 1 hour PRN Pre/post blood products, medications, blood draw, and to maintain line patency      LAB/STUDIES:                        8.5    2.37  )-----------( 58       ( 11 Jul 2022 05:58 )             27.5     07-11    144  |  110  |  32<H>  ----------------------------<  96  2.8<L>   |  22  |  0.7    Ca    8.1<L>      11 Jul 2022 05:58  Phos  3.6     07-11  Mg     1.9     07-11    TPro  5.6<L>  /  Alb  2.8<L>  /  TBili  0.6  /  DBili  x   /  AST  89<H>  /  ALT  58<H>  /  AlkPhos  373<H>  07-11      LIVER FUNCTIONS - ( 11 Jul 2022 05:58 )  Alb: 2.8 g/dL / Pro: 5.6 g/dL / ALK PHOS: 373 U/L / ALT: 58 U/L / AST: 89 U/L / GGT: 403 U/L       Urinalysis Basic - ( 09 Jul 2022 19:25 )    Color: Orange / Appearance: Cloudy / SG: >=1.030 / pH: x  Gluc: x / Ketone: Trace  / Bili: Small / Urobili: 0.2 mg/dL   Blood: x / Protein: >=300 mg/dL / Nitrite: Negative   Leuk Esterase: Negative / RBC: >50 /HPF / WBC 1-2 /HPF   Sq Epi: x / Non Sq Epi: Few /HPF / Bacteria: Few              ABG - ( 11 Jul 2022 04:43 )  pH, Arterial: 7.40  pH, Blood: x     /  pCO2: 35    /  pO2: 123   / HCO3: 22    / Base Excess: -2.5  /  SaO2: 99.7                Babesia microti PCR, Blood. (collected 10 Jul 2022 15:43)  Source: .Blood None  Final Report (11 Jul 2022 09:45):    Babesia microti PCR    Results: NOT detected    ***************Result Note*************    The detection of Babesia microti by PCR has only been    validated for whole blood; this test has not been approved    by the [place] Food and Drug Administration (FDA). Performance    characteristics of this assay have been determined by    [facility]. The clinical significance    of results should be considered in conjunction with the    overall clinical presentation of the patient. Result is not    intended to be used as the sole means for clinical diagnosis    or patient management decisions.    One negative sample does not necessarily rule    out the presence of a parasitic infection.

## 2022-07-11 NOTE — PROGRESS NOTE ADULT - SUBJECTIVE AND OBJECTIVE BOX
Patient is seen and examined at the bed side, is afebrile now.     REVIEW OF SYSTEMS: Unable to obtain due to mental status      ALLERGIES: buprenorphine (Rash)  Suboxone (Rash)      ICU Vital Signs Last 24 Hrs  T(C): 37.7 (2022 19:17), Max: 38.1 (2022 03:00)  T(F): 99.9 (2022 19:17), Max: 100.6 (2022 03:00)  HR: 59 (:17) (59 - 76)  BP: 141/83 (:17) (132/76 - 153/90)  BP(mean): 105 (:17) (97 - 121)  ABP: --  ABP(mean): --  RR: 20 (:17) (20 - 48)  SpO2: 100% (:17) (95% - 100%)    O2 Parameters below as of 2022 15:26  Patient On (Oxygen Delivery Method): ventilator    O2 Concentration (%): 35        PHYSICAL EXAM:  GENERAL: Intubated/vented  CHEST/LUNG: Not using accessory muscles   HEART: s1 and s2 present  ABDOMEN:  Nontender and  Nondistended  EXTREMITIES: No pedal  edema  CNS:  Intubated/vented      LABS:                        8.5    2.37  )-----------( 58       ( 2022 05:58 )             27.5                           8.8    1.54  )-----------( 133      ( 2022 17:05 )             29.0                 11    144  |  110  |  32<H>  ----------------------------<  96  2.8<L>   |  22  |  0.7    Ca    8.1<L>      2022 05:58  Phos  3.6       Mg     1.9         TPro  5.6<L>  /  Alb  2.8<L>  /  TBili  0.6  /  DBili  x   /  AST  89<H>  /  ALT  58<H>  /  AlkPhos  373<H>         07-06    144  |  109  |  32<H>  ----------------------------<  91  3.2<L>   |  27  |  0.7    Ca    8.8      2022 07:20  Phos  3.4     07-  Mg     1.9     07-06    TPro  5.4<L>  /  Alb  2.5<L>  /  TBili  0.3  /  DBili  x   /  AST  37  /  ALT  17  /  AlkPhos  522<H>  07-        ABG - ( 2022 03:14 )  pH, Arterial: 7.30  pH, Blood: x     /  pCO2: 51    /  pO2: 111   / HCO3: 25    / Base Excess: -1.7  /  SaO2: 99.0          Urinalysis Basic - ( 2022 19:06 )  Color: Yellow / Appearance: Slightly Cloudy / S.025 / pH: x  Gluc: x / Ketone: Trace  / Bili: Negative / Urobili: 1.0 mg/dL   Blood: x / Protein: >=300 mg/dL / Nitrite: Negative   Leuk Esterase: Negative / RBC: 26-50 /HPF / WBC 3-5 /HPF   Sq Epi: x / Non Sq Epi: Occasional /HPF / Bacteria: Moderate        MEDICATIONS  (STANDING):    cefiderocol IVPB 2000 milliGRAM(s) IV Intermittent every 6 hours  chlorhexidine 0.12% Liquid 15 milliLiter(s) Oral Mucosa every 12 hours  chlorhexidine 4% Liquid 1 Application(s) Topical daily  dexMEDEtomidine Infusion 0.2 MICROgram(s)/kG/Hr (3.52 mL/Hr) IV Continuous <Continuous>  fentaNYL   Infusion. 0.5 MICROgram(s)/kG/Hr (3.52 mL/Hr) IV Continuous <Continuous>  folic acid 1 milliGRAM(s) Oral daily  heparin   Injectable 5000 Unit(s) SubCutaneous every 12 hours  nicotine -  14 mG/24Hr(s) Patch 1 patch Transdermal daily  norepinephrine Infusion 0.05 MICROgram(s)/kG/Min (8.03 mL/Hr) IV Continuous <Continuous>  pantoprazole  Injectable 40 milliGRAM(s) IV Push daily  potassium chloride  20 mEq/100 mL IVPB 20 milliEquivalent(s) IV Intermittent every 2 hours  propofol Infusion 0.1 MICROgram(s)/kG/Min (0.04 mL/Hr) IV Continuous <Continuous>  QUEtiapine 100 milliGRAM(s) Oral two times a day  scopolamine 1 mG/72 Hr(s) Patch 1 Patch Transdermal every 72 hours  spironolactone 100 milliGRAM(s) Oral daily  thiamine 100 milliGRAM(s) Oral daily  vancomycin    Solution 250 milliGRAM(s) Oral every 6 hours          RADIOLOGY & ADDITIONAL TESTS:    < from: Xray Kidney Ureter Bladder (22 @ 13:25) >    FINDINGS: Enteric feeding tube is stable, with tip in the left upper quadrant.  There is a non-obstructive bowel gas pattern.  No abnormal   masses or calcifications are seen.  Stable probe/catheter projects over  the lower pelvis.    < from: CT Angio Chest PE Protocol w/ IV Cont (22 @ 21:14) >    No pulmonary embolus.    Near complete consolidation/collapse of the left lower lobe, increased as  compared with prior. Mildly increased right lower lobe patchy   consolidative opacities-atelectasis. Adjacent bilateral small pleural effusions. Findings compatible with likely mixed pneumonia and   atelectasis.    Redemonstration of a dilated main pulmonary artery measuring up to 3.8 cm  compatible with pulmonary hypertension.    < from: Xray Chest 1 View- PORTABLE-Routine (Xray Chest 1 View- PORTABLE-Routine in AM.) (22 @ 05:28) >  Stable cardiomegaly and left basilar opacity. Stable support devices.    < end of copied text >  < from: CT Abdomen and Pelvis w/ IV Cont (22 @ 17:27) >  No evidence of retroperitoneal hematoma.    Small bilateral pleural effusions and left basilar consolidation. Moderate ascites redemonstrated.    Moderate pericardial effusion.  Hepatosplenomegaly redemonstrated.        MICROBIOLOGY DATA:  Clostridium difficile Toxin by PCR (22 @ 19:15)   Clostridium difficile Toxin by PCR: RESULT INTERPRETATION:  Detected     Culture - Sputum . (22 @ 13:39)   Gram Stain: Few polymorphonuclear leukocytes per low power field   No Squamous epithelial cells per low power field   Numerous Gram Negative Coccobacilli seen per oil power field   Specimen Source: ET Tube ET Tube   Culture Results:   Moderate Acinetobacter baumannii/nosocomialis group   Normal Respiratory Kelly absent     MRSA/MSSA PCR (22 @ 10:34)   MRSA PCR Result.: Negative    Respiratory Viral Panel with COVID-19 by BROOKE (22 @ 09:19)   Rapid RVP Result: NotDetec   SARS-CoV-2: NotDetec:    Culture - Blood (22 @ 20:31)   Specimen Source: .Blood Blood-Peripheral   Culture Results:   No growth to date.     Culture - Blood (22 @ 20:31)   Specimen Source: .Blood Blood   Culture Results:   No growth to date.     Culture - Sputum . (22 @ 14:00)   - Oxacillin: S <=0.25 Oxacillin predicts susceptibility for dicloxacillin, methicillin, and nafcillin   - Cefazolin: S <=4   - Erythromycin: S <=0.25   - Gentamicin: S <=1 Should not be used as monotherapy   - Clindamycin: S <=0.25   - Rifampin: S <=1 Should not be used as monotherapy   - Tetra/Doxy: S <=1   - Trimethoprim/Sulfamethoxazole: S <=0.5/9.5   - Vancomycin: S 2   Gram Stain:   Moderate polymorphonuclear leukocytes per low power field   Few Squamous epithelial cells per low power field   Moderate Gram positive cocci in pairs seen per oil power field   Few Gram Variable Rods seen per oil power field   - Ampicillin/Sulbactam: S <=8/4   Specimen Source: Trach Asp Tracheal Aspirate   Culture Results:   Numerous Mixed gram negative rods   Moderate Staphylococcus aureus   Normal Respiratory Kelly present   Organism Identification: Staphylococcus aureus   Organism: Staphylococcus aureus   Culture - Blood in AM (22 @ 06:00)   Specimen Source: .Blood Blood   Culture Results: No growth to date.     Culture - Sputum . (22 @ 14:00)   Gram Stain:   Moderate polymorphonuclear leukocytes per low power field   Few Squamous epithelial cells per low power field   Moderate Gram positive cocci in pairs seen per oil power field   Few Gram Variable Rods seen per oil power field   Specimen Source: Trach Asp Tracheal Aspirate   Culture Results:   Numerous Mixed gram negative rods   Moderate Staphylococcus aureus Susceptibility to follow.   Normal Respiratory Kelly absent     Culture - Sputum . (22 @ 17:20)   Gram Stain:   Few polymorphonuclear leukocytes per low power field   Rare Squamous epithelial cells per low power field   Moderate Gram Negative Rods seen per oil power field   - Amikacin: S <=16   - Amikacin: S <=16   - Amoxicillin/Clavulanic Acid: R >16/8   - Amoxicillin/Clavulanic Acid: S <=8/4   - Ampicillin: R 16 These ampicillin results predict results for amoxicillin   - Ampicillin: R >16 These ampicillin results predict results for amoxicillin   - Ampicillin/Sulbactam: R >16/8 Enterobacter, Klebsiella aerogenes, Citrobacter, and Serratia may develop resistance during prolonged therapy (3-4 days)   - Ampicillin/Sulbactam: S <=4/2 Enterobacter, Klebsiella aerogenes, Citrobacter, and Serratia may develop resistance during prolonged therapy (3-4 days)   - Aztreonam: S <=4   - Aztreonam: S <=4   - Cefazolin: R >16 Enterobacter, Klebsiella aerogenes, Citrobacter, and Serratia may develop resistance during prolonged therapy (3-4 days)   - Cefazolin: S <=2 Enterobacter, Klebsiella aerogenes, Citrobacter, and Serratia may develop resistance during prolonged therapy (3-4 days)   - Cefepime: S <=2   - Cefepime: S <=2   - Cefoxitin: R >16   - Cefoxitin: S <=8   - Ceftriaxone: S <=1 Enterobacter, Klebsiella aerogenes, Citrobacter, and Serratia may develop resistance during prolonged therapy   - Ceftriaxone: S <=1 Enterobacter, Klebsiella aerogenes, Citrobacter, and Serratia may develop resistance during prolonged therapy   - Ciprofloxacin: S <=0.25   - Ciprofloxacin: S <=0.25   - Ertapenem: S <=0.5   - Ertapenem: S <=0.5   - Gentamicin: S <=2   - Gentamicin: S <=2   - Imipenem: S <=1   - Imipenem: S <=1   - Levofloxacin: S <=0.5   - Levofloxacin: S <=0.5   - Meropenem: S <=1   - Meropenem: S <=1   - Piperacillin/Tazobactam: S <=8   - Piperacillin/Tazobactam: S <=8   - Tobramycin: S <=2   - Tobramycin: S <=2   - Trimethoprim/Sulfamethoxazole: S <=0.5/9.5   - Trimethoprim/Sulfamethoxazole: S <=0.5/9.5   Specimen Source: Trach Asp Tracheal Aspirate   Culture Results:   Moderate Klebsiella oxytoca/Raoutella ornithinolytica   Moderate Enterobacter cloacae complex   Normal Respiratory Kelly absent   Organism Identification: Klebsiella oxytoca /Raoutella ornithinolytica   Enterobacter cloacae complex   Organism: Klebsiella oxytoca /Raoutella ornithinolytica   Organism: Enterobacter cloacae complex COVID-19 PCR (06.15.22 @ 07:10)   COVID-19 PCR: NotDetec: Culture - Acid Fast - Sputum w/Smear (22 @ 07:00)   Specimen Source: .Sputum Sputum   Acid Fast Bacilli Smear:   No acid fast bacilli seen by fluorochrome stain   Culture Results:   No acid fast bacilli isolated after 6 weeks.              Patient is seen and examined at the bed side, is afebrile now. She remains intubated/vented.    REVIEW OF SYSTEMS: Unable to obtain due to mental status      ALLERGIES: buprenorphine (Rash)  Suboxone (Rash)      ICU Vital Signs Last 24 Hrs  T(C): 37.7 (2022 19:17), Max: 38.1 (2022 03:00)  T(F): 99.9 (:17), Max: 100.6 (2022 03:00)  HR: 59 (2022 19:17) (59 - 76)  BP: 141/83 (2022 19:17) (132/76 - 153/90)  BP(mean): 105 (:17) (97 - 121)  ABP: --  ABP(mean): --  RR: 20 (:17) (20 - 48)  SpO2: 100% (:17) (95% - 100%)    O2 Parameters below as of 2022 15:26  Patient On (Oxygen Delivery Method): ventilator    O2 Concentration (%): 35        PHYSICAL EXAM:  GENERAL: Intubated/vented  CHEST/LUNG: Not using accessory muscles   HEART: s1 and s2 present  ABDOMEN:  Nontender and  Nondistended  EXTREMITIES: No pedal  edema  CNS:  Intubated/vented      LABS:                        8.5    2.37  )-----------( 58       ( 2022 05:58 )             27.5                           8.8    1.54  )-----------( 133      ( 2022 17:05 )             29.0                 11    144  |  110  |  32<H>  ----------------------------<  96  2.8<L>   |  22  |  0.7    Ca    8.1<L>      2022 05:58  Phos  3.6       Mg     1.9         TPro  5.6<L>  /  Alb  2.8<L>  /  TBili  0.6  /  DBili  x   /  AST  89<H>  /  ALT  58<H>  /  AlkPhos  373<H>  06    144  |  109  |  32<H>  ----------------------------<  91  3.2<L>   |  27  |  0.7    Ca    8.8      2022 07:20  Phos  3.4     07-  Mg     1.9     07-06    TPro  5.4<L>  /  Alb  2.5<L>  /  TBili  0.3  /  DBili  x   /  AST  37  /  ALT  17  /  AlkPhos  522<H>  07-06        ABG - ( 2022 03:14 )  pH, Arterial: 7.30  pH, Blood: x     /  pCO2: 51    /  pO2: 111   / HCO3: 25    / Base Excess: -1.7  /  SaO2: 99.0          Urinalysis Basic - ( 2022 19:06 )  Color: Yellow / Appearance: Slightly Cloudy / S.025 / pH: x  Gluc: x / Ketone: Trace  / Bili: Negative / Urobili: 1.0 mg/dL   Blood: x / Protein: >=300 mg/dL / Nitrite: Negative   Leuk Esterase: Negative / RBC: 26-50 /HPF / WBC 3-5 /HPF   Sq Epi: x / Non Sq Epi: Occasional /HPF / Bacteria: Moderate        MEDICATIONS  (STANDING):    cefiderocol IVPB 2000 milliGRAM(s) IV Intermittent every 6 hours  chlorhexidine 0.12% Liquid 15 milliLiter(s) Oral Mucosa every 12 hours  chlorhexidine 4% Liquid 1 Application(s) Topical daily  dexMEDEtomidine Infusion 0.2 MICROgram(s)/kG/Hr (3.52 mL/Hr) IV Continuous <Continuous>  fentaNYL   Infusion. 0.5 MICROgram(s)/kG/Hr (3.52 mL/Hr) IV Continuous <Continuous>  folic acid 1 milliGRAM(s) Oral daily  heparin   Injectable 5000 Unit(s) SubCutaneous every 12 hours  nicotine -  14 mG/24Hr(s) Patch 1 patch Transdermal daily  norepinephrine Infusion 0.05 MICROgram(s)/kG/Min (8.03 mL/Hr) IV Continuous <Continuous>  pantoprazole  Injectable 40 milliGRAM(s) IV Push daily  potassium chloride  20 mEq/100 mL IVPB 20 milliEquivalent(s) IV Intermittent every 2 hours  propofol Infusion 0.1 MICROgram(s)/kG/Min (0.04 mL/Hr) IV Continuous <Continuous>  QUEtiapine 100 milliGRAM(s) Oral two times a day  scopolamine 1 mG/72 Hr(s) Patch 1 Patch Transdermal every 72 hours  spironolactone 100 milliGRAM(s) Oral daily  thiamine 100 milliGRAM(s) Oral daily  vancomycin    Solution 250 milliGRAM(s) Oral every 6 hours          RADIOLOGY & ADDITIONAL TESTS:    < from: Xray Kidney Ureter Bladder (22 @ 13:25) >    FINDINGS: Enteric feeding tube is stable, with tip in the left upper quadrant.  There is a non-obstructive bowel gas pattern.  No abnormal   masses or calcifications are seen.  Stable probe/catheter projects over  the lower pelvis.    < from: CT Angio Chest PE Protocol w/ IV Cont (22 @ 21:14) >    No pulmonary embolus.    Near complete consolidation/collapse of the left lower lobe, increased as  compared with prior. Mildly increased right lower lobe patchy   consolidative opacities-atelectasis. Adjacent bilateral small pleural effusions. Findings compatible with likely mixed pneumonia and   atelectasis.    Redemonstration of a dilated main pulmonary artery measuring up to 3.8 cm  compatible with pulmonary hypertension.    < from: Xray Chest 1 View- PORTABLE-Routine (Xray Chest 1 View- PORTABLE-Routine in AM.) (22 @ 05:28) >  Stable cardiomegaly and left basilar opacity. Stable support devices.    < end of copied text >  < from: CT Abdomen and Pelvis w/ IV Cont (22 @ 17:27) >  No evidence of retroperitoneal hematoma.    Small bilateral pleural effusions and left basilar consolidation. Moderate ascites redemonstrated.    Moderate pericardial effusion.  Hepatosplenomegaly redemonstrated.        MICROBIOLOGY DATA:  Clostridium difficile Toxin by PCR (22 @ 19:15)   Clostridium difficile Toxin by PCR: RESULT INTERPRETATION:  Detected     Culture - Sputum . (22 @ 13:39)   Gram Stain: Few polymorphonuclear leukocytes per low power field   No Squamous epithelial cells per low power field   Numerous Gram Negative Coccobacilli seen per oil power field   Specimen Source: ET Tube ET Tube   Culture Results:   Moderate Acinetobacter baumannii/nosocomialis group   Normal Respiratory Kelly absent     MRSA/MSSA PCR (22 @ 10:34)   MRSA PCR Result.: Negative    Respiratory Viral Panel with COVID-19 by BROOKE (06.30.22 @ 09:19)   Rapid RVP Result: Fernanda   SARS-CoV-2: NotDetec:    Culture - Blood (22 @ 20:31)   Specimen Source: .Blood Blood-Peripheral   Culture Results:   No growth to date.     Culture - Blood (22 @ 20:31)   Specimen Source: .Blood Blood   Culture Results:   No growth to date.     Culture - Sputum . (22 @ 14:00)   - Oxacillin: S <=0.25 Oxacillin predicts susceptibility for dicloxacillin, methicillin, and nafcillin   - Cefazolin: S <=4   - Erythromycin: S <=0.25   - Gentamicin: S <=1 Should not be used as monotherapy   - Clindamycin: S <=0.25   - Rifampin: S <=1 Should not be used as monotherapy   - Tetra/Doxy: S <=1   - Trimethoprim/Sulfamethoxazole: S <=0.5/9.5   - Vancomycin: S 2   Gram Stain:   Moderate polymorphonuclear leukocytes per low power field   Few Squamous epithelial cells per low power field   Moderate Gram positive cocci in pairs seen per oil power field   Few Gram Variable Rods seen per oil power field   - Ampicillin/Sulbactam: S <=8/4   Specimen Source: Trach Asp Tracheal Aspirate   Culture Results:   Numerous Mixed gram negative rods   Moderate Staphylococcus aureus   Normal Respiratory Kelly present   Organism Identification: Staphylococcus aureus   Organism: Staphylococcus aureus   Culture - Blood in AM (22 @ 06:00)   Specimen Source: .Blood Blood   Culture Results: No growth to date.     Culture - Sputum . (22 @ 14:00)   Gram Stain:   Moderate polymorphonuclear leukocytes per low power field   Few Squamous epithelial cells per low power field   Moderate Gram positive cocci in pairs seen per oil power field   Few Gram Variable Rods seen per oil power field   Specimen Source: Trach Asp Tracheal Aspirate   Culture Results:   Numerous Mixed gram negative rods   Moderate Staphylococcus aureus Susceptibility to follow.   Normal Respiratory Kelly absent     Culture - Sputum . (22 @ 17:20)   Gram Stain:   Few polymorphonuclear leukocytes per low power field   Rare Squamous epithelial cells per low power field   Moderate Gram Negative Rods seen per oil power field   - Amikacin: S <=16   - Amikacin: S <=16   - Amoxicillin/Clavulanic Acid: R >16/8   - Amoxicillin/Clavulanic Acid: S <=8/4   - Ampicillin: R 16 These ampicillin results predict results for amoxicillin   - Ampicillin: R >16 These ampicillin results predict results for amoxicillin   - Ampicillin/Sulbactam: R >16/8 Enterobacter, Klebsiella aerogenes, Citrobacter, and Serratia may develop resistance during prolonged therapy (3-4 days)   - Ampicillin/Sulbactam: S <=4/2 Enterobacter, Klebsiella aerogenes, Citrobacter, and Serratia may develop resistance during prolonged therapy (3-4 days)   - Aztreonam: S <=4   - Aztreonam: S <=4   - Cefazolin: R >16 Enterobacter, Klebsiella aerogenes, Citrobacter, and Serratia may develop resistance during prolonged therapy (3-4 days)   - Cefazolin: S <=2 Enterobacter, Klebsiella aerogenes, Citrobacter, and Serratia may develop resistance during prolonged therapy (3-4 days)   - Cefepime: S <=2   - Cefepime: S <=2   - Cefoxitin: R >16   - Cefoxitin: S <=8   - Ceftriaxone: S <=1 Enterobacter, Klebsiella aerogenes, Citrobacter, and Serratia may develop resistance during prolonged therapy   - Ceftriaxone: S <=1 Enterobacter, Klebsiella aerogenes, Citrobacter, and Serratia may develop resistance during prolonged therapy   - Ciprofloxacin: S <=0.25   - Ciprofloxacin: S <=0.25   - Ertapenem: S <=0.5   - Ertapenem: S <=0.5   - Gentamicin: S <=2   - Gentamicin: S <=2   - Imipenem: S <=1   - Imipenem: S <=1   - Levofloxacin: S <=0.5   - Levofloxacin: S <=0.5   - Meropenem: S <=1   - Meropenem: S <=1   - Piperacillin/Tazobactam: S <=8   - Piperacillin/Tazobactam: S <=8   - Tobramycin: S <=2   - Tobramycin: S <=2   - Trimethoprim/Sulfamethoxazole: S <=0.5/9.5   - Trimethoprim/Sulfamethoxazole: S <=0.5/9.5   Specimen Source: Trach Asp Tracheal Aspirate   Culture Results:   Moderate Klebsiella oxytoca/Raoutella ornithinolytica   Moderate Enterobacter cloacae complex   Normal Respiratory Kelly absent   Organism Identification: Klebsiella oxytoca /Raoutella ornithinolytica   Enterobacter cloacae complex   Organism: Klebsiella oxytoca /Raoutella ornithinolytica   Organism: Enterobacter cloacae complex COVID-19 PCR (06.15.22 @ 07:10)   COVID-19 PCR: NotDetec: Culture - Acid Fast - Sputum w/Smear (22 @ 07:00)   Specimen Source: .Sputum Sputum   Acid Fast Bacilli Smear:   No acid fast bacilli seen by fluorochrome stain   Culture Results:   No acid fast bacilli isolated after 6 weeks.

## 2022-07-11 NOTE — PROGRESS NOTE ADULT - ASSESSMENT
Patient is a 26 year old female with hx of asthma, untreated Hep C, IVDA, anasarca presenting with increased dyspnea since yesterday    IMPRESSION  #Acute respiratory failure s/p intubation 6/16  #Pneumonia -   - CT Chest 6/22 - left greater than right lower lobe consolidations   - sputum Cx 6/24 Klebsiella oxytoca, Enterobacter cloacae complex- The Sputum culture from 6/27 grew Numerous Mixed gram negative rods and Moderate Staphylococcus aureus.    #Fevers  - Ferritin, Serum: 94 ng/mL (06.27.22 @ 06:47),  Procalcitonin, Serum: 0.11 (06.27.22 @ 15:46)  - CT Abd/pelvis 6/26 - moderated ascites moderate pericardial effusion     #Drug overdose vs withdrawal?  #Hepatosplenomegaly - present since CT Abd/pelvis 1/30/2021  #Pancytopenia  #Hx of Untreated Hep C   #IVDA   #Abx allergy: buprenorphine (Rash), Suboxone (Rash)  # C.difficille infection - 7/5/22    would recommend:    1.  Monitor Temp. and c/w supportive care  2. Continue Oral Vancomycin to cover C.diff  3. Continue Meropenem 1 g q 8 hours for now  4. Management of Vent. as per ICU protocol  5. Aspiration precaution    d/w ICU team    Attending  Attestation:   Spent more than 35 minutes on total encounter, more than 50 % of the visit was spent counseling and/or coordinating care by the Attending physician.     Patient is a 26 year old female with hx of asthma, untreated Hep C, IVDA, anasarca presenting with increased dyspnea since yesterday    IMPRESSION  #Acute respiratory failure s/p intubation 6/16  #Pneumonia -   - CT Chest 6/22 - left greater than right lower lobe consolidations   - sputum Cx 6/24 Klebsiella oxytoca, Enterobacter cloacae complex- The Sputum culture from 6/27 grew Numerous Mixed gram negative rods and Moderate Staphylococcus aureus.    #Fevers  - Ferritin, Serum: 94 ng/mL (06.27.22 @ 06:47),  Procalcitonin, Serum: 0.11 (06.27.22 @ 15:46)  - CT Abd/pelvis 6/26 - moderated ascites moderate pericardial effusion     #Drug overdose vs withdrawal?  #Hepatosplenomegaly - present since CT Abd/pelvis 1/30/2021  #Pancytopenia  #Hx of Untreated Hep C   #IVDA   #Abx allergy: buprenorphine (Rash), Suboxone (Rash)  # C.difficille infection - 7/5/22    would recommend:    1. Continue Cefiderocol  2.  Monitor Temp. and c/w supportive care, is afebrile now  3. Continue Oral Vancomycin to cover C.diff, during and 7 days after completing the Systemic Abx  4. Management of Vent. as per ICU protocol  5. Aspiration precaution     - Please call micro [phone] ext1 to add on sensitivities for cefiderocol to the Acinetobacter IF NOT DONE ALREADY  - if hemodynamic compromise, ADD tigecycline 100mg then 50mg q12h IV for acinetobacter coverage    d/w ICU team    Attending  Attestation:   Spent more than 35 minutes on total encounter, more than 50 % of the visit was spent counseling and/or coordinating care by the Attending physician.

## 2022-07-11 NOTE — PROGRESS NOTE ADULT - SUBJECTIVE AND OBJECTIVE BOX
Patient is a 26y old  Female who presents with a chief complaint of anasarca (10 Jul 2022 20:34)      Over Night Events:  Patient seen and examined.   still spiking temp this morning 99   was reintubated friday to sat   awake comfortable     ROS:  See HPI    PHYSICAL EXAM    ICU Vital Signs Last 24 Hrs  T(C): 37.5 (11 Jul 2022 07:00), Max: 39.4 (10 Jul 2022 11:00)  T(F): 99.5 (11 Jul 2022 07:00), Max: 102.9 (10 Jul 2022 11:00)  HR: 62 (11 Jul 2022 07:00) (62 - 80)  BP: 138/84 (11 Jul 2022 07:00) (128/90 - 153/90)  BP(mean): 99 (11 Jul 2022 05:26) (97 - 120)  ABP: --  ABP(mean): --  RR: 26 (11 Jul 2022 07:00) (26 - 34)  SpO2: 100% (11 Jul 2022 07:00) (95% - 100%)    O2 Parameters below as of 11 Jul 2022 05:26  Patient On (Oxygen Delivery Method): ventilator    O2 Concentration (%): 35        General: awake   HEENT:  et tube             Lymph Nodes: NO cervical LN   Lungs: Bilateral BS  Cardiovascular: Regular   Abdomen: Soft, Positive BS  Extremities: No clubbing   Skin: warm   Neurological:  no focal   Musculoskeletal: move all ext     I&O's Detail    10 Jul 2022 07:01  -  11 Jul 2022 07:00  --------------------------------------------------------  IN:    Dexmedetomidine: 712.5 mL    IV PiggyBack: 100 mL    Propofol: 571.5 mL  Total IN: 1384 mL    OUT:    FentaNYL: 0 mL    Indwelling Catheter - Urethral (mL): 1670 mL    Nasogastric/Oral tube (mL): 400 mL    Norepinephrine: 0 mL  Total OUT: 2070 mL    Total NET: -686 mL      11 Jul 2022 07:01  -  11 Jul 2022 07:50  --------------------------------------------------------  IN:  Total IN: 0 mL    OUT:    Indwelling Catheter - Urethral (mL): 20 mL  Total OUT: 20 mL    Total NET: -20 mL          LABS:                          8.8    2.30  )-----------( 85       ( 10 Jul 2022 05:42 )             28.7         10 Jul 2022 05:42    144    |  109    |  34     ----------------------------<  116    3.4     |  24     |  0.8      Ca    8.2        10 Jul 2022 05:42  Phos  4.1       10 Jul 2022 05:42  Mg     2.0       10 Jul 2022 05:42                                                                                      Urinalysis Basic - ( 09 Jul 2022 19:25 )    Color: Orange / Appearance: Cloudy / SG: >=1.030 / pH: x  Gluc: x / Ketone: Trace  / Bili: Small / Urobili: 0.2 mg/dL   Blood: x / Protein: >=300 mg/dL / Nitrite: Negative   Leuk Esterase: Negative / RBC: >50 /HPF / WBC 1-2 /HPF   Sq Epi: x / Non Sq Epi: Few /HPF / Bacteria: Few                                                                                                             Mode: AC/ CMV (Assist Control/ Continuous Mandatory Ventilation)  RR (machine): 25  TV (machine): 370  FiO2: 35  PEEP: 5  ITime: 1  MAP: 14  PIP: 30                                      ABG - ( 11 Jul 2022 04:43 )  pH, Arterial: 7.40  pH, Blood: x     /  pCO2: 35    /  pO2: 123   / HCO3: 22    / Base Excess: -2.5  /  SaO2: 99.7                MEDICATIONS  (STANDING):  cefiderocol IVPB 2000 milliGRAM(s) IV Intermittent every 6 hours  chlorhexidine 0.12% Liquid 15 milliLiter(s) Oral Mucosa every 12 hours  chlorhexidine 4% Liquid 1 Application(s) Topical daily  dexMEDEtomidine Infusion 0.2 MICROgram(s)/kG/Hr (3.52 mL/Hr) IV Continuous <Continuous>  fentaNYL   Infusion. 0.5 MICROgram(s)/kG/Hr (3.52 mL/Hr) IV Continuous <Continuous>  folic acid 1 milliGRAM(s) Oral daily  heparin   Injectable 5000 Unit(s) SubCutaneous every 12 hours  nicotine -  14 mG/24Hr(s) Patch 1 patch Transdermal daily  norepinephrine Infusion 0.05 MICROgram(s)/kG/Min (8.03 mL/Hr) IV Continuous <Continuous>  pantoprazole  Injectable 40 milliGRAM(s) IV Push daily  propofol Infusion 0.1 MICROgram(s)/kG/Min (0.04 mL/Hr) IV Continuous <Continuous>  QUEtiapine 100 milliGRAM(s) Oral two times a day  scopolamine 1 mG/72 Hr(s) Patch 1 Patch Transdermal every 72 hours  spironolactone 100 milliGRAM(s) Oral daily  thiamine 100 milliGRAM(s) Oral daily  vancomycin    Solution 250 milliGRAM(s) Oral every 6 hours    MEDICATIONS  (PRN):  acetaminophen  Suppository .. 650 milliGRAM(s) Rectal every 6 hours PRN Temp greater or equal to 38C (100.4F), Mild Pain (1 - 3)  ALBUTerol    90 MICROgram(s) HFA Inhaler 1 Puff(s) Inhalation every 4 hours PRN Shortness of Breath and/or Wheezing  cloNIDine 0.1 milliGRAM(s) Oral every 6 hours PRN opiate withdrawal  hydrOXYzine hydrochloride 50 milliGRAM(s) Oral every 6 hours PRN Anxiety  ibuprofen  Tablet. 600 milliGRAM(s) Oral every 6 hours PRN Temp greater or equal to 38C (100.4F)  ibuprofen  Tablet. 400 milliGRAM(s) Oral every 6 hours PRN Mild Pain (1 - 3)  sodium chloride 0.9% lock flush 10 milliLiter(s) IV Push every 1 hour PRN Pre/post blood products, medications, blood draw, and to maintain line patency          Xrays:  TLC:  OG:  ET tube:                                                                                    mild bibasilar opacity    ECHO:  [facility] ICU:

## 2022-07-11 NOTE — PROGRESS NOTE ADULT - SUBJECTIVE AND OBJECTIVE BOX
Patient is a 26y old  Female who presents with a chief complaint of anasarca (11 Jul 2022 10:06)      T(F): 99.5 (07-11-22 @ 07:00), Max: 102.9 (07-10-22 @ 11:00)  HR: 73 (07-11-22 @ 08:26)  BP: 151/90 (07-11-22 @ 08:26)  RR: 36 (07-11-22 @ 08:26)  SpO2: 100% (07-11-22 @ 08:26) (95% - 100%)    PHYSICAL EXAM:  GENERAL: NAD  HEAD:  Atraumatic, Normocephalic  EYES: EOMI, PERRLA, conjunctiva and sclera clear  NERVOUS SYSTEM:  Alert & Oriented X3, no focal deficits   CHEST/LUNG: Clear to percussion bilaterally; No rales, rhonchi, wheezing, or rubs  HEART: Regular rate and rhythm; No murmurs, rubs, or gallops  ABDOMEN: Soft, Nontender, Nondistended; Bowel sounds present  EXTREMITIES:  2+ Peripheral Pulses, No clubbing, cyanosis, or edema    LABS  07-11    144  |  110  |  32<H>  ----------------------------<  96  2.8<L>   |  22  |  0.7    Ca    8.1<L>      11 Jul 2022 05:58  Phos  3.6     07-11  Mg     1.9     07-11    TPro  5.6<L>  /  Alb  2.8<L>  /  TBili  0.6  /  DBili  x   /  AST  89<H>  /  ALT  58<H>  /  AlkPhos  373<H>  07-11                          8.5    2.37  )-----------( 58       ( 11 Jul 2022 05:58 )             27.5       Mode: AC/ CMV (Assist Control/ Continuous Mandatory Ventilation)  RR (machine): 25  TV (machine): 370  FiO2: 35  PEEP: 5    Culture Results:   Babesia microti PCR  Results: NOT detected  ***************Result Note*************  The detection of Babesia microti by PCR has only been  validated for whole blood; this test has not been approved  by the US Food and Drug Administration (FDA). Performance  characteristics of this assay have been determined by  [facility]. The clinical significance  of results should be considered in conjunction with the  overall clinical presentation of the patient. Result is not  intended to be used as the sole means for clinical diagnosis  or patient management decisions.  One negative sample does not necessarily rule  out the presence of a parasitic infection. (07-10-22)  Culture Results:   No growth to date. (07-08-22)  Culture Results:   No growth to date. (07-06-22)  Culture Results:   No growth to date. (07-06-22)  Culture Results:   Moderate Acinetobacter baumannii/nosocom group (Carbapenem Resistant)  Cefiderocol interpretations based on FDA breakpoints.  Normal Respiratory Kelly absent (07-04-22)  Culture Results:   No Growth Final (06-29-22)  Culture Results:   No Growth Final (06-29-22)  Culture Results:   No Growth Final (06-28-22)  Culture Results:   Numerous Mixed gram negative rods  Moderate Staphylococcus aureus  Normal Respiratory Kelly present (06-27-22)  Culture Results:   Moderate Klebsiella oxytoca/Raoutella ornithinolytica  Moderate Enterobacter cloacae complex  Normal Respiratory Kelly absent (06-24-22)    RADIOLOGY  < from: Xray Chest 1 View- PORTABLE-Routine (Xray Chest 1 View- PORTABLE-Routine in AM.) (07.10.22 @ 05:50) >   Unchanged bilateral opacities.      < end of copied text >    MEDICATIONS  (STANDING):  cefiderocol IVPB 2000 milliGRAM(s) IV Intermittent every 6 hours  chlorhexidine 0.12% Liquid 15 milliLiter(s) Oral Mucosa every 12 hours  chlorhexidine 4% Liquid 1 Application(s) Topical daily  dexMEDEtomidine Infusion 0.2 MICROgram(s)/kG/Hr (3.52 mL/Hr) IV Continuous <Continuous>  fentaNYL   Infusion. 0.5 MICROgram(s)/kG/Hr (3.52 mL/Hr) IV Continuous <Continuous>  folic acid 1 milliGRAM(s) Oral daily  heparin   Injectable 5000 Unit(s) SubCutaneous every 12 hours  nicotine -  14 mG/24Hr(s) Patch 1 patch Transdermal daily  norepinephrine Infusion 0.05 MICROgram(s)/kG/Min (8.03 mL/Hr) IV Continuous <Continuous>  pantoprazole  Injectable 40 milliGRAM(s) IV Push daily  propofol Infusion 0.1 MICROgram(s)/kG/Min (0.04 mL/Hr) IV Continuous <Continuous>  QUEtiapine 100 milliGRAM(s) Oral two times a day  scopolamine 1 mG/72 Hr(s) Patch 1 Patch Transdermal every 72 hours  spironolactone 100 milliGRAM(s) Oral daily  thiamine 100 milliGRAM(s) Oral daily  vancomycin    Solution 250 milliGRAM(s) Oral every 6 hours    MEDICATIONS  (PRN):  acetaminophen  Suppository .. 650 milliGRAM(s) Rectal every 6 hours PRN Temp greater or equal to 38C (100.4F), Mild Pain (1 - 3)  ALBUTerol    90 MICROgram(s) HFA Inhaler 1 Puff(s) Inhalation every 4 hours PRN Shortness of Breath and/or Wheezing  cloNIDine 0.1 milliGRAM(s) Oral every 6 hours PRN opiate withdrawal  hydrOXYzine hydrochloride 50 milliGRAM(s) Oral every 6 hours PRN Anxiety  ibuprofen  Tablet. 600 milliGRAM(s) Oral every 6 hours PRN Temp greater or equal to 38C (100.4F)  ibuprofen  Tablet. 400 milliGRAM(s) Oral every 6 hours PRN Mild Pain (1 - 3)  sodium chloride 0.9% lock flush 10 milliLiter(s) IV Push every 1 hour PRN Pre/post blood products, medications, blood draw, and to maintain line patency

## 2022-07-11 NOTE — CHART NOTE - NSCHARTNOTEFT_GEN_A_CORE
Registered Dietitian Follow-Up     Patient Profile Reviewed                           Yes [X]   No []     Nutrition History Previously Obtained        Yes [X]  No []       Pertinent  Information:  pt is 26 year old female with hx of asthma, Hep C, active IVDA, anasarca presents with dyspnea admitted with fluid overload and initially admitted to med surg unit. s/p RRT 6/16 pt was in the lobby s/p seizure and fall 2/2 overdose s/p intubation and upgraded to ICU. + laceration to scalp noted. As per GI moderate ascites, s/p paracentesis 1L removed on 6/22., + portal HTN 2/2 cirrhosis. SPiked temp 7/4/22, + cdiff colitis on 7/5. s/p extubation on 7/8 with reintubation warranted on 7/9 2/2 tachypnea with persistent temps. OGT to LCS presently with no residual. Spoke with MD to resume feeds. Pt receiving propofol at 25 ml/hr (660 kcals).             Diet order: NPO      Anthropometrics:  - Ht. 167.6 cm   - Wt. 68.8 kgs today vs 79.3 kgs upon admission   - %wt change  - BMI   - IBW      Pertinent Lab Data:  07-11    144  |  110  |  32<H>  ----------------------------<  96  2.8<L>   |  22  |  0.7    Ca    8.1<L>      11 Jul 2022 05:58  Phos  3.6     07-11  Mg     1.9     07-11    TPro  5.6<L>  /  Alb  2.8<L>  /  TBili  0.6  /  DBili  x   /  AST  89<H>  /  ALT  58<H>  /  AlkPhos  373<H>  07-11                          8.5    2.37  )-----------( 58       ( 11 Jul 2022 05:58 )             27.5        Pertinent Meds:   MEDICATIONS  (STANDING):  cefiderocol IVPB 2000 milliGRAM(s) IV Intermittent every 6 hours  chlorhexidine 0.12% Liquid 15 milliLiter(s) Oral Mucosa every 12 hours  chlorhexidine 4% Liquid 1 Application(s) Topical daily  dexMEDEtomidine Infusion 0.2 MICROgram(s)/kG/Hr (3.52 mL/Hr) IV Continuous <Continuous>  fentaNYL   Infusion. 0.5 MICROgram(s)/kG/Hr (3.52 mL/Hr) IV Continuous <Continuous>  folic acid 1 milliGRAM(s) Oral daily  heparin   Injectable 5000 Unit(s) SubCutaneous every 12 hours  nicotine -  14 mG/24Hr(s) Patch 1 patch Transdermal daily  norepinephrine Infusion 0.05 MICROgram(s)/kG/Min (8.03 mL/Hr) IV Continuous <Continuous>  pantoprazole  Injectable 40 milliGRAM(s) IV Push daily  propofol Infusion 0.1 MICROgram(s)/kG/Min (0.04 mL/Hr) IV Continuous <Continuous>  QUEtiapine 100 milliGRAM(s) Oral two times a day  scopolamine 1 mG/72 Hr(s) Patch 1 Patch Transdermal every 72 hours  spironolactone 100 milliGRAM(s) Oral daily  thiamine 100 milliGRAM(s) Oral daily  vancomycin    Solution 250 milliGRAM(s) Oral every 6 hours    MEDICATIONS  (PRN):  acetaminophen  Suppository .. 650 milliGRAM(s) Rectal every 6 hours PRN Temp greater or equal to 38C (100.4F), Mild Pain (1 - 3)  ALBUTerol    90 MICROgram(s) HFA Inhaler 1 Puff(s) Inhalation every 4 hours PRN Shortness of Breath and/or Wheezing  cloNIDine 0.1 milliGRAM(s) Oral every 6 hours PRN opiate withdrawal  hydrOXYzine hydrochloride 50 milliGRAM(s) Oral every 6 hours PRN Anxiety  ibuprofen  Tablet. 600 milliGRAM(s) Oral every 6 hours PRN Temp greater or equal to 38C (100.4F)  ibuprofen  Tablet. 400 milliGRAM(s) Oral every 6 hours PRN Mild Pain (1 - 3)  sodium chloride 0.9% lock flush 10 milliLiter(s) IV Push every 1 hour PRN Pre/post blood products, medications, blood draw, and to maintain line patency    Physical Findings:  - Appearance: intubated to vent, sedated however pt remains awake   - GI function: +BS, BM noted 7/9  - Tubes: OGT   - Oral/Mouth cavity:  - Skin:      Nutrition Requirements  Weight Used: 68.8 kgs     Estimated Energy Needs:  9758-2741 kcals (25-30 kcal/kg/BW) vs 1893 kcals ANNE MARIE state  83-96g protein (1.2-1.4g/kg/BW)  1;1 kcal for estimated fluid needs             Nutrient Intake: NPO         [] Previous Nutrition Diagnosis:            [X] Ongoing          [] Resolved    inadequate energy intake remains      Nutrition Intervention: resume previous feeds of Vital HP at 10 ml/hr increase as tolerated to goal rate of 50 ml/hr will provide pt with 1200 kcals + additional 660 from propofol, 103.5g protein and 1200 ml total volume, meeting >80% of estimated nutrient needs     Goal/Expected Outcome: pt to tolerate EN and meet >80% of estimated nutrient needs      Indicator/Monitoring: RD to monitor tolerance to EN, labs/meds (propofol infusion rate*), NFPF and f/u as needed within 2-4 days

## 2022-07-11 NOTE — PROGRESS NOTE ADULT - ASSESSMENT
26 year old female with hx of asthma, untreated Hep C, IVDA, anasarca was admitted to medical floor  with increased SOB and anasarca  Pt admits to using opiates 2 grams per day IV into her thighs  x years with minimal sobriety. Pt has been on MMTP in 2020. Pt is contemplating going back on program. Pt denies any other substance abuse. Pt states used a xanax for wd about 3 days ago.      Hepatitis C with Cirrhosis no compliant with treatment  Hx of withdrawal   variable periods of sobriety in the past    on floor pt was diuresed with improvement of symptoms  when the following events took place  "R[facility] was called [facility] pt in the [facility]    patient was found on the floor in ER, gasping for air, short of breath, tachypnea, bleeding profusely from her posterior scalp, was placed in a stretcher, was hypoxic 70s    emergently intubated, ct scan head ordered re scalp bleeding    several syringes, drug paraphanellia found on patient clothes     pt  intubated and  transferred to ICU       I strongly suspect that she injected illicit drug ( abuses IV heroin) and she went into resp failure secondary to heroin overdose."      Acute respiratory failure with hypoxemia / aspiration pneumonia with sepsis / suspected drug overdose / head laceration s/p fall in lobby / possible seizure / anasarca  pancytopenia with continued fever     - now with c-diff colitis - continue oral vanco   - w/u and antibiotics as per ID   - Keppra weaned and DC'd as per neur   - central line changed    - supplement hypokalemia and  mg level and maintain above 2   - re intubated -> surgical consult for trach if fails SAT and SBT

## 2022-07-11 NOTE — CONSULT NOTE ADULT - ATTENDING COMMENTS
As above. 25yo woman with polysubstance abuse now admitted with acute respiratory failure and developed C dificile colitis. She has failed one trial of extubation and this morning has poor mental status and suspected weak respiratory mechanics. Please optimize, cont to treat all sources of infection. Pending clinical stability, will d/w family and plan for tracheostomy in OR later this week / early next week.

## 2022-07-12 LAB
ALBUMIN SERPL ELPH-MCNC: 2.7 G/DL — LOW (ref 3.5–5.2)
ALP SERPL-CCNC: 347 U/L — HIGH (ref 30–115)
ALT FLD-CCNC: 37 U/L — SIGNIFICANT CHANGE UP (ref 0–41)
ANION GAP SERPL CALC-SCNC: 12 MMOL/L — SIGNIFICANT CHANGE UP (ref 7–14)
ANION GAP SERPL CALC-SCNC: 13 MMOL/L — SIGNIFICANT CHANGE UP (ref 7–14)
AST SERPL-CCNC: 40 U/L — SIGNIFICANT CHANGE UP (ref 0–41)
BASOPHILS # BLD AUTO: 0.01 K/UL — SIGNIFICANT CHANGE UP (ref 0–0.2)
BASOPHILS NFR BLD AUTO: 0.5 % — SIGNIFICANT CHANGE UP (ref 0–1)
BILIRUB SERPL-MCNC: 0.4 MG/DL — SIGNIFICANT CHANGE UP (ref 0.2–1.2)
BUN SERPL-MCNC: 17 MG/DL — SIGNIFICANT CHANGE UP (ref 10–20)
BUN SERPL-MCNC: 24 MG/DL — HIGH (ref 10–20)
CALCIUM SERPL-MCNC: 8.2 MG/DL — LOW (ref 8.5–10.1)
CALCIUM SERPL-MCNC: 8.3 MG/DL — LOW (ref 8.5–10.1)
CHLORIDE SERPL-SCNC: 112 MMOL/L — HIGH (ref 98–110)
CHLORIDE SERPL-SCNC: 113 MMOL/L — HIGH (ref 98–110)
CO2 SERPL-SCNC: 21 MMOL/L — SIGNIFICANT CHANGE UP (ref 17–32)
CO2 SERPL-SCNC: 21 MMOL/L — SIGNIFICANT CHANGE UP (ref 17–32)
CREAT SERPL-MCNC: 0.7 MG/DL — SIGNIFICANT CHANGE UP (ref 0.7–1.5)
CREAT SERPL-MCNC: 0.7 MG/DL — SIGNIFICANT CHANGE UP (ref 0.7–1.5)
EGFR: 121 ML/MIN/1.73M2 — SIGNIFICANT CHANGE UP
EGFR: 121 ML/MIN/1.73M2 — SIGNIFICANT CHANGE UP
EOSINOPHIL # BLD AUTO: 0.01 K/UL — SIGNIFICANT CHANGE UP (ref 0–0.7)
EOSINOPHIL NFR BLD AUTO: 0.5 % — SIGNIFICANT CHANGE UP (ref 0–8)
GALACTOMANNAN AG SERPL-ACNC: 0.03 INDEX — SIGNIFICANT CHANGE UP (ref 0–0.49)
GAS PNL BLDA: SIGNIFICANT CHANGE UP
GLUCOSE SERPL-MCNC: 103 MG/DL — HIGH (ref 70–99)
GLUCOSE SERPL-MCNC: 89 MG/DL — SIGNIFICANT CHANGE UP (ref 70–99)
HCT VFR BLD CALC: 27.2 % — LOW (ref 37–47)
HGB BLD-MCNC: 8.3 G/DL — LOW (ref 12–16)
HIV1 RNA SER-IMP: SIGNIFICANT CHANGE UP COPIES/ML
HIV1 RNA SERPL NAA+PROBE-LOG#: SIGNIFICANT CHANGE UP LOGCOPIES/ML
IMM GRANULOCYTES NFR BLD AUTO: 0.5 % — HIGH (ref 0.1–0.3)
LYMPHOCYTES # BLD AUTO: 0.33 K/UL — LOW (ref 1.2–3.4)
LYMPHOCYTES # BLD AUTO: 15.6 % — LOW (ref 20.5–51.1)
MAGNESIUM SERPL-MCNC: 1.8 MG/DL — SIGNIFICANT CHANGE UP (ref 1.8–2.4)
MAGNESIUM SERPL-MCNC: 2.1 MG/DL — SIGNIFICANT CHANGE UP (ref 1.8–2.4)
MCHC RBC-ENTMCNC: 27.2 PG — SIGNIFICANT CHANGE UP (ref 27–31)
MCHC RBC-ENTMCNC: 30.5 G/DL — LOW (ref 32–37)
MCV RBC AUTO: 89.2 FL — SIGNIFICANT CHANGE UP (ref 81–99)
MONOCYTES # BLD AUTO: 0.08 K/UL — LOW (ref 0.1–0.6)
MONOCYTES NFR BLD AUTO: 3.8 % — SIGNIFICANT CHANGE UP (ref 1.7–9.3)
NEUTROPHILS # BLD AUTO: 1.68 K/UL — SIGNIFICANT CHANGE UP (ref 1.4–6.5)
NEUTROPHILS NFR BLD AUTO: 79.1 % — HIGH (ref 42.2–75.2)
NRBC # BLD: 0 /100 WBCS — SIGNIFICANT CHANGE UP (ref 0–0)
PHOSPHATE SERPL-MCNC: 2.9 MG/DL — SIGNIFICANT CHANGE UP (ref 2.1–4.9)
PLATELET # BLD AUTO: 65 K/UL — LOW (ref 130–400)
POTASSIUM SERPL-MCNC: 2.8 MMOL/L — LOW (ref 3.5–5)
POTASSIUM SERPL-MCNC: 2.9 MMOL/L — LOW (ref 3.5–5)
POTASSIUM SERPL-SCNC: 2.8 MMOL/L — LOW (ref 3.5–5)
POTASSIUM SERPL-SCNC: 2.9 MMOL/L — LOW (ref 3.5–5)
PROT SERPL-MCNC: 5.4 G/DL — LOW (ref 6–8)
RBC # BLD: 3.05 M/UL — LOW (ref 4.2–5.4)
RBC # FLD: 15.1 % — HIGH (ref 11.5–14.5)
SODIUM SERPL-SCNC: 145 MMOL/L — SIGNIFICANT CHANGE UP (ref 135–146)
SODIUM SERPL-SCNC: 147 MMOL/L — HIGH (ref 135–146)
WBC # BLD: 2.12 K/UL — LOW (ref 4.8–10.8)
WBC # FLD AUTO: 2.12 K/UL — LOW (ref 4.8–10.8)

## 2022-07-12 PROCEDURE — 93010 ELECTROCARDIOGRAM REPORT: CPT

## 2022-07-12 PROCEDURE — 99233 SBSQ HOSP IP/OBS HIGH 50: CPT

## 2022-07-12 PROCEDURE — 99221 1ST HOSP IP/OBS SF/LOW 40: CPT

## 2022-07-12 PROCEDURE — ZZZZZ: CPT

## 2022-07-12 PROCEDURE — 71045 X-RAY EXAM CHEST 1 VIEW: CPT | Mod: 26

## 2022-07-12 PROCEDURE — 71045 X-RAY EXAM CHEST 1 VIEW: CPT | Mod: 26,77

## 2022-07-12 RX ORDER — POTASSIUM CHLORIDE 20 MEQ
20 PACKET (EA) ORAL
Refills: 0 | Status: COMPLETED | OUTPATIENT
Start: 2022-07-12 | End: 2022-07-12

## 2022-07-12 RX ORDER — MAGNESIUM SULFATE 500 MG/ML
2 VIAL (ML) INJECTION ONCE
Refills: 0 | Status: COMPLETED | OUTPATIENT
Start: 2022-07-12 | End: 2022-07-12

## 2022-07-12 RX ADMIN — Medication 50 MILLIEQUIVALENT(S): at 09:46

## 2022-07-12 RX ADMIN — Medication 1 MILLIGRAM(S): at 11:34

## 2022-07-12 RX ADMIN — Medication 25 GRAM(S): at 09:47

## 2022-07-12 RX ADMIN — Medication 1 PATCH: at 11:18

## 2022-07-12 RX ADMIN — CEFIDEROCOL SULFATE TOSYLATE 33.33 MILLIGRAM(S): 1 INJECTION, POWDER, FOR SOLUTION INTRAVENOUS at 10:51

## 2022-07-12 RX ADMIN — DEXMEDETOMIDINE HYDROCHLORIDE IN 0.9% SODIUM CHLORIDE 3.52 MICROGRAM(S)/KG/HR: 4 INJECTION INTRAVENOUS at 21:54

## 2022-07-12 RX ADMIN — Medication 50 MILLIEQUIVALENT(S): at 14:02

## 2022-07-12 RX ADMIN — HEPARIN SODIUM 5000 UNIT(S): 5000 INJECTION INTRAVENOUS; SUBCUTANEOUS at 05:54

## 2022-07-12 RX ADMIN — PROPOFOL 0.04 MICROGRAM(S)/KG/MIN: 10 INJECTION, EMULSION INTRAVENOUS at 05:58

## 2022-07-12 RX ADMIN — CEFIDEROCOL SULFATE TOSYLATE 33.33 MILLIGRAM(S): 1 INJECTION, POWDER, FOR SOLUTION INTRAVENOUS at 17:18

## 2022-07-12 RX ADMIN — SPIRONOLACTONE 100 MILLIGRAM(S): 25 TABLET, FILM COATED ORAL at 05:53

## 2022-07-12 RX ADMIN — CHLORHEXIDINE GLUCONATE 1 APPLICATION(S): 213 SOLUTION TOPICAL at 05:59

## 2022-07-12 RX ADMIN — PANTOPRAZOLE SODIUM 40 MILLIGRAM(S): 20 TABLET, DELAYED RELEASE ORAL at 11:34

## 2022-07-12 RX ADMIN — QUETIAPINE FUMARATE 100 MILLIGRAM(S): 200 TABLET, FILM COATED ORAL at 17:19

## 2022-07-12 RX ADMIN — PROPOFOL 0.04 MICROGRAM(S)/KG/MIN: 10 INJECTION, EMULSION INTRAVENOUS at 19:54

## 2022-07-12 RX ADMIN — CEFIDEROCOL SULFATE TOSYLATE 33.33 MILLIGRAM(S): 1 INJECTION, POWDER, FOR SOLUTION INTRAVENOUS at 03:12

## 2022-07-12 RX ADMIN — Medication 1 PATCH: at 19:30

## 2022-07-12 RX ADMIN — SCOPALAMINE 1 PATCH: 1 PATCH, EXTENDED RELEASE TRANSDERMAL at 11:33

## 2022-07-12 RX ADMIN — Medication 250 MILLIGRAM(S): at 23:59

## 2022-07-12 RX ADMIN — CHLORHEXIDINE GLUCONATE 15 MILLILITER(S): 213 SOLUTION TOPICAL at 17:19

## 2022-07-12 RX ADMIN — QUETIAPINE FUMARATE 100 MILLIGRAM(S): 200 TABLET, FILM COATED ORAL at 05:53

## 2022-07-12 RX ADMIN — PROPOFOL 0.04 MICROGRAM(S)/KG/MIN: 10 INJECTION, EMULSION INTRAVENOUS at 21:55

## 2022-07-12 RX ADMIN — Medication 250 MILLIGRAM(S): at 11:35

## 2022-07-12 RX ADMIN — DEXMEDETOMIDINE HYDROCHLORIDE IN 0.9% SODIUM CHLORIDE 3.52 MICROGRAM(S)/KG/HR: 4 INJECTION INTRAVENOUS at 19:54

## 2022-07-12 RX ADMIN — CHLORHEXIDINE GLUCONATE 15 MILLILITER(S): 213 SOLUTION TOPICAL at 05:53

## 2022-07-12 RX ADMIN — Medication 250 MILLIGRAM(S): at 05:53

## 2022-07-12 RX ADMIN — Medication 50 MILLIEQUIVALENT(S): at 10:57

## 2022-07-12 RX ADMIN — SCOPALAMINE 1 PATCH: 1 PATCH, EXTENDED RELEASE TRANSDERMAL at 07:40

## 2022-07-12 RX ADMIN — CEFIDEROCOL SULFATE TOSYLATE 33.33 MILLIGRAM(S): 1 INJECTION, POWDER, FOR SOLUTION INTRAVENOUS at 23:59

## 2022-07-12 RX ADMIN — SCOPALAMINE 1 PATCH: 1 PATCH, EXTENDED RELEASE TRANSDERMAL at 19:30

## 2022-07-12 RX ADMIN — Medication 1 PATCH: at 07:40

## 2022-07-12 RX ADMIN — Medication 1 PATCH: at 11:33

## 2022-07-12 RX ADMIN — Medication 250 MILLIGRAM(S): at 00:56

## 2022-07-12 RX ADMIN — DEXMEDETOMIDINE HYDROCHLORIDE IN 0.9% SODIUM CHLORIDE 3.52 MICROGRAM(S)/KG/HR: 4 INJECTION INTRAVENOUS at 05:58

## 2022-07-12 RX ADMIN — Medication 250 MILLIGRAM(S): at 17:19

## 2022-07-12 RX ADMIN — Medication 0.1 MILLIGRAM(S): at 19:54

## 2022-07-12 RX ADMIN — Medication 100 MILLIGRAM(S): at 11:34

## 2022-07-12 NOTE — PROGRESS NOTE ADULT - ASSESSMENT
26 year old female with hx of asthma, untreated Hep C, IVDA, anasarca was admitted to medical floor  with increased SOB and anasarca  Pt admits to using opiates 2 grams per day IV into her thighs  x years with minimal sobriety. Pt has been on MMTP in 2020. Pt is contemplating going back on program. Pt denies any other substance abuse. Pt states used a xanax for wd about 3 days ago.      Hepatitis C with Cirrhosis no compliant with treatment  Hx of withdrawal   variable periods of sobriety in the past    on floor pt was diuresed with improvement of symptoms  when the following events took place  "RRT was called for pt in the [facility]    patient was found on the floor in ER, gasping for air, short of breath, tachypnea, bleeding profusely from her posterior scalp, was placed in a stretcher, was hypoxic 70s    emergently intubated, ct scan head ordered re scalp bleeding    several syringes, drug paraphanellia found on patient clothes     pt  intubated and  transferred to ICU       I strongly suspect that she injected illicit drug ( abuses IV heroin) and she went into resp failure secondary to heroin overdose."      Acute respiratory failure with hypoxemia / aspiration pneumonia with sepsis / suspected drug overdose / head laceration s/p fall in lobby / possible seizure / anasarca  pancytopenia with continued fever     - now with c-diff colitis - continue oral vanco   - w/u and antibiotics as per ID   - Keppra weaned and DC'd as per neur   - central line changed    - supplement hypokalemia and  mg level and maintain above 2   - re intubated -> surgical consult for trach if fails SAT and SBT

## 2022-07-12 NOTE — PROGRESS NOTE ADULT - ASSESSMENT
Acute respiratory failure sp intubation and extubation 7/8 followed by reintubation  PNA CT Chest 6/22 - left greater than right lower lobe consolidations; sputum Cx 6/24 Klebsiella oxytoca, Enterobacter cloacae complex- and Acinetobacter buamnii  MSSA pna  seizure like activity  ?? 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 Diff on oral vancomycin   Hypokalemia/hypomagnesemia      PLAN:    CNS:   c/w with sedation (hold methadone dose)  CW Clonapin to 2mg TID  CW Seroquel 100mg BID     HEENT: oral care     PULMONARY:  HOB 45,  wean O2 as tolerated,     CARDIOVASCULAR: goal MAP >65.  Monitor QTc daily  ECHO 7/6: Small to mod generalized effusion. No tamponade . Small mod effusion   present by report on echo 4/21/22  f/u cardiology recommendations; no urgency in diagnostic tap       GI: GI prophylaxis.  OG Feeding.   laxative prn   f/u repeat kub     RENAL: monitor lytes  f/u repeat BMP and replete K and mag as needed     INFECTIOUS DISEASE:   worsening R infiltrate. Fungitell <31 7/5 , Glactomannan pending   Repeat procal 0.14 from 0.36 (6/30)   Acinetobacter buamnii in sputum cx -> switch celestine to cefiderocol 2gm q 6 (nonformulary filled and sent to pharmacy)  -f/u heme onc for BM biopsy for splenomegaly, pancytopenia, thrombocytopenia and persistent fevers   - continue PO vancomycin 250 mg q 6 hours   - f/u parasite smear and babesia PCR (doubt given chronicity of findings), EBV serologies, CMV IgM/IgG, HIV Viral Load, repeat ferritin  -f/u sensitivities for cefiderocol to the acinetobacter  - if hemodynamic compromise, ADD tigecycline 100mg then 50mg q12h IV for acinetobacter coverage  - f/u ID recommendations     HEMATOLOGICAL:    monitor CBC  D-dimer 1322, HIT antibody negative  Restarted DVT ppx (heparin sc)  -f/u heme onc for BM biopsy for splenomegaly, pancytopenia, thrombocytopenia and persistent fevers     ENDOCRINE:  Follow up FS.  Insulin protocol if needed.     MICU  lines: right IJ 7/6   [place] - 7/5   Acute respiratory failure sp intubation and extubation 7/8 followed by reintubation  PNA CT Chest 6/22 - left greater than right lower lobe consolidations; sputum Cx 6/24 Klebsiella oxytoca, Enterobacter cloacae complex- and Acinetobacter buamnii  MSSA pna  seizure like activity  ?? 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 Diff on oral vancomycin   Hypokalemia/hypomagnesemia      PLAN:    CNS:    (hold methadone dose)  CW Clonapin to 2mg TID  CW Seroquel 100mg BID  SBT      HEENT: oral care     PULMONARY:  HOB 45,  wean O2 as tolerated,   f/u surgery for trach eval     CARDIOVASCULAR: goal MAP >65.  Monitor QTc daily  ECHO 7/6: Small to mod generalized effusion. No tamponade . Small mod effusion   present by report on echo 4/21/22  f/u cardiology recommendations; no urgency in diagnostic tap       GI: GI prophylaxis.  OG Feeding.   laxative prn   f/u repeat kub     RENAL: monitor lytes  f/u repeat BMP and replete K and mag as needed     INFECTIOUS DISEASE:   worsening R infiltrate. Fungitell <31 7/5 , Glactomannan pending   Repeat procal 0.14 from 0.36 (6/30)   Acinetobacter buamnii in sputum cx -> switch celestine to cefiderocol 2gm q 6 (nonformulary filled and sent to pharmacy)  -f/u heme onc for BM biopsy for splenomegaly, pancytopenia, thrombocytopenia and persistent fevers   - continue PO vancomycin 250 mg q 6 hours   - f/u parasite smear and babesia PCR (doubt given chronicity of findings), EBV serologies, CMV IgM/IgG, HIV Viral Load, repeat ferritin  -f/u sensitivities for cefiderocol to the acinetobacter  - if hemodynamic compromise, ADD tigecycline 100mg then 50mg q12h IV for acinetobacter coverage  - f/u ID recommendations     HEMATOLOGICAL:    monitor CBC  D-dimer 1322, HIT antibody negative  hold heparin for now   -f/u heme onc for BM biopsy for splenomegaly, pancytopenia, thrombocytopenia and persistent fevers     ENDOCRINE:  Follow up FS.  Insulin protocol if needed.     MICU  lines: right IJ 7/6   [place] - 7/5   Acute respiratory failure sp intubation and extubation 7/8 followed by reintubation  PNA CT Chest 6/22 - left greater than right lower lobe consolidations; sputum Cx 6/24 Klebsiella oxytoca, Enterobacter cloacae complex- and Acinetobacter buamnii  MSSA pna  seizure like activity  ?? 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 Diff on oral vancomycin   Hypokalemia/hypomagnesemia      PLAN:    CNS:    (hold methadone dose)  CW Clonapin to 2mg TID  CW Seroquel 100mg BID  SBT      HEENT: oral care     PULMONARY:  HOB 45,  wean O2 as tolerated,   f/u surgery for trach eval     CARDIOVASCULAR: goal MAP >65.  Monitor QTc daily  ECHO 7/6: Small to mod generalized effusion. No tamponade . Small mod effusion   present by report on echo 4/21/22  f/u cardiology recommendations; no urgency in diagnostic tap       GI: GI prophylaxis.  OG Feeding.   laxative prn   f/u repeat kub     RENAL: monitor lytes  f/u repeat BMP and replete K and mag as needed     INFECTIOUS DISEASE:   worsening R infiltrate. Fungitell <31 7/5 , Glactomannan pending   Repeat procal 0.14 from 0.36 (6/30)   Acinetobacter buamnii in sputum cx -> switch celestine to cefiderocol 2gm q 6 (nonformulary filled and sent to pharmacy)  -f/u heme onc for BM biopsy for splenomegaly, pancytopenia, thrombocytopenia and persistent fevers -> heme/onc requesting IR eval for BM biopsy  - IR consult placed: f/u recommendations   - continue PO vancomycin 250 mg q 6 hours   - f/u parasite smear and babesia PCR (doubt given chronicity of findings), EBV serologies, CMV IgM/IgG, HIV Viral Load, repeat ferritin  -f/u sensitivities for cefiderocol to the acinetobacter  - if hemodynamic compromise, ADD tigecycline 100mg then 50mg q12h IV for acinetobacter coverage  - f/u ID recommendations     HEMATOLOGICAL:    monitor CBC  D-dimer 1322, HIT antibody negative  hold heparin for now   -f/u heme onc for BM biopsy for splenomegaly, pancytopenia, thrombocytopenia and persistent fevers > heme/onc requesting IR eval for BM biopsy  - IR consult placed: f/u recommendations     ENDOCRINE:  Follow up FS.  Insulin protocol if needed.     MICU  lines: right IJ 7/6   [place] - 7/5

## 2022-07-12 NOTE — PROGRESS NOTE ADULT - SUBJECTIVE AND OBJECTIVE BOX
Patient is a 26y old  Female who presents with a chief complaint of anasarca (12 Jul 2022 07:46)      INTERVAL HPI/OVERNIGHT EVENTS:   Remains intubated and sedated     ICU Vital Signs Last 24 Hrs  T(C): 38 (12 Jul 2022 07:00), Max: 38 (12 Jul 2022 03:26)  T(F): 100.4 (12 Jul 2022 07:00), Max: 100.4 (12 Jul 2022 03:26)  HR: 71 (12 Jul 2022 09:26) (56 - 77)  BP: 170/96 (12 Jul 2022 09:26) (138/84 - 170/96)  BP(mean): 126 (12 Jul 2022 09:26) (105 - 126)  ABP: --  ABP(mean): --  RR: 37 (12 Jul 2022 09:26) (20 - 48)  SpO2: 100% (12 Jul 2022 09:26) (97% - 100%)    O2 Parameters below as of 12 Jul 2022 09:26  Patient On (Oxygen Delivery Method): ventilator    O2 Concentration (%): 35      I&O's Summary    11 Jul 2022 07:01  -  12 Jul 2022 07:00  --------------------------------------------------------  IN: 1882.3 mL / OUT: 2270 mL / NET: -387.7 mL    12 Jul 2022 07:01  -  12 Jul 2022 09:57  --------------------------------------------------------  IN: 153 mL / OUT: 75 mL / NET: 78 mL      Mode: AC/ CMV (Assist Control/ Continuous Mandatory Ventilation)  RR (machine): 25  TV (machine): 370  FiO2: 35  PEEP: 5  MAP: 14  PIP: 26      LABS:                        8.3    2.12  )-----------( 65       ( 12 Jul 2022 05:34 )             27.2     07-12    147<H>  |  113<H>  |  24<H>  ----------------------------<  89  2.8<L>   |  21  |  0.7    Ca    8.2<L>      12 Jul 2022 05:34  Phos  2.9     07-12  Mg     1.8     07-12    TPro  5.4<L>  /  Alb  2.7<L>  /  TBili  0.4  /  DBili  x   /  AST  40  /  ALT  37  /  AlkPhos  347<H>  07-12        CAPILLARY BLOOD GLUCOSE        ABG - ( 12 Jul 2022 04:25 )  pH, Arterial: 7.43  pH, Blood: x     /  pCO2: 31    /  pO2: 149   / HCO3: 21    / Base Excess: -3.1  /  SaO2: 99.8                RADIOLOGY & ADDITIONAL TESTS:    Consultant(s) Notes Reviewed:  [x ] YES  [ ] NO    MEDICATIONS  (STANDING):  cefiderocol IVPB 2000 milliGRAM(s) IV Intermittent every 6 hours  chlorhexidine 0.12% Liquid 15 milliLiter(s) Oral Mucosa every 12 hours  chlorhexidine 4% Liquid 1 Application(s) Topical daily  dexMEDEtomidine Infusion 0.2 MICROgram(s)/kG/Hr (3.52 mL/Hr) IV Continuous <Continuous>  fentaNYL   Infusion. 0.5 MICROgram(s)/kG/Hr (3.52 mL/Hr) IV Continuous <Continuous>  folic acid 1 milliGRAM(s) Oral daily  nicotine -  14 mG/24Hr(s) Patch 1 patch Transdermal daily  norepinephrine Infusion 0.05 MICROgram(s)/kG/Min (8.03 mL/Hr) IV Continuous <Continuous>  pantoprazole  Injectable 40 milliGRAM(s) IV Push daily  potassium chloride  20 mEq/100 mL IVPB 20 milliEquivalent(s) IV Intermittent every 2 hours  propofol Infusion 0.1 MICROgram(s)/kG/Min (0.04 mL/Hr) IV Continuous <Continuous>  QUEtiapine 100 milliGRAM(s) Oral two times a day  scopolamine 1 mG/72 Hr(s) Patch 1 Patch Transdermal every 72 hours  spironolactone 100 milliGRAM(s) Oral daily  thiamine 100 milliGRAM(s) Oral daily  vancomycin    Solution 250 milliGRAM(s) Oral every 6 hours    MEDICATIONS  (PRN):  acetaminophen  Suppository .. 650 milliGRAM(s) Rectal every 6 hours PRN Temp greater or equal to 38C (100.4F), Mild Pain (1 - 3)  ALBUTerol    90 MICROgram(s) HFA Inhaler 1 Puff(s) Inhalation every 4 hours PRN Shortness of Breath and/or Wheezing  cloNIDine 0.1 milliGRAM(s) Oral every 6 hours PRN opiate withdrawal  hydrOXYzine hydrochloride 50 milliGRAM(s) Oral every 6 hours PRN Anxiety  ibuprofen  Tablet. 600 milliGRAM(s) Oral every 6 hours PRN Temp greater or equal to 38C (100.4F)  ibuprofen  Tablet. 400 milliGRAM(s) Oral every 6 hours PRN Mild Pain (1 - 3)  sodium chloride 0.9% lock flush 10 milliLiter(s) IV Push every 1 hour PRN Pre/post blood products, medications, blood draw, and to maintain line patency      PHYSICAL EXAM:  GENERAL:   HEAD:  Atraumatic, Normocephalic  EYES: EOMI, PERRLA, conjunctiva and sclera clear  NECK: Supple, No JVD, Normal thyroid, no enlarged nodes  NERVOUS SYSTEM:  Alert & Awake.   CHEST/LUNG: B/L good air entry; No rales, rhonchi, or wheezing  HEART: S1S2 normal, no S3, Regular rate and rhythm; No murmurs  ABDOMEN: Soft, Nontender, Nondistended; Bowel sounds present  EXTREMITIES:  2+ Peripheral Pulses, No clubbing, cyanosis, or edema  LYMPH: No lymphadenopathy noted  SKIN: No rashes or lesions    Care Discussed with Consultants/Other Providers [ x] YES  [ ] NO

## 2022-07-12 NOTE — PROGRESS NOTE ADULT - SUBJECTIVE AND OBJECTIVE BOX
Patient seen and evaluated this am sedated on ventilator on propofol and Precedx      T(F): 99.5 (07-12-22 @ 12:00), Max: 100.4 (07-12-22 @ 03:26)  HR: 79 (07-12-22 @ 13:26)  BP: 148/91 (07-12-22 @ 13:26)  RR: 20  SpO2: 100% (07-12-22 @ 13:26) (99% - 100%)    PHYSICAL EXAM:  GENERAL: NAD  HEAD:  Atraumatic, Normocephalic  EYES: EOMI, PERRLA, conjunctiva and sclera clear  NERVOUS SYSTEM:   no focal deficits   CHEST/LUNG:  bilateral rhonchi  HEART: Regular rate and rhythm; No murmurs, rubs, or gallops  ABDOMEN: Soft, Nontender, Nondistended; Bowel sounds present  EXTREMITIES:  2+ Peripheral Pulses, No clubbing, cyanosis, or edema    LABS  07-12    147<H>  |  113<H>  |  24<H>  ----------------------------<  89  2.8<L>   |  21  |  0.7    Ca    8.2<L>      12 Jul 2022 05:34  Phos  2.9     07-12  Mg     1.8     07-12    TPro  5.4<L>  /  Alb  2.7<L>  /  TBili  0.4  /  DBili  x   /  AST  40  /  ALT  37  /  AlkPhos  347<H>  07-12                          8.3    2.12  )-----------( 65       ( 12 Jul 2022 05:34 )             27.2       Mode: AC/ CMV (Assist Control/ Continuous Mandatory Ventilation)  RR (machine): 25  TV (machine): 370  FiO2: 35  PEEP: 5    Culture Results:   Babesia microti PCR  Results: NOT detected  ***************Result Note*************  The detection of Babesia microti by PCR has only been  validated for whole blood; this test has not been approved  by the US Food and Drug Administration (FDA). Performance  characteristics of this assay have been determined by  [employer]. The clinical significance  of results should be considered in conjunction with the  overall clinical presentation of the patient. Result is not  intended to be used as the sole means for clinical diagnosis  or patient management decisions.  One negative sample does not necessarily rule  out the presence of a parasitic infection. (07-10-22)  Culture Results:   No growth to date. (07-08-22)  Culture Results:   No Growth Final (07-06-22)  Culture Results:   No Growth Final (07-06-22)  Culture Results:   Moderate Acinetobacter baumannii/nosocom group (Carbapenem Resistant)  Cefiderocol interpretations based on FDA breakpoints.  Normal Respiratory Kelly absent (07-04-22)  Culture Results:   No Growth Final (06-29-22)  Culture Results:   No Growth Final (06-29-22)  Culture Results:   No Growth Final (06-28-22)  Culture Results:   Numerous Mixed gram negative rods  Moderate Staphylococcus aureus  Normal Respiratory Kelly present (06-27-22)  Culture Results:   Moderate Klebsiella oxytoca/Raoutella ornithinolytica  Moderate Enterobacter cloacae complex  Normal Respiratory Kelly absent (06-24-22)    RADIOLOGY  < from: Xray Chest 1 View- PORTABLE-Routine (Xray Chest 1 View- PORTABLE-Routine in AM.) (07.12.22 @ 04:47) >  Increasing left basilar opacity.    < end of copied text >    MEDICATIONS  (STANDING):  cefiderocol IVPB 2000 milliGRAM(s) IV Intermittent every 6 hours  chlorhexidine 0.12% Liquid 15 milliLiter(s) Oral Mucosa every 12 hours  chlorhexidine 4% Liquid 1 Application(s) Topical daily  dexMEDEtomidine Infusion 0.2 MICROgram(s)/kG/Hr (3.52 mL/Hr) IV Continuous <Continuous>  fentaNYL   Infusion. 0.5 MICROgram(s)/kG/Hr (3.52 mL/Hr) IV Continuous <Continuous>  folic acid 1 milliGRAM(s) Oral daily  nicotine -  14 mG/24Hr(s) Patch 1 patch Transdermal daily  norepinephrine Infusion 0.05 MICROgram(s)/kG/Min (8.03 mL/Hr) IV Continuous <Continuous>  pantoprazole  Injectable 40 milliGRAM(s) IV Push daily  propofol Infusion 0.1 MICROgram(s)/kG/Min (0.04 mL/Hr) IV Continuous <Continuous>  QUEtiapine 100 milliGRAM(s) Oral two times a day  scopolamine 1 mG/72 Hr(s) Patch 1 Patch Transdermal every 72 hours  spironolactone 100 milliGRAM(s) Oral daily  thiamine 100 milliGRAM(s) Oral daily  vancomycin    Solution 250 milliGRAM(s) Oral every 6 hours    MEDICATIONS  (PRN):  acetaminophen  Suppository .. 650 milliGRAM(s) Rectal every 6 hours PRN Temp greater or equal to 38C (100.4F), Mild Pain (1 - 3)  ALBUTerol    90 MICROgram(s) HFA Inhaler 1 Puff(s) Inhalation every 4 hours PRN Shortness of Breath and/or Wheezing  cloNIDine 0.1 milliGRAM(s) Oral every 6 hours PRN opiate withdrawal  hydrOXYzine hydrochloride 50 milliGRAM(s) Oral every 6 hours PRN Anxiety  ibuprofen  Tablet. 600 milliGRAM(s) Oral every 6 hours PRN Temp greater or equal to 38C (100.4F)  ibuprofen  Tablet. 400 milliGRAM(s) Oral every 6 hours PRN Mild Pain (1 - 3)  sodium chloride 0.9% lock flush 10 milliLiter(s) IV Push every 1 hour PRN Pre/post blood products, medications, blood draw, and to maintain line patency

## 2022-07-12 NOTE — PROGRESS NOTE ADULT - SUBJECTIVE AND OBJECTIVE BOX
Patient is a 26y old  Female who presents with a chief complaint of anasarca (11 Jul 2022 19:50)      Over Night Events:  Patient seen and examined.   yesterday agitated when sedation lower tachypnea     ROS:  See HPI    PHYSICAL EXAM    ICU Vital Signs Last 24 Hrs  T(C): 38 (12 Jul 2022 07:00), Max: 38 (12 Jul 2022 03:26)  T(F): 100.4 (12 Jul 2022 07:00), Max: 100.4 (12 Jul 2022 03:26)  HR: 70 (12 Jul 2022 07:26) (56 - 77)  BP: 158/90 (12 Jul 2022 07:26) (138/84 - 158/93)  BP(mean): 118 (12 Jul 2022 07:26) (105 - 121)  ABP: --  ABP(mean): --  RR: 34 (12 Jul 2022 07:26) (20 - 48)  SpO2: 100% (12 Jul 2022 07:26) (97% - 100%)    O2 Parameters below as of 12 Jul 2022 07:26  Patient On (Oxygen Delivery Method): ventilator    O2 Concentration (%): 35        General: Awake   HEENT:           et tube      Lymph Nodes: NO cervical LN   Lungs: Bilateral BS  Cardiovascular: Regular   Abdomen: Soft, Positive BS  Extremities: No clubbing   Skin: warm   Neurological: follow command   Musculoskeletal: move all ext     I&O's Detail    11 Jul 2022 07:01  -  12 Jul 2022 07:00  --------------------------------------------------------  IN:    Dexmedetomidine: 598 mL    IV PiggyBack: 300 mL    IV PiggyBack: 300 mL    Propofol: 553.3 mL    Vital High Protein: 80 mL  Total IN: 1831.3 mL    OUT:    FentaNYL: 0 mL    Indwelling Catheter - Urethral (mL): 2270 mL    Norepinephrine: 0 mL  Total OUT: 2270 mL    Total NET: -438.7 mL          LABS:                          8.5    2.37  )-----------( 58       ( 11 Jul 2022 05:58 )             27.5         12 Jul 2022 05:34    147    |  113    |  24     ----------------------------<  89     2.8     |  21     |  0.7      Ca    8.2        12 Jul 2022 05:34  Phos  2.9       12 Jul 2022 05:34  Mg     1.8       12 Jul 2022 05:34    TPro  5.4    /  Alb  2.7    /  TBili  0.4    /  DBili  x      /  AST  40     /  ALT  37     /  AlkPhos  347    12 Jul 2022 05:34  Amylase x     lipase x                                                                                                                                                    Babesia microti PCR, Blood. (collected 10 Jul 2022 15:43)  Source: .Blood None  Final Report (11 Jul 2022 09:45):    Babesia microti PCR    Results: NOT detected    ***************Result Note*************    The detection of Babesia microti by PCR has only been    validated for whole blood; this test has not been approved    by the US Food and Drug Administration (FDA). Performance    characteristics of this assay have been determined by    [employer]. The clinical significance    of results should be considered in conjunction with the    overall clinical presentation of the patient. Result is not    intended to be used as the sole means for clinical diagnosis    or patient management decisions.    One negative sample does not necessarily rule    out the presence of a parasitic infection.                                                   Mode: AC/ CMV (Assist Control/ Continuous Mandatory Ventilation)  RR (machine): 25  TV (machine): 370  FiO2: 35  PEEP: 5  ITime: 1  MAP: 9  PIP: 18                                      ABG - ( 12 Jul 2022 04:25 )  pH, Arterial: 7.43  pH, Blood: x     /  pCO2: 31    /  pO2: 149   / HCO3: 21    / Base Excess: -3.1  /  SaO2: 99.8                MEDICATIONS  (STANDING):  cefiderocol IVPB 2000 milliGRAM(s) IV Intermittent every 6 hours  chlorhexidine 0.12% Liquid 15 milliLiter(s) Oral Mucosa every 12 hours  chlorhexidine 4% Liquid 1 Application(s) Topical daily  dexMEDEtomidine Infusion 0.2 MICROgram(s)/kG/Hr (3.52 mL/Hr) IV Continuous <Continuous>  fentaNYL   Infusion. 0.5 MICROgram(s)/kG/Hr (3.52 mL/Hr) IV Continuous <Continuous>  folic acid 1 milliGRAM(s) Oral daily  heparin   Injectable 5000 Unit(s) SubCutaneous every 12 hours  nicotine -  14 mG/24Hr(s) Patch 1 patch Transdermal daily  norepinephrine Infusion 0.05 MICROgram(s)/kG/Min (8.03 mL/Hr) IV Continuous <Continuous>  pantoprazole  Injectable 40 milliGRAM(s) IV Push daily  propofol Infusion 0.1 MICROgram(s)/kG/Min (0.04 mL/Hr) IV Continuous <Continuous>  QUEtiapine 100 milliGRAM(s) Oral two times a day  scopolamine 1 mG/72 Hr(s) Patch 1 Patch Transdermal every 72 hours  spironolactone 100 milliGRAM(s) Oral daily  thiamine 100 milliGRAM(s) Oral daily  vancomycin    Solution 250 milliGRAM(s) Oral every 6 hours    MEDICATIONS  (PRN):  acetaminophen  Suppository .. 650 milliGRAM(s) Rectal every 6 hours PRN Temp greater or equal to 38C (100.4F), Mild Pain (1 - 3)  ALBUTerol    90 MICROgram(s) HFA Inhaler 1 Puff(s) Inhalation every 4 hours PRN Shortness of Breath and/or Wheezing  cloNIDine 0.1 milliGRAM(s) Oral every 6 hours PRN opiate withdrawal  hydrOXYzine hydrochloride 50 milliGRAM(s) Oral every 6 hours PRN Anxiety  ibuprofen  Tablet. 600 milliGRAM(s) Oral every 6 hours PRN Temp greater or equal to 38C (100.4F)  ibuprofen  Tablet. 400 milliGRAM(s) Oral every 6 hours PRN Mild Pain (1 - 3)  sodium chloride 0.9% lock flush 10 milliLiter(s) IV Push every 1 hour PRN Pre/post blood products, medications, blood draw, and to maintain line patency          Xrays:  TLC:  OG:  ET tube:                                                                                    stable    ECHO:  [facility] ICU:

## 2022-07-12 NOTE — PROGRESS NOTE ADULT - ASSESSMENT
Patient is a 26 year old female with hx of asthma, untreated Hep C, IVDA, anasarca presenting with increased dyspnea since yesterday. Patient has been short of breath chronically and has been admitted for fluid overload. Patient was intubated 6/16/22 after episode of seizure and unresponsiveness. Recently extubated 7/8, reintubated 7/9 after increased WOB and tachypnea. General surgery consulted for tracheostomy after failed SBT today.    Plan    Wean vent settings as tolerated, daily SBT, currently 35/5  Monitor for pressor support  Was febrile this morning to 100.4F  F/U heme onc about bone marrow biopsy  ID recs appreciated  Plan for OR Monday 7/18 for tracheostomy  Pre-op orders 7/17: NPO after midnight, IVF at midnight, T+S, COVID, Coags, CBC, BMP, Mag, Phos  Will need electrolytes corrected prior to surgery: K>4, Mg >2, Phos >3    [id number] Patient is a 26 year old female with hx of asthma, untreated Hep C, IVDA, anasarca presenting with increased dyspnea since yesterday. Patient has been short of breath chronically and has been admitted for fluid overload. Patient was intubated 6/16/22 after episode of seizure and unresponsiveness. Recently extubated 7/8, reintubated 7/9 after increased WOB and tachypnea. General surgery consulted for tracheostomy after failed SBT today.    Plan    Wean vent settings as tolerated, daily SBT, currently 35/5  Monitor for pressor support  Was febrile this morning to 100.4F  F/U heme onc about bone marrow biopsy  ID recs appreciated  Plan for OR Friday 7/15 for tracheostomy  Pre-op orders 7/14: NPO after midnight, IVF at midnight, T+S, COVID, Coags, CBC, BMP, Mag, Phos  Will need electrolytes corrected prior to surgery: K>4, Mg >2, Phos >3    [id number]

## 2022-07-12 NOTE — PROGRESS NOTE ADULT - SUBJECTIVE AND OBJECTIVE BOX
GENERAL SURGERY PROGRESS NOTE    Patient: POOJA DIANE , 26y ([birth year])Female   MRN: [id number]  [address]  Visit: 06-15-22 Inpatient  Date: 07-12-22 @ 13:03        Procedure/Dx/Injuries: intubated, consulted for trach    Events of past 24 hours: patient awake and alert, intubated, slightly overbreathing vent, failed SBT today due to tachypnea/agitation per RN.    PAST MEDICAL & SURGICAL HISTORY:  Hepatitis C      Asthma      Anxiety and depression      IV drug abuse  heroin      No significant past surgical history          Vitals:   T(F): 99.5 (07-12-22 @ 12:00), Max: 100.4 (07-12-22 @ 03:26)  HR: 109 (07-12-22 @ 12:00)  BP: 148/90 (07-12-22 @ 12:00)  RR: 22 (07-12-22 @ 12:00)  SpO2: 100% (07-12-22 @ 11:26)  Mode: AC/ CMV (Assist Control/ Continuous Mandatory Ventilation), RR (machine): 25, TV (machine): 370, FiO2: 35, PEEP: 5, MAP: 14, PIP: 26    Diet, NPO with Tube Feed:   Tube Feeding Modality: Orogastric  Vital High Protein  Total Volume for 24 Hours (mL): 1200  Continuous  Starting Tube Feed Rate mL per Hour: 10  Until Goal Tube Feed Rate (mL per Hour): 50  Tube Feed Duration (in Hours): 24  Tube Feed Start Time: 16:00      Fluids:     I & O's:    07-11-22 @ 07:01  -  07-12-22 @ 07:00  --------------------------------------------------------  IN:    Dexmedetomidine: 624 mL    IV PiggyBack: 300 mL    IV PiggyBack: 300 mL    Propofol: 578.3 mL    Vital High Protein: 80 mL  Total IN: 1882.3 mL    OUT:    FentaNYL: 0 mL    Indwelling Catheter - Urethral (mL): 2270 mL    Norepinephrine: 0 mL  Total OUT: 2270 mL    Total NET: -387.7 mL        Bowel Movement: : [] YES [] NO  Flatus: : [] YES [] NO    PHYSICAL EXAM:  General: NAD, AAOx3, calm and cooperative  HEENT: MARIANNA, EOMI, Trachea ML, Neck supple  Cardiac: RRR S1, S2, no Murmurs, rubs or gallops  Respiratory: CTAB  Abdomen: Soft, non-distended, non-tender    MEDICATIONS  (STANDING):  cefiderocol IVPB 2000 milliGRAM(s) IV Intermittent every 6 hours  chlorhexidine 0.12% Liquid 15 milliLiter(s) Oral Mucosa every 12 hours  chlorhexidine 4% Liquid 1 Application(s) Topical daily  dexMEDEtomidine Infusion 0.2 MICROgram(s)/kG/Hr (3.52 mL/Hr) IV Continuous <Continuous>  fentaNYL   Infusion. 0.5 MICROgram(s)/kG/Hr (3.52 mL/Hr) IV Continuous <Continuous>  folic acid 1 milliGRAM(s) Oral daily  nicotine -  14 mG/24Hr(s) Patch 1 patch Transdermal daily  norepinephrine Infusion 0.05 MICROgram(s)/kG/Min (8.03 mL/Hr) IV Continuous <Continuous>  pantoprazole  Injectable 40 milliGRAM(s) IV Push daily  potassium chloride  20 mEq/100 mL IVPB 20 milliEquivalent(s) IV Intermittent every 2 hours  propofol Infusion 0.1 MICROgram(s)/kG/Min (0.04 mL/Hr) IV Continuous <Continuous>  QUEtiapine 100 milliGRAM(s) Oral two times a day  scopolamine 1 mG/72 Hr(s) Patch 1 Patch Transdermal every 72 hours  spironolactone 100 milliGRAM(s) Oral daily  thiamine 100 milliGRAM(s) Oral daily  vancomycin    Solution 250 milliGRAM(s) Oral every 6 hours    MEDICATIONS  (PRN):  acetaminophen  Suppository .. 650 milliGRAM(s) Rectal every 6 hours PRN Temp greater or equal to 38C (100.4F), Mild Pain (1 - 3)  ALBUTerol    90 MICROgram(s) HFA Inhaler 1 Puff(s) Inhalation every 4 hours PRN Shortness of Breath and/or Wheezing  cloNIDine 0.1 milliGRAM(s) Oral every 6 hours PRN opiate withdrawal  hydrOXYzine hydrochloride 50 milliGRAM(s) Oral every 6 hours PRN Anxiety  ibuprofen  Tablet. 600 milliGRAM(s) Oral every 6 hours PRN Temp greater or equal to 38C (100.4F)  ibuprofen  Tablet. 400 milliGRAM(s) Oral every 6 hours PRN Mild Pain (1 - 3)  sodium chloride 0.9% lock flush 10 milliLiter(s) IV Push every 1 hour PRN Pre/post blood products, medications, blood draw, and to maintain line patency      DVT PROPHYLAXIS:   GI PROPHYLAXIS: pantoprazole  Injectable 40 milliGRAM(s) IV Push daily    ANTICOAGULATION:   ANTIBIOTICS:  cefiderocol IVPB 2000 milliGRAM(s)  vancomycin    Solution 250 milliGRAM(s)            LAB/STUDIES:  Labs:  CAPILLARY BLOOD GLUCOSE                              8.3    2.12  )-----------( 65       ( 12 Jul 2022 05:34 )             27.2       Auto Neutrophil %: 79.1 % (07-12-22 @ 05:34)  Auto Immature Granulocyte %: 0.5 % (07-12-22 @ 05:34)    07-12    147<H>  |  113<H>  |  24<H>  ----------------------------<  89  2.8<L>   |  21  |  0.7      Calcium, Total Serum: 8.2 mg/dL (07-12-22 @ 05:34)      LFTs:             5.4  | 0.4  | 40       ------------------[347     ( 12 Jul 2022 05:34 )  2.7  | x    | 37          Lipase:x      Amylase:x         Blood Gas Arterial, Lactate: 0.40 mmol/L (07-12-22 @ 04:25)  Blood Gas Arterial, Lactate: 0.60 mmol/L (07-11-22 @ 04:43)  Blood Gas Arterial, Lactate: 0.60 mmol/L (07-10-22 @ 05:41)    ABG - ( 12 Jul 2022 04:25 )  pH: 7.43  /  pCO2: 31    /  pO2: 149   / HCO3: 21    / Base Excess: -3.1  /  SaO2: 99.8            ABG - ( 11 Jul 2022 04:43 )  pH: 7.40  /  pCO2: 35    /  pO2: 123   / HCO3: 22    / Base Excess: -2.5  /  SaO2: 99.7            ABG - ( 10 Jul 2022 05:41 )  pH: 7.47  /  pCO2: 32    /  pO2: 179   / HCO3: 23    / Base Excess: x     /  SaO2: 99.8              Coags:                Babesia microti PCR, Blood. (collected 10 Jul 2022 15:43)  Source: .Blood None  Final Report (11 Jul 2022 09:45):    Babesia microti PCR    Results: NOT detected    ***************Result Note*************    The detection of Babesia microti by PCR has only been    validated for whole blood; this test has not been approved    by the [place] Food and Drug Administration (FDA). Performance    characteristics of this assay have been determined by    [facility]. The clinical significance    of results should be considered in conjunction with the    overall clinical presentation of the patient. Result is not    intended to be used as the sole means for clinical diagnosis    or patient management decisions.    One negative sample does not necessarily rule    out the presence of a parasitic infection.

## 2022-07-12 NOTE — CONSULT NOTE ADULT - SUBJECTIVE AND OBJECTIVE BOX
INTERVENTIONAL RADIOLOGY CONSULT:     Procedure Requested: image guided paracentesis     HPI:  Patient is a 26 year old female with hx of asthma, untreated Hep C, IVDA, anasarca presenting with increased dyspnea since yesterday. Patient has been short of breath chronically and has been admitted for fluid overload. Patient describes worsening symptoms yesterday associated with cough. States generalized edema has remained the same. Patient is actively using heroin. Not on any meds or MMTP.  Denies fever, chills, sore throat, cp, palpitations, abd pain, n/v/d, dysuria, headache, syncope, hemoptysis. + generalized edema (15 Reinaldo 2022 10:03)      PAST MEDICAL & SURGICAL HISTORY:  Hepatitis C      Asthma      Anxiety and depression      IV drug abuse  heroin      No significant past surgical history          MEDICATIONS  (STANDING):  cefiderocol IVPB 2000 milliGRAM(s) IV Intermittent every 6 hours  chlorhexidine 0.12% Liquid 15 milliLiter(s) Oral Mucosa every 12 hours  chlorhexidine 4% Liquid 1 Application(s) Topical daily  dexMEDEtomidine Infusion 0.2 MICROgram(s)/kG/Hr (3.52 mL/Hr) IV Continuous <Continuous>  fentaNYL   Infusion. 0.5 MICROgram(s)/kG/Hr (3.52 mL/Hr) IV Continuous <Continuous>  folic acid 1 milliGRAM(s) Oral daily  nicotine -  14 mG/24Hr(s) Patch 1 patch Transdermal daily  norepinephrine Infusion 0.05 MICROgram(s)/kG/Min (8.03 mL/Hr) IV Continuous <Continuous>  pantoprazole  Injectable 40 milliGRAM(s) IV Push daily  propofol Infusion 0.1 MICROgram(s)/kG/Min (0.04 mL/Hr) IV Continuous <Continuous>  QUEtiapine 100 milliGRAM(s) Oral two times a day  scopolamine 1 mG/72 Hr(s) Patch 1 Patch Transdermal every 72 hours  spironolactone 100 milliGRAM(s) Oral daily  thiamine 100 milliGRAM(s) Oral daily  vancomycin    Solution 250 milliGRAM(s) Oral every 6 hours    MEDICATIONS  (PRN):  acetaminophen  Suppository .. 650 milliGRAM(s) Rectal every 6 hours PRN Temp greater or equal to 38C (100.4F), Mild Pain (1 - 3)  ALBUTerol    90 MICROgram(s) HFA Inhaler 1 Puff(s) Inhalation every 4 hours PRN Shortness of Breath and/or Wheezing  cloNIDine 0.1 milliGRAM(s) Oral every 6 hours PRN opiate withdrawal  hydrOXYzine hydrochloride 50 milliGRAM(s) Oral every 6 hours PRN Anxiety  ibuprofen  Tablet. 600 milliGRAM(s) Oral every 6 hours PRN Temp greater or equal to 38C (100.4F)  ibuprofen  Tablet. 400 milliGRAM(s) Oral every 6 hours PRN Mild Pain (1 - 3)  sodium chloride 0.9% lock flush 10 milliLiter(s) IV Push every 1 hour PRN Pre/post blood products, medications, blood draw, and to maintain line patency      Allergies    buprenorphine (Rash)  Suboxone (Rash)    Intolerances    FAMILY HISTORY:  No pertinent family history in first degree relatives        Physical Exam:   Vital Signs Last 24 Hrs  T(C): 37.7 (12 Jul 2022 15:00), Max: 38 (12 Jul 2022 03:26)  T(F): 99.9 (12 Jul 2022 15:00), Max: 100.4 (12 Jul 2022 03:26)  HR: 79 (12 Jul 2022 15:26) (56 - 109)  BP: 169/99 (12 Jul 2022 15:26) (138/84 - 170/96)  BP(mean): 127 (12 Jul 2022 15:26) (105 - 127)  RR: 25 (12 Jul 2022 15:26) (20 - 38)  SpO2: 100% (12 Jul 2022 15:26) (99% - 100%)    Parameters below as of 12 Jul 2022 15:26  Patient On (Oxygen Delivery Method): ventilator    O2 Concentration (%): 35    Labs:                         8.3    2.12  )-----------( 65       ( 12 Jul 2022 05:34 )             27.2     07-12    147<H>  |  113<H>  |  24<H>  ----------------------------<  89  2.8<L>   |  21  |  0.7    Ca    8.2<L>      12 Jul 2022 05:34  Phos  2.9     07-12  Mg     1.8     07-12    TPro  5.4<L>  /  Alb  2.7<L>  /  TBili  0.4  /  DBili  x   /  AST  40  /  ALT  37  /  AlkPhos  347<H>  07-12        Pertinent labs:                      8.3    2.12  )-----------( 65       ( 12 Jul 2022 05:34 )             27.2       07-12    147<H>  |  113<H>  |  24<H>  ----------------------------<  89  2.8<L>   |  21  |  0.7    Ca    8.2<L>      12 Jul 2022 05:34  Phos  2.9     07-12  Mg     1.8     07-12    TPro  5.4<L>  /  Alb  2.7<L>  /  TBili  0.4  /  DBili  x   /  AST  40  /  ALT  37  /  AlkPhos  347<H>  07-12      Radiology & Additional Studies:   Radiology imaging reviewed.       ASSESSMENT/ PLAN:   Patient is a 26 year old female with hx of asthma, untreated Hep C, IVDA, anasarca presenting with increased dyspnea since yesterday. Patient has been short of breath chronically and has been admitted for fluid overload. Patient describes worsening symptoms yesterday associated with cough. States generalized edema has remained the same. Patient is actively using heroin. Not on any meds or MMTP. Consulted for image guided paracentesis due to suspected ascites and abdominal distention. IR has been consulted in the past for paracentesis on this patient, on US evaluation patient with diffuse anasarca and minimal ascites not amenable to drainage. Since last consult patient is now intubated, sedated on vent on propofol and precedex. Due to logistics, patient would have to be transferred to [facility] for procedure. Do not think it is safe at this time to transport patient given current status. Discussed case with attending he agrees to hold off for now until patient is more stable for transfer.   - no IR intervention at this time  - when stable, recommend 4Q US evaluation of ascites  - reconsult PRN      Thank you for the courtesy of this consult, please call [phone] with any further questions.

## 2022-07-12 NOTE — PROGRESS NOTE ADULT - SUBJECTIVE AND OBJECTIVE BOX
Patient is seen and examined at the bed side, is afebrile now, spiked fever.  She remains intubated/vented.    REVIEW OF SYSTEMS: Unable to obtain due to mental status      ALLERGIES: buprenorphine (Rash)  Suboxone (Rash)      ICU Vital Signs Last 24 Hrs  T(C): 37.8 (2022 19:00), Max: 38 (2022 03:26)  T(F): 100 (2022 19:00), Max: 100.4 (2022 03:26)  HR: 93 (2022 19:00) (56 - 109)  BP: 177/95 (2022 19:00) (142/87 - 177/95)  BP(mean): 127 (2022 17:26) (109 - 127)  ABP: --  ABP(mean): --  RR: 38 (2022 19:00) (22 - 47)  SpO2: 100% (:00) (100% - 100%)    O2 Parameters below as of 2022 17:26  Patient On (Oxygen Delivery Method): ventilator    O2 Concentration (%): 35        PHYSICAL EXAM:  GENERAL: Intubated/vented  CHEST/LUNG: Not using accessory muscles   HEART: s1 and s2 present  ABDOMEN:  Nontender and  Nondistended  EXTREMITIES: No pedal  edema  CNS:  Intubated/vented      LABS:                        8.3    2.12  )-----------( 65       ( 2022 05:34 )             27.2                           8.5    2.37  )-----------( 58       ( 2022 05:58 )             27.5        07-12    147<H>  |  113<H>  |  24<H>  ----------------------------<  89  2.8<L>   |  21  |  0.7    Ca    8.2<L>      2022 05:34  Phos  2.9     0712  Mg     1.8     12    TPro  5.4<L>  /  Alb  2.7<L>  /  TBili  0.4  /  DBili  x   /  AST  40  /  ALT  37  /  AlkPhos  347<H>               07-11    144  |  110  |  32<H>  ----------------------------<  96  2.8<L>   |  22  |  0.7    Ca    8.1<L>      2022 05:58  Phos  3.6       Mg     1.9         TPro  5.6<L>  /  Alb  2.8<L>  /  TBili  0.6  /  DBili  x   /  AST  89<H>  /  ALT  58<H>  /  AlkPhos  373<H>            ABG - ( 2022 03:14 )  pH, Arterial: 7.30  pH, Blood: x     /  pCO2: 51    /  pO2: 111   / HCO3: 25    / Base Excess: -1.7  /  SaO2: 99.0          Urinalysis Basic - ( 2022 19:06 )  Color: Yellow / Appearance: Slightly Cloudy / S.025 / pH: x  Gluc: x / Ketone: Trace  / Bili: Negative / Urobili: 1.0 mg/dL   Blood: x / Protein: >=300 mg/dL / Nitrite: Negative   Leuk Esterase: Negative / RBC: 26-50 /HPF / WBC 3-5 /HPF   Sq Epi: x / Non Sq Epi: Occasional /HPF / Bacteria: Moderate        MEDICATIONS  (STANDING):    cefiderocol IVPB 2000 milliGRAM(s) IV Intermittent every 6 hours  chlorhexidine 0.12% Liquid 15 milliLiter(s) Oral Mucosa every 12 hours  chlorhexidine 4% Liquid 1 Application(s) Topical daily  dexMEDEtomidine Infusion 0.2 MICROgram(s)/kG/Hr (3.52 mL/Hr) IV Continuous <Continuous>  fentaNYL   Infusion. 0.5 MICROgram(s)/kG/Hr (3.52 mL/Hr) IV Continuous <Continuous>  folic acid 1 milliGRAM(s) Oral daily  nicotine -  14 mG/24Hr(s) Patch 1 patch Transdermal daily  norepinephrine Infusion 0.05 MICROgram(s)/kG/Min (8.03 mL/Hr) IV Continuous <Continuous>  pantoprazole  Injectable 40 milliGRAM(s) IV Push daily  propofol Infusion 0.1 MICROgram(s)/kG/Min (0.04 mL/Hr) IV Continuous <Continuous>  QUEtiapine 100 milliGRAM(s) Oral two times a day  scopolamine 1 mG/72 Hr(s) Patch 1 Patch Transdermal every 72 hours  spironolactone 100 milliGRAM(s) Oral daily  thiamine 100 milliGRAM(s) Oral daily  vancomycin    Solution 250 milliGRAM(s) Oral every 6 hours      RADIOLOGY & ADDITIONAL TESTS:    < from: Xray Kidney Ureter Bladder (22 @ 13:25) >    FINDINGS: Enteric feeding tube is stable, with tip in the left upper quadrant.  There is a non-obstructive bowel gas pattern.  No abnormal   masses or calcifications are seen.  Stable probe/catheter projects over  the lower pelvis.    < from: CT Angio Chest PE Protocol w/ IV Cont (22 @ 21:14) No pulmonary embolus.    Near complete consolidation/collapse of the left lower lobe, increased as  compared with prior. Mildly increased right lower lobe patchy   consolidative opacities-atelectasis. Adjacent bilateral small pleural effusions. Findings compatible with likely mixed pneumonia and  atelectasis.    Redemonstration of a dilated main pulmonary artery measuring up to 3.8 cm  compatible with pulmonary hypertension.    < from: Xray Chest 1 View- PORTABLE-Routine (Xray Chest 1 View- PORTABLE-Routine in AM.) (22 @ 05:28) >  Stable cardiomegaly and left basilar opacity. Stable support devices.      < from: CT Abdomen and Pelvis w/ IV Cont (22 @ 17:27) > No evidence of retroperitoneal hematoma.    Small bilateral pleural effusions and left basilar consolidation. Moderate ascites redemonstrated.    Moderate pericardial effusion.  Hepatosplenomegaly redemonstrated.        MICROBIOLOGY DATA:  Clostridium difficile Toxin by PCR (22 @ 19:15)   Clostridium difficile Toxin by PCR: RESULT INTERPRETATION:  Detected     Culture - Sputum . (22 @ 13:39)   Gram Stain: Few polymorphonuclear leukocytes per low power field   No Squamous epithelial cells per low power field   Numerous Gram Negative Coccobacilli seen per oil power field   Specimen Source: ET Tube ET Tube   Culture Results:   Moderate Acinetobacter baumannii/nosocomialis group   Normal Respiratory Kelly absent     MRSA/MSSA PCR (22 @ 10:34)   MRSA PCR Result.: Negative    Respiratory Viral Panel with COVID-19 by BROOKE (22 @ 09:19)   Rapid RVP Result: NotDetec   SARS-CoV-2: NotDetec:    Culture - Blood (22 @ 20:31)   Specimen Source: .Blood Blood-Peripheral   Culture Results:   No growth to date.     Culture - Blood (22 @ 20:31)   Specimen Source: .Blood Blood   Culture Results:   No growth to date.     Culture - Sputum . (22 @ 14:00)   - Oxacillin: S <=0.25 Oxacillin predicts susceptibility for dicloxacillin, methicillin, and nafcillin   - Cefazolin: S <=4   - Erythromycin: S <=0.25   - Gentamicin: S <=1 Should not be used as monotherapy   - Clindamycin: S <=0.25   - Rifampin: S <=1 Should not be used as monotherapy   - Tetra/Doxy: S <=1   - Trimethoprim/Sulfamethoxazole: S <=0.5/9.5   - Vancomycin: S 2   Gram Stain:   Moderate polymorphonuclear leukocytes per low power field   Few Squamous epithelial cells per low power field   Moderate Gram positive cocci in pairs seen per oil power field   Few Gram Variable Rods seen per oil power field   - Ampicillin/Sulbactam: S <=8/4   Specimen Source: Trach Asp Tracheal Aspirate   Culture Results:   Numerous Mixed gram negative rods   Moderate Staphylococcus aureus   Normal Respiratory Kelly present   Organism Identification: Staphylococcus aureus   Organism: Staphylococcus aureus   Culture - Blood in AM (22 @ 06:00)   Specimen Source: .Blood Blood   Culture Results: No growth to date.     Culture - Sputum . (22 @ 14:00)   Gram Stain:   Moderate polymorphonuclear leukocytes per low power field   Few Squamous epithelial cells per low power field   Moderate Gram positive cocci in pairs seen per oil power field   Few Gram Variable Rods seen per oil power field   Specimen Source: Trach Asp Tracheal Aspirate   Culture Results:   Numerous Mixed gram negative rods   Moderate Staphylococcus aureus Susceptibility to follow.   Normal Respiratory Kelly absent     Culture - Sputum . (22 @ 17:20)   Gram Stain:   Few polymorphonuclear leukocytes per low power field   Rare Squamous epithelial cells per low power field   Moderate Gram Negative Rods seen per oil power field   - Amikacin: S <=16   - Amikacin: S <=16   - Amoxicillin/Clavulanic Acid: R >16/8   - Amoxicillin/Clavulanic Acid: S <=8/4   - Ampicillin: R 16 These ampicillin results predict results for amoxicillin   - Ampicillin: R >16 These ampicillin results predict results for amoxicillin   - Ampicillin/Sulbactam: R >16/8 Enterobacter, Klebsiella aerogenes, Citrobacter, and Serratia may develop resistance during prolonged therapy (3-4 days)   - Ampicillin/Sulbactam: S <=4/2 Enterobacter, Klebsiella aerogenes, Citrobacter, and Serratia may develop resistance during prolonged therapy (3-4 days)   - Aztreonam: S <=4   - Aztreonam: S <=4   - Cefazolin: R >16 Enterobacter, Klebsiella aerogenes, Citrobacter, and Serratia may develop resistance during prolonged therapy (3-4 days)   - Cefazolin: S <=2 Enterobacter, Klebsiella aerogenes, Citrobacter, and Serratia may develop resistance during prolonged therapy (3-4 days)   - Cefepime: S <=2   - Cefepime: S <=2   - Cefoxitin: R >16   - Cefoxitin: S <=8   - Ceftriaxone: S <=1 Enterobacter, Klebsiella aerogenes, Citrobacter, and Serratia may develop resistance during prolonged therapy   - Ceftriaxone: S <=1 Enterobacter, Klebsiella aerogenes, Citrobacter, and Serratia may develop resistance during prolonged therapy   - Ciprofloxacin: S <=0.25   - Ciprofloxacin: S <=0.25   - Ertapenem: S <=0.5   - Ertapenem: S <=0.5   - Gentamicin: S <=2   - Gentamicin: S <=2   - Imipenem: S <=1   - Imipenem: S <=1   - Levofloxacin: S <=0.5   - Levofloxacin: S <=0.5   - Meropenem: S <=1   - Meropenem: S <=1   - Piperacillin/Tazobactam: S <=8   - Piperacillin/Tazobactam: S <=8   - Tobramycin: S <=2   - Tobramycin: S <=2   - Trimethoprim/Sulfamethoxazole: S <=0.5/9.5   - Trimethoprim/Sulfamethoxazole: S <=0.5/9.5   Specimen Source: Trach Asp Tracheal Aspirate   Culture Results:   Moderate Klebsiella oxytoca/Raoutella ornithinolytica   Moderate Enterobacter cloacae complex   Normal Respiratory Kelly absent   Organism Identification: Klebsiella oxytoca /Raoutella ornithinolytica   Enterobacter cloacae complex   Organism: Klebsiella oxytoca /Raoutella ornithinolytica   Organism: Enterobacter cloacae complex COVID-19 PCR (06.15.22 @ 07:10)   COVID-19 PCR: NotDetec: Culture - Acid Fast - Sputum w/Smear (22 @ 07:00)   Specimen Source: .Sputum Sputum   Acid Fast Bacilli Smear:   No acid fast bacilli seen by fluorochrome stain   Culture Results:   No acid fast bacilli isolated after 6 weeks.            Patient is seen and examined at the bed side, is afebrile now, spiked fever.  She remains intubated/vented.    REVIEW OF SYSTEMS: Unable to obtain due to mental status      ALLERGIES: buprenorphine (Rash)  Suboxone (Rash)      ICU Vital Signs Last 24 Hrs  T(C): 37.8 (2022 19:00), Max: 38 (2022 03:26)  T(F): 100 (2022 19:00), Max: 100.4 (2022 03:26)  HR: 93 (2022 19:00) (56 - 109)  BP: 177/95 (2022 19:00) (142/87 - 177/95)  BP(mean): 127 (2022 17:26) (109 - 127)  ABP: --  ABP(mean): --  RR: 38 (2022 19:00) (22 - 47)  SpO2: 100% (:00) (100% - 100%)    O2 Parameters below as of 2022 17:26  Patient On (Oxygen Delivery Method): ventilator    O2 Concentration (%): 35        PHYSICAL EXAM:  GENERAL: Intubated/vented  CHEST/LUNG: Not using accessory muscles   HEART: s1 and s2 present  ABDOMEN:  Nontender and  Nondistended  EXTREMITIES: No pedal  edema  CNS:  Intubated/vented and awake now      LABS:                        8.3    2.12  )-----------( 65       ( 2022 05:34 )             27.2                           8.5    2.37  )-----------( 58       ( 2022 05:58 )             27.5        07-12    147<H>  |  113<H>  |  24<H>  ----------------------------<  89  2.8<L>   |  21  |  0.7    Ca    8.2<L>      2022 05:34  Phos  2.9     07-12  Mg     1.8     12    TPro  5.4<L>  /  Alb  2.7<L>  /  TBili  0.4  /  DBili  x   /  AST  40  /  ALT  37  /  AlkPhos  347<H>               07-11    144  |  110  |  32<H>  ----------------------------<  96  2.8<L>   |  22  |  0.7    Ca    8.1<L>      2022 05:58  Phos  3.6       Mg     1.9         TPro  5.6<L>  /  Alb  2.8<L>  /  TBili  0.6  /  DBili  x   /  AST  89<H>  /  ALT  58<H>  /  AlkPhos  373<H>            ABG - ( 2022 03:14 )  pH, Arterial: 7.30  pH, Blood: x     /  pCO2: 51    /  pO2: 111   / HCO3: 25    / Base Excess: -1.7  /  SaO2: 99.0          Urinalysis Basic - ( 2022 19:06 )  Color: Yellow / Appearance: Slightly Cloudy / S.025 / pH: x  Gluc: x / Ketone: Trace  / Bili: Negative / Urobili: 1.0 mg/dL   Blood: x / Protein: >=300 mg/dL / Nitrite: Negative   Leuk Esterase: Negative / RBC: 26-50 /HPF / WBC 3-5 /HPF   Sq Epi: x / Non Sq Epi: Occasional /HPF / Bacteria: Moderate        MEDICATIONS  (STANDING):    cefiderocol IVPB 2000 milliGRAM(s) IV Intermittent every 6 hours  chlorhexidine 0.12% Liquid 15 milliLiter(s) Oral Mucosa every 12 hours  chlorhexidine 4% Liquid 1 Application(s) Topical daily  dexMEDEtomidine Infusion 0.2 MICROgram(s)/kG/Hr (3.52 mL/Hr) IV Continuous <Continuous>  fentaNYL   Infusion. 0.5 MICROgram(s)/kG/Hr (3.52 mL/Hr) IV Continuous <Continuous>  folic acid 1 milliGRAM(s) Oral daily  nicotine -  14 mG/24Hr(s) Patch 1 patch Transdermal daily  norepinephrine Infusion 0.05 MICROgram(s)/kG/Min (8.03 mL/Hr) IV Continuous <Continuous>  pantoprazole  Injectable 40 milliGRAM(s) IV Push daily  propofol Infusion 0.1 MICROgram(s)/kG/Min (0.04 mL/Hr) IV Continuous <Continuous>  QUEtiapine 100 milliGRAM(s) Oral two times a day  scopolamine 1 mG/72 Hr(s) Patch 1 Patch Transdermal every 72 hours  spironolactone 100 milliGRAM(s) Oral daily  thiamine 100 milliGRAM(s) Oral daily  vancomycin    Solution 250 milliGRAM(s) Oral every 6 hours      RADIOLOGY & ADDITIONAL TESTS:    < from: Xray Kidney Ureter Bladder (22 @ 13:25) >    FINDINGS: Enteric feeding tube is stable, with tip in the left upper quadrant.  There is a non-obstructive bowel gas pattern.  No abnormal   masses or calcifications are seen.  Stable probe/catheter projects over  the lower pelvis.    < from: CT Angio Chest PE Protocol w/ IV Cont (22 @ 21:14) No pulmonary embolus.    Near complete consolidation/collapse of the left lower lobe, increased as  compared with prior. Mildly increased right lower lobe patchy   consolidative opacities-atelectasis. Adjacent bilateral small pleural effusions. Findings compatible with likely mixed pneumonia and  atelectasis.    Redemonstration of a dilated main pulmonary artery measuring up to 3.8 cm  compatible with pulmonary hypertension.    < from: Xray Chest 1 View- PORTABLE-Routine (Xray Chest 1 View- PORTABLE-Routine in AM.) (22 @ 05:28) >  Stable cardiomegaly and left basilar opacity. Stable support devices.      < from: CT Abdomen and Pelvis w/ IV Cont (22 @ 17:27) > No evidence of retroperitoneal hematoma.    Small bilateral pleural effusions and left basilar consolidation. Moderate ascites redemonstrated.    Moderate pericardial effusion.  Hepatosplenomegaly redemonstrated.        MICROBIOLOGY DATA:  Clostridium difficile Toxin by PCR (22 @ 19:15)   Clostridium difficile Toxin by PCR: RESULT INTERPRETATION:  Detected     Culture - Sputum . (22 @ 13:39)   Gram Stain: Few polymorphonuclear leukocytes per low power field   No Squamous epithelial cells per low power field   Numerous Gram Negative Coccobacilli seen per oil power field   Specimen Source: ET Tube ET Tube   Culture Results:   Moderate Acinetobacter baumannii/nosocomialis group   Normal Respiratory Kelly absent     MRSA/MSSA PCR (22 @ 10:34)   MRSA PCR Result.: Negative    Respiratory Viral Panel with COVID-19 by BROOKE (22 @ 09:19)   Rapid RVP Result: NotDetec   SARS-CoV-2: NotDetec:    Culture - Blood (22 @ 20:31)   Specimen Source: .Blood Blood-Peripheral   Culture Results:   No growth to date.     Culture - Blood (22 @ 20:31)   Specimen Source: .Blood Blood   Culture Results:   No growth to date.     Culture - Sputum . (22 @ 14:00)   - Oxacillin: S <=0.25 Oxacillin predicts susceptibility for dicloxacillin, methicillin, and nafcillin   - Cefazolin: S <=4   - Erythromycin: S <=0.25   - Gentamicin: S <=1 Should not be used as monotherapy   - Clindamycin: S <=0.25   - Rifampin: S <=1 Should not be used as monotherapy   - Tetra/Doxy: S <=1   - Trimethoprim/Sulfamethoxazole: S <=0.5/9.5   - Vancomycin: S 2   Gram Stain:   Moderate polymorphonuclear leukocytes per low power field   Few Squamous epithelial cells per low power field   Moderate Gram positive cocci in pairs seen per oil power field   Few Gram Variable Rods seen per oil power field   - Ampicillin/Sulbactam: S <=8/4   Specimen Source: Trach Asp Tracheal Aspirate   Culture Results:   Numerous Mixed gram negative rods   Moderate Staphylococcus aureus   Normal Respiratory Kelly present   Organism Identification: Staphylococcus aureus   Organism: Staphylococcus aureus   Culture - Blood in AM (22 @ 06:00)   Specimen Source: .Blood Blood   Culture Results: No growth to date.     Culture - Sputum . (22 @ 14:00)   Gram Stain:   Moderate polymorphonuclear leukocytes per low power field   Few Squamous epithelial cells per low power field   Moderate Gram positive cocci in pairs seen per oil power field   Few Gram Variable Rods seen per oil power field   Specimen Source: Trach Asp Tracheal Aspirate   Culture Results:   Numerous Mixed gram negative rods   Moderate Staphylococcus aureus Susceptibility to follow.   Normal Respiratory Kelly absent     Culture - Sputum . (22 @ 17:20)   Gram Stain:   Few polymorphonuclear leukocytes per low power field   Rare Squamous epithelial cells per low power field   Moderate Gram Negative Rods seen per oil power field   - Amikacin: S <=16   - Amikacin: S <=16   - Amoxicillin/Clavulanic Acid: R >16/8   - Amoxicillin/Clavulanic Acid: S <=8/4   - Ampicillin: R 16 These ampicillin results predict results for amoxicillin   - Ampicillin: R >16 These ampicillin results predict results for amoxicillin   - Ampicillin/Sulbactam: R >16/8 Enterobacter, Klebsiella aerogenes, Citrobacter, and Serratia may develop resistance during prolonged therapy (3-4 days)   - Ampicillin/Sulbactam: S <=4/2 Enterobacter, Klebsiella aerogenes, Citrobacter, and Serratia may develop resistance during prolonged therapy (3-4 days)   - Aztreonam: S <=4   - Aztreonam: S <=4   - Cefazolin: R >16 Enterobacter, Klebsiella aerogenes, Citrobacter, and Serratia may develop resistance during prolonged therapy (3-4 days)   - Cefazolin: S <=2 Enterobacter, Klebsiella aerogenes, Citrobacter, and Serratia may develop resistance during prolonged therapy (3-4 days)   - Cefepime: S <=2   - Cefepime: S <=2   - Cefoxitin: R >16   - Cefoxitin: S <=8   - Ceftriaxone: S <=1 Enterobacter, Klebsiella aerogenes, Citrobacter, and Serratia may develop resistance during prolonged therapy   - Ceftriaxone: S <=1 Enterobacter, Klebsiella aerogenes, Citrobacter, and Serratia may develop resistance during prolonged therapy   - Ciprofloxacin: S <=0.25   - Ciprofloxacin: S <=0.25   - Ertapenem: S <=0.5   - Ertapenem: S <=0.5   - Gentamicin: S <=2   - Gentamicin: S <=2   - Imipenem: S <=1   - Imipenem: S <=1   - Levofloxacin: S <=0.5   - Levofloxacin: S <=0.5   - Meropenem: S <=1   - Meropenem: S <=1   - Piperacillin/Tazobactam: S <=8   - Piperacillin/Tazobactam: S <=8   - Tobramycin: S <=2   - Tobramycin: S <=2   - Trimethoprim/Sulfamethoxazole: S <=0.5/9.5   - Trimethoprim/Sulfamethoxazole: S <=0.5/9.5   Specimen Source: Trach Asp Tracheal Aspirate   Culture Results:   Moderate Klebsiella oxytoca/Raoutella ornithinolytica   Moderate Enterobacter cloacae complex   Normal Respiratory Kelly absent   Organism Identification: Klebsiella oxytoca /Raoutella ornithinolytica   Enterobacter cloacae complex   Organism: Klebsiella oxytoca /Raoutella ornithinolytica   Organism: Enterobacter cloacae complex COVID-19 PCR (06.15.22 @ 07:10)   COVID-19 PCR: NotDetec: Culture - Acid Fast - Sputum w/Smear (22 @ 07:00)   Specimen Source: .Sputum Sputum   Acid Fast Bacilli Smear:   No acid fast bacilli seen by fluorochrome stain   Culture Results:   No acid fast bacilli isolated after 6 weeks.

## 2022-07-12 NOTE — PROGRESS NOTE ADULT - ASSESSMENT
Patient is a 26 year old female with hx of asthma, untreated Hep C, IVDA, anasarca presenting with increased dyspnea since yesterday    IMPRESSION  #Acute respiratory failure s/p intubation 6/16  #Pneumonia -   - CT Chest 6/22 - left greater than right lower lobe consolidations   - sputum Cx 6/24 Klebsiella oxytoca, Enterobacter cloacae complex- The Sputum culture from 6/27 grew Numerous Mixed gram negative rods and Moderate Staphylococcus aureus.-  - Please call micro [phone] ext1 to add on sensitivities for cefiderocol to the Acinetobacter IF NOT DONE ALREADY    #Fevers  - Ferritin, Serum: 94 ng/mL (06.27.22 @ 06:47),  Procalcitonin, Serum: 0.11 (06.27.22 @ 15:46)  - CT Abd/pelvis 6/26 - moderated ascites moderate pericardial effusion     #Drug overdose vs withdrawal?  #Hepatosplenomegaly - present since CT Abd/pelvis 1/30/2021  #Pancytopenia  #Hx of Untreated Hep C   #IVDA   #Abx allergy: buprenorphine (Rash), Suboxone (Rash)  # C.difficille infection - 7/5/22    would recommend:    1. Continue Cefiderocol  2.  Monitor Temp. and c/w supportive care, is afebrile now  3. Continue Oral Vancomycin to cover C.diff, during and 7 days after completing the Systemic Abx  4. Management of Vent. as per ICU protocol  5. Aspiration precaution      - if hemodynamic compromise, ADD tigecycline 100mg then 50mg q12h IV for acinetobacter coverage    d/w ICU team    Attending  Attestation:   Spent more than 35 minutes on total encounter, more than 50 % of the visit was spent counseling and/or coordinating care by the Attending physician.         Patient is a 26 year old female with hx of asthma, untreated Hep C, IVDA, anasarca presenting with increased dyspnea since yesterday    IMPRESSION  #Acute respiratory failure s/p intubation 6/16  #Pneumonia -   - CT Chest 6/22 - left greater than right lower lobe consolidations   - sputum Cx 6/24 Klebsiella oxytoca, Enterobacter cloacae complex- The Sputum culture from 6/27 grew Numerous Mixed gram negative rods and Moderate Staphylococcus aureus.-  - Please call micro [phone] ext1 to add on sensitivities for cefiderocol to the Acinetobacter IF NOT DONE ALREADY    #Fevers  - Ferritin, Serum: 94 ng/mL (06.27.22 @ 06:47),  Procalcitonin, Serum: 0.11 (06.27.22 @ 15:46)  - CT Abd/pelvis 6/26 - moderated ascites moderate pericardial effusion     #Drug overdose vs withdrawal?  #Hepatosplenomegaly - present since CT Abd/pelvis 1/30/2021  #Pancytopenia  #Hx of Untreated Hep C   #IVDA   #Abx allergy: buprenorphine (Rash), Suboxone (Rash)  # C.difficille infection - 7/5/22    would recommend:    1. Monitor Temp. and c/w supportive care, is afebrile now  2. Continue Cefiderocol  3. Continue Oral Vancomycin to cover C.diff, during and 7 days after completing the Systemic Abx  4. Management of Vent. as per ICU protocol  5. Aspiration precaution      - if hemodynamic compromise, ADD tigecycline 100mg then 50mg q12h IV for acinetobacter coverage    d/w ICU team    Attending  Attestation:   Spent more than 35 minutes on total encounter, more than 50 % of the visit was spent counseling and/or coordinating care by the Attending physician.

## 2022-07-12 NOTE — PROGRESS NOTE ADULT - ASSESSMENT
IMPRESSION:  Acute respiratory failure sp intubation  PNA  MSSA pna  seizure like activity  ?? drug over dose/ withdrawal opoid   liver cirrhosis   Ascites sp paracentesis   Pancytopenia- worsening  RENETTA improved  C diff +ve     PLAN:    CNS: do SAT/SBT as tolerated  CW methadone 30mg daily  CW Clonapin to 2mg TID  CW Seroquel 100mg BID  SAT    precedex   HEENT: oral care     PULMONARY:  HOB 45,  Vent changes: wean O2 as tolerated, proceed with SBT    G sx on board been for trach need covid test   if passed SBt  then placed NG tube to continue her meds     CARDIOVASCULAR: goal MAP >65.  Monitor QTc daily.     keep is = os  Pericarditis management per cardio. discussed   do ekg follow QtC  GI: GI prophylaxis.  OG Feeding.    KUB reviewed    RENAL: monitor lytes is and os     INFECTIOUS DISEASE:follow Id recommendation   continue abx   heme for BMB    HEMATOLOGICAL:  recall hematology for BMB  monitor CBC, Heme FU.   reticulocyte count  dvt ppx  check d-dimer  reviewed , HIT antibody neg resume heparin .     ENDOCRINE:  Follow up FS.  Insulin protocol if needed.     MICU  TLC 7/6 HARSH Salmeron - failed TOV 6/24 Change.      IMPRESSION:  Acute respiratory failure sp intubation  PNA  MSSA pna  seizure like activity  ?? drug over dose/ withdrawal opoid   liver cirrhosis   Ascites sp paracentesis   Pancytopenia- worsening  RENETTA improved  C diff +ve     PLAN:    CNS: do SAT/SBT as tolerated  CW methadone 30mg daily  CW Clonapin to 2mg TID  CW Seroquel 100mg BID  SAT    precedex   HEENT: oral care     PULMONARY:  HOB 45,  Vent changes: wean O2 as tolerated, proceed with SBT    G sx on board been for trach need covid test   if passed SBt  then placed NG tube to continue her meds     CARDIOVASCULAR: goal MAP >65.  Monitor QTc daily.     keep is = os  Pericarditis management per cardio. discussed   do ekg follow QtC  GI: GI prophylaxis.  OG Feeding.    KUB reviewed    RENAL: monitor lytes is and os     INFECTIOUS DISEASE:follow Id recommendation   continue abx   heme for BMB    HEMATOLOGICAL:  recall hematology for BMB  monitor CBC, Heme FU.   reticulocyte count  dvt ppx  check d-dimer  reviewed ,  hold heparin   repeat HIT panel     ENDOCRINE:  Follow up FS.  Insulin protocol if needed.     MICU  TLC 7/6 HARSH Salmeron - failed TOV 6/24 Change.

## 2022-07-12 NOTE — CHART NOTE - NSCHARTNOTEFT_GEN_A_CORE
[facility] attending note  consult called to do bone marrow biopsy. In view of patient's status, strongly suggest to have this done by IR team or [place]. Discussed with medicine team None available

## 2022-07-13 LAB
ALBUMIN SERPL ELPH-MCNC: 3 G/DL — LOW (ref 3.5–5.2)
ALP SERPL-CCNC: 335 U/L — HIGH (ref 30–115)
ALT FLD-CCNC: 26 U/L — SIGNIFICANT CHANGE UP (ref 0–41)
ANION GAP SERPL CALC-SCNC: 11 MMOL/L — SIGNIFICANT CHANGE UP (ref 7–14)
ANION GAP SERPL CALC-SCNC: 13 MMOL/L — SIGNIFICANT CHANGE UP (ref 7–14)
ANION GAP SERPL CALC-SCNC: 9 MMOL/L — SIGNIFICANT CHANGE UP (ref 7–14)
AST SERPL-CCNC: 22 U/L — SIGNIFICANT CHANGE UP (ref 0–41)
BASOPHILS # BLD AUTO: 0.01 K/UL — SIGNIFICANT CHANGE UP (ref 0–0.2)
BASOPHILS NFR BLD AUTO: 0.3 % — SIGNIFICANT CHANGE UP (ref 0–1)
BILIRUB SERPL-MCNC: 0.4 MG/DL — SIGNIFICANT CHANGE UP (ref 0.2–1.2)
BUN SERPL-MCNC: 15 MG/DL — SIGNIFICANT CHANGE UP (ref 10–20)
CALCIUM SERPL-MCNC: 8.1 MG/DL — LOW (ref 8.5–10.1)
CALCIUM SERPL-MCNC: 8.2 MG/DL — LOW (ref 8.5–10.1)
CALCIUM SERPL-MCNC: 8.3 MG/DL — LOW (ref 8.5–10.1)
CHLORIDE SERPL-SCNC: 111 MMOL/L — HIGH (ref 98–110)
CHLORIDE SERPL-SCNC: 114 MMOL/L — HIGH (ref 98–110)
CHLORIDE SERPL-SCNC: 115 MMOL/L — HIGH (ref 98–110)
CO2 SERPL-SCNC: 21 MMOL/L — SIGNIFICANT CHANGE UP (ref 17–32)
CO2 SERPL-SCNC: 22 MMOL/L — SIGNIFICANT CHANGE UP (ref 17–32)
CO2 SERPL-SCNC: 22 MMOL/L — SIGNIFICANT CHANGE UP (ref 17–32)
CREAT SERPL-MCNC: 0.6 MG/DL — LOW (ref 0.7–1.5)
CULTURE RESULTS: SIGNIFICANT CHANGE UP
EGFR: 125 ML/MIN/1.73M2 — SIGNIFICANT CHANGE UP
EOSINOPHIL # BLD AUTO: 0 K/UL — SIGNIFICANT CHANGE UP (ref 0–0.7)
EOSINOPHIL NFR BLD AUTO: 0 % — SIGNIFICANT CHANGE UP (ref 0–8)
GAS PNL BLDA: SIGNIFICANT CHANGE UP
GLUCOSE SERPL-MCNC: 104 MG/DL — HIGH (ref 70–99)
GLUCOSE SERPL-MCNC: 107 MG/DL — HIGH (ref 70–99)
GLUCOSE SERPL-MCNC: 112 MG/DL — HIGH (ref 70–99)
HCT VFR BLD CALC: 30.6 % — LOW (ref 37–47)
HGB BLD-MCNC: 9.5 G/DL — LOW (ref 12–16)
IMM GRANULOCYTES NFR BLD AUTO: 0.5 % — HIGH (ref 0.1–0.3)
LYMPHOCYTES # BLD AUTO: 0.43 K/UL — LOW (ref 1.2–3.4)
LYMPHOCYTES # BLD AUTO: 11.5 % — LOW (ref 20.5–51.1)
MAGNESIUM SERPL-MCNC: 1.9 MG/DL — SIGNIFICANT CHANGE UP (ref 1.8–2.4)
MCHC RBC-ENTMCNC: 27.1 PG — SIGNIFICANT CHANGE UP (ref 27–31)
MCHC RBC-ENTMCNC: 31 G/DL — LOW (ref 32–37)
MCV RBC AUTO: 87.4 FL — SIGNIFICANT CHANGE UP (ref 81–99)
MONOCYTES # BLD AUTO: 0.13 K/UL — SIGNIFICANT CHANGE UP (ref 0.1–0.6)
MONOCYTES NFR BLD AUTO: 3.5 % — SIGNIFICANT CHANGE UP (ref 1.7–9.3)
NEUTROPHILS # BLD AUTO: 3.14 K/UL — SIGNIFICANT CHANGE UP (ref 1.4–6.5)
NEUTROPHILS NFR BLD AUTO: 84.2 % — HIGH (ref 42.2–75.2)
NRBC # BLD: 0 /100 WBCS — SIGNIFICANT CHANGE UP (ref 0–0)
PHOSPHATE SERPL-MCNC: 2.5 MG/DL — SIGNIFICANT CHANGE UP (ref 2.1–4.9)
PLATELET # BLD AUTO: 83 K/UL — LOW (ref 130–400)
POTASSIUM SERPL-MCNC: 2.9 MMOL/L — LOW (ref 3.5–5)
POTASSIUM SERPL-MCNC: 3.7 MMOL/L — SIGNIFICANT CHANGE UP (ref 3.5–5)
POTASSIUM SERPL-MCNC: 4.2 MMOL/L — SIGNIFICANT CHANGE UP (ref 3.5–5)
POTASSIUM SERPL-SCNC: 2.9 MMOL/L — LOW (ref 3.5–5)
POTASSIUM SERPL-SCNC: 3.7 MMOL/L — SIGNIFICANT CHANGE UP (ref 3.5–5)
POTASSIUM SERPL-SCNC: 4.2 MMOL/L — SIGNIFICANT CHANGE UP (ref 3.5–5)
PROT SERPL-MCNC: 5.5 G/DL — LOW (ref 6–8)
RBC # BLD: 3.5 M/UL — LOW (ref 4.2–5.4)
RBC # FLD: 15.2 % — HIGH (ref 11.5–14.5)
SODIUM SERPL-SCNC: 145 MMOL/L — SIGNIFICANT CHANGE UP (ref 135–146)
SODIUM SERPL-SCNC: 145 MMOL/L — SIGNIFICANT CHANGE UP (ref 135–146)
SODIUM SERPL-SCNC: 148 MMOL/L — HIGH (ref 135–146)
SPECIMEN SOURCE: SIGNIFICANT CHANGE UP
WBC # BLD: 3.73 K/UL — LOW (ref 4.8–10.8)
WBC # FLD AUTO: 3.73 K/UL — LOW (ref 4.8–10.8)

## 2022-07-13 PROCEDURE — 99222 1ST HOSP IP/OBS MODERATE 55: CPT

## 2022-07-13 PROCEDURE — 99233 SBSQ HOSP IP/OBS HIGH 50: CPT

## 2022-07-13 PROCEDURE — 93010 ELECTROCARDIOGRAM REPORT: CPT

## 2022-07-13 PROCEDURE — 76705 ECHO EXAM OF ABDOMEN: CPT | Mod: 26

## 2022-07-13 PROCEDURE — 71045 X-RAY EXAM CHEST 1 VIEW: CPT | Mod: 26

## 2022-07-13 PROCEDURE — ZZZZZ: CPT

## 2022-07-13 RX ORDER — CHLORHEXIDINE GLUCONATE 213 G/1000ML
1 SOLUTION TOPICAL DAILY
Refills: 0 | Status: DISCONTINUED | OUTPATIENT
Start: 2022-07-13 | End: 2022-07-20

## 2022-07-13 RX ORDER — POTASSIUM CHLORIDE 20 MEQ
40 PACKET (EA) ORAL DAILY
Refills: 0 | Status: DISCONTINUED | OUTPATIENT
Start: 2022-07-13 | End: 2022-07-20

## 2022-07-13 RX ORDER — MIDAZOLAM HYDROCHLORIDE 1 MG/ML
2 INJECTION, SOLUTION INTRAMUSCULAR; INTRAVENOUS ONCE
Refills: 0 | Status: DISCONTINUED | OUTPATIENT
Start: 2022-07-13 | End: 2022-07-13

## 2022-07-13 RX ORDER — MIDAZOLAM HYDROCHLORIDE 1 MG/ML
0.02 INJECTION, SOLUTION INTRAMUSCULAR; INTRAVENOUS
Qty: 100 | Refills: 0 | Status: DISCONTINUED | OUTPATIENT
Start: 2022-07-13 | End: 2022-07-14

## 2022-07-13 RX ORDER — POTASSIUM CHLORIDE 20 MEQ
20 PACKET (EA) ORAL
Refills: 0 | Status: COMPLETED | OUTPATIENT
Start: 2022-07-13 | End: 2022-07-13

## 2022-07-13 RX ORDER — POTASSIUM CHLORIDE 20 MEQ
40 PACKET (EA) ORAL ONCE
Refills: 0 | Status: DISCONTINUED | OUTPATIENT
Start: 2022-07-13 | End: 2022-07-13

## 2022-07-13 RX ADMIN — MIDAZOLAM HYDROCHLORIDE 1.41 MG/KG/HR: 1 INJECTION, SOLUTION INTRAMUSCULAR; INTRAVENOUS at 14:56

## 2022-07-13 RX ADMIN — DEXMEDETOMIDINE HYDROCHLORIDE IN 0.9% SODIUM CHLORIDE 3.52 MICROGRAM(S)/KG/HR: 4 INJECTION INTRAVENOUS at 21:59

## 2022-07-13 RX ADMIN — Medication 600 MILLIGRAM(S): at 04:28

## 2022-07-13 RX ADMIN — PROPOFOL 0.04 MICROGRAM(S)/KG/MIN: 10 INJECTION, EMULSION INTRAVENOUS at 19:30

## 2022-07-13 RX ADMIN — DEXMEDETOMIDINE HYDROCHLORIDE IN 0.9% SODIUM CHLORIDE 3.52 MICROGRAM(S)/KG/HR: 4 INJECTION INTRAVENOUS at 19:30

## 2022-07-13 RX ADMIN — Medication 1 PATCH: at 09:07

## 2022-07-13 RX ADMIN — Medication 50 MILLIEQUIVALENT(S): at 09:10

## 2022-07-13 RX ADMIN — Medication 50 MILLIGRAM(S): at 04:27

## 2022-07-13 RX ADMIN — CEFIDEROCOL SULFATE TOSYLATE 33.33 MILLIGRAM(S): 1 INJECTION, POWDER, FOR SOLUTION INTRAVENOUS at 17:32

## 2022-07-13 RX ADMIN — PROPOFOL 0.04 MICROGRAM(S)/KG/MIN: 10 INJECTION, EMULSION INTRAVENOUS at 13:27

## 2022-07-13 RX ADMIN — DEXMEDETOMIDINE HYDROCHLORIDE IN 0.9% SODIUM CHLORIDE 3.52 MICROGRAM(S)/KG/HR: 4 INJECTION INTRAVENOUS at 09:21

## 2022-07-13 RX ADMIN — Medication 250 MILLIGRAM(S): at 05:40

## 2022-07-13 RX ADMIN — PROPOFOL 0.04 MICROGRAM(S)/KG/MIN: 10 INJECTION, EMULSION INTRAVENOUS at 01:20

## 2022-07-13 RX ADMIN — CHLORHEXIDINE GLUCONATE 1 APPLICATION(S): 213 SOLUTION TOPICAL at 05:41

## 2022-07-13 RX ADMIN — Medication 250 MILLIGRAM(S): at 11:02

## 2022-07-13 RX ADMIN — PANTOPRAZOLE SODIUM 40 MILLIGRAM(S): 20 TABLET, DELAYED RELEASE ORAL at 11:02

## 2022-07-13 RX ADMIN — MIDAZOLAM HYDROCHLORIDE 2 MILLIGRAM(S): 1 INJECTION, SOLUTION INTRAMUSCULAR; INTRAVENOUS at 12:50

## 2022-07-13 RX ADMIN — CHLORHEXIDINE GLUCONATE 15 MILLILITER(S): 213 SOLUTION TOPICAL at 05:40

## 2022-07-13 RX ADMIN — PROPOFOL 0.04 MICROGRAM(S)/KG/MIN: 10 INJECTION, EMULSION INTRAVENOUS at 05:07

## 2022-07-13 RX ADMIN — Medication 50 MILLIEQUIVALENT(S): at 07:48

## 2022-07-13 RX ADMIN — DEXMEDETOMIDINE HYDROCHLORIDE IN 0.9% SODIUM CHLORIDE 3.52 MICROGRAM(S)/KG/HR: 4 INJECTION INTRAVENOUS at 13:27

## 2022-07-13 RX ADMIN — CEFIDEROCOL SULFATE TOSYLATE 33.33 MILLIGRAM(S): 1 INJECTION, POWDER, FOR SOLUTION INTRAVENOUS at 12:28

## 2022-07-13 RX ADMIN — Medication 1 PATCH: at 11:03

## 2022-07-13 RX ADMIN — QUETIAPINE FUMARATE 100 MILLIGRAM(S): 200 TABLET, FILM COATED ORAL at 05:39

## 2022-07-13 RX ADMIN — Medication 50 MILLIEQUIVALENT(S): at 11:03

## 2022-07-13 RX ADMIN — CEFIDEROCOL SULFATE TOSYLATE 33.33 MILLIGRAM(S): 1 INJECTION, POWDER, FOR SOLUTION INTRAVENOUS at 23:34

## 2022-07-13 RX ADMIN — Medication 600 MILLIGRAM(S): at 04:58

## 2022-07-13 RX ADMIN — DEXMEDETOMIDINE HYDROCHLORIDE IN 0.9% SODIUM CHLORIDE 3.52 MICROGRAM(S)/KG/HR: 4 INJECTION INTRAVENOUS at 01:21

## 2022-07-13 RX ADMIN — Medication 250 MILLIGRAM(S): at 17:31

## 2022-07-13 RX ADMIN — PROPOFOL 0.04 MICROGRAM(S)/KG/MIN: 10 INJECTION, EMULSION INTRAVENOUS at 09:11

## 2022-07-13 RX ADMIN — CHLORHEXIDINE GLUCONATE 1 APPLICATION(S): 213 SOLUTION TOPICAL at 11:04

## 2022-07-13 RX ADMIN — Medication 250 MILLIGRAM(S): at 23:35

## 2022-07-13 RX ADMIN — CEFIDEROCOL SULFATE TOSYLATE 33.33 MILLIGRAM(S): 1 INJECTION, POWDER, FOR SOLUTION INTRAVENOUS at 05:58

## 2022-07-13 RX ADMIN — Medication 1 PATCH: at 19:37

## 2022-07-13 RX ADMIN — SPIRONOLACTONE 100 MILLIGRAM(S): 25 TABLET, FILM COATED ORAL at 05:39

## 2022-07-13 RX ADMIN — Medication 1 MILLIGRAM(S): at 11:03

## 2022-07-13 RX ADMIN — DEXMEDETOMIDINE HYDROCHLORIDE IN 0.9% SODIUM CHLORIDE 3.52 MICROGRAM(S)/KG/HR: 4 INJECTION INTRAVENOUS at 17:33

## 2022-07-13 RX ADMIN — DEXMEDETOMIDINE HYDROCHLORIDE IN 0.9% SODIUM CHLORIDE 3.52 MICROGRAM(S)/KG/HR: 4 INJECTION INTRAVENOUS at 05:07

## 2022-07-13 RX ADMIN — SCOPALAMINE 1 PATCH: 1 PATCH, EXTENDED RELEASE TRANSDERMAL at 19:37

## 2022-07-13 RX ADMIN — Medication 100 MILLIGRAM(S): at 11:03

## 2022-07-13 RX ADMIN — SCOPALAMINE 1 PATCH: 1 PATCH, EXTENDED RELEASE TRANSDERMAL at 09:08

## 2022-07-13 RX ADMIN — MIDAZOLAM HYDROCHLORIDE 1.41 MG/KG/HR: 1 INJECTION, SOLUTION INTRAMUSCULAR; INTRAVENOUS at 19:31

## 2022-07-13 RX ADMIN — Medication 1 PATCH: at 11:04

## 2022-07-13 RX ADMIN — CHLORHEXIDINE GLUCONATE 15 MILLILITER(S): 213 SOLUTION TOPICAL at 17:31

## 2022-07-13 RX ADMIN — QUETIAPINE FUMARATE 100 MILLIGRAM(S): 200 TABLET, FILM COATED ORAL at 17:31

## 2022-07-13 RX ADMIN — Medication 0.1 MILLIGRAM(S): at 04:28

## 2022-07-13 RX ADMIN — Medication 40 MILLIEQUIVALENT(S): at 09:10

## 2022-07-13 NOTE — PROGRESS NOTE ADULT - ASSESSMENT
26 year old female with hx of asthma, untreated Hep C, IVDA, anasarca was admitted to medical floor  with increased SOB and anasarca  Pt admits to using opiates 2 grams per day IV into her thighs  x years with minimal sobriety. Pt has been on MMTP in 2020. Pt is contemplating going back on program. Pt denies any other substance abuse. Pt states used a xanax for wd about 3 days ago.      Hepatitis C with Cirrhosis no compliant with treatment  Hx of withdrawal   variable periods of sobriety in the past    on floor pt was diuresed with improvement of symptoms  when the following events took place  "RRT was called for pt in the [facility]    patient was found on the floor in ER, gasping for air, short of breath, tachypnea, bleeding profusely from her posterior scalp, was placed in a stretcher, was hypoxic 70s    emergently intubated, ct scan head ordered re scalp bleeding    several syringes, drug paraphanellia found on patient clothes     pt  intubated and  transferred to ICU       I strongly suspect that she injected illicit drug ( abuses IV heroin) and she went into resp failure secondary to heroin overdose."      Acute respiratory failure with hypoxemia / aspiration pneumonia with sepsis / suspected drug overdose / head laceration s/p fall in lobby / possible seizure / anasarca  pancytopenia with continued fever     - now with c-diff colitis - continue oral vanco   - w/u and antibiotics as per ID   - Keppra weaned and DC'd as per neur   - central line changed    - supplement hypokalemia and  mg level and maintain above 2   - re intubated -> surgical consult for trach if fails SAT and SBT

## 2022-07-13 NOTE — PROGRESS NOTE ADULT - SUBJECTIVE AND OBJECTIVE BOX
Interventional Radiology Follow- Up Note    Procedure Requested: image guided bone marrow biopsy    HPI:  Patient is a 26 year old female with hx of asthma, untreated Hep C, IVDA, anasarca presenting with increased dyspnea since yesterday. Patient has been short of breath chronically and has been admitted for fluid overload. Patient describes worsening symptoms yesterday associated with cough. States generalized edema has remained the same. Patient is actively using heroin. Not on any meds or MMTP.  Denies fever, chills, sore throat, cp, palpitations, abd pain, n/v/d, dysuria, headache, syncope, hemoptysis. + generalized edema (15 Reinaldo 2022 10:03)    Vitals: T(F): 100.6 (07-13-22 @ 07:00), Max: 101.7 (07-13-22 @ 03:00)  HR: 125 (07-13-22 @ 08:40) (70 - 125)  BP: 144/93 (07-13-22 @ 07:01) (144/93 - 196/105)  RR: 35 (07-13-22 @ 07:01) (22 - 60)  SpO2: 100% (07-13-22 @ 08:40) (99% - 100%)  Wt(kg): --    LABS:                        9.5    3.73  )-----------( 83       ( 13 Jul 2022 06:23 )             30.6     07-13    145  |  111<H>  |  15  ----------------------------<  104<H>  2.9<L>   |  21  |  0.6<L>    Ca    8.3<L>      13 Jul 2022 06:23  Phos  2.5     07-13  Mg     1.9     07-13    TPro  5.5<L>  /  Alb  3.0<L>  /  TBili  0.4  /  DBili  x   /  AST  22  /  ALT  26  /  AlkPhos  335<H>  07-13      Impression: 26y Female admitted with CIRRHOSIS ANASARCA FLUID OVERLOAD DRUG ABUSE        Assessment/Plan:  Patient is a 26 year old female with hx of asthma, untreated Hep C, IVDA, anasarca presenting with increased dyspnea since yesterday. Patient has been short of breath chronically and has been admitted for fluid overload. Patient describes worsening symptoms yesterday associated with cough. States generalized edema has remained the same. Patient is actively using heroin. Not on any meds or MMTP. Consulted for image guided paracentesis due to suspected ascites and abdominal distention. IR has been consulted in the past for paracentesis on this patient, on US evaluation patient with diffuse anasarca and minimal ascites not amenable to drainage. Since last consult patient is now intubated, sedated on vent on propofol and precedex. Due to logistics, patient would have to be transferred to [facility] for procedure. Do not think it is safe at this time to transport patient given current status. Discussed case with attending he agrees to hold off for now until patient is more stable for transfer. Reconsulted today for bone marrow biopsy due to splenomegaly, pancytopenia, thrombocytopenia and persistent fevers. As stated previously, patient will need to be transferred to [facility] for procedure to be performed. Since patient remains intubated and sedated, do not recommend transport for procedure.   - recommend bedside bone marrow bx by heme/onc  - case discussed with primary team, considering transferring patient to [facility] for continued care  - patient scheduled for trach this Friday, will reconvene after procedure  - procedure on hold until patient stable for transfer  - reconsult PRN        Please call Interventional Radiology [phone] with any questions, concerns, or issues regarding above.

## 2022-07-13 NOTE — PROGRESS NOTE ADULT - SUBJECTIVE AND OBJECTIVE BOX
Patient is a 26y old  Female who presents with a chief complaint of anasarca (13 Jul 2022 08:46)      T(F): 100.6 (07-13-22 @ 07:00), Max: 101.7 (07-13-22 @ 03:00)  HR: 125 (07-13-22 @ 08:40)  BP: 171/102 (07-13-22 @ 08:26)  RR: 37 (07-13-22 @ 08:26)  SpO2: 100% (07-13-22 @ 08:40) (98% - 100%)    PHYSICAL EXAM:  GENERAL: NAD  HEAD:  Atraumatic, Normocephalic  EYES: EOMI, PERRLA, conjunctiva and sclera clear  NERVOUS SYSTEM:  Alert & Oriented X3, no focal deficits   CHEST/LUNG: Clear to percussion bilaterally; No rales, rhonchi, wheezing, or rubs  HEART: Regular rate and rhythm; No murmurs, rubs, or gallops  ABDOMEN: Soft, Nontender, Nondistended; Bowel sounds present  EXTREMITIES:  2+ Peripheral Pulses, No clubbing, cyanosis, or edema    LABS  07-13    145  |  111<H>  |  15  ----------------------------<  104<H>  2.9<L>   |  21  |  0.6<L>    Ca    8.3<L>      13 Jul 2022 06:23  Phos  2.5     07-13  Mg     1.9     07-13    TPro  5.5<L>  /  Alb  3.0<L>  /  TBili  0.4  /  DBili  x   /  AST  22  /  ALT  26  /  AlkPhos  335<H>  07-13                          9.5    3.73  )-----------( 83       ( 13 Jul 2022 06:23 )             30.6       Mode: AC/ CMV (Assist Control/ Continuous Mandatory Ventilation)  RR (machine): 25  TV (machine): 370  FiO2: 35  PEEP: 5  ITime: 0.8  MAP: 22  PIP: 42    CARDIAC ENZYMES          Culture Results:   Babesia microti PCR  Results: NOT detected  ***************Result Note*************  The detection of Babesia microti by PCR has only been  validated for whole blood; this test has not been approved  by the US Food and Drug Administration (FDA). Performance  characteristics of this assay have been determined by  [facility]. The clinical significance  of results should be considered in conjunction with the  overall clinical presentation of the patient. Result is not  intended to be used as the sole means for clinical diagnosis  or patient management decisions.  One negative sample does not necessarily rule  out the presence of a parasitic infection. (07-10-22)  Culture Results:   No growth to date. (07-08-22)  Culture Results:   No Growth Final (07-06-22)  Culture Results:   No Growth Final (07-06-22)  Culture Results:   Moderate Acinetobacter baumannii/nosocom group (Carbapenem Resistant)  Cefiderocol interpretations based on FDA breakpoints.  Normal Respiratory Kelly absent (07-04-22)  Culture Results:   No Growth Final (06-29-22)  Culture Results:   No Growth Final (06-29-22)  Culture Results:   No Growth Final (06-28-22)  Culture Results:   Numerous Mixed gram negative rods  Moderate Staphylococcus aureus  Normal Respiratory Kelly present (06-27-22)  Culture Results:   Moderate Klebsiella oxytoca/Raoutella ornithinolytica  Moderate Enterobacter cloacae complex  Normal Respiratory Kelly absent (06-24-22)    RADIOLOGY  < from: Xray Chest 1 View- PORTABLE-Routine (Xray Chest 1 View- PORTABLE-Routine in AM.) (07.13.22 @ 06:04) >  Impression:    Cardiomegaly with no focal consolidation.    < end of copied text >    MEDICATIONS  (STANDING):  cefiderocol IVPB 2000 milliGRAM(s) IV Intermittent every 6 hours  dexMEDEtomidine Infusion 0.2 MICROgram(s)/kG/Hr (3.52 mL/Hr) IV Continuous <Continuous>  fentaNYL   Infusion. 0.5 MICROgram(s)/kG/Hr (3.52 mL/Hr) IV Continuous <Continuous>  folic acid 1 milliGRAM(s) Oral daily  nicotine -  14 mG/24Hr(s) Patch 1 patch Transdermal daily  pantoprazole  Injectable 40 milliGRAM(s) IV Push daily  potassium chloride   Powder 40 milliEquivalent(s) Oral daily  potassium chloride  20 mEq/100 mL IVPB 20 milliEquivalent(s) IV Intermittent every 2 hours  propofol Infusion 0.1 MICROgram(s)/kG/Min (0.04 mL/Hr) IV Continuous <Continuous>  QUEtiapine 100 milliGRAM(s) Oral two times a day  scopolamine 1 mG/72 Hr(s) Patch 1 Patch Transdermal every 72 hours  spironolactone 100 milliGRAM(s) Oral daily  thiamine 100 milliGRAM(s) Oral daily  vancomycin    Solution 250 milliGRAM(s) Oral every 6 hours    MEDICATIONS  (PRN):  acetaminophen  Suppository .. 650 milliGRAM(s) Rectal every 6 hours PRN Temp greater or equal to 38C (100.4F), Mild Pain (1 - 3)  ALBUTerol    90 MICROgram(s) HFA Inhaler 1 Puff(s) Inhalation every 4 hours PRN Shortness of Breath and/or Wheezing  cloNIDine 0.1 milliGRAM(s) Oral every 6 hours PRN opiate withdrawal  hydrOXYzine hydrochloride 50 milliGRAM(s) Oral every 6 hours PRN Anxiety  ibuprofen  Tablet. 400 milliGRAM(s) Oral every 6 hours PRN Mild Pain (1 - 3)  ibuprofen  Tablet. 600 milliGRAM(s) Oral every 6 hours PRN Temp greater or equal to 38C (100.4F)  sodium chloride 0.9% lock flush 10 milliLiter(s) IV Push every 1 hour PRN Pre/post blood products, medications, blood draw, and to maintain line patency      Patient  lightly sedated on ventilator      T(F): 100.6 (07-13-22 @ 07:00), Max: 101.7 (07-13-22 @ 03:00)  HR: 125 (07-13-22 @ 08:40)  BP: 171/102 (07-13-22 @ 08:26)  RR: 20  SpO2: 100% (07-13-22 @ 08:40) (98% - 100%)    PHYSICAL EXAM:  GENERAL: NAD  HEAD:  Atraumatic, Normocephalic  EYES: EOMI, PERRLA, conjunctiva and sclera clear  NERVOUS SYSTEM:   no focal deficits   CHEST/LUNG: Clear to percussion bilaterally; No rales, rhonchi, wheezing, or rubs  HEART: Regular rate and rhythm; No murmurs, rubs, or gallops  ABDOMEN: Soft, Nontender, Nondistended; Bowel sounds present  EXTREMITIES:  2+ Peripheral Pulses, No clubbing, cyanosis, or edema    LABS  07-13    145  |  111<H>  |  15  ----------------------------<  104<H>  2.9<L>   |  21  |  0.6<L>    Ca    8.3<L>      13 Jul 2022 06:23  Phos  2.5     07-13  Mg     1.9     07-13    TPro  5.5<L>  /  Alb  3.0<L>  /  TBili  0.4  /  DBili  x   /  AST  22  /  ALT  26  /  AlkPhos  335<H>  07-13                          9.5    3.73  )-----------( 83       ( 13 Jul 2022 06:23 )             30.6       Mode: AC/ CMV (Assist Control/ Continuous Mandatory Ventilation)  RR (machine): 25  TV (machine): 370  FiO2: 35  PEEP: 5        Culture Results:   Babesia microti PCR  Results: NOT detected  ***************Result Note*************  The detection of Babesia microti by PCR has only been  validated for whole blood; this test has not been approved  by the US Food and Drug Administration (FDA). Performance  characteristics of this assay have been determined by  [facility]. The clinical significance  of results should be considered in conjunction with the  overall clinical presentation of the patient. Result is not  intended to be used as the sole means for clinical diagnosis  or patient management decisions.  One negative sample does not necessarily rule  out the presence of a parasitic infection. (07-10-22)  Culture Results:   No growth to date. (07-08-22)  Culture Results:   No Growth Final (07-06-22)  Culture Results:   No Growth Final (07-06-22)  Culture Results:   Moderate Acinetobacter baumannii/nosocom group (Carbapenem Resistant)  Cefiderocol interpretations based on FDA breakpoints.  Normal Respiratory Kelly absent (07-04-22)  Culture Results:   No Growth Final (06-29-22)  Culture Results:   No Growth Final (06-29-22)  Culture Results:   No Growth Final (06-28-22)  Culture Results:   Numerous Mixed gram negative rods  Moderate Staphylococcus aureus  Normal Respiratory Kelly present (06-27-22)  Culture Results:   Moderate Klebsiella oxytoca/Raoutella ornithinolytica  Moderate Enterobacter cloacae complex  Normal Respiratory Kelly absent (06-24-22)    RADIOLOGY  < from: Xray Chest 1 View- PORTABLE-Routine (Xray Chest 1 View- PORTABLE-Routine in AM.) (07.13.22 @ 06:04) >  Impression:    Cardiomegaly with no focal consolidation.    < end of copied text >    MEDICATIONS  (STANDING):  cefiderocol IVPB 2000 milliGRAM(s) IV Intermittent every 6 hours  dexMEDEtomidine Infusion 0.2 MICROgram(s)/kG/Hr (3.52 mL/Hr) IV Continuous <Continuous>  fentaNYL   Infusion. 0.5 MICROgram(s)/kG/Hr (3.52 mL/Hr) IV Continuous <Continuous>  folic acid 1 milliGRAM(s) Oral daily  nicotine -  14 mG/24Hr(s) Patch 1 patch Transdermal daily  pantoprazole  Injectable 40 milliGRAM(s) IV Push daily  potassium chloride   Powder 40 milliEquivalent(s) Oral daily  potassium chloride  20 mEq/100 mL IVPB 20 milliEquivalent(s) IV Intermittent every 2 hours  propofol Infusion 0.1 MICROgram(s)/kG/Min (0.04 mL/Hr) IV Continuous <Continuous>  QUEtiapine 100 milliGRAM(s) Oral two times a day  scopolamine 1 mG/72 Hr(s) Patch 1 Patch Transdermal every 72 hours  spironolactone 100 milliGRAM(s) Oral daily  thiamine 100 milliGRAM(s) Oral daily  vancomycin    Solution 250 milliGRAM(s) Oral every 6 hours    MEDICATIONS  (PRN):  acetaminophen  Suppository .. 650 milliGRAM(s) Rectal every 6 hours PRN Temp greater or equal to 38C (100.4F), Mild Pain (1 - 3)  ALBUTerol    90 MICROgram(s) HFA Inhaler 1 Puff(s) Inhalation every 4 hours PRN Shortness of Breath and/or Wheezing  cloNIDine 0.1 milliGRAM(s) Oral every 6 hours PRN opiate withdrawal  hydrOXYzine hydrochloride 50 milliGRAM(s) Oral every 6 hours PRN Anxiety  ibuprofen  Tablet. 400 milliGRAM(s) Oral every 6 hours PRN Mild Pain (1 - 3)  ibuprofen  Tablet. 600 milliGRAM(s) Oral every 6 hours PRN Temp greater or equal to 38C (100.4F)  sodium chloride 0.9% lock flush 10 milliLiter(s) IV Push every 1 hour PRN Pre/post blood products, medications, blood draw, and to maintain line patency

## 2022-07-13 NOTE — PROGRESS NOTE ADULT - SUBJECTIVE AND OBJECTIVE BOX
Patient is a 26y old  Female who presents with a chief complaint of anasarca (13 Jul 2022 07:50)      INTERVAL HPI/OVERNIGHT EVENTS:   Remains intubated and sedation  on OG feeds     ICU Vital Signs Last 24 Hrs  T(C): 38.1 (13 Jul 2022 07:00), Max: 38.7 (13 Jul 2022 03:00)  T(F): 100.6 (13 Jul 2022 07:00), Max: 101.7 (13 Jul 2022 03:00)  HR: 83 (13 Jul 2022 07:00) (70 - 109)  BP: 144/93 (13 Jul 2022 07:00) (144/93 - 196/105)  BP(mean): 113 (13 Jul 2022 06:26) (104 - 139)  ABP: --  ABP(mean): --  RR: 35 (13 Jul 2022 06:26) (22 - 60)  SpO2: 99% (13 Jul 2022 07:00) (99% - 100%)    O2 Parameters below as of 13 Jul 2022 06:26  Patient On (Oxygen Delivery Method): ventilator    O2 Concentration (%): 35      I&O's Summary    12 Jul 2022 07:01  -  13 Jul 2022 07:00  --------------------------------------------------------  IN: 2673.1 mL / OUT: 2035 mL / NET: 638.1 mL      Mode: AC/ CMV (Assist Control/ Continuous Mandatory Ventilation)  RR (machine): 25  TV (machine): 370  FiO2: 35  PEEP: 5  MAP: 14  PIP: 34      LABS:                        9.5    3.73  )-----------( x        ( 13 Jul 2022 06:23 )             30.6     07-13    145  |  111<H>  |  15  ----------------------------<  104<H>  2.9<L>   |  21  |  0.6<L>    Ca    8.3<L>      13 Jul 2022 06:23  Phos  2.5     07-13  Mg     1.9     07-13    TPro  5.5<L>  /  Alb  3.0<L>  /  TBili  0.4  /  DBili  x   /  AST  22  /  ALT  26  /  AlkPhos  335<H>  07-13        CAPILLARY BLOOD GLUCOSE        ABG - ( 13 Jul 2022 04:55 )  pH, Arterial: 7.47  pH, Blood: x     /  pCO2: 30    /  pO2: 180   / HCO3: 22    / Base Excess: -1.2  /  SaO2: 99.5                RADIOLOGY & ADDITIONAL TESTS:    Consultant(s) Notes Reviewed:  [x ] YES  [ ] NO    MEDICATIONS  (STANDING):  cefiderocol IVPB 2000 milliGRAM(s) IV Intermittent every 6 hours  chlorhexidine 0.12% Liquid 15 milliLiter(s) Oral Mucosa every 12 hours  chlorhexidine 2% Cloths 1 Application(s) Topical daily  chlorhexidine 4% Liquid 1 Application(s) Topical daily  dexMEDEtomidine Infusion 0.2 MICROgram(s)/kG/Hr (3.52 mL/Hr) IV Continuous <Continuous>  fentaNYL   Infusion. 0.5 MICROgram(s)/kG/Hr (3.52 mL/Hr) IV Continuous <Continuous>  folic acid 1 milliGRAM(s) Oral daily  nicotine -  14 mG/24Hr(s) Patch 1 patch Transdermal daily  norepinephrine Infusion 0.05 MICROgram(s)/kG/Min (8.03 mL/Hr) IV Continuous <Continuous>  pantoprazole  Injectable 40 milliGRAM(s) IV Push daily  potassium chloride   Powder 40 milliEquivalent(s) Oral daily  potassium chloride  20 mEq/100 mL IVPB 20 milliEquivalent(s) IV Intermittent every 2 hours  propofol Infusion 0.1 MICROgram(s)/kG/Min (0.04 mL/Hr) IV Continuous <Continuous>  QUEtiapine 100 milliGRAM(s) Oral two times a day  scopolamine 1 mG/72 Hr(s) Patch 1 Patch Transdermal every 72 hours  spironolactone 100 milliGRAM(s) Oral daily  thiamine 100 milliGRAM(s) Oral daily  vancomycin    Solution 250 milliGRAM(s) Oral every 6 hours    MEDICATIONS  (PRN):  acetaminophen  Suppository .. 650 milliGRAM(s) Rectal every 6 hours PRN Temp greater or equal to 38C (100.4F), Mild Pain (1 - 3)  ALBUTerol    90 MICROgram(s) HFA Inhaler 1 Puff(s) Inhalation every 4 hours PRN Shortness of Breath and/or Wheezing  cloNIDine 0.1 milliGRAM(s) Oral every 6 hours PRN opiate withdrawal  hydrOXYzine hydrochloride 50 milliGRAM(s) Oral every 6 hours PRN Anxiety  ibuprofen  Tablet. 400 milliGRAM(s) Oral every 6 hours PRN Mild Pain (1 - 3)  ibuprofen  Tablet. 600 milliGRAM(s) Oral every 6 hours PRN Temp greater or equal to 38C (100.4F)  sodium chloride 0.9% lock flush 10 milliLiter(s) IV Push every 1 hour PRN Pre/post blood products, medications, blood draw, and to maintain line patency        PHYSICAL EXAM:  GENERAL: intubated, sedated   HEENT:  NCAT, PERRL, No JVD, Trachea Midline, +ETT, +OGT  NERVOUS SYSTEM:  Sedated to RASS 0 to -1 opens eyes to my voice and tracks me   CHEST/LUNG: Good air entry bilaterally  HEART: Regular rate and rhythm  ABDOMEN: Soft, Nontender, + distension   EXTREMITIES:  Warm, well perfused  SKIN: No new skin breakdowns

## 2022-07-13 NOTE — PROGRESS NOTE ADULT - SUBJECTIVE AND OBJECTIVE BOX
Patient is a 26y old  Female who presents with a chief complaint of anasarca (12 Jul 2022 19:33)      Over Night Events:  Patient seen and examined.   still spiking temp   failed weaning trial yesterday     ROS:  See HPI    PHYSICAL EXAM    ICU Vital Signs Last 24 Hrs  T(C): 38.1 (13 Jul 2022 07:00), Max: 38.7 (13 Jul 2022 03:00)  T(F): 100.6 (13 Jul 2022 07:00), Max: 101.7 (13 Jul 2022 03:00)  HR: 83 (13 Jul 2022 07:00) (67 - 109)  BP: 144/93 (13 Jul 2022 07:00) (144/93 - 196/105)  BP(mean): 113 (13 Jul 2022 06:26) (104 - 139)  ABP: --  ABP(mean): --  RR: 35 (13 Jul 2022 06:26) (22 - 60)  SpO2: 99% (13 Jul 2022 07:00) (99% - 100%)    O2 Parameters below as of 13 Jul 2022 06:26  Patient On (Oxygen Delivery Method): ventilator    O2 Concentration (%): 35        General: awake   HEENT:     nikki            Lymph Nodes: NO cervical LN   Lungs: Bilateral BS  Cardiovascular: Regular   Abdomen: Soft, Positive BS  Extremities: No clubbing   Skin: warm   Neurological: follow command   Musculoskeletal: move all ext     I&O's Detail    12 Jul 2022 07:01  -  13 Jul 2022 07:00  --------------------------------------------------------  IN:    Dexmedetomidine: 629.2 mL    IV PiggyBack: 400 mL    IV PiggyBack: 300 mL    Propofol: 603.9 mL    Vital High Protein: 740 mL  Total IN: 2673.1 mL    OUT:    Indwelling Catheter - Urethral (mL): 2035 mL  Total OUT: 2035 mL    Total NET: 638.1 mL          LABS:                          8.3    2.12  )-----------( 65       ( 12 Jul 2022 05:34 )             27.2         13 Jul 2022 06:23    145    |  111    |  15     ----------------------------<  104    2.9     |  21     |  0.6      Ca    8.3        13 Jul 2022 06:23  Phos  2.5       13 Jul 2022 06:23  Mg     1.9       13 Jul 2022 06:23    TPro  5.5    /  Alb  3.0    /  TBili  0.4    /  DBili  x      /  AST  22     /  ALT  26     /  AlkPhos  335    13 Jul 2022 06:23  Amylase x     lipase x                                                                                                                                                    Babesia microti PCR, Blood. (collected 10 Jul 2022 15:43)  Source: .Blood None  Final Report (11 Jul 2022 09:45):    Babesia microti PCR    Results: NOT detected    ***************Result Note*************    The detection of Babesia microti by PCR has only been    validated for whole blood; this test has not been approved    by the [place] Food and Drug Administration (FDA). Performance    characteristics of this assay have been determined by    [facility]. The clinical significance    of results should be considered in conjunction with the    overall clinical presentation of the patient. Result is not    intended to be used as the sole means for clinical diagnosis    or patient management decisions.    One negative sample does not necessarily rule    out the presence of a parasitic infection.                                                   Mode: AC/ CMV (Assist Control/ Continuous Mandatory Ventilation)  RR (machine): 25  TV (machine): 370  FiO2: 35  PEEP: 5  MAP: 14  PIP: 34                                      ABG - ( 13 Jul 2022 04:55 )  pH, Arterial: 7.47  pH, Blood: x     /  pCO2: 30    /  pO2: 180   / HCO3: 22    / Base Excess: -1.2  /  SaO2: 99.5                MEDICATIONS  (STANDING):  cefiderocol IVPB 2000 milliGRAM(s) IV Intermittent every 6 hours  chlorhexidine 0.12% Liquid 15 milliLiter(s) Oral Mucosa every 12 hours  chlorhexidine 2% Cloths 1 Application(s) Topical daily  chlorhexidine 4% Liquid 1 Application(s) Topical daily  dexMEDEtomidine Infusion 0.2 MICROgram(s)/kG/Hr (3.52 mL/Hr) IV Continuous <Continuous>  fentaNYL   Infusion. 0.5 MICROgram(s)/kG/Hr (3.52 mL/Hr) IV Continuous <Continuous>  folic acid 1 milliGRAM(s) Oral daily  nicotine -  14 mG/24Hr(s) Patch 1 patch Transdermal daily  norepinephrine Infusion 0.05 MICROgram(s)/kG/Min (8.03 mL/Hr) IV Continuous <Continuous>  pantoprazole  Injectable 40 milliGRAM(s) IV Push daily  potassium chloride    Tablet ER 40 milliEquivalent(s) Oral once  potassium chloride  20 mEq/100 mL IVPB 20 milliEquivalent(s) IV Intermittent every 2 hours  propofol Infusion 0.1 MICROgram(s)/kG/Min (0.04 mL/Hr) IV Continuous <Continuous>  QUEtiapine 100 milliGRAM(s) Oral two times a day  scopolamine 1 mG/72 Hr(s) Patch 1 Patch Transdermal every 72 hours  spironolactone 100 milliGRAM(s) Oral daily  thiamine 100 milliGRAM(s) Oral daily  vancomycin    Solution 250 milliGRAM(s) Oral every 6 hours    MEDICATIONS  (PRN):  acetaminophen  Suppository .. 650 milliGRAM(s) Rectal every 6 hours PRN Temp greater or equal to 38C (100.4F), Mild Pain (1 - 3)  ALBUTerol    90 MICROgram(s) HFA Inhaler 1 Puff(s) Inhalation every 4 hours PRN Shortness of Breath and/or Wheezing  cloNIDine 0.1 milliGRAM(s) Oral every 6 hours PRN opiate withdrawal  hydrOXYzine hydrochloride 50 milliGRAM(s) Oral every 6 hours PRN Anxiety  ibuprofen  Tablet. 400 milliGRAM(s) Oral every 6 hours PRN Mild Pain (1 - 3)  ibuprofen  Tablet. 600 milliGRAM(s) Oral every 6 hours PRN Temp greater or equal to 38C (100.4F)  sodium chloride 0.9% lock flush 10 milliLiter(s) IV Push every 1 hour PRN Pre/post blood products, medications, blood draw, and to maintain line patency          Xrays:  TLC:  OG:  ET tube:                                                                                    stable    ECHO:  [facility] ICU:

## 2022-07-13 NOTE — PROGRESS NOTE ADULT - ASSESSMENT
Patient is a 26 year old female with hx of asthma, untreated Hep C, IVDA, anasarca presenting with increased dyspnea since yesterday    IMPRESSION  #Acute respiratory failure s/p intubation 6/16  #Pneumonia -   - CT Chest 6/22 - left greater than right lower lobe consolidations   - sputum Cx 6/24 Klebsiella oxytoca, Enterobacter cloacae complex- The Sputum culture from 6/27 grew Numerous Mixed gram negative rods and Moderate Staphylococcus aureus.-  - Please call micro [phone] ext1 to add on sensitivities for cefiderocol to the Acinetobacter IF NOT DONE ALREADY    #Fevers  - Ferritin, Serum: 94 ng/mL (06.27.22 @ 06:47),  Procalcitonin, Serum: 0.11 (06.27.22 @ 15:46)  - CT Abd/pelvis 6/26 - moderated ascites moderate pericardial effusion     #Drug overdose vs withdrawal?  #Hepatosplenomegaly - present since CT Abd/pelvis 1/30/2021  #Pancytopenia  #Hx of Untreated Hep C   #IVDA   #Abx allergy: buprenorphine (Rash), Suboxone (Rash)  # C.difficille infection - 7/5/22    would recommend:    1. Monitor Temp. and c/w supportive care, is afebrile now  2. Continue Cefiderocol  3. Continue Oral Vancomycin to cover C.diff, during and 7 days after completing the Systemic Abx  4. Management of Vent. as per ICU protocol  5. Aspiration precaution    - if hemodynamic compromise, ADD tigecycline 100mg then 50mg q12h IV for acinetobacter coverage    d/w ICU team    Attending  Attestation:   Spent more than 35 minutes on total encounter, more than 50 % of the visit was spent counseling and/or coordinating care by the Attending physician.         Patient is a 26 year old female with hx of asthma, untreated Hep C, IVDA, anasarca presenting with increased dyspnea since yesterday    IMPRESSION  #Acute respiratory failure s/p intubation 6/16  #Pneumonia -   - CT Chest 6/22 - left greater than right lower lobe consolidations   - sputum Cx 6/24 Klebsiella oxytoca, Enterobacter cloacae complex- The Sputum culture from 6/27 grew Numerous Mixed gram negative rods and Moderate Staphylococcus aureus.-  - Please call micro [phone] ext1 to add on sensitivities for cefiderocol to the Acinetobacter IF NOT DONE ALREADY    #Fevers  - Ferritin, Serum: 94 ng/mL (06.27.22 @ 06:47),  Procalcitonin, Serum: 0.11 (06.27.22 @ 15:46)  - CT Abd/pelvis 6/26 - moderated ascites moderate pericardial effusion     #Drug overdose vs withdrawal?  #Hepatosplenomegaly - present since CT Abd/pelvis 1/30/2021  #Pancytopenia  #Hx of Untreated Hep C   #IVDA   #Abx allergy: buprenorphine (Rash), Suboxone (Rash)  # C.difficille infection - 7/5/22    would recommend:    1. Monitor Temp. and c/w supportive care, is afebrile now  2. Continue Cefiderocol  3. Continue Oral Vancomycin to cover C.diff, during and 7 days after completing the Systemic Abx  4. Management of Vent. as per ICU protocol  5. Aspiration precaution    d/w ICU team    Attending  Attestation:   Spent more than 35 minutes on total encounter, more than 50 % of the visit was spent counseling and/or coordinating care by the Attending physician.

## 2022-07-13 NOTE — PROGRESS NOTE ADULT - ASSESSMENT
Acute respiratory failure sp intubation and extubation 7/8 followed by reintubation  PNA CT Chest 6/22 - left greater than right lower lobe consolidations; sputum Cx 6/24 Klebsiella oxytoca, Enterobacter cloacae complex- and Acinetobacter buamnii  MSSA pna  seizure like activity  ?? drug over dose/ withdrawal opoid   liver cirrhosis 2/2 to IVDA  Ascites s/p paracentesis   Pancytopenia- worsening s/p 2 units PRBC   RENETAT Resolving   C. Diff on oral vancomycin   Hypokalemia/hypomagnesemia      PLAN:    CNS:    (hold methadone dose)  CW Clonapin to 2mg TID  CW Seroquel 100mg BID  SBT      HEENT: oral care     PULMONARY:  HOB 45,  wean O2 as tolerated,   f/u surgery for trach eval     CARDIOVASCULAR: goal MAP >65.  Monitor QTc daily  ECHO 7/6: Small to mod generalized effusion. No tamponade . Small mod effusion   present by report on echo 4/21/22  f/u cardiology recommendations; no urgency in diagnostic tap       GI: GI prophylaxis.  OG Feeding.   laxative prn   f/u repeat kub     RENAL: monitor lytes  f/u repeat BMP and replete K and mag as needed     INFECTIOUS DISEASE:   worsening R infiltrate. Fungitell <31 7/5 , Glactomannan pending   Repeat procal 0.14 from 0.36 (6/30)   Acinetobacter buamnii in sputum cx -> switch celestine to cefiderocol 2gm q 6 (nonformulary filled and sent to pharmacy)  -f/u heme onc for BM biopsy for splenomegaly, pancytopenia, thrombocytopenia and persistent fevers -> heme/onc requesting IR eval for BM biopsy  - IR consult placed: f/u recommendations   - continue PO vancomycin 250 mg q 6 hours   - f/u parasite smear and babesia PCR (doubt given chronicity of findings), EBV serologies, CMV IgM/IgG, HIV Viral Load, repeat ferritin  -f/u sensitivities for cefiderocol to the acinetobacter  - if hemodynamic compromise, ADD tigecycline 100mg then 50mg q12h IV for acinetobacter coverage  - f/u ID recommendations     HEMATOLOGICAL:    monitor CBC  D-dimer 1322, HIT antibody negative  hold heparin for now   -f/u heme onc for BM biopsy for splenomegaly, pancytopenia, thrombocytopenia and persistent fevers > heme/onc requesting IR eval for BM biopsy  - IR consult placed: f/u recommendations     ENDOCRINE:  Follow up FS.  Insulin protocol if needed.     MICU  lines: right IJ 7/6   [place] - 7/5   Acute respiratory failure sp intubation and extubation 7/8 followed by reintubation  PNA CT Chest 6/22 - left greater than right lower lobe consolidations; sputum Cx 6/24 Klebsiella oxytoca, Enterobacter cloacae complex- and Acinetobacter buamnii  MSSA pna  seizure like activity  ?? 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 Diff on oral vancomycin   Hypokalemia/hypomagnesemia      PLAN:    CNS:   (hold methadone dose)  CW Clonapin to 2mg TID  CW Seroquel 100mg BID  SBT      HEENT: oral care     PULMONARY:  HOB 45,  wean O2 as tolerated,   f/u surgery reccs: planned for trach on friday     CARDIOVASCULAR: goal MAP >65.  Monitor QTc daily  ECHO 7/6: Small to mod generalized effusion. No tamponade . Small mod effusion   present by report on echo 4/21/22  f/u cardiology recommendations; no urgency in diagnostic tap       GI: GI prophylaxis.  OG Feeding.   laxative prn   f/u repeat kub     RENAL: monitor lytes  f/u repeat BMP and replete K and mag as needed     INFECTIOUS DISEASE:   worsening R infiltrate. Fungitell <31 7/5 , Glactomannan pending   Repeat procal 0.14 from 0.36 (6/30)   Acinetobacter buamnii in sputum cx -> switch celestine to cefiderocol 2gm q 6 (nonformulary filled and sent to pharmacy)  -f/u heme onc for BM biopsy for splenomegaly, pancytopenia, thrombocytopenia and persistent fevers -> heme/onc requesting IR eval for BM biopsy  - IR consult placed: not stable for transfer to [facility]  - Heme/onc: recommend patient to be transferred to [facility] for BM biopsy as patient not stable; patient to revisit BM biopsy after trach?  - continue PO vancomycin 250 mg q 6 hours   - parasite smear and babesia PCR negative,  - f/u  EBV serologies, CMV IgM/IgG, HIV Viral Load, repeat ferritin  - if hemodynamic compromise, ADD tigecycline 100mg then 50mg q12h IV for acinetobacter coverage  - f/u ID recommendations     HEMATOLOGICAL:    monitor CBC  D-dimer 1322, HIT antibody negative;  f/u repeat HIT antibody; duplex negative 7/2/2022  hold heparin for now   -f/u heme onc for BM biopsy for splenomegaly, pancytopenia, thrombocytopenia and persistent fevers -> heme/onc requesting IR eval for BM biopsy  - IR consult placed: not stable for transfer to [facility]  - Heme/onc: recommend patient to be transferred to [facility] for BM biopsy as patient not stable; patient to revisit BM biopsy after trach?    ENDOCRINE:  Follow up FS.  Insulin protocol if needed.     MICU  lines: right IJ 7/6   [place] - 7/5   Acute respiratory failure sp intubation and extubation 7/8 followed by reintubation  PNA CT Chest 6/22 - left greater than right lower lobe consolidations; sputum Cx 6/24 Klebsiella oxytoca, Enterobacter cloacae complex- and Acinetobacter buamnii  MSSA pna  seizure like activity  ?? 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 Diff on oral vancomycin   Hypokalemia/hypomagnesemia      PLAN:    CNS:   (hold methadone dose)  CW Clonapin to 2mg TID  CW Seroquel 100mg BID  SBT      HEENT: oral care     PULMONARY:  HOB 45,  wean O2 as tolerated,   f/u surgery reccs: planned for trach on friday     CARDIOVASCULAR: goal MAP >65.  Monitor QTc daily  ECHO 7/6: Small to mod generalized effusion. No tamponade . Small mod effusion   present by report on echo 4/21/22  f/u cardiology recommendations; no urgency in diagnostic tap       GI: GI prophylaxis.  OG Feeding.   laxative prn   f/u repeat kub     RENAL: monitor lytes  f/u repeat BMP and replete K and mag as needed     INFECTIOUS DISEASE:   worsening R infiltrate. Fungitell <31 7/5 , Glactomannan pending   Repeat procal 0.14 from 0.36 (6/30)   Acinetobacter buamnii in sputum cx -> switch celestine to cefiderocol 2gm q 6 (nonformulary filled and sent to pharmacy)  -f/u heme onc for BM biopsy for splenomegaly, pancytopenia, thrombocytopenia and persistent fevers -> heme/onc requesting IR eval for BM biopsy  - IR consult placed: not stable for transfer to [facility]  - Heme/onc: recommend patient to be transferred to [place] for BM biopsy as patient not stable; patient to revisit BM biopsy after trach?  - continue PO vancomycin 250 mg q 6 hours   - parasite smear and babesia PCR negative,  - f/u  EBV serologies, CMV IgM/IgG, HIV Viral Load, repeat ferritin  - if hemodynamic compromise, ADD tigecycline 100mg then 50mg q12h IV for acinetobacter coverage  - f/u ID recommendations     HEMATOLOGICAL:    monitor CBC  D-dimer 1322, HIT antibody negative;  f/u repeat HIT antibody; duplex negative 7/2/2022  hold heparin for now   -f/u heme onc for BM biopsy for splenomegaly, pancytopenia, thrombocytopenia and persistent fevers -> heme/onc requesting IR eval for BM biopsy  - IR consult placed[place]  - Heme/onc: recommend patient to be transferred to [place] for BM biopsy as patient not stable; patient to revisit BM biopsy after trach?    ENDOCRINE:  Follow up FS.  Insulin protocol if needed.     MICU  lines: right IJ 7/6   [place] - 7/5; trial of void today

## 2022-07-13 NOTE — PROGRESS NOTE ADULT - ASSESSMENT
IMPRESSION:  Acute respiratory failure sp intubation  PNA  MSSA pna  seizure like activity  ?? drug over dose/ withdrawal opoid   liver cirrhosis   Ascites sp paracentesis   Pancytopenia- worsening  RENETTA improved  C diff +ve     PLAN:    CNS:   CW methadone 30mg daily  CW Clonapin to 2mg TID  CW Seroquel 100mg BID  SAT    precedex   HEENT: oral care     PULMONARY:  HOB 45,  Vent changes: wean O2 as tolerated, proceed with SBT  for weaning trial   G sx on board  for trach need covid test   if passed SBt  then placed NG tube to continue her meds     CARDIOVASCULAR: goal MAP >65.  Monitor QTc daily.     keep is = os  Pericarditis management per cardio. discussed   do ekg follow QtC  GI: GI prophylaxis.  OG Feeding.    KUB reviewed    RENAL: monitor lytes is and os     INFECTIOUS DISEASE:follow Id recommendation   continue abx   heme for BMB    HEMATOLOGICAL:   i will discus with  hematology for BMB to be done at [facility] other wise need IR consult   monitor CBC, Heme FU.   reticulocyte count  dvt ppx  check d-dimer  reviewed ,  hold heparin   repeat HIT panel     ENDOCRINE:  Follow up FS.  Insulin protocol if needed.     MICU  TLC 7/6 HARSH [place] - failed TOV 6/24 Change.      IMPRESSION:  Acute respiratory failure sp intubation  PNA  MSSA pna  seizure like activity  ?? drug over dose/ withdrawal opoid   liver cirrhosis   Ascites sp paracentesis   Pancytopenia- worsening  RENETTA improved  C diff +ve     PLAN:    CNS:   CW methadone 30mg daily  CW Clonapin to 2mg TID  CW Seroquel 100mg BID  SAT    precedex   HEENT: oral care     PULMONARY:  HOB 45,  Vent changes: wean O2 as tolerated, proceed with SBT  for weaning trial   G sx on board  for trach need covid test   if passed SBt  then placed NG tube to continue her meds     CARDIOVASCULAR: goal MAP >65.  Monitor QTc daily.     keep is = os  Pericarditis management per cardio. discussed   do ekg follow QtC  GI: GI prophylaxis.  OG Feeding.    KUB reviewed    RENAL: monitor lytes is and os     INFECTIOUS DISEASE:follow Id recommendation   continue abx   heme for BMB    HEMATOLOGICAL:   i will discus with  hematology for BMB to be done at [facility] other wise need IR consult   monitor CBC, Heme FU.   reticulocyte count  dvt ppx  check d-dimer  reviewed ,  hold heparin   repeat HIT panel     ENDOCRINE:  Follow up FS.  Insulin protocol if needed.     MICU  TLC 7/6 RIJ  [place] - failed TOV 6/24 Change.   try voiding trial today

## 2022-07-13 NOTE — PROGRESS NOTE ADULT - SUBJECTIVE AND OBJECTIVE BOX
Patient is seen and examined at the bed side, is afebrile now, spiked fever.  She remains intubated/vented.    REVIEW OF SYSTEMS: Unable to obtain due to mental status      ALLERGIES: buprenorphine (Rash)  Suboxone (Rash)      ICU Vital Signs Last 24 Hrs  T(C): 37 (2022 15:00), Max: 38.7 (2022 03:00)  T(F): 98.6 (2022 15:00), Max: 101.7 (2022 03:00)  HR: 78 (2022 19:22) (70 - 125)  BP: 108/73 (2022 19:13) (108/73 - 196/105)  BP(mean): 86 (2022 19:13) (86 - 139)  ABP: --  ABP(mean): --  RR: 58 (2022 19:13) (29 - 60)  SpO2: 100% (:22) (97% - 100%)    O2 Parameters below as of 2022 19:13  Patient On (Oxygen Delivery Method): ventilator    O2 Concentration (%): 35          PHYSICAL EXAM:  GENERAL: Intubated/vented  CHEST/LUNG: Not using accessory muscles   HEART: s1 and s2 present  ABDOMEN:  Nontender and  Nondistended  EXTREMITIES: No pedal  edema  CNS:  Intubated/vented and awake now      LABS:                        9.5    3.73  )-----------( 83       ( 2022 06:23 )             30.6                           8.3    2.12  )-----------( 65       ( 2022 05:34 )             27.2       13    148<H>  |  115<H>  |  15  ----------------------------<  107<H>  3.7   |  22  |  0.6<L>    Ca    8.1<L>      2022 18:51  Phos  2.5       Mg     1.9         TPro  5.5<L>  /  Alb  3.0<L>  /  TBili  0.4  /  DBili  x   /  AST  22  /  ALT  26  /  AlkPhos  335<H>  12    147<H>  |  113<H>  |  24<H>  ----------------------------<  89  2.8<L>   |  21  |  0.7    Ca    8.2<L>      2022 05:34  Phos  2.9       Mg     1.8         TPro  5.4<L>  /  Alb  2.7<L>  /  TBili  0.4  /  DBili  x   /  AST  40  /  ALT  37  /  AlkPhos  347<H>                 ABG - ( 2022 03:14 )  pH, Arterial: 7.30  pH, Blood: x     /  pCO2: 51    /  pO2: 111   / HCO3: 25    / Base Excess: -1.7  /  SaO2: 99.0          Urinalysis Basic - ( 2022 19:06 )  Color: Yellow / Appearance: Slightly Cloudy / S.025 / pH: x  Gluc: x / Ketone: Trace  / Bili: Negative / Urobili: 1.0 mg/dL   Blood: x / Protein: >=300 mg/dL / Nitrite: Negative   Leuk Esterase: Negative / RBC: 26-50 /HPF / WBC 3-5 /HPF   Sq Epi: x / Non Sq Epi: Occasional /HPF / Bacteria: Moderate        MEDICATIONS  (STANDING):    cefiderocol IVPB 2000 milliGRAM(s) IV Intermittent every 6 hours  chlorhexidine 0.12% Liquid 15 milliLiter(s) Oral Mucosa every 12 hours  chlorhexidine 2% Cloths 1 Application(s) Topical daily  dexMEDEtomidine Infusion 0.2 MICROgram(s)/kG/Hr (3.52 mL/Hr) IV Continuous <Continuous>  fentaNYL   Infusion. 0.5 MICROgram(s)/kG/Hr (3.52 mL/Hr) IV Continuous <Continuous>  folic acid 1 milliGRAM(s) Oral daily  midazolam Infusion 0.02 mG/kG/Hr (1.41 mL/Hr) IV Continuous <Continuous>  nicotine -  14 mG/24Hr(s) Patch 1 patch Transdermal daily  norepinephrine Infusion 0.05 MICROgram(s)/kG/Min (8.03 mL/Hr) IV Continuous <Continuous>  pantoprazole  Injectable 40 milliGRAM(s) IV Push daily  potassium chloride   Powder 40 milliEquivalent(s) Oral daily  propofol Infusion 0.1 MICROgram(s)/kG/Min (0.04 mL/Hr) IV Continuous <Continuous>  QUEtiapine 100 milliGRAM(s) Oral two times a day  scopolamine 1 mG/72 Hr(s) Patch 1 Patch Transdermal every 72 hours  spironolactone 100 milliGRAM(s) Oral daily  thiamine 100 milliGRAM(s) Oral daily  vancomycin    Solution 250 milliGRAM(s) Oral every 6 hours        RADIOLOGY & ADDITIONAL TESTS:    < from: Xray Kidney Ureter Bladder (22 @ 13:25) >    FINDINGS: Enteric feeding tube is stable, with tip in the left upper quadrant.  There is a non-obstructive bowel gas pattern.  No abnormal   masses or calcifications are seen.  Stable probe/catheter projects over  the lower pelvis.    < from: CT Angio Chest PE Protocol w/ IV Cont (22 @ 21:14) No pulmonary embolus.    Near complete consolidation/collapse of the left lower lobe, increased as  compared with prior. Mildly increased right lower lobe patchy   consolidative opacities-atelectasis. Adjacent bilateral small pleural effusions. Findings compatible with likely mixed pneumonia and  atelectasis.    Redemonstration of a dilated main pulmonary artery measuring up to 3.8 cm  compatible with pulmonary hypertension.    < from: Xray Chest 1 View- PORTABLE-Routine (Xray Chest 1 View- PORTABLE-Routine in AM.) (22 @ 05:28) >  Stable cardiomegaly and left basilar opacity. Stable support devices.      < from: CT Abdomen and Pelvis w/ IV Cont (22 @ 17:27) > No evidence of retroperitoneal hematoma.    Small bilateral pleural effusions and left basilar consolidation. Moderate ascites redemonstrated.    Moderate pericardial effusion.  Hepatosplenomegaly redemonstrated.        MICROBIOLOGY DATA:  Clostridium difficile Toxin by PCR (22 @ 19:15)   Clostridium difficile Toxin by PCR: RESULT INTERPRETATION:  Detected     Culture - Sputum . (22 @ 13:39)   Gram Stain: Few polymorphonuclear leukocytes per low power field   No Squamous epithelial cells per low power field   Numerous Gram Negative Coccobacilli seen per oil power field   Specimen Source: ET Tube ET Tube   Culture Results:   Moderate Acinetobacter baumannii/nosocomialis group   Normal Respiratory Kelly absent     MRSA/MSSA PCR (22 @ 10:34)   MRSA PCR Result.: Negative    Respiratory Viral Panel with COVID-19 by BROOKE (22 @ 09:19)   Rapid RVP Result: Jamaltewilfredo   SARS-CoV-2: NotDetec:    Culture - Blood (22 @ 20:31)   Specimen Source: .Blood Blood-Peripheral   Culture Results:   No growth to date.     Culture - Blood (22 @ 20:31)   Specimen Source: .Blood Blood   Culture Results:   No growth to date.     Culture - Sputum . (22 @ 14:00)   - Oxacillin: S <=0.25 Oxacillin predicts susceptibility for dicloxacillin, methicillin, and nafcillin   - Cefazolin: S <=4   - Erythromycin: S <=0.25   - Gentamicin: S <=1 Should not be used as monotherapy   - Clindamycin: S <=0.25   - Rifampin: S <=1 Should not be used as monotherapy   - Tetra/Doxy: S <=1   - Trimethoprim/Sulfamethoxazole: S <=0.5/9.5   - Vancomycin: S 2   Gram Stain:   Moderate polymorphonuclear leukocytes per low power field   Few Squamous epithelial cells per low power field   Moderate Gram positive cocci in pairs seen per oil power field   Few Gram Variable Rods seen per oil power field   - Ampicillin/Sulbactam: S <=8/4   Specimen Source: Trach Asp Tracheal Aspirate   Culture Results:   Numerous Mixed gram negative rods   Moderate Staphylococcus aureus   Normal Respiratory Kelly present   Organism Identification: Staphylococcus aureus   Organism: Staphylococcus aureus   Culture - Blood in AM (22 @ 06:00)   Specimen Source: .Blood Blood   Culture Results: No growth to date.     Culture - Sputum . (22 @ 14:00)   Gram Stain:   Moderate polymorphonuclear leukocytes per low power field   Few Squamous epithelial cells per low power field   Moderate Gram positive cocci in pairs seen per oil power field   Few Gram Variable Rods seen per oil power field   Specimen Source: Trach Asp Tracheal Aspirate   Culture Results:   Numerous Mixed gram negative rods   Moderate Staphylococcus aureus Susceptibility to follow.   Normal Respiratory Kelly absent     Culture - Sputum . (22 @ 17:20)   Gram Stain:   Few polymorphonuclear leukocytes per low power field   Rare Squamous epithelial cells per low power field   Moderate Gram Negative Rods seen per oil power field   - Amikacin: S <=16   - Amikacin: S <=16   - Amoxicillin/Clavulanic Acid: R >16/8   - Amoxicillin/Clavulanic Acid: S <=8/4   - Ampicillin: R 16 These ampicillin results predict results for amoxicillin   - Ampicillin: R >16 These ampicillin results predict results for amoxicillin   - Ampicillin/Sulbactam: R >16/8 Enterobacter, Klebsiella aerogenes, Citrobacter, and Serratia may develop resistance during prolonged therapy (3-4 days)   - Ampicillin/Sulbactam: S <=4/2 Enterobacter, Klebsiella aerogenes, Citrobacter, and Serratia may develop resistance during prolonged therapy (3-4 days)   - Aztreonam: S <=4   - Aztreonam: S <=4   - Cefazolin: R >16 Enterobacter, Klebsiella aerogenes, Citrobacter, and Serratia may develop resistance during prolonged therapy (3-4 days)   - Cefazolin: S <=2 Enterobacter, Klebsiella aerogenes, Citrobacter, and Serratia may develop resistance during prolonged therapy (3-4 days)   - Cefepime: S <=2   - Cefepime: S <=2   - Cefoxitin: R >16   - Cefoxitin: S <=8   - Ceftriaxone: S <=1 Enterobacter, Klebsiella aerogenes, Citrobacter, and Serratia may develop resistance during prolonged therapy   - Ceftriaxone: S <=1 Enterobacter, Klebsiella aerogenes, Citrobacter, and Serratia may develop resistance during prolonged therapy   - Ciprofloxacin: S <=0.25   - Ciprofloxacin: S <=0.25   - Ertapenem: S <=0.5   - Ertapenem: S <=0.5   - Gentamicin: S <=2   - Gentamicin: S <=2   - Imipenem: S <=1   - Imipenem: S <=1   - Levofloxacin: S <=0.5   - Levofloxacin: S <=0.5   - Meropenem: S <=1   - Meropenem: S <=1   - Piperacillin/Tazobactam: S <=8   - Piperacillin/Tazobactam: S <=8   - Tobramycin: S <=2   - Tobramycin: S <=2   - Trimethoprim/Sulfamethoxazole: S <=0.5/9.5   - Trimethoprim/Sulfamethoxazole: S <=0.5/9.5   Specimen Source: Trach Asp Tracheal Aspirate   Culture Results:   Moderate Klebsiella oxytoca/Raoutella ornithinolytica   Moderate Enterobacter cloacae complex   Normal Respiratory Kelly absent   Organism Identification: Klebsiella oxytoca /Raoutella ornithinolytica   Enterobacter cloacae complex   Organism: Klebsiella oxytoca /Raoutella ornithinolytica   Organism: Enterobacter cloacae complex COVID-19 PCR (06.15.22 @ 07:10)   COVID-19 PCR: NotDetec: Culture - Acid Fast - Sputum w/Smear (22 @ 07:00)   Specimen Source: .Sputum Sputum   Acid Fast Bacilli Smear:   No acid fast bacilli seen by fluorochrome stain   Culture Results:   No acid fast bacilli isolated after 6 weeks.        Patient is seen and examined at the bed side, is afebrile now, spiked fever.  She remains intubated/vented.    REVIEW OF SYSTEMS: Unable to obtain due to mental status      ALLERGIES: buprenorphine (Rash)  Suboxone (Rash)      ICU Vital Signs Last 24 Hrs  T(C): 37 (2022 15:00), Max: 38.7 (2022 03:00)  T(F): 98.6 (2022 15:00), Max: 101.7 (2022 03:00)  HR: 78 (2022 19:22) (70 - 125)  BP: 108/73 (2022 19:13) (108/73 - 196/105)  BP(mean): 86 (2022 19:13) (86 - 139)  ABP: --  ABP(mean): --  RR: 58 (2022 19:13) (29 - 60)  SpO2: 100% (:22) (97% - 100%)    O2 Parameters below as of 2022 19:13  Patient On (Oxygen Delivery Method): ventilator    O2 Concentration (%): 35        PHYSICAL EXAM:  GENERAL: Intubated/vented  CHEST/LUNG: Not using accessory muscles   HEART: s1 and s2 present  ABDOMEN:  Nontender and  Nondistended  EXTREMITIES: No pedal  edema  CNS:  Intubated/vented       LABS:                        9.5    3.73  )-----------( 83       ( 2022 06:23 )             30.6                           8.3    2.12  )-----------( 65       ( 2022 05:34 )             27.2       13    148<H>  |  115<H>  |  15  ----------------------------<  107<H>  3.7   |  22  |  0.6<L>    Ca    8.1<L>      2022 18:51  Phos  2.5       Mg     1.9         TPro  5.5<L>  /  Alb  3.0<L>  /  TBili  0.4  /  DBili  x   /  AST  22  /  ALT  26  /  AlkPhos  335<H>  12    147<H>  |  113<H>  |  24<H>  ----------------------------<  89  2.8<L>   |  21  |  0.7    Ca    8.2<L>      2022 05:34  Phos  2.9       Mg     1.8         TPro  5.4<L>  /  Alb  2.7<L>  /  TBili  0.4  /  DBili  x   /  AST  40  /  ALT  37  /  AlkPhos  347<H>                 ABG - ( 2022 03:14 )  pH, Arterial: 7.30  pH, Blood: x     /  pCO2: 51    /  pO2: 111   / HCO3: 25    / Base Excess: -1.7  /  SaO2: 99.0          Urinalysis Basic - ( 2022 19:06 )  Color: Yellow / Appearance: Slightly Cloudy / S.025 / pH: x  Gluc: x / Ketone: Trace  / Bili: Negative / Urobili: 1.0 mg/dL   Blood: x / Protein: >=300 mg/dL / Nitrite: Negative   Leuk Esterase: Negative / RBC: 26-50 /HPF / WBC 3-5 /HPF   Sq Epi: x / Non Sq Epi: Occasional /HPF / Bacteria: Moderate        MEDICATIONS  (STANDING):    cefiderocol IVPB 2000 milliGRAM(s) IV Intermittent every 6 hours  chlorhexidine 0.12% Liquid 15 milliLiter(s) Oral Mucosa every 12 hours  chlorhexidine 2% Cloths 1 Application(s) Topical daily  dexMEDEtomidine Infusion 0.2 MICROgram(s)/kG/Hr (3.52 mL/Hr) IV Continuous <Continuous>  fentaNYL   Infusion. 0.5 MICROgram(s)/kG/Hr (3.52 mL/Hr) IV Continuous <Continuous>  folic acid 1 milliGRAM(s) Oral daily  midazolam Infusion 0.02 mG/kG/Hr (1.41 mL/Hr) IV Continuous <Continuous>  nicotine -  14 mG/24Hr(s) Patch 1 patch Transdermal daily  norepinephrine Infusion 0.05 MICROgram(s)/kG/Min (8.03 mL/Hr) IV Continuous <Continuous>  pantoprazole  Injectable 40 milliGRAM(s) IV Push daily  potassium chloride   Powder 40 milliEquivalent(s) Oral daily  propofol Infusion 0.1 MICROgram(s)/kG/Min (0.04 mL/Hr) IV Continuous <Continuous>  QUEtiapine 100 milliGRAM(s) Oral two times a day  scopolamine 1 mG/72 Hr(s) Patch 1 Patch Transdermal every 72 hours  spironolactone 100 milliGRAM(s) Oral daily  thiamine 100 milliGRAM(s) Oral daily  vancomycin    Solution 250 milliGRAM(s) Oral every 6 hours      RADIOLOGY & ADDITIONAL TESTS:    < from: Xray Kidney Ureter Bladder (22 @ 13:25) >    FINDINGS: Enteric feeding tube is stable, with tip in the left upper quadrant.  There is a non-obstructive bowel gas pattern.  No abnormal   masses or calcifications are seen.  Stable probe/catheter projects over  the lower pelvis.    < from: CT Angio Chest PE Protocol w/ IV Cont (22 @ 21:14) No pulmonary embolus.    Near complete consolidation/collapse of the left lower lobe, increased as  compared with prior. Mildly increased right lower lobe patchy   consolidative opacities-atelectasis. Adjacent bilateral small pleural effusions. Findings compatible with likely mixed pneumonia and  atelectasis.    Redemonstration of a dilated main pulmonary artery measuring up to 3.8 cm  compatible with pulmonary hypertension.    < from: Xray Chest 1 View- PORTABLE-Routine (Xray Chest 1 View- PORTABLE-Routine in AM.) (22 @ 05:28) >  Stable cardiomegaly and left basilar opacity. Stable support devices.      < from: CT Abdomen and Pelvis w/ IV Cont (22 @ 17:27) > No evidence of retroperitoneal hematoma.    Small bilateral pleural effusions and left basilar consolidation. Moderate ascites redemonstrated.    Moderate pericardial effusion.  Hepatosplenomegaly redemonstrated.        MICROBIOLOGY DATA:  Clostridium difficile Toxin by PCR (22 @ 19:15)   Clostridium difficile Toxin by PCR: RESULT INTERPRETATION:  Detected     Culture - Sputum . (22 @ 13:39)   Gram Stain: Few polymorphonuclear leukocytes per low power field   No Squamous epithelial cells per low power field   Numerous Gram Negative Coccobacilli seen per oil power field   Specimen Source: ET Tube ET Tube   Culture Results:   Moderate Acinetobacter baumannii/nosocomialis group   Normal Respiratory Kelly absent     MRSA/MSSA PCR (22 @ 10:34)   MRSA PCR Result.: Negative    Respiratory Viral Panel with COVID-19 by BROOKE (22 @ 09:19)   Rapid RVP Result: Jamaltewilfredo   SARS-CoV-2: NotDetec:    Culture - Blood (22 @ 20:31)   Specimen Source: .Blood Blood-Peripheral   Culture Results:   No growth to date.     Culture - Blood (22 @ 20:31)   Specimen Source: .Blood Blood   Culture Results:   No growth to date.     Culture - Sputum . (22 @ 14:00)   - Oxacillin: S <=0.25 Oxacillin predicts susceptibility for dicloxacillin, methicillin, and nafcillin   - Cefazolin: S <=4   - Erythromycin: S <=0.25   - Gentamicin: S <=1 Should not be used as monotherapy   - Clindamycin: S <=0.25   - Rifampin: S <=1 Should not be used as monotherapy   - Tetra/Doxy: S <=1   - Trimethoprim/Sulfamethoxazole: S <=0.5/9.5   - Vancomycin: S 2   Gram Stain:   Moderate polymorphonuclear leukocytes per low power field   Few Squamous epithelial cells per low power field   Moderate Gram positive cocci in pairs seen per oil power field   Few Gram Variable Rods seen per oil power field   - Ampicillin/Sulbactam: S <=8/4   Specimen Source: Trach Asp Tracheal Aspirate   Culture Results:   Numerous Mixed gram negative rods   Moderate Staphylococcus aureus   Normal Respiratory Kelly present   Organism Identification: Staphylococcus aureus   Organism: Staphylococcus aureus   Culture - Blood in AM (22 @ 06:00)   Specimen Source: .Blood Blood   Culture Results: No growth to date.     Culture - Sputum . (22 @ 14:00)   Gram Stain:   Moderate polymorphonuclear leukocytes per low power field   Few Squamous epithelial cells per low power field   Moderate Gram positive cocci in pairs seen per oil power field   Few Gram Variable Rods seen per oil power field   Specimen Source: Trach Asp Tracheal Aspirate   Culture Results:   Numerous Mixed gram negative rods   Moderate Staphylococcus aureus Susceptibility to follow.   Normal Respiratory Kelly absent     Culture - Sputum . (22 @ 17:20)   Gram Stain:   Few polymorphonuclear leukocytes per low power field   Rare Squamous epithelial cells per low power field   Moderate Gram Negative Rods seen per oil power field   - Amikacin: S <=16   - Amikacin: S <=16   - Amoxicillin/Clavulanic Acid: R >16/8   - Amoxicillin/Clavulanic Acid: S <=8/4   - Ampicillin: R 16 These ampicillin results predict results for amoxicillin   - Ampicillin: R >16 These ampicillin results predict results for amoxicillin   - Ampicillin/Sulbactam: R >16/8 Enterobacter, Klebsiella aerogenes, Citrobacter, and Serratia may develop resistance during prolonged therapy (3-4 days)   - Ampicillin/Sulbactam: S <=4/2 Enterobacter, Klebsiella aerogenes, Citrobacter, and Serratia may develop resistance during prolonged therapy (3-4 days)   - Aztreonam: S <=4   - Aztreonam: S <=4   - Cefazolin: R >16 Enterobacter, Klebsiella aerogenes, Citrobacter, and Serratia may develop resistance during prolonged therapy (3-4 days)   - Cefazolin: S <=2 Enterobacter, Klebsiella aerogenes, Citrobacter, and Serratia may develop resistance during prolonged therapy (3-4 days)   - Cefepime: S <=2   - Cefepime: S <=2   - Cefoxitin: R >16   - Cefoxitin: S <=8   - Ceftriaxone: S <=1 Enterobacter, Klebsiella aerogenes, Citrobacter, and Serratia may develop resistance during prolonged therapy   - Ceftriaxone: S <=1 Enterobacter, Klebsiella aerogenes, Citrobacter, and Serratia may develop resistance during prolonged therapy   - Ciprofloxacin: S <=0.25   - Ciprofloxacin: S <=0.25   - Ertapenem: S <=0.5   - Ertapenem: S <=0.5   - Gentamicin: S <=2   - Gentamicin: S <=2   - Imipenem: S <=1   - Imipenem: S <=1   - Levofloxacin: S <=0.5   - Levofloxacin: S <=0.5   - Meropenem: S <=1   - Meropenem: S <=1   - Piperacillin/Tazobactam: S <=8   - Piperacillin/Tazobactam: S <=8   - Tobramycin: S <=2   - Tobramycin: S <=2   - Trimethoprim/Sulfamethoxazole: S <=0.5/9.5   - Trimethoprim/Sulfamethoxazole: S <=0.5/9.5   Specimen Source: Trach Asp Tracheal Aspirate   Culture Results:   Moderate Klebsiella oxytoca/Raoutella ornithinolytica   Moderate Enterobacter cloacae complex   Normal Respiratory Kelly absent   Organism Identification: Klebsiella oxytoca /Raoutella ornithinolytica   Enterobacter cloacae complex   Organism: Klebsiella oxytoca /Raoutella ornithinolytica   Organism: Enterobacter cloacae complex COVID-19 PCR (06.15.22 @ 07:10)   COVID-19 PCR: NotDetec: Culture - Acid Fast - Sputum w/Smear (22 @ 07:00)   Specimen Source: .Sputum Sputum   Acid Fast Bacilli Smear:   No acid fast bacilli seen by fluorochrome stain   Culture Results:   No acid fast bacilli isolated after 6 weeks.

## 2022-07-13 NOTE — CONSULT NOTE ADULT - ASSESSMENT
27 yo woman with pancytopenia of unclear etiology; she has PMH of Hep C and splenomegaly;  labs are not remarkable  will do bone marrow biopsy 7/14/22 at bedside  obtain LDH, uric acid, phosphate

## 2022-07-13 NOTE — CONSULT NOTE ADULT - SUBJECTIVE AND OBJECTIVE BOX
The consult is called to evaluate pancytopenia, persistent fevers and splenomegaly    Patient is a 26 year old female admitted  with increased dyspnea since yesterday. Patient has been short of breath chronically and has been admitted for fluid overload.   Patient describes worsening symptoms yesterday associated with cough. States generalized edema has remained the same. Patient is actively using heroin.   Not on any meds or MMTP.      ROS:Denies fever, chills, sore throat, cp, palpitations, abd pain, n/v/d, dysuria, headache, syncope, hemoptysis. + generalized edema (15 Reinaldo 2022 10:03)      PAST MEDICAL & SURGICAL HISTORY:  Hepatitis C  Asthma  Anxiety and depression  IV drug abuse  heroin      No significant past surgical history          MEDICATIONS  (STANDING):  cefiderocol IVPB 2000 milliGRAM(s) IV Intermittent every 6 hours  chlorhexidine 0.12% Liquid 15 milliLiter(s) Oral Mucosa every 12 hours  chlorhexidine 4% Liquid 1 Application(s) Topical daily  dexMEDEtomidine Infusion 0.2 MICROgram(s)/kG/Hr (3.52 mL/Hr) IV Continuous <Continuous>  fentaNYL   Infusion. 0.5 MICROgram(s)/kG/Hr (3.52 mL/Hr) IV Continuous <Continuous>  folic acid 1 milliGRAM(s) Oral daily  nicotine -  14 mG/24Hr(s) Patch 1 patch Transdermal daily  norepinephrine Infusion 0.05 MICROgram(s)/kG/Min (8.03 mL/Hr) IV Continuous <Continuous>  pantoprazole  Injectable 40 milliGRAM(s) IV Push daily  propofol Infusion 0.1 MICROgram(s)/kG/Min (0.04 mL/Hr) IV Continuous <Continuous>  QUEtiapine 100 milliGRAM(s) Oral two times a day  scopolamine 1 mG/72 Hr(s) Patch 1 Patch Transdermal every 72 hours  spironolactone 100 milliGRAM(s) Oral daily  thiamine 100 milliGRAM(s) Oral daily  vancomycin    Solution 250 milliGRAM(s) Oral every 6 hours          Allergies    buprenorphine (Rash)  Suboxone (Rash)    FAMILY HISTORY:  No pertinent family history in first degree relatives        Physical Exam:   INTUBATED  VSS  MARIANNA  RRR [id number]  decreased breath sounds  abdomen soft; splenomegaly  no edema  skin : no bruises

## 2022-07-14 ENCOUNTER — RESULT REVIEW (OUTPATIENT)
Age: 27
End: 2022-07-14

## 2022-07-14 LAB
ALBUMIN SERPL ELPH-MCNC: 3 G/DL — LOW (ref 3.5–5.2)
ALP SERPL-CCNC: 340 U/L — HIGH (ref 30–115)
ALT FLD-CCNC: 21 U/L — SIGNIFICANT CHANGE UP (ref 0–41)
ANION GAP SERPL CALC-SCNC: 11 MMOL/L — SIGNIFICANT CHANGE UP (ref 7–14)
ANION GAP SERPL CALC-SCNC: 13 MMOL/L — SIGNIFICANT CHANGE UP (ref 7–14)
APTT BLD: 28.3 SEC — SIGNIFICANT CHANGE UP (ref 27–39.2)
AST SERPL-CCNC: 18 U/L — SIGNIFICANT CHANGE UP (ref 0–41)
BASOPHILS # BLD AUTO: 0.01 K/UL — SIGNIFICANT CHANGE UP (ref 0–0.2)
BASOPHILS # BLD AUTO: 0.02 K/UL — SIGNIFICANT CHANGE UP (ref 0–0.2)
BASOPHILS NFR BLD AUTO: 0.1 % — SIGNIFICANT CHANGE UP (ref 0–1)
BASOPHILS NFR BLD AUTO: 0.2 % — SIGNIFICANT CHANGE UP (ref 0–1)
BILIRUB SERPL-MCNC: 0.5 MG/DL — SIGNIFICANT CHANGE UP (ref 0.2–1.2)
BLD GP AB SCN SERPL QL: SIGNIFICANT CHANGE UP
BUN SERPL-MCNC: 13 MG/DL — SIGNIFICANT CHANGE UP (ref 10–20)
BUN SERPL-MCNC: 14 MG/DL — SIGNIFICANT CHANGE UP (ref 10–20)
CALCIUM SERPL-MCNC: 8.3 MG/DL — LOW (ref 8.5–10.1)
CALCIUM SERPL-MCNC: 8.4 MG/DL — LOW (ref 8.5–10.1)
CHLORIDE SERPL-SCNC: 111 MMOL/L — HIGH (ref 98–110)
CHLORIDE SERPL-SCNC: 114 MMOL/L — HIGH (ref 98–110)
CHOLEST SERPL-MCNC: 328 MG/DL — HIGH
CK SERPL-CCNC: 24 U/L — SIGNIFICANT CHANGE UP (ref 0–225)
CO2 SERPL-SCNC: 21 MMOL/L — SIGNIFICANT CHANGE UP (ref 17–32)
CO2 SERPL-SCNC: 21 MMOL/L — SIGNIFICANT CHANGE UP (ref 17–32)
CREAT SERPL-MCNC: 0.6 MG/DL — LOW (ref 0.7–1.5)
CREAT SERPL-MCNC: 0.6 MG/DL — LOW (ref 0.7–1.5)
EGFR: 125 ML/MIN/1.73M2 — SIGNIFICANT CHANGE UP
EGFR: 125 ML/MIN/1.73M2 — SIGNIFICANT CHANGE UP
EOSINOPHIL # BLD AUTO: 0 K/UL — SIGNIFICANT CHANGE UP (ref 0–0.7)
EOSINOPHIL # BLD AUTO: 0 K/UL — SIGNIFICANT CHANGE UP (ref 0–0.7)
EOSINOPHIL NFR BLD AUTO: 0 % — SIGNIFICANT CHANGE UP (ref 0–8)
EOSINOPHIL NFR BLD AUTO: 0 % — SIGNIFICANT CHANGE UP (ref 0–8)
FIBRINOGEN PPP-MCNC: 525 MG/DL — SIGNIFICANT CHANGE UP (ref 204.4–570.6)
GAS PNL BLDA: SIGNIFICANT CHANGE UP
GLUCOSE SERPL-MCNC: 100 MG/DL — HIGH (ref 70–99)
GLUCOSE SERPL-MCNC: 108 MG/DL — HIGH (ref 70–99)
HCT VFR BLD CALC: 33.1 % — LOW (ref 37–47)
HCT VFR BLD CALC: 35.5 % — LOW (ref 37–47)
HDLC SERPL-MCNC: 16 MG/DL — LOW
HEPARIN-PF4 AB RESULT: <0.6 U/ML — SIGNIFICANT CHANGE UP (ref 0–0.9)
HGB BLD-MCNC: 10.8 G/DL — LOW (ref 12–16)
HGB BLD-MCNC: 9.9 G/DL — LOW (ref 12–16)
IMM GRANULOCYTES NFR BLD AUTO: 0.3 % — SIGNIFICANT CHANGE UP (ref 0.1–0.3)
IMM GRANULOCYTES NFR BLD AUTO: 0.6 % — HIGH (ref 0.1–0.3)
INR BLD: 1.15 RATIO — SIGNIFICANT CHANGE UP (ref 0.65–1.3)
LDH SERPL L TO P-CCNC: 271 — HIGH (ref 50–242)
LDLC SERPL DIRECT ASSAY-MCNC: 156 MG/DL — SIGNIFICANT CHANGE UP
LIPID PNL WITH DIRECT LDL SERPL: ABNORMAL
LYMPHOCYTES # BLD AUTO: 0.46 K/UL — LOW (ref 1.2–3.4)
LYMPHOCYTES # BLD AUTO: 0.59 K/UL — LOW (ref 1.2–3.4)
LYMPHOCYTES # BLD AUTO: 5.2 % — LOW (ref 20.5–51.1)
LYMPHOCYTES # BLD AUTO: 6.7 % — LOW (ref 20.5–51.1)
MAGNESIUM SERPL-MCNC: 1.6 MG/DL — LOW (ref 1.8–2.4)
MAGNESIUM SERPL-MCNC: 1.8 MG/DL — SIGNIFICANT CHANGE UP (ref 1.8–2.4)
MCHC RBC-ENTMCNC: 26.6 PG — LOW (ref 27–31)
MCHC RBC-ENTMCNC: 27.2 PG — SIGNIFICANT CHANGE UP (ref 27–31)
MCHC RBC-ENTMCNC: 29.9 G/DL — LOW (ref 32–37)
MCHC RBC-ENTMCNC: 30.4 G/DL — LOW (ref 32–37)
MCV RBC AUTO: 89 FL — SIGNIFICANT CHANGE UP (ref 81–99)
MCV RBC AUTO: 89.4 FL — SIGNIFICANT CHANGE UP (ref 81–99)
MONOCYTES # BLD AUTO: 0.19 K/UL — SIGNIFICANT CHANGE UP (ref 0.1–0.6)
MONOCYTES # BLD AUTO: 0.2 K/UL — SIGNIFICANT CHANGE UP (ref 0.1–0.6)
MONOCYTES NFR BLD AUTO: 2.1 % — SIGNIFICANT CHANGE UP (ref 1.7–9.3)
MONOCYTES NFR BLD AUTO: 2.3 % — SIGNIFICANT CHANGE UP (ref 1.7–9.3)
NEUTROPHILS # BLD AUTO: 8.02 K/UL — HIGH (ref 1.4–6.5)
NEUTROPHILS # BLD AUTO: 8.06 K/UL — HIGH (ref 1.4–6.5)
NEUTROPHILS NFR BLD AUTO: 90.8 % — HIGH (ref 42.2–75.2)
NEUTROPHILS NFR BLD AUTO: 91.7 % — HIGH (ref 42.2–75.2)
NON HDL CHOLESTEROL: 312 MG/DL — HIGH
NRBC # BLD: 0 /100 WBCS — SIGNIFICANT CHANGE UP (ref 0–0)
NRBC # BLD: 0 /100 WBCS — SIGNIFICANT CHANGE UP (ref 0–0)
PF4 HEPARIN CMPLX AB SER-ACNC: NEGATIVE — SIGNIFICANT CHANGE UP
PHOSPHATE SERPL-MCNC: 1.8 MG/DL — LOW (ref 2.1–4.9)
PHOSPHATE SERPL-MCNC: 1.8 MG/DL — LOW (ref 2.1–4.9)
PHOSPHATE SERPL-MCNC: 2.3 MG/DL — SIGNIFICANT CHANGE UP (ref 2.1–4.9)
PLATELET # BLD AUTO: 112 K/UL — LOW (ref 130–400)
PLATELET # BLD AUTO: 94 K/UL — LOW (ref 130–400)
POTASSIUM SERPL-MCNC: 3.4 MMOL/L — LOW (ref 3.5–5)
POTASSIUM SERPL-MCNC: 3.6 MMOL/L — SIGNIFICANT CHANGE UP (ref 3.5–5)
POTASSIUM SERPL-SCNC: 3.4 MMOL/L — LOW (ref 3.5–5)
POTASSIUM SERPL-SCNC: 3.6 MMOL/L — SIGNIFICANT CHANGE UP (ref 3.5–5)
PROT SERPL-MCNC: 6.1 G/DL — SIGNIFICANT CHANGE UP (ref 6–8)
PROTHROM AB SERPL-ACNC: 13.2 SEC — HIGH (ref 9.95–12.87)
RBC # BLD: 3.72 M/UL — LOW (ref 4.2–5.4)
RBC # BLD: 3.97 M/UL — LOW (ref 4.2–5.4)
RBC # FLD: 15.6 % — HIGH (ref 11.5–14.5)
RBC # FLD: 15.8 % — HIGH (ref 11.5–14.5)
SARS-COV-2 RNA SPEC QL NAA+PROBE: SIGNIFICANT CHANGE UP
SODIUM SERPL-SCNC: 143 MMOL/L — SIGNIFICANT CHANGE UP (ref 135–146)
SODIUM SERPL-SCNC: 148 MMOL/L — HIGH (ref 135–146)
TRIGL SERPL-MCNC: 587 MG/DL — HIGH
URATE SERPL-MCNC: 1.5 MG/DL — LOW (ref 2.5–7)
WBC # BLD: 8.79 K/UL — SIGNIFICANT CHANGE UP (ref 4.8–10.8)
WBC # BLD: 8.84 K/UL — SIGNIFICANT CHANGE UP (ref 4.8–10.8)
WBC # FLD AUTO: 8.79 K/UL — SIGNIFICANT CHANGE UP (ref 4.8–10.8)
WBC # FLD AUTO: 8.84 K/UL — SIGNIFICANT CHANGE UP (ref 4.8–10.8)

## 2022-07-14 PROCEDURE — 99232 SBSQ HOSP IP/OBS MODERATE 35: CPT | Mod: 25

## 2022-07-14 PROCEDURE — 71045 X-RAY EXAM CHEST 1 VIEW: CPT | Mod: 26

## 2022-07-14 PROCEDURE — 99291 CRITICAL CARE FIRST HOUR: CPT

## 2022-07-14 PROCEDURE — 88189 FLOWCYTOMETRY/READ 16 & >: CPT

## 2022-07-14 PROCEDURE — 38222 DX BONE MARROW BX & ASPIR: CPT | Mod: LT

## 2022-07-14 PROCEDURE — 88342 IMHCHEM/IMCYTCHM 1ST ANTB: CPT | Mod: 26,59

## 2022-07-14 PROCEDURE — 88356 ANALYSIS NERVE: CPT | Mod: 26

## 2022-07-14 PROCEDURE — 99233 SBSQ HOSP IP/OBS HIGH 50: CPT

## 2022-07-14 PROCEDURE — 88311 DECALCIFY TISSUE: CPT | Mod: 26

## 2022-07-14 PROCEDURE — 93010 ELECTROCARDIOGRAM REPORT: CPT

## 2022-07-14 PROCEDURE — 88313 SPECIAL STAINS GROUP 2: CPT | Mod: 26

## 2022-07-14 PROCEDURE — 88305 TISSUE EXAM BY PATHOLOGIST: CPT | Mod: 26

## 2022-07-14 RX ORDER — FENTANYL CITRATE 50 UG/ML
50 INJECTION INTRAVENOUS ONCE
Refills: 0 | Status: DISCONTINUED | OUTPATIENT
Start: 2022-07-14 | End: 2022-07-14

## 2022-07-14 RX ORDER — POTASSIUM CHLORIDE 20 MEQ
20 PACKET (EA) ORAL ONCE
Refills: 0 | Status: COMPLETED | OUTPATIENT
Start: 2022-07-14 | End: 2022-07-14

## 2022-07-14 RX ORDER — FENTANYL CITRATE 50 UG/ML
50 INJECTION INTRAVENOUS
Refills: 0 | Status: DISCONTINUED | OUTPATIENT
Start: 2022-07-14 | End: 2022-07-17

## 2022-07-14 RX ORDER — AMPICILLIN SODIUM AND SULBACTAM SODIUM 250; 125 MG/ML; MG/ML
3 INJECTION, POWDER, FOR SUSPENSION INTRAMUSCULAR; INTRAVENOUS EVERY 6 HOURS
Refills: 0 | Status: DISCONTINUED | OUTPATIENT
Start: 2022-07-14 | End: 2022-07-20

## 2022-07-14 RX ORDER — POLYMYXIN B SULFATE 500000 [USP'U]/1
875000 INJECTION, POWDER, LYOPHILIZED, FOR SOLUTION INTRAMUSCULAR; INTRATHECAL; INTRAVENOUS; OPHTHALMIC EVERY 12 HOURS
Refills: 0 | Status: DISCONTINUED | OUTPATIENT
Start: 2022-07-14 | End: 2022-07-20

## 2022-07-14 RX ORDER — POLYMYXIN B SULFATE 500000 [USP'U]/1
1400000 INJECTION, POWDER, LYOPHILIZED, FOR SOLUTION INTRAMUSCULAR; INTRATHECAL; INTRAVENOUS; OPHTHALMIC ONCE
Refills: 0 | Status: COMPLETED | OUTPATIENT
Start: 2022-07-14 | End: 2022-07-14

## 2022-07-14 RX ORDER — MIDAZOLAM HYDROCHLORIDE 1 MG/ML
2 INJECTION, SOLUTION INTRAMUSCULAR; INTRAVENOUS ONCE
Refills: 0 | Status: DISCONTINUED | OUTPATIENT
Start: 2022-07-14 | End: 2022-07-14

## 2022-07-14 RX ORDER — METHADONE HYDROCHLORIDE 40 MG/1
30 TABLET ORAL DAILY
Refills: 0 | Status: DISCONTINUED | OUTPATIENT
Start: 2022-07-14 | End: 2022-07-14

## 2022-07-14 RX ORDER — METHADONE HYDROCHLORIDE 40 MG/1
10 TABLET ORAL ONCE
Refills: 0 | Status: DISCONTINUED | OUTPATIENT
Start: 2022-07-14 | End: 2022-07-14

## 2022-07-14 RX ORDER — HEPARIN SODIUM 5000 [USP'U]/ML
5000 INJECTION INTRAVENOUS; SUBCUTANEOUS EVERY 12 HOURS
Refills: 0 | Status: DISCONTINUED | OUTPATIENT
Start: 2022-07-14 | End: 2022-07-15

## 2022-07-14 RX ORDER — MIDAZOLAM HYDROCHLORIDE 1 MG/ML
4 INJECTION, SOLUTION INTRAMUSCULAR; INTRAVENOUS ONCE
Refills: 0 | Status: DISCONTINUED | OUTPATIENT
Start: 2022-07-14 | End: 2022-07-14

## 2022-07-14 RX ADMIN — Medication 100 MILLIGRAM(S): at 12:01

## 2022-07-14 RX ADMIN — Medication 600 MILLIGRAM(S): at 12:03

## 2022-07-14 RX ADMIN — Medication 250 MILLIGRAM(S): at 17:38

## 2022-07-14 RX ADMIN — PROPOFOL 0.04 MICROGRAM(S)/KG/MIN: 10 INJECTION, EMULSION INTRAVENOUS at 05:44

## 2022-07-14 RX ADMIN — Medication 250 MILLIGRAM(S): at 12:00

## 2022-07-14 RX ADMIN — Medication 600 MILLIGRAM(S): at 05:31

## 2022-07-14 RX ADMIN — Medication 600 MILLIGRAM(S): at 18:41

## 2022-07-14 RX ADMIN — DEXMEDETOMIDINE HYDROCHLORIDE IN 0.9% SODIUM CHLORIDE 3.52 MICROGRAM(S)/KG/HR: 4 INJECTION INTRAVENOUS at 01:41

## 2022-07-14 RX ADMIN — Medication 250 MILLIGRAM(S): at 05:01

## 2022-07-14 RX ADMIN — CEFIDEROCOL SULFATE TOSYLATE 33.33 MILLIGRAM(S): 1 INJECTION, POWDER, FOR SOLUTION INTRAVENOUS at 08:23

## 2022-07-14 RX ADMIN — Medication 1 PATCH: at 19:00

## 2022-07-14 RX ADMIN — FENTANYL CITRATE 3.52 MICROGRAM(S)/KG/HR: 50 INJECTION INTRAVENOUS at 08:24

## 2022-07-14 RX ADMIN — FENTANYL CITRATE 50 MICROGRAM(S): 50 INJECTION INTRAVENOUS at 01:06

## 2022-07-14 RX ADMIN — SCOPALAMINE 1 PATCH: 1 PATCH, EXTENDED RELEASE TRANSDERMAL at 19:00

## 2022-07-14 RX ADMIN — CHLORHEXIDINE GLUCONATE 15 MILLILITER(S): 213 SOLUTION TOPICAL at 17:39

## 2022-07-14 RX ADMIN — Medication 1 PATCH: at 11:58

## 2022-07-14 RX ADMIN — Medication 1 PATCH: at 08:26

## 2022-07-14 RX ADMIN — CHLORHEXIDINE GLUCONATE 1 APPLICATION(S): 213 SOLUTION TOPICAL at 12:00

## 2022-07-14 RX ADMIN — MIDAZOLAM HYDROCHLORIDE 1.41 MG/KG/HR: 1 INJECTION, SOLUTION INTRAMUSCULAR; INTRAVENOUS at 01:41

## 2022-07-14 RX ADMIN — Medication 1 MILLIGRAM(S): at 12:03

## 2022-07-14 RX ADMIN — Medication 650 MILLIGRAM(S): at 18:41

## 2022-07-14 RX ADMIN — PANTOPRAZOLE SODIUM 40 MILLIGRAM(S): 20 TABLET, DELAYED RELEASE ORAL at 12:01

## 2022-07-14 RX ADMIN — Medication 650 MILLIGRAM(S): at 17:40

## 2022-07-14 RX ADMIN — DEXMEDETOMIDINE HYDROCHLORIDE IN 0.9% SODIUM CHLORIDE 3.52 MICROGRAM(S)/KG/HR: 4 INJECTION INTRAVENOUS at 05:44

## 2022-07-14 RX ADMIN — Medication 650 MILLIGRAM(S): at 08:37

## 2022-07-14 RX ADMIN — Medication 600 MILLIGRAM(S): at 05:01

## 2022-07-14 RX ADMIN — AMPICILLIN SODIUM AND SULBACTAM SODIUM 200 GRAM(S): 250; 125 INJECTION, POWDER, FOR SUSPENSION INTRAMUSCULAR; INTRAVENOUS at 18:55

## 2022-07-14 RX ADMIN — CEFIDEROCOL SULFATE TOSYLATE 33.33 MILLIGRAM(S): 1 INJECTION, POWDER, FOR SOLUTION INTRAVENOUS at 14:44

## 2022-07-14 RX ADMIN — METHADONE HYDROCHLORIDE 10 MILLIGRAM(S): 40 TABLET ORAL at 09:56

## 2022-07-14 RX ADMIN — MIDAZOLAM HYDROCHLORIDE 2 MILLIGRAM(S): 1 INJECTION, SOLUTION INTRAMUSCULAR; INTRAVENOUS at 00:34

## 2022-07-14 RX ADMIN — DEXMEDETOMIDINE HYDROCHLORIDE IN 0.9% SODIUM CHLORIDE 3.52 MICROGRAM(S)/KG/HR: 4 INJECTION INTRAVENOUS at 09:17

## 2022-07-14 RX ADMIN — Medication 0.1 MILLIGRAM(S): at 00:34

## 2022-07-14 RX ADMIN — SPIRONOLACTONE 100 MILLIGRAM(S): 25 TABLET, FILM COATED ORAL at 05:02

## 2022-07-14 RX ADMIN — SCOPALAMINE 1 PATCH: 1 PATCH, EXTENDED RELEASE TRANSDERMAL at 08:26

## 2022-07-14 RX ADMIN — QUETIAPINE FUMARATE 100 MILLIGRAM(S): 200 TABLET, FILM COATED ORAL at 05:01

## 2022-07-14 RX ADMIN — HEPARIN SODIUM 5000 UNIT(S): 5000 INJECTION INTRAVENOUS; SUBCUTANEOUS at 17:39

## 2022-07-14 RX ADMIN — Medication 650 MILLIGRAM(S): at 12:06

## 2022-07-14 RX ADMIN — QUETIAPINE FUMARATE 100 MILLIGRAM(S): 200 TABLET, FILM COATED ORAL at 17:38

## 2022-07-14 RX ADMIN — PROPOFOL 0.04 MICROGRAM(S)/KG/MIN: 10 INJECTION, EMULSION INTRAVENOUS at 01:41

## 2022-07-14 RX ADMIN — POLYMYXIN B SULFATE 1000 UNIT(S): 500000 INJECTION, POWDER, LYOPHILIZED, FOR SOLUTION INTRAMUSCULAR; INTRATHECAL; INTRAVENOUS; OPHTHALMIC at 17:51

## 2022-07-14 RX ADMIN — Medication 1 PATCH: at 11:30

## 2022-07-14 RX ADMIN — MIDAZOLAM HYDROCHLORIDE 4 MILLIGRAM(S): 1 INJECTION, SOLUTION INTRAMUSCULAR; INTRAVENOUS at 02:40

## 2022-07-14 RX ADMIN — DEXMEDETOMIDINE HYDROCHLORIDE IN 0.9% SODIUM CHLORIDE 3.52 MICROGRAM(S)/KG/HR: 4 INJECTION INTRAVENOUS at 17:35

## 2022-07-14 RX ADMIN — CHLORHEXIDINE GLUCONATE 15 MILLILITER(S): 213 SOLUTION TOPICAL at 05:02

## 2022-07-14 RX ADMIN — FENTANYL CITRATE 50 MICROGRAM(S): 50 INJECTION INTRAVENOUS at 01:36

## 2022-07-14 RX ADMIN — Medication 40 MILLIEQUIVALENT(S): at 11:59

## 2022-07-14 RX ADMIN — Medication 2 MILLIGRAM(S): at 03:50

## 2022-07-14 RX ADMIN — Medication 50 MILLIEQUIVALENT(S): at 11:59

## 2022-07-14 NOTE — PROCEDURE NOTE - NSPROCNAME_GEN_A_CORE
Laceration Repair
Central Line Insertion
Paracentesis
Paracentesis
Central Line Insertion
General
Central Line Insertion

## 2022-07-14 NOTE — PROGRESS NOTE ADULT - SUBJECTIVE AND OBJECTIVE BOX
GENERAL SURGERY PROGRESS NOTE    Patient: POOJA DIANE , 26y ([birth year])Female   MRN: [id number]  Location: [address]- [address]  Visit: 06-15-22 Inpatient  Date: 07-14-22 @ 13:43      Procedure/Dx/Injuries: consult for trach    Events of past 24 hours: Patient febrile to 102.4F again today,  tachypneic at bedside, still 35/5.    PAST MEDICAL & SURGICAL HISTORY:  Hepatitis C      Asthma      Anxiety and depression      IV drug abuse  heroin      No significant past surgical history          Vitals:   T(F): 102.4 (07-14-22 @ 07:00), Max: 102.4 (07-14-22 @ 07:00)  HR: 107 (07-14-22 @ 08:39)  BP: 136/85 (07-14-22 @ 07:00)  RR: 25 (07-14-22 @ 07:00)  SpO2: 98% (07-14-22 @ 08:39)  Mode: AC/ CMV (Assist Control/ Continuous Mandatory Ventilation), RR (machine): 25, TV (machine): 370, FiO2: 35, PEEP: 5, ITime: 0.8, MAP: 14, PIP: 28    Diet, NPO with Tube Feed:   Tube Feeding Modality: Orogastric  Vital High Protein  Total Volume for 24 Hours (mL): 1200  Continuous  Starting Tube Feed Rate mL per Hour: 10  Until Goal Tube Feed Rate (mL per Hour): 50  Tube Feed Duration (in Hours): 24  Tube Feed Start Time: 16:00      Fluids:     I & O's:    07-13-22 @ 07:01  -  07-14-22 @ 07:00  --------------------------------------------------------  IN:    Dexmedetomidine: 634.8 mL    IV PiggyBack: 300 mL    IV PiggyBack: 400 mL    Midazolam: 119.7 mL    Propofol: 581.9 mL    Vital High Protein: 1200 mL  Total IN: 3236.4 mL    OUT:    FentaNYL: 0 mL    Indwelling Catheter - Urethral (mL): 1795 mL    Norepinephrine: 0 mL  Total OUT: 1795 mL    Total NET: 1441.4 mL        Bowel Movement: : [] YES [] NO  Flatus: : [] YES [] NO    PHYSICAL EXAM:  General: NAD, AAOx3, calm and cooperative  HEENT: NCAT, MARIANNA, EOMI, Trachea ML, Neck supple  Cardiac: RRR S1, S2, no Murmurs, rubs or gallops  Respiratory: CTAB, tachypneic, breath sounds equal BL, no wheeze, rhonchi or crackles  Abdomen: Soft, non-distended, non-tender    MEDICATIONS  (STANDING):  cefiderocol IVPB 2000 milliGRAM(s) IV Intermittent every 6 hours  chlorhexidine 0.12% Liquid 15 milliLiter(s) Oral Mucosa every 12 hours  chlorhexidine 2% Cloths 1 Application(s) Topical daily  dexMEDEtomidine Infusion 0.2 MICROgram(s)/kG/Hr (3.52 mL/Hr) IV Continuous <Continuous>  fentaNYL   Infusion. 0.5 MICROgram(s)/kG/Hr (3.52 mL/Hr) IV Continuous <Continuous>  folic acid 1 milliGRAM(s) Oral daily  heparin   Injectable 5000 Unit(s) SubCutaneous every 12 hours  nicotine -  14 mG/24Hr(s) Patch 1 patch Transdermal daily  norepinephrine Infusion 0.05 MICROgram(s)/kG/Min (8.03 mL/Hr) IV Continuous <Continuous>  pantoprazole  Injectable 40 milliGRAM(s) IV Push daily  potassium chloride   Powder 40 milliEquivalent(s) Oral daily  propofol Infusion 0.1 MICROgram(s)/kG/Min (0.04 mL/Hr) IV Continuous <Continuous>  QUEtiapine 100 milliGRAM(s) Oral two times a day  scopolamine 1 mG/72 Hr(s) Patch 1 Patch Transdermal every 72 hours  spironolactone 100 milliGRAM(s) Oral daily  thiamine 100 milliGRAM(s) Oral daily  vancomycin    Solution 250 milliGRAM(s) Oral every 6 hours    MEDICATIONS  (PRN):  acetaminophen  Suppository .. 650 milliGRAM(s) Rectal every 6 hours PRN Temp greater or equal to 38C (100.4F), Mild Pain (1 - 3)  ALBUTerol    90 MICROgram(s) HFA Inhaler 1 Puff(s) Inhalation every 4 hours PRN Shortness of Breath and/or Wheezing  cloNIDine 0.1 milliGRAM(s) Oral every 6 hours PRN opiate withdrawal  fentaNYL    Injectable 50 MICROGram(s) IV Push every 30 minutes PRN Agitation  hydrOXYzine hydrochloride 50 milliGRAM(s) Oral every 6 hours PRN Anxiety  ibuprofen  Tablet. 600 milliGRAM(s) Oral every 6 hours PRN Temp greater or equal to 38C (100.4F)  ibuprofen  Tablet. 400 milliGRAM(s) Oral every 6 hours PRN Mild Pain (1 - 3)  sodium chloride 0.9% lock flush 10 milliLiter(s) IV Push every 1 hour PRN Pre/post blood products, medications, blood draw, and to maintain line patency      DVT PROPHYLAXIS: heparin   Injectable 5000 Unit(s) SubCutaneous every 12 hours    GI PROPHYLAXIS: pantoprazole  Injectable 40 milliGRAM(s) IV Push daily    ANTICOAGULATION:   ANTIBIOTICS:  cefiderocol IVPB 2000 milliGRAM(s)  vancomycin    Solution 250 milliGRAM(s)            LAB/STUDIES:  Labs:  CAPILLARY BLOOD GLUCOSE                              10.8   8.79  )-----------( 112      ( 14 Jul 2022 05:31 )             35.5       Auto Neutrophil %: 91.7 % (07-14-22 @ 05:31)  Auto Immature Granulocyte %: 0.6 % (07-14-22 @ 05:31)    07-14    148<H>  |  114<H>  |  14  ----------------------------<  108<H>  3.4<L>   |  21  |  0.6<L>      Calcium, Total Serum: 8.4 mg/dL (07-14-22 @ 05:31)      LFTs:             6.1  | 0.5  | 18       ------------------[340     ( 14 Jul 2022 05:31 )  3.0  | x    | 21          Lipase:x      Amylase:x         Blood Gas Arterial, Lactate: 0.60 mmol/L (07-14-22 @ 04:40)  Blood Gas Arterial, Lactate: 0.40 mmol/L (07-13-22 @ 04:55)  Blood Gas Arterial, Lactate: 0.40 mmol/L (07-12-22 @ 04:25)    ABG - ( 14 Jul 2022 04:40 )  pH: 7.39  /  pCO2: 37    /  pO2: 71    / HCO3: 22    / Base Excess: -2.2  /  SaO2: 95.5            ABG - ( 13 Jul 2022 04:55 )  pH: 7.47  /  pCO2: 30    /  pO2: 180   / HCO3: 22    / Base Excess: -1.2  /  SaO2: 99.5            ABG - ( 12 Jul 2022 04:25 )  pH: 7.43  /  pCO2: 31    /  pO2: 149   / HCO3: 21    / Base Excess: -3.1  /  SaO2: 99.8              Coags:

## 2022-07-14 NOTE — DIETITIAN NUTRITION RISK NOTIFICATION - TREATMENT: THE FOLLOWING DIET HAS BEEN RECOMMENDED
Diet, NPO after Midnight:      NPO Start Date: 14-Jul-2022,   NPO Start Time: 23:59 (07-14-22 @ 13:57) [Active]  Diet, NPO with Tube Feed:   Tube Feeding Modality: Orogastric  Vital High Protein  Total Volume for 24 Hours (mL): 1200  Continuous  Starting Tube Feed Rate {mL per Hour}: 10  Until Goal Tube Feed Rate (mL per Hour): 50  Tube Feed Duration (in Hours): 24  Tube Feed Start Time: 16:00 (07-11-22 @ 16:15) [Active]  Diet, NPO with Tube Feed:   Tube Feeding Modality: Orogastric  Vital High Protein  Total Volume for 24 Hours (mL): 1080  Continuous  Starting Tube Feed Rate {mL per Hour}: 20  Until Goal Tube Feed Rate (mL per Hour): 45  Tube Feed Duration (in Hours): 24  Tube Feed Start Time: 13:00  No Carb Prosource (1pkg = 15gms Protein)     Qty per Day:  1 (06-18-22 @ 13:34) [Pending Verification By Attending]

## 2022-07-14 NOTE — PROGRESS NOTE ADULT - SUBJECTIVE AND OBJECTIVE BOX
ROXI POOJA  26y, Female  Allergy: buprenorphine (Rash)  Suboxone (Rash)      LOS  29d    CHIEF COMPLAINT: anasarca (14 Jul 2022 13:43)      INTERVAL EVENTS/HPI  - No acute events overnight  - T(F): , Max: 102.7 (07-14-22 @ 15:26)    - WBC Count: 8.79 (07-14-22 @ 05:31)  WBC Count: 3.73 (07-13-22 @ 06:23)     - Creatinine, Serum: 0.6 (07-14-22 @ 05:31)  Creatinine, Serum: 0.6 (07-13-22 @ 18:51)       ROS  unable to obtain history secondary to patient's mental status and/or sedation    VITALS:  T(F): 102.7, Max: 102.7 (07-14-22 @ 15:26)  HR: 116  BP: 154/98  RR: 38Vital Signs Last 24 Hrs  T(C): 39.3 (14 Jul 2022 15:26), Max: 39.3 (14 Jul 2022 15:26)  T(F): 102.7 (14 Jul 2022 15:26), Max: 102.7 (14 Jul 2022 15:26)  HR: 116 (14 Jul 2022 15:26) (75 - 135)  BP: 154/98 (14 Jul 2022 15:26) (108/73 - 200/135)  BP(mean): 120 (14 Jul 2022 15:26) (86 - 161)  RR: 38 (14 Jul 2022 15:26) (25 - 70)  SpO2: 99% (14 Jul 2022 15:26) (91% - 100%)    Parameters below as of 14 Jul 2022 07:00  Patient On (Oxygen Delivery Method): ventilator    O2 Concentration (%): 35    PHYSICAL EXAM:  Gen: vent/trach   HEENT: Normocephalic, atraumatic  Neck: supple, no lymphadenopathy  CV: Regular rate & regular rhythm  Lungs: decreased BS at bases, no fremitus  Abdomen: Soft, BS present; distended  Ext: Warm, well perfused  Neuro: non focal, awake  Skin: no rash, no erythema  Lines: no phlebitis    FH: Non-contributory  Social Hx: Non-contributory    TESTS & MEASUREMENTS:                        10.8   8.79  )-----------( 112      ( 14 Jul 2022 05:31 )             35.5     07-14    148<H>  |  114<H>  |  14  ----------------------------<  108<H>  3.4<L>   |  21  |  0.6<L>    Ca    8.4<L>      14 Jul 2022 05:31  Phos  1.8     07-14  Mg     1.8     07-14    TPro  6.1  /  Alb  3.0<L>  /  TBili  0.5  /  DBili  x   /  AST  18  /  ALT  21  /  AlkPhos  340<H>  07-14      LIVER FUNCTIONS - ( 14 Jul 2022 05:31 )  Alb: 3.0 g/dL / Pro: 6.1 g/dL / ALK PHOS: 340 U/L / ALT: 21 U/L / AST: 18 U/L / GGT: x               Babesia microti PCR, Blood. (collected 07-10-22 @ 15:43)  Source: .Blood None  Final Report (07-11-22 @ 09:45):    Babesia microti PCR    Results: NOT detected    ***************Result Note*************    The detection of Babesia microti by PCR has only been    validated for whole blood; this test has not been approved    by the [place] Food and Drug Administration (FDA). Performance    characteristics of this assay have been determined by    [facility]. The clinical significance    of results should be considered in conjunction with the    overall clinical presentation of the patient. Result is not    intended to be used as the sole means for clinical diagnosis    or patient management decisions.    One negative sample does not necessarily rule    out the presence of a parasitic infection.    Culture - Blood (collected 07-08-22 @ 05:26)  Source: .Blood None  Final Report (07-13-22 @ 19:00):    No Growth Final    Culture - Blood (collected 07-06-22 @ 07:20)  Source: .Blood None  Final Report (07-11-22 @ 19:00):    No Growth Final    Culture - Blood (collected 07-06-22 @ 07:20)  Source: .Blood None  Final Report (07-11-22 @ 19:00):    No Growth Final    Culture - Sputum (collected 07-04-22 @ 13:39)  Source: ET Tube ET Tube  Gram Stain (07-05-22 @ 08:48):    Few polymorphonuclear leukocytes per low power field    No Squamous epithelial cells per low power field    Numerous Gram Negative Coccobacilli seen per oil power field  Final Report (07-11-22 @ 08:12):    Moderate Acinetobacter baumannii/nosocom group (Carbapenem Resistant)    Cefiderocol interpretations based on FDA breakpoints.    Normal Respiratory Kilo absent  Organism: Acinetobacter baumannii/nosocom group (Carbapenem Resistant)  Acinetobacter baumannii/nosocom group (Carbapenem Resistant) (07-11-22 @ 08:12)  Organism: Acinetobacter baumannii/nosocom group (Carbapenem Resistant) (07-11-22 @ 08:12)      -  Cefiderocol: I      -  Imipenem: R      -  Piperacillin/Tazobactam: R      Method Type: KB  Organism: Acinetobacter baumannii/nosocom group (Carbapenem Resistant) (07-11-22 @ 08:12)      -  Amikacin: R >32      -  Ampicillin/Sulbactam: R >16/8      -  Cefepime: R >16      -  Ceftazidime: R >16      -  Ciprofloxacin: R >2      -  Gentamicin: R >8      -  Levofloxacin: R >4      -  Meropenem: R >8      -  Tobramycin: R >8      -  Trimethoprim/Sulfamethoxazole: R >2/38      Method Type: AL    Culture - Blood (collected 06-29-22 @ 20:31)  Source: .Blood Blood-Peripheral  Final Report (07-05-22 @ 20:00):    No Growth Final    Culture - Blood (collected 06-29-22 @ 20:31)  Source: .Blood Blood  Final Report (07-05-22 @ 20:00):    No Growth Final    Culture - Blood (collected 06-28-22 @ 06:00)  Source: .Blood Blood  Final Report (07-03-22 @ 23:00):    No Growth Final    Culture - Sputum (collected 06-27-22 @ 14:00)  Source: Trach Asp Tracheal Aspirate  Gram Stain (06-28-22 @ 03:15):    Moderate polymorphonuclear leukocytes per low power field    Few Squamous epithelial cells per low power field    Moderate Gram positive cocci in pairs seen per oil power field    Few Gram Variable Rods seen per oil power field  Final Report (06-30-22 @ 16:33):    Numerous Mixed gram negative rods    Moderate Staphylococcus aureus    Normal Respiratory Kilo present  Organism: Staphylococcus aureus (06-30-22 @ 16:33)  Organism: Staphylococcus aureus (06-30-22 @ 16:33)      -  Ampicillin/Sulbactam: S <=8/4      -  Cefazolin: S <=4      -  Clindamycin: S <=0.25      -  Erythromycin: S <=0.25      -  Gentamicin: S <=1 Should not be used as monotherapy      -  Oxacillin: S <=0.25 Oxacillin predicts susceptibility for dicloxacillin, methicillin, and nafcillin      -  Rifampin: S <=1 Should not be used as monotherapy      -  Tetra/Doxy: S <=1      -  Trimethoprim/Sulfamethoxazole: S <=0.5/9.5      -  Vancomycin: S 2      Method Type: AL    Culture - Sputum (collected 06-24-22 @ 17:20)  Source: Trach Asp Tracheal Aspirate  Gram Stain (06-25-22 @ 06:29):    Few polymorphonuclear leukocytes per low power field    Rare Squamous epithelial cells per low power field    Moderate Gram Negative Rods seen per oil power field  Final Report (06-26-22 @ 17:00):    Moderate Klebsiella oxytoca/Raoutella ornithinolytica    Moderate Enterobacter cloacae complex    Normal Respiratory Kilo absent  Organism: Klebsiella oxytoca /Raoutella ornithinolytica  Enterobacter cloacae complex (06-26-22 @ 17:00)  Organism: Enterobacter cloacae complex (06-26-22 @ 17:00)      -  Amikacin: S <=16      -  Amoxicillin/Clavulanic Acid: R >16/8      -  Ampicillin: R >16 These ampicillin results predict results for amoxicillin      -  Ampicillin/Sulbactam: R >16/8 Enterobacter, Klebsiella aerogenes, Citrobacter, and Serratia may develop resistance during prolonged therapy (3-4 days)      -  Aztreonam: S <=4      -  Cefazolin: R >16 Enterobacter, Klebsiella aerogenes, Citrobacter, and Serratia may develop resistance during prolonged therapy (3-4 days)      -  Cefepime: S <=2      -  Cefoxitin: R >16      -  Ceftriaxone: S <=1 Enterobacter, Klebsiella aerogenes, Citrobacter, and Serratia may develop resistance during prolonged therapy      -  Ciprofloxacin: S <=0.25      -  Ertapenem: S <=0.5      -  Gentamicin: S <=2      -  Imipenem: S <=1      -  Levofloxacin: S <=0.5      -  Meropenem: S <=1      -  Piperacillin/Tazobactam: S <=8      -  Tobramycin: S <=2      -  Trimethoprim/Sulfamethoxazole: S <=0.5/9.5      Method Type: AL  Organism: Klebsiella oxytoca /Raoutella ornithinolytica (06-26-22 @ 17:00)      -  Amikacin: S <=16      -  Amoxicillin/Clavulanic Acid: S <=8/4      -  Ampicillin: R 16 These ampicillin results predict results for amoxicillin      -  Ampicillin/Sulbactam: S <=4/2 Enterobacter, Klebsiella aerogenes, Citrobacter, and Serratia may develop resistance during prolonged therapy (3-4 days)      -  Aztreonam: S <=4      -  Cefazolin: S <=2 Enterobacter, Klebsiella aerogenes, Citrobacter, and Serratia may develop resistance during prolonged therapy (3-4 days)      -  Cefepime: S <=2      -  Cefoxitin: S <=8      -  Ceftriaxone: S <=1 Enterobacter, Klebsiella aerogenes, Citrobacter, and Serratia may develop resistance during prolonged therapy      -  Ciprofloxacin: S <=0.25      -  Ertapenem: S <=0.5      -  Gentamicin: S <=2      -  Imipenem: S <=1      -  Levofloxacin: S <=0.5      -  Meropenem: S <=1      -  Piperacillin/Tazobactam: S <=8      -  Tobramycin: S <=2      -  Trimethoprim/Sulfamethoxazole: S <=0.5/9.5      Method Type: AL    Culture - Body Fluid with Gram Stain (collected 06-22-22 @ 01:00)  Source: Peritoneal Peritoneal Fluid  Gram Stain (06-23-22 @ 02:31):    No polymorphonuclear leukocytes seen    No organisms seen    by cytocentrifuge  Final Report (06-27-22 @ 19:13):    No growth at 5 days    Culture - Blood (collected 06-21-22 @ 05:33)  Source: .Blood None  Final Report (06-26-22 @ 19:00):    No Growth Final    Culture - Sputum (collected 06-17-22 @ 14:30)  Source: Trach Asp Tracheal Aspirate  Gram Stain (06-18-22 @ 10:18):    Rare polymorphonuclear leukocytes per low power field    No Squamous epithelial cells per low power field    Numerous Gram Positive Cocci in Pairs and Chains seen per oil power field    Rare Gram Negative Rods seen per oil power field    Rare Gram PositiveRods seen per oil power field  Final Report (06-19-22 @ 16:39):    Normal Respiratory Kilo present    Culture - Fungal, Body Fluid (collected 06-17-22 @ 12:24)  Source: .Body Fluid Peritoneal Fluid  Preliminary Report (06-25-22 @ 15:02):    No growth    Culture - Body Fluid with Gram Stain (collected 06-17-22 @ 12:24)  Source: .Body Fluid Peritoneal Fluid  Gram Stain (06-18-22 @ 03:19):    No polymorphonuclear leukocytes seen    No organisms seen    by cytocentrifuge  Final Report (06-22-22 @ 20:34):    No growth at 5 days    Culture - Urine (collected 06-15-22 @ 08:00)  Source: Clean Catch Clean Catch (Midstream)  Final Report (06-17-22 @ 22:14):    Normal Urogenital kilo present            INFECTIOUS DISEASES TESTING  Procalcitonin, Serum: 0.12 (07-10-22 @ 05:42)  Fungitell: <31 (07-05-22 @ 11:49)  Procalcitonin, Serum: 0.14 (07-05-22 @ 11:23)  MRSA PCR Result.: Negative (06-30-22 @ 10:34)Procalcitonin, Serum: 0.36 (06-30-22 @ 10:30)  Rapid RVP Result: NotDetec (06-30-22 @ 09:19)  HIV-1/2 Combo Result: Nonreact (06-29-22 @ 10:29)  Procalcitonin, Serum: 0.11 (06-27-22 @ 15:46)  Procalcitonin, Serum: 0.11 (06-27-22 @ 06:47)  Procalcitonin, Serum: 0.62 (06-20-22 @ 05:49)  MRSA PCR Result.: Negative (06-17-22 @ 14:30)  Procalcitonin, Serum: 3.28 (06-17-22 @ 05:22)  COVID-19 PCR: NotDetec (06-15-22 @ 07:10)  Procalcitonin, Serum: 0.07 (04-20-22 @ 12:46)      INFLAMMATORY MARKERS  Sedimentation Rate, Erythrocyte: 86 mm/Hr (06-30-22 @ 05:46)  Sedimentation Rate, Erythrocyte: 65 mm/Hr (06-23-22 @ 16:00)      RADIOLOGY & ADDITIONAL TESTS:  I have personally reviewed the last available Chest xray  CXR  Xray Chest 1 View- PORTABLE-Urgent:   ACC: [id number] EXAM:  XR CHEST PORTABLE URGENT 1V                          PROCEDURE DATE:  07/12/2022          INTERPRETATION:  Clinical History / Reason for exam: For evaluation of   endotracheal tube placement    Comparison : Chest radiograph frontal view dated July 12, 2022 time 4:18   AM.    Technique/Positioning: AP portable time 4:24 PM.    Findings:    Support devices: Endotracheal tube located approximately 3.0 cm above the   kd. Nasogastric tube whose tip is not visualized but extends below   the left hemidiaphragm. Right-sided central line with the tip in the   region of the superior vena cava.    Cardiac/mediastinum/hilum: The overall heart size appears mildly enlarged   which may be in part related to projection.    Lung parenchyma/Pleura: Left basilar opacity.    Impression:    In comparison with the prior chest x-ray dated July 12, 2022 time 4:18 AM:    The endotracheal tube tip appears approximately 3 cm above the kd and   nasogastric tube whose tip is not visualized but extends below the left   hemidiaphragm.    Left basilar opacity, unchanged.    --- End of Report ---            MAC VIGIL MD; Attending Radiologist  This document has been electronically signed. Jul 12 2022  4:46PM (07-12-22 @ 16:31)      CT      CARDIOLOGY TESTING  12 Lead ECG:   Ventricular Rate 107 BPM    Atrial Rate 107 BPM    P-R Interval 138 ms    QRS Duration 82 ms    Q-T Interval 300 ms    QTC Calculation(Bazett) 400 ms    P Axis 86 degrees    R Axis 125 degrees    T Axis 218 degrees    Diagnosis Line Sinus tachycardia  Right axis deviation  Pulmonary disease pattern  Possible Right ventricular hypertrophy  Nonspecific T wave abnormality  Abnormal ECG    Confirmed by BRANDON BELL MD ([id number]) on 7/14/2022 11:44:00 AM (07-14-22 @ 08:36)  12 Lead ECG:   Ventricular Rate 84 BPM    Atrial Rate 84 BPM    P-R Interval 158 ms    QRS Duration 70 ms    Q-T Interval 366 ms    QTC Calculation(Bazett) 432 ms    P Axis 80 degrees    R Axis 109 degrees    T Axis 168 degrees    Diagnosis Line Normal sinus rhythm  Rightward axis  Low voltage QRS  Nonspecific T wave abnormality  Abnormal ECG    Confirmed by BRANDON BELL MD ([id number]) on 7/13/2022 9:25:54 AM (07-13-22 @ 08:19)      MEDICATIONS  cefiderocol IVPB 2000 IV Intermittent every 6 hours  chlorhexidine 0.12% Liquid 15 Oral Mucosa every 12 hours  chlorhexidine 2% Cloths 1 Topical daily  dexMEDEtomidine Infusion 0.2 IV Continuous <Continuous>  fentaNYL   Infusion. 0.5 IV Continuous <Continuous>  folic acid 1 Oral daily  heparin   Injectable 5000 SubCutaneous every 12 hours  nicotine -  14 mG/24Hr(s) Patch 1 Transdermal daily  norepinephrine Infusion 0.05 IV Continuous <Continuous>  pantoprazole  Injectable 40 IV Push daily  potassium chloride   Powder 40 Oral daily  propofol Infusion 0.1 IV Continuous <Continuous>  QUEtiapine 100 Oral two times a day  scopolamine 1 mG/72 Hr(s) Patch 1 Transdermal every 72 hours  spironolactone 100 Oral daily  thiamine 100 Oral daily  vancomycin    Solution 250 Oral every 6 hours      WEIGHT  Weight (kg): 70.4 (07-07-22 @ 07:00)  Creatinine, Serum: 0.6 mg/dL (07-14-22 @ 05:31)  Creatinine, Serum: 0.6 mg/dL (07-13-22 @ 18:51)      ANTIBIOTICS:  cefiderocol IVPB 2000 milliGRAM(s) IV Intermittent every 6 hours  vancomycin    Solution 250 milliGRAM(s) Oral every 6 hours      All available historical records have been reviewed

## 2022-07-14 NOTE — PROGRESS NOTE ADULT - SUBJECTIVE AND OBJECTIVE BOX
seen/examined w/ medical resident  no new complaints  intubated  VSS  RRR [id number]  CTA B/L  no organomegaly  no adenopathy  no peripheral edema    DD:   Pancytopenia due to unclear etiology; she has splenomegaly; in the setting of HEP C, this could be due to hypersplenism; also, need to r/o NHL and HLH  PLAN  bone marrow biopsy/aspiration completed   requested to send ferritin, IL6, fasting lipids (triglicerides), fibrinogen

## 2022-07-14 NOTE — PROGRESS NOTE ADULT - ASSESSMENT
Acute respiratory failure with hypoxemia / aspiration pneumonia with sepsis / suspected drug overdose / head laceration s/p fall in lobby / possible seizure / anasarca  pancytopenia - resolved /  fever     - c-diff colitis - continue oral vanco   - w/u and antibiotics as per ID - new opacity on CXR   - s/p BMB   - central line changed    - supplement hypokalemia and  mg level and maintain above 2   - re intubated -> surgical consult for trach if fails SAT and SBT

## 2022-07-14 NOTE — PROCEDURAL SAFETY CHECKLIST WITH OR WITHOUT SEDATION - NSSTERILITYCONFIRMD_GEN_ALL_CORE
Last seen: 02/17/2021  Next scheduled: 05/25/2021  Last fill: 03/18/20201  CSA up to date: YES  Date of last UDS: 11/19/2020  UDS consistent: CONSISTENT     
done

## 2022-07-14 NOTE — PROGRESS NOTE ADULT - ASSESSMENT
Patient is a 26 year old female with hx of asthma, untreated Hep C, IVDA, anasarca presenting with increased dyspnea since yesterday. Patient has been short of breath chronically and has been admitted for fluid overload. Patient was intubated 6/16/22 after episode of seizure and unresponsiveness. Recently extubated 7/8, reintubated 7/9 after increased WOB and tachypnea. General surgery consulted for tracheostomy after failed SBT today.    Plan    Wean vent settings as tolerated, daily SBT, currently 35/5  Monitor for pressor support  Was febrile this morning to 100.4F  F/U heme onc about bone marrow biopsy  F/U results of bone marrow biopsy  ID recs appreciated  Plan for OR Friday 7/15 for tracheostomy  Pre-op orders 7/14: NPO after midnight, IVF at midnight, T+S, COVID, Coags, CBC, BMP, Mag, Phos  Will need electrolytes corrected prior to surgery: K>4, Mg >2, Phos >3    [id number]

## 2022-07-14 NOTE — PROGRESS NOTE ADULT - ASSESSMENT
Skin -assessment  and plan : Patient received  in bed, sedated and vented.  Currently in  ICU for   Acute respiratory failure with hypoxemia / aspiration pneumonia with sepsis / suspected drug overdose / head laceration s/p fall in lobby / possible seizure / anasarca  pancytopenia - resolved /  fever, c-diff colitis - continue oral vanco, w/u and antibiotics as per ID - new opacity on CXR, s/p BMB, central line changed ,supplement hypokalemia and  mg level and maintain above 2, re intubated -> surgical consult for trach if fails SAT and SBT        #Pressure injury  #1  Location and type:  ?? DTPI  to coccyx - no progression noted at this time   Size:  0.5x0.5  Wound Exudate : None   Wound Edge: Intact   Goldie wound -  Dry     Plan: Clean coccyx with soap and water, pat dry apply triad with Allevyn foam dressing - monitor DTPI for          progression   Pressure  injury preventive  measures  skin and incontinence  care   Assess skin  and inform primary provider of any changes   Case discussed with primary Rn   Wound/ ostomy specialist  to f/u as needed     Offloading: [ x] Frequent position changes [ ] Devices/Equipment  Cleansing: [ ] Saline [x ] Soap/Water [ ] Other: ______  Topicals: [x ] Barrier Cream [ ] Antimicrobial [ ] Enzymatic Wound Debridement  Dressings: [ ] Dry, sterile [ ] Foam [ ] Absorbant Pads [ ] Collagenase    #   Anemia    Recs. per primary team   - s/p 1u PRBC  - CBC dropped rapidly again after 1u PRBC  - U/S performed but limited organs scanned?  - CT/abd/pelvis r/o hematoma  # Nutrition     per nutrition recs.        Total time at beside for patient assessment, chart review and discussing plan with primary time more than 35

## 2022-07-14 NOTE — PHARMACOTHERAPY INTERVENTION NOTE - INTERVENTION TYPE RECOOMEND
Therapy Recommended - Drug indicated but not ordered
Dose Optimization/Non-renal Dose Adjustment
Duration of Therapy Recommended
Therapy Discontinuation Recommended - No indication
Therapy Recommended - Additional therapy
Therapy Recommended - Alternative treatment
Therapy Recommended - Alternative treatment

## 2022-07-14 NOTE — PROCEDURE NOTE - NSINFORMCONSENT_GEN_A_CORE
Benefits, risks, and possible complications of procedure explained to patient/caregiver who verbalized understanding and gave written consent.
This was an emergent procedure.
Benefits, risks, and possible complications of procedure explained to patient/caregiver who verbalized understanding and gave verbal consent.
Benefits, risks, and possible complications of procedure explained to patient/caregiver who verbalized understanding and gave written consent.
This was an emergent procedure.

## 2022-07-14 NOTE — PROCEDURAL SAFETY CHECKLIST WITH OR WITHOUT SEDATION - NSPX2BRECORDED_GEN_ALL_CORE
BONE MARROW BIOPSY AND BONE MARROW ASPIRATION
Patient information on fall and injury prevention
[facility] TLC placement
PARACENTESIS
[facility] TLC

## 2022-07-14 NOTE — PROCEDURE NOTE - GENERAL PROCEDURE DETAILS
site located, area cleaned, anesthesia administered; bone marrow aspiration obtained; bone marrow biopsy obtained; sent for flow cytometry; sent for cytogenetics; smears prepared; pathology submitted

## 2022-07-14 NOTE — PROCEDURE NOTE - PROCEDURE DATE TIME, MLM
30-Jun-2022 12:20
16-Jun-2022 17:42
06-Jul-2022 11:07
17-Jun-2022 10:07
14-Jul-2022 10:47
22-Jun-2022 11:54
16-Jun-2022 15:00

## 2022-07-14 NOTE — PROCEDURE NOTE - NSSITEPREP_SKIN_A_CORE
chlorhexidine
chlorhexidine/Adherence to aseptic technique: hand hygiene prior to donning barriers (gown, gloves), don cap and mask, sterile drape over patient
chlorhexidine/povidone iodine (if allergic to chlorhexidine)

## 2022-07-14 NOTE — PROGRESS NOTE ADULT - SUBJECTIVE AND OBJECTIVE BOX
Patient seen and evaluated this am, sedated on propofol, Precedex and versed      T(F): 102.4 (07-14-22 @ 07:00), Max: 102.4 (07-14-22 @ 07:00)  HR: 107 (07-14-22 @ 08:39)  BP: 136/85 (07-14-22 @ 07:00)  RR: 22  SpO2: 98% (07-14-22 @ 08:39) (91% - 100%)    PHYSICAL EXAM:  GENERAL: NAD  HEAD:  Atraumatic, Normocephalic  EYES: EOMI, PERRLA, conjunctiva and sclera clear  NERVOUS SYSTEM:  no focal deficits   CHEST/LUNG:  bilateral rhonchi  HEART: Regular rate and rhythm; No murmurs, rubs, or gallops  ABDOMEN: Soft, Nontender, Nondistended; Bowel sounds present  EXTREMITIES:  2+ Peripheral Pulses, No clubbing, cyanosis, or edema    LABS  07-14    148<H>  |  114<H>  |  14  ----------------------------<  108<H>  3.4<L>   |  21  |  0.6<L>    Ca    8.4<L>      14 Jul 2022 05:31  Phos  2.3     07-14  Mg     1.8     07-14    TPro  6.1  /  Alb  3.0<L>  /  TBili  0.5  /  DBili  x   /  AST  18  /  ALT  21  /  AlkPhos  340<H>  07-14                          10.8   8.79  )-----------( 112      ( 14 Jul 2022 05:31 )             35.5       Mode: AC/ CMV (Assist Control/ Continuous Mandatory Ventilation)  RR (machine): 25  TV (machine): 370  FiO2: 35  PEEP: 5    Culture Results:   Babesia microti PCR  Results: NOT detected  ***************Result Note*************  The detection of Babesia microti by PCR has only been  validated for whole blood; this test has not been approved  by the US Food and Drug Administration (FDA). Performance  characteristics of this assay have been determined by  [employer]. The clinical significance  of results should be considered in conjunction with the  overall clinical presentation of the patient. Result is not  intended to be used as the sole means for clinical diagnosis  or patient management decisions.  One negative sample does not necessarily rule  out the presence of a parasitic infection. (07-10-22)  Culture Results:   No Growth Final (07-08-22)  Culture Results:   No Growth Final (07-06-22)  Culture Results:   No Growth Final (07-06-22)  Culture Results:   Moderate Acinetobacter baumannii/nosocom group (Carbapenem Resistant)  Cefiderocol interpretations based on FDA breakpoints.  Normal Respiratory Kelly absent (07-04-22)  Culture Results:   No Growth Final (06-29-22)  Culture Results:   No Growth Final (06-29-22)  Culture Results:   No Growth Final (06-28-22)  Culture Results:   Numerous Mixed gram negative rods  Moderate Staphylococcus aureus  Normal Respiratory Kelly present (06-27-22)  Culture Results:   Moderate Klebsiella oxytoca/Raoutella ornithinolytica  Moderate Enterobacter cloacae complex  Normal Respiratory Kelly absent (06-24-22)    RADIOLOGY  < from: Xray Chest 1 View- PORTABLE-Routine (Xray Chest 1 View- PORTABLE-Routine in AM.) (07.14.22 @ 05:56) >  Impression:    New left lower lobe opacity    < end of copied text >    MEDICATIONS  (STANDING):  cefiderocol IVPB 2000 milliGRAM(s) IV Intermittent every 6 hours  chlorhexidine 0.12% Liquid 15 milliLiter(s) Oral Mucosa every 12 hours  chlorhexidine 2% Cloths 1 Application(s) Topical daily  dexMEDEtomidine Infusion 0.2 MICROgram(s)/kG/Hr (3.52 mL/Hr) IV Continuous <Continuous>  fentaNYL   Infusion. 0.5 MICROgram(s)/kG/Hr (3.52 mL/Hr) IV Continuous <Continuous>  folic acid 1 milliGRAM(s) Oral daily  nicotine -  14 mG/24Hr(s) Patch 1 patch Transdermal daily  norepinephrine Infusion 0.05 MICROgram(s)/kG/Min (8.03 mL/Hr) IV Continuous <Continuous>  pantoprazole  Injectable 40 milliGRAM(s) IV Push daily  potassium chloride   Powder 40 milliEquivalent(s) Oral daily  propofol Infusion 0.1 MICROgram(s)/kG/Min (0.04 mL/Hr) IV Continuous <Continuous>  QUEtiapine 100 milliGRAM(s) Oral two times a day  scopolamine 1 mG/72 Hr(s) Patch 1 Patch Transdermal every 72 hours  spironolactone 100 milliGRAM(s) Oral daily  thiamine 100 milliGRAM(s) Oral daily  vancomycin    Solution 250 milliGRAM(s) Oral every 6 hours    MEDICATIONS  (PRN):  acetaminophen  Suppository .. 650 milliGRAM(s) Rectal every 6 hours PRN Temp greater or equal to 38C (100.4F), Mild Pain (1 - 3)  ALBUTerol    90 MICROgram(s) HFA Inhaler 1 Puff(s) Inhalation every 4 hours PRN Shortness of Breath and/or Wheezing  cloNIDine 0.1 milliGRAM(s) Oral every 6 hours PRN opiate withdrawal  fentaNYL    Injectable 50 MICROGram(s) IV Push every 30 minutes PRN Agitation  hydrOXYzine hydrochloride 50 milliGRAM(s) Oral every 6 hours PRN Anxiety  ibuprofen  Tablet. 600 milliGRAM(s) Oral every 6 hours PRN Temp greater or equal to 38C (100.4F)  ibuprofen  Tablet. 400 milliGRAM(s) Oral every 6 hours PRN Mild Pain (1 - 3)  sodium chloride 0.9% lock flush 10 milliLiter(s) IV Push every 1 hour PRN Pre/post blood products, medications, blood draw, and to maintain line patency

## 2022-07-14 NOTE — PROGRESS NOTE ADULT - ASSESSMENT
IMPRESSION:  Acute respiratory failure sp intubation  PNA  MSSA pna  seizure like activity  ?? drug over dose/ withdrawal opoid   liver cirrhosis   Ascites sp paracentesis   Pancytopenia- worsening  RENETTA improved  C diff +ve     PLAN:    CNS: start fentanyl drip ?? withdrawal patient tachy , fever  do EKG if Qtc fine start methadone 10  to taper of fentanyl   d/c versed   taper propofol   CW Clonapin to 2mg TID  CW Seroquel 100mg BID  SAT    precedex   HEENT: oral care     PULMONARY:  HOB 45,  Vent changes: wean O2 as tolerated, proceed with SBT  for weaning trial   G sx on board  for trach need covid test   if passed SBt  then placed NG tube to continue her meds     CARDIOVASCULAR: goal MAP >65.  Monitor QTc daily.     keep is = os  Pericarditis management per cardio. discussed   do ekg follow QtC  GI: GI prophylaxis.  OG Feeding.    KUB reviewed    RENAL: monitor lytes is and os     INFECTIOUS DISEASE:follow Id recommendation ?? withdrawal fever, drug induce   repeat procal   repeat cx   continue abx   heme for BMB    HEMATOLOGICAL:   i will discus with  hematology for BMB to be done at [facility] other wise need IR consult   monitor CBC, Heme FU.   reticulocyte count  dvt ppx  check d-dimer  reviewed ,  hold heparin   repeat HIT panel     ENDOCRINE:  Follow up FS.  Insulin protocol if needed.     MICU  TLC 7/6 RIJ  [place] - failed TOV 6/24 Change.   try voiding trial today    IMPRESSION:  Acute respiratory failure sp intubation  PNA  MSSA pna  seizure like activity  ?? drug over dose/ withdrawal opoid   liver cirrhosis   Ascites sp paracentesis   Pancytopenia- worsening  RENETTA improved  C diff +ve     PLAN:    CNS: start fentanyl drip ?? withdrawal patient tachy , fever  do EKG if Qtc fine start methadone 10  to taper of fentanyl   d/c versed   taper propofol   CW Clonapin to 2mg TID  CW Seroquel 100mg BID  SAT    precedex   HEENT: oral care     PULMONARY:  HOB 45,  Vent changes: wean O2 as tolerated, proceed with SBT  for weaning trial   G sx on board  for trach need covid test   if passed SBt  then placed NG tube to continue her meds     CARDIOVASCULAR: goal MAP >65.  Monitor QTc daily.     keep is = os  Pericarditis management per cardio. discussed   do ekg follow QtC  GI: GI prophylaxis.  OG Feeding. start free water 2500cc Q 6 hrs    KUB reviewed    RENAL: monitor lytes is and os     INFECTIOUS DISEASE:follow Id recommendation ?? withdrawal fever, drug induce   repeat procal   repeat cx   continue abx   heme for BMB    HEMATOLOGICAL:   i will discus with  hematology for BMB to be done at [facility] wise need IR consult   monitor CBC, Heme FU.   reticulocyte count  dvt ppx  check d-dimer  reviewed ,  hold heparin   repeat HIT panel     ENDOCRINE:  Follow up FS.  Insulin protocol if needed.     MICU  TLC 7/6 RIJ  [place] - failed TOV 6/24 Change.   try voiding trial today

## 2022-07-14 NOTE — PROGRESS NOTE ADULT - SUBJECTIVE AND OBJECTIVE BOX
24 hour events/subjective:    LOS  29d    CHIEF COMPLAINT: anasarca (14 Jul 2022 13:43)      INTERVAL EVENTS/HPI  - No acute events overnight  - T(F): , Max: 102.7 (07-14-22 @ 15:26)    - WBC Count: 8.79 (07-14-22 @ 05:31)  WBC Count: 3.73 (07-13-22 @ 06:23)     - Creatinine, Serum: 0.6 (07-14-22 @ 05:31)  Creatinine, Serum: 0.6 (07-13-22 @ 18:51)       ROS  unable to obtain history secondary to patient's mental status and/or sedation    VITALS:  T(F): 102.7, Max: 102.7 (07-14-22 @ 15:26)  HR: 116  BP: 154/98  RR: 38Vital Signs Last 24 Hrs  T(C): 39.3 (14 Jul 2022 15:26), Max: 39.3 (14 Jul 2022 15:26)  T(F): 102.7 (14 Jul 2022 15:26), Max: 102.7 (14 Jul 2022 15:26)  HR: 116 (14 Jul 2022 15:26) (75 - 135)  BP: 154/98 (14 Jul 2022 15:26) (108/73 - 200/135)  BP(mean): 120 (14 Jul 2022 15:26) (86 - 161)  RR: 38 (14 Jul 2022 15:26) (25 - 70)  SpO2: 99% (14 Jul 2022 15:26) (91% - 100%)    Parameters below as of 14 Jul 2022 07:00  Patient On (Oxygen Delivery Method): ventilator    O2 Concentration (%): 35    PHYSICAL EXAM:  Gen: vent/trach   HEENT: Normocephalic, atraumatic  Neck: supple, no lymphadenopathy  CV: Regular rate & regular rhythm      ALLERGIES & MEDICATIONS  Allergies  buprenorphine (Rash)  Suboxone (Rash)    Intolerances    STANDING INPATIENT MEDICATIONS    ampicillin/sulbactam  IVPB 3 Gram(s) IV Intermittent every 6 hours  cefiderocol IVPB 2000 milliGRAM(s) IV Intermittent every 6 hours  chlorhexidine 0.12% Liquid 15 milliLiter(s) Oral Mucosa every 12 hours  chlorhexidine 2% Cloths 1 Application(s) Topical daily  dexMEDEtomidine Infusion 0.2 MICROgram(s)/kG/Hr IV Continuous <Continuous>  fentaNYL   Infusion. 0.5 MICROgram(s)/kG/Hr IV Continuous <Continuous>  folic acid 1 milliGRAM(s) Oral daily  heparin   Injectable 5000 Unit(s) SubCutaneous every 12 hours  nicotine -  14 mG/24Hr(s) Patch 1 patch Transdermal daily  norepinephrine Infusion 0.05 MICROgram(s)/kG/Min IV Continuous <Continuous>  pantoprazole  Injectable 40 milliGRAM(s) IV Push daily  polymyxin B IVPB [id number] Unit(s) IV Intermittent once  polymyxin B IVPB [id number] Unit(s) IV Intermittent every 12 hours  potassium chloride   Powder 40 milliEquivalent(s) Oral daily  propofol Infusion 0.1 MICROgram(s)/kG/Min IV Continuous <Continuous>  QUEtiapine 100 milliGRAM(s) Oral two times a day  scopolamine 1 mG/72 Hr(s) Patch 1 Patch Transdermal every 72 hours  spironolactone 100 milliGRAM(s) Oral daily  thiamine 100 milliGRAM(s) Oral daily  vancomycin    Solution 250 milliGRAM(s) Oral every 6 hours      PRN INPATIENT MEDICATION  acetaminophen  Suppository .. 650 milliGRAM(s) Rectal every 6 hours PRN  ALBUTerol    90 MICROgram(s) HFA Inhaler 1 Puff(s) Inhalation every 4 hours PRN  cloNIDine 0.1 milliGRAM(s) Oral every 6 hours PRN  fentaNYL    Injectable 50 MICROGram(s) IV Push every 30 minutes PRN  hydrOXYzine hydrochloride 50 milliGRAM(s) Oral every 6 hours PRN  ibuprofen  Tablet. 600 milliGRAM(s) Oral every 6 hours PRN  ibuprofen  Tablet. 400 milliGRAM(s) Oral every 6 hours PRN  sodium chloride 0.9% lock flush 10 milliLiter(s) IV Push every 1 hour PRN        LABS/ CULTURES/ RADIOLOGY:              10.8   8.79  >-----------<  112      [07-14-22 @ 05:31]              35.5     148  |  114  |  14  ----------------------------<  108      [07-14-22 @ 05:31]  3.4   |  21  |  0.6        Ca     8.4     [07-14-22 @ 05:31]      Mg     1.8     [07-14-22 @ 05:31]      Phos  1.8     [07-14-22 @ 12:50]    TPro  6.1  /  Alb  3.0  /  TBili  0.5  /  DBili  x   /  AST  18  /  ALT  21  /  AlkPhos  340  [07-14-22 @ 05:31]        Uric acid 1.5      [07-14-22 @ 12:50]        [07-14-22 @ 12:50]    Blood Gas Arterial - Calcium, Ionized: 1.16 mmol/L (07-14-22 @ 04:40)  Blood Gas Arterial - Calcium, Ionized: 1.17 mmol/L (07-13-22 @ 04:55)      CAPILLARY BLOOD GLUCOSE      Triglycerides, Serum: 587 mg/dL (07-14-22 @ 12:50)  Triglycerides, Serum: 512 mg/dL (06-28-22 @ 06:00)  Triglycerides, Serum: 495 mg/dL (06-22-22 @ 06:50)            Culture - Blood (collected 07-08-22 @ 05:26)  Source: .Blood None  Final Report (07-13-22 @ 19:00):    No Growth Final

## 2022-07-14 NOTE — PROGRESS NOTE ADULT - ASSESSMENT
Patient is a 26 year old female with hx of asthma, untreated Hep C, IVDA, anasarca presenting with increased dyspnea since yesterday    IMPRESSION  #Acute respiratory failure s/p intubation 6/16  #Pneumonia -   - CT Chest 6/22 - left greater than right lower lobe consolidations   - sputum Cx 6/24 Klebsiella oxytoca, Enterobacter cloacae complex- The Sputum culture from 6/27 grew Numerous Mixed gram negative rods and Moderate Staphylococcus aureus.-  - Please call micro [phone] ext1 to add on sensitivities for cefiderocol to the Acinetobacter IF NOT DONE ALREADY    #Fevers  - Ferritin, Serum: 94 ng/mL (06.27.22 @ 06:47),  Procalcitonin, Serum: 0.11 (06.27.22 @ 15:46)  - CT Abd/pelvis 6/26 - moderated ascites moderate pericardial effusion   - s/p BMBx 7/14     #VAP  -  Xray Chest 1 View- PORTABLE-Routine (Xray Chest 1 View- PORTABLE-Routine in AM.) (07.14.22 @ 05:56): New left lower lobe opacity  - sputum Cx 7/4 CR Acinetobacter baumanii     #Drug overdose vs withdrawal?  #Hepatosplenomegaly - present since CT Abd/pelvis 1/30/2021  #Pancytopenia  #Hx of Untreated Hep C   #IVDA   #Abx allergy: buprenorphine (Rash), Suboxone (Rash)  # C.difficille infection - 7/5/22    Recommendations:  - stop cefiderocol as this is Intermediate to Acinetobacter   - repeat Sputum Cx given developing Left lobe opacities  - start Unasyn 3g q 6 hours +  Polymixin 20,000 U/KG x 1, followed by 12,500 UKG q 12 hours for combination therapy for Acinetobacter   - Continue Oral Vancomycin to cover C.diff  - follow-up bone marrow biopsy path  - follow-up onc labs    Please call or message on [employer] Teams if with any questions.  Spectra [phone]

## 2022-07-14 NOTE — CHART NOTE - NSCHARTNOTEFT_GEN_A_CORE
Registered Dietitian Follow-Up     Patient Profile Reviewed                           Yes [x]   No []  Nutrition History Previously Obtained        Yes [x]  No []      Pertinent Medical Interventions:  Pancytopenia due to unclear etiology; she has splenomegaly; in the setting of HEP C, this could be due to hypersplenism; also, need to r/o NHL and HLH  + cdiff colitis on . s/p extubation on  with reintubation warranted on  2/2 tachypnea with persistent temps     Nutrition Interval History:   + OGT in place  EN reached goal rate 50mL on   Vital HP goal rate of 50 ml/hr providing pt with 1200 kcals (receiving + additional 668 from propofol), 103.5g protein and 1200 ml total volume, meeting >80% of estimated nutrient needs  + now receiving FWF 250mL Q6hr (+1000mL water additional)     Diet order:   Diet, NPO with Tube Feed:   Tube Feeding Modality: Orogastric  Vital High Protein  Total Volume for 24 Hours (mL): 1200  Continuous  Starting Tube Feed Rate {mL per Hour}: 10  Until Goal Tube Feed Rate (mL per Hour): 50  Tube Feed Duration (in Hours): 24    Anthropometrics:  - Ht. 167.6 cm   - Wt: 70.4kg  - bmi: 25.1kg/m2  admit weight: 79.3kg     OTHER WEIGHTS:   Daily Weight in k.2 (), Weight in k.2 (), Weight in k.8 (), Weight in k.2 (), Weight in k.8 (), Weight in k.8 (), Weight in k.2 (07-10)  % Weight Change  -- fluctuations due to fluid shifts + true weight loss, 16.5% weight loss within 1 month since admission (clinically significant)    MEDICATIONS  (STANDING):  cefiderocol IVPB 2000 milliGRAM(s) IV Intermittent every 6 hours  chlorhexidine 0.12% Liquid 15 milliLiter(s) Oral Mucosa every 12 hours  chlorhexidine 2% Cloths 1 Application(s) Topical daily  dexMEDEtomidine Infusion 0.2 MICROgram(s)/kG/Hr (3.52 mL/Hr) IV Continuous <Continuous>  fentaNYL   Infusion. 0.5 MICROgram(s)/kG/Hr (3.52 mL/Hr) IV Continuous <Continuous>  folic acid 1 milliGRAM(s) Oral daily  heparin   Injectable 5000 Unit(s) SubCutaneous every 12 hours  nicotine -  14 mG/24Hr(s) Patch 1 patch Transdermal daily  norepinephrine Infusion 0.05 MICROgram(s)/kG/Min (8.03 mL/Hr) IV Continuous <Continuous>  pantoprazole  Injectable 40 milliGRAM(s) IV Push daily  potassium chloride   Powder 40 milliEquivalent(s) Oral daily  propofol Infusion 0.1 MICROgram(s)/kG/Min (0.04 mL/Hr) IV Continuous <Continuous>  QUEtiapine 100 milliGRAM(s) Oral two times a day  scopolamine 1 mG/72 Hr(s) Patch 1 Patch Transdermal every 72 hours  spironolactone 100 milliGRAM(s) Oral daily  thiamine 100 milliGRAM(s) Oral daily  vancomycin    Solution 250 milliGRAM(s) Oral every 6 hours    MEDICATIONS  (PRN):  acetaminophen  Suppository .. 650 milliGRAM(s) Rectal every 6 hours PRN Temp greater or equal to 38C (100.4F), Mild Pain (1 - 3)  ALBUTerol    90 MICROgram(s) HFA Inhaler 1 Puff(s) Inhalation every 4 hours PRN Shortness of Breath and/or Wheezing  cloNIDine 0.1 milliGRAM(s) Oral every 6 hours PRN opiate withdrawal  fentaNYL    Injectable 50 MICROGram(s) IV Push every 30 minutes PRN Agitation  hydrOXYzine hydrochloride 50 milliGRAM(s) Oral every 6 hours PRN Anxiety  ibuprofen  Tablet. 600 milliGRAM(s) Oral every 6 hours PRN Temp greater or equal to 38C (100.4F)  ibuprofen  Tablet. 400 milliGRAM(s) Oral every 6 hours PRN Mild Pain (1 - 3)  sodium chloride 0.9% lock flush 10 milliLiter(s) IV Push every 1 hour PRN Pre/post blood products, medications, blood draw, and to maintain line patency    Pertinent Labs:  @ 05:31: Na 148<H>, BUN 14, Cr 0.6<L>, <H>, K+ 3.4<L>, Phos 2.3, Mg 1.8, Alk Phos 340<H>, ALT/SGPT 21, AST/SGOT 18, HbA1c --  07-13 @ 18:51: Na 148<H>, BUN 15, Cr 0.6<L>, <H>, K+ 3.7, Phos --, Mg 1.9, Alk Phos --, ALT/SGPT --, AST/SGOT --, HbA1c --    Physical Findings:  - Appearance: intubated on vent  - GI function: 7/14 x2 BM  - Tubes: + OGT  - Oral/Mouth cavity: NPO   - Skin: back of head laceration,  ??DTPI  to coccyx per WOCN  - Edema: generalized 2+      Nutrition Requirements: w/ consideration for critical care, wound healing  Weight Used: 66.2 kgs lowest weight taken this admit     Estimated Energy Needs:  1545-6511 kcals (25-30 kcal/kg/BW) vs 2082 kcals [facility] 2003B (VE: 13.3, TMAX 39)  80-96g protein (1.2-1.4g/kg/BW)  1;1 kcal for estimated fluid needs     [x] Previous Nutrition Diagnosis:            [x] Ongoing          [] Resolved  #1 Inadequate energy intake    Nutrition Intervention:   CONTINUE: EN via OGT, Vital HP goal rate of 50 ml/hr will provide pt with 1200 kcals + additional 668 from propofol, 103.5g protein and 1200 ml total volume, meeting >80% of estimated nutrient needs  + free water per LIP (+250mL Q6hr)    Goal/Expected Outcome: pt to tolerate EN and meet >80% of estimated nutrient needs      Indicator/Monitoring: RD to monitor tolerance to EN, labs/meds (propofol infusion rate*), NFPF and f/u as needed within 2-4 days. Registered Dietitian Follow-Up     Patient Profile Reviewed                           Yes [x]   No []  Nutrition History Previously Obtained        Yes [x]  No []      Pertinent Medical Interventions:  Pancytopenia due to unclear etiology; she has splenomegaly; in the setting of HEP C, this could be due to hypersplenism; also, need to r/o NHL and HLH  + cdiff colitis on . s/p extubation on  with reintubation warranted on  2/2 tachypnea with persistent temps   Plan for OR Friday 7/15 for tracheostomy    Nutrition Interval History:   + OGT in place  EN reached goal rate 50mL on   Vital HP goal rate of 50 ml/hr providing pt with 1200 kcals (receiving + additional 668 from propofol), 103.5g protein and 1200 ml total volume, meeting >80% of estimated nutrient needs  + now receiving FWF 250mL Q6hr (+1000mL water additional). Tolerating feeds per RN, no GI s/s    Diet order:   Diet, NPO with Tube Feed:   Tube Feeding Modality: Orogastric  Vital High Protein  Total Volume for 24 Hours (mL): 1200  Continuous  Starting Tube Feed Rate {mL per Hour}: 10  Until Goal Tube Feed Rate (mL per Hour): 50  Tube Feed Duration (in Hours): 24    Anthropometrics:  - Ht. 167.6 cm   - Wt: 70.4kg  - bmi: 25.1kg/m2  admit weight: 79.3kg     OTHER WEIGHTS:   Daily Weight in k.2 (), Weight in k.2 (), Weight in k.8 (), Weight in k.2 (), Weight in k.8 (), Weight in k.8 (), Weight in k.2 (07-10)  % Weight Change  -- fluctuations due to fluid shifts + true weight loss, 16.5% weight loss within 1 month since admission (clinically significant)    MEDICATIONS  (STANDING):  cefiderocol IVPB 2000 milliGRAM(s) IV Intermittent every 6 hours  chlorhexidine 0.12% Liquid 15 milliLiter(s) Oral Mucosa every 12 hours  chlorhexidine 2% Cloths 1 Application(s) Topical daily  dexMEDEtomidine Infusion 0.2 MICROgram(s)/kG/Hr (3.52 mL/Hr) IV Continuous <Continuous>  fentaNYL   Infusion. 0.5 MICROgram(s)/kG/Hr (3.52 mL/Hr) IV Continuous <Continuous>  folic acid 1 milliGRAM(s) Oral daily  heparin   Injectable 5000 Unit(s) SubCutaneous every 12 hours  nicotine -  14 mG/24Hr(s) Patch 1 patch Transdermal daily  norepinephrine Infusion 0.05 MICROgram(s)/kG/Min (8.03 mL/Hr) IV Continuous <Continuous>  pantoprazole  Injectable 40 milliGRAM(s) IV Push daily  potassium chloride   Powder 40 milliEquivalent(s) Oral daily  propofol Infusion 0.1 MICROgram(s)/kG/Min (0.04 mL/Hr) IV Continuous <Continuous>  QUEtiapine 100 milliGRAM(s) Oral two times a day  scopolamine 1 mG/72 Hr(s) Patch 1 Patch Transdermal every 72 hours  spironolactone 100 milliGRAM(s) Oral daily  thiamine 100 milliGRAM(s) Oral daily  vancomycin    Solution 250 milliGRAM(s) Oral every 6 hours    MEDICATIONS  (PRN):  acetaminophen  Suppository .. 650 milliGRAM(s) Rectal every 6 hours PRN Temp greater or equal to 38C (100.4F), Mild Pain (1 - 3)  ALBUTerol    90 MICROgram(s) HFA Inhaler 1 Puff(s) Inhalation every 4 hours PRN Shortness of Breath and/or Wheezing  cloNIDine 0.1 milliGRAM(s) Oral every 6 hours PRN opiate withdrawal  fentaNYL    Injectable 50 MICROGram(s) IV Push every 30 minutes PRN Agitation  hydrOXYzine hydrochloride 50 milliGRAM(s) Oral every 6 hours PRN Anxiety  ibuprofen  Tablet. 600 milliGRAM(s) Oral every 6 hours PRN Temp greater or equal to 38C (100.4F)  ibuprofen  Tablet. 400 milliGRAM(s) Oral every 6 hours PRN Mild Pain (1 - 3)  sodium chloride 0.9% lock flush 10 milliLiter(s) IV Push every 1 hour PRN Pre/post blood products, medications, blood draw, and to maintain line patency    Pertinent Labs:  @ 05:31: Na 148<H>, BUN 14, Cr 0.6<L>, <H>, K+ 3.4<L>, Phos 2.3, Mg 1.8, Alk Phos 340<H>, ALT/SGPT 21, AST/SGOT 18, HbA1c --   @ 18:51: Na 148<H>, BUN 15, Cr 0.6<L>, <H>, K+ 3.7, Phos --, Mg 1.9, Alk Phos --, ALT/SGPT --, AST/SGOT --, HbA1c --    Physical Findings:  - Appearance: intubated on ventilator. NFPE : Moderate temple depletion  - GI function: 7/14 x2 BM  - Tubes: + OGT  - Oral/Mouth cavity: NPO   - Skin: back of head laceration,  ??DTPI  to coccyx per WOCN  - Edema: generalized 2+      Nutrition Requirements: w/ consideration for critical care, wound healing, malnutrition  Weight Used: 66.2 kgs lowest weight taken this admit     Estimated Energy Needs:  4066-8293 kcals (25-30 kcal/kg/BW) vs 2082 kcals [facility] 2003B (VE: 13.3, TMAX 39)  80-96g protein (1.2-1.4g/kg/BW)  1;1 kcal for estimated fluid needs     [x] Previous Nutrition Diagnosis:            [x] Ongoing          [] Resolved  #1 Inadequate energy intake  #2 MALNUTRITION (moderate) added   acute illness/injury (splenomegaly requiring intubation and prolonged hospital stay)  moderate muscle depletion, 5% wt loss in 1mo    Nutrition Intervention:   CONTINUE: EN via OGT, Vital HP goal rate of 50 ml/hr will provide pt with   (1200 kcals + additional 668 from propofol, 103.5g protein and 1200 ml total volume) meeting >80% of estimated nutrient needs  + free water per LIP (+250mL Q6hr)    Goal/Expected Outcome: pt to tolerate EN and meet >80% of estimated nutrient needs     Indicator/Monitoring: RD to monitor tolerance to EN, labs/meds (propofol infusion rate*), NFPF and f/u as needed within 2-4 days.

## 2022-07-14 NOTE — PROGRESS NOTE ADULT - SUBJECTIVE AND OBJECTIVE BOX
Patient is a 26y old  Female who presents with a chief complaint of anasarca (14 Jul 2022 07:37)      Over Night Events:  Patient seen and examined.   tachypnea   spiking temp  failed weaning trial yesterday tachypnea   ROS:  See HPI    PHYSICAL EXAM    ICU Vital Signs Last 24 Hrs  T(C): 39.1 (14 Jul 2022 07:00), Max: 39.1 (14 Jul 2022 07:00)  T(F): 102.4 (14 Jul 2022 07:00), Max: 102.4 (14 Jul 2022 07:00)  HR: 103 (14 Jul 2022 07:00) (75 - 135)  BP: 136/85 (14 Jul 2022 07:00) (108/73 - 200/135)  BP(mean): 112 (14 Jul 2022 04:27) (86 - 161)  ABP: --  ABP(mean): --  RR: 25 (14 Jul 2022 07:00) (25 - 70)  SpO2: 98% (14 Jul 2022 07:00) (91% - 100%)    O2 Parameters below as of 14 Jul 2022 04:27  Patient On (Oxygen Delivery Method): ventilator    O2 Concentration (%): 35        General: on sedation   HEENT:    et tube            Lymph Nodes: NO cervical LN   Lungs: Bilateral BS  Cardiovascular: Regular   Abdomen: Soft, Positive BS  Extremities: No clubbing   Skin: warm   Neurological: no focal   Musculoskeletal: move all ext     I&O's Detail    13 Jul 2022 07:01  -  14 Jul 2022 07:00  --------------------------------------------------------  IN:    Dexmedetomidine: 634.8 mL    IV PiggyBack: 300 mL    IV PiggyBack: 400 mL    Midazolam: 119.7 mL    Propofol: 581.9 mL    Vital High Protein: 1200 mL  Total IN: 3236.4 mL    OUT:    FentaNYL: 0 mL    Indwelling Catheter - Urethral (mL): 1795 mL    Norepinephrine: 0 mL  Total OUT: 1795 mL    Total NET: 1441.4 mL      14 Jul 2022 07:01  -  14 Jul 2022 08:07  --------------------------------------------------------  IN:  Total IN: 0 mL    OUT:    Indwelling Catheter - Urethral (mL): 105 mL  Total OUT: 105 mL    Total NET: -105 mL          LABS:                          10.8   8.79  )-----------( 112      ( 14 Jul 2022 05:31 )             35.5         14 Jul 2022 05:31    148    |  114    |  14     ----------------------------<  108    3.4     |  21     |  0.6      Ca    8.4        14 Jul 2022 05:31  Phos  2.3       14 Jul 2022 05:31  Mg     1.8       14 Jul 2022 05:31    TPro  6.1    /  Alb  3.0    /  TBili  0.5    /  DBili  x      /  AST  18     /  ALT  21     /  AlkPhos  340    14 Jul 2022 05:31  Amylase x     lipase x                                                                                                                                                                                                 Mode: AC/ CMV (Assist Control/ Continuous Mandatory Ventilation)  RR (machine): 25  TV (machine): 370  FiO2: 35  PEEP: 5  ITime: 0.8  MAP: 14  PIP: 33                                      ABG - ( 14 Jul 2022 04:40 )  pH, Arterial: 7.39  pH, Blood: x     /  pCO2: 37    /  pO2: 71    / HCO3: 22    / Base Excess: -2.2  /  SaO2: 95.5                MEDICATIONS  (STANDING):  cefiderocol IVPB 2000 milliGRAM(s) IV Intermittent every 6 hours  chlorhexidine 0.12% Liquid 15 milliLiter(s) Oral Mucosa every 12 hours  chlorhexidine 2% Cloths 1 Application(s) Topical daily  dexMEDEtomidine Infusion 0.2 MICROgram(s)/kG/Hr (3.52 mL/Hr) IV Continuous <Continuous>  fentaNYL   Infusion. 0.5 MICROgram(s)/kG/Hr (3.52 mL/Hr) IV Continuous <Continuous>  folic acid 1 milliGRAM(s) Oral daily  methadone    Tablet 30 milliGRAM(s) Oral daily  midazolam Infusion 0.02 mG/kG/Hr (1.41 mL/Hr) IV Continuous <Continuous>  nicotine -  14 mG/24Hr(s) Patch 1 patch Transdermal daily  norepinephrine Infusion 0.05 MICROgram(s)/kG/Min (8.03 mL/Hr) IV Continuous <Continuous>  pantoprazole  Injectable 40 milliGRAM(s) IV Push daily  potassium chloride   Powder 40 milliEquivalent(s) Oral daily  propofol Infusion 0.1 MICROgram(s)/kG/Min (0.04 mL/Hr) IV Continuous <Continuous>  QUEtiapine 100 milliGRAM(s) Oral two times a day  scopolamine 1 mG/72 Hr(s) Patch 1 Patch Transdermal every 72 hours  spironolactone 100 milliGRAM(s) Oral daily  thiamine 100 milliGRAM(s) Oral daily  vancomycin    Solution 250 milliGRAM(s) Oral every 6 hours    MEDICATIONS  (PRN):  acetaminophen  Suppository .. 650 milliGRAM(s) Rectal every 6 hours PRN Temp greater or equal to 38C (100.4F), Mild Pain (1 - 3)  ALBUTerol    90 MICROgram(s) HFA Inhaler 1 Puff(s) Inhalation every 4 hours PRN Shortness of Breath and/or Wheezing  cloNIDine 0.1 milliGRAM(s) Oral every 6 hours PRN opiate withdrawal  fentaNYL    Injectable 50 MICROGram(s) IV Push every 30 minutes PRN Agitation  hydrOXYzine hydrochloride 50 milliGRAM(s) Oral every 6 hours PRN Anxiety  ibuprofen  Tablet. 600 milliGRAM(s) Oral every 6 hours PRN Temp greater or equal to 38C (100.4F)  ibuprofen  Tablet. 400 milliGRAM(s) Oral every 6 hours PRN Mild Pain (1 - 3)  sodium chloride 0.9% lock flush 10 milliLiter(s) IV Push every 1 hour PRN Pre/post blood products, medications, blood draw, and to maintain line patency          Xrays:  TLC:  OG:  ET tube:                                                                                    no infiltrate    ECHO:  [facility] ICU:

## 2022-07-14 NOTE — PROGRESS NOTE ADULT - ASSESSMENT
Acute respiratory failure sp intubation and extubation 7/8 followed by reintubation  PNA CT Chest 6/22 - left greater than right lower lobe consolidations; sputum Cx 6/24 Klebsiella oxytoca, Enterobacter cloacae complex- and Acinetobacter buamnii  MSSA pna  seizure like activity  ?? 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 Diff on oral vancomycin   Hypokalemia/hypomagnesemia      PLAN:    CNS:   (hold methadone dose)  CW Clonapin to 2mg TID  CW Seroquel 100mg BID  SBT      HEENT: oral care     PULMONARY:  HOB 45,  wean O2 as tolerated,   f/u surgery reccs: planned for trach on friday     CARDIOVASCULAR: goal MAP >65.  Monitor QTc daily  ECHO 7/6: Small to mod generalized effusion. No tamponade . Small mod effusion   present by report on echo 4/21/22  f/u cardiology recommendations; no urgency in diagnostic tap       GI: GI prophylaxis.  OG Feeding.   laxative prn   f/u repeat kub     RENAL: monitor lytes  f/u repeat BMP and replete K and mag as needed     INFECTIOUS DISEASE:   worsening R infiltrate. Fungitell <31 7/5 , Glactomannan pending   Repeat procal 0.14 from 0.36 (6/30)   Acinetobacter buamnii in sputum cx -> switch celestine to cefiderocol 2gm q 6 (nonformulary filled and sent to pharmacy)  - pending BM biopsy via heme/onc today at bedside 7/14  - continue PO vancomycin 250 mg q 6 hours   - parasite smear and babesia PCR negative,  - if hemodynamic compromise, ADD tigecycline 100mg then 50mg q12h IV for acinetobacter coverage  - f/u ID recommendations     HEMATOLOGICAL:    monitor CBC  D-dimer 1322, HIT antibody negative x2   f/u repeat HIT antibody; duplex negative 7/2/2022  hold heparin for now   - pending BM biopsy via heme/onc today at bedside 7/14    ENDOCRINE:  Follow up FS.  Insulin protocol if needed.     MICU  lines: right IJ 7/6   [place] - 7/5; trial of void today    Acute respiratory failure sp intubation and extubation 7/8 followed by reintubation  PNA CT Chest 6/22 - left greater than right lower lobe consolidations; sputum Cx 6/24 Klebsiella oxytoca, Enterobacter cloacae complex- and Acinetobacter buamnii  MSSA pna  seizure like activity  ?? 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 Diff on oral vancomycin   Hypokalemia/hypomagnesemia      PLAN:    CNS:   CW Clonapin to 2mg TID  CW Seroquel 100mg BID  Started on methadone 10 mg and fentanyl today        HEENT: oral care     PULMONARY:  HOB 45,  wean O2 as tolerated,   f/u surgery reccs: planned for trach on friday; f/u coags/routine labs/covid     CARDIOVASCULAR: goal MAP >65.  Monitor QTc daily  ECHO 7/6: Small to mod generalized effusion. No tamponade . Small mod effusion   present by report on echo 4/21/22  f/u cardiology recommendations; no urgency in diagnostic tap       GI: GI prophylaxis.  OG Feeding.   laxative prn       RENAL: monitor lytes  f/u repeat BMP and replete K and mag as needed     INFECTIOUS DISEASE:   worsening R infiltrate. Fungitell <31 7/5 , Glactomannan pending   Repeat procal 0.14 from 0.36 (6/30)   Acinetobacter buamnii in sputum cx -> switch celestine to cefiderocol 2gm q 6 (nonformulary filled and sent to pharmacy)  - continue PO vancomycin 250 mg q 6 hours   - parasite smear and babesia PCR negative,  - if hemodynamic compromise, ADD tigecycline 100mg then 50mg q12h IV for acinetobacter coverage  - s/p bedside BM biopsy today 7/14: no complications  - f/u HLH panel (ferritin, IL6, triglycerides soluble antigen) and heme/onc recommendations   - f/u ID recommendations     HEMATOLOGICAL:    monitor CBC  D-dimer 1322, HIT antibody negative x2   f/u repeat HIT antibody; duplex negative 7/2/2022  hold heparin for now   - pending BM biopsy via heme/onc today at bedside 7/14  - s/p bedside BM biopsy today 7/14: no complications  - f/u HLH panel (ferritin, IL6, triglycerides soluble antigen) and heme/onc recommendations     ENDOCRINE:  Follow up FS.  Insulin protocol if needed.     MICU  lines: right IJ 7/6   [place] - 7/5; trial of void today    Acute respiratory failure sp intubation and extubation 7/8 followed by reintubation  PNA CT Chest 6/22 - left greater than right lower lobe consolidations; sputum Cx 6/24 Klebsiella oxytoca, Enterobacter cloacae complex- and Acinetobacter buamnii  MSSA pna  seizure like activity  ?? 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 Diff on oral vancomycin   Hypokalemia/hypomagnesemia      PLAN:    CNS:   CW Clonapin to 2mg TID  CW Seroquel 100mg BID  Started on methadone 10 mg and fentanyl today        HEENT: oral care     PULMONARY:  HOB 45,  wean O2 as tolerated,   f/u surgery reccs: planned for trach on friday; f/u coags/routine labs/covid     CARDIOVASCULAR: goal MAP >65.  Monitor QTc daily  ECHO 7/6: Small to mod generalized effusion. No tamponade . Small mod effusion   present by report on echo 4/21/22  f/u cardiology recommendations; no urgency in diagnostic tap       GI: GI prophylaxis.  OG Feeding.   laxative prn       RENAL: monitor lytes  f/u repeat BMP and replete K and mag as needed     INFECTIOUS DISEASE:   worsening R infiltrate. Fungitell <31 7/5 , Glactomannan pending   Repeat procal 0.14 from 0.36 (6/30)   Acinetobacter buamnii in sputum cx -> switch celestine to cefiderocol 2gm q 6 (nonformulary filled and sent to pharmacy)  - continue PO vancomycin 250 mg q 6 hours   - parasite smear and babesia PCR negative,  - if hemodynamic compromise, ADD tigecycline 100mg then 50mg q12h IV for acinetobacter coverage  - s/p bedside BM biopsy today 7/14: no complications  - f/u HLH panel (ferritin, IL6, triglycerides soluble antigen) and heme/onc recommendations   - f/u ID recommendations     HEMATOLOGICAL:    monitor CBC  D-dimer 1322, HIT antibody negative x2   f/u repeat HIT antibody; duplex negative 7/2/2022  - s/p bedside BM biopsy today 7/14: no complications  - f/u HLH panel (ferritin, IL6, triglycerides soluble antigen) and heme/onc recommendations     ENDOCRINE:  Follow up FS.  Insulin protocol if needed.     MICU  lines: right IJ 7/6   [place] - 7/5; trial of void today

## 2022-07-14 NOTE — PROGRESS NOTE ADULT - SUBJECTIVE AND OBJECTIVE BOX
Patient is a 26y old  Female who presents with a chief complaint of anasarca (13 Jul 2022 20:27)      INTERVAL HPI/OVERNIGHT EVENTS:   Remains intubated and sedated,  was more tachypneic awake and agitated  versed was added back on     ICU Vital Signs Last 24 Hrs  T(C): 39.1 (14 Jul 2022 07:00), Max: 39.1 (14 Jul 2022 07:00)  T(F): 102.4 (14 Jul 2022 07:00), Max: 102.4 (14 Jul 2022 07:00)  HR: 103 (14 Jul 2022 07:00) (75 - 135)  BP: 136/85 (14 Jul 2022 07:00) (108/73 - 200/135)  BP(mean): 112 (14 Jul 2022 04:27) (86 - 161)  ABP: --  ABP(mean): --  RR: 25 (14 Jul 2022 07:00) (25 - 70)  SpO2: 98% (14 Jul 2022 07:00) (91% - 100%)    O2 Parameters below as of 14 Jul 2022 04:27  Patient On (Oxygen Delivery Method): ventilator    O2 Concentration (%): 35      I&O's Summary    13 Jul 2022 07:01  -  14 Jul 2022 07:00  --------------------------------------------------------  IN: 3236.4 mL / OUT: 1795 mL / NET: 1441.4 mL    14 Jul 2022 07:01  -  14 Jul 2022 07:37  --------------------------------------------------------  IN: 0 mL / OUT: 30 mL / NET: -30 mL      Mode: AC/ CMV (Assist Control/ Continuous Mandatory Ventilation)  RR (machine): 25  TV (machine): 370  FiO2: 35  PEEP: 5  ITime: 0.8  MAP: 14  PIP: 33      LABS:                        10.8   8.79  )-----------( 112      ( 14 Jul 2022 05:31 )             35.5     07-13    148<H>  |  115<H>  |  15  ----------------------------<  107<H>  3.7   |  22  |  0.6<L>    Ca    8.1<L>      13 Jul 2022 18:51  Phos  2.5     07-13  Mg     1.9     07-13    TPro  5.5<L>  /  Alb  3.0<L>  /  TBili  0.4  /  DBili  x   /  AST  22  /  ALT  26  /  AlkPhos  335<H>  07-13        CAPILLARY BLOOD GLUCOSE        ABG - ( 14 Jul 2022 04:40 )  pH, Arterial: 7.39  pH, Blood: x     /  pCO2: 37    /  pO2: 71    / HCO3: 22    / Base Excess: -2.2  /  SaO2: 95.5                RADIOLOGY & ADDITIONAL TESTS:    Consultant(s) Notes Reviewed:  [x ] YES  [ ] NO    MEDICATIONS  (STANDING):  cefiderocol IVPB 2000 milliGRAM(s) IV Intermittent every 6 hours  chlorhexidine 0.12% Liquid 15 milliLiter(s) Oral Mucosa every 12 hours  chlorhexidine 2% Cloths 1 Application(s) Topical daily  dexMEDEtomidine Infusion 0.2 MICROgram(s)/kG/Hr (3.52 mL/Hr) IV Continuous <Continuous>  fentaNYL   Infusion. 0.5 MICROgram(s)/kG/Hr (3.52 mL/Hr) IV Continuous <Continuous>  folic acid 1 milliGRAM(s) Oral daily  midazolam Infusion 0.02 mG/kG/Hr (1.41 mL/Hr) IV Continuous <Continuous>  nicotine -  14 mG/24Hr(s) Patch 1 patch Transdermal daily  norepinephrine Infusion 0.05 MICROgram(s)/kG/Min (8.03 mL/Hr) IV Continuous <Continuous>  pantoprazole  Injectable 40 milliGRAM(s) IV Push daily  potassium chloride   Powder 40 milliEquivalent(s) Oral daily  propofol Infusion 0.1 MICROgram(s)/kG/Min (0.04 mL/Hr) IV Continuous <Continuous>  QUEtiapine 100 milliGRAM(s) Oral two times a day  scopolamine 1 mG/72 Hr(s) Patch 1 Patch Transdermal every 72 hours  spironolactone 100 milliGRAM(s) Oral daily  thiamine 100 milliGRAM(s) Oral daily  vancomycin    Solution 250 milliGRAM(s) Oral every 6 hours    MEDICATIONS  (PRN):  acetaminophen  Suppository .. 650 milliGRAM(s) Rectal every 6 hours PRN Temp greater or equal to 38C (100.4F), Mild Pain (1 - 3)  ALBUTerol    90 MICROgram(s) HFA Inhaler 1 Puff(s) Inhalation every 4 hours PRN Shortness of Breath and/or Wheezing  cloNIDine 0.1 milliGRAM(s) Oral every 6 hours PRN opiate withdrawal  fentaNYL    Injectable 50 MICROGram(s) IV Push every 30 minutes PRN Agitation  hydrOXYzine hydrochloride 50 milliGRAM(s) Oral every 6 hours PRN Anxiety  ibuprofen  Tablet. 600 milliGRAM(s) Oral every 6 hours PRN Temp greater or equal to 38C (100.4F)  ibuprofen  Tablet. 400 milliGRAM(s) Oral every 6 hours PRN Mild Pain (1 - 3)  sodium chloride 0.9% lock flush 10 milliLiter(s) IV Push every 1 hour PRN Pre/post blood products, medications, blood draw, and to maintain line patency        PHYSICAL EXAM:  GENERAL: intubated, sedated   HEENT:  NCAT, PERRL, No JVD, Trachea Midline, +ETT, +OGT  NERVOUS SYSTEM:  Sedated to RASS 0 to -1 opens eyes to my voice and tracks me   CHEST/LUNG: Good air entry bilaterally  HEART: Regular rate and rhythm  ABDOMEN: Soft, Nontender, + distension   EXTREMITIES:  Warm, well perfused  SKIN: No new skin breakdowns   Patient is a 26y old  Female who presents with a chief complaint of anasarca (13 Jul 2022 20:27)      INTERVAL HPI/OVERNIGHT EVENTS:   Remains intubated and sedated,  was more tachypneic awake and agitated  weaned off versed; started fentanyl today  s/p BM biopsy today; tolerated well     ICU Vital Signs Last 24 Hrs  T(C): 39.1 (14 Jul 2022 07:00), Max: 39.1 (14 Jul 2022 07:00)  T(F): 102.4 (14 Jul 2022 07:00), Max: 102.4 (14 Jul 2022 07:00)  HR: 103 (14 Jul 2022 07:00) (75 - 135)  BP: 136/85 (14 Jul 2022 07:00) (108/73 - 200/135)  BP(mean): 112 (14 Jul 2022 04:27) (86 - 161)  ABP: --  ABP(mean): --  RR: 25 (14 Jul 2022 07:00) (25 - 70)  SpO2: 98% (14 Jul 2022 07:00) (91% - 100%)    O2 Parameters below as of 14 Jul 2022 04:27  Patient On (Oxygen Delivery Method): ventilator    O2 Concentration (%): 35      I&O's Summary    13 Jul 2022 07:01  -  14 Jul 2022 07:00  --------------------------------------------------------  IN: 3236.4 mL / OUT: 1795 mL / NET: 1441.4 mL    14 Jul 2022 07:01  -  14 Jul 2022 07:37  --------------------------------------------------------  IN: 0 mL / OUT: 30 mL / NET: -30 mL      Mode: AC/ CMV (Assist Control/ Continuous Mandatory Ventilation)  RR (machine): 25  TV (machine): 370  FiO2: 35  PEEP: 5  ITime: 0.8  MAP: 14  PIP: 33      LABS:                        10.8   8.79  )-----------( 112      ( 14 Jul 2022 05:31 )             35.5     07-13    148<H>  |  115<H>  |  15  ----------------------------<  107<H>  3.7   |  22  |  0.6<L>    Ca    8.1<L>      13 Jul 2022 18:51  Phos  2.5     07-13  Mg     1.9     07-13    TPro  5.5<L>  /  Alb  3.0<L>  /  TBili  0.4  /  DBili  x   /  AST  22  /  ALT  26  /  AlkPhos  335<H>  07-13        CAPILLARY BLOOD GLUCOSE        ABG - ( 14 Jul 2022 04:40 )  pH, Arterial: 7.39  pH, Blood: x     /  pCO2: 37    /  pO2: 71    / HCO3: 22    / Base Excess: -2.2  /  SaO2: 95.5                RADIOLOGY & ADDITIONAL TESTS:    Consultant(s) Notes Reviewed:  [x ] YES  [ ] NO    MEDICATIONS  (STANDING):  cefiderocol IVPB 2000 milliGRAM(s) IV Intermittent every 6 hours  chlorhexidine 0.12% Liquid 15 milliLiter(s) Oral Mucosa every 12 hours  chlorhexidine 2% Cloths 1 Application(s) Topical daily  dexMEDEtomidine Infusion 0.2 MICROgram(s)/kG/Hr (3.52 mL/Hr) IV Continuous <Continuous>  fentaNYL   Infusion. 0.5 MICROgram(s)/kG/Hr (3.52 mL/Hr) IV Continuous <Continuous>  folic acid 1 milliGRAM(s) Oral daily  midazolam Infusion 0.02 mG/kG/Hr (1.41 mL/Hr) IV Continuous <Continuous>  nicotine -  14 mG/24Hr(s) Patch 1 patch Transdermal daily  norepinephrine Infusion 0.05 MICROgram(s)/kG/Min (8.03 mL/Hr) IV Continuous <Continuous>  pantoprazole  Injectable 40 milliGRAM(s) IV Push daily  potassium chloride   Powder 40 milliEquivalent(s) Oral daily  propofol Infusion 0.1 MICROgram(s)/kG/Min (0.04 mL/Hr) IV Continuous <Continuous>  QUEtiapine 100 milliGRAM(s) Oral two times a day  scopolamine 1 mG/72 Hr(s) Patch 1 Patch Transdermal every 72 hours  spironolactone 100 milliGRAM(s) Oral daily  thiamine 100 milliGRAM(s) Oral daily  vancomycin    Solution 250 milliGRAM(s) Oral every 6 hours    MEDICATIONS  (PRN):  acetaminophen  Suppository .. 650 milliGRAM(s) Rectal every 6 hours PRN Temp greater or equal to 38C (100.4F), Mild Pain (1 - 3)  ALBUTerol    90 MICROgram(s) HFA Inhaler 1 Puff(s) Inhalation every 4 hours PRN Shortness of Breath and/or Wheezing  cloNIDine 0.1 milliGRAM(s) Oral every 6 hours PRN opiate withdrawal  fentaNYL    Injectable 50 MICROGram(s) IV Push every 30 minutes PRN Agitation  hydrOXYzine hydrochloride 50 milliGRAM(s) Oral every 6 hours PRN Anxiety  ibuprofen  Tablet. 600 milliGRAM(s) Oral every 6 hours PRN Temp greater or equal to 38C (100.4F)  ibuprofen  Tablet. 400 milliGRAM(s) Oral every 6 hours PRN Mild Pain (1 - 3)  sodium chloride 0.9% lock flush 10 milliLiter(s) IV Push every 1 hour PRN Pre/post blood products, medications, blood draw, and to maintain line patency        PHYSICAL EXAM:  GENERAL: intubated, sedated   HEENT:  NCAT, PERRL, No JVD, Trachea Midline, +ETT, +OGT  NERVOUS SYSTEM:  Sedated to RASS 0 to -1 opens eyes to my voice and tracks me   CHEST/LUNG: Good air entry bilaterally  HEART: Regular rate and rhythm  ABDOMEN: Soft, Nontender, + distension   EXTREMITIES:  Warm, well perfused  SKIN: No new skin breakdowns

## 2022-07-15 LAB
ALBUMIN SERPL ELPH-MCNC: 2.4 G/DL — LOW (ref 3.5–5.2)
ALP SERPL-CCNC: 247 U/L — HIGH (ref 30–115)
ALT FLD-CCNC: 13 U/L — SIGNIFICANT CHANGE UP (ref 0–41)
ANION GAP SERPL CALC-SCNC: 8 MMOL/L — SIGNIFICANT CHANGE UP (ref 7–14)
AST SERPL-CCNC: 12 U/L — SIGNIFICANT CHANGE UP (ref 0–41)
BASOPHILS # BLD AUTO: 0 K/UL — SIGNIFICANT CHANGE UP (ref 0–0.2)
BASOPHILS # BLD AUTO: 0.01 K/UL — SIGNIFICANT CHANGE UP (ref 0–0.2)
BASOPHILS # BLD AUTO: 0.02 K/UL — SIGNIFICANT CHANGE UP (ref 0–0.2)
BASOPHILS NFR BLD AUTO: 0 % — SIGNIFICANT CHANGE UP (ref 0–1)
BASOPHILS NFR BLD AUTO: 0.3 % — SIGNIFICANT CHANGE UP (ref 0–1)
BASOPHILS NFR BLD AUTO: 0.4 % — SIGNIFICANT CHANGE UP (ref 0–1)
BILIRUB SERPL-MCNC: 0.4 MG/DL — SIGNIFICANT CHANGE UP (ref 0.2–1.2)
BUN SERPL-MCNC: 15 MG/DL — SIGNIFICANT CHANGE UP (ref 10–20)
CALCIUM SERPL-MCNC: 7.5 MG/DL — LOW (ref 8.5–10.1)
CHLORIDE SERPL-SCNC: 112 MMOL/L — HIGH (ref 98–110)
CO2 SERPL-SCNC: 23 MMOL/L — SIGNIFICANT CHANGE UP (ref 17–32)
CREAT SERPL-MCNC: 0.7 MG/DL — SIGNIFICANT CHANGE UP (ref 0.7–1.5)
EGFR: 121 ML/MIN/1.73M2 — SIGNIFICANT CHANGE UP
EOSINOPHIL # BLD AUTO: 0 K/UL — SIGNIFICANT CHANGE UP (ref 0–0.7)
EOSINOPHIL # BLD AUTO: 0.02 K/UL — SIGNIFICANT CHANGE UP (ref 0–0.7)
EOSINOPHIL # BLD AUTO: 0.02 K/UL — SIGNIFICANT CHANGE UP (ref 0–0.7)
EOSINOPHIL NFR BLD AUTO: 0 % — SIGNIFICANT CHANGE UP (ref 0–8)
EOSINOPHIL NFR BLD AUTO: 0.6 % — SIGNIFICANT CHANGE UP (ref 0–8)
EOSINOPHIL NFR BLD AUTO: 0.7 % — SIGNIFICANT CHANGE UP (ref 0–8)
FERRITIN SERPL-MCNC: 334 NG/ML — HIGH (ref 15–150)
GLUCOSE SERPL-MCNC: 81 MG/DL — SIGNIFICANT CHANGE UP (ref 70–99)
HCT VFR BLD CALC: 25 % — LOW (ref 37–47)
HCT VFR BLD CALC: 26.4 % — LOW (ref 37–47)
HCT VFR BLD CALC: 28.3 % — LOW (ref 37–47)
HGB BLD-MCNC: 7.3 G/DL — LOW (ref 12–16)
HGB BLD-MCNC: 7.8 G/DL — LOW (ref 12–16)
HGB BLD-MCNC: 8.5 G/DL — LOW (ref 12–16)
IMM GRANULOCYTES NFR BLD AUTO: 0.3 % — SIGNIFICANT CHANGE UP (ref 0.1–0.3)
IMM GRANULOCYTES NFR BLD AUTO: 0.4 % — HIGH (ref 0.1–0.3)
IMM GRANULOCYTES NFR BLD AUTO: 0.4 % — HIGH (ref 0.1–0.3)
LYMPHOCYTES # BLD AUTO: 0.36 K/UL — LOW (ref 1.2–3.4)
LYMPHOCYTES # BLD AUTO: 0.55 K/UL — LOW (ref 1.2–3.4)
LYMPHOCYTES # BLD AUTO: 0.56 K/UL — LOW (ref 1.2–3.4)
LYMPHOCYTES # BLD AUTO: 17.4 % — LOW (ref 20.5–51.1)
LYMPHOCYTES # BLD AUTO: 20.6 % — SIGNIFICANT CHANGE UP (ref 20.5–51.1)
LYMPHOCYTES # BLD AUTO: 7.5 % — LOW (ref 20.5–51.1)
MAGNESIUM SERPL-MCNC: 1.5 MG/DL — LOW (ref 1.8–2.4)
MCHC RBC-ENTMCNC: 26.4 PG — LOW (ref 27–31)
MCHC RBC-ENTMCNC: 26.6 PG — LOW (ref 27–31)
MCHC RBC-ENTMCNC: 26.9 PG — LOW (ref 27–31)
MCHC RBC-ENTMCNC: 29.2 G/DL — LOW (ref 32–37)
MCHC RBC-ENTMCNC: 29.5 G/DL — LOW (ref 32–37)
MCHC RBC-ENTMCNC: 30 G/DL — LOW (ref 32–37)
MCV RBC AUTO: 88.7 FL — SIGNIFICANT CHANGE UP (ref 81–99)
MCV RBC AUTO: 90.3 FL — SIGNIFICANT CHANGE UP (ref 81–99)
MCV RBC AUTO: 91 FL — SIGNIFICANT CHANGE UP (ref 81–99)
MONOCYTES # BLD AUTO: 0.07 K/UL — LOW (ref 0.1–0.6)
MONOCYTES # BLD AUTO: 0.1 K/UL — SIGNIFICANT CHANGE UP (ref 0.1–0.6)
MONOCYTES # BLD AUTO: 0.14 K/UL — SIGNIFICANT CHANGE UP (ref 0.1–0.6)
MONOCYTES NFR BLD AUTO: 2.6 % — SIGNIFICANT CHANGE UP (ref 1.7–9.3)
MONOCYTES NFR BLD AUTO: 2.9 % — SIGNIFICANT CHANGE UP (ref 1.7–9.3)
MONOCYTES NFR BLD AUTO: 3.1 % — SIGNIFICANT CHANGE UP (ref 1.7–9.3)
NEUTROPHILS # BLD AUTO: 2.02 K/UL — SIGNIFICANT CHANGE UP (ref 1.4–6.5)
NEUTROPHILS # BLD AUTO: 2.51 K/UL — SIGNIFICANT CHANGE UP (ref 1.4–6.5)
NEUTROPHILS # BLD AUTO: 4.28 K/UL — SIGNIFICANT CHANGE UP (ref 1.4–6.5)
NEUTROPHILS NFR BLD AUTO: 75.7 % — HIGH (ref 42.2–75.2)
NEUTROPHILS NFR BLD AUTO: 78.3 % — HIGH (ref 42.2–75.2)
NEUTROPHILS NFR BLD AUTO: 88.8 % — HIGH (ref 42.2–75.2)
NRBC # BLD: 0 /100 WBCS — SIGNIFICANT CHANGE UP (ref 0–0)
PHOSPHATE SERPL-MCNC: 2.5 MG/DL — SIGNIFICANT CHANGE UP (ref 2.1–4.9)
PLATELET # BLD AUTO: 45 K/UL — LOW (ref 130–400)
PLATELET # BLD AUTO: 48 K/UL — LOW (ref 130–400)
PLATELET # BLD AUTO: 55 K/UL — LOW (ref 130–400)
POTASSIUM SERPL-MCNC: 3.2 MMOL/L — LOW (ref 3.5–5)
POTASSIUM SERPL-SCNC: 3.2 MMOL/L — LOW (ref 3.5–5)
PROCALCITONIN SERPL-MCNC: 0.16 NG/ML — HIGH (ref 0.02–0.1)
PROT SERPL-MCNC: 4.7 G/DL — LOW (ref 6–8)
RBC # BLD: 2.77 M/UL — LOW (ref 4.2–5.4)
RBC # BLD: 2.9 M/UL — LOW (ref 4.2–5.4)
RBC # BLD: 3.19 M/UL — LOW (ref 4.2–5.4)
RBC # FLD: 15.8 % — HIGH (ref 11.5–14.5)
RBC # FLD: 15.9 % — HIGH (ref 11.5–14.5)
RBC # FLD: 15.9 % — HIGH (ref 11.5–14.5)
SODIUM SERPL-SCNC: 143 MMOL/L — SIGNIFICANT CHANGE UP (ref 135–146)
WBC # BLD: 2.67 K/UL — LOW (ref 4.8–10.8)
WBC # BLD: 3.21 K/UL — LOW (ref 4.8–10.8)
WBC # BLD: 4.82 K/UL — SIGNIFICANT CHANGE UP (ref 4.8–10.8)
WBC # FLD AUTO: 2.67 K/UL — LOW (ref 4.8–10.8)
WBC # FLD AUTO: 3.21 K/UL — LOW (ref 4.8–10.8)
WBC # FLD AUTO: 4.82 K/UL — SIGNIFICANT CHANGE UP (ref 4.8–10.8)

## 2022-07-15 PROCEDURE — 71045 X-RAY EXAM CHEST 1 VIEW: CPT | Mod: 26

## 2022-07-15 PROCEDURE — 93010 ELECTROCARDIOGRAM REPORT: CPT

## 2022-07-15 PROCEDURE — 99232 SBSQ HOSP IP/OBS MODERATE 35: CPT

## 2022-07-15 PROCEDURE — 99233 SBSQ HOSP IP/OBS HIGH 50: CPT

## 2022-07-15 PROCEDURE — 99291 CRITICAL CARE FIRST HOUR: CPT

## 2022-07-15 RX ORDER — POTASSIUM CHLORIDE 20 MEQ
20 PACKET (EA) ORAL ONCE
Refills: 0 | Status: COMPLETED | OUTPATIENT
Start: 2022-07-15 | End: 2022-07-15

## 2022-07-15 RX ORDER — MAGNESIUM SULFATE 500 MG/ML
2 VIAL (ML) INJECTION ONCE
Refills: 0 | Status: COMPLETED | OUTPATIENT
Start: 2022-07-15 | End: 2022-07-15

## 2022-07-15 RX ORDER — METHADONE HYDROCHLORIDE 40 MG/1
30 TABLET ORAL DAILY
Refills: 0 | Status: DISCONTINUED | OUTPATIENT
Start: 2022-07-15 | End: 2022-07-15

## 2022-07-15 RX ORDER — SODIUM,POTASSIUM PHOSPHATES 278-250MG
1 POWDER IN PACKET (EA) ORAL ONCE
Refills: 0 | Status: COMPLETED | OUTPATIENT
Start: 2022-07-15 | End: 2022-07-15

## 2022-07-15 RX ORDER — METHADONE HYDROCHLORIDE 40 MG/1
20 TABLET ORAL DAILY
Refills: 0 | Status: DISCONTINUED | OUTPATIENT
Start: 2022-07-15 | End: 2022-07-20

## 2022-07-15 RX ADMIN — Medication 600 MILLIGRAM(S): at 11:24

## 2022-07-15 RX ADMIN — Medication 50 MILLIGRAM(S): at 17:15

## 2022-07-15 RX ADMIN — Medication 40 MILLIEQUIVALENT(S): at 11:09

## 2022-07-15 RX ADMIN — SCOPALAMINE 1 PATCH: 1 PATCH, EXTENDED RELEASE TRANSDERMAL at 11:00

## 2022-07-15 RX ADMIN — POLYMYXIN B SULFATE 500 UNIT(S): 500000 INJECTION, POWDER, LYOPHILIZED, FOR SOLUTION INTRAMUSCULAR; INTRATHECAL; INTRAVENOUS; OPHTHALMIC at 17:17

## 2022-07-15 RX ADMIN — Medication 1 PATCH: at 19:18

## 2022-07-15 RX ADMIN — SPIRONOLACTONE 100 MILLIGRAM(S): 25 TABLET, FILM COATED ORAL at 05:24

## 2022-07-15 RX ADMIN — Medication 1 PATCH: at 11:00

## 2022-07-15 RX ADMIN — Medication 50 MILLIGRAM(S): at 09:17

## 2022-07-15 RX ADMIN — SCOPALAMINE 1 PATCH: 1 PATCH, EXTENDED RELEASE TRANSDERMAL at 09:42

## 2022-07-15 RX ADMIN — Medication 1 PATCH: at 11:12

## 2022-07-15 RX ADMIN — CEFIDEROCOL SULFATE TOSYLATE 33.33 MILLIGRAM(S): 1 INJECTION, POWDER, FOR SOLUTION INTRAVENOUS at 06:44

## 2022-07-15 RX ADMIN — Medication 1 MILLIGRAM(S): at 11:12

## 2022-07-15 RX ADMIN — Medication 250 MILLIGRAM(S): at 17:15

## 2022-07-15 RX ADMIN — AMPICILLIN SODIUM AND SULBACTAM SODIUM 200 GRAM(S): 250; 125 INJECTION, POWDER, FOR SUSPENSION INTRAMUSCULAR; INTRAVENOUS at 23:18

## 2022-07-15 RX ADMIN — METHADONE HYDROCHLORIDE 20 MILLIGRAM(S): 40 TABLET ORAL at 11:05

## 2022-07-15 RX ADMIN — Medication 1 PACKET(S): at 05:29

## 2022-07-15 RX ADMIN — PANTOPRAZOLE SODIUM 40 MILLIGRAM(S): 20 TABLET, DELAYED RELEASE ORAL at 11:11

## 2022-07-15 RX ADMIN — SCOPALAMINE 1 PATCH: 1 PATCH, EXTENDED RELEASE TRANSDERMAL at 19:18

## 2022-07-15 RX ADMIN — DEXMEDETOMIDINE HYDROCHLORIDE IN 0.9% SODIUM CHLORIDE 3.52 MICROGRAM(S)/KG/HR: 4 INJECTION INTRAVENOUS at 06:16

## 2022-07-15 RX ADMIN — Medication 650 MILLIGRAM(S): at 11:24

## 2022-07-15 RX ADMIN — CHLORHEXIDINE GLUCONATE 15 MILLILITER(S): 213 SOLUTION TOPICAL at 17:16

## 2022-07-15 RX ADMIN — Medication 250 MILLIGRAM(S): at 23:18

## 2022-07-15 RX ADMIN — Medication 600 MILLIGRAM(S): at 23:17

## 2022-07-15 RX ADMIN — Medication 650 MILLIGRAM(S): at 16:17

## 2022-07-15 RX ADMIN — Medication 600 MILLIGRAM(S): at 09:17

## 2022-07-15 RX ADMIN — AMPICILLIN SODIUM AND SULBACTAM SODIUM 200 GRAM(S): 250; 125 INJECTION, POWDER, FOR SUSPENSION INTRAMUSCULAR; INTRAVENOUS at 11:13

## 2022-07-15 RX ADMIN — PROPOFOL 0.04 MICROGRAM(S)/KG/MIN: 10 INJECTION, EMULSION INTRAVENOUS at 02:42

## 2022-07-15 RX ADMIN — DEXMEDETOMIDINE HYDROCHLORIDE IN 0.9% SODIUM CHLORIDE 3.52 MICROGRAM(S)/KG/HR: 4 INJECTION INTRAVENOUS at 08:50

## 2022-07-15 RX ADMIN — SCOPALAMINE 1 PATCH: 1 PATCH, EXTENDED RELEASE TRANSDERMAL at 11:11

## 2022-07-15 RX ADMIN — Medication 600 MILLIGRAM(S): at 01:24

## 2022-07-15 RX ADMIN — PROPOFOL 0.04 MICROGRAM(S)/KG/MIN: 10 INJECTION, EMULSION INTRAVENOUS at 14:18

## 2022-07-15 RX ADMIN — Medication 250 MILLIGRAM(S): at 14:17

## 2022-07-15 RX ADMIN — Medication 100 MILLIEQUIVALENT(S): at 09:16

## 2022-07-15 RX ADMIN — Medication 250 MILLIGRAM(S): at 01:23

## 2022-07-15 RX ADMIN — Medication 250 MILLIGRAM(S): at 06:16

## 2022-07-15 RX ADMIN — AMPICILLIN SODIUM AND SULBACTAM SODIUM 200 GRAM(S): 250; 125 INJECTION, POWDER, FOR SUSPENSION INTRAMUSCULAR; INTRAVENOUS at 00:54

## 2022-07-15 RX ADMIN — AMPICILLIN SODIUM AND SULBACTAM SODIUM 200 GRAM(S): 250; 125 INJECTION, POWDER, FOR SUSPENSION INTRAMUSCULAR; INTRAVENOUS at 17:15

## 2022-07-15 RX ADMIN — Medication 25 GRAM(S): at 05:29

## 2022-07-15 RX ADMIN — Medication 1 PATCH: at 09:42

## 2022-07-15 RX ADMIN — PROPOFOL 0.04 MICROGRAM(S)/KG/MIN: 10 INJECTION, EMULSION INTRAVENOUS at 23:18

## 2022-07-15 RX ADMIN — POLYMYXIN B SULFATE 500 UNIT(S): 500000 INJECTION, POWDER, LYOPHILIZED, FOR SOLUTION INTRAMUSCULAR; INTRATHECAL; INTRAVENOUS; OPHTHALMIC at 06:15

## 2022-07-15 RX ADMIN — QUETIAPINE FUMARATE 100 MILLIGRAM(S): 200 TABLET, FILM COATED ORAL at 05:24

## 2022-07-15 RX ADMIN — Medication 25 GRAM(S): at 09:16

## 2022-07-15 RX ADMIN — CHLORHEXIDINE GLUCONATE 15 MILLILITER(S): 213 SOLUTION TOPICAL at 05:23

## 2022-07-15 RX ADMIN — DEXMEDETOMIDINE HYDROCHLORIDE IN 0.9% SODIUM CHLORIDE 3.52 MICROGRAM(S)/KG/HR: 4 INJECTION INTRAVENOUS at 12:36

## 2022-07-15 RX ADMIN — AMPICILLIN SODIUM AND SULBACTAM SODIUM 200 GRAM(S): 250; 125 INJECTION, POWDER, FOR SUSPENSION INTRAMUSCULAR; INTRAVENOUS at 06:14

## 2022-07-15 RX ADMIN — HEPARIN SODIUM 5000 UNIT(S): 5000 INJECTION INTRAVENOUS; SUBCUTANEOUS at 05:23

## 2022-07-15 RX ADMIN — CEFIDEROCOL SULFATE TOSYLATE 33.33 MILLIGRAM(S): 1 INJECTION, POWDER, FOR SOLUTION INTRAVENOUS at 00:55

## 2022-07-15 RX ADMIN — Medication 600 MILLIGRAM(S): at 01:00

## 2022-07-15 RX ADMIN — Medication 100 MILLIGRAM(S): at 11:12

## 2022-07-15 RX ADMIN — FENTANYL CITRATE 3.52 MICROGRAM(S)/KG/HR: 50 INJECTION INTRAVENOUS at 23:18

## 2022-07-15 RX ADMIN — DEXMEDETOMIDINE HYDROCHLORIDE IN 0.9% SODIUM CHLORIDE 3.52 MICROGRAM(S)/KG/HR: 4 INJECTION INTRAVENOUS at 01:23

## 2022-07-15 RX ADMIN — QUETIAPINE FUMARATE 100 MILLIGRAM(S): 200 TABLET, FILM COATED ORAL at 17:16

## 2022-07-15 RX ADMIN — FENTANYL CITRATE 3.52 MICROGRAM(S)/KG/HR: 50 INJECTION INTRAVENOUS at 02:42

## 2022-07-15 RX ADMIN — CHLORHEXIDINE GLUCONATE 1 APPLICATION(S): 213 SOLUTION TOPICAL at 23:28

## 2022-07-15 RX ADMIN — DEXMEDETOMIDINE HYDROCHLORIDE IN 0.9% SODIUM CHLORIDE 3.52 MICROGRAM(S)/KG/HR: 4 INJECTION INTRAVENOUS at 23:19

## 2022-07-15 NOTE — PROGRESS NOTE ADULT - ASSESSMENT
Acute respiratory failure with hypoxemia / aspiration pneumonia with sepsis / suspected drug overdose / head laceration s/p fall in lobby / possible seizure / anasarca  pancytopenia - resolved /  fever     - c-diff colitis - continue oral vanco   - w/u and antibiotics as per ID - new opacity on CXR   - s/p BMB   - central line changed    - supplement hypokalemia and  hypomagnesemia    - re intubated -> still awaiting  trach - surgery following

## 2022-07-15 NOTE — PROGRESS NOTE ADULT - SUBJECTIVE AND OBJECTIVE BOX
GENERAL SURGERY PROGRESS NOTE    Patient: POOJA DIANE , 26y ([birth year])Female   MRN: [id number]  [address]  Visit: 06-15-22 Inpatient  Date: 07-15-22 @ 11:37        Procedure/Dx/Injuries: respiratory failure    Events of past 24 hours: patient febrile and tachycardic overnight.    PAST MEDICAL & SURGICAL HISTORY:  Hepatitis C      Asthma      Anxiety and depression      IV drug abuse  heroin      No significant past surgical history          Vitals:   T(F): 101 (07-15-22 @ 09:17), Max: 103.3 (07-14-22 @ 18:11)  HR: 114 (07-15-22 @ 10:00)  BP: 141/94 (07-15-22 @ 10:00)  RR: 25 (07-15-22 @ 10:00)  SpO2: 95% (07-15-22 @ 10:00)  Mode: AC/ CMV (Assist Control/ Continuous Mandatory Ventilation), RR (machine): 25, TV (machine): 370, FiO2: 35, PEEP: 5, MAP: 4, PIP: 11    Diet, NPO:   Except Medications  Diet, NPO after Midnight:      NPO Start Date: 14-Jul-2022,   NPO Start Time: 23:59      Fluids:     I & O's:    07-14-22 @ 07:01  -  07-15-22 @ 07:00  --------------------------------------------------------  IN:    Dexmedetomidine: 530 mL    Enteral Tube Flush: 220 mL    FentaNYL: 470.8 mL    Free Water: 500 mL    IV PiggyBack: 100 mL    IV PiggyBack: 100 mL    IV PiggyBack: 1000 mL    IV PiggyBack: 200 mL    Propofol: 506 mL    Vital High Protein: 800 mL  Total IN: 4426.8 mL    OUT:    Indwelling Catheter - Urethral (mL): 1730 mL    Norepinephrine: 0 mL  Total OUT: 1730 mL    Total NET: 2696.8 mL        Bowel Movement: : [] YES [] NO  Flatus: : [] YES [] NO    PHYSICAL EXAM:  General: NAD, intubated  HEENT: NCAT, MARIANNA, EOMI, Trachea ML, Neck supple  Cardiac: RRR S1, S2, no Murmurs, rubs or gallops  Respiratory: CTAB, tachypneic, breath sounds equal BL, no wheeze, rhonchi or crackles  Abdomen: Soft, non-distended, non-tender    MEDICATIONS  (STANDING):  ampicillin/sulbactam  IVPB 3 Gram(s) IV Intermittent every 6 hours  chlorhexidine 0.12% Liquid 15 milliLiter(s) Oral Mucosa every 12 hours  chlorhexidine 2% Cloths 1 Application(s) Topical daily  dexMEDEtomidine Infusion 0.2 MICROgram(s)/kG/Hr (3.52 mL/Hr) IV Continuous <Continuous>  fentaNYL   Infusion. 0.5 MICROgram(s)/kG/Hr (3.52 mL/Hr) IV Continuous <Continuous>  folic acid 1 milliGRAM(s) Oral daily  methadone    Tablet 20 milliGRAM(s) Oral daily  nicotine -  14 mG/24Hr(s) Patch 1 patch Transdermal daily  norepinephrine Infusion 0.05 MICROgram(s)/kG/Min (8.03 mL/Hr) IV Continuous <Continuous>  pantoprazole  Injectable 40 milliGRAM(s) IV Push daily  polymyxin B IVPB [id number] Unit(s) IV Intermittent every 12 hours  potassium chloride   Powder 40 milliEquivalent(s) Oral daily  propofol Infusion 0.1 MICROgram(s)/kG/Min (0.04 mL/Hr) IV Continuous <Continuous>  QUEtiapine 100 milliGRAM(s) Oral two times a day  scopolamine 1 mG/72 Hr(s) Patch 1 Patch Transdermal every 72 hours  spironolactone 100 milliGRAM(s) Oral daily  thiamine 100 milliGRAM(s) Oral daily  vancomycin    Solution 250 milliGRAM(s) Oral every 6 hours    MEDICATIONS  (PRN):  acetaminophen  Suppository .. 650 milliGRAM(s) Rectal every 6 hours PRN Temp greater or equal to 38C (100.4F), Mild Pain (1 - 3)  ALBUTerol    90 MICROgram(s) HFA Inhaler 1 Puff(s) Inhalation every 4 hours PRN Shortness of Breath and/or Wheezing  cloNIDine 0.1 milliGRAM(s) Oral every 6 hours PRN opiate withdrawal  fentaNYL    Injectable 50 MICROGram(s) IV Push every 30 minutes PRN Agitation  hydrOXYzine hydrochloride 50 milliGRAM(s) Oral every 6 hours PRN Anxiety  ibuprofen  Tablet. 600 milliGRAM(s) Oral every 6 hours PRN Temp greater or equal to 38C (100.4F)  ibuprofen  Tablet. 400 milliGRAM(s) Oral every 6 hours PRN Mild Pain (1 - 3)  sodium chloride 0.9% lock flush 10 milliLiter(s) IV Push every 1 hour PRN Pre/post blood products, medications, blood draw, and to maintain line patency      DVT PROPHYLAXIS:   GI PROPHYLAXIS: pantoprazole  Injectable 40 milliGRAM(s) IV Push daily    ANTICOAGULATION:   ANTIBIOTICS:  ampicillin/sulbactam  IVPB 3 Gram(s)  polymyxin B IVPB 779815 Unit(s)  vancomycin    Solution 250 milliGRAM(s)            LAB/STUDIES:  Labs:  CAPILLARY BLOOD GLUCOSE                              7.8    2.67  )-----------( 48       ( 15 Jul 2022 08:00 )             26.4       Auto Neutrophil %: 75.7 % (07-15-22 @ 08:00)  Auto Immature Granulocyte %: 0.4 % (07-15-22 @ 08:00)  Auto Neutrophil %: 78.3 % (07-15-22 @ 05:21)  Auto Immature Granulocyte %: 0.3 % (07-15-22 @ 05:21)  Auto Neutrophil %: 90.8 % (07-14-22 @ 19:30)  Auto Immature Granulocyte %: 0.3 % (07-14-22 @ 19:30)    07-15    143  |  112<H>  |  15  ----------------------------<  81  3.2<L>   |  23  |  0.7      Calcium, Total Serum: 7.5 mg/dL (07-15-22 @ 05:21)      LFTs:             4.7  | 0.4  | 12       ------------------[247     ( 15 Jul 2022 05:21 )  2.4  | x    | 13          Lipase:x      Amylase:x         Blood Gas Arterial, Lactate: 0.40 mmol/L (07-15-22 @ 04:25)  Blood Gas Arterial, Lactate: 0.60 mmol/L (07-14-22 @ 04:40)  Blood Gas Arterial, Lactate: 0.40 mmol/L (07-13-22 @ 04:55)    ABG - ( 15 Jul 2022 04:25 )  pH: 7.31  /  pCO2: 44    /  pO2: 78    / HCO3: 22    / Base Excess: -4.0  /  SaO2: 96.7            ABG - ( 14 Jul 2022 04:40 )  pH: 7.39  /  pCO2: 37    /  pO2: 71    / HCO3: 22    / Base Excess: -2.2  /  SaO2: 95.5            ABG - ( 13 Jul 2022 04:55 )  pH: 7.47  /  pCO2: 30    /  pO2: 180   / HCO3: 22    / Base Excess: -1.2  /  SaO2: 99.5              Coags:     13.20  ----< 1.15    ( 14 Jul 2022 19:30 )     28.3        CARDIAC MARKERS ( 14 Jul 2022 19:30 )  x     / x     / 24 U/L / x     / x

## 2022-07-15 NOTE — PROGRESS NOTE ADULT - ASSESSMENT
Patient is a 26 year old female with hx of asthma, untreated Hep C, IVDA, anasarca presenting with increased dyspnea since yesterday    IMPRESSION  #Acute respiratory failure s/p intubation 6/16, extubated 7/7, reintubated     #VAP  - Xray Chest 1 View- PORTABLE-Routine (Xray Chest 1 View- PORTABLE-Routine in AM.) (07.03.22 @ 06:10): Increasing right basilar opacity.  - sputum Cx 7/4 MDR Acinetobacter baumanii     # C.difficille infection - 7/5/22    #Pneumonia -   - CT Chest 6/22 - left greater than right lower lobe consolidations   - sputum Cx 6/24 Klebsiella oxytoca, Enterobacter cloacae complex- The Sputum culture from 6/27 grew Numerous Mixed gram negative rods and Moderate Staphylococcus aureus.  - treated with course of cefepime     #Persistent Fevers + Pancytopenia + Splenomegaly   - Ferritin, Serum: 94 ng/mL (06.27.22 @ 06:47),  Procalcitonin, Serum: 0.11 (06.27.22 @ 15:46)  - CT Abd/pelvis 6/26 - moderated ascites moderate pericardial effusion   - bartonella-ab negative   - Hepatosplenomegaly - present since CT Abd/pelvis 1/30/2021  - s/p Bone marrow biopsy 7/14      #Drug overdose vs withdrawal?  #Hx of Untreated Hep C   #IVDA   #Abx allergy: buprenorphine (Rash), Suboxone (Rash)    Recommendations  This is a preliminary incomplete pended note, all final recommendations to follow after interview and examination of the patient.      Please call or message on [employer] Teams if with any questions.  Spectra [phone]   Patient is a 26 year old female with hx of asthma, untreated Hep C, IVDA, anasarca presenting with increased dyspnea since yesterday    IMPRESSION  #Acute respiratory failure s/p intubation 6/16, extubated 7/7, reintubated     #VAP  - Xray Chest 1 View- PORTABLE-Routine (Xray Chest 1 View- PORTABLE-Routine in AM.) (07.03.22 @ 06:10): Increasing right basilar opacity.  - sputum Cx 7/4 MDR Acinetobacter baumanii     # C.difficille infection - 7/5/22    #Pneumonia -   - CT Chest 6/22 - left greater than right lower lobe consolidations   - sputum Cx 6/24 Klebsiella oxytoca, Enterobacter cloacae complex- The Sputum culture from 6/27 grew Numerous Mixed gram negative rods and Moderate Staphylococcus aureus.  - treated with course of cefepime     #FUO + Pancytopenia + Splenomegaly   - Ferritin, Serum: 94 ng/mL (06.27.22 @ 06:47),  Procalcitonin, Serum: 0.11 (06.27.22 @ 15:46)  - CT Abd/pelvis 6/26 - moderated ascites moderate pericardial effusion   - bartonella-ab negative   - Hepatosplenomegaly - present since CT Abd/pelvis 1/30/2021  - HIV-1/2 Combo Result: Nonreact:  (06.29.22 @ 10:29)  - Sedimentation Rate, Erythrocyte: 86 mm/Hr (06.30.22 @ 05:46)  - Autoimmune panel negative   - Quantiferon TB Plus: NEGATIVE (06.04.20 @ 10:28)  - Quantiferon TB Plus: INDETERMINATE. (02.06.21 @ 09:00)  - s/p Bone marrow biopsy 7/14    #Drug overdose vs withdrawal?  #Hx of Untreated Hep C   #IVDA   #Abx allergy: buprenorphine (Rash), Suboxone (Rash)    Recommendations  - continue polymyxin 15431 U/kg q 12 hours and unasyn 3g q 6 hours for Acinetobacter VAP   - continue PO vancomycin 250 mg q 6 hours   - no absolute contraindication for trach -- she has been febrile for quite some time  - follow-up bone marrow biopsy path     Please call or message on [employer] Teams if with any questions.  Spectra [phone]

## 2022-07-15 NOTE — PROGRESS NOTE ADULT - SUBJECTIVE AND OBJECTIVE BOX
CINDYPOOJA JOE  26y, Female  Allergy: buprenorphine (Rash)  Suboxone (Rash)      LOS  30d    CHIEF COMPLAINT: anasarca (15 Jul 2022 07:37)      INTERVAL EVENTS/HPI  - No acute events overnight  - T(F): , Max: 103.3 (07-14-22 @ 18:11)    - WBC Count: 3.21 (07-15-22 @ 05:21)  WBC Count: 8.84 (07-14-22 @ 19:30)     - Creatinine, Serum: 0.7 (07-15-22 @ 05:21)  Creatinine, Serum: 0.6 (07-14-22 @ 19:30)       ROS  General: Denies rigors, nightsweats  HEENT: Denies headache, rhinorrhea, sore throat, eye pain  CV: Denies CP, palpitations  PULM: Denies wheezing, hemoptysis  GI: Denies hematemesis, hematochezia, melena  : Denies discharge, hematuria  MSK: Denies arthralgias, myalgias  SKIN: Denies rash, lesions  NEURO: Denies paresthesias, weakness  PSYCH: Denies depression, anxiety    VITALS:  T(F): 98.2, Max: 103.3 (07-14-22 @ 18:11)  HR: 84  BP: 84/46  RR: 27Vital Signs Last 24 Hrs  T(C): 36.8 (15 Jul 2022 07:10), Max: 39.6 (14 Jul 2022 18:11)  T(F): 98.2 (15 Jul 2022 07:10), Max: 103.3 (14 Jul 2022 18:11)  HR: 84 (15 Jul 2022 06:00) (84 - 132)  BP: 84/46 (15 Jul 2022 06:00) (84/46 - 188/109)  BP(mean): 59 (15 Jul 2022 06:00) (59 - 143)  RR: 27 (15 Jul 2022 07:10) (27 - 47)  SpO2: 94% (15 Jul 2022 06:00) (92% - 100%)    Parameters below as of 15 Jul 2022 06:00  Patient On (Oxygen Delivery Method): ventilator    O2 Concentration (%): 35    PHYSICAL EXAM:  Gen: NAD, resting in bed  HEENT: Normocephalic, atraumatic  Neck: supple, no lymphadenopathy  CV: Regular rate & regular rhythm  Lungs: decreased BS at bases, no fremitus  Abdomen: Soft, BS present  Ext: Warm, well perfused  Neuro: non focal, awake  Skin: no rash, no erythema  Lines: no phlebitis    FH: Non-contributory  Social Hx: Non-contributory    TESTS & MEASUREMENTS:                        7.3    3.21  )-----------( 55       ( 15 Jul 2022 05:21 )             25.0     07-15    143  |  112<H>  |  15  ----------------------------<  81  3.2<L>   |  23  |  0.7    Ca    7.5<L>      15 Jul 2022 05:21  Phos  2.5     07-15  Mg     1.5     07-15    TPro  4.7<L>  /  Alb  2.4<L>  /  TBili  0.4  /  DBili  x   /  AST  12  /  ALT  13  /  AlkPhos  247<H>  07-15      LIVER FUNCTIONS - ( 15 Jul 2022 05:21 )  Alb: 2.4 g/dL / Pro: 4.7 g/dL / ALK PHOS: 247 U/L / ALT: 13 U/L / AST: 12 U/L / GGT: x               Babesia microti PCR, Blood. (collected 07-10-22 @ 15:43)  Source: .Blood None  Final Report (07-11-22 @ 09:45):    Babesia microti PCR    Results: NOT detected    ***************Result Note*************    The detection of Babesia microti by PCR has only been    validated for whole blood; this test has not been approved    by the US Food and Drug Administration (FDA). Performance    characteristics of this assay have been determined by    [employer]. The clinical significance    of results should be considered in conjunction with the    overall clinical presentation of the patient. Result is not    intended to be used as the sole means for clinical diagnosis    or patient management decisions.    One negative sample does not necessarily rule    out the presence of a parasitic infection.    Culture - Blood (collected 07-08-22 @ 05:26)  Source: .Blood None  Final Report (07-13-22 @ 19:00):    No Growth Final    Culture - Blood (collected 07-06-22 @ 07:20)  Source: .Blood None  Final Report (07-11-22 @ 19:00):    No Growth Final    Culture - Blood (collected 07-06-22 @ 07:20)  Source: .Blood None  Final Report (07-11-22 @ 19:00):    No Growth Final    Culture - Sputum (collected 07-04-22 @ 13:39)  Source: ET Tube ET Tube  Gram Stain (07-05-22 @ 08:48):    Few polymorphonuclear leukocytes per low power field    No Squamous epithelial cells per low power field    Numerous Gram Negative Coccobacilli seen per oil power field  Final Report (07-11-22 @ 08:12):    Moderate Acinetobacter baumannii/nosocom group (Carbapenem Resistant)    Cefiderocol interpretations based on FDA breakpoints.    Normal Respiratory Kelly absent  Organism: Acinetobacter baumannii/nosocom group (Carbapenem Resistant)  Acinetobacter baumannii/nosocom group (Carbapenem Resistant) (07-11-22 @ 08:12)  Organism: Acinetobacter baumannii/nosocom group (Carbapenem Resistant) (07-11-22 @ 08:12)      -  Cefiderocol: I      -  Imipenem: R      -  Piperacillin/Tazobactam: R      Method Type: KB  Organism: Acinetobacter baumannii/nosocom group (Carbapenem Resistant) (07-11-22 @ 08:12)      -  Amikacin: R >32      -  Ampicillin/Sulbactam: R >16/8      -  Cefepime: R >16      -  Ceftazidime: R >16      -  Ciprofloxacin: R >2      -  Gentamicin: R >8      -  Levofloxacin: R >4      -  Meropenem: R >8      -  Tobramycin: R >8      -  Trimethoprim/Sulfamethoxazole: R >2/38      Method Type: AL    Culture - Blood (collected 06-29-22 @ 20:31)  Source: .Blood Blood-Peripheral  Final Report (07-05-22 @ 20:00):    No Growth Final    Culture - Blood (collected 06-29-22 @ 20:31)  Source: .Blood Blood  Final Report (07-05-22 @ 20:00):    No Growth Final    Culture - Blood (collected 06-28-22 @ 06:00)  Source: .Blood Blood  Final Report (07-03-22 @ 23:00):    No Growth Final    Culture - Sputum (collected 06-27-22 @ 14:00)  Source: Trach Asp Tracheal Aspirate  Gram Stain (06-28-22 @ 03:15):    Moderate polymorphonuclear leukocytes per low power field    Few Squamous epithelial cells per low power field    Moderate Gram positive cocci in pairs seen per oil power field    Few Gram Variable Rods seen per oil power field  Final Report (06-30-22 @ 16:33):    Numerous Mixed gram negative rods    Moderate Staphylococcus aureus    Normal Respiratory Kelly present  Organism: Staphylococcus aureus (06-30-22 @ 16:33)  Organism: Staphylococcus aureus (06-30-22 @ 16:33)      -  Ampicillin/Sulbactam: S <=8/4      -  Cefazolin: S <=4      -  Clindamycin: S <=0.25      -  Erythromycin: S <=0.25      -  Gentamicin: S <=1 Should not be used as monotherapy      -  Oxacillin: S <=0.25 Oxacillin predicts susceptibility for dicloxacillin, methicillin, and nafcillin      -  Rifampin: S <=1 Should not be used as monotherapy      -  Tetra/Doxy: S <=1      -  Trimethoprim/Sulfamethoxazole: S <=0.5/9.5      -  Vancomycin: S 2      Method Type: AL    Culture - Sputum (collected 06-24-22 @ 17:20)  Source: Trach Asp Tracheal Aspirate  Gram Stain (06-25-22 @ 06:29):    Few polymorphonuclear leukocytes per low power field    Rare Squamous epithelial cells per low power field    Moderate Gram Negative Rods seen per oil power field  Final Report (06-26-22 @ 17:00):    Moderate Klebsiella oxytoca/Raoutella ornithinolytica    Moderate Enterobacter cloacae complex    Normal Respiratory Kelly absent  Organism: Klebsiella oxytoca /Raoutella ornithinolytica  Enterobacter cloacae complex (06-26-22 @ 17:00)  Organism: Enterobacter cloacae complex (06-26-22 @ 17:00)      -  Amikacin: S <=16      -  Amoxicillin/Clavulanic Acid: R >16/8      -  Ampicillin: R >16 These ampicillin results predict results for amoxicillin      -  Ampicillin/Sulbactam: R >16/8 Enterobacter, Klebsiella aerogenes, Citrobacter, and Serratia may develop resistance during prolonged therapy (3-4 days)      -  Aztreonam: S <=4      -  Cefazolin: R >16 Enterobacter, Klebsiella aerogenes, Citrobacter, and Serratia may develop resistance during prolonged therapy (3-4 days)      -  Cefepime: S <=2      -  Cefoxitin: R >16      -  Ceftriaxone: S <=1 Enterobacter, Klebsiella aerogenes, Citrobacter, and Serratia may develop resistance during prolonged therapy      -  Ciprofloxacin: S <=0.25      -  Ertapenem: S <=0.5      -  Gentamicin: S <=2      -  Imipenem: S <=1      -  Levofloxacin: S <=0.5      -  Meropenem: S <=1      -  Piperacillin/Tazobactam: S <=8      -  Tobramycin: S <=2      -  Trimethoprim/Sulfamethoxazole: S <=0.5/9.5      Method Type: AL  Organism: Klebsiella oxytoca /Raoutella ornithinolytica (06-26-22 @ 17:00)      -  Amikacin: S <=16      -  Amoxicillin/Clavulanic Acid: S <=8/4      -  Ampicillin: R 16 These ampicillin results predict results for amoxicillin      -  Ampicillin/Sulbactam: S <=4/2 Enterobacter, Klebsiella aerogenes, Citrobacter, and Serratia may develop resistance during prolonged therapy (3-4 days)      -  Aztreonam: S <=4      -  Cefazolin: S <=2 Enterobacter, Klebsiella aerogenes, Citrobacter, and Serratia may develop resistance during prolonged therapy (3-4 days)      -  Cefepime: S <=2      -  Cefoxitin: S <=8      -  Ceftriaxone: S <=1 Enterobacter, Klebsiella aerogenes, Citrobacter, and Serratia may develop resistance during prolonged therapy      -  Ciprofloxacin: S <=0.25      -  Ertapenem: S <=0.5      -  Gentamicin: S <=2      -  Imipenem: S <=1      -  Levofloxacin: S <=0.5      -  Meropenem: S <=1      -  Piperacillin/Tazobactam: S <=8      -  Tobramycin: S <=2      -  Trimethoprim/Sulfamethoxazole: S <=0.5/9.5      Method Type: AL    Culture - Body Fluid with Gram Stain (collected 06-22-22 @ 01:00)  Source: Peritoneal Peritoneal Fluid  Gram Stain (06-23-22 @ 02:31):    No polymorphonuclear leukocytes seen    No organisms seen    by cytocentrifuge  Final Report (06-27-22 @ 19:13):    No growth at 5 days    Culture - Blood (collected 06-21-22 @ 05:33)  Source: .Blood None  Final Report (06-26-22 @ 19:00):    No Growth Final    Culture - Sputum (collected 06-17-22 @ 14:30)  Source: Trach Asp Tracheal Aspirate  Gram Stain (06-18-22 @ 10:18):    Rare polymorphonuclear leukocytes per low power field    No Squamous epithelial cells per low power field    Numerous Gram Positive Cocci in Pairs and Chains seen per oil power field    Rare Gram Negative Rods seen per oil power field    Rare Gram PositiveRods seen per oil power field  Final Report (06-19-22 @ 16:39):    Normal Respiratory Kelly present    Culture - Fungal, Body Fluid (collected 06-17-22 @ 12:24)  Source: .Body Fluid Peritoneal Fluid  Preliminary Report (06-25-22 @ 15:02):    No growth    Culture - Body Fluid with Gram Stain (collected 06-17-22 @ 12:24)  Source: .Body Fluid Peritoneal Fluid  Gram Stain (06-18-22 @ 03:19):    No polymorphonuclear leukocytes seen    No organisms seen    by cytocentrifuge  Final Report (06-22-22 @ 20:34):    No growth at 5 days            INFECTIOUS DISEASES TESTING  COVID-19 PCR: NotDetec (07-14-22 @ 15:00)  Procalcitonin, Serum: 0.12 (07-10-22 @ 05:42)  Fungitell: <31 (07-05-22 @ 11:49)  Procalcitonin, Serum: 0.14 (07-05-22 @ 11:23)  MRSA PCR Result.: Negative (06-30-22 @ 10:34)    Procalcitonin, Serum: 0.36 (06-30-22 @ 10:30)  Rapid RVP Result: NotDetec (06-30-22 @ 09:19)  HIV-1/2 Combo Result: Nonreact (06-29-22 @ 10:29)  Procalcitonin, Serum: 0.11 (06-27-22 @ 15:46)  Procalcitonin, Serum: 0.11 (06-27-22 @ 06:47)  Procalcitonin, Serum: 0.62 (06-20-22 @ 05:49)  MRSA PCR Result.: Negative (06-17-22 @ 14:30)  Procalcitonin, Serum: 3.28 (06-17-22 @ 05:22)  COVID-19 PCR: NotDetec (06-15-22 @ 07:10)  Procalcitonin, Serum: 0.07 (04-20-22 @ 12:46)      INFLAMMATORY MARKERS  Sedimentation Rate, Erythrocyte: 86 mm/Hr (06-30-22 @ 05:46)  Sedimentation Rate, Erythrocyte: 65 mm/Hr (06-23-22 @ 16:00)      RADIOLOGY & ADDITIONAL TESTS:  I have personally reviewed the last available Chest xray  CXR  Xray Chest 1 View- PORTABLE-Urgent:   ACC: [id number] EXAM:  XR CHEST PORTABLE URGENT 1V                          PROCEDURE DATE:  07/12/2022          INTERPRETATION:  Clinical History / Reason for exam: For evaluation of   endotracheal tube placement    Comparison : Chest radiograph frontal view dated July 12, 2022 time 4:18   AM.    Technique/Positioning: AP portable time 4:24 PM.    Findings:    Support devices: Endotracheal tube located approximately 3.0 cm above the   kd. Nasogastric tube whose tip is not visualized but extends below   the left hemidiaphragm. Right-sided central line with the tip in the   region of the superior vena cava.    Cardiac/mediastinum/hilum: The overall heart size appears mildly enlarged   which may be in part related to projection.    Lung parenchyma/Pleura: Left basilar opacity.    Impression:    In comparison with the prior chest x-ray dated July 12, 2022 time 4:18 AM:    The endotracheal tube tip appears approximately 3 cm above the kd and   nasogastric tube whose tip is not visualized but extends below the left   hemidiaphragm.    Left basilar opacity, unchanged.    --- End of Report ---            MAC VIGIL MD; Attending Radiologist  This document has been electronically signed. Jul 12 2022  4:46PM (07-12-22 @ 16:31)      CT      CARDIOLOGY TESTING  12 Lead ECG:   Ventricular Rate 107 BPM    Atrial Rate 107 BPM    P-R Interval 138 ms    QRS Duration 82 ms    Q-T Interval 300 ms    QTC Calculation(Bazett) 400 ms    P Axis 86 degrees    R Axis 125 degrees    T Axis 218 degrees    Diagnosis Line Sinus tachycardia  Right axis deviation  Pulmonary disease pattern  Possible Right ventricular hypertrophy  Nonspecific T wave abnormality  Abnormal ECG    Confirmed by BRANDON BELL MD ([id number]) on 7/14/2022 11:44:00 AM (07-14-22 @ 08:36)  12 Lead ECG:   Ventricular Rate 84 BPM    Atrial Rate 84 BPM    P-R Interval 158 ms    QRS Duration 70 ms    Q-T Interval 366 ms    QTC Calculation(Bazett) 432 ms    P Axis 80 degrees    R Axis 109 degrees    T Axis 168 degrees    Diagnosis Line Normal sinus rhythm  Rightward axis  Low voltage QRS  Nonspecific T wave abnormality  Abnormal ECG    Confirmed by BRANDON BELL MD ([id number]) on 7/13/2022 9:25:54 AM (07-13-22 @ 08:19)      MEDICATIONS  ampicillin/sulbactam  IVPB 3 IV Intermittent every 6 hours  chlorhexidine 0.12% Liquid 15 Oral Mucosa every 12 hours  chlorhexidine 2% Cloths 1 Topical daily  dexMEDEtomidine Infusion 0.2 IV Continuous <Continuous>  fentaNYL   Infusion. 0.5 IV Continuous <Continuous>  folic acid 1 Oral daily  magnesium sulfate  IVPB 2 IV Intermittent once  nicotine -  14 mG/24Hr(s) Patch 1 Transdermal daily  norepinephrine Infusion 0.05 IV Continuous <Continuous>  pantoprazole  Injectable 40 IV Push daily  polymyxin B IVPB [id number] IV Intermittent every 12 hours  potassium chloride   Powder 40 Oral daily  potassium chloride  20 mEq/100 mL IVPB 20 IV Intermittent once  propofol Infusion 0.1 IV Continuous <Continuous>  QUEtiapine 100 Oral two times a day  scopolamine 1 mG/72 Hr(s) Patch 1 Transdermal every 72 hours  spironolactone 100 Oral daily  thiamine 100 Oral daily  vancomycin    Solution 250 Oral every 6 hours      WEIGHT  Weight (kg): 70.4 (07-07-22 @ 07:00)  Creatinine, Serum: 0.7 mg/dL (07-15-22 @ 05:21)  Creatinine, Serum: 0.6 mg/dL (07-14-22 @ 19:30)      ANTIBIOTICS:  ampicillin/sulbactam  IVPB 3 Gram(s) IV Intermittent every 6 hours  polymyxin B IVPB [id number] Unit(s) IV Intermittent every 12 hours  vancomycin    Solution 250 milliGRAM(s) Oral every 6 hours      All available historical records have been reviewed       CINDYPOOJA JOE  26y, Female  Allergy: buprenorphine (Rash)  Suboxone (Rash)      LOS  30d    CHIEF COMPLAINT: anasarca (15 Jul 2022 07:37)      INTERVAL EVENTS/HPI  - No acute events overnight  - T(F): , Max: 103.3 (07-14-22 @ 18:11)  - remains febrile - continued thick secretions   - WBC Count: 3.21 (07-15-22 @ 05:21)  WBC Count: 8.84 (07-14-22 @ 19:30)     - Creatinine, Serum: 0.7 (07-15-22 @ 05:21)  Creatinine, Serum: 0.6 (07-14-22 @ 19:30)       ROS  General: Denies rigors, nightsweats  HEENT: Denies headache, rhinorrhea, sore throat, eye pain  CV: Denies CP, palpitations  PULM: Denies wheezing, hemoptysis  GI: Denies hematemesis, hematochezia, melena  : Denies discharge, hematuria  MSK: Denies arthralgias, myalgias  SKIN: Denies rash, lesions  NEURO: Denies paresthesias, weakness  PSYCH: Denies depression, anxiety    VITALS:  T(F): 98.2, Max: 103.3 (07-14-22 @ 18:11)  HR: 84  BP: 84/46  RR: 27Vital Signs Last 24 Hrs  T(C): 36.8 (15 Jul 2022 07:10), Max: 39.6 (14 Jul 2022 18:11)  T(F): 98.2 (15 Jul 2022 07:10), Max: 103.3 (14 Jul 2022 18:11)  HR: 84 (15 Jul 2022 06:00) (84 - 132)  BP: 84/46 (15 Jul 2022 06:00) (84/46 - 188/109)  BP(mean): 59 (15 Jul 2022 06:00) (59 - 143)  RR: 27 (15 Jul 2022 07:10) (27 - 47)  SpO2: 94% (15 Jul 2022 06:00) (92% - 100%)    Parameters below as of 15 Jul 2022 06:00  Patient On (Oxygen Delivery Method): ventilator    O2 Concentration (%): 35    PHYSICAL EXAM:  Gen: intubated  HEENT: Normocephalic, atraumatic  Neck: supple, no lymphadenopathy  CV: Regular rate & regular rhythm  Lungs: decreased BS at bases, no fremitus  Abdomen: Soft, BS present; distended/firm   Ext: Warm, well perfused  Neuro: non focal, awake  Skin: no rash, no erythema  Lines: no phlebitis    FH: Non-contributory  Social Hx: Non-contributory    TESTS & MEASUREMENTS:                        7.3    3.21  )-----------( 55       ( 15 Jul 2022 05:21 )             25.0     07-15    143  |  112<H>  |  15  ----------------------------<  81  3.2<L>   |  23  |  0.7    Ca    7.5<L>      15 Jul 2022 05:21  Phos  2.5     07-15  Mg     1.5     07-15    TPro  4.7<L>  /  Alb  2.4<L>  /  TBili  0.4  /  DBili  x   /  AST  12  /  ALT  13  /  AlkPhos  247<H>  07-15      LIVER FUNCTIONS - ( 15 Jul 2022 05:21 )  Alb: 2.4 g/dL / Pro: 4.7 g/dL / ALK PHOS: 247 U/L / ALT: 13 U/L / AST: 12 U/L / GGT: x               Babesia microti PCR, Blood. (collected 07-10-22 @ 15:43)  Source: .Blood None  Final Report (07-11-22 @ 09:45):    Babesia microti PCR    Results: NOT detected    ***************Result Note*************    The detection of Babesia microti by PCR has only been    validated for whole blood; this test has not been approved    by the US Food and Drug Administration (FDA). Performance    characteristics of this assay have been determined by    [employer]. The clinical significance    of results should be considered in conjunction with the    overall clinical presentation of the patient. Result is not    intended to be used as the sole means for clinical diagnosis    or patient management decisions.    One negative sample does not necessarily rule    out the presence of a parasitic infection.    Culture - Blood (collected 07-08-22 @ 05:26)  Source: .Blood None  Final Report (07-13-22 @ 19:00):    No Growth Final    Culture - Blood (collected 07-06-22 @ 07:20)  Source: .Blood None  Final Report (07-11-22 @ 19:00):    No Growth Final    Culture - Blood (collected 07-06-22 @ 07:20)  Source: .Blood None  Final Report (07-11-22 @ 19:00):    No Growth Final    Culture - Sputum (collected 07-04-22 @ 13:39)  Source: ET Tube ET Tube  Gram Stain (07-05-22 @ 08:48):    Few polymorphonuclear leukocytes per low power field    No Squamous epithelial cells per low power field    Numerous Gram Negative Coccobacilli seen per oil power field  Final Report (07-11-22 @ 08:12):    Moderate Acinetobacter baumannii/nosocom group (Carbapenem Resistant)    Cefiderocol interpretations based on FDA breakpoints.    Normal Respiratory Kelly absent  Organism: Acinetobacter baumannii/nosocom group (Carbapenem Resistant)  Acinetobacter baumannii/nosocom group (Carbapenem Resistant) (07-11-22 @ 08:12)  Organism: Acinetobacter baumannii/nosocom group (Carbapenem Resistant) (07-11-22 @ 08:12)      -  Cefiderocol: I      -  Imipenem: R      -  Piperacillin/Tazobactam: R      Method Type: KB  Organism: Acinetobacter baumannii/nosocom group (Carbapenem Resistant) (07-11-22 @ 08:12)      -  Amikacin: R >32      -  Ampicillin/Sulbactam: R >16/8      -  Cefepime: R >16      -  Ceftazidime: R >16      -  Ciprofloxacin: R >2      -  Gentamicin: R >8      -  Levofloxacin: R >4      -  Meropenem: R >8      -  Tobramycin: R >8      -  Trimethoprim/Sulfamethoxazole: R >2/38      Method Type: AL    Culture - Blood (collected 06-29-22 @ 20:31)  Source: .Blood Blood-Peripheral  Final Report (07-05-22 @ 20:00):    No Growth Final    Culture - Blood (collected 06-29-22 @ 20:31)  Source: .Blood Blood  Final Report (07-05-22 @ 20:00):    No Growth Final    Culture - Blood (collected 06-28-22 @ 06:00)  Source: .Blood Blood  Final Report (07-03-22 @ 23:00):    No Growth Final    Culture - Sputum (collected 06-27-22 @ 14:00)  Source: Trach Asp Tracheal Aspirate  Gram Stain (06-28-22 @ 03:15):    Moderate polymorphonuclear leukocytes per low power field    Few Squamous epithelial cells per low power field    Moderate Gram positive cocci in pairs seen per oil power field    Few Gram Variable Rods seen per oil power field  Final Report (06-30-22 @ 16:33):    Numerous Mixed gram negative rods    Moderate Staphylococcus aureus    Normal Respiratory Kelly present  Organism: Staphylococcus aureus (06-30-22 @ 16:33)  Organism: Staphylococcus aureus (06-30-22 @ 16:33)      -  Ampicillin/Sulbactam: S <=8/4      -  Cefazolin: S <=4      -  Clindamycin: S <=0.25      -  Erythromycin: S <=0.25      -  Gentamicin: S <=1 Should not be used as monotherapy      -  Oxacillin: S <=0.25 Oxacillin predicts susceptibility for dicloxacillin, methicillin, and nafcillin      -  Rifampin: S <=1 Should not be used as monotherapy      -  Tetra/Doxy: S <=1      -  Trimethoprim/Sulfamethoxazole: S <=0.5/9.5      -  Vancomycin: S 2      Method Type: AL    Culture - Sputum (collected 06-24-22 @ 17:20)  Source: Trach Asp Tracheal Aspirate  Gram Stain (06-25-22 @ 06:29):    Few polymorphonuclear leukocytes per low power field    Rare Squamous epithelial cells per low power field    Moderate Gram Negative Rods seen per oil power field  Final Report (06-26-22 @ 17:00):    Moderate Klebsiella oxytoca/Raoutella ornithinolytica    Moderate Enterobacter cloacae complex    Normal Respiratory Kelly absent  Organism: Klebsiella oxytoca /Raoutella ornithinolytica  Enterobacter cloacae complex (06-26-22 @ 17:00)  Organism: Enterobacter cloacae complex (06-26-22 @ 17:00)      -  Amikacin: S <=16      -  Amoxicillin/Clavulanic Acid: R >16/8      -  Ampicillin: R >16 These ampicillin results predict results for amoxicillin      -  Ampicillin/Sulbactam: R >16/8 Enterobacter, Klebsiella aerogenes, Citrobacter, and Serratia may develop resistance during prolonged therapy (3-4 days)      -  Aztreonam: S <=4      -  Cefazolin: R >16 Enterobacter, Klebsiella aerogenes, Citrobacter, and Serratia may develop resistance during prolonged therapy (3-4 days)      -  Cefepime: S <=2      -  Cefoxitin: R >16      -  Ceftriaxone: S <=1 Enterobacter, Klebsiella aerogenes, Citrobacter, and Serratia may develop resistance during prolonged therapy      -  Ciprofloxacin: S <=0.25      -  Ertapenem: S <=0.5      -  Gentamicin: S <=2      -  Imipenem: S <=1      -  Levofloxacin: S <=0.5      -  Meropenem: S <=1      -  Piperacillin/Tazobactam: S <=8      -  Tobramycin: S <=2      -  Trimethoprim/Sulfamethoxazole: S <=0.5/9.5      Method Type: AL  Organism: Klebsiella oxytoca /Raoutella ornithinolytica (06-26-22 @ 17:00)      -  Amikacin: S <=16      -  Amoxicillin/Clavulanic Acid: S <=8/4      -  Ampicillin: R 16 These ampicillin results predict results for amoxicillin      -  Ampicillin/Sulbactam: S <=4/2 Enterobacter, Klebsiella aerogenes, Citrobacter, and Serratia may develop resistance during prolonged therapy (3-4 days)      -  Aztreonam: S <=4      -  Cefazolin: S <=2 Enterobacter, Klebsiella aerogenes, Citrobacter, and Serratia may develop resistance during prolonged therapy (3-4 days)      -  Cefepime: S <=2      -  Cefoxitin: S <=8      -  Ceftriaxone: S <=1 Enterobacter, Klebsiella aerogenes, Citrobacter, and Serratia may develop resistance during prolonged therapy      -  Ciprofloxacin: S <=0.25      -  Ertapenem: S <=0.5      -  Gentamicin: S <=2      -  Imipenem: S <=1      -  Levofloxacin: S <=0.5      -  Meropenem: S <=1      -  Piperacillin/Tazobactam: S <=8      -  Tobramycin: S <=2      -  Trimethoprim/Sulfamethoxazole: S <=0.5/9.5      Method Type: AL    Culture - Body Fluid with Gram Stain (collected 06-22-22 @ 01:00)  Source: Peritoneal Peritoneal Fluid  Gram Stain (06-23-22 @ 02:31):    No polymorphonuclear leukocytes seen    No organisms seen    by cytocentrifuge  Final Report (06-27-22 @ 19:13):    No growth at 5 days    Culture - Blood (collected 06-21-22 @ 05:33)  Source: .Blood None  Final Report (06-26-22 @ 19:00):    No Growth Final    Culture - Sputum (collected 06-17-22 @ 14:30)  Source: Trach Asp Tracheal Aspirate  Gram Stain (06-18-22 @ 10:18):    Rare polymorphonuclear leukocytes per low power field    No Squamous epithelial cells per low power field    Numerous Gram Positive Cocci in Pairs and Chains seen per oil power field    Rare Gram Negative Rods seen per oil power field    Rare Gram PositiveRods seen per oil power field  Final Report (06-19-22 @ 16:39):    Normal Respiratory Kelly present    Culture - Fungal, Body Fluid (collected 06-17-22 @ 12:24)  Source: .Body Fluid Peritoneal Fluid  Preliminary Report (06-25-22 @ 15:02):    No growth    Culture - Body Fluid with Gram Stain (collected 06-17-22 @ 12:24)  Source: .Body Fluid Peritoneal Fluid  Gram Stain (06-18-22 @ 03:19):    No polymorphonuclear leukocytes seen    No organisms seen    by cytocentrifuge  Final Report (06-22-22 @ 20:34):    No growth at 5 days            INFECTIOUS DISEASES TESTING  COVID-19 PCR: NotDetec (07-14-22 @ 15:00)  Procalcitonin, Serum: 0.12 (07-10-22 @ 05:42)  Fungitell: <31 (07-05-22 @ 11:49)  Procalcitonin, Serum: 0.14 (07-05-22 @ 11:23)  MRSA PCR Result.: Negative (06-30-22 @ 10:34)    Procalcitonin, Serum: 0.36 (06-30-22 @ 10:30)  Rapid RVP Result: NotDetec (06-30-22 @ 09:19)  HIV-1/2 Combo Result: Nonreact (06-29-22 @ 10:29)  Procalcitonin, Serum: 0.11 (06-27-22 @ 15:46)  Procalcitonin, Serum: 0.11 (06-27-22 @ 06:47)  Procalcitonin, Serum: 0.62 (06-20-22 @ 05:49)  MRSA PCR Result.: Negative (06-17-22 @ 14:30)  Procalcitonin, Serum: 3.28 (06-17-22 @ 05:22)  COVID-19 PCR: NotDetec (06-15-22 @ 07:10)  Procalcitonin, Serum: 0.07 (04-20-22 @ 12:46)      INFLAMMATORY MARKERS  Sedimentation Rate, Erythrocyte: 86 mm/Hr (06-30-22 @ 05:46)  Sedimentation Rate, Erythrocyte: 65 mm/Hr (06-23-22 @ 16:00)      RADIOLOGY & ADDITIONAL TESTS:  I have personally reviewed the last available Chest xray  CXR  Xray Chest 1 View- PORTABLE-Urgent:   ACC: [id number] EXAM:  XR CHEST PORTABLE URGENT 1V                          PROCEDURE DATE:  07/12/2022          INTERPRETATION:  Clinical History / Reason for exam: For evaluation of   endotracheal tube placement    Comparison : Chest radiograph frontal view dated July 12, 2022 time 4:18   AM.    Technique/Positioning: AP portable time 4:24 PM.    Findings:    Support devices: Endotracheal tube located approximately 3.0 cm above the   kd. Nasogastric tube whose tip is not visualized but extends below   the left hemidiaphragm. Right-sided central line with the tip in the   region of the superior vena cava.    Cardiac/mediastinum/hilum: The overall heart size appears mildly enlarged   which may be in part related to projection.    Lung parenchyma/Pleura: Left basilar opacity.    Impression:    In comparison with the prior chest x-ray dated July 12, 2022 time 4:18 AM:    The endotracheal tube tip appears approximately 3 cm above the kd and   nasogastric tube whose tip is not visualized but extends below the left   hemidiaphragm.    Left basilar opacity, unchanged.    --- End of Report ---            MAC VIGIL MD; Attending Radiologist  This document has been electronically signed. Jul 12 2022  4:46PM (07-12-22 @ 16:31)      CT      CARDIOLOGY TESTING  12 Lead ECG:   Ventricular Rate 107 BPM    Atrial Rate 107 BPM    P-R Interval 138 ms    QRS Duration 82 ms    Q-T Interval 300 ms    QTC Calculation(Bazett) 400 ms    P Axis 86 degrees    R Axis 125 degrees    T Axis 218 degrees    Diagnosis Line Sinus tachycardia  Right axis deviation  Pulmonary disease pattern  Possible Right ventricular hypertrophy  Nonspecific T wave abnormality  Abnormal ECG    Confirmed by BRANDON BELL MD ([id number]) on 7/14/2022 11:44:00 AM (07-14-22 @ 08:36)  12 Lead ECG:   Ventricular Rate 84 BPM    Atrial Rate 84 BPM    P-R Interval 158 ms    QRS Duration 70 ms    Q-T Interval 366 ms    QTC Calculation(Bazett) 432 ms    P Axis 80 degrees    R Axis 109 degrees    T Axis 168 degrees    Diagnosis Line Normal sinus rhythm  Rightward axis  Low voltage QRS  Nonspecific T wave abnormality  Abnormal ECG    Confirmed by BRANDON BELL MD ([id number]) on 7/13/2022 9:25:54 AM (07-13-22 @ 08:19)      MEDICATIONS  ampicillin/sulbactam  IVPB 3 IV Intermittent every 6 hours  chlorhexidine 0.12% Liquid 15 Oral Mucosa every 12 hours  chlorhexidine 2% Cloths 1 Topical daily  dexMEDEtomidine Infusion 0.2 IV Continuous <Continuous>  fentaNYL   Infusion. 0.5 IV Continuous <Continuous>  folic acid 1 Oral daily  magnesium sulfate  IVPB 2 IV Intermittent once  nicotine -  14 mG/24Hr(s) Patch 1 Transdermal daily  norepinephrine Infusion 0.05 IV Continuous <Continuous>  pantoprazole  Injectable 40 IV Push daily  polymyxin B IVPB [id number] IV Intermittent every 12 hours  potassium chloride   Powder 40 Oral daily  potassium chloride  20 mEq/100 mL IVPB 20 IV Intermittent once  propofol Infusion 0.1 IV Continuous <Continuous>  QUEtiapine 100 Oral two times a day  scopolamine 1 mG/72 Hr(s) Patch 1 Transdermal every 72 hours  spironolactone 100 Oral daily  thiamine 100 Oral daily  vancomycin    Solution 250 Oral every 6 hours      WEIGHT  Weight (kg): 70.4 (07-07-22 @ 07:00)  Creatinine, Serum: 0.7 mg/dL (07-15-22 @ 05:21)  Creatinine, Serum: 0.6 mg/dL (07-14-22 @ 19:30)      ANTIBIOTICS:  ampicillin/sulbactam  IVPB 3 Gram(s) IV Intermittent every 6 hours  polymyxin B IVPB [id number] Unit(s) IV Intermittent every 12 hours  vancomycin    Solution 250 milliGRAM(s) Oral every 6 hours      All available historical records have been reviewed

## 2022-07-15 NOTE — PROGRESS NOTE ADULT - SUBJECTIVE AND OBJECTIVE BOX
Patient is a 26y old  Female who presents with a chief complaint of anasarca (15 Jul 2022 11:36)      INTERVAL HPI/OVERNIGHT EVENTS:   Patient remains intubated and sedated on fentanyl   was planned for surgery for trach however rescheduled due to persistent fevers and tachycardia     ICU Vital Signs Last 24 Hrs  T(C): 38.3 (15 Jul 2022 09:17), Max: 39.6 (14 Jul 2022 18:11)  T(F): 101 (15 Jul 2022 09:17), Max: 103.3 (14 Jul 2022 18:11)  HR: 114 (15 Jul 2022 10:00) (84 - 132)  BP: 141/94 (15 Jul 2022 10:00) (84/46 - 188/109)  BP(mean): 114 (15 Jul 2022 10:00) (59 - 143)  ABP: --  ABP(mean): --  RR: 24 (15 Jul 2022 11:00) (15 - 47)  SpO2: 95% (15 Jul 2022 10:00) (90% - 100%)    O2 Parameters below as of 15 Jul 2022 07:10  Patient On (Oxygen Delivery Method): ventilator    O2 Concentration (%): 35      I&O's Summary    14 Jul 2022 07:01  -  15 Jul 2022 07:00  --------------------------------------------------------  IN: 4426.8 mL / OUT: 1730 mL / NET: 2696.8 mL    15 Jul 2022 07:01  -  15 Jul 2022 13:24  --------------------------------------------------------  IN: 256 mL / OUT: 535 mL / NET: -279 mL      Mode: AC/ CMV (Assist Control/ Continuous Mandatory Ventilation)  RR (machine): 25  TV (machine): 370  FiO2: 35  PEEP: 5  MAP: 4  PIP: 11      LABS:                        7.8    2.67  )-----------( 48       ( 15 Jul 2022 08:00 )             26.4     07-15    143  |  112<H>  |  15  ----------------------------<  81  3.2<L>   |  23  |  0.7    Ca    7.5<L>      15 Jul 2022 05:21  Phos  2.5     07-15  Mg     1.5     07-15    TPro  4.7<L>  /  Alb  2.4<L>  /  TBili  0.4  /  DBili  x   /  AST  12  /  ALT  13  /  AlkPhos  247<H>  07-15    PT/INR - ( 14 Jul 2022 19:30 )   PT: 13.20 sec;   INR: 1.15 ratio         PTT - ( 14 Jul 2022 19:30 )  PTT:28.3 sec    CAPILLARY BLOOD GLUCOSE        ABG - ( 15 Jul 2022 04:25 )  pH, Arterial: 7.31  pH, Blood: x     /  pCO2: 44    /  pO2: 78    / HCO3: 22    / Base Excess: -4.0  /  SaO2: 96.7                RADIOLOGY & ADDITIONAL TESTS:    Consultant(s) Notes Reviewed:  [x ] YES  [ ] NO    MEDICATIONS  (STANDING):  ampicillin/sulbactam  IVPB 3 Gram(s) IV Intermittent every 6 hours  chlorhexidine 0.12% Liquid 15 milliLiter(s) Oral Mucosa every 12 hours  chlorhexidine 2% Cloths 1 Application(s) Topical daily  dexMEDEtomidine Infusion 0.2 MICROgram(s)/kG/Hr (3.52 mL/Hr) IV Continuous <Continuous>  fentaNYL   Infusion. 0.5 MICROgram(s)/kG/Hr (3.52 mL/Hr) IV Continuous <Continuous>  folic acid 1 milliGRAM(s) Oral daily  methadone    Tablet 20 milliGRAM(s) Oral daily  nicotine -  14 mG/24Hr(s) Patch 1 patch Transdermal daily  norepinephrine Infusion 0.05 MICROgram(s)/kG/Min (8.03 mL/Hr) IV Continuous <Continuous>  pantoprazole  Injectable 40 milliGRAM(s) IV Push daily  polymyxin B IVPB [id number] Unit(s) IV Intermittent every 12 hours  potassium chloride   Powder 40 milliEquivalent(s) Oral daily  propofol Infusion 0.1 MICROgram(s)/kG/Min (0.04 mL/Hr) IV Continuous <Continuous>  QUEtiapine 100 milliGRAM(s) Oral two times a day  scopolamine 1 mG/72 Hr(s) Patch 1 Patch Transdermal every 72 hours  spironolactone 100 milliGRAM(s) Oral daily  thiamine 100 milliGRAM(s) Oral daily  vancomycin    Solution 250 milliGRAM(s) Oral every 6 hours    MEDICATIONS  (PRN):  acetaminophen  Suppository .. 650 milliGRAM(s) Rectal every 6 hours PRN Temp greater or equal to 38C (100.4F), Mild Pain (1 - 3)  ALBUTerol    90 MICROgram(s) HFA Inhaler 1 Puff(s) Inhalation every 4 hours PRN Shortness of Breath and/or Wheezing  cloNIDine 0.1 milliGRAM(s) Oral every 6 hours PRN opiate withdrawal  fentaNYL    Injectable 50 MICROGram(s) IV Push every 30 minutes PRN Agitation  hydrOXYzine hydrochloride 50 milliGRAM(s) Oral every 6 hours PRN Anxiety  ibuprofen  Tablet. 600 milliGRAM(s) Oral every 6 hours PRN Temp greater or equal to 38C (100.4F)  ibuprofen  Tablet. 400 milliGRAM(s) Oral every 6 hours PRN Mild Pain (1 - 3)  sodium chloride 0.9% lock flush 10 milliLiter(s) IV Push every 1 hour PRN Pre/post blood products, medications, blood draw, and to maintain line patency      PHYSICAL EXAM:  GENERAL: intubated, sedated   HEENT:  NCAT, PERRL, No JVD, Trachea Midline, +ETT, +OGT  NERVOUS SYSTEM:  Sedated to RASS 0 to -1 opens eyes to my voice and tracks me   CHEST/LUNG: Good air entry bilaterally  HEART: Regular rate and rhythm  ABDOMEN: Soft, Nontender, + distension   EXTREMITIES:  Warm, well perfused  SKIN: No new skin breakdowns

## 2022-07-15 NOTE — PROGRESS NOTE ADULT - ASSESSMENT
Patient is a 26 year old female with hx of asthma, untreated Hep C, IVDA, anasarca presenting with increased dyspnea since yesterday. Patient has been short of breath chronically and has been admitted for fluid overload. Patient was intubated 6/16/22 after episode of seizure and unresponsiveness. Recently extubated 7/8, reintubated 7/9 after increased WOB and tachypnea. General surgery consulted for tracheostomy after failed SBT today.    Plan      OR cancelled 7/15 due to fevers and tachycardia  Wean vent settings as tolerated, daily SBT, currently 35/5  Monitor for pressor support  Was febrile this morning to 100.4F  F/U results of bone marrow biopsy  ID recs appreciated  Plan for OR early next week  Pre-op orders: NPO after midnight, IVF at midnight, T+S, COVID, Coags, CBC, BMP, Mag, Phos  Will need electrolytes corrected prior to surgery: K>4, Mg >2, Phos >3    [id number] Patient is a 26 year old female with hx of asthma, untreated Hep C, IVDA, anasarca presenting with increased dyspnea since yesterday. Patient has been short of breath chronically and has been admitted for fluid overload. Patient was intubated 6/16/22 after episode of seizure and unresponsiveness. Recently extubated 7/8, reintubated 7/9 after increased WOB and tachypnea. General surgery consulted for tracheostomy after failed SBT today.    Plan      OR cancelled 7/15 due to fevers and tachycardia  Wean vent settings as tolerated, daily SBT, currently 35/5  Monitor for pressor support  Was febrile this morning to 100.4F  F/U results of bone marrow biopsy  ID recs appreciated  Plan for OR early next week  Pre-op orders: NPO after midnight, IVF at midnight, T+S, COVID, Coags, CBC, BMP, Mag, Phos  Will need electrolytes corrected prior to surgery: K>4, Mg >2, Phos >3  Plt >50    [id number]

## 2022-07-15 NOTE — PROGRESS NOTE ADULT - ASSESSMENT
Acute respiratory failure sp intubation and extubation 7/8 followed by reintubation  PNA CT Chest 6/22 - left greater than right lower lobe consolidations; sputum Cx 6/24 Klebsiella oxytoca, Enterobacter cloacae complex- and Acinetobacter buamnii  MSSA pna  seizure like activity  ?? 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 Diff on oral vancomycin   Hypokalemia/hypomagnesemia      PLAN:    CNS:   CW Clonapin to 2mg TID  CW Seroquel 100mg BID  c/w methadone 20 and increase as tolerated   on fentanyl        HEENT: oral care     PULMONARY:  HOB 45,  wean O2 as tolerated, SBT/SAT  f/u surgery reccs for tentative trach date as ID cleared patient/no definite contraindications     CARDIOVASCULAR: goal MAP >65.  Monitor QTc daily  ECHO 7/6: Small to mod generalized effusion. No tamponade . Small mod effusion   present by report on echo 4/21/22  f/u cardiology recommendations; no urgency in diagnostic tap, future ROSLYN given acinetobacter and IVDA     GI: GI prophylaxis.  OG Feeding.   laxative prn     RENAL: monitor lytes  f/u repeat BMP and replete K and mag as needed     INFECTIOUS DISEASE:   worsening R infiltrate. Fungitell <31 7/5 , Glactomannan pending   Repeat procal 0.14 from 0.36 (6/30)   Acinetobacter buamnii in sputum cx -> switch cefdericol to  polymyxin 02478 U/kg q 12 hours and unasyn 3g q 6 hours for Acinetobacter VAP   - continue PO vancomycin 250 mg q 6 hours   - s/p bedside BM biopsy 7/14: no complications -> f/u results   - f/u HLH panel (ferritin, IL6, triglycerides soluble antigen) and heme/onc recommendations   - f/u ID recommendations     HEMATOLOGICAL:    monitor CBC  D-dimer 1322, HIT antibody negative x2   f/u repeat HIT antibody; duplex negative 7/2/2022  - s/p bedside BM biopsy 7/14: no complications; f/u results  - f/u HLH panel (ferritin, IL6, triglycerides soluble antigen) and heme/onc recommendations   h-old heparin resume when plt more then 50 serail h/h keep hgb > 7    ENDOCRINE:  Follow up FS.  Insulin protocol if needed.     MICU  lines: right IJ 7/6

## 2022-07-15 NOTE — PROGRESS NOTE ADULT - SUBJECTIVE AND OBJECTIVE BOX
Patient is a 26y old  Female who presents with a chief complaint of anasarca (14 Jul 2022 15:54)      Over Night Events:  Patient seen and examined.   spiking temp   failed weaning trial yesterday   ROS:  See HPI    PHYSICAL EXAM    ICU Vital Signs Last 24 Hrs  T(C): 36.8 (15 Jul 2022 07:10), Max: 39.6 (14 Jul 2022 18:11)  T(F): 98.2 (15 Jul 2022 07:10), Max: 103.3 (14 Jul 2022 18:11)  HR: 84 (15 Jul 2022 06:00) (84 - 132)  BP: 84/46 (15 Jul 2022 06:00) (84/46 - 188/109)  BP(mean): 59 (15 Jul 2022 06:00) (59 - 143)  ABP: --  ABP(mean): --  RR: 27 (15 Jul 2022 07:10) (27 - 47)  SpO2: 94% (15 Jul 2022 06:00) (92% - 100%)    O2 Parameters below as of 15 Jul 2022 06:00  Patient On (Oxygen Delivery Method): ventilator    O2 Concentration (%): 35        General: awake when sedation lowered   HEENT:       et tube          Lymph Nodes: NO cervical LN   Lungs: Bilateral BS  Cardiovascular: Regular   Abdomen: Soft, Positive BS  Extremities: No clubbing   Skin: warm   Neurological: no focal   Musculoskeletal: move all ext     I&O's Detail    14 Jul 2022 07:01  -  15 Jul 2022 07:00  --------------------------------------------------------  IN:    Dexmedetomidine: 530 mL    Enteral Tube Flush: 220 mL    FentaNYL: 470.8 mL    Free Water: 500 mL    IV PiggyBack: 100 mL    IV PiggyBack: 100 mL    IV PiggyBack: 1000 mL    IV PiggyBack: 200 mL    Propofol: 506 mL    Vital High Protein: 800 mL  Total IN: 4426.8 mL    OUT:    Indwelling Catheter - Urethral (mL): 1730 mL    Norepinephrine: 0 mL  Total OUT: 1730 mL    Total NET: 2696.8 mL      15 Jul 2022 07:01  -  15 Jul 2022 07:37  --------------------------------------------------------  IN:  Total IN: 0 mL    OUT:    Indwelling Catheter - Urethral (mL): 10 mL  Total OUT: 10 mL    Total NET: -10 mL          LABS:                          7.3    3.21  )-----------( 55       ( 15 Jul 2022 05:21 )             25.0         14 Jul 2022 19:30    143    |  111    |  13     ----------------------------<  100    3.6     |  21     |  0.6      Ca    8.3        14 Jul 2022 19:30  Phos  1.8       14 Jul 2022 19:30  Mg     1.6       14 Jul 2022 19:30                                               PT/INR - ( 14 Jul 2022 19:30 )   PT: 13.20 sec;   INR: 1.15 ratio         PTT - ( 14 Jul 2022 19:30 )  PTT:28.3 sec                                             CARDIAC MARKERS ( 14 Jul 2022 19:30 )  x     / x     / 24 U/L / x     / x                                                                                                         Mode: AC/ CMV (Assist Control/ Continuous Mandatory Ventilation)  RR (machine): 25  TV (machine): 370  FiO2: 35  PEEP: 5  MAP: 4  PIP: 11                                      ABG - ( 15 Jul 2022 04:25 )  pH, Arterial: 7.31  pH, Blood: x     /  pCO2: 44    /  pO2: 78    / HCO3: 22    / Base Excess: -4.0  /  SaO2: 96.7                MEDICATIONS  (STANDING):  ampicillin/sulbactam  IVPB 3 Gram(s) IV Intermittent every 6 hours  cefiderocol IVPB 2000 milliGRAM(s) IV Intermittent every 6 hours  chlorhexidine 0.12% Liquid 15 milliLiter(s) Oral Mucosa every 12 hours  chlorhexidine 2% Cloths 1 Application(s) Topical daily  dexMEDEtomidine Infusion 0.2 MICROgram(s)/kG/Hr (3.52 mL/Hr) IV Continuous <Continuous>  fentaNYL   Infusion. 0.5 MICROgram(s)/kG/Hr (3.52 mL/Hr) IV Continuous <Continuous>  folic acid 1 milliGRAM(s) Oral daily  nicotine -  14 mG/24Hr(s) Patch 1 patch Transdermal daily  norepinephrine Infusion 0.05 MICROgram(s)/kG/Min (8.03 mL/Hr) IV Continuous <Continuous>  pantoprazole  Injectable 40 milliGRAM(s) IV Push daily  polymyxin B IVPB [id number] Unit(s) IV Intermittent every 12 hours  potassium chloride   Powder 40 milliEquivalent(s) Oral daily  propofol Infusion 0.1 MICROgram(s)/kG/Min (0.04 mL/Hr) IV Continuous <Continuous>  QUEtiapine 100 milliGRAM(s) Oral two times a day  scopolamine 1 mG/72 Hr(s) Patch 1 Patch Transdermal every 72 hours  spironolactone 100 milliGRAM(s) Oral daily  thiamine 100 milliGRAM(s) Oral daily  vancomycin    Solution 250 milliGRAM(s) Oral every 6 hours    MEDICATIONS  (PRN):  acetaminophen  Suppository .. 650 milliGRAM(s) Rectal every 6 hours PRN Temp greater or equal to 38C (100.4F), Mild Pain (1 - 3)  ALBUTerol    90 MICROgram(s) HFA Inhaler 1 Puff(s) Inhalation every 4 hours PRN Shortness of Breath and/or Wheezing  cloNIDine 0.1 milliGRAM(s) Oral every 6 hours PRN opiate withdrawal  fentaNYL    Injectable 50 MICROGram(s) IV Push every 30 minutes PRN Agitation  hydrOXYzine hydrochloride 50 milliGRAM(s) Oral every 6 hours PRN Anxiety  ibuprofen  Tablet. 600 milliGRAM(s) Oral every 6 hours PRN Temp greater or equal to 38C (100.4F)  ibuprofen  Tablet. 400 milliGRAM(s) Oral every 6 hours PRN Mild Pain (1 - 3)  sodium chloride 0.9% lock flush 10 milliLiter(s) IV Push every 1 hour PRN Pre/post blood products, medications, blood draw, and to maintain line patency          Xrays:  TLC:  OG:  ET tube:                                                                                    left lower effusion /opacity    ECHO:  [facility] ICU:

## 2022-07-15 NOTE — PROGRESS NOTE ADULT - SUBJECTIVE AND OBJECTIVE BOX
Patient seen and evaluated this am, comfortable on ventilator       T(F): 101.5 (07-15-22 @ 15:00), Max: 103.3 (07-14-22 @ 18:11)  HR: 100 (07-15-22 @ 15:00)  BP: 131/85 (07-15-22 @ 14:00)  RR: 20  SpO2: 100% (07-15-22 @ 15:00) (90% - 100%)    PHYSICAL EXAM:  GENERAL: NAD  HEAD:  Atraumatic, Normocephalic  EYES: EOMI, PERRLA, conjunctiva and sclera clear  NERVOUS SYSTEM:   no focal deficits   CHEST/LUNG:  bilateral rhonchi  HEART: Regular rate and rhythm; No murmurs, rubs, or gallops  ABDOMEN: Soft, Nontender, Nondistended; Bowel sounds present  EXTREMITIES:  2+ Peripheral Pulses, No clubbing, cyanosis, or edema    LABS  07-15    143  |  112<H>  |  15  ----------------------------<  81  3.2<L>   |  23  |  0.7    Ca    7.5<L>      15 Jul 2022 05:21  Phos  2.5     07-15  Mg     1.5     07-15    TPro  4.7<L>  /  Alb  2.4<L>  /  TBili  0.4  /  DBili  x   /  AST  12  /  ALT  13  /  AlkPhos  247<H>  07-15                          7.8    2.67  )-----------( 48       ( 15 Jul 2022 08:00 )             26.4     PT/INR - ( 14 Jul 2022 19:30 )   PT: 13.20 sec;   INR: 1.15 ratio         PTT - ( 14 Jul 2022 19:30 )  PTT:28.3 sec    Mode: AC/ CMV (Assist Control/ Continuous Mandatory Ventilation)  RR (machine): 25  TV (machine): 370  FiO2: 35  PEEP: 5    CARDIAC ENZYMES  Creatine Kinase, Serum: 24 (07-14 @ 19:30)      Culture Results:   Babesia microti PCR  Results: NOT detected  ***************Result Note*************  The detection of Babesia microti by PCR has only been  validated for whole blood; this test has not been approved  by the [place] Food and Drug Administration (FDA). Performance  characteristics of this assay have been determined by  [facility]. The clinical significance  of results should be considered in conjunction with the  overall clinical presentation of the patient. Result is not  intended to be used as the sole means for clinical diagnosis  or patient management decisions.  One negative sample does not necessarily rule  out the presence of a parasitic infection. (07-10-22)  Culture Results:   No Growth Final (07-08-22)  Culture Results:   No Growth Final (07-06-22)  Culture Results:   No Growth Final (07-06-22)  Culture Results:   Moderate Acinetobacter baumannii/nosocom group (Carbapenem Resistant)  Cefiderocol interpretations based on FDA breakpoints.  Normal Respiratory Kelly absent (07-04-22)  Culture Results:   No Growth Final (06-29-22)  Culture Results:   No Growth Final (06-29-22)  Culture Results:   No Growth Final (06-28-22)  Culture Results:   Numerous Mixed gram negative rods  Moderate Staphylococcus aureus  Normal Respiratory Kelly present (06-27-22)  Culture Results:   Moderate Klebsiella oxytoca/Raoutella ornithinolytica  Moderate Enterobacter cloacae complex  Normal Respiratory Kelly absent (06-24-22)    RADIOLOGY  < from: Xray Chest 1 View- PORTABLE-Routine (Xray Chest 1 View- PORTABLE-Routine in AM.) (07.15.22 @ 05:51) >    Impression:  Slight increase in size of left basilar opacity compared to 7/14/2022.      < end of copied text >    MEDICATIONS  (STANDING):  ampicillin/sulbactam  IVPB 3 Gram(s) IV Intermittent every 6 hours  chlorhexidine 0.12% Liquid 15 milliLiter(s) Oral Mucosa every 12 hours  chlorhexidine 2% Cloths 1 Application(s) Topical daily  dexMEDEtomidine Infusion 0.2 MICROgram(s)/kG/Hr (3.52 mL/Hr) IV Continuous <Continuous>  fentaNYL   Infusion. 0.5 MICROgram(s)/kG/Hr (3.52 mL/Hr) IV Continuous <Continuous>  folic acid 1 milliGRAM(s) Oral daily  methadone    Tablet 20 milliGRAM(s) Oral daily  nicotine -  14 mG/24Hr(s) Patch 1 patch Transdermal daily  norepinephrine Infusion 0.05 MICROgram(s)/kG/Min (8.03 mL/Hr) IV Continuous <Continuous>  pantoprazole  Injectable 40 milliGRAM(s) IV Push daily  polymyxin B IVPB [id number] Unit(s) IV Intermittent every 12 hours  potassium chloride   Powder 40 milliEquivalent(s) Oral daily  propofol Infusion 0.1 MICROgram(s)/kG/Min (0.04 mL/Hr) IV Continuous <Continuous>  QUEtiapine 100 milliGRAM(s) Oral two times a day  scopolamine 1 mG/72 Hr(s) Patch 1 Patch Transdermal every 72 hours  spironolactone 100 milliGRAM(s) Oral daily  thiamine 100 milliGRAM(s) Oral daily  vancomycin    Solution 250 milliGRAM(s) Oral every 6 hours    MEDICATIONS  (PRN):  acetaminophen  Suppository .. 650 milliGRAM(s) Rectal every 6 hours PRN Temp greater or equal to 38C (100.4F), Mild Pain (1 - 3)  ALBUTerol    90 MICROgram(s) HFA Inhaler 1 Puff(s) Inhalation every 4 hours PRN Shortness of Breath and/or Wheezing  cloNIDine 0.1 milliGRAM(s) Oral every 6 hours PRN opiate withdrawal  fentaNYL    Injectable 50 MICROGram(s) IV Push every 30 minutes PRN Agitation  hydrOXYzine hydrochloride 50 milliGRAM(s) Oral every 6 hours PRN Anxiety  ibuprofen  Tablet. 600 milliGRAM(s) Oral every 6 hours PRN Temp greater or equal to 38C (100.4F)  ibuprofen  Tablet. 400 milliGRAM(s) Oral every 6 hours PRN Mild Pain (1 - 3)  sodium chloride 0.9% lock flush 10 milliLiter(s) IV Push every 1 hour PRN Pre/post blood products, medications, blood draw, and to maintain line patency

## 2022-07-15 NOTE — PROGRESS NOTE ADULT - ASSESSMENT
IMPRESSION:  Acute respiratory failure sp intubation  PNA  MSSA pna  seizure like activity  ?? drug over dose/ withdrawal opoid   liver cirrhosis   Ascites sp paracentesis   Pancytopenia- worsening  RENETTA improved  C diff +ve     PLAN:    CNS: increase methadone to 20 if QTC stable   and start to taper fentanyl off   taper propofol and pecedex to hayes neg 1   CW Clonapin to 2mg TID  CW Seroquel 100mg BID  DO SAT     HEENT: oral care     PULMONARY:  HOB 45,  Vent changes: wean O2 as tolerated, proceed with SBT  for weaning trial   pending trach today     CARDIOVASCULAR: goal MAP >65.  Monitor QTc daily.     keep is = os  Pericarditis management per cardio. discussed   do ekg follow QtC  GI: GI prophylaxis.  OG Feeding. start free water 2500cc Q 6 hrs    KUB reviewed    RENAL: monitor lytes is and os     INFECTIOUS DISEASE: ABX changes on unasyn and polymyxin and oral vanco  follow Id recommendation ?? withdrawal fever, drug induce   repeat procal   repeat cx   continue abx   follow cx from Bone marroe biopsy     HEMATOLOGICAL:   s/p BMB follow result and cx   repeat cbc stat most likely morning cbc diluted   monitor CBC, Heme FU.   reticulocyte count  dvt ppx  check d-dimer  reviewed ,  hold heparin   repeat HIT panel     ENDOCRINE:  Follow up FS.  Insulin protocol if needed.     MICU  TLC 7/6 RIJ  [place] - failed TOV 6/24 Change.   try voiding trial today    IMPRESSION:  Acute respiratory failure sp intubation  PNA  MSSA pna  seizure like activity  ?? drug over dose/ withdrawal opoid   liver cirrhosis   Ascites sp paracentesis   Pancytopenia- worsening  RENETTA improved  C diff +ve     PLAN:    CNS: increase methadone to 20 if QTC stable   and start to taper fentanyl off   taper propofol and pecedex to hayes neg 1   CW Clonapin to 2mg TID  CW Seroquel 100mg BID  DO SAT     HEENT: oral care     PULMONARY:  HOB 45,  Vent changes: wean O2 as tolerated, proceed with SBT  for weaning trial   pending trach today     CARDIOVASCULAR: goal MAP >65.  Monitor QTc daily.     keep is = os  Pericarditis management per cardio. discussed   do ekg follow QtC  GI: GI prophylaxis.  OG Feeding. start free water 2500cc Q 6 hrs    KUB reviewed    RENAL: monitor lytes is and os     INFECTIOUS DISEASE: ABX changes on unasyn and polymyxin and oral vanco  follow Id recommendation ?? withdrawal fever, drug induce   repeat procal   repeat cx   continue abx   follow cx from Bone marroe biopsy     HEMATOLOGICAL:   s/p BMB follow result and cx   repeat cbc stat most likely morning cbc diluted   monitor CBC, Heme FU.   reticulocyte count  dvt ppx  heck d-dimer  reviewed ,  hold heparin resume when plt more then 50 serail h/h keep HB more  then 7.5   repeat HIT panel     ENDOCRINE:  Follow up FS.  Insulin protocol if needed.     MICU  TLC 7/6 RIDEONTE  [place] - failed TOV 6/24 Change.   try voiding trial today

## 2022-07-16 LAB
ALBUMIN SERPL ELPH-MCNC: 2.4 G/DL — LOW (ref 3.5–5.2)
ALP SERPL-CCNC: 252 U/L — HIGH (ref 30–115)
ALT FLD-CCNC: 12 U/L — SIGNIFICANT CHANGE UP (ref 0–41)
ANION GAP SERPL CALC-SCNC: 12 MMOL/L — SIGNIFICANT CHANGE UP (ref 7–14)
AST SERPL-CCNC: 15 U/L — SIGNIFICANT CHANGE UP (ref 0–41)
BASOPHILS # BLD AUTO: 0 K/UL — SIGNIFICANT CHANGE UP (ref 0–0.2)
BASOPHILS NFR BLD AUTO: 0 % — SIGNIFICANT CHANGE UP (ref 0–1)
BILIRUB SERPL-MCNC: 0.3 MG/DL — SIGNIFICANT CHANGE UP (ref 0.2–1.2)
BUN SERPL-MCNC: 20 MG/DL — SIGNIFICANT CHANGE UP (ref 10–20)
CALCIUM SERPL-MCNC: 7.8 MG/DL — LOW (ref 8.5–10.1)
CHLORIDE SERPL-SCNC: 107 MMOL/L — SIGNIFICANT CHANGE UP (ref 98–110)
CO2 SERPL-SCNC: 20 MMOL/L — SIGNIFICANT CHANGE UP (ref 17–32)
CREAT SERPL-MCNC: 0.9 MG/DL — SIGNIFICANT CHANGE UP (ref 0.7–1.5)
CULTURE RESULTS: SIGNIFICANT CHANGE UP
EGFR: 89 ML/MIN/1.73M2 — SIGNIFICANT CHANGE UP
EOSINOPHIL # BLD AUTO: 0 K/UL — SIGNIFICANT CHANGE UP (ref 0–0.7)
EOSINOPHIL NFR BLD AUTO: 0 % — SIGNIFICANT CHANGE UP (ref 0–8)
GLUCOSE SERPL-MCNC: 156 MG/DL — HIGH (ref 70–99)
GRAM STN FLD: SIGNIFICANT CHANGE UP
HCT VFR BLD CALC: 23.8 % — LOW (ref 37–47)
HCT VFR BLD CALC: 27.7 % — LOW (ref 37–47)
HGB BLD-MCNC: 7.1 G/DL — LOW (ref 12–16)
HGB BLD-MCNC: 8.2 G/DL — LOW (ref 12–16)
IMM GRANULOCYTES NFR BLD AUTO: 0.5 % — HIGH (ref 0.1–0.3)
LYMPHOCYTES # BLD AUTO: 0.32 K/UL — LOW (ref 1.2–3.4)
LYMPHOCYTES # BLD AUTO: 16.6 % — LOW (ref 20.5–51.1)
MAGNESIUM SERPL-MCNC: 1.8 MG/DL — SIGNIFICANT CHANGE UP (ref 1.8–2.4)
MCHC RBC-ENTMCNC: 26.1 PG — LOW (ref 27–31)
MCHC RBC-ENTMCNC: 26.9 PG — LOW (ref 27–31)
MCHC RBC-ENTMCNC: 29.6 G/DL — LOW (ref 32–37)
MCHC RBC-ENTMCNC: 29.8 G/DL — LOW (ref 32–37)
MCV RBC AUTO: 88.2 FL — SIGNIFICANT CHANGE UP (ref 81–99)
MCV RBC AUTO: 90.2 FL — SIGNIFICANT CHANGE UP (ref 81–99)
MONOCYTES # BLD AUTO: 0.07 K/UL — LOW (ref 0.1–0.6)
MONOCYTES NFR BLD AUTO: 3.6 % — SIGNIFICANT CHANGE UP (ref 1.7–9.3)
NEUTROPHILS # BLD AUTO: 1.53 K/UL — SIGNIFICANT CHANGE UP (ref 1.4–6.5)
NEUTROPHILS NFR BLD AUTO: 79.3 % — HIGH (ref 42.2–75.2)
NRBC # BLD: 0 /100 WBCS — SIGNIFICANT CHANGE UP (ref 0–0)
NRBC # BLD: 0 /100 WBCS — SIGNIFICANT CHANGE UP (ref 0–0)
PHOSPHATE SERPL-MCNC: 3.3 MG/DL — SIGNIFICANT CHANGE UP (ref 2.1–4.9)
PLATELET # BLD AUTO: 40 K/UL — LOW (ref 130–400)
PLATELET # BLD AUTO: 55 K/UL — LOW (ref 130–400)
POTASSIUM SERPL-MCNC: 3 MMOL/L — LOW (ref 3.5–5)
POTASSIUM SERPL-SCNC: 3 MMOL/L — LOW (ref 3.5–5)
PROT SERPL-MCNC: 5 G/DL — LOW (ref 6–8)
RBC # BLD: 2.64 M/UL — LOW (ref 4.2–5.4)
RBC # BLD: 3.14 M/UL — LOW (ref 4.2–5.4)
RBC # FLD: 15.9 % — HIGH (ref 11.5–14.5)
RBC # FLD: 16 % — HIGH (ref 11.5–14.5)
SODIUM SERPL-SCNC: 139 MMOL/L — SIGNIFICANT CHANGE UP (ref 135–146)
SPECIMEN SOURCE: SIGNIFICANT CHANGE UP
SPECIMEN SOURCE: SIGNIFICANT CHANGE UP
WBC # BLD: 1.93 K/UL — LOW (ref 4.8–10.8)
WBC # BLD: 3.29 K/UL — LOW (ref 4.8–10.8)
WBC # FLD AUTO: 1.93 K/UL — LOW (ref 4.8–10.8)
WBC # FLD AUTO: 3.29 K/UL — LOW (ref 4.8–10.8)

## 2022-07-16 PROCEDURE — 99233 SBSQ HOSP IP/OBS HIGH 50: CPT

## 2022-07-16 PROCEDURE — 99291 CRITICAL CARE FIRST HOUR: CPT

## 2022-07-16 PROCEDURE — 71045 X-RAY EXAM CHEST 1 VIEW: CPT | Mod: 26

## 2022-07-16 RX ADMIN — Medication 600 MILLIGRAM(S): at 00:00

## 2022-07-16 RX ADMIN — Medication 50 MILLIGRAM(S): at 09:57

## 2022-07-16 RX ADMIN — DEXMEDETOMIDINE HYDROCHLORIDE IN 0.9% SODIUM CHLORIDE 3.52 MICROGRAM(S)/KG/HR: 4 INJECTION INTRAVENOUS at 20:45

## 2022-07-16 RX ADMIN — Medication 1 MILLIGRAM(S): at 11:23

## 2022-07-16 RX ADMIN — Medication 40 MILLIEQUIVALENT(S): at 11:25

## 2022-07-16 RX ADMIN — SCOPALAMINE 1 PATCH: 1 PATCH, EXTENDED RELEASE TRANSDERMAL at 20:10

## 2022-07-16 RX ADMIN — QUETIAPINE FUMARATE 100 MILLIGRAM(S): 200 TABLET, FILM COATED ORAL at 05:31

## 2022-07-16 RX ADMIN — POLYMYXIN B SULFATE 500 UNIT(S): 500000 INJECTION, POWDER, LYOPHILIZED, FOR SOLUTION INTRAMUSCULAR; INTRATHECAL; INTRAVENOUS; OPHTHALMIC at 17:55

## 2022-07-16 RX ADMIN — PANTOPRAZOLE SODIUM 40 MILLIGRAM(S): 20 TABLET, DELAYED RELEASE ORAL at 11:25

## 2022-07-16 RX ADMIN — Medication 1 PATCH: at 20:09

## 2022-07-16 RX ADMIN — AMPICILLIN SODIUM AND SULBACTAM SODIUM 200 GRAM(S): 250; 125 INJECTION, POWDER, FOR SUSPENSION INTRAMUSCULAR; INTRAVENOUS at 12:26

## 2022-07-16 RX ADMIN — Medication 250 MILLIGRAM(S): at 11:23

## 2022-07-16 RX ADMIN — AMPICILLIN SODIUM AND SULBACTAM SODIUM 200 GRAM(S): 250; 125 INJECTION, POWDER, FOR SUSPENSION INTRAMUSCULAR; INTRAVENOUS at 17:55

## 2022-07-16 RX ADMIN — QUETIAPINE FUMARATE 100 MILLIGRAM(S): 200 TABLET, FILM COATED ORAL at 17:55

## 2022-07-16 RX ADMIN — Medication 250 MILLIGRAM(S): at 23:03

## 2022-07-16 RX ADMIN — Medication 250 MILLIGRAM(S): at 05:33

## 2022-07-16 RX ADMIN — POLYMYXIN B SULFATE 500 UNIT(S): 500000 INJECTION, POWDER, LYOPHILIZED, FOR SOLUTION INTRAMUSCULAR; INTRATHECAL; INTRAVENOUS; OPHTHALMIC at 05:32

## 2022-07-16 RX ADMIN — DEXMEDETOMIDINE HYDROCHLORIDE IN 0.9% SODIUM CHLORIDE 3.52 MICROGRAM(S)/KG/HR: 4 INJECTION INTRAVENOUS at 00:29

## 2022-07-16 RX ADMIN — METHADONE HYDROCHLORIDE 20 MILLIGRAM(S): 40 TABLET ORAL at 11:24

## 2022-07-16 RX ADMIN — Medication 50 MILLIGRAM(S): at 17:57

## 2022-07-16 RX ADMIN — CHLORHEXIDINE GLUCONATE 15 MILLILITER(S): 213 SOLUTION TOPICAL at 05:31

## 2022-07-16 RX ADMIN — CHLORHEXIDINE GLUCONATE 15 MILLILITER(S): 213 SOLUTION TOPICAL at 17:56

## 2022-07-16 RX ADMIN — Medication 1 PATCH: at 11:22

## 2022-07-16 RX ADMIN — Medication 600 MILLIGRAM(S): at 09:58

## 2022-07-16 RX ADMIN — Medication 250 MILLIGRAM(S): at 17:55

## 2022-07-16 RX ADMIN — CHLORHEXIDINE GLUCONATE 1 APPLICATION(S): 213 SOLUTION TOPICAL at 23:12

## 2022-07-16 RX ADMIN — AMPICILLIN SODIUM AND SULBACTAM SODIUM 200 GRAM(S): 250; 125 INJECTION, POWDER, FOR SUSPENSION INTRAMUSCULAR; INTRAVENOUS at 23:03

## 2022-07-16 RX ADMIN — Medication 600 MILLIGRAM(S): at 10:00

## 2022-07-16 RX ADMIN — AMPICILLIN SODIUM AND SULBACTAM SODIUM 200 GRAM(S): 250; 125 INJECTION, POWDER, FOR SUSPENSION INTRAMUSCULAR; INTRAVENOUS at 05:31

## 2022-07-16 RX ADMIN — SPIRONOLACTONE 100 MILLIGRAM(S): 25 TABLET, FILM COATED ORAL at 05:31

## 2022-07-16 RX ADMIN — Medication 100 MILLIGRAM(S): at 11:24

## 2022-07-16 NOTE — PROGRESS NOTE ADULT - ASSESSMENT
Patient is a 26 year old female with hx of asthma, untreated Hep C, IVDA, anasarca presenting with increased dyspnea since yesterday. Patient has been short of breath chronically and has been admitted for fluid overload. Patient was intubated 6/16/22 after episode of seizure and unresponsiveness. Recently extubated 7/8, reintubated 7/9 after increased WOB and tachypnea. General surgery consulted for tracheostomy after failed SBT today.    Plan    OR cancelled 7/15 due to fevers and tachycardia  Wean vent settings as tolerated, daily SBT, currently 35/5  Monitor for pressor support  Was febrile this morning to 100.8F  F/U results of bone marrow biopsy  ID recs appreciated  Plan for OR early next week  Pre-op orders: NPO after midnight, IVF at midnight, T+S, COVID, Coags, CBC, BMP, Mag, Phos  Will need electrolytes corrected prior to surgery: K>4, Mg >2, Phos >3  Plt >50    [id number]

## 2022-07-16 NOTE — PROGRESS NOTE ADULT - SUBJECTIVE AND OBJECTIVE BOX
GENERAL SURGERY PROGRESS NOTE    Patient: POOJA DIANE , 26y ([birth year])Female   MRN: [id number]  Location: [address]- [address]  Visit: 06-15-22 Inpatient  Date: 07-16-22 @ 09:41    Procedure/Dx/Injuries: consult for tracheostomy    Events of past 24 hours: 35/5, still febrile up to 100.8 overnight, tachycardia resolved.    PAST MEDICAL & SURGICAL HISTORY:  Hepatitis C      Asthma      Anxiety and depression      IV drug abuse  heroin      No significant past surgical history          Vitals:   T(F): 100.2 (07-16-22 @ 07:01), Max: 102.2 (07-15-22 @ 11:25)  HR: 96 (07-16-22 @ 08:30)  BP: 96/53 (07-16-22 @ 06:00)  RR: 22 (07-16-22 @ 09:00)  SpO2: 98% (07-16-22 @ 08:30)  Mode: CPAP with PS, FiO2: 35, PEEP: 5, PS: 5, ITime: 1, MAP: 9, PIP: 10    Diet, NPO with Tube Feed:   Tube Feeding Modality: Orogastric  Vital High Protein  Total Volume for 24 Hours (mL): 1200  Continuous  Starting Tube Feed Rate mL per Hour: 10  Until Goal Tube Feed Rate (mL per Hour): 50  Tube Feed Duration (in Hours): 24  Tube Feed Start Time: 16:00  Diet, NPO after Midnight:      NPO Start Date: 14-Jul-2022,   NPO Start Time: 23:59      Fluids:     I & O's:    07-15-22 @ 07:01  -  07-16-22 @ 07:00  --------------------------------------------------------  IN:    Dexmedetomidine: 636 mL    Enteral Tube Flush: 460 mL    FentaNYL: 147 mL    Free Water: 500 mL    IV PiggyBack: 100 mL    IV PiggyBack: 550 mL    IV PiggyBack: 200 mL    Propofol: 170 mL    Vital High Protein: 700 mL  Total IN: 3463 mL    OUT:    Indwelling Catheter - Urethral (mL): 1910 mL    Norepinephrine: 0 mL  Total OUT: 1910 mL    Total NET: 1553 mL        Bowel Movement: : [] YES [] NO  Flatus: : [] YES [] NO    PHYSICAL EXAM:  General: NAD, intubated sedated  HEENT: NCAT, MARIANNA, EOMI, Trachea ML, Neck supple  Cardiac: RRR S1, S2, no Murmurs, rubs or gallops  Respiratory: CTAB, tachypneic, breath sounds equal BL, no wheeze, rhonchi or crackles  Abdomen: Soft, non-distended, non-tender    MEDICATIONS  (STANDING):  ampicillin/sulbactam  IVPB 3 Gram(s) IV Intermittent every 6 hours  chlorhexidine 0.12% Liquid 15 milliLiter(s) Oral Mucosa every 12 hours  chlorhexidine 2% Cloths 1 Application(s) Topical daily  dexMEDEtomidine Infusion 0.2 MICROgram(s)/kG/Hr (3.52 mL/Hr) IV Continuous <Continuous>  fentaNYL   Infusion. 0.5 MICROgram(s)/kG/Hr (3.52 mL/Hr) IV Continuous <Continuous>  folic acid 1 milliGRAM(s) Oral daily  methadone    Tablet 20 milliGRAM(s) Oral daily  nicotine -  14 mG/24Hr(s) Patch 1 patch Transdermal daily  norepinephrine Infusion 0.05 MICROgram(s)/kG/Min (8.03 mL/Hr) IV Continuous <Continuous>  pantoprazole  Injectable 40 milliGRAM(s) IV Push daily  polymyxin B IVPB [id number] Unit(s) IV Intermittent every 12 hours  potassium chloride   Powder 40 milliEquivalent(s) Oral daily  propofol Infusion 0.1 MICROgram(s)/kG/Min (0.04 mL/Hr) IV Continuous <Continuous>  QUEtiapine 100 milliGRAM(s) Oral two times a day  scopolamine 1 mG/72 Hr(s) Patch 1 Patch Transdermal every 72 hours  spironolactone 100 milliGRAM(s) Oral daily  thiamine 100 milliGRAM(s) Oral daily  vancomycin    Solution 250 milliGRAM(s) Oral every 6 hours    MEDICATIONS  (PRN):  acetaminophen  Suppository .. 650 milliGRAM(s) Rectal every 6 hours PRN Temp greater or equal to 38C (100.4F), Mild Pain (1 - 3)  ALBUTerol    90 MICROgram(s) HFA Inhaler 1 Puff(s) Inhalation every 4 hours PRN Shortness of Breath and/or Wheezing  cloNIDine 0.1 milliGRAM(s) Oral every 6 hours PRN opiate withdrawal  fentaNYL    Injectable 50 MICROGram(s) IV Push every 30 minutes PRN Agitation  hydrOXYzine hydrochloride 50 milliGRAM(s) Oral every 6 hours PRN Anxiety  ibuprofen  Tablet. 600 milliGRAM(s) Oral every 6 hours PRN Temp greater or equal to 38C (100.4F)  ibuprofen  Tablet. 400 milliGRAM(s) Oral every 6 hours PRN Mild Pain (1 - 3)  sodium chloride 0.9% lock flush 10 milliLiter(s) IV Push every 1 hour PRN Pre/post blood products, medications, blood draw, and to maintain line patency      DVT PROPHYLAXIS:   GI PROPHYLAXIS: pantoprazole  Injectable 40 milliGRAM(s) IV Push daily    ANTICOAGULATION:   ANTIBIOTICS:  ampicillin/sulbactam  IVPB 3 Gram(s)  polymyxin B IVPB [id number] Unit(s)  vancomycin    Solution 250 milliGRAM(s)            LAB/STUDIES:  Labs:  CAPILLARY BLOOD GLUCOSE                              7.1    1.93  )-----------( 40       ( 16 Jul 2022 06:47 )             23.8       Auto Neutrophil %: 79.3 % (07-16-22 @ 06:47)  Auto Immature Granulocyte %: 0.5 % (07-16-22 @ 06:47)  Auto Neutrophil %: 88.8 % (07-15-22 @ 16:28)  Auto Immature Granulocyte %: 0.4 % (07-15-22 @ 16:28)    07-16    139  |  107  |  20  ----------------------------<  156<H>  3.0<L>   |  20  |  0.9      Magnesium, Serum: 1.8 mg/dL (07-16-22 @ 06:47)      LFTs:             5.0  | 0.3  | 15       ------------------[252     ( 16 Jul 2022 06:47 )  2.4  | x    | 12          Lipase:x      Amylase:x         Blood Gas Arterial, Lactate: 0.40 mmol/L (07-16-22 @ 04:49)  Blood Gas Arterial, Lactate: 0.40 mmol/L (07-15-22 @ 04:25)  Blood Gas Arterial, Lactate: 0.60 mmol/L (07-14-22 @ 04:40)    ABG - ( 16 Jul 2022 04:49 )  pH: 7.34  /  pCO2: 38    /  pO2: 75    / HCO3: 20    / Base Excess: -4.8  /  SaO2: 96.2            ABG - ( 15 Jul 2022 04:25 )  pH: 7.31  /  pCO2: 44    /  pO2: 78    / HCO3: 22    / Base Excess: -4.0  /  SaO2: 96.7            ABG - ( 14 Jul 2022 04:40 )  pH: 7.39  /  pCO2: 37    /  pO2: 71    / HCO3: 22    / Base Excess: -2.2  /  SaO2: 95.5              Coags:     13.20  ----< 1.15    ( 14 Jul 2022 19:30 )     28.3        CARDIAC MARKERS ( 14 Jul 2022 19:30 )  x     / x     / 24 U/L / x     / x                  Culture - Sputum (collected 15 Jul 2022 10:00)  Source: Trach Asp Tracheal Aspirate  Gram Stain (16 Jul 2022 04:22):    Moderate polymorphonuclear leukocytes per low power field    Rare Squamous epithelial cells per low power field    No organisms seen per oil power field

## 2022-07-16 NOTE — PROGRESS NOTE ADULT - SUBJECTIVE AND OBJECTIVE BOX
Patient is a 26y old  Female who presents with a chief complaint of anasarca (15 Jul 2022 15:48)        Over Night Events:    Remains intubated and sedated: Propofol, precedex, fentanyl   Tmax of 102.2F  Not on pressors   Fluid balance positive by 1500cc        ROS:  See HPI    PHYSICAL EXAM    ICU Vital Signs Last 24 Hrs  T(C): 38 (16 Jul 2022 06:30), Max: 39 (15 Jul 2022 11:25)  T(F): 100.4 (16 Jul 2022 06:30), Max: 102.2 (15 Jul 2022 11:25)  HR: 85 (16 Jul 2022 06:30) (72 - 115)  BP: 98/54 (16 Jul 2022 05:54) (93/54 - 141/94)  BP(mean): 70 (16 Jul 2022 05:54) (68 - 114)  ABP: --  ABP(mean): --  RR: 27 (16 Jul 2022 06:30) (15 - 33)  SpO2: 95% (16 Jul 2022 06:30) (90% - 100%)    O2 Parameters below as of 16 Jul 2022 06:00  Patient On (Oxygen Delivery Method): ventilator            General: Intubated, sedated   HEENT: MARIANNA             Lymphatic system: No cervical LN   Lungs: Bilateral BS, clear anteriorly   Cardiovascular: Regular   Gastrointestinal: Soft, Positive BS, non tender  Extremities: No clubbing.  Moves extremities.  Full Range of motion   Skin: Warm, intact  Neurological: No motor or sensory deficit       07-14-22 @ 07:01  -  07-15-22 @ 07:00  --------------------------------------------------------  IN:    Dexmedetomidine: 530 mL    Enteral Tube Flush: 220 mL    FentaNYL: 470.8 mL    Free Water: 500 mL    IV PiggyBack: 100 mL    IV PiggyBack: 100 mL    IV PiggyBack: 1000 mL    IV PiggyBack: 200 mL    Propofol: 506 mL    Vital High Protein: 800 mL  Total IN: 4426.8 mL    OUT:    Indwelling Catheter - Urethral (mL): 1730 mL    Norepinephrine: 0 mL  Total OUT: 1730 mL    Total NET: 2696.8 mL      07-15-22 @ 07:01  -  07-16-22 @ 06:50  --------------------------------------------------------  IN:    Dexmedetomidine: 636 mL    Enteral Tube Flush: 460 mL    FentaNYL: 147 mL    Free Water: 500 mL    IV PiggyBack: 100 mL    IV PiggyBack: 550 mL    IV PiggyBack: 200 mL    Propofol: 170 mL    Vital High Protein: 700 mL  Total IN: 3463 mL    OUT:    Indwelling Catheter - Urethral (mL): 1910 mL    Norepinephrine: 0 mL  Total OUT: 1910 mL    Total NET: 1553 mL          LABS:                            8.5    4.82  )-----------( 45       ( 15 Jul 2022 16:28 )             28.3                                               07-15    143  |  112<H>  |  15  ----------------------------<  81  3.2<L>   |  23  |  0.7    Ca    7.5<L>      15 Jul 2022 05:21  Phos  2.5     07-15  Mg     1.5     07-15    TPro  4.7<L>  /  Alb  2.4<L>  /  TBili  0.4  /  DBili  x   /  AST  12  /  ALT  13  /  AlkPhos  247<H>  07-15      PT/INR - ( 14 Jul 2022 19:30 )   PT: 13.20 sec;   INR: 1.15 ratio         PTT - ( 14 Jul 2022 19:30 )  PTT:28.3 sec                                           CARDIAC MARKERS ( 14 Jul 2022 19:30 )  x     / x     / 24 U/L / x     / x                                                LIVER FUNCTIONS - ( 15 Jul 2022 05:21 )  Alb: 2.4 g/dL / Pro: 4.7 g/dL / ALK PHOS: 247 U/L / ALT: 13 U/L / AST: 12 U/L / GGT: x                                                  Culture - Sputum (collected 15 Jul 2022 10:00)  Source: Trach Asp Tracheal Aspirate  Gram Stain (16 Jul 2022 04:22):    Moderate polymorphonuclear leukocytes per low power field    Rare Squamous epithelial cells per low power field    No organisms seen per oil power field                                                   Mode: AC/ CMV (Assist Control/ Continuous Mandatory Ventilation)  RR (machine): 25  TV (machine): 370  FiO2: 35  PEEP: 5  ITime: 0.8  MAP: 12  PIP: 21                                      ABG - ( 16 Jul 2022 04:49 )  pH, Arterial: 7.34  pH, Blood: x     /  pCO2: 38    /  pO2: 75    / HCO3: 20    / Base Excess: -4.8  /  SaO2: 96.2                MEDICATIONS  (STANDING):  ampicillin/sulbactam  IVPB 3 Gram(s) IV Intermittent every 6 hours  chlorhexidine 0.12% Liquid 15 milliLiter(s) Oral Mucosa every 12 hours  chlorhexidine 2% Cloths 1 Application(s) Topical daily  dexMEDEtomidine Infusion 0.2 MICROgram(s)/kG/Hr (3.52 mL/Hr) IV Continuous <Continuous>  fentaNYL   Infusion. 0.5 MICROgram(s)/kG/Hr (3.52 mL/Hr) IV Continuous <Continuous>  folic acid 1 milliGRAM(s) Oral daily  methadone    Tablet 20 milliGRAM(s) Oral daily  nicotine -  14 mG/24Hr(s) Patch 1 patch Transdermal daily  norepinephrine Infusion 0.05 MICROgram(s)/kG/Min (8.03 mL/Hr) IV Continuous <Continuous>  pantoprazole  Injectable 40 milliGRAM(s) IV Push daily  polymyxin B IVPB [id number] Unit(s) IV Intermittent every 12 hours  potassium chloride   Powder 40 milliEquivalent(s) Oral daily  propofol Infusion 0.1 MICROgram(s)/kG/Min (0.04 mL/Hr) IV Continuous <Continuous>  QUEtiapine 100 milliGRAM(s) Oral two times a day  scopolamine 1 mG/72 Hr(s) Patch 1 Patch Transdermal every 72 hours  spironolactone 100 milliGRAM(s) Oral daily  thiamine 100 milliGRAM(s) Oral daily  vancomycin    Solution 250 milliGRAM(s) Oral every 6 hours    MEDICATIONS  (PRN):  acetaminophen  Suppository .. 650 milliGRAM(s) Rectal every 6 hours PRN Temp greater or equal to 38C (100.4F), Mild Pain (1 - 3)  ALBUTerol    90 MICROgram(s) HFA Inhaler 1 Puff(s) Inhalation every 4 hours PRN Shortness of Breath and/or Wheezing  cloNIDine 0.1 milliGRAM(s) Oral every 6 hours PRN opiate withdrawal  fentaNYL    Injectable 50 MICROGram(s) IV Push every 30 minutes PRN Agitation  hydrOXYzine hydrochloride 50 milliGRAM(s) Oral every 6 hours PRN Anxiety  ibuprofen  Tablet. 600 milliGRAM(s) Oral every 6 hours PRN Temp greater or equal to 38C (100.4F)  ibuprofen  Tablet. 400 milliGRAM(s) Oral every 6 hours PRN Mild Pain (1 - 3)  sodium chloride 0.9% lock flush 10 milliLiter(s) IV Push every 1 hour PRN Pre/post blood products, medications, blood draw, and to maintain line patency      Xrays: Left basilar opacity                                                                                      ECHO

## 2022-07-16 NOTE — PROGRESS NOTE ADULT - ASSESSMENT
IMPRESSION:  Acute respiratory failure sp intubation  PNA  MSSA pna  seizure like activity  ?? drug over dose/ withdrawal opoid   liver cirrhosis   Ascites sp paracentesis   Pancytopenia- worsening  RENETTA improved  C diff +ve     PLAN:    CNS: Continue with Methadone for withdrawal taper off propofol and fentanyl if tolerated; continue with precedex; continue with seroquel and clonazepam; Check QTc daily    HEENT: oral care; ETT care    PULMONARY:  SBT daily; if extensive secretions then hold off intubation. CXR still shows a basilar opacity.     CARDIOVASCULAR: Keep MAP more than 65mmhg; not on pressors curretnly; keep equal balance;     GI: GI prophylaxis.  OG Feeding.     RENAL: Correct K ot mo re than 4 and Mg to more than 2. Decrease free water.     INFECTIOUS DISEASE: Currently on PO vancomycin, Unasyn, Polymixin. Monitor kidney function and electrolytes. ID following. DTA culture so far negative. Recheck procalcitonin.     HEMATOLOGICAL:   s/p BMB follow result and cx. Hematology following. Monitor platelet count.     ENDOCRINE:  Follow up FS.  Insulin protocol if needed.     Dispo: Remains critically and ill needs ICU care.   TLC 7/6 HARSH [place]on - failed TOV 6/24 Change.    IMPRESSION:  Acute respiratory failure sp intubation  PNA  MSSA pna  seizure like activity  ?? drug over dose/ withdrawal opoid   liver cirrhosis   Ascites sp paracentesis   Pancytopenia- worsening  RENETTA improved  C diff +ve     PLAN:    CNS: Continue with Methadone for withdrawal taper off propofol and fentanyl if tolerated; continue with precedex; continue with seroquel and clonazepam; Check QTc daily    HEENT: oral care; ETT care    PULMONARY:  SBT daily; if extensive secretions then hold off intubation. CXR still shows a basilar opacity.     CARDIOVASCULAR: Keep MAP more than 65mmhg; not on pressors curretnly; keep equal balance. On oral diuretics.     GI: GI prophylaxis.  OG Feeding.     RENAL: Correct K ot mo re than 4 and Mg to more than 2. Decrease free water.     INFECTIOUS DISEASE: Currently on PO vancomycin, Unasyn, Polymixin. Monitor kidney function and electrolytes. ID following. DTA culture so far negative. Recheck procalcitonin. Remains febrile.     HEMATOLOGICAL:   s/p BMB follow result and cx. Hematology following. Monitor platelet count. Keep hg more than 7. HIT panel for thrombocytopenia and peripheral smear.     ENDOCRINE:  Follow up FS.  Insulin protocol if needed.     Musk: bedrest for now.    Dispo: Remains critically and ill needs ICU care.     TLC 7/6 RIJ; ETT

## 2022-07-16 NOTE — CHART NOTE - NSCHARTNOTEFT_GEN_A_CORE
Registered Dietitian Follow-Up     Patient Profile Reviewed                           Yes [x]   No []    Nutrition History Previously Obtained        Yes [x]   No []      Pertinent Medical Interventions:    Per RN, pt tolerating TF regimen. Pt has diarrhea likely due to c-diff.    Pancytopenia due to unclear etiology; she has splenomegaly; in the setting of HEP C, this could be due to hypersplenism; also, need to r/o NHL and HLH  + cdiff colitis on . s/p extubation on  with reintubation warranted on /2 tachypnea with persistent temps     -- Plan for OR Friday 7/15 for tracheostomy - OR cancelled 7/15 due to fevers and tachycardia per MD note     Nutrition Interval History:   + OGT in place  EN reached goal rate 50mL on   Vital HP goal rate of 50 ml/hr providing pt with 1200 kcals (receiving + additional 668 from propofol), 103.5g protein and 1200 ml total volume, meeting >80% of estimated nutrient needs  + now receiving FWF 250mL Q6hr (+1000mL water additional). Tolerating feeds per RN, no GI s/s    Diet order:   Diet, NPO with Tube Feed:   Tube Feeding Modality: Orogastric  Vital High Protein  Total Volume for 24 Hours (mL): 1200  Continuous  Starting Tube Feed Rate {mL per Hour}: 10  Until Goal Tube Feed Rate (mL per Hour): 50  Tube Feed Duration (in Hours): 24  Tube Feed Start Time: 16:00 (07-15-22 @ 15:03) [Active]  Diet, NPO after Midnight:      NPO Start Date: 2022,   NPO Start Time: 23:59 (22 @ 13:57) [Active]  Diet, NPO with Tube Feed:   Tube Feeding Modality: Orogastric  Vital High Protein  Total Volume for 24 Hours (mL): 1080  Continuous  Starting Tube Feed Rate {mL per Hour}: 20  Until Goal Tube Feed Rate (mL per Hour): 45  Tube Feed Duration (in Hours): 24  Tube Feed Start Time: 13:00  No Carb Prosource (1pkg = 15gms Protein)     Qty per Day:  1 (22 @ 13:34) [Pending Verification By Attending]    Anthropometrics:  - Ht. 167.6 cm   - Wt: 70.4 KG  - bmi: 25.1kg/m2  admit weight: 79.3 KG     OTHER WEIGHTS:   Daily Weight in k.3 (-15), Weight in k.2 (), Weight in k.2 (), Weight in k.8 (), Weight in k.2 (), Weight in k.8 (), Weight in k.8 (), Weight in k.2 (07-10)  % Weight Change  -- fluctuations due to fluid shifts + true weight loss, 16.5% weight loss within 1 month since admission (clinically significant)    MEDICATIONS  (STANDING):  cefiderocol IVPB 2000 milliGRAM(s) IV Intermittent every 6 hours  chlorhexidine 0.12% Liquid 15 milliLiter(s) Oral Mucosa every 12 hours  chlorhexidine 2% Cloths 1 Application(s) Topical daily  dexMEDEtomidine Infusion 0.2 MICROgram(s)/kG/Hr (3.52 mL/Hr) IV Continuous <Continuous>  fentaNYL   Infusion. 0.5 MICROgram(s)/kG/Hr (3.52 mL/Hr) IV Continuous <Continuous>  folic acid 1 milliGRAM(s) Oral daily  heparin   Injectable 5000 Unit(s) SubCutaneous every 12 hours  nicotine -  14 mG/24Hr(s) Patch 1 patch Transdermal daily  norepinephrine Infusion 0.05 MICROgram(s)/kG/Min (8.03 mL/Hr) IV Continuous <Continuous>  pantoprazole  Injectable 40 milliGRAM(s) IV Push daily  potassium chloride   Powder 40 milliEquivalent(s) Oral daily  propofol Infusion 0.1 MICROgram(s)/kG/Min (0.04 mL/Hr) IV Continuous <Continuous>  QUEtiapine 100 milliGRAM(s) Oral two times a day  scopolamine 1 mG/72 Hr(s) Patch 1 Patch Transdermal every 72 hours  spironolactone 100 milliGRAM(s) Oral daily  thiamine 100 milliGRAM(s) Oral daily  vancomycin    Solution 250 milliGRAM(s) Oral every 6 hours    MEDICATIONS  (PRN):  acetaminophen  Suppository .. 650 milliGRAM(s) Rectal every 6 hours PRN Temp greater or equal to 38C (100.4F), Mild Pain (1 - 3)  ALBUTerol    90 MICROgram(s) HFA Inhaler 1 Puff(s) Inhalation every 4 hours PRN Shortness of Breath and/or Wheezing  cloNIDine 0.1 milliGRAM(s) Oral every 6 hours PRN opiate withdrawal  fentaNYL    Injectable 50 MICROGram(s) IV Push every 30 minutes PRN Agitation  hydrOXYzine hydrochloride 50 milliGRAM(s) Oral every 6 hours PRN Anxiety  ibuprofen  Tablet. 600 milliGRAM(s) Oral every 6 hours PRN Temp greater or equal to 38C (100.4F)  ibuprofen  Tablet. 400 milliGRAM(s) Oral every 6 hours PRN Mild Pain (1 - 3)  sodium chloride 0.9% lock flush 10 milliLiter(s) IV Push every 1 hour PRN Pre/post blood products, medications, blood draw, and to maintain line patency    Pertinent Labs   @ 06:47 - WBC 1.93; RBC 2.64; H/H 7.1/23.8; K 3.0; ; Ca 7.8; Alk Phos 252    Physical Findings:  - Appearance: intubated on ventilator. NFPE : Moderate temple depletion  - GI function: last BM on  - 2X  - Tubes: + OGT  - Oral/Mouth cavity: NPO   - Skin: back of head laceration; suspected DTI to sacral spine per flowsheet; ?? DTPI to coccyx per wound care RN   - Edema: dependent; generalized 3+ 7/15     Nutrition Requirements: w/ consideration for critical care, wound healing, malnutrition  Weight Used: 66.2 KG lowest weight taken this admit - per previous RD     Estimated Nutrition Needs:  ENERGY (adjusted): 2578-2984 kcal/day (25-30 kcal/kg/BW) vs 1732.18 kcals [facility] 2003B (VE: 9.3, TMAX 37.9)  PROTEIN: 80-96 g/day (1.2-1.4g/kg/BW)  FLUID: 1mL/1kcal      [x] Previous Nutrition Diagnosis: Malnutrition ({moderate); Inadequate energy intake              [x] Ongoing          [] Resolved    Nutrition Intervention:   CONTINUE: EN via OGT, Vital HP goal rate of 50 ml/hr will provide pt with   (1200 kcals + additional 668 from propofol, 103.5g protein and 1200 ml total volume) meeting >80% of estimated nutrient needs  + free water per LIP (+250mL Q6hr)    Goal/Expected Outcome: pt to tolerate EN and meet >85% - <105% of estimated nutrition needs     Indicator/Monitoring: RD to monitor tolerance to EN, labs/meds (propofol infusion rate*), NFPF, BMs and f/u as needed within 2-4 days.

## 2022-07-16 NOTE — PROGRESS NOTE ADULT - SUBJECTIVE AND OBJECTIVE BOX
Patient seen and evaluated this am, comfortable on ventilator. Patient awake a alert; following commands, she is able to answer by nodding her head. She denies any new complaints. Denies F/C, chest pain, abd pain.    ROS: limited due to being on the ventilator    Vital Signs Last 24 Hrs  T(C): 37.9 (16 Jul 2022 11:00), Max: 38.8 (15 Jul 2022 23:00)  T(F): 100.2 (16 Jul 2022 11:00), Max: 101.9 (15 Jul 2022 23:00)  HR: 96 (16 Jul 2022 08:30) (72 - 108)  BP: 96/53 (16 Jul 2022 06:00) (96/53 - 139/93)  BP(mean): 69 (16 Jul 2022 06:00) (69 - 111)  RR: 22 (16 Jul 2022 11:00) (22 - 32)  SpO2: 98% (16 Jul 2022 08:30) (93% - 100%)    Parameters below as of 16 Jul 2022 06:00  Patient On (Oxygen Delivery Method): ventilator    O2 Concentration (%): 35  PHYSICAL EXAM:  GENERAL: NAD, ventilated, awake and alert  HEAD:  Atraumatic, Normocephalic  EYES: EOMI, PERRLA, conjunctiva and sclera clear  NERVOUS SYSTEM:   awake, alert, slightly limited due to being on the ventilator   CHEST/LUNG:  bilateral rhonchi, +mechanical ventilator   HEART: Regular rate and rhythm; No murmurs, rubs, or gallops  ABDOMEN: Soft, Nontender, Nondistended; Bowel sounds present  EXTREMITIES:  2+ Peripheral Pulses, No clubbing, cyanosis, or edema    LABS                        7.1    1.93  )-----------( 40       ( 16 Jul 2022 06:47 )             23.8   07-16    139  |  107  |  20  ----------------------------<  156<H>  3.0<L>   |  20  |  0.9    Ca    7.8<L>      16 Jul 2022 06:47  Phos  3.3     07-16  Mg     1.8     07-16    TPro  5.0<L>  /  Alb  2.4<L>  /  TBili  0.3  /  DBili  x   /  AST  15  /  ALT  12  /  AlkPhos  252<H>  07-16      Mode: AC/ CMV (Assist Control/ Continuous Mandatory Ventilation)  RR (machine): 25  TV (machine): 370  FiO2: 35  PEEP: 5    CARDIAC ENZYMES  Creatine Kinase, Serum: 24 (07-14 @ 19:30)      Culture Results:   Babesia microti PCR  Results: NOT detected  ***************Result Note*************  The detection of Babesia microti by PCR has only been  validated for whole blood; this test has not been approved  by the [place] Food and Drug Administration (FDA). Performance  characteristics of this assay have been determined by  [facility]. The clinical significance  of results should be considered in conjunction with the  overall clinical presentation of the patient. Result is not  intended to be used as the sole means for clinical diagnosis  or patient management decisions.  One negative sample does not necessarily rule  out the presence of a parasitic infection. (07-10-22)  Culture Results:   No Growth Final (07-08-22)  Culture Results:   No Growth Final (07-06-22)  Culture Results:   No Growth Final (07-06-22)  Culture Results:   Moderate Acinetobacter baumannii/nosocom group (Carbapenem Resistant)  Cefiderocol interpretations based on FDA breakpoints.  Normal Respiratory Kelly absent (07-04-22)  Culture Results:   No Growth Final (06-29-22)  Culture Results:   No Growth Final (06-29-22)  Culture Results:   No Growth Final (06-28-22)  Culture Results:   Numerous Mixed gram negative rods  Moderate Staphylococcus aureus  Normal Respiratory Kelly present (06-27-22)  Culture Results:   Moderate Klebsiella oxytoca/Raoutella ornithinolytica  Moderate Enterobacter cloacae complex  Normal Respiratory Kelly absent (06-24-22)    RADIOLOGY  < from: Xray Chest 1 View- PORTABLE-Routine (Xray Chest 1 View- PORTABLE-Routine in AM.) (07.15.22 @ 05:51) >    Impression:  Slight increase in size of left basilar opacity compared to 7/14/2022.      < end of copied text >    MEDICATIONS  (STANDING):  ampicillin/sulbactam  IVPB 3 Gram(s) IV Intermittent every 6 hours  chlorhexidine 0.12% Liquid 15 milliLiter(s) Oral Mucosa every 12 hours  chlorhexidine 2% Cloths 1 Application(s) Topical daily  dexMEDEtomidine Infusion 0.2 MICROgram(s)/kG/Hr (3.52 mL/Hr) IV Continuous <Continuous>  fentaNYL   Infusion. 0.5 MICROgram(s)/kG/Hr (3.52 mL/Hr) IV Continuous <Continuous>  folic acid 1 milliGRAM(s) Oral daily  methadone    Tablet 20 milliGRAM(s) Oral daily  nicotine -  14 mG/24Hr(s) Patch 1 patch Transdermal daily  norepinephrine Infusion 0.05 MICROgram(s)/kG/Min (8.03 mL/Hr) IV Continuous <Continuous>  pantoprazole  Injectable 40 milliGRAM(s) IV Push daily  polymyxin B IVPB 133489 Unit(s) IV Intermittent every 12 hours  potassium chloride   Powder 40 milliEquivalent(s) Oral daily  propofol Infusion 0.1 MICROgram(s)/kG/Min (0.04 mL/Hr) IV Continuous <Continuous>  QUEtiapine 100 milliGRAM(s) Oral two times a day  scopolamine 1 mG/72 Hr(s) Patch 1 Patch Transdermal every 72 hours  spironolactone 100 milliGRAM(s) Oral daily  thiamine 100 milliGRAM(s) Oral daily  vancomycin    Solution 250 milliGRAM(s) Oral every 6 hours    MEDICATIONS  (PRN):  acetaminophen  Suppository .. 650 milliGRAM(s) Rectal every 6 hours PRN Temp greater or equal to 38C (100.4F), Mild Pain (1 - 3)  ALBUTerol    90 MICROgram(s) HFA Inhaler 1 Puff(s) Inhalation every 4 hours PRN Shortness of Breath and/or Wheezing  cloNIDine 0.1 milliGRAM(s) Oral every 6 hours PRN opiate withdrawal  fentaNYL    Injectable 50 MICROGram(s) IV Push every 30 minutes PRN Agitation  hydrOXYzine hydrochloride 50 milliGRAM(s) Oral every 6 hours PRN Anxiety  ibuprofen  Tablet. 600 milliGRAM(s) Oral every 6 hours PRN Temp greater or equal to 38C (100.4F)  ibuprofen  Tablet. 400 milliGRAM(s) Oral every 6 hours PRN Mild Pain (1 - 3)  sodium chloride 0.9% lock flush 10 milliLiter(s) IV Push every 1 hour PRN Pre/post blood products, medications, blood draw, and to maintain line patency

## 2022-07-16 NOTE — PROGRESS NOTE ADULT - ASSESSMENT
26 year old female with hx of asthma, untreated Hep C, IVDA, anasarca presented with increased dyspnea. On floor pt was diuresed with improvement of symptoms  when the following events took place; RRT was called for pt in the lobby; patient was found on the floor in ER, gasping for air, short of breath, tachypnea, bleeding profusely from her posterior scalp, was placed in a stretcher, was hypoxic 70s; emergently intubated, ct scan head ordered re scalp bleeding; several syringes, drug paraphanellia found on patient clothes; pt  intubated and  transferred to ICU.    #Acute respiratory failure sp intubation/  aspiration pneumonia with sepsis   #suspected drug overdose / withdrawal opoid   #MSSA pna  #seizure like activity  #liver cirrhosis, Ascites sp paracentesis   #Pancytopenia- worsening  #RENETTA improved  #C diff volitis   - on mechanical ventilator; patient is an ICU patient and being managed by intensivist and ICU team  - SBT daily  - General surgery on board for tracheostomy  - Plan for OR early next week; pre-op orders: NPO after midnight, IVF at midnight, T+S, COVID, Coags, CBC, BMP, Mag, Phos  - c-diff colitis - continue oral vanco  - c/w  PO vancomycin, Unasyn, Polymixin  - supplement hypokalemia and  hypomagnesemia   - re intubated -> still awaiting  trach - surgery following   - continue with Methadone for withdrawal taper off propofol and fentanyl if tolerated; continue with precedex; continue with seroquel and clonazepam;     Dispo: remains in MICU

## 2022-07-17 LAB
ALBUMIN SERPL ELPH-MCNC: 2.6 G/DL — LOW (ref 3.5–5.2)
ALP SERPL-CCNC: 264 U/L — HIGH (ref 30–115)
ALT FLD-CCNC: 13 U/L — SIGNIFICANT CHANGE UP (ref 0–41)
ANION GAP SERPL CALC-SCNC: 10 MMOL/L — SIGNIFICANT CHANGE UP (ref 7–14)
ANION GAP SERPL CALC-SCNC: 14 MMOL/L — SIGNIFICANT CHANGE UP (ref 7–14)
APTT BLD: 28 SEC — SIGNIFICANT CHANGE UP (ref 27–39.2)
AST SERPL-CCNC: 17 U/L — SIGNIFICANT CHANGE UP (ref 0–41)
BASOPHILS # BLD AUTO: 0 K/UL — SIGNIFICANT CHANGE UP (ref 0–0.2)
BASOPHILS # BLD AUTO: 0.01 K/UL — SIGNIFICANT CHANGE UP (ref 0–0.2)
BASOPHILS NFR BLD AUTO: 0 % — SIGNIFICANT CHANGE UP (ref 0–1)
BASOPHILS NFR BLD AUTO: 0.3 % — SIGNIFICANT CHANGE UP (ref 0–1)
BILIRUB SERPL-MCNC: 0.4 MG/DL — SIGNIFICANT CHANGE UP (ref 0.2–1.2)
BLD GP AB SCN SERPL QL: SIGNIFICANT CHANGE UP
BUN SERPL-MCNC: 22 MG/DL — HIGH (ref 10–20)
BUN SERPL-MCNC: 23 MG/DL — HIGH (ref 10–20)
CALCIUM SERPL-MCNC: 7.9 MG/DL — LOW (ref 8.5–10.1)
CALCIUM SERPL-MCNC: 8.4 MG/DL — LOW (ref 8.5–10.1)
CHLORIDE SERPL-SCNC: 104 MMOL/L — SIGNIFICANT CHANGE UP (ref 98–110)
CHLORIDE SERPL-SCNC: 107 MMOL/L — SIGNIFICANT CHANGE UP (ref 98–110)
CO2 SERPL-SCNC: 18 MMOL/L — SIGNIFICANT CHANGE UP (ref 17–32)
CO2 SERPL-SCNC: 21 MMOL/L — SIGNIFICANT CHANGE UP (ref 17–32)
CREAT SERPL-MCNC: 1 MG/DL — SIGNIFICANT CHANGE UP (ref 0.7–1.5)
CREAT SERPL-MCNC: 1 MG/DL — SIGNIFICANT CHANGE UP (ref 0.7–1.5)
CULTURE RESULTS: SIGNIFICANT CHANGE UP
EGFR: 79 ML/MIN/1.73M2 — SIGNIFICANT CHANGE UP
EGFR: 79 ML/MIN/1.73M2 — SIGNIFICANT CHANGE UP
EOSINOPHIL # BLD AUTO: 0 K/UL — SIGNIFICANT CHANGE UP (ref 0–0.7)
EOSINOPHIL # BLD AUTO: 0 K/UL — SIGNIFICANT CHANGE UP (ref 0–0.7)
EOSINOPHIL NFR BLD AUTO: 0 % — SIGNIFICANT CHANGE UP (ref 0–8)
EOSINOPHIL NFR BLD AUTO: 0 % — SIGNIFICANT CHANGE UP (ref 0–8)
GLUCOSE SERPL-MCNC: 158 MG/DL — HIGH (ref 70–99)
GLUCOSE SERPL-MCNC: 99 MG/DL — SIGNIFICANT CHANGE UP (ref 70–99)
HCT VFR BLD CALC: 23.4 % — LOW (ref 37–47)
HCT VFR BLD CALC: 29 % — LOW (ref 37–47)
HEPARIN-PF4 AB RESULT: <0.6 U/ML — SIGNIFICANT CHANGE UP (ref 0–0.9)
HGB BLD-MCNC: 7.1 G/DL — LOW (ref 12–16)
HGB BLD-MCNC: 8.7 G/DL — LOW (ref 12–16)
IMM GRANULOCYTES NFR BLD AUTO: 0 % — LOW (ref 0.1–0.3)
IMM GRANULOCYTES NFR BLD AUTO: 0.7 % — HIGH (ref 0.1–0.3)
INR BLD: 1.04 RATIO — SIGNIFICANT CHANGE UP (ref 0.65–1.3)
LYMPHOCYTES # BLD AUTO: 0.29 K/UL — LOW (ref 1.2–3.4)
LYMPHOCYTES # BLD AUTO: 0.46 K/UL — LOW (ref 1.2–3.4)
LYMPHOCYTES # BLD AUTO: 15.3 % — LOW (ref 20.5–51.1)
LYMPHOCYTES # BLD AUTO: 34.1 % — SIGNIFICANT CHANGE UP (ref 20.5–51.1)
MAGNESIUM SERPL-MCNC: 1.5 MG/DL — LOW (ref 1.8–2.4)
MAGNESIUM SERPL-MCNC: 2.2 MG/DL — SIGNIFICANT CHANGE UP (ref 1.8–2.4)
MCHC RBC-ENTMCNC: 26.3 PG — LOW (ref 27–31)
MCHC RBC-ENTMCNC: 26.6 PG — LOW (ref 27–31)
MCHC RBC-ENTMCNC: 30 G/DL — LOW (ref 32–37)
MCHC RBC-ENTMCNC: 30.3 G/DL — LOW (ref 32–37)
MCV RBC AUTO: 86.7 FL — SIGNIFICANT CHANGE UP (ref 81–99)
MCV RBC AUTO: 88.7 FL — SIGNIFICANT CHANGE UP (ref 81–99)
MONOCYTES # BLD AUTO: 0.07 K/UL — LOW (ref 0.1–0.6)
MONOCYTES # BLD AUTO: 0.12 K/UL — SIGNIFICANT CHANGE UP (ref 0.1–0.6)
MONOCYTES NFR BLD AUTO: 4 % — SIGNIFICANT CHANGE UP (ref 1.7–9.3)
MONOCYTES NFR BLD AUTO: 8.2 % — SIGNIFICANT CHANGE UP (ref 1.7–9.3)
NEUTROPHILS # BLD AUTO: 0.49 K/UL — LOW (ref 1.4–6.5)
NEUTROPHILS # BLD AUTO: 2.39 K/UL — SIGNIFICANT CHANGE UP (ref 1.4–6.5)
NEUTROPHILS NFR BLD AUTO: 57.7 % — SIGNIFICANT CHANGE UP (ref 42.2–75.2)
NEUTROPHILS NFR BLD AUTO: 79.7 % — HIGH (ref 42.2–75.2)
NRBC # BLD: 0 /100 WBCS — SIGNIFICANT CHANGE UP (ref 0–0)
NRBC # BLD: 0 /100 WBCS — SIGNIFICANT CHANGE UP (ref 0–0)
PF4 HEPARIN CMPLX AB SER-ACNC: NEGATIVE — SIGNIFICANT CHANGE UP
PHOSPHATE SERPL-MCNC: 3.8 MG/DL — SIGNIFICANT CHANGE UP (ref 2.1–4.9)
PHOSPHATE SERPL-MCNC: 4 MG/DL — SIGNIFICANT CHANGE UP (ref 2.1–4.9)
PLATELET # BLD AUTO: 42 K/UL — LOW (ref 130–400)
PLATELET # BLD AUTO: 69 K/UL — LOW (ref 130–400)
POTASSIUM SERPL-MCNC: 3 MMOL/L — LOW (ref 3.5–5)
POTASSIUM SERPL-MCNC: 4.1 MMOL/L — SIGNIFICANT CHANGE UP (ref 3.5–5)
POTASSIUM SERPL-SCNC: 3 MMOL/L — LOW (ref 3.5–5)
POTASSIUM SERPL-SCNC: 4.1 MMOL/L — SIGNIFICANT CHANGE UP (ref 3.5–5)
PROT SERPL-MCNC: 5.3 G/DL — LOW (ref 6–8)
PROTHROM AB SERPL-ACNC: 11.9 SEC — SIGNIFICANT CHANGE UP (ref 9.95–12.87)
RBC # BLD: 2.7 M/UL — LOW (ref 4.2–5.4)
RBC # BLD: 3.27 M/UL — LOW (ref 4.2–5.4)
RBC # FLD: 15.8 % — HIGH (ref 11.5–14.5)
RBC # FLD: 15.9 % — HIGH (ref 11.5–14.5)
SODIUM SERPL-SCNC: 136 MMOL/L — SIGNIFICANT CHANGE UP (ref 135–146)
SODIUM SERPL-SCNC: 138 MMOL/L — SIGNIFICANT CHANGE UP (ref 135–146)
SPECIMEN SOURCE: SIGNIFICANT CHANGE UP
WBC # BLD: 0.82 K/UL — CRITICAL LOW (ref 4.8–10.8)
WBC # BLD: 3 K/UL — LOW (ref 4.8–10.8)
WBC # FLD AUTO: 0.82 K/UL — CRITICAL LOW (ref 4.8–10.8)
WBC # FLD AUTO: 3 K/UL — LOW (ref 4.8–10.8)

## 2022-07-17 PROCEDURE — 99233 SBSQ HOSP IP/OBS HIGH 50: CPT

## 2022-07-17 PROCEDURE — 71045 X-RAY EXAM CHEST 1 VIEW: CPT | Mod: 26

## 2022-07-17 PROCEDURE — 99291 CRITICAL CARE FIRST HOUR: CPT

## 2022-07-17 RX ORDER — SODIUM CHLORIDE 9 MG/ML
1000 INJECTION, SOLUTION INTRAVENOUS
Refills: 0 | Status: DISCONTINUED | OUTPATIENT
Start: 2022-07-17 | End: 2022-07-18

## 2022-07-17 RX ORDER — POTASSIUM CHLORIDE 20 MEQ
20 PACKET (EA) ORAL ONCE
Refills: 0 | Status: COMPLETED | OUTPATIENT
Start: 2022-07-17 | End: 2022-07-17

## 2022-07-17 RX ORDER — MAGNESIUM SULFATE 500 MG/ML
1 VIAL (ML) INJECTION EVERY 8 HOURS
Refills: 0 | Status: DISCONTINUED | OUTPATIENT
Start: 2022-07-17 | End: 2022-07-20

## 2022-07-17 RX ORDER — ONDANSETRON 8 MG/1
4 TABLET, FILM COATED ORAL ONCE
Refills: 0 | Status: COMPLETED | OUTPATIENT
Start: 2022-07-17 | End: 2022-07-17

## 2022-07-17 RX ORDER — SODIUM CHLORIDE 9 MG/ML
500 INJECTION, SOLUTION INTRAVENOUS ONCE
Refills: 0 | Status: DISCONTINUED | OUTPATIENT
Start: 2022-07-17 | End: 2022-07-17

## 2022-07-17 RX ORDER — PROPOFOL 10 MG/ML
0.1 INJECTION, EMULSION INTRAVENOUS
Qty: 1000 | Refills: 0 | Status: DISCONTINUED | OUTPATIENT
Start: 2022-07-17 | End: 2022-07-20

## 2022-07-17 RX ORDER — MAGNESIUM SULFATE 500 MG/ML
2 VIAL (ML) INJECTION ONCE
Refills: 0 | Status: COMPLETED | OUTPATIENT
Start: 2022-07-17 | End: 2022-07-17

## 2022-07-17 RX ORDER — DEXMEDETOMIDINE HYDROCHLORIDE IN 0.9% SODIUM CHLORIDE 4 UG/ML
0.1 INJECTION INTRAVENOUS
Qty: 400 | Refills: 0 | Status: DISCONTINUED | OUTPATIENT
Start: 2022-07-17 | End: 2022-07-20

## 2022-07-17 RX ORDER — POTASSIUM CHLORIDE 20 MEQ
40 PACKET (EA) ORAL
Refills: 0 | Status: COMPLETED | OUTPATIENT
Start: 2022-07-17 | End: 2022-07-20

## 2022-07-17 RX ADMIN — AMPICILLIN SODIUM AND SULBACTAM SODIUM 200 GRAM(S): 250; 125 INJECTION, POWDER, FOR SUSPENSION INTRAMUSCULAR; INTRAVENOUS at 23:10

## 2022-07-17 RX ADMIN — Medication 1 MILLIGRAM(S): at 11:53

## 2022-07-17 RX ADMIN — POLYMYXIN B SULFATE 500 UNIT(S): 500000 INJECTION, POWDER, LYOPHILIZED, FOR SOLUTION INTRAMUSCULAR; INTRATHECAL; INTRAVENOUS; OPHTHALMIC at 17:26

## 2022-07-17 RX ADMIN — CHLORHEXIDINE GLUCONATE 15 MILLILITER(S): 213 SOLUTION TOPICAL at 17:24

## 2022-07-17 RX ADMIN — Medication 250 MILLIGRAM(S): at 17:23

## 2022-07-17 RX ADMIN — POLYMYXIN B SULFATE 500 UNIT(S): 500000 INJECTION, POWDER, LYOPHILIZED, FOR SOLUTION INTRAMUSCULAR; INTRATHECAL; INTRAVENOUS; OPHTHALMIC at 05:09

## 2022-07-17 RX ADMIN — Medication 1 PATCH: at 11:53

## 2022-07-17 RX ADMIN — AMPICILLIN SODIUM AND SULBACTAM SODIUM 200 GRAM(S): 250; 125 INJECTION, POWDER, FOR SUSPENSION INTRAMUSCULAR; INTRAVENOUS at 17:25

## 2022-07-17 RX ADMIN — Medication 40 MILLIEQUIVALENT(S): at 11:55

## 2022-07-17 RX ADMIN — SPIRONOLACTONE 100 MILLIGRAM(S): 25 TABLET, FILM COATED ORAL at 05:11

## 2022-07-17 RX ADMIN — Medication 250 MILLIGRAM(S): at 23:10

## 2022-07-17 RX ADMIN — SODIUM CHLORIDE 100 MILLILITER(S): 9 INJECTION, SOLUTION INTRAVENOUS at 19:53

## 2022-07-17 RX ADMIN — CHLORHEXIDINE GLUCONATE 15 MILLILITER(S): 213 SOLUTION TOPICAL at 05:11

## 2022-07-17 RX ADMIN — AMPICILLIN SODIUM AND SULBACTAM SODIUM 200 GRAM(S): 250; 125 INJECTION, POWDER, FOR SUSPENSION INTRAMUSCULAR; INTRAVENOUS at 11:57

## 2022-07-17 RX ADMIN — Medication 40 MILLIEQUIVALENT(S): at 17:24

## 2022-07-17 RX ADMIN — CHLORHEXIDINE GLUCONATE 1 APPLICATION(S): 213 SOLUTION TOPICAL at 21:46

## 2022-07-17 RX ADMIN — QUETIAPINE FUMARATE 100 MILLIGRAM(S): 200 TABLET, FILM COATED ORAL at 05:11

## 2022-07-17 RX ADMIN — Medication 1 PATCH: at 12:00

## 2022-07-17 RX ADMIN — Medication 50 MILLIEQUIVALENT(S): at 11:52

## 2022-07-17 RX ADMIN — Medication 100 MILLIGRAM(S): at 11:54

## 2022-07-17 RX ADMIN — ONDANSETRON 4 MILLIGRAM(S): 8 TABLET, FILM COATED ORAL at 12:05

## 2022-07-17 RX ADMIN — Medication 100 GRAM(S): at 14:21

## 2022-07-17 RX ADMIN — DEXMEDETOMIDINE HYDROCHLORIDE IN 0.9% SODIUM CHLORIDE 1.76 MICROGRAM(S)/KG/HR: 4 INJECTION INTRAVENOUS at 19:52

## 2022-07-17 RX ADMIN — Medication 250 MILLIGRAM(S): at 11:53

## 2022-07-17 RX ADMIN — Medication 100 GRAM(S): at 21:45

## 2022-07-17 RX ADMIN — AMPICILLIN SODIUM AND SULBACTAM SODIUM 200 GRAM(S): 250; 125 INJECTION, POWDER, FOR SUSPENSION INTRAMUSCULAR; INTRAVENOUS at 05:10

## 2022-07-17 RX ADMIN — PROPOFOL 0.04 MICROGRAM(S)/KG/MIN: 10 INJECTION, EMULSION INTRAVENOUS at 19:51

## 2022-07-17 RX ADMIN — SCOPALAMINE 1 PATCH: 1 PATCH, EXTENDED RELEASE TRANSDERMAL at 07:19

## 2022-07-17 RX ADMIN — METHADONE HYDROCHLORIDE 20 MILLIGRAM(S): 40 TABLET ORAL at 11:56

## 2022-07-17 RX ADMIN — Medication 12.5 GRAM(S): at 11:52

## 2022-07-17 RX ADMIN — Medication 1 PATCH: at 19:56

## 2022-07-17 RX ADMIN — SCOPALAMINE 1 PATCH: 1 PATCH, EXTENDED RELEASE TRANSDERMAL at 19:57

## 2022-07-17 RX ADMIN — Medication 250 MILLIGRAM(S): at 05:11

## 2022-07-17 RX ADMIN — QUETIAPINE FUMARATE 100 MILLIGRAM(S): 200 TABLET, FILM COATED ORAL at 17:25

## 2022-07-17 RX ADMIN — Medication 1 PATCH: at 07:18

## 2022-07-17 RX ADMIN — PANTOPRAZOLE SODIUM 40 MILLIGRAM(S): 20 TABLET, DELAYED RELEASE ORAL at 11:54

## 2022-07-17 NOTE — PROGRESS NOTE ADULT - SUBJECTIVE AND OBJECTIVE BOX
Patient is a 26y old  Female who presents with a chief complaint of anasarca (16 Jul 2022 11:25)        Over Night Events:        ROS:  See HPI    PHYSICAL EXAM    ICU Vital Signs Last 24 Hrs  T(C): 37.1 (17 Jul 2022 03:33), Max: 38.2 (16 Jul 2022 08:14)  T(F): 98.8 (17 Jul 2022 03:33), Max: 100.8 (16 Jul 2022 08:14)  HR: 78 (17 Jul 2022 03:33) (66 - 98)  BP: 100/66 (17 Jul 2022 03:33) (89/54 - 140/94)  BP(mean): 79 (17 Jul 2022 03:33) (66 - 112)  ABP: --  ABP(mean): --  RR: 25 (17 Jul 2022 03:33) (20 - 29)  SpO2: 95% (17 Jul 2022 03:33) (95% - 100%)    O2 Parameters below as of 17 Jul 2022 03:33  Patient On (Oxygen Delivery Method): ventilator            General: awake, no distress  HEENT: MARIANNA             Lymphatic system: No cervical LN   Lungs: Bilateral BS, clear   Cardiovascular: Regular   Gastrointestinal: Soft, Positive BS  Extremities: No clubbing.  Moves extremities.  Full Range of motion   Skin: Warm, intact  Neurological: No motor or sensory deficit       07-15-22 @ 07:01  -  07-16-22 @ 07:00  --------------------------------------------------------  IN:    Dexmedetomidine: 636 mL    Enteral Tube Flush: 460 mL    FentaNYL: 147 mL    Free Water: 500 mL    IV PiggyBack: 100 mL    IV PiggyBack: 550 mL    IV PiggyBack: 200 mL    Propofol: 170 mL    Vital High Protein: 700 mL  Total IN: 3463 mL    OUT:    Indwelling Catheter - Urethral (mL): 1910 mL    Norepinephrine: 0 mL  Total OUT: 1910 mL    Total NET: 1553 mL      07-16-22 @ 07:01  -  07-17-22 @ 06:24  --------------------------------------------------------  IN:    Dexmedetomidine: 535.5 mL    FentaNYL: 259 mL    Free Water: 600 mL    IV PiggyBack: 500 mL    IV PiggyBack: 300 mL    Propofol: 122 mL    Vital High Protein: 450 mL  Total IN: 2766.5 mL    OUT:    Indwelling Catheter - Urethral (mL): 1070 mL  Total OUT: 1070 mL    Total NET: 1696.5 mL          LABS:                            8.2    3.29  )-----------( 55       ( 16 Jul 2022 16:44 )             27.7                                               07-16    139  |  107  |  20  ----------------------------<  156<H>  3.0<L>   |  20  |  0.9    Ca    7.8<L>      16 Jul 2022 06:47  Phos  3.3     07-16  Mg     1.8     07-16    TPro  5.0<L>  /  Alb  2.4<L>  /  TBili  0.3  /  DBili  x   /  AST  15  /  ALT  12  /  AlkPhos  252<H>  07-16                                                                                           LIVER FUNCTIONS - ( 16 Jul 2022 06:47 )  Alb: 2.4 g/dL / Pro: 5.0 g/dL / ALK PHOS: 252 U/L / ALT: 12 U/L / AST: 15 U/L / GGT: x                                                  Culture - Sputum (collected 15 Jul 2022 10:00)  Source: Trach Asp Tracheal Aspirate  Gram Stain (16 Jul 2022 04:22):    Moderate polymorphonuclear leukocytes per low power field    Rare Squamous epithelial cells per low power field    No organisms seen per oil power field  Preliminary Report (16 Jul 2022 17:42):    Normal Respiratory Kelly present                                                   Mode: AC/ CMV (Assist Control/ Continuous Mandatory Ventilation)  RR (machine): 25  TV (machine): 370  FiO2: 35  PEEP: 5  ITime: 0.8  MAP: 8  PIP: 22                                      ABG - ( 17 Jul 2022 04:02 )  pH, Arterial: 7.31  pH, Blood: x     /  pCO2: 35    /  pO2: 119   / HCO3: 18    / Base Excess: -7.9  /  SaO2: 96.6                MEDICATIONS  (STANDING):  ampicillin/sulbactam  IVPB 3 Gram(s) IV Intermittent every 6 hours  chlorhexidine 0.12% Liquid 15 milliLiter(s) Oral Mucosa every 12 hours  chlorhexidine 2% Cloths 1 Application(s) Topical daily  dexMEDEtomidine Infusion 0.2 MICROgram(s)/kG/Hr (3.52 mL/Hr) IV Continuous <Continuous>  fentaNYL   Infusion. 0.5 MICROgram(s)/kG/Hr (3.52 mL/Hr) IV Continuous <Continuous>  folic acid 1 milliGRAM(s) Oral daily  methadone    Tablet 20 milliGRAM(s) Oral daily  nicotine -  14 mG/24Hr(s) Patch 1 patch Transdermal daily  norepinephrine Infusion 0.05 MICROgram(s)/kG/Min (8.03 mL/Hr) IV Continuous <Continuous>  pantoprazole  Injectable 40 milliGRAM(s) IV Push daily  polymyxin B IVPB [id number] Unit(s) IV Intermittent every 12 hours  potassium chloride   Powder 40 milliEquivalent(s) Oral daily  propofol Infusion 0.1 MICROgram(s)/kG/Min (0.04 mL/Hr) IV Continuous <Continuous>  QUEtiapine 100 milliGRAM(s) Oral two times a day  scopolamine 1 mG/72 Hr(s) Patch 1 Patch Transdermal every 72 hours  spironolactone 100 milliGRAM(s) Oral daily  thiamine 100 milliGRAM(s) Oral daily  vancomycin    Solution 250 milliGRAM(s) Oral every 6 hours    MEDICATIONS  (PRN):  acetaminophen  Suppository .. 650 milliGRAM(s) Rectal every 6 hours PRN Temp greater or equal to 38C (100.4F), Mild Pain (1 - 3)  ALBUTerol    90 MICROgram(s) HFA Inhaler 1 Puff(s) Inhalation every 4 hours PRN Shortness of Breath and/or Wheezing  cloNIDine 0.1 milliGRAM(s) Oral every 6 hours PRN opiate withdrawal  fentaNYL    Injectable 50 MICROGram(s) IV Push every 30 minutes PRN Agitation  hydrOXYzine hydrochloride 50 milliGRAM(s) Oral every 6 hours PRN Anxiety  ibuprofen  Tablet. 600 milliGRAM(s) Oral every 6 hours PRN Temp greater or equal to 38C (100.4F)  ibuprofen  Tablet. 400 milliGRAM(s) Oral every 6 hours PRN Mild Pain (1 - 3)  sodium chloride 0.9% lock flush 10 milliLiter(s) IV Push every 1 hour PRN Pre/post blood products, medications, blood draw, and to maintain line patency      Xrays: Unchanged, no new infiltrates                                                                                     ECHO     Patient is a 26y old  Female who presents with a chief complaint of anasarca (16 Jul 2022 11:25)        Over Night Events:    Tmax 100.8  Awake while on sedation  SBT today        ROS:  See HPI    PHYSICAL EXAM    ICU Vital Signs Last 24 Hrs  T(C): 37.1 (17 Jul 2022 03:33), Max: 38.2 (16 Jul 2022 08:14)  T(F): 98.8 (17 Jul 2022 03:33), Max: 100.8 (16 Jul 2022 08:14)  HR: 78 (17 Jul 2022 03:33) (66 - 98)  BP: 100/66 (17 Jul 2022 03:33) (89/54 - 140/94)  BP(mean): 79 (17 Jul 2022 03:33) (66 - 112)  ABP: --  ABP(mean): --  RR: 25 (17 Jul 2022 03:33) (20 - 29)  SpO2: 95% (17 Jul 2022 03:33) (95% - 100%)    O2 Parameters below as of 17 Jul 2022 03:33  Patient On (Oxygen Delivery Method): ventilator            General: awake, no distress  HEENT: MARIANNA             Lymphatic system: No cervical LN   Lungs: Bilateral BS, clear   Cardiovascular: Regular   Gastrointestinal: Soft, Positive BS  Extremities: No clubbing.  Moves extremities.  Full Range of motion   Skin: Warm, intact  Neurological: No motor or sensory deficit       07-15-22 @ 07:01  -  07-16-22 @ 07:00  --------------------------------------------------------  IN:    Dexmedetomidine: 636 mL    Enteral Tube Flush: 460 mL    FentaNYL: 147 mL    Free Water: 500 mL    IV PiggyBack: 100 mL    IV PiggyBack: 550 mL    IV PiggyBack: 200 mL    Propofol: 170 mL    Vital High Protein: 700 mL  Total IN: 3463 mL    OUT:    Indwelling Catheter - Urethral (mL): 1910 mL    Norepinephrine: 0 mL  Total OUT: 1910 mL    Total NET: 1553 mL      07-16-22 @ 07:01  -  07-17-22 @ 06:24  --------------------------------------------------------  IN:    Dexmedetomidine: 535.5 mL    FentaNYL: 259 mL    Free Water: 600 mL    IV PiggyBack: 500 mL    IV PiggyBack: 300 mL    Propofol: 122 mL    Vital High Protein: 450 mL  Total IN: 2766.5 mL    OUT:    Indwelling Catheter - Urethral (mL): 1070 mL  Total OUT: 1070 mL    Total NET: 1696.5 mL          LABS:                            8.2    3.29  )-----------( 55       ( 16 Jul 2022 16:44 )             27.7                                               07-16    139  |  107  |  20  ----------------------------<  156<H>  3.0<L>   |  20  |  0.9    Ca    7.8<L>      16 Jul 2022 06:47  Phos  3.3     07-16  Mg     1.8     07-16    TPro  5.0<L>  /  Alb  2.4<L>  /  TBili  0.3  /  DBili  x   /  AST  15  /  ALT  12  /  AlkPhos  252<H>  07-16                                                                                           LIVER FUNCTIONS - ( 16 Jul 2022 06:47 )  Alb: 2.4 g/dL / Pro: 5.0 g/dL / ALK PHOS: 252 U/L / ALT: 12 U/L / AST: 15 U/L / GGT: x                                                  Culture - Sputum (collected 15 Jul 2022 10:00)  Source: Trach Asp Tracheal Aspirate  Gram Stain (16 Jul 2022 04:22):    Moderate polymorphonuclear leukocytes per low power field    Rare Squamous epithelial cells per low power field    No organisms seen per oil power field  Preliminary Report (16 Jul 2022 17:42):    Normal Respiratory Kelly present                                                   Mode: AC/ CMV (Assist Control/ Continuous Mandatory Ventilation)  RR (machine): 25  TV (machine): 370  FiO2: 35  PEEP: 5  ITime: 0.8  MAP: 8  PIP: 22                                      ABG - ( 17 Jul 2022 04:02 )  pH, Arterial: 7.31  pH, Blood: x     /  pCO2: 35    /  pO2: 119   / HCO3: 18    / Base Excess: -7.9  /  SaO2: 96.6                MEDICATIONS  (STANDING):  ampicillin/sulbactam  IVPB 3 Gram(s) IV Intermittent every 6 hours  chlorhexidine 0.12% Liquid 15 milliLiter(s) Oral Mucosa every 12 hours  chlorhexidine 2% Cloths 1 Application(s) Topical daily  dexMEDEtomidine Infusion 0.2 MICROgram(s)/kG/Hr (3.52 mL/Hr) IV Continuous <Continuous>  fentaNYL   Infusion. 0.5 MICROgram(s)/kG/Hr (3.52 mL/Hr) IV Continuous <Continuous>  folic acid 1 milliGRAM(s) Oral daily  methadone    Tablet 20 milliGRAM(s) Oral daily  nicotine -  14 mG/24Hr(s) Patch 1 patch Transdermal daily  norepinephrine Infusion 0.05 MICROgram(s)/kG/Min (8.03 mL/Hr) IV Continuous <Continuous>  pantoprazole  Injectable 40 milliGRAM(s) IV Push daily  polymyxin B IVPB [id number] Unit(s) IV Intermittent every 12 hours  potassium chloride   Powder 40 milliEquivalent(s) Oral daily  propofol Infusion 0.1 MICROgram(s)/kG/Min (0.04 mL/Hr) IV Continuous <Continuous>  QUEtiapine 100 milliGRAM(s) Oral two times a day  scopolamine 1 mG/72 Hr(s) Patch 1 Patch Transdermal every 72 hours  spironolactone 100 milliGRAM(s) Oral daily  thiamine 100 milliGRAM(s) Oral daily  vancomycin    Solution 250 milliGRAM(s) Oral every 6 hours    MEDICATIONS  (PRN):  acetaminophen  Suppository .. 650 milliGRAM(s) Rectal every 6 hours PRN Temp greater or equal to 38C (100.4F), Mild Pain (1 - 3)  ALBUTerol    90 MICROgram(s) HFA Inhaler 1 Puff(s) Inhalation every 4 hours PRN Shortness of Breath and/or Wheezing  cloNIDine 0.1 milliGRAM(s) Oral every 6 hours PRN opiate withdrawal  fentaNYL    Injectable 50 MICROGram(s) IV Push every 30 minutes PRN Agitation  hydrOXYzine hydrochloride 50 milliGRAM(s) Oral every 6 hours PRN Anxiety  ibuprofen  Tablet. 600 milliGRAM(s) Oral every 6 hours PRN Temp greater or equal to 38C (100.4F)  ibuprofen  Tablet. 400 milliGRAM(s) Oral every 6 hours PRN Mild Pain (1 - 3)  sodium chloride 0.9% lock flush 10 milliLiter(s) IV Push every 1 hour PRN Pre/post blood products, medications, blood draw, and to maintain line patency      Xrays: Unchanged, no new infiltrates                                                                                     ECHO     Patient is a 26y old  Female who presents with a chief complaint of anasarca (16 Jul 2022 11:25)        Over Night Events:    Tmax 100.8  Awake while on sedation  SBT today        ROS:  See HPI    PHYSICAL EXAM    ICU Vital Signs Last 24 Hrs  T(C): 37.1 (17 Jul 2022 03:33), Max: 38.2 (16 Jul 2022 08:14)  T(F): 98.8 (17 Jul 2022 03:33), Max: 100.8 (16 Jul 2022 08:14)  HR: 78 (17 Jul 2022 03:33) (66 - 98)  BP: 100/66 (17 Jul 2022 03:33) (89/54 - 140/94)  BP(mean): 79 (17 Jul 2022 03:33) (66 - 112)  ABP: --  ABP(mean): --  RR: 25 (17 Jul 2022 03:33) (20 - 29)  SpO2: 95% (17 Jul 2022 03:33) (95% - 100%)    O2 Parameters below as of 17 Jul 2022 03:33  Patient On (Oxygen Delivery Method): ventilator            General: awake, no distress  HEENT: MARIANNA             Lymphatic system: No cervical LN   Lungs: Bilateral BS, clear   Cardiovascular: Regular   Gastrointestinal: Soft, Positive BS, abdomen distended non tender  Extremities: No clubbing.  Moves extremities.  Full Range of motion   Skin: Warm, intact  Neurological: No motor or sensory deficit       07-15-22 @ 07:01  -  07-16-22 @ 07:00  --------------------------------------------------------  IN:    Dexmedetomidine: 636 mL    Enteral Tube Flush: 460 mL    FentaNYL: 147 mL    Free Water: 500 mL    IV PiggyBack: 100 mL    IV PiggyBack: 550 mL    IV PiggyBack: 200 mL    Propofol: 170 mL    Vital High Protein: 700 mL  Total IN: 3463 mL    OUT:    Indwelling Catheter - Urethral (mL): 1910 mL    Norepinephrine: 0 mL  Total OUT: 1910 mL    Total NET: 1553 mL      07-16-22 @ 07:01  -  07-17-22 @ 06:24  --------------------------------------------------------  IN:    Dexmedetomidine: 535.5 mL    FentaNYL: 259 mL    Free Water: 600 mL    IV PiggyBack: 500 mL    IV PiggyBack: 300 mL    Propofol: 122 mL    Vital High Protein: 450 mL  Total IN: 2766.5 mL    OUT:    Indwelling Catheter - Urethral (mL): 1070 mL  Total OUT: 1070 mL    Total NET: 1696.5 mL          LABS:                            8.2    3.29  )-----------( 55       ( 16 Jul 2022 16:44 )             27.7                                               07-16    139  |  107  |  20  ----------------------------<  156<H>  3.0<L>   |  20  |  0.9    Ca    7.8<L>      16 Jul 2022 06:47  Phos  3.3     07-16  Mg     1.8     07-16    TPro  5.0<L>  /  Alb  2.4<L>  /  TBili  0.3  /  DBili  x   /  AST  15  /  ALT  12  /  AlkPhos  252<H>  07-16                                                                                           LIVER FUNCTIONS - ( 16 Jul 2022 06:47 )  Alb: 2.4 g/dL / Pro: 5.0 g/dL / ALK PHOS: 252 U/L / ALT: 12 U/L / AST: 15 U/L / GGT: x                                                  Culture - Sputum (collected 15 Jul 2022 10:00)  Source: Trach Asp Tracheal Aspirate  Gram Stain (16 Jul 2022 04:22):    Moderate polymorphonuclear leukocytes per low power field    Rare Squamous epithelial cells per low power field    No organisms seen per oil power field  Preliminary Report (16 Jul 2022 17:42):    Normal Respiratory Kelly present                                                   Mode: AC/ CMV (Assist Control/ Continuous Mandatory Ventilation)  RR (machine): 25  TV (machine): 370  FiO2: 35  PEEP: 5  ITime: 0.8  MAP: 8  PIP: 22                                      ABG - ( 17 Jul 2022 04:02 )  pH, Arterial: 7.31  pH, Blood: x     /  pCO2: 35    /  pO2: 119   / HCO3: 18    / Base Excess: -7.9  /  SaO2: 96.6                MEDICATIONS  (STANDING):  ampicillin/sulbactam  IVPB 3 Gram(s) IV Intermittent every 6 hours  chlorhexidine 0.12% Liquid 15 milliLiter(s) Oral Mucosa every 12 hours  chlorhexidine 2% Cloths 1 Application(s) Topical daily  dexMEDEtomidine Infusion 0.2 MICROgram(s)/kG/Hr (3.52 mL/Hr) IV Continuous <Continuous>  fentaNYL   Infusion. 0.5 MICROgram(s)/kG/Hr (3.52 mL/Hr) IV Continuous <Continuous>  folic acid 1 milliGRAM(s) Oral daily  methadone    Tablet 20 milliGRAM(s) Oral daily  nicotine -  14 mG/24Hr(s) Patch 1 patch Transdermal daily  norepinephrine Infusion 0.05 MICROgram(s)/kG/Min (8.03 mL/Hr) IV Continuous <Continuous>  pantoprazole  Injectable 40 milliGRAM(s) IV Push daily  polymyxin B IVPB [id number] Unit(s) IV Intermittent every 12 hours  potassium chloride   Powder 40 milliEquivalent(s) Oral daily  propofol Infusion 0.1 MICROgram(s)/kG/Min (0.04 mL/Hr) IV Continuous <Continuous>  QUEtiapine 100 milliGRAM(s) Oral two times a day  scopolamine 1 mG/72 Hr(s) Patch 1 Patch Transdermal every 72 hours  spironolactone 100 milliGRAM(s) Oral daily  thiamine 100 milliGRAM(s) Oral daily  vancomycin    Solution 250 milliGRAM(s) Oral every 6 hours    MEDICATIONS  (PRN):  acetaminophen  Suppository .. 650 milliGRAM(s) Rectal every 6 hours PRN Temp greater or equal to 38C (100.4F), Mild Pain (1 - 3)  ALBUTerol    90 MICROgram(s) HFA Inhaler 1 Puff(s) Inhalation every 4 hours PRN Shortness of Breath and/or Wheezing  cloNIDine 0.1 milliGRAM(s) Oral every 6 hours PRN opiate withdrawal  fentaNYL    Injectable 50 MICROGram(s) IV Push every 30 minutes PRN Agitation  hydrOXYzine hydrochloride 50 milliGRAM(s) Oral every 6 hours PRN Anxiety  ibuprofen  Tablet. 600 milliGRAM(s) Oral every 6 hours PRN Temp greater or equal to 38C (100.4F)  ibuprofen  Tablet. 400 milliGRAM(s) Oral every 6 hours PRN Mild Pain (1 - 3)  sodium chloride 0.9% lock flush 10 milliLiter(s) IV Push every 1 hour PRN Pre/post blood products, medications, blood draw, and to maintain line patency      Xrays: Unchanged, no new infiltrates                                                                                     ECHO

## 2022-07-17 NOTE — PROGRESS NOTE ADULT - ASSESSMENT
Acute respiratory failure with hypoxemia / aspiration pneumonia with sepsis / suspected drug overdose / head laceration s/p fall in lobby / possible seizure / anasarca  pancytopenia - resolving /  fever     - c-diff colitis - continue oral vanco   - w/u and antibiotics as per ID    - s/p BMB   - supplement hypokalemia and  hypomagnesemia    - re intubated -> still awaiting  trach - surgery following

## 2022-07-17 NOTE — PROGRESS NOTE ADULT - SUBJECTIVE AND OBJECTIVE BOX
Patient seen and evaluated this am, lightly sedated on ventilator on Precedex       T(F): 98.1 (07-17-22 @ 11:00), Max: 99.7 (07-16-22 @ 15:00)  HR: 108 (07-17-22 @ 09:40)  BP: 154/99 (07-17-22 @ 07:02)  RR: 20 (07-17-22 @ 11:00)  SpO2: 98% (07-17-22 @ 09:40) (95% - 100%)    PHYSICAL EXAM:  GENERAL: NAD  HEAD:  Atraumatic, Normocephalic  EYES: EOMI, PERRLA, conjunctiva and sclera clear  NERVOUS SYSTEM:  no focal deficits   CHEST/LUNGS:  bilateral rhonchi  HEART: Regular rate and rhythm; No murmurs, rubs, or gallops  ABDOMEN: Soft, mildly distended  EXTREMITIES:  2+ Peripheral Pulses, No clubbing, cyanosis, or edema    LABS  07-17    136  |  104  |  23<H>  ----------------------------<  158<H>  3.0<L>   |  18  |  1.0    Ca    7.9<L>      17 Jul 2022 06:12  Phos  4.0     07-17  Mg     1.5     07-17    TPro  5.3<L>  /  Alb  2.6<L>  /  TBili  0.4  /  DBili  x   /  AST  17  /  ALT  13  /  AlkPhos  264<H>  07-17                          8.7    3.00  )-----------( 69       ( 17 Jul 2022 06:12 )             29.0       Mode: AC/ CMV (Assist Control/ Continuous Mandatory Ventilation)  RR (machine): 25  TV (machine): 370  FiO2: 35  PEEP: 5    CARDIAC ENZYMES  Creatine Kinase, Serum: 24 (07-14 @ 19:30)      Culture Results:   Normal Respiratory Kelly present (07-15-22)  Culture Results:   Babesia microti PCR  Results: NOT detected  ***************Result Note*************  The detection of Babesia microti by PCR has only been  validated for whole blood; this test has not been approved  by the [place] Food and Drug Administration (FDA). Performance  characteristics of this assay have been determined by  [facility]. The clinical significance  of results should be considered in conjunction with the  overall clinical presentation of the patient. Result is not  intended to be used as the sole means for clinical diagnosis  or patient management decisions.  One negative sample does not necessarily rule  out the presence of a parasitic infection. (07-10-22)  Culture Results:   No Growth Final (07-08-22)  Culture Results:   No Growth Final (07-06-22)  Culture Results:   No Growth Final (07-06-22)  Culture Results:   Moderate Acinetobacter baumannii/nosocom group (Carbapenem Resistant)  Cefiderocol interpretations based on FDA breakpoints.  Normal Respiratory Kelly absent (07-04-22)  Culture Results:   No Growth Final (06-29-22)  Culture Results:   No Growth Final (06-29-22)  Culture Results:   No Growth Final (06-28-22)  Culture Results:   Numerous Mixed gram negative rods  Moderate Staphylococcus aureus  Normal Respiratory Kelly present (06-27-22)    RADIOLOGY  < from: Xray Chest 1 View- PORTABLE-Routine (Xray Chest 1 View- PORTABLE-Routine in AM.) (07.17.22 @ 06:09) >  Impression:    Low lung volume.    Cardiomegaly and a left basilar opacity, unchanged.    < end of copied text >    MEDICATIONS  (STANDING):  ampicillin/sulbactam  IVPB 3 Gram(s) IV Intermittent every 6 hours  chlorhexidine 0.12% Liquid 15 milliLiter(s) Oral Mucosa every 12 hours  chlorhexidine 2% Cloths 1 Application(s) Topical daily  folic acid 1 milliGRAM(s) Oral daily  magnesium sulfate  IVPB 2 Gram(s) IV Intermittent once  methadone    Tablet 20 milliGRAM(s) Oral daily  nicotine -  14 mG/24Hr(s) Patch 1 patch Transdermal daily  norepinephrine Infusion 0.05 MICROgram(s)/kG/Min (8.03 mL/Hr) IV Continuous <Continuous>  pantoprazole  Injectable 40 milliGRAM(s) IV Push daily  polymyxin B IVPB [id number] Unit(s) IV Intermittent every 12 hours  potassium chloride   Powder 40 milliEquivalent(s) Oral daily  potassium chloride  20 mEq/100 mL IVPB 20 milliEquivalent(s) IV Intermittent once  propofol Infusion 0.1 MICROgram(s)/kG/Min (0.04 mL/Hr) IV Continuous <Continuous>  QUEtiapine 100 milliGRAM(s) Oral two times a day  scopolamine 1 mG/72 Hr(s) Patch 1 Patch Transdermal every 72 hours  spironolactone 100 milliGRAM(s) Oral daily  thiamine 100 milliGRAM(s) Oral daily  vancomycin    Solution 250 milliGRAM(s) Oral every 6 hours    MEDICATIONS  (PRN):  acetaminophen  Suppository .. 650 milliGRAM(s) Rectal every 6 hours PRN Temp greater or equal to 38C (100.4F), Mild Pain (1 - 3)  ALBUTerol    90 MICROgram(s) HFA Inhaler 1 Puff(s) Inhalation every 4 hours PRN Shortness of Breath and/or Wheezing  cloNIDine 0.1 milliGRAM(s) Oral every 6 hours PRN opiate withdrawal  hydrOXYzine hydrochloride 50 milliGRAM(s) Oral every 6 hours PRN Anxiety  ibuprofen  Tablet. 600 milliGRAM(s) Oral every 6 hours PRN Temp greater or equal to 38C (100.4F)  ibuprofen  Tablet. 400 milliGRAM(s) Oral every 6 hours PRN Mild Pain (1 - 3)  sodium chloride 0.9% lock flush 10 milliLiter(s) IV Push every 1 hour PRN Pre/post blood products, medications, blood draw, and to maintain line patency

## 2022-07-17 NOTE — PROGRESS NOTE ADULT - ASSESSMENT
IMPRESSION:  Acute respiratory failure sp intubation  PNA  MSSA pna  seizure like activity  ?? drug over dose/ withdrawal opoid   liver cirrhosis   Ascites sp paracentesis   Pancytopenia- worsening  RENETTA improved  C diff +ve     PLAN:    CNS: Continue with Methadone for withdrawal Dc fentanyl and propofol this morning; continue with precedex and decrease leyla dose gradually; continue with seroquel and clonazepam; Check QTc daily. SAT this morning and following commands.    HEENT: oral care; ETT care    PULMONARY:  SBT today with goal to extubate; CXR unchnaged. ABG on SBT.     CARDIOVASCULAR: Keep MAP more than 65mmhg; not on pressors currently keep equal balance. On oral diuretics.     GI: GI prophylaxis. Hold OGT feeding for possible extubation.     RENAL: Correct K ot mo re than 4 and Mg to more than 2. DC Free water pushes.     INFECTIOUS DISEASE: Currently on PO vancomycin, Unasyn, Polymixin. Monitor kidney function and electrolytes. ID following. DTA culture so far negative. Recheck procalcitonin. Tmax 100.8.     HEMATOLOGICAL:   s/p BMB follow result and cx. Hematology following. Monitor platelet count. Keep hg more than 7. Send HIT panel for thrombocytopenia and peripheral smear. Start fondaparinux for DVT prophylaxis if platelet count stays above 50k.     ENDOCRINE:  Follow up FS.  Insulin protocol if needed.     Musk: bedrest for now. PT rehab evaluation.     Dispo: Remains critically and ill needs ICU care.     TLC 7/6 RIJ; ETT    IMPRESSION:  Acute respiratory failure sp intubation  PNA  MSSA pna  seizure like activity  ?? drug over dose/ withdrawal opoid   liver cirrhosis   Ascites sp paracentesis   Pancytopenia- worsening  RENETTA improved  C diff +ve     PLAN:    CNS: Continue with Methadone for withdrawal Dc fentanyl and propofol this morning; continue with precedex and decrease leyla dose gradually; continue with seroquel and clonazepam; Check QTc daily. SAT this morning and following commands.    HEENT: oral care; ETT care    PULMONARY:  SBT today with goal to extubate; CXR unchnaged. ABG on SBT.     CARDIOVASCULAR: Keep MAP more than 65mmhg; not on pressors currently keep equal balance. On oral diuretics.     GI: GI prophylaxis. Hold OGT feeding for possible extubation. Do a bedside US of the abdomen. If large ascites then will need therapeutic paracentesis.     RENAL: Correct K ot mo re than 4 and Mg to more than 2. DC Free water pushes.     INFECTIOUS DISEASE: Currently on PO vancomycin, Unasyn, Polymixin. Monitor kidney function and electrolytes. ID following. DTA culture so far negative. Recheck procalcitonin. Tmax 100.8.     HEMATOLOGICAL:   s/p BMB follow result and cx. Hematology following. Monitor platelet count. Keep hg more than 7. Send HIT panel for thrombocytopenia and peripheral smear. Start fondaparinux for DVT prophylaxis if platelet count stays above 50k until HIT panel is back.     ENDOCRINE:  Follow up FS.  Insulin protocol if needed.     Musk: bedrest for now. PT rehab evaluation.     Dispo: Remains critically and ill needs ICU care.     TLC 7/6 RIJ; ETT

## 2022-07-18 LAB
ALBUMIN SERPL ELPH-MCNC: 2.7 G/DL — LOW (ref 3.5–5.2)
ALP SERPL-CCNC: 262 U/L — HIGH (ref 30–115)
ALT FLD-CCNC: 12 U/L — SIGNIFICANT CHANGE UP (ref 0–41)
ANION GAP SERPL CALC-SCNC: 11 MMOL/L — SIGNIFICANT CHANGE UP (ref 7–14)
AST SERPL-CCNC: 15 U/L — SIGNIFICANT CHANGE UP (ref 0–41)
BASOPHILS # BLD AUTO: 0 K/UL — SIGNIFICANT CHANGE UP (ref 0–0.2)
BASOPHILS NFR BLD AUTO: 0 % — SIGNIFICANT CHANGE UP (ref 0–1)
BILIRUB SERPL-MCNC: 0.4 MG/DL — SIGNIFICANT CHANGE UP (ref 0.2–1.2)
BUN SERPL-MCNC: 20 MG/DL — SIGNIFICANT CHANGE UP (ref 10–20)
CALCIUM SERPL-MCNC: 8.6 MG/DL — SIGNIFICANT CHANGE UP (ref 8.5–10.1)
CHLORIDE SERPL-SCNC: 107 MMOL/L — SIGNIFICANT CHANGE UP (ref 98–110)
CO2 SERPL-SCNC: 21 MMOL/L — SIGNIFICANT CHANGE UP (ref 17–32)
CREAT SERPL-MCNC: 1 MG/DL — SIGNIFICANT CHANGE UP (ref 0.7–1.5)
EGFR: 79 ML/MIN/1.73M2 — SIGNIFICANT CHANGE UP
EOSINOPHIL # BLD AUTO: 0 K/UL — SIGNIFICANT CHANGE UP (ref 0–0.7)
EOSINOPHIL NFR BLD AUTO: 0 % — SIGNIFICANT CHANGE UP (ref 0–8)
GAS PNL BLDA: SIGNIFICANT CHANGE UP
GLUCOSE SERPL-MCNC: 106 MG/DL — HIGH (ref 70–99)
HCT VFR BLD CALC: 27.1 % — LOW (ref 37–47)
HGB BLD-MCNC: 8.2 G/DL — LOW (ref 12–16)
IMM GRANULOCYTES NFR BLD AUTO: 0 % — LOW (ref 0.1–0.3)
LYMPHOCYTES # BLD AUTO: 0.39 K/UL — LOW (ref 1.2–3.4)
LYMPHOCYTES # BLD AUTO: 29.3 % — SIGNIFICANT CHANGE UP (ref 20.5–51.1)
MAGNESIUM SERPL-MCNC: 2.3 MG/DL — SIGNIFICANT CHANGE UP (ref 1.8–2.4)
MCHC RBC-ENTMCNC: 26.3 PG — LOW (ref 27–31)
MCHC RBC-ENTMCNC: 30.3 G/DL — LOW (ref 32–37)
MCV RBC AUTO: 86.9 FL — SIGNIFICANT CHANGE UP (ref 81–99)
MONOCYTES # BLD AUTO: 0.08 K/UL — LOW (ref 0.1–0.6)
MONOCYTES NFR BLD AUTO: 6 % — SIGNIFICANT CHANGE UP (ref 1.7–9.3)
NEUTROPHILS # BLD AUTO: 0.86 K/UL — LOW (ref 1.4–6.5)
NEUTROPHILS NFR BLD AUTO: 64.7 % — SIGNIFICANT CHANGE UP (ref 42.2–75.2)
NRBC # BLD: 0 /100 WBCS — SIGNIFICANT CHANGE UP (ref 0–0)
PHOSPHATE SERPL-MCNC: 3.8 MG/DL — SIGNIFICANT CHANGE UP (ref 2.1–4.9)
PLATELET # BLD AUTO: 57 K/UL — LOW (ref 130–400)
POTASSIUM SERPL-MCNC: 3.6 MMOL/L — SIGNIFICANT CHANGE UP (ref 3.5–5)
POTASSIUM SERPL-SCNC: 3.6 MMOL/L — SIGNIFICANT CHANGE UP (ref 3.5–5)
PROT SERPL-MCNC: 5.4 G/DL — LOW (ref 6–8)
RBC # BLD: 3.12 M/UL — LOW (ref 4.2–5.4)
RBC # FLD: 15.9 % — HIGH (ref 11.5–14.5)
SARS-COV-2 RNA SPEC QL NAA+PROBE: SIGNIFICANT CHANGE UP
SODIUM SERPL-SCNC: 139 MMOL/L — SIGNIFICANT CHANGE UP (ref 135–146)
WBC # BLD: 1.33 K/UL — LOW (ref 4.8–10.8)
WBC # FLD AUTO: 1.33 K/UL — LOW (ref 4.8–10.8)

## 2022-07-18 PROCEDURE — 99232 SBSQ HOSP IP/OBS MODERATE 35: CPT

## 2022-07-18 PROCEDURE — 99233 SBSQ HOSP IP/OBS HIGH 50: CPT

## 2022-07-18 PROCEDURE — 71045 X-RAY EXAM CHEST 1 VIEW: CPT | Mod: 26

## 2022-07-18 PROCEDURE — 99291 CRITICAL CARE FIRST HOUR: CPT

## 2022-07-18 RX ORDER — FENTANYL CITRATE 50 UG/ML
25 INJECTION INTRAVENOUS ONCE
Refills: 0 | Status: DISCONTINUED | OUTPATIENT
Start: 2022-07-18 | End: 2022-07-18

## 2022-07-18 RX ORDER — SODIUM CHLORIDE 9 MG/ML
1000 INJECTION, SOLUTION INTRAVENOUS
Refills: 0 | Status: DISCONTINUED | OUTPATIENT
Start: 2022-07-18 | End: 2022-07-21

## 2022-07-18 RX ORDER — FENTANYL CITRATE 50 UG/ML
75 INJECTION INTRAVENOUS ONCE
Refills: 0 | Status: DISCONTINUED | OUTPATIENT
Start: 2022-07-18 | End: 2022-07-18

## 2022-07-18 RX ORDER — FENTANYL CITRATE 50 UG/ML
0.5 INJECTION INTRAVENOUS
Qty: 5000 | Refills: 0 | Status: DISCONTINUED | OUTPATIENT
Start: 2022-07-18 | End: 2022-07-18

## 2022-07-18 RX ORDER — FENTANYL CITRATE 50 UG/ML
0.5 INJECTION INTRAVENOUS
Qty: 2500 | Refills: 0 | Status: DISCONTINUED | OUTPATIENT
Start: 2022-07-18 | End: 2022-07-20

## 2022-07-18 RX ORDER — ONDANSETRON 8 MG/1
4 TABLET, FILM COATED ORAL ONCE
Refills: 0 | Status: COMPLETED | OUTPATIENT
Start: 2022-07-18 | End: 2022-07-18

## 2022-07-18 RX ADMIN — Medication 40 MILLIEQUIVALENT(S): at 11:49

## 2022-07-18 RX ADMIN — Medication 100 MILLIGRAM(S): at 11:50

## 2022-07-18 RX ADMIN — DEXMEDETOMIDINE HYDROCHLORIDE IN 0.9% SODIUM CHLORIDE 1.76 MICROGRAM(S)/KG/HR: 4 INJECTION INTRAVENOUS at 07:00

## 2022-07-18 RX ADMIN — SCOPALAMINE 1 PATCH: 1 PATCH, EXTENDED RELEASE TRANSDERMAL at 07:38

## 2022-07-18 RX ADMIN — AMPICILLIN SODIUM AND SULBACTAM SODIUM 200 GRAM(S): 250; 125 INJECTION, POWDER, FOR SUSPENSION INTRAMUSCULAR; INTRAVENOUS at 23:03

## 2022-07-18 RX ADMIN — FENTANYL CITRATE 25 MICROGRAM(S): 50 INJECTION INTRAVENOUS at 13:00

## 2022-07-18 RX ADMIN — SCOPALAMINE 1 PATCH: 1 PATCH, EXTENDED RELEASE TRANSDERMAL at 11:49

## 2022-07-18 RX ADMIN — AMPICILLIN SODIUM AND SULBACTAM SODIUM 200 GRAM(S): 250; 125 INJECTION, POWDER, FOR SUSPENSION INTRAMUSCULAR; INTRAVENOUS at 11:46

## 2022-07-18 RX ADMIN — Medication 1 PATCH: at 13:33

## 2022-07-18 RX ADMIN — SCOPALAMINE 1 PATCH: 1 PATCH, EXTENDED RELEASE TRANSDERMAL at 11:00

## 2022-07-18 RX ADMIN — CHLORHEXIDINE GLUCONATE 1 APPLICATION(S): 213 SOLUTION TOPICAL at 21:06

## 2022-07-18 RX ADMIN — PROPOFOL 0.04 MICROGRAM(S)/KG/MIN: 10 INJECTION, EMULSION INTRAVENOUS at 06:06

## 2022-07-18 RX ADMIN — AMPICILLIN SODIUM AND SULBACTAM SODIUM 200 GRAM(S): 250; 125 INJECTION, POWDER, FOR SUSPENSION INTRAMUSCULAR; INTRAVENOUS at 05:49

## 2022-07-18 RX ADMIN — AMPICILLIN SODIUM AND SULBACTAM SODIUM 200 GRAM(S): 250; 125 INJECTION, POWDER, FOR SUSPENSION INTRAMUSCULAR; INTRAVENOUS at 17:04

## 2022-07-18 RX ADMIN — Medication 1 MILLIGRAM(S): at 11:47

## 2022-07-18 RX ADMIN — PROPOFOL 0.04 MICROGRAM(S)/KG/MIN: 10 INJECTION, EMULSION INTRAVENOUS at 20:02

## 2022-07-18 RX ADMIN — Medication 1 PATCH: at 11:47

## 2022-07-18 RX ADMIN — Medication 1 PATCH: at 07:37

## 2022-07-18 RX ADMIN — Medication 100 GRAM(S): at 16:02

## 2022-07-18 RX ADMIN — Medication 250 MILLIGRAM(S): at 17:05

## 2022-07-18 RX ADMIN — Medication 250 MILLIGRAM(S): at 23:04

## 2022-07-18 RX ADMIN — Medication 40 MILLIEQUIVALENT(S): at 05:50

## 2022-07-18 RX ADMIN — FENTANYL CITRATE 75 MICROGRAM(S): 50 INJECTION INTRAVENOUS at 16:15

## 2022-07-18 RX ADMIN — FENTANYL CITRATE 75 MICROGRAM(S): 50 INJECTION INTRAVENOUS at 16:01

## 2022-07-18 RX ADMIN — QUETIAPINE FUMARATE 100 MILLIGRAM(S): 200 TABLET, FILM COATED ORAL at 17:05

## 2022-07-18 RX ADMIN — METHADONE HYDROCHLORIDE 20 MILLIGRAM(S): 40 TABLET ORAL at 11:47

## 2022-07-18 RX ADMIN — Medication 100 GRAM(S): at 21:06

## 2022-07-18 RX ADMIN — CHLORHEXIDINE GLUCONATE 15 MILLILITER(S): 213 SOLUTION TOPICAL at 17:06

## 2022-07-18 RX ADMIN — Medication 250 MILLIGRAM(S): at 11:50

## 2022-07-18 RX ADMIN — PROPOFOL 0.04 MICROGRAM(S)/KG/MIN: 10 INJECTION, EMULSION INTRAVENOUS at 17:53

## 2022-07-18 RX ADMIN — QUETIAPINE FUMARATE 100 MILLIGRAM(S): 200 TABLET, FILM COATED ORAL at 05:50

## 2022-07-18 RX ADMIN — CHLORHEXIDINE GLUCONATE 15 MILLILITER(S): 213 SOLUTION TOPICAL at 05:50

## 2022-07-18 RX ADMIN — SODIUM CHLORIDE 100 MILLILITER(S): 9 INJECTION, SOLUTION INTRAVENOUS at 02:41

## 2022-07-18 RX ADMIN — DEXMEDETOMIDINE HYDROCHLORIDE IN 0.9% SODIUM CHLORIDE 1.76 MICROGRAM(S)/KG/HR: 4 INJECTION INTRAVENOUS at 02:40

## 2022-07-18 RX ADMIN — PANTOPRAZOLE SODIUM 40 MILLIGRAM(S): 20 TABLET, DELAYED RELEASE ORAL at 11:48

## 2022-07-18 RX ADMIN — PROPOFOL 0.04 MICROGRAM(S)/KG/MIN: 10 INJECTION, EMULSION INTRAVENOUS at 02:41

## 2022-07-18 RX ADMIN — ONDANSETRON 4 MILLIGRAM(S): 8 TABLET, FILM COATED ORAL at 16:39

## 2022-07-18 RX ADMIN — Medication 40 MILLIEQUIVALENT(S): at 17:05

## 2022-07-18 RX ADMIN — Medication 100 GRAM(S): at 05:14

## 2022-07-18 RX ADMIN — Medication 250 MILLIGRAM(S): at 05:49

## 2022-07-18 RX ADMIN — FENTANYL CITRATE 3.52 MICROGRAM(S)/KG/HR: 50 INJECTION INTRAVENOUS at 12:22

## 2022-07-18 RX ADMIN — SODIUM CHLORIDE 65 MILLILITER(S): 9 INJECTION, SOLUTION INTRAVENOUS at 20:02

## 2022-07-18 RX ADMIN — SCOPALAMINE 1 PATCH: 1 PATCH, EXTENDED RELEASE TRANSDERMAL at 21:11

## 2022-07-18 RX ADMIN — FENTANYL CITRATE 25 MICROGRAM(S): 50 INJECTION INTRAVENOUS at 12:43

## 2022-07-18 RX ADMIN — POLYMYXIN B SULFATE 500 UNIT(S): 500000 INJECTION, POWDER, LYOPHILIZED, FOR SOLUTION INTRAMUSCULAR; INTRATHECAL; INTRAVENOUS; OPHTHALMIC at 17:07

## 2022-07-18 RX ADMIN — POLYMYXIN B SULFATE 500 UNIT(S): 500000 INJECTION, POWDER, LYOPHILIZED, FOR SOLUTION INTRAMUSCULAR; INTRATHECAL; INTRAVENOUS; OPHTHALMIC at 05:49

## 2022-07-18 RX ADMIN — Medication 1 PATCH: at 21:11

## 2022-07-18 RX ADMIN — SPIRONOLACTONE 100 MILLIGRAM(S): 25 TABLET, FILM COATED ORAL at 05:50

## 2022-07-18 NOTE — PROGRESS NOTE ADULT - SUBJECTIVE AND OBJECTIVE BOX
Patient is a 26y old  Female who presents with a chief complaint of anasarca (17 Jul 2022 11:15)        Over Night Events: Pt remains critically ill , on MV         ROS:     All ROS are negative except HPI         PHYSICAL EXAM    ICU Vital Signs Last 24 Hrs  T(C): 36.9 (18 Jul 2022 06:00), Max: 37.1 (17 Jul 2022 15:05)  T(F): 98.4 (18 Jul 2022 06:00), Max: 98.8 (17 Jul 2022 15:05)  HR: 77 (18 Jul 2022 06:00) (61 - 108)  BP: 122/81 (18 Jul 2022 05:00) (101/55 - 165/95)  BP(mean): 98 (18 Jul 2022 05:00) (72 - 122)  RR: 29 (18 Jul 2022 06:00) (18 - 34)  SpO2: 100% (18 Jul 2022 06:00) (96% - 100%)    O2 Parameters below as of 18 Jul 2022 06:00  Patient On (Oxygen Delivery Method): ventilator          General: awake, no distress  HEENT: , ET +ve   Lymphatic system: No cervical LN   Lungs: Bilateral BS, clear   Cardiovascular: Regular   Gastrointestinal: Soft, Positive BS, abdomen distended non tender  Extremities: No clubbing.  Moves extremities.  Full Range of motion            07-17-22 @ 07:01  -  07-18-22 @ 07:00  --------------------------------------------------------  IN:    Dexmedetomidine: 35 mL    Dexmedetomidine: 450 mL    Enteral Tube Flush: 50 mL    IV PiggyBack: 100 mL    IV PiggyBack: 100 mL    IV PiggyBack: 650 mL    IV PiggyBack: 200 mL    Lactated Ringers: 1100 mL    Propofol: 359 mL  Total IN: 3044 mL    OUT:    FentaNYL: 0 mL    Indwelling Catheter - Urethral (mL): 4640 mL    Nasogastric/Oral tube (mL): 0 mL    Norepinephrine: 0 mL    Propofol: 0 mL    Vital High Protein: 0 mL  Total OUT: 4640 mL    Total NET: -1596 mL          LABS:                            8.2    1.33  )-----------( x        ( 18 Jul 2022 05:37 )             27.1                                               07-17    138  |  107  |  22<H>  ----------------------------<  99  4.1   |  21  |  1.0    Ca    8.4<L>      17 Jul 2022 22:01  Phos  3.8     07-17  Mg     2.2     07-17    TPro  5.3<L>  /  Alb  2.6<L>  /  TBili  0.4  /  DBili  x   /  AST  17  /  ALT  13  /  AlkPhos  264<H>  07-17      PT/INR - ( 17 Jul 2022 22:01 )   PT: 11.90 sec;   INR: 1.04 ratio         PTT - ( 17 Jul 2022 22:01 )  PTT:28.0 sec                                                                                     LIVER FUNCTIONS - ( 17 Jul 2022 06:12 )  Alb: 2.6 g/dL / Pro: 5.3 g/dL / ALK PHOS: 264 U/L / ALT: 13 U/L / AST: 17 U/L / GGT: x                                                  Culture - Sputum (collected 15 Jul 2022 10:00)  Source: Trach Asp Tracheal Aspirate  Gram Stain (16 Jul 2022 04:22):    Moderate polymorphonuclear leukocytes per low power field    Rare Squamous epithelial cells per low power field    No organisms seen per oil power field  Final Report (17 Jul 2022 16:58):    Normal Respiratory Kelly present                                                   Mode: AC/ CMV (Assist Control/ Continuous Mandatory Ventilation)  RR (machine): 25  TV (machine): 370  FiO2: 35  PEEP: 5  ITime: 1  MAP: 10  PIP: 20                                      ABG - ( 18 Jul 2022 04:30 )  pH, Arterial: 7.35  pH, Blood: x     /  pCO2: 35    /  pO2: 118   / HCO3: 19    / Base Excess: -5.7  /  SaO2: 99.4                MEDICATIONS  (STANDING):  ampicillin/sulbactam  IVPB 3 Gram(s) IV Intermittent every 6 hours  chlorhexidine 0.12% Liquid 15 milliLiter(s) Oral Mucosa every 12 hours  chlorhexidine 2% Cloths 1 Application(s) Topical daily  dexMEDEtomidine Infusion 0.1 MICROgram(s)/kG/Hr (1.76 mL/Hr) IV Continuous <Continuous>  folic acid 1 milliGRAM(s) Oral daily  lactated ringers. 1000 milliLiter(s) (100 mL/Hr) IV Continuous <Continuous>  magnesium sulfate  IVPB 1 Gram(s) IV Intermittent every 8 hours  methadone    Tablet 20 milliGRAM(s) Oral daily  nicotine -  14 mG/24Hr(s) Patch 1 patch Transdermal daily  norepinephrine Infusion 0.05 MICROgram(s)/kG/Min (8.03 mL/Hr) IV Continuous <Continuous>  pantoprazole  Injectable 40 milliGRAM(s) IV Push daily  polymyxin B IVPB [id number] Unit(s) IV Intermittent every 12 hours  potassium chloride   Powder 40 milliEquivalent(s) Oral daily  potassium chloride   Powder 40 milliEquivalent(s) Enteral Tube two times a day  propofol Infusion 0.1 MICROgram(s)/kG/Min (0.04 mL/Hr) IV Continuous <Continuous>  QUEtiapine 100 milliGRAM(s) Oral two times a day  scopolamine 1 mG/72 Hr(s) Patch 1 Patch Transdermal every 72 hours  spironolactone 100 milliGRAM(s) Oral daily  thiamine 100 milliGRAM(s) Oral daily  vancomycin    Solution 250 milliGRAM(s) Oral every 6 hours    MEDICATIONS  (PRN):  acetaminophen  Suppository .. 650 milliGRAM(s) Rectal every 6 hours PRN Temp greater or equal to 38C (100.4F), Mild Pain (1 - 3)  ALBUTerol    90 MICROgram(s) HFA Inhaler 1 Puff(s) Inhalation every 4 hours PRN Shortness of Breath and/or Wheezing  cloNIDine 0.1 milliGRAM(s) Oral every 6 hours PRN opiate withdrawal  hydrOXYzine hydrochloride 50 milliGRAM(s) Oral every 6 hours PRN Anxiety  ibuprofen  Tablet. 600 milliGRAM(s) Oral every 6 hours PRN Temp greater or equal to 38C (100.4F)  ibuprofen  Tablet. 400 milliGRAM(s) Oral every 6 hours PRN Mild Pain (1 - 3)  sodium chloride 0.9% lock flush 10 milliLiter(s) IV Push every 1 hour PRN Pre/post blood products, medications, blood draw, and to maintain line patency         CXR interpreted by me:  cardioyany

## 2022-07-18 NOTE — PROGRESS NOTE ADULT - SUBJECTIVE AND OBJECTIVE BOX
Hospital Day:  33d    Subjective: Patient is a 26y old  Female who presents with a chief complaint of anasarca (18 Jul 2022 07:11)      Pt seen and evaluated at bedside.   Complaints: awake alert on precedex drip  Over the night Events: none    Past Medical Hx:   Hepatitis C    Asthma    Anxiety and depression    IV drug abuse      Past Sx:  No significant past surgical history      Allergies:  buprenorphine (Rash)  Suboxone (Rash)    Current Meds:   Standng Meds:  ampicillin/sulbactam  IVPB 3 Gram(s) IV Intermittent every 6 hours  chlorhexidine 0.12% Liquid 15 milliLiter(s) Oral Mucosa every 12 hours  chlorhexidine 2% Cloths 1 Application(s) Topical daily  dexMEDEtomidine Infusion 0.1 MICROgram(s)/kG/Hr (1.76 mL/Hr) IV Continuous <Continuous>  fentaNYL   Infusion. 0.5 MICROgram(s)/kG/Hr (3.52 mL/Hr) IV Continuous <Continuous>  folic acid 1 milliGRAM(s) Oral daily  lactated ringers. 1000 milliLiter(s) (100 mL/Hr) IV Continuous <Continuous>  magnesium sulfate  IVPB 1 Gram(s) IV Intermittent every 8 hours  methadone    Tablet 20 milliGRAM(s) Oral daily  nicotine -  14 mG/24Hr(s) Patch 1 patch Transdermal daily  norepinephrine Infusion 0.05 MICROgram(s)/kG/Min (8.03 mL/Hr) IV Continuous <Continuous>  pantoprazole  Injectable 40 milliGRAM(s) IV Push daily  polymyxin B IVPB [id number] Unit(s) IV Intermittent every 12 hours  potassium chloride   Powder 40 milliEquivalent(s) Oral daily  potassium chloride   Powder 40 milliEquivalent(s) Enteral Tube two times a day  propofol Infusion 0.1 MICROgram(s)/kG/Min (0.04 mL/Hr) IV Continuous <Continuous>  QUEtiapine 100 milliGRAM(s) Oral two times a day  scopolamine 1 mG/72 Hr(s) Patch 1 Patch Transdermal every 72 hours  spironolactone 100 milliGRAM(s) Oral daily  thiamine 100 milliGRAM(s) Oral daily  vancomycin    Solution 250 milliGRAM(s) Oral every 6 hours    PRN Meds:  acetaminophen  Suppository .. 650 milliGRAM(s) Rectal every 6 hours PRN Temp greater or equal to 38C (100.4F), Mild Pain (1 - 3)  ALBUTerol    90 MICROgram(s) HFA Inhaler 1 Puff(s) Inhalation every 4 hours PRN Shortness of Breath and/or Wheezing  cloNIDine 0.1 milliGRAM(s) Oral every 6 hours PRN opiate withdrawal  hydrOXYzine hydrochloride 50 milliGRAM(s) Oral every 6 hours PRN Anxiety  ibuprofen  Tablet. 600 milliGRAM(s) Oral every 6 hours PRN Temp greater or equal to 38C (100.4F)  ibuprofen  Tablet. 400 milliGRAM(s) Oral every 6 hours PRN Mild Pain (1 - 3)  sodium chloride 0.9% lock flush 10 milliLiter(s) IV Push every 1 hour PRN Pre/post blood products, medications, blood draw, and to maintain line patency      Vital Signs:   T(F): 99.7 (07-18-22 @ 11:00), Max: 99.7 (07-18-22 @ 11:00)  HR: 93 (07-18-22 @ 08:44) (61 - 93)  BP: 191/31 (07-18-22 @ 11:00) (101/55 - 191/31)  RR: 31 (07-18-22 @ 07:00) (18 - 34)  SpO2: 100% (07-18-22 @ 08:44) (96% - 100%)    Physical Exam:   GENERAL: NAD, Resting in bed  HEENT: NCAT  CHEST/LUNG: Clear to auscultation bilaterally; No wheezing or rubs.   HEART: Regular rate and rhythm; No murmurs, rubs, or gallops  ABDOMEN: Bowel sounds present; Soft, Nontender, Nondistended.   EXTREMITIES:  No clubbing, cyanosis, or edema  NERVOUS SYSTEM:  Alert & Oriented X3    FLUID BALANCE    07-16-22 @ 07:01  -  07-17-22 @ 07:00  --------------------------------------------------------  IN: 3842.5 mL / OUT: 1305 mL / NET: 2537.5 mL    07-17-22 @ 07:01  -  07-18-22 @ 07:00  --------------------------------------------------------  IN: 4084 mL / OUT: 4640 mL / NET: -556 mL    07-18-22 @ 07:01  -  07-18-22 @ 12:27  --------------------------------------------------------  IN: 0 mL / OUT: 225 mL / NET: -225 mL        Labs:                         8.2    1.33  )-----------( 57       ( 18 Jul 2022 05:37 )             27.1     Neutophil% 64.7, Lymphocyte% 29.3, Monocyte% 6.0, Bands% 0.0 07-18-22 @ 05:37    18 Jul 2022 05:37    139    |  107    |  20     ----------------------------<  106    3.6     |  21     |  1.0      Ca    8.6        18 Jul 2022 05:37  Phos  3.8       18 Jul 2022 05:37  Mg     2.3       18 Jul 2022 05:37    TPro  5.4    /  Alb  2.7    /  TBili  0.4    /  DBili  x      /  AST  15     /  ALT  12     /  AlkPhos  262    18 Jul 2022 05:37       pTT    28.0             ----< 1.04 INR  (07-17-22 @ 22:01)    11.90        PT          Troponin --, CKMB --, CK 24 07-14-22 @ 19:30                Procalcitonin, Serum: 0.16 ng/mL (07-14-22 @ 12:50)    Ferritin, Serum: 334 ng/mL (07-14-22 @ 12:50)      Lactate Dehydrogenase, Serum: 271 (07-14-22 @ 12:50)      Radiology:

## 2022-07-18 NOTE — PROGRESS NOTE ADULT - SUBJECTIVE AND OBJECTIVE BOX
CINDYON, POOJA  26y, Female  Allergy: buprenorphine (Rash)  Suboxone (Rash)      LOS  33d    CHIEF COMPLAINT: anasarca (18 Jul 2022 13:21)      INTERVAL EVENTS/HPI  - No acute events overnight  - T(F): , Max: 99.7 (07-18-22 @ 11:00)  - planned for possible extubation   - WBC Count: 1.33 (07-18-22 @ 05:37)  WBC Count: 0.82 (07-17-22 @ 22:01)     - Creatinine, Serum: 1.0 (07-18-22 @ 05:37)  Creatinine, Serum: 1.0 (07-17-22 @ 22:01)       ROS  General: Denies rigors, nightsweats  HEENT: Denies headache, rhinorrhea, sore throat, eye pain  CV: Denies CP, palpitations  PULM: Denies wheezing, hemoptysis  GI: Denies hematemesis, hematochezia, melena  : Denies discharge, hematuria  MSK: Denies arthralgias, myalgias  SKIN: Denies rash, lesions  NEURO: Denies paresthesias, weakness  PSYCH: Denies depression, anxiety    VITALS:  T(F): 97.8, Max: 99.7 (07-18-22 @ 11:00)  HR: 133  BP: 178/124  RR: 34Vital Signs Last 24 Hrs  T(C): 36.6 (18 Jul 2022 15:05), Max: 37.6 (18 Jul 2022 11:00)  T(F): 97.8 (18 Jul 2022 15:05), Max: 99.7 (18 Jul 2022 11:00)  HR: 133 (18 Jul 2022 15:27) (61 - 133)  BP: 178/124 (18 Jul 2022 15:05) (101/59 - 191/31)  BP(mean): 145 (18 Jul 2022 15:05) (74 - 145)  RR: 34 (18 Jul 2022 15:05) (28 - 34)  SpO2: 98% (18 Jul 2022 15:27) (96% - 100%)    Parameters below as of 18 Jul 2022 06:00  Patient On (Oxygen Delivery Method): ventilator        PHYSICAL EXAM:  Gen: NAD, resting in bed  HEENT: Normocephalic, atraumatic  Neck: supple, no lymphadenopathy  CV: Regular rate & regular rhythm  Lungs: decreased BS at bases, no fremitus  Abdomen: Soft, BS present  Ext: Warm, well perfused  Neuro: non focal, awake  Skin: no rash, no erythema  Lines: no phlebitis    FH: Non-contributory  Social Hx: Non-contributory    TESTS & MEASUREMENTS:                        8.2    1.33  )-----------( 57       ( 18 Jul 2022 05:37 )             27.1     07-18    139  |  107  |  20  ----------------------------<  106<H>  3.6   |  21  |  1.0    Ca    8.6      18 Jul 2022 05:37  Phos  3.8     07-18  Mg     2.3     07-18    TPro  5.4<L>  /  Alb  2.7<L>  /  TBili  0.4  /  DBili  x   /  AST  15  /  ALT  12  /  AlkPhos  262<H>  07-18      LIVER FUNCTIONS - ( 18 Jul 2022 05:37 )  Alb: 2.7 g/dL / Pro: 5.4 g/dL / ALK PHOS: 262 U/L / ALT: 12 U/L / AST: 15 U/L / GGT: x               Culture - Sputum (collected 07-15-22 @ 10:00)  Source: Trach Asp Tracheal Aspirate  Gram Stain (07-16-22 @ 04:22):    Moderate polymorphonuclear leukocytes per low power field    Rare Squamous epithelial cells per low power field    No organisms seen per oil power field  Final Report (07-17-22 @ 16:58):    Normal Respiratory Kelly present    Babesia microti PCR, Blood. (collected 07-10-22 @ 15:43)  Source: .Blood None  Final Report (07-11-22 @ 09:45):    Babesia microti PCR    Results: NOT detected    ***************Result Note*************    The detection of Babesia microti by PCR has only been    validated for whole blood; this test has not been approved    by the US Food and Drug Administration (FDA). Performance    characteristics of this assay have been determined by    [facility]. The clinical significance    of results should be considered in conjunction with the    overall clinical presentation of the patient. Result is not    intended to be used as the sole means for clinical diagnosis    or patient management decisions.    One negative sample does not necessarily rule    out the presence of a parasitic infection.    Culture - Blood (collected 07-08-22 @ 05:26)  Source: .Blood None  Final Report (07-13-22 @ 19:00):    No Growth Final    Culture - Blood (collected 07-06-22 @ 07:20)  Source: .Blood None  Final Report (07-11-22 @ 19:00):    No Growth Final    Culture - Blood (collected 07-06-22 @ 07:20)  Source: .Blood None  Final Report (07-11-22 @ 19:00):    No Growth Final    Culture - Sputum (collected 07-04-22 @ 13:39)  Source: ET Tube ET Tube  Gram Stain (07-05-22 @ 08:48):    Few polymorphonuclear leukocytes per low power field    No Squamous epithelial cells per low power field    Numerous Gram Negative Coccobacilli seen per oil power field  Final Report (07-11-22 @ 08:12):    Moderate Acinetobacter baumannii/nosocom group (Carbapenem Resistant)    Cefiderocol interpretations based on FDA breakpoints.    Normal Respiratory Kelly absent  Organism: Acinetobacter baumannii/nosocom group (Carbapenem Resistant)  Acinetobacter baumannii/nosocom group (Carbapenem Resistant) (07-11-22 @ 08:12)  Organism: Acinetobacter baumannii/nosocom group (Carbapenem Resistant) (07-11-22 @ 08:12)      -  Cefiderocol: I      -  Imipenem: R      -  Piperacillin/Tazobactam: R      Method Type: KB  Organism: Acinetobacter baumannii/nosocom group (Carbapenem Resistant) (07-11-22 @ 08:12)      -  Amikacin: R >32      -  Ampicillin/Sulbactam: R >16/8      -  Cefepime: R >16      -  Ceftazidime: R >16      -  Ciprofloxacin: R >2      -  Gentamicin: R >8      -  Levofloxacin: R >4      -  Meropenem: R >8      -  Tobramycin: R >8      -  Trimethoprim/Sulfamethoxazole: R >2/38      Method Type: AL    Culture - Blood (collected 06-29-22 @ 20:31)  Source: .Blood Blood-Peripheral  Final Report (07-05-22 @ 20:00):    No Growth Final    Culture - Blood (collected 06-29-22 @ 20:31)  Source: .Blood Blood  Final Report (07-05-22 @ 20:00):    No Growth Final    Culture - Blood (collected 06-28-22 @ 06:00)  Source: .Blood Blood  Final Report (07-03-22 @ 23:00):    No Growth Final    Culture - Sputum (collected 06-27-22 @ 14:00)  Source: Trach Asp Tracheal Aspirate  Gram Stain (06-28-22 @ 03:15):    Moderate polymorphonuclear leukocytes per low power field    Few Squamous epithelial cells per low power field    Moderate Gram positive cocci in pairs seen per oil power field    Few Gram Variable Rods seen per oil power field  Final Report (06-30-22 @ 16:33):    Numerous Mixed gram negative rods    Moderate Staphylococcus aureus    Normal Respiratory Kelly present  Organism: Staphylococcus aureus (06-30-22 @ 16:33)  Organism: Staphylococcus aureus (06-30-22 @ 16:33)      -  Ampicillin/Sulbactam: S <=8/4      -  Cefazolin: S <=4      -  Clindamycin: S <=0.25      -  Erythromycin: S <=0.25      -  Gentamicin: S <=1 Should not be used as monotherapy      -  Oxacillin: S <=0.25 Oxacillin predicts susceptibility for dicloxacillin, methicillin, and nafcillin      -  Rifampin: S <=1 Should not be used as monotherapy      -  Tetra/Doxy: S <=1      -  Trimethoprim/Sulfamethoxazole: S <=0.5/9.5      -  Vancomycin: S 2      Method Type: AL    Culture - Sputum (collected 06-24-22 @ 17:20)  Source: Trach Asp Tracheal Aspirate  Gram Stain (06-25-22 @ 06:29):    Few polymorphonuclear leukocytes per low power field    Rare Squamous epithelial cells per low power field    Moderate Gram Negative Rods seen per oil power field  Final Report (06-26-22 @ 17:00):    Moderate Klebsiella oxytoca/Raoutella ornithinolytica    Moderate Enterobacter cloacae complex    Normal Respiratory Kelly absent  Organism: Klebsiella oxytoca /Raoutella ornithinolytica  Enterobacter cloacae complex (06-26-22 @ 17:00)  Organism: Enterobacter cloacae complex (06-26-22 @ 17:00)      -  Amikacin: S <=16      -  Amoxicillin/Clavulanic Acid: R >16/8      -  Ampicillin: R >16 These ampicillin results predict results for amoxicillin      -  Ampicillin/Sulbactam: R >16/8 Enterobacter, Klebsiella aerogenes, Citrobacter, and Serratia may develop resistance during prolonged therapy (3-4 days)      -  Aztreonam: S <=4      -  Cefazolin: R >16 Enterobacter, Klebsiella aerogenes, Citrobacter, and Serratia may develop resistance during prolonged therapy (3-4 days)      -  Cefepime: S <=2      -  Cefoxitin: R >16      -  Ceftriaxone: S <=1 Enterobacter, Klebsiella aerogenes, Citrobacter, and Serratia may develop resistance during prolonged therapy      -  Ciprofloxacin: S <=0.25      -  Ertapenem: S <=0.5      -  Gentamicin: S <=2      -  Imipenem: S <=1      -  Levofloxacin: S <=0.5      -  Meropenem: S <=1      -  Piperacillin/Tazobactam: S <=8      -  Tobramycin: S <=2      -  Trimethoprim/Sulfamethoxazole: S <=0.5/9.5      Method Type: AL  Organism: Klebsiella oxytoca /Raoutella ornithinolytica (06-26-22 @ 17:00)      -  Amikacin: S <=16      -  Amoxicillin/Clavulanic Acid: S <=8/4      -  Ampicillin: R 16 These ampicillin results predict results for amoxicillin      -  Ampicillin/Sulbactam: S <=4/2 Enterobacter, Klebsiella aerogenes, Citrobacter, and Serratia may develop resistance during prolonged therapy (3-4 days)      -  Aztreonam: S <=4      -  Cefazolin: S <=2 Enterobacter, Klebsiella aerogenes, Citrobacter, and Serratia may develop resistance during prolonged therapy (3-4 days)      -  Cefepime: S <=2      -  Cefoxitin: S <=8      -  Ceftriaxone: S <=1 Enterobacter, Klebsiella aerogenes, Citrobacter, and Serratia may develop resistance during prolonged therapy      -  Ciprofloxacin: S <=0.25      -  Ertapenem: S <=0.5      -  Gentamicin: S <=2      -  Imipenem: S <=1      -  Levofloxacin: S <=0.5      -  Meropenem: S <=1      -  Piperacillin/Tazobactam: S <=8      -  Tobramycin: S <=2      -  Trimethoprim/Sulfamethoxazole: S <=0.5/9.5      Method Type: AL    Culture - Body Fluid with Gram Stain (collected 06-22-22 @ 01:00)  Source: Peritoneal Peritoneal Fluid  Gram Stain (06-23-22 @ 02:31):    No polymorphonuclear leukocytes seen    No organisms seen    by cytocentrifuge  Final Report (06-27-22 @ 19:13):    No growth at 5 days    Culture - Blood (collected 06-21-22 @ 05:33)  Source: .Blood None  Final Report (06-26-22 @ 19:00):    No Growth Final            INFECTIOUS DISEASES TESTING  COVID-19 PCR: NotDetec (07-17-22 @ 20:01)  COVID-19 PCR: NotDetec (07-14-22 @ 15:00)  Procalcitonin, Serum: 0.16 (07-14-22 @ 12:50)  Procalcitonin, Serum: 0.12 (07-10-22 @ 05:42)  Fungitell: <31 (07-05-22 @ 11:49)  Procalcitonin, Serum: 0.14 (07-05-22 @ 11:23)  MRSA PCR Result.: Negative (06-30-22 @ 10:34)  Fungitell: See Comment** **RESULTS OF FUNGITELL INCONCLUSIVE DUE TO THE PRESENCE OF UNKNOWN  INTERFERING SUBSTANCE. PLEASE SUBMIT ANOTHER SPECIMEN FOR TESTING.  PLEASE CONTACT [facility] WITH QUESTIONS.  Interpretation: The Fungitell assay does not detectcertain fungal  species such as the genus Cryptococcus (Aundrea et al. 1991) which  produces very low levels of (1-3)-Beta-D-Glucan. The assay also does  not detect the Zygomycetes such as Absidia, Mucor and Rhizopus  (Carol et al. 1994) which are not known to produce  (1-3)-Beta-D-Glucan. In addition, the yeast phase of Blastomyces  dermatitidis produces little (1-3)-Beta-D-Glucan and may not be  detected by the assay (Anila et al. 2007).  Reference Range:  Less than 60 pg/mL. Glucan values of less than 60 pg/mL are  interpreted as negative.  Glucan values of 60 to 79 pg/mL are interpreted as indeterminate,  and suggest a possible fungal infection. Additional sampling and  testing of sera is required to interpret the results.  Glucan values of greater than or equal to 80 pg/mL are interpreted  as positive.  Due to the potential for environmental contamination when  transferred to pour-off tubes, which can lead to false positive  results, interpret positive results from samples provided in  pour-off tubes with caution.  Results should be used in conjunction  with clinical findings, and should not form the sole basis for a  diagnosis or treatment decision. The Fungitell test is approved or  cleared for in vitro diagnostic use by the U.[place] Food and Drug  Administration. Modifications to the approved package insert have  been made and the performance characteristics for these  modifications were determined by [facility].  If sample result is greater than 500 pg/mL, physician may order a  titer of the sample. Please contact [facility] if you would  like to order a retest of this sample to obtain an actual value.  Samples are held for 1 week after initial testing date.  ____________________________________________________________  Performed at:  [facility]  [address] [place] [address]  : Kirk Newberry Ph.D., BCLD (ABB)  CLIA#: [id number]  Phone: [phone] (06-30-22 @ 10:30)  Procalcitonin, Serum: 0.36 (06-30-22 @ 10:30)  Rapid RVP Result: NotDetec (06-30-22 @ 09:19)  HIV-1/2 Combo Result: Nonreact (06-29-22 @ 10:29)  Procalcitonin, Serum: 0.11 (06-27-22 @ 15:46)  Procalcitonin, Serum: 0.11 (06-27-22 @ 06:47)  Procalcitonin, Serum: 0.62 (06-20-22 @ 05:49)  MRSA PCR Result.: Negative (06-17-22 @ 14:30)  Procalcitonin, Serum: 3.28 (06-17-22 @ 05:22)  COVID-19 PCR: NotDetec (06-15-22 @ 07:10)  Procalcitonin, Serum: 0.07 (04-20-22 @ 12:46)      INFLAMMATORY MARKERS  Sedimentation Rate, Erythrocyte: 86 mm/Hr (06-30-22 @ 05:46)  Sedimentation Rate, Erythrocyte: 65 mm/Hr (06-23-22 @ 16:00)      RADIOLOGY & ADDITIONAL TESTS:  I have personally reviewed the last available Chest xray  CXR      CT      CARDIOLOGY TESTING  12 Lead ECG:   Ventricular Rate 103 BPM    Atrial Rate 103 BPM    P-R Interval 154 ms    QRS Duration 66 ms    Q-T Interval 354 ms    QTC Calculation(Bazett) 463 ms    P Axis 63 degrees    R Axis 86 degrees    T Axis 198 degrees    Diagnosis Line Sinus tachycardia  Nonspecific ST-T changes  Confirmed by BRANDON BELL MD ([id number]) on 7/15/2022 10:08:35 AM (07-15-22 @ 07:50)  12 Lead ECG:   Ventricular Rate 107 BPM    Atrial Rate 107 BPM    P-R Interval 138 ms    QRS Duration 82 ms    Q-T Interval 300 ms    QTC Calculation(Bazett) 400 ms    P Axis 86 degrees    R Axis 125 degrees    T Axis 218 degrees    Diagnosis Line Sinus tachycardia  Right axis deviation  Pulmonary disease pattern  Possible Right ventricular hypertrophy  Nonspecific T wave abnormality  Abnormal ECG    Confirmed by BRANDON BELL MD ([id number]) on 7/14/2022 11:44:00 AM (07-14-22 @ 08:36)      MEDICATIONS  ampicillin/sulbactam  IVPB 3 IV Intermittent every 6 hours  chlorhexidine 0.12% Liquid 15 Oral Mucosa every 12 hours  chlorhexidine 2% Cloths 1 Topical daily  dexMEDEtomidine Infusion 0.1 IV Continuous <Continuous>  fentaNYL   Infusion. 0.5 IV Continuous <Continuous>  folic acid 1 Oral daily  lactated ringers. 1000 IV Continuous <Continuous>  magnesium sulfate  IVPB 1 IV Intermittent every 8 hours  methadone    Tablet 20 Oral daily  nicotine -  14 mG/24Hr(s) Patch 1 Transdermal daily  norepinephrine Infusion 0.05 IV Continuous <Continuous>  pantoprazole  Injectable 40 IV Push daily  polymyxin B IVPB [id number] IV Intermittent every 12 hours  potassium chloride   Powder 40 Oral daily  potassium chloride   Powder 40 Enteral Tube two times a day  propofol Infusion 0.1 IV Continuous <Continuous>  QUEtiapine 100 Oral two times a day  scopolamine 1 mG/72 Hr(s) Patch 1 Transdermal every 72 hours  spironolactone 100 Oral daily  thiamine 100 Oral daily  vancomycin    Solution 250 Oral every 6 hours      WEIGHT  Weight (kg): 70.4 (07-07-22 @ 07:00)  Creatinine, Serum: 1.0 mg/dL (07-18-22 @ 05:37)  Creatinine, Serum: 1.0 mg/dL (07-17-22 @ 22:01)      ANTIBIOTICS:  ampicillin/sulbactam  IVPB 3 Gram(s) IV Intermittent every 6 hours  polymyxin B IVPB [id number] Unit(s) IV Intermittent every 12 hours  vancomycin    Solution 250 milliGRAM(s) Oral every 6 hours      All available historical records have been reviewed

## 2022-07-18 NOTE — PROGRESS NOTE ADULT - ASSESSMENT
IMPRESSION:  Acute respiratory failure sp intubation  PNA  MSSA pna  seizure like activity  ?? drug over dose/ withdrawal opoid   liver cirrhosis   Ascites sp paracentesis   Pancytopenia- worsening  RENETTA improved  C diff +ve     PLAN:    CNS: Continue with Methadone for withdrawal Dc fentanyl and propofol this morning; continue with precedex and decrease leyla dose gradually; continue with seroquel and clonazepam; Check QTc daily. SAT this morning and following commands.    HEENT: oral care; ETT care    PULMONARY:  SBT today with goal to extubate; CXR unchnaged. ABG on SBT.     CARDIOVASCULAR: Keep MAP more than 65mmhg; not on pressors currently keep equal balance. On oral diuretics.     GI: GI prophylaxis. Hold OGT feeding for possible extubation. Do a bedside US of the abdomen. If large ascites then will need therapeutic paracentesis.     RENAL: Correct K ot mo re than 4 and Mg to more than 2. DC Free water pushes.     INFECTIOUS DISEASE: Currently on PO vancomycin, Unasyn, Polymixin. Monitor kidney function and electrolytes. ID following. DTA culture so far negative. Recheck procalcitonin. Tmax 100.8.     HEMATOLOGICAL:   s/p BMB follow result and cx. Hematology following. Monitor platelet count. Keep hg more than 7. Send HIT panel for thrombocytopenia and peripheral smear. Start fondaparinux for DVT prophylaxis if platelet count stays above 50k until HIT panel is back.     ENDOCRINE:  Follow up FS.  Insulin protocol if needed.     Musk: bedrest for now. PT rehab evaluation.     Dispo: Remains critically and ill needs ICU care.     TLC 7/6 RIJ; ETT   SAT/ SBT

## 2022-07-18 NOTE — PROGRESS NOTE ADULT - ASSESSMENT
25yo woman with acute respiratory failure in setting of substance abuse, s/p intubation (prolonged) and course c/b C dif colitis. Stable.

## 2022-07-18 NOTE — PROGRESS NOTE ADULT - ASSESSMENT
n/a Patient is a 26 year old female with hx of asthma, untreated Hep C, IVDA, anasarca presenting with increased dyspnea since yesterday    IMPRESSION  #Acute respiratory failure s/p intubation 6/16, extubated 7/7, reintubated     #VAP  - Xray Chest 1 View- PORTABLE-Routine (Xray Chest 1 View- PORTABLE-Routine in AM.) (07.03.22 @ 06:10): Increasing right basilar opacity.  - sputum Cx 7/4 MDR Acinetobacter baumanii     # C.difficille infection - 7/5/22    #Pneumonia -   - CT Chest 6/22 - left greater than right lower lobe consolidations   - sputum Cx 6/24 Klebsiella oxytoca, Enterobacter cloacae complex- The Sputum culture from 6/27 grew Numerous Mixed gram negative rods and Moderate Staphylococcus aureus.  - treated with course of cefepime     #FUO + Pancytopenia + Splenomegaly   - Ferritin, Serum: 94 ng/mL (06.27.22 @ 06:47),  Procalcitonin, Serum: 0.11 (06.27.22 @ 15:46)  - CT Abd/pelvis 6/26 - moderated ascites moderate pericardial effusion   - bartonella-ab negative   - Hepatosplenomegaly - present since CT Abd/pelvis 1/30/2021  - HIV-1/2 Combo Result: Nonreact:  (06.29.22 @ 10:29)  - Sedimentation Rate, Erythrocyte: 86 mm/Hr (06.30.22 @ 05:46)  - Autoimmune panel negative   - Quantiferon TB Plus: NEGATIVE (06.04.20 @ 10:28)  - Quantiferon TB Plus: INDETERMINATE. (02.06.21 @ 09:00)  - s/p Bone marrow biopsy 7/14    #Drug overdose vs withdrawal?  #Hx of Untreated Hep C   #IVDA   #Abx allergy: buprenorphine (Rash), Suboxone (Rash)    Recommendations  - continue polymyxin 83021 U/kg q 12 hours and unasyn 3g q 6 hours for Acinetobacter VAP   -- if Creatinine is worsening tomorrow, will have to stop polymixin   - continue PO vancomycin 250 mg q 6 hours   - follow-up bone marrow biopsy path     Please call or message on [employer] Teams if with any questions.  Spectra [phone]

## 2022-07-18 NOTE — PROGRESS NOTE ADULT - SUBJECTIVE AND OBJECTIVE BOX
Patient seen and evaluated this am, awake on ventilator, lightly sedated on Precedex       T(F): 99.7 (07-18-22 @ 11:00), Max: 99.7 (07-18-22 @ 11:00)  HR: 93 (07-18-22 @ 08:44)  BP: 191/31 (07-18-22 @ 11:00)  RR: 22  SpO2: 100% (07-18-22 @ 08:44) (96% - 100%)    PHYSICAL EXAM:  GENERAL: NAD  HEAD:  Atraumatic, Normocephalic  EYES: EOMI, PERRLA, conjunctiva and sclera clear  NERVOUS SYSTEM:  Alert & Oriented,  no focal deficits   CHEST/LUNG:  bilateral rhonchi  HEART: Regular rate and rhythm; No murmurs, rubs, or gallops  ABDOMEN: Soft, Nontender, Nondistended; Bowel sounds present  EXTREMITIES:  2+ Peripheral Pulses, No clubbing, cyanosis, or edema    LABS  07-18    139  |  107  |  20  ----------------------------<  106<H>  3.6   |  21  |  1.0    Ca    8.6      18 Jul 2022 05:37  Phos  3.8     07-18  Mg     2.3     07-18    TPro  5.4<L>  /  Alb  2.7<L>  /  TBili  0.4  /  DBili  x   /  AST  15  /  ALT  12  /  AlkPhos  262<H>  07-18                          8.2    1.33  )-----------( 57       ( 18 Jul 2022 05:37 )             27.1     PT/INR - ( 17 Jul 2022 22:01 )   PT: 11.90 sec;   INR: 1.04 ratio         PTT - ( 17 Jul 2022 22:01 )  PTT:28.0 sec    Mode: AC/ CMV (Assist Control/ Continuous Mandatory Ventilation)  RR (machine): 25  TV (machine): 370  FiO2: 35  PEEP: 5      Culture Results:   Normal Respiratory Kelly present (07-15-22)  Culture Results:   Babesia microti PCR  Results: NOT detected  ***************Result Note*************  The detection of Babesia microti by PCR has only been  validated for whole blood; this test has not been approved  by the US Food and Drug Administration (FDA). Performance  characteristics of this assay have been determined by  [facility]. The clinical significance  of results should be considered in conjunction with the  overall clinical presentation of the patient. Result is not  intended to be used as the sole means for clinical diagnosis  or patient management decisions.  One negative sample does not necessarily rule  out the presence of a parasitic infection. (07-10-22)  Culture Results:   No Growth Final (07-08-22)  Culture Results:   No Growth Final (07-06-22)  Culture Results:   No Growth Final (07-06-22)  Culture Results:   Moderate Acinetobacter baumannii / nosocom group (Carbapenem Resistant)  Cefiderocol interpretations based on FDA breakpoints.  Normal Respiratory Kelly absent (07-04-22)  Culture Results:   No Growth Final (06-29-22)  Culture Results:   No Growth Final (06-29-22)  Culture Results:   No Growth Final (06-28-22)  Culture Results:   Numerous Mixed gram negative rods  Moderate Staphylococcus aureus  Normal Respiratory Kelly present (06-27-22)    RADIOLOGY  < from: Xray Chest 1 View- PORTABLE-Routine (Xray Chest 1 View- PORTABLE-Routine in AM.) (07.18.22 @ 05:16) >  Impression:    Unchanged bilateral opacities.    < end of copied text >    MEDICATIONS  (STANDING):  ampicillin/sulbactam  IVPB 3 Gram(s) IV Intermittent every 6 hours  chlorhexidine 0.12% Liquid 15 milliLiter(s) Oral Mucosa every 12 hours  chlorhexidine 2% Cloths 1 Application(s) Topical daily  dexMEDEtomidine Infusion 0.1 MICROgram(s)/kG/Hr (1.76 mL/Hr) IV Continuous <Continuous>  fentaNYL   Infusion. 0.5 MICROgram(s)/kG/Hr (3.52 mL/Hr) IV Continuous <Continuous>  folic acid 1 milliGRAM(s) Oral daily  lactated ringers. 1000 milliLiter(s) (100 mL/Hr) IV Continuous <Continuous>  magnesium sulfate  IVPB 1 Gram(s) IV Intermittent every 8 hours  methadone    Tablet 20 milliGRAM(s) Oral daily  nicotine -  14 mG/24Hr(s) Patch 1 patch Transdermal daily  norepinephrine Infusion 0.05 MICROgram(s)/kG/Min (8.03 mL/Hr) IV Continuous <Continuous>  pantoprazole  Injectable 40 milliGRAM(s) IV Push daily  polymyxin B IVPB [id number] Unit(s) IV Intermittent every 12 hours  potassium chloride   Powder 40 milliEquivalent(s) Oral daily  potassium chloride   Powder 40 milliEquivalent(s) Enteral Tube two times a day  propofol Infusion 0.1 MICROgram(s)/kG/Min (0.04 mL/Hr) IV Continuous <Continuous>  QUEtiapine 100 milliGRAM(s) Oral two times a day  scopolamine 1 mG/72 Hr(s) Patch 1 Patch Transdermal every 72 hours  spironolactone 100 milliGRAM(s) Oral daily  thiamine 100 milliGRAM(s) Oral daily  vancomycin    Solution 250 milliGRAM(s) Oral every 6 hours    MEDICATIONS  (PRN):  acetaminophen  Suppository .. 650 milliGRAM(s) Rectal every 6 hours PRN Temp greater or equal to 38C (100.4F), Mild Pain (1 - 3)  ALBUTerol    90 MICROgram(s) HFA Inhaler 1 Puff(s) Inhalation every 4 hours PRN Shortness of Breath and/or Wheezing  cloNIDine 0.1 milliGRAM(s) Oral every 6 hours PRN opiate withdrawal  hydrOXYzine hydrochloride 50 milliGRAM(s) Oral every 6 hours PRN Anxiety  ibuprofen  Tablet. 600 milliGRAM(s) Oral every 6 hours PRN Temp greater or equal to 38C (100.4F)  ibuprofen  Tablet. 400 milliGRAM(s) Oral every 6 hours PRN Mild Pain (1 - 3)  sodium chloride 0.9% lock flush 10 milliLiter(s) IV Push every 1 hour PRN Pre/post blood products, medications, blood draw, and to maintain line patency

## 2022-07-18 NOTE — PROGRESS NOTE ADULT - ASSESSMENT
Acute respiratory failure with hypoxemia / aspiration pneumonia with sepsis / suspected drug overdose / head laceration s/p fall in lobby / possible seizure / anasarca  pancytopenia - resolving /  fever     - c-diff colitis - continue oral vanco   - w/u and antibiotics as per ID    - s/p BMB - heme f/u    - supplement hypokalemia and  hypomagnesemia    - re intubated -> still awaiting  trach - surgery following

## 2022-07-18 NOTE — PROGRESS NOTE ADULT - ASSESSMENT
Impression:  Acute respiratory failure sp intubation and extubation 7/8 followed by reintubation  PNA CT Chest 6/22 - left greater than right lower lobe consolidations; sputum Cx 6/24 Klebsiella oxytoca, Enterobacter cloacae complex- and Acinetobacter buamnii  MSSA pna; acetinobacter VAP  seizure like activity  ?? 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 Diff on oral vancomycin   Hypokalemia/hypomagnesemia      PLAN:    CNS:   c/w methadone   c/w precedex  Did not tolerate SBT this am    HEENT: oral care     PULMONARY:  HOB 45,  did not tolerate SBT this am (patient hypertensive/tachycardic)  f/u surgery reccs for tentative trach date as ID cleared patient/no definite contraindications     CARDIOVASCULAR:   goal MAP >65.  Monitor QTc daily on ECG  ECHO 7/6: Small to mod generalized effusion. No tamponade . Small mod effusion   present by report on echo 4/21/22  f/u cardiology recommendations; no urgency in diagnostic tap, future ROSLYN given acinetobacter and IVDA     GI:   GI prophylaxis.  OG Feeding.   laxative prn     RENAL: monitor lytes  D/C free water flushes     INFECTIOUS DISEASE:   worsening R infiltrate. Fungitell <31 7/5 , Glactomannan pending   Repeat procal 0.14 from 0.36 (6/30)   Acinetobacter buamnii in sputum cx -> switch cefdericol to  polymyxin 33301 U/kg q 12 hours and unasyn 3g q 6 hours for Acinetobacter VAP   - continue PO vancomycin 250 mg q 6 hours  - s/p bedside BM biopsy 7/14: no complications -> f/u results   - f/u HLH panel (ferritin, IL6, triglycerides soluble antigen) and heme/onc recommendations   - f/u ID recommendations     HEMATOLOGICAL:    monitor CBC  D-dimer 1322, HIT antibody negative x2   f/u repeat HIT antibody; duplex negative 7/2/2022  - s/p bedside BM biopsy 7/14: negative  - f/u HLH panel (ferritin, IL6, triglycerides soluble antigen) and heme/onc recommendations   hold heparin and start fondaparinux when plt more then 50 serial h/h keep hgb > 7    ENDOCRINE:  Follow up FS.  Insulin protocol if needed.     MICU  lines: right IJ 7/6

## 2022-07-18 NOTE — PROGRESS NOTE ADULT - SUBJECTIVE AND OBJECTIVE BOX
Doing better. Afebrile and tolerated short periods of SBT per nursing staff. Off pressors. This morning, was somewhat agitated annd restarted on Precedex, HR now 130s. Overall, her mental status is improved and there is hope for extubation today.

## 2022-07-19 LAB
ALBUMIN SERPL ELPH-MCNC: 2.7 G/DL — LOW (ref 3.5–5.2)
ALP SERPL-CCNC: 277 U/L — HIGH (ref 30–115)
ALT FLD-CCNC: 11 U/L — SIGNIFICANT CHANGE UP (ref 0–41)
ANION GAP SERPL CALC-SCNC: 12 MMOL/L — SIGNIFICANT CHANGE UP (ref 7–14)
APTT BLD: 27.4 SEC — SIGNIFICANT CHANGE UP (ref 27–39.2)
AST SERPL-CCNC: 15 U/L — SIGNIFICANT CHANGE UP (ref 0–41)
BASOPHILS # BLD AUTO: 0 K/UL — SIGNIFICANT CHANGE UP (ref 0–0.2)
BASOPHILS NFR BLD AUTO: 0 % — SIGNIFICANT CHANGE UP (ref 0–1)
BILIRUB SERPL-MCNC: 0.4 MG/DL — SIGNIFICANT CHANGE UP (ref 0.2–1.2)
BUN SERPL-MCNC: 19 MG/DL — SIGNIFICANT CHANGE UP (ref 10–20)
CALCIUM SERPL-MCNC: 8.8 MG/DL — SIGNIFICANT CHANGE UP (ref 8.5–10.1)
CHLORIDE SERPL-SCNC: 108 MMOL/L — SIGNIFICANT CHANGE UP (ref 98–110)
CO2 SERPL-SCNC: 20 MMOL/L — SIGNIFICANT CHANGE UP (ref 17–32)
CREAT SERPL-MCNC: 0.9 MG/DL — SIGNIFICANT CHANGE UP (ref 0.7–1.5)
EGFR: 89 ML/MIN/1.73M2 — SIGNIFICANT CHANGE UP
EOSINOPHIL # BLD AUTO: 0.01 K/UL — SIGNIFICANT CHANGE UP (ref 0–0.7)
EOSINOPHIL NFR BLD AUTO: 0.6 % — SIGNIFICANT CHANGE UP (ref 0–8)
GLUCOSE SERPL-MCNC: 96 MG/DL — SIGNIFICANT CHANGE UP (ref 70–99)
HCT VFR BLD CALC: 25 % — LOW (ref 37–47)
HGB BLD-MCNC: 7.6 G/DL — LOW (ref 12–16)
IMM GRANULOCYTES NFR BLD AUTO: 0.6 % — HIGH (ref 0.1–0.3)
INR BLD: 1.05 RATIO — SIGNIFICANT CHANGE UP (ref 0.65–1.3)
LYMPHOCYTES # BLD AUTO: 0.57 K/UL — LOW (ref 1.2–3.4)
LYMPHOCYTES # BLD AUTO: 33.5 % — SIGNIFICANT CHANGE UP (ref 20.5–51.1)
MAGNESIUM SERPL-MCNC: 2.2 MG/DL — SIGNIFICANT CHANGE UP (ref 1.8–2.4)
MCHC RBC-ENTMCNC: 26.4 PG — LOW (ref 27–31)
MCHC RBC-ENTMCNC: 30.4 G/DL — LOW (ref 32–37)
MCV RBC AUTO: 86.8 FL — SIGNIFICANT CHANGE UP (ref 81–99)
MONOCYTES # BLD AUTO: 0.13 K/UL — SIGNIFICANT CHANGE UP (ref 0.1–0.6)
MONOCYTES NFR BLD AUTO: 7.6 % — SIGNIFICANT CHANGE UP (ref 1.7–9.3)
NEUTROPHILS # BLD AUTO: 0.98 K/UL — LOW (ref 1.4–6.5)
NEUTROPHILS NFR BLD AUTO: 57.7 % — SIGNIFICANT CHANGE UP (ref 42.2–75.2)
NRBC # BLD: 0 /100 WBCS — SIGNIFICANT CHANGE UP (ref 0–0)
PHOSPHATE SERPL-MCNC: 4.1 MG/DL — SIGNIFICANT CHANGE UP (ref 2.1–4.9)
PLATELET # BLD AUTO: 63 K/UL — LOW (ref 130–400)
POTASSIUM SERPL-MCNC: 4 MMOL/L — SIGNIFICANT CHANGE UP (ref 3.5–5)
POTASSIUM SERPL-SCNC: 4 MMOL/L — SIGNIFICANT CHANGE UP (ref 3.5–5)
PROCALCITONIN SERPL-MCNC: 0.21 NG/ML — HIGH (ref 0.02–0.1)
PROT SERPL-MCNC: 5.2 G/DL — LOW (ref 6–8)
PROTHROM AB SERPL-ACNC: 12.1 SEC — SIGNIFICANT CHANGE UP (ref 9.95–12.87)
RBC # BLD: 2.88 M/UL — LOW (ref 4.2–5.4)
RBC # FLD: 15.7 % — HIGH (ref 11.5–14.5)
SODIUM SERPL-SCNC: 140 MMOL/L — SIGNIFICANT CHANGE UP (ref 135–146)
WBC # BLD: 1.7 K/UL — LOW (ref 4.8–10.8)
WBC # FLD AUTO: 1.7 K/UL — LOW (ref 4.8–10.8)

## 2022-07-19 PROCEDURE — 71045 X-RAY EXAM CHEST 1 VIEW: CPT | Mod: 26,77

## 2022-07-19 PROCEDURE — 71045 X-RAY EXAM CHEST 1 VIEW: CPT | Mod: 26

## 2022-07-19 PROCEDURE — 99232 SBSQ HOSP IP/OBS MODERATE 35: CPT

## 2022-07-19 PROCEDURE — 74018 RADEX ABDOMEN 1 VIEW: CPT | Mod: 26

## 2022-07-19 PROCEDURE — 99291 CRITICAL CARE FIRST HOUR: CPT

## 2022-07-19 RX ADMIN — SODIUM CHLORIDE 65 MILLILITER(S): 9 INJECTION, SOLUTION INTRAVENOUS at 07:23

## 2022-07-19 RX ADMIN — DEXMEDETOMIDINE HYDROCHLORIDE IN 0.9% SODIUM CHLORIDE 1.76 MICROGRAM(S)/KG/HR: 4 INJECTION INTRAVENOUS at 01:51

## 2022-07-19 RX ADMIN — AMPICILLIN SODIUM AND SULBACTAM SODIUM 200 GRAM(S): 250; 125 INJECTION, POWDER, FOR SUSPENSION INTRAMUSCULAR; INTRAVENOUS at 12:41

## 2022-07-19 RX ADMIN — Medication 250 MILLIGRAM(S): at 05:29

## 2022-07-19 RX ADMIN — AMPICILLIN SODIUM AND SULBACTAM SODIUM 200 GRAM(S): 250; 125 INJECTION, POWDER, FOR SUSPENSION INTRAMUSCULAR; INTRAVENOUS at 18:21

## 2022-07-19 RX ADMIN — CHLORHEXIDINE GLUCONATE 15 MILLILITER(S): 213 SOLUTION TOPICAL at 18:21

## 2022-07-19 RX ADMIN — SCOPALAMINE 1 PATCH: 1 PATCH, EXTENDED RELEASE TRANSDERMAL at 07:39

## 2022-07-19 RX ADMIN — DEXMEDETOMIDINE HYDROCHLORIDE IN 0.9% SODIUM CHLORIDE 1.76 MICROGRAM(S)/KG/HR: 4 INJECTION INTRAVENOUS at 14:14

## 2022-07-19 RX ADMIN — PANTOPRAZOLE SODIUM 40 MILLIGRAM(S): 20 TABLET, DELAYED RELEASE ORAL at 11:10

## 2022-07-19 RX ADMIN — CHLORHEXIDINE GLUCONATE 15 MILLILITER(S): 213 SOLUTION TOPICAL at 05:31

## 2022-07-19 RX ADMIN — QUETIAPINE FUMARATE 100 MILLIGRAM(S): 200 TABLET, FILM COATED ORAL at 05:30

## 2022-07-19 RX ADMIN — Medication 1 PATCH: at 18:53

## 2022-07-19 RX ADMIN — POLYMYXIN B SULFATE 500 UNIT(S): 500000 INJECTION, POWDER, LYOPHILIZED, FOR SOLUTION INTRAMUSCULAR; INTRATHECAL; INTRAVENOUS; OPHTHALMIC at 05:45

## 2022-07-19 RX ADMIN — FENTANYL CITRATE 3.52 MICROGRAM(S)/KG/HR: 50 INJECTION INTRAVENOUS at 07:22

## 2022-07-19 RX ADMIN — FENTANYL CITRATE 3.52 MICROGRAM(S)/KG/HR: 50 INJECTION INTRAVENOUS at 01:52

## 2022-07-19 RX ADMIN — Medication 100 GRAM(S): at 22:17

## 2022-07-19 RX ADMIN — Medication 100 GRAM(S): at 16:43

## 2022-07-19 RX ADMIN — PROPOFOL 0.04 MICROGRAM(S)/KG/MIN: 10 INJECTION, EMULSION INTRAVENOUS at 12:41

## 2022-07-19 RX ADMIN — QUETIAPINE FUMARATE 100 MILLIGRAM(S): 200 TABLET, FILM COATED ORAL at 18:23

## 2022-07-19 RX ADMIN — CHLORHEXIDINE GLUCONATE 1 APPLICATION(S): 213 SOLUTION TOPICAL at 22:18

## 2022-07-19 RX ADMIN — Medication 1 PATCH: at 07:40

## 2022-07-19 RX ADMIN — Medication 40 MILLIEQUIVALENT(S): at 18:22

## 2022-07-19 RX ADMIN — Medication 40 MILLIEQUIVALENT(S): at 12:40

## 2022-07-19 RX ADMIN — Medication 100 MILLIGRAM(S): at 12:40

## 2022-07-19 RX ADMIN — Medication 1 PATCH: at 11:11

## 2022-07-19 RX ADMIN — Medication 100 GRAM(S): at 05:45

## 2022-07-19 RX ADMIN — DEXMEDETOMIDINE HYDROCHLORIDE IN 0.9% SODIUM CHLORIDE 1.76 MICROGRAM(S)/KG/HR: 4 INJECTION INTRAVENOUS at 07:22

## 2022-07-19 RX ADMIN — Medication 1 PATCH: at 11:35

## 2022-07-19 RX ADMIN — SPIRONOLACTONE 100 MILLIGRAM(S): 25 TABLET, FILM COATED ORAL at 05:30

## 2022-07-19 RX ADMIN — Medication 250 MILLIGRAM(S): at 12:40

## 2022-07-19 RX ADMIN — POLYMYXIN B SULFATE 500 UNIT(S): 500000 INJECTION, POWDER, LYOPHILIZED, FOR SOLUTION INTRAMUSCULAR; INTRATHECAL; INTRAVENOUS; OPHTHALMIC at 18:21

## 2022-07-19 RX ADMIN — SCOPALAMINE 1 PATCH: 1 PATCH, EXTENDED RELEASE TRANSDERMAL at 18:53

## 2022-07-19 RX ADMIN — Medication 250 MILLIGRAM(S): at 18:22

## 2022-07-19 RX ADMIN — PROPOFOL 0.04 MICROGRAM(S)/KG/MIN: 10 INJECTION, EMULSION INTRAVENOUS at 07:22

## 2022-07-19 RX ADMIN — DEXMEDETOMIDINE HYDROCHLORIDE IN 0.9% SODIUM CHLORIDE 1.76 MICROGRAM(S)/KG/HR: 4 INJECTION INTRAVENOUS at 10:26

## 2022-07-19 RX ADMIN — Medication 40 MILLIEQUIVALENT(S): at 05:30

## 2022-07-19 RX ADMIN — PROPOFOL 0.04 MICROGRAM(S)/KG/MIN: 10 INJECTION, EMULSION INTRAVENOUS at 04:52

## 2022-07-19 RX ADMIN — METHADONE HYDROCHLORIDE 20 MILLIGRAM(S): 40 TABLET ORAL at 12:40

## 2022-07-19 RX ADMIN — DEXMEDETOMIDINE HYDROCHLORIDE IN 0.9% SODIUM CHLORIDE 1.76 MICROGRAM(S)/KG/HR: 4 INJECTION INTRAVENOUS at 05:56

## 2022-07-19 RX ADMIN — PROPOFOL 0.04 MICROGRAM(S)/KG/MIN: 10 INJECTION, EMULSION INTRAVENOUS at 00:16

## 2022-07-19 RX ADMIN — Medication 1 MILLIGRAM(S): at 12:40

## 2022-07-19 RX ADMIN — AMPICILLIN SODIUM AND SULBACTAM SODIUM 200 GRAM(S): 250; 125 INJECTION, POWDER, FOR SUSPENSION INTRAMUSCULAR; INTRAVENOUS at 05:29

## 2022-07-19 RX ADMIN — FENTANYL CITRATE 3.52 MICROGRAM(S)/KG/HR: 50 INJECTION INTRAVENOUS at 16:44

## 2022-07-19 NOTE — PROGRESS NOTE ADULT - ASSESSMENT
27yo woman with acute respiratory failure in setting of substance abuse, s/p intubation (prolonged) and course c/b C dif colitis. Stable.  Booked for OR 7/20 at 4 PM    NPO after midnight  COVID within 72 hours - on file  T+S from 7/17 OK  7 PM labs: CBC, BMP, Mg, Phos, Coags  K>4, Phos >3, Mg>2  Platelets >50  Will call family today for informed consent    1616

## 2022-07-19 NOTE — PROGRESS NOTE ADULT - SUBJECTIVE AND OBJECTIVE BOX
GENERAL SURGERY PROGRESS NOTE    Patient: POOJA DIANE , 26y ([birth year])Female   MRN: [id number]  [address]  Visit: 06-15-22 Inpatient  Date: 07-19-22 @ 13:48      Procedure/Dx/Injuries: consulted for tracheostomy    Events of past 24 hours: Patient afebrile overnight, nontachycardic.    PAST MEDICAL & SURGICAL HISTORY:  Hepatitis C      Asthma      Anxiety and depression      IV drug abuse  heroin      No significant past surgical history          Vitals:   T(F): 96 (07-19-22 @ 11:07), Max: 99 (07-18-22 @ 23:00)  HR: 66 (07-19-22 @ 11:00)  BP: 98/65 (07-19-22 @ 11:00)  RR: 20 (07-19-22 @ 13:00)  SpO2: 99% (07-19-22 @ 11:00)  Mode: AC/ CMV (Assist Control/ Continuous Mandatory Ventilation), RR (machine): 25, TV (machine): 370, FiO2: 35, PEEP: 5, ITime: 0.8, MAP: 8, PIP: 16    Diet, NPO:   NPO for Procedure/Test     NPO Start Date: 20-Jul-2022,   NPO Start Time: 23:59  Except Medications  Diet, NPO with Tube Feed:   Tube Feeding Modality: Orogastric  Vital High Protein  Total Volume for 24 Hours (mL): 1200  Continuous  Starting Tube Feed Rate mL per Hour: 10  Until Goal Tube Feed Rate (mL per Hour): 50  Tube Feed Duration (in Hours): 24  Tube Feed Start Time: 16:00      Fluids: lactated ringers.: Solution, 1000 milliLiter(s) infuse at 65 mL/Hr, Stop After 18 Hours      I & O's:    07-18-22 @ 07:01  -  07-19-22 @ 07:00  --------------------------------------------------------  IN:    Dexmedetomidine: 475 mL    FentaNYL: 271.5 mL    IV PiggyBack: 1000 mL    IV PiggyBack: 300 mL    IV PiggyBack: 400 mL    Lactated Ringers: 700 mL    Lactated Ringers: 715 mL    Propofol: 368 mL  Total IN: 4229.5 mL    OUT:    Indwelling Catheter - Urethral (mL): 2685 mL  Total OUT: 2685 mL    Total NET: 1544.5 mL        PHYSICAL EXAM:  General: NAD, awake and alert, intubated  HEENT: NCAT, MARIANNA, EOMI, Trachea ML, Neck supple  Cardiac: RRR S1, S2, no Murmurs, rubs or gallops  Respiratory: CTAB  Abdomen: Soft, non-distended, non-tender    MEDICATIONS  (STANDING):  ampicillin/sulbactam  IVPB 3 Gram(s) IV Intermittent every 6 hours  chlorhexidine 0.12% Liquid 15 milliLiter(s) Oral Mucosa every 12 hours  chlorhexidine 2% Cloths 1 Application(s) Topical daily  dexMEDEtomidine Infusion 0.1 MICROgram(s)/kG/Hr (1.76 mL/Hr) IV Continuous <Continuous>  fentaNYL   Infusion. 0.5 MICROgram(s)/kG/Hr (3.52 mL/Hr) IV Continuous <Continuous>  folic acid 1 milliGRAM(s) Oral daily  lactated ringers. 1000 milliLiter(s) (65 mL/Hr) IV Continuous <Continuous>  magnesium sulfate  IVPB 1 Gram(s) IV Intermittent every 8 hours  methadone    Tablet 20 milliGRAM(s) Oral daily  nicotine -  14 mG/24Hr(s) Patch 1 patch Transdermal daily  norepinephrine Infusion 0.05 MICROgram(s)/kG/Min (8.03 mL/Hr) IV Continuous <Continuous>  pantoprazole  Injectable 40 milliGRAM(s) IV Push daily  polymyxin B IVPB 035759 Unit(s) IV Intermittent every 12 hours  potassium chloride   Powder 40 milliEquivalent(s) Oral daily  potassium chloride   Powder 40 milliEquivalent(s) Enteral Tube two times a day  propofol Infusion 0.1 MICROgram(s)/kG/Min (0.04 mL/Hr) IV Continuous <Continuous>  QUEtiapine 100 milliGRAM(s) Oral two times a day  scopolamine 1 mG/72 Hr(s) Patch 1 Patch Transdermal every 72 hours  spironolactone 100 milliGRAM(s) Oral daily  thiamine 100 milliGRAM(s) Oral daily  vancomycin    Solution 250 milliGRAM(s) Oral every 6 hours    MEDICATIONS  (PRN):  acetaminophen  Suppository .. 650 milliGRAM(s) Rectal every 6 hours PRN Temp greater or equal to 38C (100.4F), Mild Pain (1 - 3)  ALBUTerol    90 MICROgram(s) HFA Inhaler 1 Puff(s) Inhalation every 4 hours PRN Shortness of Breath and/or Wheezing  cloNIDine 0.1 milliGRAM(s) Oral every 6 hours PRN opiate withdrawal  hydrOXYzine hydrochloride 50 milliGRAM(s) Oral every 6 hours PRN Anxiety  ibuprofen  Tablet. 600 milliGRAM(s) Oral every 6 hours PRN Temp greater or equal to 38C (100.4F)  ibuprofen  Tablet. 400 milliGRAM(s) Oral every 6 hours PRN Mild Pain (1 - 3)  sodium chloride 0.9% lock flush 10 milliLiter(s) IV Push every 1 hour PRN Pre/post blood products, medications, blood draw, and to maintain line patency      DVT PROPHYLAXIS:   GI PROPHYLAXIS: pantoprazole  Injectable 40 milliGRAM(s) IV Push daily    ANTICOAGULATION:   ANTIBIOTICS:  ampicillin/sulbactam  IVPB 3 Gram(s)  polymyxin B IVPB 567398 Unit(s)  vancomycin    Solution 250 milliGRAM(s)            LAB/STUDIES:  Labs:  CAPILLARY BLOOD GLUCOSE                              7.6    1.70  )-----------( 63       ( 19 Jul 2022 05:10 )             25.0       Auto Neutrophil %: 57.7 % (07-19-22 @ 05:10)  Auto Immature Granulocyte %: 0.6 % (07-19-22 @ 05:10)    07-19    140  |  108  |  19  ----------------------------<  96  4.0   |  20  |  0.9      Calcium, Total Serum: 8.8 mg/dL (07-19-22 @ 05:10)      LFTs:             5.2  | 0.4  | 15       ------------------[277     ( 19 Jul 2022 05:10 )  2.7  | x    | 11          Lipase:x      Amylase:x         Blood Gas Arterial, Lactate: 0.50 mmol/L (07-19-22 @ 05:01)  Blood Gas Arterial, Lactate: 0.40 mmol/L (07-18-22 @ 09:22)  Blood Gas Arterial, Lactate: 0.50 mmol/L (07-18-22 @ 04:30)  Blood Gas Arterial, Lactate: 0.40 mmol/L (07-17-22 @ 04:02)    ABG - ( 19 Jul 2022 05:01 )  pH: 7.35  /  pCO2: 36    /  pO2: 121   / HCO3: 20    / Base Excess: -5.2  /  SaO2: 99.5            ABG - ( 18 Jul 2022 09:22 )  pH: 7.36  /  pCO2: 35    /  pO2: 113   / HCO3: 20    / Base Excess: -5.1  /  SaO2: 98.7            ABG - ( 18 Jul 2022 04:30 )  pH: 7.35  /  pCO2: 35    /  pO2: 118   / HCO3: 19    / Base Excess: -5.7  /  SaO2: 99.4              Coags:     12.10  ----< 1.05    ( 19 Jul 2022 05:10 )     27.4

## 2022-07-19 NOTE — PROGRESS NOTE ADULT - ASSESSMENT
Impression:  Acute respiratory failure sp intubation and extubation 7/8 followed by reintubation  PNA CT Chest 6/22 - left greater than right lower lobe consolidations; sputum Cx 6/24 Klebsiella oxytoca, Enterobacter cloacae complex- and Acinetobacter buamnii  MSSA pna; acetinobacter VAP  seizure like activity  ?? 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 Diff on oral vancomycin   Hypokalemia/hypomagnesemia      CNS:   c/w methadone   on fentanyl/precedex/propofol  Did not tolerate SBT this am    HEENT: oral care     PULMONARY:  HOB 45,  did not tolerate SBT on 7/18 (patient hypertensive/tachycardic)  - ID cleared patient for trach/no definite contraindications   - f/u surgery for trach today    CARDIOVASCULAR:   goal MAP >65.  Monitor QTc daily on ECG  ECHO 7/6: Small to mod generalized effusion. No tamponade . Small mod effusion   present by report on echo 4/21/22  f/u cardiology recommendations; no urgency in diagnostic tap, future ROSLYN given acinetobacter and IVDA     GI:   GI prophylaxis.  OG Feeding.   laxative prn     RENAL: monitor lytes  D/C free water flushes     INFECTIOUS DISEASE:   worsening R infiltrate. Fungitell <31 7/5 , Glactomannan pending   Repeat procal 0.14 from 0.36 (6/30)   Acinetobacter buamnii in sputum cx -> switch cefdericol to  polymyxin 56274 U/kg q 12 hours and unasyn 3g q 6 hours for Acinetobacter VAP   - continue PO vancomycin 250 mg q 6 hours  - s/p bedside BM biopsy 7/14: negative   - f/u HLH panel (ferritin, IL6, triglycerides soluble antigen) and heme/onc recommendations   - f/u ID recommendations     HEMATOLOGICAL:    monitor CBC  D-dimer 1322, HIT antibody negative x2   f/u repeat HIT antibody; duplex negative 7/2/2022  - s/p bedside BM biopsy 7/14: negative  - f/u HLH panel (ferritin, IL6, triglycerides soluble antigen) and heme/onc recommendations   hold heparin and start fondaparinux when plt more then 50 serial h/h keep hgb > 7    ENDOCRINE:  Follow up FS.  Insulin protocol if needed.     MICU  lines: right IJ 7/6    Impression:  Acute respiratory failure sp intubation and extubation 7/8 followed by reintubation  PNA CT Chest 6/22 - left greater than right lower lobe consolidations; sputum Cx 6/24 Klebsiella oxytoca, Enterobacter cloacae complex- and Acinetobacter buamnii  MSSA pna; acetinobacter VAP  seizure like activity  ?? 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 Diff on oral vancomycin   Hypokalemia/hypomagnesemia      CNS:   c/w methadone   on fentanyl/precedex/propofol  Did not tolerate SBT 7/19  Spoke to surgery, possible trach on thursday or friday    HEENT: oral care     PULMONARY:  HOB 45,  did not tolerate SBT on 7/18 (patient hypertensive/tachycardic)  - ID cleared patient for trach/no definite contraindications   - f/u surgery for trach today    CARDIOVASCULAR:   goal MAP >65.  Monitor QTc daily on ECG  ECHO 7/6: Small to mod generalized effusion. No tamponade . Small mod effusion   present by report on echo 4/21/22  f/u cardiology recommendations; no urgency in diagnostic tap, future ROSLYN given acinetobacter and IVDA     GI:   GI prophylaxis.  OG Feeding.   laxative prn   restart feeding  may need paracentesis this week     RENAL: monitor lytes  D/C free water flushes     INFECTIOUS DISEASE:   worsening R infiltrate. Fungitell <31 7/5 , Glactomannan pending   Repeat procal 0.14 from 0.36 (6/30)   Acinetobacter buamnii in sputum cx -> switch cefdericol to  polymyxin 18198 U/kg q 12 hours and unasyn 3g q 6 hours for Acinetobacter VAP   - continue PO vancomycin 250 mg q 6 hours  - s/p bedside BM biopsy 7/14: negative   - f/u HLH panel (ferritin, IL6, triglycerides soluble antigen) and heme/onc recommendations   - f/u ID recommendations     HEMATOLOGICAL:    monitor CBC  D-dimer 1322, HIT antibody negative x2   f/u repeat HIT antibody; duplex negative 7/2/2022  - s/p bedside BM biopsy 7/14: negative  - f/u HLH panel (ferritin, IL6, triglycerides soluble antigen) and heme/onc recommendations   hold heparin and start fondaparinux when plt more then 50 serial h/h keep hgb > 7    ENDOCRINE:  Follow up FS.  Insulin protocol if needed.     MICU  lines: right IJ 7/6

## 2022-07-19 NOTE — PROGRESS NOTE ADULT - ASSESSMENT
IMPRESSION:  Acute respiratory failure sp intubation  PNA  MSSA pna  seizure like activity  ?? drug over dose/ withdrawal opoid   liver cirrhosis   Ascites sp paracentesis   Pancytopenia- worsening  RENETTA improved  C diff +ve     PLAN:    CNS: Continue with Methadone for withdrawal Dc fentanyl and propofol this morning; continue with precedex and decrease leyla dose gradually; continue with seroquel and clonazepam; Check QTc daily. SAT this morning and following commands.    HEENT: oral care; ETT care    PULMONARY:  SBT today with goal to extubate; CXR unchnaged. ABG     CARDIOVASCULAR: Keep MAP more than 65mmhg; not on pressors currently keep equal balance. On oral diuretics.     GI: GI prophylaxis. Hold OGT feeding for possible extubation. Do a bedside US of the abdomen. If large ascites then will need therapeutic paracentesis.     RENAL: Correct K ot mo re than 4 and Mg to more than 2. DC Free water pushes.     INFECTIOUS DISEASE: Currently on PO vancomycin, Unasyn, Polymixin. Monitor kidney function and electrolytes. ID following. DTA culture so far negative. Recheck procalcitonin. Tmax 100.8.     HEMATOLOGICAL:   s/p BMB follow result and cx. Hematology following. Monitor platelet count. Keep hg more than 7. Send HIT panel for thrombocytopenia and peripheral smear. Start fondaparinux for DVT prophylaxis if platelet count stays above 50k until HIT panel is back.     ENDOCRINE:  Follow up FS.  Insulin protocol if needed.     Musk: bedrest for now. PT rehab evaluation.     Dispo: Remains critically and ill needs ICU care.     TLC 7/6 RIJ; ETT   SAT/ SBT    IMPRESSION:  Acute respiratory failure sp intubation  PNA  MSSA pna  seizure like activity  drug over dose/ withdrawal opoid   liver cirrhosis   Ascites sp paracentesis   Pancytopenia- worsening  RENETTA improved  C diff +ve     PLAN:    CNS: Continue with Methadone for withdrawal Dc fentanyl and propofol this morning; continue with precedex and decrease leyla dose gradually; continue with seroquel and clonazepam; Check QTc daily. SAT this morning and following commands.    HEENT: oral care; ETT care    PULMONARY:  SBT today with goal to extubate; CXR unchnaged. ABG     CARDIOVASCULAR: Keep MAP more than 65mmhg; not on pressors currently keep equal balance. On oral diuretics.     GI: GI prophylaxis. Hold OGT feeding for possible extubation. Do a bedside US of the abdomen. If large ascites then will need therapeutic paracentesis.     RENAL: Correct K ot mo re than 4 and Mg to more than 2. DC Free water pushes.     INFECTIOUS DISEASE: Currently on PO vancomycin, Unasyn, Polymixin. Monitor kidney function and electrolytes. ID following. DTA culture so far negative. Recheck procalcitonin. Tmax 100.8.     HEMATOLOGICAL:   s/p BMB follow result and cx. Hematology following. Monitor platelet count. Keep hg more than 7. Send HIT panel for thrombocytopenia and peripheral smear. Start fondaparinux for DVT prophylaxis if platelet count stays above 50k until HIT panel is back.     ENDOCRINE:  Follow up FS.  Insulin protocol if needed.     Musk: bedrest for now. PT rehab evaluation.     Dispo: Remains critically and ill needs ICU care.     TLC 7/6 RIJ; ETT   SAT/ SBT

## 2022-07-19 NOTE — CONSULT NOTE ADULT - ASSESSMENT
IMPRESSION:  Acute respiratory failure sp intubation  PNA  MSSA pna  seizure like activity  ?? drug over dose/ withdrawal opoid   liver cirrhosis   Ascites sp paracentesis   Pancytopenia- worsening  RENETTA improved  C diff +ve   pancytopenia  skin breakdown    pt fed Vital Hi Pro at 50 ml/h most recently, which is appropriate re protein dose, and considering varying dose but dependence on propofol  est REE by PSE today 1791 kcal/d, est protein needs ~ 110 gm/d  expect loss of LBM since admission as she has been bedbound/ vent bound for a  month  - after trach, hope pt can be off propofol - will need small bore NG feeding tube (not a [place])  - in that case, would change feeds to Jevity 1.2 at 60 ml/h (alternatively transition to gravity feeds 360 ml over 40 min x 4 feeds/d)   - if pt remains afebrile, add 2 Malick daily  - consider course of Kelly Stor 250 mg twice daily enterally x 14 days (r/t mult antimicrobials and being c diff +)  - might be able to consider speech eval after trach, but suspect po intake will be insufficient for a period of time, so would cont enteral supplementation

## 2022-07-19 NOTE — PROGRESS NOTE ADULT - SUBJECTIVE AND OBJECTIVE BOX
Hospital Day:  34d    Subjective: Patient is a 26y old  Female who presents with a chief complaint of anasarca (18 Jul 2022 17:05)      Pt seen and evaluated at bedside.   Complaints: none, sedated on precedex/fentanyl/propofol  Over the night Events: none    Past Medical Hx:   Hepatitis C    Asthma    Anxiety and depression    IV drug abuse      Past Sx:  No significant past surgical history      Allergies:  buprenorphine (Rash)  Suboxone (Rash)    Current Meds:   Standng Meds:  ampicillin/sulbactam  IVPB 3 Gram(s) IV Intermittent every 6 hours  chlorhexidine 0.12% Liquid 15 milliLiter(s) Oral Mucosa every 12 hours  chlorhexidine 2% Cloths 1 Application(s) Topical daily  dexMEDEtomidine Infusion 0.1 MICROgram(s)/kG/Hr (1.76 mL/Hr) IV Continuous <Continuous>  fentaNYL   Infusion. 0.5 MICROgram(s)/kG/Hr (3.52 mL/Hr) IV Continuous <Continuous>  folic acid 1 milliGRAM(s) Oral daily  lactated ringers. 1000 milliLiter(s) (65 mL/Hr) IV Continuous <Continuous>  magnesium sulfate  IVPB 1 Gram(s) IV Intermittent every 8 hours  methadone    Tablet 20 milliGRAM(s) Oral daily  nicotine -  14 mG/24Hr(s) Patch 1 patch Transdermal daily  norepinephrine Infusion 0.05 MICROgram(s)/kG/Min (8.03 mL/Hr) IV Continuous <Continuous>  pantoprazole  Injectable 40 milliGRAM(s) IV Push daily  polymyxin B IVPB 407352 Unit(s) IV Intermittent every 12 hours  potassium chloride   Powder 40 milliEquivalent(s) Oral daily  potassium chloride   Powder 40 milliEquivalent(s) Enteral Tube two times a day  propofol Infusion 0.1 MICROgram(s)/kG/Min (0.04 mL/Hr) IV Continuous <Continuous>  QUEtiapine 100 milliGRAM(s) Oral two times a day  scopolamine 1 mG/72 Hr(s) Patch 1 Patch Transdermal every 72 hours  spironolactone 100 milliGRAM(s) Oral daily  thiamine 100 milliGRAM(s) Oral daily  vancomycin    Solution 250 milliGRAM(s) Oral every 6 hours    PRN Meds:  acetaminophen  Suppository .. 650 milliGRAM(s) Rectal every 6 hours PRN Temp greater or equal to 38C (100.4F), Mild Pain (1 - 3)  ALBUTerol    90 MICROgram(s) HFA Inhaler 1 Puff(s) Inhalation every 4 hours PRN Shortness of Breath and/or Wheezing  cloNIDine 0.1 milliGRAM(s) Oral every 6 hours PRN opiate withdrawal  hydrOXYzine hydrochloride 50 milliGRAM(s) Oral every 6 hours PRN Anxiety  ibuprofen  Tablet. 600 milliGRAM(s) Oral every 6 hours PRN Temp greater or equal to 38C (100.4F)  ibuprofen  Tablet. 400 milliGRAM(s) Oral every 6 hours PRN Mild Pain (1 - 3)  sodium chloride 0.9% lock flush 10 milliLiter(s) IV Push every 1 hour PRN Pre/post blood products, medications, blood draw, and to maintain line patency      Vital Signs:   T(F): 96.8 (07-19-22 @ 06:00), Max: 99.7 (07-18-22 @ 11:00)  HR: 67 (07-19-22 @ 06:00) (65 - 135)  BP: 80/53 (07-19-22 @ 06:00) (80/53 - 202/127)  RR: 26 (07-19-22 @ 06:00) (25 - 44)  SpO2: 100% (07-19-22 @ 06:00) (89% - 100%)    Physical Exam:   GENERAL: NAD, Resting in bed  HEENT: NCAT  CHEST/LUNG: Clear to auscultation bilaterally; No wheezing or rubs.   HEART: Regular rate and rhythm; No murmurs, rubs, or gallops  ABDOMEN: Bowel sounds present; Soft, Nontender, Nondistended.   EXTREMITIES:  No clubbing, cyanosis, or edema  NERVOUS SYSTEM:  Alert & Oriented X3    FLUID BALANCE    07-17-22 @ 07:01  -  07-18-22 @ 07:00  --------------------------------------------------------  IN: 4084 mL / OUT: 4640 mL / NET: -556 mL    07-18-22 @ 07:01  -  07-19-22 @ 07:00  --------------------------------------------------------  IN: 4229.5 mL / OUT: 2685 mL / NET: 1544.5 mL        Labs:                         7.6    1.70  )-----------( x        ( 19 Jul 2022 05:10 )             25.0     Neutophil% 57.7, Lymphocyte% 33.5, Monocyte% 7.6, Bands% 0.6 07-19-22 @ 05:10    18 Jul 2022 05:37    139    |  107    |  20     ----------------------------<  106    3.6     |  21     |  1.0      Ca    8.6        18 Jul 2022 05:37  Phos  3.8       18 Jul 2022 05:37  Mg     2.3       18 Jul 2022 05:37    TPro  5.4    /  Alb  2.7    /  TBili  0.4    /  DBili  x      /  AST  15     /  ALT  12     /  AlkPhos  262    18 Jul 2022 05:37       pTT    27.4             ----< 1.05 INR  (07-19-22 @ 05:10)    12.10        PT                        Procalcitonin, Serum: 0.16 ng/mL (07-14-22 @ 12:50)  Procalcitonin, Serum: 0.21 ng/mL (07-17-22 @ 10:34)    Ferritin, Serum: 334 ng/mL (07-14-22 @ 12:50)      Lactate Dehydrogenase, Serum: 271 (07-14-22 @ 12:50)      Radiology:

## 2022-07-19 NOTE — PROGRESS NOTE ADULT - ASSESSMENT
Acute respiratory failure with hypoxemia / aspiration pneumonia with sepsis / suspected drug overdose / head laceration s/p fall in lobby / possible seizure / anasarca  pancytopenia      - c-diff colitis - continue oral vanco   - w/u and antibiotics as per ID    - s/p BMB - heme f/u    - supplement hypokalemia and  hypomagnesemia    - re intubated -> still awaiting  trach - surgery following

## 2022-07-19 NOTE — PROGRESS NOTE ADULT - SUBJECTIVE AND OBJECTIVE BOX
Patient is a 26y old  Female who presents with a chief complaint of anasarca (19 Jul 2022 08:26)        Over Night Events: no major events. Pt HD stable , remains on MV , for trach today.         ROS:     All ROS are negative except HPI         PHYSICAL EXAM    ICU Vital Signs Last 24 Hrs  T(C): 36 (19 Jul 2022 07:10), Max: 37.6 (18 Jul 2022 11:00)  T(F): 96.8 (19 Jul 2022 07:10), Max: 99.7 (18 Jul 2022 11:00)  HR: 75 (19 Jul 2022 08:00) (67 - 135)  BP: 100/65 (19 Jul 2022 08:00) (80/53 - 202/127)  BP(mean): 78 (19 Jul 2022 08:00) (62 - 161)  RR: 25 (19 Jul 2022 08:00) (15 - 44)  SpO2: 100% (19 Jul 2022 08:00) (89% - 100%)    O2 Parameters below as of 19 Jul 2022 08:00  Patient On (Oxygen Delivery Method): ventilator    O2 Concentration (%): 35        CONSTITUTIONAL:  Well nourished.  NAD    ENT:   Airway patent,   Mouth with normal mucosa.   trach       CARDIAC:   Normal rate,   Regular rhythm.        RESPIRATORY:   No wheezing  Bilateral BS  Normal chest expansion  Not tachypneic,  No use of accessory muscles    GASTROINTESTINAL:  Abdomen soft,   Non-tender,   No guarding,   + BS    NEUROLOGICAL:   Alert and oriented   No motor  deficits.    SKIN:   DTI       07-18-22 @ 07:01  -  07-19-22 @ 07:00  --------------------------------------------------------  IN:    Dexmedetomidine: 475 mL    FentaNYL: 271.5 mL    IV PiggyBack: 1000 mL    IV PiggyBack: 300 mL    IV PiggyBack: 400 mL    Lactated Ringers: 700 mL    Lactated Ringers: 715 mL    Propofol: 368 mL  Total IN: 4229.5 mL    OUT:    Indwelling Catheter - Urethral (mL): 2685 mL  Total OUT: 2685 mL    Total NET: 1544.5 mL      07-19-22 @ 07:01  -  07-19-22 @ 08:55  --------------------------------------------------------  IN:    Dexmedetomidine: 50 mL    FentaNYL: 20 mL    Lactated Ringers: 130 mL    Propofol: 20 mL  Total IN: 220 mL    OUT:    Indwelling Catheter - Urethral (mL): 110 mL  Total OUT: 110 mL    Total NET: 110 mL          LABS:                            7.6    1.70  )-----------( 63       ( 19 Jul 2022 05:10 )             25.0                                               07-19    140  |  108  |  19  ----------------------------<  96  4.0   |  20  |  0.9    Ca    8.8      19 Jul 2022 05:10  Phos  4.1     07-19  Mg     2.2     07-19    TPro  5.2<L>  /  Alb  2.7<L>  /  TBili  0.4  /  DBili  x   /  AST  15  /  ALT  11  /  AlkPhos  277<H>  07-19      PT/INR - ( 19 Jul 2022 05:10 )   PT: 12.10 sec;   INR: 1.05 ratio         PTT - ( 19 Jul 2022 05:10 )  PTT:27.4 sec                                                                                     LIVER FUNCTIONS - ( 19 Jul 2022 05:10 )  Alb: 2.7 g/dL / Pro: 5.2 g/dL / ALK PHOS: 277 U/L / ALT: 11 U/L / AST: 15 U/L / GGT: x                                                                                               Mode: AC/ CMV (Assist Control/ Continuous Mandatory Ventilation)  RR (machine): 25  TV (machine): 370  FiO2: 35  PEEP: 5  ITime: 0.8  MAP: 8  PIP: 16                                      ABG - ( 19 Jul 2022 05:01 )  pH, Arterial: 7.35  pH, Blood: x     /  pCO2: 36    /  pO2: 121   / HCO3: 20    / Base Excess: -5.2  /  SaO2: 99.5                MEDICATIONS  (STANDING):  ampicillin/sulbactam  IVPB 3 Gram(s) IV Intermittent every 6 hours  chlorhexidine 0.12% Liquid 15 milliLiter(s) Oral Mucosa every 12 hours  chlorhexidine 2% Cloths 1 Application(s) Topical daily  dexMEDEtomidine Infusion 0.1 MICROgram(s)/kG/Hr (1.76 mL/Hr) IV Continuous <Continuous>  fentaNYL   Infusion. 0.5 MICROgram(s)/kG/Hr (3.52 mL/Hr) IV Continuous <Continuous>  folic acid 1 milliGRAM(s) Oral daily  lactated ringers. 1000 milliLiter(s) (65 mL/Hr) IV Continuous <Continuous>  magnesium sulfate  IVPB 1 Gram(s) IV Intermittent every 8 hours  methadone    Tablet 20 milliGRAM(s) Oral daily  nicotine -  14 mG/24Hr(s) Patch 1 patch Transdermal daily  norepinephrine Infusion 0.05 MICROgram(s)/kG/Min (8.03 mL/Hr) IV Continuous <Continuous>  pantoprazole  Injectable 40 milliGRAM(s) IV Push daily  polymyxin B IVPB 359784 Unit(s) IV Intermittent every 12 hours  potassium chloride   Powder 40 milliEquivalent(s) Oral daily  potassium chloride   Powder 40 milliEquivalent(s) Enteral Tube two times a day  propofol Infusion 0.1 MICROgram(s)/kG/Min (0.04 mL/Hr) IV Continuous <Continuous>  QUEtiapine 100 milliGRAM(s) Oral two times a day  scopolamine 1 mG/72 Hr(s) Patch 1 Patch Transdermal every 72 hours  spironolactone 100 milliGRAM(s) Oral daily  thiamine 100 milliGRAM(s) Oral daily  vancomycin    Solution 250 milliGRAM(s) Oral every 6 hours    MEDICATIONS  (PRN):  acetaminophen  Suppository .. 650 milliGRAM(s) Rectal every 6 hours PRN Temp greater or equal to 38C (100.4F), Mild Pain (1 - 3)  ALBUTerol    90 MICROgram(s) HFA Inhaler 1 Puff(s) Inhalation every 4 hours PRN Shortness of Breath and/or Wheezing  cloNIDine 0.1 milliGRAM(s) Oral every 6 hours PRN opiate withdrawal  hydrOXYzine hydrochloride 50 milliGRAM(s) Oral every 6 hours PRN Anxiety  ibuprofen  Tablet. 600 milliGRAM(s) Oral every 6 hours PRN Temp greater or equal to 38C (100.4F)  ibuprofen  Tablet. 400 milliGRAM(s) Oral every 6 hours PRN Mild Pain (1 - 3)  sodium chloride 0.9% lock flush 10 milliLiter(s) IV Push every 1 hour PRN Pre/post blood products, medications, blood draw, and to maintain line patency           CXR interpreted by me:  B/L infiltrates

## 2022-07-19 NOTE — PROGRESS NOTE ADULT - ASSESSMENT
Patient is a 26 year old female with hx of asthma, untreated Hep C, IVDA, anasarca presenting with increased dyspnea since yesterday    IMPRESSION  #Acute respiratory failure s/p intubation 6/16, extubated 7/7, reintubated     #VAP  - Xray Chest 1 View- PORTABLE-Routine (Xray Chest 1 View- PORTABLE-Routine in AM.) (07.03.22 @ 06:10): Increasing right basilar opacity.  - sputum Cx 7/4 MDR Acinetobacter baumanii     # C.difficille infection - 7/5/22    #Pneumonia -   - CT Chest 6/22 - left greater than right lower lobe consolidations   - sputum Cx 6/24 Klebsiella oxytoca, Enterobacter cloacae complex- The Sputum culture from 6/27 grew Numerous Mixed gram negative rods and Moderate Staphylococcus aureus.  - treated with course of cefepime     #FUO + Pancytopenia + Splenomegaly   - Ferritin, Serum: 94 ng/mL (06.27.22 @ 06:47),  Procalcitonin, Serum: 0.11 (06.27.22 @ 15:46)  - CT Abd/pelvis 6/26 - moderated ascites moderate pericardial effusion   - EBV seroligies consistent with old infection   - CMV IgG +, IgM - (07.10.22 @ 15:43)  - bartonella-ab negative   - Hepatosplenomegaly - present since CT Abd/pelvis 1/30/2021  - HIV-1/2 Combo Result: Nonreact:  (06.29.22 @ 10:29)  - Sedimentation Rate, Erythrocyte: 86 mm/Hr (06.30.22 @ 05:46)  - Autoimmune panel negative   - Quantiferon TB Plus: NEGATIVE (06.04.20 @ 10:28)  - Quantiferon TB Plus: INDETERMINATE. (02.06.21 @ 09:00)  - s/p Bone marrow biopsy 7/14 - Flow negative       #Drug overdose vs withdrawal?  #Hx of Untreated Hep C   #IVDA   #Abx allergy: buprenorphine (Rash), Suboxone (Rash)    Recommendations  - continue polymyxin 10027 U/kg q 12 hours and unasyn 3g q 6 hours for Acinetobacter VAP (start date 7/15)  - continue PO vancomycin 250 mg q 6 hours   - trend creatinine   - vent management per primary       Please call or message on Microsoft Teams if with any questions.  Spectra [phone]   Patient is a 26 year old female with hx of asthma, untreated Hep C, IVDA, anasarca presenting with increased dyspnea since yesterday    IMPRESSION  #Acute respiratory failure s/p intubation 6/16, extubated 7/7, reintubated     #VAP  - Xray Chest 1 View- PORTABLE-Routine (Xray Chest 1 View- PORTABLE-Routine in AM.) (07.03.22 @ 06:10): Increasing right basilar opacity.  - sputum Cx 7/4 MDR Acinetobacter baumanii     # C.difficille infection - 7/5/22    #Pneumonia -   - CT Chest 6/22 - left greater than right lower lobe consolidations   - sputum Cx 6/24 Klebsiella oxytoca, Enterobacter cloacae complex- The Sputum culture from 6/27 grew Numerous Mixed gram negative rods and Moderate Staphylococcus aureus.  - treated with course of cefepime     #FUO + Pancytopenia + Splenomegaly   - Ferritin, Serum: 94 ng/mL (06.27.22 @ 06:47),  Procalcitonin, Serum: 0.11 (06.27.22 @ 15:46)  - CT Abd/pelvis 6/26 - moderated ascites moderate pericardial effusion   - EBV seroligies consistent with old infection   - CMV IgG +, IgM - (07.10.22 @ 15:43)  - bartonella-ab negative   - Hepatosplenomegaly - present since CT Abd/pelvis 1/30/2021  - HIV-1/2 Combo Result: Nonreact:  (06.29.22 @ 10:29)  - Sedimentation Rate, Erythrocyte: 86 mm/Hr (06.30.22 @ 05:46)  - Autoimmune panel negative   - Quantiferon TB Plus: NEGATIVE (06.04.20 @ 10:28)  - Quantiferon TB Plus: INDETERMINATE. (02.06.21 @ 09:00)  - s/p Bone marrow biopsy 7/14 - Flow negative       #Drug overdose vs withdrawal?  #Hx of Untreated Hep C   #IVDA   #Abx allergy: buprenorphine (Rash), Suboxone (Rash)    Recommendations  - continue polymyxin 70271 U/kg q 12 hours and unasyn 3g q 6 hours for Acinetobacter VAP (start date 7/15)  - continue PO vancomycin 250 mg q 6 hours   - trend creatinine  - trend fever curve  - vent management per primary       Please call or message on Microsoft Teams if with any questions.  Spectra [phone]

## 2022-07-19 NOTE — CONSULT NOTE ADULT - SUBJECTIVE AND OBJECTIVE BOX
NUTRITION SUPPORT CONSULTATION    Patient is a 26 year old female with hx of asthma, untreated Hep C, IVDA, anasarca presenting with increased dyspnea x 1 day. Patient has been short of breath chronically and has been admitted for fluid overload. Patient describes worsening symptoms yesterday associated with cough. States generalized edema has remained the same. Patient is actively using heroin. Not on any meds or MMTP.  Denies fever, chills, sore throat, cp, palpitations, abd pain, n/v/d, dysuria, headache, syncope, hemoptysis. + generalized edema (15 Reinaldo 2022 10:03)  d/w team on rounds previously; pt has been reportedly tolerating enteral feeds.  hospital course includes intubation (extubated and reintubated), pancytopenia , ascites (IR - no paracentesis indicated), + C Diff, renal and rheum eval, followed by ID.      PAST MEDICAL & SURGICAL HISTORY:  Hepatitis C  Asthma  Anxiety and depression  IV drug abuse, heroin  No significant past surgical history    Allergies  buprenorphine (Rash)  Suboxone (Rash)    MEDICATIONS  (STANDING):  ampicillin/sulbactam  IVPB 3 Gram(s) IV Intermittent every 6 hours  chlorhexidine 0.12% Liquid 15 milliLiter(s) Oral Mucosa every 12 hours  chlorhexidine 2% Cloths 1 Application(s) Topical daily  dexMEDEtomidine Infusion 0.1 MICROgram(s)/kG/Hr (1.76 mL/Hr) IV Continuous <Continuous>  fentaNYL   Infusion. 0.5 MICROgram(s)/kG/Hr (3.52 mL/Hr) IV Continuous <Continuous>  folic acid 1 milliGRAM(s) Oral daily  lactated ringers. 1000 milliLiter(s) (65 mL/Hr) IV Continuous <Continuous>  magnesium sulfate  IVPB 1 Gram(s) IV Intermittent every 8 hours  methadone    Tablet 20 milliGRAM(s) Oral daily  nicotine -  14 mG/24Hr(s) Patch 1 patch Transdermal daily  norepinephrine Infusion 0.05 MICROgram(s)/kG/Min (8.03 mL/Hr) IV Continuous <Continuous>  pantoprazole  Injectable 40 milliGRAM(s) IV Push daily  polymyxin B IVPB 280257 Unit(s) IV Intermittent every 12 hours  potassium chloride   Powder 40 milliEquivalent(s) Oral daily  potassium chloride   Powder 40 milliEquivalent(s) Enteral Tube two times a day  propofol Infusion 0.1 MICROgram(s)/kG/Min (0.04 mL/Hr) IV Continuous <Continuous>  QUEtiapine 100 milliGRAM(s) Oral two times a day  scopolamine 1 mG/72 Hr(s) Patch 1 Patch Transdermal every 72 hours  spironolactone 100 milliGRAM(s) Oral daily  thiamine 100 milliGRAM(s) Oral daily  vancomycin    Solution 250 milliGRAM(s) Oral every 6 hours    MEDICATIONS  (PRN):  acetaminophen  Suppository .. 650 milliGRAM(s) Rectal every 6 hours PRN Temp greater or equal to 38C (100.4F), Mild Pain (1 - 3)  ALBUTerol    90 MICROgram(s) HFA Inhaler 1 Puff(s) Inhalation every 4 hours PRN Shortness of Breath and/or Wheezing  cloNIDine 0.1 milliGRAM(s) Oral every 6 hours PRN opiate withdrawal  hydrOXYzine hydrochloride 50 milliGRAM(s) Oral every 6 hours PRN Anxiety  ibuprofen  Tablet. 600 milliGRAM(s) Oral every 6 hours PRN Temp greater or equal to 38C (100.4F)  ibuprofen  Tablet. 400 milliGRAM(s) Oral every 6 hours PRN Mild Pain (1 - 3)  sodium chloride 0.9% lock flush 10 milliLiter(s) IV Push every 1 hour PRN Pre/post blood products, medications, blood draw, and to maintain line patency    ICU Vital Signs Last 24 Hrs  T(C): 36 (19 Jul 2022 07:10), Max: 37.6 (18 Jul 2022 11:00)  T(F): 96.8 (19 Jul 2022 07:10), Max: 99.7 (18 Jul 2022 11:00)  HR: 75 (19 Jul 2022 08:00) (67 - 135)  BP: 100/65 (19 Jul 2022 08:00) (80/53 - 202/127)  BP(mean): 78 (19 Jul 2022 08:00) (62 - 161)  ABP: --  ABP(mean): --  RR: 19 (19 Jul 2022 09:00) (15 - 44)  SpO2: 100% (19 Jul 2022 08:00) (89% - 100%)  Drug Dosing Weight  Height (cm): 167.6 (15 Reinaldo 2022 11:25)  Weight (kg): 70.4 (07 Jul 2022 07:00)  BMI (kg/m2): 25.1 (07 Jul 2022 07:00)  BSA (m2): 1.8 (07 Jul 2022 07:00)    Mode: AC/ CMV (Assist Control/ Continuous Mandatory Ventilation)  RR (machine): 25  TV (machine): 370  FiO2: 35  PEEP: 5  ITime: 0.8  MAP: 8  PIP: 16  ABG - ( 19 Jul 2022 05:01 )  pH, Arterial: 7.35  pH, Blood: x     /  pCO2: 36    /  pO2: 121   / HCO3: 20    / Base Excess: -5.2  /  SaO2: 99.5      EXAM  Awake, alert, on vent, writing notes  Abd:  soft, ND, NT, +OG tube, feeds off since yesterday in anticipation of trach today  LE: + sacral and LE edema  skin breakdown sacral reported  Enteral access: OG  right IJ TLC c/d/i    LABS  07-19    140  |  108  |  19  ----------------------------<  96  4.0   |  20  |  0.9    Ca    8.8      19 Jul 2022 05:10  Phos  4.1     07-19  Mg     2.2     07-19    TPro  5.2<L>  /  Alb  2.7<L>  /  TBili  0.4  /  DBili  x   /  AST  15  /  ALT  11  /  AlkPhos  277<H>  07-19                            7.6    1.70  )-----------( 63       ( 19 Jul 2022 05:10 )             25.0       LIVER FUNCTIONS - ( 19 Jul 2022 05:10 )  Alb: 2.7 g/dL / Pro: 5.2 g/dL / ALK PHOS: 277 U/L / ALT: 11 U/L / AST: 15 U/L / GGT: x           < from: Xray Chest 1 View- PORTABLE-Routine (Xray Chest 1 View- PORTABLE-Routine in AM.) (07.19.22 @ 05:24) >  Support devices: Endotracheal tube/tracheostomy tube is not well seen.   Right IJ central line is limited in evaluation due to rotation. Telemetry   leads and tubing overlie patient.    Cardiac/mediastinum/hilum: Obscured    Lung parenchyma/Pleura: Stable bilateral lung opacities    < end of copied text >

## 2022-07-19 NOTE — PROGRESS NOTE ADULT - SUBJECTIVE AND OBJECTIVE BOX
Patient seen and evaluated this am, lightly sedated on ventilator, following commands       T(F): 96 (07-19-22 @ 11:07), Max: 99 (07-18-22 @ 23:00)  HR: 60 (07-19-22 @ 14:00)  BP: 85/54 (07-19-22 @ 14:00)  RR: 25 (07-19-22 @ 14:00)  SpO2: 99% (07-19-22 @ 14:00) (91% - 100%)    PHYSICAL EXAM:  GENERAL: NAD  HEAD:  Atraumatic, Normocephalic  EYES: EOMI, PERRLA, conjunctiva and sclera clear  NERVOUS SYSTEM:  no focal deficits   CHEST/LUNG:  bilateral rhonchi  HEART: Regular rate and rhythm; No murmurs, rubs, or gallops  ABDOMEN: Soft, Nontender, Nondistended; Bowel sounds present  EXTREMITIES:  2+ Peripheral Pulses, No clubbing, cyanosis, or edema    LABS  07-19    140  |  108  |  19  ----------------------------<  96  4.0   |  20  |  0.9    Ca    8.8      19 Jul 2022 05:10  Phos  4.1     07-19  Mg     2.2     07-19    TPro  5.2<L>  /  Alb  2.7<L>  /  TBili  0.4  /  DBili  x   /  AST  15  /  ALT  11  /  AlkPhos  277<H>  07-19                          7.6    1.70  )-----------( 63       ( 19 Jul 2022 05:10 )             25.0     PT/INR - ( 19 Jul 2022 05:10 )   PT: 12.10 sec;   INR: 1.05 ratio         PTT - ( 19 Jul 2022 05:10 )  PTT:27.4 sec    Mode: AC/ CMV (Assist Control/ Continuous Mandatory Ventilation)  RR (machine): 25  TV (machine): 370  FiO2: 35  PEEP: 5    Culture Results:   Normal Respiratory Kelly present (07-15-22)  Culture Results:   Babesia microti PCR  Results: NOT detected  ***************Result Note*************  The detection of Babesia microti by PCR has only been  validated for whole blood; this test has not been approved  by the [place] Food and Drug Administration (FDA). Performance  characteristics of this assay have been determined by  [employer] [facility] [employer]. The clinical significance  of results should be considered in conjunction with the  overall clinical presentation of the patient. Result is not  intended to be used as the sole means for clinical diagnosis  or patient management decisions.  One negative sample does not necessarily rule  out the presence of a parasitic infection. (07-10-22)  Culture Results:   No Growth Final (07-08-22)  Culture Results:   No Growth Final (07-06-22)  Culture Results:   No Growth Final (07-06-22)  Culture Results:   Moderate Acinetobacter baumannii/nosocom group (Carbapenem Resistant)  Cefiderocol interpretations based on FDA breakpoints.  Normal Respiratory Kelly absent (07-04-22)  Culture Results:   No Growth Final (06-29-22)  Culture Results:   No Growth Final (06-29-22)  Culture Results:   No Growth Final (06-28-22)  Culture Results:   Numerous Mixed gram negative rods  Moderate Staphylococcus aureus  Normal Respiratory Kelly present (06-27-22)    RADIOLOGY  < from: Xray Chest 1 View- PORTABLE-Urgent (Xray Chest 1 View- PORTABLE-Urgent .) (07.19.22 @ 12:36) >  Impression:    Unchanged bilateral opacities.    < end of copied text >    MEDICATIONS  (STANDING):  ampicillin/sulbactam  IVPB 3 Gram(s) IV Intermittent every 6 hours  chlorhexidine 0.12% Liquid 15 milliLiter(s) Oral Mucosa every 12 hours  chlorhexidine 2% Cloths 1 Application(s) Topical daily  dexMEDEtomidine Infusion 0.1 MICROgram(s)/kG/Hr (1.76 mL/Hr) IV Continuous <Continuous>  fentaNYL   Infusion. 0.5 MICROgram(s)/kG/Hr (3.52 mL/Hr) IV Continuous <Continuous>  folic acid 1 milliGRAM(s) Oral daily  lactated ringers. 1000 milliLiter(s) (65 mL/Hr) IV Continuous <Continuous>  magnesium sulfate  IVPB 1 Gram(s) IV Intermittent every 8 hours  methadone    Tablet 20 milliGRAM(s) Oral daily  nicotine -  14 mG/24Hr(s) Patch 1 patch Transdermal daily  norepinephrine Infusion 0.05 MICROgram(s)/kG/Min (8.03 mL/Hr) IV Continuous <Continuous>  pantoprazole  Injectable 40 milliGRAM(s) IV Push daily  polymyxin B IVPB [id number] Unit(s) IV Intermittent every 12 hours  potassium chloride   Powder 40 milliEquivalent(s) Oral daily  potassium chloride   Powder 40 milliEquivalent(s) Enteral Tube two times a day  propofol Infusion 0.1 MICROgram(s)/kG/Min (0.04 mL/Hr) IV Continuous <Continuous>  QUEtiapine 100 milliGRAM(s) Oral two times a day  scopolamine 1 mG/72 Hr(s) Patch 1 Patch Transdermal every 72 hours  spironolactone 100 milliGRAM(s) Oral daily  thiamine 100 milliGRAM(s) Oral daily  vancomycin    Solution 250 milliGRAM(s) Oral every 6 hours    MEDICATIONS  (PRN):  acetaminophen  Suppository .. 650 milliGRAM(s) Rectal every 6 hours PRN Temp greater or equal to 38C (100.4F), Mild Pain (1 - 3)  ALBUTerol    90 MICROgram(s) HFA Inhaler 1 Puff(s) Inhalation every 4 hours PRN Shortness of Breath and/or Wheezing  cloNIDine 0.1 milliGRAM(s) Oral every 6 hours PRN opiate withdrawal  hydrOXYzine hydrochloride 50 milliGRAM(s) Oral every 6 hours PRN Anxiety  ibuprofen  Tablet. 600 milliGRAM(s) Oral every 6 hours PRN Temp greater or equal to 38C (100.4F)  ibuprofen  Tablet. 400 milliGRAM(s) Oral every 6 hours PRN Mild Pain (1 - 3)  sodium chloride 0.9% lock flush 10 milliLiter(s) IV Push every 1 hour PRN Pre/post blood products, medications, blood draw, and to maintain line patency

## 2022-07-19 NOTE — PROGRESS NOTE ADULT - SUBJECTIVE AND OBJECTIVE BOX
CINDYON, POOJA  26y, Female  Allergy: buprenorphine (Rash)  Suboxone (Rash)      LOS  34d    CHIEF COMPLAINT: anasarca (19 Jul 2022 07:16)      INTERVAL EVENTS/HPI  - No acute events overnight  - T(F): , Max: 99.7 (07-18-22 @ 11:00)    - WBC Count: 1.70 (07-19-22 @ 05:10)  WBC Count: 1.33 (07-18-22 @ 05:37)     - Creatinine, Serum: 0.9 (07-19-22 @ 05:10)  Creatinine, Serum: 1.0 (07-18-22 @ 05:37)       ROS  General: Denies rigors, nightsweats  HEENT: Denies headache, rhinorrhea, sore throat, eye pain  CV: Denies CP, palpitations  PULM: Denies wheezing, hemoptysis  GI: Denies hematemesis, hematochezia, melena  : Denies discharge, hematuria  MSK: Denies arthralgias, myalgias  SKIN: Denies rash, lesions  NEURO: Denies paresthesias, weakness  PSYCH: Denies depression, anxiety    VITALS:  T(F): 96.8, Max: 99.7 (07-18-22 @ 11:00)  HR: 69  BP: 90/58  RR: 15Vital Signs Last 24 Hrs  T(C): 36 (19 Jul 2022 07:10), Max: 37.6 (18 Jul 2022 11:00)  T(F): 96.8 (19 Jul 2022 07:10), Max: 99.7 (18 Jul 2022 11:00)  HR: 69 (19 Jul 2022 07:01) (67 - 135)  BP: 90/58 (19 Jul 2022 07:01) (80/53 - 202/127)  BP(mean): 69 (19 Jul 2022 07:01) (62 - 161)  RR: 15 (19 Jul 2022 07:10) (15 - 44)  SpO2: 100% (19 Jul 2022 07:01) (89% - 100%)    Parameters below as of 19 Jul 2022 07:01  Patient On (Oxygen Delivery Method): ventilator    O2 Concentration (%): 45    PHYSICAL EXAM:  Gen: NAD, resting in bed  HEENT: Normocephalic, atraumatic  Neck: supple, no lymphadenopathy  CV: Regular rate & regular rhythm  Lungs: decreased BS at bases, no fremitus  Abdomen: Soft, BS present  Ext: Warm, well perfused  Neuro: non focal, awake  Skin: no rash, no erythema  Lines: no phlebitis    FH: Non-contributory  Social Hx: Non-contributory    TESTS & MEASUREMENTS:                        7.6    1.70  )-----------( 63       ( 19 Jul 2022 05:10 )             25.0     07-19    140  |  108  |  19  ----------------------------<  96  4.0   |  20  |  0.9    Ca    8.8      19 Jul 2022 05:10  Phos  4.1     07-19  Mg     2.2     07-19    TPro  5.2<L>  /  Alb  2.7<L>  /  TBili  0.4  /  DBili  x   /  AST  15  /  ALT  11  /  AlkPhos  277<H>  07-19      LIVER FUNCTIONS - ( 19 Jul 2022 05:10 )  Alb: 2.7 g/dL / Pro: 5.2 g/dL / ALK PHOS: 277 U/L / ALT: 11 U/L / AST: 15 U/L / GGT: x               Culture - Sputum (collected 07-15-22 @ 10:00)  Source: Trach Asp Tracheal Aspirate  Gram Stain (07-16-22 @ 04:22):    Moderate polymorphonuclear leukocytes per low power field    Rare Squamous epithelial cells per low power field    No organisms seen per oil power field  Final Report (07-17-22 @ 16:58):    Normal Respiratory Kelly present    Babesia microti PCR, Blood. (collected 07-10-22 @ 15:43)  Source: .Blood None  Final Report (07-11-22 @ 09:45):    Babesia microti PCR    Results: NOT detected    ***************Result Note*************    The detection of Babesia microti by PCR has only been    validated for whole blood; this test has not been approved    by the US Food and Drug Administration (FDA). Performance    characteristics of this assay have been determined by    [facility]. The clinical significance    of results should be considered in conjunction with the    overall clinical presentation of the patient. Result is not    intended to be used as the sole means for clinical diagnosis    or patient management decisions.    One negative sample does not necessarily rule    out the presence of a parasitic infection.    Culture - Blood (collected 07-08-22 @ 05:26)  Source: .Blood None  Final Report (07-13-22 @ 19:00):    No Growth Final    Culture - Blood (collected 07-06-22 @ 07:20)  Source: .Blood None  Final Report (07-11-22 @ 19:00):    No Growth Final    Culture - Blood (collected 07-06-22 @ 07:20)  Source: .Blood None  Final Report (07-11-22 @ 19:00):    No Growth Final    Culture - Sputum (collected 07-04-22 @ 13:39)  Source: ET Tube ET Tube  Gram Stain (07-05-22 @ 08:48):    Few polymorphonuclear leukocytes per low power field    No Squamous epithelial cells per low power field    Numerous Gram Negative Coccobacilli seen per oil power field  Final Report (07-11-22 @ 08:12):    Moderate Acinetobacter baumannii/nosocom group (Carbapenem Resistant)    Cefiderocol interpretations based on FDA breakpoints.    Normal Respiratory Kelly absent  Organism: Acinetobacter baumannii/nosocom group (Carbapenem Resistant)  Acinetobacter baumannii/nosocom group (Carbapenem Resistant) (07-11-22 @ 08:12)  Organism: Acinetobacter baumannii/nosocom group (Carbapenem Resistant) (07-11-22 @ 08:12)      -  Cefiderocol: I      -  Imipenem: R      -  Piperacillin/Tazobactam: R      Method Type: KB  Organism: Acinetobacter baumannii/nosocom group (Carbapenem Resistant) (07-11-22 @ 08:12)      -  Amikacin: R >32      -  Ampicillin/Sulbactam: R >16/8      -  Cefepime: R >16      -  Ceftazidime: R >16      -  Ciprofloxacin: R >2      -  Gentamicin: R >8      -  Levofloxacin: R >4      -  Meropenem: R >8      -  Tobramycin: R >8      -  Trimethoprim/Sulfamethoxazole: R >2/38      Method Type: AL    Culture - Blood (collected 06-29-22 @ 20:31)  Source: .Blood Blood-Peripheral  Final Report (07-05-22 @ 20:00):    No Growth Final    Culture - Blood (collected 06-29-22 @ 20:31)  Source: .Blood Blood  Final Report (07-05-22 @ 20:00):    No Growth Final    Culture - Blood (collected 06-28-22 @ 06:00)  Source: .Blood Blood  Final Report (07-03-22 @ 23:00):    No Growth Final    Culture - Sputum (collected 06-27-22 @ 14:00)  Source: Trach Asp Tracheal Aspirate  Gram Stain (06-28-22 @ 03:15):    Moderate polymorphonuclear leukocytes per low power field    Few Squamous epithelial cells per low power field    Moderate Gram positive cocci in pairs seen per oil power field    Few Gram Variable Rods seen per oil power field  Final Report (06-30-22 @ 16:33):    Numerous Mixed gram negative rods    Moderate Staphylococcus aureus    Normal Respiratory Kelly present  Organism: Staphylococcus aureus (06-30-22 @ 16:33)  Organism: Staphylococcus aureus (06-30-22 @ 16:33)      -  Ampicillin/Sulbactam: S <=8/4      -  Cefazolin: S <=4      -  Clindamycin: S <=0.25      -  Erythromycin: S <=0.25      -  Gentamicin: S <=1 Should not be used as monotherapy      -  Oxacillin: S <=0.25 Oxacillin predicts susceptibility for dicloxacillin, methicillin, and nafcillin      -  Rifampin: S <=1 Should not be used as monotherapy      -  Tetra/Doxy: S <=1      -  Trimethoprim/Sulfamethoxazole: S <=0.5/9.5      -  Vancomycin: S 2      Method Type: AL    Culture - Sputum (collected 06-24-22 @ 17:20)  Source: Trach Asp Tracheal Aspirate  Gram Stain (06-25-22 @ 06:29):    Few polymorphonuclear leukocytes per low power field    Rare Squamous epithelial cells per low power field    Moderate Gram Negative Rods seen per oil power field  Final Report (06-26-22 @ 17:00):    Moderate Klebsiella oxytoca/Raoutella ornithinolytica    Moderate Enterobacter cloacae complex    Normal Respiratory Kelly absent  Organism: Klebsiella oxytoca /Raoutella ornithinolytica  Enterobacter cloacae complex (06-26-22 @ 17:00)  Organism: Enterobacter cloacae complex (06-26-22 @ 17:00)      -  Amikacin: S <=16      -  Amoxicillin/Clavulanic Acid: R >16/8      -  Ampicillin: R >16 These ampicillin results predict results for amoxicillin      -  Ampicillin/Sulbactam: R >16/8 Enterobacter, Klebsiella aerogenes, Citrobacter, and Serratia may develop resistance during prolonged therapy (3-4 days)      -  Aztreonam: S <=4      -  Cefazolin: R >16 Enterobacter, Klebsiella aerogenes, Citrobacter, and Serratia may develop resistance during prolonged therapy (3-4 days)      -  Cefepime: S <=2      -  Cefoxitin: R >16      -  Ceftriaxone: S <=1 Enterobacter, Klebsiella aerogenes, Citrobacter, and Serratia may develop resistance during prolonged therapy      -  Ciprofloxacin: S <=0.25      -  Ertapenem: S <=0.5      -  Gentamicin: S <=2      -  Imipenem: S <=1      -  Levofloxacin: S <=0.5      -  Meropenem: S <=1      -  Piperacillin/Tazobactam: S <=8      -  Tobramycin: S <=2      -  Trimethoprim/Sulfamethoxazole: S <=0.5/9.5      Method Type: AL  Organism: Klebsiella oxytoca /Raoutella ornithinolytica (06-26-22 @ 17:00)      -  Amikacin: S <=16      -  Amoxicillin/Clavulanic Acid: S <=8/4      -  Ampicillin: R 16 These ampicillin results predict results for amoxicillin      -  Ampicillin/Sulbactam: S <=4/2 Enterobacter, Klebsiella aerogenes, Citrobacter, and Serratia may develop resistance during prolonged therapy (3-4 days)      -  Aztreonam: S <=4      -  Cefazolin: S <=2 Enterobacter, Klebsiella aerogenes, Citrobacter, and Serratia may develop resistance during prolonged therapy (3-4 days)      -  Cefepime: S <=2      -  Cefoxitin: S <=8      -  Ceftriaxone: S <=1 Enterobacter, Klebsiella aerogenes, Citrobacter, and Serratia may develop resistance during prolonged therapy      -  Ciprofloxacin: S <=0.25      -  Ertapenem: S <=0.5      -  Gentamicin: S <=2      -  Imipenem: S <=1      -  Levofloxacin: S <=0.5      -  Meropenem: S <=1      -  Piperacillin/Tazobactam: S <=8      -  Tobramycin: S <=2      -  Trimethoprim/Sulfamethoxazole: S <=0.5/9.5      Method Type: AL    Culture - Body Fluid with Gram Stain (collected 06-22-22 @ 01:00)  Source: Peritoneal Peritoneal Fluid  Gram Stain (06-23-22 @ 02:31):    No polymorphonuclear leukocytes seen    No organisms seen    by cytocentrifuge  Final Report (06-27-22 @ 19:13):    No growth at 5 days    Culture - Blood (collected 06-21-22 @ 05:33)  Source: .Blood None  Final Report (06-26-22 @ 19:00):    No Growth Final            INFECTIOUS DISEASES TESTING  COVID-19 PCR: NotDetec (07-17-22 @ 20:01)  Procalcitonin, Serum: 0.21 (07-17-22 @ 10:34)  COVID-19 PCR: NotDetec (07-14-22 @ 15:00)  Procalcitonin, Serum: 0.16 (07-14-22 @ 12:50)  Procalcitonin, Serum: 0.12 (07-10-22 @ 05:42)  Fungitell: <31 (07-05-22 @ 11:49)  Procalcitonin, Serum: 0.14 (07-05-22 @ 11:23)  MRSA PCR Result.: Negative (06-30-22 @ 10:34)  Fungitell: See Comment** **RESULTS OF FUNGITELL INCONCLUSIVE DUE TO THE PRESENCE OF UNKNOWN  INTERFERING SUBSTANCE. PLEASE SUBMIT ANOTHER SPECIMEN FOR TESTING.  PLEASE CONTACT [facility] WITH QUESTIONS.  Interpretation: The Fungitell assay does not detectcertain fungal  species such as the genus Cryptococcus (Aundrea et al. 1991) which  produces very low levels of (1-3)-Beta-D-Glucan. The assay also does  not detect the Zygomycetes such as Absidia, Mucor and Rhizopus  (Carol et al. 1994) which are not known to produce  (1-3)-Beta-D-Glucan. In addition, the yeast phase of Blastomyces  dermatitidis produces little (1-3)-Beta-D-Glucan and may not be  detected by the assay (Anila et al. 2007).  Reference Range:  Less than 60 pg/mL. Glucan values of less than 60 pg/mL are  interpreted as negative.  Glucan values of 60 to 79 pg/mL are interpreted as indeterminate,  and suggest a possible fungal infection. Additional sampling and  testing of sera is required to interpret the results.  Glucan values of greater than or equal to 80 pg/mL are interpreted  as positive.  Due to the potential for environmental contamination when  transferred to pour-off tubes, which can lead to false positive  results, interpret positive results from samples provided in  pour-off tubes with caution.  Results should be used in conjunction  with clinical findings, and should not form the sole basis for a  diagnosis or treatment decision. The Fungitell test is approved or  cleared for in vitro diagnostic use by the [place] Food and Drug  Administration. Modifications to the approved package insert have  been made and the performance characteristics for these  modifications were determined by [facility].  If sample result is greater than 500 pg/mL, physician may order a  titer of the sample. Please contact [facility] if you would  like to order a retest of this sample to obtain an actual value.  Samples are held for 1 week after initial testing date.  ____________________________________________________________  Performed at:  [facility]  [address] [place] [address]  : Kirk Newberry Ph.D., BCLD (ABB)  CLIA#: [id number]  Phone: [phone] (06-30-22 @ 10:30)  Procalcitonin, Serum: 0.36 (06-30-22 @ 10:30)  Rapid RVP Result: NotDetec (06-30-22 @ 09:19)  HIV-1/2 Combo Result: Nonreact (06-29-22 @ 10:29)  Procalcitonin, Serum: 0.11 (06-27-22 @ 15:46)  Procalcitonin, Serum: 0.11 (06-27-22 @ 06:47)  Procalcitonin, Serum: 0.62 (06-20-22 @ 05:49)  MRSA PCR Result.: Negative (06-17-22 @ 14:30)  Procalcitonin, Serum: 3.28 (06-17-22 @ 05:22)  COVID-19 PCR: NotDetec (06-15-22 @ 07:10)  Procalcitonin, Serum: 0.07 (04-20-22 @ 12:46)      INFLAMMATORY MARKERS  Sedimentation Rate, Erythrocyte: 86 mm/Hr (06-30-22 @ 05:46)  Sedimentation Rate, Erythrocyte: 65 mm/Hr (06-23-22 @ 16:00)      RADIOLOGY & ADDITIONAL TESTS:  I have personally reviewed the last available Chest xray  CXR      CT      CARDIOLOGY TESTING  12 Lead ECG:   Ventricular Rate 103 BPM    Atrial Rate 103 BPM    P-R Interval 154 ms    QRS Duration 66 ms    Q-T Interval 354 ms    QTC Calculation(Bazett) 463 ms    P Axis 63 degrees    R Axis 86 degrees    T Axis 198 degrees    Diagnosis Line Sinus tachycardia  Nonspecific ST-T changes  Confirmed by BRANDON BELL MD ([id number]) on 7/15/2022 10:08:35 AM (07-15-22 @ 07:50)  12 Lead ECG:   Ventricular Rate 107 BPM    Atrial Rate 107 BPM    P-R Interval 138 ms    QRS Duration 82 ms    Q-T Interval 300 ms    QTC Calculation(Bazett) 400 ms    P Axis 86 degrees    R Axis 125 degrees    T Axis 218 degrees    Diagnosis Line Sinus tachycardia  Right axis deviation  Pulmonary disease pattern  Possible Right ventricular hypertrophy  Nonspecific T wave abnormality  Abnormal ECG    Confirmed by BRANODN BELL MD ([id number]) on 7/14/2022 11:44:00 AM (07-14-22 @ 08:36)      MEDICATIONS  ampicillin/sulbactam  IVPB 3 IV Intermittent every 6 hours  chlorhexidine 0.12% Liquid 15 Oral Mucosa every 12 hours  chlorhexidine 2% Cloths 1 Topical daily  dexMEDEtomidine Infusion 0.1 IV Continuous <Continuous>  fentaNYL   Infusion. 0.5 IV Continuous <Continuous>  folic acid 1 Oral daily  lactated ringers. 1000 IV Continuous <Continuous>  magnesium sulfate  IVPB 1 IV Intermittent every 8 hours  methadone    Tablet 20 Oral daily  nicotine -  14 mG/24Hr(s) Patch 1 Transdermal daily  norepinephrine Infusion 0.05 IV Continuous <Continuous>  pantoprazole  Injectable 40 IV Push daily  polymyxin B IVPB [id number] IV Intermittent every 12 hours  potassium chloride   Powder 40 Oral daily  potassium chloride   Powder 40 Enteral Tube two times a day  propofol Infusion 0.1 IV Continuous <Continuous>  QUEtiapine 100 Oral two times a day  scopolamine 1 mG/72 Hr(s) Patch 1 Transdermal every 72 hours  spironolactone 100 Oral daily  thiamine 100 Oral daily  vancomycin    Solution 250 Oral every 6 hours      WEIGHT  Weight (kg): 70.4 (07-07-22 @ 07:00)  Creatinine, Serum: 0.9 mg/dL (07-19-22 @ 05:10)      ANTIBIOTICS:  ampicillin/sulbactam  IVPB 3 Gram(s) IV Intermittent every 6 hours  polymyxin B IVPB [id number] Unit(s) IV Intermittent every 12 hours  vancomycin    Solution 250 milliGRAM(s) Oral every 6 hours      All available historical records have been reviewed       ROXI POOJA  26y, Female  Allergy: buprenorphine (Rash)  Suboxone (Rash)      LOS  34d    CHIEF COMPLAINT: anasarca (19 Jul 2022 07:16)      INTERVAL EVENTS/HPI  - No acute events overnight  - T(F): , Max: 99.7 (07-18-22 @ 11:00)  - awake, alert - unable to tolerate SAT/SBT yesterday   - WBC Count: 1.70 (07-19-22 @ 05:10)  WBC Count: 1.33 (07-18-22 @ 05:37)     - Creatinine, Serum: 0.9 (07-19-22 @ 05:10)  Creatinine, Serum: 1.0 (07-18-22 @ 05:37)       ROS  General: Denies rigors, nightsweats  HEENT: Denies headache, rhinorrhea, sore throat, eye pain  CV: Denies CP, palpitations  PULM: Denies wheezing, hemoptysis  GI: Denies hematemesis, hematochezia, melena  : Denies discharge, hematuria  MSK: Denies arthralgias, myalgias  SKIN: Denies rash, lesions  NEURO: Denies paresthesias, weakness  PSYCH: Denies depression, anxiety    VITALS:  T(F): 96.8, Max: 99.7 (07-18-22 @ 11:00)  HR: 69  BP: 90/58  RR: 15Vital Signs Last 24 Hrs  T(C): 36 (19 Jul 2022 07:10), Max: 37.6 (18 Jul 2022 11:00)  T(F): 96.8 (19 Jul 2022 07:10), Max: 99.7 (18 Jul 2022 11:00)  HR: 69 (19 Jul 2022 07:01) (67 - 135)  BP: 90/58 (19 Jul 2022 07:01) (80/53 - 202/127)  BP(mean): 69 (19 Jul 2022 07:01) (62 - 161)  RR: 15 (19 Jul 2022 07:10) (15 - 44)  SpO2: 100% (19 Jul 2022 07:01) (89% - 100%)    Parameters below as of 19 Jul 2022 07:01  Patient On (Oxygen Delivery Method): ventilator    O2 Concentration (%): 45    PHYSICAL EXAM:  Gen: intubated  HEENT: Normocephalic, atraumatic  Neck: supple, no lymphadenopathy  CV: Regular rate & regular rhythm  Lungs: decreased BS at bases, no fremitus  Abdomen: Soft, BS present  Ext: Warm, well perfused  Neuro: non focal, awake  Skin: no rash, no erythema  Lines: no phlebitis    FH: Non-contributory  Social Hx: Non-contributory    TESTS & MEASUREMENTS:                        7.6    1.70  )-----------( 63       ( 19 Jul 2022 05:10 )             25.0     07-19    140  |  108  |  19  ----------------------------<  96  4.0   |  20  |  0.9    Ca    8.8      19 Jul 2022 05:10  Phos  4.1     07-19  Mg     2.2     07-19    TPro  5.2<L>  /  Alb  2.7<L>  /  TBili  0.4  /  DBili  x   /  AST  15  /  ALT  11  /  AlkPhos  277<H>  07-19      LIVER FUNCTIONS - ( 19 Jul 2022 05:10 )  Alb: 2.7 g/dL / Pro: 5.2 g/dL / ALK PHOS: 277 U/L / ALT: 11 U/L / AST: 15 U/L / GGT: x               Culture - Sputum (collected 07-15-22 @ 10:00)  Source: Trach Asp Tracheal Aspirate  Gram Stain (07-16-22 @ 04:22):    Moderate polymorphonuclear leukocytes per low power field    Rare Squamous epithelial cells per low power field    No organisms seen per oil power field  Final Report (07-17-22 @ 16:58):    Normal Respiratory Kelly present    Babesia microti PCR, Blood. (collected 07-10-22 @ 15:43)  Source: .Blood None  Final Report (07-11-22 @ 09:45):    Babesia microti PCR    Results: NOT detected    ***************Result Note*************    The detection of Babesia microti by PCR has only been    validated for whole blood; this test has not been approved    by the [place] Food and Drug Administration (FDA). Performance    characteristics of this assay have been determined by    [facility]. The clinical significance    of results should be considered in conjunction with the    overall clinical presentation of the patient. Result is not    intended to be used as the sole means for clinical diagnosis    or patient management decisions.    One negative sample does not necessarily rule    out the presence of a parasitic infection.    Culture - Blood (collected 07-08-22 @ 05:26)  Source: .Blood None  Final Report (07-13-22 @ 19:00):    No Growth Final    Culture - Blood (collected 07-06-22 @ 07:20)  Source: .Blood None  Final Report (07-11-22 @ 19:00):    No Growth Final    Culture - Blood (collected 07-06-22 @ 07:20)  Source: .Blood None  Final Report (07-11-22 @ 19:00):    No Growth Final    Culture - Sputum (collected 07-04-22 @ 13:39)  Source: ET Tube ET Tube  Gram Stain (07-05-22 @ 08:48):    Few polymorphonuclear leukocytes per low power field    No Squamous epithelial cells per low power field    Numerous Gram Negative Coccobacilli seen per oil power field  Final Report (07-11-22 @ 08:12):    Moderate Acinetobacter baumannii/nosocom group (Carbapenem Resistant)    Cefiderocol interpretations based on FDA breakpoints.    Normal Respiratory Kelly absent  Organism: Acinetobacter baumannii/nosocom group (Carbapenem Resistant)  Acinetobacter baumannii/nosocom group (Carbapenem Resistant) (07-11-22 @ 08:12)  Organism: Acinetobacter baumannii/nosocom group (Carbapenem Resistant) (07-11-22 @ 08:12)      -  Cefiderocol: I      -  Imipenem: R      -  Piperacillin/Tazobactam: R      Method Type: KB  Organism: Acinetobacter baumannii/nosocom group (Carbapenem Resistant) (07-11-22 @ 08:12)      -  Amikacin: R >32      -  Ampicillin/Sulbactam: R >16/8      -  Cefepime: R >16      -  Ceftazidime: R >16      -  Ciprofloxacin: R >2      -  Gentamicin: R >8      -  Levofloxacin: R >4      -  Meropenem: R >8      -  Tobramycin: R >8      -  Trimethoprim/Sulfamethoxazole: R >2/38      Method Type: AL    Culture - Blood (collected 06-29-22 @ 20:31)  Source: .Blood Blood-Peripheral  Final Report (07-05-22 @ 20:00):    No Growth Final    Culture - Blood (collected 06-29-22 @ 20:31)  Source: .Blood Blood  Final Report (07-05-22 @ 20:00):    No Growth Final    Culture - Blood (collected 06-28-22 @ 06:00)  Source: .Blood Blood  Final Report (07-03-22 @ 23:00):    No Growth Final    Culture - Sputum (collected 06-27-22 @ 14:00)  Source: Trach Asp Tracheal Aspirate  Gram Stain (06-28-22 @ 03:15):    Moderate polymorphonuclear leukocytes per low power field    Few Squamous epithelial cells per low power field    Moderate Gram positive cocci in pairs seen per oil power field    Few Gram Variable Rods seen per oil power field  Final Report (06-30-22 @ 16:33):    Numerous Mixed gram negative rods    Moderate Staphylococcus aureus    Normal Respiratory Kelly present  Organism: Staphylococcus aureus (06-30-22 @ 16:33)  Organism: Staphylococcus aureus (06-30-22 @ 16:33)      -  Ampicillin/Sulbactam: S <=8/4      -  Cefazolin: S <=4      -  Clindamycin: S <=0.25      -  Erythromycin: S <=0.25      -  Gentamicin: S <=1 Should not be used as monotherapy      -  Oxacillin: S <=0.25 Oxacillin predicts susceptibility for dicloxacillin, methicillin, and nafcillin      -  Rifampin: S <=1 Should not be used as monotherapy      -  Tetra/Doxy: S <=1      -  Trimethoprim/Sulfamethoxazole: S <=0.5/9.5      -  Vancomycin: S 2      Method Type: AL    Culture - Sputum (collected 06-24-22 @ 17:20)  Source: Trach Asp Tracheal Aspirate  Gram Stain (06-25-22 @ 06:29):    Few polymorphonuclear leukocytes per low power field    Rare Squamous epithelial cells per low power field    Moderate Gram Negative Rods seen per oil power field  Final Report (06-26-22 @ 17:00):    Moderate Klebsiella oxytoca/Raoutella ornithinolytica    Moderate Enterobacter cloacae complex    Normal Respiratory Kelly absent  Organism: Klebsiella oxytoca /Raoutella ornithinolytica  Enterobacter cloacae complex (06-26-22 @ 17:00)  Organism: Enterobacter cloacae complex (06-26-22 @ 17:00)      -  Amikacin: S <=16      -  Amoxicillin/Clavulanic Acid: R >16/8      -  Ampicillin: R >16 These ampicillin results predict results for amoxicillin      -  Ampicillin/Sulbactam: R >16/8 Enterobacter, Klebsiella aerogenes, Citrobacter, and Serratia may develop resistance during prolonged therapy (3-4 days)      -  Aztreonam: S <=4      -  Cefazolin: R >16 Enterobacter, Klebsiella aerogenes, Citrobacter, and Serratia may develop resistance during prolonged therapy (3-4 days)      -  Cefepime: S <=2      -  Cefoxitin: R >16      -  Ceftriaxone: S <=1 Enterobacter, Klebsiella aerogenes, Citrobacter, and Serratia may develop resistance during prolonged therapy      -  Ciprofloxacin: S <=0.25      -  Ertapenem: S <=0.5      -  Gentamicin: S <=2      -  Imipenem: S <=1      -  Levofloxacin: S <=0.5      -  Meropenem: S <=1      -  Piperacillin/Tazobactam: S <=8      -  Tobramycin: S <=2      -  Trimethoprim/Sulfamethoxazole: S <=0.5/9.5      Method Type: AL  Organism: Klebsiella oxytoca /Raoutella ornithinolytica (06-26-22 @ 17:00)      -  Amikacin: S <=16      -  Amoxicillin/Clavulanic Acid: S <=8/4      -  Ampicillin: R 16 These ampicillin results predict results for amoxicillin      -  Ampicillin/Sulbactam: S <=4/2 Enterobacter, Klebsiella aerogenes, Citrobacter, and Serratia may develop resistance during prolonged therapy (3-4 days)      -  Aztreonam: S <=4      -  Cefazolin: S <=2 Enterobacter, Klebsiella aerogenes, Citrobacter, and Serratia may develop resistance during prolonged therapy (3-4 days)      -  Cefepime: S <=2      -  Cefoxitin: S <=8      -  Ceftriaxone: S <=1 Enterobacter, Klebsiella aerogenes, Citrobacter, and Serratia may develop resistance during prolonged therapy      -  Ciprofloxacin: S <=0.25      -  Ertapenem: S <=0.5      -  Gentamicin: S <=2      -  Imipenem: S <=1      -  Levofloxacin: S <=0.5      -  Meropenem: S <=1      -  Piperacillin/Tazobactam: S <=8      -  Tobramycin: S <=2      -  Trimethoprim/Sulfamethoxazole: S <=0.5/9.5      Method Type: AL    Culture - Body Fluid with Gram Stain (collected 06-22-22 @ 01:00)  Source: Peritoneal Peritoneal Fluid  Gram Stain (06-23-22 @ 02:31):    No polymorphonuclear leukocytes seen    No organisms seen    by cytocentrifuge  Final Report (06-27-22 @ 19:13):    No growth at 5 days    Culture - Blood (collected 06-21-22 @ 05:33)  Source: .Blood None  Final Report (06-26-22 @ 19:00):    No Growth Final            INFECTIOUS DISEASES TESTING  COVID-19 PCR: NotDetec (07-17-22 @ 20:01)  Procalcitonin, Serum: 0.21 (07-17-22 @ 10:34)  COVID-19 PCR: NotDetec (07-14-22 @ 15:00)  Procalcitonin, Serum: 0.16 (07-14-22 @ 12:50)  Procalcitonin, Serum: 0.12 (07-10-22 @ 05:42)  Fungitell: <31 (07-05-22 @ 11:49)  Procalcitonin, Serum: 0.14 (07-05-22 @ 11:23)  MRSA PCR Result.: Negative (06-30-22 @ 10:34)  Fungitell: See Comment** **RESULTS OF FUNGITELL INCONCLUSIVE DUE TO THE PRESENCE OF UNKNOWN  INTERFERING SUBSTANCE. PLEASE SUBMIT ANOTHER SPECIMEN FOR TESTING.  PLEASE CONTACT [employer] WITH QUESTIONS.  Interpretation: The Fungitell assay does not detectcertain fungal  species such as the genus Cryptococcus (Aundrea et al. 1991) which  produces very low levels of (1-3)-Beta-D-Glucan. The assay also does  not detect the Zygomycetes such as Absidia, Mucor and Rhizopus  (Carol et al. 1994) which are not known to produce  (1-3)-Beta-D-Glucan. In addition, the yeast phase of Blastomyces  dermatitidis produces little (1-3)-Beta-D-Glucan and may not be  detected by the assay (Anila et al. 2007).  Reference Range:  Less than 60 pg/mL. Glucan values of less than 60 pg/mL are  interpreted as negative.  Glucan values of 60 to 79 pg/mL are interpreted as indeterminate,  and suggest a possible fungal infection. Additional sampling and  testing of sera is required to interpret the results.  Glucan values of greater than or equal to 80 pg/mL are interpreted  as positive.  Due to the potential for environmental contamination when  transferred to pour-off tubes, which can lead to false positive  results, interpret positive results from samples provided in  pour-off tubes with caution.  Results should be used in conjunction  with clinical findings, and should not form the sole basis for a  diagnosis or treatment decision. The Fungitell test is approved or  cleared for in vitro diagnostic use by the U.[place] Food and Drug  Administration. Modifications to the approved package insert have  been made and the performance characteristics for these  modifications were determined by [employer].  If sample result is greater than 500 pg/mL, physician may order a  titer of the sample. Please contact [employer] if you would  like to order a retest of this sample to obtain an actual value.  Samples are held for 1 week after initial testing date.  ____________________________________________________________  Performed at:  [facility]  [address] [place] [address]  : Kirk Newberry Ph.D., BCLD (ABB)  CLIA#: [id number]  Phone: [phone] (06-30-22 @ 10:30)  Procalcitonin, Serum: 0.36 (06-30-22 @ 10:30)  Rapid RVP Result: NotDetec (06-30-22 @ 09:19)  HIV-1/2 Combo Result: Nonreact (06-29-22 @ 10:29)  Procalcitonin, Serum: 0.11 (06-27-22 @ 15:46)  Procalcitonin, Serum: 0.11 (06-27-22 @ 06:47)  Procalcitonin, Serum: 0.62 (06-20-22 @ 05:49)  MRSA PCR Result.: Negative (06-17-22 @ 14:30)  Procalcitonin, Serum: 3.28 (06-17-22 @ 05:22)  COVID-19 PCR: NotDetec (06-15-22 @ 07:10)  Procalcitonin, Serum: 0.07 (04-20-22 @ 12:46)      INFLAMMATORY MARKERS  Sedimentation Rate, Erythrocyte: 86 mm/Hr (06-30-22 @ 05:46)  Sedimentation Rate, Erythrocyte: 65 mm/Hr (06-23-22 @ 16:00)      RADIOLOGY & ADDITIONAL TESTS:  I have personally reviewed the last available Chest xray  CXR      CT      CARDIOLOGY TESTING  12 Lead ECG:   Ventricular Rate 103 BPM    Atrial Rate 103 BPM    P-R Interval 154 ms    QRS Duration 66 ms    Q-T Interval 354 ms    QTC Calculation(Bazett) 463 ms    P Axis 63 degrees    R Axis 86 degrees    T Axis 198 degrees    Diagnosis Line Sinus tachycardia  Nonspecific ST-T changes  Confirmed by BRANDON BELL MD ([id number]) on 7/15/2022 10:08:35 AM (07-15-22 @ 07:50)  12 Lead ECG:   Ventricular Rate 107 BPM    Atrial Rate 107 BPM    P-R Interval 138 ms    QRS Duration 82 ms    Q-T Interval 300 ms    QTC Calculation(Bazett) 400 ms    P Axis 86 degrees    R Axis 125 degrees    T Axis 218 degrees    Diagnosis Line Sinus tachycardia  Right axis deviation  Pulmonary disease pattern  Possible Right ventricular hypertrophy  Nonspecific T wave abnormality  Abnormal ECG    Confirmed by BRANDON BELL MD ([id number]) on 7/14/2022 11:44:00 AM (07-14-22 @ 08:36)      MEDICATIONS  ampicillin/sulbactam  IVPB 3 IV Intermittent every 6 hours  chlorhexidine 0.12% Liquid 15 Oral Mucosa every 12 hours  chlorhexidine 2% Cloths 1 Topical daily  dexMEDEtomidine Infusion 0.1 IV Continuous <Continuous>  fentaNYL   Infusion. 0.5 IV Continuous <Continuous>  folic acid 1 Oral daily  lactated ringers. 1000 IV Continuous <Continuous>  magnesium sulfate  IVPB 1 IV Intermittent every 8 hours  methadone    Tablet 20 Oral daily  nicotine -  14 mG/24Hr(s) Patch 1 Transdermal daily  norepinephrine Infusion 0.05 IV Continuous <Continuous>  pantoprazole  Injectable 40 IV Push daily  polymyxin B IVPB [id number] IV Intermittent every 12 hours  potassium chloride   Powder 40 Oral daily  potassium chloride   Powder 40 Enteral Tube two times a day  propofol Infusion 0.1 IV Continuous <Continuous>  QUEtiapine 100 Oral two times a day  scopolamine 1 mG/72 Hr(s) Patch 1 Transdermal every 72 hours  spironolactone 100 Oral daily  thiamine 100 Oral daily  vancomycin    Solution 250 Oral every 6 hours      WEIGHT  Weight (kg): 70.4 (07-07-22 @ 07:00)  Creatinine, Serum: 0.9 mg/dL (07-19-22 @ 05:10)      ANTIBIOTICS:  ampicillin/sulbactam  IVPB 3 Gram(s) IV Intermittent every 6 hours  polymyxin B IVPB [id number] Unit(s) IV Intermittent every 12 hours  vancomycin    Solution 250 milliGRAM(s) Oral every 6 hours      All available historical records have been reviewed

## 2022-07-20 ENCOUNTER — TRANSCRIPTION ENCOUNTER (OUTPATIENT)
Age: 27
End: 2022-07-20

## 2022-07-20 LAB
ALBUMIN SERPL ELPH-MCNC: 2.7 G/DL — LOW (ref 3.5–5.2)
ALP SERPL-CCNC: 248 U/L — HIGH (ref 30–115)
ALT FLD-CCNC: 12 U/L — SIGNIFICANT CHANGE UP (ref 0–41)
ANION GAP SERPL CALC-SCNC: 12 MMOL/L — SIGNIFICANT CHANGE UP (ref 7–14)
ANION GAP SERPL CALC-SCNC: 12 MMOL/L — SIGNIFICANT CHANGE UP (ref 7–14)
APTT BLD: 28.1 SEC — SIGNIFICANT CHANGE UP (ref 27–39.2)
AST SERPL-CCNC: 16 U/L — SIGNIFICANT CHANGE UP (ref 0–41)
BASOPHILS # BLD AUTO: 0.01 K/UL — SIGNIFICANT CHANGE UP (ref 0–0.2)
BASOPHILS NFR BLD AUTO: 0.7 % — SIGNIFICANT CHANGE UP (ref 0–1)
BILIRUB SERPL-MCNC: 0.3 MG/DL — SIGNIFICANT CHANGE UP (ref 0.2–1.2)
BUN SERPL-MCNC: 18 MG/DL — SIGNIFICANT CHANGE UP (ref 10–20)
BUN SERPL-MCNC: 18 MG/DL — SIGNIFICANT CHANGE UP (ref 10–20)
CALCIUM SERPL-MCNC: 8.5 MG/DL — SIGNIFICANT CHANGE UP (ref 8.5–10.1)
CALCIUM SERPL-MCNC: 8.5 MG/DL — SIGNIFICANT CHANGE UP (ref 8.5–10.1)
CHLORIDE SERPL-SCNC: 104 MMOL/L — SIGNIFICANT CHANGE UP (ref 98–110)
CHLORIDE SERPL-SCNC: 105 MMOL/L — SIGNIFICANT CHANGE UP (ref 98–110)
CO2 SERPL-SCNC: 20 MMOL/L — SIGNIFICANT CHANGE UP (ref 17–32)
CO2 SERPL-SCNC: 20 MMOL/L — SIGNIFICANT CHANGE UP (ref 17–32)
CREAT SERPL-MCNC: 0.9 MG/DL — SIGNIFICANT CHANGE UP (ref 0.7–1.5)
CREAT SERPL-MCNC: 0.9 MG/DL — SIGNIFICANT CHANGE UP (ref 0.7–1.5)
EGFR: 89 ML/MIN/1.73M2 — SIGNIFICANT CHANGE UP
EGFR: 89 ML/MIN/1.73M2 — SIGNIFICANT CHANGE UP
EOSINOPHIL # BLD AUTO: 0 K/UL — SIGNIFICANT CHANGE UP (ref 0–0.7)
EOSINOPHIL NFR BLD AUTO: 0 % — SIGNIFICANT CHANGE UP (ref 0–8)
GLUCOSE SERPL-MCNC: 106 MG/DL — HIGH (ref 70–99)
GLUCOSE SERPL-MCNC: 91 MG/DL — SIGNIFICANT CHANGE UP (ref 70–99)
HCT VFR BLD CALC: 24.2 % — LOW (ref 37–47)
HCT VFR BLD CALC: 25.8 % — LOW (ref 37–47)
HEMATOPATHOLOGY REPORT: SIGNIFICANT CHANGE UP
HGB BLD-MCNC: 7.3 G/DL — LOW (ref 12–16)
HGB BLD-MCNC: 7.7 G/DL — LOW (ref 12–16)
IMM GRANULOCYTES NFR BLD AUTO: 0 % — LOW (ref 0.1–0.3)
INR BLD: 1.01 RATIO — SIGNIFICANT CHANGE UP (ref 0.65–1.3)
LYMPHOCYTES # BLD AUTO: 0.41 K/UL — LOW (ref 1.2–3.4)
LYMPHOCYTES # BLD AUTO: 28.1 % — SIGNIFICANT CHANGE UP (ref 20.5–51.1)
MAGNESIUM SERPL-MCNC: 2.4 MG/DL — SIGNIFICANT CHANGE UP (ref 1.8–2.4)
MAGNESIUM SERPL-MCNC: 2.6 MG/DL — HIGH (ref 1.8–2.4)
MCHC RBC-ENTMCNC: 26.2 PG — LOW (ref 27–31)
MCHC RBC-ENTMCNC: 26.5 PG — LOW (ref 27–31)
MCHC RBC-ENTMCNC: 29.8 G/DL — LOW (ref 32–37)
MCHC RBC-ENTMCNC: 30.2 G/DL — LOW (ref 32–37)
MCV RBC AUTO: 87.8 FL — SIGNIFICANT CHANGE UP (ref 81–99)
MCV RBC AUTO: 88 FL — SIGNIFICANT CHANGE UP (ref 81–99)
MONOCYTES # BLD AUTO: 0.09 K/UL — LOW (ref 0.1–0.6)
MONOCYTES NFR BLD AUTO: 6.2 % — SIGNIFICANT CHANGE UP (ref 1.7–9.3)
NEUTROPHILS # BLD AUTO: 0.95 K/UL — LOW (ref 1.4–6.5)
NEUTROPHILS NFR BLD AUTO: 65 % — SIGNIFICANT CHANGE UP (ref 42.2–75.2)
NRBC # BLD: 0 /100 WBCS — SIGNIFICANT CHANGE UP (ref 0–0)
NRBC # BLD: 0 /100 WBCS — SIGNIFICANT CHANGE UP (ref 0–0)
PHOSPHATE SERPL-MCNC: 4.3 MG/DL — SIGNIFICANT CHANGE UP (ref 2.1–4.9)
PHOSPHATE SERPL-MCNC: 4.5 MG/DL — SIGNIFICANT CHANGE UP (ref 2.1–4.9)
PLATELET # BLD AUTO: 83 K/UL — LOW (ref 130–400)
PLATELET # BLD AUTO: 87 K/UL — LOW (ref 130–400)
POTASSIUM SERPL-MCNC: 4.2 MMOL/L — SIGNIFICANT CHANGE UP (ref 3.5–5)
POTASSIUM SERPL-MCNC: 4.2 MMOL/L — SIGNIFICANT CHANGE UP (ref 3.5–5)
POTASSIUM SERPL-SCNC: 4.2 MMOL/L — SIGNIFICANT CHANGE UP (ref 3.5–5)
POTASSIUM SERPL-SCNC: 4.2 MMOL/L — SIGNIFICANT CHANGE UP (ref 3.5–5)
PROT SERPL-MCNC: 5.2 G/DL — LOW (ref 6–8)
PROTHROM AB SERPL-ACNC: 11.6 SEC — SIGNIFICANT CHANGE UP (ref 9.95–12.87)
RBC # BLD: 2.75 M/UL — LOW (ref 4.2–5.4)
RBC # BLD: 2.94 M/UL — LOW (ref 4.2–5.4)
RBC # FLD: 15.9 % — HIGH (ref 11.5–14.5)
RBC # FLD: 15.9 % — HIGH (ref 11.5–14.5)
SODIUM SERPL-SCNC: 136 MMOL/L — SIGNIFICANT CHANGE UP (ref 135–146)
SODIUM SERPL-SCNC: 137 MMOL/L — SIGNIFICANT CHANGE UP (ref 135–146)
WBC # BLD: 1.46 K/UL — LOW (ref 4.8–10.8)
WBC # BLD: 1.69 K/UL — LOW (ref 4.8–10.8)
WBC # FLD AUTO: 1.46 K/UL — LOW (ref 4.8–10.8)
WBC # FLD AUTO: 1.69 K/UL — LOW (ref 4.8–10.8)

## 2022-07-20 PROCEDURE — 71045 X-RAY EXAM CHEST 1 VIEW: CPT | Mod: 26,76

## 2022-07-20 PROCEDURE — 99232 SBSQ HOSP IP/OBS MODERATE 35: CPT | Mod: 57,25

## 2022-07-20 PROCEDURE — 31600 PLANNED TRACHEOSTOMY: CPT

## 2022-07-20 PROCEDURE — 99233 SBSQ HOSP IP/OBS HIGH 50: CPT

## 2022-07-20 PROCEDURE — 99232 SBSQ HOSP IP/OBS MODERATE 35: CPT

## 2022-07-20 RX ORDER — ALBUTEROL 90 UG/1
1 AEROSOL, METERED ORAL EVERY 4 HOURS
Refills: 0 | Status: DISCONTINUED | OUTPATIENT
Start: 2022-07-20 | End: 2022-08-04

## 2022-07-20 RX ORDER — PANTOPRAZOLE SODIUM 20 MG/1
40 TABLET, DELAYED RELEASE ORAL DAILY
Refills: 0 | Status: DISCONTINUED | OUTPATIENT
Start: 2022-07-20 | End: 2022-07-27

## 2022-07-20 RX ORDER — PROPOFOL 10 MG/ML
0.1 INJECTION, EMULSION INTRAVENOUS
Qty: 1000 | Refills: 0 | Status: DISCONTINUED | OUTPATIENT
Start: 2022-07-20 | End: 2022-07-25

## 2022-07-20 RX ORDER — FOLIC ACID 0.8 MG
1 TABLET ORAL DAILY
Refills: 0 | Status: DISCONTINUED | OUTPATIENT
Start: 2022-07-20 | End: 2022-08-03

## 2022-07-20 RX ORDER — POTASSIUM CHLORIDE 20 MEQ
40 PACKET (EA) ORAL DAILY
Refills: 0 | Status: DISCONTINUED | OUTPATIENT
Start: 2022-07-20 | End: 2022-07-22

## 2022-07-20 RX ORDER — VANCOMYCIN HCL 1 G
250 VIAL (EA) INTRAVENOUS EVERY 6 HOURS
Refills: 0 | Status: DISCONTINUED | OUTPATIENT
Start: 2022-07-20 | End: 2022-07-22

## 2022-07-20 RX ORDER — BACITRACIN ZINC 500 UNIT/G
1 OINTMENT IN PACKET (EA) TOPICAL
Refills: 0 | Status: DISCONTINUED | OUTPATIENT
Start: 2022-07-20 | End: 2022-07-20

## 2022-07-20 RX ORDER — AMPICILLIN SODIUM AND SULBACTAM SODIUM 250; 125 MG/ML; MG/ML
3 INJECTION, POWDER, FOR SUSPENSION INTRAMUSCULAR; INTRAVENOUS EVERY 6 HOURS
Refills: 0 | Status: DISCONTINUED | OUTPATIENT
Start: 2022-07-20 | End: 2022-07-29

## 2022-07-20 RX ORDER — FENTANYL CITRATE 50 UG/ML
75 INJECTION INTRAVENOUS ONCE
Refills: 0 | Status: DISCONTINUED | OUTPATIENT
Start: 2022-07-20 | End: 2022-07-20

## 2022-07-20 RX ORDER — SODIUM CHLORIDE 9 MG/ML
10 INJECTION INTRAMUSCULAR; INTRAVENOUS; SUBCUTANEOUS
Refills: 0 | Status: DISCONTINUED | OUTPATIENT
Start: 2022-07-20 | End: 2022-08-04

## 2022-07-20 RX ORDER — NOREPINEPHRINE BITARTRATE/D5W 8 MG/250ML
0.05 PLASTIC BAG, INJECTION (ML) INTRAVENOUS
Qty: 8 | Refills: 0 | Status: DISCONTINUED | OUTPATIENT
Start: 2022-07-20 | End: 2022-07-27

## 2022-07-20 RX ORDER — FENTANYL CITRATE 50 UG/ML
0.5 INJECTION INTRAVENOUS
Qty: 2500 | Refills: 0 | Status: DISCONTINUED | OUTPATIENT
Start: 2022-07-20 | End: 2022-07-22

## 2022-07-20 RX ORDER — QUETIAPINE FUMARATE 200 MG/1
100 TABLET, FILM COATED ORAL
Refills: 0 | Status: DISCONTINUED | OUTPATIENT
Start: 2022-07-20 | End: 2022-08-04

## 2022-07-20 RX ORDER — SODIUM CHLORIDE 9 MG/ML
1000 INJECTION, SOLUTION INTRAVENOUS
Refills: 0 | Status: DISCONTINUED | OUTPATIENT
Start: 2022-07-20 | End: 2022-07-21

## 2022-07-20 RX ORDER — METHADONE HYDROCHLORIDE 40 MG/1
20 TABLET ORAL DAILY
Refills: 0 | Status: DISCONTINUED | OUTPATIENT
Start: 2022-07-20 | End: 2022-07-27

## 2022-07-20 RX ORDER — BACITRACIN ZINC 500 UNIT/G
1 OINTMENT IN PACKET (EA) TOPICAL
Refills: 0 | Status: DISCONTINUED | OUTPATIENT
Start: 2022-07-20 | End: 2022-08-04

## 2022-07-20 RX ORDER — POLYMYXIN B SULFATE 500000 [USP'U]/1
875000 INJECTION, POWDER, LYOPHILIZED, FOR SOLUTION INTRAMUSCULAR; INTRATHECAL; INTRAVENOUS; OPHTHALMIC EVERY 12 HOURS
Refills: 0 | Status: DISCONTINUED | OUTPATIENT
Start: 2022-07-20 | End: 2022-07-29

## 2022-07-20 RX ORDER — SCOPALAMINE 1 MG/3D
1 PATCH, EXTENDED RELEASE TRANSDERMAL
Refills: 0 | Status: DISCONTINUED | OUTPATIENT
Start: 2022-07-20 | End: 2022-08-04

## 2022-07-20 RX ORDER — THIAMINE MONONITRATE (VIT B1) 100 MG
100 TABLET ORAL DAILY
Refills: 0 | Status: DISCONTINUED | OUTPATIENT
Start: 2022-07-20 | End: 2022-08-03

## 2022-07-20 RX ORDER — CHLORHEXIDINE GLUCONATE 213 G/1000ML
1 SOLUTION TOPICAL DAILY
Refills: 0 | Status: DISCONTINUED | OUTPATIENT
Start: 2022-07-20 | End: 2022-08-04

## 2022-07-20 RX ORDER — DEXMEDETOMIDINE HYDROCHLORIDE IN 0.9% SODIUM CHLORIDE 4 UG/ML
0.1 INJECTION INTRAVENOUS
Qty: 400 | Refills: 0 | Status: DISCONTINUED | OUTPATIENT
Start: 2022-07-20 | End: 2022-07-26

## 2022-07-20 RX ORDER — HYDROXYZINE HCL 10 MG
50 TABLET ORAL EVERY 6 HOURS
Refills: 0 | Status: DISCONTINUED | OUTPATIENT
Start: 2022-07-20 | End: 2022-08-04

## 2022-07-20 RX ORDER — CHLORHEXIDINE GLUCONATE 213 G/1000ML
15 SOLUTION TOPICAL EVERY 12 HOURS
Refills: 0 | Status: DISCONTINUED | OUTPATIENT
Start: 2022-07-20 | End: 2022-08-04

## 2022-07-20 RX ORDER — IBUPROFEN 200 MG
400 TABLET ORAL EVERY 6 HOURS
Refills: 0 | Status: DISCONTINUED | OUTPATIENT
Start: 2022-07-20 | End: 2022-07-24

## 2022-07-20 RX ORDER — NICOTINE POLACRILEX 2 MG
1 GUM BUCCAL DAILY
Refills: 0 | Status: DISCONTINUED | OUTPATIENT
Start: 2022-07-20 | End: 2022-08-04

## 2022-07-20 RX ORDER — SPIRONOLACTONE 25 MG/1
100 TABLET, FILM COATED ORAL DAILY
Refills: 0 | Status: DISCONTINUED | OUTPATIENT
Start: 2022-07-20 | End: 2022-08-04

## 2022-07-20 RX ADMIN — METHADONE HYDROCHLORIDE 20 MILLIGRAM(S): 40 TABLET ORAL at 12:57

## 2022-07-20 RX ADMIN — AMPICILLIN SODIUM AND SULBACTAM SODIUM 200 GRAM(S): 250; 125 INJECTION, POWDER, FOR SUSPENSION INTRAMUSCULAR; INTRAVENOUS at 05:27

## 2022-07-20 RX ADMIN — AMPICILLIN SODIUM AND SULBACTAM SODIUM 200 GRAM(S): 250; 125 INJECTION, POWDER, FOR SUSPENSION INTRAMUSCULAR; INTRAVENOUS at 12:46

## 2022-07-20 RX ADMIN — Medication 250 MILLIGRAM(S): at 00:44

## 2022-07-20 RX ADMIN — PROPOFOL 0.04 MICROGRAM(S)/KG/MIN: 10 INJECTION, EMULSION INTRAVENOUS at 20:04

## 2022-07-20 RX ADMIN — POLYMYXIN B SULFATE 500 UNIT(S): 500000 INJECTION, POWDER, LYOPHILIZED, FOR SOLUTION INTRAMUSCULAR; INTRATHECAL; INTRAVENOUS; OPHTHALMIC at 05:28

## 2022-07-20 RX ADMIN — AMPICILLIN SODIUM AND SULBACTAM SODIUM 200 GRAM(S): 250; 125 INJECTION, POWDER, FOR SUSPENSION INTRAMUSCULAR; INTRAVENOUS at 22:02

## 2022-07-20 RX ADMIN — PROPOFOL 0.04 MICROGRAM(S)/KG/MIN: 10 INJECTION, EMULSION INTRAVENOUS at 19:35

## 2022-07-20 RX ADMIN — DEXMEDETOMIDINE HYDROCHLORIDE IN 0.9% SODIUM CHLORIDE 1.76 MICROGRAM(S)/KG/HR: 4 INJECTION INTRAVENOUS at 15:40

## 2022-07-20 RX ADMIN — CHLORHEXIDINE GLUCONATE 15 MILLILITER(S): 213 SOLUTION TOPICAL at 05:30

## 2022-07-20 RX ADMIN — FENTANYL CITRATE 3.52 MICROGRAM(S)/KG/HR: 50 INJECTION INTRAVENOUS at 20:01

## 2022-07-20 RX ADMIN — Medication 40 MILLIEQUIVALENT(S): at 12:46

## 2022-07-20 RX ADMIN — AMPICILLIN SODIUM AND SULBACTAM SODIUM 200 GRAM(S): 250; 125 INJECTION, POWDER, FOR SUSPENSION INTRAMUSCULAR; INTRAVENOUS at 00:44

## 2022-07-20 RX ADMIN — Medication 40 MILLIEQUIVALENT(S): at 05:29

## 2022-07-20 RX ADMIN — SPIRONOLACTONE 100 MILLIGRAM(S): 25 TABLET, FILM COATED ORAL at 05:28

## 2022-07-20 RX ADMIN — Medication 250 MILLIGRAM(S): at 12:46

## 2022-07-20 RX ADMIN — Medication 250 MILLIGRAM(S): at 05:29

## 2022-07-20 RX ADMIN — SCOPALAMINE 1 PATCH: 1 PATCH, EXTENDED RELEASE TRANSDERMAL at 22:29

## 2022-07-20 RX ADMIN — DEXMEDETOMIDINE HYDROCHLORIDE IN 0.9% SODIUM CHLORIDE 1.76 MICROGRAM(S)/KG/HR: 4 INJECTION INTRAVENOUS at 20:02

## 2022-07-20 RX ADMIN — FENTANYL CITRATE 3.52 MICROGRAM(S)/KG/HR: 50 INJECTION INTRAVENOUS at 05:29

## 2022-07-20 RX ADMIN — PANTOPRAZOLE SODIUM 40 MILLIGRAM(S): 20 TABLET, DELAYED RELEASE ORAL at 12:46

## 2022-07-20 RX ADMIN — DEXMEDETOMIDINE HYDROCHLORIDE IN 0.9% SODIUM CHLORIDE 1.76 MICROGRAM(S)/KG/HR: 4 INJECTION INTRAVENOUS at 05:29

## 2022-07-20 RX ADMIN — QUETIAPINE FUMARATE 100 MILLIGRAM(S): 200 TABLET, FILM COATED ORAL at 05:28

## 2022-07-20 RX ADMIN — FENTANYL CITRATE 75 MICROGRAM(S): 50 INJECTION INTRAVENOUS at 20:03

## 2022-07-20 RX ADMIN — SODIUM CHLORIDE 50 MILLILITER(S): 9 INJECTION, SOLUTION INTRAVENOUS at 21:58

## 2022-07-20 RX ADMIN — Medication 1 PATCH: at 12:45

## 2022-07-20 RX ADMIN — SODIUM CHLORIDE 50 MILLILITER(S): 9 INJECTION, SOLUTION INTRAVENOUS at 22:03

## 2022-07-20 NOTE — PROGRESS NOTE ADULT - SUBJECTIVE AND OBJECTIVE BOX
GENERAL SURGERY PROGRESS NOTE    Patient: POOJA DIANE , 26y ([birth year])Female   MRN: [id number]  [address]  Visit: 06-15-22 Inpatient  Date: 07-20-22 @ 08:08        Procedure/Dx/Injuries: tracheostomy    Events of past 24 hours: hypotensive to 70s/ 40s, not on pressors, nontachycardic, afebrile, 45/5.    PAST MEDICAL & SURGICAL HISTORY:  Hepatitis C      Asthma      Anxiety and depression      IV drug abuse  heroin      No significant past surgical history          Vitals:   T(F): 96.5 (07-20-22 @ 03:00), Max: 97 (07-20-22 @ 00:30)  HR: 69 (07-20-22 @ 05:05)  BP: 105/67 (07-20-22 @ 05:05)  RR: 25 (07-20-22 @ 05:05)  SpO2: 99% (07-20-22 @ 05:05)  Mode: AC/ CMV (Assist Control/ Continuous Mandatory Ventilation), RR (machine): 25, TV (machine): 370, FiO2: 35, PEEP: 5, ITime: 0.8, MAP: 9, PIP: 17    Diet, NPO after Midnight:      NPO Start Date: 19-Jul-2022,   NPO Start Time: 23:59  Except Medications  Diet, NPO with Tube Feed:   Tube Feeding Modality: Orogastric  Vital High Protein  Total Volume for 24 Hours (mL): 1200  Continuous  Starting Tube Feed Rate mL per Hour: 10  Until Goal Tube Feed Rate (mL per Hour): 50  Tube Feed Duration (in Hours): 24  Tube Feed Start Time: 16:00      Fluids:     I & O's:    07-19-22 @ 07:01  -  07-20-22 @ 07:00  --------------------------------------------------------  IN:    Dexmedetomidine: 583 mL    FentaNYL: 400 mL    IV PiggyBack: 1000 mL    IV PiggyBack: 200 mL    IV PiggyBack: 400 mL    Lactated Ringers: 130 mL    Propofol: 60 mL    Vital High Protein: 40 mL  Total IN: 2813 mL    OUT:    Enteral Tube Flush: 0 mL    Free Water: 0 mL    Indwelling Catheter - Urethral (mL): 1645 mL  Total OUT: 1645 mL    Total NET: 1168 mL        Bowel Movement: : [] YES [] NO  Flatus: : [] YES [] NO    PHYSICAL EXAM:  General: NAD, awake and alert  HEENT: NCAT, MARIANNA, EOMI, Trachea ML, Neck supple  Cardiac: RRR S1, S2, no Murmurs, rubs or gallops  Respiratory: CTAB  Abdomen: Soft, non-distended, non-tender    MEDICATIONS  (STANDING):  ampicillin/sulbactam  IVPB 3 Gram(s) IV Intermittent every 6 hours  chlorhexidine 0.12% Liquid 15 milliLiter(s) Oral Mucosa every 12 hours  chlorhexidine 2% Cloths 1 Application(s) Topical daily  dexMEDEtomidine Infusion 0.1 MICROgram(s)/kG/Hr (1.76 mL/Hr) IV Continuous <Continuous>  fentaNYL   Infusion. 0.5 MICROgram(s)/kG/Hr (3.52 mL/Hr) IV Continuous <Continuous>  folic acid 1 milliGRAM(s) Oral daily  lactated ringers. 1000 milliLiter(s) (65 mL/Hr) IV Continuous <Continuous>  methadone    Tablet 20 milliGRAM(s) Oral daily  nicotine -  14 mG/24Hr(s) Patch 1 patch Transdermal daily  norepinephrine Infusion 0.05 MICROgram(s)/kG/Min (8.03 mL/Hr) IV Continuous <Continuous>  pantoprazole  Injectable 40 milliGRAM(s) IV Push daily  polymyxin B IVPB [id number] Unit(s) IV Intermittent every 12 hours  potassium chloride   Powder 40 milliEquivalent(s) Oral daily  propofol Infusion 0.1 MICROgram(s)/kG/Min (0.04 mL/Hr) IV Continuous <Continuous>  QUEtiapine 100 milliGRAM(s) Oral two times a day  scopolamine 1 mG/72 Hr(s) Patch 1 Patch Transdermal every 72 hours  spironolactone 100 milliGRAM(s) Oral daily  thiamine 100 milliGRAM(s) Oral daily  vancomycin    Solution 250 milliGRAM(s) Oral every 6 hours    MEDICATIONS  (PRN):  acetaminophen  Suppository .. 650 milliGRAM(s) Rectal every 6 hours PRN Temp greater or equal to 38C (100.4F), Mild Pain (1 - 3)  ALBUTerol    90 MICROgram(s) HFA Inhaler 1 Puff(s) Inhalation every 4 hours PRN Shortness of Breath and/or Wheezing  cloNIDine 0.1 milliGRAM(s) Oral every 6 hours PRN opiate withdrawal  hydrOXYzine hydrochloride 50 milliGRAM(s) Oral every 6 hours PRN Anxiety  ibuprofen  Tablet. 600 milliGRAM(s) Oral every 6 hours PRN Temp greater or equal to 38C (100.4F)  ibuprofen  Tablet. 400 milliGRAM(s) Oral every 6 hours PRN Mild Pain (1 - 3)  sodium chloride 0.9% lock flush 10 milliLiter(s) IV Push every 1 hour PRN Pre/post blood products, medications, blood draw, and to maintain line patency      DVT PROPHYLAXIS:   GI PROPHYLAXIS: pantoprazole  Injectable 40 milliGRAM(s) IV Push daily    ANTICOAGULATION:   ANTIBIOTICS:  ampicillin/sulbactam  IVPB 3 Gram(s)  polymyxin B IVPB [id number] Unit(s)  vancomycin    Solution 250 milliGRAM(s)            LAB/STUDIES:  Labs:  CAPILLARY BLOOD GLUCOSE                              7.3    1.46  )-----------( x        ( 20 Jul 2022 07:25 )             24.2         07-20    137  |  105  |  18  ----------------------------<  106<H>  4.2   |  20  |  0.9      Calcium, Total Serum: 8.5 mg/dL (07-20-22 @ 07:25)      LFTs:             5.2  | 0.3  | 16       ------------------[248     ( 20 Jul 2022 07:25 )  2.7  | x    | 12          Lipase:x      Amylase:x         Blood Gas Arterial, Lactate: 0.40 mmol/L (07-20-22 @ 04:56)  Blood Gas Arterial, Lactate: 0.50 mmol/L (07-19-22 @ 05:01)  Blood Gas Arterial, Lactate: 0.40 mmol/L (07-18-22 @ 09:22)  Blood Gas Arterial, Lactate: 0.50 mmol/L (07-18-22 @ 04:30)    ABG - ( 20 Jul 2022 04:56 )  pH: 7.31  /  pCO2: 37    /  pO2: 141   / HCO3: 19    / Base Excess: -7.0  /  SaO2: 99.4            ABG - ( 19 Jul 2022 05:01 )  pH: 7.35  /  pCO2: 36    /  pO2: 121   / HCO3: 20    / Base Excess: -5.2  /  SaO2: 99.5            ABG - ( 18 Jul 2022 09:22 )  pH: 7.36  /  pCO2: 35    /  pO2: 113   / HCO3: 20    / Base Excess: -5.1  /  SaO2: 98.7              Coags:     11.60  ----< 1.01    ( 20 Jul 2022 00:24 )     28.1

## 2022-07-20 NOTE — BRIEF OPERATIVE NOTE - OPERATION/FINDINGS
Open tracheostomy created between 2nd and 3rd tracheal rings, 6 Shiley Cuffed tracheostomy tube inserted, no air leak noted, sutured in place.

## 2022-07-20 NOTE — PROGRESS NOTE ADULT - SUBJECTIVE AND OBJECTIVE BOX
Patient seen and evaluated this am, awake and alert on ventilator, following commands       T(F): 96.8 (07-20-22 @ 11:00), Max: 97 (07-20-22 @ 00:30)  HR: 63 (07-20-22 @ 07:46)  BP: 116/88 (07-20-22 @ 11:00)  RR: 20  SpO2: 100% (07-20-22 @ 07:46) (91% - 100%)    PHYSICAL EXAM:  GENERAL: NAD  HEAD:  Atraumatic, Normocephalic  EYES: EOMI, PERRLA, conjunctiva and sclera clear  NERVOUS SYSTEM:  no focal deficits   CHEST/LUNG:  bilateral rhonchi  HEART: Regular rate and rhythm; No murmurs, rubs, or gallops  ABDOMEN: Soft, Nontender, Nondistended; Bowel sounds present  EXTREMITIES:  2+ Peripheral Pulses, No clubbing, cyanosis, or edema    LABS  07-20    137  |  105  |  18  ----------------------------<  106<H>  4.2   |  20  |  0.9    Ca    8.5      20 Jul 2022 07:25  Phos  4.5     07-20  Mg     2.4     07-20    TPro  5.2<L>  /  Alb  2.7<L>  /  TBili  0.3  /  DBili  x   /  AST  16  /  ALT  12  /  AlkPhos  248<H>  07-20                          7.3    1.46  )-----------( 87       ( 20 Jul 2022 07:25 )             24.2     PT/INR - ( 20 Jul 2022 00:24 )   PT: 11.60 sec;   INR: 1.01 ratio         PTT - ( 20 Jul 2022 00:24 )  PTT:28.1 sec    Mode: AC/ CMV (Assist Control/ Continuous Mandatory Ventilation)  RR (machine): 25  TV (machine): 370  FiO2: 35  PEEP: 5      Culture Results:   Normal Respiratory Kelly present (07-15-22)  Culture Results:   Babesia microti PCR  Results: NOT detected  ***************Result Note*************  The detection of Babesia microti by PCR has only been  validated for whole blood; this test has not been approved  by the [place] Food and Drug Administration (FDA). Performance  characteristics of this assay have been determined by  [facility]. The clinical significance  of results should be considered in conjunction with the  overall clinical presentation of the patient. Result is not  intended to be used as the sole means for clinical diagnosis  or patient management decisions.  One negative sample does not necessarily rule  out the presence of a parasitic infection. (07-10-22)  Culture Results:   No Growth Final (07-08-22)  Culture Results:   No Growth Final (07-06-22)  Culture Results:   No Growth Final (07-06-22)  Culture Results:   Moderate Acinetobacter baumannii/nosocom group (Carbapenem Resistant)  Cefiderocol interpretations based on FDA breakpoints.  Normal Respiratory Kelly absent (07-04-22)  Culture Results:   No Growth Final (06-29-22)  Culture Results:   No Growth Final (06-29-22)  Culture Results:   No Growth Final (06-28-22)  Culture Results:   Numerous Mixed gram negative rods  Moderate Staphylococcus aureus  Normal Respiratory Kelly present (06-27-22)    RADIOLOGY  < from: Xray Chest 1 View- PORTABLE-Routine (Xray Chest 1 View- PORTABLE-Routine in AM.) (07.20.22 @ 05:54) >    Impression:    Unchanged bilateral lung opacities.        < end of copied text >    MEDICATIONS  (STANDING):  ampicillin/sulbactam  IVPB 3 Gram(s) IV Intermittent every 6 hours  chlorhexidine 0.12% Liquid 15 milliLiter(s) Oral Mucosa every 12 hours  chlorhexidine 2% Cloths 1 Application(s) Topical daily  dexMEDEtomidine Infusion 0.1 MICROgram(s)/kG/Hr (1.76 mL/Hr) IV Continuous <Continuous>  fentaNYL   Infusion. 0.5 MICROgram(s)/kG/Hr (3.52 mL/Hr) IV Continuous <Continuous>  folic acid 1 milliGRAM(s) Oral daily  lactated ringers. 1000 milliLiter(s) (65 mL/Hr) IV Continuous <Continuous>  methadone    Tablet 20 milliGRAM(s) Oral daily  nicotine -  14 mG/24Hr(s) Patch 1 patch Transdermal daily  norepinephrine Infusion 0.05 MICROgram(s)/kG/Min (8.03 mL/Hr) IV Continuous <Continuous>  pantoprazole  Injectable 40 milliGRAM(s) IV Push daily  polymyxin B IVPB [id number] Unit(s) IV Intermittent every 12 hours  potassium chloride   Powder 40 milliEquivalent(s) Oral daily  propofol Infusion 0.1 MICROgram(s)/kG/Min (0.04 mL/Hr) IV Continuous <Continuous>  QUEtiapine 100 milliGRAM(s) Oral two times a day  scopolamine 1 mG/72 Hr(s) Patch 1 Patch Transdermal every 72 hours  spironolactone 100 milliGRAM(s) Oral daily  thiamine 100 milliGRAM(s) Oral daily  vancomycin    Solution 250 milliGRAM(s) Oral every 6 hours    MEDICATIONS  (PRN):  acetaminophen  Suppository .. 650 milliGRAM(s) Rectal every 6 hours PRN Temp greater or equal to 38C (100.4F), Mild Pain (1 - 3)  ALBUTerol    90 MICROgram(s) HFA Inhaler 1 Puff(s) Inhalation every 4 hours PRN Shortness of Breath and/or Wheezing  cloNIDine 0.1 milliGRAM(s) Oral every 6 hours PRN opiate withdrawal  hydrOXYzine hydrochloride 50 milliGRAM(s) Oral every 6 hours PRN Anxiety  ibuprofen  Tablet. 600 milliGRAM(s) Oral every 6 hours PRN Temp greater or equal to 38C (100.4F)  ibuprofen  Tablet. 400 milliGRAM(s) Oral every 6 hours PRN Mild Pain (1 - 3)  sodium chloride 0.9% lock flush 10 milliLiter(s) IV Push every 1 hour PRN Pre/post blood products, medications, blood draw, and to maintain line patency

## 2022-07-20 NOTE — BRIEF OPERATIVE NOTE - NSICDXBRIEFPREOP_GEN_ALL_CORE_FT
PRE-OP DIAGNOSIS:  Acute respiratory failure with hypoxia and hypercapnia 20-Jul-2022 18:30:04  Suman Chu

## 2022-07-20 NOTE — PROGRESS NOTE ADULT - ASSESSMENT
Patient is a 26 year old female with hx of asthma, untreated Hep C, IVDA, anasarca presenting with increased dyspnea since yesterday    IMPRESSION  #Acute respiratory failure s/p intubation 6/16, extubated 7/7, reintubated     #VAP  - Xray Chest 1 View- PORTABLE-Routine (Xray Chest 1 View- PORTABLE-Routine in AM.) (07.03.22 @ 06:10): Increasing right basilar opacity.  - sputum Cx 7/4 MDR Acinetobacter baumanii     # C.difficille infection - 7/5/22    #Pneumonia -   - CT Chest 6/22 - left greater than right lower lobe consolidations   - sputum Cx 6/24 Klebsiella oxytoca, Enterobacter cloacae complex- The Sputum culture from 6/27 grew Numerous Mixed gram negative rods and Moderate Staphylococcus aureus.  - treated with course of cefepime     #FUO + Pancytopenia + Splenomegaly   - Ferritin, Serum: 94 ng/mL (06.27.22 @ 06:47),  Procalcitonin, Serum: 0.11 (06.27.22 @ 15:46)  - CT Abd/pelvis 6/26 - moderated ascites moderate pericardial effusion   - EBV seroligies consistent with old infection   - CMV IgG +, IgM - (07.10.22 @ 15:43)  - bartonella-ab negative   - Hepatosplenomegaly - present since CT Abd/pelvis 1/30/2021  - HIV-1/2 Combo Result: Nonreact:  (06.29.22 @ 10:29)  - Sedimentation Rate, Erythrocyte: 86 mm/Hr (06.30.22 @ 05:46)  - Autoimmune panel negative   - Quantiferon TB Plus: NEGATIVE (06.04.20 @ 10:28)  - Quantiferon TB Plus: INDETERMINATE. (02.06.21 @ 09:00)  - s/p Bone marrow biopsy 7/14 - Flow negative       #Drug overdose vs withdrawal?  #Hx of Untreated Hep C   #IVDA   #Abx allergy: buprenorphine (Rash), Suboxone (Rash)    Recommendations  - continue polymyxin 08729 U/kg q 12 hours and unasyn 3g q 6 hours for Acinetobacter VAP (start date 7/15)  - continue PO vancomycin 250 mg q 6 hours -- starting tomorrow, switch to 125 mg BID for prophylaxis while on systemic antibiotics   - monitor creatinine  - vent management per primary       Please call or message on [employer] Teams if with any questions.  Spectra [phone]

## 2022-07-20 NOTE — PROGRESS NOTE ADULT - SUBJECTIVE AND OBJECTIVE BOX
Patient is a 26y old  Female who presents with a chief complaint of anasarca (19 Jul 2022 14:27)        Over Night Events:Remains mechanically ventilated. FOr trach tommorow        ROS:     All ROS are negative except HPI         PHYSICAL EXAM    ICU Vital Signs Last 24 Hrs  T(C): 35.8 (20 Jul 2022 03:00), Max: 36.1 (20 Jul 2022 00:30)  T(F): 96.5 (20 Jul 2022 03:00), Max: 97 (20 Jul 2022 00:30)  HR: 69 (20 Jul 2022 05:05) (59 - 88)  BP: 105/67 (20 Jul 2022 05:05) (75/49 - 147/105)  BP(mean): 82 (20 Jul 2022 05:05) (58 - 122)  RR: 25 (20 Jul 2022 05:05) (15 - 35)  SpO2: 99% (20 Jul 2022 05:05) (91% - 100%)    O2 Parameters below as of 20 Jul 2022 05:00  Patient On (Oxygen Delivery Method): ventilator            CONSTITUTIONAL:  Well nourished.  NAD    ENT:   Airway patent,   Mouth with normal mucosa.   trach       CARDIAC:   Normal rate,   Regular rhythm.        RESPIRATORY:   No wheezing  Bilateral BS  Normal chest expansion  Not tachypneic,  No use of accessory muscles    GASTROINTESTINAL:  Abdomen soft,   Non-tender,   No guarding,   + BS    NEUROLOGICAL:   Alert and oriented   No motor  deficits.    SKIN:   DTI       07-19-22 @ 07:01  -  07-20-22 @ 07:00  --------------------------------------------------------  IN:    Dexmedetomidine: 583 mL    FentaNYL: 400 mL    IV PiggyBack: 1000 mL    IV PiggyBack: 200 mL    IV PiggyBack: 400 mL    Lactated Ringers: 130 mL    Propofol: 60 mL    Vital High Protein: 40 mL  Total IN: 2813 mL    OUT:    Enteral Tube Flush: 0 mL    Free Water: 0 mL    Indwelling Catheter - Urethral (mL): 1645 mL  Total OUT: 1645 mL    Total NET: 1168 mL          LABS:                            7.3    1.46  )-----------( x        ( 20 Jul 2022 07:25 )             24.2                                               07-20    137  |  105  |  18  ----------------------------<  106<H>  4.2   |  20  |  0.9    Ca    8.5      20 Jul 2022 07:25  Phos  4.5     07-20  Mg     2.4     07-20    TPro  5.2<L>  /  Alb  2.7<L>  /  TBili  0.3  /  DBili  x   /  AST  16  /  ALT  12  /  AlkPhos  248<H>  07-20      PT/INR - ( 20 Jul 2022 00:24 )   PT: 11.60 sec;   INR: 1.01 ratio         PTT - ( 20 Jul 2022 00:24 )  PTT:28.1 sec                                                                                     LIVER FUNCTIONS - ( 20 Jul 2022 07:25 )  Alb: 2.7 g/dL / Pro: 5.2 g/dL / ALK PHOS: 248 U/L / ALT: 12 U/L / AST: 16 U/L / GGT: x                                                                                               Mode: AC/ CMV (Assist Control/ Continuous Mandatory Ventilation)  RR (machine): 25  TV (machine): 370  FiO2: 35  PEEP: 5  ITime: 0.8  MAP: 9  PIP: 17                                      ABG - ( 20 Jul 2022 04:56 )  pH, Arterial: 7.31  pH, Blood: x     /  pCO2: 37    /  pO2: 141   / HCO3: 19    / Base Excess: -7.0  /  SaO2: 99.4                MEDICATIONS  (STANDING):  ampicillin/sulbactam  IVPB 3 Gram(s) IV Intermittent every 6 hours  chlorhexidine 0.12% Liquid 15 milliLiter(s) Oral Mucosa every 12 hours  chlorhexidine 2% Cloths 1 Application(s) Topical daily  dexMEDEtomidine Infusion 0.1 MICROgram(s)/kG/Hr (1.76 mL/Hr) IV Continuous <Continuous>  fentaNYL   Infusion. 0.5 MICROgram(s)/kG/Hr (3.52 mL/Hr) IV Continuous <Continuous>  folic acid 1 milliGRAM(s) Oral daily  lactated ringers. 1000 milliLiter(s) (65 mL/Hr) IV Continuous <Continuous>  methadone    Tablet 20 milliGRAM(s) Oral daily  nicotine -  14 mG/24Hr(s) Patch 1 patch Transdermal daily  norepinephrine Infusion 0.05 MICROgram(s)/kG/Min (8.03 mL/Hr) IV Continuous <Continuous>  pantoprazole  Injectable 40 milliGRAM(s) IV Push daily  polymyxin B IVPB [id number] Unit(s) IV Intermittent every 12 hours  potassium chloride   Powder 40 milliEquivalent(s) Oral daily  propofol Infusion 0.1 MICROgram(s)/kG/Min (0.04 mL/Hr) IV Continuous <Continuous>  QUEtiapine 100 milliGRAM(s) Oral two times a day  scopolamine 1 mG/72 Hr(s) Patch 1 Patch Transdermal every 72 hours  spironolactone 100 milliGRAM(s) Oral daily  thiamine 100 milliGRAM(s) Oral daily  vancomycin    Solution 250 milliGRAM(s) Oral every 6 hours    MEDICATIONS  (PRN):  acetaminophen  Suppository .. 650 milliGRAM(s) Rectal every 6 hours PRN Temp greater or equal to 38C (100.4F), Mild Pain (1 - 3)  ALBUTerol    90 MICROgram(s) HFA Inhaler 1 Puff(s) Inhalation every 4 hours PRN Shortness of Breath and/or Wheezing  cloNIDine 0.1 milliGRAM(s) Oral every 6 hours PRN opiate withdrawal  hydrOXYzine hydrochloride 50 milliGRAM(s) Oral every 6 hours PRN Anxiety  ibuprofen  Tablet. 600 milliGRAM(s) Oral every 6 hours PRN Temp greater or equal to 38C (100.4F)  ibuprofen  Tablet. 400 milliGRAM(s) Oral every 6 hours PRN Mild Pain (1 - 3)  sodium chloride 0.9% lock flush 10 milliLiter(s) IV Push every 1 hour PRN Pre/post blood products, medications, blood draw, and to maintain line patency      New X-rays reviewed:                                                                                  ECHO    CXR interpreted by me:

## 2022-07-20 NOTE — PROGRESS NOTE ADULT - SUBJECTIVE AND OBJECTIVE BOX
Hospital Day:  35d    Subjective: Patient is a 26y old  Female who presents with a chief complaint of anasarca (20 Jul 2022 09:19)      Pt seen and evaluated at bedside.   Complaints: none  Over the night Events: none    Past Medical Hx:   Hepatitis C    Asthma    Anxiety and depression    IV drug abuse      Past Sx:  No significant past surgical history      Allergies:  buprenorphine (Rash)  Suboxone (Rash)    Current Meds:   Standng Meds:  ampicillin/sulbactam  IVPB 3 Gram(s) IV Intermittent every 6 hours  chlorhexidine 0.12% Liquid 15 milliLiter(s) Oral Mucosa every 12 hours  chlorhexidine 2% Cloths 1 Application(s) Topical daily  dexMEDEtomidine Infusion 0.1 MICROgram(s)/kG/Hr (1.76 mL/Hr) IV Continuous <Continuous>  fentaNYL   Infusion. 0.5 MICROgram(s)/kG/Hr (3.52 mL/Hr) IV Continuous <Continuous>  folic acid 1 milliGRAM(s) Oral daily  lactated ringers. 1000 milliLiter(s) (65 mL/Hr) IV Continuous <Continuous>  methadone    Tablet 20 milliGRAM(s) Oral daily  nicotine -  14 mG/24Hr(s) Patch 1 patch Transdermal daily  norepinephrine Infusion 0.05 MICROgram(s)/kG/Min (8.03 mL/Hr) IV Continuous <Continuous>  pantoprazole  Injectable 40 milliGRAM(s) IV Push daily  polymyxin B IVPB [id number] Unit(s) IV Intermittent every 12 hours  potassium chloride   Powder 40 milliEquivalent(s) Oral daily  propofol Infusion 0.1 MICROgram(s)/kG/Min (0.04 mL/Hr) IV Continuous <Continuous>  QUEtiapine 100 milliGRAM(s) Oral two times a day  scopolamine 1 mG/72 Hr(s) Patch 1 Patch Transdermal every 72 hours  spironolactone 100 milliGRAM(s) Oral daily  thiamine 100 milliGRAM(s) Oral daily  vancomycin    Solution 250 milliGRAM(s) Oral every 6 hours    PRN Meds:  acetaminophen  Suppository .. 650 milliGRAM(s) Rectal every 6 hours PRN Temp greater or equal to 38C (100.4F), Mild Pain (1 - 3)  ALBUTerol    90 MICROgram(s) HFA Inhaler 1 Puff(s) Inhalation every 4 hours PRN Shortness of Breath and/or Wheezing  cloNIDine 0.1 milliGRAM(s) Oral every 6 hours PRN opiate withdrawal  hydrOXYzine hydrochloride 50 milliGRAM(s) Oral every 6 hours PRN Anxiety  ibuprofen  Tablet. 600 milliGRAM(s) Oral every 6 hours PRN Temp greater or equal to 38C (100.4F)  ibuprofen  Tablet. 400 milliGRAM(s) Oral every 6 hours PRN Mild Pain (1 - 3)  sodium chloride 0.9% lock flush 10 milliLiter(s) IV Push every 1 hour PRN Pre/post blood products, medications, blood draw, and to maintain line patency      Vital Signs:   T(F): 95 (07-20-22 @ 07:00), Max: 97 (07-20-22 @ 00:30)  HR: 63 (07-20-22 @ 07:46) (59 - 88)  BP: 90/64 (07-20-22 @ 07:00) (75/49 - 147/105)  RR: 25 (07-20-22 @ 05:05) (15 - 35)  SpO2: 100% (07-20-22 @ 07:46) (91% - 100%)    Physical Exam:   GENERAL: NAD, Resting in bed  HEENT: NCAT  CHEST/LUNG: Clear to auscultation bilaterally; No wheezing or rubs.   HEART: Regular rate and rhythm; No murmurs, rubs, or gallops  ABDOMEN: distended but soft   EXTREMITIES:  No clubbing, cyanosis, or edema  NERVOUS SYSTEM:  sedated ventilated     FLUID BALANCE    07-18-22 @ 07:01  -  07-19-22 @ 07:00  --------------------------------------------------------  IN: 4229.5 mL / OUT: 2685 mL / NET: 1544.5 mL    07-19-22 @ 07:01  -  07-20-22 @ 07:00  --------------------------------------------------------  IN: 2813 mL / OUT: 1645 mL / NET: 1168 mL    07-20-22 @ 07:01  -  07-20-22 @ 10:35  --------------------------------------------------------  IN: 0 mL / OUT: 175 mL / NET: -175 mL        Labs:                         7.3    1.46  )-----------( 87       ( 20 Jul 2022 07:25 )             24.2     Neutophil% 65.0, Lymphocyte% 28.1, Monocyte% 6.2, Bands% 0.0 07-20-22 @ 07:25    20 Jul 2022 07:25    137    |  105    |  18     ----------------------------<  106    4.2     |  20     |  0.9      Ca    8.5        20 Jul 2022 07:25  Phos  4.5       20 Jul 2022 07:25  Mg     2.4       20 Jul 2022 07:25    TPro  5.2    /  Alb  2.7    /  TBili  0.3    /  DBili  x      /  AST  16     /  ALT  12     /  AlkPhos  248    20 Jul 2022 07:25       pTT    28.1             ----< 1.01 INR  (07-20-22 @ 00:24)    11.60        PT                        Procalcitonin, Serum: 0.16 ng/mL (07-14-22 @ 12:50)  Procalcitonin, Serum: 0.21 ng/mL (07-17-22 @ 10:34)    Ferritin, Serum: 334 ng/mL (07-14-22 @ 12:50)      Lactate Dehydrogenase, Serum: 271 (07-14-22 @ 12:50)      Radiology:

## 2022-07-20 NOTE — PACU DISCHARGE NOTE - COMMENTS
Pt Transported to ICU on monitors and ventilated with ambu bag.. In ICU placed on vent by RT and left in care of ICU RN. MIGUEL

## 2022-07-20 NOTE — PROGRESS NOTE ADULT - ASSESSMENT
IMPRESSION:  Acute respiratory failure sp intubation  PNA  MSSA pna  seizure like activity  drug over dose/ withdrawal opoid   liver cirrhosis   Ascites sp paracentesis   Pancytopenia- worsening  RENETTA improved  C diff +ve     PLAN:    CNS: Continue with Methadone for withdrawal Dc fentanyl and propofol this morning; continue with precedex and decrease leyla dose gradually; continue with seroquel and clonazepam; Check QTc daily. SAT this morning and following commands.    HEENT: oral care; ETT care    PULMONARY:  SBT today with goal to extubate; CXR unchnaged. ABG     CARDIOVASCULAR: Keep MAP more than 65mmhg; not on pressors currently keep equal balance. On oral diuretics.     GI: GI prophylaxis. Hold OGT feeding for possible extubation. Do a bedside US of the abdomen. If large ascites then will need therapeutic paracentesis.     RENAL: Correct K ot mo re than 4 and Mg to more than 2. DC Free water pushes.     INFECTIOUS DISEASE: Currently on PO vancomycin, Unasyn, Polymixin. Monitor kidney function and electrolytes. ID following. DTA culture so far negative. Recheck procalcitonin. Tmax 100.8.     HEMATOLOGICAL:   s/p BMB follow result and cx. Hematology following. Monitor platelet count. Keep hg more than 7. Send HIT panel for thrombocytopenia and peripheral smear. Start fondaparinux for DVT prophylaxis if platelet count stays above 50k until HIT panel is back.     ENDOCRINE:  Follow up FS.  Insulin protocol if needed.     Musk: bedrest for now. PT rehab evaluation.     Dispo: Remains critically and ill needs ICU care / NPO overnight for Trach in am .     TLC 7/6 RIJ; ETT   SAT/ SBT    IMPRESSION:  Acute respiratory failure sp intubation  PNA  MSSA pna  seizure like activity  drug over dose/ withdrawal opoid   liver cirrhosis   Ascites sp paracentesis   Pancytopenia- worsening  RENETTA improved  C diff +ve     PLAN:    CNS: Continue with Methadone for withdrawal Dc fentanyl and propofol this morning; continue with precedex and decrease the dose gradually; continue with seroquel and clonazepam; Check QTc daily. SAT this morning and following commands.    HEENT: oral care; ETT care    PULMONARY:  trach today  CXR unchnaged. ABG reviewed  keep off L side in bed develops atelectasis    CARDIOVASCULAR: Keep MAP more than 65mmhg; not on pressors currently keep equal balance. On oral diuretics.     GI: GI prophylaxis. Hold OGT feeding for trach.   Do a bedside US of the abdomen. If large ascites then will need therapeutic paracentesis.     RENAL: Correct K ot mo re than 4 and Mg to more than 2. DC Free water pushes.     INFECTIOUS DISEASE: Currently on PO vancomycin, Unasyn, Polymixin. Monitor kidney function and electrolytes. ID following. DTA culture so far negative. Recheck procalcitonin. Tmax 100.8.     HEMATOLOGICAL:   s/p BMB follow result and cx. Hematology following. Monitor platelet count. Keep hg more than 7.   Send HIT panel for thrombocytopenia and peripheral smear. S  tart fondaparinux for DVT prophylaxis if platelet count stays above 50k until HIT panel is back.     ENDOCRINE:  Follow up FS.  Insulin protocol if needed.     Musk: bedrest for now. PT rehab evaluation.     Dispo: Remains critically and ill needs ICU care / NPO for Trach .     TLC 7/6 RIJ; ETT

## 2022-07-20 NOTE — PROGRESS NOTE ADULT - ASSESSMENT
Acute respiratory failure with hypoxemia / aspiration pneumonia with sepsis / suspected drug overdose / head laceration s/p fall in lobby / possible seizure / anasarca  pancytopenia      - c-diff colitis - continue oral vanco   - w/u and antibiotics as per ID    - s/p BMB - heme f/u    - s/p supplementation of   hypokalemia and  hypomagnesemia    - re intubated -> still awaiting  trach - surgery following

## 2022-07-20 NOTE — PROGRESS NOTE ADULT - ASSESSMENT
27yo woman with acute respiratory failure in setting of substance abuse, s/p intubation (prolonged) and course c/b C dif colitis. Stable.  Booked for OR 7/20 at 4 PM    Will update after OR  Family aware and agreeable to tracheostomy  Please call [phone] with any acute updates or clinical deterioration    [phone]

## 2022-07-20 NOTE — PROGRESS NOTE ADULT - ASSESSMENT
Impression:  Acute respiratory failure sp intubation and extubation 7/8 followed by reintubation  PNA CT Chest 6/22 - left greater than right lower lobe consolidations; sputum Cx 6/24 Klebsiella oxytoca, Enterobacter cloacae complex- and Acinetobacter buamnii  MSSA pna; acetinobacter VAP  seizure like activity  ?? 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 Diff on oral vancomycin   Hypokalemia/hypomagnesemia      CNS:   c/w methadone   on fentanyl/precedex/propofol  Did not tolerate SBT 7/19    HEENT: oral care     PULMONARY:  HOB 45,  did not tolerate SBT on 7/18 (patient hypertensive/tachycardic)  - ID cleared patient for trach/no definite contraindications   - trach today 7/20 approximately 4pm    CARDIOVASCULAR:   goal MAP >65.  Monitor QTc daily on ECG  ECHO 7/6: Small to mod generalized effusion. No tamponade . Small mod effusion   present by report on echo 4/21/22  f/u cardiology recommendations; no urgency in diagnostic tap, future ROSLYN given acinetobacter and IVDA     GI:   GI prophylaxis.  OG Feeding.   laxative prn   restart feeding  may need paracentesis this week     RENAL: monitor lytes  D/C free water flushes     INFECTIOUS DISEASE:   worsening R infiltrate. Fungitell <31 7/5 , Glactomannan pending   Repeat procal 0.14 from 0.36 (6/30)   Acinetobacter buamnii in sputum cx -> switch cefdericol to  polymyxin 57451 U/kg q 12 hours and unasyn 3g q 6 hours for Acinetobacter VAP   - continue PO vancomycin 250 mg q 6 hours  - s/p bedside BM biopsy 7/14: negative   - f/u HLH panel (ferritin, IL6, triglycerides soluble antigen) and heme/onc recommendations   - f/u ID recommendations     HEMATOLOGICAL:    monitor CBC  D-dimer 1322, HIT antibody negative x2   f/u repeat HIT antibody; duplex negative 7/2/2022  - s/p bedside BM biopsy 7/14: negative  - f/u HLH panel (ferritin, IL6, triglycerides soluble antigen) and heme/onc recommendations   hold heparin and start fondaparinux when plt more then 50 serial h/h keep hgb > 7    ENDOCRINE:  Follow up FS.  Insulin protocol if needed.     MICU  lines: right IJ 7/6

## 2022-07-21 LAB
ALBUMIN SERPL ELPH-MCNC: 2.9 G/DL — LOW (ref 3.5–5.2)
ALP SERPL-CCNC: 268 U/L — HIGH (ref 30–115)
ALT FLD-CCNC: 13 U/L — SIGNIFICANT CHANGE UP (ref 0–41)
ANION GAP SERPL CALC-SCNC: 13 MMOL/L — SIGNIFICANT CHANGE UP (ref 7–14)
ANTI PM-SCL-100 PLUS: <20 UNITS — SIGNIFICANT CHANGE UP
ANTI-SAE 1 IGG: <20 UNITS — SIGNIFICANT CHANGE UP
ANTI-SS-A 52 KD AB, IGG PLUS: <20 UNITS — SIGNIFICANT CHANGE UP
ANTI-U1-RNP AB PLUS: <20 UNITS — SIGNIFICANT CHANGE UP
AST SERPL-CCNC: 17 U/L — SIGNIFICANT CHANGE UP (ref 0–41)
BASOPHILS # BLD AUTO: 0 K/UL — SIGNIFICANT CHANGE UP (ref 0–0.2)
BASOPHILS NFR BLD AUTO: 0 % — SIGNIFICANT CHANGE UP (ref 0–1)
BILIRUB SERPL-MCNC: 0.4 MG/DL — SIGNIFICANT CHANGE UP (ref 0.2–1.2)
BUN SERPL-MCNC: 16 MG/DL — SIGNIFICANT CHANGE UP (ref 10–20)
CALCIUM SERPL-MCNC: 8.9 MG/DL — SIGNIFICANT CHANGE UP (ref 8.5–10.1)
CHLORIDE SERPL-SCNC: 109 MMOL/L — SIGNIFICANT CHANGE UP (ref 98–110)
CO2 SERPL-SCNC: 19 MMOL/L — SIGNIFICANT CHANGE UP (ref 17–32)
CREAT SERPL-MCNC: 0.9 MG/DL — SIGNIFICANT CHANGE UP (ref 0.7–1.5)
EGFR: 89 ML/MIN/1.73M2 — SIGNIFICANT CHANGE UP
EJ MYOMARKER3 PLUS: NEGATIVE — SIGNIFICANT CHANGE UP
ENA JO1 AB SER IA-ACNC: <20 UNITS — SIGNIFICANT CHANGE UP
EOSINOPHIL # BLD AUTO: 0.01 K/UL — SIGNIFICANT CHANGE UP (ref 0–0.7)
EOSINOPHIL NFR BLD AUTO: 0.5 % — SIGNIFICANT CHANGE UP (ref 0–8)
FIBRILLARIN (U3 RNP) PLUS: NEGATIVE — SIGNIFICANT CHANGE UP
GLUCOSE SERPL-MCNC: 91 MG/DL — SIGNIFICANT CHANGE UP (ref 70–99)
HCT VFR BLD CALC: 29.4 % — LOW (ref 37–47)
HGB BLD-MCNC: 9 G/DL — LOW (ref 12–16)
IMM GRANULOCYTES NFR BLD AUTO: 0.5 % — HIGH (ref 0.1–0.3)
KU MYOMARKER3 PLUS: NEGATIVE — SIGNIFICANT CHANGE UP
LYMPHOCYTES # BLD AUTO: 0.63 K/UL — LOW (ref 1.2–3.4)
LYMPHOCYTES # BLD AUTO: 32.1 % — SIGNIFICANT CHANGE UP (ref 20.5–51.1)
MAGNESIUM SERPL-MCNC: 2 MG/DL — SIGNIFICANT CHANGE UP (ref 1.8–2.4)
MCHC RBC-ENTMCNC: 26.6 PG — LOW (ref 27–31)
MCHC RBC-ENTMCNC: 30.6 G/DL — LOW (ref 32–37)
MCV RBC AUTO: 87 FL — SIGNIFICANT CHANGE UP (ref 81–99)
MDA5 (P140)(CADM-140) PLUS: <20 UNITS — SIGNIFICANT CHANGE UP
MI-2 PLUS: NEGATIVE — SIGNIFICANT CHANGE UP
MONOCYTES # BLD AUTO: 0.1 K/UL — SIGNIFICANT CHANGE UP (ref 0.1–0.6)
MONOCYTES NFR BLD AUTO: 5.1 % — SIGNIFICANT CHANGE UP (ref 1.7–9.3)
NEUTROPHILS # BLD AUTO: 1.21 K/UL — LOW (ref 1.4–6.5)
NEUTROPHILS NFR BLD AUTO: 61.8 % — SIGNIFICANT CHANGE UP (ref 42.2–75.2)
NRBC # BLD: 0 /100 WBCS — SIGNIFICANT CHANGE UP (ref 0–0)
NXP-2 (P140) MYOPLUS: <20 UNITS — SIGNIFICANT CHANGE UP
OJ MYOMARKER3 PLUS: NEGATIVE — SIGNIFICANT CHANGE UP
PHOSPHATE SERPL-MCNC: 4.9 MG/DL — SIGNIFICANT CHANGE UP (ref 2.1–4.9)
PL-12 PLUS: NEGATIVE — SIGNIFICANT CHANGE UP
PL-7 PLUS: NEGATIVE — SIGNIFICANT CHANGE UP
PLATELET # BLD AUTO: 112 K/UL — LOW (ref 130–400)
POTASSIUM SERPL-MCNC: 4.2 MMOL/L — SIGNIFICANT CHANGE UP (ref 3.5–5)
POTASSIUM SERPL-SCNC: 4.2 MMOL/L — SIGNIFICANT CHANGE UP (ref 3.5–5)
PROT SERPL-MCNC: 5.4 G/DL — LOW (ref 6–8)
RBC # BLD: 3.38 M/UL — LOW (ref 4.2–5.4)
RBC # FLD: 15.5 % — HIGH (ref 11.5–14.5)
SODIUM SERPL-SCNC: 141 MMOL/L — SIGNIFICANT CHANGE UP (ref 135–146)
SRP MYOMARKER3 PLUS: NEGATIVE — SIGNIFICANT CHANGE UP
TIF GAMMA (P155/140) PLUS: <20 UNITS — SIGNIFICANT CHANGE UP
U2 SNRNP PLUS: NEGATIVE — SIGNIFICANT CHANGE UP
WBC # BLD: 1.96 K/UL — LOW (ref 4.8–10.8)
WBC # FLD AUTO: 1.96 K/UL — LOW (ref 4.8–10.8)

## 2022-07-21 PROCEDURE — 93010 ELECTROCARDIOGRAM REPORT: CPT

## 2022-07-21 PROCEDURE — 99291 CRITICAL CARE FIRST HOUR: CPT

## 2022-07-21 PROCEDURE — 99233 SBSQ HOSP IP/OBS HIGH 50: CPT

## 2022-07-21 PROCEDURE — 71045 X-RAY EXAM CHEST 1 VIEW: CPT | Mod: 26

## 2022-07-21 RX ORDER — PHENAZOPYRIDINE HCL 100 MG
100 TABLET ORAL EVERY 8 HOURS
Refills: 0 | Status: DISCONTINUED | OUTPATIENT
Start: 2022-07-21 | End: 2022-07-21

## 2022-07-21 RX ORDER — FUROSEMIDE 40 MG
40 TABLET ORAL ONCE
Refills: 0 | Status: COMPLETED | OUTPATIENT
Start: 2022-07-21 | End: 2022-07-21

## 2022-07-21 RX ADMIN — SCOPALAMINE 1 PATCH: 1 PATCH, EXTENDED RELEASE TRANSDERMAL at 07:27

## 2022-07-21 RX ADMIN — DEXMEDETOMIDINE HYDROCHLORIDE IN 0.9% SODIUM CHLORIDE 1.76 MICROGRAM(S)/KG/HR: 4 INJECTION INTRAVENOUS at 12:08

## 2022-07-21 RX ADMIN — Medication 40 MILLIGRAM(S): at 12:47

## 2022-07-21 RX ADMIN — Medication 1 APPLICATION(S): at 17:29

## 2022-07-21 RX ADMIN — AMPICILLIN SODIUM AND SULBACTAM SODIUM 200 GRAM(S): 250; 125 INJECTION, POWDER, FOR SUSPENSION INTRAMUSCULAR; INTRAVENOUS at 17:30

## 2022-07-21 RX ADMIN — QUETIAPINE FUMARATE 100 MILLIGRAM(S): 200 TABLET, FILM COATED ORAL at 17:29

## 2022-07-21 RX ADMIN — POLYMYXIN B SULFATE 500 UNIT(S): 500000 INJECTION, POWDER, LYOPHILIZED, FOR SOLUTION INTRAMUSCULAR; INTRATHECAL; INTRAVENOUS; OPHTHALMIC at 06:07

## 2022-07-21 RX ADMIN — AMPICILLIN SODIUM AND SULBACTAM SODIUM 200 GRAM(S): 250; 125 INJECTION, POWDER, FOR SUSPENSION INTRAMUSCULAR; INTRAVENOUS at 23:14

## 2022-07-21 RX ADMIN — SPIRONOLACTONE 100 MILLIGRAM(S): 25 TABLET, FILM COATED ORAL at 12:54

## 2022-07-21 RX ADMIN — Medication 250 MILLIGRAM(S): at 23:15

## 2022-07-21 RX ADMIN — PANTOPRAZOLE SODIUM 40 MILLIGRAM(S): 20 TABLET, DELAYED RELEASE ORAL at 12:55

## 2022-07-21 RX ADMIN — CHLORHEXIDINE GLUCONATE 15 MILLILITER(S): 213 SOLUTION TOPICAL at 06:05

## 2022-07-21 RX ADMIN — CHLORHEXIDINE GLUCONATE 15 MILLILITER(S): 213 SOLUTION TOPICAL at 18:39

## 2022-07-21 RX ADMIN — FENTANYL CITRATE 3.52 MICROGRAM(S)/KG/HR: 50 INJECTION INTRAVENOUS at 12:09

## 2022-07-21 RX ADMIN — Medication 100 MILLIGRAM(S): at 12:53

## 2022-07-21 RX ADMIN — Medication 250 MILLIGRAM(S): at 17:29

## 2022-07-21 RX ADMIN — Medication 1 PATCH: at 13:09

## 2022-07-21 RX ADMIN — Medication 1 PATCH: at 12:51

## 2022-07-21 RX ADMIN — SCOPALAMINE 1 PATCH: 1 PATCH, EXTENDED RELEASE TRANSDERMAL at 13:10

## 2022-07-21 RX ADMIN — Medication 1 APPLICATION(S): at 06:06

## 2022-07-21 RX ADMIN — SCOPALAMINE 1 PATCH: 1 PATCH, EXTENDED RELEASE TRANSDERMAL at 21:23

## 2022-07-21 RX ADMIN — Medication 1 PATCH: at 21:23

## 2022-07-21 RX ADMIN — AMPICILLIN SODIUM AND SULBACTAM SODIUM 200 GRAM(S): 250; 125 INJECTION, POWDER, FOR SUSPENSION INTRAMUSCULAR; INTRAVENOUS at 05:55

## 2022-07-21 RX ADMIN — DEXMEDETOMIDINE HYDROCHLORIDE IN 0.9% SODIUM CHLORIDE 1.76 MICROGRAM(S)/KG/HR: 4 INJECTION INTRAVENOUS at 22:26

## 2022-07-21 RX ADMIN — CHLORHEXIDINE GLUCONATE 1 APPLICATION(S): 213 SOLUTION TOPICAL at 12:47

## 2022-07-21 RX ADMIN — METHADONE HYDROCHLORIDE 20 MILLIGRAM(S): 40 TABLET ORAL at 13:00

## 2022-07-21 RX ADMIN — FENTANYL CITRATE 3.52 MICROGRAM(S)/KG/HR: 50 INJECTION INTRAVENOUS at 07:54

## 2022-07-21 RX ADMIN — POLYMYXIN B SULFATE 500 UNIT(S): 500000 INJECTION, POWDER, LYOPHILIZED, FOR SOLUTION INTRAMUSCULAR; INTRATHECAL; INTRAVENOUS; OPHTHALMIC at 17:30

## 2022-07-21 RX ADMIN — Medication 40 MILLIEQUIVALENT(S): at 12:58

## 2022-07-21 RX ADMIN — Medication 1 MILLIGRAM(S): at 12:53

## 2022-07-21 RX ADMIN — AMPICILLIN SODIUM AND SULBACTAM SODIUM 200 GRAM(S): 250; 125 INJECTION, POWDER, FOR SUSPENSION INTRAMUSCULAR; INTRAVENOUS at 12:48

## 2022-07-21 RX ADMIN — Medication 250 MILLIGRAM(S): at 06:02

## 2022-07-21 RX ADMIN — PROPOFOL 0.04 MICROGRAM(S)/KG/MIN: 10 INJECTION, EMULSION INTRAVENOUS at 07:53

## 2022-07-21 RX ADMIN — Medication 250 MILLIGRAM(S): at 12:55

## 2022-07-21 RX ADMIN — DEXMEDETOMIDINE HYDROCHLORIDE IN 0.9% SODIUM CHLORIDE 1.76 MICROGRAM(S)/KG/HR: 4 INJECTION INTRAVENOUS at 16:39

## 2022-07-21 RX ADMIN — SCOPALAMINE 1 PATCH: 1 PATCH, EXTENDED RELEASE TRANSDERMAL at 07:26

## 2022-07-21 RX ADMIN — Medication 1 PATCH: at 07:26

## 2022-07-21 NOTE — PROGRESS NOTE ADULT - SUBJECTIVE AND OBJECTIVE BOX
Patient seen and evaluated this am, awake on ventilator      T(F): 97 (07-21-22 @ 19:00), Max: 97 (07-21-22 @ 11:00)  HR: 72 (07-21-22 @ 18:50)  BP: 157/114 (07-21-22 @ 18:00)  RR: 20  SpO2: 99% (07-21-22 @ 18:50) (96% - 100%)    PHYSICAL EXAM:  GENERAL: NAD  HEAD:  Atraumatic, Normocephalic  EYES: EOMI, PERRLA, conjunctiva and sclera clear  NERVOUS SYSTEM:  Alert & Oriented X3, no focal deficits   CHEST/LUNG:  bilateral rhonchi  HEART: Regular rate and rhythm; No murmurs, rubs, or gallops  ABDOMEN: Soft, Nontender, Nondistended; Bowel sounds present  EXTREMITIES:  2+ Peripheral Pulses, No clubbing, cyanosis, or edema    LABS  07-21    141  |  109  |  16  ----------------------------<  91  4.2   |  19  |  0.9    Ca    8.9      21 Jul 2022 06:07  Phos  4.9     07-21  Mg     2.0     07-21    TPro  5.4<L>  /  Alb  2.9<L>  /  TBili  0.4  /  DBili  x   /  AST  17  /  ALT  13  /  AlkPhos  268<H>  07-21                          9.0    1.96  )-----------( 112      ( 21 Jul 2022 06:07 )             29.4     PT/INR - ( 20 Jul 2022 00:24 )   PT: 11.60 sec;   INR: 1.01 ratio         PTT - ( 20 Jul 2022 00:24 )  PTT:28.1 sec    Mode: AC/ CMV (Assist Control/ Continuous Mandatory Ventilation)  RR (machine): 25  TV (machine): 370  FiO2: 35  PEEP: 5      Culture Results:   Normal Respiratory Kelly present (07-15-22)  Culture Results:   Babesia microti PCR  Results: NOT detected  ***************Result Note*************  The detection of Babesia microti by PCR has only been  validated for whole blood; this test has not been approved  by the US Food and Drug Administration (FDA). Performance  characteristics of this assay have been determined by  [employer]. The clinical significance  of results should be considered in conjunction with the  overall clinical presentation of the patient. Result is not  intended to be used as the sole means for clinical diagnosis  or patient management decisions.  One negative sample does not necessarily rule  out the presence of a parasitic infection. (07-10-22)  Culture Results:   No Growth Final (07-08-22)  Culture Results:   No Growth Final (07-06-22)  Culture Results:   No Growth Final (07-06-22)  Culture Results:   Moderate Acinetobacter baumannii/nosocom group (Carbapenem Resistant)  Cefiderocol interpretations based on FDA breakpoints.  Normal Respiratory Kelly absent (07-04-22)  Culture Results:   No Growth Final (06-29-22)  Culture Results:   No Growth Final (06-29-22)  Culture Results:   No Growth Final (06-28-22)  Culture Results:   Numerous Mixed gram negative rods  Moderate Staphylococcus aureus  Normal Respiratory Kelly present (06-27-22)    RADIOLOGY  < from: Xray Chest 1 View- PORTABLE-Routine (Xray Chest 1 View- PORTABLE-Routine in AM.) (07.21.22 @ 07:01) >  Impression:    Bilateral lung opacities, unchanged. Cardiomegaly.      < end of copied text >    MEDICATIONS  (STANDING):  ampicillin/sulbactam  IVPB 3 Gram(s) IV Intermittent every 6 hours  BACItracin   Ointment 1 Application(s) Topical two times a day  chlorhexidine 0.12% Liquid 15 milliLiter(s) Oral Mucosa every 12 hours  chlorhexidine 2% Cloths 1 Application(s) Topical daily  dexMEDEtomidine Infusion 0.1 MICROgram(s)/kG/Hr (1.76 mL/Hr) IV Continuous <Continuous>  fentaNYL   Infusion. 0.5 MICROgram(s)/kG/Hr (3.52 mL/Hr) IV Continuous <Continuous>  folic acid 1 milliGRAM(s) Oral daily  methadone    Tablet 20 milliGRAM(s) Oral daily  nicotine -  14 mG/24Hr(s) Patch 1 patch Transdermal daily  norepinephrine Infusion 0.05 MICROgram(s)/kG/Min (8.03 mL/Hr) IV Continuous <Continuous>  pantoprazole  Injectable 40 milliGRAM(s) IV Push daily  polymyxin B IVPB 225300 Unit(s) IV Intermittent every 12 hours  potassium chloride   Powder 40 milliEquivalent(s) Oral daily  propofol Infusion 0.095 MICROgram(s)/kG/Min (0.04 mL/Hr) IV Continuous <Continuous>  QUEtiapine 100 milliGRAM(s) Oral two times a day  scopolamine 1 mG/72 Hr(s) Patch 1 Patch Transdermal every 72 hours  spironolactone 100 milliGRAM(s) Oral daily  thiamine 100 milliGRAM(s) Oral daily  vancomycin    Solution 250 milliGRAM(s) Oral every 6 hours    MEDICATIONS  (PRN):  ALBUTerol    90 MICROgram(s) HFA Inhaler 1 Puff(s) Inhalation every 4 hours PRN Shortness of Breath and/or Wheezing  cloNIDine 0.1 milliGRAM(s) Oral every 6 hours PRN opiate withdrawal  hydrOXYzine hydrochloride 50 milliGRAM(s) Oral every 6 hours PRN Anxiety  ibuprofen  Tablet. 400 milliGRAM(s) Oral every 6 hours PRN Mild Pain (1 - 3)  sodium chloride 0.9% lock flush 10 milliLiter(s) IV Push every 1 hour PRN Pre/post blood products, medications, blood draw, and to maintain line patency

## 2022-07-21 NOTE — PROGRESS NOTE ADULT - SUBJECTIVE AND OBJECTIVE BOX
GENERAL SURGERY PROGRESS NOTE    Patient: POOJA DIANE , 26y ([birth year])Female   MRN: [id number]  Location: [address]- 1  Visit: 06-15-22 Inpatient  Date: 07-21-22 @ 10:11      Procedure/Dx/Injuries: POD 1 s/p tracheostomy    Events of past 24 hours: Tracheostomy tube in place midline, no secretions or bleeding noted, good tidal volume, saturating 100% 35/5.    PAST MEDICAL & SURGICAL HISTORY:  Hepatitis C      Asthma      Anxiety and depression      IV drug abuse  heroin      No significant past surgical history          Vitals:   T(F): 96.2 (07-21-22 @ 07:10), Max: 97.3 (07-20-22 @ 18:35)  HR: 64 (07-21-22 @ 08:26)  BP: 112/85 (07-21-22 @ 06:00)  RR: 25 (07-21-22 @ 09:00)  SpO2: 100% (07-21-22 @ 08:26)  Mode: AC/ CMV (Assist Control/ Continuous Mandatory Ventilation), RR (machine): 25, TV (machine): 370, FiO2: 35, PEEP: 5, ITime: 0.8, MAP: 9, PIP: 16    Diet, NPO:   With Ice Chips/Sips of Water      Fluids:     I & O's:    07-20-22 @ 07:01  -  07-21-22 @ 07:00  --------------------------------------------------------  IN:    Dexmedetomidine: 225 mL    Dexmedetomidine: 300 mL    dextrose 5% + sodium chloride 0.45%: 450 mL    FentaNYL: 173 mL    FentaNYL: 180 mL    IV PiggyBack: 500 mL    IV PiggyBack: 200 mL    Propofol: 120 mL  Total IN: 2148 mL    OUT:    Indwelling Catheter - Urethral (mL): 2605 mL  Total OUT: 2605 mL    Total NET: -457 mL        Bowel Movement: : [] YES [] NO  Flatus: : [] YES [] NO    PHYSICAL EXAM:  General: NAD, AAOx3, calm and cooperative  HEENT: NCAT, MARIANNA, EOMI, Trachea ML, Neck supple  Cardiac: RRR S1, S2, no Murmurs, rubs or gallops  Respiratory: CTAB, tracheostomy in place with no secretions or bleeding  Abdomen: distended, non-tender    MEDICATIONS  (STANDING):  ampicillin/sulbactam  IVPB 3 Gram(s) IV Intermittent every 6 hours  BACItracin   Ointment 1 Application(s) Topical two times a day  chlorhexidine 0.12% Liquid 15 milliLiter(s) Oral Mucosa every 12 hours  chlorhexidine 2% Cloths 1 Application(s) Topical daily  dexMEDEtomidine Infusion 0.1 MICROgram(s)/kG/Hr (1.76 mL/Hr) IV Continuous <Continuous>  fentaNYL   Infusion. 0.5 MICROgram(s)/kG/Hr (3.52 mL/Hr) IV Continuous <Continuous>  folic acid 1 milliGRAM(s) Oral daily  furosemide   Injectable 40 milliGRAM(s) IV Push once  methadone    Tablet 20 milliGRAM(s) Oral daily  nicotine -  14 mG/24Hr(s) Patch 1 patch Transdermal daily  norepinephrine Infusion 0.05 MICROgram(s)/kG/Min (8.03 mL/Hr) IV Continuous <Continuous>  pantoprazole  Injectable 40 milliGRAM(s) IV Push daily  polymyxin B IVPB [id number] Unit(s) IV Intermittent every 12 hours  potassium chloride   Powder 40 milliEquivalent(s) Oral daily  propofol Infusion 0.095 MICROgram(s)/kG/Min (0.04 mL/Hr) IV Continuous <Continuous>  QUEtiapine 100 milliGRAM(s) Oral two times a day  scopolamine 1 mG/72 Hr(s) Patch 1 Patch Transdermal every 72 hours  spironolactone 100 milliGRAM(s) Oral daily  thiamine 100 milliGRAM(s) Oral daily  vancomycin    Solution 250 milliGRAM(s) Oral every 6 hours    MEDICATIONS  (PRN):  ALBUTerol    90 MICROgram(s) HFA Inhaler 1 Puff(s) Inhalation every 4 hours PRN Shortness of Breath and/or Wheezing  cloNIDine 0.1 milliGRAM(s) Oral every 6 hours PRN opiate withdrawal  hydrOXYzine hydrochloride 50 milliGRAM(s) Oral every 6 hours PRN Anxiety  ibuprofen  Tablet. 400 milliGRAM(s) Oral every 6 hours PRN Mild Pain (1 - 3)  sodium chloride 0.9% lock flush 10 milliLiter(s) IV Push every 1 hour PRN Pre/post blood products, medications, blood draw, and to maintain line patency      DVT PROPHYLAXIS:   GI PROPHYLAXIS: pantoprazole  Injectable 40 milliGRAM(s) IV Push daily    ANTICOAGULATION:   ANTIBIOTICS:  ampicillin/sulbactam  IVPB 3 Gram(s)  polymyxin B IVPB [id number] Unit(s)  vancomycin    Solution 250 milliGRAM(s)        Isolation Precautions:     Isolation Type: CONTACT;  Indication for Isolation: Clostridium difficile    Additional Instructions:  Contact Isolation (07-20-22 @ 18:47)      LAB/STUDIES:  Labs:  CAPILLARY BLOOD GLUCOSE                              9.0    1.96  )-----------( 112      ( 21 Jul 2022 06:07 )             29.4       Auto Neutrophil %: 61.8 % (07-21-22 @ 06:07)  Auto Immature Granulocyte %: 0.5 % (07-21-22 @ 06:07)    07-21    141  |  109  |  16  ----------------------------<  91  4.2   |  19  |  0.9      Calcium, Total Serum: 8.9 mg/dL (07-21-22 @ 06:07)      LFTs:             5.4  | 0.4  | 17       ------------------[268     ( 21 Jul 2022 06:07 )  2.9  | x    | 13          Lipase:x      Amylase:x         Blood Gas Arterial, Lactate: 0.60 mmol/L (07-21-22 @ 04:12)  Blood Gas Arterial, Lactate: 0.40 mmol/L (07-20-22 @ 04:56)  Blood Gas Arterial, Lactate: 0.50 mmol/L (07-19-22 @ 05:01)    ABG - ( 21 Jul 2022 04:12 )  pH: 7.38  /  pCO2: 33    /  pO2: 135   / HCO3: 19    / Base Excess: -5.0  /  SaO2: 99.0            ABG - ( 20 Jul 2022 04:56 )  pH: 7.31  /  pCO2: 37    /  pO2: 141   / HCO3: 19    / Base Excess: -7.0  /  SaO2: 99.4            ABG - ( 19 Jul 2022 05:01 )  pH: 7.35  /  pCO2: 36    /  pO2: 121   / HCO3: 20    / Base Excess: -5.2  /  SaO2: 99.5              Coags:     11.60  ----< 1.01    ( 20 Jul 2022 00:24 )     28.1

## 2022-07-21 NOTE — SWALLOW BEDSIDE ASSESSMENT ADULT - COMMENTS
Patient received calm, alert, with partial cuff deflation no voice observed, however noted air flow though mouth.

## 2022-07-21 NOTE — PHARMACOTHERAPY INTERVENTION NOTE - COMMENTS
As per Resident, Pt was on methadone. He is aware of Midazolam drip.
Currently on midazolam 0.14 mg/kg/hr, on propofol 68 mcg/kg/min. Would rec consider adding fentanyl to help titrate down sedation requirements for propofol. TG was up 512
Hard to extubate.. and still needing lot of sedation requirements for vent synchrony. Rec to consider low dose analgesic ketamine from 0.05-1mg/kg/hr. Higher dose may be utilized if intubated. However, if extubated, maximum rate of ketamine infusion should generally not be over ~30mg/hr if possible. 
Possible high fevers induced from dexmedetomidine? rec to consider dc and switch to alternative sedative agent such as methadone IV or olanzapine 
Rec obtain TG while on propofol. On propofol 50mcg/kg/min
Rec to re-evaluate the need for levetiracetam - per previous neuro note, she was to receive 250mg iv bid x3 days and dc
Rec to check qtc while on methadone
s/p 14 days of vanco oral for Cdiff infection. Per ID, rec to lower the dose to 125mg po bid as prophylaxis dose during systemic infection.    Per pulm fellow, would like to continue the same dose during ICU stay - 250mg po q6hr 
As per Resident: Pt is not in an MMTP. They are a drug abuser and are intubated. Being maintained for now.
GFR>90, Rec to dose adjust cefepime to 1g iv q8hr
Not on dvt ppx; rec to consider starting. No apparent contraindication for dvt ppx. Hg 10.8, 
Running propofol 60mcg/kg/min- rec to monitor CK and TG
Cefiderocol will fall off tomorrow. Ordered for 7 days. Rec to follow up with ID regarding duration of therapy

## 2022-07-21 NOTE — PROGRESS NOTE ADULT - SUBJECTIVE AND OBJECTIVE BOX
CINDYPOOJA JOE  26y, Female  Allergy: buprenorphine (Rash)  Suboxone (Rash)      LOS  36d    CHIEF COMPLAINT: anasarca (21 Jul 2022 10:18)      INTERVAL EVENTS/HPI  - No acute events overnight  - T(F): , Max: 97.3 (07-20-22 @ 18:35)  - s/p trach - denies any abdominal pain, nausae, vomiting   - WBC Count: 1.96 (07-21-22 @ 06:07)  WBC Count: 1.46 (07-20-22 @ 07:25)     - Creatinine, Serum: 0.9 (07-21-22 @ 06:07)  Creatinine, Serum: 0.9 (07-20-22 @ 07:25)       ROS  General: Denies rigors, nightsweats  HEENT: Denies headache, rhinorrhea, sore throat, eye pain  CV: Denies CP, palpitations  PULM: Denies wheezing, hemoptysis  GI: Denies hematemesis, hematochezia, melena  : Denies discharge, hematuria  MSK: Denies arthralgias, myalgias  SKIN: Denies rash, lesions  NEURO: Denies paresthesias, weakness  PSYCH: Denies depression, anxiety    VITALS:  T(F): 96, Max: 97.3 (07-20-22 @ 18:35)  HR: 75  BP: 167/113  RR: 25Vital Signs Last 24 Hrs  T(C): 35.6 (21 Jul 2022 15:00), Max: 36.3 (20 Jul 2022 18:35)  T(F): 96 (21 Jul 2022 15:00), Max: 97.3 (20 Jul 2022 18:35)  HR: 75 (21 Jul 2022 17:00) (54 - 109)  BP: 167/113 (21 Jul 2022 17:00) (101/66 - 213/136)  BP(mean): 136 (21 Jul 2022 17:00) (77 - 167)  RR: 25 (21 Jul 2022 17:01) (20 - 31)  SpO2: 100% (21 Jul 2022 17:00) (94% - 100%)    Parameters below as of 21 Jul 2022 12:00      O2 Concentration (%): 27    PHYSICAL EXAM:  Gen: vent/trach   HEENT: Normocephalic, atraumatic  Neck: supple, no lymphadenopathy  CV: Regular rate & regular rhythm  Lungs: decreased BS at bases, no fremitus  Abdomen: Soft, BS presen; enlarged  Ext: Warm, well perfused  Neuro: non focal, awake  Skin: no rash, no erythema  Lines: no phlebitis    FH: Non-contributory  Social Hx: Non-contributory    TESTS & MEASUREMENTS:                        9.0    1.96  )-----------( 112      ( 21 Jul 2022 06:07 )             29.4     07-21    141  |  109  |  16  ----------------------------<  91  4.2   |  19  |  0.9    Ca    8.9      21 Jul 2022 06:07  Phos  4.9     07-21  Mg     2.0     07-21    TPro  5.4<L>  /  Alb  2.9<L>  /  TBili  0.4  /  DBili  x   /  AST  17  /  ALT  13  /  AlkPhos  268<H>  07-21      LIVER FUNCTIONS - ( 21 Jul 2022 06:07 )  Alb: 2.9 g/dL / Pro: 5.4 g/dL / ALK PHOS: 268 U/L / ALT: 13 U/L / AST: 17 U/L / GGT: x               Culture - Sputum (collected 07-15-22 @ 10:00)  Source: Trach Asp Tracheal Aspirate  Gram Stain (07-16-22 @ 04:22):    Moderate polymorphonuclear leukocytes per low power field    Rare Squamous epithelial cells per low power field    No organisms seen per oil power field  Final Report (07-17-22 @ 16:58):    Normal Respiratory Kelly present    Babesia microti PCR, Blood. (collected 07-10-22 @ 15:43)  Source: .Blood None  Final Report (07-11-22 @ 09:45):    Babesia microti PCR    Results: NOT detected    ***************Result Note*************    The detection of Babesia microti by PCR has only been    validated for whole blood; this test has not been approved    by the US Food and Drug Administration (FDA). Performance    characteristics of this assay have been determined by    [employer]. The clinical significance    of results should be considered in conjunction with the    overall clinical presentation of the patient. Result is not    intended to be used as the sole means for clinical diagnosis    or patient management decisions.    One negative sample does not necessarily rule    out the presence of a parasitic infection.    Culture - Blood (collected 07-08-22 @ 05:26)  Source: .Blood None  Final Report (07-13-22 @ 19:00):    No Growth Final    Culture - Blood (collected 07-06-22 @ 07:20)  Source: .Blood None  Final Report (07-11-22 @ 19:00):    No Growth Final    Culture - Blood (collected 07-06-22 @ 07:20)  Source: .Blood None  Final Report (07-11-22 @ 19:00):    No Growth Final    Culture - Sputum (collected 07-04-22 @ 13:39)  Source: ET Tube ET Tube  Gram Stain (07-05-22 @ 08:48):    Few polymorphonuclear leukocytes per low power field    No Squamous epithelial cells per low power field    Numerous Gram Negative Coccobacilli seen per oil power field  Final Report (07-11-22 @ 08:12):    Moderate Acinetobacter baumannii/nosocom group (Carbapenem Resistant)    Cefiderocol interpretations based on FDA breakpoints.    Normal Respiratory Kelly absent  Organism: Acinetobacter baumannii/nosocom group (Carbapenem Resistant)  Acinetobacter baumannii/nosocom group (Carbapenem Resistant) (07-11-22 @ 08:12)  Organism: Acinetobacter baumannii/nosocom group (Carbapenem Resistant) (07-11-22 @ 08:12)      -  Cefiderocol: I      -  Imipenem: R      -  Piperacillin/Tazobactam: R      Method Type: KB  Organism: Acinetobacter baumannii/nosocom group (Carbapenem Resistant) (07-11-22 @ 08:12)      -  Amikacin: R >32      -  Ampicillin/Sulbactam: R >16/8      -  Cefepime: R >16      -  Ceftazidime: R >16      -  Ciprofloxacin: R >2      -  Gentamicin: R >8      -  Levofloxacin: R >4      -  Meropenem: R >8      -  Tobramycin: R >8      -  Trimethoprim/Sulfamethoxazole: R >2/38      Method Type: AL    Culture - Blood (collected 06-29-22 @ 20:31)  Source: .Blood Blood-Peripheral  Final Report (07-05-22 @ 20:00):    No Growth Final    Culture - Blood (collected 06-29-22 @ 20:31)  Source: .Blood Blood  Final Report (07-05-22 @ 20:00):    No Growth Final    Culture - Blood (collected 06-28-22 @ 06:00)  Source: .Blood Blood  Final Report (07-03-22 @ 23:00):    No Growth Final    Culture - Sputum (collected 06-27-22 @ 14:00)  Source: Trach Asp Tracheal Aspirate  Gram Stain (06-28-22 @ 03:15):    Moderate polymorphonuclear leukocytes per low power field    Few Squamous epithelial cells per low power field    Moderate Gram positive cocci in pairs seen per oil power field    Few Gram Variable Rods seen per oil power field  Final Report (06-30-22 @ 16:33):    Numerous Mixed gram negative rods    Moderate Staphylococcus aureus    Normal Respiratory Kelly present  Organism: Staphylococcus aureus (06-30-22 @ 16:33)  Organism: Staphylococcus aureus (06-30-22 @ 16:33)      -  Ampicillin/Sulbactam: S <=8/4      -  Cefazolin: S <=4      -  Clindamycin: S <=0.25      -  Erythromycin: S <=0.25      -  Gentamicin: S <=1 Should not be used as monotherapy      -  Oxacillin: S <=0.25 Oxacillin predicts susceptibility for dicloxacillin, methicillin, and nafcillin      -  Rifampin: S <=1 Should not be used as monotherapy      -  Tetra/Doxy: S <=1      -  Trimethoprim/Sulfamethoxazole: S <=0.5/9.5      -  Vancomycin: S 2      Method Type: AL    Culture - Sputum (collected 06-24-22 @ 17:20)  Source: Trach Asp Tracheal Aspirate  Gram Stain (06-25-22 @ 06:29):    Few polymorphonuclear leukocytes per low power field    Rare Squamous epithelial cells per low power field    Moderate Gram Negative Rods seen per oil power field  Final Report (06-26-22 @ 17:00):    Moderate Klebsiella oxytoca/Raoutella ornithinolytica    Moderate Enterobacter cloacae complex    Normal Respiratory Kelly absent  Organism: Klebsiella oxytoca /Raoutella ornithinolytica  Enterobacter cloacae complex (06-26-22 @ 17:00)  Organism: Enterobacter cloacae complex (06-26-22 @ 17:00)      -  Amikacin: S <=16      -  Amoxicillin/Clavulanic Acid: R >16/8      -  Ampicillin: R >16 These ampicillin results predict results for amoxicillin      -  Ampicillin/Sulbactam: R >16/8 Enterobacter, Klebsiella aerogenes, Citrobacter, and Serratia may develop resistance during prolonged therapy (3-4 days)      -  Aztreonam: S <=4      -  Cefazolin: R >16 Enterobacter, Klebsiella aerogenes, Citrobacter, and Serratia may develop resistance during prolonged therapy (3-4 days)      -  Cefepime: S <=2      -  Cefoxitin: R >16      -  Ceftriaxone: S <=1 Enterobacter, Klebsiella aerogenes, Citrobacter, and Serratia may develop resistance during prolonged therapy      -  Ciprofloxacin: S <=0.25      -  Ertapenem: S <=0.5      -  Gentamicin: S <=2      -  Imipenem: S <=1      -  Levofloxacin: S <=0.5      -  Meropenem: S <=1      -  Piperacillin/Tazobactam: S <=8      -  Tobramycin: S <=2      -  Trimethoprim/Sulfamethoxazole: S <=0.5/9.5      Method Type: AL  Organism: Klebsiella oxytoca /Raoutella ornithinolytica (06-26-22 @ 17:00)      -  Amikacin: S <=16      -  Amoxicillin/Clavulanic Acid: S <=8/4      -  Ampicillin: R 16 These ampicillin results predict results for amoxicillin      -  Ampicillin/Sulbactam: S <=4/2 Enterobacter, Klebsiella aerogenes, Citrobacter, and Serratia may develop resistance during prolonged therapy (3-4 days)      -  Aztreonam: S <=4      -  Cefazolin: S <=2 Enterobacter, Klebsiella aerogenes, Citrobacter, and Serratia may develop resistance during prolonged therapy (3-4 days)      -  Cefepime: S <=2      -  Cefoxitin: S <=8      -  Ceftriaxone: S <=1 Enterobacter, Klebsiella aerogenes, Citrobacter, and Serratia may develop resistance during prolonged therapy      -  Ciprofloxacin: S <=0.25      -  Ertapenem: S <=0.5      -  Gentamicin: S <=2      -  Imipenem: S <=1      -  Levofloxacin: S <=0.5      -  Meropenem: S <=1      -  Piperacillin/Tazobactam: S <=8      -  Tobramycin: S <=2      -  Trimethoprim/Sulfamethoxazole: S <=0.5/9.5      Method Type: AL    Culture - Body Fluid with Gram Stain (collected 06-22-22 @ 01:00)  Source: Peritoneal Peritoneal Fluid  Gram Stain (06-23-22 @ 02:31):    No polymorphonuclear leukocytes seen    No organisms seen    by cytocentrifuge  Final Report (06-27-22 @ 19:13):    No growth at 5 days            INFECTIOUS DISEASES TESTING  COVID-19 PCR: NotDetec (07-19-22 @ 19:56)  COVID-19 PCR: NotDetec (07-17-22 @ 20:01)  Procalcitonin, Serum: 0.21 (07-17-22 @ 10:34)  COVID-19 PCR: NotDetec (07-14-22 @ 15:00)  Procalcitonin, Serum: 0.16 (07-14-22 @ 12:50)  Procalcitonin, Serum: 0.12 (07-10-22 @ 05:42)  Fungitell: <31 (07-05-22 @ 11:49)  Procalcitonin, Serum: 0.14 (07-05-22 @ 11:23)  MRSA PCR Result.: Negative (06-30-22 @ 10:34)  Fungitell: See Comment** **RESULTS OF FUNGITELL INCONCLUSIVE DUE TO THE PRESENCE OF UNKNOWN  INTERFERING SUBSTANCE. PLEASE SUBMIT ANOTHER SPECIMEN FOR TESTING.  PLEASE CONTACT [employer] WITH QUESTIONS.  Interpretation: The Fungitell assay does not detectcertain fungal  species such as the genus Cryptococcus (Aundrea et al. 1991) which  produces very low levels of (1-3)-Beta-D-Glucan. The assay also does  not detect the Zygomycetes such as Absidia, Mucor and Rhizopus  (Carol et al. 1994) which are not known to produce  (1-3)-Beta-D-Glucan. In addition, the yeast phase of Blastomyces  dermatitidis produces little (1-3)-Beta-D-Glucan and may not be  detected by the assay (Anila et al. 2007).  Reference Range:  Less than 60 pg/mL. Glucan values of less than 60 pg/mL are  interpreted as negative.  Glucan values of 60 to 79 pg/mL are interpreted as indeterminate,  and suggest a possible fungal infection. Additional sampling and  testing of sera is required to interpret the results.  Glucan values of greater than or equal to 80 pg/mL are interpreted  as positive.  Due to the potential for environmental contamination when  transferred to pour-off tubes, which can lead to false positive  results, interpret positive results from samples provided in  pour-off tubes with caution.  Results should be used in conjunction  with clinical findings, and should not form the sole basis for a  diagnosis or treatment decision. The Fungitell test is approved or  cleared for in vitro diagnostic use by the U.[place] Food and Drug  Administration. Modifications to the approved package insert have  been made and the performance characteristics for these  modifications were determined by [employer].  If sample result is greater than 500 pg/mL, physician may order a  titer of the sample. Please contact [employer] if you would  like to order a retest of this sample to obtain an actual value.  Samples are held for 1 week after initial testing date.  ____________________________________________________________  Performed at:  [email or web address]  [address] [place] [address]  : Kirk Newberry Ph.D., BCLD (ABB)  CLIA#: [id number]  Phone: [phone] (06-30-22 @ 10:30)  Procalcitonin, Serum: 0.36 (06-30-22 @ 10:30)  Rapid RVP Result: NotDetec (06-30-22 @ 09:19)  HIV-1/2 Combo Result: Nonreact (06-29-22 @ 10:29)  Procalcitonin, Serum: 0.11 (06-27-22 @ 15:46)  Procalcitonin, Serum: 0.11 (06-27-22 @ 06:47)  Procalcitonin, Serum: 0.62 (06-20-22 @ 05:49)  MRSA PCR Result.: Negative (06-17-22 @ 14:30)  Procalcitonin, Serum: 3.28 (06-17-22 @ 05:22)  COVID-19 PCR: NotDetec (06-15-22 @ 07:10)  Procalcitonin, Serum: 0.07 (04-20-22 @ 12:46)      INFLAMMATORY MARKERS  Sedimentation Rate, Erythrocyte: 86 mm/Hr (06-30-22 @ 05:46)  Sedimentation Rate, Erythrocyte: 65 mm/Hr (06-23-22 @ 16:00)      RADIOLOGY & ADDITIONAL TESTS:  I have personally reviewed the last available Chest xray  CXR  Xray Chest 1 View- PORTABLE-Urgent:   ACC: [id number] EXAM:  XR CHEST PORTABLE URGENT 1V                          PROCEDURE DATE:  07/20/2022          INTERPRETATION:  Clinical History / Reason for exam: Tracheostomy    Comparison : Chest radiograph performed earlier the same day.    Technique/Positioning: AP chest.    Findings:    Support devices: He is a new tracheostomy tube which is in midline.   Nasogastric tube has been removed. There is a right IJ catheter tip in   the region of proximal superior vena cava.    Cardiac/mediastinum/hilum: Heart enlarged.    Lung parenchyma/Pleura: Bilateral lung opacities again noted.    Skeleton/soft tissues: No change    Impression:    Bilateral lung opacities, unchanged. Cardiomegaly.        --- End of Report ---            CALEB COLEMAN MD; Attending Radiologist  This document has been electronically signed. Jul 21 2022 10:53AM (07-20-22 @ 19:25)      CT      CARDIOLOGY TESTING  12 Lead ECG:   Ventricular Rate 73 BPM    Atrial Rate 73 BPM    P-R Interval 176 ms    QRS Duration 84 ms    Q-T Interval 482 ms    QTC Calculation(Bazett) 531 ms    P Axis 70 degrees    R Axis 116 degrees    T Axis 153 degrees    Diagnosis Line Normal sinus rhythm  Right axis deviation  Possible Right ventricular hypertrophy  Nonspecific T wave abnormality  Abnormal ECG    Confirmed by BRANDON BELL MD ([id number]) on 7/21/2022 11:32:44 AM (07-21-22 @ 09:07)  12 Lead ECG:   Ventricular Rate 103 BPM    Atrial Rate 103 BPM    P-R Interval 154 ms    QRS Duration 66 ms    Q-T Interval 354 ms    QTC Calculation(Bazett) 463 ms    P Axis 63 degrees    R Axis 86 degrees    T Axis 198 degrees    Diagnosis Line Sinus tachycardia  Nonspecific ST-T changes  Confirmed by BRANDON BELL MD ([id number]) on 7/15/2022 10:08:35 AM (07-15-22 @ 07:50)      MEDICATIONS  ampicillin/sulbactam  IVPB 3 IV Intermittent every 6 hours  BACItracin   Ointment 1 Topical two times a day  chlorhexidine 0.12% Liquid 15 Oral Mucosa every 12 hours  chlorhexidine 2% Cloths 1 Topical daily  dexMEDEtomidine Infusion 0.1 IV Continuous <Continuous>  fentaNYL   Infusion. 0.5 IV Continuous <Continuous>  folic acid 1 Oral daily  methadone    Tablet 20 Oral daily  nicotine -  14 mG/24Hr(s) Patch 1 Transdermal daily  norepinephrine Infusion 0.05 IV Continuous <Continuous>  pantoprazole  Injectable 40 IV Push daily  polymyxin B IVPB [id number] IV Intermittent every 12 hours  potassium chloride   Powder 40 Oral daily  propofol Infusion 0.095 IV Continuous <Continuous>  QUEtiapine 100 Oral two times a day  scopolamine 1 mG/72 Hr(s) Patch 1 Transdermal every 72 hours  spironolactone 100 Oral daily  thiamine 100 Oral daily  vancomycin    Solution 250 Oral every 6 hours      WEIGHT  Weight (kg): 70.4 (07-20-22 @ 16:41)  Creatinine, Serum: 0.9 mg/dL (07-21-22 @ 06:07)      ANTIBIOTICS:  ampicillin/sulbactam  IVPB 3 Gram(s) IV Intermittent every 6 hours  polymyxin B IVPB [id number] Unit(s) IV Intermittent every 12 hours  vancomycin    Solution 250 milliGRAM(s) Oral every 6 hours      All available historical records have been reviewed

## 2022-07-21 NOTE — PROGRESS NOTE ADULT - ASSESSMENT
Impression:  Acute respiratory failure sp intubation and extubation 7/8 followed by reintubation  PNA CT Chest 6/22 - left greater than right lower lobe consolidations; sputum Cx 6/24 Klebsiella oxytoca, Enterobacter cloacae complex- and Acinetobacter buamnii  MSSA pna; acetinobacter VAP  seizure like activity  ?? 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 Diff on oral vancomycin   Hypokalemia/hypomagnesemia      CNS:   c/w methadone   on fentanyl/precedex/propofol  Did not tolerate SBT 7/19    HEENT: oral care     PULMONARY:  HOB 45,  did not tolerate SBT on 7/18 (patient hypertensive/tachycardic)  - ID cleared patient for trach/no definite contraindications   - s/p trach 7/20    CARDIOVASCULAR:   goal MAP >65.  Monitor QTc daily on ECG  ECHO 7/6: Small to mod generalized effusion. No tamponade . Small mod effusion   present by report on echo 4/21/22  f/u cardiology recommendations; no urgency in diagnostic tap, future ROSLYN given acinetobacter and IVDA   give lasix 40mg IV push x 1     GI:   GI prophylaxis.    laxative prn   speech and swallow eval     RENAL: monitor lytes  D/C free water flushes     INFECTIOUS DISEASE:   worsening R infiltrate. Fungitell <31 7/5 , Glactomannan pending   Repeat procal 0.14 from 0.36 (6/30)   Acinetobacter buamnii in sputum cx -> switch cefdericol to  polymyxin 60691 U/kg q 12 hours and unasyn 3g q 6 hours for Acinetobacter VAP   - continue PO vancomycin 250 mg q 6 hours  - s/p bedside BM biopsy 7/14: negative   - f/u HLH panel (ferritin, IL6, triglycerides soluble antigen) and heme/onc recommendations   - f/u ID recommendations     HEMATOLOGICAL:    monitor CBC  D-dimer 1322, HIT antibody negative x2   f/u repeat HIT antibody; duplex negative 7/2/2022  - s/p bedside BM biopsy 7/14: negative  - hematopathology inconclusive?   - f/u HLH panel (ferritin, IL6, triglycerides soluble antigen); f/u heme onc eventually   hold heparin and start fondaparinux when plt more then 50 serial h/h keep hgb > 7    ENDOCRINE:  Follow up FS.  Insulin protocol if needed.     MICU  lines: right IJ 7/6, trach 7/20

## 2022-07-21 NOTE — PROGRESS NOTE ADULT - ASSESSMENT
27yo woman with Hep C presenting with acute respiratory failure in setting of substance abuse, s/p intubation (prolonged) and course c/b C dif colitis and Acitenobacter trach cultures.    Plan    Keep dressing between cuff and skin to prevent skin breakdown  Frequent suctioning  6 Shiley cuffed trach in place  Sutures out in 10 days    [id number]

## 2022-07-21 NOTE — PROGRESS NOTE ADULT - ASSESSMENT
Acute respiratory failure with hypoxemia / aspiration pneumonia with sepsis / suspected drug overdose / head laceration s/p fall in lobby / possible seizure / anasarca  pancytopenia      - c-diff colitis - continue oral vanco   - w/u and antibiotics as per ID    - s/p BMB - heme f/u    - s/p supplementation of   hypokalemia and  hypomagnesemia    - s/p   trach   - slow weaning trial

## 2022-07-21 NOTE — PROGRESS NOTE ADULT - ASSESSMENT
IMPRESSION:  Acute respiratory failure sp trach  PNA  MSSA pna  seizure like activity  drug over dose/ withdrawal opoid   liver cirrhosis   Ascites sp paracentesis   Pancytopenia- worsening  RENETTA improved  C diff +ve     PLAN:    CNS: Continue with Methadone for withdrawal Dc fentanyl and propofol this morning; continue with precedex and decrease the dose gradually; continue with seroquel and clonazepam; Check QTc daily.      HEENT: oral care; Trach care     PULMONARY:    CXR unchnaged. ABG reviewed  keep off L side in bed develops atelectasis  pressure support     CARDIOVASCULAR: Keep MAP more than 65mmhg; not on pressors currently keep equal balance. On oral diuretics.     GI: GI prophylaxis. Hold OGT feeding for trach.   Do a bedside US of the abdomen. If large ascites then will need therapeutic paracentesis.     RENAL: Correct K ot mo re than 4 and Mg to more than 2. DC Free water pushes.     INFECTIOUS DISEASE: Currently on PO vancomycin, Unasyn, Polymixin. Monitor kidney function and electrolytes. ID following. DTA culture so far negative. Recheck procalcitonin.     HEMATOLOGICAL:   s/p BMB follow result and cx. Hematology following. Monitor platelet count. Keep hg more than 7.   Send HIT panel for thrombocytopenia and peripheral smear. S  tart fondaparinux for DVT prophylaxis if platelet count stays above 50k until HIT panel is back.     ENDOCRINE:  Follow up FS.  Insulin protocol if needed.     Musk: bedrest for now. PT rehab evaluation.     Dispo: Remains critically and ill needs ICU care     TLC 7/6 RIJ; ETT      IMPRESSION:  Acute respiratory failure sp trach  PNA  MSSA pna  seizure like activity  drug over dose/ withdrawal opoid   liver cirrhosis   Ascites sp paracentesis   Pancytopenia- worsening  RENETTA improved  C diff +ve     PLAN:    CNS: Continue with Methadone for withdrawal Dc fentanyl and propofol this morning; continue with precedex and decrease the dose gradually; continue with seroquel and clonazepam; Check QTc daily.      HEENT: oral care; Trach care     PULMONARY:    CXR unchnaged. ABG reviewed  keep off L side in bed develops atelectasis  pressure support     CARDIOVASCULAR: Keep MAP more than 65mmhg; not on pressors currently keep equal balance. On oral diuretics.     GI: GI prophylaxis. Hold OGT feeding for trach.   Do a bedside US of the abdomen. If large ascites then will need therapeutic paracentesis.     RENAL: Correct K ot mo re than 4 and Mg to more than 2. DC Free water pushes.     INFECTIOUS DISEASE: Currently on PO vancomycin, Unasyn, Polymixin. Monitor kidney function and electrolytes. ID following. DTA culture so far negative. Recheck procalcitonin.     HEMATOLOGICAL:   s/p BMB follow result and cx. Hematology following. Monitor platelet count. Keep hg more than 7.     ENDOCRINE:  Follow up FS.  Insulin protocol if needed.     Musk: bedrest for now. PT rehab evaluation.     Dispo: Remains critically and ill needs ICU care     TLC 7/6 RIJ; ETT

## 2022-07-21 NOTE — PROGRESS NOTE ADULT - ASSESSMENT
Patient is a 26 year old female with hx of asthma, untreated Hep C, IVDA, anasarca presenting with increased dyspnea since yesterday    IMPRESSION  #Acute respiratory failure s/p intubation 6/16, extubated 7/7, reintubated     #VAP  - Xray Chest 1 View- PORTABLE-Routine (Xray Chest 1 View- PORTABLE-Routine in AM.) (07.03.22 @ 06:10): Increasing right basilar opacity.  - sputum Cx 7/4 MDR Acinetobacter baumanii     # C.difficille infection - 7/5/22    #Pneumonia -   - CT Chest 6/22 - left greater than right lower lobe consolidations   - sputum Cx 6/24 Klebsiella oxytoca, Enterobacter cloacae complex- The Sputum culture from 6/27 grew Numerous Mixed gram negative rods and Moderate Staphylococcus aureus.  - treated with course of cefepime     #FUO + Pancytopenia + Splenomegaly   - Ferritin, Serum: 94 ng/mL (06.27.22 @ 06:47),  Procalcitonin, Serum: 0.11 (06.27.22 @ 15:46)  - CT Abd/pelvis 6/26 - moderated ascites moderate pericardial effusion   - EBV seroligies consistent with old infection   - CMV IgG +, IgM - (07.10.22 @ 15:43)  - bartonella-ab negative   - Hepatosplenomegaly - present since CT Abd/pelvis 1/30/2021  - HIV-1/2 Combo Result: Nonreact:  (06.29.22 @ 10:29)  - Sedimentation Rate, Erythrocyte: 86 mm/Hr (06.30.22 @ 05:46)  - Autoimmune panel negative   - Quantiferon TB Plus: NEGATIVE (06.04.20 @ 10:28)  - Quantiferon TB Plus: INDETERMINATE. (02.06.21 @ 09:00)  - s/p Bone marrow biopsy 7/14 - Flow negative       #Drug overdose vs withdrawal?  #Hx of Untreated Hep C   #IVDA   #Abx allergy: buprenorphine (Rash), Suboxone (Rash)    Recommendations  - continue polymyxin 39399 U/kg q 12 hours and unasyn 3g q 6 hours for Acinetobacter VAP (start date 7/15) -- will plan at least 10 days of antibiotics   - will follow RUQ US to see if ascites to tap   - switch to 125 mg BID for prophylaxis while on systemic antibiotics   - monitor creatinine  - vent management per primary       Please call or message on [employer] Teams if with any questions.  Spectra [phone]

## 2022-07-21 NOTE — PROGRESS NOTE ADULT - SUBJECTIVE AND OBJECTIVE BOX
Hospital Day:  36d    Subjective: Patient is a 26y old  Female who presents with a chief complaint of anasarca (21 Jul 2022 10:10)      Pt seen and evaluated at bedside.   Complaints: none, s/p trach   Over the night Events: none    Past Medical Hx:   Hepatitis C    Asthma    Anxiety and depression    IV drug abuse      Past Sx:  No significant past surgical history      Allergies:  buprenorphine (Rash)  Suboxone (Rash)    Current Meds:   Standng Meds:  ampicillin/sulbactam  IVPB 3 Gram(s) IV Intermittent every 6 hours  BACItracin   Ointment 1 Application(s) Topical two times a day  chlorhexidine 0.12% Liquid 15 milliLiter(s) Oral Mucosa every 12 hours  chlorhexidine 2% Cloths 1 Application(s) Topical daily  dexMEDEtomidine Infusion 0.1 MICROgram(s)/kG/Hr (1.76 mL/Hr) IV Continuous <Continuous>  fentaNYL   Infusion. 0.5 MICROgram(s)/kG/Hr (3.52 mL/Hr) IV Continuous <Continuous>  folic acid 1 milliGRAM(s) Oral daily  furosemide   Injectable 40 milliGRAM(s) IV Push once  methadone    Tablet 20 milliGRAM(s) Oral daily  nicotine -  14 mG/24Hr(s) Patch 1 patch Transdermal daily  norepinephrine Infusion 0.05 MICROgram(s)/kG/Min (8.03 mL/Hr) IV Continuous <Continuous>  pantoprazole  Injectable 40 milliGRAM(s) IV Push daily  polymyxin B IVPB 210008 Unit(s) IV Intermittent every 12 hours  potassium chloride   Powder 40 milliEquivalent(s) Oral daily  propofol Infusion 0.095 MICROgram(s)/kG/Min (0.04 mL/Hr) IV Continuous <Continuous>  QUEtiapine 100 milliGRAM(s) Oral two times a day  scopolamine 1 mG/72 Hr(s) Patch 1 Patch Transdermal every 72 hours  spironolactone 100 milliGRAM(s) Oral daily  thiamine 100 milliGRAM(s) Oral daily  vancomycin    Solution 250 milliGRAM(s) Oral every 6 hours    PRN Meds:  ALBUTerol    90 MICROgram(s) HFA Inhaler 1 Puff(s) Inhalation every 4 hours PRN Shortness of Breath and/or Wheezing  cloNIDine 0.1 milliGRAM(s) Oral every 6 hours PRN opiate withdrawal  hydrOXYzine hydrochloride 50 milliGRAM(s) Oral every 6 hours PRN Anxiety  ibuprofen  Tablet. 400 milliGRAM(s) Oral every 6 hours PRN Mild Pain (1 - 3)  sodium chloride 0.9% lock flush 10 milliLiter(s) IV Push every 1 hour PRN Pre/post blood products, medications, blood draw, and to maintain line patency      Vital Signs:   T(F): 96.2 (07-21-22 @ 07:10), Max: 97.3 (07-20-22 @ 18:35)  HR: 64 (07-21-22 @ 08:26) (54 - 109)  BP: 112/85 (07-21-22 @ 06:00) (101/66 - 213/136)  RR: 25 (07-21-22 @ 09:00) (14 - 31)  SpO2: 100% (07-21-22 @ 08:26) (94% - 100%)    Physical Exam:   GENERAL: NAD, Resting in bed  HEENT: NCAT  CHEST/LUNG: Clear to auscultation bilaterally; No wheezing or rubs.   HEART: Regular rate and rhythm; No murmurs, rubs, or gallops  ABDOMEN: Bowel sounds present; Soft, Nontender, Nondistended.   EXTREMITIES:  No clubbing, cyanosis, or edema  NERVOUS SYSTEM:  Alert & Oriented X3    FLUID BALANCE    07-19-22 @ 07:01  -  07-20-22 @ 07:00  --------------------------------------------------------  IN: 2813 mL / OUT: 1645 mL / NET: 1168 mL    07-20-22 @ 07:01  -  07-21-22 @ 07:00  --------------------------------------------------------  IN: 2148 mL / OUT: 2605 mL / NET: -457 mL    07-21-22 @ 07:01  -  07-21-22 @ 10:19  --------------------------------------------------------  IN: 0 mL / OUT: 240 mL / NET: -240 mL        Labs:                         9.0    1.96  )-----------( 112      ( 21 Jul 2022 06:07 )             29.4     Neutophil% 61.8, Lymphocyte% 32.1, Monocyte% 5.1, Bands% 0.5 07-21-22 @ 06:07    21 Jul 2022 06:07    141    |  109    |  16     ----------------------------<  91     4.2     |  19     |  0.9      Ca    8.9        21 Jul 2022 06:07  Phos  4.9       21 Jul 2022 06:07  Mg     2.0       21 Jul 2022 06:07    TPro  5.4    /  Alb  2.9    /  TBili  0.4    /  DBili  x      /  AST  17     /  ALT  13     /  AlkPhos  268    21 Jul 2022 06:07                            Procalcitonin, Serum: 0.16 ng/mL (07-14-22 @ 12:50)  Procalcitonin, Serum: 0.21 ng/mL (07-17-22 @ 10:34)    Ferritin, Serum: 334 ng/mL (07-14-22 @ 12:50)      Lactate Dehydrogenase, Serum: 271 (07-14-22 @ 12:50)      Radiology:

## 2022-07-21 NOTE — PROGRESS NOTE ADULT - SUBJECTIVE AND OBJECTIVE BOX
Patient is a 26y old  Female who presents with a chief complaint of anasarca (20 Jul 2022 12:05)        Over Night Events: Pt is s/p trach.         ROS:     All ROS are negative except HPI         PHYSICAL EXAM    ICU Vital Signs Last 24 Hrs  T(C): 35.7 (21 Jul 2022 07:10), Max: 36.3 (20 Jul 2022 18:35)  T(F): 96.2 (21 Jul 2022 07:10), Max: 97.3 (20 Jul 2022 18:35)  HR: 58 (21 Jul 2022 06:00) (54 - 109)  BP: 112/85 (21 Jul 2022 06:00) (101/66 - 213/136)  BP(mean): 95 (21 Jul 2022 06:00) (77 - 167)  RR: 25 (21 Jul 2022 07:10) (14 - 31)  SpO2: 99% (21 Jul 2022 06:00) (94% - 100%)    O2 Parameters below as of 21 Jul 2022 04:00  Patient On (Oxygen Delivery Method): ventilator,trach          CONSTITUTIONAL:  Well nourished.  NAD    ENT:   Airway patent,   Mouth with normal mucosa.   trach       CARDIAC:   Normal rate,   Regular rhythm.        RESPIRATORY:   No wheezing  Bilateral BS  Normal chest expansion  Not tachypneic,  No use of accessory muscles    GASTROINTESTINAL:  Abdomen soft,   Non-tender,   No guarding,   + BS    NEUROLOGICAL:   Alert and oriented   No motor  deficits.    SKIN:   DTI         07-20-22 @ 07:01  -  07-21-22 @ 07:00  --------------------------------------------------------  IN:    Dexmedetomidine: 225 mL    Dexmedetomidine: 300 mL    dextrose 5% + sodium chloride 0.45%: 450 mL    FentaNYL: 173 mL    FentaNYL: 180 mL    IV PiggyBack: 500 mL    IV PiggyBack: 200 mL    Propofol: 120 mL  Total IN: 2148 mL    OUT:    Indwelling Catheter - Urethral (mL): 2605 mL  Total OUT: 2605 mL    Total NET: -457 mL      07-21-22 @ 07:01  -  07-21-22 @ 08:05  --------------------------------------------------------  IN:  Total IN: 0 mL    OUT:    Indwelling Catheter - Urethral (mL): 100 mL  Total OUT: 100 mL    Total NET: -100 mL          LABS:                            9.0    1.96  )-----------( 112      ( 21 Jul 2022 06:07 )             29.4                                               07-20    137  |  105  |  18  ----------------------------<  106<H>  4.2   |  20  |  0.9    Ca    8.5      20 Jul 2022 07:25  Phos  4.5     07-20  Mg     2.4     07-20    TPro  5.2<L>  /  Alb  2.7<L>  /  TBili  0.3  /  DBili  x   /  AST  16  /  ALT  12  /  AlkPhos  248<H>  07-20      PT/INR - ( 20 Jul 2022 00:24 )   PT: 11.60 sec;   INR: 1.01 ratio         PTT - ( 20 Jul 2022 00:24 )  PTT:28.1 sec                                                                                     LIVER FUNCTIONS - ( 20 Jul 2022 07:25 )  Alb: 2.7 g/dL / Pro: 5.2 g/dL / ALK PHOS: 248 U/L / ALT: 12 U/L / AST: 16 U/L / GGT: x                                                                                               Mode: AC/ CMV (Assist Control/ Continuous Mandatory Ventilation)  RR (machine): 25  TV (machine): 370  FiO2: 35  PEEP: 5  ITime: 0.8  MAP: 9  PIP: 19                                      ABG - ( 21 Jul 2022 04:12 )  pH, Arterial: 7.38  pH, Blood: x     /  pCO2: 33    /  pO2: 135   / HCO3: 19    / Base Excess: -5.0  /  SaO2: 99.0                MEDICATIONS  (STANDING):  ampicillin/sulbactam  IVPB 3 Gram(s) IV Intermittent every 6 hours  BACItracin   Ointment 1 Application(s) Topical two times a day  chlorhexidine 0.12% Liquid 15 milliLiter(s) Oral Mucosa every 12 hours  chlorhexidine 2% Cloths 1 Application(s) Topical daily  dexMEDEtomidine Infusion 0.1 MICROgram(s)/kG/Hr (1.76 mL/Hr) IV Continuous <Continuous>  dextrose 5% + sodium chloride 0.45%. 1000 milliLiter(s) (50 mL/Hr) IV Continuous <Continuous>  fentaNYL   Infusion. 0.5 MICROgram(s)/kG/Hr (3.52 mL/Hr) IV Continuous <Continuous>  folic acid 1 milliGRAM(s) Oral daily  lactated ringers. 1000 milliLiter(s) (65 mL/Hr) IV Continuous <Continuous>  methadone    Tablet 20 milliGRAM(s) Oral daily  nicotine -  14 mG/24Hr(s) Patch 1 patch Transdermal daily  norepinephrine Infusion 0.05 MICROgram(s)/kG/Min (8.03 mL/Hr) IV Continuous <Continuous>  pantoprazole  Injectable 40 milliGRAM(s) IV Push daily  polymyxin B IVPB 147803 Unit(s) IV Intermittent every 12 hours  potassium chloride   Powder 40 milliEquivalent(s) Oral daily  propofol Infusion 0.095 MICROgram(s)/kG/Min (0.04 mL/Hr) IV Continuous <Continuous>  QUEtiapine 100 milliGRAM(s) Oral two times a day  scopolamine 1 mG/72 Hr(s) Patch 1 Patch Transdermal every 72 hours  spironolactone 100 milliGRAM(s) Oral daily  thiamine 100 milliGRAM(s) Oral daily  vancomycin    Solution 250 milliGRAM(s) Oral every 6 hours    MEDICATIONS  (PRN):  ALBUTerol    90 MICROgram(s) HFA Inhaler 1 Puff(s) Inhalation every 4 hours PRN Shortness of Breath and/or Wheezing  cloNIDine 0.1 milliGRAM(s) Oral every 6 hours PRN opiate withdrawal  hydrOXYzine hydrochloride 50 milliGRAM(s) Oral every 6 hours PRN Anxiety  ibuprofen  Tablet. 400 milliGRAM(s) Oral every 6 hours PRN Mild Pain (1 - 3)      CXR interpreted by me:  B/l Infiltrates

## 2022-07-22 LAB
ALBUMIN SERPL ELPH-MCNC: 2.8 G/DL — LOW (ref 3.5–5.2)
ALP SERPL-CCNC: 248 U/L — HIGH (ref 30–115)
ALT FLD-CCNC: 14 U/L — SIGNIFICANT CHANGE UP (ref 0–41)
ANION GAP SERPL CALC-SCNC: 14 MMOL/L — SIGNIFICANT CHANGE UP (ref 7–14)
AST SERPL-CCNC: 24 U/L — SIGNIFICANT CHANGE UP (ref 0–41)
BASOPHILS # BLD AUTO: 0 K/UL — SIGNIFICANT CHANGE UP (ref 0–0.2)
BASOPHILS NFR BLD AUTO: 0 % — SIGNIFICANT CHANGE UP (ref 0–1)
BILIRUB SERPL-MCNC: 0.4 MG/DL — SIGNIFICANT CHANGE UP (ref 0.2–1.2)
BUN SERPL-MCNC: 14 MG/DL — SIGNIFICANT CHANGE UP (ref 10–20)
CALCIUM SERPL-MCNC: 8.9 MG/DL — SIGNIFICANT CHANGE UP (ref 8.5–10.1)
CHLORIDE SERPL-SCNC: 105 MMOL/L — SIGNIFICANT CHANGE UP (ref 98–110)
CO2 SERPL-SCNC: 21 MMOL/L — SIGNIFICANT CHANGE UP (ref 17–32)
CREAT SERPL-MCNC: 0.9 MG/DL — SIGNIFICANT CHANGE UP (ref 0.7–1.5)
EGFR: 89 ML/MIN/1.73M2 — SIGNIFICANT CHANGE UP
EOSINOPHIL # BLD AUTO: 0 K/UL — SIGNIFICANT CHANGE UP (ref 0–0.7)
EOSINOPHIL NFR BLD AUTO: 0 % — SIGNIFICANT CHANGE UP (ref 0–8)
GLUCOSE SERPL-MCNC: 84 MG/DL — SIGNIFICANT CHANGE UP (ref 70–99)
HCT VFR BLD CALC: 28.7 % — LOW (ref 37–47)
HGB BLD-MCNC: 9.2 G/DL — LOW (ref 12–16)
IMM GRANULOCYTES NFR BLD AUTO: 0.6 % — HIGH (ref 0.1–0.3)
LYMPHOCYTES # BLD AUTO: 0.61 K/UL — LOW (ref 1.2–3.4)
LYMPHOCYTES # BLD AUTO: 38.1 % — SIGNIFICANT CHANGE UP (ref 20.5–51.1)
MAGNESIUM SERPL-MCNC: 1.5 MG/DL — LOW (ref 1.8–2.4)
MCHC RBC-ENTMCNC: 27.7 PG — SIGNIFICANT CHANGE UP (ref 27–31)
MCHC RBC-ENTMCNC: 32.1 G/DL — SIGNIFICANT CHANGE UP (ref 32–37)
MCV RBC AUTO: 86.4 FL — SIGNIFICANT CHANGE UP (ref 81–99)
MONOCYTES # BLD AUTO: 0.09 K/UL — LOW (ref 0.1–0.6)
MONOCYTES NFR BLD AUTO: 5.6 % — SIGNIFICANT CHANGE UP (ref 1.7–9.3)
NEUTROPHILS # BLD AUTO: 0.89 K/UL — LOW (ref 1.4–6.5)
NEUTROPHILS NFR BLD AUTO: 55.7 % — SIGNIFICANT CHANGE UP (ref 42.2–75.2)
NRBC # BLD: 0 /100 WBCS — SIGNIFICANT CHANGE UP (ref 0–0)
PHOSPHATE SERPL-MCNC: 5.4 MG/DL — HIGH (ref 2.1–4.9)
PLATELET # BLD AUTO: 118 K/UL — LOW (ref 130–400)
POTASSIUM SERPL-MCNC: 3.5 MMOL/L — SIGNIFICANT CHANGE UP (ref 3.5–5)
POTASSIUM SERPL-SCNC: 3.5 MMOL/L — SIGNIFICANT CHANGE UP (ref 3.5–5)
PROT SERPL-MCNC: 5.1 G/DL — LOW (ref 6–8)
RBC # BLD: 3.32 M/UL — LOW (ref 4.2–5.4)
RBC # FLD: 15.2 % — HIGH (ref 11.5–14.5)
SODIUM SERPL-SCNC: 140 MMOL/L — SIGNIFICANT CHANGE UP (ref 135–146)
WBC # BLD: 1.6 K/UL — LOW (ref 4.8–10.8)
WBC # FLD AUTO: 1.6 K/UL — LOW (ref 4.8–10.8)

## 2022-07-22 PROCEDURE — 71045 X-RAY EXAM CHEST 1 VIEW: CPT | Mod: 26,77

## 2022-07-22 PROCEDURE — 71045 X-RAY EXAM CHEST 1 VIEW: CPT | Mod: 26

## 2022-07-22 PROCEDURE — 99233 SBSQ HOSP IP/OBS HIGH 50: CPT

## 2022-07-22 PROCEDURE — 93010 ELECTROCARDIOGRAM REPORT: CPT

## 2022-07-22 RX ORDER — CLONAZEPAM 1 MG
1 TABLET ORAL EVERY 8 HOURS
Refills: 0 | Status: DISCONTINUED | OUTPATIENT
Start: 2022-07-22 | End: 2022-07-29

## 2022-07-22 RX ORDER — VANCOMYCIN HCL 1 G
125 VIAL (EA) INTRAVENOUS EVERY 12 HOURS
Refills: 0 | Status: DISCONTINUED | OUTPATIENT
Start: 2022-07-22 | End: 2022-08-03

## 2022-07-22 RX ORDER — FENTANYL CITRATE 50 UG/ML
0.5 INJECTION INTRAVENOUS
Qty: 2500 | Refills: 0 | Status: DISCONTINUED | OUTPATIENT
Start: 2022-07-22 | End: 2022-07-26

## 2022-07-22 RX ORDER — POTASSIUM CHLORIDE 20 MEQ
40 PACKET (EA) ORAL DAILY
Refills: 0 | Status: DISCONTINUED | OUTPATIENT
Start: 2022-07-22 | End: 2022-07-22

## 2022-07-22 RX ORDER — ENOXAPARIN SODIUM 100 MG/ML
40 INJECTION SUBCUTANEOUS EVERY 24 HOURS
Refills: 0 | Status: DISCONTINUED | OUTPATIENT
Start: 2022-07-22 | End: 2022-07-24

## 2022-07-22 RX ORDER — MAGNESIUM SULFATE 500 MG/ML
1 VIAL (ML) INJECTION EVERY 12 HOURS
Refills: 0 | Status: COMPLETED | OUTPATIENT
Start: 2022-07-22 | End: 2022-07-25

## 2022-07-22 RX ORDER — MAGNESIUM SULFATE 500 MG/ML
2 VIAL (ML) INJECTION ONCE
Refills: 0 | Status: COMPLETED | OUTPATIENT
Start: 2022-07-22 | End: 2022-07-22

## 2022-07-22 RX ADMIN — SPIRONOLACTONE 100 MILLIGRAM(S): 25 TABLET, FILM COATED ORAL at 05:08

## 2022-07-22 RX ADMIN — FENTANYL CITRATE 3.52 MICROGRAM(S)/KG/HR: 50 INJECTION INTRAVENOUS at 13:00

## 2022-07-22 RX ADMIN — CHLORHEXIDINE GLUCONATE 1 APPLICATION(S): 213 SOLUTION TOPICAL at 11:22

## 2022-07-22 RX ADMIN — AMPICILLIN SODIUM AND SULBACTAM SODIUM 200 GRAM(S): 250; 125 INJECTION, POWDER, FOR SUSPENSION INTRAMUSCULAR; INTRAVENOUS at 18:50

## 2022-07-22 RX ADMIN — POLYMYXIN B SULFATE 500 UNIT(S): 500000 INJECTION, POWDER, LYOPHILIZED, FOR SOLUTION INTRAMUSCULAR; INTRATHECAL; INTRAVENOUS; OPHTHALMIC at 05:07

## 2022-07-22 RX ADMIN — FENTANYL CITRATE 3.52 MICROGRAM(S)/KG/HR: 50 INJECTION INTRAVENOUS at 03:58

## 2022-07-22 RX ADMIN — PANTOPRAZOLE SODIUM 40 MILLIGRAM(S): 20 TABLET, DELAYED RELEASE ORAL at 11:23

## 2022-07-22 RX ADMIN — Medication 25 GRAM(S): at 09:59

## 2022-07-22 RX ADMIN — Medication 1 APPLICATION(S): at 19:19

## 2022-07-22 RX ADMIN — Medication 125 MILLIGRAM(S): at 23:25

## 2022-07-22 RX ADMIN — Medication 100 GRAM(S): at 18:13

## 2022-07-22 RX ADMIN — AMPICILLIN SODIUM AND SULBACTAM SODIUM 200 GRAM(S): 250; 125 INJECTION, POWDER, FOR SUSPENSION INTRAMUSCULAR; INTRAVENOUS at 23:25

## 2022-07-22 RX ADMIN — CHLORHEXIDINE GLUCONATE 15 MILLILITER(S): 213 SOLUTION TOPICAL at 18:13

## 2022-07-22 RX ADMIN — SCOPALAMINE 1 PATCH: 1 PATCH, EXTENDED RELEASE TRANSDERMAL at 20:12

## 2022-07-22 RX ADMIN — Medication 1 MILLIGRAM(S): at 23:25

## 2022-07-22 RX ADMIN — SCOPALAMINE 1 PATCH: 1 PATCH, EXTENDED RELEASE TRANSDERMAL at 08:15

## 2022-07-22 RX ADMIN — Medication 1 PATCH: at 08:15

## 2022-07-22 RX ADMIN — QUETIAPINE FUMARATE 100 MILLIGRAM(S): 200 TABLET, FILM COATED ORAL at 23:26

## 2022-07-22 RX ADMIN — AMPICILLIN SODIUM AND SULBACTAM SODIUM 200 GRAM(S): 250; 125 INJECTION, POWDER, FOR SUSPENSION INTRAMUSCULAR; INTRAVENOUS at 11:21

## 2022-07-22 RX ADMIN — CHLORHEXIDINE GLUCONATE 15 MILLILITER(S): 213 SOLUTION TOPICAL at 05:09

## 2022-07-22 RX ADMIN — Medication 1 PATCH: at 11:23

## 2022-07-22 RX ADMIN — QUETIAPINE FUMARATE 100 MILLIGRAM(S): 200 TABLET, FILM COATED ORAL at 05:07

## 2022-07-22 RX ADMIN — AMPICILLIN SODIUM AND SULBACTAM SODIUM 200 GRAM(S): 250; 125 INJECTION, POWDER, FOR SUSPENSION INTRAMUSCULAR; INTRAVENOUS at 05:07

## 2022-07-22 RX ADMIN — POLYMYXIN B SULFATE 500 UNIT(S): 500000 INJECTION, POWDER, LYOPHILIZED, FOR SOLUTION INTRAMUSCULAR; INTRATHECAL; INTRAVENOUS; OPHTHALMIC at 17:57

## 2022-07-22 RX ADMIN — Medication 1 MILLIGRAM(S): at 11:22

## 2022-07-22 RX ADMIN — ENOXAPARIN SODIUM 40 MILLIGRAM(S): 100 INJECTION SUBCUTANEOUS at 14:54

## 2022-07-22 RX ADMIN — Medication 1 PATCH: at 20:12

## 2022-07-22 RX ADMIN — Medication 1 MILLIGRAM(S): at 14:54

## 2022-07-22 RX ADMIN — METHADONE HYDROCHLORIDE 20 MILLIGRAM(S): 40 TABLET ORAL at 11:21

## 2022-07-22 RX ADMIN — Medication 1 PATCH: at 11:22

## 2022-07-22 RX ADMIN — Medication 1 APPLICATION(S): at 05:10

## 2022-07-22 RX ADMIN — DEXMEDETOMIDINE HYDROCHLORIDE IN 0.9% SODIUM CHLORIDE 1.76 MICROGRAM(S)/KG/HR: 4 INJECTION INTRAVENOUS at 13:07

## 2022-07-22 RX ADMIN — DEXMEDETOMIDINE HYDROCHLORIDE IN 0.9% SODIUM CHLORIDE 1.76 MICROGRAM(S)/KG/HR: 4 INJECTION INTRAVENOUS at 05:08

## 2022-07-22 RX ADMIN — DEXMEDETOMIDINE HYDROCHLORIDE IN 0.9% SODIUM CHLORIDE 1.76 MICROGRAM(S)/KG/HR: 4 INJECTION INTRAVENOUS at 20:31

## 2022-07-22 RX ADMIN — DEXMEDETOMIDINE HYDROCHLORIDE IN 0.9% SODIUM CHLORIDE 1.76 MICROGRAM(S)/KG/HR: 4 INJECTION INTRAVENOUS at 09:03

## 2022-07-22 RX ADMIN — Medication 250 MILLIGRAM(S): at 05:07

## 2022-07-22 NOTE — PROGRESS NOTE ADULT - SUBJECTIVE AND OBJECTIVE BOX
Hospital Day:  37d    Subjective: Patient is a 26y old  Female who presents with a chief complaint of anasarca (22 Jul 2022 09:23)      Pt seen and evaluated at bedside.   Complaints: none  Over the night Events: none    Past Medical Hx:   Hepatitis C    Asthma    Anxiety and depression    IV drug abuse      Past Sx:  No significant past surgical history      Allergies:  buprenorphine (Rash)  Suboxone (Rash)    Current Meds:   Standng Meds:  ampicillin/sulbactam  IVPB 3 Gram(s) IV Intermittent every 6 hours  BACItracin   Ointment 1 Application(s) Topical two times a day  chlorhexidine 0.12% Liquid 15 milliLiter(s) Oral Mucosa every 12 hours  chlorhexidine 2% Cloths 1 Application(s) Topical daily  clonazePAM  Tablet 1 milliGRAM(s) Oral every 8 hours  dexMEDEtomidine Infusion 0.1 MICROgram(s)/kG/Hr (1.76 mL/Hr) IV Continuous <Continuous>  folic acid 1 milliGRAM(s) Oral daily  methadone    Tablet 20 milliGRAM(s) Oral daily  nicotine -  14 mG/24Hr(s) Patch 1 patch Transdermal daily  norepinephrine Infusion 0.05 MICROgram(s)/kG/Min (8.03 mL/Hr) IV Continuous <Continuous>  pantoprazole  Injectable 40 milliGRAM(s) IV Push daily  polymyxin B IVPB 707125 Unit(s) IV Intermittent every 12 hours  potassium chloride   Powder 40 milliEquivalent(s) Oral daily  propofol Infusion 0.095 MICROgram(s)/kG/Min (0.04 mL/Hr) IV Continuous <Continuous>  QUEtiapine 100 milliGRAM(s) Oral two times a day  scopolamine 1 mG/72 Hr(s) Patch 1 Patch Transdermal every 72 hours  spironolactone 100 milliGRAM(s) Oral daily  thiamine 100 milliGRAM(s) Oral daily  vancomycin    Solution 125 milliGRAM(s) Oral every 12 hours    PRN Meds:  ALBUTerol    90 MICROgram(s) HFA Inhaler 1 Puff(s) Inhalation every 4 hours PRN Shortness of Breath and/or Wheezing  cloNIDine 0.1 milliGRAM(s) Oral every 6 hours PRN opiate withdrawal  hydrOXYzine hydrochloride 50 milliGRAM(s) Oral every 6 hours PRN Anxiety  ibuprofen  Tablet. 400 milliGRAM(s) Oral every 6 hours PRN Mild Pain (1 - 3)  sodium chloride 0.9% lock flush 10 milliLiter(s) IV Push every 1 hour PRN Pre/post blood products, medications, blood draw, and to maintain line patency      Vital Signs:   T(F): 96.5 (07-22-22 @ 11:00), Max: 99.3 (07-21-22 @ 23:00)  HR: 79 (07-22-22 @ 11:00) (58 - 84)  BP: 169/113 (07-22-22 @ 11:00) (114/81 - 169/113)  RR: 15 (07-22-22 @ 11:00) (15 - 26)  SpO2: 98% (07-22-22 @ 11:00) (98% - 100%)    Physical Exam:   GENERAL: NAD, Resting in bed  HEENT: NCAT  CHEST/LUNG: Clear to auscultation bilaterally; No wheezing or rubs.   HEART: Regular rate and rhythm; No murmurs, rubs, or gallops  ABDOMEN: Bowel sounds present; Soft, Nontender, Nondistended.   EXTREMITIES:  No clubbing, cyanosis, or edema  NERVOUS SYSTEM:  Alert & Oriented X3    FLUID BALANCE    07-20-22 @ 07:01  -  07-21-22 @ 07:00  --------------------------------------------------------  IN: 2148 mL / OUT: 2605 mL / NET: -457 mL    07-21-22 @ 07:01  -  07-22-22 @ 07:00  --------------------------------------------------------  IN: 2267 mL / OUT: 4000 mL / NET: -1733 mL    07-22-22 @ 07:01  -  07-22-22 @ 11:23  --------------------------------------------------------  IN: 84.8 mL / OUT: 300 mL / NET: -215.2 mL        Labs:                         9.2    1.60  )-----------( 118      ( 22 Jul 2022 06:01 )             28.7     Neutophil% 55.7, Lymphocyte% 38.1, Monocyte% 5.6, Bands% 0.6 07-22-22 @ 06:01    22 Jul 2022 06:01    140    |  105    |  14     ----------------------------<  84     3.5     |  21     |  0.9      Ca    8.9        22 Jul 2022 06:01  Phos  5.4       22 Jul 2022 06:01  Mg     1.5       22 Jul 2022 06:01    TPro  5.1    /  Alb  2.8    /  TBili  0.4    /  DBili  x      /  AST  24     /  ALT  14     /  AlkPhos  248    22 Jul 2022 06:01                            Procalcitonin, Serum: 0.21 ng/mL (07-17-22 @ 10:34)            Radiology:

## 2022-07-22 NOTE — PROGRESS NOTE ADULT - ASSESSMENT
Acute respiratory failure with hypoxemia / aspiration pneumonia with sepsis / suspected drug overdose / head laceration s/p fall in lobby / possible seizure / anasarca  pancytopenia      - c-diff colitis - continue oral vanco   - w/u and antibiotics as per ID    - s/p BMB - heme f/u    -  supplement  hypokalemia and  hypomagnesemia    - s/p   trach   - slow weaning trial

## 2022-07-22 NOTE — PROGRESS NOTE ADULT - SUBJECTIVE AND OBJECTIVE BOX
ROXI POOJA  26y, Female  Allergy: buprenorphine (Rash)  Suboxone (Rash)      LOS  37d    CHIEF COMPLAINT: anasarca (22 Jul 2022 11:23)      INTERVAL EVENTS/HPI  - No acute events overnight  - T(F): , Max: 99.3 (07-21-22 @ 23:00)  - denies abdominal pain, but feels distension/discomfort   - WBC Count: 1.60 (07-22-22 @ 06:01)  WBC Count: 1.96 (07-21-22 @ 06:07)     - Creatinine, Serum: 0.9 (07-22-22 @ 06:01)  Creatinine, Serum: 0.9 (07-21-22 @ 06:07)       ROS  General: Denies rigors, nightsweats  HEENT: Denies headache, rhinorrhea, sore throat, eye pain  CV: Denies CP, palpitations  PULM: Denies wheezing, hemoptysis  GI: Denies hematemesis, hematochezia, melena  : Denies discharge, hematuria  MSK: Denies arthralgias, myalgias  SKIN: Denies rash, lesions  NEURO: Denies paresthesias, weakness  PSYCH: Denies depression, anxiety    VITALS:  T(F): 96.5, Max: 99.3 (07-21-22 @ 23:00)  HR: 73  BP: 130/86  RR: 27Vital Signs Last 24 Hrs  T(C): 35.8 (22 Jul 2022 11:00), Max: 37.4 (21 Jul 2022 23:00)  T(F): 96.5 (22 Jul 2022 11:00), Max: 99.3 (21 Jul 2022 23:00)  HR: 73 (22 Jul 2022 13:30) (58 - 86)  BP: 130/86 (22 Jul 2022 13:30) (114/81 - 169/113)  BP(mean): 103 (22 Jul 2022 13:30) (94 - 136)  RR: 27 (22 Jul 2022 13:30) (12 - 27)  SpO2: 99% (22 Jul 2022 13:30) (97% - 100%)    Parameters below as of 22 Jul 2022 08:00  Patient On (Oxygen Delivery Method): ventilator    O2 Concentration (%): 35    PHYSICAL EXAM:  Gen: vent/trach   HEENT: Normocephalic, atraumatic  Neck: supple, no lymphadenopathy  CV: Regular rate & regular rhythm  Lungs: decreased BS at bases, no fremitus  Abdomen: Soft, BS present; distended  Ext: Warm, well perfused  Neuro: non focal, awake  Skin: no rash, no erythema  Lines: no phlebitis    FH: Non-contributory  Social Hx: Non-contributory    TESTS & MEASUREMENTS:                        9.2    1.60  )-----------( 118      ( 22 Jul 2022 06:01 )             28.7     07-22    140  |  105  |  14  ----------------------------<  84  3.5   |  21  |  0.9    Ca    8.9      22 Jul 2022 06:01  Phos  5.4     07-22  Mg     1.5     07-22    TPro  5.1<L>  /  Alb  2.8<L>  /  TBili  0.4  /  DBili  x   /  AST  24  /  ALT  14  /  AlkPhos  248<H>  07-22      LIVER FUNCTIONS - ( 22 Jul 2022 06:01 )  Alb: 2.8 g/dL / Pro: 5.1 g/dL / ALK PHOS: 248 U/L / ALT: 14 U/L / AST: 24 U/L / GGT: x               Culture - Sputum (collected 07-15-22 @ 10:00)  Source: Trach Asp Tracheal Aspirate  Gram Stain (07-16-22 @ 04:22):    Moderate polymorphonuclear leukocytes per low power field    Rare Squamous epithelial cells per low power field    No organisms seen per oil power field  Final Report (07-17-22 @ 16:58):    Normal Respiratory Kelly present    Babesia microti PCR, Blood. (collected 07-10-22 @ 15:43)  Source: .Blood None  Final Report (07-11-22 @ 09:45):    Babesia microti PCR    Results: NOT detected    ***************Result Note*************    The detection of Babesia microti by PCR has only been    validated for whole blood; this test has not been approved    by the [place] Food and Drug Administration (FDA). Performance    characteristics of this assay have been determined by    [employer]. The clinical significance    of results should be considered in conjunction with the    overall clinical presentation of the patient. Result is not    intended to be used as the sole means for clinical diagnosis    or patient management decisions.    One negative sample does not necessarily rule    out the presence of a parasitic infection.    Culture - Blood (collected 07-08-22 @ 05:26)  Source: .Blood None  Final Report (07-13-22 @ 19:00):    No Growth Final    Culture - Blood (collected 07-06-22 @ 07:20)  Source: .Blood None  Final Report (07-11-22 @ 19:00):    No Growth Final    Culture - Blood (collected 07-06-22 @ 07:20)  Source: .Blood None  Final Report (07-11-22 @ 19:00):    No Growth Final    Culture - Sputum (collected 07-04-22 @ 13:39)  Source: ET Tube ET Tube  Gram Stain (07-05-22 @ 08:48):    Few polymorphonuclear leukocytes per low power field    No Squamous epithelial cells per low power field    Numerous Gram Negative Coccobacilli seen per oil power field  Final Report (07-11-22 @ 08:12):    Moderate Acinetobacter baumannii/nosocom group (Carbapenem Resistant)    Cefiderocol interpretations based on FDA breakpoints.    Normal Respiratory Kelly absent  Organism: Acinetobacter baumannii/nosocom group (Carbapenem Resistant)  Acinetobacter baumannii/nosocom group (Carbapenem Resistant) (07-11-22 @ 08:12)  Organism: Acinetobacter baumannii/nosocom group (Carbapenem Resistant) (07-11-22 @ 08:12)      -  Cefiderocol: I      -  Imipenem: R      -  Piperacillin/Tazobactam: R      Method Type: KB  Organism: Acinetobacter baumannii/nosocom group (Carbapenem Resistant) (07-11-22 @ 08:12)      -  Amikacin: R >32      -  Ampicillin/Sulbactam: R >16/8      -  Cefepime: R >16      -  Ceftazidime: R >16      -  Ciprofloxacin: R >2      -  Gentamicin: R >8      -  Levofloxacin: R >4      -  Meropenem: R >8      -  Tobramycin: R >8      -  Trimethoprim/Sulfamethoxazole: R >2/38      Method Type: AL    Culture - Blood (collected 06-29-22 @ 20:31)  Source: .Blood Blood-Peripheral  Final Report (07-05-22 @ 20:00):    No Growth Final    Culture - Blood (collected 06-29-22 @ 20:31)  Source: .Blood Blood  Final Report (07-05-22 @ 20:00):    No Growth Final    Culture - Blood (collected 06-28-22 @ 06:00)  Source: .Blood Blood  Final Report (07-03-22 @ 23:00):    No Growth Final    Culture - Sputum (collected 06-27-22 @ 14:00)  Source: Trach Asp Tracheal Aspirate  Gram Stain (06-28-22 @ 03:15):    Moderate polymorphonuclear leukocytes per low power field    Few Squamous epithelial cells per low power field    Moderate Gram positive cocci in pairs seen per oil power field    Few Gram Variable Rods seen per oil power field  Final Report (06-30-22 @ 16:33):    Numerous Mixed gram negative rods    Moderate Staphylococcus aureus    Normal Respiratory Kelly present  Organism: Staphylococcus aureus (06-30-22 @ 16:33)  Organism: Staphylococcus aureus (06-30-22 @ 16:33)      -  Ampicillin/Sulbactam: S <=8/4      -  Cefazolin: S <=4      -  Clindamycin: S <=0.25      -  Erythromycin: S <=0.25      -  Gentamicin: S <=1 Should not be used as monotherapy      -  Oxacillin: S <=0.25 Oxacillin predicts susceptibility for dicloxacillin, methicillin, and nafcillin      -  Rifampin: S <=1 Should not be used as monotherapy      -  Tetra/Doxy: S <=1      -  Trimethoprim/Sulfamethoxazole: S <=0.5/9.5      -  Vancomycin: S 2      Method Type: AL    Culture - Sputum (collected 06-24-22 @ 17:20)  Source: Trach Asp Tracheal Aspirate  Gram Stain (06-25-22 @ 06:29):    Few polymorphonuclear leukocytes per low power field    Rare Squamous epithelial cells per low power field    Moderate Gram Negative Rods seen per oil power field  Final Report (06-26-22 @ 17:00):    Moderate Klebsiella oxytoca/Raoutella ornithinolytica    Moderate Enterobacter cloacae complex    Normal Respiratory Kelly absent  Organism: Klebsiella oxytoca /Raoutella ornithinolytica  Enterobacter cloacae complex (06-26-22 @ 17:00)  Organism: Enterobacter cloacae complex (06-26-22 @ 17:00)      -  Amikacin: S <=16      -  Amoxicillin/Clavulanic Acid: R >16/8      -  Ampicillin: R >16 These ampicillin results predict results for amoxicillin      -  Ampicillin/Sulbactam: R >16/8 Enterobacter, Klebsiella aerogenes, Citrobacter, and Serratia may develop resistance during prolonged therapy (3-4 days)      -  Aztreonam: S <=4      -  Cefazolin: R >16 Enterobacter, Klebsiella aerogenes, Citrobacter, and Serratia may develop resistance during prolonged therapy (3-4 days)      -  Cefepime: S <=2      -  Cefoxitin: R >16      -  Ceftriaxone: S <=1 Enterobacter, Klebsiella aerogenes, Citrobacter, and Serratia may develop resistance during prolonged therapy      -  Ciprofloxacin: S <=0.25      -  Ertapenem: S <=0.5      -  Gentamicin: S <=2      -  Imipenem: S <=1      -  Levofloxacin: S <=0.5      -  Meropenem: S <=1      -  Piperacillin/Tazobactam: S <=8      -  Tobramycin: S <=2      -  Trimethoprim/Sulfamethoxazole: S <=0.5/9.5      Method Type: AL  Organism: Klebsiella oxytoca /Raoutella ornithinolytica (06-26-22 @ 17:00)      -  Amikacin: S <=16      -  Amoxicillin/Clavulanic Acid: S <=8/4      -  Ampicillin: R 16 These ampicillin results predict results for amoxicillin      -  Ampicillin/Sulbactam: S <=4/2 Enterobacter, Klebsiella aerogenes, Citrobacter, and Serratia may develop resistance during prolonged therapy (3-4 days)      -  Aztreonam: S <=4      -  Cefazolin: S <=2 Enterobacter, Klebsiella aerogenes, Citrobacter, and Serratia may develop resistance during prolonged therapy (3-4 days)      -  Cefepime: S <=2      -  Cefoxitin: S <=8      -  Ceftriaxone: S <=1 Enterobacter, Klebsiella aerogenes, Citrobacter, and Serratia may develop resistance during prolonged therapy      -  Ciprofloxacin: S <=0.25      -  Ertapenem: S <=0.5      -  Gentamicin: S <=2      -  Imipenem: S <=1      -  Levofloxacin: S <=0.5      -  Meropenem: S <=1      -  Piperacillin/Tazobactam: S <=8      -  Tobramycin: S <=2      -  Trimethoprim/Sulfamethoxazole: S <=0.5/9.5      Method Type: AL            INFECTIOUS DISEASES TESTING  COVID-19 PCR: NotDetec (07-19-22 @ 19:56)  COVID-19 PCR: NotDetec (07-17-22 @ 20:01)  Procalcitonin, Serum: 0.21 (07-17-22 @ 10:34)  COVID-19 PCR: NotDetec (07-14-22 @ 15:00)  Procalcitonin, Serum: 0.16 (07-14-22 @ 12:50)  Procalcitonin, Serum: 0.12 (07-10-22 @ 05:42)  Fungitell: <31 (07-05-22 @ 11:49)  Procalcitonin, Serum: 0.14 (07-05-22 @ 11:23)  MRSA PCR Result.: Negative (06-30-22 @ 10:34)  Fungitell: See Comment** **RESULTS OF FUNGITELL INCONCLUSIVE DUE TO THE PRESENCE OF UNKNOWN  INTERFERING SUBSTANCE. PLEASE SUBMIT ANOTHER SPECIMEN FOR TESTING.  PLEASE CONTACT [facility] WITH QUESTIONS.  Interpretation: The Fungitell assay does not detectcertain fungal  species such as the genus Cryptococcus (Aundrea et al. 1991) which  produces very low levels of (1-3)-Beta-D-Glucan. The assay also does  not detect the Zygomycetes such as Absidia, Mucor and Rhizopus  (Carol et al. 1994) which are not known to produce  (1-3)-Beta-D-Glucan. In addition, the yeast phase of Blastomyces  dermatitidis produces little (1-3)-Beta-D-Glucan and may not be  detected by the assay (Anila et al. 2007).  Reference Range:  Less than 60 pg/mL. Glucan values of less than 60 pg/mL are  interpreted as negative.  Glucan values of 60 to 79 pg/mL are interpreted as indeterminate,  and suggest a possible fungal infection. Additional sampling and  testing of sera is required to interpret the results.  Glucan values of greater than or equal to 80 pg/mL are interpreted  as positive.  Due to the potential for environmental contamination when  transferred to pour-off tubes, which can lead to false positive  results, interpret positive results from samples provided in  pour-off tubes with caution.  Results should be used in conjunction  with clinical findings, and should not form the sole basis for a  diagnosis or treatment decision. The Fungitell test is approved or  cleared for in vitro diagnostic use by the [place] Food and Drug  Administration. Modifications to the approved package insert have  been made and the performance characteristics for these  modifications were determined by [facility].  If sample result is greater than 500 pg/mL, physician may order a  titer of the sample. Please contact [facility] if you would  like to order a retest of this sample to obtain an actual value.  Samples are held for 1 week after initial testing date.  ____________________________________________________________  Performed at:  [facility]  [address] [place] [address]  : Kirk Newberry Ph.D., BCLD (ABB)  CLIA#: [id number]  Phone: [phone] (06-30-22 @ 10:30)  Procalcitonin, Serum: 0.36 (06-30-22 @ 10:30)  Rapid RVP Result: NotDetec (06-30-22 @ 09:19)  HIV-1/2 Combo Result: Nonreact (06-29-22 @ 10:29)  Procalcitonin, Serum: 0.11 (06-27-22 @ 15:46)  Procalcitonin, Serum: 0.11 (06-27-22 @ 06:47)  Procalcitonin, Serum: 0.62 (06-20-22 @ 05:49)  MRSA PCR Result.: Negative (06-17-22 @ 14:30)  Procalcitonin, Serum: 3.28 (06-17-22 @ 05:22)  COVID-19 PCR: NotDetec (06-15-22 @ 07:10)  Procalcitonin, Serum: 0.07 (04-20-22 @ 12:46)      INFLAMMATORY MARKERS  Sedimentation Rate, Erythrocyte: 86 mm/Hr (06-30-22 @ 05:46)  Sedimentation Rate, Erythrocyte: 65 mm/Hr (06-23-22 @ 16:00)      RADIOLOGY & ADDITIONAL TESTS:  I have personally reviewed the last available Chest xray  CXR  Xray Chest 1 View- PORTABLE-Urgent:   ACC: [id number] EXAM:  XR CHEST PORTABLE URGENT 1V                          PROCEDURE DATE:  07/20/2022          INTERPRETATION:  Clinical History / Reason for exam: Tracheostomy    Comparison : Chest radiograph performed earlier the same day.    Technique/Positioning: AP chest.    Findings:    Support devices: He is a new tracheostomy tube which is in midline.   Nasogastric tube has been removed. There is a right IJ catheter tip in   the region of proximal superior vena cava.    Cardiac/mediastinum/hilum: Heart enlarged.    Lung parenchyma/Pleura: Bilateral lung opacities again noted.    Skeleton/soft tissues: No change    Impression:    Bilateral lung opacities, unchanged. Cardiomegaly.        --- End of Report ---            CALEB COLEMAN MD; Attending Radiologist  This document has been electronically signed. Jul 21 2022 10:53AM (07-20-22 @ 19:25)      CT      CARDIOLOGY TESTING  12 Lead ECG:   Ventricular Rate 77 BPM    Atrial Rate 77 BPM    P-R Interval 182 ms    QRS Duration 82 ms    Q-T Interval 442 ms    QTC Calculation(Bazett) 500 ms    P Axis 80 degrees    R Axis 127 degrees    T Axis 158 degrees    Diagnosis Line Normal sinus rhythm  Right axis deviation  Possible Right ventricular hypertrophy  Nonspecific ST and T wave abnormality  Abnormal ECG    Confirmed by BRANDON BELL MD ([id number]) on 7/22/2022 11:06:33 AM (07-22-22 @ 09:38)  12 Lead ECG:   Ventricular Rate 73 BPM    Atrial Rate 73 BPM    P-R Interval 176 ms    QRS Duration 84 ms    Q-T Interval 482 ms    QTC Calculation(Bazett) 531 ms    P Axis 70 degrees    R Axis 116 degrees    T Axis 153 degrees    Diagnosis Line Normal sinus rhythm  Right axis deviation  Possible Right ventricular hypertrophy  Nonspecific T wave abnormality  Abnormal ECG    Confirmed by BRANDON BELL MD ([id number]) on 7/21/2022 11:32:44 AM (07-21-22 @ 09:07)      MEDICATIONS  ampicillin/sulbactam  IVPB 3 IV Intermittent every 6 hours  BACItracin   Ointment 1 Topical two times a day  chlorhexidine 0.12% Liquid 15 Oral Mucosa every 12 hours  chlorhexidine 2% Cloths 1 Topical daily  clonazePAM  Tablet 1 Oral every 8 hours  dexMEDEtomidine Infusion 0.1 IV Continuous <Continuous>  enoxaparin Injectable 40 SubCutaneous every 24 hours  fentaNYL   Infusion. 0.5 IV Continuous <Continuous>  folic acid 1 Oral daily  methadone    Tablet 20 Oral daily  nicotine -  14 mG/24Hr(s) Patch 1 Transdermal daily  norepinephrine Infusion 0.05 IV Continuous <Continuous>  pantoprazole  Injectable 40 IV Push daily  polymyxin B IVPB [id number] IV Intermittent every 12 hours  propofol Infusion 0.095 IV Continuous <Continuous>  QUEtiapine 100 Oral two times a day  scopolamine 1 mG/72 Hr(s) Patch 1 Transdermal every 72 hours  spironolactone 100 Oral daily  thiamine 100 Oral daily  vancomycin    Solution 125 Oral every 12 hours      WEIGHT  Weight (kg): 70.4 (07-20-22 @ 16:41)  Creatinine, Serum: 0.9 mg/dL (07-22-22 @ 06:01)      ANTIBIOTICS:  ampicillin/sulbactam  IVPB 3 Gram(s) IV Intermittent every 6 hours  polymyxin B IVPB [id number] Unit(s) IV Intermittent every 12 hours  vancomycin    Solution 125 milliGRAM(s) Oral every 12 hours      All available historical records have been reviewed

## 2022-07-22 NOTE — PROGRESS NOTE ADULT - SUBJECTIVE AND OBJECTIVE BOX
Patient seen and evaluated this am, sedated on Precedex and Fentanyl      T(F): 96.5 (07-22-22 @ 11:00), Max: 99.3 (07-21-22 @ 23:00)  HR: 74 (07-22-22 @ 14:12)  BP: 130/86 (07-22-22 @ 13:30)  RR: 20  SpO2: 99% (07-22-22 @ 14:12) (97% - 100%)    PHYSICAL EXAM:  GENERAL: NAD  HEAD:  Atraumatic, Normocephalic  EYES: EOMI, PERRLA, conjunctiva and sclera clear  NERVOUS SYSTEM:  no focal deficits   CHEST/LUNG:  bilateral, rhonchi  HEART: Regular rate and rhythm; No murmurs, rubs, or gallops  ABDOMEN: Soft, Nontender, mildly distended  EXTREMITIES:  b/l  edema    LABS  07-22    140  |  105  |  14  ----------------------------<  84  3.5   |  21  |  0.9    Ca    8.9      22 Jul 2022 06:01  Phos  5.4     07-22  Mg     1.5     07-22    TPro  5.1<L>  /  Alb  2.8<L>  /  TBili  0.4  /  DBili  x   /  AST  24  /  ALT  14  /  AlkPhos  248<H>  07-22                          9.2    1.60  )-----------( 118      ( 22 Jul 2022 06:01 )             28.7       Mode: AC/ CMV (Assist Control/ Continuous Mandatory Ventilation)  RR (machine): 25  TV (machine): 370  FiO2: 35  PEEP: 5      Culture Results:   Normal Respiratory Kelly present (07-15-22)  Culture Results:   Babesia microti PCR  Results: NOT detected  ***************Result Note*************  The detection of Babesia microti by PCR has only been  validated for whole blood; this test has not been approved  by the [place] Food and Drug Administration (FDA). Performance  characteristics of this assay have been determined by  [employer]. The clinical significance  of results should be considered in conjunction with the  overall clinical presentation of the patient. Result is not  intended to be used as the sole means for clinical diagnosis  or patient management decisions.  One negative sample does not necessarily rule  out the presence of a parasitic infection. (07-10-22)  Culture Results:   No Growth Final (07-08-22)  Culture Results:   No Growth Final (07-06-22)  Culture Results:   No Growth Final (07-06-22)  Culture Results:   Moderate Acinetobacter baumannii/nosocom group (Carbapenem Resistant)  Cefiderocol interpretations based on FDA breakpoints.  Normal Respiratory Kelly absent (07-04-22)  Culture Results:   No Growth Final (06-29-22)  Culture Results:   No Growth Final (06-29-22)  Culture Results:   No Growth Final (06-28-22)  Culture Results:   Numerous Mixed gram negative rods  Moderate Staphylococcus aureus  Normal Respiratory Kelly present (06-27-22)    RADIOLOGY  < from: Xray Chest 1 View- PORTABLE-Routine (Xray Chest 1 View- PORTABLE-Routine in AM.) (07.22.22 @ 07:14) >    Lung parenchyma/Pleura: Bilateral opacities and effusions similar to   prior. No pneumothorax.    < end of copied text >    MEDICATIONS  (STANDING):  ampicillin/sulbactam  IVPB 3 Gram(s) IV Intermittent every 6 hours  BACItracin   Ointment 1 Application(s) Topical two times a day  chlorhexidine 0.12% Liquid 15 milliLiter(s) Oral Mucosa every 12 hours  chlorhexidine 2% Cloths 1 Application(s) Topical daily  clonazePAM  Tablet 1 milliGRAM(s) Oral every 8 hours  dexMEDEtomidine Infusion 0.1 MICROgram(s)/kG/Hr (1.76 mL/Hr) IV Continuous <Continuous>  enoxaparin Injectable 40 milliGRAM(s) SubCutaneous every 24 hours  fentaNYL   Infusion. 0.5 MICROgram(s)/kG/Hr (3.52 mL/Hr) IV Continuous <Continuous>  folic acid 1 milliGRAM(s) Oral daily  methadone    Tablet 20 milliGRAM(s) Oral daily  nicotine -  14 mG/24Hr(s) Patch 1 patch Transdermal daily  norepinephrine Infusion 0.05 MICROgram(s)/kG/Min (8.03 mL/Hr) IV Continuous <Continuous>  pantoprazole  Injectable 40 milliGRAM(s) IV Push daily  polymyxin B IVPB [id number] Unit(s) IV Intermittent every 12 hours  propofol Infusion 0.095 MICROgram(s)/kG/Min (0.04 mL/Hr) IV Continuous <Continuous>  QUEtiapine 100 milliGRAM(s) Oral two times a day  scopolamine 1 mG/72 Hr(s) Patch 1 Patch Transdermal every 72 hours  spironolactone 100 milliGRAM(s) Oral daily  thiamine 100 milliGRAM(s) Oral daily  vancomycin    Solution 125 milliGRAM(s) Oral every 12 hours    MEDICATIONS  (PRN):  ALBUTerol    90 MICROgram(s) HFA Inhaler 1 Puff(s) Inhalation every 4 hours PRN Shortness of Breath and/or Wheezing  cloNIDine 0.1 milliGRAM(s) Oral every 6 hours PRN opiate withdrawal  hydrOXYzine hydrochloride 50 milliGRAM(s) Oral every 6 hours PRN Anxiety  ibuprofen  Tablet. 400 milliGRAM(s) Oral every 6 hours PRN Mild Pain (1 - 3)  sodium chloride 0.9% lock flush 10 milliLiter(s) IV Push every 1 hour PRN Pre/post blood products, medications, blood draw, and to maintain line patency

## 2022-07-22 NOTE — PROGRESS NOTE ADULT - ASSESSMENT
IMPRESSION:  Acute respiratory failure sp trach  PNA  MSSA pna  seizure like activity  drug over dose/ withdrawal opoid   liver cirrhosis   Ascites sp paracentesis   Pancytopenia- worsening  RENETTA improved  C diff +ve     PLAN:    CNS: Continue with Methadone for withdrawal Dc fentanyl and propofol this morning; continue with precedex and decrease the dose gradually; continue with seroquel and clonazepam; Check QTc daily.      HEENT: oral care; Trach care     PULMONARY:    CXR unchnaged. ABG reviewed  keep off L side in bed develops atelectasis  pressure support     CARDIOVASCULAR: Keep MAP more than 65mmhg; not on pressors currently keep equal balance. On oral diuretics.     GI: GI prophylaxis. Hold OGT feeding for trach.   Do a bedside US of the abdomen. If large ascites then will need therapeutic paracentesis.     RENAL: Correct K ot mo re than 4 and Mg to more than 2. DC Free water pushes.     INFECTIOUS DISEASE: Currently on PO vancomycin, Unasyn, Polymixin. Monitor kidney function and electrolytes. ID following. DTA culture so far negative. Recheck procalcitonin.     HEMATOLOGICAL:   s/p BMB follow result and cx. Hematology following. Monitor platelet count. Keep hg more than 7.     ENDOCRINE:  Follow up FS.  Insulin protocol if needed.     Musk: bedrest for now. PT rehab evaluation.     Dispo: Remains critically and ill needs ICU care     TLC 7/6 RIJ;

## 2022-07-22 NOTE — PROGRESS NOTE ADULT - ASSESSMENT
Patient is a 26 year old female with hx of asthma, untreated Hep C, IVDA, anasarca presenting with increased dyspnea since yesterday    IMPRESSION  #Acute respiratory failure s/p intubation 6/16, extubated 7/7, reintubated     #VAP  - Xray Chest 1 View- PORTABLE-Routine (Xray Chest 1 View- PORTABLE-Routine in AM.) (07.03.22 @ 06:10): Increasing right basilar opacity.  - sputum Cx 7/4 MDR Acinetobacter baumanii     # C.difficille infection - 7/5/22    #Pneumonia -   - CT Chest 6/22 - left greater than right lower lobe consolidations   - sputum Cx 6/24 Klebsiella oxytoca, Enterobacter cloacae complex- The Sputum culture from 6/27 grew Numerous Mixed gram negative rods and Moderate Staphylococcus aureus.  - treated with course of cefepime     #FUO + Pancytopenia + Splenomegaly   - Ferritin, Serum: 94 ng/mL (06.27.22 @ 06:47),  Procalcitonin, Serum: 0.11 (06.27.22 @ 15:46)  - CT Abd/pelvis 6/26 - moderated ascites moderate pericardial effusion   - EBV seroligies consistent with old infection   - CMV IgG +, IgM - (07.10.22 @ 15:43)  - bartonella-ab negative   - Hepatosplenomegaly - present since CT Abd/pelvis 1/30/2021  - HIV-1/2 Combo Result: Nonreact:  (06.29.22 @ 10:29)  - Sedimentation Rate, Erythrocyte: 86 mm/Hr (06.30.22 @ 05:46)  - Autoimmune panel negative   - Quantiferon TB Plus: NEGATIVE (06.04.20 @ 10:28)  - Quantiferon TB Plus: INDETERMINATE. (02.06.21 @ 09:00)  - s/p Bone marrow biopsy 7/14 - Flow negative       #Drug overdose vs withdrawal?  #Hx of Untreated Hep C   #IVDA   #Abx allergy: buprenorphine (Rash), Suboxone (Rash)    Recommendations  - continue polymyxin 05895 U/kg q 12 hours and unasyn 3g q 6 hours for Acinetobacter VAP (start date 7/15) -- will plan at least 10 days of antibiotics   - planned for possible para  - continue PO vancomycin 125 mg BID for prophylaxis while on systemic antibiotics   - monitor creatinine  - vent management per primary       Please call or message on [employer] Teams if with any questions.  Spectra [phone]

## 2022-07-22 NOTE — PROGRESS NOTE ADULT - SUBJECTIVE AND OBJECTIVE BOX
Patient is a 26y old  Female who presents with a chief complaint of anasarca (21 Jul 2022 19:15)        Over Night Events: pt remains on  Fentanyl and precedex         ROS:     All ROS are negative except HPI         PHYSICAL EXAM    ICU Vital Signs Last 24 Hrs  T(C): 36 (22 Jul 2022 04:00), Max: 37.4 (21 Jul 2022 23:00)  T(F): 96.8 (22 Jul 2022 04:00), Max: 99.3 (21 Jul 2022 23:00)  HR: 64 (22 Jul 2022 08:00) (58 - 84)  BP: 114/81 (22 Jul 2022 08:00) (114/81 - 167/113)  BP(mean): 94 (22 Jul 2022 08:00) (94 - 136)  RR: 25 (22 Jul 2022 09:00) (20 - 26)  SpO2: 100% (22 Jul 2022 08:00) (99% - 100%)    O2 Parameters below as of 22 Jul 2022 08:00  Patient On (Oxygen Delivery Method): ventilator    O2 Concentration (%): 35        CONSTITUTIONAL:  Well nourished.  NAD    ENT:   Airway patent,   Mouth with normal mucosa.   trach       CARDIAC:   Normal rate,   Regular rhythm.        RESPIRATORY:   No wheezing  Bilateral BS  Normal chest expansion  Not tachypneic,  No use of accessory muscles    GASTROINTESTINAL:  Abdomen soft,   Non-tender,   No guarding,   + BS    NEUROLOGICAL:   Alert and oriented   No motor  deficits.    SKIN:   DTI           07-21-22 @ 07:01  -  07-22-22 @ 07:00  --------------------------------------------------------  IN:    Dexmedetomidine: 647 mL    FentaNYL: 400 mL    IV PiggyBack: 300 mL    IV PiggyBack: 500 mL    Oral Fluid: 400 mL    Propofol: 20 mL  Total IN: 2267 mL    OUT:    Indwelling Catheter - Urethral (mL): 4000 mL  Total OUT: 4000 mL    Total NET: -1733 mL      07-22-22 @ 07:01  -  07-22-22 @ 09:23  --------------------------------------------------------  IN:  Total IN: 0 mL    OUT:    Indwelling Catheter - Urethral (mL): 225 mL  Total OUT: 225 mL    Total NET: -225 mL          LABS:                            9.2    1.60  )-----------( 118      ( 22 Jul 2022 06:01 )             28.7                                               07-22    140  |  105  |  14  ----------------------------<  84  3.5   |  21  |  0.9    Ca    8.9      22 Jul 2022 06:01  Phos  5.4     07-22  Mg     1.5     07-22    TPro  5.1<L>  /  Alb  2.8<L>  /  TBili  0.4  /  DBili  x   /  AST  24  /  ALT  14  /  AlkPhos  248<H>  07-22                                                                                           LIVER FUNCTIONS - ( 22 Jul 2022 06:01 )  Alb: 2.8 g/dL / Pro: 5.1 g/dL / ALK PHOS: 248 U/L / ALT: 14 U/L / AST: 24 U/L / GGT: x                                                                                               Mode: AC/ CMV (Assist Control/ Continuous Mandatory Ventilation)  RR (machine): 25  TV (machine): 370  FiO2: 35  PEEP: 5  MAP: 10  PIP: 20                                      ABG - ( 22 Jul 2022 04:16 )  pH, Arterial: 7.39  pH, Blood: x     /  pCO2: 33    /  pO2: 130   / HCO3: 20    / Base Excess: -4.4  /  SaO2: 99.8                MEDICATIONS  (STANDING):  ampicillin/sulbactam  IVPB 3 Gram(s) IV Intermittent every 6 hours  BACItracin   Ointment 1 Application(s) Topical two times a day  chlorhexidine 0.12% Liquid 15 milliLiter(s) Oral Mucosa every 12 hours  chlorhexidine 2% Cloths 1 Application(s) Topical daily  dexMEDEtomidine Infusion 0.1 MICROgram(s)/kG/Hr (1.76 mL/Hr) IV Continuous <Continuous>  fentaNYL   Infusion. 0.5 MICROgram(s)/kG/Hr (3.52 mL/Hr) IV Continuous <Continuous>  folic acid 1 milliGRAM(s) Oral daily  methadone    Tablet 20 milliGRAM(s) Oral daily  nicotine -  14 mG/24Hr(s) Patch 1 patch Transdermal daily  norepinephrine Infusion 0.05 MICROgram(s)/kG/Min (8.03 mL/Hr) IV Continuous <Continuous>  pantoprazole  Injectable 40 milliGRAM(s) IV Push daily  polymyxin B IVPB 043454 Unit(s) IV Intermittent every 12 hours  potassium chloride   Powder 40 milliEquivalent(s) Oral daily  propofol Infusion 0.095 MICROgram(s)/kG/Min (0.04 mL/Hr) IV Continuous <Continuous>  QUEtiapine 100 milliGRAM(s) Oral two times a day  scopolamine 1 mG/72 Hr(s) Patch 1 Patch Transdermal every 72 hours  spironolactone 100 milliGRAM(s) Oral daily  thiamine 100 milliGRAM(s) Oral daily  vancomycin    Solution 250 milliGRAM(s) Oral every 6 hours    MEDICATIONS  (PRN):  ALBUTerol    90 MICROgram(s) HFA Inhaler 1 Puff(s) Inhalation every 4 hours PRN Shortness of Breath and/or Wheezing  cloNIDine 0.1 milliGRAM(s) Oral every 6 hours PRN opiate withdrawal  hydrOXYzine hydrochloride 50 milliGRAM(s) Oral every 6 hours PRN Anxiety  ibuprofen  Tablet. 400 milliGRAM(s) Oral every 6 hours PRN Mild Pain (1 - 3)  sodium chloride 0.9% lock flush 10 milliLiter(s) IV Push every 1 hour PRN Pre/post blood products, medications, blood draw, and to maintain line patency           CXR interpreted by me:  CXR stable

## 2022-07-22 NOTE — PROGRESS NOTE ADULT - ASSESSMENT
Impression:  Acute respiratory failure sp intubation and extubation 7/8 followed by reintubation  PNA CT Chest 6/22 - left greater than right lower lobe consolidations; sputum Cx 6/24 Klebsiella oxytoca, Enterobacter cloacae complex- and Acinetobacter buamnii  MSSA pna; acetinobacter VAP  seizure like activity  ?? 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 Diff on oral vancomycin   Hypokalemia/hypomagnesemia      CNS:   c/w methadone   on fentanyl/precedex/propofol  Did not tolerate SBT 7/19    HEENT: oral care     PULMONARY:  HOB 45,  did not tolerate SBT on 7/18 (patient hypertensive/tachycardic)  - ID cleared patient for trach/no definite contraindications   - s/p trach 7/20    CARDIOVASCULAR:   goal MAP >65.  Monitor QTc daily on ECG  ECHO 7/6: Small to mod generalized effusion. No tamponade . Small mod effusion   present by report on echo 4/21/22  f/u cardiology recommendations; no urgency in diagnostic tap, future ROSLYN given acinetobacter and IVDA     GI:   GI prophylaxis.    laxative prn   speech and swallow f/u; currently in ice chips/water/meds  possible para today     RENAL: monitor lytes  D/C free water flushes     INFECTIOUS DISEASE:   worsening R infiltrate. Fungitell <31 7/5 , Glactomannan pending   Repeat procal 0.14 from 0.36 (6/30)   Acinetobacter buamnii in sputum cx -> switch cefdericol to  polymyxin 63589 U/kg q 12 hours and unasyn 3g q 6 hours for Acinetobacter VAP   - continue PO vancomycin 250 mg q 6 hours  - s/p bedside BM biopsy 7/14: negative   - f/u [facility] panel (ferritin, IL6, triglycerides soluble antigen) and heme/onc recommendations   - f/u ID recommendations     HEMATOLOGICAL:    monitor CBC  D-dimer 1322, HIT antibody negative x2    duplex negative 7/2/2022  - s/p bedside BM biopsy 7/14: negative  - hematopathology inconclusive?   - f/u [facility] panel (ferritin, IL6, triglycerides soluble antigen); f/u heme onc eventually   HIT ab negative; restart lovenox 40mg qd dvt ppx as platelets are stable at 100+    ENDOCRINE:  Follow up FS.  Insulin protocol if needed.     MICU  lines: right IJ 7/6, trach 7/20   Impression:  Acute respiratory failure sp intubation and extubation 7/8 followed by reintubation  PNA CT Chest 6/22 - left greater than right lower lobe consolidations; sputum Cx 6/24 Klebsiella oxytoca, Enterobacter cloacae complex- and Acinetobacter buamnii  MSSA pna; acetinobacter VAP  seizure like activity  ?? 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 Diff on oral vancomycin   Hypokalemia/hypomagnesemia      CNS:   c/w methadone   on fentanyl/precedex/propofol  Did not tolerate SBT 7/19    HEENT: oral care     PULMONARY:  HOB 45,  did not tolerate SBT on 7/18 (patient hypertensive/tachycardic)  - ID cleared patient for trach/no definite contraindications   - s/p trach 7/20    CARDIOVASCULAR:   goal MAP >65.  Monitor QTc daily on ECG  ECHO 7/6: Small to mod generalized effusion. No tamponade . Small mod effusion   present by report on echo 4/21/22  f/u cardiology recommendations; no urgency in diagnostic tap, future ROSLYN given acinetobacter and IVDA     GI:   GI prophylaxis.    laxative prn   speech and swallow f/u; currently in ice chips/water/meds  possible para today     RENAL: monitor lytes  D/C free water flushes     INFECTIOUS DISEASE:   worsening R infiltrate. Fungitell <31 7/5 , Glactomannan pending   Repeat procal 0.14 from 0.36 (6/30)   Acinetobacter buamnii in sputum cx -> switch cefdericol to  polymyxin 06032 U/kg q 12 hours and unasyn 3g q 6 hours for Acinetobacter VAP   - s/p bedside BM biopsy 7/14: negative   - f/u [facility] panel (ferritin, IL6, triglycerides soluble antigen) and heme/onc recommendations   - as per ID; decreased vancomycin po 125 bid; c/w unasyn + polymixin for total 10 days (end 7/25)    HEMATOLOGICAL:    monitor CBC  D-dimer 1322, HIT antibody negative x2    duplex negative 7/2/2022  - s/p bedside BM biopsy 7/14: negative  - hematopathology inconclusive?   - f/u [facility] panel (ferritin, IL6, triglycerides soluble antigen); f/u heme onc eventually   HIT ab negative; restart lovenox 40mg qd dvt ppx as platelets are stable at 100+    ENDOCRINE:  Follow up FS.  Insulin protocol if needed.     MICU  lines: right IJ 7/6, trach 7/20

## 2022-07-22 NOTE — PROGRESS NOTE ADULT - CRITICAL CARE SERVICES PROVIDED
Patient is critically ill, requiring critical care services.

## 2022-07-22 NOTE — SWALLOW BEDSIDE ASSESSMENT ADULT - COMMENTS
RRT stated Pt trialed CPAP this morning and at the time Pt reported difficulty breathing. RRT stated Pt trialed CPAP this morning and at the time Pt reported difficulty breathing.    Current Vent Settings as per RN: Tidal volume : 370, Respiratory rate: 25, PEEP 5, 35% FIO2

## 2022-07-23 LAB
ALBUMIN SERPL ELPH-MCNC: 2.5 G/DL — LOW (ref 3.5–5.2)
ALP SERPL-CCNC: 231 U/L — HIGH (ref 30–115)
ALT FLD-CCNC: 15 U/L — SIGNIFICANT CHANGE UP (ref 0–41)
ANION GAP SERPL CALC-SCNC: 12 MMOL/L — SIGNIFICANT CHANGE UP (ref 7–14)
ANION GAP SERPL CALC-SCNC: 13 MMOL/L — SIGNIFICANT CHANGE UP (ref 7–14)
AST SERPL-CCNC: 23 U/L — SIGNIFICANT CHANGE UP (ref 0–41)
BASOPHILS # BLD AUTO: 0.01 K/UL — SIGNIFICANT CHANGE UP (ref 0–0.2)
BASOPHILS NFR BLD AUTO: 0.6 % — SIGNIFICANT CHANGE UP (ref 0–1)
BILIRUB SERPL-MCNC: 0.5 MG/DL — SIGNIFICANT CHANGE UP (ref 0.2–1.2)
BUN SERPL-MCNC: 11 MG/DL — SIGNIFICANT CHANGE UP (ref 10–20)
BUN SERPL-MCNC: 12 MG/DL — SIGNIFICANT CHANGE UP (ref 10–20)
CALCIUM SERPL-MCNC: 8.4 MG/DL — LOW (ref 8.5–10.1)
CALCIUM SERPL-MCNC: 9 MG/DL — SIGNIFICANT CHANGE UP (ref 8.5–10.1)
CHLORIDE SERPL-SCNC: 103 MMOL/L — SIGNIFICANT CHANGE UP (ref 98–110)
CHLORIDE SERPL-SCNC: 104 MMOL/L — SIGNIFICANT CHANGE UP (ref 98–110)
CO2 SERPL-SCNC: 21 MMOL/L — SIGNIFICANT CHANGE UP (ref 17–32)
CO2 SERPL-SCNC: 23 MMOL/L — SIGNIFICANT CHANGE UP (ref 17–32)
CREAT SERPL-MCNC: 0.9 MG/DL — SIGNIFICANT CHANGE UP (ref 0.7–1.5)
CREAT SERPL-MCNC: 1 MG/DL — SIGNIFICANT CHANGE UP (ref 0.7–1.5)
EGFR: 79 ML/MIN/1.73M2 — SIGNIFICANT CHANGE UP
EGFR: 89 ML/MIN/1.73M2 — SIGNIFICANT CHANGE UP
EOSINOPHIL # BLD AUTO: 0 K/UL — SIGNIFICANT CHANGE UP (ref 0–0.7)
EOSINOPHIL NFR BLD AUTO: 0 % — SIGNIFICANT CHANGE UP (ref 0–8)
GLUCOSE SERPL-MCNC: 88 MG/DL — SIGNIFICANT CHANGE UP (ref 70–99)
GLUCOSE SERPL-MCNC: 90 MG/DL — SIGNIFICANT CHANGE UP (ref 70–99)
HCT VFR BLD CALC: 29.5 % — LOW (ref 37–47)
HGB BLD-MCNC: 9.2 G/DL — LOW (ref 12–16)
IMM GRANULOCYTES NFR BLD AUTO: 0.6 % — HIGH (ref 0.1–0.3)
LYMPHOCYTES # BLD AUTO: 0.51 K/UL — LOW (ref 1.2–3.4)
LYMPHOCYTES # BLD AUTO: 30.4 % — SIGNIFICANT CHANGE UP (ref 20.5–51.1)
MAGNESIUM SERPL-MCNC: 1.6 MG/DL — LOW (ref 1.8–2.4)
MCHC RBC-ENTMCNC: 26.7 PG — LOW (ref 27–31)
MCHC RBC-ENTMCNC: 31.2 G/DL — LOW (ref 32–37)
MCV RBC AUTO: 85.5 FL — SIGNIFICANT CHANGE UP (ref 81–99)
MONOCYTES # BLD AUTO: 0.08 K/UL — LOW (ref 0.1–0.6)
MONOCYTES NFR BLD AUTO: 4.8 % — SIGNIFICANT CHANGE UP (ref 1.7–9.3)
NEUTROPHILS # BLD AUTO: 1.07 K/UL — LOW (ref 1.4–6.5)
NEUTROPHILS NFR BLD AUTO: 63.6 % — SIGNIFICANT CHANGE UP (ref 42.2–75.2)
NRBC # BLD: 0 /100 WBCS — SIGNIFICANT CHANGE UP (ref 0–0)
PHOSPHATE SERPL-MCNC: 5.6 MG/DL — HIGH (ref 2.1–4.9)
PLATELET # BLD AUTO: 136 K/UL — SIGNIFICANT CHANGE UP (ref 130–400)
POTASSIUM SERPL-MCNC: 3 MMOL/L — LOW (ref 3.5–5)
POTASSIUM SERPL-MCNC: 3.3 MMOL/L — LOW (ref 3.5–5)
POTASSIUM SERPL-SCNC: 3 MMOL/L — LOW (ref 3.5–5)
POTASSIUM SERPL-SCNC: 3.3 MMOL/L — LOW (ref 3.5–5)
PROT SERPL-MCNC: 4.9 G/DL — LOW (ref 6–8)
RBC # BLD: 3.45 M/UL — LOW (ref 4.2–5.4)
RBC # FLD: 15.4 % — HIGH (ref 11.5–14.5)
SODIUM SERPL-SCNC: 137 MMOL/L — SIGNIFICANT CHANGE UP (ref 135–146)
SODIUM SERPL-SCNC: 139 MMOL/L — SIGNIFICANT CHANGE UP (ref 135–146)
WBC # BLD: 1.68 K/UL — LOW (ref 4.8–10.8)
WBC # FLD AUTO: 1.68 K/UL — LOW (ref 4.8–10.8)

## 2022-07-23 PROCEDURE — 71045 X-RAY EXAM CHEST 1 VIEW: CPT | Mod: 26

## 2022-07-23 PROCEDURE — 76705 ECHO EXAM OF ABDOMEN: CPT | Mod: 26

## 2022-07-23 PROCEDURE — 99233 SBSQ HOSP IP/OBS HIGH 50: CPT

## 2022-07-23 PROCEDURE — 74018 RADEX ABDOMEN 1 VIEW: CPT | Mod: 26

## 2022-07-23 RX ORDER — POTASSIUM CHLORIDE 20 MEQ
20 PACKET (EA) ORAL
Refills: 0 | Status: COMPLETED | OUTPATIENT
Start: 2022-07-23 | End: 2022-07-23

## 2022-07-23 RX ORDER — FUROSEMIDE 40 MG
40 TABLET ORAL ONCE
Refills: 0 | Status: COMPLETED | OUTPATIENT
Start: 2022-07-23 | End: 2022-07-23

## 2022-07-23 RX ORDER — MAGNESIUM SULFATE 500 MG/ML
2 VIAL (ML) INJECTION ONCE
Refills: 0 | Status: COMPLETED | OUTPATIENT
Start: 2022-07-23 | End: 2022-07-23

## 2022-07-23 RX ADMIN — Medication 1 MILLIGRAM(S): at 05:57

## 2022-07-23 RX ADMIN — AMPICILLIN SODIUM AND SULBACTAM SODIUM 200 GRAM(S): 250; 125 INJECTION, POWDER, FOR SUSPENSION INTRAMUSCULAR; INTRAVENOUS at 11:18

## 2022-07-23 RX ADMIN — QUETIAPINE FUMARATE 100 MILLIGRAM(S): 200 TABLET, FILM COATED ORAL at 17:37

## 2022-07-23 RX ADMIN — POLYMYXIN B SULFATE 500 UNIT(S): 500000 INJECTION, POWDER, LYOPHILIZED, FOR SOLUTION INTRAMUSCULAR; INTRATHECAL; INTRAVENOUS; OPHTHALMIC at 05:42

## 2022-07-23 RX ADMIN — Medication 100 MILLIEQUIVALENT(S): at 21:08

## 2022-07-23 RX ADMIN — Medication 100 GRAM(S): at 17:38

## 2022-07-23 RX ADMIN — Medication 1 MILLIGRAM(S): at 13:17

## 2022-07-23 RX ADMIN — SCOPALAMINE 1 PATCH: 1 PATCH, EXTENDED RELEASE TRANSDERMAL at 23:06

## 2022-07-23 RX ADMIN — Medication 1 PATCH: at 11:12

## 2022-07-23 RX ADMIN — Medication 40 MILLIGRAM(S): at 05:58

## 2022-07-23 RX ADMIN — Medication 1 PATCH: at 20:58

## 2022-07-23 RX ADMIN — CHLORHEXIDINE GLUCONATE 15 MILLILITER(S): 213 SOLUTION TOPICAL at 05:43

## 2022-07-23 RX ADMIN — SCOPALAMINE 1 PATCH: 1 PATCH, EXTENDED RELEASE TRANSDERMAL at 07:56

## 2022-07-23 RX ADMIN — Medication 50 MILLIEQUIVALENT(S): at 10:23

## 2022-07-23 RX ADMIN — Medication 50 MILLIEQUIVALENT(S): at 12:05

## 2022-07-23 RX ADMIN — FENTANYL CITRATE 3.52 MICROGRAM(S)/KG/HR: 50 INJECTION INTRAVENOUS at 07:07

## 2022-07-23 RX ADMIN — SPIRONOLACTONE 100 MILLIGRAM(S): 25 TABLET, FILM COATED ORAL at 05:42

## 2022-07-23 RX ADMIN — Medication 50 MILLIEQUIVALENT(S): at 14:42

## 2022-07-23 RX ADMIN — Medication 125 MILLIGRAM(S): at 05:42

## 2022-07-23 RX ADMIN — Medication 125 MILLIGRAM(S): at 17:37

## 2022-07-23 RX ADMIN — CHLORHEXIDINE GLUCONATE 15 MILLILITER(S): 213 SOLUTION TOPICAL at 17:38

## 2022-07-23 RX ADMIN — Medication 25 GRAM(S): at 12:28

## 2022-07-23 RX ADMIN — Medication 100 GRAM(S): at 05:41

## 2022-07-23 RX ADMIN — Medication 1 MILLIGRAM(S): at 11:18

## 2022-07-23 RX ADMIN — Medication 1 APPLICATION(S): at 17:37

## 2022-07-23 RX ADMIN — QUETIAPINE FUMARATE 100 MILLIGRAM(S): 200 TABLET, FILM COATED ORAL at 05:42

## 2022-07-23 RX ADMIN — Medication 100 MILLIEQUIVALENT(S): at 22:10

## 2022-07-23 RX ADMIN — AMPICILLIN SODIUM AND SULBACTAM SODIUM 200 GRAM(S): 250; 125 INJECTION, POWDER, FOR SUSPENSION INTRAMUSCULAR; INTRAVENOUS at 17:37

## 2022-07-23 RX ADMIN — CHLORHEXIDINE GLUCONATE 1 APPLICATION(S): 213 SOLUTION TOPICAL at 11:18

## 2022-07-23 RX ADMIN — METHADONE HYDROCHLORIDE 20 MILLIGRAM(S): 40 TABLET ORAL at 11:18

## 2022-07-23 RX ADMIN — Medication 1 PATCH: at 07:56

## 2022-07-23 RX ADMIN — ENOXAPARIN SODIUM 40 MILLIGRAM(S): 100 INJECTION SUBCUTANEOUS at 14:41

## 2022-07-23 RX ADMIN — AMPICILLIN SODIUM AND SULBACTAM SODIUM 200 GRAM(S): 250; 125 INJECTION, POWDER, FOR SUSPENSION INTRAMUSCULAR; INTRAVENOUS at 05:41

## 2022-07-23 RX ADMIN — Medication 1 PATCH: at 11:17

## 2022-07-23 RX ADMIN — AMPICILLIN SODIUM AND SULBACTAM SODIUM 200 GRAM(S): 250; 125 INJECTION, POWDER, FOR SUSPENSION INTRAMUSCULAR; INTRAVENOUS at 23:23

## 2022-07-23 RX ADMIN — DEXMEDETOMIDINE HYDROCHLORIDE IN 0.9% SODIUM CHLORIDE 1.76 MICROGRAM(S)/KG/HR: 4 INJECTION INTRAVENOUS at 07:07

## 2022-07-23 RX ADMIN — SCOPALAMINE 1 PATCH: 1 PATCH, EXTENDED RELEASE TRANSDERMAL at 22:10

## 2022-07-23 RX ADMIN — Medication 1 MILLIGRAM(S): at 21:07

## 2022-07-23 RX ADMIN — PANTOPRAZOLE SODIUM 40 MILLIGRAM(S): 20 TABLET, DELAYED RELEASE ORAL at 11:17

## 2022-07-23 RX ADMIN — POLYMYXIN B SULFATE 500 UNIT(S): 500000 INJECTION, POWDER, LYOPHILIZED, FOR SOLUTION INTRAMUSCULAR; INTRATHECAL; INTRAVENOUS; OPHTHALMIC at 17:37

## 2022-07-23 RX ADMIN — Medication 1 APPLICATION(S): at 05:43

## 2022-07-23 RX ADMIN — Medication 100 MILLIGRAM(S): at 11:18

## 2022-07-23 NOTE — PROGRESS NOTE ADULT - SUBJECTIVE AND OBJECTIVE BOX
Patient is seen and examined at the bed side, is afebrile now, spiked fever.  She remains intubated/vented.    REVIEW OF SYSTEMS: Unable to obtain due to mental status      ALLERGIES: buprenorphine (Rash)  Suboxone (Rash)      ICU Vital Signs Last 24 Hrs  T(C): 37.1 (2022 15:00), Max: 37.1 (2022 15:00)  T(F): 98.7 (2022 15:00), Max: 98.7 (2022 15:00)  HR: 114 (2022 19:34) (58 - 114)  BP: 137/67 (2022 18:00) (90/60 - 154/90)  BP(mean): 97 (2022 18:00) (71 - 114)  ABP: --  ABP(mean): --  RR: 16 (2022 18:00) (16 - 27)  SpO2: 99% (2022 19:34) (96% - 100%)    O2 Parameters below as of 2022 07:00  Patient On (Oxygen Delivery Method): ventilator    O2 Concentration (%): 35          PHYSICAL EXAM:  GENERAL: Intubated/vented  CHEST/LUNG: Not using accessory muscles   HEART: s1 and s2 present  ABDOMEN:  Nontender and  Nondistended  EXTREMITIES: No pedal  edema  CNS:  Intubated/vented       LABS:                        9.2    1.68  )-----------( 136      ( 2022 07:40 )             29.5                           9.5    3.73  )-----------( 83       ( 2022 06:23 )             30.6           139  |  104  |  11  ----------------------------<  88  3.3<L>   |  23  |  1.0    Ca    9.0      2022 19:45  Phos  5.6       Mg     1.6         TPro  4.9<L>  /  Alb  2.5<L>  /  TBili  0.5  /  DBili  x   /  AST  23  /  ALT  15  /  AlkPhos  231<H>      0713    148<H>  |  115<H>  |  15  ----------------------------<  107<H>  3.7   |  22  |  0.6<L>    Ca    8.1<L>      2022 18:51  Phos  2.5       Mg     1.9         TPro  5.5<L>  /  Alb  3.0<L>  /  TBili  0.4  /  DBili  x   /  AST  22  /  ALT  26  /  AlkPhos  335<H>          ABG - ( 2022 03:14 )  pH, Arterial: 7.30  pH, Blood: x     /  pCO2: 51    /  pO2: 111   / HCO3: 25    / Base Excess: -1.7  /  SaO2: 99.0          Urinalysis Basic - ( 2022 19:06 )  Color: Yellow / Appearance: Slightly Cloudy / S.025 / pH: x  Gluc: x / Ketone: Trace  / Bili: Negative / Urobili: 1.0 mg/dL   Blood: x / Protein: >=300 mg/dL / Nitrite: Negative   Leuk Esterase: Negative / RBC: 26-50 /HPF / WBC 3-5 /HPF   Sq Epi: x / Non Sq Epi: Occasional /HPF / Bacteria: Moderate        MEDICATIONS  (STANDING):      ampicillin/sulbactam  IVPB 3 Gram(s) IV Intermittent every 6 hours  BACItracin   Ointment 1 Application(s) Topical two times a day  chlorhexidine 0.12% Liquid 15 milliLiter(s) Oral Mucosa every 12 hours  chlorhexidine 2% Cloths 1 Application(s) Topical daily  clonazePAM  Tablet 1 milliGRAM(s) Oral every 8 hours  dexMEDEtomidine Infusion 0.1 MICROgram(s)/kG/Hr (1.76 mL/Hr) IV Continuous <Continuous>  enoxaparin Injectable 40 milliGRAM(s) SubCutaneous every 24 hours  fentaNYL   Infusion. 0.5 MICROgram(s)/kG/Hr (3.52 mL/Hr) IV Continuous <Continuous>  folic acid 1 milliGRAM(s) Oral daily  magnesium sulfate  IVPB 1 Gram(s) IV Intermittent every 12 hours  methadone    Tablet 20 milliGRAM(s) Oral daily  nicotine -  14 mG/24Hr(s) Patch 1 patch Transdermal daily  norepinephrine Infusion 0.05 MICROgram(s)/kG/Min (8.03 mL/Hr) IV Continuous <Continuous>  pantoprazole  Injectable 40 milliGRAM(s) IV Push daily  polymyxin B IVPB [id number] Unit(s) IV Intermittent every 12 hours  propofol Infusion 0.095 MICROgram(s)/kG/Min (0.04 mL/Hr) IV Continuous <Continuous>  QUEtiapine 100 milliGRAM(s) Oral two times a day  scopolamine 1 mG/72 Hr(s) Patch 1 Patch Transdermal every 72 hours  spironolactone 100 milliGRAM(s) Oral daily  thiamine 100 milliGRAM(s) Oral daily  vancomycin    Solution 125 milliGRAM(s) Oral every 12 hours        RADIOLOGY & ADDITIONAL TESTS:    < from: Xray Kidney Ureter Bladder (22 @ 13:25) >    FINDINGS: Enteric feeding tube is stable, with tip in the left upper quadrant.  There is a non-obstructive bowel gas pattern.  No abnormal   masses or calcifications are seen.  Stable probe/catheter projects over  the lower pelvis.    < from: CT Angio Chest PE Protocol w/ IV Cont (22 @ 21:14) No pulmonary embolus.    Near complete consolidation/collapse of the left lower lobe, increased as  compared with prior. Mildly increased right lower lobe patchy   consolidative opacities-atelectasis. Adjacent bilateral small pleural effusions. Findings compatible with likely mixed pneumonia and  atelectasis.    Redemonstration of a dilated main pulmonary artery measuring up to 3.8 cm  compatible with pulmonary hypertension.    < from: Xray Chest 1 View- PORTABLE-Routine (Xray Chest 1 View- PORTABLE-Routine in AM.) (22 @ 05:28) >  Stable cardiomegaly and left basilar opacity. Stable support devices.      < from: CT Abdomen and Pelvis w/ IV Cont (22 @ 17:27) > No evidence of retroperitoneal hematoma.    Small bilateral pleural effusions and left basilar consolidation. Moderate ascites redemonstrated.    Moderate pericardial effusion.  Hepatosplenomegaly redemonstrated.        MICROBIOLOGY DATA:  Clostridium difficile Toxin by PCR (22 @ 19:15)   Clostridium difficile Toxin by PCR: RESULT INTERPRETATION:  Detected     Culture - Sputum . (22 @ 13:39)   Gram Stain: Few polymorphonuclear leukocytes per low power field   No Squamous epithelial cells per low power field   Numerous Gram Negative Coccobacilli seen per oil power field   Specimen Source: ET Tube ET Tube   Culture Results:   Moderate Acinetobacter baumannii/nosocomialis group   Normal Respiratory Kelly absent     MRSA/MSSA PCR (22 @ 10:34)   MRSA PCR Result.: Negative    Respiratory Viral Panel with COVID-19 by BROOKE (22 @ 09:19)   Rapid RVP Result: NotDetec   SARS-CoV-2: NotDetec:    Culture - Blood (22 @ 20:31)   Specimen Source: .Blood Blood-Peripheral   Culture Results:   No growth to date.     Culture - Blood (22 @ 20:31)   Specimen Source: .Blood Blood   Culture Results:   No growth to date.     Culture - Sputum . (22 @ 14:00)   - Oxacillin: S <=0.25 Oxacillin predicts susceptibility for dicloxacillin, methicillin, and nafcillin   - Cefazolin: S <=4   - Erythromycin: S <=0.25   - Gentamicin: S <=1 Should not be used as monotherapy   - Clindamycin: S <=0.25   - Rifampin: S <=1 Should not be used as monotherapy   - Tetra/Doxy: S <=1   - Trimethoprim/Sulfamethoxazole: S <=0.5/9.5   - Vancomycin: S 2   Gram Stain:   Moderate polymorphonuclear leukocytes per low power field   Few Squamous epithelial cells per low power field   Moderate Gram positive cocci in pairs seen per oil power field   Few Gram Variable Rods seen per oil power field   - Ampicillin/Sulbactam: S <=8/4   Specimen Source: Trach Asp Tracheal Aspirate   Culture Results:   Numerous Mixed gram negative rods   Moderate Staphylococcus aureus   Normal Respiratory Kelly present   Organism Identification: Staphylococcus aureus   Organism: Staphylococcus aureus   Culture - Blood in AM (22 @ 06:00)   Specimen Source: .Blood Blood   Culture Results: No growth to date.     Culture - Sputum . (22 @ 14:00)   Gram Stain:   Moderate polymorphonuclear leukocytes per low power field   Few Squamous epithelial cells per low power field   Moderate Gram positive cocci in pairs seen per oil power field   Few Gram Variable Rods seen per oil power field   Specimen Source: Trach Asp Tracheal Aspirate   Culture Results:   Numerous Mixed gram negative rods   Moderate Staphylococcus aureus Susceptibility to follow.   Normal Respiratory Kelly absent     Culture - Sputum . (22 @ 17:20)   Gram Stain:   Few polymorphonuclear leukocytes per low power field   Rare Squamous epithelial cells per low power field   Moderate Gram Negative Rods seen per oil power field   - Amikacin: S <=16   - Amikacin: S <=16   - Amoxicillin/Clavulanic Acid: R >16/8   - Amoxicillin/Clavulanic Acid: S <=8/4   - Ampicillin: R 16 These ampicillin results predict results for amoxicillin   - Ampicillin: R >16 These ampicillin results predict results for amoxicillin   - Ampicillin/Sulbactam: R >16/8 Enterobacter, Klebsiella aerogenes, Citrobacter, and Serratia may develop resistance during prolonged therapy (3-4 days)   - Ampicillin/Sulbactam: S <=4/2 Enterobacter, Klebsiella aerogenes, Citrobacter, and Serratia may develop resistance during prolonged therapy (3-4 days)   - Aztreonam: S <=4   - Aztreonam: S <=4   - Cefazolin: R >16 Enterobacter, Klebsiella aerogenes, Citrobacter, and Serratia may develop resistance during prolonged therapy (3-4 days)   - Cefazolin: S <=2 Enterobacter, Klebsiella aerogenes, Citrobacter, and Serratia may develop resistance during prolonged therapy (3-4 days)   - Cefepime: S <=2   - Cefepime: S <=2   - Cefoxitin: R >16   - Cefoxitin: S <=8   - Ceftriaxone: S <=1 Enterobacter, Klebsiella aerogenes, Citrobacter, and Serratia may develop resistance during prolonged therapy   - Ceftriaxone: S <=1 Enterobacter, Klebsiella aerogenes, Citrobacter, and Serratia may develop resistance during prolonged therapy   - Ciprofloxacin: S <=0.25   - Ciprofloxacin: S <=0.25   - Ertapenem: S <=0.5   - Ertapenem: S <=0.5   - Gentamicin: S <=2   - Gentamicin: S <=2   - Imipenem: S <=1   - Imipenem: S <=1   - Levofloxacin: S <=0.5   - Levofloxacin: S <=0.5   - Meropenem: S <=1   - Meropenem: S <=1   - Piperacillin/Tazobactam: S <=8   - Piperacillin/Tazobactam: S <=8   - Tobramycin: S <=2   - Tobramycin: S <=2   - Trimethoprim/Sulfamethoxazole: S <=0.5/9.5   - Trimethoprim/Sulfamethoxazole: S <=0.5/9.5   Specimen Source: Trach Asp Tracheal Aspirate   Culture Results:   Moderate Klebsiella oxytoca/Raoutella ornithinolytica   Moderate Enterobacter cloacae complex   Normal Respiratory Kelly absent   Organism Identification: Klebsiella oxytoca /Raoutella ornithinolytica   Enterobacter cloacae complex   Organism: Klebsiella oxytoca /Raoutella ornithinolytica   Organism: Enterobacter cloacae complex COVID-19 PCR (06.15.22 @ 07:10)   COVID-19 PCR: NotDetec: Culture - Acid Fast - Sputum w/Smear (22 @ 07:00)   Specimen Source: .Sputum Sputum   Acid Fast Bacilli Smear:   No acid fast bacilli seen by fluorochrome stain   Culture Results:   No acid fast bacilli isolated after 6 weeks.    Patient is seen and examined at the bed side, is afebrile.  She is s/p Tracheostomy, doing better.    REVIEW OF SYSTEMS: All other review systems are negative      ALLERGIES: buprenorphine (Rash)  Suboxone (Rash)      ICU Vital Signs Last 24 Hrs  T(C): 37.1 (2022 15:00), Max: 37.1 (2022 15:00)  T(F): 98.7 (2022 15:00), Max: 98.7 (2022 15:00)  HR: 114 (2022 19:34) (58 - 114)  BP: 137/67 (2022 18:00) (90/60 - 154/90)  BP(mean): 97 (2022 18:00) (71 - 114)  ABP: --  ABP(mean): --  RR: 16 (2022 18:00) (16 - 27)  SpO2: 99% (2022 19:34) (96% - 100%)    O2 Parameters below as of 2022 07:00  Patient On (Oxygen Delivery Method): ventilator    O2 Concentration (%): 35        PHYSICAL EXAM:  GENERAL: Not in distress  HEENT: Trach in placed  CHEST/LUNG: Not using accessory muscles   HEART: s1 and s2 present  ABDOMEN:  Nontender and  Nondistended  EXTREMITIES: No pedal  edema  CNS:  Awake and  Alert      LABS:                        9.2    1.68  )-----------( 136      ( 2022 07:40 )             29.5                           9.5    3.73  )-----------( 83       ( 2022 06:23 )             30.6       23    139  |  104  |  11  ----------------------------<  88  3.3<L>   |  23  |  1.0    Ca    9.0      2022 19:45  Phos  5.6       Mg     1.6         TPro  4.9<L>  /  Alb  2.5<L>  /  TBili  0.5  /  DBili  x   /  AST  23  /  ALT  15  /  AlkPhos  231<H>      07-13    148<H>  |  115<H>  |  15  ----------------------------<  107<H>  3.7   |  22  |  0.6<L>    Ca    8.1<L>      2022 18:51  Phos  2.5       Mg     1.9         TPro  5.5<L>  /  Alb  3.0<L>  /  TBili  0.4  /  DBili  x   /  AST  22  /  ALT  26  /  AlkPhos  335<H>          ABG - ( 2022 03:14 )  pH, Arterial: 7.30  pH, Blood: x     /  pCO2: 51    /  pO2: 111   / HCO3: 25    / Base Excess: -1.7  /  SaO2: 99.0          Urinalysis Basic - ( 2022 19:06 )  Color: Yellow / Appearance: Slightly Cloudy / S.025 / pH: x  Gluc: x / Ketone: Trace  / Bili: Negative / Urobili: 1.0 mg/dL   Blood: x / Protein: >=300 mg/dL / Nitrite: Negative   Leuk Esterase: Negative / RBC: 26-50 /HPF / WBC 3-5 /HPF   Sq Epi: x / Non Sq Epi: Occasional /HPF / Bacteria: Moderate        MEDICATIONS  (STANDING):      ampicillin/sulbactam  IVPB 3 Gram(s) IV Intermittent every 6 hours  BACItracin   Ointment 1 Application(s) Topical two times a day  chlorhexidine 0.12% Liquid 15 milliLiter(s) Oral Mucosa every 12 hours  chlorhexidine 2% Cloths 1 Application(s) Topical daily  clonazePAM  Tablet 1 milliGRAM(s) Oral every 8 hours  dexMEDEtomidine Infusion 0.1 MICROgram(s)/kG/Hr (1.76 mL/Hr) IV Continuous <Continuous>  enoxaparin Injectable 40 milliGRAM(s) SubCutaneous every 24 hours  fentaNYL   Infusion. 0.5 MICROgram(s)/kG/Hr (3.52 mL/Hr) IV Continuous <Continuous>  folic acid 1 milliGRAM(s) Oral daily  magnesium sulfate  IVPB 1 Gram(s) IV Intermittent every 12 hours  methadone    Tablet 20 milliGRAM(s) Oral daily  nicotine -  14 mG/24Hr(s) Patch 1 patch Transdermal daily  norepinephrine Infusion 0.05 MICROgram(s)/kG/Min (8.03 mL/Hr) IV Continuous <Continuous>  pantoprazole  Injectable 40 milliGRAM(s) IV Push daily  polymyxin B IVPB [id number] Unit(s) IV Intermittent every 12 hours  propofol Infusion 0.095 MICROgram(s)/kG/Min (0.04 mL/Hr) IV Continuous <Continuous>  QUEtiapine 100 milliGRAM(s) Oral two times a day  scopolamine 1 mG/72 Hr(s) Patch 1 Patch Transdermal every 72 hours  spironolactone 100 milliGRAM(s) Oral daily  thiamine 100 milliGRAM(s) Oral daily  vancomycin    Solution 125 milliGRAM(s) Oral every 12 hours        RADIOLOGY & ADDITIONAL TESTS:    < from: Xray Kidney Ureter Bladder (22 @ 13:25) >    FINDINGS: Enteric feeding tube is stable, with tip in the left upper quadrant.  There is a non-obstructive bowel gas pattern.  No abnormal   masses or calcifications are seen.  Stable probe/catheter projects over  the lower pelvis.    < from: CT Angio Chest PE Protocol w/ IV Cont (22 @ 21:14) No pulmonary embolus.    Near complete consolidation/collapse of the left lower lobe, increased as  compared with prior. Mildly increased right lower lobe patchy   consolidative opacities-atelectasis. Adjacent bilateral small pleural effusions. Findings compatible with likely mixed pneumonia and  atelectasis.    Redemonstration of a dilated main pulmonary artery measuring up to 3.8 cm  compatible with pulmonary hypertension.    < from: Xray Chest 1 View- PORTABLE-Routine (Xray Chest 1 View- PORTABLE-Routine in AM.) (22 @ 05:28) >  Stable cardiomegaly and left basilar opacity. Stable support devices.      < from: CT Abdomen and Pelvis w/ IV Cont (22 @ 17:27) > No evidence of retroperitoneal hematoma.    Small bilateral pleural effusions and left basilar consolidation. Moderate ascites redemonstrated.    Moderate pericardial effusion.  Hepatosplenomegaly redemonstrated.        MICROBIOLOGY DATA:  Clostridium difficile Toxin by PCR (22 @ 19:15)   Clostridium difficile Toxin by PCR: RESULT INTERPRETATION:  Detected     Culture - Sputum . (22 @ 13:39)   Gram Stain: Few polymorphonuclear leukocytes per low power field   No Squamous epithelial cells per low power field   Numerous Gram Negative Coccobacilli seen per oil power field   Specimen Source: ET Tube ET Tube   Culture Results:   Moderate Acinetobacter baumannii/nosocomialis group   Normal Respiratory Kelly absent     MRSA/MSSA PCR (22 @ 10:34)   MRSA PCR Result.: Negative    Respiratory Viral Panel with COVID-19 by BROOKE (22 @ 09:19)   Rapid RVP Result: NotDetec   SARS-CoV-2: NotDetec:    Culture - Blood (22 @ 20:31)   Specimen Source: .Blood Blood-Peripheral   Culture Results:   No growth to date.     Culture - Blood (22 @ 20:31)   Specimen Source: .Blood Blood   Culture Results:   No growth to date.     Culture - Sputum . (22 @ 14:00)   - Oxacillin: S <=0.25 Oxacillin predicts susceptibility for dicloxacillin, methicillin, and nafcillin   - Cefazolin: S <=4   - Erythromycin: S <=0.25   - Gentamicin: S <=1 Should not be used as monotherapy   - Clindamycin: S <=0.25   - Rifampin: S <=1 Should not be used as monotherapy   - Tetra/Doxy: S <=1   - Trimethoprim/Sulfamethoxazole: S <=0.5/9.5   - Vancomycin: S 2   Gram Stain:   Moderate polymorphonuclear leukocytes per low power field   Few Squamous epithelial cells per low power field   Moderate Gram positive cocci in pairs seen per oil power field   Few Gram Variable Rods seen per oil power field   - Ampicillin/Sulbactam: S <=8/4   Specimen Source: Trach Asp Tracheal Aspirate   Culture Results:   Numerous Mixed gram negative rods   Moderate Staphylococcus aureus   Normal Respiratory Kelly present   Organism Identification: Staphylococcus aureus   Organism: Staphylococcus aureus   Culture - Blood in AM (22 @ 06:00)   Specimen Source: .Blood Blood   Culture Results: No growth to date.     Culture - Sputum . (22 @ 14:00)   Gram Stain:   Moderate polymorphonuclear leukocytes per low power field   Few Squamous epithelial cells per low power field   Moderate Gram positive cocci in pairs seen per oil power field   Few Gram Variable Rods seen per oil power field   Specimen Source: Trach Asp Tracheal Aspirate   Culture Results:   Numerous Mixed gram negative rods   Moderate Staphylococcus aureus Susceptibility to follow.   Normal Respiratory Kelly absent     Culture - Sputum . (22 @ 17:20)   Gram Stain:   Few polymorphonuclear leukocytes per low power field   Rare Squamous epithelial cells per low power field   Moderate Gram Negative Rods seen per oil power field   - Amikacin: S <=16   - Amikacin: S <=16   - Amoxicillin/Clavulanic Acid: R >16/8   - Amoxicillin/Clavulanic Acid: S <=8/4   - Ampicillin: R 16 These ampicillin results predict results for amoxicillin   - Ampicillin: R >16 These ampicillin results predict results for amoxicillin   - Ampicillin/Sulbactam: R >16/8 Enterobacter, Klebsiella aerogenes, Citrobacter, and Serratia may develop resistance during prolonged therapy (3-4 days)   - Ampicillin/Sulbactam: S <=4/2 Enterobacter, Klebsiella aerogenes, Citrobacter, and Serratia may develop resistance during prolonged therapy (3-4 days)   - Aztreonam: S <=4   - Aztreonam: S <=4   - Cefazolin: R >16 Enterobacter, Klebsiella aerogenes, Citrobacter, and Serratia may develop resistance during prolonged therapy (3-4 days)   - Cefazolin: S <=2 Enterobacter, Klebsiella aerogenes, Citrobacter, and Serratia may develop resistance during prolonged therapy (3-4 days)   - Cefepime: S <=2   - Cefepime: S <=2   - Cefoxitin: R >16   - Cefoxitin: S <=8   - Ceftriaxone: S <=1 Enterobacter, Klebsiella aerogenes, Citrobacter, and Serratia may develop resistance during prolonged therapy   - Ceftriaxone: S <=1 Enterobacter, Klebsiella aerogenes, Citrobacter, and Serratia may develop resistance during prolonged therapy   - Ciprofloxacin: S <=0.25   - Ciprofloxacin: S <=0.25   - Ertapenem: S <=0.5   - Ertapenem: S <=0.5   - Gentamicin: S <=2   - Gentamicin: S <=2   - Imipenem: S <=1   - Imipenem: S <=1   - Levofloxacin: S <=0.5   - Levofloxacin: S <=0.5   - Meropenem: S <=1   - Meropenem: S <=1   - Piperacillin/Tazobactam: S <=8   - Piperacillin/Tazobactam: S <=8   - Tobramycin: S <=2   - Tobramycin: S <=2   - Trimethoprim/Sulfamethoxazole: S <=0.5/9.5   - Trimethoprim/Sulfamethoxazole: S <=0.5/9.5   Specimen Source: Trach Asp Tracheal Aspirate   Culture Results:   Moderate Klebsiella oxytoca/Raoutella ornithinolytica   Moderate Enterobacter cloacae complex   Normal Respiratory Kelly absent   Organism Identification: Klebsiella oxytoca /Raoutella ornithinolytica   Enterobacter cloacae complex   Organism: Klebsiella oxytoca /Raoutella ornithinolytica   Organism: Enterobacter cloacae complex COVID-19 PCR (06.15.22 @ 07:10)   COVID-19 PCR: NotDetec: Culture - Acid Fast - Sputum w/Smear (22 @ 07:00)   Specimen Source: .Sputum Sputum   Acid Fast Bacilli Smear:   No acid fast bacilli seen by fluorochrome stain   Culture Results:   No acid fast bacilli isolated after 6 weeks.

## 2022-07-23 NOTE — PROGRESS NOTE ADULT - ASSESSMENT
IMPRESSION:  Acute respiratory failure sp trach  PNA  MSSA pna  seizure like activity  drug over dose/ withdrawal opoid   liver cirrhosis   Ascites sp paracentesis   Pancytopenia- worsening  RENETTA improved  C diff +ve     PLAN:    CNS: Continue with Methadone for withdrawal Dc fentanyl and propofol this morning; continue with precedex and decrease the dose gradually; continue with seroquel and clonazepam; Check QTc daily.      HEENT: oral care; Trach care     PULMONARY:    CXR unchnaged. ABG reviewed  keep off L side in bed develops atelectasis  pressure support     CARDIOVASCULAR: Keep MAP more than 65mmhg; not on pressors currently keep equal balance. On oral diuretics.   add IV lasix today    GI: GI prophylaxis. Hold OGT feeding for trach.   Do a bedside US of the abdomen. If large ascites then will need therapeutic paracentesis.     RENAL: Correct K ot mo re than 4 and Mg to more than 2. DC Free water pushes.     INFECTIOUS DISEASE: Currently on PO vancomycin, Unasyn, Polymixin. Monitor kidney function and electrolytes. ID following. DTA culture so far negative. Recheck procalcitonin.     HEMATOLOGICAL:   s/p BMB follow result and cx. Hematology following. Monitor platelet count. Keep hg more than 7.     ENDOCRINE:  Follow up FS.  Insulin protocol if needed.     Musk: bedrest for now. PT rehab evaluation.     Dispo: Remains critically and ill needs ICU care     TLC 7/6 RIJ;

## 2022-07-23 NOTE — PROGRESS NOTE ADULT - SUBJECTIVE AND OBJECTIVE BOX
SUBJECTIVE:    Patient is a 26y old Female who presents with a chief complaint of anasarca (23 Jul 2022 05:16)    Currently admitted to medicine with the primary diagnosis of Cirrhosis       Today is hospital day 38d. This morning she is resting comfortably in bed     ROS:     unable to obtain , patient s/p trach, however appears comfortable and not in distress       PAST MEDICAL & SURGICAL HISTORY  Hepatitis C    Asthma    Anxiety and depression    IV drug abuse  heroin    No significant past surgical history      SOCIAL HISTORY:    ALLERGIES:  buprenorphine (Rash)  Suboxone (Rash)    MEDICATIONS:  STANDING MEDICATIONS  ampicillin/sulbactam  IVPB 3 Gram(s) IV Intermittent every 6 hours  BACItracin   Ointment 1 Application(s) Topical two times a day  chlorhexidine 0.12% Liquid 15 milliLiter(s) Oral Mucosa every 12 hours  chlorhexidine 2% Cloths 1 Application(s) Topical daily  clonazePAM  Tablet 1 milliGRAM(s) Oral every 8 hours  dexMEDEtomidine Infusion 0.1 MICROgram(s)/kG/Hr IV Continuous <Continuous>  enoxaparin Injectable 40 milliGRAM(s) SubCutaneous every 24 hours  fentaNYL   Infusion. 0.5 MICROgram(s)/kG/Hr IV Continuous <Continuous>  folic acid 1 milliGRAM(s) Oral daily  magnesium sulfate  IVPB 1 Gram(s) IV Intermittent every 12 hours  methadone    Tablet 20 milliGRAM(s) Oral daily  nicotine -  14 mG/24Hr(s) Patch 1 patch Transdermal daily  norepinephrine Infusion 0.05 MICROgram(s)/kG/Min IV Continuous <Continuous>  pantoprazole  Injectable 40 milliGRAM(s) IV Push daily  polymyxin B IVPB 939033 Unit(s) IV Intermittent every 12 hours  potassium chloride  20 mEq/100 mL IVPB 20 milliEquivalent(s) IV Intermittent every 2 hours  propofol Infusion 0.095 MICROgram(s)/kG/Min IV Continuous <Continuous>  QUEtiapine 100 milliGRAM(s) Oral two times a day  scopolamine 1 mG/72 Hr(s) Patch 1 Patch Transdermal every 72 hours  spironolactone 100 milliGRAM(s) Oral daily  thiamine 100 milliGRAM(s) Oral daily  vancomycin    Solution 125 milliGRAM(s) Oral every 12 hours    PRN MEDICATIONS  ALBUTerol    90 MICROgram(s) HFA Inhaler 1 Puff(s) Inhalation every 4 hours PRN  cloNIDine 0.1 milliGRAM(s) Oral every 6 hours PRN  hydrOXYzine hydrochloride 50 milliGRAM(s) Oral every 6 hours PRN  ibuprofen  Tablet. 400 milliGRAM(s) Oral every 6 hours PRN  sodium chloride 0.9% lock flush 10 milliLiter(s) IV Push every 1 hour PRN    VITALS:   T(F): 98.4  HR: 64  BP: 121/81  RR: 26  SpO2: 99%    LABS:  Negative for smoking/alcohol/drug use.                         9.2    1.68  )-----------( 136      ( 23 Jul 2022 07:40 )             29.5     07-23    137  |  103  |  12  ----------------------------<  90  3.0<L>   |  21  |  0.9    Ca    8.4<L>      23 Jul 2022 07:40  Phos  5.6     07-23  Mg     1.6     07-23    TPro  4.9<L>  /  Alb  2.5<L>  /  TBili  0.5  /  DBili  x   /  AST  23  /  ALT  15  /  AlkPhos  231<H>  07-23        ABG - ( 23 Jul 2022 04:16 )  pH, Arterial: 7.40  pH, Blood: x     /  pCO2: 35    /  pO2: 89    / HCO3: 22    / Base Excess: -2.7  /  SaO2: 98.7        RADIOLOGY:    CXR official report pending from today, appears stable from previous with b/l effusions     PHYSICAL EXAM:  GEN: No acute distress  HEENT: normocephalic, atraumatic, aniceteric  LUNGS: b/l faint rales   HEART: S1/S2 present. RRR, no murmurs  ABD: Soft, non-tender, non-distended. Bowel sounds present  EXT: b/l le non pitting edema   NEURO: awake, does not follow commands      ASSESSMENT AND PLAN:    Acute respiratory failure with hypoxemia / aspiration pneumonia with sepsis / suspected drug overdose / head laceration s/p fall in lobby / possible seizure / anasarca and pancytopenia     #Acute respiratory failure sp intubation and extubation 7/8 followed by reintubation s/p trach  #PNA CT Chest 6/22 - left greater than right lower lobe consolidations; sputum Cx 6/24 Klebsiella oxytoca, Enterobacter cloacae complex- and   # Acinetobacter buamnii  #MSSA pna; acetinobacter VAP  #Seizure like activity  #?? drug over dose/ withdrawal opoid   # Liver cirrhosis 2/2 to IVDA  #Ascites s/p paracentesis   # Pancytopenia  # RENETTA Resolving   #C. Diff on 7/5/2022  #Hypokalemia/hypomagnesemia        Plan:    - U/S Abdomen , might need paracenthesis    - c/w Unasyn / Polymyxin , f/u with ID   ?10 days of abx   -  PO Vancomycin PPX as per ID while on abx    - f/u with Heme/onc and BMB results   -  c/w IV Lasix , repeat CXR    - supplement  hypokalemia and  hypomagnesemia    - vent management per ICU     # DVT PPX  # GI PPX: PPI   # Diet: hold tube feeds   # Dispo : acute     Patient is being managed by intensivist

## 2022-07-23 NOTE — CHART NOTE - NSCHARTNOTEFT_GEN_A_CORE
Attempted paracentesis x twice with no success. Consider reattempt tomorrow/IR consult. Attempted paracentesis twice, including one attempt by intensivist on call. Unable to obtain any peritoneal drainage. Consider reattempt tomorrow/IR consult.

## 2022-07-23 NOTE — PROGRESS NOTE ADULT - ASSESSMENT
Patient is a 26 year old female with hx of asthma, untreated Hep C, IVDA, anasarca presenting with increased dyspnea since yesterday    IMPRESSION  #Acute respiratory failure s/p intubation 6/16  #Pneumonia -   - CT Chest 6/22 - left greater than right lower lobe consolidations   - sputum Cx 6/24 Klebsiella oxytoca, Enterobacter cloacae complex- The Sputum culture from 6/27 grew Numerous Mixed gram negative rods and Moderate Staphylococcus aureus.-  - Please call micro [phone] ext1 to add on sensitivities for cefiderocol to the Acinetobacter IF NOT DONE ALREADY    #Fevers  - Ferritin, Serum: 94 ng/mL (06.27.22 @ 06:47),  Procalcitonin, Serum: 0.11 (06.27.22 @ 15:46)  - CT Abd/pelvis 6/26 - moderated ascites moderate pericardial effusion     #Drug overdose vs withdrawal?  #Hepatosplenomegaly - present since CT Abd/pelvis 1/30/2021  #Pancytopenia  #Hx of Untreated Hep C   #IVDA   #Abx allergy: buprenorphine (Rash), Suboxone (Rash)  # C.difficille infection - 7/5/22    would recommend:    1. Continue Unasyn and Polymixin B  2. Continue Oral Vancomycin to cover C.diff, during and 7 days after completing the Systemic Abx  3. Management of Vent. as per ICU protocol  4. Aspiration precaution    d/w ICU team    Attending  Attestation:   Spent more than 35 minutes on total encounter, more than 50 % of the visit was spent counseling and/or coordinating care by the Attending physician.

## 2022-07-23 NOTE — PROGRESS NOTE ADULT - SUBJECTIVE AND OBJECTIVE BOX
Patient is a 26y old  Female who presents with a chief complaint of anasarca (22 Jul 2022 14:28)        HPI:  Patient is a 26 year old female with hx of asthma, untreated Hep C, IVDA, anasarca presenting with increased dyspnea since yesterday. Patient has been short of breath chronically and has been admitted for fluid overload. Patient describes worsening symptoms yesterday associated with cough. States generalized edema has remained the same. Patient is actively using heroin. Not on any meds or MMTP.  Denies fever, chills, sore throat, cp, palpitations, abd pain, n/v/d, dysuria, headache, syncope, hemoptysis. + generalized edema (15 Reinaldo 2022 10:03)      Pt evaluated on rounds.  I reviewed the radiology tests and hospital record.    I reviewed previous notes on this patient.    Interval Events: No overnight events.      CAM:n    SAT:y    SBT:y      REVIEW OF SYSTEMS:   see HPI      OBJECTIVE:  ICU Vital Signs Last 24 Hrs  T(C): 35.8 (23 Jul 2022 03:00), Max: 37.3 (22 Jul 2022 19:00)  T(F): 96.4 (23 Jul 2022 03:00), Max: 99.2 (22 Jul 2022 19:00)  HR: 60 (23 Jul 2022 04:00) (58 - 88)  BP: 134/86 (23 Jul 2022 04:00) (101/70 - 169/113)  BP(mean): 105 (23 Jul 2022 04:00) (81 - 134)  ABP: --  ABP(mean): --  RR: 25 (23 Jul 2022 04:00) (12 - 28)  SpO2: 97% (23 Jul 2022 04:00) (96% - 100%)    O2 Parameters below as of 23 Jul 2022 03:00  Patient On (Oxygen Delivery Method): trach          Mode: AC/ CMV (Assist Control/ Continuous Mandatory Ventilation), RR (machine): 25, TV (machine): 370, FiO2: 35, PEEP: 5, MAP: 10, PIP: 21    07-21 @ 07:01  -  07-22 @ 07:00  --------------------------------------------------------  IN: 2267 mL / OUT: 4000 mL / NET: -1733 mL    07-22 @ 07:01  -  07-23 @ 05:16  --------------------------------------------------------  IN: 1540.5 mL / OUT: 2470 mL / NET: -929.5 mL      CAPILLARY BLOOD GLUCOSE            PHYSICAL EXAM:       · ENMT:   Airway patent,   Nasal mucosa clear.  Mouth with normal mucosa.   No thrush    · EYES:   Clear bilaterally,   pupils equal,   round and reactive to light.    · CARDIAC:   Normal rate,   regular rhythm.    Heart sounds S1, S2.   No murmurs, no rubs or gallops on auscultation  no edema        CAROTID:   normal systolic impulse  no bruits    · RESPIRATORY:   rales  normal chest expansion  no retractions or use of accessory muscles  palpation of chest is normal with no fremitus  percussion of chest demonstrates no hyperresonance or dullness    · GASTROINTESTINAL:  Abdomen soft,   non-tender,   + BS  liver/spleen not palpable    · MUSCULOSKELETAL:   no clubbing, cyanosis      · SKIN:   Skin normal color for race,   warm, dry   No evidence of rash.        · HEME LYMPH:   no splenomegaly.  No cervical  lymphadenopathy.  no inguinal lymphadenopathy    HOSPITAL MEDICATIONS:  MEDICATIONS  (STANDING):  ampicillin/sulbactam  IVPB 3 Gram(s) IV Intermittent every 6 hours  BACItracin   Ointment 1 Application(s) Topical two times a day  chlorhexidine 0.12% Liquid 15 milliLiter(s) Oral Mucosa every 12 hours  chlorhexidine 2% Cloths 1 Application(s) Topical daily  clonazePAM  Tablet 1 milliGRAM(s) Oral every 8 hours  dexMEDEtomidine Infusion 0.1 MICROgram(s)/kG/Hr (1.76 mL/Hr) IV Continuous <Continuous>  enoxaparin Injectable 40 milliGRAM(s) SubCutaneous every 24 hours  fentaNYL   Infusion. 0.5 MICROgram(s)/kG/Hr (3.52 mL/Hr) IV Continuous <Continuous>  folic acid 1 milliGRAM(s) Oral daily  magnesium sulfate  IVPB 1 Gram(s) IV Intermittent every 12 hours  methadone    Tablet 20 milliGRAM(s) Oral daily  nicotine -  14 mG/24Hr(s) Patch 1 patch Transdermal daily  norepinephrine Infusion 0.05 MICROgram(s)/kG/Min (8.03 mL/Hr) IV Continuous <Continuous>  pantoprazole  Injectable 40 milliGRAM(s) IV Push daily  polymyxin B IVPB [id number] Unit(s) IV Intermittent every 12 hours  propofol Infusion 0.095 MICROgram(s)/kG/Min (0.04 mL/Hr) IV Continuous <Continuous>  QUEtiapine 100 milliGRAM(s) Oral two times a day  scopolamine 1 mG/72 Hr(s) Patch 1 Patch Transdermal every 72 hours  spironolactone 100 milliGRAM(s) Oral daily  thiamine 100 milliGRAM(s) Oral daily  vancomycin    Solution 125 milliGRAM(s) Oral every 12 hours    MEDICATIONS  (PRN):  ALBUTerol    90 MICROgram(s) HFA Inhaler 1 Puff(s) Inhalation every 4 hours PRN Shortness of Breath and/or Wheezing  cloNIDine 0.1 milliGRAM(s) Oral every 6 hours PRN opiate withdrawal  hydrOXYzine hydrochloride 50 milliGRAM(s) Oral every 6 hours PRN Anxiety  ibuprofen  Tablet. 400 milliGRAM(s) Oral every 6 hours PRN Mild Pain (1 - 3)  sodium chloride 0.9% lock flush 10 milliLiter(s) IV Push every 1 hour PRN Pre/post blood products, medications, blood draw, and to maintain line patency    dextrose 5% + sodium chloride 0.45%.: Solution, 1000 milliLiter(s) infuse at 50 mL/Hr  lactated ringers.: Solution, 1000 milliLiter(s) infuse at 65 mL/Hr, Stop After 18 Hours  lactated ringers.: Solution, 1000 milliLiter(s) infuse at 100 mL/Hr, Stop After 1 Days  Provider's Contact #: [phone]  lactated ringers Bolus:   500 milliLiter(s), IV Bolus, once, infuse over 60 Minute(s), Stop After 1 Doses  sodium chloride 0.9% Bolus:   500 milliLiter(s), IV Bolus, once, infuse over 30 Minute(s), Stop After 1 Doses  sodium chloride 0.9% Bolus:   250 milliLiter(s), IV Bolus, once, infuse over 15 Minute(s), Stop After 1 Doses  lactated ringers Bolus:   250 milliLiter(s), IV Bolus, once, infuse over 60 Minute(s), Stop After 1 Doses  Special Instructions: please do CHEETAH  sodium chloride 0.9% Bolus:   1000 milliLiter(s), IV Bolus, once, infuse over 60 Minute(s), Stop After 1 Doses  Provider's Contact #: [phone]  lactated ringers Bolus:   500 milliLiter(s), IV Bolus, once, infuse over 1 Hr, Stop After 1 Doses  Provider's Contact #: [phone]  lactated ringers.: Solution, 500 milliLiter(s) infuse at 250 mL/Hr      LABS:                        9.2    1.60  )-----------( 118      ( 22 Jul 2022 06:01 )             28.7     07-22    140  |  105  |  14  ----------------------------<  84  3.5   |  21  |  0.9    Ca    8.9      22 Jul 2022 06:01  Phos  5.4     07-22  Mg     1.5     07-22    TPro  5.1<L>  /  Alb  2.8<L>  /  TBili  0.4  /  DBili  x   /  AST  24  /  ALT  14  /  AlkPhos  248<H>  07-22        Arterial Blood Gas:  07-23 @ 04:16  7.40/35/89/22/98.7/-2.7  ABG lactate: --  Arterial Blood Gas:  07-22 @ 04:16  7.39/33/130/20/99.8/-4.4  ABG lactate: --      Mode: AC/ CMV (Assist Control/ Continuous Mandatory Ventilation), RR (machine): 25, TV (machine): 370, FiO2: 35, PEEP: 5, MAP: 10, PIP: 21      COVID-19 PCR: NotDetec (19 Jul 2022 19:56)  COVID-19 PCR: NotDetec (17 Jul 2022 20:01)  COVID-19 PCR: NotDetec (14 Jul 2022 15:00)  SARS-CoV-2: NotDetec (30 Jun 2022 09:19)  COVID-19 PCR: NotDetec (15 Reinaldo 2022 07:10)    Mode: AC/ CMV (Assist Control/ Continuous Mandatory Ventilation)  RR (machine): 25  TV (machine): 370  FiO2: 35  PEEP: 5  MAP: 10  PIP: 21      ABG - ( 23 Jul 2022 04:16 )  pH, Arterial: 7.40  pH, Blood: x     /  pCO2: 35    /  pO2: 89    / HCO3: 22    / Base Excess: -2.7  /  SaO2: 98.7          RADIOLOGY: Today I personally interpreted the latest CXR and other pertinent films.

## 2022-07-24 LAB
ALBUMIN SERPL ELPH-MCNC: 3.1 G/DL — LOW (ref 3.5–5.2)
ALP SERPL-CCNC: 221 U/L — HIGH (ref 30–115)
ALT FLD-CCNC: 12 U/L — SIGNIFICANT CHANGE UP (ref 0–41)
ANION GAP SERPL CALC-SCNC: 13 MMOL/L — SIGNIFICANT CHANGE UP (ref 7–14)
APTT BLD: 35.9 SEC — SIGNIFICANT CHANGE UP (ref 27–39.2)
AST SERPL-CCNC: 18 U/L — SIGNIFICANT CHANGE UP (ref 0–41)
BILIRUB SERPL-MCNC: 0.5 MG/DL — SIGNIFICANT CHANGE UP (ref 0.2–1.2)
BLD GP AB SCN SERPL QL: SIGNIFICANT CHANGE UP
BUN SERPL-MCNC: 9 MG/DL — LOW (ref 10–20)
CALCIUM SERPL-MCNC: 8.9 MG/DL — SIGNIFICANT CHANGE UP (ref 8.5–10.1)
CHLORIDE SERPL-SCNC: 103 MMOL/L — SIGNIFICANT CHANGE UP (ref 98–110)
CO2 SERPL-SCNC: 25 MMOL/L — SIGNIFICANT CHANGE UP (ref 17–32)
CREAT SERPL-MCNC: 0.8 MG/DL — SIGNIFICANT CHANGE UP (ref 0.7–1.5)
EGFR: 103 ML/MIN/1.73M2 — SIGNIFICANT CHANGE UP
GLUCOSE SERPL-MCNC: 78 MG/DL — SIGNIFICANT CHANGE UP (ref 70–99)
HCT VFR BLD CALC: 25.7 % — LOW (ref 37–47)
HCT VFR BLD CALC: 26.4 % — LOW (ref 37–47)
HGB BLD-MCNC: 8.1 G/DL — LOW (ref 12–16)
HGB BLD-MCNC: 8.4 G/DL — LOW (ref 12–16)
INR BLD: 1.25 RATIO — SIGNIFICANT CHANGE UP (ref 0.65–1.3)
MAGNESIUM SERPL-MCNC: 1.9 MG/DL — SIGNIFICANT CHANGE UP (ref 1.8–2.4)
MCHC RBC-ENTMCNC: 26.9 PG — LOW (ref 27–31)
MCHC RBC-ENTMCNC: 27.1 PG — SIGNIFICANT CHANGE UP (ref 27–31)
MCHC RBC-ENTMCNC: 31.5 G/DL — LOW (ref 32–37)
MCHC RBC-ENTMCNC: 31.8 G/DL — LOW (ref 32–37)
MCV RBC AUTO: 84.6 FL — SIGNIFICANT CHANGE UP (ref 81–99)
MCV RBC AUTO: 86 FL — SIGNIFICANT CHANGE UP (ref 81–99)
NRBC # BLD: 0 /100 WBCS — SIGNIFICANT CHANGE UP (ref 0–0)
NRBC # BLD: 0 /100 WBCS — SIGNIFICANT CHANGE UP (ref 0–0)
PLATELET # BLD AUTO: 105 K/UL — LOW (ref 130–400)
PLATELET # BLD AUTO: 129 K/UL — LOW (ref 130–400)
POTASSIUM SERPL-MCNC: 3.3 MMOL/L — LOW (ref 3.5–5)
POTASSIUM SERPL-SCNC: 3.3 MMOL/L — LOW (ref 3.5–5)
PROT SERPL-MCNC: 5.4 G/DL — LOW (ref 6–8)
PROTHROM AB SERPL-ACNC: 14.3 SEC — HIGH (ref 9.95–12.87)
RBC # BLD: 2.99 M/UL — LOW (ref 4.2–5.4)
RBC # BLD: 3.12 M/UL — LOW (ref 4.2–5.4)
RBC # FLD: 15.5 % — HIGH (ref 11.5–14.5)
RBC # FLD: 15.6 % — HIGH (ref 11.5–14.5)
SODIUM SERPL-SCNC: 141 MMOL/L — SIGNIFICANT CHANGE UP (ref 135–146)
WBC # BLD: 2.14 K/UL — LOW (ref 4.8–10.8)
WBC # BLD: 3.03 K/UL — LOW (ref 4.8–10.8)
WBC # FLD AUTO: 2.14 K/UL — LOW (ref 4.8–10.8)
WBC # FLD AUTO: 3.03 K/UL — LOW (ref 4.8–10.8)

## 2022-07-24 PROCEDURE — 93010 ELECTROCARDIOGRAM REPORT: CPT

## 2022-07-24 PROCEDURE — 99233 SBSQ HOSP IP/OBS HIGH 50: CPT

## 2022-07-24 PROCEDURE — 71045 X-RAY EXAM CHEST 1 VIEW: CPT | Mod: 26

## 2022-07-24 RX ORDER — ACETAMINOPHEN 500 MG
650 TABLET ORAL EVERY 6 HOURS
Refills: 0 | Status: DISCONTINUED | OUTPATIENT
Start: 2022-07-24 | End: 2022-07-25

## 2022-07-24 RX ORDER — POTASSIUM CHLORIDE 20 MEQ
40 PACKET (EA) ORAL ONCE
Refills: 0 | Status: COMPLETED | OUTPATIENT
Start: 2022-07-24 | End: 2022-07-24

## 2022-07-24 RX ORDER — POTASSIUM CHLORIDE 20 MEQ
20 PACKET (EA) ORAL EVERY 4 HOURS
Refills: 0 | Status: COMPLETED | OUTPATIENT
Start: 2022-07-24 | End: 2022-07-24

## 2022-07-24 RX ORDER — MORPHINE SULFATE 50 MG/1
4 CAPSULE, EXTENDED RELEASE ORAL EVERY 6 HOURS
Refills: 0 | Status: DISCONTINUED | OUTPATIENT
Start: 2022-07-24 | End: 2022-07-24

## 2022-07-24 RX ORDER — MORPHINE SULFATE 50 MG/1
2 CAPSULE, EXTENDED RELEASE ORAL ONCE
Refills: 0 | Status: DISCONTINUED | OUTPATIENT
Start: 2022-07-24 | End: 2022-07-24

## 2022-07-24 RX ADMIN — PANTOPRAZOLE SODIUM 40 MILLIGRAM(S): 20 TABLET, DELAYED RELEASE ORAL at 12:52

## 2022-07-24 RX ADMIN — Medication 1 PATCH: at 12:10

## 2022-07-24 RX ADMIN — CHLORHEXIDINE GLUCONATE 1 APPLICATION(S): 213 SOLUTION TOPICAL at 12:52

## 2022-07-24 RX ADMIN — Medication 1 MILLIGRAM(S): at 13:28

## 2022-07-24 RX ADMIN — AMPICILLIN SODIUM AND SULBACTAM SODIUM 200 GRAM(S): 250; 125 INJECTION, POWDER, FOR SUSPENSION INTRAMUSCULAR; INTRAVENOUS at 17:02

## 2022-07-24 RX ADMIN — Medication 1 APPLICATION(S): at 06:42

## 2022-07-24 RX ADMIN — POLYMYXIN B SULFATE 500 UNIT(S): 500000 INJECTION, POWDER, LYOPHILIZED, FOR SOLUTION INTRAMUSCULAR; INTRATHECAL; INTRAVENOUS; OPHTHALMIC at 17:02

## 2022-07-24 RX ADMIN — Medication 1 PATCH: at 12:51

## 2022-07-24 RX ADMIN — Medication 1 APPLICATION(S): at 17:03

## 2022-07-24 RX ADMIN — Medication 1 PATCH: at 19:42

## 2022-07-24 RX ADMIN — AMPICILLIN SODIUM AND SULBACTAM SODIUM 200 GRAM(S): 250; 125 INJECTION, POWDER, FOR SUSPENSION INTRAMUSCULAR; INTRAVENOUS at 06:45

## 2022-07-24 RX ADMIN — Medication 20 MILLIEQUIVALENT(S): at 17:03

## 2022-07-24 RX ADMIN — Medication 20 MILLIEQUIVALENT(S): at 13:28

## 2022-07-24 RX ADMIN — Medication 100 GRAM(S): at 09:14

## 2022-07-24 RX ADMIN — METHADONE HYDROCHLORIDE 20 MILLIGRAM(S): 40 TABLET ORAL at 12:50

## 2022-07-24 RX ADMIN — Medication 100 MILLIGRAM(S): at 12:51

## 2022-07-24 RX ADMIN — CHLORHEXIDINE GLUCONATE 15 MILLILITER(S): 213 SOLUTION TOPICAL at 07:00

## 2022-07-24 RX ADMIN — Medication 1 MILLIGRAM(S): at 22:02

## 2022-07-24 RX ADMIN — AMPICILLIN SODIUM AND SULBACTAM SODIUM 200 GRAM(S): 250; 125 INJECTION, POWDER, FOR SUSPENSION INTRAMUSCULAR; INTRAVENOUS at 12:51

## 2022-07-24 RX ADMIN — Medication 1 PATCH: at 07:41

## 2022-07-24 RX ADMIN — DEXMEDETOMIDINE HYDROCHLORIDE IN 0.9% SODIUM CHLORIDE 1.76 MICROGRAM(S)/KG/HR: 4 INJECTION INTRAVENOUS at 07:04

## 2022-07-24 RX ADMIN — Medication 650 MILLIGRAM(S): at 17:52

## 2022-07-24 RX ADMIN — CHLORHEXIDINE GLUCONATE 15 MILLILITER(S): 213 SOLUTION TOPICAL at 17:02

## 2022-07-24 RX ADMIN — Medication 1 MILLIGRAM(S): at 12:52

## 2022-07-24 RX ADMIN — SCOPALAMINE 1 PATCH: 1 PATCH, EXTENDED RELEASE TRANSDERMAL at 00:18

## 2022-07-24 RX ADMIN — QUETIAPINE FUMARATE 100 MILLIGRAM(S): 200 TABLET, FILM COATED ORAL at 17:03

## 2022-07-24 RX ADMIN — MORPHINE SULFATE 2 MILLIGRAM(S): 50 CAPSULE, EXTENDED RELEASE ORAL at 12:53

## 2022-07-24 RX ADMIN — Medication 125 MILLIGRAM(S): at 17:03

## 2022-07-24 RX ADMIN — Medication 40 MILLIEQUIVALENT(S): at 22:02

## 2022-07-24 RX ADMIN — POLYMYXIN B SULFATE 500 UNIT(S): 500000 INJECTION, POWDER, LYOPHILIZED, FOR SOLUTION INTRAMUSCULAR; INTRATHECAL; INTRAVENOUS; OPHTHALMIC at 06:45

## 2022-07-24 RX ADMIN — MORPHINE SULFATE 2 MILLIGRAM(S): 50 CAPSULE, EXTENDED RELEASE ORAL at 13:33

## 2022-07-24 RX ADMIN — AMPICILLIN SODIUM AND SULBACTAM SODIUM 200 GRAM(S): 250; 125 INJECTION, POWDER, FOR SUSPENSION INTRAMUSCULAR; INTRAVENOUS at 23:51

## 2022-07-24 RX ADMIN — Medication 100 GRAM(S): at 17:02

## 2022-07-24 NOTE — CHART NOTE - NSCHARTNOTEFT_GEN_A_CORE
Called by RN because patient has been bleeding from the trach site. Patient POD #4 s/p trach on 07/20  On exam - patient hemodynamically stable, fresh bleeding noted around the trach site with soaked dressing. Small amount of blood tinged secretions coming out from the tracheostomy tube on suctioning.  Stat CBC, Type and screen, Coags sent.   Surgery PA called to evaluate the trach site.  Follow up Surgery recs.

## 2022-07-24 NOTE — PROVIDER CONTACT NOTE (CHANGE IN STATUS NOTIFICATION) - ACTION/TREATMENT ORDERED:
dressing changed. MD Garvey assessed py at bedside. Labs sent. Surgical Pa notified and aware, to see pt. Will monitor dressing closely
CBC ordered for afternoon. Surgical PA Carolina to reassess pt and change dressing. Surgicel to be applied per RUSSELL Figueroa

## 2022-07-24 NOTE — PROGRESS NOTE ADULT - SUBJECTIVE AND OBJECTIVE BOX
GENERAL SURGERY PROGRESS NOTE    Patient: POOJA DIANE , 26y ([birth year])Female   MRN: [id number]  Location: [address]- [address]  Visit: 06-15-22 Inpatient  Date: 07-24-22 @ 10:13      Procedure/Dx/Injuries: s/p tracheostomy 7/20    Events of past 24 hours: Bleeding noted around tracheostomy site. Tracheostomy evaluated at bedside, silver nitrate sticks used x2 to control bleeding from skin around inferior suture. Bleeding controlled, surgicel in place. Will re-evaluate later this AM.    PAST MEDICAL & SURGICAL HISTORY:  Hepatitis C      Asthma      Anxiety and depression      IV drug abuse  heroin      No significant past surgical history          Vitals:   T(F): 96.1 (07-24-22 @ 07:05), Max: 99.7 (07-23-22 @ 23:00)  HR: 83 (07-24-22 @ 09:05)  BP: 138/92 (07-24-22 @ 09:05)  RR: 25 (07-24-22 @ 09:05)  SpO2: 100% (07-24-22 @ 09:05)  Mode: AC/ CMV (Assist Control/ Continuous Mandatory Ventilation), RR (machine): 25, TV (machine): 370, FiO2: 35, PEEP: 5, MAP: 9, PIP: 17    Diet, NPO:   With Ice Chips/Sips of Water      Fluids:     I & O's:    07-23-22 @ 07:01  -  07-24-22 @ 07:00  --------------------------------------------------------  IN:    Dexmedetomidine: 440.6 mL    Enteral Tube Flush: 380 mL    IV PiggyBack: 100 mL    IV PiggyBack: 1000 mL    IV PiggyBack: 400 mL    IV PiggyBack: 500 mL  Total IN: 2820.6 mL    OUT:    Indwelling Catheter - Urethral (mL): 3950 mL    Voided (mL): 1950 mL  Total OUT: 5900 mL    Total NET: -3079.4 mL        Bowel Movement: : [] YES [] NO  Flatus: : [] YES [] NO    PHYSICAL EXAM:  General: NAD, AAOx3, calm and cooperative  HEENT: NCAT, MARIANNA, EOMI, Trachea ML, Neck supple  Cardiac: RRR S1, S2, no Murmurs, rubs or gallops  Respiratory: CTAB, bleeding around tracheostomy site, minimal bright red blood from inferior portion of incision    MEDICATIONS  (STANDING):  ampicillin/sulbactam  IVPB 3 Gram(s) IV Intermittent every 6 hours  BACItracin   Ointment 1 Application(s) Topical two times a day  chlorhexidine 0.12% Liquid 15 milliLiter(s) Oral Mucosa every 12 hours  chlorhexidine 2% Cloths 1 Application(s) Topical daily  clonazePAM  Tablet 1 milliGRAM(s) Oral every 8 hours  dexMEDEtomidine Infusion 0.1 MICROgram(s)/kG/Hr (1.76 mL/Hr) IV Continuous <Continuous>  fentaNYL   Infusion. 0.5 MICROgram(s)/kG/Hr (3.52 mL/Hr) IV Continuous <Continuous>  folic acid 1 milliGRAM(s) Oral daily  magnesium sulfate  IVPB 1 Gram(s) IV Intermittent every 12 hours  methadone    Tablet 20 milliGRAM(s) Oral daily  nicotine -  14 mG/24Hr(s) Patch 1 patch Transdermal daily  norepinephrine Infusion 0.05 MICROgram(s)/kG/Min (8.03 mL/Hr) IV Continuous <Continuous>  pantoprazole  Injectable 40 milliGRAM(s) IV Push daily  polymyxin B IVPB [id number] Unit(s) IV Intermittent every 12 hours  propofol Infusion 0.095 MICROgram(s)/kG/Min (0.04 mL/Hr) IV Continuous <Continuous>  QUEtiapine 100 milliGRAM(s) Oral two times a day  scopolamine 1 mG/72 Hr(s) Patch 1 Patch Transdermal every 72 hours  spironolactone 100 milliGRAM(s) Oral daily  thiamine 100 milliGRAM(s) Oral daily  vancomycin    Solution 125 milliGRAM(s) Oral every 12 hours    MEDICATIONS  (PRN):  ALBUTerol    90 MICROgram(s) HFA Inhaler 1 Puff(s) Inhalation every 4 hours PRN Shortness of Breath and/or Wheezing  cloNIDine 0.1 milliGRAM(s) Oral every 6 hours PRN opiate withdrawal  hydrOXYzine hydrochloride 50 milliGRAM(s) Oral every 6 hours PRN Anxiety  ibuprofen  Tablet. 400 milliGRAM(s) Oral every 6 hours PRN Mild Pain (1 - 3)  sodium chloride 0.9% lock flush 10 milliLiter(s) IV Push every 1 hour PRN Pre/post blood products, medications, blood draw, and to maintain line patency      DVT PROPHYLAXIS:   GI PROPHYLAXIS: pantoprazole  Injectable 40 milliGRAM(s) IV Push daily    ANTICOAGULATION:   ANTIBIOTICS:  ampicillin/sulbactam  IVPB 3 Gram(s)  polymyxin B IVPB [id number] Unit(s)  vancomycin    Solution 125 milliGRAM(s)            LAB/STUDIES:  Labs:  CAPILLARY BLOOD GLUCOSE                              8.1    2.14  )-----------( 105      ( 24 Jul 2022 04:15 )             25.7         07-23    139  |  104  |  11  ----------------------------<  88  3.3<L>   |  23  |  1.0      Calcium, Total Serum: 9.0 mg/dL (07-23-22 @ 19:45)      LFTs:             4.9  | 0.5  | 23       ------------------[231     ( 23 Jul 2022 07:40 )  2.5  | x    | 15          Lipase:x      Amylase:x         Blood Gas Arterial, Lactate: 0.40 mmol/L (07-24-22 @ 05:16)  Blood Gas Arterial, Lactate: 0.30 mmol/L (07-23-22 @ 04:16)  Blood Gas Arterial, Lactate: 0.40 mmol/L (07-22-22 @ 04:16)    ABG - ( 24 Jul 2022 05:16 )  pH: 7.42  /  pCO2: 40    /  pO2: 95    / HCO3: 26    / Base Excess: 1.3   /  SaO2: 98.6            ABG - ( 23 Jul 2022 04:16 )  pH: 7.40  /  pCO2: 35    /  pO2: 89    / HCO3: 22    / Base Excess: -2.7  /  SaO2: 98.7            ABG - ( 22 Jul 2022 04:16 )  pH: 7.39  /  pCO2: 33    /  pO2: 130   / HCO3: 20    / Base Excess: -4.4  /  SaO2: 99.8              Coags:     14.30  ----< 1.25    ( 24 Jul 2022 04:15 )     35.9

## 2022-07-24 NOTE — CHART NOTE - NSCHARTNOTEFT_GEN_A_CORE
called  by ICU NURSING  STAFF  PATIENT  S/P  OPEN TRACHEOSTOMY 7/20/22  DR WILCOX  NOW WITH SCANT BLEEDING FROM TRACH SITE  SURGICEL APPLIED  NO ACTIVE BLEEDING  WILL CONTINUE TO FOLLOW

## 2022-07-24 NOTE — PROVIDER CONTACT NOTE (CHANGE IN STATUS NOTIFICATION) - BACKGROUND
pt assessed by MD and PA prior for large bleeding around trach site
pt vented to trach, history of liver disease

## 2022-07-24 NOTE — PROGRESS NOTE ADULT - ASSESSMENT
Acute respiratory failure with hypoxemia / aspiration pneumonia with sepsis / suspected drug overdose / head laceration s/p fall in lobby / possible seizure / anasarca  pancytopenia      - c-diff colitis - continue oral vanco   - w/u and antibiotics as per ID    - s/p BMB - heme f/u    - surgery following trach site bleeding   -  supplement  hypokalemia and  hypomagnesemia    - slow weaning trial

## 2022-07-24 NOTE — PROGRESS NOTE ADULT - SUBJECTIVE AND OBJECTIVE BOX
Patient is a 26y old  Female who presents with a chief complaint of anasarca (23 Jul 2022 20:41)        HPI:  Patient is a 26 year old female with hx of asthma, untreated Hep C, IVDA, anasarca presenting with increased dyspnea since yesterday. Patient has been short of breath chronically and has been admitted for fluid overload. Patient describes worsening symptoms yesterday associated with cough. States generalized edema has remained the same. Patient is actively using heroin. Not on any meds or MMTP.  Denies fever, chills, sore throat, cp, palpitations, abd pain, n/v/d, dysuria, headache, syncope, hemoptysis. + generalized edema (15 Reinaldo 2022 10:03)      Pt evaluated on rounds.  I reviewed the radiology tests and hospital record.    I reviewed previous notes on this patient.    Interval Events: No overnight events.      CAM:+    SAT:+    SBT:+      REVIEW OF SYSTEMS:   see HPI      OBJECTIVE:  ICU Vital Signs Last 24 Hrs  T(C): 36.7 (24 Jul 2022 03:00), Max: 37.6 (23 Jul 2022 23:00)  T(F): 98.1 (24 Jul 2022 03:00), Max: 99.7 (23 Jul 2022 23:00)  HR: 90 (24 Jul 2022 04:00) (58 - 127)  BP: 140/97 (24 Jul 2022 03:00) (90/60 - 154/90)  BP(mean): 114 (24 Jul 2022 03:00) (71 - 114)  ABP: --  ABP(mean): --  RR: 26 (24 Jul 2022 04:00) (16 - 27)  SpO2: 100% (24 Jul 2022 04:00) (97% - 100%)    O2 Parameters below as of 24 Jul 2022 03:00  Patient On (Oxygen Delivery Method): ventilator,trach          Mode: AC/ CMV (Assist Control/ Continuous Mandatory Ventilation), RR (machine): 25, TV (machine): 370, FiO2: 35, PEEP: 5, MAP: 9, PIP: 17    07-22 @ 07:01  -  07-23 @ 07:00  --------------------------------------------------------  IN: 2380.1 mL / OUT: 2870 mL / NET: -489.9 mL    07-23 @ 07:01 - 07-24 @ 04:51  --------------------------------------------------------  IN: 1930.6 mL / OUT: 5525 mL / NET: -3594.4 mL      CAPILLARY BLOOD GLUCOSE            PHYSICAL EXAM:       · ENMT:   Airway patent,   Nasal mucosa clear.  Mouth with normal mucosa.   No thrush    · EYES:   Clear bilaterally,   pupils equal,   round and reactive to light.    · CARDIAC:   Normal rate,   regular rhythm.    Heart sounds S1, S2.   No murmurs, no rubs or gallops on auscultation  no edema        CAROTID:   normal systolic impulse  no bruits    · RESPIRATORY:   rales  normal chest expansion  no retractions or use of accessory muscles  palpation of chest is normal with no fremitus  percussion of chest demonstrates no hyperresonance or dullness    · GASTROINTESTINAL:  Abdomen soft,   non-tender,   + BS  liver/spleen not palpable    · MUSCULOSKELETAL:   no clubbing, cyanosis      · SKIN:   Skin normal color for race,   warm, dry   No evidence of rash.        · HEME LYMPH:   no splenomegaly.  No cervical  lymphadenopathy.  no inguinal lymphadenopathy    HOSPITAL MEDICATIONS:  MEDICATIONS  (STANDING):  ampicillin/sulbactam  IVPB 3 Gram(s) IV Intermittent every 6 hours  BACItracin   Ointment 1 Application(s) Topical two times a day  chlorhexidine 0.12% Liquid 15 milliLiter(s) Oral Mucosa every 12 hours  chlorhexidine 2% Cloths 1 Application(s) Topical daily  clonazePAM  Tablet 1 milliGRAM(s) Oral every 8 hours  dexMEDEtomidine Infusion 0.1 MICROgram(s)/kG/Hr (1.76 mL/Hr) IV Continuous <Continuous>  enoxaparin Injectable 40 milliGRAM(s) SubCutaneous every 24 hours  fentaNYL   Infusion. 0.5 MICROgram(s)/kG/Hr (3.52 mL/Hr) IV Continuous <Continuous>  folic acid 1 milliGRAM(s) Oral daily  magnesium sulfate  IVPB 1 Gram(s) IV Intermittent every 12 hours  methadone    Tablet 20 milliGRAM(s) Oral daily  nicotine -  14 mG/24Hr(s) Patch 1 patch Transdermal daily  norepinephrine Infusion 0.05 MICROgram(s)/kG/Min (8.03 mL/Hr) IV Continuous <Continuous>  pantoprazole  Injectable 40 milliGRAM(s) IV Push daily  polymyxin B IVPB [id number] Unit(s) IV Intermittent every 12 hours  propofol Infusion 0.095 MICROgram(s)/kG/Min (0.04 mL/Hr) IV Continuous <Continuous>  QUEtiapine 100 milliGRAM(s) Oral two times a day  scopolamine 1 mG/72 Hr(s) Patch 1 Patch Transdermal every 72 hours  spironolactone 100 milliGRAM(s) Oral daily  thiamine 100 milliGRAM(s) Oral daily  vancomycin    Solution 125 milliGRAM(s) Oral every 12 hours    MEDICATIONS  (PRN):  ALBUTerol    90 MICROgram(s) HFA Inhaler 1 Puff(s) Inhalation every 4 hours PRN Shortness of Breath and/or Wheezing  cloNIDine 0.1 milliGRAM(s) Oral every 6 hours PRN opiate withdrawal  hydrOXYzine hydrochloride 50 milliGRAM(s) Oral every 6 hours PRN Anxiety  ibuprofen  Tablet. 400 milliGRAM(s) Oral every 6 hours PRN Mild Pain (1 - 3)  sodium chloride 0.9% lock flush 10 milliLiter(s) IV Push every 1 hour PRN Pre/post blood products, medications, blood draw, and to maintain line patency    dextrose 5% + sodium chloride 0.45%.: Solution, 1000 milliLiter(s) infuse at 50 mL/Hr  lactated ringers.: Solution, 1000 milliLiter(s) infuse at 65 mL/Hr, Stop After 18 Hours  lactated ringers.: Solution, 1000 milliLiter(s) infuse at 100 mL/Hr, Stop After 1 Days  Provider's Contact #: [phone]  lactated ringers Bolus:   500 milliLiter(s), IV Bolus, once, infuse over 60 Minute(s), Stop After 1 Doses  sodium chloride 0.9% Bolus:   500 milliLiter(s), IV Bolus, once, infuse over 30 Minute(s), Stop After 1 Doses  sodium chloride 0.9% Bolus:   250 milliLiter(s), IV Bolus, once, infuse over 15 Minute(s), Stop After 1 Doses  lactated ringers Bolus:   250 milliLiter(s), IV Bolus, once, infuse over 60 Minute(s), Stop After 1 Doses  Special Instructions: please do CHEETAH  sodium chloride 0.9% Bolus:   1000 milliLiter(s), IV Bolus, once, infuse over 60 Minute(s), Stop After 1 Doses  Provider's Contact #: [phone]  lactated ringers Bolus:   500 milliLiter(s), IV Bolus, once, infuse over 1 Hr, Stop After 1 Doses  Provider's Contact #: [phone]  lactated ringers.: Solution, 500 milliLiter(s) infuse at 250 mL/Hr      LABS:                        9.2    1.68  )-----------( 136      ( 23 Jul 2022 07:40 )             29.5     07-23    139  |  104  |  11  ----------------------------<  88  3.3<L>   |  23  |  1.0    Ca    9.0      23 Jul 2022 19:45  Phos  5.6     07-23  Mg     1.6     07-23    TPro  4.9<L>  /  Alb  2.5<L>  /  TBili  0.5  /  DBili  x   /  AST  23  /  ALT  15  /  AlkPhos  231<H>  07-23    PT/INR - ( 24 Jul 2022 04:15 )   PT: 14.30 sec;   INR: 1.25 ratio         PTT - ( 24 Jul 2022 04:15 )  PTT:35.9 sec    Arterial Blood Gas:  07-23 @ 04:16  7.40/35/89/22/98.7/-2.7  ABG lactate: --      Mode: AC/ CMV (Assist Control/ Continuous Mandatory Ventilation), RR (machine): 25, TV (machine): 370, FiO2: 35, PEEP: 5, MAP: 9, PIP: 17      COVID-19 PCR: NotDetec (19 Jul 2022 19:56)  COVID-19 PCR: NotDetec (17 Jul 2022 20:01)  COVID-19 PCR: NotDetec (14 Jul 2022 15:00)  SARS-CoV-2: NotDetec (30 Jun 2022 09:19)  COVID-19 PCR: NotDetec (15 Reinaldo 2022 07:10)    Mode: AC/ CMV (Assist Control/ Continuous Mandatory Ventilation)  RR (machine): 25  TV (machine): 370  FiO2: 35  PEEP: 5  MAP: 9  PIP: 17      ABG - ( 23 Jul 2022 04:16 )  pH, Arterial: 7.40  pH, Blood: x     /  pCO2: 35    /  pO2: 89    / HCO3: 22    / Base Excess: -2.7  /  SaO2: 98.7                RADIOLOGY: Today I personally interpreted the latest CXR and other pertinent films.           Patient is a 26y old  Female who presents with a chief complaint of anasarca (23 Jul 2022 20:41)        HPI:  Patient is a 26 year old female with hx of asthma, untreated Hep C, IVDA, anasarca presenting with increased dyspnea since yesterday. Patient has been short of breath chronically and has been admitted for fluid overload. Patient describes worsening symptoms yesterday associated with cough. States generalized edema has remained the same. Patient is actively using heroin. Not on any meds or MMTP.  Denies fever, chills, sore throat, cp, palpitations, abd pain, n/v/d, dysuria, headache, syncope, hemoptysis. + generalized edema (15 Reinaldo 2022 10:03)      Pt evaluated on rounds.  I reviewed the radiology tests and hospital record.    I reviewed previous notes on this patient.    Interval Events: Bleeding around trach overnight . Surgery on case packed area      CAM:+    SAT:+    SBT:+      REVIEW OF SYSTEMS:   see HPI      OBJECTIVE:  ICU Vital Signs Last 24 Hrs  T(C): 36.7 (24 Jul 2022 03:00), Max: 37.6 (23 Jul 2022 23:00)  T(F): 98.1 (24 Jul 2022 03:00), Max: 99.7 (23 Jul 2022 23:00)  HR: 90 (24 Jul 2022 04:00) (58 - 127)  BP: 140/97 (24 Jul 2022 03:00) (90/60 - 154/90)  BP(mean): 114 (24 Jul 2022 03:00) (71 - 114)  ABP: --  ABP(mean): --  RR: 26 (24 Jul 2022 04:00) (16 - 27)  SpO2: 100% (24 Jul 2022 04:00) (97% - 100%)    O2 Parameters below as of 24 Jul 2022 03:00  Patient On (Oxygen Delivery Method): ventilator,trach          Mode: AC/ CMV (Assist Control/ Continuous Mandatory Ventilation), RR (machine): 25, TV (machine): 370, FiO2: 35, PEEP: 5, MAP: 9, PIP: 17    07-22 @ 07:01  -  07-23 @ 07:00  --------------------------------------------------------  IN: 2380.1 mL / OUT: 2870 mL / NET: -489.9 mL    07-23 @ 07:01 - 07-24 @ 04:51  --------------------------------------------------------  IN: 1930.6 mL / OUT: 5525 mL / NET: -3594.4 mL      CAPILLARY BLOOD GLUCOSE            PHYSICAL EXAM:       · ENMT:   Airway patent,   Nasal mucosa clear.  Mouth with normal mucosa.   No thrush    · EYES:   Clear bilaterally,   pupils equal,   round and reactive to light.    · CARDIAC:   Normal rate,   regular rhythm.    Heart sounds S1, S2.   No murmurs, no rubs or gallops on auscultation  no edema        CAROTID:   normal systolic impulse  no bruits    · RESPIRATORY:   rales  normal chest expansion  no retractions or use of accessory muscles  palpation of chest is normal with no fremitus  percussion of chest demonstrates no hyperresonance or dullness    · GASTROINTESTINAL:  Abdomen soft,   non-tender,   + BS  liver/spleen not palpable    · MUSCULOSKELETAL:   no clubbing, cyanosis      · SKIN:   Skin normal color for race,   warm, dry   No evidence of rash.        · HEME LYMPH:   no splenomegaly.  No cervical  lymphadenopathy.  no inguinal lymphadenopathy    HOSPITAL MEDICATIONS:  MEDICATIONS  (STANDING):  ampicillin/sulbactam  IVPB 3 Gram(s) IV Intermittent every 6 hours  BACItracin   Ointment 1 Application(s) Topical two times a day  chlorhexidine 0.12% Liquid 15 milliLiter(s) Oral Mucosa every 12 hours  chlorhexidine 2% Cloths 1 Application(s) Topical daily  clonazePAM  Tablet 1 milliGRAM(s) Oral every 8 hours  dexMEDEtomidine Infusion 0.1 MICROgram(s)/kG/Hr (1.76 mL/Hr) IV Continuous <Continuous>  enoxaparin Injectable 40 milliGRAM(s) SubCutaneous every 24 hours  fentaNYL   Infusion. 0.5 MICROgram(s)/kG/Hr (3.52 mL/Hr) IV Continuous <Continuous>  folic acid 1 milliGRAM(s) Oral daily  magnesium sulfate  IVPB 1 Gram(s) IV Intermittent every 12 hours  methadone    Tablet 20 milliGRAM(s) Oral daily  nicotine -  14 mG/24Hr(s) Patch 1 patch Transdermal daily  norepinephrine Infusion 0.05 MICROgram(s)/kG/Min (8.03 mL/Hr) IV Continuous <Continuous>  pantoprazole  Injectable 40 milliGRAM(s) IV Push daily  polymyxin B IVPB [id number] Unit(s) IV Intermittent every 12 hours  propofol Infusion 0.095 MICROgram(s)/kG/Min (0.04 mL/Hr) IV Continuous <Continuous>  QUEtiapine 100 milliGRAM(s) Oral two times a day  scopolamine 1 mG/72 Hr(s) Patch 1 Patch Transdermal every 72 hours  spironolactone 100 milliGRAM(s) Oral daily  thiamine 100 milliGRAM(s) Oral daily  vancomycin    Solution 125 milliGRAM(s) Oral every 12 hours    MEDICATIONS  (PRN):  ALBUTerol    90 MICROgram(s) HFA Inhaler 1 Puff(s) Inhalation every 4 hours PRN Shortness of Breath and/or Wheezing  cloNIDine 0.1 milliGRAM(s) Oral every 6 hours PRN opiate withdrawal  hydrOXYzine hydrochloride 50 milliGRAM(s) Oral every 6 hours PRN Anxiety  ibuprofen  Tablet. 400 milliGRAM(s) Oral every 6 hours PRN Mild Pain (1 - 3)  sodium chloride 0.9% lock flush 10 milliLiter(s) IV Push every 1 hour PRN Pre/post blood products, medications, blood draw, and to maintain line patency    dextrose 5% + sodium chloride 0.45%.: Solution, 1000 milliLiter(s) infuse at 50 mL/Hr  lactated ringers.: Solution, 1000 milliLiter(s) infuse at 65 mL/Hr, Stop After 18 Hours  lactated ringers.: Solution, 1000 milliLiter(s) infuse at 100 mL/Hr, Stop After 1 Days  Provider's Contact #: [phone]  lactated ringers Bolus:   500 milliLiter(s), IV Bolus, once, infuse over 60 Minute(s), Stop After 1 Doses  sodium chloride 0.9% Bolus:   500 milliLiter(s), IV Bolus, once, infuse over 30 Minute(s), Stop After 1 Doses  sodium chloride 0.9% Bolus:   250 milliLiter(s), IV Bolus, once, infuse over 15 Minute(s), Stop After 1 Doses  lactated ringers Bolus:   250 milliLiter(s), IV Bolus, once, infuse over 60 Minute(s), Stop After 1 Doses  Special Instructions: please do CHEETAH  sodium chloride 0.9% Bolus:   1000 milliLiter(s), IV Bolus, once, infuse over 60 Minute(s), Stop After 1 Doses  Provider's Contact #: [phone]  lactated ringers Bolus:   500 milliLiter(s), IV Bolus, once, infuse over 1 Hr, Stop After 1 Doses  Provider's Contact #: [phone]  lactated ringers.: Solution, 500 milliLiter(s) infuse at 250 mL/Hr      LABS:                        9.2    1.68  )-----------( 136      ( 23 Jul 2022 07:40 )             29.5     07-23    139  |  104  |  11  ----------------------------<  88  3.3<L>   |  23  |  1.0    Ca    9.0      23 Jul 2022 19:45  Phos  5.6     07-23  Mg     1.6     07-23    TPro  4.9<L>  /  Alb  2.5<L>  /  TBili  0.5  /  DBili  x   /  AST  23  /  ALT  15  /  AlkPhos  231<H>  07-23    PT/INR - ( 24 Jul 2022 04:15 )   PT: 14.30 sec;   INR: 1.25 ratio         PTT - ( 24 Jul 2022 04:15 )  PTT:35.9 sec    Arterial Blood Gas:  07-23 @ 04:16  7.40/35/89/22/98.7/-2.7  ABG lactate: --      Mode: AC/ CMV (Assist Control/ Continuous Mandatory Ventilation), RR (machine): 25, TV (machine): 370, FiO2: 35, PEEP: 5, MAP: 9, PIP: 17      COVID-19 PCR: NotDetec (19 Jul 2022 19:56)  COVID-19 PCR: NotDetec (17 Jul 2022 20:01)  COVID-19 PCR: NotDetec (14 Jul 2022 15:00)  SARS-CoV-2: NotDetec (30 Jun 2022 09:19)  COVID-19 PCR: NotDetec (15 Reinaldo 2022 07:10)    Mode: AC/ CMV (Assist Control/ Continuous Mandatory Ventilation)  RR (machine): 25  TV (machine): 370  FiO2: 35  PEEP: 5  MAP: 9  PIP: 17      ABG - ( 23 Jul 2022 04:16 )  pH, Arterial: 7.40  pH, Blood: x     /  pCO2: 35    /  pO2: 89    / HCO3: 22    / Base Excess: -2.7  /  SaO2: 98.7                RADIOLOGY: Today I personally interpreted the latest CXR and other pertinent films.

## 2022-07-24 NOTE — PROGRESS NOTE ADULT - ASSESSMENT
27yo woman with Hep C presenting with acute respiratory failure in setting of substance abuse, s/p intubation (prolonged) and course c/b C dif colitis and Acitenobacter respiratory cultures s/p tracheostomy 7/20.    Plan    Keep head of bed elevated  Frequent suctioning  Keep packing in place for now  Will re-evaluate for bleeding later this AM  have size 6 cuffed shiley trach at bedside at all times in case of need for emergent replacement    [id number]

## 2022-07-24 NOTE — CHART NOTE - NSCHARTNOTEFT_GEN_A_CORE
.  Called to ICU due to persistent bleeding from Tracheostomy site that started early am.  Overnight PA assessed earlier and he put some Surgicel gauze but RN states still oozing.    Dr. Mascorro called (covering for Dr. Moyer since on vacation) and states to remove all dressing to see where bleeding from and redress and have the surgical resident evaluate the bleeding.      Dressing removed and 2 Surgicel are in place, No arterial bleeding noted but she did have a slow bloody oozing noted inferior Trach site.  Trach functioning well.    Applied 2 more Surgicel gauze to bleeding area and applied multiple gauze 4 x 4  folded to apply compression to the area.      Will re-evaluate when surgical resident arrives.

## 2022-07-24 NOTE — PROGRESS NOTE ADULT - SUBJECTIVE AND OBJECTIVE BOX
Patient is seen and examined at the bed side, is afebrile.  She mentioned feeling better and is tolerating ABx well.     REVIEW OF SYSTEMS: All other review systems are negative      ALLERGIES: buprenorphine (Rash)  Suboxone (Rash)      ICU Vital Signs Last 24 Hrs  T(C): 37.7 (2022 19:20), Max: 37.7 (2022 19:20)  T(F): 99.8 (2022 19:20), Max: 99.8 (2022 19:20)  HR: 104 (2022 19:20) (66 - 114)  BP: 142/93 (2022 19:20) (104/72 - 182/104)  BP(mean): 108 (2022 18:00) (82 - 135)  ABP: --  ABP(mean): --  RR: 25 (2022 18:00) (21 - 38)  SpO2: 99% (2022 19:20) (98% - 100%)    O2 Parameters below as of 2022 07:23  Patient On (Oxygen Delivery Method): ventilator    O2 Concentration (%): 35        PHYSICAL EXAM:  GENERAL: Not in distress  HEENT: Trach in placed  CHEST/LUNG: Not using accessory muscles   HEART: s1 and s2 present  ABDOMEN:  Nontender and  Nondistended  EXTREMITIES: No pedal  edema  CNS:  Awake and  Alert      LABS:                        8.4    3.03  )-----------( 129      ( 2022 12:16 )             26.4                           9.2    1.68  )-----------( 136      ( 2022 07:40 )             29.5           141  |  103  |  9<L>  ----------------------------<  78  3.3<L>   |  25  |  0.8    Ca    8.9      2022 17:30  Phos  5.6       Mg     1.9         TPro  5.4<L>  /  Alb  3.1<L>  /  TBili  0.5  /  DBili  x   /  AST  18  /  ALT  12  /  AlkPhos  221<H>      139  |  104  |  11  ----------------------------<  88  3.3<L>   |  23  |  1.0    Ca    9.0      2022 19:45  Phos  5.6       Mg     1.6         TPro  4.9<L>  /  Alb  2.5<L>  /  TBili  0.5  /  DBili  x   /  AST  23  /  ALT  15  /  AlkPhos  231<H>        ABG - ( 2022 03:14 )  pH, Arterial: 7.30  pH, Blood: x     /  pCO2: 51    /  pO2: 111   / HCO3: 25    / Base Excess: -1.7  /  SaO2: 99.0          Urinalysis Basic - ( 2022 19:06 )  Color: Yellow / Appearance: Slightly Cloudy / S.025 / pH: x  Gluc: x / Ketone: Trace  / Bili: Negative / Urobili: 1.0 mg/dL   Blood: x / Protein: >=300 mg/dL / Nitrite: Negative   Leuk Esterase: Negative / RBC: 26-50 /HPF / WBC 3-5 /HPF   Sq Epi: x / Non Sq Epi: Occasional /HPF / Bacteria: Moderate        MEDICATIONS  (STANDING):    ampicillin/sulbactam  IVPB 3 Gram(s) IV Intermittent every 6 hours  BACItracin   Ointment 1 Application(s) Topical two times a day  chlorhexidine 0.12% Liquid 15 milliLiter(s) Oral Mucosa every 12 hours  chlorhexidine 2% Cloths 1 Application(s) Topical daily  clonazePAM  Tablet 1 milliGRAM(s) Oral every 8 hours  dexMEDEtomidine Infusion 0.1 MICROgram(s)/kG/Hr (1.76 mL/Hr) IV Continuous <Continuous>  fentaNYL   Infusion. 0.5 MICROgram(s)/kG/Hr (3.52 mL/Hr) IV Continuous <Continuous>  folic acid 1 milliGRAM(s) Oral daily  magnesium sulfate  IVPB 1 Gram(s) IV Intermittent every 12 hours  methadone    Tablet 20 milliGRAM(s) Oral daily  nicotine -  14 mG/24Hr(s) Patch 1 patch Transdermal daily  norepinephrine Infusion 0.05 MICROgram(s)/kG/Min (8.03 mL/Hr) IV Continuous <Continuous>  pantoprazole  Injectable 40 milliGRAM(s) IV Push daily  polymyxin B IVPB 151114 Unit(s) IV Intermittent every 12 hours  potassium chloride   Powder 40 milliEquivalent(s) Oral once  propofol Infusion 0.095 MICROgram(s)/kG/Min (0.04 mL/Hr) IV Continuous <Continuous>  QUEtiapine 100 milliGRAM(s) Oral two times a day  scopolamine 1 mG/72 Hr(s) Patch 1 Patch Transdermal every 72 hours  spironolactone 100 milliGRAM(s) Oral daily  thiamine 100 milliGRAM(s) Oral daily  vancomycin    Solution 125 milliGRAM(s) Oral every 12 hours        RADIOLOGY & ADDITIONAL TESTS:    < from: Xray Kidney Ureter Bladder (22 @ 13:25) >    FINDINGS: Enteric feeding tube is stable, with tip in the left upper quadrant.  There is a non-obstructive bowel gas pattern.  No abnormal   masses or calcifications are seen.  Stable probe/catheter projects over  the lower pelvis.    < from: CT Angio Chest PE Protocol w/ IV Cont (22 @ 21:14) No pulmonary embolus.    Near complete consolidation/collapse of the left lower lobe, increased as  compared with prior. Mildly increased right lower lobe patchy   consolidative opacities-atelectasis. Adjacent bilateral small pleural effusions. Findings compatible with likely mixed pneumonia and  atelectasis.    Redemonstration of a dilated main pulmonary artery measuring up to 3.8 cm  compatible with pulmonary hypertension.    < from: Xray Chest 1 View- PORTABLE-Routine (Xray Chest 1 View- PORTABLE-Routine in AM.) (22 @ 05:28) >  Stable cardiomegaly and left basilar opacity. Stable support devices.      < from: CT Abdomen and Pelvis w/ IV Cont (22 @ 17:27) > No evidence of retroperitoneal hematoma.    Small bilateral pleural effusions and left basilar consolidation. Moderate ascites redemonstrated.    Moderate pericardial effusion.  Hepatosplenomegaly redemonstrated.        MICROBIOLOGY DATA:  Clostridium difficile Toxin by PCR (22 @ 19:15)   Clostridium difficile Toxin by PCR: RESULT INTERPRETATION:  Detected     Culture - Sputum . (22 @ 13:39)   Gram Stain: Few polymorphonuclear leukocytes per low power field   No Squamous epithelial cells per low power field   Numerous Gram Negative Coccobacilli seen per oil power field   Specimen Source: ET Tube ET Tube   Culture Results:   Moderate Acinetobacter baumannii/nosocomialis group   Normal Respiratory Kelly absent     MRSA/MSSA PCR (22 @ 10:34)   MRSA PCR Result.: Negative    Respiratory Viral Panel with COVID-19 by BROOKE (22 @ 09:19)   Rapid RVP Result: NotDetec   SARS-CoV-2: NotDetec:    Culture - Blood (22 @ 20:31)   Specimen Source: .Blood Blood-Peripheral   Culture Results:   No growth to date.     Culture - Blood (22 @ 20:31)   Specimen Source: .Blood Blood   Culture Results:   No growth to date.     Culture - Sputum . (22 @ 14:00)   - Oxacillin: S <=0.25 Oxacillin predicts susceptibility for dicloxacillin, methicillin, and nafcillin   - Cefazolin: S <=4   - Erythromycin: S <=0.25   - Gentamicin: S <=1 Should not be used as monotherapy   - Clindamycin: S <=0.25   - Rifampin: S <=1 Should not be used as monotherapy   - Tetra/Doxy: S <=1   - Trimethoprim/Sulfamethoxazole: S <=0.5/9.5   - Vancomycin: S 2   Gram Stain:   Moderate polymorphonuclear leukocytes per low power field   Few Squamous epithelial cells per low power field   Moderate Gram positive cocci in pairs seen per oil power field   Few Gram Variable Rods seen per oil power field   - Ampicillin/Sulbactam: S <=8/4   Specimen Source: Trach Asp Tracheal Aspirate   Culture Results:   Numerous Mixed gram negative rods   Moderate Staphylococcus aureus   Normal Respiratory Kelly present   Organism Identification: Staphylococcus aureus   Organism: Staphylococcus aureus   Culture - Blood in AM (22 @ 06:00)   Specimen Source: .Blood Blood   Culture Results: No growth to date.     Culture - Sputum . (22 @ 14:00)   Gram Stain:   Moderate polymorphonuclear leukocytes per low power field   Few Squamous epithelial cells per low power field   Moderate Gram positive cocci in pairs seen per oil power field   Few Gram Variable Rods seen per oil power field   Specimen Source: Trach Asp Tracheal Aspirate   Culture Results:   Numerous Mixed gram negative rods   Moderate Staphylococcus aureus Susceptibility to follow.   Normal Respiratory Kelly absent     Culture - Sputum . (22 @ 17:20)   Gram Stain:   Few polymorphonuclear leukocytes per low power field   Rare Squamous epithelial cells per low power field   Moderate Gram Negative Rods seen per oil power field   - Amikacin: S <=16   - Amikacin: S <=16   - Amoxicillin/Clavulanic Acid: R >16/8   - Amoxicillin/Clavulanic Acid: S <=8/4   - Ampicillin: R 16 These ampicillin results predict results for amoxicillin   - Ampicillin: R >16 These ampicillin results predict results for amoxicillin   - Ampicillin/Sulbactam: R >16/8 Enterobacter, Klebsiella aerogenes, Citrobacter, and Serratia may develop resistance during prolonged therapy (3-4 days)   - Ampicillin/Sulbactam: S <=4/2 Enterobacter, Klebsiella aerogenes, Citrobacter, and Serratia may develop resistance during prolonged therapy (3-4 days)   - Aztreonam: S <=4   - Aztreonam: S <=4   - Cefazolin: R >16 Enterobacter, Klebsiella aerogenes, Citrobacter, and Serratia may develop resistance during prolonged therapy (3-4 days)   - Cefazolin: S <=2 Enterobacter, Klebsiella aerogenes, Citrobacter, and Serratia may develop resistance during prolonged therapy (3-4 days)   - Cefepime: S <=2   - Cefepime: S <=2   - Cefoxitin: R >16   - Cefoxitin: S <=8   - Ceftriaxone: S <=1 Enterobacter, Klebsiella aerogenes, Citrobacter, and Serratia may develop resistance during prolonged therapy   - Ceftriaxone: S <=1 Enterobacter, Klebsiella aerogenes, Citrobacter, and Serratia may develop resistance during prolonged therapy   - Ciprofloxacin: S <=0.25   - Ciprofloxacin: S <=0.25   - Ertapenem: S <=0.5   - Ertapenem: S <=0.5   - Gentamicin: S <=2   - Gentamicin: S <=2   - Imipenem: S <=1   - Imipenem: S <=1   - Levofloxacin: S <=0.5   - Levofloxacin: S <=0.5   - Meropenem: S <=1   - Meropenem: S <=1   - Piperacillin/Tazobactam: S <=8   - Piperacillin/Tazobactam: S <=8   - Tobramycin: S <=2   - Tobramycin: S <=2   - Trimethoprim/Sulfamethoxazole: S <=0.5/9.5   - Trimethoprim/Sulfamethoxazole: S <=0.5/9.5   Specimen Source: Trach Asp Tracheal Aspirate   Culture Results:   Moderate Klebsiella oxytoca/Raoutella ornithinolytica   Moderate Enterobacter cloacae complex   Normal Respiratory Kelly absent   Organism Identification: Klebsiella oxytoca /Raoutella ornithinolytica   Enterobacter cloacae complex   Organism: Klebsiella oxytoca /Raoutella ornithinolytica   Organism: Enterobacter cloacae complex COVID-19 PCR (06.15.22 @ 07:10)   COVID-19 PCR: NotDetec: Culture - Acid Fast - Sputum w/Smear (22 @ 07:00)   Specimen Source: .Sputum Sputum   Acid Fast Bacilli Smear:   No acid fast bacilli seen by fluorochrome stain   Culture Results:   No acid fast bacilli isolated after 6 weeks.

## 2022-07-24 NOTE — PROGRESS NOTE ADULT - SUBJECTIVE AND OBJECTIVE BOX
Patient seen and evaluated this am, had some bleeding from trach site over past day      T(F): 99.3 (07-24-22 @ 15:00), Max: 99.7 (07-23-22 @ 23:00)  HR: 98 (07-24-22 @ 15:00)  BP: 170/111 (07-24-22 @ 15:00)  RR: 25 (07-24-22 @ 15:00)  SpO2: 100% (07-24-22 @ 15:00) (98% - 100%)    PHYSICAL EXAM:  GENERAL: NAD  HEAD:  Atraumatic, Normocephalic  EYES: EOMI, PERRLA, conjunctiva and sclera clear  NERVOUS SYSTEM:   no focal deficits   CHEST/LUNG:  bilateral rhonchi  HEART: Regular rate and rhythm; No murmurs, rubs, or gallops  ABDOMEN: Soft, Nontender, Nondistended; Bowel sounds present  EXTREMITIES:  2+ Peripheral Pulses, No clubbing, cyanosis, or edema    LABS  07-23    139  |  104  |  11  ----------------------------<  88  3.3<L>   |  23  |  1.0    Ca    9.0      23 Jul 2022 19:45  Phos  5.6     07-23  Mg     1.6     07-23    TPro  4.9<L>  /  Alb  2.5<L>  /  TBili  0.5  /  DBili  x   /  AST  23  /  ALT  15  /  AlkPhos  231<H>  07-23                          8.4    3.03  )-----------( 129      ( 24 Jul 2022 12:16 )             26.4     PT/INR - ( 24 Jul 2022 04:15 )   PT: 14.30 sec;   INR: 1.25 ratio         PTT - ( 24 Jul 2022 04:15 )  PTT:35.9 sec  Mode: CPAP with PS  FiO2: 35  PEEP: 5  PS: 5      Culture Results:   Normal Respiratory Kelly present (07-15-22)  Culture Results:   Babesia microti PCR  Results: NOT detected  ***************Result Note*************  The detection of Babesia microti by PCR has only been  validated for whole blood; this test has not been approved  by the [place] Food and Drug Administration (FDA). Performance  characteristics of this assay have been determined by  [employer]. The clinical significance  of results should be considered in conjunction with the  overall clinical presentation of the patient. Result is not  intended to be used as the sole means for clinical diagnosis  or patient management decisions.  One negative sample does not necessarily rule  out the presence of a parasitic infection. (07-10-22)  Culture Results:   No Growth Final (07-08-22)  Culture Results:   No Growth Final (07-06-22)  Culture Results:   No Growth Final (07-06-22)  Culture Results:   Moderate Acinetobacter baumannii/nosocom group (Carbapenem Resistant)  Cefiderocol interpretations based on FDA breakpoints.  Normal Respiratory Kelly absent (07-04-22)  Culture Results:   No Growth Final (06-29-22)  Culture Results:   No Growth Final (06-29-22)  Culture Results:   No Growth Final (06-28-22)  Culture Results:   Numerous Mixed gram negative rods  Moderate Staphylococcus aureus  Normal Respiratory Kelly present (06-27-22)    RADIOLOGY  < from: Xray Chest 1 View- PORTABLE-Routine (Xray Chest 1 View- PORTABLE-Routine in AM.) (07.24.22 @ 06:38) >  Impression:    Bilateral opacities and effusions, unchanged.      < end of copied text >    MEDICATIONS  (STANDING):  ampicillin/sulbactam  IVPB 3 Gram(s) IV Intermittent every 6 hours  BACItracin   Ointment 1 Application(s) Topical two times a day  chlorhexidine 0.12% Liquid 15 milliLiter(s) Oral Mucosa every 12 hours  chlorhexidine 2% Cloths 1 Application(s) Topical daily  clonazePAM  Tablet 1 milliGRAM(s) Oral every 8 hours  dexMEDEtomidine Infusion 0.1 MICROgram(s)/kG/Hr (1.76 mL/Hr) IV Continuous <Continuous>  fentaNYL   Infusion. 0.5 MICROgram(s)/kG/Hr (3.52 mL/Hr) IV Continuous <Continuous>  folic acid 1 milliGRAM(s) Oral daily  magnesium sulfate  IVPB 1 Gram(s) IV Intermittent every 12 hours  methadone    Tablet 20 milliGRAM(s) Oral daily  nicotine -  14 mG/24Hr(s) Patch 1 patch Transdermal daily  norepinephrine Infusion 0.05 MICROgram(s)/kG/Min (8.03 mL/Hr) IV Continuous <Continuous>  pantoprazole  Injectable 40 milliGRAM(s) IV Push daily  polymyxin B IVPB [id number] Unit(s) IV Intermittent every 12 hours  potassium chloride   Powder 20 milliEquivalent(s) Oral every 4 hours  propofol Infusion 0.095 MICROgram(s)/kG/Min (0.04 mL/Hr) IV Continuous <Continuous>  QUEtiapine 100 milliGRAM(s) Oral two times a day  scopolamine 1 mG/72 Hr(s) Patch 1 Patch Transdermal every 72 hours  spironolactone 100 milliGRAM(s) Oral daily  thiamine 100 milliGRAM(s) Oral daily  vancomycin    Solution 125 milliGRAM(s) Oral every 12 hours    MEDICATIONS  (PRN):  ALBUTerol    90 MICROgram(s) HFA Inhaler 1 Puff(s) Inhalation every 4 hours PRN Shortness of Breath and/or Wheezing  cloNIDine 0.1 milliGRAM(s) Oral every 6 hours PRN opiate withdrawal  hydrOXYzine hydrochloride 50 milliGRAM(s) Oral every 6 hours PRN Anxiety  morphine  - Injectable 4 milliGRAM(s) IV Push every 6 hours PRN Severe Pain (7 - 10)  sodium chloride 0.9% lock flush 10 milliLiter(s) IV Push every 1 hour PRN Pre/post blood products, medications, blood draw, and to maintain line patency

## 2022-07-24 NOTE — PROGRESS NOTE ADULT - ASSESSMENT
IMPRESSION:  Acute respiratory failure sp trach  PNA  MSSA pna  seizure like activity  drug over dose/ withdrawal opoid   liver cirrhosis   Ascites sp paracentesis   Pancytopenia- worsening  RENETTA improved  C diff +ve     PLAN:    CNS: Continue with Methadone for withdrawal   ontinue with seroquel and clonazepam;   Check QTc daily.    SAT    HEENT: oral care; Trach care     PULMONARY:    CXR pend  . ABG reviewed  keep off L side in bed develops atelectasis  pressure support   SBT    CARDIOVASCULAR: Keep MAP more than 65mmhg; not on pressors currently keep equal balance. On oral diuretics.   no lasix today    GI: GI prophylaxis. Hold OGT feeding for trach.   Do a bedside US of the abdomen.   mod ascites deep in pelvis on US    RENAL: Correct K ot mo re than 4 and Mg to more than 2.   DC Free water pushes PRN.     INFECTIOUS DISEASE: Currently onabx per ID  ID following.  D/C TLC if poss    HEMATOLOGICAL:   s/p BMB follow result and cx. Hematology following. Monitor platelet count. Keep hg more than 7.     ENDOCRINE:  Follow up FS.  Insulin protocol if needed.     Musk: bedrest for now. PT rehab evaluation.     Dispo: Remains critically and ill needs ICU care     TLC 7/6 RIJ;    IMPRESSION:  Acute respiratory failure sp trach  bleeding from trach site  PNA  MSSA pna  seizure like activity  drug over dose/ withdrawal opoid   liver cirrhosis   Ascites sp paracentesis   Pancytopenia- worsening  RENETTA improved  C diff +ve     PLAN:    CNS: Continue with Methadone for withdrawal   continue with seroquel and clonazepam;   Check QTc daily.    SAT    HEENT: oral care; Trach care     PULMONARY:    CXR pend  urgent surg f/u  ABG reviewed  keep off L side in bed develops atelectasis  no SBT today    CARDIOVASCULAR: Keep MAP more than 65mmhg; not on pressors currently keep equal balance. On oral diuretics.   no lasix today    GI: GI prophylaxis. Hold OGT feeding for trach.   Do a bedside US of the abdomen.   mod ascites deep in pelvis on US    RENAL: Correct K ot mo re than 4 and Mg to more than 2.   DC Free water pushes PRN.     INFECTIOUS DISEASE: Currently onabx per ID  ID following.  D/C TLC if poss    HEMATOLOGICAL:   s/p BMB follow result and cx. Hematology following. Monitor platelet count. Keep hg more than 7.   TC serial HH    ENDOCRINE:  Follow up FS.  Insulin protocol if needed.     Musk: bedrest for now.   Dispo: Remains critically and ill needs ICU care     TLC 7/6 RIJ;    IMPRESSION:  Acute respiratory failure sp trach  bleeding from trach site  PNA  MSSA pna  seizure like activity  drug over dose/ withdrawal opoid   liver cirrhosis   Ascites sp paracentesis   Pancytopenia- worsening  RENETTA improved  C diff +ve     PLAN:    CNS: Continue with Methadone for withdrawal   continue with seroquel and clonazepam;   Check QTc daily.    SAT    HEENT: oral care; Trach care     PULMONARY:    CXR pend  urgent surg f/u  ABG reviewed  keep off L side in bed develops atelectasis  no SBT today spoke to the covering intensivist who will be following on the case.    CARDIOVASCULAR: Keep MAP more than 65mmhg; not on pressors currently keep equal balance. On oral diuretics.   no lasix today    GI: GI prophylaxis. Hold OGT feeding for trach.   Do a bedside US of the abdomen.   mod ascites deep in pelvis on US    RENAL: Correct K ot mo re than 4 and Mg to more than 2.   DC Free water pushes PRN.     INFECTIOUS DISEASE: Currently onabx per ID  ID following.  D/C TLC if poss    HEMATOLOGICAL:   s/p BMB follow result and cx. Hematology following. Monitor platelet count. Keep hg more than 7.   TC serial HH    ENDOCRINE:  Follow up FS.  Insulin protocol if needed.     Musk: bedrest for now.   Dispo: Remains critically and ill needs ICU care     TLC 7/6 RIJ;

## 2022-07-24 NOTE — PROVIDER CONTACT NOTE (CHANGE IN STATUS NOTIFICATION) - ASSESSMENT
blood around trach site, dressing saturated, small amount of BRB suctioned from trach inline. Pt denies pain, dizziness or other symptoms. pt in NAD, color normal. VSS
dressing saturating, pt more pale appearing. pt denies pain, dizziness, or other symptoms. in NAD. VSS

## 2022-07-24 NOTE — PROGRESS NOTE ADULT - ASSESSMENT
Patient is a 26 year old female with hx of asthma, untreated Hep C, IVDA, anasarca presenting with increased dyspnea since yesterday    IMPRESSION  #Acute respiratory failure s/p intubation 6/16  #Pneumonia -   - CT Chest 6/22 - left greater than right lower lobe consolidations   - sputum Cx 6/24 Klebsiella oxytoca, Enterobacter cloacae complex- The Sputum culture from 6/27 grew Numerous Mixed gram negative rods and Moderate Staphylococcus aureus.-  - Please call micro [phone] ext1 to add on sensitivities for cefiderocol to the Acinetobacter IF NOT DONE ALREADY    #Fevers  - Ferritin, Serum: 94 ng/mL (06.27.22 @ 06:47),  Procalcitonin, Serum: 0.11 (06.27.22 @ 15:46)  - CT Abd/pelvis 6/26 - moderated ascites moderate pericardial effusion     #Drug overdose vs withdrawal?  #Hepatosplenomegaly - present since CT Abd/pelvis 1/30/2021  #Pancytopenia  #Hx of Untreated Hep C   #IVDA   #Abx allergy: buprenorphine (Rash), Suboxone (Rash)  # C.difficille infection - 7/5/22    would recommend:    1.  Weaning trial from Vent. as per protocol  2. Continue Unasyn and Polymixin B  to complete the course  3. Continue Oral Vancomycin to cover C.diff, during and 7 days after completing the Systemic Abx  4. Aspiration precaution    d/w ICU team    Attending  Attestation:   Spent more than 35 minutes on total encounter, more than 50 % of the visit was spent counseling and/or coordinating care by the Attending physician.

## 2022-07-25 LAB
ALBUMIN SERPL ELPH-MCNC: 2.8 G/DL — LOW (ref 3.5–5.2)
ALP SERPL-CCNC: 206 U/L — HIGH (ref 30–115)
ALT FLD-CCNC: 11 U/L — SIGNIFICANT CHANGE UP (ref 0–41)
ANION GAP SERPL CALC-SCNC: 11 MMOL/L — SIGNIFICANT CHANGE UP (ref 7–14)
APPEARANCE UR: ABNORMAL
AST SERPL-CCNC: 16 U/L — SIGNIFICANT CHANGE UP (ref 0–41)
BILIRUB SERPL-MCNC: 0.5 MG/DL — SIGNIFICANT CHANGE UP (ref 0.2–1.2)
BILIRUB UR-MCNC: NEGATIVE — SIGNIFICANT CHANGE UP
BUN SERPL-MCNC: 8 MG/DL — LOW (ref 10–20)
CALCIUM SERPL-MCNC: 8.5 MG/DL — SIGNIFICANT CHANGE UP (ref 8.5–10.1)
CHLORIDE SERPL-SCNC: 105 MMOL/L — SIGNIFICANT CHANGE UP (ref 98–110)
CO2 SERPL-SCNC: 25 MMOL/L — SIGNIFICANT CHANGE UP (ref 17–32)
COLOR SPEC: YELLOW — SIGNIFICANT CHANGE UP
CREAT SERPL-MCNC: 0.8 MG/DL — SIGNIFICANT CHANGE UP (ref 0.7–1.5)
DIFF PNL FLD: ABNORMAL
EGFR: 103 ML/MIN/1.73M2 — SIGNIFICANT CHANGE UP
GLUCOSE SERPL-MCNC: 104 MG/DL — HIGH (ref 70–99)
GLUCOSE UR QL: NEGATIVE MG/DL — SIGNIFICANT CHANGE UP
HCT VFR BLD CALC: 22.6 % — LOW (ref 37–47)
HGB BLD-MCNC: 7.1 G/DL — LOW (ref 12–16)
KETONES UR-MCNC: NEGATIVE — SIGNIFICANT CHANGE UP
LEUKOCYTE ESTERASE UR-ACNC: NEGATIVE — SIGNIFICANT CHANGE UP
MAGNESIUM SERPL-MCNC: 1.8 MG/DL — SIGNIFICANT CHANGE UP (ref 1.8–2.4)
MCHC RBC-ENTMCNC: 27.7 PG — SIGNIFICANT CHANGE UP (ref 27–31)
MCHC RBC-ENTMCNC: 31.4 G/DL — LOW (ref 32–37)
MCV RBC AUTO: 88.3 FL — SIGNIFICANT CHANGE UP (ref 81–99)
NITRITE UR-MCNC: NEGATIVE — SIGNIFICANT CHANGE UP
NRBC # BLD: 0 /100 WBCS — SIGNIFICANT CHANGE UP (ref 0–0)
PH UR: 7 — SIGNIFICANT CHANGE UP (ref 5–8)
PHOSPHATE SERPL-MCNC: 5.3 MG/DL — HIGH (ref 2.1–4.9)
PLATELET # BLD AUTO: 60 K/UL — LOW (ref 130–400)
POTASSIUM SERPL-MCNC: 3.6 MMOL/L — SIGNIFICANT CHANGE UP (ref 3.5–5)
POTASSIUM SERPL-SCNC: 3.6 MMOL/L — SIGNIFICANT CHANGE UP (ref 3.5–5)
PROT SERPL-MCNC: 5 G/DL — LOW (ref 6–8)
PROT UR-MCNC: 30 MG/DL
RBC # BLD: 2.56 M/UL — LOW (ref 4.2–5.4)
RBC # FLD: 15.3 % — HIGH (ref 11.5–14.5)
SODIUM SERPL-SCNC: 141 MMOL/L — SIGNIFICANT CHANGE UP (ref 135–146)
SP GR SPEC: 1.01 — SIGNIFICANT CHANGE UP (ref 1.01–1.03)
UROBILINOGEN FLD QL: 0.2 MG/DL — SIGNIFICANT CHANGE UP
WBC # BLD: 1.51 K/UL — LOW (ref 4.8–10.8)
WBC # FLD AUTO: 1.51 K/UL — LOW (ref 4.8–10.8)

## 2022-07-25 PROCEDURE — 99233 SBSQ HOSP IP/OBS HIGH 50: CPT

## 2022-07-25 PROCEDURE — 71045 X-RAY EXAM CHEST 1 VIEW: CPT | Mod: 26

## 2022-07-25 RX ORDER — KETOROLAC TROMETHAMINE 30 MG/ML
15 SYRINGE (ML) INJECTION ONCE
Refills: 0 | Status: DISCONTINUED | OUTPATIENT
Start: 2022-07-25 | End: 2022-07-25

## 2022-07-25 RX ORDER — FENTANYL CITRATE 50 UG/ML
25 INJECTION INTRAVENOUS ONCE
Refills: 0 | Status: DISCONTINUED | OUTPATIENT
Start: 2022-07-25 | End: 2022-07-25

## 2022-07-25 RX ORDER — ACETAMINOPHEN 500 MG
650 TABLET ORAL EVERY 6 HOURS
Refills: 0 | Status: DISCONTINUED | OUTPATIENT
Start: 2022-07-25 | End: 2022-08-04

## 2022-07-25 RX ADMIN — QUETIAPINE FUMARATE 100 MILLIGRAM(S): 200 TABLET, FILM COATED ORAL at 17:21

## 2022-07-25 RX ADMIN — Medication 125 MILLIGRAM(S): at 05:11

## 2022-07-25 RX ADMIN — POLYMYXIN B SULFATE 500 UNIT(S): 500000 INJECTION, POWDER, LYOPHILIZED, FOR SOLUTION INTRAMUSCULAR; INTRATHECAL; INTRAVENOUS; OPHTHALMIC at 17:20

## 2022-07-25 RX ADMIN — Medication 1 PATCH: at 19:49

## 2022-07-25 RX ADMIN — Medication 15 MILLIGRAM(S): at 00:54

## 2022-07-25 RX ADMIN — DEXMEDETOMIDINE HYDROCHLORIDE IN 0.9% SODIUM CHLORIDE 1.76 MICROGRAM(S)/KG/HR: 4 INJECTION INTRAVENOUS at 19:01

## 2022-07-25 RX ADMIN — FENTANYL CITRATE 25 MICROGRAM(S): 50 INJECTION INTRAVENOUS at 09:05

## 2022-07-25 RX ADMIN — CHLORHEXIDINE GLUCONATE 1 APPLICATION(S): 213 SOLUTION TOPICAL at 12:09

## 2022-07-25 RX ADMIN — CHLORHEXIDINE GLUCONATE 15 MILLILITER(S): 213 SOLUTION TOPICAL at 05:11

## 2022-07-25 RX ADMIN — Medication 1 PATCH: at 07:53

## 2022-07-25 RX ADMIN — Medication 1 PATCH: at 12:10

## 2022-07-25 RX ADMIN — QUETIAPINE FUMARATE 100 MILLIGRAM(S): 200 TABLET, FILM COATED ORAL at 05:12

## 2022-07-25 RX ADMIN — Medication 100 GRAM(S): at 05:08

## 2022-07-25 RX ADMIN — Medication 125 MILLIGRAM(S): at 17:21

## 2022-07-25 RX ADMIN — CHLORHEXIDINE GLUCONATE 15 MILLILITER(S): 213 SOLUTION TOPICAL at 17:21

## 2022-07-25 RX ADMIN — SCOPALAMINE 1 PATCH: 1 PATCH, EXTENDED RELEASE TRANSDERMAL at 19:49

## 2022-07-25 RX ADMIN — AMPICILLIN SODIUM AND SULBACTAM SODIUM 200 GRAM(S): 250; 125 INJECTION, POWDER, FOR SUSPENSION INTRAMUSCULAR; INTRAVENOUS at 13:47

## 2022-07-25 RX ADMIN — SCOPALAMINE 1 PATCH: 1 PATCH, EXTENDED RELEASE TRANSDERMAL at 07:53

## 2022-07-25 RX ADMIN — Medication 1 MILLIGRAM(S): at 12:10

## 2022-07-25 RX ADMIN — Medication 650 MILLIGRAM(S): at 22:41

## 2022-07-25 RX ADMIN — Medication 15 MILLIGRAM(S): at 21:22

## 2022-07-25 RX ADMIN — POLYMYXIN B SULFATE 500 UNIT(S): 500000 INJECTION, POWDER, LYOPHILIZED, FOR SOLUTION INTRAMUSCULAR; INTRATHECAL; INTRAVENOUS; OPHTHALMIC at 05:10

## 2022-07-25 RX ADMIN — PANTOPRAZOLE SODIUM 40 MILLIGRAM(S): 20 TABLET, DELAYED RELEASE ORAL at 12:11

## 2022-07-25 RX ADMIN — Medication 15 MILLIGRAM(S): at 21:52

## 2022-07-25 RX ADMIN — SPIRONOLACTONE 100 MILLIGRAM(S): 25 TABLET, FILM COATED ORAL at 05:10

## 2022-07-25 RX ADMIN — METHADONE HYDROCHLORIDE 20 MILLIGRAM(S): 40 TABLET ORAL at 12:10

## 2022-07-25 RX ADMIN — AMPICILLIN SODIUM AND SULBACTAM SODIUM 200 GRAM(S): 250; 125 INJECTION, POWDER, FOR SUSPENSION INTRAMUSCULAR; INTRAVENOUS at 05:09

## 2022-07-25 RX ADMIN — Medication 650 MILLIGRAM(S): at 17:14

## 2022-07-25 RX ADMIN — Medication 100 MILLIGRAM(S): at 12:10

## 2022-07-25 RX ADMIN — DEXMEDETOMIDINE HYDROCHLORIDE IN 0.9% SODIUM CHLORIDE 1.76 MICROGRAM(S)/KG/HR: 4 INJECTION INTRAVENOUS at 09:05

## 2022-07-25 RX ADMIN — DEXMEDETOMIDINE HYDROCHLORIDE IN 0.9% SODIUM CHLORIDE 1.76 MICROGRAM(S)/KG/HR: 4 INJECTION INTRAVENOUS at 13:47

## 2022-07-25 RX ADMIN — AMPICILLIN SODIUM AND SULBACTAM SODIUM 200 GRAM(S): 250; 125 INJECTION, POWDER, FOR SUSPENSION INTRAMUSCULAR; INTRAVENOUS at 17:20

## 2022-07-25 RX ADMIN — Medication 0.1 MILLIGRAM(S): at 12:24

## 2022-07-25 RX ADMIN — Medication 1 MILLIGRAM(S): at 21:20

## 2022-07-25 RX ADMIN — Medication 1 MILLIGRAM(S): at 05:10

## 2022-07-25 RX ADMIN — Medication 1 APPLICATION(S): at 05:11

## 2022-07-25 RX ADMIN — Medication 650 MILLIGRAM(S): at 21:20

## 2022-07-25 RX ADMIN — Medication 650 MILLIGRAM(S): at 00:09

## 2022-07-25 RX ADMIN — FENTANYL CITRATE 25 MICROGRAM(S): 50 INJECTION INTRAVENOUS at 09:10

## 2022-07-25 RX ADMIN — Medication 1 MILLIGRAM(S): at 14:42

## 2022-07-25 RX ADMIN — Medication 650 MILLIGRAM(S): at 15:27

## 2022-07-25 RX ADMIN — Medication 15 MILLIGRAM(S): at 01:15

## 2022-07-25 RX ADMIN — Medication 1 APPLICATION(S): at 17:22

## 2022-07-25 RX ADMIN — DEXMEDETOMIDINE HYDROCHLORIDE IN 0.9% SODIUM CHLORIDE 1.76 MICROGRAM(S)/KG/HR: 4 INJECTION INTRAVENOUS at 21:19

## 2022-07-25 NOTE — PROGRESS NOTE ADULT - ASSESSMENT
Acute respiratory failure with hypoxemia / aspiration pneumonia with sepsis / suspected drug overdose / head laceration s/p fall in lobby / possible seizure / anasarca  pancytopenia      - c-diff colitis - complete  oral vanco course   - w/u and antibiotics as per ID    - s/p BMB - heme f/u    - surgery following trach site bleeding   -  supplement  hypokalemia and  hypomagnesemia    - slow weaning trial

## 2022-07-25 NOTE — PHYSICAL THERAPY INITIAL EVALUATION ADULT - GENERAL OBSERVATIONS, REHAB EVAL
16:50-17:20. Chart reviewed; confirmed with RN to see the pt for PT. Pt ready for PT; received in bed with complain of back pain. +NG tube, +C-line, +trach, +doss, +tele. Agreeable for PT evaluation.

## 2022-07-25 NOTE — PROGRESS NOTE ADULT - SUBJECTIVE AND OBJECTIVE BOX
CINDYPOOJA JOE  26y, Female  Allergy: buprenorphine (Rash)  Suboxone (Rash)      LOS  40d    CHIEF COMPLAINT: anasarca (25 Jul 2022 11:14)      INTERVAL EVENTS/HPI  - No acute events overnight  - T(F): , Max: 99.8 (07-24-22 @ 19:20)  - noted events over weekend  - WBC Count: 1.51 (07-25-22 @ 10:00)  WBC Count: 3.03 (07-24-22 @ 12:16)     - Creatinine, Serum: 0.8 (07-25-22 @ 05:28)  Creatinine, Serum: 0.8 (07-24-22 @ 17:30)       ROS  General: Denies rigors, nightsweats  HEENT: Denies headache, rhinorrhea, sore throat, eye pain  CV: Denies CP, palpitations  PULM: Denies wheezing, hemoptysis  GI: Denies hematemesis, hematochezia, melena  : Denies discharge, hematuria  MSK: Denies arthralgias, myalgias  SKIN: Denies rash, lesions  NEURO: Denies paresthesias, weakness  PSYCH: Denies depression, anxiety    VITALS:  T(F): 99.1, Max: 99.8 (07-24-22 @ 19:20)  HR: 94  BP: 101/67  RR: 20Vital Signs Last 24 Hrs  T(C): 37.3 (25 Jul 2022 11:00), Max: 37.7 (24 Jul 2022 19:20)  T(F): 99.1 (25 Jul 2022 11:00), Max: 99.8 (24 Jul 2022 19:20)  HR: 94 (25 Jul 2022 11:00) (65 - 104)  BP: 101/67 (25 Jul 2022 07:00) (101/67 - 182/104)  BP(mean): 78 (25 Jul 2022 06:13) (78 - 135)  RR: 20 (25 Jul 2022 11:00) (18 - 36)  SpO2: 100% (25 Jul 2022 07:39) (99% - 100%)        PHYSICAL EXAM:  Gen: NAD, resting in bed  HEENT: Normocephalic, atraumatic  Neck: supple, no lymphadenopathy  CV: Regular rate & regular rhythm  Lungs: decreased BS at bases, no fremitus  Abdomen: Soft, BS present  Ext: Warm, well perfused  Neuro: non focal, awake  Skin: no rash, no erythema  Lines: no phlebitis    FH: Non-contributory  Social Hx: Non-contributory    TESTS & MEASUREMENTS:                        7.1    1.51  )-----------( 60       ( 25 Jul 2022 10:00 )             22.6     07-25    141  |  105  |  8<L>  ----------------------------<  104<H>  3.6   |  25  |  0.8    Ca    8.5      25 Jul 2022 05:28  Phos  5.3     07-25  Mg     1.8     07-25    TPro  5.0<L>  /  Alb  2.8<L>  /  TBili  0.5  /  DBili  x   /  AST  16  /  ALT  11  /  AlkPhos  206<H>  07-25      LIVER FUNCTIONS - ( 25 Jul 2022 05:28 )  Alb: 2.8 g/dL / Pro: 5.0 g/dL / ALK PHOS: 206 U/L / ALT: 11 U/L / AST: 16 U/L / GGT: x               Culture - Sputum (collected 07-15-22 @ 10:00)  Source: Trach Asp Tracheal Aspirate  Gram Stain (07-16-22 @ 04:22):    Moderate polymorphonuclear leukocytes per low power field    Rare Squamous epithelial cells per low power field    No organisms seen per oil power field  Final Report (07-17-22 @ 16:58):    Normal Respiratory Kelly present    Babesia microti PCR, Blood. (collected 07-10-22 @ 15:43)  Source: .Blood None  Final Report (07-11-22 @ 09:45):    Babesia microti PCR    Results: NOT detected    ***************Result Note*************    The detection of Babesia microti by PCR has only been    validated for whole blood; this test has not been approved    by the US Food and Drug Administration (FDA). Performance    characteristics of this assay have been determined by    [employer]. The clinical significance    of results should be considered in conjunction with the    overall clinical presentation of the patient. Result is not    intended to be used as the sole means for clinical diagnosis    or patient management decisions.    One negative sample does not necessarily rule    out the presence of a parasitic infection.    Culture - Blood (collected 07-08-22 @ 05:26)  Source: .Blood None  Final Report (07-13-22 @ 19:00):    No Growth Final    Culture - Blood (collected 07-06-22 @ 07:20)  Source: .Blood None  Final Report (07-11-22 @ 19:00):    No Growth Final    Culture - Blood (collected 07-06-22 @ 07:20)  Source: .Blood None  Final Report (07-11-22 @ 19:00):    No Growth Final    Culture - Sputum (collected 07-04-22 @ 13:39)  Source: ET Tube ET Tube  Gram Stain (07-05-22 @ 08:48):    Few polymorphonuclear leukocytes per low power field    No Squamous epithelial cells per low power field    Numerous Gram Negative Coccobacilli seen per oil power field  Final Report (07-11-22 @ 08:12):    Moderate Acinetobacter baumannii/nosocom group (Carbapenem Resistant)    Cefiderocol interpretations based on FDA breakpoints.    Normal Respiratory Kelly absent  Organism: Acinetobacter baumannii/nosocom group (Carbapenem Resistant)  Acinetobacter baumannii/nosocom group (Carbapenem Resistant) (07-11-22 @ 08:12)  Organism: Acinetobacter baumannii/nosocom group (Carbapenem Resistant) (07-11-22 @ 08:12)      -  Cefiderocol: I      -  Imipenem: R      -  Piperacillin/Tazobactam: R      Method Type: KB  Organism: Acinetobacter baumannii/nosocom group (Carbapenem Resistant) (07-11-22 @ 08:12)      -  Amikacin: R >32      -  Ampicillin/Sulbactam: R >16/8      -  Cefepime: R >16      -  Ceftazidime: R >16      -  Ciprofloxacin: R >2      -  Gentamicin: R >8      -  Levofloxacin: R >4      -  Meropenem: R >8      -  Tobramycin: R >8      -  Trimethoprim/Sulfamethoxazole: R >2/38      Method Type: AL    Culture - Blood (collected 06-29-22 @ 20:31)  Source: .Blood Blood-Peripheral  Final Report (07-05-22 @ 20:00):    No Growth Final    Culture - Blood (collected 06-29-22 @ 20:31)  Source: .Blood Blood  Final Report (07-05-22 @ 20:00):    No Growth Final    Culture - Blood (collected 06-28-22 @ 06:00)  Source: .Blood Blood  Final Report (07-03-22 @ 23:00):    No Growth Final    Culture - Sputum (collected 06-27-22 @ 14:00)  Source: Trach Asp Tracheal Aspirate  Gram Stain (06-28-22 @ 03:15):    Moderate polymorphonuclear leukocytes per low power field    Few Squamous epithelial cells per low power field    Moderate Gram positive cocci in pairs seen per oil power field    Few Gram Variable Rods seen per oil power field  Final Report (06-30-22 @ 16:33):    Numerous Mixed gram negative rods    Moderate Staphylococcus aureus    Normal Respiratory Kelly present  Organism: Staphylococcus aureus (06-30-22 @ 16:33)  Organism: Staphylococcus aureus (06-30-22 @ 16:33)      -  Ampicillin/Sulbactam: S <=8/4      -  Cefazolin: S <=4      -  Clindamycin: S <=0.25      -  Erythromycin: S <=0.25      -  Gentamicin: S <=1 Should not be used as monotherapy      -  Oxacillin: S <=0.25 Oxacillin predicts susceptibility for dicloxacillin, methicillin, and nafcillin      -  Rifampin: S <=1 Should not be used as monotherapy      -  Tetra/Doxy: S <=1      -  Trimethoprim/Sulfamethoxazole: S <=0.5/9.5      -  Vancomycin: S 2      Method Type: AL            INFECTIOUS DISEASES TESTING  COVID-19 PCR: NotDetec (07-19-22 @ 19:56)  COVID-19 PCR: NotDetec (07-17-22 @ 20:01)  Procalcitonin, Serum: 0.21 (07-17-22 @ 10:34)  COVID-19 PCR: NotDetec (07-14-22 @ 15:00)  Procalcitonin, Serum: 0.16 (07-14-22 @ 12:50)  Procalcitonin, Serum: 0.12 (07-10-22 @ 05:42)  Fungitell: <31 (07-05-22 @ 11:49)  Procalcitonin, Serum: 0.14 (07-05-22 @ 11:23)  MRSA PCR Result.: Negative (06-30-22 @ 10:34)  Fungitell: See Comment** **RESULTS OF FUNGITELL INCONCLUSIVE DUE TO THE PRESENCE OF UNKNOWN  INTERFERING SUBSTANCE. PLEASE SUBMIT ANOTHER SPECIMEN FOR TESTING.  PLEASE CONTACT [facility] WITH QUESTIONS.  Interpretation: The Fungitell assay does not detectcertain fungal  species such as the genus Cryptococcus (Aundrea et al. 1991) which  produces very low levels of (1-3)-Beta-D-Glucan. The assay also does  not detect the Zygomycetes such as Absidia, Mucor and Rhizopus  (Carol et al. 1994) which are not known to produce  (1-3)-Beta-D-Glucan. In addition, the yeast phase of Blastomyces  dermatitidis produces little (1-3)-Beta-D-Glucan and may not be  detected by the assay (Anila et al. 2007).  Reference Range:  Less than 60 pg/mL. Glucan values of less than 60 pg/mL are  interpreted as negative.  Glucan values of 60 to 79 pg/mL are interpreted as indeterminate,  and suggest a possible fungal infection. Additional sampling and  testing of sera is required to interpret the results.  Glucan values of greater than or equal to 80 pg/mL are interpreted  as positive.  Due to the potential for environmental contamination when  transferred to pour-off tubes, which can lead to false positive  results, interpret positive results from samples provided in  pour-off tubes with caution.  Results should be used in conjunction  with clinical findings, and should not form the sole basis for a  diagnosis or treatment decision. The Fungitell test is approved or  cleared for in vitro diagnostic use by the [place] Food and Drug  Administration. Modifications to the approved package insert have  been made and the performance characteristics for these  modifications were determined by [facility].  If sample result is greater than 500 pg/mL, physician may order a  titer of the sample. Please contact [facility] if you would  like to order a retest of this sample to obtain an actual value.  Samples are held for 1 week after initial testing date.  ____________________________________________________________  Performed at:  [facility]  [address] [place] [address]  : Kirk Newberry Ph.D., JONATHON (ABB)  CLIA#: [id number]  Phone: [phone] (06-30-22 @ 10:30)  Procalcitonin, Serum: 0.36 (06-30-22 @ 10:30)  Rapid RVP Result: NotDetec (06-30-22 @ 09:19)  HIV-1/2 Combo Result: Nonreact (06-29-22 @ 10:29)  Procalcitonin, Serum: 0.11 (06-27-22 @ 15:46)  Procalcitonin, Serum: 0.11 (06-27-22 @ 06:47)  Procalcitonin, Serum: 0.62 (06-20-22 @ 05:49)  MRSA PCR Result.: Negative (06-17-22 @ 14:30)  Procalcitonin, Serum: 3.28 (06-17-22 @ 05:22)  COVID-19 PCR: NotDetec (06-15-22 @ 07:10)  Procalcitonin, Serum: 0.07 (04-20-22 @ 12:46)      INFLAMMATORY MARKERS  Sedimentation Rate, Erythrocyte: 86 mm/Hr (06-30-22 @ 05:46)  Sedimentation Rate, Erythrocyte: 65 mm/Hr (06-23-22 @ 16:00)      RADIOLOGY & ADDITIONAL TESTS:  I have personally reviewed the last available Chest xray  CXR  Xray Chest 1 View- PORTABLE-Urgent:   ACC: [id number] EXAM:  XR CHEST PORTABLE URGENT 1V                          PROCEDURE DATE:  07/22/2022          INTERPRETATION:  Clinical History / Reason for exam: Enteric tube   placement. Follow    Comparison : Chest radiograph 7/22/2022.    Technique/Positioning: Single frontal view of the chest.    Findings:    Support devices: Enteric tube tip below the diaphragm. Right IJ catheter   tip at the SVC. Left IJ tip at the mid SVC. Tracheostomy.    Cardiac/mediastinum/hilum: Magnified.    Lung parenchyma/Pleura: Improved opacities. No pneumothorax.    Skeleton/soft tissues: Stable.    Impression:    Improving opacities.    Support devices as above.    --- End of Report ---            CARLOS CARRILLO MD; Attending Radiologist  This document hasbeen electronically signed. Jul 23 2022 10:01PM (07-22-22 @ 21:59)      CT      CARDIOLOGY TESTING  12 Lead ECG:   Ventricular Rate 74 BPM    Atrial Rate 74 BPM    P-R Interval 188 ms    QRS Duration 94 ms    Q-T Interval 442 ms    QTC Calculation(Bazett) 490 ms    P Axis 60 degrees    R Axis 83 degrees    T Axis 185 degrees    Diagnosis Line *** Poor data quality, interpretation may be adversely affected  Normal sinus rhythm  ST & T wave abnormality, consider anterolateral ischemia  Prolonged QT  Abnormal ECG    Confirmed by BRANDON BELL MD ([id number]) on 7/24/2022 9:59:09 AM (07-24-22 @ 07:48)  12 Lead ECG:   Ventricular Rate 77 BPM    Atrial Rate 77 BPM    P-R Interval 182 ms    QRS Duration 82 ms    Q-T Interval 442 ms    QTC Calculation(Bazett) 500 ms    P Axis 80 degrees    R Axis 127 degrees    T Axis 158 degrees    Diagnosis Line Normal sinus rhythm  Right axis deviation  Possible Right ventricular hypertrophy  Nonspecific ST and T wave abnormality  Abnormal ECG    Confirmed by BRANDON BELL MD ([id number]) on 7/22/2022 11:06:33 AM (07-22-22 @ 09:38)      MEDICATIONS  ampicillin/sulbactam  IVPB 3 IV Intermittent every 6 hours  BACItracin   Ointment 1 Topical two times a day  chlorhexidine 0.12% Liquid 15 Oral Mucosa every 12 hours  chlorhexidine 2% Cloths 1 Topical daily  clonazePAM  Tablet 1 Oral every 8 hours  cloNIDine 0.1 Oral daily  dexMEDEtomidine Infusion 0.1 IV Continuous <Continuous>  fentaNYL   Infusion. 0.5 IV Continuous <Continuous>  folic acid 1 Oral daily  methadone    Tablet 20 Oral daily  nicotine -  14 mG/24Hr(s) Patch 1 Transdermal daily  norepinephrine Infusion 0.05 IV Continuous <Continuous>  pantoprazole  Injectable 40 IV Push daily  polymyxin B IVPB [id number] IV Intermittent every 12 hours  QUEtiapine 100 Oral two times a day  scopolamine 1 mG/72 Hr(s) Patch 1 Transdermal every 72 hours  spironolactone 100 Oral daily  thiamine 100 Oral daily  vancomycin    Solution 125 Oral every 12 hours      WEIGHT  Weight (kg): 70.4 (07-20-22 @ 16:41)  Creatinine, Serum: 0.8 mg/dL (07-25-22 @ 05:28)  Creatinine, Serum: 0.8 mg/dL (07-24-22 @ 17:30)      ANTIBIOTICS:  ampicillin/sulbactam  IVPB 3 Gram(s) IV Intermittent every 6 hours  polymyxin B IVPB [id number] Unit(s) IV Intermittent every 12 hours  vancomycin    Solution 125 milliGRAM(s) Oral every 12 hours      All available historical records have been reviewed

## 2022-07-25 NOTE — PROGRESS NOTE ADULT - SUBJECTIVE AND OBJECTIVE BOX
SUBJECTIVE:    Patient is a 26y old Female who presents with a chief complaint of anasarca (25 Jul 2022 07:58)    Currently admitted to medicine with the primary diagnosis of Cirrhosis       Today is hospital day 40d. This morning she is resting comfortably in bed and reports no new issues or overnight events.     PAST MEDICAL & SURGICAL HISTORY  Hepatitis C    Asthma    Anxiety and depression    IV drug abuse  heroin    No significant past surgical history      SOCIAL HISTORY:  Negative for smoking/alcohol/drug use.     ALLERGIES:  buprenorphine (Rash)  Suboxone (Rash)    MEDICATIONS:  STANDING MEDICATIONS  ampicillin/sulbactam  IVPB 3 Gram(s) IV Intermittent every 6 hours  BACItracin   Ointment 1 Application(s) Topical two times a day  chlorhexidine 0.12% Liquid 15 milliLiter(s) Oral Mucosa every 12 hours  chlorhexidine 2% Cloths 1 Application(s) Topical daily  clonazePAM  Tablet 1 milliGRAM(s) Oral every 8 hours  cloNIDine 0.1 milliGRAM(s) Oral daily  dexMEDEtomidine Infusion 0.1 MICROgram(s)/kG/Hr IV Continuous <Continuous>  fentaNYL   Infusion. 0.5 MICROgram(s)/kG/Hr IV Continuous <Continuous>  folic acid 1 milliGRAM(s) Oral daily  methadone    Tablet 20 milliGRAM(s) Oral daily  nicotine -  14 mG/24Hr(s) Patch 1 patch Transdermal daily  norepinephrine Infusion 0.05 MICROgram(s)/kG/Min IV Continuous <Continuous>  pantoprazole  Injectable 40 milliGRAM(s) IV Push daily  polymyxin B IVPB [id number] Unit(s) IV Intermittent every 12 hours  QUEtiapine 100 milliGRAM(s) Oral two times a day  scopolamine 1 mG/72 Hr(s) Patch 1 Patch Transdermal every 72 hours  spironolactone 100 milliGRAM(s) Oral daily  thiamine 100 milliGRAM(s) Oral daily  vancomycin    Solution 125 milliGRAM(s) Oral every 12 hours    PRN MEDICATIONS  acetaminophen     Tablet .. 650 milliGRAM(s) Oral every 6 hours PRN  ALBUTerol    90 MICROgram(s) HFA Inhaler 1 Puff(s) Inhalation every 4 hours PRN  cloNIDine 0.1 milliGRAM(s) Oral every 6 hours PRN  hydrOXYzine hydrochloride 50 milliGRAM(s) Oral every 6 hours PRN  sodium chloride 0.9% lock flush 10 milliLiter(s) IV Push every 1 hour PRN    VITALS:   T(F): 99.1  HR: 94  BP: 101/67  RR: 20  SpO2: 100%    LABS:                        7.1    1.51  )-----------( 60       ( 25 Jul 2022 10:00 )             22.6     07-25    141  |  105  |  8<L>  ----------------------------<  104<H>  3.6   |  25  |  0.8    Ca    8.5      25 Jul 2022 05:28  Phos  5.3     07-25  Mg     1.8     07-25    TPro  5.0<L>  /  Alb  2.8<L>  /  TBili  0.5  /  DBili  x   /  AST  16  /  ALT  11  /  AlkPhos  206<H>  07-25    PT/INR - ( 24 Jul 2022 04:15 )   PT: 14.30 sec;   INR: 1.25 ratio         PTT - ( 24 Jul 2022 04:15 )  PTT:35.9 sec    ABG - ( 25 Jul 2022 05:45 )  pH, Arterial: 7.41  pH, Blood: x     /  pCO2: 41    /  pO2: 170   / HCO3: 26    / Base Excess: 1.2   /  SaO2: 100.0     RADIOLOGY:    PHYSICAL EXAM:  GEN: No acute distress  LUNGS: Clear to auscultation bilaterally   HEART: S1/S2 present. RRR.   ABD: Soft, non-tender, non-distended. Bowel sounds present  EXT: NC/NC/NE/2+PP/GENTILE/Skin Intact.   NEURO: AAOX3    Intravenous access:   NG tube:   Salmeron Catheter:   Indwelling Urethral Catheter:     Connect To:  Straight Drainage/Gravity    Indication:  Urine Output Monitoring in Critically Ill (07-24-22 @ 13:43) (not performed) [Active]  Indwelling Urethral Catheter:     Connect To:  Straight Drainage/Gravity    Indication:  Urine Output Monitoring in Critically Ill (07-23-22 @ 12:10) (not performed) [Active]  Indwelling Urethral Catheter:     Connect To:  Straight Drainage/[place]    Indication:  Urinary Retention / Obstruction (07-20-22 @ 18:47) (not performed) [Active]         SUBJECTIVE:    Patient is a 26y old Female who presents with a chief complaint of anasarca (25 Jul 2022 07:58). Currently admitted to medicine with the primary diagnosis of AHRF s/p intubation ( 2/2 druge overdose ??)  Today is hospital day 40d.     PAST MEDICAL & SURGICAL HISTORY  Hepatitis C    Asthma    Anxiety and depression    IV drug abuse  heroin    No significant past surgical history      SOCIAL HISTORY:  Negative for smoking/alcohol/drug use.     ALLERGIES:  buprenorphine (Rash)  Suboxone (Rash)    MEDICATIONS:  STANDING MEDICATIONS  ampicillin/sulbactam  IVPB 3 Gram(s) IV Intermittent every 6 hours  BACItracin   Ointment 1 Application(s) Topical two times a day  chlorhexidine 0.12% Liquid 15 milliLiter(s) Oral Mucosa every 12 hours  chlorhexidine 2% Cloths 1 Application(s) Topical daily  clonazePAM  Tablet 1 milliGRAM(s) Oral every 8 hours  cloNIDine 0.1 milliGRAM(s) Oral daily  dexMEDEtomidine Infusion 0.1 MICROgram(s)/kG/Hr IV Continuous <Continuous>  fentaNYL   Infusion. 0.5 MICROgram(s)/kG/Hr IV Continuous <Continuous>  folic acid 1 milliGRAM(s) Oral daily  methadone    Tablet 20 milliGRAM(s) Oral daily  nicotine -  14 mG/24Hr(s) Patch 1 patch Transdermal daily  norepinephrine Infusion 0.05 MICROgram(s)/kG/Min IV Continuous <Continuous>  pantoprazole  Injectable 40 milliGRAM(s) IV Push daily  polymyxin B IVPB [id number] Unit(s) IV Intermittent every 12 hours  QUEtiapine 100 milliGRAM(s) Oral two times a day  scopolamine 1 mG/72 Hr(s) Patch 1 Patch Transdermal every 72 hours  spironolactone 100 milliGRAM(s) Oral daily  thiamine 100 milliGRAM(s) Oral daily  vancomycin    Solution 125 milliGRAM(s) Oral every 12 hours    PRN MEDICATIONS  acetaminophen     Tablet .. 650 milliGRAM(s) Oral every 6 hours PRN  ALBUTerol    90 MICROgram(s) HFA Inhaler 1 Puff(s) Inhalation every 4 hours PRN  cloNIDine 0.1 milliGRAM(s) Oral every 6 hours PRN  hydrOXYzine hydrochloride 50 milliGRAM(s) Oral every 6 hours PRN  sodium chloride 0.9% lock flush 10 milliLiter(s) IV Push every 1 hour PRN    VITALS:   T(F): 99.1  HR: 94  BP: 101/67  RR: 20  SpO2: 100%    LABS:                        7.1    1.51  )-----------( 60       ( 25 Jul 2022 10:00 )             22.6     07-25    141  |  105  |  8<L>  ----------------------------<  104<H>  3.6   |  25  |  0.8    Ca    8.5      25 Jul 2022 05:28  Phos  5.3     07-25  Mg     1.8     07-25    TPro  5.0<L>  /  Alb  2.8<L>  /  TBili  0.5  /  DBili  x   /  AST  16  /  ALT  11  /  AlkPhos  206<H>  07-25    PT/INR - ( 24 Jul 2022 04:15 )   PT: 14.30 sec;   INR: 1.25 ratio         PTT - ( 24 Jul 2022 04:15 )  PTT:35.9 sec    ABG - ( 25 Jul 2022 05:45 )  pH, Arterial: 7.41  pH, Blood: x     /  pCO2: 41    /  pO2: 170   / HCO3: 26    / Base Excess: 1.2   /  SaO2: 100.0     RADIOLOGY:    PHYSICAL EXAM:  GEN: No acute distress  LUNGS: Clear to auscultation bilaterally   HEART: S1/S2 present. RRR.   ABD: Soft, non-tender, non-distended. Bowel sounds present  EXT: NC/NC/NE/2+PP/GENTILE/Skin Intact.   NEURO: AAOX3    Intravenous access:   NG tube:   Salmeron Catheter:   Indwelling Urethral Catheter:     Connect To:  Straight Drainage/Gravity    Indication:  Urine Output Monitoring in Critically Ill (07-24-22 @ 13:43) (not performed) [Active]  Indwelling Urethral Catheter:     Connect To:  Straight Drainage/Gravity    Indication:  Urine Output Monitoring in Critically Ill (07-23-22 @ 12:10) (not performed) [Active]  Indwelling Urethral Catheter:     Connect To:  Straight Drainage/[place]    Indication:  Urinary Retention / Obstruction (07-20-22 @ 18:47) (not performed) [Active]

## 2022-07-25 NOTE — PROGRESS NOTE ADULT - ASSESSMENT
IMPRESSION:  Acute respiratory failure sp trach  bleeding from trach site  PNA  MSSA pna  seizure like activity  drug over dose/ withdrawal opoid   liver cirrhosis   Ascites sp paracentesis   Pancytopenia- worsening  RENETTA improved  C diff +ve     PLAN:    CNS: Continue with Methadone for withdrawal   continue with seroquel and clonazepam;   Clonidine   Check QTc daily.    SAT    HEENT: oral care; Trach care     PULMONARY:    CXR reviewed   surgery following for trach care   ABG reviewed  keep off L side in bed develops atelectasis  Pressure support as much as tolerated     CARDIOVASCULAR: Keep MAP more than 65mmhg; not on pressors currently keep equal balance. On oral diuretics.   will assess need for lasix in am    GI: GI prophylaxis. Feedding per speech and swallow  s/p paracentesis , f/u studies     RENAL: Correct K ot mo re than 4 and Mg to more than 2.        INFECTIOUS DISEASE: Currently onabx per ID  ID following.       HEMATOLOGICAL:   s/p BMB follow result and cx. Hematology following. Monitor platelet count. Keep hg more than 7.   TC serial HH    ENDOCRINE:  Follow up FS.  Insulin protocol if needed.     Musk: bedrest for now.   Dispo: Remains critically and ill needs ICU care     TLC 7/6 RIJ;        IMPRESSION:  Acute respiratory failure sp trach  bleeding from trach site  PNA  MSSA pna  seizure like activity  drug over dose/ withdrawal opoid   liver cirrhosis   Ascites sp paracentesis   Pancytopenia- worsening  RENETTA improved  C diff +ve     PLAN:    CNS: Continue with Methadone for withdrawal   continue with seroquel and clonazepam;   Clonidine   Check QTc daily.    SAT    HEENT: oral care; Trach care     PULMONARY:    CXR reviewed   surgery following for trach care   ABG reviewed  keep off L side in bed develops atelectasis  Pressure support as much as tolerated     CARDIOVASCULAR: Keep MAP more than 65mmhg; not on pressors currently keep equal balance. On oral diuretics.       GI: GI prophylaxis. Feedding per speech and swallow  s/p paracentesis , f/u studies     RENAL: Correct K ot mo re than 4 and Mg to more than 2.        INFECTIOUS DISEASE: Currently onabx per ID  ID following.       HEMATOLOGICAL:   s/p BMB follow result and cx. Hematology following. Monitor platelet count. Keep hg more than 7.   TC serial HH    ENDOCRINE:  Follow up FS.  Insulin protocol if needed.     Musk: bedrest for now.   Dispo: Remains critically and ill needs ICU care     TLC 7/6 RIJ;

## 2022-07-25 NOTE — PROGRESS NOTE ADULT - SUBJECTIVE AND OBJECTIVE BOX
Patient seen and evaluated this am, sedated on ventilator on Precedex      T(F): 99.1 (07-25-22 @ 11:00), Max: 99.8 (07-24-22 @ 19:20)  HR: 109 (07-25-22 @ 12:45)  BP: 163/106 (07-25-22 @ 12:13)  RR: 25 (07-25-22 @ 12:13)  SpO2: 100% (07-25-22 @ 12:45) (97% - 100%)    PHYSICAL EXAM:  GENERAL: NAD  HEAD:  Atraumatic, Normocephalic  EYES: EOMI, PERRLA, conjunctiva and sclera clear  NERVOUS SYSTEM:  no focal deficits   CHEST/LUNG:  bilateral rhonchi  HEART: Regular rate and rhythm; No murmurs, rubs, or gallops  ABDOMEN: Soft, Nontender, Nondistended; Bowel sounds present  EXTREMITIES:  2+ Peripheral Pulses, No clubbing, cyanosis, or edema    LABS  07-25    141  |  105  |  8<L>  ----------------------------<  104<H>  3.6   |  25  |  0.8    Ca    8.5      25 Jul 2022 05:28  Phos  5.3     07-25  Mg     1.8     07-25    TPro  5.0<L>  /  Alb  2.8<L>  /  TBili  0.5  /  DBili  x   /  AST  16  /  ALT  11  /  AlkPhos  206<H>  07-25                          7.1    1.51  )-----------( 60       ( 25 Jul 2022 10:00 )             22.6     PT/INR - ( 24 Jul 2022 04:15 )   PT: 14.30 sec;   INR: 1.25 ratio         PTT - ( 24 Jul 2022 04:15 )  PTT:35.9 sec    Mode: CPAP with PS  FiO2: 30  PEEP: 5      Culture Results:   Normal Respiratory Kelly present (07-15-22)  Culture Results:   Babesia microti PCR  Results: NOT detected  ***************Result Note*************  The detection of Babesia microti by PCR has only been  validated for whole blood; this test has not been approved  by the US Food and Drug Administration (FDA). Performance  characteristics of this assay have been determined by  [facility]. The clinical significance  of results should be considered in conjunction with the  overall clinical presentation of the patient. Result is not  intended to be used as the sole means for clinical diagnosis  or patient management decisions.  One negative sample does not necessarily rule  out the presence of a parasitic infection. (07-10-22)  Culture Results:   No Growth Final (07-08-22)  Culture Results:   No Growth Final (07-06-22)  Culture Results:   No Growth Final (07-06-22)  Culture Results:   Moderate Acinetobacter baumannii/nosocom group (Carbapenem Resistant)  Cefiderocol interpretations based on FDA breakpoints.  Normal Respiratory Kelly absent (07-04-22)  Culture Results:   No Growth Final (06-29-22)  Culture Results:   No Growth Final (06-29-22)  Culture Results:   No Growth Final (06-28-22)  Culture Results:   Numerous Mixed gram negative rods  Moderate Staphylococcus aureus  Normal Respiratory Kelly present (06-27-22)    RADIOLOGY  < from: Xray Chest 1 View- PORTABLE-Routine (Xray Chest 1 View- PORTABLE-Routine in AM.) (07.25.22 @ 06:10) >  Lung parenchyma/Pleura: Bilateral opacities and effusions similar to   prior. No pneumothorax.    < end of copied text >    MEDICATIONS  (STANDING):  ampicillin/sulbactam  IVPB 3 Gram(s) IV Intermittent every 6 hours  BACItracin   Ointment 1 Application(s) Topical two times a day  chlorhexidine 0.12% Liquid 15 milliLiter(s) Oral Mucosa every 12 hours  chlorhexidine 2% Cloths 1 Application(s) Topical daily  clonazePAM  Tablet 1 milliGRAM(s) Oral every 8 hours  cloNIDine 0.1 milliGRAM(s) Oral daily  dexMEDEtomidine Infusion 0.1 MICROgram(s)/kG/Hr (1.76 mL/Hr) IV Continuous <Continuous>  fentaNYL   Infusion. 0.5 MICROgram(s)/kG/Hr (3.52 mL/Hr) IV Continuous <Continuous>  folic acid 1 milliGRAM(s) Oral daily  methadone    Tablet 20 milliGRAM(s) Oral daily  nicotine -  14 mG/24Hr(s) Patch 1 patch Transdermal daily  norepinephrine Infusion 0.05 MICROgram(s)/kG/Min (8.03 mL/Hr) IV Continuous <Continuous>  pantoprazole  Injectable 40 milliGRAM(s) IV Push daily  polymyxin B IVPB 471432 Unit(s) IV Intermittent every 12 hours  QUEtiapine 100 milliGRAM(s) Oral two times a day  scopolamine 1 mG/72 Hr(s) Patch 1 Patch Transdermal every 72 hours  spironolactone 100 milliGRAM(s) Oral daily  thiamine 100 milliGRAM(s) Oral daily  vancomycin    Solution 125 milliGRAM(s) Oral every 12 hours    MEDICATIONS  (PRN):  acetaminophen     Tablet .. 650 milliGRAM(s) Oral every 6 hours PRN Severe Pain (7 - 10)  ALBUTerol    90 MICROgram(s) HFA Inhaler 1 Puff(s) Inhalation every 4 hours PRN Shortness of Breath and/or Wheezing  cloNIDine 0.1 milliGRAM(s) Oral every 6 hours PRN opiate withdrawal  hydrOXYzine hydrochloride 50 milliGRAM(s) Oral every 6 hours PRN Anxiety  sodium chloride 0.9% lock flush 10 milliLiter(s) IV Push every 1 hour PRN Pre/post blood products, medications, blood draw, and to maintain line patency

## 2022-07-25 NOTE — PROGRESS NOTE ADULT - ASSESSMENT
Patient is a 26 year old female with hx of asthma, untreated Hep C, IVDA, anasarca presenting with increased dyspnea since yesterday    IMPRESSION  #Acute respiratory failure s/p intubation 6/16, extubated 7/7, reintubated     #VAP  - Xray Chest 1 View- PORTABLE-Routine (Xray Chest 1 View- PORTABLE-Routine in AM.) (07.03.22 @ 06:10): Increasing right basilar opacity.  - sputum Cx 7/4 MDR Acinetobacter baumanii     # C.difficille infection - 7/5/22    #Pneumonia -   - CT Chest 6/22 - left greater than right lower lobe consolidations   - sputum Cx 6/24 Klebsiella oxytoca, Enterobacter cloacae complex- The Sputum culture from 6/27 grew Numerous Mixed gram negative rods and Moderate Staphylococcus aureus.  - treated with course of cefepime     #FUO + Pancytopenia + Splenomegaly   - Ferritin, Serum: 94 ng/mL (06.27.22 @ 06:47),  Procalcitonin, Serum: 0.11 (06.27.22 @ 15:46)  - CT Abd/pelvis 6/26 - moderated ascites moderate pericardial effusion   - EBV seroligies consistent with old infection   - CMV IgG +, IgM - (07.10.22 @ 15:43)  - bartonella-ab negative   - Hepatosplenomegaly - present since CT Abd/pelvis 1/30/2021  - HIV-1/2 Combo Result: Nonreact:  (06.29.22 @ 10:29)  - Sedimentation Rate, Erythrocyte: 86 mm/Hr (06.30.22 @ 05:46)  - Autoimmune panel negative   - Quantiferon TB Plus: NEGATIVE (06.04.20 @ 10:28)  - Quantiferon TB Plus: INDETERMINATE. (02.06.21 @ 09:00)  - s/p Bone marrow biopsy 7/14 - Flow negative       #Drug overdose vs withdrawal?  #Hx of Untreated Hep C   #IVDA   #Abx allergy: buprenorphine (Rash), Suboxone (Rash)    Recommendations  - continue polymyxin 96324 U/kg q 12 hours and unasyn 3g q 6 hours for Acinetobacter VAP (start date 7/15) -- last day of antiboitics today   - continue PO vancomycin 125 mg BID for prophylaxis while on systemic antibiotics -- continue until 7/20   - monitor creatinine  - vent management per primary   - trend CBC - hepatomegaly/splenomegaly + pancytopenia cause is unclear -- BMBx is unremarkable;  consider eventual liver biopsy (ie sarcoid) for diagnostic purposes       Please call or message on [employer] Teams if with any questions.  Spectra [phone]

## 2022-07-25 NOTE — PROGRESS NOTE ADULT - SUBJECTIVE AND OBJECTIVE BOX
Patient is a 26y old  Female who presents with a chief complaint of anasarca (24 Jul 2022 19:28)        Over Night Events: pt had a bleeding episode around [facility] care. On precedex only.         ROS:     All ROS are negative except HPI         PHYSICAL EXAM    ICU Vital Signs Last 24 Hrs  T(C): 37.2 (25 Jul 2022 07:00), Max: 37.7 (24 Jul 2022 19:20)  T(F): 99 (25 Jul 2022 07:00), Max: 99.8 (24 Jul 2022 19:20)  HR: 67 (25 Jul 2022 07:39) (65 - 104)  BP: 101/67 (25 Jul 2022 07:00) (101/67 - 182/104)  BP(mean): 78 (25 Jul 2022 06:13) (78 - 135)  RR: 20 (25 Jul 2022 07:00) (20 - 38)  SpO2: 100% (25 Jul 2022 07:39) (98% - 100%)        CONSTITUTIONAL:  Ill appearing     ENT:   Airway patent,   Mouth with normal mucosa.   [facility]       CARDIAC:   Normal rate,   Regular rhythm.    LE edema        RESPIRATORY:   No wheezing  Bilateral BS  Normal chest expansion  Not tachypneic,  No use of accessory muscles    GASTROINTESTINAL:  Abdomen soft,   Non-tender,   No guarding,   + BS       NEUROLOGICAL:   Alert and oriented    SKIN:   Skin normal color for race,   Warm and dry and intact.   No evidence of rash.     07-24-22 @ 07:01  -  07-25-22 @ 07:00  --------------------------------------------------------  IN:    Dexmedetomidine: 432 mL    Enteral Tube Flush: 250 mL    IV PiggyBack: 100 mL    IV PiggyBack: 1000 mL    IV PiggyBack: 400 mL  Total IN: 2182 mL    OUT:    Blood Loss (mL): 300 mL    Indwelling Catheter - Urethral (mL): 2790 mL  Total OUT: 3090 mL    Total NET: -908 mL      07-25-22 @ 07:01  -  07-25-22 @ 07:58  --------------------------------------------------------  IN:  Total IN: 0 mL    OUT:    Indwelling Catheter - Urethral (mL): 125 mL  Total OUT: 125 mL    Total NET: -125 mL          LABS:                            8.4    3.03  )-----------( 129      ( 24 Jul 2022 12:16 )             26.4                                               07-25    141  |  105  |  8<L>  ----------------------------<  104<H>  3.6   |  25  |  0.8    Ca    8.5      25 Jul 2022 05:28  Phos  5.3     07-25  Mg     1.8     07-25    TPro  5.0<L>  /  Alb  2.8<L>  /  TBili  0.5  /  DBili  x   /  AST  16  /  ALT  11  /  AlkPhos  206<H>  07-25      PT/INR - ( 24 Jul 2022 04:15 )   PT: 14.30 sec;   INR: 1.25 ratio         PTT - ( 24 Jul 2022 04:15 )  PTT:35.9 sec                                                                                     LIVER FUNCTIONS - ( 25 Jul 2022 05:28 )  Alb: 2.8 g/dL / Pro: 5.0 g/dL / ALK PHOS: 206 U/L / ALT: 11 U/L / AST: 16 U/L / GGT: x                                                                                               Mode: AC/ CMV (Assist Control/ Continuous Mandatory Ventilation)  RR (machine): 25  TV (machine): 370  FiO2: 30  PEEP: 5  MAP: 10  PIP: 19                                      ABG - ( 25 Jul 2022 05:45 )  pH, Arterial: 7.41  pH, Blood: x     /  pCO2: 41    /  pO2: 170   / HCO3: 26    / Base Excess: 1.2   /  SaO2: 100.0  / lactate 0.4               MEDICATIONS  (STANDING):  ampicillin/sulbactam  IVPB 3 Gram(s) IV Intermittent every 6 hours  BACItracin   Ointment 1 Application(s) Topical two times a day  chlorhexidine 0.12% Liquid 15 milliLiter(s) Oral Mucosa every 12 hours  chlorhexidine 2% Cloths 1 Application(s) Topical daily  clonazePAM  Tablet 1 milliGRAM(s) Oral every 8 hours  dexMEDEtomidine Infusion 0.1 MICROgram(s)/kG/Hr (1.76 mL/Hr) IV Continuous <Continuous>  fentaNYL   Infusion. 0.5 MICROgram(s)/kG/Hr (3.52 mL/Hr) IV Continuous <Continuous>  folic acid 1 milliGRAM(s) Oral daily  methadone    Tablet 20 milliGRAM(s) Oral daily  nicotine -  14 mG/24Hr(s) Patch 1 patch Transdermal daily  norepinephrine Infusion 0.05 MICROgram(s)/kG/Min (8.03 mL/Hr) IV Continuous <Continuous>  pantoprazole  Injectable 40 milliGRAM(s) IV Push daily  polymyxin B IVPB 245053 Unit(s) IV Intermittent every 12 hours  propofol Infusion 0.095 MICROgram(s)/kG/Min (0.04 mL/Hr) IV Continuous <Continuous>  QUEtiapine 100 milliGRAM(s) Oral two times a day  scopolamine 1 mG/72 Hr(s) Patch 1 Patch Transdermal every 72 hours  spironolactone 100 milliGRAM(s) Oral daily  thiamine 100 milliGRAM(s) Oral daily  vancomycin    Solution 125 milliGRAM(s) Oral every 12 hours    MEDICATIONS  (PRN):  acetaminophen     Tablet .. 650 milliGRAM(s) Oral every 6 hours PRN Severe Pain (7 - 10)  ALBUTerol    90 MICROgram(s) HFA Inhaler 1 Puff(s) Inhalation every 4 hours PRN Shortness of Breath and/or Wheezing  cloNIDine 0.1 milliGRAM(s) Oral every 6 hours PRN opiate withdrawal  hydrOXYzine hydrochloride 50 milliGRAM(s) Oral every 6 hours PRN Anxiety  sodium chloride 0.9% lock flush 10 milliLiter(s) IV Push every 1 hour PRN Pre/post blood products, medications, blood draw, and to maintain line patency           CXR interpreted by me:  B/L lower Lobe infiltrates

## 2022-07-25 NOTE — PROGRESS NOTE ADULT - ASSESSMENT
#Acute respiratory failure sp intubation and extubation 7/8 followed by reintubation s/p trach  - Trach bled. Evaluated by surgical team. In place.  - Remains mechanically ventilated  - Pressure support trials  - C/w seroquel, clonazepam and clonidine  - Try to wean off  - EKG daily to check QTc    #PNA   #Acinetobacter buamnii  #MSSA pna; acetinobacter VAP  - CT Chest 6/22 - left greater than right lower lobe consolidations; sputum Cx 6/24 Klebsiella oxytoca, Enterobacter cloacae complex- and   - C/w Unasyn and Polymyxin B for now      #Seizure like activity  #?? drug over dose/ withdrawal opoid   # Liver cirrhosis 2/2 to IVDA  - Ascites s/p paracentesis   # Pancytopenia    # RENETTA Resolving     #C. Diff on 7/5/2022:  - s/p oral vancomycin treatment  - Now on oral vancomycin 125mg po q12hrs prophylaxis  - Contact isolation    #Hypokalemia/hypomagnesemia   #Acute respiratory failure sp intubation and extubation 7/8 followed by reintubation s/p trach  - Trach bled. Evaluated by surgical team. In place.  - Remains mechanically ventilated  - Pressure support trials  - C/w seroquel, clonazepam and clonidine  - Try to wean off precedex  - EKG daily to check QTc  - Speech and swallow evaluation, prefer to perform evaluation while cuff is inflated for now as she is on sedation.   - PT/OT    #PNA   #Acinetobacter buamnii  #MSSA pna; acetinobacter VAP  - CT Chest 6/22 - left greater than right lower lobe consolidations; sputum Cx 6/24 Klebsiella oxytoca, Enterobacter cloacae complex- and   - C/w Unasyn and Polymyxin B for now    #Seizure like activity  #?? drug over dose/ withdrawal opoid   # Liver cirrhosis 2/2 to IVDA  - Ascites s/p paracentesis     #Pancytopenia    #RENETTA   - Resolved     #C. Diff on 7/5/2022:  - s/p oral vancomycin treatment  - Now on oral vancomycin 125mg po q12hrs prophylaxis  - Contact isolation    #Misc:   - DVT porph: Off lovenox likely secondary to bleeding from Trach site, reconsider starting.   - Diet: NG feeding, speech and swallow evaluation to consider oral feeding  - GI proph: Pantoprazole 40mg IV once daily.

## 2022-07-26 LAB
ALBUMIN SERPL ELPH-MCNC: 2.8 G/DL — LOW (ref 3.5–5.2)
ALP SERPL-CCNC: 217 U/L — HIGH (ref 30–115)
ALT FLD-CCNC: 9 U/L — SIGNIFICANT CHANGE UP (ref 0–41)
ANION GAP SERPL CALC-SCNC: 12 MMOL/L — SIGNIFICANT CHANGE UP (ref 7–14)
AST SERPL-CCNC: 14 U/L — SIGNIFICANT CHANGE UP (ref 0–41)
BACTERIA # UR AUTO: ABNORMAL
BASOPHILS # BLD AUTO: 0.01 K/UL — SIGNIFICANT CHANGE UP (ref 0–0.2)
BASOPHILS NFR BLD AUTO: 0.6 % — SIGNIFICANT CHANGE UP (ref 0–1)
BILIRUB SERPL-MCNC: 0.6 MG/DL — SIGNIFICANT CHANGE UP (ref 0.2–1.2)
BUN SERPL-MCNC: 10 MG/DL — SIGNIFICANT CHANGE UP (ref 10–20)
CALCIUM SERPL-MCNC: 8.5 MG/DL — SIGNIFICANT CHANGE UP (ref 8.5–10.1)
CHLORIDE SERPL-SCNC: 102 MMOL/L — SIGNIFICANT CHANGE UP (ref 98–110)
CO2 SERPL-SCNC: 26 MMOL/L — SIGNIFICANT CHANGE UP (ref 17–32)
COMMENT - URINE: SIGNIFICANT CHANGE UP
CREAT SERPL-MCNC: 0.9 MG/DL — SIGNIFICANT CHANGE UP (ref 0.7–1.5)
EGFR: 89 ML/MIN/1.73M2 — SIGNIFICANT CHANGE UP
EOSINOPHIL # BLD AUTO: 0 K/UL — SIGNIFICANT CHANGE UP (ref 0–0.7)
EOSINOPHIL NFR BLD AUTO: 0 % — SIGNIFICANT CHANGE UP (ref 0–8)
EPI CELLS # UR: ABNORMAL /HPF
GLUCOSE SERPL-MCNC: 83 MG/DL — SIGNIFICANT CHANGE UP (ref 70–99)
HCT VFR BLD CALC: 22.1 % — LOW (ref 37–47)
HGB BLD-MCNC: 6.9 G/DL — CRITICAL LOW (ref 12–16)
IMM GRANULOCYTES NFR BLD AUTO: 0.6 % — HIGH (ref 0.1–0.3)
LYMPHOCYTES # BLD AUTO: 0.41 K/UL — LOW (ref 1.2–3.4)
LYMPHOCYTES # BLD AUTO: 25.2 % — SIGNIFICANT CHANGE UP (ref 20.5–51.1)
MAGNESIUM SERPL-MCNC: 1.5 MG/DL — LOW (ref 1.8–2.4)
MCHC RBC-ENTMCNC: 27.3 PG — SIGNIFICANT CHANGE UP (ref 27–31)
MCHC RBC-ENTMCNC: 31.2 G/DL — LOW (ref 32–37)
MCV RBC AUTO: 87.4 FL — SIGNIFICANT CHANGE UP (ref 81–99)
MONOCYTES # BLD AUTO: 0.06 K/UL — LOW (ref 0.1–0.6)
MONOCYTES NFR BLD AUTO: 3.7 % — SIGNIFICANT CHANGE UP (ref 1.7–9.3)
NEUTROPHILS # BLD AUTO: 1.14 K/UL — LOW (ref 1.4–6.5)
NEUTROPHILS NFR BLD AUTO: 69.9 % — SIGNIFICANT CHANGE UP (ref 42.2–75.2)
NRBC # BLD: 0 /100 WBCS — SIGNIFICANT CHANGE UP (ref 0–0)
PHOSPHATE SERPL-MCNC: 5.4 MG/DL — HIGH (ref 2.1–4.9)
PLATELET # BLD AUTO: 66 K/UL — LOW (ref 130–400)
POTASSIUM SERPL-MCNC: 3.3 MMOL/L — LOW (ref 3.5–5)
POTASSIUM SERPL-SCNC: 3.3 MMOL/L — LOW (ref 3.5–5)
PROT SERPL-MCNC: 5 G/DL — LOW (ref 6–8)
RBC # BLD: 2.53 M/UL — LOW (ref 4.2–5.4)
RBC # FLD: 15.2 % — HIGH (ref 11.5–14.5)
RBC CASTS # UR COMP ASSIST: >50 /HPF
SODIUM SERPL-SCNC: 140 MMOL/L — SIGNIFICANT CHANGE UP (ref 135–146)
WBC # BLD: 1.63 K/UL — LOW (ref 4.8–10.8)
WBC # FLD AUTO: 1.63 K/UL — LOW (ref 4.8–10.8)
WBC UR QL: SIGNIFICANT CHANGE UP /HPF

## 2022-07-26 PROCEDURE — 99233 SBSQ HOSP IP/OBS HIGH 50: CPT

## 2022-07-26 PROCEDURE — 74230 X-RAY XM SWLNG FUNCJ C+: CPT | Mod: 26

## 2022-07-26 PROCEDURE — 93010 ELECTROCARDIOGRAM REPORT: CPT

## 2022-07-26 PROCEDURE — 71045 X-RAY EXAM CHEST 1 VIEW: CPT | Mod: 26

## 2022-07-26 RX ORDER — POTASSIUM CHLORIDE 20 MEQ
20 PACKET (EA) ORAL
Refills: 0 | Status: COMPLETED | OUTPATIENT
Start: 2022-07-26 | End: 2022-07-26

## 2022-07-26 RX ORDER — VANCOMYCIN HCL 1 G
1500 VIAL (EA) INTRAVENOUS ONCE
Refills: 0 | Status: DISCONTINUED | OUTPATIENT
Start: 2022-07-26 | End: 2022-07-28

## 2022-07-26 RX ORDER — MAGNESIUM SULFATE 500 MG/ML
2 VIAL (ML) INJECTION
Refills: 0 | Status: COMPLETED | OUTPATIENT
Start: 2022-07-26 | End: 2022-07-26

## 2022-07-26 RX ORDER — VANCOMYCIN HCL 1 G
1000 VIAL (EA) INTRAVENOUS EVERY 8 HOURS
Refills: 0 | Status: DISCONTINUED | OUTPATIENT
Start: 2022-07-26 | End: 2022-07-28

## 2022-07-26 RX ADMIN — Medication 125 MILLIGRAM(S): at 17:08

## 2022-07-26 RX ADMIN — AMPICILLIN SODIUM AND SULBACTAM SODIUM 200 GRAM(S): 250; 125 INJECTION, POWDER, FOR SUSPENSION INTRAMUSCULAR; INTRAVENOUS at 17:06

## 2022-07-26 RX ADMIN — AMPICILLIN SODIUM AND SULBACTAM SODIUM 200 GRAM(S): 250; 125 INJECTION, POWDER, FOR SUSPENSION INTRAMUSCULAR; INTRAVENOUS at 05:33

## 2022-07-26 RX ADMIN — Medication 1 PATCH: at 11:29

## 2022-07-26 RX ADMIN — Medication 125 MILLIGRAM(S): at 05:32

## 2022-07-26 RX ADMIN — QUETIAPINE FUMARATE 100 MILLIGRAM(S): 200 TABLET, FILM COATED ORAL at 05:33

## 2022-07-26 RX ADMIN — Medication 1 MILLIGRAM(S): at 21:33

## 2022-07-26 RX ADMIN — Medication 50 MILLIEQUIVALENT(S): at 11:27

## 2022-07-26 RX ADMIN — SCOPALAMINE 1 PATCH: 1 PATCH, EXTENDED RELEASE TRANSDERMAL at 11:29

## 2022-07-26 RX ADMIN — CHLORHEXIDINE GLUCONATE 15 MILLILITER(S): 213 SOLUTION TOPICAL at 17:07

## 2022-07-26 RX ADMIN — Medication 1 MILLIGRAM(S): at 16:37

## 2022-07-26 RX ADMIN — CHLORHEXIDINE GLUCONATE 15 MILLILITER(S): 213 SOLUTION TOPICAL at 05:35

## 2022-07-26 RX ADMIN — POLYMYXIN B SULFATE 500 UNIT(S): 500000 INJECTION, POWDER, LYOPHILIZED, FOR SOLUTION INTRAMUSCULAR; INTRATHECAL; INTRAVENOUS; OPHTHALMIC at 17:06

## 2022-07-26 RX ADMIN — SCOPALAMINE 1 PATCH: 1 PATCH, EXTENDED RELEASE TRANSDERMAL at 22:00

## 2022-07-26 RX ADMIN — Medication 0.1 MILLIGRAM(S): at 05:34

## 2022-07-26 RX ADMIN — POLYMYXIN B SULFATE 500 UNIT(S): 500000 INJECTION, POWDER, LYOPHILIZED, FOR SOLUTION INTRAMUSCULAR; INTRATHECAL; INTRAVENOUS; OPHTHALMIC at 05:33

## 2022-07-26 RX ADMIN — Medication 1 MILLIGRAM(S): at 11:27

## 2022-07-26 RX ADMIN — Medication 50 MILLIEQUIVALENT(S): at 16:39

## 2022-07-26 RX ADMIN — Medication 100 MILLIGRAM(S): at 11:27

## 2022-07-26 RX ADMIN — Medication 1 APPLICATION(S): at 05:34

## 2022-07-26 RX ADMIN — AMPICILLIN SODIUM AND SULBACTAM SODIUM 200 GRAM(S): 250; 125 INJECTION, POWDER, FOR SUSPENSION INTRAMUSCULAR; INTRAVENOUS at 11:28

## 2022-07-26 RX ADMIN — SCOPALAMINE 1 PATCH: 1 PATCH, EXTENDED RELEASE TRANSDERMAL at 22:32

## 2022-07-26 RX ADMIN — Medication 1 MILLIGRAM(S): at 05:34

## 2022-07-26 RX ADMIN — PANTOPRAZOLE SODIUM 40 MILLIGRAM(S): 20 TABLET, DELAYED RELEASE ORAL at 11:28

## 2022-07-26 RX ADMIN — METHADONE HYDROCHLORIDE 20 MILLIGRAM(S): 40 TABLET ORAL at 11:27

## 2022-07-26 RX ADMIN — CHLORHEXIDINE GLUCONATE 1 APPLICATION(S): 213 SOLUTION TOPICAL at 11:28

## 2022-07-26 RX ADMIN — Medication 250 MILLIGRAM(S): at 22:54

## 2022-07-26 RX ADMIN — Medication 50 MILLIEQUIVALENT(S): at 10:00

## 2022-07-26 RX ADMIN — Medication 25 GRAM(S): at 10:01

## 2022-07-26 RX ADMIN — Medication 650 MILLIGRAM(S): at 16:45

## 2022-07-26 RX ADMIN — Medication 1 APPLICATION(S): at 17:07

## 2022-07-26 RX ADMIN — AMPICILLIN SODIUM AND SULBACTAM SODIUM 200 GRAM(S): 250; 125 INJECTION, POWDER, FOR SUSPENSION INTRAMUSCULAR; INTRAVENOUS at 00:40

## 2022-07-26 RX ADMIN — Medication 1 PATCH: at 20:26

## 2022-07-26 RX ADMIN — QUETIAPINE FUMARATE 100 MILLIGRAM(S): 200 TABLET, FILM COATED ORAL at 17:07

## 2022-07-26 RX ADMIN — Medication 25 GRAM(S): at 11:26

## 2022-07-26 RX ADMIN — SPIRONOLACTONE 100 MILLIGRAM(S): 25 TABLET, FILM COATED ORAL at 05:34

## 2022-07-26 RX ADMIN — SCOPALAMINE 1 PATCH: 1 PATCH, EXTENDED RELEASE TRANSDERMAL at 20:26

## 2022-07-26 NOTE — PROGRESS NOTE ADULT - ASSESSMENT
Acute respiratory failure with hypoxemia / aspiration pneumonia with sepsis / suspected drug overdose / head laceration s/p fall in lobby / possible seizure / anasarca  pancytopenia      - c-diff colitis - complete  oral vanco course   - w/u and antibiotics as per ID    - s/p BMB - heme f/u - transfuse and follow cbc   - surgery following trach site bleeding   -  supplement  hypokalemia and  hypomagnesemia    - slow weaning trial

## 2022-07-26 NOTE — SWALLOW VFSS/MBS ASSESSMENT ADULT - CONSISTENCIES ADMINISTERED
solids withheld due to increased residue and reduced clearance generalized weakness./moderately thick/mildly thick/pureed thin liquid

## 2022-07-26 NOTE — PROGRESS NOTE ADULT - SUBJECTIVE AND OBJECTIVE BOX
Patient is a 26y old  Female who presents with a chief complaint of anasarca (2022 14:00)        Over Night Events: pt is spiking temperatures now, dropped Hb. Precedex drip only ,         ROS:     All ROS are negative except HPI         PHYSICAL EXAM    ICU Vital Signs Last 24 Hrs  T(C): 36.8 (2022 07:00), Max: 39.7 (2022 15:00)  T(F): 98.3 (2022 07:00), Max: 103.5 (2022 15:00)  HR: 91 (2022 07:18) (69 - 126)  BP: 111/76 (2022 07:00) (103/62 - 174/104)  BP(mean): 88 (2022 07:00) (76 - 134)  RR: 44 (2022 07:00) (18 - 44)  SpO2: 100% (2022 07:18) (96% - 100%)    O2 Parameters below as of 2022 21:00  Patient On (Oxygen Delivery Method): ventilator    O2 Concentration (%): 30        CONSTITUTIONAL:  ill appearing    ENT:   Airway patent,   Mouth with normal mucosa.   Trach         CARDIAC:   Normal rate,   Regular rhythm.    LE edema        RESPIRATORY:   No wheezing  Bilateral BS  Normal chest expansion  Not tachypneic,  No use of accessory muscles    GASTROINTESTINAL:  Distented , Tenderness  No guarding,   + BS       NEUROLOGICAL:   Alert and oriented   No motor  deficits.    SKIN:   sacrum stage 2         22 @ 07:01  -  -- @ 07:00  --------------------------------------------------------  IN:    Dexmedetomidine: 373 mL    Enteral Tube Flush: 390 mL    IV PiggyBack: 1000 mL    IV PiggyBack: 400 mL  Total IN: 2163 mL    OUT:    Indwelling Catheter - Urethral (mL): 2840 mL  Total OUT: 2840 mL    Total NET: -677 mL          LABS:                            6.9    1.63  )-----------( 66       ( 2022 05:29 )             22.1                                                   140  |  102  |  10  ----------------------------<  83  3.3<L>   |  26  |  0.9    Ca    8.5      2022 05:29  Phos  5.4       Mg     1.5         TPro  5.0<L>  /  Alb  2.8<L>  /  TBili  0.6  /  DBili  x   /  AST  14  /  ALT  9   /  AlkPhos  217<H>                                               Urinalysis Basic - ( 2022 23:50 )    Color: Yellow / Appearance: Slightly Cloudy / S.015 / pH: x  Gluc: x / Ketone: Negative  / Bili: Negative / Urobili: 0.2 mg/dL   Blood: x / Protein: 30 mg/dL / Nitrite: Negative   Leuk Esterase: Negative / RBC: >50 /HPF / WBC 1-2 /HPF   Sq Epi: x / Non Sq Epi: Occasional /HPF / Bacteria: Few                                                  LIVER FUNCTIONS - ( 2022 05:29 )  Alb: 2.8 g/dL / Pro: 5.0 g/dL / ALK PHOS: 217 U/L / ALT: 9 U/L / AST: 14 U/L / GGT: x                                                                                               Mode: AC/ CMV (Assist Control/ Continuous Mandatory Ventilation)  RR (machine): 25  TV (machine): 370  FiO2: 30  PEEP: 5  MAP: 8  PIP: 19                                      ABG - ( 2022 05:34 )  pH, Arterial: 7.47  pH, Blood: x     /  pCO2: 34    /  pO2: 150   / HCO3: 25    / Base Excess: 1.1   /  SaO2: 99.8  / lactate 0.3              MEDICATIONS  (STANDING):  ampicillin/sulbactam  IVPB 3 Gram(s) IV Intermittent every 6 hours  BACItracin   Ointment 1 Application(s) Topical two times a day  chlorhexidine 0.12% Liquid 15 milliLiter(s) Oral Mucosa every 12 hours  chlorhexidine 2% Cloths 1 Application(s) Topical daily  clonazePAM  Tablet 1 milliGRAM(s) Oral every 8 hours  cloNIDine 0.1 milliGRAM(s) Oral daily  dexMEDEtomidine Infusion 0.1 MICROgram(s)/kG/Hr (1.76 mL/Hr) IV Continuous <Continuous>  fentaNYL   Infusion. 0.5 MICROgram(s)/kG/Hr (3.52 mL/Hr) IV Continuous <Continuous>  folic acid 1 milliGRAM(s) Oral daily  magnesium sulfate  IVPB 2 Gram(s) IV Intermittent every 2 hours  methadone    Tablet 20 milliGRAM(s) Oral daily  nicotine -  14 mG/24Hr(s) Patch 1 patch Transdermal daily  norepinephrine Infusion 0.05 MICROgram(s)/kG/Min (8.03 mL/Hr) IV Continuous <Continuous>  pantoprazole  Injectable 40 milliGRAM(s) IV Push daily  polymyxin B IVPB 129786 Unit(s) IV Intermittent every 12 hours  potassium chloride  20 mEq/100 mL IVPB 20 milliEquivalent(s) IV Intermittent every 2 hours  QUEtiapine 100 milliGRAM(s) Oral two times a day  scopolamine 1 mG/72 Hr(s) Patch 1 Patch Transdermal every 72 hours  spironolactone 100 milliGRAM(s) Oral daily  thiamine 100 milliGRAM(s) Oral daily  vancomycin    Solution 125 milliGRAM(s) Oral every 12 hours    MEDICATIONS  (PRN):  acetaminophen     Tablet .. 650 milliGRAM(s) Oral every 6 hours PRN Temp greater or equal to 38.5C (101.3F), Moderate Pain (4 - 6)  ALBUTerol    90 MICROgram(s) HFA Inhaler 1 Puff(s) Inhalation every 4 hours PRN Shortness of Breath and/or Wheezing  cloNIDine 0.1 milliGRAM(s) Oral every 6 hours PRN opiate withdrawal  hydrOXYzine hydrochloride 50 milliGRAM(s) Oral every 6 hours PRN Anxiety  sodium chloride 0.9% lock flush 10 milliLiter(s) IV Push every 1 hour PRN Pre/post blood products, medications, blood draw, and to maintain line patency           CXR interpreted by me:  Right sided opacity

## 2022-07-26 NOTE — SWALLOW VFSS/MBS ASSESSMENT ADULT - ORAL PHASE COMMENTS
mild impairment, chewables withheld due to weakness, and increasing residue with increasing viscosity

## 2022-07-26 NOTE — PROGRESS NOTE ADULT - ASSESSMENT
#Acute respiratory failure sp intubation and extubation 7/8 followed by reintubation s/p trach  - Trach bled. Evaluated by surgical team. In place.  - Remains mechanically ventilated  - Pressure support trials  - C/w seroquel, clonazepam and clonidine  - Try to wean off precedex  - EKG daily to check QTc  - Speech and swallow evaluation, prefer to perform evaluation while cuff is inflated for now as she is on sedation.   - PT/OT    #PNA   #Acinetobacter buamnii  #MSSA pna; acetinobacter VAP  - CT Chest 6/22 - left greater than right lower lobe consolidations; sputum Cx 6/24 Klebsiella oxytoca, Enterobacter cloacae complex- and   - C/w Unasyn and Polymyxin B for now    #Neutropenic fever ( ANC >1165, mild):  - The patient is spiking fevers  - Discussed with Dr. Bay, blood cultures sent on 7/25, UA on 7/25: negative, resend procal  - C/w for now Unasyn and Polymixn  - DC TLC  - DC doss catheter  - Attempt Paracentesis if feasible     #Seizure like activity  #?? drug over dose/ withdrawal opoid     #Liver cirrhosis 2/2 to IVDA  - Ascites s/p paracentesis   - Will re-asses the abdomen today with bedside sonography, if an accessible pocket is present will tap.    #Pancytopenia  #Mild neutropenia    #RENETTA   - Resolved     #C. Diff on 7/5/2022:  - s/p oral vancomycin treatment  - Now on oral vancomycin 125mg po q12hrs prophylaxis    #Misc:   - DVT porph: Off lovenox likely secondary to bleeding from Trach site, reconsider starting.   - Diet: NG feeding, speech and swallow evaluation to consider oral feeding  - GI proph: Pantoprazole 40mg IV once daily.   #Acute respiratory failure sp intubation and extubation 7/8 followed by reintubation s/p trach  - Trach bled. Evaluated by surgical team. In place.  - Remains mechanically ventilated  - Pressure support trials  - C/w seroquel, clonazepam and clonidine  - Try to wean off precedex  - EKG daily to check QTc  - Speech and swallow evaluation, prefer to perform evaluation while cuff is inflated for now as she is on sedation.   - PT/OT  - Tolerating 6 hours of pressure support daily    #PNA   #Acinetobacter buamnii  #MSSA pna; acetinobacter VAP  - CT Chest 6/22 - left greater than right lower lobe consolidations; sputum Cx 6/24 Klebsiella oxytoca, Enterobacter cloacae complex- and   - C/w Unasyn and Polymyxin B for now    #Neutropenic fever ( ANC >1165, mild):  - The patient is spiking fevers  - Discussed with Dr. Bay, blood cultures sent on 7/25, UA on 7/25: negative, resend procal  - C/w for now Unasyn and Polymixn  - DC TLC  - DC doss catheter  - Attempt Paracentesis if feasible     #Seizure like activity  #?? drug over dose/ withdrawal opoid     #Liver cirrhosis 2/2 to IVDA  - Ascites s/p paracentesis   - Will re-asses the abdomen today with bedside sonography, if an accessible pocket is present will tap.    #Pancytopenia  #Mild neutropenia    #RENETTA   - Resolved     #C. Diff on 7/5/2022:  - s/p oral vancomycin treatment  - Now on oral vancomycin 125mg po q12hrs prophylaxis    #Misc:   - DVT porph: Off lovenox likely secondary to bleeding from Trach site, reconsider starting.   - Diet: NG feeding, speech and swallow evaluation to consider oral feeding  - GI proph: Pantoprazole 40mg IV once daily.

## 2022-07-26 NOTE — PROGRESS NOTE ADULT - SUBJECTIVE AND OBJECTIVE BOX
CINDYPOOJA JOE  26y, Female  Allergy: buprenorphine (Rash)  Suboxone (Rash)      LOS  41d    CHIEF COMPLAINT: anasarca (2022 13:37)      INTERVAL EVENTS/HPI  - No acute events overnight  - T(F): , Max: 100.6 (22 @ 21:16)  - febrile, right IJ removed - denies any abdominal pain   - pain mostly by trach site   - WBC Count: 1.63 (22 @ 05:29)  WBC Count: 1.51 (22 @ 10:00)     - Creatinine, Serum: 0.9 (22 @ 05:29)  Creatinine, Serum: 0.8 (22 @ 05:28)       ROS  General: Denies rigors, nightsweats  HEENT: Denies headache, rhinorrhea, sore throat, eye pain  CV: Denies CP, palpitations  PULM: Denies wheezing, hemoptysis  GI: Denies hematemesis, hematochezia, melena  : Denies discharge, hematuria  MSK: Denies arthralgias, myalgias  SKIN: Denies rash, lesions  NEURO: Denies paresthesias, weakness  PSYCH: Denies depression, anxiety    VITALS:  T(F): 100.4, Max: 100.6 (22 @ 21:16)  HR: 135  BP: 160/97  RR: 36Vital Signs Last 24 Hrs  T(C): 38 (2022 17:00), Max: 38.1 (2022 21:16)  T(F): 100.4 (2022 17:00), Max: 100.6 (2022 21:16)  HR: 135 (2022 18:49) (77 - 141)  BP: 160/97 (2022 17:40) (103/62 - 167/91)  BP(mean): 121 (2022 17:40) (76 - 123)  RR: 36 (2022 17:40) (19 - 44)  SpO2: 100% (2022 18:49) (96% - 100%)    Parameters below as of 2022 21:00  Patient On (Oxygen Delivery Method): ventilator    O2 Concentration (%): 30    PHYSICAL EXAM:  Gen: vent/trach  HEENT: Normocephalic, atraumatic  Neck: supple, no lymphadenopathy  CV: Regular rate & regular rhythm  Lungs: decreased BS at bases, no fremitus  Abdomen: Soft, BS present  Ext: Warm, well perfused  Neuro: non focal, awake  Skin: no rash, no erythema  Lines: no phlebitis    FH: Non-contributory  Social Hx: Non-contributory    TESTS & MEASUREMENTS:                        6.9    1.63  )-----------( 66       ( 2022 05:29 )             22.1         140  |  102  |  10  ----------------------------<  83  3.3<L>   |  26  |  0.9    Ca    8.5      2022 05:29  Phos  5.4       Mg     1.5         TPro  5.0<L>  /  Alb  2.8<L>  /  TBili  0.6  /  DBili  x   /  AST  14  /  ALT  9   /  AlkPhos  217<H>        LIVER FUNCTIONS - ( 2022 05:29 )  Alb: 2.8 g/dL / Pro: 5.0 g/dL / ALK PHOS: 217 U/L / ALT: 9 U/L / AST: 14 U/L / GGT: x           Urinalysis Basic - ( 2022 23:50 )    Color: Yellow / Appearance: Slightly Cloudy / S.015 / pH: x  Gluc: x / Ketone: Negative  / Bili: Negative / Urobili: 0.2 mg/dL   Blood: x / Protein: 30 mg/dL / Nitrite: Negative   Leuk Esterase: Negative / RBC: >50 /HPF / WBC 1-2 /HPF   Sq Epi: x / Non Sq Epi: Occasional /HPF / Bacteria: Few        Culture - Sputum (collected 07-15-22 @ 10:00)  Source: Trach Asp Tracheal Aspirate  Gram Stain (22 @ 04:22):    Moderate polymorphonuclear leukocytes per low power field    Rare Squamous epithelial cells per low power field    No organisms seen per oil power field  Final Report (22 @ 16:58):    Normal Respiratory Kelly present    Babesia microti PCR, Blood. (collected 07-10-22 @ 15:43)  Source: .Blood None  Final Report (22 @ 09:45):    Babesia microti PCR    Results: NOT detected    ***************Result Note*************    The detection of Babesia microti by PCR has only been    validated for whole blood; this test has not been approved    by the US Food and Drug Administration (FDA). Performance    characteristics of this assay have been determined by    [facility]. The clinical significance    of results should be considered in conjunction with the    overall clinical presentation of the patient. Result is not    intended to be used as the sole means for clinical diagnosis    or patient management decisions.    One negative sample does not necessarily rule    out the presence of a parasitic infection.    Culture - Blood (collected 22 @ 05:26)  Source: .Blood None  Final Report (22 @ 19:00):    No Growth Final    Culture - Blood (collected 22 @ 07:20)  Source: .Blood None  Final Report (22 @ 19:00):    No Growth Final    Culture - Blood (collected 22 @ 07:20)  Source: .Blood None  Final Report (22 @ 19:00):    No Growth Final    Culture - Sputum (collected 22 @ 13:39)  Source: ET Tube ET Tube  Gram Stain (22 @ 08:48):    Few polymorphonuclear leukocytes per low power field    No Squamous epithelial cells per low power field    Numerous Gram Negative Coccobacilli seen per oil power field  Final Report (22 @ 08:12):    Moderate Acinetobacter baumannii/nosocom group (Carbapenem Resistant)    Cefiderocol interpretations based on FDA breakpoints.    Normal Respiratory Kelly absent  Organism: Acinetobacter baumannii/nosocom group (Carbapenem Resistant)  Acinetobacter baumannii/nosocom group (Carbapenem Resistant) (22 @ 08:12)  Organism: Acinetobacter baumannii/nosocom group (Carbapenem Resistant) (22 @ 08:12)      -  Cefiderocol: I      -  Imipenem: R      -  Piperacillin/Tazobactam: R      Method Type: KB  Organism: Acinetobacter baumannii/nosocom group (Carbapenem Resistant) (22 @ 08:12)      -  Amikacin: R >32      -  Ampicillin/Sulbactam: R >16/8      -  Cefepime: R >16      -  Ceftazidime: R >16      -  Ciprofloxacin: R >2      -  Gentamicin: R >8      -  Levofloxacin: R >4      -  Meropenem: R >8      -  Tobramycin: R >8      -  Trimethoprim/Sulfamethoxazole: R >2/38      Method Type: AL    Culture - Blood (collected 22 @ 20:31)  Source: .Blood Blood-Peripheral  Final Report (22 @ 20:00):    No Growth Final    Culture - Blood (collected 22 @ 20:31)  Source: .Blood Blood  Final Report (22 @ 20:00):    No Growth Final    Culture - Blood (collected 22 @ 06:00)  Source: .Blood Blood  Final Report (22 @ 23:00):    No Growth Final    Culture - Sputum (collected 22 @ 14:00)  Source: Trach Asp Tracheal Aspirate  Gram Stain (22 @ 03:15):    Moderate polymorphonuclear leukocytes per low power field    Few Squamous epithelial cells per low power field    Moderate Gram positive cocci in pairs seen per oil power field    Few Gram Variable Rods seen per oil power field  Final Report (22 @ 16:33):    Numerous Mixed gram negative rods    Moderate Staphylococcus aureus    Normal Respiratory Kelly present  Organism: Staphylococcus aureus (22 @ 16:33)  Organism: Staphylococcus aureus (22 @ 16:33)      -  Ampicillin/Sulbactam: S <=8/4      -  Cefazolin: S <=4      -  Clindamycin: S <=0.25      -  Erythromycin: S <=0.25      -  Gentamicin: S <=1 Should not be used as monotherapy      -  Oxacillin: S <=0.25 Oxacillin predicts susceptibility for dicloxacillin, methicillin, and nafcillin      -  Rifampin: S <=1 Should not be used as monotherapy      -  Tetra/Doxy: S <=1      -  Trimethoprim/Sulfamethoxazole: S <=0.5/9.5      -  Vancomycin: S 2      Method Type: AL            INFECTIOUS DISEASES TESTING  COVID-19 PCR: NotDetec (22 @ 19:56)  COVID-19 PCR: NotDetec (22 @ 20:01)  Procalcitonin, Serum: 0.21 (22 @ 10:34)  COVID-19 PCR: NotDetec (22 @ 15:00)  Procalcitonin, Serum: 0.16 (22 @ 12:50)  Procalcitonin, Serum: 0.12 (07-10-22 @ 05:42)  Fungitell: <31 (22 @ 11:49)  Procalcitonin, Serum: 0.14 (22 @ 11:23)  MRSA PCR Result.: Negative (22 @ 10:34)  Fungitell: See Comment** **RESULTS OF FUNGITELL INCONCLUSIVE DUE TO THE PRESENCE OF UNKNOWN  INTERFERING SUBSTANCE. PLEASE SUBMIT ANOTHER SPECIMEN FOR TESTING.  PLEASE CONTACT [facility] [employer] WITH QUESTIONS.  Interpretation: The Fungitell assay does not detectcertain fungal  species such as the genus Cryptococcus (Aundrea et al. 1991) which  produces very low levels of (1-3)-Beta-D-Glucan. The assay also does  not detect the Zygomycetes such as Absidia, Mucor and Rhizopus  (Carol et al. 1994) which are not known to produce  (1-3)-Beta-D-Glucan. In addition, the yeast phase of Blastomyces  dermatitidis produces little (1-3)-Beta-D-Glucan and may not be  detected by the assay (Anila et al. 2007).  Reference Range:  Less than 60 pg/mL. Glucan values of less than 60 pg/mL are  interpreted as negative.  Glucan values of 60 to 79 pg/mL are interpreted as indeterminate,  and suggest a possible fungal infection. Additional sampling and  testing of sera is required to interpret the results.  Glucan values of greater than or equal to 80 pg/mL are interpreted  as positive.  Due to the potential for environmental contamination when  transferred to pour-off tubes, which can lead to false positive  results, interpret positive results from samples provided in  pour-off tubes with caution.  Results should be used in conjunction  with clinical findings, and should not form the sole basis for a  diagnosis or treatment decision. The Fungitell test is approved or  cleared for in vitro diagnostic use by the [place] Food and Drug  Administration. Modifications to the approved package insert have  been made and the performance characteristics for these  modifications were determined by [facility].  If sample result is greater than 500 pg/mL, physician may order a  titer of the sample. Please contact [facility] if you would  like to order a retest of this sample to obtain an actual value.  Samples are held for 1 week after initial testing date.  ____________________________________________________________  Performed at:  [facility]  [address] [place] [address]  : Kirk Newberry Ph.D., BCLD (ABB)  CLIA#: [id number]  Phone: [phone] (22 @ 10:30)  Procalcitonin, Serum: 0.36 (22 @ 10:30)  Rapid RVP Result: NotDetec (22 @ 09:19)  HIV-1/2 Combo Result: Nonreact (22 @ 10:29)  Procalcitonin, Serum: 0.11 (22 @ 15:46)  Procalcitonin, Serum: 0.11 (22 @ 06:47)  Procalcitonin, Serum: 0.62 (22 @ 05:49)  MRSA PCR Result.: Negative (22 @ 14:30)  Procalcitonin, Serum: 3.28 (22 @ 05:22)  COVID-19 PCR: NotDetec (06-15-22 @ 07:10)  Procalcitonin, Serum: 0.07 (22 @ 12:46)      INFLAMMATORY MARKERS  Sedimentation Rate, Erythrocyte: 86 mm/Hr (22 @ 05:46)  Sedimentation Rate, Erythrocyte: 65 mm/Hr (22 @ 16:00)      RADIOLOGY & ADDITIONAL TESTS:  I have personally reviewed the last available Chest xray  CXR      CT      CARDIOLOGY TESTING  12 Lead ECG:   Ventricular Rate 87 BPM    Atrial Rate 87 BPM    P-R Interval 180 ms    QRS Duration 78 ms    Q-T Interval 396 ms    QTC Calculation(Bazett) 476 ms    P Axis 76 degrees    R Axis 110 degrees    T Axis 172 degrees    Diagnosis Line Normal sinus rhythm  Right axis deviation  Possible Right ventricular hypertrophy  Nonspecific ST and T wave abnormality  Abnormal ECG    Confirmed by BRANDON BELL MD ([id number]) on 2022 9:25:52 AM (22 @ 08:27)  12 Lead ECG:   Ventricular Rate 74 BPM    Atrial Rate 74 BPM    P-R Interval 188 ms    QRS Duration 94 ms    Q-T Interval 442 ms    QTC Calculation(Bazett) 490 ms    P Axis 60 degrees    R Axis 83 degrees    T Axis 185 degrees    Diagnosis Line *** Poor data quality, interpretation may be adversely affected  Normal sinus rhythm  ST & T wave abnormality, consider anterolateral ischemia  Prolonged QT  Abnormal ECG    Confirmed by BRANDON BELL MD ([id number]) on 2022 9:59:09 AM (22 @ 07:48)      MEDICATIONS  ampicillin/sulbactam  IVPB 3 IV Intermittent every 6 hours  BACItracin   Ointment 1 Topical two times a day  chlorhexidine 0.12% Liquid 15 Oral Mucosa every 12 hours  chlorhexidine 2% Cloths 1 Topical daily  clonazePAM  Tablet 1 Oral every 8 hours  cloNIDine 0.1 Oral daily  folic acid 1 Oral daily  methadone    Tablet 20 Oral daily  nicotine -  14 mG/24Hr(s) Patch 1 Transdermal daily  norepinephrine Infusion 0.05 IV Continuous <Continuous>  pantoprazole  Injectable 40 IV Push daily  polymyxin B IVPB 798977 IV Intermittent every 12 hours  QUEtiapine 100 Oral two times a day  scopolamine 1 mG/72 Hr(s) Patch 1 Transdermal every 72 hours  spironolactone 100 Oral daily  thiamine 100 Oral daily  vancomycin    Solution 125 Oral every 12 hours  vancomycin  IVPB 1500 IV Intermittent once  vancomycin  IVPB 1000 IV Intermittent every 8 hours      WEIGHT  Weight (kg): 70.4 (22 @ 16:41)  Creatinine, Serum: 0.9 mg/dL (22 @ 05:29)      ANTIBIOTICS:  ampicillin/sulbactam  IVPB 3 Gram(s) IV Intermittent every 6 hours  polymyxin B IVPB [id number] Unit(s) IV Intermittent every 12 hours  vancomycin    Solution 125 milliGRAM(s) Oral every 12 hours  vancomycin  IVPB 1500 milliGRAM(s) IV Intermittent once  vancomycin  IVPB 1000 milliGRAM(s) IV Intermittent every 8 hours      All available historical records have been reviewed

## 2022-07-26 NOTE — PROGRESS NOTE ADULT - ASSESSMENT
IMPRESSION:  Acute respiratory failure sp intubation  PNA  MSSA pna  seizure like activity  ?? drug over dose/ withdrawal opoid   liver cirrhosis   Ascites sp paracentesis   Pancytopenia- worsening  RENETTA improved  C diff +ve   pancytopenia  skin breakdown      est REE by PSE last week 1791 kcal/d, est protein needs ~ 110 gm/d  expect loss of LBM since admission as she has been bedbound/ vent bound for a  month  - after trach pt off propofol   - change feeds to Jevity 1.2 - see previous notes - can give by continuous drip at 60 ml/h        BUT as speech permitting po diet with assistance, will change to gravity feeds to be given AFTER each attempted po meal        start 240 ml over 30-40 min via NG after each po meal and after 2 such feeds tolerated change to 360 ml pc and hs  - if pt remains afebrile, add 2 Malick daily  - consider course of Kelly Stor 250 mg twice daily enterally x 14 days (r/t mult antimicrobials and being c diff +) IMPRESSION:  Acute respiratory failure sp intubation  PNA  MSSA pna  seizure like activity  ?? drug over dose/ withdrawal opoid   liver cirrhosis   Ascites sp paracentesis   Pancytopenia- worsening  RENETTA improved  C diff +ve   pancytopenia  skin breakdown      est REE by PSE last week 1791 kcal/d, est protein needs ~ 110 gm/d  expect loss of LBM since admission as she has been bedbound/ vent bound for a  month  - after trach pt off propofol   - change feeds to Jevity 1.2 - see previous notes - can give by continuous drip at 60 ml/h        BUT as speech permitting po diet with assistance, will change to gravity feeds to be given AFTER each attempted po meal        start 240 ml over 30-40 min via NG after each po meal and after 2 such feeds tolerated change to 360 ml pc and hs  - if pt remains afebrile, add 2 Malick daily  - consider course of Kelly Stor 250 mg twice daily enterally x 14 days (r/t mult antimicrobials and being c diff +)  - should d/c high dose B1 and folate - no reason to treat for > about 7 days anywasy

## 2022-07-26 NOTE — SWALLOW VFSS/MBS ASSESSMENT ADULT - PHARYNGEAL PHASE COMMENTS
mild-moderate impairment with reduced laryngeal elevation, reduced tongue base retraction, reduced PES opening.

## 2022-07-26 NOTE — PROGRESS NOTE ADULT - SUBJECTIVE AND OBJECTIVE BOX
SUBJECTIVE:    Patient is a 26y old Female who presents with a chief complaint of anasarca (2022 08:07)    Currently admitted to medicine with the primary diagnosis of Cirrhosis       Today is hospital day 41d. This morning she is resting comfortably in bed and reports no new issues or overnight events.     PAST MEDICAL & SURGICAL HISTORY  Hepatitis C    Asthma    Anxiety and depression    IV drug abuse  heroin    No significant past surgical history      SOCIAL HISTORY:  Negative for smoking/alcohol/drug use.     ALLERGIES:  buprenorphine (Rash)  Suboxone (Rash)    MEDICATIONS:  STANDING MEDICATIONS  ampicillin/sulbactam  IVPB 3 Gram(s) IV Intermittent every 6 hours  BACItracin   Ointment 1 Application(s) Topical two times a day  chlorhexidine 0.12% Liquid 15 milliLiter(s) Oral Mucosa every 12 hours  chlorhexidine 2% Cloths 1 Application(s) Topical daily  clonazePAM  Tablet 1 milliGRAM(s) Oral every 8 hours  cloNIDine 0.1 milliGRAM(s) Oral daily  dexMEDEtomidine Infusion 0.1 MICROgram(s)/kG/Hr IV Continuous <Continuous>  fentaNYL   Infusion. 0.5 MICROgram(s)/kG/Hr IV Continuous <Continuous>  folic acid 1 milliGRAM(s) Oral daily  magnesium sulfate  IVPB 2 Gram(s) IV Intermittent every 2 hours  methadone    Tablet 20 milliGRAM(s) Oral daily  nicotine -  14 mG/24Hr(s) Patch 1 patch Transdermal daily  norepinephrine Infusion 0.05 MICROgram(s)/kG/Min IV Continuous <Continuous>  pantoprazole  Injectable 40 milliGRAM(s) IV Push daily  polymyxin B IVPB [id number] Unit(s) IV Intermittent every 12 hours  potassium chloride  20 mEq/100 mL IVPB 20 milliEquivalent(s) IV Intermittent every 2 hours  QUEtiapine 100 milliGRAM(s) Oral two times a day  scopolamine 1 mG/72 Hr(s) Patch 1 Patch Transdermal every 72 hours  spironolactone 100 milliGRAM(s) Oral daily  thiamine 100 milliGRAM(s) Oral daily  vancomycin    Solution 125 milliGRAM(s) Oral every 12 hours    PRN MEDICATIONS  acetaminophen     Tablet .. 650 milliGRAM(s) Oral every 6 hours PRN  ALBUTerol    90 MICROgram(s) HFA Inhaler 1 Puff(s) Inhalation every 4 hours PRN  cloNIDine 0.1 milliGRAM(s) Oral every 6 hours PRN  hydrOXYzine hydrochloride 50 milliGRAM(s) Oral every 6 hours PRN  sodium chloride 0.9% lock flush 10 milliLiter(s) IV Push every 1 hour PRN    VITALS:   T(F): 97.4  HR: 90  BP: 123/86  RR: 23  SpO2: 100%    LABS:                        6.9    1.63  )-----------( 66       ( 2022 05:29 )             22.1     -    140  |  102  |  10  ----------------------------<  83  3.3<L>   |  26  |  0.9    Ca    8.5      2022 05:29  Phos  5.4       Mg     1.5         TPro  5.0<L>  /  Alb  2.8<L>  /  TBili  0.6  /  DBili  x   /  AST  14  /  ALT  9   /  AlkPhos  217<H>        Urinalysis Basic - ( 2022 23:50 )    Color: Yellow / Appearance: Slightly Cloudy / S.015 / pH: x  Gluc: x / Ketone: Negative  / Bili: Negative / Urobili: 0.2 mg/dL   Blood: x / Protein: 30 mg/dL / Nitrite: Negative   Leuk Esterase: Negative / RBC: >50 /HPF / WBC 1-2 /HPF   Sq Epi: x / Non Sq Epi: Occasional /HPF / Bacteria: Few      ABG - ( 2022 05:34 )  pH, Arterial: 7.47  pH, Blood: x     /  pCO2: 34    /  pO2: 150   / HCO3: 25    / Base Excess: 1.1   /  SaO2: 99.8        PHYSICAL EXAM:  GEN: No acute distress  LUNGS: Clear to auscultation bilaterally   HEART: S1/S2 present. RRR.   ABD: Soft, non-tender, distended, tympanic to percussion. Bowel sounds present  EXT: NC/NC/NE/2+PP/GENTILE/Skin Intact.   NEURO: AAOX3    Intravenous access: Right IJ  NG tube: Yes  Salmeron Catheter: Yes to be discontinued today

## 2022-07-26 NOTE — PHARMACOTHERAPY INTERVENTION NOTE - OUTCOME
accepted
not accepted

## 2022-07-26 NOTE — PHARMACOTHERAPY INTERVENTION NOTE - INTERVENTION CATEGORIES
ASP
Therapy
ASP
Monitoring
Therapy
Monitoring
Monitoring
Therapy

## 2022-07-26 NOTE — PHARMACOTHERAPY INTERVENTION NOTE - NSPHARMCOMMASP
ASP - Duration of therapy
ASP - Dose optimization/Non-Renal dose adjustment

## 2022-07-26 NOTE — OCCUPATIONAL THERAPY INITIAL EVALUATION ADULT - SPECIFY REASON(S)
Chart reviewed. As discussed with VERONICA Armstrong, pt is unavailable 2/2 to currently being transported for MBS. OT to f/u when appropriate.

## 2022-07-26 NOTE — PROGRESS NOTE ADULT - ASSESSMENT
IMPRESSION:  Acute respiratory failure sp trach  bleeding from trach site resolved   PNA  MSSA pna  seizure like activity  drug over dose/ withdrawal opoid   liver cirrhosis   Ascites sp paracentesis   Pancytopenia- worsening  RENETTA improved  C diff +ve     PLAN:    CNS: Continue with Methadone for withdrawal   continue with seroquel and clonazepam;   Clonidine   Check QTc daily.    SAT    HEENT: oral care; Trach care     PULMONARY:    CXR reviewed   surgery following for trach care   ABG reviewed  keep off L side in bed develops atelectasis  Pressure support as much as tolerated     CARDIOVASCULAR: Keep MAP more than 65mmhg; not on pressors currently keep equal balance. On oral diuretics.       GI: GI prophylaxis. Feedding per speech and swallow , US Abd for para        RENAL: Correct K ot mo re than 4 and Mg to more than 2.        INFECTIOUS DISEASE: Currently onabx per ID  ID following.       HEMATOLOGICAL:   s/p BMB follow result and cx. Hematology following. Monitor platelet count. Keep hg more than 7.   TC serial HH    ENDOCRINE:  Follow up FS.  Insulin protocol if needed.     Musk: bedrest for now.   Dispo: Remains critically and ill needs ICU care     Change the lines   D/C BLAISE Salmeron      IMPRESSION:  Acute respiratory failure sp trach  bleeding from trach site resolved   PNA  MSSA pna  seizure like activity  drug over dose/ withdrawal opoid   liver cirrhosis   Ascites sp paracentesis   Pancytopenia- worsening  RENETTA improved  C diff +ve     PLAN:    CNS: Continue with Methadone for withdrawal   continue with seroquel and clonazepam;   Clonidine   Check QTc daily.    SAT    HEENT: oral care; Trach care     PULMONARY:    CXR reviewed   surgery following for trach care   ABG reviewed  keep off L side in bed develops atelectasis  Pressure support as much as tolerated     CARDIOVASCULAR: Keep MAP more than 65mmhg; not on pressors currently keep equal balance. On oral diuretics.       GI: GI prophylaxis. Feedding per speech and swallow , US Abd for para        RENAL: Correct K ot mo re than 4 and Mg to more than 2.        INFECTIOUS DISEASE: Currently onabx per ID  ID following.       HEMATOLOGICAL:   s/p BMB follow result and cx. Hematology following. Monitor platelet count. Keep hg more than 7.   TC serial HH    ENDOCRINE:  Follow up FS.  Insulin protocol if needed.     Musk: bedrest for now.   Dispo: Remains critically and ill needs ICU care     Change the lines   D/C Jaron TOKATINA   d/c tlc

## 2022-07-26 NOTE — PROGRESS NOTE ADULT - SUBJECTIVE AND OBJECTIVE BOX
pt seen & examined this morning   alert, responsive, weak, had been off precedex 1.5 h when seen  s/p trach , remains vent dependent  right IJ out   abd distended, fairly soft, s/p paracentesis again with little return  no B for several days (had been C. diff + )  pt seen by speech later today - cleared for puree diet with assistance and manipulations  T(F): 100.4 (22 @ 17:00), Max: 100.6 (22 @ 21:16)  HR: 124 (22 @ 20:00) (77 - 141)  BP: 182/99 (22 @ 19:00) (103/62 - 182/99)  RR: 25 (22 @ 20:00) (19 - 44)  SpO2: 97% (22 @ 20:00) (96% - 100%)  Mode: AC/ CMV (Assist Control/ Continuous Mandatory Ventilation)  RR (machine): 25  TV (machine): 370  FiO2: 30  PEEP: 5  MAP: 14  PIP: 33  ABG - ( 2022 05:34 )  pH, Arterial: 7.47  pH, Blood: x     /  pCO2: 34    /  pO2: 150   / HCO3: 25    / Base Excess: 1.1   /  SaO2: 99.8      I&O's Detail    2022 07:01  -  2022 07:00  --------------------------------------------------------  IN:    Dexmedetomidine: 373 mL    Enteral Tube Flush: 390 mL    IV PiggyBack: 1000 mL    IV PiggyBack: 400 mL  Total IN: 2163 mL    OUT:    Indwelling Catheter - Urethral (mL): 2840 mL  Total OUT: 2840 mL    Total NET: -677 mL      2022 07:01  -  2022 20:26  --------------------------------------------------------  IN:    PRBCs (Packed Red Blood Cells): 309 mL  Total IN: 309 mL    OUT:    Dexmedetomidine: 0 mL    Indwelling Catheter - Urethral (mL): 270 mL  Total OUT: 270 mL    Total NET: 39 mL            140  |  102  |  10  ----------------------------<  83  3.3<L>   |  26  |  0.9    Ca    8.5      2022 05:29  Phos  5.4       Mg     1.5         TPro  5.0<L>  /  Alb  2.8<L>  /  TBili  0.6  /  DBili  x   /  AST  14  /  ALT  9   /  AlkPhos  217<H>                            6.9    1.63  )-----------( 66       ( 2022 05:29 )             22.1     Daily Weight in k.9 (2022 04:43)    Diet, NPO with Tube Feed:   Tube Feeding Modality: Nasogastric  Osmolite 1.5 Doe  Total Volume for 24 Hours (mL): 1200  (mL per Hour): 50  Tube Feed Duration (in Hours): 24  Tube Feed Start Time: 08:30 (22 @ 08:15)  PT HAD BEEN NPO  --??  PLEASE SEE PREVIOUS NOTES FOR RECS

## 2022-07-26 NOTE — PROGRESS NOTE ADULT - SUBJECTIVE AND OBJECTIVE BOX
Patient seen and evaluated this am, awake on ventilator, receiving one unit prbc transfusion      T(F): 96.6 (07-26-22 @ 11:00), Max: 103.5 (07-25-22 @ 15:00)  HR: 90 (07-26-22 @ 11:00)  BP: 141/94 (07-26-22 @ 11:00)  RR: 20  SpO2: 100% (07-26-22 @ 11:00) (96% - 100%)    PHYSICAL EXAM:  GENERAL: NAD  HEAD:  Atraumatic, Normocephalic  EYES: EOMI, PERRLA, conjunctiva and sclera clear  NERVOUS SYSTEM: no focal deficits   CHEST/LUNG:  bilateral rhonchi  HEART: Regular rate and rhythm; No murmurs, rubs, or gallops  ABDOMEN: Soft, Nontender, Nondistended; Bowel sounds present  EXTREMITIES:  2+ Peripheral Pulses, No clubbing, cyanosis, or edema    LABS  07-26    140  |  102  |  10  ----------------------------<  83  3.3<L>   |  26  |  0.9    Ca    8.5      26 Jul 2022 05:29  Phos  5.4     07-26  Mg     1.5     07-26    TPro  5.0<L>  /  Alb  2.8<L>  /  TBili  0.6  /  DBili  x   /  AST  14  /  ALT  9   /  AlkPhos  217<H>  07-26                          6.9    1.63  )-----------( 66       ( 26 Jul 2022 05:29 )             22.1       Mode: AC/ CMV (Assist Control/ Continuous Mandatory Ventilation)  RR (machine): 25  TV (machine): 370  FiO2: 30  PEEP: 5    Culture Results:   Normal Respiratory Kelly present (07-15-22)  Culture Results:   Babesia microti PCR  Results: NOT detected  ***************Result Note*************  The detection of Babesia microti by PCR has only been  validated for whole blood; this test has not been approved  by the [place] Food and Drug Administration (FDA). Performance  characteristics of this assay have been determined by  [employer]. The clinical significance  of results should be considered in conjunction with the  overall clinical presentation of the patient. Result is not  intended to be used as the sole means for clinical diagnosis  or patient management decisions.  One negative sample does not necessarily rule  out the presence of a parasitic infection. (07-10-22)  Culture Results:   No Growth Final (07-08-22)  Culture Results:   No Growth Final (07-06-22)  Culture Results:   No Growth Final (07-06-22)  Culture Results:   Moderate Acinetobacter baumannii/nosocom group (Carbapenem Resistant)  Cefiderocol interpretations based on FDA breakpoints.  Normal Respiratory Kelly absent (07-04-22)  Culture Results:   No Growth Final (06-29-22)  Culture Results:   No Growth Final (06-29-22)  Culture Results:   No Growth Final (06-28-22)  Culture Results:   Numerous Mixed gram negative rods  Moderate Staphylococcus aureus  Normal Respiratory Kelly present (06-27-22)    RADIOLOGY  < from: Xray Chest 1 View- PORTABLE-Routine (Xray Chest 1 View- PORTABLE-Routine in AM.) (07.26.22 @ 06:04) >    Impression:    Bilateral lung opacities and pleural effusions, unchanged from the prior   study    < end of copied text >    MEDICATIONS  (STANDING):  ampicillin/sulbactam  IVPB 3 Gram(s) IV Intermittent every 6 hours  BACItracin   Ointment 1 Application(s) Topical two times a day  chlorhexidine 0.12% Liquid 15 milliLiter(s) Oral Mucosa every 12 hours  chlorhexidine 2% Cloths 1 Application(s) Topical daily  clonazePAM  Tablet 1 milliGRAM(s) Oral every 8 hours  cloNIDine 0.1 milliGRAM(s) Oral daily  folic acid 1 milliGRAM(s) Oral daily  methadone    Tablet 20 milliGRAM(s) Oral daily  nicotine -  14 mG/24Hr(s) Patch 1 patch Transdermal daily  norepinephrine Infusion 0.05 MICROgram(s)/kG/Min (8.03 mL/Hr) IV Continuous <Continuous>  pantoprazole  Injectable 40 milliGRAM(s) IV Push daily  polymyxin B IVPB 694641 Unit(s) IV Intermittent every 12 hours  potassium chloride  20 mEq/100 mL IVPB 20 milliEquivalent(s) IV Intermittent every 2 hours  QUEtiapine 100 milliGRAM(s) Oral two times a day  scopolamine 1 mG/72 Hr(s) Patch 1 Patch Transdermal every 72 hours  spironolactone 100 milliGRAM(s) Oral daily  thiamine 100 milliGRAM(s) Oral daily  vancomycin    Solution 125 milliGRAM(s) Oral every 12 hours  vancomycin  IVPB 1500 milliGRAM(s) IV Intermittent once    MEDICATIONS  (PRN):  acetaminophen     Tablet .. 650 milliGRAM(s) Oral every 6 hours PRN Temp greater or equal to 38.5C (101.3F), Moderate Pain (4 - 6)  ALBUTerol    90 MICROgram(s) HFA Inhaler 1 Puff(s) Inhalation every 4 hours PRN Shortness of Breath and/or Wheezing  cloNIDine 0.1 milliGRAM(s) Oral every 6 hours PRN opiate withdrawal  hydrOXYzine hydrochloride 50 milliGRAM(s) Oral every 6 hours PRN Anxiety  sodium chloride 0.9% lock flush 10 milliLiter(s) IV Push every 1 hour PRN Pre/post blood products, medications, blood draw, and to maintain line patency

## 2022-07-26 NOTE — SWALLOW VFSS/MBS ASSESSMENT ADULT - ORAL PHASE
Delayed oral transit time/Reduced anterior - posterior transport/Residue in oral cavity/Incomplete tongue to palate contact Delayed oral transit time/Reduced anterior - posterior transport/Residue in oral cavity/Laryngeal penetration before swallow - silent

## 2022-07-26 NOTE — SWALLOW VFSS/MBS ASSESSMENT ADULT - ESOPHAGEAL STAGE
retention with reduced clearance. retention in the esophageus increased with increased viscosity,, reduced segment retention with full deflation of cuff

## 2022-07-26 NOTE — PROGRESS NOTE ADULT - ASSESSMENT
Patient is a 26 year old female with hx of asthma, untreated Hep C, IVDA, anasarca presenting with increased dyspnea since yesterday    IMPRESSION  #Acute respiratory failure s/p intubation 6/16, extubated 7/7, reintubated     #VAP  - Xray Chest 1 View- PORTABLE-Routine (Xray Chest 1 View- PORTABLE-Routine in AM.) (07.03.22 @ 06:10): Increasing right basilar opacity.  - sputum Cx 7/4 MDR Acinetobacter baumanii     # C.difficille infection - 7/5/22    #Pneumonia -   - CT Chest 6/22 - left greater than right lower lobe consolidations   - sputum Cx 6/24 Klebsiella oxytoca, Enterobacter cloacae complex- The Sputum culture from 6/27 grew Numerous Mixed gram negative rods and Moderate Staphylococcus aureus.  - treated with course of cefepime     #FUO + Pancytopenia + Splenomegaly   - Ferritin, Serum: 94 ng/mL (06.27.22 @ 06:47),  Procalcitonin, Serum: 0.11 (06.27.22 @ 15:46)  - CT Abd/pelvis 6/26 - moderated ascites moderate pericardial effusion   - EBV seroligies consistent with old infection   - CMV IgG +, IgM - (07.10.22 @ 15:43)  - bartonella-ab negative   - Hepatosplenomegaly - present since CT Abd/pelvis 1/30/2021  - HIV-1/2 Combo Result: Nonreact:  (06.29.22 @ 10:29)  - Sedimentation Rate, Erythrocyte: 86 mm/Hr (06.30.22 @ 05:46)  - Autoimmune panel negative   - Quantiferon TB Plus: NEGATIVE (06.04.20 @ 10:28)  - Quantiferon TB Plus: INDETERMINATE. (02.06.21 @ 09:00)  - s/p Bone marrow biopsy 7/14 - Flow negative       #Drug overdose vs withdrawal?  #Hx of Untreated Hep C   #IVDA   #Abx allergy: buprenorphine (Rash), Suboxone (Rash)    Recommendations  - continue polymyxin 12429 U/kg q 12 hours and unasyn 3g q 6 hours for Acinetobacter VAP (start date 7/15)   - continue PO vancomycin 125 mg BID for prophylaxis while on systemic antibiotics   - RIght IJ line removed 7/24 - continue vancomycin 1g q 8 hours -- check trough prior to 4th dose   - check fungitell   - monitor creatinine  - vent management per primary   - follow-up blood cx    I will be away tomorrow and will be available on Thursday.   I will be unavailable by phone. Please message on Teams if with questions.  Spectra [phone]

## 2022-07-27 LAB
ALBUMIN SERPL ELPH-MCNC: 3 G/DL — LOW (ref 3.5–5.2)
ALP SERPL-CCNC: 251 U/L — HIGH (ref 30–115)
ALT FLD-CCNC: 9 U/L — SIGNIFICANT CHANGE UP (ref 0–41)
ANION GAP SERPL CALC-SCNC: 13 MMOL/L — SIGNIFICANT CHANGE UP (ref 7–14)
AST SERPL-CCNC: 17 U/L — SIGNIFICANT CHANGE UP (ref 0–41)
BASOPHILS # BLD AUTO: 0 K/UL — SIGNIFICANT CHANGE UP (ref 0–0.2)
BASOPHILS NFR BLD AUTO: 0 % — SIGNIFICANT CHANGE UP (ref 0–1)
BILIRUB SERPL-MCNC: 0.6 MG/DL — SIGNIFICANT CHANGE UP (ref 0.2–1.2)
BLD GP AB SCN SERPL QL: SIGNIFICANT CHANGE UP
BUN SERPL-MCNC: 9 MG/DL — LOW (ref 10–20)
CALCIUM SERPL-MCNC: 8.8 MG/DL — SIGNIFICANT CHANGE UP (ref 8.5–10.1)
CHLORIDE SERPL-SCNC: 101 MMOL/L — SIGNIFICANT CHANGE UP (ref 98–110)
CO2 SERPL-SCNC: 25 MMOL/L — SIGNIFICANT CHANGE UP (ref 17–32)
CREAT SERPL-MCNC: 0.8 MG/DL — SIGNIFICANT CHANGE UP (ref 0.7–1.5)
EGFR: 103 ML/MIN/1.73M2 — SIGNIFICANT CHANGE UP
EOSINOPHIL # BLD AUTO: 0 K/UL — SIGNIFICANT CHANGE UP (ref 0–0.7)
EOSINOPHIL NFR BLD AUTO: 0 % — SIGNIFICANT CHANGE UP (ref 0–8)
GLUCOSE SERPL-MCNC: 89 MG/DL — SIGNIFICANT CHANGE UP (ref 70–99)
HCT VFR BLD CALC: 25.3 % — LOW (ref 37–47)
HCT VFR BLD CALC: 27.3 % — LOW (ref 37–47)
HGB BLD-MCNC: 7.9 G/DL — LOW (ref 12–16)
HGB BLD-MCNC: 8.7 G/DL — LOW (ref 12–16)
IMM GRANULOCYTES NFR BLD AUTO: 0.4 % — HIGH (ref 0.1–0.3)
LYMPHOCYTES # BLD AUTO: 0.51 K/UL — LOW (ref 1.2–3.4)
LYMPHOCYTES # BLD AUTO: 18 % — LOW (ref 20.5–51.1)
MAGNESIUM SERPL-MCNC: 1.7 MG/DL — LOW (ref 1.8–2.4)
MCHC RBC-ENTMCNC: 26.8 PG — LOW (ref 27–31)
MCHC RBC-ENTMCNC: 27.5 PG — SIGNIFICANT CHANGE UP (ref 27–31)
MCHC RBC-ENTMCNC: 31.2 G/DL — LOW (ref 32–37)
MCHC RBC-ENTMCNC: 31.9 G/DL — LOW (ref 32–37)
MCV RBC AUTO: 85.8 FL — SIGNIFICANT CHANGE UP (ref 81–99)
MCV RBC AUTO: 86.4 FL — SIGNIFICANT CHANGE UP (ref 81–99)
MONOCYTES # BLD AUTO: 0.08 K/UL — LOW (ref 0.1–0.6)
MONOCYTES NFR BLD AUTO: 2.8 % — SIGNIFICANT CHANGE UP (ref 1.7–9.3)
NEUTROPHILS # BLD AUTO: 2.23 K/UL — SIGNIFICANT CHANGE UP (ref 1.4–6.5)
NEUTROPHILS NFR BLD AUTO: 78.8 % — HIGH (ref 42.2–75.2)
NRBC # BLD: 0 /100 WBCS — SIGNIFICANT CHANGE UP (ref 0–0)
NRBC # BLD: 0 /100 WBCS — SIGNIFICANT CHANGE UP (ref 0–0)
PHOSPHATE SERPL-MCNC: 5.1 MG/DL — HIGH (ref 2.1–4.9)
PLATELET # BLD AUTO: 69 K/UL — LOW (ref 130–400)
PLATELET # BLD AUTO: 75 K/UL — LOW (ref 130–400)
POTASSIUM SERPL-MCNC: 3.5 MMOL/L — SIGNIFICANT CHANGE UP (ref 3.5–5)
POTASSIUM SERPL-SCNC: 3.5 MMOL/L — SIGNIFICANT CHANGE UP (ref 3.5–5)
PROCALCITONIN SERPL-MCNC: 0.11 NG/ML — HIGH (ref 0.02–0.1)
PROT SERPL-MCNC: 5.7 G/DL — LOW (ref 6–8)
RBC # BLD: 2.95 M/UL — LOW (ref 4.2–5.4)
RBC # BLD: 3.16 M/UL — LOW (ref 4.2–5.4)
RBC # FLD: 14.7 % — HIGH (ref 11.5–14.5)
RBC # FLD: 14.8 % — HIGH (ref 11.5–14.5)
SODIUM SERPL-SCNC: 139 MMOL/L — SIGNIFICANT CHANGE UP (ref 135–146)
WBC # BLD: 1.74 K/UL — LOW (ref 4.8–10.8)
WBC # BLD: 2.83 K/UL — LOW (ref 4.8–10.8)
WBC # FLD AUTO: 1.74 K/UL — LOW (ref 4.8–10.8)
WBC # FLD AUTO: 2.83 K/UL — LOW (ref 4.8–10.8)

## 2022-07-27 PROCEDURE — 93010 ELECTROCARDIOGRAM REPORT: CPT

## 2022-07-27 PROCEDURE — 99233 SBSQ HOSP IP/OBS HIGH 50: CPT

## 2022-07-27 RX ORDER — ENOXAPARIN SODIUM 100 MG/ML
40 INJECTION SUBCUTANEOUS EVERY 24 HOURS
Refills: 0 | Status: DISCONTINUED | OUTPATIENT
Start: 2022-07-27 | End: 2022-08-04

## 2022-07-27 RX ORDER — PANTOPRAZOLE SODIUM 20 MG/1
40 TABLET, DELAYED RELEASE ORAL EVERY 12 HOURS
Refills: 0 | Status: DISCONTINUED | OUTPATIENT
Start: 2022-07-27 | End: 2022-07-27

## 2022-07-27 RX ORDER — PANTOPRAZOLE SODIUM 20 MG/1
40 TABLET, DELAYED RELEASE ORAL DAILY
Refills: 0 | Status: DISCONTINUED | OUTPATIENT
Start: 2022-07-27 | End: 2022-08-04

## 2022-07-27 RX ORDER — MAGNESIUM SULFATE 500 MG/ML
2 VIAL (ML) INJECTION
Refills: 0 | Status: COMPLETED | OUTPATIENT
Start: 2022-07-27 | End: 2022-07-27

## 2022-07-27 RX ADMIN — AMPICILLIN SODIUM AND SULBACTAM SODIUM 200 GRAM(S): 250; 125 INJECTION, POWDER, FOR SUSPENSION INTRAMUSCULAR; INTRAVENOUS at 05:18

## 2022-07-27 RX ADMIN — POLYMYXIN B SULFATE 500 UNIT(S): 500000 INJECTION, POWDER, LYOPHILIZED, FOR SOLUTION INTRAMUSCULAR; INTRATHECAL; INTRAVENOUS; OPHTHALMIC at 21:32

## 2022-07-27 RX ADMIN — SCOPALAMINE 1 PATCH: 1 PATCH, EXTENDED RELEASE TRANSDERMAL at 06:17

## 2022-07-27 RX ADMIN — Medication 1 PATCH: at 13:44

## 2022-07-27 RX ADMIN — Medication 25 GRAM(S): at 16:58

## 2022-07-27 RX ADMIN — SPIRONOLACTONE 100 MILLIGRAM(S): 25 TABLET, FILM COATED ORAL at 05:22

## 2022-07-27 RX ADMIN — Medication 1 PATCH: at 05:14

## 2022-07-27 RX ADMIN — Medication 1 MILLIGRAM(S): at 12:14

## 2022-07-27 RX ADMIN — Medication 1 MILLIGRAM(S): at 21:32

## 2022-07-27 RX ADMIN — QUETIAPINE FUMARATE 100 MILLIGRAM(S): 200 TABLET, FILM COATED ORAL at 17:18

## 2022-07-27 RX ADMIN — SCOPALAMINE 1 PATCH: 1 PATCH, EXTENDED RELEASE TRANSDERMAL at 19:53

## 2022-07-27 RX ADMIN — Medication 1 MILLIGRAM(S): at 05:20

## 2022-07-27 RX ADMIN — AMPICILLIN SODIUM AND SULBACTAM SODIUM 200 GRAM(S): 250; 125 INJECTION, POWDER, FOR SUSPENSION INTRAMUSCULAR; INTRAVENOUS at 18:04

## 2022-07-27 RX ADMIN — CHLORHEXIDINE GLUCONATE 15 MILLILITER(S): 213 SOLUTION TOPICAL at 17:16

## 2022-07-27 RX ADMIN — Medication 25 GRAM(S): at 12:15

## 2022-07-27 RX ADMIN — Medication 125 MILLIGRAM(S): at 05:21

## 2022-07-27 RX ADMIN — Medication 1 PATCH: at 13:45

## 2022-07-27 RX ADMIN — POLYMYXIN B SULFATE 500 UNIT(S): 500000 INJECTION, POWDER, LYOPHILIZED, FOR SOLUTION INTRAMUSCULAR; INTRATHECAL; INTRAVENOUS; OPHTHALMIC at 05:18

## 2022-07-27 RX ADMIN — Medication 1 APPLICATION(S): at 06:17

## 2022-07-27 RX ADMIN — CHLORHEXIDINE GLUCONATE 1 APPLICATION(S): 213 SOLUTION TOPICAL at 12:16

## 2022-07-27 RX ADMIN — Medication 100 MILLIGRAM(S): at 13:44

## 2022-07-27 RX ADMIN — Medication 250 MILLIGRAM(S): at 05:19

## 2022-07-27 RX ADMIN — AMPICILLIN SODIUM AND SULBACTAM SODIUM 200 GRAM(S): 250; 125 INJECTION, POWDER, FOR SUSPENSION INTRAMUSCULAR; INTRAVENOUS at 00:37

## 2022-07-27 RX ADMIN — Medication 0.1 MILLIGRAM(S): at 05:17

## 2022-07-27 RX ADMIN — Medication 125 MILLIGRAM(S): at 17:18

## 2022-07-27 RX ADMIN — Medication 1 MILLIGRAM(S): at 13:44

## 2022-07-27 RX ADMIN — Medication 1 APPLICATION(S): at 17:16

## 2022-07-27 RX ADMIN — CHLORHEXIDINE GLUCONATE 15 MILLILITER(S): 213 SOLUTION TOPICAL at 05:17

## 2022-07-27 RX ADMIN — AMPICILLIN SODIUM AND SULBACTAM SODIUM 200 GRAM(S): 250; 125 INJECTION, POWDER, FOR SUSPENSION INTRAMUSCULAR; INTRAVENOUS at 12:26

## 2022-07-27 RX ADMIN — Medication 250 MILLIGRAM(S): at 22:45

## 2022-07-27 RX ADMIN — QUETIAPINE FUMARATE 100 MILLIGRAM(S): 200 TABLET, FILM COATED ORAL at 05:16

## 2022-07-27 RX ADMIN — Medication 1 PATCH: at 19:53

## 2022-07-27 RX ADMIN — METHADONE HYDROCHLORIDE 20 MILLIGRAM(S): 40 TABLET ORAL at 12:13

## 2022-07-27 RX ADMIN — Medication 250 MILLIGRAM(S): at 16:58

## 2022-07-27 NOTE — PROGRESS NOTE ADULT - ASSESSMENT
IMPRESSION:  Acute respiratory failure sp trach  bleeding from trach site resolved   Anemia s/p 1 U PRBC  PNA  MSSA pna  seizure like activity  drug over dose/ withdrawal opoid   liver cirrhosis? due to suspected Hep C  Ascites sp paracentesis SAAG > 1.1  Pancytopenia- worsening  RENETTA improved  C diff +ve     PLAN:    CNS: Continue with Methadone for withdrawal   continue with seroquel and clonazepam;   Clonidine   Check QTc daily.    SAT    HEENT: oral care; Trach care     PULMONARY:    CXR reviewed   surgery following for trach care   ABG reviewed  keep off L side in bed develops atelectasis  Pressure support as much as tolerated     CARDIOVASCULAR: Keep MAP more than 65mmhg; not on pressors currently keep equal balance. On oral diuretics.       GI: GI prophylaxis. Feeding per speech and swallow.        RENAL: Correct K ot mo re than 4 and Mg to more than 2.        INFECTIOUS DISEASE: Currently on abx per ID  ID following. Cx negative to date       HEMATOLOGICAL:   s/p BMB follow result and cx. Hematology following. Monitor platelet count. Keep hg more than 7.   CBC q 12 hrs serial HH    ENDOCRINE:  Follow up FS.  Insulin protocol if needed.     Musk: bedrest for now.   Dispo: Remains critically and ill needs ICU care     No lines  [facility] is out.

## 2022-07-27 NOTE — PROGRESS NOTE ADULT - SUBJECTIVE AND OBJECTIVE BOX
SUBJECTIVE:    Patient is a 26y old Female who presents with a chief complaint of anasarca (2022 10:42)    Currently admitted to medicine with the primary diagnosis of Cirrhosis       Today is hospital day 42d. This morning she is resting comfortably in bed and reports no new issues or overnight events.     PAST MEDICAL & SURGICAL HISTORY  Hepatitis C    Asthma    Anxiety and depression    IV drug abuse  heroin    No significant past surgical history      SOCIAL HISTORY:  Negative for smoking/alcohol/drug use.     ALLERGIES:  buprenorphine (Rash)  Suboxone (Rash)    MEDICATIONS:  STANDING MEDICATIONS  ampicillin/sulbactam  IVPB 3 Gram(s) IV Intermittent every 6 hours  BACItracin   Ointment 1 Application(s) Topical two times a day  chlorhexidine 0.12% Liquid 15 milliLiter(s) Oral Mucosa every 12 hours  chlorhexidine 2% Cloths 1 Application(s) Topical daily  clonazePAM  Tablet 1 milliGRAM(s) Oral every 8 hours  cloNIDine 0.1 milliGRAM(s) Oral daily  folic acid 1 milliGRAM(s) Oral daily  magnesium sulfate  IVPB 2 Gram(s) IV Intermittent every 2 hours  nicotine -  14 mG/24Hr(s) Patch 1 patch Transdermal daily  pantoprazole    Tablet 40 milliGRAM(s) Oral every 12 hours  polymyxin B IVPB 358440 Unit(s) IV Intermittent every 12 hours  QUEtiapine 100 milliGRAM(s) Oral two times a day  scopolamine 1 mG/72 Hr(s) Patch 1 Patch Transdermal every 72 hours  spironolactone 100 milliGRAM(s) Oral daily  thiamine 100 milliGRAM(s) Oral daily  vancomycin    Solution 125 milliGRAM(s) Oral every 12 hours  vancomycin  IVPB 1500 milliGRAM(s) IV Intermittent once  vancomycin  IVPB 1000 milliGRAM(s) IV Intermittent every 8 hours    PRN MEDICATIONS  acetaminophen     Tablet .. 650 milliGRAM(s) Oral every 6 hours PRN  ALBUTerol    90 MICROgram(s) HFA Inhaler 1 Puff(s) Inhalation every 4 hours PRN  cloNIDine 0.1 milliGRAM(s) Oral every 6 hours PRN  hydrOXYzine hydrochloride 50 milliGRAM(s) Oral every 6 hours PRN  sodium chloride 0.9% lock flush 10 milliLiter(s) IV Push every 1 hour PRN    VITALS:   T(F): 97  HR: 104  BP: 129/88  RR: 25  SpO2: 100%    LABS:                        8.7    2.83  )-----------( 75       ( 2022 05:42 )             27.3     07-    139  |  101  |  9<L>  ----------------------------<  89  3.5   |  25  |  0.8    Ca    8.8      2022 05:42  Phos  5.1       Mg     1.7         TPro  5.7<L>  /  Alb  3.0<L>  /  TBili  0.6  /  DBili  x   /  AST  17  /  ALT  9   /  AlkPhos  251<H>        Urinalysis Basic - ( 2022 23:50 )    Color: Yellow / Appearance: Slightly Cloudy / S.015 / pH: x  Gluc: x / Ketone: Negative  / Bili: Negative / Urobili: 0.2 mg/dL   Blood: x / Protein: 30 mg/dL / Nitrite: Negative   Leuk Esterase: Negative / RBC: >50 /HPF / WBC 1-2 /HPF   Sq Epi: x / Non Sq Epi: Occasional /HPF / Bacteria: Few      ABG - ( 2022 05:37 )  pH, Arterial: 7.47  pH, Blood: x     /  pCO2: 35    /  pO2: 189   / HCO3: 26    / Base Excess: 1.8   /  SaO2: 99.7      Culture - Blood (collected 2022 15:34)  Source: .Blood Blood  Preliminary Report (2022 02:02):    No growth to date.    PHYSICAL EXAM:  GEN: No acute distress  LUNGS: Clear to auscultation bilaterally   HEART: S1/S2 present. RRR.   ABD: Soft, non-tender, distended, tympanic to percussion. Bowel sounds present  EXT: NC/NC/NE/2+PP/GENTILE/Skin Intact.   NEURO: AAOX3

## 2022-07-27 NOTE — OCCUPATIONAL THERAPY INITIAL EVALUATION ADULT - IMPAIRMENTS CONTRIBUTING IMPAIRED BED MOBILITY, REHAB EVAL
impaired postural control/decreased ROM/decreased strength impaired balance/impaired postural control/decreased ROM/decreased strength

## 2022-07-27 NOTE — PROGRESS NOTE ADULT - SUBJECTIVE AND OBJECTIVE BOX
Patient is seen and examined at the bed side, is afebrile now.  She mentioned feeling better and is tolerating ABx well.     REVIEW OF SYSTEMS: All other review systems are negative      ALLERGIES: buprenorphine (Rash)  Suboxone (Rash)    ICU Vital Signs Last 24 Hrs  T(C): 37.3 (2022 15:00), Max: 38 (2022 17:00)  T(F): 99.2 (2022 15:00), Max: 100.4 (2022 17:00)  HR: 110 (2022 15:03) (97 - 141)  BP: 153/111 (2022 15:03) (118/77 - 182/99)  BP(mean): 128 (2022 15:03) (92 - 134)  ABP: --  ABP(mean): --  RR: 25 (2022 15:03) (11 - 38)  SpO2: 92% (2022 15:03) (92% - 100%)    O2 Parameters below as of 2022 07:00  Patient On (Oxygen Delivery Method): ventilator    O2 Concentration (%): 30          PHYSICAL EXAM:  GENERAL: Not in distress  HEENT: Trach in placed  CHEST/LUNG: Not using accessory muscles   HEART: s1 and s2 present  ABDOMEN:  Nontender and  Nondistended  EXTREMITIES: No pedal  edema  CNS:  Awake and  Alert      LABS:                        8.7    2.83  )-----------( 75       ( 2022 05:42 )             27.3                           8.4    3.03  )-----------( 129      ( 2022 12:16 )             26.4           139  |  101  |  9<L>  ----------------------------<  89  3.5   |  25  |  0.8    Ca    8.8      2022 05:42  Phos  5.1       Mg     1.7         TPro  5.7<L>  /  Alb  3.0<L>  /  TBili  0.6  /  DBili  x   /  AST  17  /  ALT  9   /  AlkPhos  251<H>      141  |  103  |  9<L>  ----------------------------<  78  3.3<L>   |  25  |  0.8    Ca    8.9      2022 17:30  Phos  5.6     07-  Mg     1.9         TPro  5.4<L>  /  Alb  3.1<L>  /  TBili  0.5  /  DBili  x   /  AST  18  /  ALT  12  /  AlkPhos  221<H>        ABG - ( 2022 03:14 )  pH, Arterial: 7.30  pH, Blood: x     /  pCO2: 51    /  pO2: 111   / HCO3: 25    / Base Excess: -1.7  /  SaO2: 99.0          Urinalysis Basic - ( 2022 19:06 )  Color: Yellow / Appearance: Slightly Cloudy / S.025 / pH: x  Gluc: x / Ketone: Trace  / Bili: Negative / Urobili: 1.0 mg/dL   Blood: x / Protein: >=300 mg/dL / Nitrite: Negative   Leuk Esterase: Negative / RBC: 26-50 /HPF / WBC 3-5 /HPF   Sq Epi: x / Non Sq Epi: Occasional /HPF / Bacteria: Moderate        MEDICATIONS  (STANDING):    ampicillin/sulbactam  IVPB 3 Gram(s) IV Intermittent every 6 hours  BACItracin   Ointment 1 Application(s) Topical two times a day  chlorhexidine 0.12% Liquid 15 milliLiter(s) Oral Mucosa every 12 hours  chlorhexidine 2% Cloths 1 Application(s) Topical daily  clonazePAM  Tablet 1 milliGRAM(s) Oral every 8 hours  cloNIDine 0.1 milliGRAM(s) Oral daily  folic acid 1 milliGRAM(s) Oral daily  magnesium sulfate  IVPB 2 Gram(s) IV Intermittent every 2 hours  nicotine -  14 mG/24Hr(s) Patch 1 patch Transdermal daily  pantoprazole    Tablet 40 milliGRAM(s) Oral every 12 hours  polymyxin B IVPB [id number] Unit(s) IV Intermittent every 12 hours  QUEtiapine 100 milliGRAM(s) Oral two times a day  scopolamine 1 mG/72 Hr(s) Patch 1 Patch Transdermal every 72 hours  spironolactone 100 milliGRAM(s) Oral daily  thiamine 100 milliGRAM(s) Oral daily  vancomycin    Solution 125 milliGRAM(s) Oral every 12 hours  vancomycin  IVPB 1500 milliGRAM(s) IV Intermittent once  vancomycin  IVPB 1000 milliGRAM(s) IV Intermittent every 8 hours          RADIOLOGY & ADDITIONAL TESTS:    < from: Xray Kidney Ureter Bladder (22 @ 13:25) >    FINDINGS: Enteric feeding tube is stable, with tip in the left upper quadrant.  There is a non-obstructive bowel gas pattern.  No abnormal   masses or calcifications are seen.  Stable probe/catheter projects over  the lower pelvis.    < from: CT Angio Chest PE Protocol w/ IV Cont (22 @ 21:14) No pulmonary embolus.    Near complete consolidation/collapse of the left lower lobe, increased as  compared with prior. Mildly increased right lower lobe patchy   consolidative opacities-atelectasis. Adjacent bilateral small pleural effusions. Findings compatible with likely mixed pneumonia and  atelectasis.    Redemonstration of a dilated main pulmonary artery measuring up to 3.8 cm  compatible with pulmonary hypertension.    < from: Xray Chest 1 View- PORTABLE-Routine (Xray Chest 1 View- PORTABLE-Routine in AM.) (22 @ 05:28) >  Stable cardiomegaly and left basilar opacity. Stable support devices.      < from: CT Abdomen and Pelvis w/ IV Cont (22 @ 17:27) > No evidence of retroperitoneal hematoma.    Small bilateral pleural effusions and left basilar consolidation. Moderate ascites redemonstrated.    Moderate pericardial effusion.  Hepatosplenomegaly redemonstrated.        MICROBIOLOGY DATA:  Clostridium difficile Toxin by PCR (22 @ 19:15)   Clostridium difficile Toxin by PCR: RESULT INTERPRETATION:  Detected     Culture - Sputum . (22 @ 13:39)   Gram Stain: Few polymorphonuclear leukocytes per low power field   No Squamous epithelial cells per low power field   Numerous Gram Negative Coccobacilli seen per oil power field   Specimen Source: ET Tube ET Tube   Culture Results:   Moderate Acinetobacter baumannii/nosocomialis group   Normal Respiratory Kelly absent     MRSA/MSSA PCR (22 @ 10:34)   MRSA PCR Result.: Negative    Respiratory Viral Panel with COVID-19 by BROOKE (22 @ 09:19)   Rapid RVP Result: NotDetec   SARS-CoV-2: NotDetec:    Culture - Blood (22 @ 20:31)   Specimen Source: .Blood Blood-Peripheral   Culture Results:   No growth to date.     Culture - Blood (22 @ 20:31)   Specimen Source: .Blood Blood   Culture Results:   No growth to date.     Culture - Sputum . (22 @ 14:00)   - Oxacillin: S <=0.25 Oxacillin predicts susceptibility for dicloxacillin, methicillin, and nafcillin   - Cefazolin: S <=4   - Erythromycin: S <=0.25   - Gentamicin: S <=1 Should not be used as monotherapy   - Clindamycin: S <=0.25   - Rifampin: S <=1 Should not be used as monotherapy   - Tetra/Doxy: S <=1   - Trimethoprim/Sulfamethoxazole: S <=0.5/9.5   - Vancomycin: S 2   Gram Stain:   Moderate polymorphonuclear leukocytes per low power field   Few Squamous epithelial cells per low power field   Moderate Gram positive cocci in pairs seen per oil power field   Few Gram Variable Rods seen per oil power field   - Ampicillin/Sulbactam: S <=8/4   Specimen Source: Trach Asp Tracheal Aspirate   Culture Results:   Numerous Mixed gram negative rods   Moderate Staphylococcus aureus   Normal Respiratory Kelly present   Organism Identification: Staphylococcus aureus   Organism: Staphylococcus aureus   Culture - Blood in AM (22 @ 06:00)   Specimen Source: .Blood Blood   Culture Results: No growth to date.     Culture - Sputum . (22 @ 14:00)   Gram Stain:   Moderate polymorphonuclear leukocytes per low power field   Few Squamous epithelial cells per low power field   Moderate Gram positive cocci in pairs seen per oil power field   Few Gram Variable Rods seen per oil power field   Specimen Source: Trach Asp Tracheal Aspirate   Culture Results:   Numerous Mixed gram negative rods   Moderate Staphylococcus aureus Susceptibility to follow.   Normal Respiratory Kelly absent     Culture - Sputum . (22 @ 17:20)   Gram Stain:   Few polymorphonuclear leukocytes per low power field   Rare Squamous epithelial cells per low power field   Moderate Gram Negative Rods seen per oil power field   - Amikacin: S <=16   - Amikacin: S <=16   - Amoxicillin/Clavulanic Acid: R >16/8   - Amoxicillin/Clavulanic Acid: S <=8/4   - Ampicillin: R 16 These ampicillin results predict results for amoxicillin   - Ampicillin: R >16 These ampicillin results predict results for amoxicillin   - Ampicillin/Sulbactam: R >16/8 Enterobacter, Klebsiella aerogenes, Citrobacter, and Serratia may develop resistance during prolonged therapy (3-4 days)   - Ampicillin/Sulbactam: S <=4/2 Enterobacter, Klebsiella aerogenes, Citrobacter, and Serratia may develop resistance during prolonged therapy (3-4 days)   - Aztreonam: S <=4   - Aztreonam: S <=4   - Cefazolin: R >16 Enterobacter, Klebsiella aerogenes, Citrobacter, and Serratia may develop resistance during prolonged therapy (3-4 days)   - Cefazolin: S <=2 Enterobacter, Klebsiella aerogenes, Citrobacter, and Serratia may develop resistance during prolonged therapy (3-4 days)   - Cefepime: S <=2   - Cefepime: S <=2   - Cefoxitin: R >16   - Cefoxitin: S <=8   - Ceftriaxone: S <=1 Enterobacter, Klebsiella aerogenes, Citrobacter, and Serratia may develop resistance during prolonged therapy   - Ceftriaxone: S <=1 Enterobacter, Klebsiella aerogenes, Citrobacter, and Serratia may develop resistance during prolonged therapy   - Ciprofloxacin: S <=0.25   - Ciprofloxacin: S <=0.25   - Ertapenem: S <=0.5   - Ertapenem: S <=0.5   - Gentamicin: S <=2   - Gentamicin: S <=2   - Imipenem: S <=1   - Imipenem: S <=1   - Levofloxacin: S <=0.5   - Levofloxacin: S <=0.5   - Meropenem: S <=1   - Meropenem: S <=1   - Piperacillin/Tazobactam: S <=8   - Piperacillin/Tazobactam: S <=8   - Tobramycin: S <=2   - Tobramycin: S <=2   - Trimethoprim/Sulfamethoxazole: S <=0.5/9.5   - Trimethoprim/Sulfamethoxazole: S <=0.5/9.5   Specimen Source: Trach Asp Tracheal Aspirate   Culture Results:   Moderate Klebsiella oxytoca/Raoutella ornithinolytica   Moderate Enterobacter cloacae complex   Normal Respiratory Kelly absent   Organism Identification: Klebsiella oxytoca /Raoutella ornithinolytica   Enterobacter cloacae complex   Organism: Klebsiella oxytoca /Raoutella ornithinolytica   Organism: Enterobacter cloacae complex COVID-19 PCR (06.15.22 @ 07:10)   COVID-19 PCR: NotDetec: Culture - Acid Fast - Sputum w/Smear (22 @ 07:00)   Specimen Source: .Sputum Sputum   Acid Fast Bacilli Smear:   No acid fast bacilli seen by fluorochrome stain   Culture Results:   No acid fast bacilli isolated after 6 weeks.    Patient is seen and examined at the bed side, is afebrile now.  The events noted regarding unable to tolerate  puree diet.    REVIEW OF SYSTEMS: All other review systems are negative      ALLERGIES: buprenorphine (Rash)  Suboxone (Rash)    ICU Vital Signs Last 24 Hrs  T(C): 37.3 (2022 15:00), Max: 38 (2022 17:00)  T(F): 99.2 (2022 15:00), Max: 100.4 (2022 17:00)  HR: 110 (2022 15:03) (97 - 141)  BP: 153/111 (2022 15:03) (118/77 - 182/99)  BP(mean): 128 (2022 15:03) (92 - 134)  ABP: --  ABP(mean): --  RR: 25 (2022 15:03) (11 - 38)  SpO2: 92% (2022 15:03) (92% - 100%)    O2 Parameters below as of 2022 07:00  Patient On (Oxygen Delivery Method): ventilator    O2 Concentration (%): 30        PHYSICAL EXAM:  GENERAL: Not in distress  HEENT: Trach in placed  CHEST/LUNG: Not using accessory muscles   HEART: s1 and s2 present  ABDOMEN:  Nontender and  Nondistended  EXTREMITIES: No pedal  edema  CNS:  Awake and  Alert      LABS:                        8.7    2.83  )-----------( 75       ( 2022 05:42 )             27.3                           8.4    3.03  )-----------( 129      ( 2022 12:16 )             26.4           139  |  101  |  9<L>  ----------------------------<  89  3.5   |  25  |  0.8    Ca    8.8      2022 05:42  Phos  5.1       Mg     1.7         TPro  5.7<L>  /  Alb  3.0<L>  /  TBili  0.6  /  DBili  x   /  AST  17  /  ALT  9   /  AlkPhos  251<H>      141  |  103  |  9<L>  ----------------------------<  78  3.3<L>   |  25  |  0.8    Ca    8.9      2022 17:30  Phos  5.6     07-  Mg     1.9         TPro  5.4<L>  /  Alb  3.1<L>  /  TBili  0.5  /  DBili  x   /  AST  18  /  ALT  12  /  AlkPhos  221<H>        ABG - ( 2022 03:14 )  pH, Arterial: 7.30  pH, Blood: x     /  pCO2: 51    /  pO2: 111   / HCO3: 25    / Base Excess: -1.7  /  SaO2: 99.0          Urinalysis Basic - ( 2022 19:06 )  Color: Yellow / Appearance: Slightly Cloudy / S.025 / pH: x  Gluc: x / Ketone: Trace  / Bili: Negative / Urobili: 1.0 mg/dL   Blood: x / Protein: >=300 mg/dL / Nitrite: Negative   Leuk Esterase: Negative / RBC: 26-50 /HPF / WBC 3-5 /HPF   Sq Epi: x / Non Sq Epi: Occasional /HPF / Bacteria: Moderate        MEDICATIONS  (STANDING):    ampicillin/sulbactam  IVPB 3 Gram(s) IV Intermittent every 6 hours  BACItracin   Ointment 1 Application(s) Topical two times a day  chlorhexidine 0.12% Liquid 15 milliLiter(s) Oral Mucosa every 12 hours  chlorhexidine 2% Cloths 1 Application(s) Topical daily  clonazePAM  Tablet 1 milliGRAM(s) Oral every 8 hours  cloNIDine 0.1 milliGRAM(s) Oral daily  folic acid 1 milliGRAM(s) Oral daily  magnesium sulfate  IVPB 2 Gram(s) IV Intermittent every 2 hours  nicotine -  14 mG/24Hr(s) Patch 1 patch Transdermal daily  pantoprazole    Tablet 40 milliGRAM(s) Oral every 12 hours  polymyxin B IVPB [id number] Unit(s) IV Intermittent every 12 hours  QUEtiapine 100 milliGRAM(s) Oral two times a day  scopolamine 1 mG/72 Hr(s) Patch 1 Patch Transdermal every 72 hours  spironolactone 100 milliGRAM(s) Oral daily  thiamine 100 milliGRAM(s) Oral daily  vancomycin    Solution 125 milliGRAM(s) Oral every 12 hours  vancomycin  IVPB 1500 milliGRAM(s) IV Intermittent once  vancomycin  IVPB 1000 milliGRAM(s) IV Intermittent every 8 hours          RADIOLOGY & ADDITIONAL TESTS:    < from: Xray Kidney Ureter Bladder (22 @ 13:25) >    FINDINGS: Enteric feeding tube is stable, with tip in the left upper quadrant.  There is a non-obstructive bowel gas pattern.  No abnormal   masses or calcifications are seen.  Stable probe/catheter projects over  the lower pelvis.    < from: CT Angio Chest PE Protocol w/ IV Cont (22 @ 21:14) No pulmonary embolus.    Near complete consolidation/collapse of the left lower lobe, increased as  compared with prior. Mildly increased right lower lobe patchy   consolidative opacities-atelectasis. Adjacent bilateral small pleural effusions. Findings compatible with likely mixed pneumonia and  atelectasis.    Redemonstration of a dilated main pulmonary artery measuring up to 3.8 cm  compatible with pulmonary hypertension.    < from: Xray Chest 1 View- PORTABLE-Routine (Xray Chest 1 View- PORTABLE-Routine in AM.) (22 @ 05:28) >  Stable cardiomegaly and left basilar opacity. Stable support devices.      < from: CT Abdomen and Pelvis w/ IV Cont (22 @ 17:27) > No evidence of retroperitoneal hematoma.    Small bilateral pleural effusions and left basilar consolidation. Moderate ascites redemonstrated.    Moderate pericardial effusion.  Hepatosplenomegaly redemonstrated.        MICROBIOLOGY DATA:  Clostridium difficile Toxin by PCR (22 @ 19:15)   Clostridium difficile Toxin by PCR: RESULT INTERPRETATION:  Detected     Culture - Sputum . (22 @ 13:39)   Gram Stain: Few polymorphonuclear leukocytes per low power field   No Squamous epithelial cells per low power field   Numerous Gram Negative Coccobacilli seen per oil power field   Specimen Source: ET Tube ET Tube   Culture Results:   Moderate Acinetobacter baumannii/nosocomialis group   Normal Respiratory Kelly absent     MRSA/MSSA PCR (22 @ 10:34)   MRSA PCR Result.: Negative    Respiratory Viral Panel with COVID-19 by BROOKE (22 @ 09:19)   Rapid RVP Result: NotDetec   SARS-CoV-2: NotDetec:    Culture - Blood (22 @ 20:31)   Specimen Source: .Blood Blood-Peripheral   Culture Results:   No growth to date.     Culture - Blood (22 @ 20:31)   Specimen Source: .Blood Blood   Culture Results:   No growth to date.     Culture - Sputum . (22 @ 14:00)   - Oxacillin: S <=0.25 Oxacillin predicts susceptibility for dicloxacillin, methicillin, and nafcillin   - Cefazolin: S <=4   - Erythromycin: S <=0.25   - Gentamicin: S <=1 Should not be used as monotherapy   - Clindamycin: S <=0.25   - Rifampin: S <=1 Should not be used as monotherapy   - Tetra/Doxy: S <=1   - Trimethoprim/Sulfamethoxazole: S <=0.5/9.5   - Vancomycin: S 2   Gram Stain:   Moderate polymorphonuclear leukocytes per low power field   Few Squamous epithelial cells per low power field   Moderate Gram positive cocci in pairs seen per oil power field   Few Gram Variable Rods seen per oil power field   - Ampicillin/Sulbactam: S <=8/4   Specimen Source: Trach Asp Tracheal Aspirate   Culture Results:   Numerous Mixed gram negative rods   Moderate Staphylococcus aureus   Normal Respiratory Kelly present   Organism Identification: Staphylococcus aureus   Organism: Staphylococcus aureus   Culture - Blood in AM (22 @ 06:00)   Specimen Source: .Blood Blood   Culture Results: No growth to date.     Culture - Sputum . (22 @ 14:00)   Gram Stain:   Moderate polymorphonuclear leukocytes per low power field   Few Squamous epithelial cells per low power field   Moderate Gram positive cocci in pairs seen per oil power field   Few Gram Variable Rods seen per oil power field   Specimen Source: Trach Asp Tracheal Aspirate   Culture Results:   Numerous Mixed gram negative rods   Moderate Staphylococcus aureus Susceptibility to follow.   Normal Respiratory Kelly absent     Culture - Sputum . (22 @ 17:20)   Gram Stain:   Few polymorphonuclear leukocytes per low power field   Rare Squamous epithelial cells per low power field   Moderate Gram Negative Rods seen per oil power field   - Amikacin: S <=16   - Amikacin: S <=16   - Amoxicillin/Clavulanic Acid: R >16/8   - Amoxicillin/Clavulanic Acid: S <=8/4   - Ampicillin: R 16 These ampicillin results predict results for amoxicillin   - Ampicillin: R >16 These ampicillin results predict results for amoxicillin   - Ampicillin/Sulbactam: R >16/8 Enterobacter, Klebsiella aerogenes, Citrobacter, and Serratia may develop resistance during prolonged therapy (3-4 days)   - Ampicillin/Sulbactam: S <=4/2 Enterobacter, Klebsiella aerogenes, Citrobacter, and Serratia may develop resistance during prolonged therapy (3-4 days)   - Aztreonam: S <=4   - Aztreonam: S <=4   - Cefazolin: R >16 Enterobacter, Klebsiella aerogenes, Citrobacter, and Serratia may develop resistance during prolonged therapy (3-4 days)   - Cefazolin: S <=2 Enterobacter, Klebsiella aerogenes, Citrobacter, and Serratia may develop resistance during prolonged therapy (3-4 days)   - Cefepime: S <=2   - Cefepime: S <=2   - Cefoxitin: R >16   - Cefoxitin: S <=8   - Ceftriaxone: S <=1 Enterobacter, Klebsiella aerogenes, Citrobacter, and Serratia may develop resistance during prolonged therapy   - Ceftriaxone: S <=1 Enterobacter, Klebsiella aerogenes, Citrobacter, and Serratia may develop resistance during prolonged therapy   - Ciprofloxacin: S <=0.25   - Ciprofloxacin: S <=0.25   - Ertapenem: S <=0.5   - Ertapenem: S <=0.5   - Gentamicin: S <=2   - Gentamicin: S <=2   - Imipenem: S <=1   - Imipenem: S <=1   - Levofloxacin: S <=0.5   - Levofloxacin: S <=0.5   - Meropenem: S <=1   - Meropenem: S <=1   - Piperacillin/Tazobactam: S <=8   - Piperacillin/Tazobactam: S <=8   - Tobramycin: S <=2   - Tobramycin: S <=2   - Trimethoprim/Sulfamethoxazole: S <=0.5/9.5   - Trimethoprim/Sulfamethoxazole: S <=0.5/9.5   Specimen Source: Trach Asp Tracheal Aspirate   Culture Results:   Moderate Klebsiella oxytoca/Raoutella ornithinolytica   Moderate Enterobacter cloacae complex   Normal Respiratory Kelly absent   Organism Identification: Klebsiella oxytoca /Raoutella ornithinolytica   Enterobacter cloacae complex   Organism: Klebsiella oxytoca /Raoutella ornithinolytica   Organism: Enterobacter cloacae complex COVID-19 PCR (06.15.22 @ 07:10)   COVID-19 PCR: NotDetec: Culture - Acid Fast - Sputum w/Smear (22 @ 07:00)   Specimen Source: .Sputum Sputum   Acid Fast Bacilli Smear:   No acid fast bacilli seen by fluorochrome stain   Culture Results:   No acid fast bacilli isolated after 6 weeks.

## 2022-07-27 NOTE — PROGRESS NOTE ADULT - ASSESSMENT
Patient is a 26 year old female with hx of asthma, untreated Hep C, IVDA, anasarca presenting with increased dyspnea since yesterday    IMPRESSION  #Acute respiratory failure s/p intubation 6/16  #Pneumonia -   - CT Chest 6/22 - left greater than right lower lobe consolidations   - sputum Cx 6/24 Klebsiella oxytoca, Enterobacter cloacae complex- The Sputum culture from 6/27 grew Numerous Mixed gram negative rods and Moderate Staphylococcus aureus.-  - Please call micro [phone] ext1 to add on sensitivities for cefiderocol to the Acinetobacter IF NOT DONE ALREADY    #Fevers  - Ferritin, Serum: 94 ng/mL (06.27.22 @ 06:47),  Procalcitonin, Serum: 0.11 (06.27.22 @ 15:46)  - CT Abd/pelvis 6/26 - moderated ascites moderate pericardial effusion     #Drug overdose vs withdrawal?  #Hepatosplenomegaly - present since CT Abd/pelvis 1/30/2021  #Pancytopenia  #Hx of Untreated Hep C   #IVDA   #Abx allergy: buprenorphine (Rash), Suboxone (Rash)  # C.difficille infection - 7/5/22    would recommend:    1.  Follow up Blood cxs  2. Weaning trial from Vent. as per protocol  3. Continue  Vancomycin.  Unasyn and Polymixin B    4. Continue Oral Vancomycin to cover C.diff, during and 7 days after completing the Systemic Abx  5. Aspiration precaution    d/w ICU team    Attending  Attestation:   Spent more than 35 minutes on total encounter, more than 50 % of the visit was spent counseling and/or coordinating care by the Attending physician.       Patient is a 26 year old female with hx of asthma, untreated Hep C, IVDA, anasarca presenting with increased dyspnea since yesterday    IMPRESSION  #Acute respiratory failure s/p intubation 6/16  #Pneumonia -   - CT Chest 6/22 - left greater than right lower lobe consolidations   - sputum Cx 6/24 Klebsiella oxytoca, Enterobacter cloacae complex- The Sputum culture from 6/27 grew Numerous Mixed gram negative rods and Moderate Staphylococcus aureus.-  - Please call micro [phone] ext1 to add on sensitivities for cefiderocol to the Acinetobacter IF NOT DONE ALREADY    #Fevers  - Ferritin, Serum: 94 ng/mL (06.27.22 @ 06:47),  Procalcitonin, Serum: 0.11 (06.27.22 @ 15:46)  - CT Abd/pelvis 6/26 - moderated ascites moderate pericardial effusion     #Drug overdose vs withdrawal?  #Hepatosplenomegaly - present since CT Abd/pelvis 1/30/2021  #Pancytopenia  #Hx of Untreated Hep C   #IVDA   #Abx allergy: buprenorphine (Rash), Suboxone (Rash)  # C.difficille infection - 7/5/22    would recommend:    1. Aspiration precaution and Follow up Blood cxs  2. Weaning trial from Vent. as per protocol  3. Continue  Vancomycin.  Unasyn and Polymixin B  and Keep Vancomycin level between 15 to 20  4. Continue Oral Vancomycin to cover C.diff, during and 7 days after completing the Systemic Abx  5. Aspiration precaution    d/w ICU team    Attending  Attestation:   Spent more than 35 minutes on total encounter, more than 50 % of the visit was spent counseling and/or coordinating care by the Attending physician.

## 2022-07-27 NOTE — OCCUPATIONAL THERAPY INITIAL EVALUATION ADULT - PERTINENT HX OF CURRENT PROBLEM, REHAB EVAL
Admitted due to cirrhosis, anasarca, fluid overload, drug abuseasthma, untreated Hep C, IVDA, anasarca presenting with increased dyspnea. Patient is actively using heroin Admitted due to cirrhosis, anasarca, fluid overload, drug abuse, asthma, untreated Hep C, IVDA, anasarca presenting with increased dyspnea. Patient is actively using heroin. s/p fall seizure with subsequent intubation due to respiratory failure extubated 7/8/2022 then reintubated same day, s/p trach 7/20/22

## 2022-07-27 NOTE — OCCUPATIONAL THERAPY INITIAL EVALUATION ADULT - GENERAL OBSERVATIONS, REHAB EVAL
11:18-11:53; Chart reviewed, ok to treat by Occupational Therapist as confirmed by RN Lisha, Pt received in semi-fowlers position +tele +trach tube +NG tube +heplock RUE +premafit in NAD. Pt in agreement with OT IE. 11:18-11:53; Chart reviewed, ok to treat by Occupational Therapist as confirmed by RN Lisha, Pt received in semi-fowlers position 1/4 turned to the right +tele +vent via trach 30% +NG tube +heplock RUE +premafit in NAD. Pt in agreement with OT IE.

## 2022-07-27 NOTE — PROGRESS NOTE ADULT - ASSESSMENT
Acute respiratory failure with hypoxemia / aspiration pneumonia with sepsis / suspected drug overdose / head laceration s/p fall in lobby / possible seizure / anasarca  pancytopenia      - antibiotics as per ID    - s/p BMB - heme f/u    - s/p transfusion  cbc now stable - trach site bleeding now resolved    -  supplement  hypokalemia and  hypomagnesemia    - SAT

## 2022-07-27 NOTE — OCCUPATIONAL THERAPY INITIAL EVALUATION ADULT - BED MOBILITY LIMITATIONS, REHAB EVAL
impaired ability to control trunk for mobility decreased ability to use arms for pushing/pulling/impaired ability to control trunk for mobility

## 2022-07-27 NOTE — PROGRESS NOTE ADULT - ASSESSMENT
#Acute respiratory failure sp intubation and extubation 7/8 followed by reintubation s/p trach  - Trach bled. Evaluated by surgical team. In place.  - Remains mechanically ventilated  - Pressure support trials  - C/w seroquel, clonazepam and clonidine  - Try to wean off precedex  - EKG daily to check QTc  - Speech and swallow evaluation, prefer to perform evaluation while cuff is inflated for now as she is on sedation.   - PT/OT  - Tolerating 6 hours of pressure support daily    #PNA   #Acinetobacter buamnii  #MSSA pna; acetinobacter VAP  - CT Chest 6/22 - left greater than right lower lobe consolidations; sputum Cx 6/24 Klebsiella oxytoca, Enterobacter cloacae complex- and   - C/w Unasyn and Polymyxin B for now    #Neutropenic fever ( ANC >1165, mild):  - The patient is spiking fevers  - Neutropenia resolved on 7/27/2022  - C/w Vancomycin for now ( follow up blood cultures from 7/25/2022 --> NGTD)  - Discussed with Dr. Bay, blood cultures sent on 7/25, UA on 7/25: negative, resend procal  - C/w for now Unasyn and Polymixn  - DCd TLC on 7/26  - DCd doss catheter 7/26    #Seizure like activity  #?? drug over dose/ withdrawal opoid     #Liver cirrhosis 2/2 to IVDA  - Ascites s/p paracentesis     #Pancytopenia  #Mild neutropenia    #RENETTA   - Resolved     #C. Diff on 7/5/2022:  - s/p oral vancomycin treatment  - Now on oral vancomycin 125mg po q12hrs prophylaxis    #Misc:   - DVT porph: Off lovenox likely secondary to bleeding from Trach site, reconsider starting.   - Diet: NG feeding, speech and swallow evaluation to consider oral feeding  - GI proph: Pantoprazole 40mg IV once daily.

## 2022-07-27 NOTE — PROGRESS NOTE ADULT - SUBJECTIVE AND OBJECTIVE BOX
Patient is comfortable on ventilator       T(F): 97 (07-27-22 @ 05:00), Max: 100.4 (07-26-22 @ 17:00)  HR: 104 (07-27-22 @ 07:14)  BP: 129/88 (07-27-22 @ 05:00)  RR: 20  SpO2: 98% (07-27-22 @ 07:14) (97% - 100%)    PHYSICAL EXAM:  GENERAL: NAD  HEAD:  Atraumatic, Normocephalic  EYES: EOMI, PERRLA, conjunctiva and sclera clear  NERVOUS SYSTEM:  Alert & Oriented X3, no focal deficits   CHEST/LUNG:  bilateral rhonchi  HEART: Regular rate and rhythm; No murmurs, rubs, or gallops  ABDOMEN: Soft, Nontender, Nondistended; Bowel sounds present  EXTREMITIES:  2+ Peripheral Pulses, No clubbing, cyanosis, or edema    LABS  07-27    139  |  101  |  9<L>  ----------------------------<  89  3.5   |  25  |  0.8    Ca    8.8      27 Jul 2022 05:42  Phos  5.1     07-27  Mg     1.7     07-27    TPro  5.7<L>  /  Alb  3.0<L>  /  TBili  0.6  /  DBili  x   /  AST  17  /  ALT  9   /  AlkPhos  251<H>  07-27                          8.7    2.83  )-----------( 75       ( 27 Jul 2022 05:42 )             27.3       Mode: AC/ CMV (Assist Control/ Continuous Mandatory Ventilation)  RR (machine): 25  TV (machine): 370  FiO2: 30  PEEP: 5      Culture Results:   No growth to date. (07-25-22)  Culture Results:   Normal Respiratory Kelly present (07-15-22)  Culture Results:   Babesia microti PCR  Results: NOT detected  ***************Result Note*************  The detection of Babesia microti by PCR has only been  validated for whole blood; this test has not been approved  by the US Food and Drug Administration (FDA). Performance  characteristics of this assay have been determined by  [facility]. The clinical significance  of results should be considered in conjunction with the  overall clinical presentation of the patient. Result is not  intended to be used as the sole means for clinical diagnosis  or patient management decisions.  One negative sample does not necessarily rule  out the presence of a parasitic infection. (07-10-22)  Culture Results:   No Growth Final (07-08-22)  Culture Results:   No Growth Final (07-06-22)  Culture Results:   No Growth Final (07-06-22)  Culture Results:   Moderate Acinetobacter baumannii/nosocom group (Carbapenem Resistant)  Cefiderocol interpretations based on FDA breakpoints.  Normal Respiratory Kelly absent (07-04-22)  Culture Results:   No Growth Final (06-29-22)  Culture Results:   No Growth Final (06-29-22)  Culture Results:   No Growth Final (06-28-22)    RADIOLOGY  < from: Xray Chest 1 View- PORTABLE-Routine (Xray Chest 1 View- PORTABLE-Routine in AM.) (07.26.22 @ 06:04) >  Impression:    Bilateral lung opacities and pleural effusions, unchanged     < end of copied text >    MEDICATIONS  (STANDING):  ampicillin/sulbactam  IVPB 3 Gram(s) IV Intermittent every 6 hours  BACItracin   Ointment 1 Application(s) Topical two times a day  chlorhexidine 0.12% Liquid 15 milliLiter(s) Oral Mucosa every 12 hours  chlorhexidine 2% Cloths 1 Application(s) Topical daily  clonazePAM  Tablet 1 milliGRAM(s) Oral every 8 hours  cloNIDine 0.1 milliGRAM(s) Oral daily  folic acid 1 milliGRAM(s) Oral daily  magnesium sulfate  IVPB 2 Gram(s) IV Intermittent every 2 hours  methadone    Tablet 20 milliGRAM(s) Oral daily  nicotine -  14 mG/24Hr(s) Patch 1 patch Transdermal daily  pantoprazole    Tablet 40 milliGRAM(s) Oral every 12 hours  polymyxin B IVPB 767155 Unit(s) IV Intermittent every 12 hours  QUEtiapine 100 milliGRAM(s) Oral two times a day  scopolamine 1 mG/72 Hr(s) Patch 1 Patch Transdermal every 72 hours  spironolactone 100 milliGRAM(s) Oral daily  thiamine 100 milliGRAM(s) Oral daily  vancomycin    Solution 125 milliGRAM(s) Oral every 12 hours  vancomycin  IVPB 1500 milliGRAM(s) IV Intermittent once  vancomycin  IVPB 1000 milliGRAM(s) IV Intermittent every 8 hours    MEDICATIONS  (PRN):  acetaminophen     Tablet .. 650 milliGRAM(s) Oral every 6 hours PRN Temp greater or equal to 38.5C (101.3F), Moderate Pain (4 - 6)  ALBUTerol    90 MICROgram(s) HFA Inhaler 1 Puff(s) Inhalation every 4 hours PRN Shortness of Breath and/or Wheezing  cloNIDine 0.1 milliGRAM(s) Oral every 6 hours PRN opiate withdrawal  hydrOXYzine hydrochloride 50 milliGRAM(s) Oral every 6 hours PRN Anxiety  sodium chloride 0.9% lock flush 10 milliLiter(s) IV Push every 1 hour PRN Pre/post blood products, medications, blood draw, and to maintain line patency

## 2022-07-28 LAB
ALBUMIN SERPL ELPH-MCNC: 3 G/DL — LOW (ref 3.5–5.2)
ALP SERPL-CCNC: 229 U/L — HIGH (ref 30–115)
ALT FLD-CCNC: 8 U/L — SIGNIFICANT CHANGE UP (ref 0–41)
ANION GAP SERPL CALC-SCNC: 13 MMOL/L — SIGNIFICANT CHANGE UP (ref 7–14)
AST SERPL-CCNC: 14 U/L — SIGNIFICANT CHANGE UP (ref 0–41)
BASOPHILS # BLD AUTO: 0.01 K/UL — SIGNIFICANT CHANGE UP (ref 0–0.2)
BASOPHILS NFR BLD AUTO: 0.4 % — SIGNIFICANT CHANGE UP (ref 0–1)
BILIRUB SERPL-MCNC: 0.6 MG/DL — SIGNIFICANT CHANGE UP (ref 0.2–1.2)
BUN SERPL-MCNC: 7 MG/DL — LOW (ref 10–20)
CALCIUM SERPL-MCNC: 9.3 MG/DL — SIGNIFICANT CHANGE UP (ref 8.5–10.1)
CHLORIDE SERPL-SCNC: 101 MMOL/L — SIGNIFICANT CHANGE UP (ref 98–110)
CO2 SERPL-SCNC: 26 MMOL/L — SIGNIFICANT CHANGE UP (ref 17–32)
CREAT SERPL-MCNC: 0.8 MG/DL — SIGNIFICANT CHANGE UP (ref 0.7–1.5)
EGFR: 103 ML/MIN/1.73M2 — SIGNIFICANT CHANGE UP
EOSINOPHIL # BLD AUTO: 0.01 K/UL — SIGNIFICANT CHANGE UP (ref 0–0.7)
EOSINOPHIL NFR BLD AUTO: 0.4 % — SIGNIFICANT CHANGE UP (ref 0–8)
GLUCOSE SERPL-MCNC: 85 MG/DL — SIGNIFICANT CHANGE UP (ref 70–99)
HCT VFR BLD CALC: 28.3 % — LOW (ref 37–47)
HGB BLD-MCNC: 8.9 G/DL — LOW (ref 12–16)
IMM GRANULOCYTES NFR BLD AUTO: 0 % — LOW (ref 0.1–0.3)
LYMPHOCYTES # BLD AUTO: 0.6 K/UL — LOW (ref 1.2–3.4)
LYMPHOCYTES # BLD AUTO: 26.3 % — SIGNIFICANT CHANGE UP (ref 20.5–51.1)
MAGNESIUM SERPL-MCNC: 2 MG/DL — SIGNIFICANT CHANGE UP (ref 1.8–2.4)
MCHC RBC-ENTMCNC: 27.4 PG — SIGNIFICANT CHANGE UP (ref 27–31)
MCHC RBC-ENTMCNC: 31.4 G/DL — LOW (ref 32–37)
MCV RBC AUTO: 87.1 FL — SIGNIFICANT CHANGE UP (ref 81–99)
MONOCYTES # BLD AUTO: 0.08 K/UL — LOW (ref 0.1–0.6)
MONOCYTES NFR BLD AUTO: 3.5 % — SIGNIFICANT CHANGE UP (ref 1.7–9.3)
NEUTROPHILS # BLD AUTO: 1.58 K/UL — SIGNIFICANT CHANGE UP (ref 1.4–6.5)
NEUTROPHILS NFR BLD AUTO: 69.4 % — SIGNIFICANT CHANGE UP (ref 42.2–75.2)
NRBC # BLD: 0 /100 WBCS — SIGNIFICANT CHANGE UP (ref 0–0)
PHOSPHATE SERPL-MCNC: 5.1 MG/DL — HIGH (ref 2.1–4.9)
PLATELET # BLD AUTO: 69 K/UL — LOW (ref 130–400)
POTASSIUM SERPL-MCNC: 2.6 MMOL/L — CRITICAL LOW (ref 3.5–5)
POTASSIUM SERPL-SCNC: 2.6 MMOL/L — CRITICAL LOW (ref 3.5–5)
PROT SERPL-MCNC: 5.5 G/DL — LOW (ref 6–8)
RBC # BLD: 3.25 M/UL — LOW (ref 4.2–5.4)
RBC # FLD: 14.7 % — HIGH (ref 11.5–14.5)
SODIUM SERPL-SCNC: 140 MMOL/L — SIGNIFICANT CHANGE UP (ref 135–146)
VANCOMYCIN TROUGH SERPL-MCNC: 31.8 UG/ML — HIGH (ref 5–10)
WBC # BLD: 2.28 K/UL — LOW (ref 4.8–10.8)
WBC # FLD AUTO: 2.28 K/UL — LOW (ref 4.8–10.8)

## 2022-07-28 PROCEDURE — 71045 X-RAY EXAM CHEST 1 VIEW: CPT | Mod: 26

## 2022-07-28 PROCEDURE — 99233 SBSQ HOSP IP/OBS HIGH 50: CPT

## 2022-07-28 RX ORDER — POTASSIUM CHLORIDE 20 MEQ
40 PACKET (EA) ORAL ONCE
Refills: 0 | Status: DISCONTINUED | OUTPATIENT
Start: 2022-07-28 | End: 2022-07-28

## 2022-07-28 RX ORDER — POTASSIUM CHLORIDE 20 MEQ
40 PACKET (EA) ORAL EVERY 4 HOURS
Refills: 0 | Status: DISCONTINUED | OUTPATIENT
Start: 2022-07-28 | End: 2022-07-28

## 2022-07-28 RX ORDER — POTASSIUM CHLORIDE 20 MEQ
20 PACKET (EA) ORAL ONCE
Refills: 0 | Status: COMPLETED | OUTPATIENT
Start: 2022-07-28 | End: 2022-07-28

## 2022-07-28 RX ORDER — POTASSIUM CHLORIDE 20 MEQ
20 PACKET (EA) ORAL
Refills: 0 | Status: COMPLETED | OUTPATIENT
Start: 2022-07-28 | End: 2022-07-28

## 2022-07-28 RX ORDER — POTASSIUM CHLORIDE 20 MEQ
20 PACKET (EA) ORAL
Refills: 0 | Status: DISCONTINUED | OUTPATIENT
Start: 2022-07-28 | End: 2022-07-28

## 2022-07-28 RX ORDER — METHADONE HYDROCHLORIDE 40 MG/1
20 TABLET ORAL DAILY
Refills: 0 | Status: DISCONTINUED | OUTPATIENT
Start: 2022-07-28 | End: 2022-08-01

## 2022-07-28 RX ADMIN — Medication 1 MILLIGRAM(S): at 21:14

## 2022-07-28 RX ADMIN — Medication 650 MILLIGRAM(S): at 21:30

## 2022-07-28 RX ADMIN — POLYMYXIN B SULFATE 500 UNIT(S): 500000 INJECTION, POWDER, LYOPHILIZED, FOR SOLUTION INTRAMUSCULAR; INTRATHECAL; INTRAVENOUS; OPHTHALMIC at 17:58

## 2022-07-28 RX ADMIN — SCOPALAMINE 1 PATCH: 1 PATCH, EXTENDED RELEASE TRANSDERMAL at 20:19

## 2022-07-28 RX ADMIN — Medication 100 MILLIGRAM(S): at 12:19

## 2022-07-28 RX ADMIN — QUETIAPINE FUMARATE 100 MILLIGRAM(S): 200 TABLET, FILM COATED ORAL at 05:10

## 2022-07-28 RX ADMIN — Medication 50 MILLIEQUIVALENT(S): at 20:28

## 2022-07-28 RX ADMIN — Medication 650 MILLIGRAM(S): at 20:40

## 2022-07-28 RX ADMIN — Medication 50 MILLIEQUIVALENT(S): at 08:27

## 2022-07-28 RX ADMIN — Medication 1 PATCH: at 20:19

## 2022-07-28 RX ADMIN — Medication 250 MILLIGRAM(S): at 05:38

## 2022-07-28 RX ADMIN — AMPICILLIN SODIUM AND SULBACTAM SODIUM 200 GRAM(S): 250; 125 INJECTION, POWDER, FOR SUSPENSION INTRAMUSCULAR; INTRAVENOUS at 05:09

## 2022-07-28 RX ADMIN — AMPICILLIN SODIUM AND SULBACTAM SODIUM 200 GRAM(S): 250; 125 INJECTION, POWDER, FOR SUSPENSION INTRAMUSCULAR; INTRAVENOUS at 12:20

## 2022-07-28 RX ADMIN — Medication 1 MILLIGRAM(S): at 12:19

## 2022-07-28 RX ADMIN — QUETIAPINE FUMARATE 100 MILLIGRAM(S): 200 TABLET, FILM COATED ORAL at 20:26

## 2022-07-28 RX ADMIN — Medication 40 MILLIEQUIVALENT(S): at 10:06

## 2022-07-28 RX ADMIN — PANTOPRAZOLE SODIUM 40 MILLIGRAM(S): 20 TABLET, DELAYED RELEASE ORAL at 12:22

## 2022-07-28 RX ADMIN — Medication 50 MILLIEQUIVALENT(S): at 21:14

## 2022-07-28 RX ADMIN — Medication 1 PATCH: at 11:40

## 2022-07-28 RX ADMIN — SCOPALAMINE 1 PATCH: 1 PATCH, EXTENDED RELEASE TRANSDERMAL at 09:47

## 2022-07-28 RX ADMIN — CHLORHEXIDINE GLUCONATE 1 APPLICATION(S): 213 SOLUTION TOPICAL at 12:35

## 2022-07-28 RX ADMIN — AMPICILLIN SODIUM AND SULBACTAM SODIUM 200 GRAM(S): 250; 125 INJECTION, POWDER, FOR SUSPENSION INTRAMUSCULAR; INTRAVENOUS at 00:52

## 2022-07-28 RX ADMIN — Medication 1 APPLICATION(S): at 05:10

## 2022-07-28 RX ADMIN — SPIRONOLACTONE 100 MILLIGRAM(S): 25 TABLET, FILM COATED ORAL at 05:10

## 2022-07-28 RX ADMIN — ENOXAPARIN SODIUM 40 MILLIGRAM(S): 100 INJECTION SUBCUTANEOUS at 05:07

## 2022-07-28 RX ADMIN — AMPICILLIN SODIUM AND SULBACTAM SODIUM 200 GRAM(S): 250; 125 INJECTION, POWDER, FOR SUSPENSION INTRAMUSCULAR; INTRAVENOUS at 19:06

## 2022-07-28 RX ADMIN — Medication 0.1 MILLIGRAM(S): at 05:10

## 2022-07-28 RX ADMIN — Medication 1 PATCH: at 07:47

## 2022-07-28 RX ADMIN — CHLORHEXIDINE GLUCONATE 15 MILLILITER(S): 213 SOLUTION TOPICAL at 05:11

## 2022-07-28 RX ADMIN — Medication 125 MILLIGRAM(S): at 05:11

## 2022-07-28 RX ADMIN — Medication 1 MILLIGRAM(S): at 05:09

## 2022-07-28 RX ADMIN — METHADONE HYDROCHLORIDE 20 MILLIGRAM(S): 40 TABLET ORAL at 12:20

## 2022-07-28 RX ADMIN — POLYMYXIN B SULFATE 500 UNIT(S): 500000 INJECTION, POWDER, LYOPHILIZED, FOR SOLUTION INTRAMUSCULAR; INTRATHECAL; INTRAVENOUS; OPHTHALMIC at 05:38

## 2022-07-28 RX ADMIN — CHLORHEXIDINE GLUCONATE 15 MILLILITER(S): 213 SOLUTION TOPICAL at 18:29

## 2022-07-28 RX ADMIN — Medication 1 APPLICATION(S): at 18:29

## 2022-07-28 RX ADMIN — Medication 1 PATCH: at 12:20

## 2022-07-28 NOTE — PROGRESS NOTE ADULT - ASSESSMENT
#Acute respiratory failure sp intubation and extubation 7/8 followed by reintubation s/p trach  - Trach bled. Evaluated by surgical team. In place.  - Remains mechanically ventilated  - Pressure support trials  - C/w seroquel, clonazepam and clonidine  - EKG daily to check QTc  - PT/OT  - Tolerating 6 hours of pressure support daily  - SandS evaluation for feeding, failed on 7/27, re-attempt 7/28,if she fails, may need to consult GI for PEG tube placement.    #PNA   #Acinetobacter buamnii  #MSSA pna; acetinobacter VAP  - CT Chest 6/22 - left greater than right lower lobe consolidations; sputum Cx 6/24 Klebsiella oxytoca, Enterobacter cloacae complex- and   - C/w Unasyn and Polymyxin B for now    #Neutropenic fever ( ANC >1165, mild):  - The patient is spiking fevers  - Neutropenia resolved on 7/27/2022  - C/w Vancomycin for now ( follow up blood cultures from 7/25/2022 --> NGTD)  - Discussed with Dr. Bay, blood cultures sent on 7/25, UA on 7/25: negative, resend procal  - C/w for now Unasyn and Polymixn  - DCd TLC on 7/26  - DCd doss catheter 7/26    #Seizure like activity  #?? drug over dose/ withdrawal opoid     #Liver cirrhosis  #Splenomegaly  - SAAG >1.1, Portal HTN related  - Ascites s/p paracentesis   - Will need OP follow up    #Pancytopenia  #Mild neutropenia    #RENETTA   - Resolved     #C. Diff on 7/5/2022:  - s/p oral vancomycin treatment  - Now on oral vancomycin 125mg po q12hrs prophylaxis    #Misc:   - DVT porph: Lovenox 40mg subQ once daily  - Diet: NG feeding, speech and swallow evaluation to consider oral feeding  - GI proph: Pantoprazole 40mg IV once daily   #Acute respiratory failure sp intubation and extubation 7/8 followed by reintubation s/p trach  - Trach bled. Evaluated by surgical team. In place.  - Remains mechanically ventilated  - Pressure support trials  - C/w seroquel, clonazepam and clonidine  - EKG daily to check QTc  - PT/OT  - Tolerating 6 hours of pressure support daily  - SandS evaluation for feeding, failed on 7/27, re-attempt 7/28,if she fails, may need to consult GI for PEG tube placement.    #PNA   #Acinetobacter buamnii  #MSSA pna; acetinobacter VAP  - CT Chest 6/22 - left greater than right lower lobe consolidations; sputum Cx 6/24 Klebsiella oxytoca, Enterobacter cloacae complex- and   - C/w Unasyn and Polymyxin B for now    #Neutropenic fever ( ANC >1165, mild):  - Fevers resolved from 7/27/2022  - Neutropenia resolved on 7/27/2022  - Blood cultures on 7/25:NGTD, Procal 7/25: 0.11  - DC Vancomycin  - C/w for now Unasyn and Polymixn, if afebrile on 7/29, then DC all antibiotics  - DCd TLC on 7/26  - DCd doss catheter 7/26    #Seizure like activity  #?? drug over dose/ withdrawal opoid     #Liver cirrhosis  #Splenomegaly  - SAAG >1.1, Portal HTN related  - Ascites s/p paracentesis   - Will need OP follow up    #Pancytopenia  #Mild neutropenia    #RENETTA   - Resolved     #C. Diff on 7/5/2022:  - s/p oral vancomycin treatment  - Now on oral vancomycin 125mg po q12hrs prophylaxis    #Misc:   - DVT porph: Lovenox 40mg subQ once daily  - Diet: NG feeding, speech and swallow evaluation to consider oral feeding  - GI proph: Pantoprazole 40mg IV once daily

## 2022-07-28 NOTE — PROGRESS NOTE ADULT - ASSESSMENT
IMPRESSION:  Acute respiratory failure sp trach  bleeding from trach site resolved   Anemia s/p 1 U PRBC  PNA  MSSA pna  seizure like activity  drug over dose/ withdrawal opoid   liver cirrhosis? due to suspected Hep C  Ascites sp paracentesis SAAG > 1.1  Pancytopenia- worsening  RENETTA improved  C diff +ve     PLAN:    CNS: Continue with Methadone for withdrawal   continue with seroquel and clonazepam;   Clonidine   Check QTc daily.        HEENT: oral care; Trach care     PULMONARY:    repeat cxr today   cpap as tolerated during the day   Pressure support as much as tolerated     CARDIOVASCULAR: Keep MAP more than 65mmhg; not on pressors currently keep equal balance. On oral diuretics.       GI: GI prophylaxis. Feeding per speech and swallow.        RENAL: Correct K ot mo re than 4 and Mg to more than 2.        INFECTIOUS DISEASE: Currently on abx per ID  ID following. Cx negative to date   follow ID for duration of abx   check vanco trouph     HEMATOLOGICAL:   s/p BMB follow result and cx. Hematology following. Monitor platelet count. Keep hg more than 7.   CBC q 12 hrs serial HH    ENDOCRINE:  Follow up FS.  Insulin protocol if needed.     Musk: bedrest for now.   Dispo: Remains critically and ill needs ICU care     No lines  Jaron is out.  social service for d/c  process   downgrade to floor

## 2022-07-28 NOTE — SPEAKING VALVE EVALUATION - COMMENTS
Patient with MBS on 07/26/2022; Not tolerating PO consistent at this time. Improved with toleration of lower vent settings to allow for trach collar trial and PMSV trial.

## 2022-07-28 NOTE — PROGRESS NOTE ADULT - SUBJECTIVE AND OBJECTIVE BOX
SUBJECTIVE:    Patient is a 26y old Female who presents with a chief complaint of anasarca (28 Jul 2022 07:49)    Currently admitted to medicine with the primary diagnosis of Cirrhosis       Today is hospital day 43d. This morning she is resting comfortably in bed and reports no new issues or overnight events.     PAST MEDICAL & SURGICAL HISTORY  Hepatitis C    Asthma    Anxiety and depression    IV drug abuse  heroin    No significant past surgical history      SOCIAL HISTORY:  Negative for smoking/alcohol/drug use.     ALLERGIES:  buprenorphine (Rash)  Suboxone (Rash)    MEDICATIONS:  STANDING MEDICATIONS  ampicillin/sulbactam  IVPB 3 Gram(s) IV Intermittent every 6 hours  BACItracin   Ointment 1 Application(s) Topical two times a day  chlorhexidine 0.12% Liquid 15 milliLiter(s) Oral Mucosa every 12 hours  chlorhexidine 2% Cloths 1 Application(s) Topical daily  clonazePAM  Tablet 1 milliGRAM(s) Oral every 8 hours  cloNIDine 0.1 milliGRAM(s) Oral daily  enoxaparin Injectable 40 milliGRAM(s) SubCutaneous every 24 hours  folic acid 1 milliGRAM(s) Oral daily  nicotine -  14 mG/24Hr(s) Patch 1 patch Transdermal daily  pantoprazole   Suspension 40 milliGRAM(s) Oral daily  polymyxin B IVPB 737612 Unit(s) IV Intermittent every 12 hours  potassium chloride   Powder 40 milliEquivalent(s) Oral every 4 hours  QUEtiapine 100 milliGRAM(s) Oral two times a day  scopolamine 1 mG/72 Hr(s) Patch 1 Patch Transdermal every 72 hours  spironolactone 100 milliGRAM(s) Oral daily  thiamine 100 milliGRAM(s) Oral daily  vancomycin    Solution 125 milliGRAM(s) Oral every 12 hours    PRN MEDICATIONS  acetaminophen     Tablet .. 650 milliGRAM(s) Oral every 6 hours PRN  ALBUTerol    90 MICROgram(s) HFA Inhaler 1 Puff(s) Inhalation every 4 hours PRN  cloNIDine 0.1 milliGRAM(s) Oral every 6 hours PRN  hydrOXYzine hydrochloride 50 milliGRAM(s) Oral every 6 hours PRN  sodium chloride 0.9% lock flush 10 milliLiter(s) IV Push every 1 hour PRN    VITALS:   T(F): 97.6  HR: 104  BP: 122/75  RR: 29  SpO2: 98%    LABS:                        8.9    2.28  )-----------( 69       ( 28 Jul 2022 05:10 )             28.3     07-28    140  |  101  |  7<L>  ----------------------------<  85  2.6<LL>   |  26  |  0.8    Ca    9.3      28 Jul 2022 05:10  Phos  5.1     07-28  Mg     2.0     07-28    TPro  5.5<L>  /  Alb  3.0<L>  /  TBili  0.6  /  DBili  x   /  AST  14  /  ALT  8   /  AlkPhos  229<H>  07-28      ABG - ( 27 Jul 2022 05:37 )  pH, Arterial: 7.47  pH, Blood: x     /  pCO2: 35    /  pO2: 189   / HCO3: 26    / Base Excess: 1.8   /  SaO2: 99.7      Culture - Blood (collected 25 Jul 2022 15:34)  Source: .Blood Blood  Preliminary Report (27 Jul 2022 02:02):    No growth to date.    PHYSICAL EXAM:  GEN: No acute distress  LUNGS: Clear to auscultation bilaterally   HEART: S1/S2 present. RRR.   ABD: Soft, non-tender, distended, tympanic to percussion. Bowel sounds present  EXT: NC/NC/NE/2+PP/GENTILE/Skin Intact.   NEURO: AAOX3

## 2022-07-28 NOTE — SPEAKING VALVE EVALUATION - RECOMMENDED SPEAKING VALVE GUIDELINES
Placement of speaking valve as tolerated/During waking hours only/Place on hub of tracheostomy.../Suction prior to placement/Cuff fully deflated/Level of supervision

## 2022-07-28 NOTE — PROGRESS NOTE ADULT - SUBJECTIVE AND OBJECTIVE BOX
Patient is a 26y old  Female who presents with a chief complaint of anasarca (27 Jul 2022 15:29)      Over Night Events:  Patient seen and examined.   awake tolerating cpap   off drip     ROS:  See HPI    PHYSICAL EXAM    ICU Vital Signs Last 24 Hrs  T(C): 36.4 (28 Jul 2022 07:18), Max: 37.6 (27 Jul 2022 18:46)  T(F): 97.6 (28 Jul 2022 07:18), Max: 99.6 (27 Jul 2022 18:46)  HR: 104 (28 Jul 2022 07:18) (97 - 117)  BP: 122/75 (28 Jul 2022 07:18) (122/75 - 153/111)  BP(mean): 93 (28 Jul 2022 07:18) (93 - 128)  ABP: --  ABP(mean): --  RR: 29 (28 Jul 2022 07:18) (11 - 34)  SpO2: 98% (28 Jul 2022 07:18) (92% - 100%)    O2 Parameters below as of 28 Jul 2022 05:06  Patient On (Oxygen Delivery Method): ventilator            General: awake   HEENT:         trach        Lymph Nodes: NO cervical LN   Lungs: Bilateral BS  Cardiovascular: Regular   Abdomen: Soft, Positive BS  Extremities: No clubbing   Skin: warm   Neurological: no focal   Musculoskeletal: move all ext     I&O's Detail    27 Jul 2022 07:01  -  28 Jul 2022 07:00  --------------------------------------------------------  IN:    IV PiggyBack: 1000 mL    IV PiggyBack: 200 mL    IV PiggyBack: 50 mL  Total IN: 1250 mL    OUT:    Indwelling Catheter - Urethral (mL): 1200 mL    Voided (mL): 4400 mL  Total OUT: 5600 mL    Total NET: -4350 mL          LABS:                          8.9    2.28  )-----------( 69       ( 28 Jul 2022 05:10 )             28.3         27 Jul 2022 05:42    139    |  101    |  9      ----------------------------<  89     3.5     |  25     |  0.8      Ca    8.8        27 Jul 2022 05:42  Phos  5.1       27 Jul 2022 05:42  Mg     1.7       27 Jul 2022 05:42                                                                                                                                                  Culture - Blood (collected 25 Jul 2022 15:34)  Source: .Blood Blood  Preliminary Report (27 Jul 2022 02:02):    No growth to date.                                                   Mode: AC/ CMV (Assist Control/ Continuous Mandatory Ventilation)  RR (machine): 25  TV (machine): 370  FiO2: 40  PEEP: 5  ITime: 0.8  MAP: 11  PIP: 23                                      ABG - ( 27 Jul 2022 05:37 )  pH, Arterial: 7.47  pH, Blood: x     /  pCO2: 35    /  pO2: 189   / HCO3: 26    / Base Excess: 1.8   /  SaO2: 99.7                MEDICATIONS  (STANDING):  ampicillin/sulbactam  IVPB 3 Gram(s) IV Intermittent every 6 hours  BACItracin   Ointment 1 Application(s) Topical two times a day  chlorhexidine 0.12% Liquid 15 milliLiter(s) Oral Mucosa every 12 hours  chlorhexidine 2% Cloths 1 Application(s) Topical daily  clonazePAM  Tablet 1 milliGRAM(s) Oral every 8 hours  cloNIDine 0.1 milliGRAM(s) Oral daily  enoxaparin Injectable 40 milliGRAM(s) SubCutaneous every 24 hours  folic acid 1 milliGRAM(s) Oral daily  nicotine -  14 mG/24Hr(s) Patch 1 patch Transdermal daily  pantoprazole   Suspension 40 milliGRAM(s) Oral daily  polymyxin B IVPB 860038 Unit(s) IV Intermittent every 12 hours  QUEtiapine 100 milliGRAM(s) Oral two times a day  scopolamine 1 mG/72 Hr(s) Patch 1 Patch Transdermal every 72 hours  spironolactone 100 milliGRAM(s) Oral daily  thiamine 100 milliGRAM(s) Oral daily  vancomycin    Solution 125 milliGRAM(s) Oral every 12 hours  vancomycin  IVPB 1500 milliGRAM(s) IV Intermittent once  vancomycin  IVPB 1000 milliGRAM(s) IV Intermittent every 8 hours    MEDICATIONS  (PRN):  acetaminophen     Tablet .. 650 milliGRAM(s) Oral every 6 hours PRN Temp greater or equal to 38.5C (101.3F), Moderate Pain (4 - 6)  ALBUTerol    90 MICROgram(s) HFA Inhaler 1 Puff(s) Inhalation every 4 hours PRN Shortness of Breath and/or Wheezing  cloNIDine 0.1 milliGRAM(s) Oral every 6 hours PRN opiate withdrawal  hydrOXYzine hydrochloride 50 milliGRAM(s) Oral every 6 hours PRN Anxiety  sodium chloride 0.9% lock flush 10 milliLiter(s) IV Push every 1 hour PRN Pre/post blood products, medications, blood draw, and to maintain line patency          Xrays:  TLC:  OG:  ET tube:                                                                                       ECHO:  [facility] ICU:

## 2022-07-28 NOTE — PROGRESS NOTE ADULT - SUBJECTIVE AND OBJECTIVE BOX
Patient seen and evaluated this am awake and comfortable on ventilator      T(F): 100.4 (07-28-22 @ 15:00), Max: 100.4 (07-28-22 @ 15:00)  HR: 117 (07-28-22 @ 15:00)  BP: 182/108 (07-28-22 @ 15:00)  RR: 22 (07-28-22 @ 15:00)  SpO2: 95% (07-28-22 @ 14:40) (94% - 99%)    PHYSICAL EXAM:  GENERAL: NAD  HEAD:  Atraumatic, Normocephalic  EYES: EOMI, PERRLA, conjunctiva and sclera clear  NERVOUS SYSTEM:  Alert & Oriented X3, no focal deficits   CHEST/LUNG:  bilateral rhonchi  HEART: Regular rate and rhythm; No murmurs, rubs, or gallops  ABDOMEN: Soft, Nontender, Nondistended; Bowel sounds present  EXTREMITIES:  2+ Peripheral Pulses, No clubbing, cyanosis, or edema    LABS  07-28    140  |  101  |  7<L>  ----------------------------<  85  2.6<LL>   |  26  |  0.8    Ca    9.3      28 Jul 2022 05:10  Phos  5.1     07-28  Mg     2.0     07-28    TPro  5.5<L>  /  Alb  3.0<L>  /  TBili  0.6  /  DBili  x   /  AST  14  /  ALT  8   /  AlkPhos  229<H>  07-28                          8.9    2.28  )-----------( 69       ( 28 Jul 2022 05:10 )             28.3       Culture Results:   No growth to date. (07-25-22)  Culture Results:   Normal Respiratory Kelly present (07-15-22)  Culture Results:   Babesia microti PCR  Results: NOT detected  ***************Result Note*************  The detection of Babesia microti by PCR has only been  validated for whole blood; this test has not been approved  by the US Food and Drug Administration (FDA). Performance  characteristics of this assay have been determined by  [employer]. The clinical significance  of results should be considered in conjunction with the  overall clinical presentation of the patient. Result is not  intended to be used as the sole means for clinical diagnosis  or patient management decisions.  One negative sample does not necessarily rule  out the presence of a parasitic infection. (07-10-22)  Culture Results:   No Growth Final (07-08-22)  Culture Results:   No Growth Final (07-06-22)  Culture Results:   No Growth Final (07-06-22)  Culture Results:   Moderate Acinetobacter baumannii/nosocom group (Carbapenem Resistant)  Cefiderocol interpretations based on FDA breakpoints.  Normal Respiratory Kelly absent (07-04-22)  Culture Results:   No Growth Final (06-29-22)  Culture Results:   No Growth Final (06-29-22)    RADIOLOGY  < from: Xray Chest 1 View-PORTABLE IMMEDIATE (Xray Chest 1 View-PORTABLE IMMEDIATE .) (07.28.22 @ 08:38) >  Impression:    Stable bibasilar opacities/effusions.      < end of copied text >    MEDICATIONS  (STANDING):  ampicillin/sulbactam  IVPB 3 Gram(s) IV Intermittent every 6 hours  BACItracin   Ointment 1 Application(s) Topical two times a day  chlorhexidine 0.12% Liquid 15 milliLiter(s) Oral Mucosa every 12 hours  chlorhexidine 2% Cloths 1 Application(s) Topical daily  clonazePAM  Tablet 1 milliGRAM(s) Oral every 8 hours  cloNIDine 0.1 milliGRAM(s) Oral daily  enoxaparin Injectable 40 milliGRAM(s) SubCutaneous every 24 hours  folic acid 1 milliGRAM(s) Oral daily  methadone    Tablet 20 milliGRAM(s) Oral daily  nicotine -  14 mG/24Hr(s) Patch 1 patch Transdermal daily  pantoprazole   Suspension 40 milliGRAM(s) Oral daily  polymyxin B IVPB 198580 Unit(s) IV Intermittent every 12 hours  potassium chloride   Powder 40 milliEquivalent(s) Oral every 4 hours  QUEtiapine 100 milliGRAM(s) Oral two times a day  scopolamine 1 mG/72 Hr(s) Patch 1 Patch Transdermal every 72 hours  spironolactone 100 milliGRAM(s) Oral daily  thiamine 100 milliGRAM(s) Oral daily  vancomycin    Solution 125 milliGRAM(s) Oral every 12 hours    MEDICATIONS  (PRN):  acetaminophen     Tablet .. 650 milliGRAM(s) Oral every 6 hours PRN Temp greater or equal to 38.5C (101.3F), Moderate Pain (4 - 6)  ALBUTerol    90 MICROgram(s) HFA Inhaler 1 Puff(s) Inhalation every 4 hours PRN Shortness of Breath and/or Wheezing  cloNIDine 0.1 milliGRAM(s) Oral every 6 hours PRN opiate withdrawal  hydrOXYzine hydrochloride 50 milliGRAM(s) Oral every 6 hours PRN Anxiety  sodium chloride 0.9% lock flush 10 milliLiter(s) IV Push every 1 hour PRN Pre/post blood products, medications, blood draw, and to maintain line patency

## 2022-07-28 NOTE — PROGRESS NOTE ADULT - ASSESSMENT
Acute respiratory failure with hypoxemia / aspiration pneumonia with sepsis / suspected drug overdose / head laceration s/p fall in lobby / possible seizure / anasarca  pancytopenia      - complete antibiotics as per ID    - s/p BMB - heme f/u    - s/p transfusion  cbc now stable - trach site bleeding  resolved    -  supplement  hypokalemia and  hypomagnesemia    - SAT     Acute respiratory failure with hypoxemia / aspiration pneumonia with sepsis / suspected drug overdose / head laceration s/p fall in lobby / possible seizure / anasarca  pancytopenia      - complete antibiotics as per ID    - s/p BMB - heme f/u    - s/p transfusion  cbc now stable - trach site bleeding  resolved    -  supplement  hypokalemia and  hypomagnesemia    - advance diet as per S&S

## 2022-07-29 LAB
ALBUMIN SERPL ELPH-MCNC: 3 G/DL — LOW (ref 3.5–5.2)
ALP SERPL-CCNC: 238 U/L — HIGH (ref 30–115)
ALT FLD-CCNC: 7 U/L — SIGNIFICANT CHANGE UP (ref 0–41)
ANION GAP SERPL CALC-SCNC: 11 MMOL/L — SIGNIFICANT CHANGE UP (ref 7–14)
AST SERPL-CCNC: 13 U/L — SIGNIFICANT CHANGE UP (ref 0–41)
BASOPHILS # BLD AUTO: 0 K/UL — SIGNIFICANT CHANGE UP (ref 0–0.2)
BASOPHILS NFR BLD AUTO: 0 % — SIGNIFICANT CHANGE UP (ref 0–1)
BILIRUB SERPL-MCNC: 0.6 MG/DL — SIGNIFICANT CHANGE UP (ref 0.2–1.2)
BUN SERPL-MCNC: 5 MG/DL — LOW (ref 10–20)
CALCIUM SERPL-MCNC: 9.2 MG/DL — SIGNIFICANT CHANGE UP (ref 8.5–10.1)
CHLORIDE SERPL-SCNC: 99 MMOL/L — SIGNIFICANT CHANGE UP (ref 98–110)
CO2 SERPL-SCNC: 29 MMOL/L — SIGNIFICANT CHANGE UP (ref 17–32)
CREAT SERPL-MCNC: 0.8 MG/DL — SIGNIFICANT CHANGE UP (ref 0.7–1.5)
EGFR: 103 ML/MIN/1.73M2 — SIGNIFICANT CHANGE UP
EOSINOPHIL # BLD AUTO: 0.01 K/UL — SIGNIFICANT CHANGE UP (ref 0–0.7)
EOSINOPHIL NFR BLD AUTO: 0.6 % — SIGNIFICANT CHANGE UP (ref 0–8)
GLUCOSE SERPL-MCNC: 78 MG/DL — SIGNIFICANT CHANGE UP (ref 70–99)
HCT VFR BLD CALC: 27.9 % — LOW (ref 37–47)
HGB BLD-MCNC: 8.6 G/DL — LOW (ref 12–16)
IMM GRANULOCYTES NFR BLD AUTO: 0 % — LOW (ref 0.1–0.3)
LYMPHOCYTES # BLD AUTO: 0.48 K/UL — LOW (ref 1.2–3.4)
LYMPHOCYTES # BLD AUTO: 31.2 % — SIGNIFICANT CHANGE UP (ref 20.5–51.1)
MAGNESIUM SERPL-MCNC: 1.4 MG/DL — LOW (ref 1.8–2.4)
MCHC RBC-ENTMCNC: 26.8 PG — LOW (ref 27–31)
MCHC RBC-ENTMCNC: 30.8 G/DL — LOW (ref 32–37)
MCV RBC AUTO: 86.9 FL — SIGNIFICANT CHANGE UP (ref 81–99)
MONOCYTES # BLD AUTO: 0.07 K/UL — LOW (ref 0.1–0.6)
MONOCYTES NFR BLD AUTO: 4.5 % — SIGNIFICANT CHANGE UP (ref 1.7–9.3)
NEUTROPHILS # BLD AUTO: 0.98 K/UL — LOW (ref 1.4–6.5)
NEUTROPHILS NFR BLD AUTO: 63.7 % — SIGNIFICANT CHANGE UP (ref 42.2–75.2)
NRBC # BLD: 0 /100 WBCS — SIGNIFICANT CHANGE UP (ref 0–0)
PHOSPHATE SERPL-MCNC: 5.1 MG/DL — HIGH (ref 2.1–4.9)
PLATELET # BLD AUTO: 61 K/UL — LOW (ref 130–400)
POTASSIUM SERPL-MCNC: 3.1 MMOL/L — LOW (ref 3.5–5)
POTASSIUM SERPL-SCNC: 3.1 MMOL/L — LOW (ref 3.5–5)
PROT SERPL-MCNC: 5.5 G/DL — LOW (ref 6–8)
RBC # BLD: 3.21 M/UL — LOW (ref 4.2–5.4)
RBC # FLD: 14.4 % — SIGNIFICANT CHANGE UP (ref 11.5–14.5)
SODIUM SERPL-SCNC: 139 MMOL/L — SIGNIFICANT CHANGE UP (ref 135–146)
WBC # BLD: 1.54 K/UL — LOW (ref 4.8–10.8)
WBC # FLD AUTO: 1.54 K/UL — LOW (ref 4.8–10.8)

## 2022-07-29 PROCEDURE — 74230 X-RAY XM SWLNG FUNCJ C+: CPT | Mod: 26

## 2022-07-29 PROCEDURE — 93010 ELECTROCARDIOGRAM REPORT: CPT

## 2022-07-29 PROCEDURE — 99233 SBSQ HOSP IP/OBS HIGH 50: CPT

## 2022-07-29 RX ORDER — POTASSIUM CHLORIDE 20 MEQ
40 PACKET (EA) ORAL EVERY 4 HOURS
Refills: 0 | Status: COMPLETED | OUTPATIENT
Start: 2022-07-29 | End: 2022-07-29

## 2022-07-29 RX ORDER — MAGNESIUM SULFATE 500 MG/ML
2 VIAL (ML) INJECTION
Refills: 0 | Status: COMPLETED | OUTPATIENT
Start: 2022-07-29 | End: 2022-07-29

## 2022-07-29 RX ADMIN — Medication 40 MILLIEQUIVALENT(S): at 09:34

## 2022-07-29 RX ADMIN — METHADONE HYDROCHLORIDE 20 MILLIGRAM(S): 40 TABLET ORAL at 13:33

## 2022-07-29 RX ADMIN — Medication 25 GRAM(S): at 12:02

## 2022-07-29 RX ADMIN — SCOPALAMINE 1 PATCH: 1 PATCH, EXTENDED RELEASE TRANSDERMAL at 22:00

## 2022-07-29 RX ADMIN — Medication 125 MILLIGRAM(S): at 05:52

## 2022-07-29 RX ADMIN — Medication 1 MILLIGRAM(S): at 14:59

## 2022-07-29 RX ADMIN — SPIRONOLACTONE 100 MILLIGRAM(S): 25 TABLET, FILM COATED ORAL at 05:52

## 2022-07-29 RX ADMIN — Medication 1 MILLIGRAM(S): at 13:36

## 2022-07-29 RX ADMIN — Medication 25 GRAM(S): at 15:00

## 2022-07-29 RX ADMIN — Medication 1 APPLICATION(S): at 05:54

## 2022-07-29 RX ADMIN — ENOXAPARIN SODIUM 40 MILLIGRAM(S): 100 INJECTION SUBCUTANEOUS at 05:52

## 2022-07-29 RX ADMIN — CHLORHEXIDINE GLUCONATE 15 MILLILITER(S): 213 SOLUTION TOPICAL at 05:52

## 2022-07-29 RX ADMIN — Medication 0.1 MILLIGRAM(S): at 05:53

## 2022-07-29 RX ADMIN — Medication 100 MILLIGRAM(S): at 13:37

## 2022-07-29 RX ADMIN — Medication 1 APPLICATION(S): at 17:38

## 2022-07-29 RX ADMIN — QUETIAPINE FUMARATE 100 MILLIGRAM(S): 200 TABLET, FILM COATED ORAL at 05:52

## 2022-07-29 RX ADMIN — CHLORHEXIDINE GLUCONATE 15 MILLILITER(S): 213 SOLUTION TOPICAL at 17:38

## 2022-07-29 RX ADMIN — Medication 125 MILLIGRAM(S): at 17:37

## 2022-07-29 RX ADMIN — QUETIAPINE FUMARATE 100 MILLIGRAM(S): 200 TABLET, FILM COATED ORAL at 17:39

## 2022-07-29 RX ADMIN — SCOPALAMINE 1 PATCH: 1 PATCH, EXTENDED RELEASE TRANSDERMAL at 05:49

## 2022-07-29 RX ADMIN — Medication 40 MILLIEQUIVALENT(S): at 17:39

## 2022-07-29 RX ADMIN — AMPICILLIN SODIUM AND SULBACTAM SODIUM 200 GRAM(S): 250; 125 INJECTION, POWDER, FOR SUSPENSION INTRAMUSCULAR; INTRAVENOUS at 05:53

## 2022-07-29 RX ADMIN — Medication 1 MILLIGRAM(S): at 05:53

## 2022-07-29 RX ADMIN — CHLORHEXIDINE GLUCONATE 1 APPLICATION(S): 213 SOLUTION TOPICAL at 13:35

## 2022-07-29 RX ADMIN — Medication 40 MILLIEQUIVALENT(S): at 13:35

## 2022-07-29 RX ADMIN — Medication 1 PATCH: at 13:38

## 2022-07-29 RX ADMIN — AMPICILLIN SODIUM AND SULBACTAM SODIUM 200 GRAM(S): 250; 125 INJECTION, POWDER, FOR SUSPENSION INTRAMUSCULAR; INTRAVENOUS at 00:45

## 2022-07-29 RX ADMIN — Medication 1 PATCH: at 05:49

## 2022-07-29 RX ADMIN — Medication 25 GRAM(S): at 10:15

## 2022-07-29 RX ADMIN — PANTOPRAZOLE SODIUM 40 MILLIGRAM(S): 20 TABLET, DELAYED RELEASE ORAL at 13:37

## 2022-07-29 RX ADMIN — POLYMYXIN B SULFATE 500 UNIT(S): 500000 INJECTION, POWDER, LYOPHILIZED, FOR SOLUTION INTRAMUSCULAR; INTRATHECAL; INTRAVENOUS; OPHTHALMIC at 05:53

## 2022-07-29 NOTE — SWALLOW VFSS/MBS ASSESSMENT ADULT - COMMENTS
previous MBS recommended for puree mildly thick, with cuff deflation on vent, patient not tolerating and was able to wean off vent. Tolerate PMSV
trach to vent cuff initially partially deflated, then fully deflated.

## 2022-07-29 NOTE — SWALLOW VFSS/MBS ASSESSMENT ADULT - ORAL PREP COMMENTS
trace to mild impairment MILD IMPAIRMENT moderate impairment negatively impacted by reduced dentition

## 2022-07-29 NOTE — SWALLOW VFSS/MBS ASSESSMENT ADULT - UNSUCCESSFUL STRATEGIES TRIALED DURING PROCEDURE
chin tuck/hard swallow/head turn to the right/head turn to the left
nonproductive volitional cough following clinician cue/productive volitional cough following clinician cue

## 2022-07-29 NOTE — SWALLOW VFSS/MBS ASSESSMENT ADULT - DIAGNOSTIC IMPRESSIONS
mild oral impairment, mild- moderate pharyngeal impairment, impaired clearance in esophageus increased with partial cuff inflation. Patient with reduced laryngeal elevation, reduced tongue base retraction, reduced PES opening consistent with weakness, increased zeenat aspiration noted for thins with timing of vent inspiratory cycle. reduced ability to cough to clear without full cuff deflation.
mild-moderate oral impairment moderate pharyngeal impairment esophageal retention. noted ot have reduced tongue base retraction reduce lingual movement for

## 2022-07-29 NOTE — PROGRESS NOTE ADULT - ASSESSMENT
IMPRESSION:  Acute respiratory failure sp trach  bleeding from trach site resolved   Anemia s/p 1 U PRBC  PNA  MSSA pna  seizure like activity  drug over dose/ withdrawal opoid   liver cirrhosis? due to suspected Hep C  Ascites sp paracentesis SAAG > 1.1  Pancytopenia- worsening  RENETTA improved  C diff +ve     PLAN:    CNS: Continue with Methadone for withdrawal   continue with seroquel and clonazepam;   Clonidine   Check QTc daily.        HEENT: oral care; Trach care     PULMONARY:    repeat cxr today   cpap as tolerated during the day   Pressure support as much as tolerated     CARDIOVASCULAR: Keep MAP more than 65mmhg; not on pressors currently keep equal balance. On oral diuretics.       GI: GI prophylaxis. Feeding per speech and swallow.        RENAL: Correct K ot mo re than 4 and Mg to more than 2.        INFECTIOUS DISEASE: Currently on abx per ID  ID following.     HEMATOLOGICAL:   s/p BMB follow result and cx. Hematology following. Monitor platelet count. Keep hg more than 7.        ENDOCRINE:  Follow up FS.  Insulin protocol if needed.     Musk: bedrest for now.   Dispo: DG to [facility]     No lines  Jaron is out.  social service for d/c  process

## 2022-07-29 NOTE — SWALLOW VFSS/MBS ASSESSMENT ADULT - THE ABOVE FINDINGS WERE DISCUSSED WITH
Physician/Nursing/Patient
Joyce, VERONICA Granados, RRT MD [facility] resident at [id number]/Physician/Nursing/Patient/Family

## 2022-07-29 NOTE — SWALLOW VFSS/MBS ASSESSMENT ADULT - RECOMMENDED FEEDING/EATING TECHNIQUES
allow for swallow between intakes/alternate food with liquid/check mouth frequently for oral residue/pocketing/hard swallow w/ each bite or sip/oral hygiene/provide rest periods between swallows/small sips/bites/tuck chin
allow for swallow between intakes/alternate food with liquid/hard swallow w/ each bite or sip/maintain upright posture during/after eating for 30 mins/oral hygiene/position upright (90 degrees)/provide rest periods between swallows/small sips/bites/tuck chin/turn head left

## 2022-07-29 NOTE — PROGRESS NOTE ADULT - ATTENDING COMMENTS
Attending Statement: I have personally performed a face to face diagnostic evaluation on this patient. The patient is suffering from:  Acute respiratory failure  PNA  seizure like activity  ?? drug over dose / withdrawal opoid   liver cirrhosis  I have made amendments to the documentation where necessary. I have personally seen and examined this patient.  I have fully participated in the care of this patient.  I have reviewed all pertinent clinical information, including history, physical exam, plan and note.
Attending Statement: I have personally performed a face to face diagnostic evaluation on this patient. The patient is suffering from:  Acute respiratory failure sp intubation  PNA  MSSA pna  seizure like activity  ?? drug over dose/ withdrawal opoid   liver cirrhosis   Ascites sp paracentesis   Pancytopenia- worsening  RENETTA improved  C diff +ve   I have made amendments to the documentation where necessary. I have personally seen and examined this patient.  I have fully participated in the care of this patient.  I have reviewed all pertinent clinical information, including history, physical exam, plan and note.
Attending Statement: I have personally performed a face to face diagnostic evaluation on this patient. The patient is suffering from:  Acute respiratory failure sp intubation  PNA  MSSA pna  seizure like activity  ?? drug over dose/ withdrawal opoid   liver cirrhosis   Ascites sp paracentesis   Pancytopenia- worsening  RENETTA improved  C diff +ve   I have made amendments to the documentation where necessary. I have personally seen and examined this patient.  I have fully participated in the care of this patient.  I have reviewed all pertinent clinical information, including history, physical exam, plan and note.
patient seen and examined agree above note   abx as per ID   follow h/h   SDU   social service for d/c plan
patient seen and examined agree above note   can be downgrade to floor   follow social service for d/c plan   abx as per ID   replace K
patient seen and examined agree above note   d/c precedex   clonidine taper   transfuse 1 unit prbc  off heparin   doubt hit   follow hematology   do CPAP trial   abx as per ID
As above. Pt has been stabilized, is afebrile and HD stable and is optimized for tracheostomy. The risks, benefits, alternatives of the procedure were d/w her mother Lisa by myself this afternoon. Plan to proceed.
Attending Statement: I have personally performed a face to face diagnostic evaluation on this patient. The patient is suffering from:  Acute respiratory failure  PNA  seizure like activity  ?? drug over dose / withdrawal opoid   liver cirrhosis  I have made amendments to the documentation where necessary. I have personally seen and examined this patient.  I have fully participated in the care of this patient.  I have reviewed all pertinent clinical information, including history, physical exam, plan and note.
Attending Statement: I have personally performed a face to face diagnostic evaluation on this patient. The patient is suffering from:  Acute respiratory failure  PNA  seizure like activity  drug over dose / withdrawal opoid   liver cirrhosis  Pancytopenia- worsening  RENETTA   I have made amendments to the documentation where necessary. I have personally seen and examined this patient.  I have fully participated in the care of this patient.  I have reviewed all pertinent clinical information, including history, physical exam, plan and note.
Attending Statement: I have personally performed a face to face diagnostic evaluation on this patient. The patient is suffering from:  Acute respiratory failure  PNA  seizure like activity  drug over dose / withdrawal opoid   liver cirrhosis  Pancytopenia- worsening  RENETTA   I have made amendments to the documentation where necessary. I have personally seen and examined this patient.  I have fully participated in the care of this patient.  I have reviewed all pertinent clinical information, including history, physical exam, plan and note.
Attending Statement: I have personally performed a face to face diagnostic evaluation on this patient. The patient is suffering from:  Acute respiratory failure sp intubation  PNA  MSSA pna  seizure like activity  ?? drug over dose/ withdrawal opoid   liver cirrhosis   Ascites sp paracentesis   Pancytopenia- worsening  RENETTA improved  C diff +ve   I have made amendments to the documentation where necessary. I have personally seen and examined this patient.  I have fully participated in the care of this patient.  I have reviewed all pertinent clinical information, including history, physical exam, plan and note.
Attending Statement: I have personally performed a face to face diagnostic evaluation on this patient. The patient is suffering from:  Acute respiratory failure sp intubation  PNA  MSSA pna  seizure like activity  ?? drug over dose/ withdrawal opoid   liver cirrhosis   Ascites sp paracentesis   Pancytopenia- worsening  RENETTA improved  C diff +ve   I have made amendments to the documentation where necessary. I have personally seen and examined this patient.  I have fully participated in the care of this patient.  I have reviewed all pertinent clinical information, including history, physical exam, plan and note.
Patient seen and examined intubated and sedated    Ascites  c/w portal HTN, hepatomegaly splenomegaly   US and CT no nodularity  Platelets normal  Serological workup negative    Plan  Continue current management  Continue lasix/aldactone  Monitor fluid input and output  Liver biopsy at a later date
above noted discussed case with surgical resident and pt trach site explored and deep bleeding controlled with vicryl sutures
patient seen and examined agree above note   start clonidine and taper off precedex   on methadone   keep IS < Os   follow lytes   abx as per ID   REHAB / PT eval
As above. Alert, stable on vent. Following commands. No bleeding at tracheostomy though pt endorses mild pain there. XR reveals good position and aeration bilaterally. Reocmmend speech and swallow eval and PO diet if possible though may need to hold off as based on new trach. Will consider downsizing in next few weeks once tract has formed and pt is able to be tolerate SBT.
As above. In lieu of fevers and HD instability, will need to reschedule tracheostomy for sometime in the near future pending clinical stabilization.
Attending Statement: I have personally performed a face to face diagnostic evaluation on this patient. The patient is suffering from:  Acute respiratory failure  PNA  seizure like activity  ?? drug over dose / withdrawal opoid   liver cirrhosis  I have made amendments to the documentation where necessary. I have personally seen and examined this patient.  I have fully participated in the care of this patient.  I have reviewed all pertinent clinical information, including history, physical exam, plan and note.
Attending Statement: I have personally performed a face to face diagnostic evaluation on this patient. The patient is suffering from:  Acute respiratory failure  PNA  seizure like activity  ?? drug over dose / withdrawal opoid   liver cirrhosis  I have made amendments to the documentation where necessary. I have personally seen and examined this patient.  I have fully participated in the care of this patient.  I have reviewed all pertinent clinical information, including history, physical exam, plan and note.
Attending Statement: I have personally performed a face to face diagnostic evaluation on this patient. The patient is suffering from:  Acute respiratory failure  PNA  seizure like activity  drug over dose / withdrawal opoid   liver cirrhosis  Pancytopenia- worsening  RENETTA   I have made amendments to the documentation where necessary. I have personally seen and examined this patient.  I have fully participated in the care of this patient.  I have reviewed all pertinent clinical information, including history, physical exam, plan and note.
Attending Statement: I have personally performed a face to face diagnostic evaluation on this patient. The patient is suffering from:  Acute respiratory failure  PNA  seizure like activity  ?? drug over dose / withdrawal opoid   liver cirrhosis  I have made amendments to the documentation where necessary. I have personally seen and examined this patient.  I have fully participated in the care of this patient.  I have reviewed all pertinent clinical information, including history, physical exam, plan and note.
Attending Statement: I have personally performed a face to face diagnostic evaluation on this patient. The patient is suffering from:  Acute respiratory failure  PNA  seizure like activity  ?? drug over dose / withdrawal opoid   liver cirrhosis  I have made amendments to the documentation where necessary. I have personally seen and examined this patient.  I have fully participated in the care of this patient.  I have reviewed all pertinent clinical information, including history, physical exam, plan and note.
Attending Statement: I have personally performed a face to face diagnostic evaluation on this patient. The patient is suffering from:  Acute respiratory failure sp intubation  PNA  MSSA pna  seizure like activity  ?? drug over dose/ withdrawal opoid   liver cirrhosis   Ascites sp paracentesis   Pancytopenia- worsening  RENETTA improved  C diff +ve   I have made amendments to the documentation where necessary. I have personally seen and examined this patient.  I have fully participated in the care of this patient.  I have reviewed all pertinent clinical information, including history, physical exam, plan and note.
Attending Statement: I have personally performed a face to face diagnostic evaluation on this patient. The patient is suffering from:  Acute respiratory failure  PNA  seizure like activity  ?? drug over dose / withdrawal opoid   liver cirrhosis  I have made amendments to the documentation where necessary. I have personally seen and examined this patient.  I have fully participated in the care of this patient.  I have reviewed all pertinent clinical information, including history, physical exam, plan and note.
Attending Statement: I have personally performed a face to face diagnostic evaluation on this patient. The patient is suffering from:  Acute respiratory failure  PNA  seizure like activity  ?? drug over dose / withdrawal opoid   liver cirrhosis  I have made amendments to the documentation where necessary. I have personally seen and examined this patient.  I have fully participated in the care of this patient.  I have reviewed all pertinent clinical information, including history, physical exam, plan and note.

## 2022-07-29 NOTE — SWALLOW VFSS/MBS ASSESSMENT ADULT - SLP PERTINENT HISTORY OF CURRENT PROBLEM
Acute respiratory failure with hypoxemia / aspiration pneumonia with sepsis / suspected drug overdose / head laceration s/p fall in lobby / possible seizure / anasarca
Patient is a 26 year old female with hx of asthma, untreated Hep C, IVDA, presenting upon admission 06/15/2022 for anasarca with  increased dyspnea since day prior to admission. Patient has been short of breath chronically and has been admitted for fluid overload. Patient describes worsening symptoms day before admission associated with cough. States generalized edema has remained the same. Patient is actively using heroin. s/p fall seizure with subsequent intubation due to respiratory failure extubated 7/8/2022 then reintubated same day,  s/p trach 7/20/22.

## 2022-07-29 NOTE — SWALLOW VFSS/MBS ASSESSMENT ADULT - PHARYNGEAL PHASE COMMENTS
moderate impairment benefits from liquid wash (moderately thick) moderate impairment note with reduced laryngeal vestibular closure, strategies in effecitve in reducing aspiration, patient with visual feedback only coughed up 25% of aspirated material moderate impairment

## 2022-07-29 NOTE — SWALLOW VFSS/MBS ASSESSMENT ADULT - ESOPHAGEAL STAGE
retention noted in lateral view unable to safely obtain AP view at this time. retention noted  in lateral view unable to safely reposition for AP view at this time. retention noted

## 2022-07-29 NOTE — SWALLOW VFSS/MBS ASSESSMENT ADULT - ORAL PHASE
Delayed oral transit time/Reduced anterior - posterior transport/Residue in oral cavity/Incomplete tongue to palate contact/Uncontrolled bolus / spillover in petra-pharynx/Laryngeal penetration before swallow - silent/Aspiration before swallow - silent Delayed oral transit time/Reduced anterior - posterior transport/Residue in oral cavity/Incomplete tongue to palate contact/Uncontrolled bolus / spillover in petra-pharynx

## 2022-07-29 NOTE — SWALLOW VFSS/MBS ASSESSMENT ADULT - SUCCESSFUL STRATEGIES TRIALED DURING PROCEDURE
chin tuck/hard swallow/head turn to the left WITH VISUAL FEEDBACK (PATIENT LOOKED AT MONITOR)/productive volitional cough following clinician cue

## 2022-07-29 NOTE — PROGRESS NOTE ADULT - TIME BILLING
I have personally seen and examined this patient.    I have reviewed all pertinent clinical information and reviewed all relevant imaging and diagnostic studies personally.   I counseled the patient about diagnostic testing and treatment plan. All questions were answered.   I discussed recommendations with the primary team.
Coordination of care
I have personally seen and examined this patient.    I have reviewed all pertinent clinical information and reviewed all relevant imaging and diagnostic studies personally.   I counseled the patient about diagnostic testing and treatment plan. All questions were answered.   I discussed recommendations with the primary team.
#Progress Note Handoff  Pending (specify):  wean vent, follow up hgb, trend cbc, pulm  Family discussion: house staff updated pt family  Disposition: ICU   Pt is being co-managed by Medicine and Critical care/Pulmonary team. Decisions on ventilator settings/changes, drips and ICU level of care aspects of the case has been discussed and approved  with/by  ICU/Pulmonary attending.
I have personally seen and examined this patient.    I have reviewed all pertinent clinical information and reviewed all relevant imaging and diagnostic studies personally.   I counseled the patient about diagnostic testing and treatment plan. All questions were answered.   I discussed recommendations with the primary team.
Coordination of care
I have personally seen and examined this patient.    I have reviewed all pertinent clinical information and reviewed all relevant imaging and diagnostic studies personally.   I counseled the patient about diagnostic testing and treatment plan. All questions were answered.   I discussed recommendations with the primary team.
Coordination of care
Coordination of care

## 2022-07-29 NOTE — PROGRESS NOTE ADULT - SUBJECTIVE AND OBJECTIVE BOX
CINDYON, POOJA  26y, Female  Allergy: buprenorphine (Rash)  Suboxone (Rash)      LOS  44d    CHIEF COMPLAINT: anasarca (29 Jul 2022 08:07)      INTERVAL EVENTS/HPI  - No acute events overnight  - T(F): , Max: 100.5 (07-28-22 @ 19:58)    - WBC Count: 1.54 (07-29-22 @ 05:09)  WBC Count: 2.28 (07-28-22 @ 05:10)     - Creatinine, Serum: 0.8 (07-29-22 @ 05:09)  Creatinine, Serum: 0.8 (07-28-22 @ 05:10)       ROS  General: Denies rigors, nightsweats  HEENT: Denies headache, rhinorrhea, sore throat, eye pain  CV: Denies CP, palpitations  PULM: Denies wheezing, hemoptysis  GI: Denies hematemesis, hematochezia, melena  : Denies discharge, hematuria  MSK: Denies arthralgias, myalgias  SKIN: Denies rash, lesions  NEURO: Denies paresthesias, weakness  PSYCH: Denies depression, anxiety    VITALS:  T(F): 99, Max: 100.5 (07-28-22 @ 19:58)  HR: 68  BP: 100/65  RR: 20Vital Signs Last 24 Hrs  T(C): 37.2 (29 Jul 2022 07:00), Max: 38.1 (28 Jul 2022 19:58)  T(F): 99 (29 Jul 2022 07:00), Max: 100.5 (28 Jul 2022 19:58)  HR: 68 (29 Jul 2022 07:00) (68 - 117)  BP: 100/65 (29 Jul 2022 07:00) (100/65 - 182/108)  BP(mean): 111 (29 Jul 2022 06:15) (92 - 111)  RR: 20 (29 Jul 2022 07:00) (10 - 30)  SpO2: 100% (29 Jul 2022 06:15) (95% - 100%)    Parameters below as of 29 Jul 2022 06:15  Patient On (Oxygen Delivery Method): ventilator        PHYSICAL EXAM:  Gen: NAD, resting in bed  HEENT: Normocephalic, atraumatic  Neck: supple, no lymphadenopathy  CV: Regular rate & regular rhythm  Lungs: decreased BS at bases, no fremitus  Abdomen: Soft, BS present  Ext: Warm, well perfused  Neuro: non focal, awake  Skin: no rash, no erythema  Lines: no phlebitis    FH: Non-contributory  Social Hx: Non-contributory    TESTS & MEASUREMENTS:                        8.6    1.54  )-----------( 61       ( 29 Jul 2022 05:09 )             27.9     07-29    139  |  99  |  5<L>  ----------------------------<  78  3.1<L>   |  29  |  0.8    Ca    9.2      29 Jul 2022 05:09  Phos  5.1     07-29  Mg     1.4     07-29    TPro  5.5<L>  /  Alb  3.0<L>  /  TBili  0.6  /  DBili  x   /  AST  13  /  ALT  7   /  AlkPhos  238<H>  07-29      LIVER FUNCTIONS - ( 29 Jul 2022 05:09 )  Alb: 3.0 g/dL / Pro: 5.5 g/dL / ALK PHOS: 238 U/L / ALT: 7 U/L / AST: 13 U/L / GGT: x               Culture - Blood (collected 07-25-22 @ 15:34)  Source: .Blood Blood  Preliminary Report (07-27-22 @ 02:02):    No growth to date.    Culture - Sputum (collected 07-15-22 @ 10:00)  Source: Trach Asp Tracheal Aspirate  Gram Stain (07-16-22 @ 04:22):    Moderate polymorphonuclear leukocytes per low power field    Rare Squamous epithelial cells per low power field    No organisms seen per oil power field  Final Report (07-17-22 @ 16:58):    Normal Respiratory Kelly present    Babesia microti PCR, Blood. (collected 07-10-22 @ 15:43)  Source: .Blood None  Final Report (07-11-22 @ 09:45):    Babesia microti PCR    Results: NOT detected    ***************Result Note*************    The detection of Babesia microti by PCR has only been    validated for whole blood; this test has not been approved    by the US Food and Drug Administration (FDA). Performance    characteristics of this assay have been determined by    [employer]. The clinical significance    of results should be considered in conjunction with the    overall clinical presentation of the patient. Result is not    intended to be used as the sole means for clinical diagnosis    or patient management decisions.    One negative sample does not necessarily rule    out the presence of a parasitic infection.    Culture - Blood (collected 07-08-22 @ 05:26)  Source: .Blood None  Final Report (07-13-22 @ 19:00):    No Growth Final    Culture - Blood (collected 07-06-22 @ 07:20)  Source: .Blood None  Final Report (07-11-22 @ 19:00):    No Growth Final    Culture - Blood (collected 07-06-22 @ 07:20)  Source: .Blood None  Final Report (07-11-22 @ 19:00):    No Growth Final    Culture - Sputum (collected 07-04-22 @ 13:39)  Source: ET Tube ET Tube  Gram Stain (07-05-22 @ 08:48):    Few polymorphonuclear leukocytes per low power field    No Squamous epithelial cells per low power field    Numerous Gram Negative Coccobacilli seen per oil power field  Final Report (07-11-22 @ 08:12):    Moderate Acinetobacter baumannii/nosocom group (Carbapenem Resistant)    Cefiderocol interpretations based on FDA breakpoints.    Normal Respiratory Kelly absent  Organism: Acinetobacter baumannii/nosocom group (Carbapenem Resistant)  Acinetobacter baumannii/nosocom group (Carbapenem Resistant) (07-11-22 @ 08:12)  Organism: Acinetobacter baumannii/nosocom group (Carbapenem Resistant) (07-11-22 @ 08:12)      -  Cefiderocol: I      -  Imipenem: R      -  Piperacillin/Tazobactam: R      Method Type: KB  Organism: Acinetobacter baumannii/nosocom group (Carbapenem Resistant) (07-11-22 @ 08:12)      -  Amikacin: R >32      -  Ampicillin/Sulbactam: R >16/8      -  Cefepime: R >16      -  Ceftazidime: R >16      -  Ciprofloxacin: R >2      -  Gentamicin: R >8      -  Levofloxacin: R >4      -  Meropenem: R >8      -  Tobramycin: R >8      -  Trimethoprim/Sulfamethoxazole: R >2/38      Method Type: AL    Culture - Blood (collected 06-29-22 @ 20:31)  Source: .Blood Blood-Peripheral  Final Report (07-05-22 @ 20:00):    No Growth Final    Culture - Blood (collected 06-29-22 @ 20:31)  Source: .Blood Blood  Final Report (07-05-22 @ 20:00):    No Growth Final            INFECTIOUS DISEASES TESTING  Procalcitonin, Serum: 0.11 (07-25-22 @ 15:34)  COVID-19 PCR: NotDetec (07-19-22 @ 19:56)  COVID-19 PCR: NotDetec (07-17-22 @ 20:01)  Procalcitonin, Serum: 0.21 (07-17-22 @ 10:34)  COVID-19 PCR: NotDetec (07-14-22 @ 15:00)  Procalcitonin, Serum: 0.16 (07-14-22 @ 12:50)  Procalcitonin, Serum: 0.12 (07-10-22 @ 05:42)  Fungitell: <31 (07-05-22 @ 11:49)  Procalcitonin, Serum: 0.14 (07-05-22 @ 11:23)  MRSA PCR Result.: Negative (06-30-22 @ 10:34)  Fungitell: See Comment** **RESULTS OF FUNGITELL INCONCLUSIVE DUE TO THE PRESENCE OF UNKNOWN  INTERFERING SUBSTANCE. PLEASE SUBMIT ANOTHER SPECIMEN FOR TESTING.  PLEASE CONTACT [employer] WITH QUESTIONS.  Interpretation: The Fungitell assay does not detectcertain fungal  species such as the genus Cryptococcus (Aundrea et al. 1991) which  produces very low levels of (1-3)-Beta-D-Glucan. The assay also does  not detect the Zygomycetes such as Absidia, Mucor and Rhizopus  (Carol et al. 1994) which are not known to produce  (1-3)-Beta-D-Glucan. In addition, the yeast phase of Blastomyces  dermatitidis produces little (1-3)-Beta-D-Glucan and may not be  detected by the assay (Anila et al. 2007).  Reference Range:  Less than 60 pg/mL. Glucan values of less than 60 pg/mL are  interpreted as negative.  Glucan values of 60 to 79 pg/mL are interpreted as indeterminate,  and suggest a possible fungal infection. Additional sampling and  testing of sera is required to interpret the results.  Glucan values of greater than or equal to 80 pg/mL are interpreted  as positive.  Due to the potential for environmental contamination when  transferred to pour-off tubes, which can lead to false positive  results, interpret positive results from samples provided in  pour-off tubes with caution.  Results should be used in conjunction  with clinical findings, and should not form the sole basis for a  diagnosis or treatment decision. The Fungitell test is approved or  cleared for in vitro diagnostic use by the U.[place] Food and Drug  Administration. Modifications to the approved package insert have  been made and the performance characteristics for these  modifications were determined by [employer].  If sample result is greater than 500 pg/mL, physician may order a  titer of the sample. Please contact [employer] if you would  like to order a retest of this sample to obtain an actual value.  Samples are held for 1 week after initial testing date.  ____________________________________________________________  Performed at:  [facility]  [address] [place] [address]  : Kirk Newberry Ph.D., BCLD (ABB)  CLIA#: [id number]  Phone: [phone] (06-30-22 @ 10:30)  Procalcitonin, Serum: 0.36 (06-30-22 @ 10:30)  Rapid RVP Result: NotDetec (06-30-22 @ 09:19)  HIV-1/2 Combo Result: Nonreact (06-29-22 @ 10:29)  Procalcitonin, Serum: 0.11 (06-27-22 @ 15:46)  Procalcitonin, Serum: 0.11 (06-27-22 @ 06:47)  Procalcitonin, Serum: 0.62 (06-20-22 @ 05:49)  MRSA PCR Result.: Negative (06-17-22 @ 14:30)  Procalcitonin, Serum: 3.28 (06-17-22 @ 05:22)  COVID-19 PCR: NotDetec (06-15-22 @ 07:10)  Procalcitonin, Serum: 0.07 (04-20-22 @ 12:46)      INFLAMMATORY MARKERS  Sedimentation Rate, Erythrocyte: 86 mm/Hr (06-30-22 @ 05:46)  Sedimentation Rate, Erythrocyte: 65 mm/Hr (06-23-22 @ 16:00)      RADIOLOGY & ADDITIONAL TESTS:  I have personally reviewed the last available Chest xray  CXR      CT      CARDIOLOGY TESTING  12 Lead ECG:   Ventricular Rate 97 BPM    Atrial Rate 97 BPM    P-R Interval 170 ms    QRS Duration 74 ms    Q-T Interval 368 ms    QTC Calculation(Bazett) 467 ms    P Axis 71 degrees    R Axis 122 degrees    T Axis 168 degrees    Diagnosis Line Normal sinus rhythm  Right axis deviation  Possible Right ventricular hypertrophy  Nonspecific ST and T wave abnormality  Abnormal ECG    Confirmed by BRANDON BELL MD ([id number]) on 7/27/2022 11:42:21 AM (07-27-22 @ 10:32)  12 Lead ECG:   Ventricular Rate 87 BPM    Atrial Rate 87 BPM    P-R Interval 180 ms    QRS Duration 78 ms    Q-T Interval 396 ms    QTC Calculation(Bazett) 476 ms    P Axis 76 degrees    R Axis 110 degrees    T Axis 172 degrees    Diagnosis Line Normal sinus rhythm  Right axis deviation  Possible Right ventricular hypertrophy  Nonspecific ST and T wave abnormality  Abnormal ECG    Confirmed by BRANDON BELL MD ([id number]) on 7/26/2022 9:25:52 AM (07-26-22 @ 08:27)      MEDICATIONS  ampicillin/sulbactam  IVPB 3 IV Intermittent every 6 hours  BACItracin   Ointment 1 Topical two times a day  chlorhexidine 0.12% Liquid 15 Oral Mucosa every 12 hours  chlorhexidine 2% Cloths 1 Topical daily  clonazePAM  Tablet 1 Oral every 8 hours  cloNIDine 0.1 Oral daily  enoxaparin Injectable 40 SubCutaneous every 24 hours  folic acid 1 Oral daily  magnesium sulfate  IVPB 2 IV Intermittent every 2 hours  methadone    Tablet 20 Oral daily  nicotine -  14 mG/24Hr(s) Patch 1 Transdermal daily  pantoprazole   Suspension 40 Oral daily  polymyxin B IVPB [id number] IV Intermittent every 12 hours  potassium chloride   Powder 40 Oral every 4 hours  QUEtiapine 100 Oral two times a day  scopolamine 1 mG/72 Hr(s) Patch 1 Transdermal every 72 hours  spironolactone 100 Oral daily  thiamine 100 Oral daily  vancomycin    Solution 125 Oral every 12 hours      WEIGHT  Weight (kg): 70.4 (07-20-22 @ 16:41)  Creatinine, Serum: 0.8 mg/dL (07-29-22 @ 05:09)      ANTIBIOTICS:  ampicillin/sulbactam  IVPB 3 Gram(s) IV Intermittent every 6 hours  polymyxin B IVPB [id number] Unit(s) IV Intermittent every 12 hours  vancomycin    Solution 125 milliGRAM(s) Oral every 12 hours      All available historical records have been reviewed       CINDYON, POOJA  26y, Female  Allergy: buprenorphine (Rash)  Suboxone (Rash)      LOS  44d    CHIEF COMPLAINT: anasarca (29 Jul 2022 08:07)      INTERVAL EVENTS/HPI  - No acute events overnight  - T(F): , Max: 100.5 (07-28-22 @ 19:58)  - low grade fever, vent settings stable - denies any neck pain, abdominal pain   - WBC Count: 1.54 (07-29-22 @ 05:09)  WBC Count: 2.28 (07-28-22 @ 05:10)     - Creatinine, Serum: 0.8 (07-29-22 @ 05:09)  Creatinine, Serum: 0.8 (07-28-22 @ 05:10)       ROS  General: Denies rigors, nightsweats  HEENT: Denies headache, rhinorrhea, sore throat, eye pain  CV: Denies CP, palpitations  PULM: Denies wheezing, hemoptysis  GI: Denies hematemesis, hematochezia, melena  : Denies discharge, hematuria  MSK: Denies arthralgias, myalgias  SKIN: Denies rash, lesions  NEURO: Denies paresthesias, weakness  PSYCH: Denies depression, anxiety    VITALS:  T(F): 99, Max: 100.5 (07-28-22 @ 19:58)  HR: 68  BP: 100/65  RR: 20Vital Signs Last 24 Hrs  T(C): 37.2 (29 Jul 2022 07:00), Max: 38.1 (28 Jul 2022 19:58)  T(F): 99 (29 Jul 2022 07:00), Max: 100.5 (28 Jul 2022 19:58)  HR: 68 (29 Jul 2022 07:00) (68 - 117)  BP: 100/65 (29 Jul 2022 07:00) (100/65 - 182/108)  BP(mean): 111 (29 Jul 2022 06:15) (92 - 111)  RR: 20 (29 Jul 2022 07:00) (10 - 30)  SpO2: 100% (29 Jul 2022 06:15) (95% - 100%)    Parameters below as of 29 Jul 2022 06:15  Patient On (Oxygen Delivery Method): ventilator        PHYSICAL EXAM:  Gen: NAD, resting in bed  HEENT: Normocephalic, atraumatic  Neck: supple, no lymphadenopathy  CV: Regular rate & regular rhythm  Lungs: decreased BS at bases, no fremitus  Abdomen: Soft, BS present  Ext: Warm, well perfused  Neuro: non focal, awake  Skin: no rash, no erythema  Lines: no phlebitis    FH: Non-contributory  Social Hx: Non-contributory    TESTS & MEASUREMENTS:                        8.6    1.54  )-----------( 61       ( 29 Jul 2022 05:09 )             27.9     07-29    139  |  99  |  5<L>  ----------------------------<  78  3.1<L>   |  29  |  0.8    Ca    9.2      29 Jul 2022 05:09  Phos  5.1     07-29  Mg     1.4     07-29    TPro  5.5<L>  /  Alb  3.0<L>  /  TBili  0.6  /  DBili  x   /  AST  13  /  ALT  7   /  AlkPhos  238<H>  07-29      LIVER FUNCTIONS - ( 29 Jul 2022 05:09 )  Alb: 3.0 g/dL / Pro: 5.5 g/dL / ALK PHOS: 238 U/L / ALT: 7 U/L / AST: 13 U/L / GGT: x               Culture - Blood (collected 07-25-22 @ 15:34)  Source: .Blood Blood  Preliminary Report (07-27-22 @ 02:02):    No growth to date.    Culture - Sputum (collected 07-15-22 @ 10:00)  Source: Trach Asp Tracheal Aspirate  Gram Stain (07-16-22 @ 04:22):    Moderate polymorphonuclear leukocytes per low power field    Rare Squamous epithelial cells per low power field    No organisms seen per oil power field  Final Report (07-17-22 @ 16:58):    Normal Respiratory Kelly present    Babesia microti PCR, Blood. (collected 07-10-22 @ 15:43)  Source: .Blood None  Final Report (07-11-22 @ 09:45):    Babesia microti PCR    Results: NOT detected    ***************Result Note*************    The detection of Babesia microti by PCR has only been    validated for whole blood; this test has not been approved    by the US Food and Drug Administration (FDA). Performance    characteristics of this assay have been determined by    [facility]. The clinical significance    of results should be considered in conjunction with the    overall clinical presentation of the patient. Result is not    intended to be used as the sole means for clinical diagnosis    or patient management decisions.    One negative sample does not necessarily rule    out the presence of a parasitic infection.    Culture - Blood (collected 07-08-22 @ 05:26)  Source: .Blood None  Final Report (07-13-22 @ 19:00):    No Growth Final    Culture - Blood (collected 07-06-22 @ 07:20)  Source: .Blood None  Final Report (07-11-22 @ 19:00):    No Growth Final    Culture - Blood (collected 07-06-22 @ 07:20)  Source: .Blood None  Final Report (07-11-22 @ 19:00):    No Growth Final    Culture - Sputum (collected 07-04-22 @ 13:39)  Source: ET Tube ET Tube  Gram Stain (07-05-22 @ 08:48):    Few polymorphonuclear leukocytes per low power field    No Squamous epithelial cells per low power field    Numerous Gram Negative Coccobacilli seen per oil power field  Final Report (07-11-22 @ 08:12):    Moderate Acinetobacter baumannii/nosocom group (Carbapenem Resistant)    Cefiderocol interpretations based on FDA breakpoints.    Normal Respiratory Kelly absent  Organism: Acinetobacter baumannii/nosocom group (Carbapenem Resistant)  Acinetobacter baumannii/nosocom group (Carbapenem Resistant) (07-11-22 @ 08:12)  Organism: Acinetobacter baumannii/nosocom group (Carbapenem Resistant) (07-11-22 @ 08:12)      -  Cefiderocol: I      -  Imipenem: R      -  Piperacillin/Tazobactam: R      Method Type: KB  Organism: Acinetobacter baumannii/nosocom group (Carbapenem Resistant) (07-11-22 @ 08:12)      -  Amikacin: R >32      -  Ampicillin/Sulbactam: R >16/8      -  Cefepime: R >16      -  Ceftazidime: R >16      -  Ciprofloxacin: R >2      -  Gentamicin: R >8      -  Levofloxacin: R >4      -  Meropenem: R >8      -  Tobramycin: R >8      -  Trimethoprim/Sulfamethoxazole: R >2/38      Method Type: AL    Culture - Blood (collected 06-29-22 @ 20:31)  Source: .Blood Blood-Peripheral  Final Report (07-05-22 @ 20:00):    No Growth Final    Culture - Blood (collected 06-29-22 @ 20:31)  Source: .Blood Blood  Final Report (07-05-22 @ 20:00):    No Growth Final            INFECTIOUS DISEASES TESTING  Procalcitonin, Serum: 0.11 (07-25-22 @ 15:34)  COVID-19 PCR: NotDetec (07-19-22 @ 19:56)  COVID-19 PCR: NotDetec (07-17-22 @ 20:01)  Procalcitonin, Serum: 0.21 (07-17-22 @ 10:34)  COVID-19 PCR: NotDetec (07-14-22 @ 15:00)  Procalcitonin, Serum: 0.16 (07-14-22 @ 12:50)  Procalcitonin, Serum: 0.12 (07-10-22 @ 05:42)  Fungitell: <31 (07-05-22 @ 11:49)  Procalcitonin, Serum: 0.14 (07-05-22 @ 11:23)  MRSA PCR Result.: Negative (06-30-22 @ 10:34)  Fungitell: See Comment** **RESULTS OF FUNGITELL INCONCLUSIVE DUE TO THE PRESENCE OF UNKNOWN  INTERFERING SUBSTANCE. PLEASE SUBMIT ANOTHER SPECIMEN FOR TESTING.  PLEASE CONTACT [employer] WITH QUESTIONS.  Interpretation: The Fungitell assay does not detectcertain fungal  species such as the genus Cryptococcus (Aundrea et al. 1991) which  produces very low levels of (1-3)-Beta-D-Glucan. The assay also does  not detect the Zygomycetes such as Absidia, Mucor and Rhizopus  (Carol et al. 1994) which are not known to produce  (1-3)-Beta-D-Glucan. In addition, the yeast phase of Blastomyces  dermatitidis produces little (1-3)-Beta-D-Glucan and may not be  detected by the assay (Anila et al. 2007).  Reference Range:  Less than 60 pg/mL. Glucan values of less than 60 pg/mL are  interpreted as negative.  Glucan values of 60 to 79 pg/mL are interpreted as indeterminate,  and suggest a possible fungal infection. Additional sampling and  testing of sera is required to interpret the results.  Glucan values of greater than or equal to 80 pg/mL are interpreted  as positive.  Due to the potential for environmental contamination when  transferred to pour-off tubes, which can lead to false positive  results, interpret positive results from samples provided in  pour-off tubes with caution.  Results should be used in conjunction  with clinical findings, and should not form the sole basis for a  diagnosis or treatment decision. The Fungitell test is approved or  cleared for in vitro diagnostic use by the U.[place] Food and Drug  Administration. Modifications to the approved package insert have  been made and the performance characteristics for these  modifications were determined by [employer].  If sample result is greater than 500 pg/mL, physician may order a  titer of the sample. Please contact [employer] if you would  like to order a retest of this sample to obtain an actual value.  Samples are held for 1 week after initial testing date.  ____________________________________________________________  Performed at:  [facility]  [address] [place] [address]  : Kirk Newberry Ph.D., BCLD (ABB)  CLIA#: [id number]  Phone: [phone] (06-30-22 @ 10:30)  Procalcitonin, Serum: 0.36 (06-30-22 @ 10:30)  Rapid RVP Result: NotDetec (06-30-22 @ 09:19)  HIV-1/2 Combo Result: Nonreact (06-29-22 @ 10:29)  Procalcitonin, Serum: 0.11 (06-27-22 @ 15:46)  Procalcitonin, Serum: 0.11 (06-27-22 @ 06:47)  Procalcitonin, Serum: 0.62 (06-20-22 @ 05:49)  MRSA PCR Result.: Negative (06-17-22 @ 14:30)  Procalcitonin, Serum: 3.28 (06-17-22 @ 05:22)  COVID-19 PCR: NotDetec (06-15-22 @ 07:10)  Procalcitonin, Serum: 0.07 (04-20-22 @ 12:46)      INFLAMMATORY MARKERS  Sedimentation Rate, Erythrocyte: 86 mm/Hr (06-30-22 @ 05:46)  Sedimentation Rate, Erythrocyte: 65 mm/Hr (06-23-22 @ 16:00)      RADIOLOGY & ADDITIONAL TESTS:  I have personally reviewed the last available Chest xray  CXR      CT      CARDIOLOGY TESTING  12 Lead ECG:   Ventricular Rate 97 BPM    Atrial Rate 97 BPM    P-R Interval 170 ms    QRS Duration 74 ms    Q-T Interval 368 ms    QTC Calculation(Bazett) 467 ms    P Axis 71 degrees    R Axis 122 degrees    T Axis 168 degrees    Diagnosis Line Normal sinus rhythm  Right axis deviation  Possible Right ventricular hypertrophy  Nonspecific ST and T wave abnormality  Abnormal ECG    Confirmed by BRANDON BELL MD ([id number]) on 7/27/2022 11:42:21 AM (07-27-22 @ 10:32)  12 Lead ECG:   Ventricular Rate 87 BPM    Atrial Rate 87 BPM    P-R Interval 180 ms    QRS Duration 78 ms    Q-T Interval 396 ms    QTC Calculation(Bazett) 476 ms    P Axis 76 degrees    R Axis 110 degrees    T Axis 172 degrees    Diagnosis Line Normal sinus rhythm  Right axis deviation  Possible Right ventricular hypertrophy  Nonspecific ST and T wave abnormality  Abnormal ECG    Confirmed by BRANDON BELL MD ([id number]) on 7/26/2022 9:25:52 AM (07-26-22 @ 08:27)      MEDICATIONS  ampicillin/sulbactam  IVPB 3 IV Intermittent every 6 hours  BACItracin   Ointment 1 Topical two times a day  chlorhexidine 0.12% Liquid 15 Oral Mucosa every 12 hours  chlorhexidine 2% Cloths 1 Topical daily  clonazePAM  Tablet 1 Oral every 8 hours  cloNIDine 0.1 Oral daily  enoxaparin Injectable 40 SubCutaneous every 24 hours  folic acid 1 Oral daily  magnesium sulfate  IVPB 2 IV Intermittent every 2 hours  methadone    Tablet 20 Oral daily  nicotine -  14 mG/24Hr(s) Patch 1 Transdermal daily  pantoprazole   Suspension 40 Oral daily  polymyxin B IVPB [id number] IV Intermittent every 12 hours  potassium chloride   Powder 40 Oral every 4 hours  QUEtiapine 100 Oral two times a day  scopolamine 1 mG/72 Hr(s) Patch 1 Transdermal every 72 hours  spironolactone 100 Oral daily  thiamine 100 Oral daily  vancomycin    Solution 125 Oral every 12 hours      WEIGHT  Weight (kg): 70.4 (07-20-22 @ 16:41)  Creatinine, Serum: 0.8 mg/dL (07-29-22 @ 05:09)      ANTIBIOTICS:  ampicillin/sulbactam  IVPB 3 Gram(s) IV Intermittent every 6 hours  polymyxin B IVPB [id number] Unit(s) IV Intermittent every 12 hours  vancomycin    Solution 125 milliGRAM(s) Oral every 12 hours      All available historical records have been reviewed

## 2022-07-29 NOTE — PROGRESS NOTE ADULT - SUBJECTIVE AND OBJECTIVE BOX
Patient seen and evaluated thi am awake on ventilator       T(F): 99 (07-29-22 @ 07:00), Max: 100.5 (07-28-22 @ 19:58)  HR: 68 (07-29-22 @ 07:00)  BP: 100/65 (07-29-22 @ 07:00)  RR: 20 (07-29-22 @ 07:00)  SpO2: 97% (07-29-22 @ 07:00) (95% - 100%)    PHYSICAL EXAM:  GENERAL: NAD  HEAD:  Atraumatic, Normocephalic  EYES: EOMI, PERRLA, conjunctiva and sclera clear  NERVOUS SYSTEM:  no focal deficits   CHEST/LUNG:  bilateral rhonchi  HEART: Regular rate and rhythm; No murmurs, rubs, or gallops  ABDOMEN: Soft, Nontender, Nondistended; Bowel sounds present  EXTREMITIES:  2+ Peripheral Pulses, No clubbing, cyanosis, or edema    LABS  07-29    139  |  99  |  5<L>  ----------------------------<  78  3.1<L>   |  29  |  0.8    Ca    9.2      29 Jul 2022 05:09  Phos  5.1     07-29  Mg     1.4     07-29    TPro  5.5<L>  /  Alb  3.0<L>  /  TBili  0.6  /  DBili  x   /  AST  13  /  ALT  7   /  AlkPhos  238<H>  07-29                          8.6    1.54  )-----------( 61       ( 29 Jul 2022 05:09 )             27.9       Mode: AC/ CMV (Assist Control/ Continuous Mandatory Ventilation)  RR (machine): 20  TV (machine): 370  FiO2: 35  PEEP: 5    Culture Results:   No growth to date. (07-25-22)  Culture Results:   Normal Respiratory Kelly present (07-15-22)  Culture Results:   Babesia microti PCR  Results: NOT detected  ***************Result Note*************  The detection of Babesia microti by PCR has only been  validated for whole blood; this test has not been approved  by the US Food and Drug Administration (FDA). Performance  characteristics of this assay have been determined by  [facility] [employer]. The clinical significance  of results should be considered in conjunction with the  overall clinical presentation of the patient. Result is not  intended to be used as the sole means for clinical diagnosis  or patient management decisions.  One negative sample does not necessarily rule  out the presence of a parasitic infection. (07-10-22)  Culture Results:   No Growth Final (07-08-22)  Culture Results:   No Growth Final (07-06-22)  Culture Results:   No Growth Final (07-06-22)  Culture Results:   Moderate Acinetobacter baumannii/nosocom group (Carbapenem Resistant)  Cefiderocol interpretations based on FDA breakpoints.  Normal Respiratory Kelly absent (07-04-22)  Culture Results:   No Growth Final (06-29-22)  Culture Results:   No Growth Final (06-29-22)    RADIOLOGY  Acute respiratory failure with hypoxemia / aspiration pneumonia with sepsis / suspected drug overdose / head laceration s/p fall in lobby / possible seizure / anasarca  pancytopenia     MEDICATIONS  (STANDING):  BACItracin   Ointment 1 Application(s) Topical two times a day  chlorhexidine 0.12% Liquid 15 milliLiter(s) Oral Mucosa every 12 hours  chlorhexidine 2% Cloths 1 Application(s) Topical daily  clonazePAM  Tablet 1 milliGRAM(s) Oral every 8 hours  cloNIDine 0.1 milliGRAM(s) Oral daily  enoxaparin Injectable 40 milliGRAM(s) SubCutaneous every 24 hours  folic acid 1 milliGRAM(s) Oral daily  magnesium sulfate  IVPB 2 Gram(s) IV Intermittent every 2 hours  methadone    Tablet 20 milliGRAM(s) Oral daily  nicotine -  14 mG/24Hr(s) Patch 1 patch Transdermal daily  pantoprazole   Suspension 40 milliGRAM(s) Oral daily  potassium chloride   Powder 40 milliEquivalent(s) Oral every 4 hours  QUEtiapine 100 milliGRAM(s) Oral two times a day  scopolamine 1 mG/72 Hr(s) Patch 1 Patch Transdermal every 72 hours  spironolactone 100 milliGRAM(s) Oral daily  thiamine 100 milliGRAM(s) Oral daily  vancomycin    Solution 125 milliGRAM(s) Oral every 12 hours    MEDICATIONS  (PRN):  acetaminophen     Tablet .. 650 milliGRAM(s) Oral every 6 hours PRN Temp greater or equal to 38.5C (101.3F), Moderate Pain (4 - 6)  ALBUTerol    90 MICROgram(s) HFA Inhaler 1 Puff(s) Inhalation every 4 hours PRN Shortness of Breath and/or Wheezing  cloNIDine 0.1 milliGRAM(s) Oral every 6 hours PRN opiate withdrawal  hydrOXYzine hydrochloride 50 milliGRAM(s) Oral every 6 hours PRN Anxiety  sodium chloride 0.9% lock flush 10 milliLiter(s) IV Push every 1 hour PRN Pre/post blood products, medications, blood draw, and to maintain line patency

## 2022-07-29 NOTE — PROGRESS NOTE ADULT - ASSESSMENT
Patient is a 26 year old female with hx of asthma, untreated Hep C, IVDA, anasarca presenting with increased dyspnea since yesterday    IMPRESSION  #Acute respiratory failure s/p intubation 6/16, extubated 7/7, reintubated     #VAP  - Xray Chest 1 View- PORTABLE-Routine (Xray Chest 1 View- PORTABLE-Routine in AM.) (07.03.22 @ 06:10): Increasing right basilar opacity.  - sputum Cx 7/4 MDR Acinetobacter baumanii     # C.difficille infection - 7/5/22    #Pneumonia -   - CT Chest 6/22 - left greater than right lower lobe consolidations   - sputum Cx 6/24 Klebsiella oxytoca, Enterobacter cloacae complex- The Sputum culture from 6/27 grew Numerous Mixed gram negative rods and Moderate Staphylococcus aureus.  - treated with course of cefepime     #FUO + Pancytopenia + Splenomegaly   - Ferritin, Serum: 94 ng/mL (06.27.22 @ 06:47),  Procalcitonin, Serum: 0.11 (06.27.22 @ 15:46)  - CT Abd/pelvis 6/26 - moderated ascites moderate pericardial effusion   - EBV seroligies consistent with old infection   - CMV IgG +, IgM - (07.10.22 @ 15:43)  - bartonella-ab negative   - Hepatosplenomegaly - present since CT Abd/pelvis 1/30/2021  - HIV-1/2 Combo Result: Nonreact:  (06.29.22 @ 10:29)  - Sedimentation Rate, Erythrocyte: 86 mm/Hr (06.30.22 @ 05:46)  - Autoimmune panel negative   - Quantiferon TB Plus: NEGATIVE (06.04.20 @ 10:28)  - Quantiferon TB Plus: INDETERMINATE. (02.06.21 @ 09:00)  - s/p Bone marrow biopsy 7/14 - Flow negative       #Drug overdose vs withdrawal?  #Hx of Untreated Hep C   #IVDA   #Abx allergy: buprenorphine (Rash), Suboxone (Rash)    Recommendations  This is a preliminary incomplete pended note, all final recommendations to follow after interview and examination of the patient.    Please call or message on [employer] Teams if with any questions.  Spectra [phone]     Patient is a 26 year old female with hx of asthma, untreated Hep C, IVDA, anasarca presenting with increased dyspnea since yesterday    IMPRESSION  #Acute respiratory failure s/p intubation 6/16, extubated 7/7, reintubated     #VAP  - Xray Chest 1 View- PORTABLE-Routine (Xray Chest 1 View- PORTABLE-Routine in AM.) (07.03.22 @ 06:10): Increasing right basilar opacity.  - sputum Cx 7/4 MDR Acinetobacter baumanii     # C.difficille infection - 7/5/22    #Pneumonia -   - CT Chest 6/22 - left greater than right lower lobe consolidations   - sputum Cx 6/24 Klebsiella oxytoca, Enterobacter cloacae complex- The Sputum culture from 6/27 grew Numerous Mixed gram negative rods and Moderate Staphylococcus aureus.  - treated with course of cefepime     #FUO + Pancytopenia + Splenomegaly   - Ferritin, Serum: 94 ng/mL (06.27.22 @ 06:47),  Procalcitonin, Serum: 0.11 (06.27.22 @ 15:46)  - CT Abd/pelvis 6/26 - moderated ascites moderate pericardial effusion   - EBV seroligies consistent with old infection   - CMV IgG +, IgM - (07.10.22 @ 15:43)  - bartonella-ab negative   - Hepatosplenomegaly - present since CT Abd/pelvis 1/30/2021  - HIV-1/2 Combo Result: Nonreact:  (06.29.22 @ 10:29)  - Sedimentation Rate, Erythrocyte: 86 mm/Hr (06.30.22 @ 05:46)  - Autoimmune panel negative   - Quantiferon TB Plus: NEGATIVE (06.04.20 @ 10:28)  - Quantiferon TB Plus: INDETERMINATE. (02.06.21 @ 09:00)  - s/p Bone marrow biopsy 7/14 - Flow negative       #Drug overdose vs withdrawal?  #Hx of Untreated Hep C   #IVDA   #Abx allergy: buprenorphine (Rash), Suboxone (Rash)    Recommendations  - can stop unasyn and polymixin after today's dose   - monitor off antibiotics   - trend fever curve -- will stop polymixin incase causing drug fever  - continue PO vancomycin until 8/3    Please call or message on [employer] Teams if with any questions.  Spectra [phone]

## 2022-07-29 NOTE — PROGRESS NOTE ADULT - ATTENDING SUPERVISION STATEMENT
Fellow
Resident
Fellow
Resident
Resident
Fellow
Resident
Fellow

## 2022-07-29 NOTE — SWALLOW VFSS/MBS ASSESSMENT ADULT - RECOMMENDED CONSISTENCY
puree consistency, moderately thick liquids. left head turn chin tuck multiple swallows WITH PMSV IN PLACE AND CUFF DEFLATED OFF VENT
puree consistency mildly thick liquids.

## 2022-07-29 NOTE — PROGRESS NOTE ADULT - SUBJECTIVE AND OBJECTIVE BOX
Patient is a 26y old  Female who presents with a chief complaint of anasarca (28 Jul 2022 15:40)        Over Night Events:events noted , on Trach Collar. on no IV sedation         ROS:     All ROS are negative except HPI         PHYSICAL EXAM    ICU Vital Signs Last 24 Hrs  T(C): 37.2 (29 Jul 2022 07:00), Max: 38.1 (28 Jul 2022 19:58)  T(F): 99 (29 Jul 2022 07:00), Max: 100.5 (28 Jul 2022 19:58)  HR: 68 (29 Jul 2022 07:00) (68 - 117)  BP: 100/65 (29 Jul 2022 07:00) (100/65 - 182/108)  BP(mean): 111 (29 Jul 2022 06:15) (92 - 111)  ABP: --  ABP(mean): --  RR: 20 (29 Jul 2022 07:00) (10 - 30)  SpO2: 100% (29 Jul 2022 06:15) (95% - 100%)    O2 Parameters below as of 29 Jul 2022 06:15  Patient On (Oxygen Delivery Method): ventilator            CONSTITUTIONAL:  NAD  ill appearing     ENT:   Airway patent,   Mouth with normal mucosa.   Trach  COLLAR       CARDIAC:   Normal rate,   Regular rhythm.    No edema           RESPIRATORY:   No wheezing  Bilateral BS  Normal chest expansion  Not tachypneic,  No use of accessory muscles    GASTROINTESTINAL:  Abdomen soft,   Non-tender,   No guarding,   + BS    MUSCULOSKELETAL:   Range of motion is not limited,  No clubbing, cyanosis    NEUROLOGICAL:   Alert and oriented   No motor  deficits.    SKIN:   Skin normal color for race,   Warm and dry and intact.   No evidence of rash.         07-28-22 @ 07:01  -  07-29-22 @ 07:00  --------------------------------------------------------  IN:    IV PiggyBack: 500 mL    IV PiggyBack: 100 mL    Oral Fluid: 120 mL  Total IN: 720 mL    OUT:    Voided (mL): 5050 mL  Total OUT: 5050 mL    Total NET: -4330 mL          LABS:                            8.6    1.54  )-----------( 61       ( 29 Jul 2022 05:09 )             27.9                                               07-29    139  |  99  |  5<L>  ----------------------------<  78  3.1<L>   |  29  |  0.8    Ca    9.2      29 Jul 2022 05:09  Phos  5.1     07-29  Mg     1.4     07-29    TPro  5.5<L>  /  Alb  3.0<L>  /  TBili  0.6  /  DBili  x   /  AST  13  /  ALT  7   /  AlkPhos  238<H>  07-29                                                                                           LIVER FUNCTIONS - ( 29 Jul 2022 05:09 )  Alb: 3.0 g/dL / Pro: 5.5 g/dL / ALK PHOS: 238 U/L / ALT: 7 U/L / AST: 13 U/L / GGT: x                                                                                               Mode: AC/ CMV (Assist Control/ Continuous Mandatory Ventilation)  RR (machine): 20  TV (machine): 370  FiO2: 35  PEEP: 5  ITime: 0.8  MAP: 8  PIP: 10                                          MEDICATIONS  (STANDING):  ampicillin/sulbactam  IVPB 3 Gram(s) IV Intermittent every 6 hours  BACItracin   Ointment 1 Application(s) Topical two times a day  chlorhexidine 0.12% Liquid 15 milliLiter(s) Oral Mucosa every 12 hours  chlorhexidine 2% Cloths 1 Application(s) Topical daily  clonazePAM  Tablet 1 milliGRAM(s) Oral every 8 hours  cloNIDine 0.1 milliGRAM(s) Oral daily  enoxaparin Injectable 40 milliGRAM(s) SubCutaneous every 24 hours  folic acid 1 milliGRAM(s) Oral daily  methadone    Tablet 20 milliGRAM(s) Oral daily  nicotine -  14 mG/24Hr(s) Patch 1 patch Transdermal daily  pantoprazole   Suspension 40 milliGRAM(s) Oral daily  polymyxin B IVPB 109592 Unit(s) IV Intermittent every 12 hours  QUEtiapine 100 milliGRAM(s) Oral two times a day  scopolamine 1 mG/72 Hr(s) Patch 1 Patch Transdermal every 72 hours  spironolactone 100 milliGRAM(s) Oral daily  thiamine 100 milliGRAM(s) Oral daily  vancomycin    Solution 125 milliGRAM(s) Oral every 12 hours    MEDICATIONS  (PRN):  acetaminophen     Tablet .. 650 milliGRAM(s) Oral every 6 hours PRN Temp greater or equal to 38.5C (101.3F), Moderate Pain (4 - 6)  ALBUTerol    90 MICROgram(s) HFA Inhaler 1 Puff(s) Inhalation every 4 hours PRN Shortness of Breath and/or Wheezing  cloNIDine 0.1 milliGRAM(s) Oral every 6 hours PRN opiate withdrawal  hydrOXYzine hydrochloride 50 milliGRAM(s) Oral every 6 hours PRN Anxiety  sodium chloride 0.9% lock flush 10 milliLiter(s) IV Push every 1 hour PRN Pre/post blood products, medications, blood draw, and to maintain line patency           CXR interpreted by me:  VICKI?L [email or web address]

## 2022-07-30 LAB
ALBUMIN SERPL ELPH-MCNC: 3.1 G/DL — LOW (ref 3.5–5.2)
ALP SERPL-CCNC: 245 U/L — HIGH (ref 30–115)
ALT FLD-CCNC: 7 U/L — SIGNIFICANT CHANGE UP (ref 0–41)
ANION GAP SERPL CALC-SCNC: 11 MMOL/L — SIGNIFICANT CHANGE UP (ref 7–14)
ANISOCYTOSIS BLD QL: SLIGHT — SIGNIFICANT CHANGE UP
AST SERPL-CCNC: 14 U/L — SIGNIFICANT CHANGE UP (ref 0–41)
BASOPHILS # BLD AUTO: 0 K/UL — SIGNIFICANT CHANGE UP (ref 0–0.2)
BASOPHILS NFR BLD AUTO: 0 % — SIGNIFICANT CHANGE UP (ref 0–1)
BILIRUB SERPL-MCNC: 0.5 MG/DL — SIGNIFICANT CHANGE UP (ref 0.2–1.2)
BUN SERPL-MCNC: 4 MG/DL — LOW (ref 10–20)
CALCIUM SERPL-MCNC: 9.5 MG/DL — SIGNIFICANT CHANGE UP (ref 8.5–10.1)
CHLORIDE SERPL-SCNC: 100 MMOL/L — SIGNIFICANT CHANGE UP (ref 98–110)
CO2 SERPL-SCNC: 30 MMOL/L — SIGNIFICANT CHANGE UP (ref 17–32)
CREAT SERPL-MCNC: 0.9 MG/DL — SIGNIFICANT CHANGE UP (ref 0.7–1.5)
DACRYOCYTES BLD QL SMEAR: SLIGHT — SIGNIFICANT CHANGE UP
EGFR: 89 ML/MIN/1.73M2 — SIGNIFICANT CHANGE UP
EOSINOPHIL # BLD AUTO: 0 K/UL — SIGNIFICANT CHANGE UP (ref 0–0.7)
EOSINOPHIL NFR BLD AUTO: 0 % — SIGNIFICANT CHANGE UP (ref 0–8)
GLUCOSE SERPL-MCNC: 84 MG/DL — SIGNIFICANT CHANGE UP (ref 70–99)
HCT VFR BLD CALC: 26.3 % — LOW (ref 37–47)
HGB BLD-MCNC: 8.1 G/DL — LOW (ref 12–16)
IMM GRANULOCYTES NFR BLD AUTO: 0 % — LOW (ref 0.1–0.3)
LYMPHOCYTES # BLD AUTO: 0.39 K/UL — LOW (ref 1.2–3.4)
LYMPHOCYTES # BLD AUTO: 26.2 % — SIGNIFICANT CHANGE UP (ref 20.5–51.1)
MAGNESIUM SERPL-MCNC: 2.2 MG/DL — SIGNIFICANT CHANGE UP (ref 1.8–2.4)
MCHC RBC-ENTMCNC: 27.5 PG — SIGNIFICANT CHANGE UP (ref 27–31)
MCHC RBC-ENTMCNC: 30.8 G/DL — LOW (ref 32–37)
MCV RBC AUTO: 89.2 FL — SIGNIFICANT CHANGE UP (ref 81–99)
MONOCYTES # BLD AUTO: 0.07 K/UL — LOW (ref 0.1–0.6)
MONOCYTES NFR BLD AUTO: 4.7 % — SIGNIFICANT CHANGE UP (ref 1.7–9.3)
NEUTROPHILS # BLD AUTO: 1.03 K/UL — LOW (ref 1.4–6.5)
NEUTROPHILS NFR BLD AUTO: 69.1 % — SIGNIFICANT CHANGE UP (ref 42.2–75.2)
NEUTS BAND # BLD: 24 % — HIGH (ref 0–6)
NRBC # BLD: 0 /100 WBCS — SIGNIFICANT CHANGE UP (ref 0–0)
NRBC # BLD: 0 /100 — SIGNIFICANT CHANGE UP (ref 0–0)
PHOSPHATE SERPL-MCNC: 5.1 MG/DL — HIGH (ref 2.1–4.9)
PLAT MORPH BLD: ABNORMAL
PLATELET # BLD AUTO: 65 K/UL — LOW (ref 130–400)
PLATELET COUNT - ESTIMATE: ABNORMAL
POIKILOCYTOSIS BLD QL AUTO: SLIGHT — SIGNIFICANT CHANGE UP
POTASSIUM SERPL-MCNC: 4 MMOL/L — SIGNIFICANT CHANGE UP (ref 3.5–5)
POTASSIUM SERPL-SCNC: 4 MMOL/L — SIGNIFICANT CHANGE UP (ref 3.5–5)
PROT SERPL-MCNC: 5.7 G/DL — LOW (ref 6–8)
RBC # BLD: 2.95 M/UL — LOW (ref 4.2–5.4)
RBC # FLD: 14.4 % — SIGNIFICANT CHANGE UP (ref 11.5–14.5)
RBC BLD AUTO: ABNORMAL
SCHISTOCYTES BLD QL AUTO: SLIGHT — SIGNIFICANT CHANGE UP
SODIUM SERPL-SCNC: 141 MMOL/L — SIGNIFICANT CHANGE UP (ref 135–146)
WBC # BLD: 1.35 K/UL — LOW (ref 4.8–10.8)
WBC # FLD AUTO: 1.35 K/UL — LOW (ref 4.8–10.8)

## 2022-07-30 PROCEDURE — 71045 X-RAY EXAM CHEST 1 VIEW: CPT | Mod: 26

## 2022-07-30 PROCEDURE — 99291 CRITICAL CARE FIRST HOUR: CPT

## 2022-07-30 RX ORDER — FLUTICASONE PROPIONATE 50 MCG
1 SPRAY, SUSPENSION NASAL
Refills: 0 | Status: DISCONTINUED | OUTPATIENT
Start: 2022-07-30 | End: 2022-08-04

## 2022-07-30 RX ORDER — CARBAMIDE PEROXIDE 81.86 MG/ML
1 SOLUTION/ DROPS AURICULAR (OTIC) EVERY 12 HOURS
Refills: 0 | Status: DISCONTINUED | OUTPATIENT
Start: 2022-07-30 | End: 2022-08-04

## 2022-07-30 RX ADMIN — METHADONE HYDROCHLORIDE 20 MILLIGRAM(S): 40 TABLET ORAL at 11:35

## 2022-07-30 RX ADMIN — Medication 1 APPLICATION(S): at 06:27

## 2022-07-30 RX ADMIN — QUETIAPINE FUMARATE 100 MILLIGRAM(S): 200 TABLET, FILM COATED ORAL at 17:45

## 2022-07-30 RX ADMIN — Medication 100 MILLIGRAM(S): at 11:54

## 2022-07-30 RX ADMIN — ENOXAPARIN SODIUM 40 MILLIGRAM(S): 100 INJECTION SUBCUTANEOUS at 06:31

## 2022-07-30 RX ADMIN — Medication 0.1 MILLIGRAM(S): at 22:07

## 2022-07-30 RX ADMIN — Medication 1 MILLIGRAM(S): at 11:54

## 2022-07-30 RX ADMIN — Medication 1 MILLIGRAM(S): at 00:13

## 2022-07-30 RX ADMIN — Medication 125 MILLIGRAM(S): at 06:28

## 2022-07-30 RX ADMIN — SPIRONOLACTONE 100 MILLIGRAM(S): 25 TABLET, FILM COATED ORAL at 06:30

## 2022-07-30 RX ADMIN — Medication 1 SPRAY(S): at 17:51

## 2022-07-30 RX ADMIN — SCOPALAMINE 1 PATCH: 1 PATCH, EXTENDED RELEASE TRANSDERMAL at 20:34

## 2022-07-30 RX ADMIN — Medication 0.1 MILLIGRAM(S): at 06:30

## 2022-07-30 RX ADMIN — CARBAMIDE PEROXIDE 1 DROP(S): 81.86 SOLUTION/ DROPS AURICULAR (OTIC) at 17:51

## 2022-07-30 RX ADMIN — CHLORHEXIDINE GLUCONATE 15 MILLILITER(S): 213 SOLUTION TOPICAL at 06:29

## 2022-07-30 RX ADMIN — CHLORHEXIDINE GLUCONATE 15 MILLILITER(S): 213 SOLUTION TOPICAL at 17:52

## 2022-07-30 RX ADMIN — SCOPALAMINE 1 PATCH: 1 PATCH, EXTENDED RELEASE TRANSDERMAL at 00:13

## 2022-07-30 RX ADMIN — QUETIAPINE FUMARATE 100 MILLIGRAM(S): 200 TABLET, FILM COATED ORAL at 06:29

## 2022-07-30 RX ADMIN — Medication 1 APPLICATION(S): at 17:52

## 2022-07-30 RX ADMIN — Medication 125 MILLIGRAM(S): at 17:51

## 2022-07-30 RX ADMIN — PANTOPRAZOLE SODIUM 40 MILLIGRAM(S): 20 TABLET, DELAYED RELEASE ORAL at 11:36

## 2022-07-30 RX ADMIN — Medication 1 PATCH: at 11:54

## 2022-07-30 NOTE — PROGRESS NOTE ADULT - ASSESSMENT
Acute respiratory failure with hypoxemia / aspiration pneumonia with sepsis / suspected drug overdose - on trach collar     cdiff - complete oral vanco course until 8/3    anemia - s/p transfusions    dysphagia - on modified diet

## 2022-07-30 NOTE — CHART NOTE - NSCHARTNOTEFT_GEN_A_CORE
3 DAY CALORIE COUNT observed in room. Initiated on 7/29 until 7/31. RD will collect and post results on 8/1.     Nutrition Support Team is follow this pt. Please refer to NST notes for further recommendations.    RD remains available: [phone]

## 2022-07-30 NOTE — PROGRESS NOTE ADULT - SUBJECTIVE AND OBJECTIVE BOX
Pt seen and examined in chair at bedside. Pt states she feels much better, c/o ear fullness B/L    T(F): , Max: 98.6 (07-30-22 @ 16:00)  HR: 97 (07-30-22 @ 22:07) (96 - 100)  BP: 150/95 (07-30-22 @ 22:07)  RR: 26 (07-30-22 @ 22:07)  SpO2: 97% (07-30-22 @ 22:07)  IN: 870 mL / OUT: 900 mL / NET: -30 mL    IN: 0 mL / OUT: 250 mL / NET: -250 mL      General: No apparent distress  Cardiovascular: S1, S2  Gastrointestinal: Soft, Non-tender, Non-distended  Respiratory: on trach collar  Musculoskeletal: Moves all extremities  Lymphatic: No edema  Neurologic: speech labored, follows commands and answers questions  Dermatologic: Skin dry                          8.1    1.35  )-----------( 65       ( 30 Jul 2022 05:49 )             26.3     07-30    141  |  100  |  4<L>  ----------------------------<  84  4.0   |  30  |  0.9    Ca    9.5      30 Jul 2022 05:49  Phos  5.1     07-30  Mg     2.2     07-30    TPro  5.7<L>  /  Alb  3.1<L>  /  TBili  0.5  /  DBili  x   /  AST  14  /  ALT  7   /  AlkPhos  245<H>  07-30

## 2022-07-30 NOTE — PROGRESS NOTE ADULT - ASSESSMENT
IMPRESSION/PLAN:  acute respiratory failure  substance abuse/OD  anasarca  C-diff    Pt doing well, tolerating trach collar.  Continue PO vanco for c-diff.  OOB to chair, PT drew. D/c planning    CNS: continue metahdone    HEENT: oral care    PULMONARY: trach collar    CARDIOVASCULAR: monitor BP    GI: GI prophylaxis.  Feeding     RENAL: monitor UO    INFECTIOUS DISEASE: continue PO vanco    HEMATOLOGICAL:  DVT prophylaxis.    ENDOCRINE:  Follow up FS.  Insulin protocol if needed.    MUSCULOSKELETAL: PT drew, OOB to chair        CRITICAL CARE TIME SPENT: 30 minutes

## 2022-07-30 NOTE — PROGRESS NOTE ADULT - SUBJECTIVE AND OBJECTIVE BOX
Patient is a 26y old  Female who presents with a chief complaint of anasarca (29 Jul 2022 11:51)      HPI:  Patient is a 26 year old female with hx of asthma, untreated Hep C, IVDA, anasarca presenting with increased dyspnea since yesterday. Patient has been short of breath chronically and has been admitted for fluid overload. Patient describes worsening symptoms yesterday associated with cough. States generalized edema has remained the same. Patient is actively using heroin. Not on any meds or MMTP.  Denies fever, chills, sore throat, cp, palpitations, abd pain, n/v/d, dysuria, headache, syncope, hemoptysis. + generalized edema (15 Reinaldo 2022 10:03)    Pt in bed tolerating trach collar    PAST MEDICAL & SURGICAL HISTORY:  Hepatitis C      Asthma      Anxiety and depression      IV drug abuse  heroin      No significant past surgical history        Allergies    buprenorphine (Rash)  Suboxone (Rash)    Intolerances      FAMILY HISTORY:  No pertinent family history in first degree relatives      Home Medications:    Occupation:  Alochol: Denied  Smoking: Denied  Drug Use: Denied  Marital Status:         ROS: as in HPI; All other systems reviewed are negative    ICU Vital Signs Last 24 Hrs  T(C): 36.6 (30 Jul 2022 07:01), Max: 36.6 (30 Jul 2022 07:01)  T(F): 97.9 (30 Jul 2022 07:01), Max: 97.9 (30 Jul 2022 07:01)  HR: 100 (30 Jul 2022 07:00) (96 - 107)  BP: 141/88 (30 Jul 2022 07:04) (107/74 - 154/101)  BP(mean): 109 (30 Jul 2022 07:04) (87 - 123)  ABP: --  ABP(mean): --  RR: 15 (30 Jul 2022 07:04) (12 - 28)  SpO2: 100% (30 Jul 2022 08:50) (97% - 100%)    O2 Parameters below as of 30 Jul 2022 08:50  Patient On (Oxygen Delivery Method): tracheostomy collar    O2 Concentration (%): 30          Physical Examination:    General: No acute distress.  Alert, oriented, interactive, nonfocal    HEENT: + trach    PULM: Clear to auscultation bilaterally, no significant sputum production    CVS: Regular rate and rhythm, no murmurs, rubs, or gallops    ABD: Soft, nondistended, nontender, normoactive bowel sounds, no masses    EXT: No edema, nontender    SKIN: Warm and well perfused, no rashes noted.      Mode: standby          I&O's Detail    29 Jul 2022 07:01  -  30 Jul 2022 07:00  --------------------------------------------------------  IN:    Enteral Tube Flush: 300 mL    IV PiggyBack: 150 mL    Oral Fluid: 420 mL  Total IN: 870 mL    OUT:    Voided (mL): 900 mL  Total OUT: 900 mL    Total NET: -30 mL            LABS:                        8.1    1.35  )-----------( 65       ( 30 Jul 2022 05:49 )             26.3     30 Jul 2022 05:49    141    |  100    |  4      ----------------------------<  84     4.0     |  30     |  0.9      Ca    9.5        30 Jul 2022 05:49  Phos  5.1       30 Jul 2022 05:49  Mg     2.2       30 Jul 2022 05:49    TPro  5.7    /  Alb  3.1    /  TBili  0.5    /  DBili  x      /  AST  14     /  ALT  7      /  AlkPhos  245    30 Jul 2022 05:49  Amylase x     lipase x              CAPILLARY BLOOD GLUCOSE            Culture        MEDICATIONS  (STANDING):  BACItracin   Ointment 1 Application(s) Topical two times a day  chlorhexidine 0.12% Liquid 15 milliLiter(s) Oral Mucosa every 12 hours  chlorhexidine 2% Cloths 1 Application(s) Topical daily  cloNIDine 0.1 milliGRAM(s) Oral daily  enoxaparin Injectable 40 milliGRAM(s) SubCutaneous every 24 hours  folic acid 1 milliGRAM(s) Oral daily  methadone    Tablet 20 milliGRAM(s) Oral daily  nicotine -  14 mG/24Hr(s) Patch 1 patch Transdermal daily  pantoprazole   Suspension 40 milliGRAM(s) Oral daily  QUEtiapine 100 milliGRAM(s) Oral two times a day  scopolamine 1 mG/72 Hr(s) Patch 1 Patch Transdermal every 72 hours  spironolactone 100 milliGRAM(s) Oral daily  thiamine 100 milliGRAM(s) Oral daily  vancomycin    Solution 125 milliGRAM(s) Oral every 12 hours    MEDICATIONS  (PRN):  acetaminophen     Tablet .. 650 milliGRAM(s) Oral every 6 hours PRN Temp greater or equal to 38.5C (101.3F), Moderate Pain (4 - 6)  ALBUTerol    90 MICROgram(s) HFA Inhaler 1 Puff(s) Inhalation every 4 hours PRN Shortness of Breath and/or Wheezing  cloNIDine 0.1 milliGRAM(s) Oral every 6 hours PRN opiate withdrawal  hydrOXYzine hydrochloride 50 milliGRAM(s) Oral every 6 hours PRN Anxiety  sodium chloride 0.9% lock flush 10 milliLiter(s) IV Push every 1 hour PRN Pre/post blood products, medications, blood draw, and to maintain line patency

## 2022-07-31 LAB
ALBUMIN SERPL ELPH-MCNC: 3.2 G/DL — LOW (ref 3.5–5.2)
ALP SERPL-CCNC: 254 U/L — HIGH (ref 30–115)
ALT FLD-CCNC: 7 U/L — SIGNIFICANT CHANGE UP (ref 0–41)
ANION GAP SERPL CALC-SCNC: 13 MMOL/L — SIGNIFICANT CHANGE UP (ref 7–14)
AST SERPL-CCNC: 15 U/L — SIGNIFICANT CHANGE UP (ref 0–41)
BASOPHILS # BLD AUTO: 0 K/UL — SIGNIFICANT CHANGE UP (ref 0–0.2)
BASOPHILS NFR BLD AUTO: 0 % — SIGNIFICANT CHANGE UP (ref 0–1)
BILIRUB SERPL-MCNC: 0.5 MG/DL — SIGNIFICANT CHANGE UP (ref 0.2–1.2)
BUN SERPL-MCNC: 4 MG/DL — LOW (ref 10–20)
CALCIUM SERPL-MCNC: 9.4 MG/DL — SIGNIFICANT CHANGE UP (ref 8.5–10.1)
CHLORIDE SERPL-SCNC: 100 MMOL/L — SIGNIFICANT CHANGE UP (ref 98–110)
CO2 SERPL-SCNC: 28 MMOL/L — SIGNIFICANT CHANGE UP (ref 17–32)
CREAT SERPL-MCNC: 0.9 MG/DL — SIGNIFICANT CHANGE UP (ref 0.7–1.5)
CULTURE RESULTS: SIGNIFICANT CHANGE UP
EGFR: 89 ML/MIN/1.73M2 — SIGNIFICANT CHANGE UP
EOSINOPHIL # BLD AUTO: 0 K/UL — SIGNIFICANT CHANGE UP (ref 0–0.7)
EOSINOPHIL NFR BLD AUTO: 0 % — SIGNIFICANT CHANGE UP (ref 0–8)
GLUCOSE SERPL-MCNC: 96 MG/DL — SIGNIFICANT CHANGE UP (ref 70–99)
HCT VFR BLD CALC: 25.5 % — LOW (ref 37–47)
HGB BLD-MCNC: 8 G/DL — LOW (ref 12–16)
IMM GRANULOCYTES NFR BLD AUTO: 1 % — HIGH (ref 0.1–0.3)
LYMPHOCYTES # BLD AUTO: 0.33 K/UL — LOW (ref 1.2–3.4)
LYMPHOCYTES # BLD AUTO: 34 % — SIGNIFICANT CHANGE UP (ref 20.5–51.1)
MAGNESIUM SERPL-MCNC: 1.5 MG/DL — LOW (ref 1.8–2.4)
MCHC RBC-ENTMCNC: 27.2 PG — SIGNIFICANT CHANGE UP (ref 27–31)
MCHC RBC-ENTMCNC: 31.4 G/DL — LOW (ref 32–37)
MCV RBC AUTO: 86.7 FL — SIGNIFICANT CHANGE UP (ref 81–99)
MONOCYTES # BLD AUTO: 0.05 K/UL — LOW (ref 0.1–0.6)
MONOCYTES NFR BLD AUTO: 5.2 % — SIGNIFICANT CHANGE UP (ref 1.7–9.3)
NEUTROPHILS # BLD AUTO: 0.58 K/UL — LOW (ref 1.4–6.5)
NEUTROPHILS NFR BLD AUTO: 59.8 % — SIGNIFICANT CHANGE UP (ref 42.2–75.2)
NRBC # BLD: 0 /100 WBCS — SIGNIFICANT CHANGE UP (ref 0–0)
PHOSPHATE SERPL-MCNC: 5.2 MG/DL — HIGH (ref 2.1–4.9)
PLATELET # BLD AUTO: 70 K/UL — LOW (ref 130–400)
POTASSIUM SERPL-MCNC: 3.5 MMOL/L — SIGNIFICANT CHANGE UP (ref 3.5–5)
POTASSIUM SERPL-SCNC: 3.5 MMOL/L — SIGNIFICANT CHANGE UP (ref 3.5–5)
PROT SERPL-MCNC: 5.7 G/DL — LOW (ref 6–8)
RBC # BLD: 2.94 M/UL — LOW (ref 4.2–5.4)
RBC # FLD: 14 % — SIGNIFICANT CHANGE UP (ref 11.5–14.5)
SODIUM SERPL-SCNC: 141 MMOL/L — SIGNIFICANT CHANGE UP (ref 135–146)
SPECIMEN SOURCE: SIGNIFICANT CHANGE UP
WBC # BLD: 0.97 K/UL — CRITICAL LOW (ref 4.8–10.8)
WBC # FLD AUTO: 0.97 K/UL — CRITICAL LOW (ref 4.8–10.8)

## 2022-07-31 PROCEDURE — 99232 SBSQ HOSP IP/OBS MODERATE 35: CPT

## 2022-07-31 RX ORDER — POTASSIUM CHLORIDE 20 MEQ
40 PACKET (EA) ORAL EVERY 12 HOURS
Refills: 0 | Status: COMPLETED | OUTPATIENT
Start: 2022-07-31 | End: 2022-08-03

## 2022-07-31 RX ORDER — MAGNESIUM OXIDE 400 MG ORAL TABLET 241.3 MG
400 TABLET ORAL
Refills: 0 | Status: DISCONTINUED | OUTPATIENT
Start: 2022-07-31 | End: 2022-08-04

## 2022-07-31 RX ORDER — MAGNESIUM SULFATE 500 MG/ML
2 VIAL (ML) INJECTION EVERY 4 HOURS
Refills: 0 | Status: COMPLETED | OUTPATIENT
Start: 2022-07-31 | End: 2022-07-31

## 2022-07-31 RX ADMIN — SCOPALAMINE 1 PATCH: 1 PATCH, EXTENDED RELEASE TRANSDERMAL at 19:35

## 2022-07-31 RX ADMIN — Medication 1 PATCH: at 11:29

## 2022-07-31 RX ADMIN — MAGNESIUM OXIDE 400 MG ORAL TABLET 400 MILLIGRAM(S): 241.3 TABLET ORAL at 11:35

## 2022-07-31 RX ADMIN — MAGNESIUM OXIDE 400 MG ORAL TABLET 400 MILLIGRAM(S): 241.3 TABLET ORAL at 18:00

## 2022-07-31 RX ADMIN — ENOXAPARIN SODIUM 40 MILLIGRAM(S): 100 INJECTION SUBCUTANEOUS at 05:19

## 2022-07-31 RX ADMIN — CHLORHEXIDINE GLUCONATE 15 MILLILITER(S): 213 SOLUTION TOPICAL at 05:17

## 2022-07-31 RX ADMIN — Medication 25 GRAM(S): at 18:23

## 2022-07-31 RX ADMIN — Medication 1 PATCH: at 11:36

## 2022-07-31 RX ADMIN — Medication 25 GRAM(S): at 13:07

## 2022-07-31 RX ADMIN — Medication 1 APPLICATION(S): at 18:01

## 2022-07-31 RX ADMIN — Medication 1 SPRAY(S): at 18:02

## 2022-07-31 RX ADMIN — CARBAMIDE PEROXIDE 1 DROP(S): 81.86 SOLUTION/ DROPS AURICULAR (OTIC) at 18:02

## 2022-07-31 RX ADMIN — METHADONE HYDROCHLORIDE 20 MILLIGRAM(S): 40 TABLET ORAL at 11:36

## 2022-07-31 RX ADMIN — Medication 125 MILLIGRAM(S): at 05:19

## 2022-07-31 RX ADMIN — Medication 40 MILLIEQUIVALENT(S): at 18:00

## 2022-07-31 RX ADMIN — Medication 1 SPRAY(S): at 05:18

## 2022-07-31 RX ADMIN — QUETIAPINE FUMARATE 100 MILLIGRAM(S): 200 TABLET, FILM COATED ORAL at 18:01

## 2022-07-31 RX ADMIN — CARBAMIDE PEROXIDE 1 DROP(S): 81.86 SOLUTION/ DROPS AURICULAR (OTIC) at 05:18

## 2022-07-31 RX ADMIN — CHLORHEXIDINE GLUCONATE 1 APPLICATION(S): 213 SOLUTION TOPICAL at 11:38

## 2022-07-31 RX ADMIN — Medication 100 MILLIGRAM(S): at 11:36

## 2022-07-31 RX ADMIN — Medication 0.1 MILLIGRAM(S): at 05:23

## 2022-07-31 RX ADMIN — QUETIAPINE FUMARATE 100 MILLIGRAM(S): 200 TABLET, FILM COATED ORAL at 05:20

## 2022-07-31 RX ADMIN — Medication 1 MILLIGRAM(S): at 11:36

## 2022-07-31 RX ADMIN — PANTOPRAZOLE SODIUM 40 MILLIGRAM(S): 20 TABLET, DELAYED RELEASE ORAL at 11:36

## 2022-07-31 RX ADMIN — CHLORHEXIDINE GLUCONATE 15 MILLILITER(S): 213 SOLUTION TOPICAL at 17:59

## 2022-07-31 RX ADMIN — SPIRONOLACTONE 100 MILLIGRAM(S): 25 TABLET, FILM COATED ORAL at 05:20

## 2022-07-31 RX ADMIN — Medication 1 PATCH: at 07:45

## 2022-07-31 RX ADMIN — SCOPALAMINE 1 PATCH: 1 PATCH, EXTENDED RELEASE TRANSDERMAL at 07:28

## 2022-07-31 RX ADMIN — Medication 1 APPLICATION(S): at 05:23

## 2022-07-31 RX ADMIN — Medication 125 MILLIGRAM(S): at 17:59

## 2022-07-31 RX ADMIN — Medication 1 PATCH: at 19:33

## 2022-07-31 NOTE — PROGRESS NOTE ADULT - SUBJECTIVE AND OBJECTIVE BOX
Patient is awake and alert on trach collar, no complaints, tolerating feeds      T(F): 96.7 (07-31-22 @ 04:33), Max: 98.6 (07-30-22 @ 16:00)  HR: 87 (07-31-22 @ 04:33)  BP: 138/90 (07-31-22 @ 04:33)  RR: 16 (07-31-22 @ 04:33)  SpO2: 99% (07-31-22 @ 07:58) (97% - 99%)    PHYSICAL EXAM:  GENERAL: NAD  HEAD:  Atraumatic, Normocephalic  EYES: EOMI, PERRLA, conjunctiva and sclera clear  NERVOUS SYSTEM:  Alert & Oriented X3, no focal deficits   CHEST/LUNG: Clear to percussion bilaterally; No rales, rhonchi, wheezing, or rubs  HEART: Regular rate and rhythm; No murmurs, rubs, or gallops  ABDOMEN: Soft, Nontender, Nondistended; Bowel sounds present  EXTREMITIES:  2+ Peripheral Pulses, No clubbing, cyanosis, or edema    LABS  07-31    141  |  100  |  4<L>  ----------------------------<  96  3.5   |  28  |  0.9    Ca    9.4      31 Jul 2022 06:45  Phos  5.2     07-31  Mg     1.5     07-31    TPro  5.7<L>  /  Alb  3.2<L>  /  TBili  0.5  /  DBili  x   /  AST  15  /  ALT  7   /  AlkPhos  254<H>  07-31                          8.0    0.97  )-----------( 70       ( 31 Jul 2022 06:45 )             25.5       Culture Results:   No Growth Final (07-25-22)  Culture Results:   Normal Respiratory Kelly present (07-15-22)  Culture Results:   Babesia microti PCR  Results: NOT detected  ***************Result Note*************  The detection of Babesia microti by PCR has only been  validated for whole blood; this test has not been approved  by the US Food and Drug Administration (FDA). Performance  characteristics of this assay have been determined by  [employer]. The clinical significance  of results should be considered in conjunction with the  overall clinical presentation of the patient. Result is not  intended to be used as the sole means for clinical diagnosis  or patient management decisions.  One negative sample does not necessarily rule  out the presence of a parasitic infection. (07-10-22)  Culture Results:   No Growth Final (07-08-22)  Culture Results:   No Growth Final (07-06-22)  Culture Results:   No Growth Final (07-06-22)  Culture Results:   Moderate Acinetobacter baumannii/nosocom group (Carbapenem Resistant)  Cefiderocol interpretations based on FDA breakpoints.  Normal Respiratory Kelly absent (07-04-22)      MEDICATIONS  (STANDING):  BACItracin   Ointment 1 Application(s) Topical two times a day  carbamide peroxide Otic Solution 1 Drop(s) Both Ears every 12 hours  chlorhexidine 0.12% Liquid 15 milliLiter(s) Oral Mucosa every 12 hours  chlorhexidine 2% Cloths 1 Application(s) Topical daily  cloNIDine 0.1 milliGRAM(s) Oral daily  enoxaparin Injectable 40 milliGRAM(s) SubCutaneous every 24 hours  fluticasone propionate 50 MICROgram(s)/spray Nasal Spray 1 Spray(s) Both Nostrils two times a day  folic acid 1 milliGRAM(s) Oral daily  methadone    Tablet 20 milliGRAM(s) Oral daily  nicotine -  14 mG/24Hr(s) Patch 1 patch Transdermal daily  pantoprazole   Suspension 40 milliGRAM(s) Oral daily  QUEtiapine 100 milliGRAM(s) Oral two times a day  scopolamine 1 mG/72 Hr(s) Patch 1 Patch Transdermal every 72 hours  spironolactone 100 milliGRAM(s) Oral daily  thiamine 100 milliGRAM(s) Oral daily  vancomycin    Solution 125 milliGRAM(s) Oral every 12 hours    MEDICATIONS  (PRN):  acetaminophen     Tablet .. 650 milliGRAM(s) Oral every 6 hours PRN Temp greater or equal to 38.5C (101.3F), Moderate Pain (4 - 6)  ALBUTerol    90 MICROgram(s) HFA Inhaler 1 Puff(s) Inhalation every 4 hours PRN Shortness of Breath and/or Wheezing  cloNIDine 0.1 milliGRAM(s) Oral every 6 hours PRN opiate withdrawal  hydrOXYzine hydrochloride 50 milliGRAM(s) Oral every 6 hours PRN Anxiety  sodium chloride 0.9% lock flush 10 milliLiter(s) IV Push every 1 hour PRN Pre/post blood products, medications, blood draw, and to maintain line patency

## 2022-07-31 NOTE — PROGRESS NOTE ADULT - ASSESSMENT
Acute respiratory failure with hypoxemia / aspiration pneumonia with sepsis / suspected drug overdose / head laceration s/p fall in lobby / possible seizure / anasarca  pancytopenia      - completed course of  antibiotics as per ID    - complete oral vanco course until 8/3   - s/p transfusion  cbc now stable - trach site bleeding  resolved    -  supplement  hypokalemia and  hypomagnesemia    - tolerating diet    - DC planning

## 2022-08-01 LAB
ALBUMIN SERPL ELPH-MCNC: 3.3 G/DL — LOW (ref 3.5–5.2)
ALP SERPL-CCNC: 303 U/L — HIGH (ref 30–115)
ALT FLD-CCNC: 7 U/L — SIGNIFICANT CHANGE UP (ref 0–41)
ANION GAP SERPL CALC-SCNC: 11 MMOL/L — SIGNIFICANT CHANGE UP (ref 7–14)
AST SERPL-CCNC: 15 U/L — SIGNIFICANT CHANGE UP (ref 0–41)
BASOPHILS # BLD AUTO: 0.01 K/UL — SIGNIFICANT CHANGE UP (ref 0–0.2)
BASOPHILS NFR BLD AUTO: 0.4 % — SIGNIFICANT CHANGE UP (ref 0–1)
BILIRUB SERPL-MCNC: 0.5 MG/DL — SIGNIFICANT CHANGE UP (ref 0.2–1.2)
BUN SERPL-MCNC: 6 MG/DL — LOW (ref 10–20)
CALCIUM SERPL-MCNC: 9.8 MG/DL — SIGNIFICANT CHANGE UP (ref 8.5–10.1)
CHLORIDE SERPL-SCNC: 94 MMOL/L — LOW (ref 98–110)
CO2 SERPL-SCNC: 27 MMOL/L — SIGNIFICANT CHANGE UP (ref 17–32)
CREAT SERPL-MCNC: 0.8 MG/DL — SIGNIFICANT CHANGE UP (ref 0.7–1.5)
EGFR: 103 ML/MIN/1.73M2 — SIGNIFICANT CHANGE UP
EOSINOPHIL # BLD AUTO: 0.01 K/UL — SIGNIFICANT CHANGE UP (ref 0–0.7)
EOSINOPHIL NFR BLD AUTO: 0.4 % — SIGNIFICANT CHANGE UP (ref 0–8)
GLUCOSE SERPL-MCNC: 100 MG/DL — HIGH (ref 70–99)
HCT VFR BLD CALC: 31.4 % — LOW (ref 37–47)
HGB BLD-MCNC: 9.9 G/DL — LOW (ref 12–16)
IMM GRANULOCYTES NFR BLD AUTO: 0 % — LOW (ref 0.1–0.3)
LYMPHOCYTES # BLD AUTO: 0.54 K/UL — LOW (ref 1.2–3.4)
LYMPHOCYTES # BLD AUTO: 21.7 % — SIGNIFICANT CHANGE UP (ref 20.5–51.1)
MAGNESIUM SERPL-MCNC: 1.9 MG/DL — SIGNIFICANT CHANGE UP (ref 1.8–2.4)
MCHC RBC-ENTMCNC: 27.3 PG — SIGNIFICANT CHANGE UP (ref 27–31)
MCHC RBC-ENTMCNC: 31.5 G/DL — LOW (ref 32–37)
MCV RBC AUTO: 86.5 FL — SIGNIFICANT CHANGE UP (ref 81–99)
MONOCYTES # BLD AUTO: 0.09 K/UL — LOW (ref 0.1–0.6)
MONOCYTES NFR BLD AUTO: 3.6 % — SIGNIFICANT CHANGE UP (ref 1.7–9.3)
NEUTROPHILS # BLD AUTO: 1.84 K/UL — SIGNIFICANT CHANGE UP (ref 1.4–6.5)
NEUTROPHILS NFR BLD AUTO: 73.9 % — SIGNIFICANT CHANGE UP (ref 42.2–75.2)
NRBC # BLD: 0 /100 WBCS — SIGNIFICANT CHANGE UP (ref 0–0)
PHOSPHATE SERPL-MCNC: 4.4 MG/DL — SIGNIFICANT CHANGE UP (ref 2.1–4.9)
PLATELET # BLD AUTO: 103 K/UL — LOW (ref 130–400)
POTASSIUM SERPL-MCNC: 4.3 MMOL/L — SIGNIFICANT CHANGE UP (ref 3.5–5)
POTASSIUM SERPL-SCNC: 4.3 MMOL/L — SIGNIFICANT CHANGE UP (ref 3.5–5)
PROT SERPL-MCNC: 6.4 G/DL — SIGNIFICANT CHANGE UP (ref 6–8)
RBC # BLD: 3.63 M/UL — LOW (ref 4.2–5.4)
RBC # FLD: 13.9 % — SIGNIFICANT CHANGE UP (ref 11.5–14.5)
SODIUM SERPL-SCNC: 132 MMOL/L — LOW (ref 135–146)
WBC # BLD: 2.49 K/UL — LOW (ref 4.8–10.8)
WBC # FLD AUTO: 2.49 K/UL — LOW (ref 4.8–10.8)

## 2022-08-01 PROCEDURE — 99232 SBSQ HOSP IP/OBS MODERATE 35: CPT

## 2022-08-01 RX ORDER — METHADONE HYDROCHLORIDE 40 MG/1
15 TABLET ORAL
Refills: 0 | Status: DISCONTINUED | OUTPATIENT
Start: 2022-08-01 | End: 2022-08-02

## 2022-08-01 RX ORDER — PANTOPRAZOLE SODIUM 20 MG/1
1 TABLET, DELAYED RELEASE ORAL
Qty: 0 | Refills: 0 | DISCHARGE
Start: 2022-08-01

## 2022-08-01 RX ORDER — FLUTICASONE PROPIONATE 50 MCG
1 SPRAY, SUSPENSION NASAL
Qty: 0 | Refills: 0 | DISCHARGE
Start: 2022-08-01

## 2022-08-01 RX ORDER — SPIRONOLACTONE 25 MG/1
4 TABLET, FILM COATED ORAL
Qty: 0 | Refills: 0 | DISCHARGE
Start: 2022-08-01

## 2022-08-01 RX ORDER — THIAMINE MONONITRATE (VIT B1) 100 MG
1 TABLET ORAL
Qty: 0 | Refills: 0 | DISCHARGE
Start: 2022-08-01

## 2022-08-01 RX ORDER — FOLIC ACID 0.8 MG
1 TABLET ORAL
Qty: 0 | Refills: 0 | DISCHARGE
Start: 2022-08-01

## 2022-08-01 RX ORDER — QUETIAPINE FUMARATE 200 MG/1
1 TABLET, FILM COATED ORAL
Qty: 0 | Refills: 0 | DISCHARGE
Start: 2022-08-01

## 2022-08-01 RX ADMIN — Medication 1 PATCH: at 13:12

## 2022-08-01 RX ADMIN — SPIRONOLACTONE 100 MILLIGRAM(S): 25 TABLET, FILM COATED ORAL at 05:26

## 2022-08-01 RX ADMIN — MAGNESIUM OXIDE 400 MG ORAL TABLET 400 MILLIGRAM(S): 241.3 TABLET ORAL at 12:06

## 2022-08-01 RX ADMIN — CARBAMIDE PEROXIDE 1 DROP(S): 81.86 SOLUTION/ DROPS AURICULAR (OTIC) at 17:46

## 2022-08-01 RX ADMIN — Medication 1 SPRAY(S): at 17:46

## 2022-08-01 RX ADMIN — QUETIAPINE FUMARATE 100 MILLIGRAM(S): 200 TABLET, FILM COATED ORAL at 05:26

## 2022-08-01 RX ADMIN — Medication 1 SPRAY(S): at 05:30

## 2022-08-01 RX ADMIN — Medication 0.1 MILLIGRAM(S): at 05:26

## 2022-08-01 RX ADMIN — Medication 1 MILLIGRAM(S): at 12:07

## 2022-08-01 RX ADMIN — ENOXAPARIN SODIUM 40 MILLIGRAM(S): 100 INJECTION SUBCUTANEOUS at 05:30

## 2022-08-01 RX ADMIN — Medication 40 MILLIEQUIVALENT(S): at 17:45

## 2022-08-01 RX ADMIN — METHADONE HYDROCHLORIDE 15 MILLIGRAM(S): 40 TABLET ORAL at 12:06

## 2022-08-01 RX ADMIN — Medication 1 APPLICATION(S): at 17:37

## 2022-08-01 RX ADMIN — Medication 1 APPLICATION(S): at 05:30

## 2022-08-01 RX ADMIN — Medication 125 MILLIGRAM(S): at 17:45

## 2022-08-01 RX ADMIN — Medication 125 MILLIGRAM(S): at 05:31

## 2022-08-01 RX ADMIN — QUETIAPINE FUMARATE 100 MILLIGRAM(S): 200 TABLET, FILM COATED ORAL at 17:44

## 2022-08-01 RX ADMIN — CHLORHEXIDINE GLUCONATE 15 MILLILITER(S): 213 SOLUTION TOPICAL at 05:25

## 2022-08-01 RX ADMIN — CHLORHEXIDINE GLUCONATE 15 MILLILITER(S): 213 SOLUTION TOPICAL at 17:37

## 2022-08-01 RX ADMIN — MAGNESIUM OXIDE 400 MG ORAL TABLET 400 MILLIGRAM(S): 241.3 TABLET ORAL at 07:51

## 2022-08-01 RX ADMIN — PANTOPRAZOLE SODIUM 40 MILLIGRAM(S): 20 TABLET, DELAYED RELEASE ORAL at 12:13

## 2022-08-01 RX ADMIN — Medication 100 MILLIGRAM(S): at 12:06

## 2022-08-01 RX ADMIN — CARBAMIDE PEROXIDE 1 DROP(S): 81.86 SOLUTION/ DROPS AURICULAR (OTIC) at 06:44

## 2022-08-01 RX ADMIN — Medication 40 MILLIEQUIVALENT(S): at 05:27

## 2022-08-01 RX ADMIN — CHLORHEXIDINE GLUCONATE 1 APPLICATION(S): 213 SOLUTION TOPICAL at 11:58

## 2022-08-01 RX ADMIN — MAGNESIUM OXIDE 400 MG ORAL TABLET 400 MILLIGRAM(S): 241.3 TABLET ORAL at 17:43

## 2022-08-01 RX ADMIN — SCOPALAMINE 1 PATCH: 1 PATCH, EXTENDED RELEASE TRANSDERMAL at 21:57

## 2022-08-01 NOTE — SWALLOW BEDSIDE ASSESSMENT ADULT - ASR SWALLOW LABIAL MOBILITY
generalized weakness
lack of ROM, weakness/impaired retraction/impaired pursing/impaired seal/impaired coordination

## 2022-08-01 NOTE — SWALLOW BEDSIDE ASSESSMENT ADULT - SWALLOW EVAL: ORAL MUSCULATURE
lack of ROM. weakness/anomalies present
generalized weakness and poor participation d/t poor trunk control.
limited assessment, generalized weakness/unable to assess due to poor participation/comprehension

## 2022-08-01 NOTE — CHART NOTE - NSCHARTNOTEFT_GEN_A_CORE
Calorie count found hanging in patient's room, not filled out, working with ST, nursing also present, patient reports consuming mostly ice cream, yogurt, has some yogurt pouches present in room. Please re-order calorie count if indicated. Patient is being followed by Nutrition Support Team service.

## 2022-08-01 NOTE — SWALLOW BEDSIDE ASSESSMENT ADULT - SWALLOW EVAL: SECRETION MANAGEMENT
requires suctioning; secretions are minimal cloudy yellow tinge./problems swallowing secretions
baseline cough

## 2022-08-01 NOTE — PROGRESS NOTE ADULT - SUBJECTIVE AND OBJECTIVE BOX
Patient is awake and alert in bed, tolerating oral diet, no complaints       T(F): 96.2 (08-01-22 @ 04:48), Max: 97.1 (07-31-22 @ 14:30)  HR: 86 (08-01-22 @ 04:48)  BP: 140/96 (08-01-22 @ 04:48)  RR: 18 (07-31-22 @ 22:30)  SpO2: 98% (08-01-22 @ 03:00) (98% - 98%)    PHYSICAL EXAM:  GENERAL: NAD  HEAD:  Atraumatic, Normocephalic  EYES: EOMI, PERRLA, conjunctiva and sclera clear  NERVOUS SYSTEM:  Alert & Oriented X3, no focal deficits   CHEST/LUNG:  bilateral rhonchi  HEART: Regular rate and rhythm; No murmurs, rubs, or gallops  ABDOMEN: Soft, Nontender, Nondistended; Bowel sounds present  EXTREMITIES:  2+ Peripheral Pulses, No clubbing, cyanosis, or edema    LABS  07-31    141  |  100  |  4<L>  ----------------------------<  96  3.5   |  28  |  0.9    Ca    9.4      31 Jul 2022 06:45  Phos  5.2     07-31  Mg     1.5     07-31    TPro  5.7<L>  /  Alb  3.2<L>  /  TBili  0.5  /  DBili  x   /  AST  15  /  ALT  7   /  AlkPhos  254<H>  07-31                          8.0    0.97  )-----------( 70       ( 31 Jul 2022 06:45 )             25.5     Culture Results:   No Growth Final (07-25-22)  Culture Results:   Normal Respiratory Kelly present (07-15-22)  Culture Results:   Babesia microti PCR  Results: NOT detected  ***************Result Note*************  The detection of Babesia microti by PCR has only been  validated for whole blood; this test has not been approved  by the US Food and Drug Administration (FDA). Performance  characteristics of this assay have been determined by  [facility]. The clinical significance  of results should be considered in conjunction with the  overall clinical presentation of the patient. Result is not  intended to be used as the sole means for clinical diagnosis  or patient management decisions.  One negative sample does not necessarily rule  out the presence of a parasitic infection. (07-10-22)  Culture Results:   No Growth Final (07-08-22)  Culture Results:   No Growth Final (07-06-22)  Culture Results:   No Growth Final (07-06-22)  Culture Results:   Moderate Acinetobacter baumannii/nosocom group (Carbapenem Resistant)  Cefiderocol interpretations based on FDA breakpoints.  Normal Respiratory Kelly absent (07-04-22)      MEDICATIONS  (STANDING):  BACItracin   Ointment 1 Application(s) Topical two times a day  carbamide peroxide Otic Solution 1 Drop(s) Both Ears every 12 hours  chlorhexidine 0.12% Liquid 15 milliLiter(s) Oral Mucosa every 12 hours  chlorhexidine 2% Cloths 1 Application(s) Topical daily  cloNIDine 0.1 milliGRAM(s) Oral daily  enoxaparin Injectable 40 milliGRAM(s) SubCutaneous every 24 hours  fluticasone propionate 50 MICROgram(s)/spray Nasal Spray 1 Spray(s) Both Nostrils two times a day  folic acid 1 milliGRAM(s) Oral daily  magnesium oxide 400 milliGRAM(s) Oral three times a day with meals  methadone    Tablet 20 milliGRAM(s) Oral daily  nicotine -  14 mG/24Hr(s) Patch 1 patch Transdermal daily  pantoprazole   Suspension 40 milliGRAM(s) Oral daily  potassium chloride   Powder 40 milliEquivalent(s) Oral every 12 hours  QUEtiapine 100 milliGRAM(s) Oral two times a day  scopolamine 1 mG/72 Hr(s) Patch 1 Patch Transdermal every 72 hours  spironolactone 100 milliGRAM(s) Oral daily  thiamine 100 milliGRAM(s) Oral daily  vancomycin    Solution 125 milliGRAM(s) Oral every 12 hours    MEDICATIONS  (PRN):  acetaminophen     Tablet .. 650 milliGRAM(s) Oral every 6 hours PRN Temp greater or equal to 38.5C (101.3F), Moderate Pain (4 - 6)  ALBUTerol    90 MICROgram(s) HFA Inhaler 1 Puff(s) Inhalation every 4 hours PRN Shortness of Breath and/or Wheezing  cloNIDine 0.1 milliGRAM(s) Oral every 6 hours PRN opiate withdrawal  hydrOXYzine hydrochloride 50 milliGRAM(s) Oral every 6 hours PRN Anxiety  sodium chloride 0.9% lock flush 10 milliLiter(s) IV Push every 1 hour PRN Pre/post blood products, medications, blood draw, and to maintain line patency

## 2022-08-01 NOTE — PROGRESS NOTE ADULT - ASSESSMENT
Acute respiratory failure with hypoxemia / aspiration pneumonia with sepsis / suspected drug overdose / head laceration s/p fall in lobby / possible seizure / anasarca  pancytopenia      - completed course of  antibiotics as per ID    - complete oral vanco course until 8/3   - s/p transfusion  cbc now stable - trach site bleeding  resolved    -  supplement  hypokalemia and  hypomagnesemia    - tolerating diet    - DC planning   - hematology f/u prior to DC for continued pancytopenia

## 2022-08-02 LAB
ALBUMIN SERPL ELPH-MCNC: 3.7 G/DL — SIGNIFICANT CHANGE UP (ref 3.5–5.2)
ALBUMIN SERPL ELPH-MCNC: 4 G/DL — SIGNIFICANT CHANGE UP (ref 3.5–5.2)
ALP SERPL-CCNC: 279 U/L — HIGH (ref 30–115)
ALP SERPL-CCNC: 328 U/L — HIGH (ref 30–115)
ALT FLD-CCNC: 6 U/L — SIGNIFICANT CHANGE UP (ref 0–41)
ALT FLD-CCNC: 7 U/L — SIGNIFICANT CHANGE UP (ref 0–41)
ANION GAP SERPL CALC-SCNC: 10 MMOL/L — SIGNIFICANT CHANGE UP (ref 7–14)
ANION GAP SERPL CALC-SCNC: 11 MMOL/L — SIGNIFICANT CHANGE UP (ref 7–14)
AST SERPL-CCNC: 13 U/L — SIGNIFICANT CHANGE UP (ref 0–41)
AST SERPL-CCNC: 17 U/L — SIGNIFICANT CHANGE UP (ref 0–41)
BASOPHILS # BLD AUTO: 0 K/UL — SIGNIFICANT CHANGE UP (ref 0–0.2)
BASOPHILS NFR BLD AUTO: 0 % — SIGNIFICANT CHANGE UP (ref 0–1)
BILIRUB SERPL-MCNC: 0.5 MG/DL — SIGNIFICANT CHANGE UP (ref 0.2–1.2)
BILIRUB SERPL-MCNC: 0.6 MG/DL — SIGNIFICANT CHANGE UP (ref 0.2–1.2)
BUN SERPL-MCNC: 8 MG/DL — LOW (ref 10–20)
BUN SERPL-MCNC: 8 MG/DL — LOW (ref 10–20)
CALCIUM SERPL-MCNC: 9.3 MG/DL — SIGNIFICANT CHANGE UP (ref 8.5–10.1)
CALCIUM SERPL-MCNC: 9.6 MG/DL — SIGNIFICANT CHANGE UP (ref 8.5–10.1)
CHLORIDE SERPL-SCNC: 98 MMOL/L — SIGNIFICANT CHANGE UP (ref 98–110)
CHLORIDE SERPL-SCNC: 99 MMOL/L — SIGNIFICANT CHANGE UP (ref 98–110)
CO2 SERPL-SCNC: 27 MMOL/L — SIGNIFICANT CHANGE UP (ref 17–32)
CO2 SERPL-SCNC: 28 MMOL/L — SIGNIFICANT CHANGE UP (ref 17–32)
CREAT SERPL-MCNC: 0.7 MG/DL — SIGNIFICANT CHANGE UP (ref 0.7–1.5)
CREAT SERPL-MCNC: 0.8 MG/DL — SIGNIFICANT CHANGE UP (ref 0.7–1.5)
EGFR: 103 ML/MIN/1.73M2 — SIGNIFICANT CHANGE UP
EGFR: 121 ML/MIN/1.73M2 — SIGNIFICANT CHANGE UP
EOSINOPHIL # BLD AUTO: 0.01 K/UL — SIGNIFICANT CHANGE UP (ref 0–0.7)
EOSINOPHIL NFR BLD AUTO: 0.7 % — SIGNIFICANT CHANGE UP (ref 0–8)
GLUCOSE BLDC GLUCOMTR-MCNC: 118 MG/DL — HIGH (ref 70–99)
GLUCOSE SERPL-MCNC: 104 MG/DL — HIGH (ref 70–99)
GLUCOSE SERPL-MCNC: 98 MG/DL — SIGNIFICANT CHANGE UP (ref 70–99)
HCT VFR BLD CALC: 27.3 % — LOW (ref 37–47)
HCT VFR BLD CALC: 30.8 % — LOW (ref 37–47)
HGB BLD-MCNC: 8.5 G/DL — LOW (ref 12–16)
HGB BLD-MCNC: 9.8 G/DL — LOW (ref 12–16)
IMM GRANULOCYTES NFR BLD AUTO: 0 % — LOW (ref 0.1–0.3)
LACTATE SERPL-SCNC: 0.8 MMOL/L — SIGNIFICANT CHANGE UP (ref 0.7–2)
LYMPHOCYTES # BLD AUTO: 0.43 K/UL — LOW (ref 1.2–3.4)
LYMPHOCYTES # BLD AUTO: 29.9 % — SIGNIFICANT CHANGE UP (ref 20.5–51.1)
MAGNESIUM SERPL-MCNC: 1.7 MG/DL — LOW (ref 1.8–2.4)
MCHC RBC-ENTMCNC: 27.1 PG — SIGNIFICANT CHANGE UP (ref 27–31)
MCHC RBC-ENTMCNC: 27.2 PG — SIGNIFICANT CHANGE UP (ref 27–31)
MCHC RBC-ENTMCNC: 31.1 G/DL — LOW (ref 32–37)
MCHC RBC-ENTMCNC: 31.8 G/DL — LOW (ref 32–37)
MCV RBC AUTO: 85.6 FL — SIGNIFICANT CHANGE UP (ref 81–99)
MCV RBC AUTO: 86.9 FL — SIGNIFICANT CHANGE UP (ref 81–99)
MONOCYTES # BLD AUTO: 0.09 K/UL — LOW (ref 0.1–0.6)
MONOCYTES NFR BLD AUTO: 6.3 % — SIGNIFICANT CHANGE UP (ref 1.7–9.3)
NEUTROPHILS # BLD AUTO: 0.91 K/UL — LOW (ref 1.4–6.5)
NEUTROPHILS NFR BLD AUTO: 63.1 % — SIGNIFICANT CHANGE UP (ref 42.2–75.2)
NRBC # BLD: 0 /100 WBCS — SIGNIFICANT CHANGE UP (ref 0–0)
NRBC # BLD: 0 /100 WBCS — SIGNIFICANT CHANGE UP (ref 0–0)
PHOSPHATE SERPL-MCNC: 4.2 MG/DL — SIGNIFICANT CHANGE UP (ref 2.1–4.9)
PLATELET # BLD AUTO: 123 K/UL — LOW (ref 130–400)
PLATELET # BLD AUTO: 75 K/UL — LOW (ref 130–400)
POTASSIUM SERPL-MCNC: 3.9 MMOL/L — SIGNIFICANT CHANGE UP (ref 3.5–5)
POTASSIUM SERPL-MCNC: 4 MMOL/L — SIGNIFICANT CHANGE UP (ref 3.5–5)
POTASSIUM SERPL-SCNC: 3.9 MMOL/L — SIGNIFICANT CHANGE UP (ref 3.5–5)
POTASSIUM SERPL-SCNC: 4 MMOL/L — SIGNIFICANT CHANGE UP (ref 3.5–5)
PROT SERPL-MCNC: 6.5 G/DL — SIGNIFICANT CHANGE UP (ref 6–8)
PROT SERPL-MCNC: 7.5 G/DL — SIGNIFICANT CHANGE UP (ref 6–8)
RBC # BLD: 3.14 M/UL — LOW (ref 4.2–5.4)
RBC # BLD: 3.6 M/UL — LOW (ref 4.2–5.4)
RBC # FLD: 13.7 % — SIGNIFICANT CHANGE UP (ref 11.5–14.5)
RBC # FLD: 13.8 % — SIGNIFICANT CHANGE UP (ref 11.5–14.5)
SODIUM SERPL-SCNC: 136 MMOL/L — SIGNIFICANT CHANGE UP (ref 135–146)
SODIUM SERPL-SCNC: 137 MMOL/L — SIGNIFICANT CHANGE UP (ref 135–146)
WBC # BLD: 1.44 K/UL — LOW (ref 4.8–10.8)
WBC # BLD: 3.64 K/UL — LOW (ref 4.8–10.8)
WBC # FLD AUTO: 1.44 K/UL — LOW (ref 4.8–10.8)
WBC # FLD AUTO: 3.64 K/UL — LOW (ref 4.8–10.8)

## 2022-08-02 PROCEDURE — 99232 SBSQ HOSP IP/OBS MODERATE 35: CPT

## 2022-08-02 PROCEDURE — G0425: CPT | Mod: 95

## 2022-08-02 RX ORDER — METHADONE HYDROCHLORIDE 40 MG/1
10 TABLET ORAL DAILY
Refills: 0 | Status: DISCONTINUED | OUTPATIENT
Start: 2022-08-02 | End: 2022-08-03

## 2022-08-02 RX ADMIN — CHLORHEXIDINE GLUCONATE 15 MILLILITER(S): 213 SOLUTION TOPICAL at 05:26

## 2022-08-02 RX ADMIN — Medication 40 MILLIEQUIVALENT(S): at 18:20

## 2022-08-02 RX ADMIN — Medication 0.1 MILLIGRAM(S): at 05:23

## 2022-08-02 RX ADMIN — CHLORHEXIDINE GLUCONATE 1 APPLICATION(S): 213 SOLUTION TOPICAL at 12:51

## 2022-08-02 RX ADMIN — Medication 1 APPLICATION(S): at 18:17

## 2022-08-02 RX ADMIN — Medication 1 SPRAY(S): at 06:09

## 2022-08-02 RX ADMIN — Medication 100 MILLIGRAM(S): at 12:41

## 2022-08-02 RX ADMIN — SPIRONOLACTONE 100 MILLIGRAM(S): 25 TABLET, FILM COATED ORAL at 05:22

## 2022-08-02 RX ADMIN — Medication 1 APPLICATION(S): at 05:25

## 2022-08-02 RX ADMIN — MAGNESIUM OXIDE 400 MG ORAL TABLET 400 MILLIGRAM(S): 241.3 TABLET ORAL at 18:20

## 2022-08-02 RX ADMIN — MAGNESIUM OXIDE 400 MG ORAL TABLET 400 MILLIGRAM(S): 241.3 TABLET ORAL at 12:41

## 2022-08-02 RX ADMIN — Medication 1 PATCH: at 12:40

## 2022-08-02 RX ADMIN — QUETIAPINE FUMARATE 100 MILLIGRAM(S): 200 TABLET, FILM COATED ORAL at 21:21

## 2022-08-02 RX ADMIN — Medication 40 MILLIEQUIVALENT(S): at 05:24

## 2022-08-02 RX ADMIN — CARBAMIDE PEROXIDE 1 DROP(S): 81.86 SOLUTION/ DROPS AURICULAR (OTIC) at 05:39

## 2022-08-02 RX ADMIN — CHLORHEXIDINE GLUCONATE 15 MILLILITER(S): 213 SOLUTION TOPICAL at 18:20

## 2022-08-02 RX ADMIN — ENOXAPARIN SODIUM 40 MILLIGRAM(S): 100 INJECTION SUBCUTANEOUS at 05:23

## 2022-08-02 RX ADMIN — QUETIAPINE FUMARATE 100 MILLIGRAM(S): 200 TABLET, FILM COATED ORAL at 05:24

## 2022-08-02 RX ADMIN — Medication 125 MILLIGRAM(S): at 18:20

## 2022-08-02 RX ADMIN — Medication 1 MILLIGRAM(S): at 12:41

## 2022-08-02 RX ADMIN — Medication 1 PATCH: at 07:00

## 2022-08-02 RX ADMIN — Medication 125 MILLIGRAM(S): at 06:23

## 2022-08-02 RX ADMIN — PANTOPRAZOLE SODIUM 40 MILLIGRAM(S): 20 TABLET, DELAYED RELEASE ORAL at 12:40

## 2022-08-02 RX ADMIN — Medication 1 PATCH: at 10:59

## 2022-08-02 RX ADMIN — METHADONE HYDROCHLORIDE 10 MILLIGRAM(S): 40 TABLET ORAL at 12:40

## 2022-08-02 RX ADMIN — Medication 1 PATCH: at 18:47

## 2022-08-02 RX ADMIN — MAGNESIUM OXIDE 400 MG ORAL TABLET 400 MILLIGRAM(S): 241.3 TABLET ORAL at 08:03

## 2022-08-02 RX ADMIN — SCOPALAMINE 1 PATCH: 1 PATCH, EXTENDED RELEASE TRANSDERMAL at 18:47

## 2022-08-02 RX ADMIN — CARBAMIDE PEROXIDE 1 DROP(S): 81.86 SOLUTION/ DROPS AURICULAR (OTIC) at 17:52

## 2022-08-02 RX ADMIN — SCOPALAMINE 1 PATCH: 1 PATCH, EXTENDED RELEASE TRANSDERMAL at 07:00

## 2022-08-02 RX ADMIN — Medication 1 SPRAY(S): at 21:21

## 2022-08-02 NOTE — CONSULT NOTE ADULT - ASSESSMENT
Pancytopenia, multifactorial. See Note above.  Situation discussed with the patient and the mother. All their questions answered.

## 2022-08-02 NOTE — CONSULT NOTE ADULT - CONSULT REQUESTED DATE/TIME
02-Aug-2022
15-Reinaldo-2022 14:38
19-Jul-2022 10:28
01-Jul-2022 15:17
13-Jul-2022 20:19
15-Reinaldo-2022 15:30
17-Jun-2022 07:57
17-Jun-2022 11:00
27-Jun-2022 10:30
28-Jun-2022 10:13
29-Jun-2022
29-Jun-2022 16:57
11-Jul-2022 14:55
16-Jun-2022 17:09
15-Reinaldo-2022 16:04
27-Jun-2022 15:00
12-Jul-2022 16:14

## 2022-08-02 NOTE — CONSULT NOTE ADULT - PROVIDER SPECIALTY LIST ADULT
Heme/Onc
Intervent Radiology
Intervent Radiology
Heme/Onc
Neurology
Heme/Onc
Nutrition Support
Cardiology
Gastroenterology
Pulmonology
Infectious Disease
Nephrology
Surgery
Wound Care
Rheumatology
Surgery
Addiction Medicine

## 2022-08-02 NOTE — CHART NOTE - NSCHARTNOTEFT_GEN_A_CORE
Patient found with Altered mental status: questionable intact of outside medication  - evaluated by Dr Husain: send Serum drug screen, CBC, Lactate, CMP for 7PM

## 2022-08-02 NOTE — BH CONSULTATION LIAISON ASSESSMENT NOTE - NSBHCHARTREVIEWVS_PSY_A_CORE FT
Vital Signs Last 24 Hrs  T(C): 35.8 (02 Aug 2022 14:00), Max: 36 (01 Aug 2022 21:00)  T(F): 96.5 (02 Aug 2022 14:00), Max: 96.8 (01 Aug 2022 21:00)  HR: 101 (02 Aug 2022 14:00) (87 - 110)  BP: 136/86 (02 Aug 2022 14:00) (136/86 - 157/102)  BP(mean): --  RR: 18 (02 Aug 2022 14:00) (18 - 18)  SpO2: --

## 2022-08-02 NOTE — BH CONSULTATION LIAISON ASSESSMENT NOTE - RISK ASSESSMENT
chronic rf: substance use hx, mdd  acute rf: medical issues, substance use hx  protective f: domiciled

## 2022-08-02 NOTE — CONSULT NOTE ADULT - REASON FOR ADMISSION
anasarca

## 2022-08-02 NOTE — BH CONSULTATION LIAISON ASSESSMENT NOTE - HPI (INCLUDE ILLNESS QUALITY, SEVERITY, DURATION, TIMING, CONTEXT, MODIFYING FACTORS, ASSOCIATED SIGNS AND SYMPTOMS)
Patient is a 26 year old F with Acute respiratory failure with hypoxemia, resolving/aspiration pneumonia with sepsis in the setting of substance use, patient admitted head laceration s/p fall in lobby / possible seizure / anasarca / c-diff pancytopenia. Tele psychiatr consulted for depression.

## 2022-08-02 NOTE — CONSULT NOTE ADULT - CONSULT REASON
paracentesis
Pancytopenia
Polysubstance Dependency
Pressure injury assessment an treatment  recommendation
RENETTA
fever
seizure
Persistent fevers and multiorgan system dysfunction, rule out autoimmune etiology
Tracheostomy
enteral feeding, skin breakdown
pancytopenia
scalp laceration s/p seizure & fall
fluid overload, ascites
Pancytopenia
abnormal tte
paracentesis
ARf

## 2022-08-02 NOTE — BH CONSULTATION LIAISON ASSESSMENT NOTE - SUMMARY
Patient is a 26 year old F with extensive polysubstance use disorder Acute respiratory failure with hypoxemia, resolving/aspiration pneumonia with sepsis in the setting of substance use, patient admitted head laceration s/p fall in lobby / possible seizure / anasarca / c-diff pancytopenia. Tele psychiatry consulted for depression. Upon assessment atient reports depression in the contecct f long hosiptal stay. Patient denies any SI.HI.AVH. Patient is a 26 year old F with extensive polysubstance use disorder Acute respiratory failure with hypoxemia, resolving/aspiration pneumonia with sepsis in the setting of substance use, patient admitted head laceration s/p fall in lobby / possible seizure / anasarca / c-diff pancytopenia. Tele psychiatry consulted for depression. Upon assessment patient reports depression in the context of long hospital stay. Patient denies any SI/HI/AVH. Given no acute symptoms at this time patient would benefit from outpatient psychiatry follow up.

## 2022-08-02 NOTE — PROGRESS NOTE ADULT - ASSESSMENT
Acute respiratory failure with hypoxemia - resolving / aspiration pneumonia with sepsis - resolved / drug overdose / head laceration s/p fall in lobby / possible seizure / anasarca / c-diff  pancytopenia      - pt requesting psych eval for depression   - continue quetiapine, methadone, clonidine and atarax   - completed course of  antibiotics as per ID    - complete oral vanco course until 8/3   - s/p transfusion  cbc now stable - trach site bleeding  resolved    -  supplement  hypokalemia and  hypomagnesemia    - tolerating diet    - DC planning   - hematology f/u prior to DC for  pancytopenia

## 2022-08-02 NOTE — BH CONSULTATION LIAISON ASSESSMENT NOTE - CURRENT MEDICATION
MEDICATIONS  (STANDING):  BACItracin   Ointment 1 Application(s) Topical two times a day  carbamide peroxide Otic Solution 1 Drop(s) Both Ears every 12 hours  chlorhexidine 0.12% Liquid 15 milliLiter(s) Oral Mucosa every 12 hours  chlorhexidine 2% Cloths 1 Application(s) Topical daily  cloNIDine 0.1 milliGRAM(s) Oral daily  enoxaparin Injectable 40 milliGRAM(s) SubCutaneous every 24 hours  fluticasone propionate 50 MICROgram(s)/spray Nasal Spray 1 Spray(s) Both Nostrils two times a day  folic acid 1 milliGRAM(s) Oral daily  magnesium oxide 400 milliGRAM(s) Oral three times a day with meals  methadone    Tablet 10 milliGRAM(s) Oral daily  nicotine -  14 mG/24Hr(s) Patch 1 patch Transdermal daily  pantoprazole   Suspension 40 milliGRAM(s) Oral daily  potassium chloride   Powder 40 milliEquivalent(s) Oral every 12 hours  QUEtiapine 100 milliGRAM(s) Oral two times a day  scopolamine 1 mG/72 Hr(s) Patch 1 Patch Transdermal every 72 hours  spironolactone 100 milliGRAM(s) Oral daily  thiamine 100 milliGRAM(s) Oral daily  vancomycin    Solution 125 milliGRAM(s) Oral every 12 hours    MEDICATIONS  (PRN):  acetaminophen     Tablet .. 650 milliGRAM(s) Oral every 6 hours PRN Temp greater or equal to 38.5C (101.3F), Moderate Pain (4 - 6)  ALBUTerol    90 MICROgram(s) HFA Inhaler 1 Puff(s) Inhalation every 4 hours PRN Shortness of Breath and/or Wheezing  cloNIDine 0.1 milliGRAM(s) Oral every 6 hours PRN opiate withdrawal  hydrOXYzine hydrochloride 50 milliGRAM(s) Oral every 6 hours PRN Anxiety  sodium chloride 0.9% lock flush 10 milliLiter(s) IV Push every 1 hour PRN Pre/post blood products, medications, blood draw, and to maintain line patency

## 2022-08-02 NOTE — CONSULT NOTE ADULT - SUBJECTIVE AND OBJECTIVE BOX
The patient was seen previously for her pancytopenia aggravated with sepsis.   Hematology follow up was requested yesterday because of drop of her counts again.  The patient was recently treated for sepsis.  At present, she is alert. Looks depressed. Wants to speak to a psychiatrist  The patient is not febrile at this time.  The patient is known having splenomegaly (about 20 cm). Bone marrow studies did not reveal any malignancy or explanation for her splenomegaly which was diffuse without nodules or masses. It was felt that the splenomegaly was secondary to her cirrhosis.    She looks alert but depressed, crying easily.    Her PMH includes asthma, anxiety, depression, IV drug abuse.    Her medications include: Thiamine, spironolactone, scopolamine, albuterol, folic acid, Bacitracin topical, enoxaparin, methadone, nicotine, pantoprazole, quetiapine.    Review of systems was significant for still generalized weakness, sill some SOB without O2 (although she seems not be dependent on the respirator as before), significant weight loss.    Physical examination:  HEENT: bitemporal wastage. Tracheostomy noted covered with O2 mask.  Lungs: essentially clear.  Heart: RR. No significant mRG.  Abdomen: distended (patient examined sitting in the chair, known to have splenomegaly about 20 cm)  Extremities: no significant CCE.    Blood work shows satisfactory chemistries although alk. phos. elevated. However, the CBC shows significant fluctuation of the hemogram. A couple of days ago the WBC was down to around 1 K then yesterday it was up above 2.5, platelets were around 65 K then went above 103 K. The only consistency of the hemogram has been the variability. It's most likely multifactorial including her poor general condition overall.     Impression:  Pancytopenia, multifactorial including her poor general condition (almost marasmus although she being fed parenterally also) and the splenomegaly causing at least a certain degree of hypersplenism causing the cytopenias.  Erythropoietin analogs may be tried for the anemia, but I do not advise using GSCF (i.e. Neupogen or similars) given the significant splenomegaly.  Iron supplementation should also be considered given the absence of iron noted in the bone marrow sample. A baseline ferritin may also be obtained especially that she was transfused on different occasions during this admission.  There is no evidence of any specific hematologic entity otherwise that has been detected. Supportive treatment may continue as previously.   In addition, given her overall mental and psychological condition, she may benefit from a psychiatric evaluation at least.         The patient was seen previously for her pancytopenia aggravated with sepsis.   Hematology follow up was requested yesterday because of drop of her counts again.  The patient was recently treated for sepsis.  At present, she is alert. Looks depressed. Wants to speak to a psychiatrist  The patient is not febrile at this time.  The patient is known having splenomegaly (about 20 cm). Bone marrow studies did not reveal any malignancy or explanation for her splenomegaly which was diffuse without nodules or masses. It was felt that the splenomegaly was secondary to her cirrhosis.    She looks alert but depressed, crying easily.    Her PMH includes asthma, anxiety, depression, IV drug abuse.    Her medications include: Thiamine, spironolactone, scopolamine, albuterol, folic acid, Bacitracin topical, enoxaparin, methadone, nicotine, pantoprazole, quetiapine.    Review of systems was significant for still generalized weakness, sill some SOB without O2 (although she seems not be dependent on the respirator as before), significant weight loss.    Physical examination:  HEENT: bitemporal wastage. Tracheostomy noted covered with O2 mask.  Lungs: essentially clear.  Heart: RR. No significant mRG.  Abdomen: distended (patient examined sitting in the chair, known to have splenomegaly about 20 cm), non-tender.  Extremities: no significant CCE.    Blood work shows satisfactory chemistries although alk. phos. elevated. However, the CBC shows significant fluctuation of the hemogram. A couple of days ago the WBC was down to around 1 K then yesterday it was up above 2.5, platelets were around 65 K then went above 103 K. The only consistency of the hemogram has been the variability. It's most likely multifactorial including her poor general condition overall.     Impression:  Pancytopenia, multifactorial including her poor general condition (almost marasmus although she being fed parenterally also) and the splenomegaly causing at least a certain degree of hypersplenism causing the cytopenias.  Erythropoietin analogs may be tried for the anemia, but I do not advise using GSCF (i.e. Neupogen or similars) given the significant splenomegaly.  Iron supplementation should also be considered given the absence of iron noted in the bone marrow sample. A baseline ferritin may also be obtained especially that she was transfused on different occasions during this admission.  There is no evidence of any specific hematologic entity otherwise that has been detected. Supportive treatment may continue as previously.   In addition, given her overall mental and psychological condition, she may benefit from a psychiatric evaluation at least.         The patient was seen previously for her pancytopenia aggravated with sepsis.   Hematology follow up was requested yesterday because of drop of her counts again.  The patient was recently treated for sepsis.  At present, she is alert. Looks depressed. Wants to speak to a psychiatrist  The patient is not febrile at this time.  The patient is known having splenomegaly (about 20 cm). Bone marrow studies did not reveal any malignancy or explanation for her splenomegaly which was diffuse without nodules or masses. It was felt that the splenomegaly was secondary to her cirrhosis.    She looks alert but depressed, crying easily.    Her PMH includes asthma, anxiety, depression, IV drug abuse.    Her medications include: Thiamine, spironolactone, scopolamine, albuterol, folic acid, Bacitracin topical, enoxaparin, methadone, nicotine, pantoprazole, quetiapine.    Review of systems was significant for still generalized weakness, sill some SOB without O2 (although she seems not be dependent on the respirator as before), significant weight loss.    Physical examination:  HEENT: bitemporal wastage. Tracheostomy noted covered with O2 mask.  Lungs: essentially clear.  Heart: RR. No significant mRG.  Abdomen: distended (patient examined sitting in the chair, known to have splenomegaly about 20 cm), non-tender.  Extremities: no significant CCE.    Blood work shows satisfactory chemistries although alk. phos. elevated. However, the CBC shows significant fluctuation of the hemogram. A couple of days ago the WBC was down to around 1 K then yesterday it was up above 2.5, platelets were around 65 K then went above 103 K. The only consistency of the hemogram has been the variability. It's most likely multifactorial including her poor general condition overall.     Impression:  Pancytopenia, multifactorial including her poor general condition (almost marasmus although she being fed parenterally also) and the splenomegaly causing at least a certain degree of hypersplenism causing the cytopenias.  Erythropoietin analogs may be tried for the anemia, but I do not advise using GSCF (i.e. Neupogen or similars) given the significant splenomegaly. Splenectomy may be considered cautiously given the patient's age and overall condition.  Iron supplementation should also be considered given the absence of iron noted in the bone marrow sample. A baseline ferritin may be obtained especially that she was transfused on different occasions during this admission and the bone marrow sample may not be representative of all her iron stores.  There is no evidence of any specific hematologic entity otherwise that has been detected. Supportive treatment may continue as previously.   In addition, given her overall mental and psychological condition, she may benefit from a psychiatric evaluation at least.

## 2022-08-02 NOTE — PROGRESS NOTE ADULT - ASSESSMENT
IMPRESSION:  Acute respiratory failure sp intubation  PNA  MSSA pna  seizure like activity  ?? drug over dose/ withdrawal opoid   liver cirrhosis   Ascites sp paracentesis   Pancytopenia- worsening  RENETTA improved  C diff +ve   pancytopenia  skin breakdown    - pt now on po diet  - add Ensure Enlive and pudding between meals  - if pt remains afebrile, add 2 Malick daily x 14 days  - consider course of Kelly Stor 250 mg twice daily enterally x 14 days (r/t mult antimicrobials and being c diff +)  - should d/c high dose B1 and folate - no reason to treat for > about 7 days, and she's been on them since admission

## 2022-08-02 NOTE — CHART NOTE - NSCHARTNOTEFT_GEN_A_CORE
The pt was rapid response ~ 5:30 pm for metabolic encephalopathy for a few minutes of being unresponsive. vital stable. . no hypotension, slightly tachycardiac, regular on palpation. Per nursing staff the patient was unattended at time of episode. Physical exam unremarkable except dilated pupils bilaterally , responsive to light. Patient not confused post episode of being unconscious. Will order cbc, cmp, lactate, tox screen. security to search belongings for possible substance ingestion. patient denies SI/HI The pt was rapid response ~ 5:30 pm for metabolic encephalopathy for a few minutes of being unresponsive. vital stable. . no hypotension, slightly tachycardiac, regular on palpation. Per nursing staff the patient was unattended at time of episode. Physical exam unremarkable except dilated pupils bilaterally , responsive to light. Patient not confused post episode of being unconscious. Will order cbc, cmp, lactate, tox screen. security to search belongings for possible substance ingestion. patient denies SI/HI. Recommend restricted visitation. Patient agreeable with plan.

## 2022-08-02 NOTE — CONSULT NOTE ADULT - CONSULT REQUESTED BY NAME
medicine
icu
ARABELLA Sorensen
primary team
x
medicine
Dr Chaudhry
Dr Frazier
Medicine
RUSSELL De La Cruz
Dr Anna
Dr. Mcdermott
ICU team
Medicine
Dr. ARABELLA Sorensen
x
hospitalist

## 2022-08-02 NOTE — PROGRESS NOTE ADULT - SUBJECTIVE AND OBJECTIVE BOX
pt seen this morning, in bed, no distress  + trach, O2 collar  skin turgor good  abd distended, softer, NT  pt now receiving and tolerating po diet, although intake seems variable  no edema, remains markedly chronically cachectic  58.6 kg on 7/31   T(F): 96.5 (08-02-22 @ 14:00), Max: 96.8 (08-01-22 @ 21:00)  HR: 101 (08-02-22 @ 14:00) (87 - 110)  BP: 136/86 (08-02-22 @ 14:00) (136/86 - 157/102)  RR: 18 (08-02-22 @ 14:00) (18 - 18)    MEDICATIONS  (STANDING):  BACItracin   Ointment 1 Application(s) Topical two times a day  carbamide peroxide Otic Solution 1 Drop(s) Both Ears every 12 hours  chlorhexidine 0.12% Liquid 15 milliLiter(s) Oral Mucosa every 12 hours  chlorhexidine 2% Cloths 1 Application(s) Topical daily  cloNIDine 0.1 milliGRAM(s) Oral daily  enoxaparin Injectable 40 milliGRAM(s) SubCutaneous every 24 hours  fluticasone propionate 50 MICROgram(s)/spray Nasal Spray 1 Spray(s) Both Nostrils two times a day  folic acid 1 milliGRAM(s) Oral daily  magnesium oxide 400 milliGRAM(s) Oral three times a day with meals  methadone    Tablet 10 milliGRAM(s) Oral daily  nicotine -  14 mG/24Hr(s) Patch 1 patch Transdermal daily  pantoprazole   Suspension 40 milliGRAM(s) Oral daily  potassium chloride   Powder 40 milliEquivalent(s) Oral every 12 hours  QUEtiapine 100 milliGRAM(s) Oral two times a day  scopolamine 1 mG/72 Hr(s) Patch 1 Patch Transdermal every 72 hours  spironolactone 100 milliGRAM(s) Oral daily  thiamine 100 milliGRAM(s) Oral daily  vancomycin    Solution 125 milliGRAM(s) Oral every 12 hours        08-02    136  |  98  |  8<L>  ----------------------------<  98  3.9   |  28  |  0.7    Ca    9.3      02 Aug 2022 06:58  Phos  4.2     08-02  Mg     1.7     08-02    TPro  6.5  /  Alb  3.7  /  TBili  0.5  /  DBili  x   /  AST  13  /  ALT  6   /  AlkPhos  279<H>  08-02                          8.5    1.44  )-----------( 75       ( 02 Aug 2022 06:58 )             27.3     CAPILLARY BLOOD GLUCOSE      POCT Blood Glucose.: 118 mg/dL (02 Aug 2022 17:34)    Diet, Easy to Chew:   Moderately Thick Liquids (MODTHICKLIQS) (08-01-22 @ 13:14) [Active]

## 2022-08-02 NOTE — PROGRESS NOTE ADULT - SUBJECTIVE AND OBJECTIVE BOX
Patient is awake and alert, tolerating oral diet, asking for psych eval for depression       T(F): 96.3 (08-02-22 @ 05:00), Max: 96.8 (08-01-22 @ 21:00)  HR: 87 (08-02-22 @ 05:00)  BP: 157/102 (08-02-22 @ 05:00)  RR: 18 (08-02-22 @ 05:00)      PHYSICAL EXAM:  GENERAL: NAD  HEAD:  Atraumatic, Normocephalic  EYES: EOMI, PERRLA, conjunctiva and sclera clear  NERVOUS SYSTEM:  Alert & Oriented X3, no focal deficits   CHEST/LUNG: Clear to percussion bilaterally; No rales, rhonchi, wheezing, or rubs  HEART: Regular rate and rhythm; No murmurs, rubs, or gallops  ABDOMEN: Soft, Nontender, Nondistended; Bowel sounds present  EXTREMITIES:  2+ Peripheral Pulses, No clubbing, cyanosis, or edema    LABS  08-01    132<L>  |  94<L>  |  6<L>  ----------------------------<  100<H>  4.3   |  27  |  0.8    Ca    9.8      01 Aug 2022 11:00  Phos  4.4     08-01  Mg     1.9     08-01    TPro  6.4  /  Alb  3.3<L>  /  TBili  0.5  /  DBili  x   /  AST  15  /  ALT  7   /  AlkPhos  303<H>  08-01                          9.9    2.49  )-----------( 103      ( 01 Aug 2022 11:00 )             31.4       Culture Results:   No Growth Final (07-25-22)  Culture Results:   Normal Respiratory Kelly present (07-15-22)  Culture Results:   Babesia microti PCR  Results: NOT detected  ***************Result Note*************  The detection of Babesia microti by PCR has only been  validated for whole blood; this test has not been approved  by the US Food and Drug Administration (FDA). Performance  characteristics of this assay have been determined by  [employer] [facility]. The clinical significance  of results should be considered in conjunction with the  overall clinical presentation of the patient. Result is not  intended to be used as the sole means for clinical diagnosis  or patient management decisions.  One negative sample does not necessarily rule  out the presence of a parasitic infection. (07-10-22)  Culture Results:   No Growth Final (07-08-22)  Culture Results:   No Growth Final (07-06-22)  Culture Results:   No Growth Final (07-06-22)  Culture Results:   Moderate Acinetobacter baumannii/nosocom group (Carbapenem Resistant)  Cefiderocol interpretations based on FDA breakpoints.  Normal Respiratory Kelly absent (07-04-22)      MEDICATIONS  (STANDING):  BACItracin   Ointment 1 Application(s) Topical two times a day  carbamide peroxide Otic Solution 1 Drop(s) Both Ears every 12 hours  chlorhexidine 0.12% Liquid 15 milliLiter(s) Oral Mucosa every 12 hours  chlorhexidine 2% Cloths 1 Application(s) Topical daily  cloNIDine 0.1 milliGRAM(s) Oral daily  enoxaparin Injectable 40 milliGRAM(s) SubCutaneous every 24 hours  fluticasone propionate 50 MICROgram(s)/spray Nasal Spray 1 Spray(s) Both Nostrils two times a day  folic acid 1 milliGRAM(s) Oral daily  magnesium oxide 400 milliGRAM(s) Oral three times a day with meals  methadone    Tablet 15 milliGRAM(s) Oral <User Schedule>  nicotine -  14 mG/24Hr(s) Patch 1 patch Transdermal daily  pantoprazole   Suspension 40 milliGRAM(s) Oral daily  potassium chloride   Powder 40 milliEquivalent(s) Oral every 12 hours  QUEtiapine 100 milliGRAM(s) Oral two times a day  scopolamine 1 mG/72 Hr(s) Patch 1 Patch Transdermal every 72 hours  spironolactone 100 milliGRAM(s) Oral daily  thiamine 100 milliGRAM(s) Oral daily  vancomycin    Solution 125 milliGRAM(s) Oral every 12 hours    MEDICATIONS  (PRN):  acetaminophen     Tablet .. 650 milliGRAM(s) Oral every 6 hours PRN Temp greater or equal to 38.5C (101.3F), Moderate Pain (4 - 6)  ALBUTerol    90 MICROgram(s) HFA Inhaler 1 Puff(s) Inhalation every 4 hours PRN Shortness of Breath and/or Wheezing  cloNIDine 0.1 milliGRAM(s) Oral every 6 hours PRN opiate withdrawal  hydrOXYzine hydrochloride 50 milliGRAM(s) Oral every 6 hours PRN Anxiety  sodium chloride 0.9% lock flush 10 milliLiter(s) IV Push every 1 hour PRN Pre/post blood products, medications, blood draw, and to maintain line patency

## 2022-08-03 LAB
ALBUMIN SERPL ELPH-MCNC: 3.8 G/DL — SIGNIFICANT CHANGE UP (ref 3.5–5.2)
ALP SERPL-CCNC: 293 U/L — HIGH (ref 30–115)
ALT FLD-CCNC: 6 U/L — SIGNIFICANT CHANGE UP (ref 0–41)
ANION GAP SERPL CALC-SCNC: 13 MMOL/L — SIGNIFICANT CHANGE UP (ref 7–14)
AST SERPL-CCNC: 16 U/L — SIGNIFICANT CHANGE UP (ref 0–41)
BASOPHILS # BLD AUTO: 0.01 K/UL — SIGNIFICANT CHANGE UP (ref 0–0.2)
BASOPHILS NFR BLD AUTO: 0.4 % — SIGNIFICANT CHANGE UP (ref 0–1)
BILIRUB SERPL-MCNC: 0.6 MG/DL — SIGNIFICANT CHANGE UP (ref 0.2–1.2)
BUN SERPL-MCNC: 8 MG/DL — LOW (ref 10–20)
CALCIUM SERPL-MCNC: 9.9 MG/DL — SIGNIFICANT CHANGE UP (ref 8.5–10.1)
CHLORIDE SERPL-SCNC: 100 MMOL/L — SIGNIFICANT CHANGE UP (ref 98–110)
CO2 SERPL-SCNC: 24 MMOL/L — SIGNIFICANT CHANGE UP (ref 17–32)
CREAT SERPL-MCNC: 0.7 MG/DL — SIGNIFICANT CHANGE UP (ref 0.7–1.5)
EGFR: 121 ML/MIN/1.73M2 — SIGNIFICANT CHANGE UP
EOSINOPHIL # BLD AUTO: 0 K/UL — SIGNIFICANT CHANGE UP (ref 0–0.7)
EOSINOPHIL NFR BLD AUTO: 0 % — SIGNIFICANT CHANGE UP (ref 0–8)
GLUCOSE SERPL-MCNC: 91 MG/DL — SIGNIFICANT CHANGE UP (ref 70–99)
HCT VFR BLD CALC: 29 % — LOW (ref 37–47)
HGB BLD-MCNC: 9.1 G/DL — LOW (ref 12–16)
IMM GRANULOCYTES NFR BLD AUTO: 0.4 % — HIGH (ref 0.1–0.3)
LYMPHOCYTES # BLD AUTO: 0.72 K/UL — LOW (ref 1.2–3.4)
LYMPHOCYTES # BLD AUTO: 30.9 % — SIGNIFICANT CHANGE UP (ref 20.5–51.1)
MAGNESIUM SERPL-MCNC: 1.6 MG/DL — LOW (ref 1.8–2.4)
MCHC RBC-ENTMCNC: 27.1 PG — SIGNIFICANT CHANGE UP (ref 27–31)
MCHC RBC-ENTMCNC: 31.4 G/DL — LOW (ref 32–37)
MCV RBC AUTO: 86.3 FL — SIGNIFICANT CHANGE UP (ref 81–99)
MONOCYTES # BLD AUTO: 0.12 K/UL — SIGNIFICANT CHANGE UP (ref 0.1–0.6)
MONOCYTES NFR BLD AUTO: 5.2 % — SIGNIFICANT CHANGE UP (ref 1.7–9.3)
NEUTROPHILS # BLD AUTO: 1.47 K/UL — SIGNIFICANT CHANGE UP (ref 1.4–6.5)
NEUTROPHILS NFR BLD AUTO: 63.1 % — SIGNIFICANT CHANGE UP (ref 42.2–75.2)
NRBC # BLD: 0 /100 WBCS — SIGNIFICANT CHANGE UP (ref 0–0)
PHOSPHATE SERPL-MCNC: 4.5 MG/DL — SIGNIFICANT CHANGE UP (ref 2.1–4.9)
PLATELET # BLD AUTO: 116 K/UL — LOW (ref 130–400)
POTASSIUM SERPL-MCNC: 4.4 MMOL/L — SIGNIFICANT CHANGE UP (ref 3.5–5)
POTASSIUM SERPL-SCNC: 4.4 MMOL/L — SIGNIFICANT CHANGE UP (ref 3.5–5)
PROT SERPL-MCNC: 6.9 G/DL — SIGNIFICANT CHANGE UP (ref 6–8)
RBC # BLD: 3.36 M/UL — LOW (ref 4.2–5.4)
RBC # FLD: 13.7 % — SIGNIFICANT CHANGE UP (ref 11.5–14.5)
SODIUM SERPL-SCNC: 137 MMOL/L — SIGNIFICANT CHANGE UP (ref 135–146)
WBC # BLD: 2.33 K/UL — LOW (ref 4.8–10.8)
WBC # FLD AUTO: 2.33 K/UL — LOW (ref 4.8–10.8)

## 2022-08-03 PROCEDURE — 99231 SBSQ HOSP IP/OBS SF/LOW 25: CPT

## 2022-08-03 PROCEDURE — 99232 SBSQ HOSP IP/OBS MODERATE 35: CPT

## 2022-08-03 RX ORDER — MAGNESIUM SULFATE 500 MG/ML
2 VIAL (ML) INJECTION ONCE
Refills: 0 | Status: COMPLETED | OUTPATIENT
Start: 2022-08-03 | End: 2022-08-03

## 2022-08-03 RX ORDER — METHADONE HYDROCHLORIDE 40 MG/1
5 TABLET ORAL DAILY
Refills: 0 | Status: DISCONTINUED | OUTPATIENT
Start: 2022-08-03 | End: 2022-08-04

## 2022-08-03 RX ORDER — SERTRALINE 25 MG/1
50 TABLET, FILM COATED ORAL DAILY
Refills: 0 | Status: DISCONTINUED | OUTPATIENT
Start: 2022-08-03 | End: 2022-08-04

## 2022-08-03 RX ADMIN — MAGNESIUM OXIDE 400 MG ORAL TABLET 400 MILLIGRAM(S): 241.3 TABLET ORAL at 13:47

## 2022-08-03 RX ADMIN — MAGNESIUM OXIDE 400 MG ORAL TABLET 400 MILLIGRAM(S): 241.3 TABLET ORAL at 17:36

## 2022-08-03 RX ADMIN — PANTOPRAZOLE SODIUM 40 MILLIGRAM(S): 20 TABLET, DELAYED RELEASE ORAL at 13:48

## 2022-08-03 RX ADMIN — CHLORHEXIDINE GLUCONATE 15 MILLILITER(S): 213 SOLUTION TOPICAL at 05:44

## 2022-08-03 RX ADMIN — Medication 1 SPRAY(S): at 05:48

## 2022-08-03 RX ADMIN — Medication 40 MILLIEQUIVALENT(S): at 05:45

## 2022-08-03 RX ADMIN — Medication 1 SPRAY(S): at 17:38

## 2022-08-03 RX ADMIN — CHLORHEXIDINE GLUCONATE 15 MILLILITER(S): 213 SOLUTION TOPICAL at 17:37

## 2022-08-03 RX ADMIN — SPIRONOLACTONE 100 MILLIGRAM(S): 25 TABLET, FILM COATED ORAL at 05:46

## 2022-08-03 RX ADMIN — SERTRALINE 50 MILLIGRAM(S): 25 TABLET, FILM COATED ORAL at 17:38

## 2022-08-03 RX ADMIN — METHADONE HYDROCHLORIDE 5 MILLIGRAM(S): 40 TABLET ORAL at 13:47

## 2022-08-03 RX ADMIN — Medication 0.1 MILLIGRAM(S): at 05:46

## 2022-08-03 RX ADMIN — CARBAMIDE PEROXIDE 1 DROP(S): 81.86 SOLUTION/ DROPS AURICULAR (OTIC) at 17:36

## 2022-08-03 RX ADMIN — Medication 1 APPLICATION(S): at 17:36

## 2022-08-03 RX ADMIN — Medication 25 GRAM(S): at 18:21

## 2022-08-03 RX ADMIN — MAGNESIUM OXIDE 400 MG ORAL TABLET 400 MILLIGRAM(S): 241.3 TABLET ORAL at 10:41

## 2022-08-03 RX ADMIN — Medication 1 PATCH: at 13:48

## 2022-08-03 RX ADMIN — Medication 125 MILLIGRAM(S): at 05:44

## 2022-08-03 RX ADMIN — Medication 1 APPLICATION(S): at 05:45

## 2022-08-03 RX ADMIN — QUETIAPINE FUMARATE 100 MILLIGRAM(S): 200 TABLET, FILM COATED ORAL at 17:36

## 2022-08-03 RX ADMIN — QUETIAPINE FUMARATE 100 MILLIGRAM(S): 200 TABLET, FILM COATED ORAL at 05:46

## 2022-08-03 RX ADMIN — ENOXAPARIN SODIUM 40 MILLIGRAM(S): 100 INJECTION SUBCUTANEOUS at 05:44

## 2022-08-03 RX ADMIN — CARBAMIDE PEROXIDE 1 DROP(S): 81.86 SOLUTION/ DROPS AURICULAR (OTIC) at 05:48

## 2022-08-03 NOTE — PROGRESS NOTE ADULT - ASSESSMENT
Acute respiratory failure with hypoxemia likely secondary drug overdose to v seizure complicated by head laceration s/p fall in lobby   -s/p trach  - continue quetiapine, clonidine and atarax  - Taper off methadone  - addiction med following    aspiration pneumonia with sepsis   - completed course of  antibiotics as per ID     depression  -psych following    c-diff  - complete oral vanco course until 8/3    hypokalemia   -replete and monitor prn    hypomagnesemia   -replete and monitor prn    Pancytopenia  -evaluated by heme/onc  -s/p transfusion  cbc now stable    Dispo: Dc planning once off methadone

## 2022-08-03 NOTE — SWALLOW BEDSIDE ASSESSMENT ADULT - SWALLOW EVAL: RECOMMENDED FEEDING/EATING TECHNIQUES
oral hygiene/position upright (90 degrees)
multiple swallows and PMSV in place/tuck chin/turn head left
oral hygiene/position upright (90 degrees)/small sips/bites/tuck chin/turn head left

## 2022-08-03 NOTE — PROGRESS NOTE ADULT - REASON FOR ADMISSION
anasarca
AHRF
anasarca

## 2022-08-03 NOTE — PROGRESS NOTE ADULT - SUBJECTIVE AND OBJECTIVE BOX
Pt interviewed, examined and EMR chart reviewed.    Follow up of Opiate Addiction. Pt is_taper off methadone protocol. Pt is doing well at this time.     SOCIAL HISTORY:        MEDICATIONS  (STANDING):  BACItracin   Ointment 1 Application(s) Topical two times a day  carbamide peroxide Otic Solution 1 Drop(s) Both Ears every 12 hours  chlorhexidine 0.12% Liquid 15 milliLiter(s) Oral Mucosa every 12 hours  chlorhexidine 2% Cloths 1 Application(s) Topical daily  cloNIDine 0.1 milliGRAM(s) Oral daily  enoxaparin Injectable 40 milliGRAM(s) SubCutaneous every 24 hours  fluticasone propionate 50 MICROgram(s)/spray Nasal Spray 1 Spray(s) Both Nostrils two times a day  magnesium oxide 400 milliGRAM(s) Oral three times a day with meals  methadone    Tablet 5 milliGRAM(s) Oral daily  nicotine -  14 mG/24Hr(s) Patch 1 patch Transdermal daily  pantoprazole   Suspension 40 milliGRAM(s) Oral daily  QUEtiapine 100 milliGRAM(s) Oral two times a day  scopolamine 1 mG/72 Hr(s) Patch 1 Patch Transdermal every 72 hours  sertraline 50 milliGRAM(s) Oral daily  spironolactone 100 milliGRAM(s) Oral daily    MEDICATIONS  (PRN):  acetaminophen     Tablet .. 650 milliGRAM(s) Oral every 6 hours PRN Temp greater or equal to 38.5C (101.3F), Moderate Pain (4 - 6)  ALBUTerol    90 MICROgram(s) HFA Inhaler 1 Puff(s) Inhalation every 4 hours PRN Shortness of Breath and/or Wheezing  cloNIDine 0.1 milliGRAM(s) Oral every 6 hours PRN opiate withdrawal  hydrOXYzine hydrochloride 50 milliGRAM(s) Oral every 6 hours PRN Anxiety  sodium chloride 0.9% lock flush 10 milliLiter(s) IV Push every 1 hour PRN Pre/post blood products, medications, blood draw, and to maintain line patency      Vital Signs Last 24 Hrs  T(C): 36.1 (03 Aug 2022 05:22), Max: 38 (02 Aug 2022 20:36)  T(F): 96.9 (03 Aug 2022 05:22), Max: 100.4 (02 Aug 2022 20:36)  HR: 90 (03 Aug 2022 05:22) (90 - 101)  BP: 136/84 (03 Aug 2022 05:22) (136/84 - 156/95)  BP(mean): --  RR: 16 (03 Aug 2022 05:22) (16 - 18)  SpO2: 98% (03 Aug 2022 06:00) (96% - 98%)    Parameters below as of 03 Aug 2022 06:00  Patient On (Oxygen Delivery Method): tracheostomy collar        PHYSICAL EXAM:    Constitutional: NAD, well-groomed, well-developed  HEENT: PERRLA, EOMI, Normal Hearing, MMM  Neck: No LAD, No JVD positive trache  Back: Normal spine flexure, No CVA tenderness  Respiratory: CTAB/L  Cardiovascular: S1 and S2, RRR, no M/G/R  Gastrointestinal: BS+, soft, NT/ND  Extremities: No peripheral edema  Vascular: 2+ peripheral pulses  Neurological: A/O x 3, no focal deficits    LABS:                        9.1    2.33  )-----------( 116      ( 03 Aug 2022 06:40 )             29.0     08-03    137  |  100  |  8<L>  ----------------------------<  91  4.4   |  24  |  0.7    Ca    9.9      03 Aug 2022 06:40  Phos  4.5     08-03  Mg     1.6     08-03    TPro  6.9  /  Alb  3.8  /  TBili  0.6  /  DBili  x   /  AST  16  /  ALT  6   /  AlkPhos  293<H>  08-03        Drug Screen Urine:  Alcohol Level        RADIOLOGY & ADDITIONAL STUDIES:

## 2022-08-03 NOTE — PROGRESS NOTE ADULT - PROBLEM SELECTOR PLAN 1
After evaluation at this time complete methadone protocol. Pt is not to be on methadone maintenance therapy. Pt in agreement with plan. Pt is stable and has no complaints of withdrawal.   Pt will be monitored and supportive care provided.
- Cont weaning protocol per ICU  - Hold off tracheostomy for now, as may be extubated  - Surgical team will cont to be involved and proceed with trach as needed and pending clinical stability

## 2022-08-03 NOTE — SWALLOW BEDSIDE ASSESSMENT ADULT - COMMENTS
Upon entering the room, Gatorade and water were found at bedside. Pt admitted to drinking thins and not doing left head turn or chin tuck when eating. SLPs educated pt on the rationale of moderately thick liquids and swallowing posture techniques, as well as risks associated with non adhering to these recommendations. + toleration observed without overt symptoms of penetration/aspiration

## 2022-08-03 NOTE — PROGRESS NOTE ADULT - NUTRITIONAL ASSESSMENT
This patient has been assessed with a concern for Malnutrition and has been determined to have a diagnosis/diagnoses of Moderate protein-calorie malnutrition.    This patient is being managed with:   Diet NPO-  With Ice Chips/Sips of Water  Entered: Jul 21 2022  6:52AM    
This patient has been assessed with a concern for Malnutrition and has been determined to have a diagnosis/diagnoses of Moderate protein-calorie malnutrition.    This patient is being managed with:   Diet NPO after Midnight-     NPO Start Date: 17-Jul-2022   NPO Start Time: 23:59  Entered: Jul 17 2022  4:55PM    Diet NPO-  Except Medications  Entered: Jul 17 2022  7:38AM    Diet NPO after Midnight-     NPO Start Date: 14-Jul-2022   NPO Start Time: 23:59  Entered: Jul 14 2022  1:56PM    The following pending diet order is being considered for treatment of Moderate protein-calorie malnutrition:  Diet NPO with Tube Feed-  Tube Feeding Modality: Orogastric  Vital High Protein  Total Volume for 24 Hours (mL): 1080  Continuous  Starting Tube Feed Rate {mL per Hour}: 20  Until Goal Tube Feed Rate (mL per Hour): 45  Tube Feed Duration (in Hours): 24  Tube Feed Start Time: 13:00  No Carb Prosource (1pkg = 15gms Protein)     Qty per Day:  1  Entered: Jun 18 2022  1:34PM  
This patient has been assessed with a concern for Malnutrition and has been determined to have a diagnosis/diagnoses of Moderate protein-calorie malnutrition.    This patient is being managed with:   Diet NPO after Midnight-     NPO Start Date: 19-Jul-2022   NPO Start Time: 23:59  Except Medications  Entered: Jul 19 2022  2:25PM    Diet NPO with Tube Feed-  Tube Feeding Modality: Orogastric  Vital High Protein  Total Volume for 24 Hours (mL): 1200  Continuous  Starting Tube Feed Rate {mL per Hour}: 10  Until Goal Tube Feed Rate (mL per Hour): 50  Tube Feed Duration (in Hours): 24  Tube Feed Start Time: 16:00  Entered: Jul 19 2022 10:41AM    Diet NPO with Tube Feed-  Tube Feeding Modality: Orogastric  Vital High Protein  Total Volume for 24 Hours (mL): 1080  Continuous  Starting Tube Feed Rate {mL per Hour}: 20  Until Goal Tube Feed Rate (mL per Hour): 45  Tube Feed Duration (in Hours): 24  Tube Feed Start Time: 13:00  No Carb Prosource (1pkg = 15gms Protein)     Qty per Day:  1  Entered: Jun 18 2022  1:34PM    The following pending diet order is being considered for treatment of Moderate protein-calorie malnutrition:null
This patient has been assessed with a concern for Malnutrition and has been determined to have a diagnosis/diagnoses of Moderate protein-calorie malnutrition.    This patient is being managed with:   Diet NPO after Midnight-     NPO Start Date: 19-Jul-2022   NPO Start Time: 23:59  Except Medications  Entered: Jul 19 2022  2:25PM    Diet NPO with Tube Feed-  Tube Feeding Modality: Orogastric  Vital High Protein  Total Volume for 24 Hours (mL): 1200  Continuous  Starting Tube Feed Rate {mL per Hour}: 10  Until Goal Tube Feed Rate (mL per Hour): 50  Tube Feed Duration (in Hours): 24  Tube Feed Start Time: 16:00  Entered: Jul 19 2022 10:41AM    Diet NPO with Tube Feed-  Tube Feeding Modality: Orogastric  Vital High Protein  Total Volume for 24 Hours (mL): 1080  Continuous  Starting Tube Feed Rate {mL per Hour}: 20  Until Goal Tube Feed Rate (mL per Hour): 45  Tube Feed Duration (in Hours): 24  Tube Feed Start Time: 13:00  No Carb Prosource (1pkg = 15gms Protein)     Qty per Day:  1  Entered: Jun 18 2022  1:34PM    The following pending diet order is being considered for treatment of Moderate protein-calorie malnutrition:null
This patient has been assessed with a concern for Malnutrition and has been determined to have a diagnosis/diagnoses of Moderate protein-calorie malnutrition.    This patient is being managed with:   Diet NPO with Tube Feed-  Tube Feeding Modality: Orogastric  Vital High Protein  Total Volume for 24 Hours (mL): 1200  Continuous  Starting Tube Feed Rate {mL per Hour}: 10  Until Goal Tube Feed Rate (mL per Hour): 50  Tube Feed Duration (in Hours): 24  Tube Feed Start Time: 16:00  Entered: Jul 15 2022  7:45AM    Diet NPO after Midnight-     NPO Start Date: 14-Jul-2022   NPO Start Time: 23:59  Entered: Jul 14 2022  1:56PM    Diet NPO with Tube Feed-  Tube Feeding Modality: Orogastric  Vital High Protein  Total Volume for 24 Hours (mL): 1080  Continuous  Starting Tube Feed Rate {mL per Hour}: 20  Until Goal Tube Feed Rate (mL per Hour): 45  Tube Feed Duration (in Hours): 24  Tube Feed Start Time: 13:00  No Carb Prosource (1pkg = 15gms Protein)     Qty per Day:  1  Entered: Jun 18 2022  1:34PM    The following pending diet order is being considered for treatment of Moderate protein-calorie malnutrition:null
This patient has been assessed with a concern for Malnutrition and has been determined to have a diagnosis/diagnoses of Moderate protein-calorie malnutrition.    This patient is being managed with:   Diet NPO with Tube Feed-  Tube Feeding Modality: Orogastric  Vital High Protein  Total Volume for 24 Hours (mL): 1200  Continuous  Starting Tube Feed Rate {mL per Hour}: 10  Until Goal Tube Feed Rate (mL per Hour): 50  Tube Feed Duration (in Hours): 24  Tube Feed Start Time: 16:00  Entered: Jul 15 2022  7:45AM    Diet NPO after Midnight-     NPO Start Date: 14-Jul-2022   NPO Start Time: 23:59  Entered: Jul 14 2022  1:56PM    Diet NPO with Tube Feed-  Tube Feeding Modality: Orogastric  Vital High Protein  Total Volume for 24 Hours (mL): 1080  Continuous  Starting Tube Feed Rate {mL per Hour}: 20  Until Goal Tube Feed Rate (mL per Hour): 45  Tube Feed Duration (in Hours): 24  Tube Feed Start Time: 13:00  No Carb Prosource (1pkg = 15gms Protein)     Qty per Day:  1  Entered: Jun 18 2022  1:34PM    The following pending diet order is being considered for treatment of Moderate protein-calorie malnutrition:null
This patient has been assessed with a concern for Malnutrition and has been determined to have a diagnosis/diagnoses of Moderate protein-calorie malnutrition.    This patient is being managed with:   Diet NPO-  Except Medications  Entered: Jul 17 2022  7:38AM    Diet NPO after Midnight-     NPO Start Date: 14-Jul-2022   NPO Start Time: 23:59  Entered: Jul 14 2022  1:56PM    The following pending diet order is being considered for treatment of Moderate protein-calorie malnutrition:  Diet NPO with Tube Feed-  Tube Feeding Modality: Orogastric  Vital High Protein  Total Volume for 24 Hours (mL): 1080  Continuous  Starting Tube Feed Rate {mL per Hour}: 20  Until Goal Tube Feed Rate (mL per Hour): 45  Tube Feed Duration (in Hours): 24  Tube Feed Start Time: 13:00  No Carb Prosource (1pkg = 15gms Protein)     Qty per Day:  1  Entered: Jun 18 2022  1:34PM  
This patient has been assessed with a concern for Malnutrition and has been determined to have a diagnosis/diagnoses of Moderate protein-calorie malnutrition.    This patient is being managed with:   Diet NPO-  NPO for Procedure/Test     NPO Start Date: 20-Jul-2022   NPO Start Time: 23:59  Except Medications  Entered: Jul 19 2022 10:43AM    Diet NPO with Tube Feed-  Tube Feeding Modality: Orogastric  Vital High Protein  Total Volume for 24 Hours (mL): 1200  Continuous  Starting Tube Feed Rate {mL per Hour}: 10  Until Goal Tube Feed Rate (mL per Hour): 50  Tube Feed Duration (in Hours): 24  Tube Feed Start Time: 16:00  Entered: Jul 19 2022 10:41AM    Diet NPO with Tube Feed-  Tube Feeding Modality: Orogastric  Vital High Protein  Total Volume for 24 Hours (mL): 1080  Continuous  Starting Tube Feed Rate {mL per Hour}: 20  Until Goal Tube Feed Rate (mL per Hour): 45  Tube Feed Duration (in Hours): 24  Tube Feed Start Time: 13:00  No Carb Prosource (1pkg = 15gms Protein)     Qty per Day:  1  Entered: Jun 18 2022  1:34PM    The following pending diet order is being considered for treatment of Moderate protein-calorie malnutrition:null
This patient has been assessed with a concern for Malnutrition and has been determined to have a diagnosis/diagnoses of Moderate protein-calorie malnutrition.    This patient is being managed with:   Diet NPO-  With Ice Chips/Sips of Water  Entered: Jul 21 2022  6:52AM    
This patient has been assessed with a concern for Malnutrition and has been determined to have a diagnosis/diagnoses of Moderate protein-calorie malnutrition.    This patient is being managed with:   Diet Pureed-  Mildly Thick Liquids (MILDTHICKLIQS)  Entered: Jul 27 2022 11:46AM    
This patient has been assessed with a concern for Malnutrition and has been determined to have a diagnosis/diagnoses of Moderate protein-calorie malnutrition.    This patient is being managed with:   Diet Pureed-  Mildly Thick Liquids (MILDTHICKLIQS)  Entered: Jul 27 2022 11:46AM    
This patient has been assessed with a concern for Malnutrition and has been determined to have a diagnosis/diagnoses of Moderate protein-calorie malnutrition.    This patient is being managed with:   Diet Pureed-  Moderately Thick Liquids (MODTHICKLIQS)  Entered: Jul 29 2022  1:25PM    
This patient has been assessed with a concern for Malnutrition and has been determined to have a diagnosis/diagnoses of Moderate protein-calorie malnutrition.    This patient is being managed with:   Diet Easy to Chew-  Moderately Thick Liquids (MODTHICKLIQS)  Entered: Aug  1 2022  1:13PM    
This patient has been assessed with a concern for Malnutrition and has been determined to have a diagnosis/diagnoses of Moderate protein-calorie malnutrition.    This patient is being managed with:   Diet NPO with Tube Feed-  Tube Feeding Modality: Orogastric  Vital High Protein  Total Volume for 24 Hours (mL): 1200  Continuous  Starting Tube Feed Rate {mL per Hour}: 10  Until Goal Tube Feed Rate (mL per Hour): 50  Tube Feed Duration (in Hours): 24  Tube Feed Start Time: 16:00  Entered: Jul 15 2022  7:45AM    Diet NPO after Midnight-     NPO Start Date: 14-Jul-2022   NPO Start Time: 23:59  Entered: Jul 14 2022  1:56PM    Diet NPO with Tube Feed-  Tube Feeding Modality: Orogastric  Vital High Protein  Total Volume for 24 Hours (mL): 1080  Continuous  Starting Tube Feed Rate {mL per Hour}: 20  Until Goal Tube Feed Rate (mL per Hour): 45  Tube Feed Duration (in Hours): 24  Tube Feed Start Time: 13:00  No Carb Prosource (1pkg = 15gms Protein)     Qty per Day:  1  Entered: Jun 18 2022  1:34PM    The following pending diet order is being considered for treatment of Moderate protein-calorie malnutrition:null
This patient has been assessed with a concern for Malnutrition and has been determined to have a diagnosis/diagnoses of Moderate protein-calorie malnutrition.    This patient is being managed with:   Diet NPO-  Except Medications  Entered: Jul 15 2022  7:42AM    Diet NPO after Midnight-     NPO Start Date: 14-Jul-2022   NPO Start Time: 23:59  Entered: Jul 14 2022  1:56PM    The following pending diet order is being considered for treatment of Moderate protein-calorie malnutrition:  Diet NPO with Tube Feed-  Tube Feeding Modality: Orogastric  Vital High Protein  Total Volume for 24 Hours (mL): 1080  Continuous  Starting Tube Feed Rate {mL per Hour}: 20  Until Goal Tube Feed Rate (mL per Hour): 45  Tube Feed Duration (in Hours): 24  Tube Feed Start Time: 13:00  No Carb Prosource (1pkg = 15gms Protein)     Qty per Day:  1  Entered: Jun 18 2022  1:34PM  
This patient has been assessed with a concern for Malnutrition and has been determined to have a diagnosis/diagnoses of Moderate protein-calorie malnutrition.    This patient is being managed with:   Diet NPO-  With Ice Chips/Sips of Water  Entered: Jul 21 2022  6:52AM    
This patient has been assessed with a concern for Malnutrition and has been determined to have a diagnosis/diagnoses of Moderate protein-calorie malnutrition.    This patient is being managed with:   Diet Pureed-  Mildly Thick Liquids (MILDTHICKLIQS)  Entered: Jul 27 2022 11:46AM    
This patient has been assessed with a concern for Malnutrition and has been determined to have a diagnosis/diagnoses of Moderate protein-calorie malnutrition.    This patient is being managed with:   Diet Pureed-  Moderately Thick Liquids (MODTHICKLIQS)  Entered: Jul 29 2022  1:25PM    
This patient has been assessed with a concern for Malnutrition and has been determined to have a diagnosis/diagnoses of Moderate protein-calorie malnutrition.    This patient is being managed with:   Diet Pureed-  Moderately Thick Liquids (MODTHICKLIQS)  Entered: Jul 29 2022  1:25PM    
This patient has been assessed with a concern for Malnutrition and has been determined to have a diagnosis/diagnoses of Moderate protein-calorie malnutrition.    This patient is being managed with:   Diet Pureed-  Entered: Jul 27 2022  8:31AM    
This patient has been assessed with a concern for Malnutrition and has been determined to have a diagnosis/diagnoses of Moderate protein-calorie malnutrition.    This patient is being managed with:   Diet Pureed-  Mildly Thick Liquids (MILDTHICKLIQS)  Entered: Jul 27 2022 11:46AM    
This patient has been assessed with a concern for Malnutrition and has been determined to have a diagnosis/diagnoses of Moderate protein-calorie malnutrition.    This patient is being managed with:   Diet Easy to Chew-  Moderately Thick Liquids (MODTHICKLIQS)  Entered: Aug  1 2022  1:13PM    
This patient has been assessed with a concern for Malnutrition and has been determined to have a diagnosis/diagnoses of Moderate protein-calorie malnutrition.    This patient is being managed with:   Diet Easy to Chew-  Moderately Thick Liquids (MODTHICKLIQS)  Entered: Aug  1 2022  1:13PM    
This patient has been assessed with a concern for Malnutrition and has been determined to have a diagnosis/diagnoses of Moderate protein-calorie malnutrition.    This patient is being managed with:   Diet NPO after Midnight-     NPO Start Date: 17-Jul-2022   NPO Start Time: 23:59  Entered: Jul 17 2022  4:55PM    Diet NPO-  Except Medications  Entered: Jul 17 2022  7:38AM    Diet NPO after Midnight-     NPO Start Date: 14-Jul-2022   NPO Start Time: 23:59  Entered: Jul 14 2022  1:56PM    The following pending diet order is being considered for treatment of Moderate protein-calorie malnutrition:  Diet NPO with Tube Feed-  Tube Feeding Modality: Orogastric  Vital High Protein  Total Volume for 24 Hours (mL): 1080  Continuous  Starting Tube Feed Rate {mL per Hour}: 20  Until Goal Tube Feed Rate (mL per Hour): 45  Tube Feed Duration (in Hours): 24  Tube Feed Start Time: 13:00  No Carb Prosource (1pkg = 15gms Protein)     Qty per Day:  1  Entered: Jun 18 2022  1:34PM  
This patient has been assessed with a concern for Malnutrition and has been determined to have a diagnosis/diagnoses of Moderate protein-calorie malnutrition.    This patient is being managed with:   Diet NPO after Midnight-     NPO Start Date: 19-Jul-2022   NPO Start Time: 23:59  Except Medications  Entered: Jul 19 2022  2:25PM    Diet NPO with Tube Feed-  Tube Feeding Modality: Orogastric  Vital High Protein  Total Volume for 24 Hours (mL): 1200  Continuous  Starting Tube Feed Rate {mL per Hour}: 10  Until Goal Tube Feed Rate (mL per Hour): 50  Tube Feed Duration (in Hours): 24  Tube Feed Start Time: 16:00  Entered: Jul 19 2022 10:41AM    Diet NPO with Tube Feed-  Tube Feeding Modality: Orogastric  Vital High Protein  Total Volume for 24 Hours (mL): 1080  Continuous  Starting Tube Feed Rate {mL per Hour}: 20  Until Goal Tube Feed Rate (mL per Hour): 45  Tube Feed Duration (in Hours): 24  Tube Feed Start Time: 13:00  No Carb Prosource (1pkg = 15gms Protein)     Qty per Day:  1  Entered: Jun 18 2022  1:34PM    The following pending diet order is being considered for treatment of Moderate protein-calorie malnutrition:null
This patient has been assessed with a concern for Malnutrition and has been determined to have a diagnosis/diagnoses of Moderate protein-calorie malnutrition.    This patient is being managed with:   Diet NPO after Midnight-     NPO Start Date: 19-Jul-2022   NPO Start Time: 23:59  Except Medications  Entered: Jul 19 2022  2:25PM    Diet NPO with Tube Feed-  Tube Feeding Modality: Orogastric  Vital High Protein  Total Volume for 24 Hours (mL): 1200  Continuous  Starting Tube Feed Rate {mL per Hour}: 10  Until Goal Tube Feed Rate (mL per Hour): 50  Tube Feed Duration (in Hours): 24  Tube Feed Start Time: 16:00  Entered: Jul 19 2022 10:41AM    Diet NPO with Tube Feed-  Tube Feeding Modality: Orogastric  Vital High Protein  Total Volume for 24 Hours (mL): 1080  Continuous  Starting Tube Feed Rate {mL per Hour}: 20  Until Goal Tube Feed Rate (mL per Hour): 45  Tube Feed Duration (in Hours): 24  Tube Feed Start Time: 13:00  No Carb Prosource (1pkg = 15gms Protein)     Qty per Day:  1  Entered: Jun 18 2022  1:34PM    The following pending diet order is being considered for treatment of Moderate protein-calorie malnutrition:null
This patient has been assessed with a concern for Malnutrition and has been determined to have a diagnosis/diagnoses of Moderate protein-calorie malnutrition.    This patient is being managed with:   Diet NPO with Tube Feed-  Tube Feeding Modality: Nasogastric  Osmolite 1.5 Doe  Total Volume for 24 Hours (mL): 1200  Continuous  Starting Tube Feed Rate {mL per Hour}: 10  Increase Tube Feed Rate by (mL): 10     Every 4 hours  Until Goal Tube Feed Rate (mL per Hour): 50  Tube Feed Duration (in Hours): 24  Tube Feed Start Time: 08:30  Entered: Jul 26 2022  8:15AM    
This patient has been assessed with a concern for Malnutrition and has been determined to have a diagnosis/diagnoses of Moderate protein-calorie malnutrition.    This patient is being managed with:   Diet NPO with Tube Feed-  Tube Feeding Modality: Nasogastric  Osmolite 1.5 Doe  Total Volume for 24 Hours (mL): 1200  Continuous  Starting Tube Feed Rate {mL per Hour}: 10  Increase Tube Feed Rate by (mL): 10     Every 4 hours  Until Goal Tube Feed Rate (mL per Hour): 50  Tube Feed Duration (in Hours): 24  Tube Feed Start Time: 08:30  Entered: Jul 26 2022  8:15AM    
This patient has been assessed with a concern for Malnutrition and has been determined to have a diagnosis/diagnoses of Moderate protein-calorie malnutrition.    This patient is being managed with:   Diet NPO-  Except Medications  Entered: Jul 15 2022  7:42AM    Diet NPO after Midnight-     NPO Start Date: 14-Jul-2022   NPO Start Time: 23:59  Entered: Jul 14 2022  1:56PM    The following pending diet order is being considered for treatment of Moderate protein-calorie malnutrition:  Diet NPO with Tube Feed-  Tube Feeding Modality: Orogastric  Vital High Protein  Total Volume for 24 Hours (mL): 1080  Continuous  Starting Tube Feed Rate {mL per Hour}: 20  Until Goal Tube Feed Rate (mL per Hour): 45  Tube Feed Duration (in Hours): 24  Tube Feed Start Time: 13:00  No Carb Prosource (1pkg = 15gms Protein)     Qty per Day:  1  Entered: Jun 18 2022  1:34PM  
This patient has been assessed with a concern for Malnutrition and has been determined to have a diagnosis/diagnoses of Moderate protein-calorie malnutrition.    This patient is being managed with:   Diet NPO-  Except Medications  Entered: Jul 17 2022  7:38AM    The following pending diet order is being considered for treatment of Moderate protein-calorie malnutrition:  Diet NPO with Tube Feed-  Tube Feeding Modality: Orogastric  Vital High Protein  Total Volume for 24 Hours (mL): 1080  Continuous  Starting Tube Feed Rate {mL per Hour}: 20  Until Goal Tube Feed Rate (mL per Hour): 45  Tube Feed Duration (in Hours): 24  Tube Feed Start Time: 13:00  No Carb Prosource (1pkg = 15gms Protein)     Qty per Day:  1  Entered: Jun 18 2022  1:34PM  
This patient has been assessed with a concern for Malnutrition and has been determined to have a diagnosis/diagnoses of Moderate protein-calorie malnutrition.    This patient is being managed with:   Diet NPO-  With Ice Chips/Sips of Water  Entered: Jul 21 2022  6:52AM    
This patient has been assessed with a concern for Malnutrition and has been determined to have a diagnosis/diagnoses of Moderate protein-calorie malnutrition.    This patient is being managed with:   Diet Pureed-  Mildly Thick Liquids (MILDTHICKLIQS)  Entered: Jul 27 2022 11:46AM    
This patient has been assessed with a concern for Malnutrition and has been determined to have a diagnosis/diagnoses of Moderate protein-calorie malnutrition.    This patient is being managed with:   Diet Pureed-  Mildly Thick Liquids (MILDTHICKLIQS)  Entered: Jul 27 2022 11:46AM    
Treatment: The Following Diet Has Been Recommended  Diet, NPO after Midnight:      NPO Start Date: 14-Jul-2022,   NPO Start Time: 23:59 (07-14-22 @ 13:57) [Active]  Diet, NPO with Tube Feed:   Tube Feeding Modality: Orogastric  Vital High Protein  Total Volume for 24 Hours (mL): 1200  Continuous  Starting Tube Feed Rate {mL per Hour}: 10  Until Goal Tube Feed Rate (mL per Hour): 50  Tube Feed Duration (in Hours): 24  Tube Feed Start Time: 16:00 (07-11-22 @ 16:15) [Active]  Diet, NPO with Tube Feed:   Tube Feeding Modality: Orogastric  Vital High Protein  Total Volume for 24 Hours (mL): 1080  Continuous  Starting Tube Feed Rate {mL per Hour}: 20  Until Goal Tube Feed Rate (mL per Hour): 45  Tube Feed Duration (in Hours): 24  Tube Feed Start Time: 13:00  No Carb Prosource (1pkg = 15gms Protein)     Qty per Day:  1 (06-18-22 @ 13:34) [Pending Verification By Attending]

## 2022-08-04 VITALS — OXYGEN SATURATION: 97 % | RESPIRATION RATE: 20 BRPM | HEART RATE: 108 BPM

## 2022-08-04 RX ADMIN — MAGNESIUM OXIDE 400 MG ORAL TABLET 400 MILLIGRAM(S): 241.3 TABLET ORAL at 11:52

## 2022-08-04 RX ADMIN — MAGNESIUM OXIDE 400 MG ORAL TABLET 400 MILLIGRAM(S): 241.3 TABLET ORAL at 17:40

## 2022-08-04 RX ADMIN — Medication 1 APPLICATION(S): at 06:34

## 2022-08-04 RX ADMIN — Medication 0.1 MILLIGRAM(S): at 06:35

## 2022-08-04 RX ADMIN — QUETIAPINE FUMARATE 100 MILLIGRAM(S): 200 TABLET, FILM COATED ORAL at 06:37

## 2022-08-04 RX ADMIN — Medication 1 APPLICATION(S): at 17:38

## 2022-08-04 RX ADMIN — SERTRALINE 50 MILLIGRAM(S): 25 TABLET, FILM COATED ORAL at 11:52

## 2022-08-04 RX ADMIN — MAGNESIUM OXIDE 400 MG ORAL TABLET 400 MILLIGRAM(S): 241.3 TABLET ORAL at 07:33

## 2022-08-04 RX ADMIN — QUETIAPINE FUMARATE 100 MILLIGRAM(S): 200 TABLET, FILM COATED ORAL at 17:41

## 2022-08-04 RX ADMIN — PANTOPRAZOLE SODIUM 40 MILLIGRAM(S): 20 TABLET, DELAYED RELEASE ORAL at 11:52

## 2022-08-04 RX ADMIN — CARBAMIDE PEROXIDE 1 DROP(S): 81.86 SOLUTION/ DROPS AURICULAR (OTIC) at 17:41

## 2022-08-04 RX ADMIN — ENOXAPARIN SODIUM 40 MILLIGRAM(S): 100 INJECTION SUBCUTANEOUS at 06:36

## 2022-08-04 RX ADMIN — CHLORHEXIDINE GLUCONATE 15 MILLILITER(S): 213 SOLUTION TOPICAL at 06:35

## 2022-08-04 RX ADMIN — Medication 1 PATCH: at 11:52

## 2022-08-04 RX ADMIN — CHLORHEXIDINE GLUCONATE 1 APPLICATION(S): 213 SOLUTION TOPICAL at 11:37

## 2022-08-04 RX ADMIN — SCOPALAMINE 1 PATCH: 1 PATCH, EXTENDED RELEASE TRANSDERMAL at 19:04

## 2022-08-04 RX ADMIN — SPIRONOLACTONE 100 MILLIGRAM(S): 25 TABLET, FILM COATED ORAL at 06:37

## 2022-08-04 RX ADMIN — Medication 1 PATCH: at 19:04

## 2022-08-04 NOTE — SWALLOW BEDSIDE ASSESSMENT ADULT - SWALLOW EVAL: RECOMMENDED DIET
ice chips for comfort pending further assessment when patient is consistently tolerating full or partial cuff deflation, 24 hours post trach placement without blood, and assess for PMSV trial
NPO
NPO with alternate means of nutrition/hydration
Easy to chew with moderately thick liquids
Easy to chew with moderately thick liquids
Easy to chew and moderately thick liquids

## 2022-08-04 NOTE — SWALLOW BEDSIDE ASSESSMENT ADULT - SLP PERTINENT HISTORY OF CURRENT PROBLEM
Patient is a 26 year old female with hx of asthma, untreated Hep C, IVDA, presenting upon admission 06/15/2022 for anasarca with  increased dyspnea since day prior to admission. Patient has been short of breath chronically and has been admitted for fluid overload. Patient describes worsening symptoms day before admission associated with cough. States generalized edema has remained the same. Patient is actively using heroin. s/p fall seizure with subsequent intubation due to respiratory failure extubated 7/8/2022 then reintubated same day,  s/p trach 7/20/22.
Patient is a 26 year old female with hx of asthma, untreated Hep C, IVDA, presenting upon admission 06/15/2022 for anasarca with  increased dyspnea since day prior to admission. Patient has been short of breath chronically and has been admitted for fluid overload. Patient describes worsening symptoms day before admission associated with cough. States generalized edema has remained the same. Patient is actively using heroin. s/p fall seizure with subsequent intubation due to respiratory failure extubated 7/8/2022 then reintubated same day,  s/p trach 7/20/22. Pt off ventilator. Tolerating PMSV and independent in donning and doffing. As per RN Bo report 8/3/22, rapid response called last night 8/2. Pt regained coherence after span of 15 to 20 minutes gradually. Pt was tachycardic with elevated BP. Pt stated to nurse RN that she "took a pill". Security was called and possessions were searched. Visitation has been restricted to mother only henceforth..
Patient is a 26 year old female with hx of asthma, untreated Hep C, IVDA, presenting upon admission 06/15/2022 for anasarca with  increased dyspnea since day prior to admission. Patient has been short of breath chronically and has been admitted for fluid overload. Patient describes worsening symptoms day before admission associated with cough. States generalized edema has remained the same. Patient is actively using heroin. s/p fall seizure with subsequent intubation due to respiratory failure extubated 7/8/2022 then reintubated same day,  s/p trach 7/20/22. Pt off ventilator. Tolerating PMSV and independent in donning and doffing. As per RN Bo report, rapid response called last night 8/2. Pt regained coherence after span of 15 to 20 minutes gradually. Pt was tachycardic with elevated BP. Pt stated to nurse RN that she "took a pill". Security was called and possessions were searched. Visitation has been restricted to mother only henceforth..
26 year old female with hx of asthma, untreated Hep C, IVDA, was admitted to medical floor with increased SOB and anasarca. Pt admits to using opiates 2 grams per day IV into her thighs x years w minimal sobriety. Pt has been on [facility] in 2020, contemplating going back on program. Pt denies any other substance abuse. Pt states used a xanax for wd about 3 days ago. Hepatitis C with Cirrhosis not compliant with treatment, Hx of withdrawal. On floor pt was diuresed with improvement of symptoms when the following events took place- pt found on floor in ER, gasping for air, short of breath, tachypnea, bleeding profusely from her posterior scalp, was placed in a stretcher, was hypoxic 70s emergently intubated, ct scan head ordered re scalp bleeding. Several syringes, drug paraphanellia found on patient clothes. Transferred to ICU. Acute respiratory failure with hypoxemia / aspiration PNA with sepsis / suspected drug overdose / head laceration s/p fall in lobby / possible seizure / anasarca
Patient is a 26 year old female with hx of asthma, untreated Hep C, IVDA, presenting upon admission 06/15/2022 for anasarca with  increased dyspnea since day prior to admission. Patient has been short of breath chronically and has been admitted for fluid overload. Patient describes worsening symptoms day before admission associated with cough. States generalized edema has remained the same. Patient is actively using heroin. s/p fall seizure with subsequent intubation due to respiratory failure extubated 7/8/2022 then reintubated same day,  s/p trach 7/20/22. Pt off ventilator. Tolerating PMSV and independent in donning and doffing
Patient is a 26 year old female with hx of asthma, untreated Hep C, IVDA, presenting upon admission 06/15/2022 for anasarca with  increased dyspnea since day prior to admission. Patient has been short of breath chronically and has been admitted for fluid overload. Patient describes worsening symptoms day before admission associated with cough. States generalized edema has remained the same. Patient is actively using heroin. s/p fall seizure with subsequent intubation due to respiratory failure extubated 7/8/2022 then reintubated same day,  s/p trach 7/20/22.

## 2022-08-04 NOTE — CHART NOTE - NSCHARTNOTEFT_GEN_A_CORE
Patient announces that she wants to leave hospital now (AMA) adding that she already has a individual waiting to take her home. Both myself and nursing staff discussed risks of leaving AMA now including but not limited to worsening breathing leading to death and insurance problems. Also mentioned that if she would stay overnight, we could set up better home conditions, however she did not change her mind. On exam she is fully awake and knowledgeable about her condition. Pulse ox is 97% on room air. IV access removed and patient signed AMA papers. Patient announces that she wants to leave hospital now (AMA) adding that she already has a individual waiting to take her home. Both myself and nursing staff discussed risks of leaving AMA now including but not limited to worsening breathing leading to death and insurance problems. Also mentioned that if she would stay overnight, we could set up better home conditions, however she did not change her mind. On exam she is fully awake and knowledgeable about her condition. Of note, psychiatry wrote there is no contraindication to discharge (yesterday). Pulse ox is 97% on room air. IV access removed and patient signed AMA papers. Patient announces that she wants to leave hospital now (AMA) adding that she already has a individual waiting to take her home. Both myself and nursing staff discussed risks of leaving AMA now including but not limited to worsening breathing leading to death and insurance problems. Also mentioned that if she would stay overnight, we could set up better home conditions, however she did not change her mind. On exam she is fully awake and knowledgeable about her condition. Of note, psychiatry wrote there is no contraindication to discharge (yesterday). Pulse ox is 97% on room air. IV access removed, emphasized need to arrange for trach care and patient signed AMA papers. Patient announces that she wants to leave hospital now (AMA) adding that she already has a individual waiting to take her home. Both myself and nursing staff discussed risks of leaving AMA now including but not limited to worsening breathing leading to death and insurance problems. Also mentioned that if she would stay overnight, we could set up better home conditions, however she did not change her mind. On exam she is fully awake and knowledgeable about her condition. Of note, psychiatry wrote there is no contraindication to discharge (yesterday). Pulse ox is 97% on room air. IV access removed, emphasized need to arrange for trach care and patient signed AMA papers. She says she will return to ER if she experiences any problems

## 2022-08-04 NOTE — SWALLOW BEDSIDE ASSESSMENT ADULT - SLP GENERAL OBSERVATIONS
Pt awake in bed, RRT present to trial cuff deflation. Pt not tolerating trial. PO trials are not safe at this time. Pt's family received at bedside. SLP educated Pt and family on rationale for no trials.
Pt received awake and alert, difficulty keeping head up to maintain eye contact with SLP and during oral care. Poor trunk control. Wet breath sounds at baseline, poor secretion management. Pt is completely aphonic at this time but is able to mouth words with some difficulty.  Appears SOB even when mouthing words.
Pt received in bed, repositioned self to upright position. Patient alert and oriented to self.
Pt received in bed, repositioned self. Pt had gatorade and water at bedside.
Pt received in chair sitting up. VERONICA Latif suctioned trach before trials. Pt alert and oriented to self.
Pt received in bed, with partial cuff deflated. Pt was alert and responsive. Limited assessment due to extubation s/p trach 7/20/22

## 2022-08-04 NOTE — PROVIDER CONTACT NOTE (OTHER) - SITUATION
patient with temperature of 102 b/p elevated 166/105 hr 120's
Pt noted to vomit this afternoon. Feed-colored. Approximately 20-30cc.
Pt first puree feed at lunch time- ate a couple spoons stopped- expectorated phlegm purplish food colored from mouth, respiratory at bedside cuff reinflated, suctioned white tan via trach mod amts.
Pt observed agitated, restless, tachycardia, tachypnea and diaphoretic
Pt heart rate is in the 120s and Tmax is 101.3
patient with temp of 101.8
Pt states her ride is here and she wants to leave immediately

## 2022-08-04 NOTE — SWALLOW BEDSIDE ASSESSMENT ADULT - ASR SWALLOW ASPIRATION MONITOR
change of breathing pattern/oral hygiene/position upright (90Y)/cough/gurgly voice/fever/pneumonia/throat clearing/upper respiratory infection

## 2022-08-04 NOTE — SWALLOW BEDSIDE ASSESSMENT ADULT - NS SPL SWALLOW CLINIC TRIAL FT
+ toleration observed without overt symptoms of penetration/aspiration
+ suspected decreased hyolaryngeal elevation/excursion via palpation. +immediate cough +multiple swallows per small unit bolus
+ toleration observed without overt symptoms of penetration/aspiration
+ toleration observed without overt symptoms of penetration/aspiration
4-5 ice chips over a 20 min period

## 2022-08-04 NOTE — PROVIDER CONTACT NOTE (OTHER) - BACKGROUND
TF via OGT. At goal of 50cc/hr.
Trach collar, subs abuse, cleared by psych
Pt previously received 500cc bolus of LR and ibuprofen
27 y/o female mechanical vented since 6/15 sedation with ketamine, propofol, versed

## 2022-08-04 NOTE — PROVIDER CONTACT NOTE (OTHER) - DATE AND TIME:
02-Jul-2022 06:00
14-Jul-2022 01:00
18-Jun-2022 23:30
02-Jul-2022 21:00
17-Jul-2022 12:00
04-Aug-2022 20:00

## 2022-08-04 NOTE — PROVIDER CONTACT NOTE (OTHER) - REASON
Elevated Temp and Heart rate
pt wants to leave AMA
pulse ox 92%, pt c/o carol around trach
Pt positive for withdrawal symptoms
elevated b/p elevated temp elevated hr elevated resp rate
temp 101.8
Pt vomited small amount of feed.

## 2022-08-04 NOTE — CHART NOTE - NSCHARTNOTESELECT_GEN_ALL_CORE
Addiction Follow up/Event Note
Altered Mental Status/Event Note
Event Note
Surgery
Bleeding/Event Note
Calorie Count- Limited/Event Note
Calorie Count/Event Note
Diet/Event Note
Event Note
Follow-Up/Event Note
Methadone dosing/Event Note
Paracentesis attempt/Event Note
Rapid Response
Surgical PA/Event Note
Suture Removal Surgery PA/Event Note
sensitivities/Event Note
surgery note

## 2022-08-04 NOTE — SWALLOW BEDSIDE ASSESSMENT ADULT - SPECIFY REASON(S)
f/u for tolerance of diet
dysphagia evaluation s/p extubation
f/u tolerance of diet
f/u for tolerance/advancement of diet
pt extubated s/p trach 7/20/22

## 2022-08-04 NOTE — SWALLOW BEDSIDE ASSESSMENT ADULT - COMMENTS
Pt found to have thins at bedside, SLP educated pt on rationale behind moderately thick liquids. Pt expressed understanding. + toleration observed without overt symptoms of penetration/aspiration

## 2022-08-04 NOTE — SWALLOW BEDSIDE ASSESSMENT ADULT - SWALLOW EVAL: DIAGNOSIS
As per MBS 7/29 mild-moderate oral impairment moderate pharyngeal impairment esophageal retention. noted to have reduced tongue base retraction reduce lingual movement
RRT came to partially deflate Pt's cough. RRT stated Pt was not tolerating partial cuff deflation and her tidal volume was dropping and Pt had increased work of breathing. RRT & SLP agree PO trials at time are not safe  given Pt's respiratory status.
high risk of oral pharyngeal impairment given prolonged intubation and subsequent trach place 7/20 in the evening, prioritize toleration of partial and or full cuff deflation with attempted trials of PMSV prior to full PO diet.
As per MBS 7/29 mild-moderate oral impairment moderate pharyngeal impairment esophageal retention. noted ot have reduced tongue base retraction reduce lingual movement
Pt presents with concern for oropharyngeal swallow impairment and aspiration. +congested/wet breath sounds at baseline +poor secretion management +weak cough +tachypnea +poor trunk control +overt s/s aspiration with ice chips
As per MBS 7/29 mild-moderate oral impairment moderate pharyngeal impairment esophageal retention. noted to have reduced tongue base retraction reduce lingual movement

## 2022-08-04 NOTE — PROVIDER CONTACT NOTE (OTHER) - ASSESSMENT
dr gold was at bedside, pt relaxed in bed, Pulse ox noted to be  92-95% Dr Gold and respiratory notified.
on vent , iv unayn, thick brown tan secretions
Pt is agitated, pacing, refusing oxygen therapy.  She has her belongings and states she is leaving now because her ride is here.  She no longer wants any type of aftercare.  Risks of leaving AMA explained to patient with verbalization of understanding, but pt is adamant that she is leaving.
No respiratory distress. Pt suctioned immediately and sat upright.

## 2022-08-04 NOTE — SWALLOW BEDSIDE ASSESSMENT ADULT - SLP PRECAUTIONS/LIMITATIONS: VISION
wears glasses/impaired
wears glasses/impaired
within functional limits
glasses/impaired
wears glasses/impaired

## 2022-08-04 NOTE — SWALLOW BEDSIDE ASSESSMENT ADULT - NS ASR SWALLOW FINDINGS DISCUS
VERONICA Landaverde/Physician/Nursing
VERONICA Molina/Nursing/Patient
VERONICA Schofield; DR Cook/Physician/Nursing/Patient
VERONICA Sanches; RUSSELL Muse/Nursing/Patient
Pt's family at bedside/Physician/Nursing/Patient/Family
RN mariam/Nursing/Patient

## 2022-08-06 ENCOUNTER — INPATIENT (INPATIENT)
Facility: HOSPITAL | Age: 27
LOS: 5 days | Discharge: LEFT BEFORE DISCHARGE | End: 2022-08-12
Attending: STUDENT IN AN ORGANIZED HEALTH CARE EDUCATION/TRAINING PROGRAM | Admitting: STUDENT IN AN ORGANIZED HEALTH CARE EDUCATION/TRAINING PROGRAM

## 2022-08-06 VITALS
HEART RATE: 98 BPM | HEIGHT: 66 IN | DIASTOLIC BLOOD PRESSURE: 121 MMHG | TEMPERATURE: 97 F | RESPIRATION RATE: 20 BRPM | OXYGEN SATURATION: 98 % | SYSTOLIC BLOOD PRESSURE: 199 MMHG

## 2022-08-06 DIAGNOSIS — R16.2 HEPATOMEGALY WITH SPLENOMEGALY, NOT ELSEWHERE CLASSIFIED: ICD-10-CM

## 2022-08-06 DIAGNOSIS — D61.818 OTHER PANCYTOPENIA: ICD-10-CM

## 2022-08-06 DIAGNOSIS — F11.23 OPIOID DEPENDENCE WITH WITHDRAWAL: ICD-10-CM

## 2022-08-06 DIAGNOSIS — K74.60 UNSPECIFIED CIRRHOSIS OF LIVER: ICD-10-CM

## 2022-08-06 DIAGNOSIS — Z43.0 ENCOUNTER FOR ATTENTION TO TRACHEOSTOMY: ICD-10-CM

## 2022-08-06 DIAGNOSIS — J45.909 UNSPECIFIED ASTHMA, UNCOMPLICATED: ICD-10-CM

## 2022-08-06 DIAGNOSIS — Z86.19 PERSONAL HISTORY OF OTHER INFECTIOUS AND PARASITIC DISEASES: ICD-10-CM

## 2022-08-06 DIAGNOSIS — Z88.8 ALLERGY STATUS TO OTHER DRUGS, MEDICAMENTS AND BIOLOGICAL SUBSTANCES STATUS: ICD-10-CM

## 2022-08-06 DIAGNOSIS — F41.9 ANXIETY DISORDER, UNSPECIFIED: ICD-10-CM

## 2022-08-06 DIAGNOSIS — F33.2 MAJOR DEPRESSIVE DISORDER, RECURRENT SEVERE WITHOUT PSYCHOTIC FEATURES: ICD-10-CM

## 2022-08-06 DIAGNOSIS — L89.150 PRESSURE ULCER OF SACRAL REGION, UNSTAGEABLE: ICD-10-CM

## 2022-08-06 DIAGNOSIS — B19.20 UNSPECIFIED VIRAL HEPATITIS C WITHOUT HEPATIC COMA: ICD-10-CM

## 2022-08-06 DIAGNOSIS — I10 ESSENTIAL (PRIMARY) HYPERTENSION: ICD-10-CM

## 2022-08-06 DIAGNOSIS — E43 UNSPECIFIED SEVERE PROTEIN-CALORIE MALNUTRITION: ICD-10-CM

## 2022-08-06 DIAGNOSIS — J96.10 CHRONIC RESPIRATORY FAILURE, UNSPECIFIED WHETHER WITH HYPOXIA OR HYPERCAPNIA: ICD-10-CM

## 2022-08-06 DIAGNOSIS — F17.210 NICOTINE DEPENDENCE, CIGARETTES, UNCOMPLICATED: ICD-10-CM

## 2022-08-06 PROBLEM — F19.10 OTHER PSYCHOACTIVE SUBSTANCE ABUSE, UNCOMPLICATED: Chronic | Status: ACTIVE | Noted: 2022-04-20

## 2022-08-06 LAB
ALBUMIN SERPL ELPH-MCNC: 4.7 G/DL — SIGNIFICANT CHANGE UP (ref 3.5–5.2)
ALP SERPL-CCNC: 315 U/L — HIGH (ref 30–115)
ALT FLD-CCNC: 9 U/L — SIGNIFICANT CHANGE UP (ref 0–41)
ANION GAP SERPL CALC-SCNC: 13 MMOL/L — SIGNIFICANT CHANGE UP (ref 7–14)
AST SERPL-CCNC: 22 U/L — SIGNIFICANT CHANGE UP (ref 0–41)
BASOPHILS # BLD AUTO: 0.01 K/UL — SIGNIFICANT CHANGE UP (ref 0–0.2)
BASOPHILS NFR BLD AUTO: 0.2 % — SIGNIFICANT CHANGE UP (ref 0–1)
BILIRUB SERPL-MCNC: 0.8 MG/DL — SIGNIFICANT CHANGE UP (ref 0.2–1.2)
BUN SERPL-MCNC: 11 MG/DL — SIGNIFICANT CHANGE UP (ref 10–20)
CALCIUM SERPL-MCNC: 9.8 MG/DL — SIGNIFICANT CHANGE UP (ref 8.5–10.1)
CHLORIDE SERPL-SCNC: 99 MMOL/L — SIGNIFICANT CHANGE UP (ref 98–110)
CO2 SERPL-SCNC: 24 MMOL/L — SIGNIFICANT CHANGE UP (ref 17–32)
CREAT SERPL-MCNC: 0.7 MG/DL — SIGNIFICANT CHANGE UP (ref 0.7–1.5)
EGFR: 121 ML/MIN/1.73M2 — SIGNIFICANT CHANGE UP
EOSINOPHIL # BLD AUTO: 0 K/UL — SIGNIFICANT CHANGE UP (ref 0–0.7)
EOSINOPHIL NFR BLD AUTO: 0 % — SIGNIFICANT CHANGE UP (ref 0–8)
GLUCOSE SERPL-MCNC: 117 MG/DL — HIGH (ref 70–99)
HCG SERPL QL: NEGATIVE — SIGNIFICANT CHANGE UP
HCT VFR BLD CALC: 34 % — LOW (ref 37–47)
HGB BLD-MCNC: 10.7 G/DL — LOW (ref 12–16)
IMM GRANULOCYTES NFR BLD AUTO: 0.2 % — SIGNIFICANT CHANGE UP (ref 0.1–0.3)
LYMPHOCYTES # BLD AUTO: 1.02 K/UL — LOW (ref 1.2–3.4)
LYMPHOCYTES # BLD AUTO: 23.8 % — SIGNIFICANT CHANGE UP (ref 20.5–51.1)
MAGNESIUM SERPL-MCNC: 1.8 MG/DL — SIGNIFICANT CHANGE UP (ref 1.8–2.4)
MCHC RBC-ENTMCNC: 26.6 PG — LOW (ref 27–31)
MCHC RBC-ENTMCNC: 31.5 G/DL — LOW (ref 32–37)
MCV RBC AUTO: 84.4 FL — SIGNIFICANT CHANGE UP (ref 81–99)
MONOCYTES # BLD AUTO: 0.26 K/UL — SIGNIFICANT CHANGE UP (ref 0.1–0.6)
MONOCYTES NFR BLD AUTO: 6.1 % — SIGNIFICANT CHANGE UP (ref 1.7–9.3)
NEUTROPHILS # BLD AUTO: 2.99 K/UL — SIGNIFICANT CHANGE UP (ref 1.4–6.5)
NEUTROPHILS NFR BLD AUTO: 69.7 % — SIGNIFICANT CHANGE UP (ref 42.2–75.2)
NRBC # BLD: 0 /100 WBCS — SIGNIFICANT CHANGE UP (ref 0–0)
PLATELET # BLD AUTO: 163 K/UL — SIGNIFICANT CHANGE UP (ref 130–400)
POTASSIUM SERPL-MCNC: 3.9 MMOL/L — SIGNIFICANT CHANGE UP (ref 3.5–5)
POTASSIUM SERPL-SCNC: 3.9 MMOL/L — SIGNIFICANT CHANGE UP (ref 3.5–5)
PROT SERPL-MCNC: 7.9 G/DL — SIGNIFICANT CHANGE UP (ref 6–8)
RBC # BLD: 4.03 M/UL — LOW (ref 4.2–5.4)
RBC # FLD: 14 % — SIGNIFICANT CHANGE UP (ref 11.5–14.5)
SARS-COV-2 RNA SPEC QL NAA+PROBE: SIGNIFICANT CHANGE UP
SODIUM SERPL-SCNC: 136 MMOL/L — SIGNIFICANT CHANGE UP (ref 135–146)
WBC # BLD: 4.15 K/UL — LOW (ref 4.8–10.8)
WBC # FLD AUTO: 4.15 K/UL — LOW (ref 4.8–10.8)

## 2022-08-06 PROCEDURE — 93010 ELECTROCARDIOGRAM REPORT: CPT | Mod: 76

## 2022-08-06 PROCEDURE — 71045 X-RAY EXAM CHEST 1 VIEW: CPT | Mod: 26

## 2022-08-06 PROCEDURE — 99285 EMERGENCY DEPT VISIT HI MDM: CPT

## 2022-08-06 RX ORDER — QUETIAPINE FUMARATE 200 MG/1
100 TABLET, FILM COATED ORAL
Refills: 0 | Status: DISCONTINUED | OUTPATIENT
Start: 2022-08-06 | End: 2022-08-12

## 2022-08-06 RX ORDER — CHLORHEXIDINE GLUCONATE 213 G/1000ML
1 SOLUTION TOPICAL
Refills: 0 | Status: DISCONTINUED | OUTPATIENT
Start: 2022-08-06 | End: 2022-08-12

## 2022-08-06 RX ORDER — FLUTICASONE PROPIONATE 50 MCG
1 SPRAY, SUSPENSION NASAL
Refills: 0 | Status: DISCONTINUED | OUTPATIENT
Start: 2022-08-06 | End: 2022-08-12

## 2022-08-06 RX ORDER — NICOTINE POLACRILEX 2 MG
1 GUM BUCCAL DAILY
Refills: 0 | Status: DISCONTINUED | OUTPATIENT
Start: 2022-08-06 | End: 2022-08-12

## 2022-08-06 RX ORDER — ACETAMINOPHEN 500 MG
650 TABLET ORAL EVERY 6 HOURS
Refills: 0 | Status: DISCONTINUED | OUTPATIENT
Start: 2022-08-06 | End: 2022-08-12

## 2022-08-06 RX ORDER — SPIRONOLACTONE 25 MG/1
100 TABLET, FILM COATED ORAL DAILY
Refills: 0 | Status: DISCONTINUED | OUTPATIENT
Start: 2022-08-06 | End: 2022-08-12

## 2022-08-06 RX ORDER — METHADONE HYDROCHLORIDE 40 MG/1
15 TABLET ORAL EVERY 12 HOURS
Refills: 0 | Status: DISCONTINUED | OUTPATIENT
Start: 2022-08-06 | End: 2022-08-08

## 2022-08-06 RX ORDER — PANTOPRAZOLE SODIUM 20 MG/1
40 TABLET, DELAYED RELEASE ORAL DAILY
Refills: 0 | Status: DISCONTINUED | OUTPATIENT
Start: 2022-08-06 | End: 2022-08-07

## 2022-08-06 RX ADMIN — METHADONE HYDROCHLORIDE 15 MILLIGRAM(S): 40 TABLET ORAL at 23:24

## 2022-08-06 RX ADMIN — QUETIAPINE FUMARATE 100 MILLIGRAM(S): 200 TABLET, FILM COATED ORAL at 18:12

## 2022-08-06 RX ADMIN — PANTOPRAZOLE SODIUM 40 MILLIGRAM(S): 20 TABLET, DELAYED RELEASE ORAL at 18:11

## 2022-08-06 RX ADMIN — Medication 1 PATCH: at 18:11

## 2022-08-06 RX ADMIN — CHLORHEXIDINE GLUCONATE 1 APPLICATION(S): 213 SOLUTION TOPICAL at 18:10

## 2022-08-06 RX ADMIN — Medication 650 MILLIGRAM(S): at 18:10

## 2022-08-06 RX ADMIN — SPIRONOLACTONE 100 MILLIGRAM(S): 25 TABLET, FILM COATED ORAL at 18:12

## 2022-08-06 RX ADMIN — Medication 1 PATCH: at 19:32

## 2022-08-06 RX ADMIN — Medication 0.1 MILLIGRAM(S): at 09:13

## 2022-08-06 RX ADMIN — Medication 0.1 MILLIGRAM(S): at 13:37

## 2022-08-06 NOTE — PHYSICAL THERAPY INITIAL EVALUATION ADULT - GENERAL OBSERVATIONS, REHAB EVAL
13:20 - 13:50 Chart reviewed. Patient available to be seen for physical therapy, denies pain, +2L O2 via trach, confirmed with VERONICA Pineda.

## 2022-08-06 NOTE — H&P ADULT - ASSESSMENT
A 27 y/o female with pmhx of asthma, hepc , IVDA last use 8 wks ago heroin with recent acute respiratory failure s/p overdose vented now with trach collar left ICU AMA 2 days ago presents for drainage from trach and  sensation of clogged ears.    # tracheostomy  drainage   -respiratory care  -monitor pulse   -case management for rehab placement   -Pt consult     #sensation of clogged ears  -ENT as oupt    #nicotine dependency   -smoking cessation educated  -nicotine patch     #malnutrition   -dietary consult     #HTN  s/p clonidine in ED  -monitor BP  -continue clonidine       #hx depression/anxiety   -continue Quetiapine     #hx anasarca   -continue spironolactone      A 27 y/o female with pmhx of asthma, hepc , IVDA last use 8 wks ago heroin with recent acute respiratory failure s/p overdose vented now with trach collar left ICU AMA 2 days ago presents for drainage from trach and  sensation of clogged ears.    # tracheostomy  drainage   -respiratory care  -monitor pulse   -case management for rehab placement   -Pt consult     #sensation of clogged ears  -ENT as oupt    #nicotine dependency   -smoking cessation educated  -nicotine patch     #malnutrition   -dietary consult     #HTN  s/p clonidine in ED  -monitor BP  -continue clonidine     #unstageable sacral ulcer   -local wound care       #hx depression/anxiety   -continue Quetiapine     #hx anasarca   -continue spironolactone

## 2022-08-06 NOTE — H&P ADULT - NSHPLABSRESULTS_GEN_ALL_CORE
10.7   4.15  )-----------( 163      ( 06 Aug 2022 09:20 )             34.0       08-06    136  |  99  |  11  ----------------------------<  117<H>  3.9   |  24  |  0.7    Ca    9.8      06 Aug 2022 09:20  Mg     1.8     08-06    TPro  7.9  /  Alb  4.7  /  TBili  0.8  /  DBili  x   /  AST  22  /  ALT  9   /  AlkPhos  315<H>  08-06          Magnesium, Serum: 1.8 mg/dL (08-06-22 @ 09:20)      Lactate Trend  08-02 @ 20:00 Lactate:0.8         Serum Pregnancy: Negative (08-06-22 @ 09:20)      Culture Results:   No Growth Final (07-25 @ 15:34)  Culture Results:   Normal Respiratory Kelly present (07-15 @ 10:00)  Culture Results:   Babesia microti PCR  Results: NOT detected      < from: Xray Chest 1 View- PORTABLE-Urgent (Xray Chest 1 View- PORTABLE-Urgent .) (08.06.22 @ 10:41) >    Impression:    No radiographic evidence of acute cardiopulmonary disease.    --- End of Report ---      IRVIN SUMNER MD; Attending Radiologist  This document has been electronically    < end of copied text >

## 2022-08-06 NOTE — ED PROVIDER NOTE - OBJECTIVE STATEMENT
26y F pmh Asthma, Hep C, IVDA, trached presents for eval of ear pain. Pt has mild aching pressure like pain to b/l ears x1mo, no inciting or relieving factors. Pt also endorses drainage from trach that was recently placed for respiratory failure 2/2 OD. Pt AMA'd from ICU x2days ago but states she is not able to care for herself.

## 2022-08-06 NOTE — H&P ADULT - NSHPPHYSICALEXAM_GEN_ALL_CORE
VITALS:     ICU Vital Signs Last 24 Hrs  T(C): 36.1 (06 Aug 2022 07:20), Max: 36.1 (06 Aug 2022 07:20)  T(F): 96.9 (06 Aug 2022 07:20), Max: 96.9 (06 Aug 2022 07:20)  HR: 92 (06 Aug 2022 09:27) (92 - 98)  BP: 181/117 (06 Aug 2022 09:27) (181/117 - 199/121)  RR: 20 (06 Aug 2022 07:20) (20 - 20)  SpO2: 98% (06 Aug 2022 07:20) (98% - 98%)    O2 Parameters below as of 06 Aug 2022 07:20  Patient On (Oxygen Delivery Method): room air            GENERAL: NAD, lying in bed comfortably  HEAD:  Atraumatic, Normocephalic  EYES: EOMI, PERRLA, dilated 8 mm , conjunctiva and sclera clear  ENT: dry mucous membranes  NECK: Supple, No JVD, trach in place, with sputum secretion , no bleeding, no sign of infection   CHEST/LUNG: Clear to auscultation bilaterally; No rales, rhonchi, wheezing, or rubs. Unlabored respirations , trach on humidified oxygen saturating 100 %  HEART: Regular rate and rhythm; No murmurs, rubs, or gallops  ABDOMEN: Soft, Nontender, Nondistended. No hepatomegally, palpable umbilical hernia, soft non tender.   EXTREMITIES:  no edema or tenderness   NERVOUS SYSTEM:  Alert & Oriented X3, speech clear. No deficits   MSK: FROM all 4 extremities, full and equal strength  SKIN: old IVDA injection sites on arms, no sign of infection. VITALS:     ICU Vital Signs Last 24 Hrs  T(C): 36.1 (06 Aug 2022 07:20), Max: 36.1 (06 Aug 2022 07:20)  T(F): 96.9 (06 Aug 2022 07:20), Max: 96.9 (06 Aug 2022 07:20)  HR: 92 (06 Aug 2022 09:27) (92 - 98)  BP: 181/117 (06 Aug 2022 09:27) (181/117 - 199/121)  RR: 20 (06 Aug 2022 07:20) (20 - 20)  SpO2: 98% (06 Aug 2022 07:20) (98% - 98%)    O2 Parameters below as of 06 Aug 2022 07:20  Patient On (Oxygen Delivery Method): room air            GENERAL: NAD, lying in bed comfortably  HEAD:  Atraumatic, Normocephalic  EYES: EOMI, PERRLA, dilated 8 mm , conjunctiva and sclera clear  ENT: dry mucous membranes  NECK: Supple, No JVD, trach in place, with sputum secretion , no bleeding, no sign of infection   CHEST/LUNG: Clear to auscultation bilaterally; No rales, rhonchi, wheezing, or rubs. Unlabored respirations , trach on humidified oxygen saturating 100 %  HEART: Regular rate and rhythm; No murmurs, rubs, or gallops  ABDOMEN: Soft, Nontender, Nondistended. No hepatomegally, palpable umbilical hernia, soft non tender.   EXTREMITIES:  no edema or tenderness   NERVOUS SYSTEM:  Alert & Oriented X3, speech clear. No deficits   MSK: FROM all 4 extremities, full and equal strength  SKIN: old IVDA injection sites on arms, no sign of infection. unstagable 5x5 sacral ulcer

## 2022-08-06 NOTE — ED PROVIDER NOTE - PHYSICAL EXAMINATION
CONST: NAD  EYES: Sclera and conjunctiva clear.   ENT: TM clear b/l. No nasal discharge. Oropharynx normal appearing, no erythema or exudates. No abscess or swelling. Uvula midline.   NECK: Trach in place with secretions. Non-tender, no meningeal signs. normal ROM. supple   CARD: S1 S2; No jvd  RESP: Equal BS B/L, No wheezes, rhonchi or rales. No distress  GI: Soft, non-tender, non-distended. no cva tenderness. normal BS  MS: Normal ROM in all extremities. pulses 2 +. no calf tenderness or swelling  SKIN: Warm, dry, no acute rashes. Good turgor  NEURO: A&Ox3, No focal deficits. Strength 5/5 with no sensory deficits. Steady gait.

## 2022-08-06 NOTE — ED PROVIDER NOTE - NS ED ROS FT
Constitutional: (-) fever  Eyes/ENT: (+) ear pain, (-) blurry vision, (-) epistaxis  Cardiovascular: (-) chest pain, (-) syncope  Respiratory: (-) cough, (-) shortness of breath  Gastrointestinal: (-) vomiting, (-) diarrhea  Musculoskeletal: (-) neck pain, (-) back pain, (-) joint pain  Integumentary: (-) rash, (-) edema  Neurological: (-) headache, (-) altered mental status  Psychiatric: (-) hallucinations  Allergic/Immunologic: (-) pruritus

## 2022-08-06 NOTE — ED PROVIDER NOTE - CLINICAL SUMMARY MEDICAL DECISION MAKING FREE TEXT BOX
26y female above PMH s/p prolonged ICU stay s/p resp failure s/p overdose vented now with trach collar left ICU AMA 2d ago returns for sensation of clogged ears as well as drainage from trach, realizes she is in no position to care for herself/trach at home, also on no meds, on exam nontoxic but chronically ill appearing, hypertensive, trach with secretions, lungs with transmitted sounds, abd snt, will suction, readmit

## 2022-08-06 NOTE — ED ADULT TRIAGE NOTE - CHIEF COMPLAINT QUOTE
patient SOLOMON from ICU two nights ago, patient c/o popping in her hears since her trach was placed and when she eats and drink her trach leaks

## 2022-08-06 NOTE — PATIENT PROFILE ADULT - IS PATIENT POST-MENOPAUSAL?
INTERIM HISTORY:  Ms. Magalie Bowers is a 77year old female who follows up 11 months status post right distal femur fracture ORIF (DOS 1/7/2018). She was last seen in the office on 5/9/18 where she was advised to continue activites as tolerated. Today, she indicates continued pain to the medial aspect of her right knee and is using a walker for assisted ambulation. She denies any episodes of right knee buckling or give-out. She is having some sensations of straining pain in her right femur during her ambulation. For treatment, she has kinga using pool exercise that seems to make her right knee ambulation    ROS:  She denies new onset chest pain or shortness of breath. PHYSICAL EXAMINATION: Right Knee  Able to preform straight leg raise   No erythema   Incisions healed    IMAGING  12/19/2018: XR Right Knee: No evidence of hardware failure     IMPRESSION:  11 Months Status Post:  Right distal periprosthetic femur fracture ORIF    PLAN:  I recommend a CT scan of her right knee   If fracture healed, may consider follow up with Dr. Adrian Milan for evaluation of knee. Could also consider IR guided knee steroid injection first.    The patient verbalizes understanding and agreement with the plan. All questions have been answered at this time. On 12/19/2018, IAlex scribed the services personally performed by Jeane Lentz MD    The documentation recorded by the scribe accurately and completely reflects the service(s) I personally performed and the decisions made by me. no

## 2022-08-06 NOTE — CHART NOTE - NSCHARTNOTEFT_GEN_A_CORE
Was notified by VERONICA Stinson that patient is experiencing symptoms of withdrawals.  Addiction medicine consult placed. Follow up recommendations for detox.  Follow up serum drug screen.  Previous chart from 8/1/22 reviewed. Methadone dose was reduced to 15mg q12 per chemical dependency team.

## 2022-08-06 NOTE — PHYSICAL THERAPY INITIAL EVALUATION ADULT - ADDITIONAL COMMENTS
Pt states she lives with boyfriend in apartment building on the first floor, no steps to enter building. She states she was independent in all ADLs prior to hospital admission.

## 2022-08-06 NOTE — H&P ADULT - HISTORY OF PRESENT ILLNESS
A 27 y/o female with pmhx of asthma, hepc , IVDA last use 8 wks ago heroin with recent acute respiratory failure s/p overdose vented now with trach collar left ICU AMA 2 days ago presents for drainage from trach and  sensation of clogged ears. Pt reports drainage from trach , mucus , white discharge. Pt states since she had trach placement , her ears are clogged , she hears herself loud. Pt denies tinnitus. Pt denies fever or chills. Pt reports occasional cough, with brown phlegm since she had tracheostomy. Pt reports currently smokes 1x pack a day. Pt denies any use of illicit drugs recently. Pt found with hypertension in ED, unsure if she on any medications.  Pt reports unable to take care of herself. PT denies fever, chills, chest pain, shortness of breath, burning with urination, diarrhea, nausea vomiting, dizziness, blurry vision

## 2022-08-06 NOTE — ED PROVIDER NOTE - CARE PLAN
Principal Discharge DX:	Tracheostomy care  Secondary Diagnosis:	Encounter for rehabilitation evaluation   1

## 2022-08-07 DIAGNOSIS — F33.2 MAJOR DEPRESSIVE DISORDER, RECURRENT SEVERE WITHOUT PSYCHOTIC FEATURES: ICD-10-CM

## 2022-08-07 LAB
ALBUMIN SERPL ELPH-MCNC: 4.5 G/DL — SIGNIFICANT CHANGE UP (ref 3.5–5.2)
ALP SERPL-CCNC: 318 U/L — HIGH (ref 30–115)
ALT FLD-CCNC: 9 U/L — SIGNIFICANT CHANGE UP (ref 0–41)
ANION GAP SERPL CALC-SCNC: 10 MMOL/L — SIGNIFICANT CHANGE UP (ref 7–14)
AST SERPL-CCNC: 20 U/L — SIGNIFICANT CHANGE UP (ref 0–41)
BILIRUB SERPL-MCNC: 0.6 MG/DL — SIGNIFICANT CHANGE UP (ref 0.2–1.2)
BUN SERPL-MCNC: 14 MG/DL — SIGNIFICANT CHANGE UP (ref 10–20)
CALCIUM SERPL-MCNC: 9.8 MG/DL — SIGNIFICANT CHANGE UP (ref 8.5–10.1)
CHLORIDE SERPL-SCNC: 100 MMOL/L — SIGNIFICANT CHANGE UP (ref 98–110)
CO2 SERPL-SCNC: 26 MMOL/L — SIGNIFICANT CHANGE UP (ref 17–32)
CREAT SERPL-MCNC: 0.7 MG/DL — SIGNIFICANT CHANGE UP (ref 0.7–1.5)
DRUG SCREEN, SERUM: ABNORMAL
EGFR: 121 ML/MIN/1.73M2 — SIGNIFICANT CHANGE UP
GLUCOSE SERPL-MCNC: 109 MG/DL — HIGH (ref 70–99)
HCT VFR BLD CALC: 33.2 % — LOW (ref 37–47)
HGB BLD-MCNC: 10.3 G/DL — LOW (ref 12–16)
MAGNESIUM SERPL-MCNC: 2.1 MG/DL — SIGNIFICANT CHANGE UP (ref 1.8–2.4)
MCHC RBC-ENTMCNC: 26 PG — LOW (ref 27–31)
MCHC RBC-ENTMCNC: 31 G/DL — LOW (ref 32–37)
MCV RBC AUTO: 83.8 FL — SIGNIFICANT CHANGE UP (ref 81–99)
NRBC # BLD: 0 /100 WBCS — SIGNIFICANT CHANGE UP (ref 0–0)
PLATELET # BLD AUTO: 144 K/UL — SIGNIFICANT CHANGE UP (ref 130–400)
POTASSIUM SERPL-MCNC: 5.1 MMOL/L — HIGH (ref 3.5–5)
POTASSIUM SERPL-SCNC: 5.1 MMOL/L — HIGH (ref 3.5–5)
PROT SERPL-MCNC: 7.8 G/DL — SIGNIFICANT CHANGE UP (ref 6–8)
RBC # BLD: 3.96 M/UL — LOW (ref 4.2–5.4)
RBC # FLD: 13.9 % — SIGNIFICANT CHANGE UP (ref 11.5–14.5)
SODIUM SERPL-SCNC: 136 MMOL/L — SIGNIFICANT CHANGE UP (ref 135–146)
WBC # BLD: 2.79 K/UL — LOW (ref 4.8–10.8)
WBC # FLD AUTO: 2.79 K/UL — LOW (ref 4.8–10.8)

## 2022-08-07 PROCEDURE — 99221 1ST HOSP IP/OBS SF/LOW 40: CPT

## 2022-08-07 PROCEDURE — 99232 SBSQ HOSP IP/OBS MODERATE 35: CPT

## 2022-08-07 RX ORDER — METHADONE HYDROCHLORIDE 40 MG/1
10 TABLET ORAL ONCE
Refills: 0 | Status: DISCONTINUED | OUTPATIENT
Start: 2022-08-07 | End: 2022-08-07

## 2022-08-07 RX ORDER — BACITRACIN ZINC 500 UNIT/G
1 OINTMENT IN PACKET (EA) TOPICAL DAILY
Refills: 0 | Status: DISCONTINUED | OUTPATIENT
Start: 2022-08-07 | End: 2022-08-12

## 2022-08-07 RX ORDER — ENOXAPARIN SODIUM 100 MG/ML
30 INJECTION SUBCUTANEOUS ONCE
Refills: 0 | Status: COMPLETED | OUTPATIENT
Start: 2022-08-07 | End: 2022-08-07

## 2022-08-07 RX ORDER — AMLODIPINE BESYLATE 2.5 MG/1
5 TABLET ORAL ONCE
Refills: 0 | Status: COMPLETED | OUTPATIENT
Start: 2022-08-07 | End: 2022-08-07

## 2022-08-07 RX ADMIN — Medication 0.1 MILLIGRAM(S): at 04:27

## 2022-08-07 RX ADMIN — METHADONE HYDROCHLORIDE 10 MILLIGRAM(S): 40 TABLET ORAL at 02:59

## 2022-08-07 RX ADMIN — Medication 1 PATCH: at 20:51

## 2022-08-07 RX ADMIN — ENOXAPARIN SODIUM 30 MILLIGRAM(S): 100 INJECTION SUBCUTANEOUS at 12:48

## 2022-08-07 RX ADMIN — METHADONE HYDROCHLORIDE 15 MILLIGRAM(S): 40 TABLET ORAL at 16:28

## 2022-08-07 RX ADMIN — METHADONE HYDROCHLORIDE 15 MILLIGRAM(S): 40 TABLET ORAL at 11:51

## 2022-08-07 RX ADMIN — Medication 1 PATCH: at 11:45

## 2022-08-07 RX ADMIN — Medication 0.2 MILLIGRAM(S): at 03:02

## 2022-08-07 RX ADMIN — AMLODIPINE BESYLATE 5 MILLIGRAM(S): 2.5 TABLET ORAL at 06:11

## 2022-08-07 RX ADMIN — METHADONE HYDROCHLORIDE 10 MILLIGRAM(S): 40 TABLET ORAL at 04:27

## 2022-08-07 RX ADMIN — SPIRONOLACTONE 100 MILLIGRAM(S): 25 TABLET, FILM COATED ORAL at 05:05

## 2022-08-07 RX ADMIN — Medication 1 APPLICATION(S): at 11:45

## 2022-08-07 RX ADMIN — Medication 0.1 MILLIGRAM(S): at 05:05

## 2022-08-07 RX ADMIN — Medication 1 PATCH: at 08:00

## 2022-08-07 RX ADMIN — QUETIAPINE FUMARATE 100 MILLIGRAM(S): 200 TABLET, FILM COATED ORAL at 05:05

## 2022-08-07 RX ADMIN — QUETIAPINE FUMARATE 100 MILLIGRAM(S): 200 TABLET, FILM COATED ORAL at 16:26

## 2022-08-07 NOTE — BH CONSULTATION LIAISON ASSESSMENT NOTE - NSBHREFERDETAILS_PSY_A_CORE_FT
1). wants to leave despite evidence that she is unable to care for herself properly at home  2). Support with Capacity

## 2022-08-07 NOTE — BH CONSULTATION LIAISON ASSESSMENT NOTE - HPI (INCLUDE ILLNESS QUALITY, SEVERITY, DURATION, TIMING, CONTEXT, MODIFYING FACTORS, ASSOCIATED SIGNS AND SYMPTOMS)
Mirta is a 27 y/o female with pmhx of asthma, hepc , IVDA last use 8 wks ago heroin with recent acute respiratory failure s/p overdose vented now with trach collar left ICU AMA 2 days ago presents for drainage from trach and  sensation of clogged ears. Psychiatry was consulted to assess for capacity to leave AMA as patient has done so in the past and was attempting to do so overnight.   Upon interview, patient was sitting in bed with her mother present. She was tearful and had been discussing the anniversary of her father's death with her mother. She somewhat tolerated the interview but did not provide many details. Mirta reported not remembering the circumstances of her admission. She reported she had wanted to leave last night because she is uncomfortable in the hospital. She reported believing her methadone dose was too low. She repeatedly stated she did not understand why she was in the hospital and did not understand why she had a trach places. She stated she would like to speak with the medical team to better understand what is going on. At this time, she reported she would not attempt to leave AMA.    Regarding her mental health concerns, she reported feeling depressed and hopeless about her future. She reported passive death wish but adamantly denied any plans or intent to harm herself. She reported she could never go through with it because of being too afraid of death or dying. She reported difficulty sleeping, no appetite, increased guilt, and anhedonia. She denied history of symptoms consistent with lei, hypomania, or psychosis. She reported a history of receiving mental health services but denied feeling as though it was helpful.

## 2022-08-07 NOTE — BH CONSULTATION LIAISON ASSESSMENT NOTE - DESCRIPTION
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 Her Father  2 years ago. She has been using heroin since 12 years old.

## 2022-08-07 NOTE — CHART NOTE - NSCHARTNOTEFT_GEN_A_CORE
Despite ongoing treatments for withdrawal which has included several doses of catapres, BP remains high. To get additional meds now (catapres and spirolactone), will add Norvasc and ask renal to see. Despite ongoing treatments for withdrawal which has included several doses of catapres, BP remains high. To get additional meds now (catapres and spirolactone), will add Norvasc and ask renal to see. Patient seen at bedside (actually was sleeping, woke her up) to discuss this plan

## 2022-08-07 NOTE — CHART NOTE - NSCHARTNOTEFT_GEN_A_CORE
Patient's BP quite high and continues to complain of withdrawal symptoms. Got methadone a little earlier, will try 0.2 mg catapres Patient's BP quite high and continues to complain of withdrawal symptoms. Told RN last use of heroin was Friday night (little more then 24 hours ago). Got methadone a little earlier, will try 0.2 mg catapres

## 2022-08-07 NOTE — CONSULT NOTE ADULT - ASSESSMENT
elevated BP's   - likely due to clonidine rebound, missed clonidine and aldactone and drug withdrawal  substance abuse / ivda  s/p trach  ascites / hx of cirrhosis    plan:    last BP better  cont clonidine and titrate up as needed  cont aldactone  monitor BP's
IMPRESSION: Rehab of 26  y.o  f  rehab  for debility ?sz  dis     PRECAUTIONS: [  ] Cardiac  [  ] Respiratory  [  ] Seizures [  ] Contact Isolation  [  ] Droplet Isolation  [ FALL ] Other    Weight Bearing Status:     RECOMMENDATION:    Out of Bed to Chair     DVT/Decubiti Prophylaxis    REHAB PLAN:     [   x] Bedside P/T 3-5 times a week   [   ]   Bedside O/T  2-3 times a week             [   ] No Rehab Therapy Indicated                   [   ]  Speech Therapy   Conditioning/ROM                                    ADL  Bed Mobility                                               Conditioning/ROM  Transfers                                                     Bed Mobility  Sitting /Standing Balance                         Transfers                                        Gait Training                                               Sitting/Standing Balance  Stair Training [   ]Applicable                    Home equipment Eval                                                                        Splinting  [   ] Only      GOALS:   ADL   [   ]   Independent                    Transfers  [   ] Independent                          Ambulation  [   ] Independent     [    ] With device                            [   ]  CG                                                         [   ]  CG                                                                  [   ] CG                            [    ] Min A                                                   [   ] Min A                                                              [   ] Min  A          DISCHARGE PLAN:   [   ]  Good candidate for Intensive Rehabilitation/Hospital based-[facility]                                             Will tolerate 3hrs Intensive Rehab Daily                                       [    ]  Short Term Rehab in Skilled Nursing Facility                                       [  xx  ]  Home with Outpatient or VN services and  social  work  care  and  eval                                           [    ]  Possible Candidate for Intensive Hospital based Rehab

## 2022-08-07 NOTE — BH CONSULTATION LIAISON ASSESSMENT NOTE - CURRENT MEDICATION
MEDICATIONS  (STANDING):  BACItracin   Ointment 1 Application(s) Topical daily  chlorhexidine 2% Cloths 1 Application(s) Topical <User Schedule>  cloNIDine 0.1 milliGRAM(s) Oral daily  methadone    Tablet 15 milliGRAM(s) Oral every 12 hours  nicotine -  14 mG/24Hr(s) Patch 1 patch Transdermal daily  QUEtiapine 100 milliGRAM(s) Oral two times a day  spironolactone 100 milliGRAM(s) Oral daily    MEDICATIONS  (PRN):  acetaminophen     Tablet .. 650 milliGRAM(s) Oral every 6 hours PRN Temp greater or equal to 38C (100.4F), Mild Pain (1 - 3)  fluticasone propionate 50 MICROgram(s)/spray Nasal Spray 1 Spray(s) Both Nostrils two times a day PRN nasal congestion

## 2022-08-07 NOTE — BH CONSULTATION LIAISON ASSESSMENT NOTE - NSBHCHARTREVIEWVS_PSY_A_CORE FT
Vital Signs Last 24 Hrs  T(C): 36.3 (07 Aug 2022 14:00), Max: 36.3 (07 Aug 2022 14:00)  T(F): 97.3 (07 Aug 2022 14:00), Max: 97.3 (07 Aug 2022 14:00)  HR: 88 (07 Aug 2022 14:00) (70 - 92)  BP: 134/87 (07 Aug 2022 14:00) (134/87 - 183/107)  BP(mean): --  RR: 18 (07 Aug 2022 14:00) (18 - 18)  SpO2: 99% (07 Aug 2022 03:49) (99% - 100%)    Parameters below as of 07 Aug 2022 03:49  Patient On (Oxygen Delivery Method): tracheostomy collar

## 2022-08-07 NOTE — BH CONSULTATION LIAISON ASSESSMENT NOTE - NSBHCONSULTFOLLOWAFTERCARE_PSY_A_CORE FT
Although patient has declined. Please provide her with referral to outpatient chemical dependency clinic.

## 2022-08-07 NOTE — BH CONSULTATION LIAISON ASSESSMENT NOTE - DETAILS
Father : alcohol use disorder  Mother: MDD, TREY "I wish I wasn't here." visible track marks, reported bed sore

## 2022-08-07 NOTE — BH CONSULTATION LIAISON ASSESSMENT NOTE - ADDITIONAL DETAILS / COMMENTS
minimally cooperative with interview. Tearful throughout. She had been discussing the death of her father with her mother.

## 2022-08-07 NOTE — BH CONSULTATION LIAISON ASSESSMENT NOTE - RISK ASSESSMENT
Patient is at a chronic and elevated risk for harm to herself. She is expressing passive wishes to not be alive and is also reporting significant heroin use. Protective factors include her adamantly denying any intent or plan to harm herself, her mother and aunt are supportive, and she expressed a fear of attempting suicide.

## 2022-08-07 NOTE — BH CONSULTATION LIAISON ASSESSMENT NOTE - SUMMARY
Mirta is a 25 y/o female with pmhx of asthma, hepc , IVDA last use 8 wks ago heroin with recent acute respiratory failure s/p overdose vented now with trach collar left ICU AMA 2 days ago presents for drainage from trach and  sensation of clogged ears. Psychiatry was consulted to assess for capacity to leave AMA as patient has done so in the past and was attempting to do so overnight. Mirta's primary mental health condition is severe opiate use disorder. She is on methadone therapy and addiction consult is pending. She is reporting symptoms consistent with a depressive episode. While endorsing passive SI, she adamantly denies any plans or intent to harm herself. She denies symptoms consistent with lei, hypomania, or psychosis.    Regarding capacity: Patient currently does not have capacity to leave the hospital against medical advice. She is not able to demonstrate understanding of treatment or her illnesses. She is not able to report appreciation of the risks associated with leaving against medical advice.

## 2022-08-07 NOTE — CHART NOTE - NSCHARTNOTEFT_GEN_A_CORE
Patient says she still in withdrawal, although laying in bed with legs elevated, her BP remains very high. Will repeat methadone and catapres

## 2022-08-07 NOTE — BH CONSULTATION LIAISON ASSESSMENT NOTE - DIFFERENTIAL
opiate use disorder, severe  substance induced mood disorder  major depressive disorder, severe, without psychosis

## 2022-08-07 NOTE — PROGRESS NOTE ADULT - ASSESSMENT
A 25 y/o female with pmhx of asthma, hepc , IVDA last use 8 wks ago heroin with recent acute respiratory failure s/p overdose vented now with trach collar left ICU AMA 2 days ago presents for drainage from trach and  sensation of clogged ears.    Tracheostomy  Drainage +  Sensation of clogged ears  -Respiratory care, case management for rehab placement (refused [facility] last time)   -Patient wants to Leave AMA, consulted psych for support in determining capacity   -Constant Observation for now   -ENT as oupt    Hypertension likely secondary to Clonidine Rebound   -IV Drug Abuse with Heroin + Nicotine dependency    -H/O Hepatitis C :Viral RNA undetectable   -Patient missed several doses of Clonidine  -smoking cessation educated + nicotine patch offered  -c/w Clonidine and Methadone       Mild Anasarca likely from Malnutrition   c/w spironolactone and F/up dietary Consult     Unstageable sacral ulcer: local wound care  H/O  depression/anxiety : continue Quetiapine     DVT PPX: Lovenox  GI PPX: not indicated   Full code  Dispo: From home

## 2022-08-07 NOTE — CHART NOTE - NSCHARTNOTEFT_GEN_A_CORE
Informed that patient (well known to me from recent encounter to leave hospital AMA) is demanding to leave hospital now (again AMA). Chart reviewed and it is apparent to me that patient was not able to care for herself at home after leaving hospital, in particular regarding trach care. As this is a potentially imminent threat to breathing capability and therefore her life, I informed her that she cannot leave hospital at this time. Upon further discussion one of patient's issues is she feels she needs more methadone (I was just informed that patient refused to take catapres that I ordered). Informed that patient (well known to me from recent encounter to leave hospital AMA) is demanding to leave hospital now (again AMA). Chart reviewed and it is apparent to me that patient was not able to care for herself at home after leaving, in particular regarding trach care. As this is a potentially imminent threat to breathing capability and therefore her life, I informed her that she cannot leave hospital at this time. Upon further discussion one of patient's issues is she feels she needs more methadone (I was just informed that patient refused to take catapres that I ordered).

## 2022-08-08 DIAGNOSIS — F11.20 OPIOID DEPENDENCE, UNCOMPLICATED: ICD-10-CM

## 2022-08-08 LAB
ANION GAP SERPL CALC-SCNC: 13 MMOL/L — SIGNIFICANT CHANGE UP (ref 7–14)
BASOPHILS # BLD AUTO: 0 K/UL — SIGNIFICANT CHANGE UP (ref 0–0.2)
BASOPHILS NFR BLD AUTO: 0 % — SIGNIFICANT CHANGE UP (ref 0–1)
BUN SERPL-MCNC: 18 MG/DL — SIGNIFICANT CHANGE UP (ref 10–20)
CALCIUM SERPL-MCNC: 9.3 MG/DL — SIGNIFICANT CHANGE UP (ref 8.5–10.1)
CHLORIDE SERPL-SCNC: 99 MMOL/L — SIGNIFICANT CHANGE UP (ref 98–110)
CO2 SERPL-SCNC: 23 MMOL/L — SIGNIFICANT CHANGE UP (ref 17–32)
CREAT SERPL-MCNC: 0.8 MG/DL — SIGNIFICANT CHANGE UP (ref 0.7–1.5)
EGFR: 103 ML/MIN/1.73M2 — SIGNIFICANT CHANGE UP
EOSINOPHIL # BLD AUTO: 0 K/UL — SIGNIFICANT CHANGE UP (ref 0–0.7)
EOSINOPHIL NFR BLD AUTO: 0 % — SIGNIFICANT CHANGE UP (ref 0–8)
GLUCOSE SERPL-MCNC: 109 MG/DL — HIGH (ref 70–99)
HCT VFR BLD CALC: 27.5 % — LOW (ref 37–47)
HGB BLD-MCNC: 8.6 G/DL — LOW (ref 12–16)
IMM GRANULOCYTES NFR BLD AUTO: 0.6 % — HIGH (ref 0.1–0.3)
LYMPHOCYTES # BLD AUTO: 0.62 K/UL — LOW (ref 1.2–3.4)
LYMPHOCYTES # BLD AUTO: 37.3 % — SIGNIFICANT CHANGE UP (ref 20.5–51.1)
MCHC RBC-ENTMCNC: 26.5 PG — LOW (ref 27–31)
MCHC RBC-ENTMCNC: 31.3 G/DL — LOW (ref 32–37)
MCV RBC AUTO: 84.6 FL — SIGNIFICANT CHANGE UP (ref 81–99)
MONOCYTES # BLD AUTO: 0.09 K/UL — LOW (ref 0.1–0.6)
MONOCYTES NFR BLD AUTO: 5.4 % — SIGNIFICANT CHANGE UP (ref 1.7–9.3)
NEUTROPHILS # BLD AUTO: 0.94 K/UL — LOW (ref 1.4–6.5)
NEUTROPHILS NFR BLD AUTO: 56.7 % — SIGNIFICANT CHANGE UP (ref 42.2–75.2)
NRBC # BLD: 0 /100 WBCS — SIGNIFICANT CHANGE UP (ref 0–0)
PLATELET # BLD AUTO: 130 K/UL — SIGNIFICANT CHANGE UP (ref 130–400)
POTASSIUM SERPL-MCNC: 4 MMOL/L — SIGNIFICANT CHANGE UP (ref 3.5–5)
POTASSIUM SERPL-SCNC: 4 MMOL/L — SIGNIFICANT CHANGE UP (ref 3.5–5)
RBC # BLD: 3.25 M/UL — LOW (ref 4.2–5.4)
RBC # FLD: 14.1 % — SIGNIFICANT CHANGE UP (ref 11.5–14.5)
SODIUM SERPL-SCNC: 135 MMOL/L — SIGNIFICANT CHANGE UP (ref 135–146)
WBC # BLD: 1.66 K/UL — LOW (ref 4.8–10.8)
WBC # FLD AUTO: 1.66 K/UL — LOW (ref 4.8–10.8)

## 2022-08-08 PROCEDURE — 99233 SBSQ HOSP IP/OBS HIGH 50: CPT

## 2022-08-08 PROCEDURE — 99221 1ST HOSP IP/OBS SF/LOW 40: CPT

## 2022-08-08 RX ORDER — METHADONE HYDROCHLORIDE 40 MG/1
15 TABLET ORAL
Refills: 0 | Status: DISCONTINUED | OUTPATIENT
Start: 2022-08-08 | End: 2022-08-10

## 2022-08-08 RX ADMIN — QUETIAPINE FUMARATE 100 MILLIGRAM(S): 200 TABLET, FILM COATED ORAL at 05:21

## 2022-08-08 RX ADMIN — Medication 1 PATCH: at 20:00

## 2022-08-08 RX ADMIN — METHADONE HYDROCHLORIDE 15 MILLIGRAM(S): 40 TABLET ORAL at 21:58

## 2022-08-08 RX ADMIN — SPIRONOLACTONE 100 MILLIGRAM(S): 25 TABLET, FILM COATED ORAL at 05:21

## 2022-08-08 RX ADMIN — Medication 0.1 MILLIGRAM(S): at 05:28

## 2022-08-08 RX ADMIN — METHADONE HYDROCHLORIDE 15 MILLIGRAM(S): 40 TABLET ORAL at 05:22

## 2022-08-08 RX ADMIN — QUETIAPINE FUMARATE 100 MILLIGRAM(S): 200 TABLET, FILM COATED ORAL at 18:12

## 2022-08-08 RX ADMIN — Medication 1 PATCH: at 11:35

## 2022-08-08 RX ADMIN — Medication 1 PATCH: at 14:50

## 2022-08-08 RX ADMIN — Medication 1 APPLICATION(S): at 11:37

## 2022-08-08 NOTE — DIETITIAN NUTRITION RISK NOTIFICATION - TREATMENT: THE FOLLOWING DIET HAS BEEN RECOMMENDED
Diet, Easy to Chew:   Moderately Thick Liquids (MODTHICKLIQS)  Prosource Gelatein Plus     Qty per Day:  1 with each meal  Supplement Feeding Modality:  Oral (08-08-22 @ 17:28) [Active]

## 2022-08-08 NOTE — BH CONSULTATION LIAISON PROGRESS NOTE - NSBHCHARTREVIEWVS_PSY_A_CORE FT
Vital Signs Last 24 Hrs  T(C): 35.8 (08 Aug 2022 05:26), Max: 36.3 (07 Aug 2022 14:00)  T(F): 96.4 (08 Aug 2022 05:26), Max: 97.3 (07 Aug 2022 14:00)  HR: 91 (08 Aug 2022 08:57) (69 - 92)  BP: 150/91 (08 Aug 2022 05:26) (121/81 - 150/91)  BP(mean): --  RR: 18 (08 Aug 2022 08:57) (18 - 18)  SpO2: 100% (08 Aug 2022 08:57) (99% - 100%)

## 2022-08-08 NOTE — BH CONSULTATION LIAISON PROGRESS NOTE - NSBHASSESSMENTFT_PSY_ALL_CORE
Patient is a 27yo F, past medical history of asthma, hep C, cirrhosis, IVDA w/recent acute respiratory failure s/p overdose now with trach collar, who left ICU AMA 2 days ago and represented to hospital for sensation of clogged ears and drainage from trach. Seen by Dr. Bush for depression 8/2. Seen by Dr. Nicole for capacity 8/7, deemed to not have capacity to leave the hospital, noted to have depressive symptoms, also recommended addiction to see. Reconsulted 8/8 for capacity consult.     Patient currently does not have capacity to leave AMA at this time. On interview, patient was not able to express a consistent preference. When first interviewing her, stated that she wanted to leave, but by the end of the interview stated "not really" in terms of leaving. Additionally pt does not appear to appreciate the situation including the illnesses she has and treatment she is receiving. She cannot state the risks of leaving against medical advice on this interview.      Currently pt endorses some depressive symptoms. There was no evidence of lei or psychosis. She endorsed passive SI but denied active SI/intent/plan and stated family is a protective factor. Given this, primary team can consider increasing Seroquel 100mg to 150mg (indicated for bipolar depression, although pt denies hx and dx can be confounded when there is a hx of substance use) and/or starting SSRI Zoloft 50mg daily as suggested by Dr. Bush while monitoring for side effects.     Management of withdrawal is per addiction team as can contribute to distress and poor frustration tolerance. Substance use appears to be pt's primary issue.     Would encourage primary team to speak to HCP, ethics, legal as necessary to determine next steps. Would also attempt to re-explain risks to patient.  Patient is a 27yo F, past medical history of asthma, hep C, cirrhosis, IVDA w/recent acute respiratory failure s/p overdose now with trach collar, who left ICU AMA 2 days ago and represented to hospital for sensation of clogged ears and drainage from trach. Seen by Dr. Bush for depression 8/2. Seen by Dr. Nicole for capacity 8/7, deemed to not have capacity to leave the hospital, noted to have depressive symptoms, also recommended addiction to see. Reconsulted 8/8 for capacity consult.     Patient currently does not have capacity to leave AMA at this time. On interview, patient was not able to express a consistent preference. When first interviewing her, stated that she wanted to leave, but by the end of the interview stated "not really" in terms of leaving. Additionally pt does not appear to appreciate the situation including the illnesses she has and treatment she is receiving. She cannot state the risks of leaving against medical advice on this interview.      Currently pt endorses depressive symptoms. There was no evidence of lei or psychosis. She endorsed passive SI but denied active SI/intent/plan and stated family is a protective factor. Given this, primary team can consider increasing Seroquel 100mg to 150mg (indicated for bipolar depression, although pt denies hx and dx can be confounded when there is a hx of substance use) and/or starting SSRI Zoloft 50mg daily as suggested by Dr. Bush while monitoring for side effects.     Management of withdrawal is per addiction team as can contribute to distress and poor frustration tolerance. Substance use appears to be pt's primary issue.     Would encourage primary team to speak to HCP, ethics, legal as necessary to determine next steps. Would also attempt to re-explain risks to patient.  Patient is a 27yo F, past medical history of asthma, hep C, cirrhosis, IVDA w/recent acute respiratory failure s/p overdose now with trach collar, who left ICU AMA 2 days ago and represented to hospital for sensation of clogged ears and drainage from trach. Seen by Dr. Bush for depression 8/2. Seen by Dr. Nicole for capacity 8/7, deemed to not have capacity to leave the hospital, noted to have depressive symptoms, also recommended addiction to see. Reconsulted 8/8 for capacity consult.     Patient currently does not have capacity to leave AMA at this time. On interview, patient was not able to express a consistent preference. When first interviewing her, stated that she wanted to leave, but by the end of the interview stated "not really" in terms of leaving. Additionally pt does not appear to appreciate the situation including the illnesses she has and treatment she is receiving. She cannot state the risks of leaving against medical advice on this interview.      Currently pt endorses depressive symptoms. There was no evidence of lei or psychosis. She endorsed passive SI but denied active SI/intent/plan and stated family is a protective factor. Given this, primary team can consider increasing Seroquel 100mg to 150mg (indicated for bipolar depression, although pt denies hx and bipolar dx can be confounded when there is a hx of substance use) and/or starting SSRI Zoloft 50mg daily as suggested by Dr. Bush while monitoring for side effects including lei-- not clear what the etiology of pt's psych symptoms is at this time but does have depressive symptoms.    Would also recommend management of withdrawal per addiction team as symptoms can contribute to distress and poor frustration tolerance. Moreover, substance use appears to be pt's primary issue.     Would encourage primary team to speak to HCP, ethics, legal as necessary to determine next steps. Would also attempt to re-explain risks to patient.  Patient is a 25yo F, past medical history of asthma, hep C, cirrhosis, IVDA w/recent acute respiratory failure s/p overdose now with trach collar, who left ICU AMA 2 days ago and represented to hospital for sensation of clogged ears and drainage from trach. Seen by Dr. Bush for depression 8/2. Seen by Dr. Nicole for capacity 8/7, deemed to not have capacity to leave the hospital, noted to have depressive symptoms, also recommended addiction to see. Reconsulted 8/8 for capacity consult.     Patient currently does not have capacity to leave AMA at this time. On interview, patient was not able to express a consistent preference. When first interviewing her, stated that she wanted to leave, but by the end of the interview stated "not really" in terms of leaving. Additionally pt does not appear to appreciate the situation including the illnesses she has and treatment she is receiving. She cannot state the risks of leaving against medical advice on this interview.      Currently pt endorses depressive symptoms. There was no evidence of lei or psychosis. She endorsed passive SI but denied active SI/intent/plan and stated family is a protective factor. Given this, primary team can consider increasing Seroquel 100mg to 150mg (indicated for bipolar depression, although bipolar dx can be confounded when there is a hx of substance use) and/or starting SSRI Zoloft 50mg daily as suggested by Dr. Bush while monitoring for side effects including but not limited to lei. Of note, it is not clear what the etiology of pt's psych symptoms is at this time (could be largely related to substance abuse/withdrawal/use) but does have depressive symptoms.    Would also recommend management of withdrawal per addiction team as symptoms can contribute to distress and poor frustration tolerance. Moreover, substance use appears to be pt's primary issue.     Would encourage primary team to speak to HCP, ethics, legal as necessary to determine next steps. Would also attempt to re-explain risks to patient.

## 2022-08-08 NOTE — DIETITIAN INITIAL EVALUATION ADULT - OTHER INFO
pt is 26 year old female with pmhx of asthma, hepc , IVDA, recently admitted 6/15-8/4 2/2 [facility] due to OD s/p trach collar, pt left AMA from med surg unit on 8/4. presents with discharge from trach collar and ringing in her ears.  last drug use ~ 3 days ago. Pt appears cachetic, as per EMR pt has 23# weight loss in less than two weeks, some associated to fluid due to hx of anasarca.

## 2022-08-08 NOTE — DIETITIAN INITIAL EVALUATION ADULT - PERTINENT LABORATORY DATA
08-08    135  |  99  |  18  ----------------------------<  109<H>  4.0   |  23  |  0.8    Ca    9.3      08 Aug 2022 08:44  Mg     2.1     08-07    TPro  7.8  /  Alb  4.5  /  TBili  0.6  /  DBili  x   /  AST  20  /  ALT  9   /  AlkPhos  318<H>  08-07                        8.6    1.66  )-----------( 130      ( 08 Aug 2022 08:44 )             27.5

## 2022-08-08 NOTE — DIETITIAN INITIAL EVALUATION ADULT - ORAL INTAKE PTA/DIET HISTORY
as per pt good po intake for last few days-pt admits to occasional non-compliance with thickened liquids. I reiterated risk of aspiration PNA and urged pt to comply with diet    presently consumes ~ 50% of meals with family bringing in outside foods such as pizza

## 2022-08-08 NOTE — BH CONSULTATION LIAISON PROGRESS NOTE - NSBHCONSULTFOLLOWAFTERCARE_PSY_A_CORE FT
Capacity consult complete  Currently medically active  Please call psychiatry back for further questions

## 2022-08-08 NOTE — CONSULT NOTE ADULT - SUBJECTIVE AND OBJECTIVE BOX
HPI: 27 y/o female with pmhx of asthma, hep c , IVDA last use 8 wks ago heroin with recent acute respiratory failure s/p overdose vented now with trach collar left ICU AMA 2 days ago presents for drainage from trach and  sensation of clogged ears. Pt reports drainage from trach , mucus , white discharge. Pt states since she had trach placement , her ears are clogged , she hears herself loud. Pt denies tinnitus. Pt denies fever or chills. Pt reports occasional cough, with brown phlegm since she had tracheostomy. Pt reports currently smokes 1x pack a day. Pt denies any use of illicit drugs recently. Pt found with hypertension in ED, unsure if she on any medications.  Pt reports unable to take care of herself. PT denies fever, chills, chest pain, shortness of breath, burning with urination, diarrhea, nausea vomiting, dizziness, blurry vision  ,ptn  seen and exam  at  bed  side  nad  no  new  c.o  aox3  fu  command     PTN  REFERRED TO ACUTE  REHAB  FOR  EVAL AND  TX   PAST MEDICAL & SURGICAL HISTORY:  Hepatitis C      Asthma      Anxiety and depression      IV drug abuse  heroin      No significant past surgical history          Hospital Course:    TODAY'S SUBJECTIVE & REVIEW OF SYMPTOMS:     Constitutional WNL   Cardio WNL   Resp WNL   GI WNL  Heme WNL  Endo WNL  Skin WNL  MSK WNL  Neuro WNL  Cognitive WNL  Psych WNL      MEDICATIONS  (STANDING):  BACItracin   Ointment 1 Application(s) Topical daily  chlorhexidine 2% Cloths 1 Application(s) Topical <User Schedule>  cloNIDine 0.1 milliGRAM(s) Oral daily  methadone    Tablet 15 milliGRAM(s) Oral every 12 hours  nicotine -  14 mG/24Hr(s) Patch 1 patch Transdermal daily  pantoprazole   Suspension 40 milliGRAM(s) Oral daily  QUEtiapine 100 milliGRAM(s) Oral two times a day  spironolactone 100 milliGRAM(s) Oral daily    MEDICATIONS  (PRN):  acetaminophen     Tablet .. 650 milliGRAM(s) Oral every 6 hours PRN Temp greater or equal to 38C (100.4F), Mild Pain (1 - 3)  fluticasone propionate 50 MICROgram(s)/spray Nasal Spray 1 Spray(s) Both Nostrils two times a day PRN nasal congestion      FAMILY HISTORY:      Allergies    buprenorphine (Rash)  Suboxone (Rash)    Intolerances        SOCIAL HISTORY:    [  ] Etoh  [  ] Smoking  [  ] Substance abuse     Home Environment:  [ x ] Home Alone  [  ] Lives with Family  [  ] Home Health Aid    Dwelling:  [x  ] Apartment  [  ] Private House  [  ] Adult Home  [  ] Skilled Nursing Facility      [  ] Short Term  [  ] Long Term  [ - ] Stairs       Elevator [  ]    FUNCTIONAL STATUS PTA: (Check all that apply)  Ambulation: [  x ]Independent    [  ] Dependent     [  ] Non-Ambulatory  Assistive Device: [  ] SA Cane  [  ]  Q Cane  [  ] Walker  [  ]  Wheelchair  ADL : [x  ] Independent  [  ]  Dependent       Vital Signs Last 24 Hrs  T(C): 36.1 (07 Aug 2022 05:04), Max: 36.1 (07 Aug 2022 05:04)  T(F): 96.9 (07 Aug 2022 05:04), Max: 96.9 (07 Aug 2022 05:04)  HR: 78 (07 Aug 2022 06:17) (70 - 92)  BP: 139/89 (07 Aug 2022 06:17) (139/89 - 183/107)  BP(mean): --  RR: 18 (07 Aug 2022 05:04) (18 - 18)  SpO2: 99% (07 Aug 2022 03:49) (99% - 100%)    Parameters below as of 07 Aug 2022 03:49  Patient On (Oxygen Delivery Method): tracheostomy collar          PHYSICAL EXAM: Alert & Oriented X3  GENERAL: NAD, well-groomed, well-developed  HEAD:  Atraumatic, Normocephalic  EYES: EOMI, PERRLA, conjunctiva and sclera clear  NECK: Supple, No JVD, Normal thyroid trach  in  place  CHEST/LUNG: Clear to percussion bilaterally; No rales, rhonchi, wheezing, or rubs  HEART: Regular rate and rhythm; No murmurs, rubs, or gallops  ABDOMEN: Soft, Nontender, Nondistended; Bowel sounds present  EXTREMITIES:  2+ Peripheral Pulses, No clubbing, cyanosis, or edema    NERVOUS SYSTEM:  Cranial Nerves 2-12 intact [  x] Abnormal  [  ]  ROM: WFL all extremities [ x ]  Abnormal [  ]  Motor Strength: WFL all extremities  [ x ]  Abnormal [  ]  Sensation: intact to light touch [ x ] Abnormal [  ]  Reflexes: Symmetric [ x ]  Abnormal [  ]    FUNCTIONAL STATUS:  Bed Mobility: Independent [  ]  Supervision [ x ]  Needs Assistance [  ]  N/A [  ]  Transfers: Independent [  ]  Supervision [ x ]  Needs Assistance [  ]  N/A [  ]   Ambulation: Independent [  ]  Supervision [x ]  Needs Assistance [  ]  N/A [  ]  ADL: Independent [ x ] Requires Assistance [  ] N/A [  ]  SEE PT/OT IE NOTES    LABS:                        10.7   4.15  )-----------( 163      ( 06 Aug 2022 09:20 )             34.0     08-06    136  |  99  |  11  ----------------------------<  117<H>  3.9   |  24  |  0.7    Ca    9.8      06 Aug 2022 09:20  Mg     1.8     08-06    TPro  7.9  /  Alb  4.7  /  TBili  0.8  /  DBili  x   /  AST  22  /  ALT  9   /  AlkPhos  315<H>  08-06          RADIOLOGY & ADDITIONAL STUDIES:    Assesment:
NEPHROLOGY CONSULTATION NOTE     A 27 y/o female with pmhx of asthma, hepc , IVDA last use 8 wks ago heroin with recent acute respiratory failure s/p overdose vented now with trach collar left ICU AMA 2 days ago presents for drainage from trach and  sensation of clogged ears. Pt reports drainage from trach , mucus , white discharge. Pt states since she had trach placement , her ears are clogged , she hears herself loud. Pt denies tinnitus. Pt denies fever or chills. Pt reports occasional cough, with brown phlegm since she had tracheostomy. Pt reports currently smokes 1x pack a day. Pt denies any use of illicit drugs recently. Pt found with hypertension in ED, unsure if she on any medications.  Pt reports unable to take care of herself. PT denies fever, chills, chest pain, shortness of breath, burning with urination, diarrhea, nausea vomiting, dizziness, blurry vision     renal:   called for elevated BP's, now improved    PAST MEDICAL & SURGICAL HISTORY:  Hepatitis C      Asthma      Anxiety and depression      IV drug abuse  heroin      No significant past surgical history        Allergies:  buprenorphine (Rash)  Suboxone (Rash)    Home Medications Reviewed    SOCIAL HISTORY:  Denies ETOH,Smoking,   FAMILY HISTORY:        REVIEW OF SYSTEMS:  CONSTITUTIONAL: No weakness, fevers or chills  EYES/ENT: No visual changes;  No vertigo or throat pain   NECK: No pain or stiffness  RESPIRATORY: No cough, wheezing, hemoptysis; No shortness of breath  CARDIOVASCULAR: No chest pain or palpitations.  GASTROINTESTINAL: No abdominal or epigastric pain. No nausea, vomiting, or hematemesis; No diarrhea or constipation. No melena or hematochezia.  GENITOURINARY: No dysuria, frequency, foamy urine, urinary urgency, incontinence or hematuria  NEUROLOGICAL: No numbness or weakness  SKIN: No itching, burning, rashes, or lesions   VASCULAR: No bilateral lower extremity edema.   All other review of systems is negative unless indicated above.    PHYSICAL EXAM:  Constitutional: NAD  HEENT: anicteric sclera, oropharynx clear, MMM  Neck: + trach  Respiratory: CTAB, no wheezes, rales or rhonchi  Cardiovascular: S1, S2, RRR  Gastrointestinal: BS+, soft, NT + distention  Extremities: No cyanosis or clubbing. No peripheral edema  Neurological: A/O x 3, no focal deficits  Psychiatric: Normal mood, normal affect  : No CVA tenderness. No doss.   Skin: No rashes, multiple scars and bruises    Hospital Medications:   MEDICATIONS  (STANDING):  BACItracin   Ointment 1 Application(s) Topical daily  chlorhexidine 2% Cloths 1 Application(s) Topical <User Schedule>  cloNIDine 0.1 milliGRAM(s) Oral daily  methadone    Tablet 15 milliGRAM(s) Oral every 12 hours  nicotine -  14 mG/24Hr(s) Patch 1 patch Transdermal daily  pantoprazole   Suspension 40 milliGRAM(s) Oral daily  QUEtiapine 100 milliGRAM(s) Oral two times a day  spironolactone 100 milliGRAM(s) Oral daily        VITALS:  T(F): 96.9 (08-07-22 @ 05:04), Max: 96.9 (08-07-22 @ 05:04)  HR: 78 (08-07-22 @ 06:17)  BP: 139/89 (08-07-22 @ 06:17)  RR: 18 (08-07-22 @ 05:04)  SpO2: 99% (08-07-22 @ 03:49)  Wt(kg): --        LABS:  08-06    136  |  99  |  11  ----------------------------<  117<H>  3.9   |  24  |  0.7    Ca    9.8      06 Aug 2022 09:20  Mg     1.8     08-06    TPro  7.9  /  Alb  4.7  /  TBili  0.8  /  DBili      /  AST  22  /  ALT  9   /  AlkPhos  315<H>  08-06                          10.7   4.15  )-----------( 163      ( 06 Aug 2022 09:20 )             34.0       Urine Studies:        RADIOLOGY & ADDITIONAL STUDIES:    
    Pt interviewed, examined and EMR chart reviewed.  Pt admits to using opiates 1 x use over the last 2 days after leaving AMA on 8/4. Last use 3 days ago  Pt has recieved methadone since admission   Hx of withdrawal variable periods of sobriety in the past.  Has been in detox before _____yes,   _____No    SOCIAL HISTORY:    REVIEW OF SYSTEMS:    Constitutional: No fever, weight loss or fatigue  ENT:  No difficulty hearing, tinnitus, vertigo; No sinus or throat pain  Neck: No pain or stiffness  Respiratory: No cough, wheezing, chills or hemoptysis  Cardiovascular: No chest pain, palpitations, shortness of breath, dizziness or leg swelling  Gastrointestinal: No abdominal or epigastric pain. No nausea, vomiting or hematemesis; No diarrhea or constipation. No melena or hematochezia.  Neurological: No headaches, memory loss, loss of strength, numbness or tremors  Musculoskeletal: No joint pain or swelling; No muscle, back or extremity pain  Psychiatric: No depression, anxiety, mood swings or difficulty sleeping    MEDICATIONS  (STANDING):  BACItracin   Ointment 1 Application(s) Topical daily  chlorhexidine 2% Cloths 1 Application(s) Topical <User Schedule>  cloNIDine 0.1 milliGRAM(s) Oral daily  nicotine -  14 mG/24Hr(s) Patch 1 patch Transdermal daily  QUEtiapine 100 milliGRAM(s) Oral two times a day  spironolactone 100 milliGRAM(s) Oral daily    MEDICATIONS  (PRN):  acetaminophen     Tablet .. 650 milliGRAM(s) Oral every 6 hours PRN Temp greater or equal to 38C (100.4F), Mild Pain (1 - 3)  fluticasone propionate 50 MICROgram(s)/spray Nasal Spray 1 Spray(s) Both Nostrils two times a day PRN nasal congestion      Vital Signs Last 24 Hrs  T(C): 35.8 (08 Aug 2022 05:26), Max: 36.3 (07 Aug 2022 14:00)  T(F): 96.4 (08 Aug 2022 05:26), Max: 97.3 (07 Aug 2022 14:00)  HR: 91 (08 Aug 2022 08:57) (69 - 92)  BP: 150/91 (08 Aug 2022 05:26) (121/81 - 150/91)  BP(mean): --  RR: 18 (08 Aug 2022 08:57) (18 - 18)  SpO2: 100% (08 Aug 2022 08:57) (99% - 100%)        PHYSICAL EXAM:    Constitutional: NAD, well-groomed, well-developed  HEENT: PERRLA, EOMI, Normal Hearing, MMM  Neck: No LAD, No JVD..positive trache  Back: Normal spine flexure, No CVA tenderness  Respiratory: CTAB/L  Cardiovascular: S1 and S2, RRR, no M/G/R  Gastrointestinal: BS+, soft, NT/ND  Extremities: No peripheral edema, positive muscle wasting  Vascular: 2+ peripheral pulses  Neurological: A/O x 3, no focal deficits    LABS:                        8.6    1.66  )-----------( 130      ( 08 Aug 2022 08:44 )             27.5     08-08    135  |  99  |  18  ----------------------------<  109<H>  4.0   |  23  |  0.8    Ca    9.3      08 Aug 2022 08:44  Mg     2.1     08-07    TPro  7.8  /  Alb  4.5  /  TBili  0.6  /  DBili  x   /  AST  20  /  ALT  9   /  AlkPhos  318<H>  08-07        Drug Screen Urine:  Alcohol Level        RADIOLOGY & ADDITIONAL STUDIES:

## 2022-08-08 NOTE — PROGRESS NOTE ADULT - ASSESSMENT
HTN   - bp's now contrilled   substance abuse / ivda  s/p trach  ascites / hx of cirrhosis    plan:    cont clonidine    cont aldactone  monitor BP's

## 2022-08-08 NOTE — PROGRESS NOTE ADULT - ASSESSMENT
A 25 y/o female with pmhx of asthma, hepc , IVDA last use 8 wks ago heroin with recent acute respiratory failure s/p overdose vented now with trach collar left ICU AMA 2 days ago presents for drainage from trach and  sensation of clogged ears.    Tracheostomy  Drainage +  Sensation of clogged ears  -Respiratory care, case management for rehab placement (refused [facility] last time)   -Patient wants to Leave AMA, consulted psych for support in determining capacity   -Constant Observation for now   -ENT as oupt    Hypertension likely secondary to Clonidine Rebound   -IV Drug Abuse with Heroin + Nicotine dependency    -H/O Hepatitis C :Viral RNA undetectable   -Patient missed several doses of Clonidine  -smoking cessation educated + nicotine patch offered  -c/w Clonidine        Pancytopenia with hepatosplenomegally,   Multifactorial, Evaluated by Heme/Onc already  -O/P follow-up     Mild Anasarca likely from Malnutrition: improving    c/w spironolactone and F/up dietary Consult     Unstageable sacral ulcer: local wound care  H/O  depression/anxiety : continue Quetiapine     DVT PPX: Lovenox  GI PPX: not indicated   Full code  Dispo: From home  (Planing for SNF or VNS services (but home condition is unstable squatting with friends)

## 2022-08-08 NOTE — CONSULT NOTE ADULT - PROBLEM SELECTOR RECOMMENDATION 9
After evaluation at this time would continue to monitor for mild withdrawal of opiates. Pt is requesting to go onto [facility] program after discharge. Pt states she wants to go home and have home care for her tracheostomy. Pt will be living with her aunt who she states will be assisting her and will take her to [facility] every day. Pt is to give consent to coordinate care.  Pt is currently on 1:1 secondary to wanting to leave AMA again.  Case discussed with Dr. De Dios, RUSSELL Lopez and RUSSELL Maradiaga as well as unit manager Dagmar who are agreeable to accept patient to program once stable and after confirming patients ability to get to the program on a daily basis.  Pt will be monitored and supportive care provided.     CATCH team involved for aftercare and pt will follow up with aftercare

## 2022-08-08 NOTE — BH CONSULTATION LIAISON PROGRESS NOTE - CURRENT MEDICATION
MEDICATIONS  (STANDING):  BACItracin   Ointment 1 Application(s) Topical daily  chlorhexidine 2% Cloths 1 Application(s) Topical <User Schedule>  cloNIDine 0.1 milliGRAM(s) Oral daily  nicotine -  14 mG/24Hr(s) Patch 1 patch Transdermal daily  QUEtiapine 100 milliGRAM(s) Oral two times a day  spironolactone 100 milliGRAM(s) Oral daily    MEDICATIONS  (PRN):  acetaminophen     Tablet .. 650 milliGRAM(s) Oral every 6 hours PRN Temp greater or equal to 38C (100.4F), Mild Pain (1 - 3)  fluticasone propionate 50 MICROgram(s)/spray Nasal Spray 1 Spray(s) Both Nostrils two times a day PRN nasal congestion

## 2022-08-08 NOTE — BH CONSULTATION LIAISON PROGRESS NOTE - NSBHFUPINTERVALHXFT_PSY_A_CORE
Patient location: [facility]  Provider location: Home    Interval chart reviewed. Patient was seen by psych CL for depression by Dr. Bush on 8/3, recommended starting Zoloft 50mg daily, continuing current medications, and follow up at [facility]. Patient left AMA 8/4/22. Represented to the hospital 8/6 complaining of popping in her ears and trach leaking. Seen by psych Cl on 8/7 by Dr. Nicole for capacity to leave AMA and pt was found to not have capacity to leave the hospital against medical advice. Patient was also endorsing depressive symptoms, passive SI (no active SI/intent/plan), and reporting heroin use. Addiction consult recommended. Seen by addiction for opiate withdrawal. Admitted to using opiates after leaving AMA.     On interview today, patient states "I don't want to be here" and when asked to elaborate says "I just don't want to be here." I ask her again what is bothering her, and pt states "detoxing here is not doing enough, I feel like have the shakes, cold sweats, it's hard to eat, hard to sleep." I ask patient what brought her to the hospital, and she replies "I don't know, I don't remember, I fell or something... I've been in a coma." I ask patient if she is concerned about her trach and pt replies "I don't know." Repeats multiple times "I don't know." Patient endorses feeling depressed, anhedonic, having low appetite, sleep 4-6 hours a night, fatigue, difficulty concentrating. States that she is tired of being sick and in the hospital. Pt denies any self harm or SI and states her family is a protective factor. When asked if she sees a future for herself, replies "I don't really know." Does endorse passive SI despite not wanting to actively do anything to herself. Pt denies hallucinations or thoughts of harming others. When asked about her medical condition, replies "cirrhosis." I tried to ask her about risks of leaving the hospital, and pt replies "I don't know." She elaborates that "I don't really want to leave" but that "I don't know what to do anymore." Does state that she wants to get better. I ask her what the Seroquel is for that she is taking and pt states "I don't know." She denies any history of lei or bipolar disorder dx. I discuss with her if she has tried Zoloft and pt states that she has heard of it. She reports hx detox and rehabs. Denies any past psych hospitalizations.    Emergency [facility] obtained for risk assessment and considering hx drug OD in the past per chart. Pt is noted to have hx opiate use disorder, alcohol use disorder, cocaine use disorder, unspecified bipolar disorder, unspecified anxiety, other psychoactive substance, major depressive disorder. Patient is in substance use program at [facility]. Has [facility] detox, medical and CPEP visits. No psych hospitalizations listed.

## 2022-08-09 LAB
ALBUMIN SERPL ELPH-MCNC: 4.3 G/DL — SIGNIFICANT CHANGE UP (ref 3.5–5.2)
ALP SERPL-CCNC: 354 U/L — HIGH (ref 30–115)
ALT FLD-CCNC: 17 U/L — SIGNIFICANT CHANGE UP (ref 0–41)
ANION GAP SERPL CALC-SCNC: 11 MMOL/L — SIGNIFICANT CHANGE UP (ref 7–14)
AST SERPL-CCNC: 35 U/L — SIGNIFICANT CHANGE UP (ref 0–41)
BASOPHILS # BLD AUTO: 0.01 K/UL — SIGNIFICANT CHANGE UP (ref 0–0.2)
BASOPHILS NFR BLD AUTO: 0.4 % — SIGNIFICANT CHANGE UP (ref 0–1)
BILIRUB SERPL-MCNC: 0.4 MG/DL — SIGNIFICANT CHANGE UP (ref 0.2–1.2)
BUN SERPL-MCNC: 23 MG/DL — HIGH (ref 10–20)
CALCIUM SERPL-MCNC: 9.5 MG/DL — SIGNIFICANT CHANGE UP (ref 8.5–10.1)
CHLORIDE SERPL-SCNC: 96 MMOL/L — LOW (ref 98–110)
CO2 SERPL-SCNC: 24 MMOL/L — SIGNIFICANT CHANGE UP (ref 17–32)
CREAT SERPL-MCNC: 0.7 MG/DL — SIGNIFICANT CHANGE UP (ref 0.7–1.5)
EGFR: 121 ML/MIN/1.73M2 — SIGNIFICANT CHANGE UP
EOSINOPHIL # BLD AUTO: 0.01 K/UL — SIGNIFICANT CHANGE UP (ref 0–0.7)
EOSINOPHIL NFR BLD AUTO: 0.4 % — SIGNIFICANT CHANGE UP (ref 0–8)
GLUCOSE SERPL-MCNC: 104 MG/DL — HIGH (ref 70–99)
HCT VFR BLD CALC: 26.6 % — LOW (ref 37–47)
HGB BLD-MCNC: 8.5 G/DL — LOW (ref 12–16)
IMM GRANULOCYTES NFR BLD AUTO: 0 % — LOW (ref 0.1–0.3)
LYMPHOCYTES # BLD AUTO: 0.95 K/UL — LOW (ref 1.2–3.4)
LYMPHOCYTES # BLD AUTO: 39.4 % — SIGNIFICANT CHANGE UP (ref 20.5–51.1)
MCHC RBC-ENTMCNC: 26.7 PG — LOW (ref 27–31)
MCHC RBC-ENTMCNC: 32 G/DL — SIGNIFICANT CHANGE UP (ref 32–37)
MCV RBC AUTO: 83.6 FL — SIGNIFICANT CHANGE UP (ref 81–99)
MONOCYTES # BLD AUTO: 0.11 K/UL — SIGNIFICANT CHANGE UP (ref 0.1–0.6)
MONOCYTES NFR BLD AUTO: 4.6 % — SIGNIFICANT CHANGE UP (ref 1.7–9.3)
NEUTROPHILS # BLD AUTO: 1.33 K/UL — LOW (ref 1.4–6.5)
NEUTROPHILS NFR BLD AUTO: 55.2 % — SIGNIFICANT CHANGE UP (ref 42.2–75.2)
NRBC # BLD: 0 /100 WBCS — SIGNIFICANT CHANGE UP (ref 0–0)
PLATELET # BLD AUTO: 141 K/UL — SIGNIFICANT CHANGE UP (ref 130–400)
POTASSIUM SERPL-MCNC: 4.7 MMOL/L — SIGNIFICANT CHANGE UP (ref 3.5–5)
POTASSIUM SERPL-SCNC: 4.7 MMOL/L — SIGNIFICANT CHANGE UP (ref 3.5–5)
PROT SERPL-MCNC: 7.4 G/DL — SIGNIFICANT CHANGE UP (ref 6–8)
RBC # BLD: 3.18 M/UL — LOW (ref 4.2–5.4)
RBC # FLD: 14.2 % — SIGNIFICANT CHANGE UP (ref 11.5–14.5)
SODIUM SERPL-SCNC: 131 MMOL/L — LOW (ref 135–146)
WBC # BLD: 2.41 K/UL — LOW (ref 4.8–10.8)
WBC # FLD AUTO: 2.41 K/UL — LOW (ref 4.8–10.8)

## 2022-08-09 PROCEDURE — 99233 SBSQ HOSP IP/OBS HIGH 50: CPT

## 2022-08-09 PROCEDURE — 99231 SBSQ HOSP IP/OBS SF/LOW 25: CPT

## 2022-08-09 PROCEDURE — 99232 SBSQ HOSP IP/OBS MODERATE 35: CPT

## 2022-08-09 RX ORDER — CLONAZEPAM 1 MG
0.5 TABLET ORAL ONCE
Refills: 0 | Status: DISCONTINUED | OUTPATIENT
Start: 2022-08-09 | End: 2022-08-09

## 2022-08-09 RX ORDER — ONDANSETRON 8 MG/1
4 TABLET, FILM COATED ORAL EVERY 4 HOURS
Refills: 0 | Status: DISCONTINUED | OUTPATIENT
Start: 2022-08-09 | End: 2022-08-12

## 2022-08-09 RX ADMIN — Medication 0.1 MILLIGRAM(S): at 23:06

## 2022-08-09 RX ADMIN — Medication 1 PATCH: at 11:45

## 2022-08-09 RX ADMIN — Medication 1 APPLICATION(S): at 11:40

## 2022-08-09 RX ADMIN — Medication 650 MILLIGRAM(S): at 22:24

## 2022-08-09 RX ADMIN — METHADONE HYDROCHLORIDE 15 MILLIGRAM(S): 40 TABLET ORAL at 05:44

## 2022-08-09 RX ADMIN — SPIRONOLACTONE 100 MILLIGRAM(S): 25 TABLET, FILM COATED ORAL at 05:43

## 2022-08-09 RX ADMIN — Medication 0.5 MILLIGRAM(S): at 23:06

## 2022-08-09 RX ADMIN — QUETIAPINE FUMARATE 100 MILLIGRAM(S): 200 TABLET, FILM COATED ORAL at 05:43

## 2022-08-09 RX ADMIN — QUETIAPINE FUMARATE 100 MILLIGRAM(S): 200 TABLET, FILM COATED ORAL at 18:02

## 2022-08-09 RX ADMIN — ONDANSETRON 4 MILLIGRAM(S): 8 TABLET, FILM COATED ORAL at 23:03

## 2022-08-09 RX ADMIN — Medication 0.1 MILLIGRAM(S): at 05:43

## 2022-08-09 RX ADMIN — Medication 1 TABLET(S): at 11:45

## 2022-08-09 NOTE — PROGRESS NOTE ADULT - ASSESSMENT
A 25 y/o female with pmhx of asthma, hepc , IVDA last use 8 wks ago heroin with recent acute respiratory failure s/p overdose vented now with trach collar left ICU AMA 2 days ago presents for drainage from trach and  sensation of clogged ears.    Tracheostomy  Drainage  -Respiratory care, case management for rehab placement (refused [facility] last time)   -Patient wants to Leave AMA, consulted psych for support in determining capacity  -Per Psych: pt has no capacity, continue constant Observation  -Pulm consult appreciated: plan to downsize trach and monitor for possible decannulation  -Surgery consulted        Hypertension likely secondary to Clonidine Rebound   -IV Drug Abuse with Heroin + Nicotine dependency    -H/O Hepatitis C :Viral RNA undetectable   -Patient missed several doses of Clonidine  -smoking cessation educated + nicotine patch offered  -c/w Clonidine        Pancytopenia with hepatosplenomegaly   Multifactorial, Evaluated by Heme/Onc already  -O/P follow-up     Mild Anasarca likely from Malnutrition: improving    c/w spironolactone and F/up dietary Consult     Unstageable sacral ulcer: local wound care  H/O  depression/anxiety : continue Quetiapine     DVT PPX: Lovenox  GI PPX: not indicated   Full code  Dispo: From home  (Planing for SNF or VNS services (but home condition is unstable squatting with friends)   ***other plan to decanulate trach for d/c home

## 2022-08-09 NOTE — PROGRESS NOTE ADULT - ASSESSMENT
Impression :  chronic resp failure s/p tracheostomy   secondary to drug over dose         plan   has 6 size trach   please call surgery to downsize to size 4 and then if tolerate size 4 for 12- 24 hrs need to be capped for at least 12 hrs prior to removal   when capped if any sign of distress , sob alter mental status uncap the trach   keep pox > 92 %

## 2022-08-09 NOTE — CHART NOTE - NSCHARTNOTEFT_GEN_A_CORE
Pt is being weaned down on methadone and feeling anxious and nauseous. Pt is hypertensive as well. Will Rx clonidine, klonopin, and zofran. VERONICA quintero. Will continue to monitor.

## 2022-08-09 NOTE — CHART NOTE - NSCHARTNOTEFT_GEN_A_CORE
6 Shiley cuffed tracheostomy tube downsized to 4 Shiley cuffed tracheostomy tube at bedside with respiratory present. No complications, patient resting comfortably, b/l breath sounds present.    [id number]

## 2022-08-09 NOTE — PROGRESS NOTE ADULT - PROBLEM SELECTOR PLAN 1
After evaluation at this time I explained a possible plan with patient for getting onto [facility]. Pt can not be maintained on methadone until accepted into program. WIll continue to decrease methadone until possible acceptance. Pt understood. Pt gave consent to coordinate care with mom and aunt for living arrangements and transportation to [facility] clinic. Catch Counselor will send paperwork to [facility] for acceptance.     Pt seen by pulmonary and there is a possible plan for decannulation.   Pt will be monitored and supportive care provided.

## 2022-08-09 NOTE — SWALLOW BEDSIDE ASSESSMENT ADULT - SLP GENERAL OBSERVATIONS
Pt received upright in bed tolerating trach collar, no c/o discomfort or pain. AOx3. Pt received upright in bed tolerating PMSV, no c/o discomfort or pain. AOx3.

## 2022-08-09 NOTE — SWALLOW BEDSIDE ASSESSMENT ADULT - ADDITIONAL RECOMMENDATIONS
Recommend to continue with diet as rec'd by previous MBSS of easy to chew/bite sized solids and moderately thick liquids. Pt with improvements in ambulation; therefore repeat MBSS recommended to determine candidacy for diet upgrade once capping trials begin.

## 2022-08-09 NOTE — CHART NOTE - NSCHARTNOTEFT_GEN_A_CORE
08/09/2022-1645    Trach downsized by MD Chu, remained present at bedside for procedure. Original sutures removed. Stoma given a good cleaning while trach was out. Patient remained in no distress for duration of the procedure. Patient on room air with a PMSV in place. Size 4 placed without incident, new dressing placed around stoma, new trach re-secured with foam trach ties. Inner cannula placed, and speaking valve reattached. Patient in no distress, phonation ability present, strong productive cough witnessed. Will continue to monitor.     PLAN PER MD MARTINEZ:  After trach downsized, patient must tolerate without incident for 12-24 hours.  Capping trial to take place after waiting period.   repeat ABG while capped for 12 hours to ensure no hypercapnia  re-consult surgery for decannulation    1:1 sit present, informed of what signs of distress to look for and to notify nurse immediately if distress arises.   Awaiting trach cap delivery from [facility]     -Josiane Bauer, RRT

## 2022-08-09 NOTE — SWALLOW BEDSIDE ASSESSMENT ADULT - SWALLOW EVAL: RECOMMENDED FEEDING/EATING TECHNIQUES
alternate food with liquid/maintain upright posture during/after eating for 30 mins/oral hygiene/position upright (90 degrees)/small sips/bites/tuck chin/turn head left

## 2022-08-10 PROCEDURE — 71045 X-RAY EXAM CHEST 1 VIEW: CPT | Mod: 26

## 2022-08-10 PROCEDURE — 74230 X-RAY XM SWLNG FUNCJ C+: CPT | Mod: 26

## 2022-08-10 PROCEDURE — 99233 SBSQ HOSP IP/OBS HIGH 50: CPT

## 2022-08-10 PROCEDURE — 99232 SBSQ HOSP IP/OBS MODERATE 35: CPT

## 2022-08-10 PROCEDURE — 99231 SBSQ HOSP IP/OBS SF/LOW 25: CPT

## 2022-08-10 RX ORDER — CLONAZEPAM 1 MG
0.5 TABLET ORAL ONCE
Refills: 0 | Status: DISCONTINUED | OUTPATIENT
Start: 2022-08-10 | End: 2022-08-10

## 2022-08-10 RX ORDER — METHADONE HYDROCHLORIDE 40 MG/1
15 TABLET ORAL DAILY
Refills: 0 | Status: DISCONTINUED | OUTPATIENT
Start: 2022-08-10 | End: 2022-08-11

## 2022-08-10 RX ORDER — METHADONE HYDROCHLORIDE 40 MG/1
10 TABLET ORAL DAILY
Refills: 0 | Status: DISCONTINUED | OUTPATIENT
Start: 2022-08-12 | End: 2022-08-12

## 2022-08-10 RX ORDER — METHADONE HYDROCHLORIDE 40 MG/1
TABLET ORAL
Refills: 0 | Status: DISCONTINUED | OUTPATIENT
Start: 2022-08-10 | End: 2022-08-12

## 2022-08-10 RX ADMIN — Medication 1 APPLICATION(S): at 11:33

## 2022-08-10 RX ADMIN — QUETIAPINE FUMARATE 100 MILLIGRAM(S): 200 TABLET, FILM COATED ORAL at 05:46

## 2022-08-10 RX ADMIN — SPIRONOLACTONE 100 MILLIGRAM(S): 25 TABLET, FILM COATED ORAL at 05:46

## 2022-08-10 RX ADMIN — QUETIAPINE FUMARATE 100 MILLIGRAM(S): 200 TABLET, FILM COATED ORAL at 18:05

## 2022-08-10 RX ADMIN — Medication 1 PATCH: at 11:32

## 2022-08-10 RX ADMIN — Medication 0.1 MILLIGRAM(S): at 05:46

## 2022-08-10 RX ADMIN — Medication 1 PATCH: at 17:10

## 2022-08-10 RX ADMIN — METHADONE HYDROCHLORIDE 15 MILLIGRAM(S): 40 TABLET ORAL at 12:13

## 2022-08-10 RX ADMIN — Medication 0.5 MILLIGRAM(S): at 23:14

## 2022-08-10 RX ADMIN — Medication 1 PATCH: at 11:35

## 2022-08-10 RX ADMIN — Medication 1 TABLET(S): at 11:33

## 2022-08-10 NOTE — PROGRESS NOTE ADULT - ASSESSMENT
HTN   - bp's now contrilled   substance abuse / ivda  s/p trach  ascites / hx of cirrhosis    plan:    cont clonidine    cont aldactone  monitor BP's  pt educated re abrupt withdrawal of clonidine  full code

## 2022-08-10 NOTE — SWALLOW VFSS/MBS ASSESSMENT ADULT - ESOPHAGEAL STAGE
not assessed for thin liquids. retention noted in AP VIew, with reduced clearance despite time delay.  Improved with increased swallows. retention noted in AP view.

## 2022-08-10 NOTE — SWALLOW VFSS/MBS ASSESSMENT ADULT - UNSUCCESSFUL STRATEGIES TRIALED DURING PROCEDURE
chin tuck/head turn to the right chin tuck/head turn to the left/nonproductive volitional cough following clinician cue/productive volitional cough following clinician cue

## 2022-08-10 NOTE — CHART NOTE - NSCHARTNOTEFT_GEN_A_CORE
08/10/5078-0393    Trach capped per MD Anna order. ABG to follow tomorrow AM. Patient in no distress, understands if distress arises to immediately call nurse for help. VERONICA Winn aware of plan. Patient also shown how to remove cap if emergency occurs with no immediate response. Aware she needs to call first.     -Josiane Bauer, RRT

## 2022-08-10 NOTE — SWALLOW VFSS/MBS ASSESSMENT ADULT - DIAGNOSTIC IMPRESSIONS
mild-moderate oral pharyngeal dysphagia marked by reduced tongue base retraction, reduced laryngeal elevation, reduced  pharyngeal stripping wave, reduced airway protection, aspiration during the swallow; esophageal impairment noted with some improvement given repeated swallows. Oral phase is negatively impacted by reduced dentition.

## 2022-08-10 NOTE — SWALLOW VFSS/MBS ASSESSMENT ADULT - COMMENTS
Patient reported having thin liquids and no coughing, concern for reduced airway protection given history of prolonged intubation, general debility and previously shown to have reduced airway protection on previous MBS studies completed on previous admission.

## 2022-08-10 NOTE — PROGRESS NOTE ADULT - ASSESSMENT
A 25 y/o female with pmhx of asthma, hepc , IVDA last use 8 wks ago heroin with recent acute respiratory failure s/p overdose vented now with trach collar left ICU AMA 2 days ago presents for drainage from trach and  sensation of clogged ears.    Tracheostomy  Drainage  -Respiratory care, case management for rehab placement (refused [place] last time)   -Patient wanted to Leave AMA, consulted psych for support in determining capacity  -Per Psych: pt has no capacity, continue constant Observation  -Pulm consult appreciated:  keep trach capped for 24 hrs  do ABG tomorrow if stable then can decannulate   if any altered mental status or resp distress while capped , then remove the cap   keep pox > 92 %   speech and swallow eval appreciated     Hypertension likely secondary to Clonidine Rebound   -IV Drug Abuse with Heroin + Nicotine dependency    -H/O Hepatitis C :Viral RNA undetectable   -Patient missed several doses of Clonidine  -smoking cessation educated + nicotine patch offered  -c/w Clonidine    -Tapering Methadone, will be tapered and will start with MMTP once discharged      Pancytopenia with hepatosplenomegaly   Multifactorial, Evaluated by Heme/Onc already  -O/P follow-up     Mild Anasarca likely from Malnutrition: improving    c/w spironolactone and F/up dietary Consult     Unstageable sacral ulcer: local wound care  H/O  depression/anxiety : continue Quetiapine     DVT PPX: Lovenox  GI PPX: not indicated   Full code  Dispo: From home  (Planing for SNF or VNS services (but home condition is unstable squatting with friends)   ***plan to decanulate trach for d/c

## 2022-08-10 NOTE — SWALLOW VFSS/MBS ASSESSMENT ADULT - DEMONSTRATES NEED FOR REFERRAL TO ANOTHER SERVICE
can be as OP ENt for reduced airway protection and clearance for adduction exercises, GI for esophageal retention/ENT/GI

## 2022-08-10 NOTE — SWALLOW VFSS/MBS ASSESSMENT ADULT - ROSENBEK'S PENETRATION ASPIRATION SCALE
(8) contrast passes glottis, visible subglottic residue remains, absent patient response (aspiration) (4) contrast contacts vocal cords, no residue remains (penetration)

## 2022-08-10 NOTE — SWALLOW VFSS/MBS ASSESSMENT ADULT - ORAL PHASE
Reduced anterior - posterior transport Delayed oral transit time/Reduced anterior - posterior transport/Residue in oral cavity/Incomplete tongue to palate contact Residue in oral cavity/Incomplete tongue to palate contact

## 2022-08-10 NOTE — PROGRESS NOTE ADULT - PROBLEM SELECTOR PLAN 1
Pt seen and case reviewed with BRI counselor. Team discussed with Mirta as well as aunt and mother possible aftercare to MMTP and living arrangements and transportation to program on daily basis. At this time there is no good plan for her living arrangements as well as aftercare to program. Pt is aware and understands that she cannot be maintained on methadone for opiate withdrawal unless on MMTP. Pt understands that we will have to continue on slow taper until medically stable for discharge. If patient is decannulated and is discharged home to safe environment with suppport to take to program pt will self present to program. I have been in contact with medical team and they are willing to accept onto the program after discharge if patient is stable and meets criteria, there will be no obstacle to acceptance into program. BRI counselor has sent paperwork to clinic at [facility]  Dr. Calabrese aware of plan.

## 2022-08-10 NOTE — SWALLOW VFSS/MBS ASSESSMENT ADULT - PHARYNGEAL PHASE COMMENTS
mild impairment. mild-moderate impairment with reduced tongue base retraction, reduced laryngeal elevation, reduced pharyngeal stripping wave, reduced PES opening, reduced airway protection. mild impairment consistent with above impairment, improved management with increased viscosity

## 2022-08-10 NOTE — SWALLOW VFSS/MBS ASSESSMENT ADULT - RECOMMENDED FEEDING/EATING TECHNIQUES
allow for swallow between intakes/alternate food with liquid/hard swallow w/ each bite or sip/maintain upright posture during/after eating for 30 mins/oral hygiene/tuck chin/turn head right

## 2022-08-10 NOTE — SWALLOW VFSS/MBS ASSESSMENT ADULT - SUCCESSFUL STRATEGIES TRIALED DURING PROCEDURE
needed image on screen to cough hard enough to clear aspirated material ; needed right head turn and chin tuck simultaneously ./chin tuck/hard swallow/head turn to the right

## 2022-08-10 NOTE — PROGRESS NOTE ADULT - ASSESSMENT
Impression :  chronic resp failure s/p tracheostomy   secondary to drug over dose         plan      keep trach capped for 24 hrs  do ABG lien if stable then can decannulate   if any alter mental status or resp distress while capped , then remove the cap   keep pox > 92 %   speech and swallow eval

## 2022-08-11 ENCOUNTER — TRANSCRIPTION ENCOUNTER (OUTPATIENT)
Age: 27
End: 2022-08-11

## 2022-08-11 DIAGNOSIS — D61.818 OTHER PANCYTOPENIA: ICD-10-CM

## 2022-08-11 DIAGNOSIS — K74.69 OTHER CIRRHOSIS OF LIVER: ICD-10-CM

## 2022-08-11 DIAGNOSIS — G93.41 METABOLIC ENCEPHALOPATHY: ICD-10-CM

## 2022-08-11 DIAGNOSIS — J15.211 PNEUMONIA DUE TO METHICILLIN SUSCEPTIBLE STAPHYLOCOCCUS AUREUS: ICD-10-CM

## 2022-08-11 DIAGNOSIS — I27.81 COR PULMONALE (CHRONIC): ICD-10-CM

## 2022-08-11 DIAGNOSIS — B19.20 UNSPECIFIED VIRAL HEPATITIS C WITHOUT HEPATIC COMA: ICD-10-CM

## 2022-08-11 DIAGNOSIS — J98.11 ATELECTASIS: ICD-10-CM

## 2022-08-11 DIAGNOSIS — J45.909 UNSPECIFIED ASTHMA, UNCOMPLICATED: ICD-10-CM

## 2022-08-11 DIAGNOSIS — K80.20 CALCULUS OF GALLBLADDER WITHOUT CHOLECYSTITIS WITHOUT OBSTRUCTION: ICD-10-CM

## 2022-08-11 DIAGNOSIS — E87.70 FLUID OVERLOAD, UNSPECIFIED: ICD-10-CM

## 2022-08-11 DIAGNOSIS — L89.156 PRESSURE-INDUCED DEEP TISSUE DAMAGE OF SACRAL REGION: ICD-10-CM

## 2022-08-11 DIAGNOSIS — J69.0 PNEUMONITIS DUE TO INHALATION OF FOOD AND VOMIT: ICD-10-CM

## 2022-08-11 DIAGNOSIS — I27.20 PULMONARY HYPERTENSION, UNSPECIFIED: ICD-10-CM

## 2022-08-11 DIAGNOSIS — T40.1X1A POISONING BY HEROIN, ACCIDENTAL (UNINTENTIONAL), INITIAL ENCOUNTER: ICD-10-CM

## 2022-08-11 DIAGNOSIS — N17.9 ACUTE KIDNEY FAILURE, UNSPECIFIED: ICD-10-CM

## 2022-08-11 DIAGNOSIS — J95.01 HEMORRHAGE FROM TRACHEOSTOMY STOMA: ICD-10-CM

## 2022-08-11 DIAGNOSIS — F11.20 OPIOID DEPENDENCE, UNCOMPLICATED: ICD-10-CM

## 2022-08-11 DIAGNOSIS — F41.9 ANXIETY DISORDER, UNSPECIFIED: ICD-10-CM

## 2022-08-11 DIAGNOSIS — E44.0 MODERATE PROTEIN-CALORIE MALNUTRITION: ICD-10-CM

## 2022-08-11 DIAGNOSIS — E83.42 HYPOMAGNESEMIA: ICD-10-CM

## 2022-08-11 DIAGNOSIS — R18.8 OTHER ASCITES: ICD-10-CM

## 2022-08-11 DIAGNOSIS — S01.01XA LACERATION WITHOUT FOREIGN BODY OF SCALP, INITIAL ENCOUNTER: ICD-10-CM

## 2022-08-11 DIAGNOSIS — E87.3 ALKALOSIS: ICD-10-CM

## 2022-08-11 DIAGNOSIS — A41.9 SEPSIS, UNSPECIFIED ORGANISM: ICD-10-CM

## 2022-08-11 DIAGNOSIS — F17.200 NICOTINE DEPENDENCE, UNSPECIFIED, UNCOMPLICATED: ICD-10-CM
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
## 2022-08-11 DIAGNOSIS — K76.6 PORTAL HYPERTENSION: ICD-10-CM

## 2022-08-11 DIAGNOSIS — R56.9 UNSPECIFIED CONVULSIONS: ICD-10-CM

## 2022-08-11 DIAGNOSIS — J96.01 ACUTE RESPIRATORY FAILURE WITH HYPOXIA: ICD-10-CM

## 2022-08-11 DIAGNOSIS — E87.6 HYPOKALEMIA: ICD-10-CM

## 2022-08-11 LAB
ALBUMIN SERPL ELPH-MCNC: 4.1 G/DL — SIGNIFICANT CHANGE UP (ref 3.5–5.2)
ALP SERPL-CCNC: 450 U/L — HIGH (ref 30–115)
ALT FLD-CCNC: 32 U/L — SIGNIFICANT CHANGE UP (ref 0–41)
ANION GAP SERPL CALC-SCNC: 10 MMOL/L — SIGNIFICANT CHANGE UP (ref 7–14)
AST SERPL-CCNC: 43 U/L — HIGH (ref 0–41)
BASOPHILS # BLD AUTO: 0.01 K/UL — SIGNIFICANT CHANGE UP (ref 0–0.2)
BASOPHILS NFR BLD AUTO: 0.6 % — SIGNIFICANT CHANGE UP (ref 0–1)
BILIRUB SERPL-MCNC: 0.4 MG/DL — SIGNIFICANT CHANGE UP (ref 0.2–1.2)
BUN SERPL-MCNC: 30 MG/DL — HIGH (ref 10–20)
CALCIUM SERPL-MCNC: 9.7 MG/DL — SIGNIFICANT CHANGE UP (ref 8.5–10.1)
CHLORIDE SERPL-SCNC: 99 MMOL/L — SIGNIFICANT CHANGE UP (ref 98–110)
CO2 SERPL-SCNC: 26 MMOL/L — SIGNIFICANT CHANGE UP (ref 17–32)
CREAT SERPL-MCNC: 0.9 MG/DL — SIGNIFICANT CHANGE UP (ref 0.7–1.5)
EGFR: 89 ML/MIN/1.73M2 — SIGNIFICANT CHANGE UP
EOSINOPHIL # BLD AUTO: 0.02 K/UL — SIGNIFICANT CHANGE UP (ref 0–0.7)
EOSINOPHIL NFR BLD AUTO: 1.2 % — SIGNIFICANT CHANGE UP (ref 0–8)
GAS PNL BLDA: SIGNIFICANT CHANGE UP
GLUCOSE SERPL-MCNC: 114 MG/DL — HIGH (ref 70–99)
HCT VFR BLD CALC: 25.7 % — LOW (ref 37–47)
HGB BLD-MCNC: 8 G/DL — LOW (ref 12–16)
IMM GRANULOCYTES NFR BLD AUTO: 0 % — LOW (ref 0.1–0.3)
LYMPHOCYTES # BLD AUTO: 0.58 K/UL — LOW (ref 1.2–3.4)
LYMPHOCYTES # BLD AUTO: 34.3 % — SIGNIFICANT CHANGE UP (ref 20.5–51.1)
MCHC RBC-ENTMCNC: 26.5 PG — LOW (ref 27–31)
MCHC RBC-ENTMCNC: 31.1 G/DL — LOW (ref 32–37)
MCV RBC AUTO: 85.1 FL — SIGNIFICANT CHANGE UP (ref 81–99)
MONOCYTES # BLD AUTO: 0.1 K/UL — SIGNIFICANT CHANGE UP (ref 0.1–0.6)
MONOCYTES NFR BLD AUTO: 5.9 % — SIGNIFICANT CHANGE UP (ref 1.7–9.3)
NEUTROPHILS # BLD AUTO: 0.98 K/UL — LOW (ref 1.4–6.5)
NEUTROPHILS NFR BLD AUTO: 58 % — SIGNIFICANT CHANGE UP (ref 42.2–75.2)
NRBC # BLD: 0 /100 WBCS — SIGNIFICANT CHANGE UP (ref 0–0)
PLATELET # BLD AUTO: 116 K/UL — LOW (ref 130–400)
POTASSIUM SERPL-MCNC: 4.4 MMOL/L — SIGNIFICANT CHANGE UP (ref 3.5–5)
POTASSIUM SERPL-SCNC: 4.4 MMOL/L — SIGNIFICANT CHANGE UP (ref 3.5–5)
PROT SERPL-MCNC: 7.2 G/DL — SIGNIFICANT CHANGE UP (ref 6–8)
RBC # BLD: 3.02 M/UL — LOW (ref 4.2–5.4)
RBC # FLD: 14.6 % — HIGH (ref 11.5–14.5)
SODIUM SERPL-SCNC: 135 MMOL/L — SIGNIFICANT CHANGE UP (ref 135–146)
WBC # BLD: 1.69 K/UL — LOW (ref 4.8–10.8)
WBC # FLD AUTO: 1.69 K/UL — LOW (ref 4.8–10.8)

## 2022-08-11 PROCEDURE — 99232 SBSQ HOSP IP/OBS MODERATE 35: CPT

## 2022-08-11 PROCEDURE — 99231 SBSQ HOSP IP/OBS SF/LOW 25: CPT

## 2022-08-11 RX ORDER — CLONAZEPAM 1 MG
0.5 TABLET ORAL ONCE
Refills: 0 | Status: DISCONTINUED | OUTPATIENT
Start: 2022-08-11 | End: 2022-08-11

## 2022-08-11 RX ADMIN — Medication 1 PATCH: at 11:51

## 2022-08-11 RX ADMIN — QUETIAPINE FUMARATE 100 MILLIGRAM(S): 200 TABLET, FILM COATED ORAL at 06:01

## 2022-08-11 RX ADMIN — Medication 1 APPLICATION(S): at 11:50

## 2022-08-11 RX ADMIN — Medication 0.1 MILLIGRAM(S): at 06:01

## 2022-08-11 RX ADMIN — SPIRONOLACTONE 100 MILLIGRAM(S): 25 TABLET, FILM COATED ORAL at 06:01

## 2022-08-11 RX ADMIN — Medication 1 PATCH: at 17:18

## 2022-08-11 RX ADMIN — Medication 1 TABLET(S): at 11:50

## 2022-08-11 RX ADMIN — Medication 1 PATCH: at 11:50

## 2022-08-11 RX ADMIN — Medication 1 PATCH: at 07:08

## 2022-08-11 RX ADMIN — QUETIAPINE FUMARATE 100 MILLIGRAM(S): 200 TABLET, FILM COATED ORAL at 17:40

## 2022-08-11 RX ADMIN — METHADONE HYDROCHLORIDE 15 MILLIGRAM(S): 40 TABLET ORAL at 11:57

## 2022-08-11 RX ADMIN — CHLORHEXIDINE GLUCONATE 1 APPLICATION(S): 213 SOLUTION TOPICAL at 06:08

## 2022-08-11 NOTE — DISCHARGE NOTE PROVIDER - NSFOLLOWUPCLINICS_GEN_ALL_ED_FT
[facility]  Detox Mgmt  [address] [place] [address]  Phone: [phone]  Fax:

## 2022-08-11 NOTE — DISCHARGE NOTE PROVIDER - NSDCMRMEDTOKEN_GEN_ALL_CORE_FT
fluticasone 50 mcg/inh nasal spray: 1 spray(s) nasal 2 times a day  pantoprazole 40 mg oral granule, delayed release: 1 tab(s) orally once a day  QUEtiapine 100 mg oral tablet: 1 tab(s) orally 2 times a day  spironolactone 25 mg oral tablet: 4 tab(s) orally once a day   cloNIDine 0.1 mg oral tablet: 1 tab(s) orally once a day  fluticasone 50 mcg/inh nasal spray: 1 spray(s) nasal 2 times a day  pantoprazole 40 mg oral granule, delayed release: 1 tab(s) orally once a day  QUEtiapine 100 mg oral tablet: 1 tab(s) orally 2 times a day  spironolactone 100 mg oral tablet: 1 tab(s) orally once a day

## 2022-08-11 NOTE — PROGRESS NOTE ADULT - ASSESSMENT
Impression :  chronic resp failure s/p tracheostomy   secondary to drug over dose         plan    do ABG if no hypercapnia then call   surgery for decannulation   case discussed with hospitalist      keep pox > 92 %   speech and swallow eval

## 2022-08-11 NOTE — DISCHARGE NOTE PROVIDER - DETAILS OF MALNUTRITION DIAGNOSIS/DIAGNOSES
This patient has been assessed with a concern for Malnutrition and was treated during this hospitalization for the following Nutrition diagnosis/diagnoses:     -  08/08/2022: Severe protein-calorie malnutrition   -  08/08/2022: Underweight (BMI < 19)

## 2022-08-11 NOTE — PROGRESS NOTE ADULT - PROBLEM SELECTOR PLAN 1
Pt seen and case reviewed with BRI counselor. Team discussed with Mirta as well as aunt and mother possible aftercare to MMTP and living arrangements and transportation to program on daily basis. At this time there is no good plan for her living arrangements as well as aftercare to program. Pt is aware and understands that she cannot be maintained on methadone for opiate withdrawal unless on MMTP. Pt understands that we will have to continue on slow taper until medically stable for discharge. If patient is decannulated and is discharged home to safe environment with suppport to take to program pt will self present to program. I have been in contact with medical team and they are willing to accept onto the program after discharge if patient is stable and meets criteria, there will be no obstacle to acceptance into program. BRI riberaor has sent paperwork to clinic at [facility] Pt seen and case reviewed with BRI counselor. Team discussed with Mirta as well as aunt and mother possible aftercare to MMTP and living arrangements and transportation to program on daily basis. At this time there is no good plan for her living arrangements as well as aftercare to program. Pt is aware and understands that she cannot be maintained on methadone for opiate withdrawal unless on MMTP. Pt understands that we will have to continue on slow taper until medically stable for discharge. Pt is now capped and evaluation for decannulation. I have been in contact with medical team and they are willing to accept onto the program after discharge if patient is stable and meets criteria, there will be no obstacle to acceptance into program. BRI counselor has sent paperwork to clinic at [facility]

## 2022-08-11 NOTE — DISCHARGE NOTE PROVIDER - NSDCFUADDINST_GEN_ALL_CORE_FT
-follow up with your pcp  -follow up with hem/onc  -local wound care - Clean coccyx with soap and water, pat dry apply triad with Allevyn foam dressing      -follow up with your pcp  -follow up with hem/onc  -local wound care - Clean coccyx with soap and water, pat dry apply triad with Allevyn foam dressing   -follow up with surgery in 1x week      -follow up with your pcp  -follow up with hem/onc  -local wound care - Clean coccyx with soap and water, pat dry apply triad with Allevyn foam dressing   -follow up with surgery in 1x week for your trach assessment      -follow up with your pcp  -follow up with hem/onc  -local wound care - Clean coccyx with soap and water, pat dry apply triad with Allevyn foam dressing   -follow up with surgery in 1x week for your trach assessment   -follow up with detox clinic      -follow up with your pcp  -follow up with hem/onc  -local wound care - Clean coccyx with soap and water, pat dry apply triad with Allevyn foam dressing   -follow up with surgery in 1x week for your trach assessment   -follow up with detox clinic on Tuesday as scheduled   -follow up with detox clinic

## 2022-08-11 NOTE — DISCHARGE NOTE PROVIDER - PROVIDER TOKENS
FREE:[LAST:[PCP],PHONE:[(   )    -],FAX:[(   )    -],FOLLOWUP:[1 week]],PROVIDER:[TOKEN:[[id number]],FOLLOWUP:[1 week]] PROVIDER:[TOKEN:[[id number]],FOLLOWUP:[1 week]],FREE:[LAST:[PCP],PHONE:[(   )    -],FAX:[(   )    -],FOLLOWUP:[1 week]],PROVIDER:[TOKEN:[[id number]],FOLLOWUP:[1 week]]

## 2022-08-11 NOTE — DISCHARGE NOTE PROVIDER - CARE PROVIDERS DIRECT ADDRESSES
,DirectAddress_Unknown,[email or web address],DirectAddress_Unknown,DirectAddress_Unknown

## 2022-08-11 NOTE — DISCHARGE NOTE PROVIDER - CARE PROVIDER_API CALL
PCP,   Phone: (   )    -  Fax: (   )    -  Follow Up Time: 1 week    Hill Sorensen)  Hematology; Internal Medicine; Medical Oncology  [address] [place] [address]  Phone: [phone]  Fax: [phone]  Follow Up Time: 1 week   Hill Sorensen)  Hematology; Internal Medicine; Medical Oncology  [address] [place] [address]  Phone: [phone]  Fax: [phone]  Follow Up Time: 1 week    PCP,   Phone: (   )    -  Fax: (   )    -  Follow Up Time: 1 week    Suleiman Moyer)  Surgery  [address] [place] [address]  Phone: [phone]  Fax: [phone]  Follow Up Time: 1 week

## 2022-08-11 NOTE — DISCHARGE NOTE PROVIDER - NSDCCPCAREPLAN_GEN_ALL_CORE_FT
PRINCIPAL DISCHARGE DIAGNOSIS  Diagnosis: Tracheostomy care  Assessment and Plan of Treatment: seen by respiratory and pulmonology , had ABG,  no hypercapnia      SECONDARY DISCHARGE DIAGNOSES  Diagnosis: Encounter for rehabilitation evaluation  Assessment and Plan of Treatment: Home with vns vs placement     PRINCIPAL DISCHARGE DIAGNOSIS  Diagnosis: Tracheostomy care  Assessment and Plan of Treatment: seen by respiratory and pulmonology , had ABG,  no hypercapnia  s/p decanulation   keep trach are dry for 24 hours do  not shower  follow up with surgery      SECONDARY DISCHARGE DIAGNOSES  Diagnosis: Encounter for rehabilitation evaluation  Assessment and Plan of Treatment: Home with vns

## 2022-08-11 NOTE — PROGRESS NOTE ADULT - ASSESSMENT
HTN   - bp's now contrilled   substance abuse / ivda  s/p trach  ascites / hx of cirrhosis  panyctopenia    plan:    cont clonidine    cont aldactone  monitor BP's

## 2022-08-11 NOTE — DISCHARGE NOTE PROVIDER - HOSPITAL COURSE
A 25 y/o female with pmhx of asthma, hepc , IVDA last use 8 wks ago heroin with recent acute respiratory failure s/p overdose vented now with trach collar left ICU AMA 2 days ago presents for drainage from trach and  sensation of clogged ears. PT with tracheostomy  Drainage, seen by respiratory and pulmonology, Pt had ABG, no hyeprcapnia , plan for decannulation.   Patient wanted to Leave AMA, consulted psych for support in determining capacity, pt has no capacity, Psych to follow up .   Pt with hypertension likely secondary to Clonidine Rebound , resolved,   Hx IV Drug Abuse with Heroin + Nicotine dependency , seen by addiction medicine. on methadone curtis to follow up with MMTP as oupt.   Pt with pancytopenia with hepatosplenomegaly Multifactorial, to follow up with hem/onc as oupt.   Unstageable sacral ulcer: local wound care.   Planing for SNF or VNS services A 25 y/o female with pmhx of asthma, hepc , IVDA last use 8 wks ago heroin with recent acute respiratory failure s/p overdose vented now with trach collar left ICU AMA 2 days ago presents for drainage from trach and  sensation of clogged ears. PT with tracheostomy  Drainage, seen by respiratory and pulmonology, Pt had ABG, no hyeprcapnia , plan for decannulation.   Patient wanted to Leave AMA, consulted psych for support in determining capacity, pt has no capacity, Psych to follow up , cleared for discharge.   Pt with hypertension likely secondary to Clonidine Rebound , resolved,   Hx IV Drug Abuse with Heroin + Nicotine dependency , seen by addiction medicine. on methadone curtis to follow up with MMTP as oupt.   Pt with pancytopenia with hepatosplenomegaly Multifactorial, to follow up with hem/onc as oupt.   Unstageable sacral ulcer: local wound care.   Planing for home with vns A 25 y/o female with pmhx of asthma, hepc , IVDA last use 8 wks ago heroin with recent acute respiratory failure s/p overdose vented now with trach collar left ICU AMA 2 days ago presents for drainage from trach and  sensation of clogged ears. PT with tracheostomy  Drainage, seen by respiratory and pulmonology, Pt had ABG, no hyeprcapnia , plan for decannulation.   Patient wanted to Leave AMA, consulted psych for support in determining capacity, pt has no capacity, Psych to follow up , cleared for discharge.   Pt with hypertension likely secondary to Clonidine Rebound , resolved,   Hx IV Drug Abuse with Heroin + Nicotine dependency , seen by addiction medicine. on methadone curtis to follow up with MMTP as oupt.   Pt with pancytopenia with hepatosplenomegaly Multifactorial, to follow up with hem/onc as oupt.   Unstageable sacral ulcer: local wound care.   Planing for home with vns    A 27 y/o female with pmhx of asthma, hepc , IVDA last use 8 wks ago heroin with recent acute respiratory failure s/p overdose vented now with trach collar left ICU AMA 2 days ago presents for drainage from trach and  sensation of clogged ears. PT with tracheostomy  Drainage, seen by respiratory and pulmonology, Pt had ABG, no hyeprcapnia , plan for decannulation.   Patient wanted to Leave AMA, consulted psych for support in determining capacity, pt has no capacity, Psych to follow up , cleared for discharge.   Pt with hypertension likely secondary to Clonidine Rebound , resolved,   Hx IV Drug Abuse with Heroin + Nicotine dependency , seen by addiction medicine. on methadone curtis to follow up with MMTP as oupt,   pt has not experiencing any depression symptoms or suicidal ideation, intent or plans, has intact capacity   Pt with pancytopenia with hepatosplenomegaly Multifactorial, to follow up with hem/onc as oupt.   Unstageable sacral ulcer: local wound care.   Planing for home with vns

## 2022-08-11 NOTE — CHART NOTE - NSCHARTNOTEFT_GEN_A_CORE
examined decub ulcer with RN   stage 2  ulcer 2x 2 cm , no bleeding, no drainage   pt afebrile   RN has been doing local wound care as per wound care recs from last admission   -Clean coccyx with soap and water, pat dry apply triad with Allevyn foam dressing   will continue local wound care   avoid pressure

## 2022-08-12 ENCOUNTER — TRANSCRIPTION ENCOUNTER (OUTPATIENT)
Age: 27
End: 2022-08-12

## 2022-08-12 VITALS
TEMPERATURE: 98 F | DIASTOLIC BLOOD PRESSURE: 82 MMHG | HEART RATE: 88 BPM | RESPIRATION RATE: 18 BRPM | SYSTOLIC BLOOD PRESSURE: 106 MMHG

## 2022-08-12 PROCEDURE — 99239 HOSP IP/OBS DSCHRG MGMT >30: CPT

## 2022-08-12 RX ORDER — QUETIAPINE FUMARATE 200 MG/1
1 TABLET, FILM COATED ORAL
Qty: 60 | Refills: 0
Start: 2022-08-12 | End: 2022-09-10

## 2022-08-12 RX ORDER — SPIRONOLACTONE 25 MG/1
1 TABLET, FILM COATED ORAL
Qty: 30 | Refills: 0
Start: 2022-08-12 | End: 2022-09-10

## 2022-08-12 RX ADMIN — Medication 0.1 MILLIGRAM(S): at 05:42

## 2022-08-12 RX ADMIN — SPIRONOLACTONE 100 MILLIGRAM(S): 25 TABLET, FILM COATED ORAL at 05:42

## 2022-08-12 RX ADMIN — Medication 1 PATCH: at 11:59

## 2022-08-12 RX ADMIN — Medication 1 APPLICATION(S): at 11:59

## 2022-08-12 RX ADMIN — Medication 1 TABLET(S): at 11:59

## 2022-08-12 RX ADMIN — METHADONE HYDROCHLORIDE 10 MILLIGRAM(S): 40 TABLET ORAL at 12:02

## 2022-08-12 RX ADMIN — QUETIAPINE FUMARATE 100 MILLIGRAM(S): 200 TABLET, FILM COATED ORAL at 05:42

## 2022-08-12 NOTE — DISCHARGE NOTE NURSING/CASE MANAGEMENT/SOCIAL WORK - NURSING SECTION COMPLETE
Patient/Caregiver provided printed discharge information.
PAST MEDICAL HISTORY:  Cervical cancer     CHF (congestive heart failure)     COPD (chronic obstructive pulmonary disease)     DVT (deep venous thrombosis)     GERD (gastroesophageal reflux disease)     History of laryngeal cancer     HTN (hypertension)     Lupus     Pulmonary embolism     RA (rheumatoid arthritis)

## 2022-08-12 NOTE — SWALLOW BEDSIDE ASSESSMENT ADULT - NS SPL SWALLOW CLINIC TRIAL FT
+ toleration observed without overt symptoms of penetration/aspiration
Pt with previous MBS 7/29 once off vent with recommendations of moderately thick liquids and easy to chew/bite sized solids 2/2 poor airway protection resulting in silent aspiration as well as generalized weakness. Pt has had improvements in ambulation and is therefore candidate for repeat MBSS once capping trials begin. Pt tolerating recommended diet currently without overt s/s aspiration or penetration. Pt in agreement with POC. Will continue to monitor diet tolerance and f/u with MBSS.

## 2022-08-12 NOTE — PROGRESS NOTE ADULT - NUTRITIONAL ASSESSMENT
This patient has been assessed with a concern for Malnutrition and has been determined to have a diagnosis/diagnoses of Severe protein-calorie malnutrition and Underweight (BMI < 19).    This patient is being managed with:   Diet Easy to Chew-  Moderately Thick Liquids (MODTHICKLIQS)  Prosource Gelatein Plus     Qty per Day:  1 with each meal  Supplement Feeding Modality:  Oral  Entered: Aug  8 2022  5:28PM    
This patient has been assessed with a concern for Malnutrition and has been determined to have a diagnosis/diagnoses of Severe protein-calorie malnutrition and Underweight (BMI < 19).    This patient is being managed with:   Diet Regular-  Mildly Thick Liquids (MILDTHICKLIQS)  Entered: Aug 12 2022 10:51AM

## 2022-08-12 NOTE — SWALLOW BEDSIDE ASSESSMENT ADULT - SLP GENERAL OBSERVATIONS
Awake, alert, capped trach in place. Moderately thick liquids noted at bedside- diet not changed on sunrise. Diet updated today to reflect most recent rec of regular with mildly thick liquids.

## 2022-08-12 NOTE — PROGRESS NOTE ADULT - REASON FOR ADMISSION
tracheal care, rehab placement

## 2022-08-12 NOTE — PROGRESS NOTE ADULT - ASSESSMENT
A 27 y/o female with pmhx of asthma, hepc , IVDA last use 8 wks ago heroin with recent acute respiratory failure s/p overdose vented now with trach collar left ICU AMA 2 days ago presents for drainage from trach and  sensation of clogged ears.    Tracheostomy  Drainage  - ABG yesterday stable, s/p tracheal removal today, no sob, no altered mental status   - see my chart note earlier for capacity, pt has intact capacity and not experiencing suicidal or depressed symptoms   -Pulm consult appreciated        Hypertension likely secondary to Clonidine Rebound   -IV Drug Abuse with Heroin + Nicotine dependency    -H/O Hepatitis C :Viral RNA undetectable   -Patient missed several doses of Clonidine  -smoking cessation educated + nicotine patch offered  -c/w Clonidine    - has been on Tapering Methadone, d/u as outpt with MMTP on Tuesday       Pancytopenia with hepatosplenomegaly   Multifactorial, Evaluated by Heme/Onc already  -O/P follow-up     Mild Anasarca likely from Malnutrition: improving    c/w spironolactone and F/up dietary Consult     Unstageable sacral ulcer: local wound care  H/O  depression/anxiety : continue Quetiapine     DVT PPX: Lovenox  GI PPX: not indicated   Full code  Dispo: Discharge    A 27 y/o female with pmhx of asthma, hepc , IVDA last use 8 wks ago heroin with recent acute respiratory failure s/p overdose vented now with trach collar left ICU AMA 2 days ago presents for drainage from trach and  sensation of clogged ears.    Tracheostomy  Drainage  - ABG yesterday stable, s/p tracheal removal today, no sob, no altered mental status   - see my chart note earlier for capacity, pt has intact capacity and not experiencing suicidal or depressed symptoms   -Pulm consult appreciated        Hypertension likely secondary to Clonidine Rebound   -IV Drug Abuse with Heroin + Nicotine dependency    -H/O Hepatitis C :Viral RNA undetectable   -Patient missed several doses of Clonidine  -smoking cessation educated + nicotine patch offered  -c/w Clonidine    - has been on Tapering Methadone, d/u as outpt with MMTP on Tuesday   - CATCH team encourage to stay till Monday then follow up on Tuesday for methadone clinic, pt pprefer to go home, no contraindication from CATH and addiction medicine for discharge, she ll follow up with methadone clinic as outpt     Pancytopenia with hepatosplenomegaly   Multifactorial, Evaluated by Heme/Onc already  -O/P follow-up     Mild Anasarca likely from Malnutrition: improving    c/w spironolactone and F/up dietary Consult     Unstageable sacral ulcer: local wound care  H/O  depression/anxiety : continue Quetiapine     DVT PPX: Lovenox  GI PPX: not indicated   Full code  Dispo: Discharge

## 2022-08-12 NOTE — CHART NOTE - NSCHARTNOTEFT_GEN_A_CORE
pt seen by surgery, s/p decannulation   pt in no distress, denies sob  pt able to ate , denies difficulty swallowing   no contraindication for discharge as per psych   pt cleared for discharge, awaiting family member to pick her up   d/w attending pt seen by surgery, s/p decannulation   pt in no distress, denies sob  pt able to ate , denies difficulty swallowing   no contraindication for discharge as per psych   pt cleared by detox  to follow up with detox clinic on Tuesday   pt cleared for discharge, awaiting family member to pick her up   d/w attending

## 2022-08-12 NOTE — SWALLOW BEDSIDE ASSESSMENT ADULT - SLP PERTINENT HISTORY OF CURRENT PROBLEM
A 27 y/o female with pmhx of asthma, hepc , IVDA last use 8 wks ago heroin with recent acute respiratory failure s/p overdose vented now with trach collar left ICU AMA 2 days ago presents for drainage from trach and  sensation of clogged ears. Pt reports drainage from trach , mucus , white discharge. Pt states since she had trach placement , her ears are clogged , she hears herself loud. Pt denies tinnitus. Pt denies fever or chills. Pt reports occasional cough, with brown phlegm since she had tracheostomy. Pt reports currently smokes 1x pack a day. Pt denies any use of illicit drugs recently. Pt found with hypertension in ED, unsure if she on any medications.  Pt reports unable to take care of herself. PT denies fever, chills, chest pain, shortness of breath, burning with urination, diarrhea, nausea vomiting, dizziness, blurry vision
A 25 y/o female with pmhx of asthma, hepc , IVDA last use 8 wks ago heroin with recent acute respiratory failure s/p overdose vented now with trach collar left ICU AMA 2 days ago presents for drainage from trach and  sensation of clogged ears. Pt reports drainage from trach , mucus , white discharge. Pt states since she had trach placement , her ears are clogged , she hears herself loud. Pt denies tinnitus. Pt denies fever or chills. Pt reports occasional cough, with brown phlegm since she had tracheostomy. Pt reports currently smokes 1x pack a day. Pt denies any use of illicit drugs recently. Pt found with hypertension in ED, unsure if she on any medications.  Pt reports unable to take care of herself. PT denies fever, chills, chest pain, shortness of breath, burning with urination, diarrhea, nausea vomiting, dizziness, blurry vision

## 2022-08-12 NOTE — ED BEHAVIORAL HEALTH NOTE - BEHAVIORAL HEALTH NOTE
TELEPSYCHIATRY CL FREE TEXT NOTE (please disregard heading)    Received call from Dr. Monsalve for capacity assessment to "reverse" the previous capacity assessments. Patient is currently not attempting to leave AMA. Provided psychoeducation on capacity, including the fact that it is time-specific and decision-specific. Would need specific decision to assess. Discussed with Dr. Monsalve and pt currently has good mood and there are no acute safety concerns. Willing to follow up outpatient addiction medicine. Consult was declined.

## 2022-08-12 NOTE — CHART NOTE - NSCHARTNOTEFT_GEN_A_CORE
the was seen by the psych team Dr. Avalos last week because she wanted to leave AMA,   which was not safe, as she needed trach care and was endorsing depressive symptoms, per their recommendations, she was claimed to have no capacity to leave AMA,  I spoke to Dr. Avalos from Psych team who evaluated her last week for the capacity, and she explained to me that the decision was made for no capacity  at that time is temporary during she was expressing agitation and depressed symptoms and she had no safe discharge, at the end of the psych note, its mentioned no need for psych follow up or psych contraindication for discharge (means when she is medically clear for discharge)   meanwhile if pt is not expressing suicidal ideation, aggression, agitation, depression she has capacity,      during my interview withe the patient today, pt seems she understands the discharge plan, has safe place to go to,  she has no suicidal ideations, plans or intent, reports good mood and wants to go home after removing the trach,   and she is  willing follow up with addiction medicine as outpt.   pt is medically cleared for discharge, and there's no acute psych intervention she need at this time the was seen by the psych team Dr. Avalos last week because she wanted to leave AMA,   which was not safe, as she needed trach care and was endorsing depressive symptoms, per their recommendations, she was claimed to have no capacity to leave AMA,  I spoke to Dr. Avalos from Psych team who evaluated her last week for the capacity, and she explained to me that the decision was made for no capacity  at that time is temporary during she was expressing agitation and depressed symptoms, at the end of the psych note, its mentioned no need for psych follow up or psych contraindication for discharge (means when she is medically clear for discharge)   meanwhile if pt is not expressing suicidal ideation, aggression, agitation, depression she has capacity,      during my interview with the patient today, pt understands the discharge plan, willing to follow up with methadone clinic,   she has no suicidal ideations, plans or intent, she reports good mood and wants to go home after removing the trach,   and she is willing follow up with addiction medicine and methadone clinic as outpt.   pt is s/p trach removal and medically cleared for discharge, and there's no acute psych intervention she need at this time

## 2022-08-12 NOTE — PROGRESS NOTE ADULT - ASSESSMENT
HTN   - bp's now contrilled   substance abuse / ivda  s/p trach  ascites / hx of cirrhosis  panyctopenia    plan:    cont clonidine    cont aldactone  monitor BP's  outpt f/u

## 2022-08-12 NOTE — SWALLOW BEDSIDE ASSESSMENT ADULT - DIET PRIOR TO ADMI
Pt known to SLP services, left AMA last admission. Known dysphagia with thins however pt has h/o being noncompliant with diet
easy to chew/bite sized and moderately thick liquids

## 2022-08-12 NOTE — PROGRESS NOTE ADULT - PROVIDER SPECIALTY LIST ADULT
Hospitalist
Nephrology
Addiction Medicine
Hospitalist
Hospitalist
Internal Medicine
Nephrology
Hospitalist
Nephrology
Nephrology
Pulmonology
Internal Medicine
Nephrology
Pulmonology
Pulmonology
Addiction Medicine
Addiction Medicine

## 2022-08-12 NOTE — DISCHARGE NOTE NURSING/CASE MANAGEMENT/SOCIAL WORK - PATIENT PORTAL LINK FT
You can access the FollowMyHealth Patient Portal offered by [facility] by registering at the following website: [email or web address]. By joining [facility]’s FollowMyHealth portal, you will also be able to view your health information using other applications (apps) compatible with our system.

## 2022-08-12 NOTE — PROGRESS NOTE ADULT - SUBJECTIVE AND OBJECTIVE BOX
A 25 y/o female with pmhx of asthma, hepc , IVDA last use 8 wks ago heroin with recent acute respiratory failure s/p overdose vented now with trach collar left ICU AMA 2 days ago presents for drainage from trach and  sensation of clogged ears.    Today:  Seen at bedside, no new complaints.          REVIEW OF SYSTEMS:  No new complaints      MEDICATIONS  (STANDING):  BACItracin   Ointment 1 Application(s) Topical daily  chlorhexidine 2% Cloths 1 Application(s) Topical <User Schedule>  cloNIDine 0.1 milliGRAM(s) Oral daily  methadone    Tablet   Oral   multivitamin 1 Tablet(s) Oral daily  nicotine -  14 mG/24Hr(s) Patch 1 patch Transdermal daily  QUEtiapine 100 milliGRAM(s) Oral two times a day  spironolactone 100 milliGRAM(s) Oral daily    MEDICATIONS  (PRN):  acetaminophen     Tablet .. 650 milliGRAM(s) Oral every 6 hours PRN Temp greater or equal to 38C (100.4F), Mild Pain (1 - 3)  fluticasone propionate 50 MICROgram(s)/spray Nasal Spray 1 Spray(s) Both Nostrils two times a day PRN nasal congestion  ondansetron Injectable 4 milliGRAM(s) IV Push every 4 hours PRN Nausea and/or Vomiting      Allergies  buprenorphine (Rash)  Suboxone (Rash)        Vital Signs Last 24 Hrs  T(C): 36.5 (10 Aug 2022 05:45), Max: 37.2 (09 Aug 2022 21:18)  T(F): 97.7 (10 Aug 2022 05:45), Max: 98.9 (09 Aug 2022 21:18)  HR: 77 (10 Aug 2022 05:45) (77 - 99)  BP: 140/81 (10 Aug 2022 05:45) (130/60 - 143/94)  RR: 18 (10 Aug 2022 05:45) (18 - 18)  SpO2: 98% (09 Aug 2022 22:47) (98% - 98%)    Parameters below as of 09 Aug 2022 22:47  Patient On (Oxygen Delivery Method): tracheostomy collar        PHYSICAL EXAM:  GENERAL: No acute distress, Trach in place  HEAD:  Atraumatic, Normocephalic  NECK: Supple, no lymphadenopathy, no JVD  CHEST/LUNG: CTAB; No wheezes, rales, or rhonchi  HEART: Regular rate and rhythm; No murmurs, rubs, or gallops  ABDOMEN: Soft, non-tender, palpable liver  EXTREMITIES:  2+ peripheral pulses b/l, No clubbing, cyanosis, or edema  NEUROLOGY: A&O x 3, no focal deficits  SKIN: multiple scars      LABS:                        8.5    2.41  )-----------( 141      ( 09 Aug 2022 12:28 )             26.6     08-09    131<L>  |  96<L>  |  23<H>  ----------------------------<  104<H>  4.7   |  24  |  0.7    Ca    9.5      09 Aug 2022 12:28    TPro  7.4  /  Alb  4.3  /  TBili  0.4  /  DBili  x   /  AST  35  /  ALT  17  /  AlkPhos  354<H>  08-09        
Patient is a 26y old  Female who presents with a chief complaint of tracheal care, rehab placement (09 Aug 2022 18:43)      Over Night Events:  Patient seen and examined.   trach was changed to size 4 and was capped this morning   patient doing good     ROS:  See HPI    PHYSICAL EXAM    ICU Vital Signs Last 24 Hrs  T(C): 36.5 (10 Aug 2022 05:45), Max: 37.2 (09 Aug 2022 21:18)  T(F): 97.7 (10 Aug 2022 05:45), Max: 98.9 (09 Aug 2022 21:18)  HR: 77 (10 Aug 2022 05:45) (77 - 99)  BP: 140/81 (10 Aug 2022 05:45) (130/60 - 143/94)  BP(mean): --  ABP: --  ABP(mean): --  RR: 18 (10 Aug 2022 05:45) (18 - 18)  SpO2: 98% (09 Aug 2022 22:47) (98% - 98%)    O2 Parameters below as of 09 Aug 2022 22:47  Patient On (Oxygen Delivery Method): tracheostomy collar            General: AOx3  HEENT:     trach            Lymph Nodes: NO cervical LN   Lungs: Bilateral BS  Cardiovascular: Regular   Abdomen: Soft, Positive BS  Extremities: No clubbing   Skin: warm   Neurological:  no focal   Musculoskeletal: move all ext     I&O's Detail      LABS:                          8.5    2.41  )-----------( 141      ( 09 Aug 2022 12:28 )             26.6         09 Aug 2022 12:28    131    |  96     |  23     ----------------------------<  104    4.7     |  24     |  0.7      Ca    9.5        09 Aug 2022 12:28    TPro  7.4    /  Alb  4.3    /  TBili  0.4    /  DBili  x      /  AST  35     /  ALT  17     /  AlkPhos  354    09 Aug 2022 12:28  Amylase x     lipase x                                                                                                                                                                                                                                         MEDICATIONS  (STANDING):  BACItracin   Ointment 1 Application(s) Topical daily  chlorhexidine 2% Cloths 1 Application(s) Topical <User Schedule>  cloNIDine 0.1 milliGRAM(s) Oral daily  methadone    Tablet 15 milliGRAM(s) Oral two times a day  multivitamin 1 Tablet(s) Oral daily  nicotine -  14 mG/24Hr(s) Patch 1 patch Transdermal daily  QUEtiapine 100 milliGRAM(s) Oral two times a day  spironolactone 100 milliGRAM(s) Oral daily    MEDICATIONS  (PRN):  acetaminophen     Tablet .. 650 milliGRAM(s) Oral every 6 hours PRN Temp greater or equal to 38C (100.4F), Mild Pain (1 - 3)  fluticasone propionate 50 MICROgram(s)/spray Nasal Spray 1 Spray(s) Both Nostrils two times a day PRN nasal congestion  ondansetron Injectable 4 milliGRAM(s) IV Push every 4 hours PRN Nausea and/or Vomiting          Xrays:  TLC:  OG:  ET tube:                                                                                       ECHO:  [facility] ICU:        
    Pt interviewed, examined and EMR chart reviewed.    Follow up of Opiate Addiction. Pt is on methadone protocol. Pt is doing fair. Pt wants to be placed on MMTP while in hospital.  Pt currently with tracheostomy and being evaluated for decannulation.     SOCIAL HISTORY:    REVIEW OF SYSTEMS:    Constitutional: No fever, weight loss or fatigue  ENT:  No difficulty hearing, tinnitus, vertigo; No sinus or throat pain  Cardiovascular: No chest pain, palpitations, shortness of breath, dizziness or leg swelling  Gastrointestinal: No abdominal or epigastric pain. No nausea, vomiting or hematemesis; No diarrhea or constipation. No melena or hematochezia.  Neurological: No headaches, memory loss, loss of strength, numbness or tremors  Musculoskeletal: No joint pain or swelling; No muscle, back or extremity pain      MEDICATIONS  (STANDING):  BACItracin   Ointment 1 Application(s) Topical daily  chlorhexidine 2% Cloths 1 Application(s) Topical <User Schedule>  cloNIDine 0.1 milliGRAM(s) Oral daily  methadone    Tablet   Oral   multivitamin 1 Tablet(s) Oral daily  nicotine -  14 mG/24Hr(s) Patch 1 patch Transdermal daily  QUEtiapine 100 milliGRAM(s) Oral two times a day  spironolactone 100 milliGRAM(s) Oral daily    MEDICATIONS  (PRN):  acetaminophen     Tablet .. 650 milliGRAM(s) Oral every 6 hours PRN Temp greater or equal to 38C (100.4F), Mild Pain (1 - 3)  fluticasone propionate 50 MICROgram(s)/spray Nasal Spray 1 Spray(s) Both Nostrils two times a day PRN nasal congestion  ondansetron Injectable 4 milliGRAM(s) IV Push every 4 hours PRN Nausea and/or Vomiting      Vital Signs Last 24 Hrs  T(C): 36.5 (10 Aug 2022 05:45), Max: 37.2 (09 Aug 2022 21:18)  T(F): 97.7 (10 Aug 2022 05:45), Max: 98.9 (09 Aug 2022 21:18)  HR: 77 (10 Aug 2022 05:45) (77 - 99)  BP: 140/81 (10 Aug 2022 05:45) (130/60 - 143/94)  BP(mean): --  RR: 18 (10 Aug 2022 05:45) (18 - 18)  SpO2: 98% (09 Aug 2022 22:47) (98% - 98%)    Parameters below as of 09 Aug 2022 22:47  Patient On (Oxygen Delivery Method): tracheostomy collar        PHYSICAL EXAM:    Constitutional: NAD, well-groomed, well-developed  HEENT: PERRLA, EOMI, Normal Hearing, MMM  Neck: No LAD, No JVD, positive trach   Back: Normal spine flexure, No CVA tenderness  Respiratory: CTAB/L  Cardiovascular: S1 and S2, RRR, no M/G/R  Gastrointestinal: BS+, soft, NT/ND  Extremities: No peripheral edema, muscle wasting noted   Vascular: 2+ peripheral pulses  Neurological: A/O x 3, no focal deficits    LABS:                        8.5    2.41  )-----------( 141      ( 09 Aug 2022 12:28 )             26.6     08-09    131<L>  |  96<L>  |  23<H>  ----------------------------<  104<H>  4.7   |  24  |  0.7    Ca    9.5      09 Aug 2022 12:28    TPro  7.4  /  Alb  4.3  /  TBili  0.4  /  DBili  x   /  AST  35  /  ALT  17  /  AlkPhos  354<H>  08-09        Drug Screen Urine:  Alcohol Level        RADIOLOGY & ADDITIONAL STUDIES:  
NEPHROLOGY FOLLOW UP NOTE    bp's controlled still  now speaking with trach capped    PAST MEDICAL & SURGICAL HISTORY:  Hepatitis C      Asthma      Anxiety and depression      IV drug abuse  heroin      No significant past surgical history        Allergies:  buprenorphine (Rash)  Suboxone (Rash)    Home Medications Reviewed    SOCIAL HISTORY:  Denies ETOH,Smoking,   FAMILY HISTORY:        REVIEW OF SYSTEMS:  CONSTITUTIONAL: No weakness, fevers or chills  EYES/ENT: No visual changes;  No vertigo or throat pain   NECK: No pain or stiffness  RESPIRATORY: No cough, wheezing, hemoptysis; No shortness of breath  CARDIOVASCULAR: No chest pain or palpitations.  GASTROINTESTINAL: No abdominal or epigastric pain. No nausea, vomiting, or hematemesis; No diarrhea or constipation. No melena or hematochezia.  GENITOURINARY: No dysuria, frequency, foamy urine, urinary urgency, incontinence or hematuria  NEUROLOGICAL: No numbness or weakness  SKIN: No itching, burning, rashes, or lesions   VASCULAR: No bilateral lower extremity edema.   All other review of systems is negative unless indicated above.    PHYSICAL EXAM:  Constitutional: NAD  HEENT: anicteric sclera, oropharynx clear, MMM  Neck: + trach  Respiratory: CTAB, no wheezes, rales or rhonchi  Cardiovascular: S1, S2, RRR  Gastrointestinal: BS+, soft, NT + distention  Extremities: No cyanosis or clubbing. No peripheral edema  Neurological: A/O x 3, no focal deficits  Psychiatric: Normal mood, normal affect  : No CVA tenderness. No doss.   Skin: No rashes, multiple scars and bruises      Hospital Medications:   MEDICATIONS  (STANDING):  BACItracin   Ointment 1 Application(s) Topical daily  chlorhexidine 2% Cloths 1 Application(s) Topical <User Schedule>  cloNIDine 0.1 milliGRAM(s) Oral daily  methadone    Tablet   Oral   methadone    Tablet 15 milliGRAM(s) Oral daily  multivitamin 1 Tablet(s) Oral daily  nicotine -  14 mG/24Hr(s) Patch 1 patch Transdermal daily  QUEtiapine 100 milliGRAM(s) Oral two times a day  spironolactone 100 milliGRAM(s) Oral daily        VITALS:  T(F): 96.9 (08-10-22 @ 13:48), Max: 98.9 (08-09-22 @ 21:18)  HR: 84 (08-10-22 @ 13:48)  BP: 135/84 (08-10-22 @ 13:48)  RR: 18 (08-10-22 @ 13:48)  SpO2: 98% (08-09-22 @ 22:47)  Wt(kg): --        LABS:  08-09    131<L>  |  96<L>  |  23<H>  ----------------------------<  104<H>  4.7   |  24  |  0.7    Ca    9.5      09 Aug 2022 12:28    TPro  7.4  /  Alb  4.3  /  TBili  0.4  /  DBili      /  AST  35  /  ALT  17  /  AlkPhos  354<H>  08-09                          8.5    2.41  )-----------( 141      ( 09 Aug 2022 12:28 )             26.6       Urine Studies:        RADIOLOGY & ADDITIONAL STUDIES:      
Patient is a 26y old  Female who presents with a chief complaint of tracheal care, rehab placement    SUBJECTIVE / OVERNIGHT EVENTS: Tried to Leave AMA, which is not a safe option, she has a recent trach that needs care  **I have called Psych to support with capacity  and she has been kept on constant observation       MEDICATIONS  (STANDING):  BACItracin   Ointment 1 Application(s) Topical daily  chlorhexidine 2% Cloths 1 Application(s) Topical <User Schedule>  cloNIDine 0.1 milliGRAM(s) Oral daily  methadone    Tablet 15 milliGRAM(s) Oral every 12 hours  nicotine -  14 mG/24Hr(s) Patch 1 patch Transdermal daily  pantoprazole   Suspension 40 milliGRAM(s) Oral daily  QUEtiapine 100 milliGRAM(s) Oral two times a day  spironolactone 100 milliGRAM(s) Oral daily    MEDICATIONS  (PRN):  acetaminophen     Tablet .. 650 milliGRAM(s) Oral every 6 hours PRN Temp greater or equal to 38C (100.4F), Mild Pain (1 - 3)  fluticasone propionate 50 MICROgram(s)/spray Nasal Spray 1 Spray(s) Both Nostrils two times a day PRN nasal congestion      PHYSICAL EXAM:  Vital Signs Last 24 Hrs  T(C): 36.1 (07 Aug 2022 05:04), Max: 36.1 (07 Aug 2022 05:04)  T(F): 96.9 (07 Aug 2022 05:04), Max: 96.9 (07 Aug 2022 05:04)  HR: 78 (07 Aug 2022 06:17) (70 - 92)  BP: 139/89 (07 Aug 2022 06:17) (139/89 - 183/107)  BP(mean): --  RR: 18 (07 Aug 2022 05:04) (18 - 18)  SpO2: 99% (07 Aug 2022 03:49) (99% - 100%)    Parameters below as of 07 Aug 2022 03:49  Patient On (Oxygen Delivery Method): tracheostomy collar        GENERAL: No acute distress, Trach in place, needs care   HEAD:  Atraumatic, Normocephalic  NECK: Supple, no lymphadenopathy, no JVD  CHEST/LUNG: CTAB; No wheezes, rales, or rhonchi  HEART: Regular rate and rhythm; No murmurs, rubs, or gallops  ABDOMEN: Soft, non-tender, non-distended; normal bowel sounds  EXTREMITIES:  2+ peripheral pulses b/l, No clubbing, cyanosis, or edema  NEUROLOGY: A&O x 3, no focal deficits  SKIN: multiple scars on her skin     LABS:                        10.3   2.79  )-----------( 144      ( 07 Aug 2022 08:19 )             33.2     08-07    136  |  100  |  14  ----------------------------<  109<H>  5.1<H>   |  26  |  0.7    Ca    9.8      07 Aug 2022 08:19  Mg     2.1     08-07    TPro  7.8  /  Alb  4.5  /  TBili  0.6  /  DBili  x   /  AST  20  /  ALT  9   /  AlkPhos  318<H>  08-07      
Patient is a 26y old  Female who presents with a chief complaint of tracheal care, rehab placement (10 Aug 2022 16:29)      Over Night Events:  Patient seen and examined.   feel good no complain   trach been capped since yesterday     ROS:  See HPI    PHYSICAL EXAM    ICU Vital Signs Last 24 Hrs  T(C): 35.9 (11 Aug 2022 05:00), Max: 36.1 (10 Aug 2022 13:48)  T(F): 96.6 (11 Aug 2022 05:00), Max: 96.9 (10 Aug 2022 13:48)  HR: 77 (11 Aug 2022 05:00) (77 - 93)  BP: 122/80 (11 Aug 2022 05:00) (122/80 - 135/84)  BP(mean): --  ABP: --  ABP(mean): --  RR: 18 (11 Aug 2022 05:00) (18 - 18)  SpO2: --        General: Aox3   HEENT:   nikki              Lymph Nodes: NO cervical LN   Lungs: Bilateral BS  Cardiovascular: Regular   Abdomen: Soft, Positive BS  Extremities: No clubbing   Skin: warm   Neurological: no focal deficit   Musculoskeletal: move all ext     I&O's Detail      LABS:                          8.0    1.69  )-----------( 116      ( 11 Aug 2022 07:41 )             25.7         11 Aug 2022 07:41    135    |  99     |  30     ----------------------------<  114    4.4     |  26     |  0.9      Ca    9.7        11 Aug 2022 07:41    TPro  7.2    /  Alb  4.1    /  TBili  0.4    /  DBili  x      /  AST  43     /  ALT  32     /  AlkPhos  450    11 Aug 2022 07:41  Amylase x     lipase x                                                                                                                                                                                                                                         MEDICATIONS  (STANDING):  BACItracin   Ointment 1 Application(s) Topical daily  chlorhexidine 2% Cloths 1 Application(s) Topical <User Schedule>  cloNIDine 0.1 milliGRAM(s) Oral daily  methadone    Tablet   Oral   methadone    Tablet 15 milliGRAM(s) Oral daily  multivitamin 1 Tablet(s) Oral daily  nicotine -  14 mG/24Hr(s) Patch 1 patch Transdermal daily  QUEtiapine 100 milliGRAM(s) Oral two times a day  spironolactone 100 milliGRAM(s) Oral daily    MEDICATIONS  (PRN):  acetaminophen     Tablet .. 650 milliGRAM(s) Oral every 6 hours PRN Temp greater or equal to 38C (100.4F), Mild Pain (1 - 3)  fluticasone propionate 50 MICROgram(s)/spray Nasal Spray 1 Spray(s) Both Nostrils two times a day PRN nasal congestion  ondansetron Injectable 4 milliGRAM(s) IV Push every 4 hours PRN Nausea and/or Vomiting          Xrays:  TLC:  OG:  ET tube:                                                                                       ECHO:  [facility] ICU:        
Patient is a 26y old  Female who presents with a chief complaint of tracheal care, rehab placement (11 Aug 2022 11:48)      OVERNIGHT EVENTS: remained stable     SUBJECTIVE / INTERVAL HPI: Patient seen and examined at bedside.     VITAL SIGNS:  Vital Signs Last 24 Hrs  T(C): 35.7 (11 Aug 2022 14:29), Max: 35.9 (11 Aug 2022 05:00)  T(F): 96.3 (11 Aug 2022 14:29), Max: 96.6 (11 Aug 2022 05:00)  HR: 84 (11 Aug 2022 14:29) (77 - 93)  BP: 118/78 (11 Aug 2022 14:29) (118/78 - 126/89)  BP(mean): --  RR: 18 (11 Aug 2022 14:29) (18 - 18)  SpO2: --        PHYSICAL EXAM:    General: WDWN  HEENT: NC/AT; PERRL, clear conjunctiva  Neck: supple  Cardiovascular: +S1/S2; RRR  Respiratory: CTA b/l; no W/R/R  Gastrointestinal: soft, NT/ND; +BSx4  Extremities: WWP; 2+ peripheral pulses; no edema   Neurological: AAOx3; no focal deficits    MEDICATIONS:  MEDICATIONS  (STANDING):  BACItracin   Ointment 1 Application(s) Topical daily  chlorhexidine 2% Cloths 1 Application(s) Topical <User Schedule>  cloNIDine 0.1 milliGRAM(s) Oral daily  methadone    Tablet   Oral   multivitamin 1 Tablet(s) Oral daily  nicotine -  14 mG/24Hr(s) Patch 1 patch Transdermal daily  QUEtiapine 100 milliGRAM(s) Oral two times a day  spironolactone 100 milliGRAM(s) Oral daily    MEDICATIONS  (PRN):  acetaminophen     Tablet .. 650 milliGRAM(s) Oral every 6 hours PRN Temp greater or equal to 38C (100.4F), Mild Pain (1 - 3)  fluticasone propionate 50 MICROgram(s)/spray Nasal Spray 1 Spray(s) Both Nostrils two times a day PRN nasal congestion  ondansetron Injectable 4 milliGRAM(s) IV Push every 4 hours PRN Nausea and/or Vomiting      ALLERGIES:  Allergies    buprenorphine (Rash)  Suboxone (Rash)    Intolerances        LABS:                        8.0    1.69  )-----------( 116      ( 11 Aug 2022 07:41 )             25.7     08-11    135  |  99  |  30<H>  ----------------------------<  114<H>  4.4   |  26  |  0.9    Ca    9.7      11 Aug 2022 07:41    TPro  7.2  /  Alb  4.1  /  TBili  0.4  /  DBili  x   /  AST  43<H>  /  ALT  32  /  AlkPhos  450<H>  08-11        
    Pt interviewed, examined and EMR chart reviewed.    Follow up of Opiate Addiction. Pt was on methadone 15mg bid and disccontinued this morning. Pt states feeling ok with the 15mg she recieved today. Pt wants to be on MMTP.     SOCIAL HISTORY:    REVIEW OF SYSTEMS:    Constitutional: No fever, weight loss or fatigue  ENT:  No difficulty hearing, tinnitus, vertigo; No sinus or throat pain  Neck: No pain or stiffness  Respiratory: No cough, wheezing, chills or hemoptysis  Cardiovascular: No chest pain, palpitations, shortness of breath, dizziness or leg swelling  Gastrointestinal: No abdominal or epigastric pain. No nausea, vomiting or hematemesis; No diarrhea or constipation. No melena or hematochezia.  Neurological: No headaches, memory loss, loss of strength, numbness or tremors  Musculoskeletal: No joint pain or swelling; No muscle, back or extremity pain      MEDICATIONS  (STANDING):  BACItracin   Ointment 1 Application(s) Topical daily  chlorhexidine 2% Cloths 1 Application(s) Topical <User Schedule>  cloNIDine 0.1 milliGRAM(s) Oral daily  methadone    Tablet 15 milliGRAM(s) Oral two times a day  multivitamin 1 Tablet(s) Oral daily  nicotine -  14 mG/24Hr(s) Patch 1 patch Transdermal daily  QUEtiapine 100 milliGRAM(s) Oral two times a day  spironolactone 100 milliGRAM(s) Oral daily    MEDICATIONS  (PRN):  acetaminophen     Tablet .. 650 milliGRAM(s) Oral every 6 hours PRN Temp greater or equal to 38C (100.4F), Mild Pain (1 - 3)  fluticasone propionate 50 MICROgram(s)/spray Nasal Spray 1 Spray(s) Both Nostrils two times a day PRN nasal congestion      Vital Signs Last 24 Hrs  T(C): 36.2 (09 Aug 2022 05:00), Max: 37.3 (08 Aug 2022 20:42)  T(F): 97.1 (09 Aug 2022 05:00), Max: 99.2 (08 Aug 2022 20:42)  HR: 73 (09 Aug 2022 05:00) (73 - 99)  BP: 113/73 (09 Aug 2022 05:00) (113/73 - 127/82)  BP(mean): --  RR: 18 (09 Aug 2022 05:00) (18 - 18)  SpO2: 99% (08 Aug 2022 20:49) (99% - 99%)    Parameters below as of 08 Aug 2022 20:49  Patient On (Oxygen Delivery Method): tracheostomy collar        PHYSICAL EXAM:    Constitutional: NAD, well-groomed, well-developed  HEENT: PERRLA, EOMI, Normal Hearing, MMM  Neck: No LAD, No JVD positive trache   Back: Normal spine flexure, No CVA tenderness  Respiratory: CTAB/L  Cardiovascular: S1 and S2, RRR, no M/G/R  Gastrointestinal: BS+, soft, NT/ND  Extremities: No peripheral edema  Vascular: 2+ peripheral pulses  Neurological: A/O x 3, no focal deficits    LABS:                        8.6    1.66  )-----------( 130      ( 08 Aug 2022 08:44 )             27.5     08-08    135  |  99  |  18  ----------------------------<  109<H>  4.0   |  23  |  0.8    Ca    9.3      08 Aug 2022 08:44          Drug Screen Urine:  Alcohol Level        RADIOLOGY & ADDITIONAL STUDIES:  
NEPHROLOGY FOLLOW UP NOTE    pt seen and examined  bp's reviewed    PAST MEDICAL & SURGICAL HISTORY:  Hepatitis C      Asthma      Anxiety and depression      IV drug abuse  heroin      No significant past surgical history        Allergies:  buprenorphine (Rash)  Suboxone (Rash)    Home Medications Reviewed    SOCIAL HISTORY:  Denies ETOH,Smoking,   FAMILY HISTORY:        REVIEW OF SYSTEMS:  CONSTITUTIONAL: No weakness, fevers or chills  EYES/ENT: No visual changes;  No vertigo or throat pain   NECK: No pain or stiffness  RESPIRATORY: No cough, wheezing, hemoptysis; No shortness of breath  CARDIOVASCULAR: No chest pain or palpitations.  GASTROINTESTINAL: No abdominal or epigastric pain. No nausea, vomiting, or hematemesis; No diarrhea or constipation. No melena or hematochezia.  GENITOURINARY: No dysuria, frequency, foamy urine, urinary urgency, incontinence or hematuria  NEUROLOGICAL: No numbness or weakness  SKIN: No itching, burning, rashes, or lesions   VASCULAR: No bilateral lower extremity edema.   All other review of systems is negative unless indicated above.    PHYSICAL EXAM:  Constitutional: NAD  HEENT: anicteric sclera, oropharynx clear, MMM  Neck: + trach  Respiratory: CTAB, no wheezes, rales or rhonchi  Cardiovascular: S1, S2, RRR  Gastrointestinal: BS+, soft, NT + distention  Extremities: No cyanosis or clubbing. No peripheral edema  Neurological: A/O x 3, no focal deficits  Psychiatric: Normal mood, normal affect  : No CVA tenderness. No doss.   Skin: No rashes, multiple scars and bruises      Hospital Medications:   MEDICATIONS  (STANDING):  BACItracin   Ointment 1 Application(s) Topical daily  chlorhexidine 2% Cloths 1 Application(s) Topical <User Schedule>  cloNIDine 0.1 milliGRAM(s) Oral daily  methadone    Tablet   Oral   methadone    Tablet 10 milliGRAM(s) Oral daily  multivitamin 1 Tablet(s) Oral daily  nicotine -  14 mG/24Hr(s) Patch 1 patch Transdermal daily  QUEtiapine 100 milliGRAM(s) Oral two times a day  spironolactone 100 milliGRAM(s) Oral daily        VITALS:  T(F): 97.6 (08-12-22 @ 14:02), Max: 97.6 (08-12-22 @ 14:02)  HR: 88 (08-12-22 @ 14:02)  BP: 106/82 (08-12-22 @ 14:02)  RR: 18 (08-12-22 @ 14:02)  SpO2: 99% (08-12-22 @ 09:34)  Wt(kg): --        LABS:  08-11    135  |  99  |  30<H>  ----------------------------<  114<H>  4.4   |  26  |  0.9    Ca    9.7      11 Aug 2022 07:41    TPro  7.2  /  Alb  4.1  /  TBili  0.4  /  DBili      /  AST  43<H>  /  ALT  32  /  AlkPhos  450<H>  08-11                          8.0    1.69  )-----------( 116      ( 11 Aug 2022 07:41 )             25.7       Urine Studies:        RADIOLOGY & ADDITIONAL STUDIES:    
NEPHROLOGY FOLLOW UP NOTE    bp's better  pt seen ambulating    PAST MEDICAL & SURGICAL HISTORY:  Hepatitis C      Asthma      Anxiety and depression      IV drug abuse  heroin      No significant past surgical history        Allergies:  buprenorphine (Rash)  Suboxone (Rash)    Home Medications Reviewed    SOCIAL HISTORY:  Denies ETOH,Smoking,   FAMILY HISTORY:        REVIEW OF SYSTEMS:  CONSTITUTIONAL: No weakness, fevers or chills  EYES/ENT: No visual changes;  No vertigo or throat pain   NECK: No pain or stiffness  RESPIRATORY: No cough, wheezing, hemoptysis; No shortness of breath  CARDIOVASCULAR: No chest pain or palpitations.  GASTROINTESTINAL: No abdominal or epigastric pain. No nausea, vomiting, or hematemesis; No diarrhea or constipation. No melena or hematochezia.  GENITOURINARY: No dysuria, frequency, foamy urine, urinary urgency, incontinence or hematuria  NEUROLOGICAL: No numbness or weakness  SKIN: No itching, burning, rashes, or lesions   VASCULAR: No bilateral lower extremity edema.   All other review of systems is negative unless indicated above.    PHYSICAL EXAM:  Constitutional: NAD  HEENT: anicteric sclera, oropharynx clear, MMM  Neck: + trach  Respiratory: CTAB, no wheezes, rales or rhonchi  Cardiovascular: S1, S2, RRR  Gastrointestinal: BS+, soft, NT + distention  Extremities: No cyanosis or clubbing. No peripheral edema  Neurological: A/O x 3, no focal deficits  Psychiatric: Normal mood, normal affect  : No CVA tenderness. No doss.   Skin: No rashes, multiple scars and bruises    Hospital Medications:   MEDICATIONS  (STANDING):  BACItracin   Ointment 1 Application(s) Topical daily  chlorhexidine 2% Cloths 1 Application(s) Topical <User Schedule>  cloNIDine 0.1 milliGRAM(s) Oral daily  nicotine -  14 mG/24Hr(s) Patch 1 patch Transdermal daily  QUEtiapine 100 milliGRAM(s) Oral two times a day  spironolactone 100 milliGRAM(s) Oral daily        VITALS:  T(F): 98 (08-08-22 @ 14:37), Max: 98 (08-08-22 @ 14:37)  HR: 82 (08-08-22 @ 14:37)  BP: 127/82 (08-08-22 @ 14:37)  RR: 18 (08-08-22 @ 08:57)  SpO2: 100% (08-08-22 @ 08:57)  Wt(kg): --        LABS:  08-08    135  |  99  |  18  ----------------------------<  109<H>  4.0   |  23  |  0.8    Ca    9.3      08 Aug 2022 08:44  Mg     2.1     08-07    TPro  7.8  /  Alb  4.5  /  TBili  0.6  /  DBili      /  AST  20  /  ALT  9   /  AlkPhos  318<H>  08-07                          8.6    1.66  )-----------( 130      ( 08 Aug 2022 08:44 )             27.5       Urine Studies:        RADIOLOGY & ADDITIONAL STUDIES:      
NEPHROLOGY FOLLOW UP NOTE    bp's controlled    PAST MEDICAL & SURGICAL HISTORY:  Hepatitis C      Asthma      Anxiety and depression      IV drug abuse  heroin      No significant past surgical history        Allergies:  buprenorphine (Rash)  Suboxone (Rash)    Home Medications Reviewed    SOCIAL HISTORY:  Denies ETOH,Smoking,   FAMILY HISTORY:        REVIEW OF SYSTEMS:  CONSTITUTIONAL: No weakness, fevers or chills  EYES/ENT: No visual changes;  No vertigo or throat pain   NECK: No pain or stiffness  RESPIRATORY: No cough, wheezing, hemoptysis; No shortness of breath  CARDIOVASCULAR: No chest pain or palpitations.  GASTROINTESTINAL: No abdominal or epigastric pain. No nausea, vomiting, or hematemesis; No diarrhea or constipation. No melena or hematochezia.  GENITOURINARY: No dysuria, frequency, foamy urine, urinary urgency, incontinence or hematuria  NEUROLOGICAL: No numbness or weakness  SKIN: No itching, burning, rashes, or lesions   VASCULAR: No bilateral lower extremity edema.   All other review of systems is negative unless indicated above.    PHYSICAL EXAM:  Constitutional: NAD  HEENT: anicteric sclera, oropharynx clear, MMM  Neck: + trach  Respiratory: CTAB, no wheezes, rales or rhonchi  Cardiovascular: S1, S2, RRR  Gastrointestinal: BS+, soft, NT + distention  Extremities: No cyanosis or clubbing. No peripheral edema  Neurological: A/O x 3, no focal deficits  Psychiatric: Normal mood, normal affect  : No CVA tenderness. No doss.   Skin: No rashes, multiple scars and bruises    Hospital Medications:   MEDICATIONS  (STANDING):  BACItracin   Ointment 1 Application(s) Topical daily  chlorhexidine 2% Cloths 1 Application(s) Topical <User Schedule>  cloNIDine 0.1 milliGRAM(s) Oral daily  methadone    Tablet 15 milliGRAM(s) Oral two times a day  multivitamin 1 Tablet(s) Oral daily  nicotine -  14 mG/24Hr(s) Patch 1 patch Transdermal daily  QUEtiapine 100 milliGRAM(s) Oral two times a day  spironolactone 100 milliGRAM(s) Oral daily        VITALS:  T(F): 98.6 (08-09-22 @ 14:39), Max: 99.2 (08-08-22 @ 20:42)  HR: 85 (08-09-22 @ 14:39)  BP: 139/89 (08-09-22 @ 14:39)  RR: 18 (08-09-22 @ 14:39)  SpO2: 99% (08-08-22 @ 20:49)  Wt(kg): --      Weight (kg): 48.3 (08-08 @ 18:52)    LABS:  08-09    131<L>  |  96<L>  |  23<H>  ----------------------------<  104<H>  4.7   |  24  |  0.7    Ca    9.5      09 Aug 2022 12:28    TPro  7.4  /  Alb  4.3  /  TBili  0.4  /  DBili      /  AST  35  /  ALT  17  /  AlkPhos  354<H>  08-09                          8.5    2.41  )-----------( 141      ( 09 Aug 2022 12:28 )             26.6       Urine Studies:        RADIOLOGY & ADDITIONAL STUDIES:    
NEPHROLOGY FOLLOW UP NOTE    bp's reviewed    PAST MEDICAL & SURGICAL HISTORY:  Hepatitis C      Asthma      Anxiety and depression      IV drug abuse  heroin      No significant past surgical history        Allergies:  buprenorphine (Rash)  Suboxone (Rash)    Home Medications Reviewed    SOCIAL HISTORY:  Denies ETOH,Smoking,   FAMILY HISTORY:        REVIEW OF SYSTEMS:  CONSTITUTIONAL: No weakness, fevers or chills  EYES/ENT: No visual changes;  No vertigo or throat pain   NECK: No pain or stiffness  RESPIRATORY: No cough, wheezing, hemoptysis; No shortness of breath  CARDIOVASCULAR: No chest pain or palpitations.  GASTROINTESTINAL: No abdominal or epigastric pain. No nausea, vomiting, or hematemesis; No diarrhea or constipation. No melena or hematochezia.  GENITOURINARY: No dysuria, frequency, foamy urine, urinary urgency, incontinence or hematuria  NEUROLOGICAL: No numbness or weakness  SKIN: No itching, burning, rashes, or lesions   VASCULAR: No bilateral lower extremity edema.   All other review of systems is negative unless indicated above.    PHYSICAL EXAM:  Constitutional: NAD  HEENT: anicteric sclera, oropharynx clear, MMM  Neck: + trach  Respiratory: CTAB, no wheezes, rales or rhonchi  Cardiovascular: S1, S2, RRR  Gastrointestinal: BS+, soft, NT + distention  Extremities: No cyanosis or clubbing. No peripheral edema  Neurological: A/O x 3, no focal deficits  Psychiatric: Normal mood, normal affect  : No CVA tenderness. No doss.   Skin: No rashes, multiple scars and bruises      Hospital Medications:   MEDICATIONS  (STANDING):  BACItracin   Ointment 1 Application(s) Topical daily  chlorhexidine 2% Cloths 1 Application(s) Topical <User Schedule>  cloNIDine 0.1 milliGRAM(s) Oral daily  methadone    Tablet   Oral   multivitamin 1 Tablet(s) Oral daily  nicotine -  14 mG/24Hr(s) Patch 1 patch Transdermal daily  QUEtiapine 100 milliGRAM(s) Oral two times a day  spironolactone 100 milliGRAM(s) Oral daily        VITALS:  T(F): 96.3 (08-11-22 @ 14:29), Max: 96.6 (08-11-22 @ 05:00)  HR: 84 (08-11-22 @ 14:29)  BP: 118/78 (08-11-22 @ 14:29)  RR: 18 (08-11-22 @ 14:29)  SpO2: --  Wt(kg): --        LABS:  08-11    135  |  99  |  30<H>  ----------------------------<  114<H>  4.4   |  26  |  0.9    Ca    9.7      11 Aug 2022 07:41    TPro  7.2  /  Alb  4.1  /  TBili  0.4  /  DBili      /  AST  43<H>  /  ALT  32  /  AlkPhos  450<H>  08-11                          8.0    1.69  )-----------( 116      ( 11 Aug 2022 07:41 )             25.7       Urine Studies:        RADIOLOGY & ADDITIONAL STUDIES:  
Patient is a 26y old  Female who presents with a chief complaint of TRACHEOSTOMY CARE; ENCOUNTER FOR REHABILITATION EVALUATION     (08 Aug 2022 17:03)      INTERVAL HISTORY/overnight events  comfortable on RA not on distress asking to remove the trach       Vital Signs Last 24 Hrs  T(C): 36.2 (09 Aug 2022 05:00), Max: 37.3 (08 Aug 2022 20:42)  T(F): 97.1 (09 Aug 2022 05:00), Max: 99.2 (08 Aug 2022 20:42)  HR: 73 (09 Aug 2022 05:00) (73 - 99)  BP: 113/73 (09 Aug 2022 05:00) (113/73 - 127/82)  BP(mean): --  RR: 18 (09 Aug 2022 05:00) (18 - 18)  SpO2: 99% (08 Aug 2022 20:49) (99% - 100%)    Parameters below as of 08 Aug 2022 20:49  Patient On (Oxygen Delivery Method): tracheostomy collar      Daily     Daily   I&O's Summary      Physical Examination:    General: No acute distress.  Alert, oriented, interactive, nonfocal    HEENT:  tracheostomy     PULM: Clear to auscultation bilaterally, no significant sputum production    CVS: Regular rate and rhythm, no murmurs, rubs, or gallops    ABD: Soft, nondistended, nontender, normoactive bowel sounds, no masses    EXT: No edema, nontender    SKIN: Warm and well perfused, no rashes noted.    Neurology:no focal     Musculoskeletal : Move all extremety         Lab Results:                        8.6    1.66  )-----------( 130      ( 08 Aug 2022 08:44 )             27.5     08 Aug 2022 08:44    135    |  99     |  18     ----------------------------<  109    4.0     |  23     |  0.8      Ca    9.3        08 Aug 2022 08:44                Microbiology      Medication:  MEDICATIONS  (STANDING):  BACItracin   Ointment 1 Application(s) Topical daily  chlorhexidine 2% Cloths 1 Application(s) Topical <User Schedule>  cloNIDine 0.1 milliGRAM(s) Oral daily  methadone    Tablet 15 milliGRAM(s) Oral two times a day  multivitamin 1 Tablet(s) Oral daily  nicotine -  14 mG/24Hr(s) Patch 1 patch Transdermal daily  QUEtiapine 100 milliGRAM(s) Oral two times a day  spironolactone 100 milliGRAM(s) Oral daily    MEDICATIONS  (PRN):  acetaminophen     Tablet .. 650 milliGRAM(s) Oral every 6 hours PRN Temp greater or equal to 38C (100.4F), Mild Pain (1 - 3)  fluticasone propionate 50 MICROgram(s)/spray Nasal Spray 1 Spray(s) Both Nostrils two times a day PRN nasal congestion        IMAGING STUDIES:  cxr normal       
Patient is a 26y old  Female who presents with a chief complaint of tracheal care, rehab placement     SUBJECTIVE / OVERNIGHT EVENTS: none     Patient examined, D/C planing pending Psych evaluation to support competency   Pulm consult also pending for Trach plan of care going forward  Patient is on constant observation given multiple threats to Leave AMA knowing that she cannot care for herself        MEDICATIONS  (STANDING):  BACItracin   Ointment 1 Application(s) Topical daily  chlorhexidine 2% Cloths 1 Application(s) Topical <User Schedule>  cloNIDine 0.1 milliGRAM(s) Oral daily  nicotine -  14 mG/24Hr(s) Patch 1 patch Transdermal daily  QUEtiapine 100 milliGRAM(s) Oral two times a day  spironolactone 100 milliGRAM(s) Oral daily    MEDICATIONS  (PRN):  acetaminophen     Tablet .. 650 milliGRAM(s) Oral every 6 hours PRN Temp greater or equal to 38C (100.4F), Mild Pain (1 - 3)  fluticasone propionate 50 MICROgram(s)/spray Nasal Spray 1 Spray(s) Both Nostrils two times a day PRN nasal congestion      PHYSICAL EXAM:  Vital Signs Last 24 Hrs  T(C): 35.8 (08 Aug 2022 05:26), Max: 36.3 (07 Aug 2022 14:00)  T(F): 96.4 (08 Aug 2022 05:26), Max: 97.3 (07 Aug 2022 14:00)  HR: 91 (08 Aug 2022 08:57) (69 - 92)  BP: 150/91 (08 Aug 2022 05:26) (121/81 - 150/91)  BP(mean): --  RR: 18 (08 Aug 2022 08:57) (18 - 18)  SpO2: 100% (08 Aug 2022 08:57) (99% - 100%)    GENERAL: No acute distress, Trach in place, needs care   HEAD:  Atraumatic, Normocephalic  NECK: Supple, no lymphadenopathy, no JVD  CHEST/LUNG: CTAB; No wheezes, rales, or rhonchi  HEART: Regular rate and rhythm; No murmurs, rubs, or gallops  ABDOMEN: Soft, non-tender, non-distended; normal bowel sounds  EXTREMITIES:  2+ peripheral pulses b/l, No clubbing, cyanosis, or edema  NEUROLOGY: A&O x 3, no focal deficits  SKIN: multiple scars on her skin       LABS:                        8.6    1.66  )-----------( 130      ( 08 Aug 2022 08:44 )             27.5     08-08    135  |  99  |  18  ----------------------------<  109<H>  4.0   |  23  |  0.8    Ca    9.3      08 Aug 2022 08:44  Mg     2.1     08-07    TPro  7.8  /  Alb  4.5  /  TBili  0.6  /  DBili  x   /  AST  20  /  ALT  9   /  AlkPhos  318<H>  08-07      
Patient is a 26y old  Female who presents with a chief complaint of tracheal care, rehab placement (11 Aug 2022 20:07)      OVERNIGHT EVENTS: s/p trach removal, remained stable     SUBJECTIVE / INTERVAL HPI: Patient seen and examined at bedside.     VITAL SIGNS:  Vital Signs Last 24 Hrs  T(C): 36.4 (12 Aug 2022 14:02), Max: 36.4 (12 Aug 2022 14:02)  T(F): 97.6 (12 Aug 2022 14:02), Max: 97.6 (12 Aug 2022 14:02)  HR: 88 (12 Aug 2022 14:02) (84 - 104)  BP: 106/82 (12 Aug 2022 14:02) (106/82 - 135/100)  BP(mean): --  RR: 18 (12 Aug 2022 14:02) (18 - 18)  SpO2: 99% (12 Aug 2022 09:34) (99% - 99%)        PHYSICAL EXAM:    General: WDWN  HEENT: NC/AT; PERRL, clear conjunctiva  Neck: supple  Cardiovascular: +S1/S2; RRR  Respiratory: CTA b/l; no W/R/R  Gastrointestinal: soft, NT/ND; +BSx4  Extremities: WWP; 2+ peripheral pulses; no edema   Neurological: AAOx3; no focal deficits    MEDICATIONS:  MEDICATIONS  (STANDING):  BACItracin   Ointment 1 Application(s) Topical daily  chlorhexidine 2% Cloths 1 Application(s) Topical <User Schedule>  cloNIDine 0.1 milliGRAM(s) Oral daily  methadone    Tablet   Oral   methadone    Tablet 10 milliGRAM(s) Oral daily  multivitamin 1 Tablet(s) Oral daily  nicotine -  14 mG/24Hr(s) Patch 1 patch Transdermal daily  QUEtiapine 100 milliGRAM(s) Oral two times a day  spironolactone 100 milliGRAM(s) Oral daily    MEDICATIONS  (PRN):  acetaminophen     Tablet .. 650 milliGRAM(s) Oral every 6 hours PRN Temp greater or equal to 38C (100.4F), Mild Pain (1 - 3)  fluticasone propionate 50 MICROgram(s)/spray Nasal Spray 1 Spray(s) Both Nostrils two times a day PRN nasal congestion  ondansetron Injectable 4 milliGRAM(s) IV Push every 4 hours PRN Nausea and/or Vomiting      ALLERGIES:  Allergies    buprenorphine (Rash)  Suboxone (Rash)    Intolerances        LABS:                        8.0    1.69  )-----------( 116      ( 11 Aug 2022 07:41 )             25.7     08-11    135  |  99  |  30<H>  ----------------------------<  114<H>  4.4   |  26  |  0.9    Ca    9.7      11 Aug 2022 07:41    TPro  7.2  /  Alb  4.1  /  TBili  0.4  /  DBili  x   /  AST  43<H>  /  ALT  32  /  AlkPhos  450<H>  08-11      
Patient is a 26y old  Female who presents with a chief complaint of tracheal care, rehab placement (09 Aug 2022 09:15)    SUBJECTIVE / OVERNIGHT EVENTS: None     Patient laying comfortably in bed  Plan to downsize trach for 12-24hrs with possible decannulation if pt tolerates   Surgery consulted      MEDICATIONS  (STANDING):  Bacitracin   Ointment 1 Application(s) Topical daily  chlorhexidine 2% Cloths 1 Application(s) Topical <User Schedule>  clonidine 0.1 milliGRAM(s) Oral daily  methadone    Tablet 15 milliGRAM(s) Oral two times a day  multivitamin 1 Tablet(s) Oral daily  nicotine -  14 mG/24Hr(s) Patch 1 patch Transdermal daily  Quetiapine 100 milliGRAM(s) Oral two times a day  spironolactone 100 milliGRAM(s) Oral daily    MEDICATIONS  (PRN):  acetaminophen     Tablet .. 650 milliGRAM(s) Oral every 6 hours PRN Temp greater or equal to 38C (100.4F), Mild Pain (1 - 3)  fluticasone propionate 50 MICROgram(s)/spray Nasal Spray 1 Spray(s) Both Nostrils two times a day PRN nasal congestion      CAPILLARY BLOOD GLUCOSE    I&O's Summary      PHYSICAL EXAM:  Vital Signs Last 24 Hrs  T(C): 36.2 (09 Aug 2022 05:00), Max: 37.3 (08 Aug 2022 20:42)  T(F): 97.1 (09 Aug 2022 05:00), Max: 99.2 (08 Aug 2022 20:42)  HR: 73 (09 Aug 2022 05:00) (73 - 99)  BP: 113/73 (09 Aug 2022 05:00) (113/73 - 127/82)  BP(mean): --  RR: 18 (09 Aug 2022 05:00) (18 - 18)  SpO2: 99% (08 Aug 2022 20:49) (99% - 99%)    Parameters below as of 08 Aug 2022 20:49  Patient On (Oxygen Delivery Method): tracheostomy collar    GENERAL: No acute distress, Trach in place  HEAD:  Atraumatic, Normocephalic  NECK: Supple, no lymphadenopathy, no JVD  CHEST/LUNG: CTAB; No wheezes, rales, or rhonchi  HEART: Regular rate and rhythm; No murmurs, rubs, or gallops  ABDOMEN: Soft, non-tender, palpable liver  EXTREMITIES:  2+ peripheral pulses b/l, No clubbing, cyanosis, or edema  NEUROLOGY: A&O x 3, no focal deficits  SKIN: multiple scars on her skin       LABS:                        8.6    1.66  )-----------( 130      ( 08 Aug 2022 08:44 )             27.5     08-08    135  |  99  |  18  ----------------------------<  109<H>  4.0   |  23  |  0.8    Ca    9.3      08 Aug 2022 08:44            
Pt interviewed, examined and EMR chart reviewed.    Follow up of Opiate Addiction. Pt is on methadone protocol. Pt is doing fair. Pt wants to be placed on MMTP while in hospital.  Pt currently with tracheostomy and being evaluated for decannulation.     SOCIAL HISTORY:    REVIEW OF SYSTEMS:    Constitutional: No fever, weight loss or fatigue  ENT:  No difficulty hearing, tinnitus, vertigo; No sinus or throat pain  Cardiovascular: No chest pain, palpitations, shortness of breath, dizziness or leg swelling  Gastrointestinal: No abdominal or epigastric pain. No nausea, vomiting or hematemesis; No diarrhea or constipation. No melena or hematochezia.  Neurological: No headaches, memory loss, loss of strength, numbness or tremors  Musculoskeletal: No joint pain or swelling; No muscle, back or extremity pain      MEDICATIONS  (STANDING):  BACItracin   Ointment 1 Application(s) Topical daily  chlorhexidine 2% Cloths 1 Application(s) Topical <User Schedule>  cloNIDine 0.1 milliGRAM(s) Oral daily  methadone    Tablet   Oral   multivitamin 1 Tablet(s) Oral daily  nicotine -  14 mG/24Hr(s) Patch 1 patch Transdermal daily  QUEtiapine 100 milliGRAM(s) Oral two times a day  spironolactone 100 milliGRAM(s) Oral daily    MEDICATIONS  (PRN):  acetaminophen     Tablet .. 650 milliGRAM(s) Oral every 6 hours PRN Temp greater or equal to 38C (100.4F), Mild Pain (1 - 3)  fluticasone propionate 50 MICROgram(s)/spray Nasal Spray 1 Spray(s) Both Nostrils two times a day PRN nasal congestion  ondansetron Injectable 4 milliGRAM(s) IV Push every 4 hours PRN Nausea and/or VomitingPHYSICAL EXAM:    Constitutional: NAD, well-groomed, well-developed  HEENT: PERRLA, EOMI, Normal Hearing, MMM  Neck: No LAD, No JVD, positive trach   Back: Normal spine flexure, No CVA tenderness  Respiratory: CTAB/L  Cardiovascular: S1 and S2, RRR, no M/G/R  Gastrointestinal: BS+, soft, NT/ND  Extremities: No peripheral edema, muscle wasting noted   Vascular: 2+ peripheral pulses  Neurological: A/O x 3, no focal deficits    LABS:                        8.5    2.41  )-----------( 141      ( 09 Aug 2022 12:28 )             26.6     08-09    131<L>  |  96<L>  |  23<H>  ----------------------------<  104<H>  4.7   |  24  |  0.7    Ca    9.5      09 Aug 2022 12:28    TPro  7.4  /  Alb  4.3  /  TBili  0.4  /  DBili  x   /  AST  35  /  ALT  17  /  AlkPhos  354<H>  08-09        Drug Screen Urine:  Alcohol Level        RADIOLOGY & ADDITIONAL STUDIES:

## 2022-08-12 NOTE — DISCHARGE NOTE NURSING/CASE MANAGEMENT/SOCIAL WORK - NSDCPEFALRISK_GEN_ALL_CORE
For information on Fall & Injury Prevention, visit: [email or web address] OR  [email or web address] OR  https://www.cdc.gov/steadi/patient.html

## 2022-08-12 NOTE — CHART NOTE - NSCHARTNOTESELECT_GEN_ALL_CORE
Event Note
Event Note
Trach downsize/Event Note
Event Note
Medical PA/Event Note
chart note a
trach cap/Event Note

## 2022-08-12 NOTE — SWALLOW BEDSIDE ASSESSMENT ADULT - SWALLOW EVAL: DIAGNOSIS
Per MBS on 8/10: mild-moderate oral pharyngeal dysphagia marked by reduced tongue base retraction, reduced laryngeal elevation, reduced  pharyngeal stripping wave, reduced airway protection, aspiration during the swallow; esophageal impairment noted with some improvement given repeated swallows. Oral phase is negatively impacted by reduced dentition.
Mild oral phase dysphagia, moderate pharyngeal phase dysphagia

## 2022-08-12 NOTE — CHART NOTE - NSCHARTNOTEFT_GEN_A_CORE
Registered Dietitian Follow-Up     Patient Profile Reviewed                           Yes [x]   No []     Nutrition History Previously Obtained        Yes X[]  No []       Pertinent  Information: pt is 26 year old female with pmhx of asthma, hepc , IVDA, recently admitted 6/15-8/4 2/2 ARF due to OD s/p trach collar, pt left AMA from med surg unit on 8/4. presents with discharge from trach collar and ringing in her ears.  last drug use ~ 3 days ago. Pt appears cachetic, as per EMR pt has 23# weight loss in less than two weeks, some associated to fluid due to hx of anasarca. Pt diagnosed with severe PCM upon initial RD assessment. s/ MBS on 8/10 place don a regular diet with mildly thick liquids, as per pt tolerating po diet well, pt is not receptive to RD today, large selection of outside foods noted by bedside.    Diet, Regular:   Mildly Thick Liquids (MILDTHICKLIQS) (08-12-22 @ 10:52) [Active]         Anthropometrics:  - Ht. 167.6 cm  - Wt. 48.3 kgs  - %wt change  - BMI   - IBW      Pertinent Lab Data:  08-11    135  |  99  |  30<H>  ----------------------------<  114<H>  4.4   |  26  |  0.9    Ca    9.7      11 Aug 2022 07:41    TPro  7.2  /  Alb  4.1  /  TBili  0.4  /  DBili  x   /  AST  43<H>  /  ALT  32  /  AlkPhos  450<H>  08-11                          8.0    1.69  )-----------( 116      ( 11 Aug 2022 07:41 )             25.7        Pertinent Meds:  MEDICATIONS  (STANDING):  BACItracin   Ointment 1 Application(s) Topical daily  chlorhexidine 2% Cloths 1 Application(s) Topical <User Schedule>  cloNIDine 0.1 milliGRAM(s) Oral daily  methadone    Tablet   Oral   methadone    Tablet 10 milliGRAM(s) Oral daily  multivitamin 1 Tablet(s) Oral daily  nicotine -  14 mG/24Hr(s) Patch 1 patch Transdermal daily  QUEtiapine 100 milliGRAM(s) Oral two times a day  spironolactone 100 milliGRAM(s) Oral daily    MEDICATIONS  (PRN):  acetaminophen     Tablet .. 650 milliGRAM(s) Oral every 6 hours PRN Temp greater or equal to 38C (100.4F), Mild Pain (1 - 3)  fluticasone propionate 50 MICROgram(s)/spray Nasal Spray 1 Spray(s) Both Nostrils two times a day PRN nasal congestion  ondansetron Injectable 4 milliGRAM(s) IV Push every 4 hours PRN Nausea and/or Vomiting       Physical Findings:  - Appearance: alert, orientated   - GI function: +BS   - Tubes: n/a   - Oral/Mouth cavity:  - Skin: sacrum unstageable      Nutrition Requirements  Weight Used: 48.3 kgs      Estimated Energy Needs:              Nutrient Intake:        [] Previous Nutrition Diagnosis:            [] Ongoing          [] Resolved    [] No active nutrition diagnosis identified at this time     Nutrition Diagnostic #1  Problem:   Etiology:   Statement:      Nutrition Diagnostic #2  Problem:  Etiology:  Statement:     Nutrition Intervention:     Goal/Expected Outcome:     Indicator/Monitoring: Registered Dietitian Follow-Up     Patient Profile Reviewed                           Yes [x]   No []     Nutrition History Previously Obtained        Yes X[]  No []       Pertinent  Information: pt is 26 year old female with pmhx of asthma, hepc , IVDA, recently admitted 6/15-8/4 2/2 ARF due to OD s/p trach collar, pt left AMA from med surg unit on 8/4. presents with discharge from trach collar and ringing in her ears.  last drug use ~ 3 days ago. Pt appears cachetic, as per EMR pt has 23# weight loss in less than two weeks, some associated to fluid due to hx of anasarca. Pt diagnosed with severe PCM upon initial RD assessment. s/p  MBS on 8/10 placed on a regular diet with mildly thick liquids, as per pt tolerating po diet well, pt is not receptive to RD today, large selection of outside foods noted by bedside.    Diet, Regular:   Mildly Thick Liquids (MILDTHICKLIQS) (08-12-22 @ 10:52) [Active]         Anthropometrics:  - Ht. 167.6 cm  - Wt. 48.3 kgs  - %wt change  - BMI   - IBW      Pertinent Lab Data:  08-11    135  |  99  |  30<H>  ----------------------------<  114<H>  4.4   |  26  |  0.9    Ca    9.7      11 Aug 2022 07:41    TPro  7.2  /  Alb  4.1  /  TBili  0.4  /  DBili  x   /  AST  43<H>  /  ALT  32  /  AlkPhos  450<H>  08-11                          8.0    1.69  )-----------( 116      ( 11 Aug 2022 07:41 )             25.7        Pertinent Meds:  MEDICATIONS  (STANDING):  BACItracin   Ointment 1 Application(s) Topical daily  chlorhexidine 2% Cloths 1 Application(s) Topical <User Schedule>  cloNIDine 0.1 milliGRAM(s) Oral daily  methadone    Tablet   Oral   methadone    Tablet 10 milliGRAM(s) Oral daily  multivitamin 1 Tablet(s) Oral daily  nicotine -  14 mG/24Hr(s) Patch 1 patch Transdermal daily  QUEtiapine 100 milliGRAM(s) Oral two times a day  spironolactone 100 milliGRAM(s) Oral daily    MEDICATIONS  (PRN):  acetaminophen     Tablet .. 650 milliGRAM(s) Oral every 6 hours PRN Temp greater or equal to 38C (100.4F), Mild Pain (1 - 3)  fluticasone propionate 50 MICROgram(s)/spray Nasal Spray 1 Spray(s) Both Nostrils two times a day PRN nasal congestion  ondansetron Injectable 4 milliGRAM(s) IV Push every 4 hours PRN Nausea and/or Vomiting       Physical Findings:  - Appearance: alert, orientated   - GI function: +BS   - Tubes: n/a   - Oral/Mouth cavity:  - Skin: sacrum unstageable      Nutrition Requirements  Weight Used: 48.3 kgs      Estimated Energy Needs:  6718-8148 kcals (30-35 kcal/kg/BW)  58-68g protein (1.2-1.4g/kg/bw)  1;1 kcal for estimated fluid needs           Nutrient Intake: pt tolerating po diet well consumes >75% of meals including foods brought in by family         [] Previous Nutrition Diagnosis:            [X] Ongoing          [] Resolved    severe PCM       Nutrition Intervention: maintain on present diet, when diet was upgraded prosource gelatin plus fell off the order, will not reorder at this time as pt is meeting her estimated nutrient needs via po diet alone and MVI was added as well during last visit      Goal/Expected Outcome: pt to continue to tolerate po diet and meet >75% of estimated nutrient needs within 6 days      Indicator/Monitoring: RD to monitor tolerance to po diet, labs/meds, NFPF and f/u as needed within 4-6 days   MOderate risk

## 2022-08-15 LAB — DRUG SCREEN, SERUM: ABNORMAL

## 2022-11-30 NOTE — BH CONSULTATION LIAISON PROGRESS NOTE - NSBHMSEEYE_PSY_A_CORE
AICD  deactivated 11/28  Ongoing comfort care  Roxanol 5mg SL for pain/dyspnea   PRN oxygen nasal cannula 2 liters for comfort
AICD  deactivated 11/28  Ongoing comfort care  Roxanol 5mg SL for pain/dyspnea   PRN oxygen nasal cannula 2 liters for comfort.
Fair

## 2022-12-06 NOTE — ED PROVIDER NOTE - HIV OFFER
Opt out Tube feeds: Jevity 1.5 Clifford  24hr volume: 1440ml  Goal Rate 60ml / hr  duration 24hours  Probiotic Smoothie 2 cans per day.

## 2023-01-06 NOTE — ED ADULT TRIAGE NOTE - SPO2 (%)
Patient resting frequently easily awoken to normal voice, medicated x 1 for anxiety , repositioned q 2 hours and as needed. Patient moves self in bed.    99

## 2023-04-25 NOTE — PROGRESS NOTE ADULT - SUBJECTIVE AND OBJECTIVE BOX
Patient is a 26y old  Female who presents with a chief complaint of anasarca (17 Jun 2022 11:00)      T(F): 98.2 (06-17-22 @ 11:00), Max: 98.3 (06-16-22 @ 23:00)  HR: 86 (06-17-22 @ 14:42)  BP: 107/75 (06-17-22 @ 12:00)  RR: 24 (06-17-22 @ 13:00)  SpO2: 100% (06-17-22 @ 14:42) (94% - 100%)    PHYSICAL EXAM:  GENERAL: NAD  HEAD:  Atraumatic, Normocephalic  EYES: EOMI, PERRLA, conjunctiva and sclera clear  NERVOUS SYSTEM:  Alert & Oriented X3, no focal deficits   CHEST/LUNG: Clear to percussion bilaterally; No rales, rhonchi, wheezing, or rubs  HEART: Regular rate and rhythm; No murmurs, rubs, or gallops  ABDOMEN: Soft, Nontender, Nondistended; Bowel sounds present  EXTREMITIES:  2+ Peripheral Pulses, No clubbing, cyanosis, or edema    LABS  06-17    139  |  107  |  17  ----------------------------<  91  4.1   |  21  |  1.1    Ca    7.6<L>      17 Jun 2022 05:27  Mg     2.1     06-16    TPro  4.4<L>  /  Alb  2.0<L>  /  TBili  0.5  /  DBili  x   /  AST  35  /  ALT  9   /  AlkPhos  640<H>  06-17                          9.5    4.96  )-----------( 154      ( 17 Jun 2022 05:27 )             31.2     PT/INR - ( 16 Jun 2022 06:56 )   PT: 10.30 sec;   INR: 0.89 ratio         PTT - ( 16 Jun 2022 06:56 )  PTT:31.7 sec  Mode: AC/ CMV (Assist Control/ Continuous Mandatory Ventilation)  RR (machine): 24  TV (machine): 450  FiO2: 40  PEEP: 8  ITime: 1  MAP: 15  PIP: 30    CARDIAC ENZYMES  Creatine Kinase, Serum: 495 (06-16 @ 17:36)      Troponin T, Serum: 0.04 ng/mL (06-16-22 @ 16:41)      Culture Results:   10,000 - 49,000 CFU/mL Gram positive organisms  <10,000 CFU/ml Normal Urogenital kilo present (06-15-22)    RADIOLOGY  < from: Xray Chest 1 View- PORTABLE-Routine (Xray Chest 1 View- PORTABLE-Routine in AM.) (06.17.22 @ 05:51) >  Impression:    Bilateral lung opacities, unchanged. Cardiomegaly.      < end of copied text >  < from: CT Head No Cont (06.16.22 @ 14:18) >    IMPRESSION:    1.  No evidence of acute intracranial pathology.    2.  Right parietal extracalvarial soft tissue swelling with subcutaneous   hematoma and superficial laceration.      < end of copied text >    MEDICATIONS  (STANDING):  ampicillin/sulbactam  IVPB 3 Gram(s) IV Intermittent every 6 hours  dexMEDEtomidine Infusion 0.2 MICROgram(s)/kG/Hr (4.28 mL/Hr) IV Continuous <Continuous>  fentaNYL   Infusion. 0.5 MICROgram(s)/kG/Hr (4.28 mL/Hr) IV Continuous <Continuous>  folic acid 1 milliGRAM(s) Oral daily  furosemide   Injectable 40 milliGRAM(s) IV Push two times a day  methadone    Tablet   Oral   methadone    Tablet 10 milliGRAM(s) Oral every 12 hours  nicotine -  14 mG/24Hr(s) Patch 1 patch Transdermal daily  norepinephrine Infusion 0.05 MICROgram(s)/kG/Min (8.03 mL/Hr) IV Continuous <Continuous>  pantoprazole    Tablet 40 milliGRAM(s) Oral before breakfast  propofol Infusion 1 MICROgram(s)/kG/Min (0.51 mL/Hr) IV Continuous <Continuous>  sodium chloride 0.9% Bolus 500 milliLiter(s) IV Bolus once  spironolactone 50 milliGRAM(s) Oral daily  thiamine 100 milliGRAM(s) Oral daily    MEDICATIONS  (PRN):  ALBUTerol    90 MICROgram(s) HFA Inhaler 1 Puff(s) Inhalation every 4 hours PRN Shortness of Breath and/or Wheezing  cloNIDine 0.1 milliGRAM(s) Oral every 6 hours PRN opiate withdrawal  hydrOXYzine hydrochloride 50 milliGRAM(s) Oral every 6 hours PRN Anxiety  ibuprofen  Tablet. 400 milliGRAM(s) Oral every 6 hours PRN Mild Pain (1 - 3)     chart reviewed, events noted pt seen and evaluated this am, sedated on ventilator       T(F): 98.2 (06-17-22 @ 11:00), Max: 98.3 (06-16-22 @ 23:00)  HR: 86 (06-17-22 @ 14:42)  BP: 107/75 (06-17-22 @ 12:00)  RR: 24 (06-17-22 @ 13:00)  SpO2: 100% (06-17-22 @ 14:42) (94% - 100%)    PHYSICAL EXAM:  GENERAL: NAD  HEAD: 6 cm scalp laceration  EYES: EOMI, PERRLA, conjunctiva and sclera clear  NERVOUS SYSTEM:   no focal deficits   CHEST/LUNG:  bilateral rhonchi  HEART: Regular rate and rhythm; No murmurs, rubs, or gallops  ABDOMEN: Soft, Nontender, Nondistended; Bowel sounds present  EXTREMITIES:  2+ Peripheral Pulses, No clubbing, cyanosis, or edema    LABS  06-17    139  |  107  |  17  ----------------------------<  91  4.1   |  21  |  1.1    Ca    7.6<L>      17 Jun 2022 05:27  Mg     2.1     06-16    TPro  4.4<L>  /  Alb  2.0<L>  /  TBili  0.5  /  DBili  x   /  AST  35  /  ALT  9   /  AlkPhos  640<H>  06-17        Fentanyl, Urine (06.16.22 @ 17:43)   Fentanyl, Ur: Positive  Methadone, Urine (06.16.22 @ 17:43)   Methadone, Urine: Positive  Opiate, Urine (06.16.22 @ 17:43)   Opiate, Urine: Positive                     9.5    4.96  )-----------( 154      ( 17 Jun 2022 05:27 )             31.2     PT/INR - ( 16 Jun 2022 06:56 )   PT: 10.30 sec;   INR: 0.89 ratio         PTT - ( 16 Jun 2022 06:56 )  PTT:31.7 sec    Mode: AC/ CMV (Assist Control/ Continuous Mandatory Ventilation)  RR (machine): 24  TV (machine): 450  FiO2: 40  PEEP: 8    CARDIAC ENZYMES  Creatine Kinase, Serum: 495 (06-16 @ 17:36)    Troponin T, Serum: 0.04 ng/mL (06-16-22 @ 16:41)    EEG - P    Culture Results:   10,000 - 49,000 CFU/mL Gram positive organisms  <10,000 CFU/ml Normal Urogenital kilo present (06-15-22)    RADIOLOGY  < from: Xray Chest 1 View- PORTABLE-Routine (Xray Chest 1 View- PORTABLE-Routine in AM.) (06.17.22 @ 05:51) >  Impression:    Bilateral lung opacities, unchanged. Cardiomegaly.      < end of copied text >  < from: CT Head No Cont (06.16.22 @ 14:18) >    IMPRESSION:    1.  No evidence of acute intracranial pathology.    2.  Right parietal extracalvarial soft tissue swelling with subcutaneous   hematoma and superficial laceration.      < end of copied text >    MEDICATIONS  (STANDING):  ampicillin/sulbactam  IVPB 3 Gram(s) IV Intermittent every 6 hours  dexMEDEtomidine Infusion 0.2 MICROgram(s)/kG/Hr (4.28 mL/Hr) IV Continuous <Continuous>  fentaNYL   Infusion. 0.5 MICROgram(s)/kG/Hr (4.28 mL/Hr) IV Continuous <Continuous>  folic acid 1 milliGRAM(s) Oral daily  furosemide   Injectable 40 milliGRAM(s) IV Push two times a day  methadone    Tablet 10 milliGRAM(s) Oral every 12 hours  nicotine -  14 mG/24Hr(s) Patch 1 patch Transdermal daily  norepinephrine Infusion 0.05 MICROgram(s)/kG/Min (8.03 mL/Hr) IV Continuous <Continuous>  pantoprazole    Tablet 40 milliGRAM(s) Oral before breakfast  propofol Infusion 1 MICROgram(s)/kG/Min (0.51 mL/Hr) IV Continuous <Continuous>  sodium chloride 0.9% Bolus 500 milliLiter(s) IV Bolus once  spironolactone 50 milliGRAM(s) Oral daily  thiamine 100 milliGRAM(s) Oral daily    MEDICATIONS  (PRN):  ALBUTerol    90 MICROgram(s) HFA Inhaler 1 Puff(s) Inhalation every 4 hours PRN Shortness of Breath and/or Wheezing  cloNIDine 0.1 milliGRAM(s) Oral every 6 hours PRN opiate withdrawal  hydrOXYzine hydrochloride 50 milliGRAM(s) Oral every 6 hours PRN Anxiety  ibuprofen  Tablet. 400 milliGRAM(s) Oral every 6 hours PRN Mild Pain (1 - 3)     Discharged

## 2023-05-25 NOTE — PROGRESS NOTE ADULT - SUBJECTIVE AND OBJECTIVE BOX
Pt verbalizes plan of care and denies questions. Safety remains intact and pain controlled within parameters set by patient goal. Good hours noted and verbalizes to call prn.   CINDYPOOJA JOE  26y, Female  Allergy: buprenorphine (Rash)  Suboxone (Rash)      LOS  35d    CHIEF COMPLAINT: anasarca (20 Jul 2022 08:08)      INTERVAL EVENTS/HPI  - No acute events overnight  - T(F): , Max: 97 (07-20-22 @ 00:30)  - afebrile, episode of hypotension - remains off pressors   - WBC Count: 1.46 (07-20-22 @ 07:25)  WBC Count: 1.69 (07-20-22 @ 00:24)     - Creatinine, Serum: 0.9 (07-20-22 @ 07:25)  Creatinine, Serum: 0.9 (07-20-22 @ 00:24)       ROS  General: Denies rigors, nightsweats  HEENT: Denies headache, rhinorrhea, sore throat, eye pain  CV: Denies CP, palpitations  PULM: Denies wheezing, hemoptysis  GI: Denies hematemesis, hematochezia, melena  : Denies discharge, hematuria  MSK: Denies arthralgias, myalgias  SKIN: Denies rash, lesions  NEURO: Denies paresthesias, weakness  PSYCH: Denies depression, anxiety    VITALS:  T(F): 95, Max: 97 (07-20-22 @ 00:30)  HR: 63  BP: 90/64  RR: 25Vital Signs Last 24 Hrs  T(C): 35 (20 Jul 2022 07:00), Max: 36.1 (20 Jul 2022 00:30)  T(F): 95 (20 Jul 2022 07:00), Max: 97 (20 Jul 2022 00:30)  HR: 63 (20 Jul 2022 07:46) (59 - 88)  BP: 90/64 (20 Jul 2022 07:00) (75/49 - 147/105)  BP(mean): 82 (20 Jul 2022 05:05) (58 - 122)  RR: 25 (20 Jul 2022 05:05) (15 - 35)  SpO2: 100% (20 Jul 2022 07:46) (91% - 100%)    Parameters below as of 20 Jul 2022 05:00  Patient On (Oxygen Delivery Method): ventilator        PHYSICAL EXAM:  Gen: NAD, resting in bed  HEENT: Normocephalic, atraumatic  Neck: supple, no lymphadenopathy  CV: Regular rate & regular rhythm  Lungs: decreased BS at bases, no fremitus  Abdomen: Soft, BS present  Ext: Warm, well perfused  Neuro: non focal, awake  Skin: no rash, no erythema  Lines: no phlebitis    FH: Non-contributory  Social Hx: Non-contributory    TESTS & MEASUREMENTS:                        7.3    1.46  )-----------( 87       ( 20 Jul 2022 07:25 )             24.2     07-20    137  |  105  |  18  ----------------------------<  106<H>  4.2   |  20  |  0.9    Ca    8.5      20 Jul 2022 07:25  Phos  4.5     07-20  Mg     2.4     07-20    TPro  5.2<L>  /  Alb  2.7<L>  /  TBili  0.3  /  DBili  x   /  AST  16  /  ALT  12  /  AlkPhos  248<H>  07-20      LIVER FUNCTIONS - ( 20 Jul 2022 07:25 )  Alb: 2.7 g/dL / Pro: 5.2 g/dL / ALK PHOS: 248 U/L / ALT: 12 U/L / AST: 16 U/L / GGT: x               Culture - Sputum (collected 07-15-22 @ 10:00)  Source: Trach Asp Tracheal Aspirate  Gram Stain (07-16-22 @ 04:22):    Moderate polymorphonuclear leukocytes per low power field    Rare Squamous epithelial cells per low power field    No organisms seen per oil power field  Final Report (07-17-22 @ 16:58):    Normal Respiratory Kelly present    Babesia microti PCR, Blood. (collected 07-10-22 @ 15:43)  Source: .Blood None  Final Report (07-11-22 @ 09:45):    Babesia microti PCR    Results: NOT detected    ***************Result Note*************    The detection of Babesia microti by PCR has only been    validated for whole blood; this test has not been approved    by the US Food and Drug Administration (FDA). Performance    characteristics of this assay have been determined by    [employer] The clinical significance    of results should be considered in conjunction with the    overall clinical presentation of the patient. Result is not    intended to be used as the sole means for clinical diagnosis    or patient management decisions.    One negative sample does not necessarily rule    out the presence of a parasitic infection.    Culture - Blood (collected 07-08-22 @ 05:26)  Source: .Blood None  Final Report (07-13-22 @ 19:00):    No Growth Final    Culture - Blood (collected 07-06-22 @ 07:20)  Source: .Blood None  Final Report (07-11-22 @ 19:00):    No Growth Final    Culture - Blood (collected 07-06-22 @ 07:20)  Source: .Blood None  Final Report (07-11-22 @ 19:00):    No Growth Final    Culture - Sputum (collected 07-04-22 @ 13:39)  Source: ET Tube ET Tube  Gram Stain (07-05-22 @ 08:48):    Few polymorphonuclear leukocytes per low power field    No Squamous epithelial cells per low power field    Numerous Gram Negative Coccobacilli seen per oil power field  Final Report (07-11-22 @ 08:12):    Moderate Acinetobacter baumannii/nosocom group (Carbapenem Resistant)    Cefiderocol interpretations based on FDA breakpoints.    Normal Respiratory Kelly absent  Organism: Acinetobacter baumannii/nosocom group (Carbapenem Resistant)  Acinetobacter baumannii/nosocom group (Carbapenem Resistant) (07-11-22 @ 08:12)  Organism: Acinetobacter baumannii/nosocom group (Carbapenem Resistant) (07-11-22 @ 08:12)      -  Cefiderocol: I      -  Imipenem: R      -  Piperacillin/Tazobactam: R      Method Type: KB  Organism: Acinetobacter baumannii/nosocom group (Carbapenem Resistant) (07-11-22 @ 08:12)      -  Amikacin: R >32      -  Ampicillin/Sulbactam: R >16/8      -  Cefepime: R >16      -  Ceftazidime: R >16      -  Ciprofloxacin: R >2      -  Gentamicin: R >8      -  Levofloxacin: R >4      -  Meropenem: R >8      -  Tobramycin: R >8      -  Trimethoprim/Sulfamethoxazole: R >2/38      Method Type: AL    Culture - Blood (collected 06-29-22 @ 20:31)  Source: .Blood Blood-Peripheral  Final Report (07-05-22 @ 20:00):    No Growth Final    Culture - Blood (collected 06-29-22 @ 20:31)  Source: .Blood Blood  Final Report (07-05-22 @ 20:00):    No Growth Final    Culture - Blood (collected 06-28-22 @ 06:00)  Source: .Blood Blood  Final Report (07-03-22 @ 23:00):    No Growth Final    Culture - Sputum (collected 06-27-22 @ 14:00)  Source: Trach Asp Tracheal Aspirate  Gram Stain (06-28-22 @ 03:15):    Moderate polymorphonuclear leukocytes per low power field    Few Squamous epithelial cells per low power field    Moderate Gram positive cocci in pairs seen per oil power field    Few Gram Variable Rods seen per oil power field  Final Report (06-30-22 @ 16:33):    Numerous Mixed gram negative rods    Moderate Staphylococcus aureus    Normal Respiratory Kelly present  Organism: Staphylococcus aureus (06-30-22 @ 16:33)  Organism: Staphylococcus aureus (06-30-22 @ 16:33)      -  Ampicillin/Sulbactam: S <=8/4      -  Cefazolin: S <=4      -  Clindamycin: S <=0.25      -  Erythromycin: S <=0.25      -  Gentamicin: S <=1 Should not be used as monotherapy      -  Oxacillin: S <=0.25 Oxacillin predicts susceptibility for dicloxacillin, methicillin, and nafcillin      -  Rifampin: S <=1 Should not be used as monotherapy      -  Tetra/Doxy: S <=1      -  Trimethoprim/Sulfamethoxazole: S <=0.5/9.5      -  Vancomycin: S 2      Method Type: AL    Culture - Sputum (collected 06-24-22 @ 17:20)  Source: Trach Asp Tracheal Aspirate  Gram Stain (06-25-22 @ 06:29):    Few polymorphonuclear leukocytes per low power field    Rare Squamous epithelial cells per low power field    Moderate Gram Negative Rods seen per oil power field  Final Report (06-26-22 @ 17:00):    Moderate Klebsiella oxytoca/Raoutella ornithinolytica    Moderate Enterobacter cloacae complex    Normal Respiratory Kelly absent  Organism: Klebsiella oxytoca /Raoutella ornithinolytica  Enterobacter cloacae complex (06-26-22 @ 17:00)  Organism: Enterobacter cloacae complex (06-26-22 @ 17:00)      -  Amikacin: S <=16      -  Amoxicillin/Clavulanic Acid: R >16/8      -  Ampicillin: R >16 These ampicillin results predict results for amoxicillin      -  Ampicillin/Sulbactam: R >16/8 Enterobacter, Klebsiella aerogenes, Citrobacter, and Serratia may develop resistance during prolonged therapy (3-4 days)      -  Aztreonam: S <=4      -  Cefazolin: R >16 Enterobacter, Klebsiella aerogenes, Citrobacter, and Serratia may develop resistance during prolonged therapy (3-4 days)      -  Cefepime: S <=2      -  Cefoxitin: R >16      -  Ceftriaxone: S <=1 Enterobacter, Klebsiella aerogenes, Citrobacter, and Serratia may develop resistance during prolonged therapy      -  Ciprofloxacin: S <=0.25      -  Ertapenem: S <=0.5      -  Gentamicin: S <=2      -  Imipenem: S <=1      -  Levofloxacin: S <=0.5      -  Meropenem: S <=1      -  Piperacillin/Tazobactam: S <=8      -  Tobramycin: S <=2      -  Trimethoprim/Sulfamethoxazole: S <=0.5/9.5      Method Type: AL  Organism: Klebsiella oxytoca /Raoutella ornithinolytica (06-26-22 @ 17:00)      -  Amikacin: S <=16      -  Amoxicillin/Clavulanic Acid: S <=8/4      -  Ampicillin: R 16 These ampicillin results predict results for amoxicillin      -  Ampicillin/Sulbactam: S <=4/2 Enterobacter, Klebsiella aerogenes, Citrobacter, and Serratia may develop resistance during prolonged therapy (3-4 days)      -  Aztreonam: S <=4      -  Cefazolin: S <=2 Enterobacter, Klebsiella aerogenes, Citrobacter, and Serratia may develop resistance during prolonged therapy (3-4 days)      -  Cefepime: S <=2      -  Cefoxitin: S <=8      -  Ceftriaxone: S <=1 Enterobacter, Klebsiella aerogenes, Citrobacter, and Serratia may develop resistance during prolonged therapy      -  Ciprofloxacin: S <=0.25      -  Ertapenem: S <=0.5      -  Gentamicin: S <=2      -  Imipenem: S <=1      -  Levofloxacin: S <=0.5      -  Meropenem: S <=1      -  Piperacillin/Tazobactam: S <=8      -  Tobramycin: S <=2      -  Trimethoprim/Sulfamethoxazole: S <=0.5/9.5      Method Type: AL    Culture - Body Fluid with Gram Stain (collected 06-22-22 @ 01:00)  Source: Peritoneal Peritoneal Fluid  Gram Stain (06-23-22 @ 02:31):    No polymorphonuclear leukocytes seen    No organisms seen    by cytocentrifuge  Final Report (06-27-22 @ 19:13):    No growth at 5 days    Culture - Blood (collected 06-21-22 @ 05:33)  Source: .Blood None  Final Report (06-26-22 @ 19:00):    No Growth Final            INFECTIOUS DISEASES TESTING  COVID-19 PCR: NotDetec (07-19-22 @ 19:56)  COVID-19 PCR: NotDetec (07-17-22 @ 20:01)  Procalcitonin, Serum: 0.21 (07-17-22 @ 10:34)  COVID-19 PCR: NotDetec (07-14-22 @ 15:00)  Procalcitonin, Serum: 0.16 (07-14-22 @ 12:50)  Procalcitonin, Serum: 0.12 (07-10-22 @ 05:42)  Fungitell: <31 (07-05-22 @ 11:49)  Procalcitonin, Serum: 0.14 (07-05-22 @ 11:23)  MRSA PCR Result.: Negative (06-30-22 @ 10:34)  Fungitell: See Comment** **RESULTS OF FUNGITELL INCONCLUSIVE DUE TO THE PRESENCE OF UNKNOWN  INTERFERING SUBSTANCE. PLEASE SUBMIT ANOTHER SPECIMEN FOR TESTING.  PLEASE CONTACT [facility] WITH QUESTIONS.  Interpretation: The Fungitell assay does not detectcertain fungal  species such as the genus Cryptococcus (Aundrea et al. 1991) which  produces very low levels of (1-3)-Beta-D-Glucan. The assay also does  not detect the Zygomycetes such as Absidia, Mucor and Rhizopus  (Carol et al. 1994) which are not known to produce  (1-3)-Beta-D-Glucan. In addition, the yeast phase of Blastomyces  dermatitidis produces little (1-3)-Beta-D-Glucan and may not be  detected by the assay (Anila et al. 2007).  Reference Range:  Less than 60 pg/mL. Glucan values of less than 60 pg/mL are  interpreted as negative.  Glucan values of 60 to 79 pg/mL are interpreted as indeterminate,  and suggest a possible fungal infection. Additional sampling and  testing of sera is required to interpret the results.  Glucan values of greater than or equal to 80 pg/mL are interpreted  as positive.  Due to the potential for environmental contamination when  transferred to pour-off tubes, which can lead to false positive  results, interpret positive results from samples provided in  pour-off tubes with caution.  Results should be used in conjunction  with clinical findings, and should not form the sole basis for a  diagnosis or treatment decision. The Fungitell test is approved or  cleared for in vitro diagnostic use by the U.[place] Food and Drug  Administration. Modifications to the approved package insert have  been made and the performance characteristics for these  modifications were determined by [facility].  If sample result is greater than 500 pg/mL, physician may order a  titer of the sample. Please contact [facility] if you would  like to order a retest of this sample to obtain an actual value.  Samples are held for 1 week after initial testing date.  ____________________________________________________________  Performed at:  [facility]  [address] [place] [address]  : Kirk Newberry Ph.D., BCLD (ABB)  CLIA#: [id number]  Phone: [phone] (06-30-22 @ 10:30)  Procalcitonin, Serum: 0.36 (06-30-22 @ 10:30)  Rapid RVP Result: NotDetec (06-30-22 @ 09:19)  HIV-1/2 Combo Result: Nonreact (06-29-22 @ 10:29)  Procalcitonin, Serum: 0.11 (06-27-22 @ 15:46)  Procalcitonin, Serum: 0.11 (06-27-22 @ 06:47)  Procalcitonin, Serum: 0.62 (06-20-22 @ 05:49)  MRSA PCR Result.: Negative (06-17-22 @ 14:30)  Procalcitonin, Serum: 3.28 (06-17-22 @ 05:22)  COVID-19 PCR: NotDetec (06-15-22 @ 07:10)  Procalcitonin, Serum: 0.07 (04-20-22 @ 12:46)      INFLAMMATORY MARKERS  Sedimentation Rate, Erythrocyte: 86 mm/Hr (06-30-22 @ 05:46)  Sedimentation Rate, Erythrocyte: 65 mm/Hr (06-23-22 @ 16:00)      RADIOLOGY & ADDITIONAL TESTS:  I have personally reviewed the last available Chest xray  CXR  Xray Chest 1 View- PORTABLE-Urgent:   ACC: [id number] EXAM:  XR CHEST PORTABLE URGENT 1V                          PROCEDURE DATE:  07/19/2022          INTERPRETATION:  Clinical History / Reason for exam: Line placement    Comparison : Chest radiograph 7/19/2022.    Technique/Positioning:Single frontal chest x-ray obtained.    Findings:    Support devices: Stable endotracheal tube, enteric tube, right central   venous catheter.    Cardiac/mediastinum/hilum: Unchanged.    Lung parenchyma/Pleura: Unchanged bilateral opacities. No pneumothorax.    Skeleton/soft tissues: Unchanged.    Impression:    Unchanged bilateral opacities.  Stable support devices.    --- End of Report ---            NALLELY MCCALL MD; Attending Radiologist  This document has been electronically signed. Jul 19 2022 12:47PM (07-19-22 @ 12:36)      CT      CARDIOLOGY TESTING  12 Lead ECG:   Ventricular Rate 103 BPM    Atrial Rate 103 BPM    P-R Interval 154 ms    QRS Duration 66 ms    Q-T Interval 354 ms    QTC Calculation(Bazett) 463 ms    P Axis 63 degrees    R Axis 86 degrees    T Axis 198 degrees    Diagnosis Line Sinus tachycardia  Nonspecific ST-T changes  Confirmed by BRANDON BELL MD ([id number]) on 7/15/2022 10:08:35 AM (07-15-22 @ 07:50)  12 Lead ECG:   Ventricular Rate 107 BPM    Atrial Rate 107 BPM    P-R Interval 138 ms    QRS Duration 82 ms    Q-T Interval 300 ms    QTC Calculation(Bazett) 400 ms    P Axis 86 degrees    R Axis 125 degrees    T Axis 218 degrees    Diagnosis Line Sinus tachycardia  Right axis deviation  Pulmonary disease pattern  Possible Right ventricular hypertrophy  Nonspecific T wave abnormality  Abnormal ECG    Confirmed by BRANDON BELL MD ([id number]) on 7/14/2022 11:44:00 AM (07-14-22 @ 08:36)      MEDICATIONS  ampicillin/sulbactam  IVPB 3 IV Intermittent every 6 hours  chlorhexidine 0.12% Liquid 15 Oral Mucosa every 12 hours  chlorhexidine 2% Cloths 1 Topical daily  dexMEDEtomidine Infusion 0.1 IV Continuous <Continuous>  fentaNYL   Infusion. 0.5 IV Continuous <Continuous>  folic acid 1 Oral daily  lactated ringers. 1000 IV Continuous <Continuous>  methadone    Tablet 20 Oral daily  nicotine -  14 mG/24Hr(s) Patch 1 Transdermal daily  norepinephrine Infusion 0.05 IV Continuous <Continuous>  pantoprazole  Injectable 40 IV Push daily  polymyxin B IVPB [id number] IV Intermittent every 12 hours  potassium chloride   Powder 40 Oral daily  propofol Infusion 0.1 IV Continuous <Continuous>  QUEtiapine 100 Oral two times a day  scopolamine 1 mG/72 Hr(s) Patch 1 Transdermal every 72 hours  spironolactone 100 Oral daily  thiamine 100 Oral daily  vancomycin    Solution 250 Oral every 6 hours      WEIGHT  Weight (kg): 70.4 (07-07-22 @ 07:00)  Creatinine, Serum: 0.9 mg/dL (07-20-22 @ 07:25)  Creatinine, Serum: 0.9 mg/dL (07-20-22 @ 00:24)      ANTIBIOTICS:  ampicillin/sulbactam  IVPB 3 Gram(s) IV Intermittent every 6 hours  polymyxin B IVPB [id number] Unit(s) IV Intermittent every 12 hours  vancomycin    Solution 250 milliGRAM(s) Oral every 6 hours      All available historical records have been reviewed

## 2024-01-28 ENCOUNTER — INPATIENT (INPATIENT)
Facility: HOSPITAL | Age: 29
LOS: 5 days | Discharge: HOME HEALTH | DRG: 283 | End: 2024-02-03
Attending: STUDENT IN AN ORGANIZED HEALTH CARE EDUCATION/TRAINING PROGRAM | Admitting: STUDENT IN AN ORGANIZED HEALTH CARE EDUCATION/TRAINING PROGRAM
Payer: MEDICAID

## 2024-01-28 VITALS
DIASTOLIC BLOOD PRESSURE: 62 MMHG | OXYGEN SATURATION: 96 % | SYSTOLIC BLOOD PRESSURE: 122 MMHG | HEART RATE: 113 BPM | RESPIRATION RATE: 21 BRPM | TEMPERATURE: 98 F | WEIGHT: 154.98 LBS

## 2024-01-28 DIAGNOSIS — D73.1 HYPERSPLENISM: ICD-10-CM
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
## 2024-01-28 DIAGNOSIS — E87.20 ACIDOSIS, UNSPECIFIED: ICD-10-CM
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
## 2024-01-28 DIAGNOSIS — N17.9 ACUTE KIDNEY FAILURE, UNSPECIFIED: ICD-10-CM

## 2024-01-28 DIAGNOSIS — F41.9 ANXIETY DISORDER, UNSPECIFIED: ICD-10-CM
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
## 2024-01-28 DIAGNOSIS — F32.A DEPRESSION, UNSPECIFIED: ICD-10-CM

## 2024-01-28 DIAGNOSIS — J11.1 INFLUENZA DUE TO UNIDENTIFIED INFLUENZA VIRUS WITH OTHER RESPIRATORY MANIFESTATIONS: ICD-10-CM

## 2024-01-28 DIAGNOSIS — K76.7 HEPATORENAL SYNDROME: ICD-10-CM

## 2024-01-28 DIAGNOSIS — S90.821A BLISTER (NONTHERMAL), RIGHT FOOT, INITIAL ENCOUNTER: ICD-10-CM

## 2024-01-28 DIAGNOSIS — I95.9 HYPOTENSION, UNSPECIFIED: ICD-10-CM

## 2024-01-28 DIAGNOSIS — E87.6 HYPOKALEMIA: ICD-10-CM

## 2024-01-28 DIAGNOSIS — J45.909 UNSPECIFIED ASTHMA, UNCOMPLICATED: ICD-10-CM

## 2024-01-28 DIAGNOSIS — S90.822A BLISTER (NONTHERMAL), LEFT FOOT, INITIAL ENCOUNTER: ICD-10-CM

## 2024-01-28 DIAGNOSIS — N18.30 CHRONIC KIDNEY DISEASE, STAGE 3 UNSPECIFIED: ICD-10-CM

## 2024-01-28 DIAGNOSIS — K74.60 UNSPECIFIED CIRRHOSIS OF LIVER: ICD-10-CM

## 2024-01-28 DIAGNOSIS — E83.42 HYPOMAGNESEMIA: ICD-10-CM

## 2024-01-28 DIAGNOSIS — E87.5 HYPERKALEMIA: ICD-10-CM

## 2024-01-28 DIAGNOSIS — D61.818 OTHER PANCYTOPENIA: ICD-10-CM

## 2024-01-28 DIAGNOSIS — R04.9 HEMORRHAGE FROM RESPIRATORY PASSAGES, UNSPECIFIED: ICD-10-CM

## 2024-01-28 DIAGNOSIS — R18.8 OTHER ASCITES: ICD-10-CM

## 2024-01-28 DIAGNOSIS — K71.9 TOXIC LIVER DISEASE, UNSPECIFIED: ICD-10-CM

## 2024-01-28 DIAGNOSIS — F11.23 OPIOID DEPENDENCE WITH WITHDRAWAL: ICD-10-CM

## 2024-01-28 DIAGNOSIS — B18.2 CHRONIC VIRAL HEPATITIS C: ICD-10-CM

## 2024-01-28 DIAGNOSIS — K71.10 TOXIC LIVER DISEASE WITH HEPATIC NECROSIS, WITHOUT COMA: ICD-10-CM

## 2024-01-28 DIAGNOSIS — T40.2X1A POISONING BY OTHER OPIOIDS, ACCIDENTAL (UNINTENTIONAL), INITIAL ENCOUNTER: ICD-10-CM

## 2024-01-28 DIAGNOSIS — F17.210 NICOTINE DEPENDENCE, CIGARETTES, UNCOMPLICATED: ICD-10-CM

## 2024-01-28 DIAGNOSIS — Z88.8 ALLERGY STATUS TO OTHER DRUGS, MEDICAMENTS AND BIOLOGICAL SUBSTANCES: ICD-10-CM

## 2024-01-28 DIAGNOSIS — R60.1 GENERALIZED EDEMA: ICD-10-CM

## 2024-01-28 LAB
ALBUMIN SERPL ELPH-MCNC: 1.6 G/DL — LOW (ref 3.5–5.2)
ALP SERPL-CCNC: 1180 U/L — HIGH (ref 30–115)
ALT FLD-CCNC: 32 U/L — SIGNIFICANT CHANGE UP (ref 0–41)
ANION GAP SERPL CALC-SCNC: 10 MMOL/L — SIGNIFICANT CHANGE UP (ref 7–14)
APPEARANCE UR: ABNORMAL
APTT BLD: 33 SEC — SIGNIFICANT CHANGE UP (ref 27–39.2)
AST SERPL-CCNC: 43 U/L — HIGH (ref 0–41)
BACTERIA # UR AUTO: ABNORMAL /HPF
BASOPHILS # BLD AUTO: 0 K/UL — SIGNIFICANT CHANGE UP (ref 0–0.2)
BASOPHILS NFR BLD AUTO: 0 % — SIGNIFICANT CHANGE UP (ref 0–1)
BILIRUB SERPL-MCNC: 0.2 MG/DL — SIGNIFICANT CHANGE UP (ref 0.2–1.2)
BILIRUB UR-MCNC: ABNORMAL
BLD GP AB SCN SERPL QL: SIGNIFICANT CHANGE UP
BUN SERPL-MCNC: 42 MG/DL — HIGH (ref 10–20)
CALCIUM SERPL-MCNC: 7.4 MG/DL — LOW (ref 8.4–10.5)
CHLORIDE SERPL-SCNC: 109 MMOL/L — SIGNIFICANT CHANGE UP (ref 98–110)
CO2 SERPL-SCNC: 17 MMOL/L — SIGNIFICANT CHANGE UP (ref 17–32)
COLOR SPEC: ABNORMAL
CREAT SERPL-MCNC: 2.1 MG/DL — HIGH (ref 0.7–1.5)
DIFF PNL FLD: ABNORMAL
EGFR: 32 ML/MIN/1.73M2 — LOW
EOSINOPHIL # BLD AUTO: 0 K/UL — SIGNIFICANT CHANGE UP (ref 0–0.7)
EOSINOPHIL NFR BLD AUTO: 0 % — SIGNIFICANT CHANGE UP (ref 0–8)
EPI CELLS # UR: SIGNIFICANT CHANGE UP
FLUAV AG NPH QL: DETECTED
FLUBV AG NPH QL: SIGNIFICANT CHANGE UP
GLUCOSE SERPL-MCNC: 86 MG/DL — SIGNIFICANT CHANGE UP (ref 70–99)
GLUCOSE UR QL: NEGATIVE MG/DL — SIGNIFICANT CHANGE UP
HCG SERPL QL: NEGATIVE — SIGNIFICANT CHANGE UP
HCT VFR BLD CALC: 25.1 % — LOW (ref 37–47)
HGB BLD-MCNC: 7.8 G/DL — LOW (ref 12–16)
HIV 1 & 2 AB SERPL IA.RAPID: SIGNIFICANT CHANGE UP
IMM GRANULOCYTES NFR BLD AUTO: 0.4 % — HIGH (ref 0.1–0.3)
INR BLD: 1 RATIO — SIGNIFICANT CHANGE UP (ref 0.65–1.3)
KETONES UR-MCNC: NEGATIVE MG/DL — SIGNIFICANT CHANGE UP
LEUKOCYTE ESTERASE UR-ACNC: ABNORMAL
LYMPHOCYTES # BLD AUTO: 0.66 K/UL — LOW (ref 1.2–3.4)
LYMPHOCYTES # BLD AUTO: 23.7 % — SIGNIFICANT CHANGE UP (ref 20.5–51.1)
MAGNESIUM SERPL-MCNC: 1.8 MG/DL — SIGNIFICANT CHANGE UP (ref 1.8–2.4)
MCHC RBC-ENTMCNC: 27.5 PG — SIGNIFICANT CHANGE UP (ref 27–31)
MCHC RBC-ENTMCNC: 31.1 G/DL — LOW (ref 32–37)
MCV RBC AUTO: 88.4 FL — SIGNIFICANT CHANGE UP (ref 81–99)
MONOCYTES # BLD AUTO: 0.16 K/UL — SIGNIFICANT CHANGE UP (ref 0.1–0.6)
MONOCYTES NFR BLD AUTO: 5.7 % — SIGNIFICANT CHANGE UP (ref 1.7–9.3)
NEUTROPHILS # BLD AUTO: 1.96 K/UL — SIGNIFICANT CHANGE UP (ref 1.4–6.5)
NEUTROPHILS NFR BLD AUTO: 70.2 % — SIGNIFICANT CHANGE UP (ref 42.2–75.2)
NITRITE UR-MCNC: NEGATIVE — SIGNIFICANT CHANGE UP
NRBC # BLD: 0 /100 WBCS — SIGNIFICANT CHANGE UP (ref 0–0)
PH UR: 5.5 — SIGNIFICANT CHANGE UP (ref 5–8)
PLATELET # BLD AUTO: 83 K/UL — LOW (ref 130–400)
PMV BLD: 11.3 FL — HIGH (ref 7.4–10.4)
POTASSIUM SERPL-MCNC: 5.2 MMOL/L — HIGH (ref 3.5–5)
POTASSIUM SERPL-SCNC: 5.2 MMOL/L — HIGH (ref 3.5–5)
PROT SERPL-MCNC: 4.5 G/DL — LOW (ref 6–8)
PROT UR-MCNC: 300 MG/DL
PROTHROM AB SERPL-ACNC: 11.4 SEC — SIGNIFICANT CHANGE UP (ref 9.95–12.87)
RBC # BLD: 2.84 M/UL — LOW (ref 4.2–5.4)
RBC # FLD: 16.1 % — HIGH (ref 11.5–14.5)
RBC CASTS # UR COMP ASSIST: ABNORMAL /HPF
RSV RNA NPH QL NAA+NON-PROBE: SIGNIFICANT CHANGE UP
SARS-COV-2 RNA SPEC QL NAA+PROBE: SIGNIFICANT CHANGE UP
SODIUM SERPL-SCNC: 136 MMOL/L — SIGNIFICANT CHANGE UP (ref 135–146)
SP GR SPEC: 1.01 — SIGNIFICANT CHANGE UP (ref 1–1.03)
UROBILINOGEN FLD QL: 0.2 MG/DL — SIGNIFICANT CHANGE UP (ref 0.2–1)
WBC # BLD: 2.79 K/UL — LOW (ref 4.8–10.8)
WBC # FLD AUTO: 2.79 K/UL — LOW (ref 4.8–10.8)
WBC UR QL: 10 /HPF — HIGH (ref 0–5)

## 2024-01-28 PROCEDURE — 84100 ASSAY OF PHOSPHORUS: CPT

## 2024-01-28 PROCEDURE — 86480 TB TEST CELL IMMUN MEASURE: CPT

## 2024-01-28 PROCEDURE — 36430 TRANSFUSION BLD/BLD COMPNT: CPT

## 2024-01-28 PROCEDURE — 86381 MITOCHONDRIAL ANTIBODY EACH: CPT

## 2024-01-28 PROCEDURE — 82150 ASSAY OF AMYLASE: CPT

## 2024-01-28 PROCEDURE — 84466 ASSAY OF TRANSFERRIN: CPT

## 2024-01-28 PROCEDURE — 84156 ASSAY OF PROTEIN URINE: CPT

## 2024-01-28 PROCEDURE — 99285 EMERGENCY DEPT VISIT HI MDM: CPT | Mod: 25

## 2024-01-28 PROCEDURE — 83735 ASSAY OF MAGNESIUM: CPT

## 2024-01-28 PROCEDURE — 87522 HEPATITIS C REVRS TRNSCRPJ: CPT

## 2024-01-28 PROCEDURE — 82945 GLUCOSE OTHER FLUID: CPT

## 2024-01-28 PROCEDURE — 85045 AUTOMATED RETICULOCYTE COUNT: CPT

## 2024-01-28 PROCEDURE — 82570 ASSAY OF URINE CREATININE: CPT

## 2024-01-28 PROCEDURE — 86038 ANTINUCLEAR ANTIBODIES: CPT

## 2024-01-28 PROCEDURE — 71045 X-RAY EXAM CHEST 1 VIEW: CPT | Mod: 26

## 2024-01-28 PROCEDURE — 83521 IG LIGHT CHAINS FREE EACH: CPT

## 2024-01-28 PROCEDURE — 82550 ASSAY OF CK (CPK): CPT

## 2024-01-28 PROCEDURE — 87340 HEPATITIS B SURFACE AG IA: CPT

## 2024-01-28 PROCEDURE — 86664 EPSTEIN-BARR NUCLEAR ANTIGEN: CPT

## 2024-01-28 PROCEDURE — 76700 US EXAM ABDOM COMPLETE: CPT

## 2024-01-28 PROCEDURE — 84300 ASSAY OF URINE SODIUM: CPT

## 2024-01-28 PROCEDURE — 87102 FUNGUS ISOLATION CULTURE: CPT

## 2024-01-28 PROCEDURE — 89051 BODY FLUID CELL COUNT: CPT

## 2024-01-28 PROCEDURE — 83540 ASSAY OF IRON: CPT

## 2024-01-28 PROCEDURE — 86301 IMMUNOASSAY TUMOR CA 19-9: CPT

## 2024-01-28 PROCEDURE — 86708 HEPATITIS A ANTIBODY: CPT

## 2024-01-28 PROCEDURE — 88112 CYTOPATH CELL ENHANCE TECH: CPT

## 2024-01-28 PROCEDURE — 83883 ASSAY NEPHELOMETRY NOT SPEC: CPT

## 2024-01-28 PROCEDURE — 87086 URINE CULTURE/COLONY COUNT: CPT

## 2024-01-28 PROCEDURE — 86036 ANCA SCREEN EACH ANTIBODY: CPT

## 2024-01-28 PROCEDURE — 82247 BILIRUBIN TOTAL: CPT

## 2024-01-28 PROCEDURE — 82042 OTHER SOURCE ALBUMIN QUAN EA: CPT

## 2024-01-28 PROCEDURE — 87902 NFCT AGT GNTYP ALYS HEP C: CPT

## 2024-01-28 PROCEDURE — 85027 COMPLETE CBC AUTOMATED: CPT

## 2024-01-28 PROCEDURE — 86706 HEP B SURFACE ANTIBODY: CPT

## 2024-01-28 PROCEDURE — 84478 ASSAY OF TRIGLYCERIDES: CPT

## 2024-01-28 PROCEDURE — 36415 COLL VENOUS BLD VENIPUNCTURE: CPT

## 2024-01-28 PROCEDURE — 82105 ALPHA-FETOPROTEIN SERUM: CPT

## 2024-01-28 PROCEDURE — 86255 FLUORESCENT ANTIBODY SCREEN: CPT

## 2024-01-28 PROCEDURE — 86403 PARTICLE AGGLUT ANTBDY SCRN: CPT

## 2024-01-28 PROCEDURE — 83935 ASSAY OF URINE OSMOLALITY: CPT

## 2024-01-28 PROCEDURE — 74176 CT ABD & PELVIS W/O CONTRAST: CPT

## 2024-01-28 PROCEDURE — 86803 HEPATITIS C AB TEST: CPT

## 2024-01-28 PROCEDURE — 82607 VITAMIN B-12: CPT

## 2024-01-28 PROCEDURE — 85025 COMPLETE CBC W/AUTO DIFF WBC: CPT

## 2024-01-28 PROCEDURE — 82728 ASSAY OF FERRITIN: CPT

## 2024-01-28 PROCEDURE — 76700 US EXAM ABDOM COMPLETE: CPT | Mod: 26

## 2024-01-28 PROCEDURE — P9047: CPT

## 2024-01-28 PROCEDURE — 88305 TISSUE EXAM BY PATHOLOGIST: CPT

## 2024-01-28 PROCEDURE — 83010 ASSAY OF HAPTOGLOBIN QUANT: CPT

## 2024-01-28 PROCEDURE — 81596 NFCT DS CHRNC HCV 6 ASSAYS: CPT

## 2024-01-28 PROCEDURE — 86704 HEP B CORE ANTIBODY TOTAL: CPT

## 2024-01-28 PROCEDURE — 93010 ELECTROCARDIOGRAM REPORT: CPT

## 2024-01-28 PROCEDURE — 83615 LACTATE (LD) (LDH) ENZYME: CPT

## 2024-01-28 PROCEDURE — 82977 ASSAY OF GGT: CPT

## 2024-01-28 PROCEDURE — 86644 CMV ANTIBODY: CPT

## 2024-01-28 PROCEDURE — 84460 ALANINE AMINO (ALT) (SGPT): CPT

## 2024-01-28 PROCEDURE — 84157 ASSAY OF PROTEIN OTHER: CPT

## 2024-01-28 PROCEDURE — 36000 PLACE NEEDLE IN VEIN: CPT

## 2024-01-28 PROCEDURE — 87521 HEPATITIS C PROBE&RVRS TRNSC: CPT

## 2024-01-28 PROCEDURE — 86663 EPSTEIN-BARR ANTIBODY: CPT

## 2024-01-28 PROCEDURE — 86665 EPSTEIN-BARR CAPSID VCA: CPT

## 2024-01-28 PROCEDURE — 94640 AIRWAY INHALATION TREATMENT: CPT

## 2024-01-28 PROCEDURE — P9016: CPT

## 2024-01-28 PROCEDURE — 80053 COMPREHEN METABOLIC PANEL: CPT

## 2024-01-28 PROCEDURE — 76775 US EXAM ABDO BACK WALL LIM: CPT

## 2024-01-28 PROCEDURE — 87799 DETECT AGENT NOS DNA QUANT: CPT

## 2024-01-28 PROCEDURE — 86777 TOXOPLASMA ANTIBODY: CPT

## 2024-01-28 PROCEDURE — 87075 CULTR BACTERIA EXCEPT BLOOD: CPT

## 2024-01-28 PROCEDURE — 80074 ACUTE HEPATITIS PANEL: CPT

## 2024-01-28 PROCEDURE — 86160 COMPLEMENT ANTIGEN: CPT

## 2024-01-28 PROCEDURE — 83516 IMMUNOASSAY NONANTIBODY: CPT

## 2024-01-28 PROCEDURE — 87070 CULTURE OTHR SPECIMN AEROBIC: CPT

## 2024-01-28 PROCEDURE — 82164 ANGIOTENSIN I ENZYME TEST: CPT

## 2024-01-28 PROCEDURE — 83550 IRON BINDING TEST: CPT

## 2024-01-28 PROCEDURE — 82747 ASSAY OF FOLIC ACID RBC: CPT

## 2024-01-28 PROCEDURE — 87205 SMEAR GRAM STAIN: CPT

## 2024-01-28 RX ORDER — FUROSEMIDE 40 MG
40 TABLET ORAL
Refills: 0 | Status: DISCONTINUED | OUTPATIENT
Start: 2024-01-28 | End: 2024-01-30

## 2024-01-28 RX ORDER — IPRATROPIUM/ALBUTEROL SULFATE 18-103MCG
3 AEROSOL WITH ADAPTER (GRAM) INHALATION EVERY 6 HOURS
Refills: 0 | Status: DISCONTINUED | OUTPATIENT
Start: 2024-01-28 | End: 2024-02-03

## 2024-01-28 RX ORDER — HYDROXYZINE HCL 10 MG
100 TABLET ORAL AT BEDTIME
Refills: 0 | Status: COMPLETED | OUTPATIENT
Start: 2024-01-28 | End: 2024-01-31

## 2024-01-28 RX ORDER — CHLORHEXIDINE GLUCONATE 213 G/1000ML
1 SOLUTION TOPICAL
Refills: 0 | Status: DISCONTINUED | OUTPATIENT
Start: 2024-01-28 | End: 2024-02-03

## 2024-01-28 RX ORDER — ACETAMINOPHEN 500 MG
650 TABLET ORAL EVERY 6 HOURS
Refills: 0 | Status: DISCONTINUED | OUTPATIENT
Start: 2024-01-28 | End: 2024-02-03

## 2024-01-28 RX ORDER — HYDROXYZINE HCL 10 MG
50 TABLET ORAL EVERY 6 HOURS
Refills: 0 | Status: DISCONTINUED | OUTPATIENT
Start: 2024-01-28 | End: 2024-02-01

## 2024-01-28 RX ORDER — PANTOPRAZOLE SODIUM 20 MG/1
40 TABLET, DELAYED RELEASE ORAL
Refills: 0 | Status: DISCONTINUED | OUTPATIENT
Start: 2024-01-28 | End: 2024-02-03

## 2024-01-28 RX ORDER — ONDANSETRON 8 MG/1
4 TABLET, FILM COATED ORAL EVERY 8 HOURS
Refills: 0 | Status: DISCONTINUED | OUTPATIENT
Start: 2024-01-28 | End: 2024-02-03

## 2024-01-28 RX ADMIN — Medication 650 MILLIGRAM(S): at 23:00

## 2024-01-28 RX ADMIN — Medication 50 MILLIGRAM(S): at 20:26

## 2024-01-28 RX ADMIN — Medication 30 MILLIGRAM(S): at 20:26

## 2024-01-28 RX ADMIN — Medication 40 MILLIGRAM(S): at 20:26

## 2024-01-28 NOTE — ED PROCEDURE NOTE - CPROC ED INFUS LINE DETAIL1
The location was identified, and the area was draped and prepped./The catheter was placed using sterile technique./Ultrasound guidance was used./All lumen(s) aspirated and flushed without difficulty.

## 2024-01-28 NOTE — H&P ADULT - HISTORY OF PRESENT ILLNESS
A 29 y/o female with pmhx of  current smoker, asthma, hep C , IVDA ,leukopenia,  ascites  presents with shortness of breath. Pt reports shortness of breath x 1 week, getting swollen everywhere and has blisters on her legs. Pt reports weight gain a lot of pounds. Pt reports s/p paracentesis 1 month ago at [facility] 5.5 L of fluids was removed. PT reports fever at home, not sure Tmax. PT with cough , productive x 1 week. PT states nausea/ vomining, NBNB. Pt with diarrhea, now resolved. Pt reports using IV heroin this am 1-2 bags, administered on her abdominal vein.  Pt reports itchy all the time.  Pt denies chest pain, burning with urination.,

## 2024-01-28 NOTE — PATIENT PROFILE ADULT - NSTOBACCOCESSATIONEDU2_GEN_A_NUR
Hypokalemia Develop coping skills.  For example, strategies and lifestyle changes that reduce negative moods, stress, and exposure to smoking cues.

## 2024-01-28 NOTE — ED PROVIDER NOTE - ATTENDING APP SHARED VISIT CONTRIBUTION OF CARE
I have personally performed a history and physical exam on this patient and personally directed the management of the patient. 28 year old female past medical history of Hep C with cirrhosis and ascitis, has been admitted 2 times at [facility] for fluid overload and has had abd drained x 2. Pt comes to emergency room for increasing abdominal swelling, leg swelling, weakness, shortness of breath and weeping legs. Pt states that she is just getting worse and states the breathing is getting worse. patient also concerned for HIV was told was positive in [facility] and negative nad wants to be tested.    on physical nc/at perrla patient is pale appearing, nc/at [facility] eomi oropharynx clear but dry chest is cta b/l, rrr s1s2 noted radial pulses 2 += patient has abdominal distension but it is not painful with no guarding or rebound ext patient has b/l lower extremity edema.  cap refill is normal at this time    a/p- Patient is a 28-year-old female presents with history of hep C and cirrhosis as well as ascites presents for evaluation of worsening generalized swelling most consistent with anasarca in addition anemia however denies any fevers no signs of infection no tenderness to palpation of the abdomen this is consistent with SBP although she has abdominal distention we obtain chest x-ray provide pain evaluation access with pneumothorax or pneumonia patient tested positive for influenza A however has no respiratory distress given her history and her picture I will admit for further evaluation at this time

## 2024-01-28 NOTE — H&P ADULT - NSHPPHYSICALEXAM_GEN_ALL_CORE
VITALS:     ICU Vital Signs Last 24 Hrs  T(C): 36.5 (28 Jan 2024 12:30), Max: 36.5 (28 Jan 2024 12:30)  T(F): 97.7 (28 Jan 2024 12:30), Max: 97.7 (28 Jan 2024 12:30)  HR: 113 (28 Jan 2024 12:30) (113 - 113)  BP: 122/62 (28 Jan 2024 12:30) (122/62 - 122/62)  RR: 21 (28 Jan 2024 12:30) (21 - 21)  SpO2: 96% (28 Jan 2024 12:30) (96% - 96%)    O2 Parameters below as of 28 Jan 2024 12:30  Patient On (Oxygen Delivery Method): room air      GENERAL: NAD, lying in bed comfortably  HEAD:  Atraumatic, Normocephalic  EYES: EOMI, PERRLA, conjunctiva and sclera clear  ENT: Moist mucous membranes  NECK: Supple, No JVD  CHEST/LUNG: Clear to auscultation bilaterally; No rales, rhonchi, wheezing, or rubs. Unlabored respirations  HEART: Regular rate and rhythm; No murmurs, rubs, or gallops  ABDOMEN: Bowel sounds present; Soft,  distended , tender,   EXTREMITIES:  b/l LE edema , L   NERVOUS SYSTEM:  Alert & Oriented X3, speech clear. No deficits   MSK: FROM all 4 extremities, full and equal strength  SKIN: anasarca , L foot blister  and 0.5 x 0.5 cm abscess , IV drug use marking on abdomen , hands, VITALS:     ICU Vital Signs Last 24 Hrs  T(C): 36.5 (28 Jan 2024 12:30), Max: 36.5 (28 Jan 2024 12:30)  T(F): 97.7 (28 Jan 2024 12:30), Max: 97.7 (28 Jan 2024 12:30)  HR: 113 (28 Jan 2024 12:30) (113 - 113)  BP: 122/62 (28 Jan 2024 12:30) (122/62 - 122/62)  RR: 21 (28 Jan 2024 12:30) (21 - 21)  SpO2: 96% (28 Jan 2024 12:30) (96% - 96%)    O2 Parameters below as of 28 Jan 2024 12:30  Patient On (Oxygen Delivery Method): room air      GENERAL: NAD, lying in bed comfortably  HEAD:  Atraumatic, Normocephalic  EYES: EOMI, PERRLA, conjunctiva and sclera clear  ENT: Moist mucous membranes  NECK: Supple, No JVD  CHEST/LUNG: Clear to auscultation bilaterally; No rales, rhonchi, wheezing, or rubs. Unlabored respirations, RA saturating 97 %.   HEART: Regular rate and rhythm; No murmurs, rubs, or gallops  ABDOMEN: Bowel sounds present; Soft,  distended , tender,   EXTREMITIES:  b/l LE edema    NERVOUS SYSTEM:  Alert & Oriented X3, speech clear. No deficits   MSK: FROM all 4 extremities, full and equal strength  SKIN: anasarca , R foot blister  and 0.5 x 0.5 cm abscess , IV drug use marking on abdomen , hands,

## 2024-01-28 NOTE — ED PROVIDER NOTE - CARE PLAN
1 Principal Discharge DX:	Anasarca  Secondary Diagnosis:	Anemia  Secondary Diagnosis:	Ascites  Secondary Diagnosis:	Anemia  Secondary Diagnosis:	Thrombocytopenia  Secondary Diagnosis:	Influenza

## 2024-01-28 NOTE — ED PROVIDER NOTE - OBJECTIVE STATEMENT
28 year old female past medical history of Hep C with cirrhosis and ascitis, has been admitted 2 times at [facility] for fluid overload and has had abd drained x 2. Pt comes to emergency room for increasing abdominal swelling, leg swelling, weakness, shortness of breath and weeping legs. Pt states that she is just getting worse and states the breathing is getting worse. patient also concerned for HIV was told was positive in [facility] and negative nad wants to be tested.

## 2024-01-28 NOTE — H&P ADULT - NSHPADDITIONALINFOADULT_GEN_ALL_CORE
Pt  with HCV liver cirrhosis, active IV drug abuser, last use was today. presented with SOB for the last few days. Stated that she does not take any medication at home because she does not follow up with any physician.   Exam remarkable for diffuse lower extremity swelling with +3 pitting edema and abd ascitics.   CXR with vascular congestion.   RENETTA on CKD.   Albumin 1.6   INR 1  Flu A +      - start Isa flu, duoneb, IV Lasix 40 mg IV BID.   - GI, nephrology and hematology consult in AM.

## 2024-01-28 NOTE — ED PROCEDURE NOTE - PROCEDURE DATE TIME, MLM
28-Jan-2024 13:03 Ketoconazole Counseling:   Patient counseled regarding improving absorption with orange juice.  Adverse effects include but are not limited to breast enlargement, headache, diarrhea, nausea, upset stomach, liver function test abnormalities, taste disturbance, and stomach pain.  There is a rare possibility of liver failure that can occur when taking ketoconazole. The patient understands that monitoring of LFTs may be required, especially at baseline. The patient verbalized understanding of the proper use and possible adverse effects of ketoconazole.  All of the patient's questions and concerns were addressed.

## 2024-01-28 NOTE — H&P ADULT - ASSESSMENT
A 29 y/o female with pmhx of  current smoker, asthma, hep C , IVDA ,leukopenia,  ascites  presents with shortness of breath. Pt reports shortness of breath x 1 week.     #shortness of breath 2/2 influenza and ascites  cxr No radiographic evidence of acute cardiopulmonary disease  -Tamiflu  -Isolation precaution   -monitor pulse ox   -oxygen PRN   -monitor for fever    #ascites  #anasarca   -abdominal US  -GI consult    #pancytopenia   -trend cbc  -hematology consult  -monitor for bleeding  -follow up HIV antigen     #hyperkalemia   K 5.2 , will trend     #RENETTA  monitor cr level   avoid nephrotoxic meds    #substance abuse   -methadone PRN   -addiction consult  -catch team   -monitor for withdrawal     #nausea/ vomiting  -ZOFRAN PRN   -PPI    #L foot blister   # L foot possible abscess  -podiatry consult     GI prophylaxis    A 27 y/o female with pmhx of  current smoker, asthma, hep C , IVDA ,leukopenia,  ascites  presents with shortness of breath. Pt reports shortness of breath x 1 week.     #shortness of breath 2/2 influenza and ascites  cxr No radiographic evidence of acute cardiopulmonary disease  -Tamiflu  -Isolation precaution   -monitor pulse ox   -oxygen PRN   -monitor for fever    #ascites  #anasarca   -abdominal US  -GI consult    #pancytopenia   -trend cbc  -hematology consult  -monitor for bleeding  -follow up HIV antigen     #hyperkalemia   K 5.2 , will trend     #RENETTA  monitor cr level   avoid nephrotoxic meds  nephrology consult     #substance abuse   -addiction consult  -catch team   -monitor for withdrawal   -will hold off  methadone for now     #nausea/ vomiting  -ZOFRAN PRN   -PPI    #R foot blister   # R foot possible abscess  -podiatry consult     GI prophylaxis

## 2024-01-28 NOTE — ED ADULT NURSE NOTE - NSFALLDEVICES_ED_ALL_ED
Patient Instructions by Ivone Foley MD at 09/07/17 05:24 PM     Author:  Ivone Foley MD Service:  (none) Author Type:  Physician     Filed:  09/07/17 05:25 PM Encounter Date:  9/7/2017 Status:  Signed     :  Ivone Foley MD (Physician)            Referrals placed for  Plastic surgeon Dr Gonzalez and for genetic counseling.  She is planning bilateral mastectomies with bilateral reconstruction.        Revision History        User Key Date/Time User Provider Type Action    > [N/A] 09/07/17 05:25 PM Ivone Foley MD Physician Sign             None

## 2024-01-28 NOTE — ED PROCEDURE NOTE - CPROC ED EX JUG EMBOL DETAIL1
The patient exhaled while the catheter was advanced./The patient was placed in the left lateral decubitus position post- procedure.

## 2024-01-28 NOTE — ED PROVIDER NOTE - CLINICAL SUMMARY MEDICAL DECISION MAKING FREE TEXT BOX
Patient is a 28-year-old female presents with history of hep C and cirrhosis as well as ascites presents for evaluation of worsening generalized swelling most consistent with anasarca in addition anemia however denies any fevers no signs of infection no tenderness to palpation of the abdomen this is consistent with SBP although she has abdominal distention we obtain chest x-ray provide pain evaluation access with pneumothorax or pneumonia patient tested positive for influenza A however has no respiratory distress given her history and her picture I will admit for further evaluation at this time

## 2024-01-28 NOTE — ED PROVIDER NOTE - WET READ LAUNCH FT
Ongoing SW/CM Assessment/Plan of Care Note     See SW/CM flowsheets for goals and other objective data.    Patient/Family discharge goal (s):  Goal #1: Communication facilitated  Goal #2: Extended Care Facility discharge arranged  Goal #3: Transportation arranged or issues addressed    PT Recommendation:  Recommendation for Discharge: PT: Post acute therapy    OT Recommendation:  Recommendations for Discharge: OT: Post acute therapy    SLP Recommendation:       Disposition:  Planned Discharge Destination: Rehabilitation/Skilled Care    Progress note:   Patient with right ankle fracture in a cast. Patient is NWB of affected extremity. Patient is ready for discharge per OFT report. PT recommending [facility] upon discharge. [facility] continues to have no bed availability and [facility] no bed availability til Wednesday. Spoke with patient regarding sending more referrals to SNF, deferred to his wife. Contacted [facility] patient's wife expressing desire for spouse to go to [facility]. Informed patient is clear for discharge and need to reach out further for alternate facilities. Provided choice of referral to [facility]. Referral sent.          There are no Wet Read(s) to document.

## 2024-01-28 NOTE — ED ADULT NURSE NOTE - NSFALLRISKINTERV_ED_ALL_ED
Assistance OOB with selected safe patient handling equipment if applicable/Assistance with ambulation/Communicate fall risk and risk factors to all staff, patient, and family/Monitor gait and stability/Provide visual cue: yellow wristband, yellow gown, etc/Reinforce activity limits and safety measures with patient and family/Call bell, personal items and telephone in reach/Instruct patient to call for assistance before getting out of bed/chair/stretcher/Non-slip footwear applied when patient is off stretcher/[place] to call system/Physically safe environment - no spills, clutter or unnecessary equipment/Purposeful Proactive Rounding/Room/bathroom lighting operational, light cord in reach

## 2024-01-28 NOTE — H&P ADULT - NSHPLABSRESULTS_GEN_ALL_CORE
7.8    2.79  )-----------( 83       ( 28 Jan 2024 13:30 )             25.1       01-28    136  |  109  |  42<H>  ----------------------------<  86  5.2<H>   |  17  |  2.1<H>    Ca    7.4<L>      28 Jan 2024 13:30  Mg     1.8     01-28    TPro  4.5<L>  /  Alb  1.6<L>  /  TBili  0.2  /  DBili  x   /  AST  43<H>  /  ALT  32  /  AlkPhos  1180<H>  01-28          Magnesium: 1.8 mg/dL (01-28-24 @ 13:30)          Urinalysis Basic - ( 28 Jan 2024 13:30 )    Color: x / Appearance: x / SG: x / pH: x  Gluc: 86 mg/dL / Ketone: x  / Bili: x / Urobili: x   Blood: x / Protein: x / Nitrite: x   Leuk Esterase: x / RBC: x / WBC x   Sq Epi: x / Non Sq Epi: x / Bacteria: x        PT/INR - ( 28 Jan 2024 13:30 )   PT: 11.40 sec;   INR: 1.00 ratio         PTT - ( 28 Jan 2024 13:30 )  PTT:33.0 sec    Lactate Trend      Serum Pregnancy: Negative (01-28-24 @ 13:30)    < from: Xray Chest 1 View-PORTABLE IMMEDIATE (01.28.24 @ 13:02) >    Impression:    No radiographic evidence of acute cardiopulmonary disease.    --- End of Report ---      NICOLE NUNES MD; Attending Radiologist  This document has been electronically signed. Jan 28 2024  1:24PM    < end of copied text >

## 2024-01-28 NOTE — ED PROVIDER NOTE - PHYSICAL EXAMINATION
Physical Exam    Vital Signs: I have reviewed the initial vital signs.  Constitutional: no acute distress  Eyes: Conjunctiva pale, Sclera clear, PERRLA, EOMI.  Cardiovascular: S1 and S2, regular rate, regular rhythm, well-perfused extremities, radial pulses equal and 2+  Respiratory: unlabored respiratory effort, clear to auscultation bilaterally no wheezing, rales and rhonchi  Gastrointestinal: Distended abd, non tender  Musculoskeletal: supple neck, + lower extremity edema, no midline tenderness  Integumentary: warm, dry + blisters weeping

## 2024-01-29 DIAGNOSIS — F11.20 OPIOID DEPENDENCE, UNCOMPLICATED: ICD-10-CM

## 2024-01-29 LAB
CREAT ?TM UR-MCNC: 123 MG/DL — SIGNIFICANT CHANGE UP
HIV 1+2 AB+HIV1 P24 AG SERPL QL IA: SIGNIFICANT CHANGE UP
PROT ?TM UR-MCNC: 227 MG/DLG/24H — SIGNIFICANT CHANGE UP
PROT/CREAT UR-RTO: 1.8 RATIO — HIGH (ref 0–0.2)
SODIUM UR-SCNC: <20 MMOL/L — SIGNIFICANT CHANGE UP

## 2024-01-29 PROCEDURE — 99221 1ST HOSP IP/OBS SF/LOW 40: CPT

## 2024-01-29 PROCEDURE — 76775 US EXAM ABDO BACK WALL LIM: CPT | Mod: 26

## 2024-01-29 PROCEDURE — 99232 SBSQ HOSP IP/OBS MODERATE 35: CPT

## 2024-01-29 PROCEDURE — 99221 1ST HOSP IP/OBS SF/LOW 40: CPT | Mod: GC

## 2024-01-29 PROCEDURE — 99223 1ST HOSP IP/OBS HIGH 75: CPT

## 2024-01-29 RX ORDER — SODIUM ZIRCONIUM CYCLOSILICATE 10 G/10G
10 POWDER, FOR SUSPENSION ORAL ONCE
Refills: 0 | Status: COMPLETED | OUTPATIENT
Start: 2024-01-29 | End: 2024-01-29

## 2024-01-29 RX ORDER — METHADONE HYDROCHLORIDE 40 MG/1
5 TABLET ORAL ONCE
Refills: 0 | Status: DISCONTINUED | OUTPATIENT
Start: 2024-01-29 | End: 2024-01-29

## 2024-01-29 RX ORDER — SODIUM BICARBONATE 1 MEQ/ML
650 SYRINGE (ML) INTRAVENOUS DAILY
Refills: 0 | Status: DISCONTINUED | OUTPATIENT
Start: 2024-01-29 | End: 2024-01-30

## 2024-01-29 RX ADMIN — Medication 650 MILLIGRAM(S): at 14:34

## 2024-01-29 RX ADMIN — Medication 30 MILLIGRAM(S): at 08:02

## 2024-01-29 RX ADMIN — Medication 3 MILLILITER(S): at 19:40

## 2024-01-29 RX ADMIN — PANTOPRAZOLE SODIUM 40 MILLIGRAM(S): 20 TABLET, DELAYED RELEASE ORAL at 05:54

## 2024-01-29 RX ADMIN — Medication 3 MILLILITER(S): at 14:19

## 2024-01-29 RX ADMIN — Medication 650 MILLIGRAM(S): at 03:38

## 2024-01-29 RX ADMIN — Medication 30 MILLIGRAM(S): at 21:15

## 2024-01-29 RX ADMIN — Medication 40 MILLIGRAM(S): at 05:54

## 2024-01-29 RX ADMIN — Medication 3 MILLILITER(S): at 08:35

## 2024-01-29 RX ADMIN — METHADONE HYDROCHLORIDE 5 MILLIGRAM(S): 40 TABLET ORAL at 22:14

## 2024-01-29 RX ADMIN — Medication 40 MILLIGRAM(S): at 14:34

## 2024-01-29 RX ADMIN — SODIUM ZIRCONIUM CYCLOSILICATE 10 GRAM(S): 10 POWDER, FOR SUSPENSION ORAL at 09:33

## 2024-01-29 RX ADMIN — Medication 50 MILLIGRAM(S): at 14:35

## 2024-01-29 NOTE — PROGRESS NOTE ADULT - SUBJECTIVE AND OBJECTIVE BOX
CINDYON, POOJA  28y  Female       History of Present Illness: Patient was seen and examined today. Pt denied any complaints.       Vital Signs Last 24 Hrs  T(C): 36.2 (29 Jan 2024 05:23), Max: 37.1 (28 Jan 2024 20:35)  T(F): 97.2 (29 Jan 2024 05:23), Max: 98.8 (28 Jan 2024 20:35)  HR: 97 (29 Jan 2024 05:23) (93 - 107)  BP: 107/70 (29 Jan 2024 05:23) (107/70 - 115/78)  BP(mean): --  RR: 18 (29 Jan 2024 05:23) (18 - 19)  SpO2: 96% (29 Jan 2024 05:23) (94% - 96%)        PHYSICAL EXAM:  GENERAL: NAD, well-groomed, well-developed  HEENT - NC/AT, pupils equal and reactive to light,  ; Moist mucous membranes, Good dentition, No lesions  NECK: Supple, No JVD  CHEST/LUNG: Clear to auscultation bilaterally; No rales, rhonchi, wheezing  HEART: Regular rate and rhythm; No murmurs, rubs, or gallops  ABDOMEN: Soft, diffusely distended.   EXTREMITIES:  +3 pitting edema to bilateral lower extremity.   NEURO:  No Focal deficits, sensory and motor intact        acetaminophen     Tablet .. 650 milliGRAM(s) Oral every 6 hours PRN  albuterol/ipratropium for Nebulization 3 milliLiter(s) Nebulizer every 6 hours  chlorhexidine 4% Liquid 1 Application(s) Topical <User Schedule>  furosemide   Injectable 40 milliGRAM(s) IV Push two times a day  hydrOXYzine hydrochloride 50 milliGRAM(s) Oral every 6 hours PRN  hydrOXYzine hydrochloride 100 milliGRAM(s) Oral at bedtime PRN  ondansetron Injectable 4 milliGRAM(s) IV Push every 8 hours PRN  oseltamivir 30 milliGRAM(s) Oral two times a day  pantoprazole    Tablet 40 milliGRAM(s) Oral before breakfast  sodium bicarbonate 650 milliGRAM(s) Oral daily

## 2024-01-29 NOTE — CONSULT NOTE ADULT - SUBJECTIVE AND OBJECTIVE BOX
Podiatry Consult Note    Subjective:  POOJA DIANE  Seen Bedside 28y Female  .   Patient is a 28y old  Female who presents with a chief complaint of shortness of breath, ascites, anemia, influenza a , anasarca , thrombocytopenia (29 Jan 2024 17:12)    HPI:  A 29 y/o female with pmhx of  current smoker, asthma, hep C , IVDA ,leukopenia,  ascites  presents with shortness of breath. Pt reports shortness of breath x 1 week, getting swollen everywhere and has blisters on her legs. Pt reports weight gain a lot of pounds. Pt reports s/p paracentesis 1 month ago at [facility] 5.5 L of fluids was removed. PT reports fever at home, not sure Tmax. PT with cough , productive x 1 week. PT states nausea/ vomining, NBNB. Pt with diarrhea, now resolved. Pt reports using IV heroin this am 1-2 bags, administered on her abdominal vein.  Pt reports itchy all the time.  Pt denies chest pain, burning with urination.,        (28 Jan 2024 16:57)      Past Medical History and Surgical History  PAST MEDICAL & SURGICAL HISTORY:  Hepatitis C      Asthma      Anxiety and depression      IV drug abuse  heroin      No significant past surgical history           Review of Systems:  [X] Ten point review of systems is otherwise negative except as noted     Objective:  Vital Signs Last 24 Hrs  T(C): 36.2 (29 Jan 2024 14:39), Max: 37.1 (28 Jan 2024 20:35)  T(F): 97.2 (29 Jan 2024 14:39), Max: 98.8 (28 Jan 2024 20:35)  HR: 97 (29 Jan 2024 14:39) (97 - 107)  BP: 119/61 (29 Jan 2024 14:39) (107/70 - 119/61)  BP(mean): --  RR: 18 (29 Jan 2024 05:23) (18 - 19)  SpO2: 99% (29 Jan 2024 14:39) (94% - 99%)                            7.8    2.79  )-----------( 83       ( 28 Jan 2024 13:30 )             25.1                 01-28    136  |  109  |  42<H>  ----------------------------<  86  5.2<H>   |  17  |  2.1<H>    Ca    7.4<L>      28 Jan 2024 13:30  Mg     1.8     01-28    TPro  4.5<L>  /  Alb  1.6<L>  /  TBili  0.2  /  DBili  x   /  AST  43<H>  /  ALT  32  /  AlkPhos  1180<H>  01-28        Physical Exam - Lower Extremity Focused:   Derm: bilateral edema with weeping, serous drainage.   Right foot medial 1st MPJ with superficial wound consistent with blister formation that is lysed.   Vascular: DP and PT Pulses palpable;  Foot is Warm to Warm to the touch; Capillary Refill Time < 3 Seconds;    Neuro: Protective Sensation intact  MSK: Pain On Palpation at Wound Site     Assessment:  right superficial wound; stable, non-infected    Plan:  Chart reviewed and Patient evaluated. All Questions and Concerns Addressed and Answered  Local Wound Care;  Wound Packed w/ bacitracin, dsd, ACE  Weight Bearing Status; wbat  Wound appears to be due to burst blister. De-roofed skin noted. No streaking or surrounding erythema; no signs of infection; continue with local wound care  re-consult if condition worsens   Discussed Plan w/ Dr Calderon    Podiatry

## 2024-01-29 NOTE — CONSULT NOTE ADULT - PROBLEM SELECTOR RECOMMENDATION 9
After evaluation at this time continue current protocol. Pts concerns addressed  Pt will be monitored and supportive care provided.  No other changes to medical care plan for withdrawals.  Monitor labs/electrolytes as needed.    -Counseling provided   CATCH team involved for aftercare and pt will follow up with aftercare.     Pt does not want MMTP or Suboxone replacement therapy.

## 2024-01-29 NOTE — CONSULT NOTE ADULT - SUBJECTIVE AND OBJECTIVE BOX
INTERVENTIONAL RADIOLOGY CONSULT:     Procedure Requested: Diagnostic and therapeutic paracentesis    HPI:  A 29 y/o female with pmhx of  current smoker, asthma, hep C , IVDA ,leukopenia,  ascites  presents with shortness of breath. Pt reports shortness of breath x 1 week, getting swollen everywhere and has blisters on her legs. Pt reports weight gain a lot of pounds. Pt reports s/p paracentesis 1 month ago at [facility] 5.5 L of fluids was removed. PT reports fever at home, not sure Tmax. PT with cough , productive x 1 week. PT states nausea/ vomining, NBNB. Pt with diarrhea, now resolved. Pt reports using IV heroin this am 1-2 bags, administered on her abdominal vein.  Pt reports itchy all the time.  Pt denies chest pain, burning with urination.,        (28 Jan 2024 16:57)      PAST MEDICAL & SURGICAL HISTORY:  Hepatitis C      Asthma      Anxiety and depression      IV drug abuse  heroin      No significant past surgical history          MEDICATIONS  (STANDING):  albuterol/ipratropium for Nebulization 3 milliLiter(s) Nebulizer every 6 hours  chlorhexidine 4% Liquid 1 Application(s) Topical <User Schedule>  furosemide   Injectable 40 milliGRAM(s) IV Push two times a day  oseltamivir 30 milliGRAM(s) Oral two times a day  pantoprazole    Tablet 40 milliGRAM(s) Oral before breakfast  sodium bicarbonate 650 milliGRAM(s) Oral daily    MEDICATIONS  (PRN):  acetaminophen     Tablet .. 650 milliGRAM(s) Oral every 6 hours PRN Temp greater or equal to 38C (100.4F), Mild Pain (1 - 3)  hydrOXYzine hydrochloride 50 milliGRAM(s) Oral every 6 hours PRN Anxiety  hydrOXYzine hydrochloride 100 milliGRAM(s) Oral at bedtime PRN insomnia  ondansetron Injectable 4 milliGRAM(s) IV Push every 8 hours PRN Nausea and/or Vomiting      Allergies    buprenorphine (Rash)  Suboxone (Rash)    Intolerances          FAMILY HISTORY:      Physical Exam:   Vital Signs Last 24 Hrs  T(C): 36.2 (29 Jan 2024 05:23), Max: 37.1 (28 Jan 2024 20:35)  T(F): 97.2 (29 Jan 2024 05:23), Max: 98.8 (28 Jan 2024 20:35)  HR: 97 (29 Jan 2024 05:23) (93 - 107)  BP: 107/70 (29 Jan 2024 05:23) (107/70 - 115/78)  BP(mean): --  RR: 18 (29 Jan 2024 05:23) (18 - 19)  SpO2: 96% (29 Jan 2024 05:23) (94% - 96%)          Labs:                         7.8    2.79  )-----------( 83       ( 28 Jan 2024 13:30 )             25.1 01-28    136  |  109  |  42<H>  ----------------------------<  86  5.2<H>   |  17  |  2.1<H>    Ca    7.4<L>      28 Jan 2024 13:30  Mg     1.8     01-28    TPro  4.5<L>  /  Alb  1.6<L>  /  TBili  0.2  /  DBili  x   /  AST  43<H>  /  ALT  32  /  AlkPhos  1180<H>  01-28    PT/INR - ( 28 Jan 2024 13:30 )   PT: 11.40 sec;   INR: 1.00 ratio         PTT - ( 28 Jan 2024 13:30 )  PTT:33.0 sec    Pertinent labs:                      7.8    2.79  )-----------( 83       ( 28 Jan 2024 13:30 )             25.1       01-28    136  |  109  |  42<H>  ----------------------------<  86  5.2<H>   |  17  |  2.1<H>    Ca    7.4<L>      28 Jan 2024 13:30  Mg     1.8     01-28    TPro  4.5<L>  /  Alb  1.6<L>  /  TBili  0.2  /  DBili  x   /  AST  43<H>  /  ALT  32  /  AlkPhos  1180<H>  01-28      PT/INR - ( 28 Jan 2024 13:30 )   PT: 11.40 sec;   INR: 1.00 ratio         PTT - ( 28 Jan 2024 13:30 )  PTT:33.0 sec    Radiology & Additional Studies:     Radiology imaging reviewed.       ASSESSMENT AND PLAN:    28F hx liver cirrhosis from hep C , IVDA ,leukopenia,  pw shortness of breath. IR is consulted for diagnostic and therapeutic paracentesis.     -Planned for IR dx and tx paracentesus for tomorrow 1/30/2024.    Please call Interventional Radiology with questions or concerns:   - M-F [phone]: [phone]   - All other hours: [phone]

## 2024-01-29 NOTE — CONSULT NOTE ADULT - ASSESSMENT
acute kidney injury  Cr 8/2022 - 0.9  proteinuria, microhematuria, pyuria and bacteriuria   anasarca / ascites  hyperkalemia  metabolic acidosis  influenza / dyspnea  liver cirrhosis / Hep C  pancytopenia  IVDA / active IV heroin abuse  leg wounds    plan:    lasix 40mg iv q12h  start albumin 25% 25g iv q8h x 72 hours  no aldactone until renal function stabilized and K+ status improved  add sodium bicarbonate 650mg po qd  lokelma 10g po x 1  ordered urine culture and urine studies, including Toni+, Uosm, U protein/Cr to calculate FeNa% and quantify proteinuria  ordered C3, C4, ANCA, KRISHNA, Anti GBM  send HIV, hep C VL  obtain renal sonogram  need accurate urine output monitoring  tamiflu renal dose  recommend paracentesis, though avoid very high volume removal in setting of worsening renal function  GI, ID eval pending

## 2024-01-29 NOTE — CONSULT NOTE ADULT - SUBJECTIVE AND OBJECTIVE BOX
NEPHROLOGY CONSULTATION NOTE    29 yo female with PMH as below, including liver cirrhosis, hep C, IVDA, s/p tracheostomy 2022, ascites, active heroin abuse, presents with worsening SOB, cough and diffuse swelling.  Pt found to have influenza in ED.  Recent hospitalizations at [facility].  Last paracentesis about a month ago.  Renal consulted for azotemia.  Denies prior renal disease and had normal Cr last year.  No voiding complaints    PAST MEDICAL & SURGICAL HISTORY:  Hepatitis C      Asthma      Anxiety and depression      IV drug abuse  heroin      No significant past surgical history        Allergies:  buprenorphine (Rash)  Suboxone (Rash)    Home Medications Reviewed    SOCIAL HISTORY:  Denies ETOH,Smoking,   FAMILY HISTORY:        REVIEW OF SYSTEMS:  All other review of systems is negative unless indicated above.    PHYSICAL EXAM:  Constitutional: NAD  HEENT: anicteric sclera, oropharynx clear, MMM  Neck: No JVD  Respiratory: b/l BS, few rales  Cardiovascular: S1, S2, RRR  Gastrointestinal: BS+, soft, NT/ND  Extremities: No cyanosis or clubbing. + peripheral edema + b/l LE ace wraps  Neurological: A/O x 3, no focal deficits  Psychiatric: Normal mood, normal affect  : No CVA tenderness. No doss.   Skin: No rashes    Hospital Medications:   MEDICATIONS  (STANDING):  albuterol/ipratropium for Nebulization 3 milliLiter(s) Nebulizer every 6 hours  chlorhexidine 4% Liquid 1 Application(s) Topical <User Schedule>  furosemide   Injectable 40 milliGRAM(s) IV Push two times a day  oseltamivir 30 milliGRAM(s) Oral two times a day  pantoprazole    Tablet 40 milliGRAM(s) Oral before breakfast        VITALS:  T(F): 97.2 (01-29-24 @ 05:23), Max: 98.8 (01-28-24 @ 20:35)  HR: 97 (01-29-24 @ 05:23)  BP: 107/70 (01-29-24 @ 05:23)  RR: 18 (01-29-24 @ 05:23)  SpO2: 96% (01-29-24 @ 05:23)  Wt(kg): --    Height (cm): 153.9 (01-28 @ 18:59)  Weight (kg): 70.3 (01-28 @ 12:30)  BMI (kg/m2): 29.7 (01-28 @ 18:59)  BSA (m2): 1.69 (01-28 @ 18:59)    LABS:  01-28    136  |  109  |  42<H>  ----------------------------<  86  5.2<H>   |  17  |  2.1<H>    Ca    7.4<L>      28 Jan 2024 13:30  Mg     1.8     01-28    TPro  4.5<L>  /  Alb  1.6<L>  /  TBili  0.2  /  DBili      /  AST  43<H>  /  ALT  32  /  AlkPhos  1180<H>  01-28                          7.8    2.79  )-----------( 83       ( 28 Jan 2024 13:30 )             25.1       Urine Studies:  Urinalysis Basic - ( 28 Jan 2024 13:30 )    Color:  / Appearance:  / SG:  / pH:   Gluc: 86 mg/dL / Ketone:   / Bili:  / Urobili:    Blood:  / Protein:  / Nitrite:    Leuk Esterase:  / RBC:  / WBC    Sq Epi:  / Non Sq Epi:  / Bacteria:           RADIOLOGY & ADDITIONAL STUDIES:

## 2024-01-29 NOTE — CONSULT NOTE ADULT - SUBJECTIVE AND OBJECTIVE BOX
FROM H&P:  A 29 y/o female with pmhx of  current smoker, asthma, hep C , IVDA ,leukopenia,  ascites  presents with shortness of breath. Pt reports shortness of breath x 1 week, getting swollen everywhere and has blisters on her legs. Pt reports weight gain a lot of pounds. Pt reports s/p paracentesis 1 month ago at [facility] 5.5 L of fluids was removed. PT reports fever at home, not sure Tmax. PT with cough , productive x 1 week. PT states nausea/ vomining, NBNB. Pt with diarrhea, now resolved. Pt reports using IV heroin this am 1-2 bags, administered on her abdominal vein.  Pt reports itchy all the time.  Pt denies chest pain, burning with urination.,        Pt interviewed, examined and EMR chart reviewed.  Pt admits to using opiates about 5 bundles / 50 bags/5grams IV x  2  months.   Last use day of admission  Hx of withdrawal   variable periods of sobriety in the past.  Has been in detox before __X___yes,   _____No    SOCIAL HISTORY:    REVIEW OF SYSTEMS:    Constitutional: No fever, weight loss or fatigue  ENT:  No difficulty hearing, tinnitus, vertigo; No sinus or throat pain  Neck: No pain or stiffness  Respiratory: SEE H&P  Cardiovascular: No chest pain, palpitations, shortness of breath, dizziness or leg swelling  Gastrointestinal: No abdominal or epigastric pain. No nausea, vomiting or hematemesis; No diarrhea or constipation. No melena or hematochezia.  Neurological: No headaches, memory loss, loss of strength, numbness or tremors  Musculoskeletal: No joint pain or swelling; No muscle, back or extremity pain  Psychiatric: No depression, anxiety, mood swings or difficulty sleeping    MEDICATIONS  (STANDING):  albuterol/ipratropium for Nebulization 3 milliLiter(s) Nebulizer every 6 hours  chlorhexidine 4% Liquid 1 Application(s) Topical <User Schedule>  furosemide   Injectable 40 milliGRAM(s) IV Push two times a day  oseltamivir 30 milliGRAM(s) Oral two times a day  pantoprazole    Tablet 40 milliGRAM(s) Oral before breakfast  sodium bicarbonate 650 milliGRAM(s) Oral daily    MEDICATIONS  (PRN):  acetaminophen     Tablet .. 650 milliGRAM(s) Oral every 6 hours PRN Temp greater or equal to 38C (100.4F), Mild Pain (1 - 3)  hydrOXYzine hydrochloride 50 milliGRAM(s) Oral every 6 hours PRN Anxiety  hydrOXYzine hydrochloride 100 milliGRAM(s) Oral at bedtime PRN insomnia  ondansetron Injectable 4 milliGRAM(s) IV Push every 8 hours PRN Nausea and/or Vomiting      Vital Signs Last 24 Hrs  T(C): 36.2 (29 Jan 2024 14:39), Max: 37.1 (28 Jan 2024 20:35)  T(F): 97.2 (29 Jan 2024 14:39), Max: 98.8 (28 Jan 2024 20:35)  HR: 97 (29 Jan 2024 14:39) (93 - 107)  BP: 119/61 (29 Jan 2024 14:39) (107/70 - 119/61)  BP(mean): --  RR: 18 (29 Jan 2024 05:23) (18 - 19)  SpO2: 99% (29 Jan 2024 14:39) (94% - 99%)        PHYSICAL EXAM:    Constitutional: NAD, well-groomed, well-developed  HEENT: PERRLA, EOMI, Normal Hearing,   Neck: No LAD, No JVD  Back: Normal spine flexure, No CVA tenderness  Respiratory: CTAB/L  Cardiovascular: S1 and S2, RRR, no M/G/R  Gastrointestinal: BS+, soft, NT/ND  Extremities: No peripheral edema positive tracks marks noted  Neurological: A/O x 3, no focal deficits    LABS:                        7.8    2.79  )-----------( 83       ( 28 Jan 2024 13:30 )             25.1     01-28    136  |  109  |  42<H>  ----------------------------<  86  5.2<H>   |  17  |  2.1<H>    Ca    7.4<L>      28 Jan 2024 13:30  Mg     1.8     01-28    TPro  4.5<L>  /  Alb  1.6<L>  /  TBili  0.2  /  DBili  x   /  AST  43<H>  /  ALT  32  /  AlkPhos  1180<H>  01-28    PT/INR - ( 28 Jan 2024 13:30 )   PT: 11.40 sec;   INR: 1.00 ratio         PTT - ( 28 Jan 2024 13:30 )  PTT:33.0 sec  Urinalysis Basic - ( 28 Jan 2024 13:30 )    Color: x / Appearance: x / SG: x / pH: x  Gluc: 86 mg/dL / Ketone: x  / Bili: x / Urobili: x   Blood: x / Protein: x / Nitrite: x   Leuk Esterase: x / RBC: x / WBC x   Sq Epi: x / Non Sq Epi: x / Bacteria: x      Drug Screen Urine:  Alcohol Level        RADIOLOGY & ADDITIONAL STUDIES:

## 2024-01-29 NOTE — PROGRESS NOTE ADULT - ASSESSMENT
27 y/o female with pmhx of  current smoker, asthma, hep C , IVDA ,leukopenia,  ascites  presents with shortness of breath. Pt reports shortness of breath x 1 week, getting swollen everywhere and has blisters on her legs. Pt reports weight gain a lot of pounds. Pt reports s/p paracentesis 1 month ago at [facility] 5.5 L of fluids was removed. PT reports fever at home, not sure Tmax. PT with cough , productive x 1 week. PT states nausea/ vomining, NBNB. Pt with diarrhea, now resolved. Pt reports using IV heroin this am 1-2 bags, administered on her abdominal vein.  Pt reports itchy all the time.  Pt denies chest pain, burning with urination.  Pt does not take any medication at home because she did not follow up.     # HCV.   # Liver cirrhosis.   # IV drug abuse.   # Moderate ascitics    Follow HIV result.    Addiction medicine consult.   GI and IR consult for paracentesis. last one was a month ago at [facility].     # Flu A infection.   CXR: No radiographic evidence of acute cardiopulmonary disease.  Symptomatic treatment: Tamiflu, douneb, tylenol     # RENETTA: hepatorenal syndrome?   #proteinuria, microhematuria, pyuria and bacteriuria   #anasarca  #hyperkalemia  # metabolic acidosis  Cr 2.1 (baseline is <1).   nephrology recs appreciated:   acute kidney injury  Cr 8/2022 - 0.9  renal sonogram: negative.   - Lasix 40mg iv q12h  -start albumin 25% 25g iv q8h x 72 hours  -no aldactone until renal function stabilized and K+ status improved  - sodium bicarbonate 650mg po qd  -Lokelma 10g po x 1  - follow urine culture and urine studies, including Toni+, Uosm, U protein/Cr to calculate FeNa% and quantify proteinuria  - follow  C3, C4, ANCA, KRISHNA, Anti GBM  - follow HIV, hep C VL  -need accurate urine output monitoring

## 2024-01-29 NOTE — CONSULT NOTE ADULT - ASSESSMENT
28yFemale pmh hepatitis C, IVDA, presents for increased abdominal distention, used heroin recently.    #ascites, liver cirrhosis from hepatitis C  Ultrasonographic evaluation for ascites. 4 quadrants of the abdomen and   pelvis evaluated which demonstrates moderate abdominal pelvic free fluid  Rec  -check acute hepatitis panel, hep C treatment unclear  -Hepatic encephalopathy-none  -Abdominal ultrasound AFP every 6 months for HCC screening  -EGD outpatient for esophageal varices screening   -Follow up with our [facility] located at [address] [place] [address]. Phone Number: [phone] for possible liver transplant referral  -substance abuse cessation advised  -Check Hep A Ab, Hep B SAg, Hep B SAB, Hep C Ab, KRISHNA, Smooth Muscle antibody, KRISHNA, Ceruloplasmin, Ferritin, Transferrin Saturation, SPEP, Alpha fetoprotein,   -Ultrasound guided diagnostic paracentesis: Obtain serum albumin same day as paracentesis, please transfuse albumin 8g/l if more than 5L removed plan for diagnostic therapeutic tap tomorrow with liver service   -Obtain fluid studies: Paracentesis Panel which includes Cultures, Cell count, albumin, total protein, cytology, AFB smear and culture. Separate order of amylase fluid and triglycerides fluid  -Further treatment is based on diagnosis.  -2g Sodium diet  -Daily weights    #anemia no gross GI bleeding  Rec  -outpatient GI workup  - Follow up with our [facility] located at [address] [place] [address]. Phone Number: [phone]     28yFemale pmh hepatitis C, IVDA, presents for increased abdominal distention, used heroin recently.    #ascites, liver cirrhosis from hepatitis C  Ultrasonographic evaluation for ascites. 4 quadrants of the abdomen and   pelvis evaluated which demonstrates moderate abdominal pelvic free fluid  Rec  -check acute hepatitis panel, hep C treatment unclear  -Hepatic encephalopathy-none  -Abdominal ultrasound AFP every 6 months for HCC screening  -EGD outpatient for esophageal varices screening   -Follow up with our [facility] located at [address] [place] [address]. Phone Number: [phone] for possible liver transplant referral  -substance abuse cessation advised  -Check Hep A Ab, Hep B SAg, Hep B SAB, Hep C Ab, KRISHNA, Smooth Muscle antibody, KRISHNA, Ceruloplasmin, Ferritin, Transferrin Saturation, SPEP, Alpha fetoprotein,   -Ultrasound guided diagnostic paracentesis: Obtain serum albumin same day as paracentesis, please transfuse albumin 8g/l if more than 5L removed plan for diagnostic therapeutic tap tomorrow   -Obtain fluid studies: Paracentesis Panel which includes Cultures, Cell count, albumin, total protein, cytology, AFB smear and culture. Separate order of amylase fluid and triglycerides fluid  -Further treatment is based on diagnosis.  -2g Sodium diet  -Daily weights    #anemia no gross GI bleeding  Rec  -outpatient GI workup  - Follow up with our [facility] located at [address] [place] [address]. Phone Number: [phone]     28yFemale pmh hepatitis C, IVDA, presents for increased abdominal distention, used heroin recently.    #ascites, liver cirrhosis from hepatitis C  #transaminitis   Ultrasonographic evaluation for ascites. 4 quadrants of the abdomen and   pelvis evaluated which demonstrates moderate abdominal pelvic free fluid  Rec  -check acute hepatitis panel, hep C treatment unclear  -Hepatic encephalopathy-none  -Abdominal ultrasound AFP every 6 months for HCC screening  -EGD outpatient for esophageal varices screening   -Follow up with our [facility] located at [address] [place] [address]. Phone Number: [phone] for possible liver transplant referral  -substance abuse cessation advised  -Check Hep A Ab, Hep B SAg, Hep B SAB, Hep C Ab, KRISHNA, Smooth Muscle antibody, KRISHNA, Ceruloplasmin, Ferritin, Transferrin Saturation, SPEP, Alpha fetoprotein,   -Ultrasound guided diagnostic paracentesis: Obtain serum albumin same day as paracentesis, please transfuse albumin 8g/l if more than 5L removed plan for diagnostic therapeutic tap tomorrow   -Obtain fluid studies: Paracentesis Panel which includes Cultures, Cell count, albumin, total protein, cytology, AFB smear and culture. Separate order of amylase fluid and triglycerides fluid  -Further treatment is based on diagnosis.  -2g Sodium diet  -Daily weights    #anemia no gross GI bleeding  Rec  -outpatient GI workup  - Follow up with our [facility] located at [address] [place] [address]. Phone Number: [phone]     28yFemale pmh hepatitis C, IVDA, presents for increased abdominal distention, used heroin recently.    #ascites, liver cirrhosis from hepatitis C  #transaminitis   Ultrasonographic evaluation for ascites. 4 quadrants of the abdomen and   pelvis evaluated which demonstrates moderate abdominal pelvic free fluid  Rec  -check acute hepatitis panel, hep C treatment unclear  -Hepatic encephalopathy-none  -Abdominal ultrasound AFP every 6 months for HCC screening  -EGD outpatient for esophageal varices screening   -Follow up with our [facility] located at [address] [place] [address]. Phone Number: [phone] for possible liver transplant referral  -substance abuse cessation advised  -Check Hep A Ab, Hep B SAg, Hep B SAB, Hep C Ab, KRISHNA, Smooth Muscle antibody, KRISHNA, Ceruloplasmin, Ferritin, Transferrin Saturation, SPEP, Alpha fetoprotein,   -Ultrasound guided diagnostic paracentesis: Obtain serum albumin same day as paracentesis, please transfuse albumin 8g/l if more than 5L removed plan for diagnostic therapeutic tap tomorrow   -Obtain fluid studies: Paracentesis Panel which includes Cultures, Cell count, albumin, total protein, cytology, AFB smear and culture. Separate order of amylase fluid and triglycerides fluid  -Further treatment is based on diagnosis.  -2g Sodium diet  -diuretics after para  -Daily weights    #anemia no gross GI bleeding  Rec  -outpatient GI workup  - Follow up with our [facility] located at [address] [place] [address]. Phone Number: [phone]

## 2024-01-29 NOTE — CHART NOTE - NSCHARTNOTEFT_GEN_A_CORE
pt requesting methadone, at refused treatment earlier but now c/o worsening symptoms and wishes to receive it  will order 5mg tonight and follow up with addiction medicine tomorrow

## 2024-01-29 NOTE — CONSULT NOTE ADULT - SUBJECTIVE AND OBJECTIVE BOX
Chief complaint/Reason for consult: ascites    HPI:  A 29 y/o female with pmhx of  current smoker, asthma, hep C , IVDA ,leukopenia,  ascites  presents with shortness of breath. Pt reports shortness of breath x 1 week, getting swollen everywhere and has blisters on her legs. Pt reports weight gain a lot of pounds. Pt reports s/p paracentesis 1 month ago at [facility] 5.5 L of fluids was removed. PT reports fever at home, not sure Tmax. PT with cough , productive x 1 week. PT states nausea/ vomining, NBNB. Pt with diarrhea, now resolved. Pt reports using IV heroin this am 1-2 bags, administered on her abdominal vein.  Pt reports itchy all the time.  Pt denies chest pain, burning with urination.,        (28 Jan 2024 16:57)    GI Updates: 28yFemale pmh hepatitis C, IVDA, presents for increased abdominal distention, used heroin recently. Patient denies nausea, vomiting, hematemesis, melena, blood in stool, diarrhea, constipation, abdominal pain.      PAST MEDICAL & SURGICAL HISTORY:   Hepatitis C      Asthma      Anxiety and depression      IV drug abuse  heroin      No significant past surgical history            Family history:  FAMILY HISTORY:    No GI cancers in first or second degree relatives    Social History: No smoking. No alcohol. No illegal drug use.    Allergies:   buprenorphine (Rash)  Suboxone (Rash)  Intolerances      MEDICATIONS: Home Medications:  none        MEDICATIONS  (STANDING):  albuterol/ipratropium for Nebulization 3 milliLiter(s) Nebulizer every 6 hours  chlorhexidine 4% Liquid 1 Application(s) Topical <User Schedule>  furosemide   Injectable 40 milliGRAM(s) IV Push two times a day  oseltamivir 30 milliGRAM(s) Oral two times a day  pantoprazole    Tablet 40 milliGRAM(s) Oral before breakfast  sodium bicarbonate 650 milliGRAM(s) Oral daily    MEDICATIONS  (PRN):  acetaminophen     Tablet .. 650 milliGRAM(s) Oral every 6 hours PRN Temp greater or equal to 38C (100.4F), Mild Pain (1 - 3)  hydrOXYzine hydrochloride 50 milliGRAM(s) Oral every 6 hours PRN Anxiety  hydrOXYzine hydrochloride 100 milliGRAM(s) Oral at bedtime PRN insomnia  ondansetron Injectable 4 milliGRAM(s) IV Push every 8 hours PRN Nausea and/or Vomiting        REVIEW OF SYSTEMS  General:  No weight loss, fevers, or chills.  Eyes:  No reported pain or visual changes  ENT:  No sore throat or runny nose.  NECK: No stiffness or lymphadenopathy  CV:  No chest pain or palpitations.  Resp:  No shortness of breath, cough, wheezing or hemoptysis  GI:  No abdominal pain, nausea, vomiting, dysphagia, diarrhea or constipation. No rectal bleeding, melena, or hematemesis.  Muscle:  No aches or weakness  Neuro:  No tingling, numbness       VITALS:   T(F): 97.2 (01-29-24 @ 05:23), Max: 98.8 (01-28-24 @ 20:35)  HR: 97 (01-29-24 @ 05:23) (93 - 107)  BP: 107/70 (01-29-24 @ 05:23) (107/70 - 115/78)  RR: 18 (01-29-24 @ 05:23) (18 - 19)  SpO2: 96% (01-29-24 @ 05:23) (94% - 96%)    PHYSICAL EXAM:  GENERAL: AAOx3, no acute distress.  HEAD:  Atraumatic, Normocephalic  EYES: conjunctiva and sclera clear  NECK: Supple, No thyromegaly   CHEST/LUNG: Clear to auscultation bilaterally; No wheeze, rhonchi, or rales  HEART: Regular rate and rhythm; normal S1, S2, No murmurs.  ABDOMEN: Soft, nontender, +distended; Bowel sounds present  NEUROLOGY: No asterixis or tremor  SKIN: Intact, no jaundice          LABS:  01-28    136  |  109  |  42<H>  ----------------------------<  86  5.2<H>   |  17  |  2.1<H>    Ca    7.4<L>      28 Jan 2024 13:30  Mg     1.8     01-28    TPro  4.5<L>  /  Alb  1.6<L>  /  TBili  0.2  /  DBili  x   /  AST  43<H>  /  ALT  32  /  AlkPhos  1180<H>  01-28                          7.8    2.79  )-----------( 83       ( 28 Jan 2024 13:30 )             25.1     LIVER FUNCTIONS - ( 28 Jan 2024 13:30 )  Alb: 1.6 g/dL / Pro: 4.5 g/dL / ALK PHOS: 1180 U/L / ALT: 32 U/L / AST: 43 U/L / GGT: x           PT/INR - ( 28 Jan 2024 13:30 )   PT: 11.40 sec;   INR: 1.00 ratio         PTT - ( 28 Jan 2024 13:30 )  PTT:33.0 sec    IMAGING:    < from: US Abdomen Complete (US Abdomen Complete .) (01.28.24 @ 18:34) >    ACC: [id number] EXAM:  US ABDOMEN COMPLETE   ORDERED BY: KYRA PIMENTEL     PROCEDURE DATE:  01/28/2024          INTERPRETATION:  CLINICAL INFORMATION: Ascites.    COMPARISON: 7/23/2022    TECHNIQUE: Sonography of the abdomen.    FINDINGS/  IMPRESSION:        Ultrasonographic evaluation for ascites. 4 quadrants of the abdomen and   pelvis evaluated which demonstrates moderate abdominal pelvic free fluid.    --- End of Report ---            CARLOS CARRILLO MD; Attending Radiologist  This document has been electronically signed. Jan 28 2024  7:40PM    < end of copied text >

## 2024-01-30 ENCOUNTER — RESULT REVIEW (OUTPATIENT)
Age: 29
End: 2024-01-30

## 2024-01-30 LAB
ALBUMIN SERPL ELPH-MCNC: 1.4 G/DL — LOW (ref 3.5–5.2)
ALP SERPL-CCNC: 1028 U/L — HIGH (ref 30–115)
ALT FLD-CCNC: 30 U/L — SIGNIFICANT CHANGE UP (ref 0–41)
ANION GAP SERPL CALC-SCNC: 14 MMOL/L — SIGNIFICANT CHANGE UP (ref 7–14)
AST SERPL-CCNC: 54 U/L — HIGH (ref 0–41)
B PERT IGG+IGM PNL SER: ABNORMAL
BILIRUB SERPL-MCNC: <0.2 MG/DL — SIGNIFICANT CHANGE UP (ref 0.2–1.2)
BUN SERPL-MCNC: 44 MG/DL — HIGH (ref 10–20)
CALCIUM SERPL-MCNC: 6.8 MG/DL — LOW (ref 8.4–10.5)
CHLORIDE SERPL-SCNC: 107 MMOL/L — SIGNIFICANT CHANGE UP (ref 98–110)
CK SERPL-CCNC: 49 U/L — SIGNIFICANT CHANGE UP (ref 0–225)
CO2 SERPL-SCNC: 13 MMOL/L — LOW (ref 17–32)
COLOR FLD: SIGNIFICANT CHANGE UP
CREAT SERPL-MCNC: 2.5 MG/DL — HIGH (ref 0.7–1.5)
CULTURE RESULTS: SIGNIFICANT CHANGE UP
EGFR: 26 ML/MIN/1.73M2 — LOW
GLUCOSE SERPL-MCNC: 85 MG/DL — SIGNIFICANT CHANGE UP (ref 70–99)
LYMPHOCYTES # FLD: 87 % — SIGNIFICANT CHANGE UP
MONOS+MACROS # FLD: 12 % — SIGNIFICANT CHANGE UP
NEUTROPHILS-BODY FLUID: 1 % — SIGNIFICANT CHANGE UP
OSMOLALITY UR: 349 MOS/KG — SIGNIFICANT CHANGE UP (ref 50–1400)
PHOSPHATE SERPL-MCNC: 7.3 MG/DL — HIGH (ref 2.1–4.9)
POTASSIUM SERPL-MCNC: 5.3 MMOL/L — HIGH (ref 3.5–5)
POTASSIUM SERPL-SCNC: 5.3 MMOL/L — HIGH (ref 3.5–5)
PROT SERPL-MCNC: 4.4 G/DL — LOW (ref 6–8)
RCV VOL RI: 0 /UL — SIGNIFICANT CHANGE UP (ref 0–0)
SODIUM SERPL-SCNC: 134 MMOL/L — LOW (ref 135–146)
SPECIMEN SOURCE: SIGNIFICANT CHANGE UP
TOTAL NUCLEATED CELL COUNT, BODY FLUID: 98 /UL — SIGNIFICANT CHANGE UP

## 2024-01-30 PROCEDURE — 88112 CYTOPATH CELL ENHANCE TECH: CPT | Mod: 26

## 2024-01-30 PROCEDURE — 99231 SBSQ HOSP IP/OBS SF/LOW 25: CPT

## 2024-01-30 PROCEDURE — 99221 1ST HOSP IP/OBS SF/LOW 40: CPT

## 2024-01-30 PROCEDURE — 88305 TISSUE EXAM BY PATHOLOGIST: CPT | Mod: 26

## 2024-01-30 PROCEDURE — 49083 ABD PARACENTESIS W/IMAGING: CPT

## 2024-01-30 PROCEDURE — 99233 SBSQ HOSP IP/OBS HIGH 50: CPT | Mod: 25

## 2024-01-30 PROCEDURE — 99232 SBSQ HOSP IP/OBS MODERATE 35: CPT

## 2024-01-30 RX ORDER — ALBUMIN HUMAN 25 %
25 VIAL (ML) INTRAVENOUS EVERY 8 HOURS
Refills: 0 | Status: COMPLETED | OUTPATIENT
Start: 2024-01-30 | End: 2024-02-02

## 2024-01-30 RX ORDER — ALBUMIN HUMAN 25 %
200 VIAL (ML) INTRAVENOUS ONCE
Refills: 0 | Status: COMPLETED | OUTPATIENT
Start: 2024-01-30 | End: 2024-01-30

## 2024-01-30 RX ORDER — SODIUM BICARBONATE 1 MEQ/ML
1300 SYRINGE (ML) INTRAVENOUS THREE TIMES A DAY
Refills: 0 | Status: DISCONTINUED | OUTPATIENT
Start: 2024-01-30 | End: 2024-02-02

## 2024-01-30 RX ORDER — MIDODRINE HYDROCHLORIDE 2.5 MG/1
5 TABLET ORAL THREE TIMES A DAY
Refills: 0 | Status: DISCONTINUED | OUTPATIENT
Start: 2024-01-30 | End: 2024-02-03

## 2024-01-30 RX ORDER — METHADONE HYDROCHLORIDE 40 MG/1
10 TABLET ORAL ONCE
Refills: 0 | Status: DISCONTINUED | OUTPATIENT
Start: 2024-01-30 | End: 2024-01-30

## 2024-01-30 RX ADMIN — Medication 30 MILLIGRAM(S): at 07:49

## 2024-01-30 RX ADMIN — MIDODRINE HYDROCHLORIDE 5 MILLIGRAM(S): 2.5 TABLET ORAL at 21:27

## 2024-01-30 RX ADMIN — Medication 650 MILLIGRAM(S): at 12:02

## 2024-01-30 RX ADMIN — Medication 25 MILLILITER(S): at 22:50

## 2024-01-30 RX ADMIN — METHADONE HYDROCHLORIDE 10 MILLIGRAM(S): 40 TABLET ORAL at 18:26

## 2024-01-30 RX ADMIN — Medication 30 MILLIGRAM(S): at 20:09

## 2024-01-30 RX ADMIN — Medication 3 MILLILITER(S): at 08:04

## 2024-01-30 RX ADMIN — Medication 100 MILLIGRAM(S): at 03:05

## 2024-01-30 RX ADMIN — Medication 3 MILLILITER(S): at 15:30

## 2024-01-30 RX ADMIN — Medication 40 MILLIGRAM(S): at 05:17

## 2024-01-30 RX ADMIN — Medication 200 MILLILITER(S): at 21:04

## 2024-01-30 RX ADMIN — Medication 40 MILLIGRAM(S): at 18:27

## 2024-01-30 RX ADMIN — PANTOPRAZOLE SODIUM 40 MILLIGRAM(S): 20 TABLET, DELAYED RELEASE ORAL at 05:17

## 2024-01-30 RX ADMIN — Medication 50 MILLIGRAM(S): at 18:26

## 2024-01-30 RX ADMIN — Medication 1300 MILLIGRAM(S): at 21:27

## 2024-01-30 RX ADMIN — Medication 25 MILLILITER(S): at 13:41

## 2024-01-30 RX ADMIN — Medication 3 MILLILITER(S): at 19:28

## 2024-01-30 NOTE — CONSULT NOTE ADULT - PROVIDER SPECIALTY LIST ADULT
Heme/Onc
Gastroenterology
Infectious Disease
Intervent Radiology
Addiction Medicine
Nephrology
Heme/Onc
Podiatry

## 2024-01-30 NOTE — CONSULT NOTE ADULT - CONSULT REQUESTED DATE/TIME
30-Jan-2024 09:27
29-Jan-2024 16:15
29-Jan-2024 17:32
30-Jan-2024 09:10
29-Jan-2024 13:54
29-Jan-2024 08:21
29-Jan-2024 13:22

## 2024-01-30 NOTE — PROGRESS NOTE ADULT - SUBJECTIVE AND OBJECTIVE BOX
POOJA DIANE  28y  Female       History of Present Illness: Patient was seen and examined today. Pt denied any complaints except for abd distension and discomfort.        Vital Signs Last 24 Hrs  T(C): 36.2 (30 Jan 2024 05:09), Max: 36.2 (30 Jan 2024 05:09)  T(F): 97.2 (30 Jan 2024 05:09), Max: 97.2 (30 Jan 2024 05:09)  HR: 99 (30 Jan 2024 05:09) (99 - 108)  BP: 97/59 (30 Jan 2024 05:09) (97/59 - 108/76)  BP(mean): --  RR: 18 (30 Jan 2024 05:09) (18 - 18)  SpO2: 97% (30 Jan 2024 05:09) (97% - 97%)        PHYSICAL EXAM:  GENERAL: NAD, well-groomed, well-developed  HEENT - NC/AT, pupils equal and reactive to light,  ; Moist mucous membranes, Good dentition, No lesions  NECK: Supple, No JVD  CHEST/LUNG: Clear to auscultation bilaterally; No rales, rhonchi, wheezing  HEART: Regular rate and rhythm; No murmurs, rubs, or gallops  ABDOMEN: Soft, but severely distended.   EXTREMITIES: +3-4 pitting edema to bilateral lower extremities   NEURO:  No Focal deficits, sensory and motor intact        acetaminophen     Tablet .. 650 milliGRAM(s) Oral every 6 hours PRN  albumin human 25% IVPB 200 milliLiter(s) IV Intermittent once  albumin human 25% IVPB 25 milliLiter(s) IV Intermittent every 8 hours  albuterol/ipratropium for Nebulization 3 milliLiter(s) Nebulizer every 6 hours  chlorhexidine 4% Liquid 1 Application(s) Topical <User Schedule>  furosemide   Injectable 40 milliGRAM(s) IV Push two times a day  hydrOXYzine hydrochloride 50 milliGRAM(s) Oral every 6 hours PRN  hydrOXYzine hydrochloride 100 milliGRAM(s) Oral at bedtime PRN  methadone    Tablet 10 milliGRAM(s) Oral once  ondansetron Injectable 4 milliGRAM(s) IV Push every 8 hours PRN  oseltamivir 30 milliGRAM(s) Oral two times a day  pantoprazole    Tablet 40 milliGRAM(s) Oral before breakfast  sodium bicarbonate 650 milliGRAM(s) Oral daily

## 2024-01-30 NOTE — CONSULT NOTE ADULT - SUBJECTIVE AND OBJECTIVE BOX
A 27 y/o female with pmhx of  current smoker, asthma, hep C , IVDA ,leukopenia,  ascites  presents with shortness of breath. Pt reports shortness of breath x 1 week, getting swollen everywhere and has blisters on her legs. Pt reports weight gain a lot of pounds. Pt reports s/p paracentesis 1 month ago at [facility] 5.5 L of fluids was removed. PT reports fever at home, not sure Tmax. PT with cough , productive x 1 week. PT states nausea/ vomining, NBNB. Pt with diarrhea, now resolved. Pt reports using IV heroin this am 1-2 bags, administered on her abdominal vein.  Pt reports itchy all the time.  Pt denies chest pain, burning with urination.,       Hem/Onc : History as above , known liver cirrhosis with splenomegaly with recurrent ascites , anasarca. noted with pancytopenia ,no prior hemogram available for comparison , Abdominal sonogram does not mention spleen size      Physical Exam :    pale , sick looking with anasarca  lungs : decreased BS bases  abdomen : massive ascites , spleen not palpable   Extremities : 3 - 4 plus pitting edema

## 2024-01-30 NOTE — CONSULT NOTE ADULT - REASON FOR ADMISSION
shortness of breath, ascites, anemia, influenza a , anasarca , thrombocytopenia

## 2024-01-30 NOTE — PROGRESS NOTE ADULT - SUBJECTIVE AND OBJECTIVE BOX
NEPHROLOGY FOLLOW UP NOTE    s/p paracentesis - 7.5l  worsening renal function     PAST MEDICAL & SURGICAL HISTORY:  Hepatitis C      Asthma      Anxiety and depression      IV drug abuse  heroin      No significant past surgical history        Allergies:  buprenorphine (Rash)  Suboxone (Rash)    Home Medications Reviewed    SOCIAL HISTORY:  Denies ETOH,Smoking,   FAMILY HISTORY:        REVIEW OF SYSTEMS:  All other review of systems is negative unless indicated above.    PHYSICAL EXAM:  Constitutional: NAD  HEENT: anicteric sclera, oropharynx clear, MMM  Neck: No JVD  Respiratory: b/l BS, few rales  Cardiovascular: S1, S2, RRR  Gastrointestinal: BS+, soft, NT/ND  Extremities: No cyanosis or clubbing. + peripheral edema + b/l LE ace wraps  Neurological: A/O x 3, no focal deficits  Psychiatric: Normal mood, normal affect  : No CVA tenderness. No doss.   Skin: No rashes    Hospital Medications:   MEDICATIONS  (STANDING):  albumin human 25% IVPB 200 milliLiter(s) IV Intermittent once  albumin human 25% IVPB 25 milliLiter(s) IV Intermittent every 8 hours  albuterol/ipratropium for Nebulization 3 milliLiter(s) Nebulizer every 6 hours  chlorhexidine 4% Liquid 1 Application(s) Topical <User Schedule>  furosemide   Injectable 40 milliGRAM(s) IV Push two times a day  oseltamivir 30 milliGRAM(s) Oral two times a day  pantoprazole    Tablet 40 milliGRAM(s) Oral before breakfast  sodium bicarbonate 650 milliGRAM(s) Oral daily        VITALS:  T(F): 97.2 (01-30-24 @ 05:09), Max: 97.2 (01-30-24 @ 05:09)  HR: 99 (01-30-24 @ 05:09)  BP: 97/59 (01-30-24 @ 05:09)  RR: 18 (01-30-24 @ 05:09)  SpO2: 97% (01-30-24 @ 05:09)  Wt(kg): --    Height (cm): 153.9 (01-30 @ 09:27)  Weight (kg): 70.3 (01-30 @ 09:27)  BMI (kg/m2): 29.7 (01-30 @ 09:27)  BSA (m2): 1.69 (01-30 @ 09:27)    LABS:  01-30    134<L>  |  107  |  44<H>  ----------------------------<  85  5.3<H>   |  13<L>  |  2.5<H>    Ca    6.8<L>      30 Jan 2024 04:30  Phos  7.3     01-30    TPro  4.4<L>  /  Alb  1.4<L>  /  TBili  <0.2  /  DBili      /  AST  54<H>  /  ALT  30  /  AlkPhos  1028<H>  01-30        Urine Studies:  Urinalysis Basic - ( 30 Jan 2024 04:30 )    Color:  / Appearance:  / SG:  / pH:   Gluc: 85 mg/dL / Ketone:   / Bili:  / Urobili:    Blood:  / Protein:  / Nitrite:    Leuk Esterase:  / RBC:  / WBC    Sq Epi:  / Non Sq Epi:  / Bacteria:       Creatinine, Random Urine: 123 mg/dL (01-29 @ 19:13)  Protein/Creatinine Ratio Calculation: 1.8 Ratio (01-29 @ 19:13)  Sodium, Random Urine: <20.0 mmoL/L (01-29 @ 19:13)  Osmolality, Random Urine: 349 mos/kg (01-29 @ 19:13)      RADIOLOGY & ADDITIONAL STUDIES:

## 2024-01-30 NOTE — PROGRESS NOTE ADULT - ASSESSMENT
28yFemale pmh hepatitis C, IVDA, presents for increased abdominal distention, used heroin recently.    #ascites, liver cirrhosis from hepatitis C  #transaminitis   Ultrasonographic evaluation for ascites. 4 quadrants of the abdomen and   pelvis evaluated which demonstrates moderate abdominal pelvic free fluid  Rec  -check acute hepatitis panel, hep C treatment unclear  -Hepatic encephalopathy-none  -Abdominal ultrasound AFP every 6 months for HCC screening  -EGD outpatient for esophageal varices screening   -Follow up with our [facility] located at [address] [place] [address]. Phone Number: [phone] for possible liver transplant referral  -substance abuse cessation advised  -Check Hep A Ab, Hep B SAg, Hep B SAB, Hep C Ab, KRISHNA, Smooth Muscle antibody, KRISHNA, Ceruloplasmin, Ferritin, Transferrin Saturation, SPEP, Alpha fetoprotein,   -Ultrasound guided diagnostic paracentesis: Obtain serum albumin same day as paracentesis, please transfuse albumin 8g/l if more than 5L removed plan for diagnostic therapeutic tap today  -Obtain fluid studies: Paracentesis Panel which includes Cultures, Cell count, albumin, total protein, cytology, AFB smear and culture. Separate order of amylase fluid and triglycerides fluid  -Further treatment is based on diagnosis.  -2g Sodium diet  -diuretics after para  -Daily weights    #anemia no gross GI bleeding  Rec  -outpatient GI workup  - Follow up with our [facility] located at [address] [place] [address]. Phone Number: [phone]

## 2024-01-30 NOTE — PROGRESS NOTE ADULT - SUBJECTIVE AND OBJECTIVE BOX
Pt interviewed, examined and EMR chart reviewed.    Follow up of Opiate Addiction. Pt is on Methadone prn. Pt is not doing well and complaining of aopiate withdrawal.            MEDICATIONS  (STANDING):  albuterol/ipratropium for Nebulization 3 milliLiter(s) Nebulizer every 6 hours  chlorhexidine 4% Liquid 1 Application(s) Topical <User Schedule>  furosemide   Injectable 40 milliGRAM(s) IV Push two times a day  methadone    Tablet 10 milliGRAM(s) Oral once  oseltamivir 30 milliGRAM(s) Oral two times a day  pantoprazole    Tablet 40 milliGRAM(s) Oral before breakfast  sodium bicarbonate 650 milliGRAM(s) Oral daily    MEDICATIONS  (PRN):  acetaminophen     Tablet .. 650 milliGRAM(s) Oral every 6 hours PRN Temp greater or equal to 38C (100.4F), Mild Pain (1 - 3)  hydrOXYzine hydrochloride 50 milliGRAM(s) Oral every 6 hours PRN Anxiety  hydrOXYzine hydrochloride 100 milliGRAM(s) Oral at bedtime PRN insomnia  ondansetron Injectable 4 milliGRAM(s) IV Push every 8 hours PRN Nausea and/or Vomiting      Vital Signs Last 24 Hrs  T(C): 36.2 (30 Jan 2024 05:09), Max: 36.2 (29 Jan 2024 14:39)  T(F): 97.2 (30 Jan 2024 05:09), Max: 97.2 (29 Jan 2024 14:39)  HR: 99 (30 Jan 2024 05:09) (97 - 108)  BP: 97/59 (30 Jan 2024 05:09) (97/59 - 119/61)  BP(mean): --  RR: 18 (30 Jan 2024 05:09) (18 - 18)  SpO2: 97% (30 Jan 2024 05:09) (97% - 99%)        PHYSICAL EXAM:    Constitutional: NAD, well-groomed, well-developed  HEENT: PERRLA, EOMI, Normal Hearing,   Neck: No LAD, No JVD  Back: Normal spine flexure, No CVA tenderness  Respiratory: CTAB/L  Cardiovascular: S1 and S2, RRR, no M/G/R  Gastrointestinal: BS+, distended soft,   Extremities: positive peripheral edema legs in wraps.  Neurological: A/O x 3, no focal deficits    LABS:                        7.8    2.79  )-----------( 83       ( 28 Jan 2024 13:30 )             25.1     01-30    134<L>  |  107  |  44<H>  ----------------------------<  85  5.3<H>   |  13<L>  |  2.5<H>    Ca    6.8<L>      30 Jan 2024 04:30  Phos  7.3     01-30  Mg     1.8     01-28    TPro  4.4<L>  /  Alb  1.4<L>  /  TBili  <0.2  /  DBili  x   /  AST  54<H>  /  ALT  30  /  AlkPhos  1028<H>  01-30    PT/INR - ( 28 Jan 2024 13:30 )   PT: 11.40 sec;   INR: 1.00 ratio         PTT - ( 28 Jan 2024 13:30 )  PTT:33.0 sec  Urinalysis Basic - ( 30 Jan 2024 04:30 )    Color: x / Appearance: x / SG: x / pH: x  Gluc: 85 mg/dL / Ketone: x  / Bili: x / Urobili: x   Blood: x / Protein: x / Nitrite: x   Leuk Esterase: x / RBC: x / WBC x   Sq Epi: x / Non Sq Epi: x / Bacteria: x      Drug Screen Urine:  Alcohol Level        RADIOLOGY & ADDITIONAL STUDIES:

## 2024-01-30 NOTE — CONSULT NOTE ADULT - SUBJECTIVE AND OBJECTIVE BOX
OPOJA DIANE  28y, Female  Allergies    buprenorphine (Rash)  Suboxone (Rash)    Intolerances        LOS  2d    HPI  HPI:  A 27 y/o female with pmhx of  current smoker, asthma, hep C , IVDA ,leukopenia,  ascites  presents with shortness of breath. Pt reports shortness of breath x 1 week, getting swollen everywhere and has blisters on her legs. Pt reports weight gain a lot of pounds. Pt reports s/p paracentesis 1 month ago at [facility] 5.5 L of fluids was removed. PT reports fever at home, not sure Tmax. PT with cough , productive x 1 week. PT states nausea/ vomining, NBNB. Pt with diarrhea, now resolved. Pt reports using IV heroin this am 1-2 bags, administered on her abdominal vein.  Pt reports itchy all the time.  Pt denies chest pain, burning with urination.,        (28 Jan 2024 16:57)      INFECTIOUS DISEASE HISTORY:  Hospital course-  ID consulted for antimicrobial recommendations.     Prior hospital charts reviewed [Yes]  Primary team notes reviewed [Yes]  Other consultant notes reviewed [Yes]    REVIEW OF SYSTEMS:  CONSTITUTIONAL: No fever or chills  HEENT: No sore throat  RESPIRATORY: No cough, no shortness of breath  CARDIOVASCULAR: No chest pain or palpitations  GASTROINTESTINAL: No abdominal or epigastric pain  GENITOURINARY: No dysuria  NEUROLOGICAL: No headache/dizziness  MSK: No joint pain, erythema, or swelling; no back pain  SKIN: No itching, rashes  All other ROS negative except noted above    PAST MEDICAL & SURGICAL HISTORY:  Hepatitis C      Asthma      Anxiety and depression      IV drug abuse  heroin      No significant past surgical history          SOCIAL HISTORY:  - No recent travel    FAMILY HISTORY: No pertinent PMH for first degree relatives.       ANTIMICROBIALS:  oseltamivir 30 two times a day      ANTIMICROBIALS (past 90 days):  MEDICATIONS  (STANDING):  oseltamivir   30 milliGRAM(s) Oral (01-30-24 @ 07:49)   30 milliGRAM(s) Oral (01-29-24 @ 21:15)   30 milliGRAM(s) Oral (01-29-24 @ 08:02)   30 milliGRAM(s) Oral (01-28-24 @ 20:26)        OTHER MEDS:   MEDICATIONS  (STANDING):  acetaminophen     Tablet .. 650 every 6 hours PRN  albuterol/ipratropium for Nebulization 3 every 6 hours  furosemide   Injectable 40 two times a day  hydrOXYzine hydrochloride 50 every 6 hours PRN  hydrOXYzine hydrochloride 100 at bedtime PRN  ondansetron Injectable 4 every 8 hours PRN  pantoprazole    Tablet 40 before breakfast      VITALS:  Vital Signs Last 24 Hrs  T(F): 97.2 (01-30-24 @ 05:09), Max: 98.8 (01-28-24 @ 20:35)    Vital Signs Last 24 Hrs  HR: 99 (01-30-24 @ 05:09) (97 - 108)  BP: 97/59 (01-30-24 @ 05:09) (97/59 - 119/61)  RR: 18 (01-30-24 @ 05:09)  SpO2: 97% (01-30-24 @ 05:09) (97% - 99%)  Wt(kg): --    EXAM:  GENERAL: NAD, lying in bed  HEAD: No head lesions  NECK: Supple, nontender to palpation; no JVD  CHEST/LUNG: Clear to auscultation bilaterally  HEART: S1 S2  ABDOMEN: Soft, nontender, nondistended; normoactive bowel sounds  EXTREMITIES: No clubbing, cyanosis, or petal edema  NERVOUS SYSTEM: Alert and oriented to person, time, place and situation, speech clear. No focal deficits   MSK: No joint erythema, swelling or pain  SKIN: No rashes or lesions, no superficial thrombophlebitis    Labs:                        7.8    2.79  )-----------( 83       ( 28 Jan 2024 13:30 )             25.1     01-28    136  |  109  |  42<H>  ----------------------------<  86  5.2<H>   |  17  |  2.1<H>    Ca    7.4<L>      28 Jan 2024 13:30  Mg     1.8     01-28    TPro  4.5<L>  /  Alb  1.6<L>  /  TBili  0.2  /  DBili  x   /  AST  43<H>  /  ALT  32  /  AlkPhos  1180<H>  01-28      WBC Trend:  WBC Count: 2.79 (01-28-24 @ 13:30)      Auto Neutrophil #: 1.96 K/uL (01-28-24 @ 13:30)      Creatine Trend:  Creatinine: 2.1 (01-28)      Liver Biochemical Testing Trend:  Alanine Aminotransferase (ALT/SGPT): 32 (01-28)  Aspartate Aminotransferase (AST/SGOT): 43 (01-28-24 @ 13:30)  Bilirubin Total: 0.2 (01-28)      Trend LDH  07-14-22 @ 12:50  271<H>  06-27-22 @ 06:47  173      Auto Eosinophil %: 0.0 % (01-28-24 @ 13:30)      Urinalysis Basic - ( 28 Jan 2024 13:30 )    Color: x / Appearance: x / SG: x / pH: x  Gluc: 86 mg/dL / Ketone: x  / Bili: x / Urobili: x   Blood: x / Protein: x / Nitrite: x   Leuk Esterase: x / RBC: x / WBC x   Sq Epi: x / Non Sq Epi: x / Bacteria: x        MICROBIOLOGY:    Female    Culture - Blood (collected 28 Jan 2024 13:30)  Source: .Blood Blood  Preliminary Report (29 Jan 2024 23:01):    No growth at 24 hours    Culture - Blood (collected 28 Jan 2024 13:30)  Source: .Blood Blood  Preliminary Report (29 Jan 2024 23:01):    No growth at 24 hours            HIV-1/2 Combo Result: Nonreact (01-28-24 @ 13:30)  HIV-1/2 Combo Result: Nonreact (06-29-22 @ 10:29)    INFLAMMATORY MARKERS      RADIOLOGY & ADDITIONAL TESTS:  I have personally reviewed the imagings.  CXR    < from: US Retroperitoneal B-Scan Limited (01.29.24 @ 10:15) >  INTERPRETATION:  CLINICAL INFORMATION: Worsening renal function    COMPARISON: CT scan dated June 26, 2022    TECHNIQUE: Sonography of the kidneys and bladder.    FINDINGS:    Right kidney: 10.5 x 5.2 x 5.6 cm. No hydronephrosis or calculi.    Left kidney:  11.4 x 5.7 x 5 cm. No hydronephrosis or calculi.    IMPRESSION:    Normal renal ultrasound.    < end of copied text >  < from: US Abdomen Complete (US Abdomen Complete .) (01.28.24 @ 18:34) >    FINDINGS/  IMPRESSION:        Ultrasonographic evaluation for ascites. 4 quadrants of the abdomen and   pelvis evaluated which demonstrates moderate abdominal pelvic free fluid.    < end of copied text >  < from: Xray Chest 1 View-PORTABLE IMMEDIATE (01.28.24 @ 13:02) >    Findings:    Support devices: None.    Cardiac/mediastinum/hilum: Unremarkable.    Lung parenchyma/Pleura: Within normal limits.    Skeleton/soft tissues: Unremarkable.    Impression:    No radiographic evidence of acute cardiopulmonary disease.    --- End of Report ---    < end of copied text >    CT      CARDIOLOGY TESTING  12 Lead ECG:   Ventricular Rate 114 BPM    Atrial Rate 114 BPM    P-R Interval 156 ms    QRS Duration 72 ms    Q-T Interval 308 ms    QTC Calculation(Bazett) 424 ms    P Axis 72 degrees    R Axis 106 degrees    T Axis 43 degrees    Diagnosis Line Sinus tachycardia  Rightward axis  Low voltage QRS  Nonspecific T wave abnormality  Abnormal ECG    Confirmed by BRANDON BELL MD ([id number]) on 1/29/2024 7:10:05 AM (01-28-24 @ 13:14)             POOJA DIANE  28y, Female  Allergies    buprenorphine (Rash)  Suboxone (Rash)    Intolerances        LOS  2d    HPI  HPI:  A 29 y/o female with pmhx of  current smoker, asthma, hep C , IVDA ,leukopenia,  ascites  presents with shortness of breath. Pt reports shortness of breath x 1 week, getting swollen everywhere and has blisters on her legs. Pt reports weight gain a lot of pounds. Pt reports s/p paracentesis 1 month ago at [facility] 5.5 L of fluids was removed. PT reports fever at home, not sure Tmax. PT with cough , productive x 1 week. PT states nausea/ vomining, NBNB. Pt with diarrhea, now resolved. Pt reports using IV heroin this am 1-2 bags, administered on her abdominal vein.  Pt reports itchy all the time.  Pt denies chest pain, burning with urination.,        (28 Jan 2024 16:57)      INFECTIOUS DISEASE HISTORY:  Hospital course-  ID consulted for antimicrobial recommendations.     Prior hospital charts reviewed [Yes]  Primary team notes reviewed [Yes]  Other consultant notes reviewed [Yes]    REVIEW OF SYSTEMS:  CONSTITUTIONAL: No fever or chills  HEENT: No sore throat  RESPIRATORY: No cough, no shortness of breath  CARDIOVASCULAR: No chest pain or palpitations  GASTROINTESTINAL: No abdominal or epigastric pain  GENITOURINARY: No dysuria  NEUROLOGICAL: No headache/dizziness  MSK: No joint pain, erythema, or swelling; no back pain  SKIN: No itching, rashes  All other ROS negative except noted above    PAST MEDICAL & SURGICAL HISTORY:  Hepatitis C      Asthma      Anxiety and depression      IV drug abuse  heroin      No significant past surgical history          SOCIAL HISTORY:  - No recent travel    FAMILY HISTORY: No pertinent PMH for first degree relatives.       ANTIMICROBIALS:  oseltamivir 30 two times a day      ANTIMICROBIALS (past 90 days):  MEDICATIONS  (STANDING):  oseltamivir   30 milliGRAM(s) Oral (01-30-24 @ 07:49)   30 milliGRAM(s) Oral (01-29-24 @ 21:15)   30 milliGRAM(s) Oral (01-29-24 @ 08:02)   30 milliGRAM(s) Oral (01-28-24 @ 20:26)        OTHER MEDS:   MEDICATIONS  (STANDING):  acetaminophen     Tablet .. 650 every 6 hours PRN  albuterol/ipratropium for Nebulization 3 every 6 hours  furosemide   Injectable 40 two times a day  hydrOXYzine hydrochloride 50 every 6 hours PRN  hydrOXYzine hydrochloride 100 at bedtime PRN  ondansetron Injectable 4 every 8 hours PRN  pantoprazole    Tablet 40 before breakfast      VITALS:  Vital Signs Last 24 Hrs  T(F): 97.2 (01-30-24 @ 05:09), Max: 98.8 (01-28-24 @ 20:35)    Vital Signs Last 24 Hrs  HR: 99 (01-30-24 @ 05:09) (97 - 108)  BP: 97/59 (01-30-24 @ 05:09) (97/59 - 119/61)  RR: 18 (01-30-24 @ 05:09)  SpO2: 97% (01-30-24 @ 05:09) (97% - 99%)  Wt(kg): --    EXAM:  GENERAL: NAD, lying in bed  HEAD: No head lesions  NECK: Supple  CHEST/LUNG: Shallow breath sounds bilaterally.   HEART: S1 S2  ABDOMEN: Soft, Distended. Mildly tender.   EXTREMITIES: Bilateral diffuse edema on lower extremities, wrapped in dressings.   NERVOUS SYSTEM: Alert and oriented to person  MSK: No joint erythema, swelling or pain  SKIN: No rashes or lesions, no superficial thrombophlebitis    Labs:                        7.8    2.79  )-----------( 83       ( 28 Jan 2024 13:30 )             25.1     01-28    136  |  109  |  42<H>  ----------------------------<  86  5.2<H>   |  17  |  2.1<H>    Ca    7.4<L>      28 Jan 2024 13:30  Mg     1.8     01-28    TPro  4.5<L>  /  Alb  1.6<L>  /  TBili  0.2  /  DBili  x   /  AST  43<H>  /  ALT  32  /  AlkPhos  1180<H>  01-28      WBC Trend:  WBC Count: 2.79 (01-28-24 @ 13:30)      Auto Neutrophil #: 1.96 K/uL (01-28-24 @ 13:30)      Creatine Trend:  Creatinine: 2.1 (01-28)      Liver Biochemical Testing Trend:  Alanine Aminotransferase (ALT/SGPT): 32 (01-28)  Aspartate Aminotransferase (AST/SGOT): 43 (01-28-24 @ 13:30)  Bilirubin Total: 0.2 (01-28)      Trend LDH  07-14-22 @ 12:50  271<H>  06-27-22 @ 06:47  173      Auto Eosinophil %: 0.0 % (01-28-24 @ 13:30)      Urinalysis Basic - ( 28 Jan 2024 13:30 )    Color: x / Appearance: x / SG: x / pH: x  Gluc: 86 mg/dL / Ketone: x  / Bili: x / Urobili: x   Blood: x / Protein: x / Nitrite: x   Leuk Esterase: x / RBC: x / WBC x   Sq Epi: x / Non Sq Epi: x / Bacteria: x        MICROBIOLOGY:    Female    Culture - Blood (collected 28 Jan 2024 13:30)  Source: .Blood Blood  Preliminary Report (29 Jan 2024 23:01):    No growth at 24 hours    Culture - Blood (collected 28 Jan 2024 13:30)  Source: .Blood Blood  Preliminary Report (29 Jan 2024 23:01):    No growth at 24 hours            HIV-1/2 Combo Result: Nonreact (01-28-24 @ 13:30)  HIV-1/2 Combo Result: Nonreact (06-29-22 @ 10:29)    INFLAMMATORY MARKERS      RADIOLOGY & ADDITIONAL TESTS:  I have personally reviewed the imagings.  CXR    < from: US Retroperitoneal B-Scan Limited (01.29.24 @ 10:15) >  INTERPRETATION:  CLINICAL INFORMATION: Worsening renal function    COMPARISON: CT scan dated June 26, 2022    TECHNIQUE: Sonography of the kidneys and bladder.    FINDINGS:    Right kidney: 10.5 x 5.2 x 5.6 cm. No hydronephrosis or calculi.    Left kidney:  11.4 x 5.7 x 5 cm. No hydronephrosis or calculi.    IMPRESSION:    Normal renal ultrasound.    < end of copied text >  < from: US Abdomen Complete (US Abdomen Complete .) (01.28.24 @ 18:34) >    FINDINGS/  IMPRESSION:        Ultrasonographic evaluation for ascites. 4 quadrants of the abdomen and   pelvis evaluated which demonstrates moderate abdominal pelvic free fluid.    < end of copied text >  < from: Xray Chest 1 View-PORTABLE IMMEDIATE (01.28.24 @ 13:02) >    Findings:    Support devices: None.    Cardiac/mediastinum/hilum: Unremarkable.    Lung parenchyma/Pleura: Within normal limits.    Skeleton/soft tissues: Unremarkable.    Impression:    No radiographic evidence of acute cardiopulmonary disease.    --- End of Report ---    < end of copied text >    CT      CARDIOLOGY TESTING  12 Lead ECG:   Ventricular Rate 114 BPM    Atrial Rate 114 BPM    P-R Interval 156 ms    QRS Duration 72 ms    Q-T Interval 308 ms    QTC Calculation(Bazett) 424 ms    P Axis 72 degrees    R Axis 106 degrees    T Axis 43 degrees    Diagnosis Line Sinus tachycardia  Rightward axis  Low voltage QRS  Nonspecific T wave abnormality  Abnormal ECG    Confirmed by BRANDON BELL MD ([id number]) on 1/29/2024 7:10:05 AM (01-28-24 @ 13:14)             Orientation to room/Bed in low position, brakes on/Side rails x 2 or 4 up, assess large gaps, such that a patient could get extremity or other body part entrapped, use additional safety procedures/Call light is within reach, educate patient/family on its functionality/Patient and family education available to parents and patient/Educate patient/parents of falls protocol precautions

## 2024-01-30 NOTE — PROGRESS NOTE ADULT - SUBJECTIVE AND OBJECTIVE BOX
28yFemale  Being followed for ascites, hep C cirrhosis   Interval history: Patient denies nausea, vomiting, hematemesis, melena, blood in stool, diarrhea, constipation, abdominal pain. Patient awaiting paracentesis.       PAST MEDICAL & SURGICAL HISTORY:  Hepatitis C      Asthma      Anxiety and depression      IV drug abuse  heroin      No significant past surgical history                Social History: +substance abuse            MEDICATIONS  (STANDING):  albumin human 25% IVPB 25 milliLiter(s) IV Intermittent every 8 hours  albuterol/ipratropium for Nebulization 3 milliLiter(s) Nebulizer every 6 hours  chlorhexidine 4% Liquid 1 Application(s) Topical <User Schedule>  furosemide   Injectable 40 milliGRAM(s) IV Push two times a day  methadone    Tablet 10 milliGRAM(s) Oral once  oseltamivir 30 milliGRAM(s) Oral two times a day  pantoprazole    Tablet 40 milliGRAM(s) Oral before breakfast  sodium bicarbonate 650 milliGRAM(s) Oral daily    MEDICATIONS  (PRN):  acetaminophen     Tablet .. 650 milliGRAM(s) Oral every 6 hours PRN Temp greater or equal to 38C (100.4F), Mild Pain (1 - 3)  hydrOXYzine hydrochloride 50 milliGRAM(s) Oral every 6 hours PRN Anxiety  hydrOXYzine hydrochloride 100 milliGRAM(s) Oral at bedtime PRN insomnia  ondansetron Injectable 4 milliGRAM(s) IV Push every 8 hours PRN Nausea and/or Vomiting      Allergies:   buprenorphine (Rash)  Suboxone (Rash)  Intolerances          REVIEW OF SYSTEMS:  General:  No weight loss, fevers, or chills.  Eyes:  No reported pain or visual changes  ENT:  No sore throat or runny nose.  NECK: No stiffness   CV:  No chest pain or palpitations.  Resp:  No shortness of breath, cough  GI:  No abdominal pain, nausea, vomiting, dysphagia, diarrhea or constipation. No rectal bleeding, melena, or hematemesis.  Muscle:  No aches or weakness  Neuro:  No tingling, numbness           VITAL SIGNS:   T(F): 97.2 (01-30-24 @ 05:09), Max: 97.2 (01-29-24 @ 14:39)  HR: 99 (01-30-24 @ 05:09) (97 - 108)  BP: 97/59 (01-30-24 @ 05:09) (97/59 - 119/61)  RR: 18 (01-30-24 @ 05:09) (18 - 18)  SpO2: 97% (01-30-24 @ 05:09) (97% - 99%)    PHYSICAL EXAM:  GENERAL: AAOx3, no acute distress.  HEAD:  Atraumatic, Normocephalic  EYES: conjunctiva and sclera clear  NECK: Supple, no JVD or thyromegaly  CHEST/LUNG: Clear to auscultation bilaterally; No wheeze, rhonchi, or rales  HEART: Regular rate and rhythm; normal S1, S2, No murmurs.  ABDOMEN: Soft, nontender, +distended; Bowel sounds present  NEUROLOGY: No asterixis or tremor.   SKIN: Intact, no jaundice            LABS:                        7.8    2.79  )-----------( 83       ( 28 Jan 2024 13:30 )             25.1     01-30    134<L>  |  107  |  44<H>  ----------------------------<  85  5.3<H>   |  13<L>  |  2.5<H>    Ca    6.8<L>      30 Jan 2024 04:30  Phos  7.3     01-30  Mg     1.8     01-28    TPro  4.4<L>  /  Alb  1.4<L>  /  TBili  <0.2  /  DBili  x   /  AST  54<H>  /  ALT  30  /  AlkPhos  1028<H>  01-30    LIVER FUNCTIONS - ( 30 Jan 2024 04:30 )  Alb: 1.4 g/dL / Pro: 4.4 g/dL / ALK PHOS: 1028 U/L / ALT: 30 U/L / AST: 54 U/L / GGT: x           PT/INR - ( 28 Jan 2024 13:30 )   PT: 11.40 sec;   INR: 1.00 ratio         PTT - ( 28 Jan 2024 13:30 )  PTT:33.0 sec    IMAGING:    < from: US Abdomen Complete (US Abdomen Complete .) (01.28.24 @ 18:34) >    ACC: [id number] EXAM:  US ABDOMEN COMPLETE   ORDERED BY: KYRA PIMENTEL     PROCEDURE DATE:  01/28/2024          INTERPRETATION:  CLINICAL INFORMATION: Ascites.    COMPARISON: 7/23/2022    TECHNIQUE: Sonography of the abdomen.    FINDINGS/  IMPRESSION:        Ultrasonographic evaluation for ascites. 4 quadrants of the abdomen and   pelvis evaluated which demonstrates moderate abdominal pelvic free fluid.    --- End of Report ---            CARLOS CARRILLO MD; Attending Radiologist  This document has been electronically signed. Jan 28 2024  7:40PM    < end of copied text >

## 2024-01-30 NOTE — CONSULT NOTE ADULT - ASSESSMENT
28 year old female with history of Hep C , IVDA , cirrhosis with splenomegaly , anasarca  Pancytopenia secondary to liver disease and hypersplenism  Suggest : rule out nutritional deficiency ,                 rule out iron deficiency ( GI bleeding , Varices ? )  check B12 , MMA , retic , iron studies.  follow as outpatient

## 2024-01-30 NOTE — PROGRESS NOTE ADULT - ASSESSMENT
27 y/o female with pmhx of  current smoker, asthma, hep C , IVDA ,leukopenia,  ascites  presents with shortness of breath. Pt reports shortness of breath x 1 week, getting swollen everywhere and has blisters on her legs. Pt reports weight gain a lot of pounds. Pt reports s/p paracentesis 1 month ago at [facility] 5.5 L of fluids was removed. PT reports fever at home, not sure Tmax. PT with cough , productive x 1 week. PT states nausea/ vomining, NBNB. Pt with diarrhea, now resolved. Pt reports using IV heroin this am 1-2 bags, administered on her abdominal vein.  Pt reports itchy all the time.  Pt denies chest pain, burning with urination.  Pt does not take any medication at home because she did not follow up.     # HCV.   # Liver cirrhosis.   # IV drug abuse.   # Moderate ascitics.   -HIV screening negative.    -GI recs appreciated:    - Follow hepatitis panel,  - follow KRISHNA SMA AMA Ig G and M  - follow Serum light chains  - follow ACE level  - follow CMV EBV HSV PCR  - follow AFP Ca 19 - 9  - Will need EGD for variceal screening  - MR abdomen w/wo MRCP when RENETTA resolves.  - s/p paracentesis last month at [facility] where 5.5 L were removed. Today 1.30.24, another paracentesis performed, 7.5 L of fluid removed.   follow paracentesis analysis.  - Addiction medicine recs appreciated: start methadone taper protoco    # RENETTA: pre-renal vs hepatorenal syndrome?   #proteinuria, microhematuria, pyuria and bacteriuria   #anasarca  #hyperkalemia  # metabolic acidosis  - Nephrology recs appreciated:   -lasix 40mg iv q12h  Cr 8/2022 - 0.9  renal sonogram: negative.   -start albumin 25% 25g iv q8h x 72 hours. Extra albumin given today after paracentesis.   -no aldactone until renal function stabilized and K+ status improved  - sodium bicarbonate 650mg po qd  -lokelma 10g po x 1  - follow urine culture and urine studies, including Toni+, Uosm, U protein/Cr to calculate FeNa% and quantify proteinuria  - follow  C3, C4, ANCA, KRISHNA, Anti GBM    # Flu A infection.   CXR: No radiographic evidence of acute cardiopulmonary disease.  Tamiflu renally dosed     # Bilateral foot ulcers/ blisters.   ID recs appreciated: no need for ABX.   Wound/ podiatry consult.   use Xeroform Guaze. Can wash with saline. Change them daily. Leg elevation

## 2024-01-30 NOTE — CONSULT NOTE ADULT - ASSESSMENT
ASSESSMENT  28y F admitted with Generalized edema      Hepatitis C    Asthma    Anxiety and depression    IV drug abuse        IMPRESSION  #  #Severe Sepsis on admission  #Lactic acidosis  #Hyponatremia   #Obesity BMI (kg/m2): 29.7  #DM     RECOMMENDATIONS  This is an incomplete consult note. All final recommendations to follow after interview and examination of the patient. Please follow recommendations noted below.    If any questions, please send a message or call on [employer] Teams  Please continue to update ID with any pertinent new laboratory or radiographic findings.    Geovani Yan M.D  Infectious Diseases Attending/   [employer] at [facility]   ASSESSMENT  This is a 27 y/o female with pmh asthma, hep C, polysubstance use disorder, leukopenia,  ascites  presents with shortness of breath.    IMPRESSION  #Influenza A Infection  #Bilateral lower extremity edema and skin tears.  #Hepatitis C, Poly substance use (IV drug user)   #Liver cirrhosis and ascites   #Pancytopenia secondary to liver disease and hypersplenism  #transaminitis   #CKD 3- Possible hepatorenal syndrome  #Obesity BMI (kg/m2): 29.7  HIV screen negative    RECOMMENDATIONS  -Finish 5 days of Tamiflu 30 mg daily.   -No other antimicrobials needed currently.   -Screen for serologies for Hep B and C.   -Planned for therapeutic and diagnostic tap for the ascites. Follow up with the studies once obtained. Less concerns for SBP currently.   -Would need to be evaluated by GI for EGD for esophageal varices.   -Please obtain wound care evaluation for the lower extremities. In the interim can use dressings- Xeroform Guaze. Can wash with saline. Change them daily. Leg elevation. Diuresis as per the primary team.     If any questions, please send a message or call on [employer] Teams  Please continue to update ID with any pertinent new laboratory or radiographic findings.    Goevani Yan M.D  Infectious Diseases Attending/   [employer] at [facility]

## 2024-01-31 LAB
ALBUMIN FLD-MCNC: <0.2 G/DL — SIGNIFICANT CHANGE UP
ALBUMIN SERPL ELPH-MCNC: 2.2 G/DL — LOW (ref 3.5–5.2)
ALP SERPL-CCNC: 714 U/L — HIGH (ref 30–115)
ALT FLD-CCNC: 21 U/L — SIGNIFICANT CHANGE UP (ref 0–41)
AMYLASE FLD-CCNC: 17 U/L — SIGNIFICANT CHANGE UP
ANION GAP SERPL CALC-SCNC: 15 MMOL/L — HIGH (ref 7–14)
AST SERPL-CCNC: 34 U/L — SIGNIFICANT CHANGE UP (ref 0–41)
AUTO DIFF PNL BLD: NEGATIVE — SIGNIFICANT CHANGE UP
BASOPHILS # BLD AUTO: 0 K/UL — SIGNIFICANT CHANGE UP (ref 0–0.2)
BASOPHILS NFR BLD AUTO: 0 % — SIGNIFICANT CHANGE UP (ref 0–1)
BILIRUB SERPL-MCNC: 0.3 MG/DL — SIGNIFICANT CHANGE UP (ref 0.2–1.2)
BUN SERPL-MCNC: 37 MG/DL — HIGH (ref 10–20)
C-ANCA SER-ACNC: NEGATIVE — SIGNIFICANT CHANGE UP
C3 SERPL-MCNC: 66 MG/DL — LOW (ref 81–157)
C4 SERPL-MCNC: 26 MG/DL — SIGNIFICANT CHANGE UP (ref 13–39)
CALCIUM SERPL-MCNC: 6.8 MG/DL — LOW (ref 8.4–10.5)
CHLORIDE SERPL-SCNC: 105 MMOL/L — SIGNIFICANT CHANGE UP (ref 98–110)
CO2 SERPL-SCNC: 15 MMOL/L — LOW (ref 17–32)
CREAT SERPL-MCNC: 2.1 MG/DL — HIGH (ref 0.7–1.5)
EGFR: 32 ML/MIN/1.73M2 — LOW
EOSINOPHIL # BLD AUTO: 0 K/UL — SIGNIFICANT CHANGE UP (ref 0–0.7)
EOSINOPHIL NFR BLD AUTO: 0 % — SIGNIFICANT CHANGE UP (ref 0–8)
GLUCOSE FLD-MCNC: 91 MG/DL — SIGNIFICANT CHANGE UP
GLUCOSE SERPL-MCNC: 86 MG/DL — SIGNIFICANT CHANGE UP (ref 70–99)
GRAM STN FLD: SIGNIFICANT CHANGE UP
HCT VFR BLD CALC: 17.8 % — LOW (ref 37–47)
HCT VFR BLD CALC: 20.6 % — LOW (ref 37–47)
HGB BLD-MCNC: 5.9 G/DL — CRITICAL LOW (ref 12–16)
HGB BLD-MCNC: 6.6 G/DL — CRITICAL LOW (ref 12–16)
IMM GRANULOCYTES NFR BLD AUTO: 0.7 % — HIGH (ref 0.1–0.3)
IRON SATN MFR SERPL: 55 UG/DL — SIGNIFICANT CHANGE UP (ref 35–150)
IRON SATN MFR SERPL: 73 % — HIGH (ref 15–50)
LDH SERPL L TO P-CCNC: 42 U/L — SIGNIFICANT CHANGE UP
LYMPHOCYTES # BLD AUTO: 0.55 K/UL — LOW (ref 1.2–3.4)
LYMPHOCYTES # BLD AUTO: 37.2 % — SIGNIFICANT CHANGE UP (ref 20.5–51.1)
MAGNESIUM SERPL-MCNC: 1.7 MG/DL — LOW (ref 1.8–2.4)
MCHC RBC-ENTMCNC: 27.6 PG — SIGNIFICANT CHANGE UP (ref 27–31)
MCHC RBC-ENTMCNC: 28.2 PG — SIGNIFICANT CHANGE UP (ref 27–31)
MCHC RBC-ENTMCNC: 32 G/DL — SIGNIFICANT CHANGE UP (ref 32–37)
MCHC RBC-ENTMCNC: 33.1 G/DL — SIGNIFICANT CHANGE UP (ref 32–37)
MCV RBC AUTO: 85.2 FL — SIGNIFICANT CHANGE UP (ref 81–99)
MCV RBC AUTO: 86.2 FL — SIGNIFICANT CHANGE UP (ref 81–99)
MONOCYTES # BLD AUTO: 0.1 K/UL — SIGNIFICANT CHANGE UP (ref 0.1–0.6)
MONOCYTES NFR BLD AUTO: 6.8 % — SIGNIFICANT CHANGE UP (ref 1.7–9.3)
MPO AB + PR3 PNL SER: SIGNIFICANT CHANGE UP
NEUTROPHILS # BLD AUTO: 0.82 K/UL — LOW (ref 1.4–6.5)
NEUTROPHILS NFR BLD AUTO: 55.3 % — SIGNIFICANT CHANGE UP (ref 42.2–75.2)
NRBC # BLD: 0 /100 WBCS — SIGNIFICANT CHANGE UP (ref 0–0)
NRBC # BLD: 0 /100 WBCS — SIGNIFICANT CHANGE UP (ref 0–0)
P-ANCA SER-ACNC: NEGATIVE — SIGNIFICANT CHANGE UP
PHOSPHATE SERPL-MCNC: 6 MG/DL — HIGH (ref 2.1–4.9)
PLATELET # BLD AUTO: 40 K/UL — LOW (ref 130–400)
PLATELET # BLD AUTO: 57 K/UL — LOW (ref 130–400)
PMV BLD: 12 FL — HIGH (ref 7.4–10.4)
PMV BLD: SIGNIFICANT CHANGE UP (ref 7.4–10.4)
POTASSIUM SERPL-MCNC: 3.6 MMOL/L — SIGNIFICANT CHANGE UP (ref 3.5–5)
POTASSIUM SERPL-SCNC: 3.6 MMOL/L — SIGNIFICANT CHANGE UP (ref 3.5–5)
PROT FLD-MCNC: <1 G/DL — SIGNIFICANT CHANGE UP
PROT SERPL-MCNC: 4.5 G/DL — LOW (ref 6–8)
RBC # BLD: 2.09 M/UL — LOW (ref 4.2–5.4)
RBC # BLD: 2.09 M/UL — LOW (ref 4.2–5.4)
RBC # BLD: 2.39 M/UL — LOW (ref 4.2–5.4)
RBC # FLD: 15.5 % — HIGH (ref 11.5–14.5)
RBC # FLD: 15.5 % — HIGH (ref 11.5–14.5)
RETICS #: 60.8 K/UL — SIGNIFICANT CHANGE UP (ref 25–125)
RETICS/RBC NFR: 2.9 % — HIGH (ref 0.5–1.5)
SODIUM SERPL-SCNC: 135 MMOL/L — SIGNIFICANT CHANGE UP (ref 135–146)
SPECIMEN SOURCE: SIGNIFICANT CHANGE UP
TIBC SERPL-MCNC: 75 UG/DL — LOW (ref 220–430)
TRANSFERRIN SERPL-MCNC: 55 MG/DL — LOW (ref 200–360)
TRIGL FLD-MCNC: 86 MG/DL — SIGNIFICANT CHANGE UP
UIBC SERPL-MCNC: 20 UG/DL — LOW (ref 110–370)
WBC # BLD: 1.26 K/UL — LOW (ref 4.8–10.8)
WBC # BLD: 1.48 K/UL — LOW (ref 4.8–10.8)
WBC # FLD AUTO: 1.26 K/UL — LOW (ref 4.8–10.8)
WBC # FLD AUTO: 1.48 K/UL — LOW (ref 4.8–10.8)

## 2024-01-31 PROCEDURE — 99232 SBSQ HOSP IP/OBS MODERATE 35: CPT

## 2024-01-31 PROCEDURE — 99231 SBSQ HOSP IP/OBS SF/LOW 25: CPT

## 2024-01-31 PROCEDURE — 99233 SBSQ HOSP IP/OBS HIGH 50: CPT

## 2024-01-31 RX ORDER — HYDROXYZINE HCL 10 MG
100 TABLET ORAL AT BEDTIME
Refills: 0 | Status: DISCONTINUED | OUTPATIENT
Start: 2024-01-31 | End: 2024-02-01

## 2024-01-31 RX ORDER — METHADONE HYDROCHLORIDE 40 MG/1
5 TABLET ORAL
Refills: 0 | Status: COMPLETED | OUTPATIENT
Start: 2024-02-02 | End: 2024-02-03

## 2024-01-31 RX ORDER — METHADONE HYDROCHLORIDE 40 MG/1
TABLET ORAL
Refills: 0 | Status: COMPLETED | OUTPATIENT
Start: 2024-01-31 | End: 2024-02-03

## 2024-01-31 RX ORDER — METHADONE HYDROCHLORIDE 40 MG/1
10 TABLET ORAL
Refills: 0 | Status: DISCONTINUED | OUTPATIENT
Start: 2024-01-31 | End: 2024-02-01

## 2024-01-31 RX ORDER — METHADONE HYDROCHLORIDE 40 MG/1
5 TABLET ORAL ONCE
Refills: 0 | Status: DISCONTINUED | OUTPATIENT
Start: 2024-01-31 | End: 2024-02-01

## 2024-01-31 RX ORDER — FUROSEMIDE 40 MG
40 TABLET ORAL
Refills: 0 | Status: DISCONTINUED | OUTPATIENT
Start: 2024-01-31 | End: 2024-02-02

## 2024-01-31 RX ADMIN — Medication 25 MILLILITER(S): at 13:14

## 2024-01-31 RX ADMIN — PANTOPRAZOLE SODIUM 40 MILLIGRAM(S): 20 TABLET, DELAYED RELEASE ORAL at 06:43

## 2024-01-31 RX ADMIN — Medication 3 MILLILITER(S): at 08:14

## 2024-01-31 RX ADMIN — Medication 1300 MILLIGRAM(S): at 21:22

## 2024-01-31 RX ADMIN — MIDODRINE HYDROCHLORIDE 5 MILLIGRAM(S): 2.5 TABLET ORAL at 17:42

## 2024-01-31 RX ADMIN — Medication 30 MILLIGRAM(S): at 08:26

## 2024-01-31 RX ADMIN — Medication 40 MILLIGRAM(S): at 12:23

## 2024-01-31 RX ADMIN — METHADONE HYDROCHLORIDE 10 MILLIGRAM(S): 40 TABLET ORAL at 17:42

## 2024-01-31 RX ADMIN — Medication 3 MILLILITER(S): at 14:20

## 2024-01-31 RX ADMIN — Medication 25 MILLILITER(S): at 21:18

## 2024-01-31 RX ADMIN — Medication 1300 MILLIGRAM(S): at 13:14

## 2024-01-31 RX ADMIN — Medication 25 MILLILITER(S): at 05:25

## 2024-01-31 RX ADMIN — Medication 1300 MILLIGRAM(S): at 05:26

## 2024-01-31 RX ADMIN — Medication 40 MILLIGRAM(S): at 13:15

## 2024-01-31 RX ADMIN — MIDODRINE HYDROCHLORIDE 5 MILLIGRAM(S): 2.5 TABLET ORAL at 12:23

## 2024-01-31 RX ADMIN — Medication 3 MILLILITER(S): at 20:18

## 2024-01-31 RX ADMIN — Medication 30 MILLIGRAM(S): at 20:04

## 2024-01-31 RX ADMIN — MIDODRINE HYDROCHLORIDE 5 MILLIGRAM(S): 2.5 TABLET ORAL at 05:26

## 2024-01-31 NOTE — PROGRESS NOTE ADULT - SUBJECTIVE AND OBJECTIVE BOX
PROGRESS NOTE   Pt seen @ bedside during AM rounds. Dressing +Clean, +Dry, + Intact      Vital Signs Last 24 Hrs  T(C): 35.6 (31 Jan 2024 05:25), Max: 35.9 (30 Jan 2024 21:47)  T(F): 96.1 (31 Jan 2024 05:25), Max: 96.7 (30 Jan 2024 21:47)  HR: 89 (31 Jan 2024 05:25) (89 - 99)  BP: 108/61 (31 Jan 2024 05:25) (106/58 - 108/61)  BP(mean): --  RR: 18 (31 Jan 2024 05:25) (18 - 18)  SpO2: --                              5.9    1.26  )-----------( 57       ( 31 Jan 2024 04:30 )             17.8               01-31    135  |  105  |  37<H>  ----------------------------<  86  3.6   |  15<L>  |  2.1<H>    Ca    6.8<L>      31 Jan 2024 04:30  Phos  6.0     01-31  Mg     1.7     01-31    TPro  4.5<L>  /  Alb  2.2<L>  /  TBili  0.3  /  DBili  x   /  AST  34  /  ALT  21  /  AlkPhos  714<H>  01-31    Physical Exam - Lower Extremity Focused:   Derm: bilateral edema   Right foot medial 1st MPJ with superficial wound   Vascular: DP and PT Pulses palpable;  Foot is Warm to Warm to the touch; Capillary Refill Time < 3 Seconds;    Neuro: Protective Sensation intact  MSK: Pain On Palpation at Wound Site     Assessment:  right superficial wound; stable, non-infected  B/L LE Edema   Plan:  Chart reviewed and Patient evaluated. All Questions and Concerns Addressed and Answered  Cont with Local Wound Care;  Xeroform/ACE q24  for 1 week   No WB restrictions .  re-consult if condition worsens     Podiatry

## 2024-01-31 NOTE — PROGRESS NOTE ADULT - SUBJECTIVE AND OBJECTIVE BOX
28yFemale  Being followed for decompensated liver cirrhosis   Interval history: Patient denies nausea, vomiting, hematemesis, melena, blood in stool, diarrhea, constipation, abdominal pain. Patient feeling much better after paracentesis.      PAST MEDICAL & SURGICAL HISTORY:   Hepatitis C      Asthma      Anxiety and depression      IV drug abuse  heroin      No significant past surgical history                Social History: No smoking. No alcohol. +drug abuse          MEDICATIONS  (STANDING):  albumin human 25% IVPB 25 milliLiter(s) IV Intermittent every 8 hours  albuterol/ipratropium for Nebulization 3 milliLiter(s) Nebulizer every 6 hours  chlorhexidine 4% Liquid 1 Application(s) Topical <User Schedule>  furosemide   Injectable 40 milliGRAM(s) IV Push two times a day  methadone    Tablet 10 milliGRAM(s) Oral two times a day  methadone    Tablet   Oral   midodrine. 5 milliGRAM(s) Oral three times a day  oseltamivir 30 milliGRAM(s) Oral two times a day  pantoprazole    Tablet 40 milliGRAM(s) Oral before breakfast  sodium bicarbonate 1300 milliGRAM(s) Oral three times a day    MEDICATIONS  (PRN):  acetaminophen     Tablet .. 650 milliGRAM(s) Oral every 6 hours PRN Temp greater or equal to 38C (100.4F), Mild Pain (1 - 3)  hydrOXYzine hydrochloride 50 milliGRAM(s) Oral every 6 hours PRN Anxiety  hydrOXYzine hydrochloride 100 milliGRAM(s) Oral at bedtime PRN insomnia  ondansetron Injectable 4 milliGRAM(s) IV Push every 8 hours PRN Nausea and/or Vomiting      Allergies:   buprenorphine (Rash)  Suboxone (Rash)  Intolerances          REVIEW OF SYSTEMS:  General:  No weight loss, fevers, or chills.  Eyes:  No reported pain or visual changes  ENT:  No sore throat or runny nose.  NECK: No stiffness   CV:  No chest pain or palpitations.  Resp:  No shortness of breath, cough  GI:  No abdominal pain, nausea, vomiting, dysphagia, diarrhea or constipation. No rectal bleeding, melena, or hematemesis.  Muscle:  No aches or weakness  Neuro:  No tingling, numbness         VITAL SIGNS:   T(F): 96.1 (01-31-24 @ 05:25), Max: 96.7 (01-30-24 @ 21:47)  HR: 89 (01-31-24 @ 05:25) (89 - 99)  BP: 108/61 (01-31-24 @ 05:25) (106/58 - 108/61)  RR: 18 (01-31-24 @ 05:25) (18 - 18)  SpO2: --    PHYSICAL EXAM:  GENERAL: AAOx3, no acute distress.  HEAD:  Atraumatic, Normocephalic  EYES: conjunctiva and sclera clear  NECK: Supple, no JVD or thyromegaly  CHEST/LUNG: Clear to auscultation bilaterally; No wheeze, rhonchi, or rales  HEART: Regular rate and rhythm; normal S1, S2, No murmurs.  ABDOMEN: Soft, nontender, nondistended; Bowel sounds present  NEUROLOGY: No asterixis or tremor.   SKIN: Intact, no jaundice            LABS:                        6.6    1.48  )-----------( x        ( 31 Jan 2024 11:00 )             20.6     01-31    135  |  105  |  37<H>  ----------------------------<  86  3.6   |  15<L>  |  2.1<H>    Ca    6.8<L>      31 Jan 2024 04:30  Phos  6.0     01-31  Mg     1.7     01-31    TPro  4.5<L>  /  Alb  2.2<L>  /  TBili  0.3  /  DBili  x   /  AST  34  /  ALT  21  /  AlkPhos  714<H>  01-31    LIVER FUNCTIONS - ( 31 Jan 2024 04:30 )  Alb: 2.2 g/dL / Pro: 4.5 g/dL / ALK PHOS: 714 U/L / ALT: 21 U/L / AST: 34 U/L / GGT: x               IMAGING:  < from: US Abdomen Complete (US Abdomen Complete .) (01.28.24 @ 18:34) >    ACC: [id number] EXAM:  US ABDOMEN COMPLETE   ORDERED BY: KYRA PIMENTEL     PROCEDURE DATE:  01/28/2024          INTERPRETATION:  CLINICAL INFORMATION: Ascites.    COMPARISON: 7/23/2022    TECHNIQUE: Sonography of the abdomen.    FINDINGS/  IMPRESSION:        Ultrasonographic evaluation for ascites. 4 quadrants of the abdomen and   pelvis evaluated which demonstrates moderate abdominal pelvic free fluid.    --- End of Report ---            CARLOS CARRILLO MD; Attending Radiologist  This document has been electronically signed. Jan 28 2024  7:40PM    < end of copied text >

## 2024-01-31 NOTE — PROGRESS NOTE ADULT - ASSESSMENT
28yFemale pmh hepatitis C, IVDA, presents for increased abdominal distention, used heroin recently.    #ascites, liver cirrhosis from hepatitis C  #transaminitis   Ultrasonographic evaluation for ascites. 4 quadrants of the abdomen and   pelvis evaluated which demonstrates moderate abdominal pelvic free fluid  Rec  -nephrology follow-up  -please obtain CT scan r/o bleeding   -check acute hepatitis panel, hep C treatment unclear  -Hepatic encephalopathy-none  -Abdominal ultrasound AFP every 6 months for HCC screening  -EGD outpatient for esophageal varices screening   -Follow up with our [facility] located at [address] [place] [address]. Phone Number: [phone] for possible liver transplant referral  -substance abuse cessation advised  -Check Hep A Ab, Hep B SAg, Hep B SAB, Hep C Ab, KRISHNA, Smooth Muscle antibody, KRISHNA, Ceruloplasmin, Ferritin, Transferrin Saturation, SPEP, Alpha fetoprotein,   -Ultrasound guided diagnostic paracentesis: fluid SBP negative SAAG 2.0-->uncomplicated ascites in liver cirrhosis   -Further treatment is based on diagnosis.  -2g Sodium diet  -diuretics creatinine stable   -Daily weights    #anemia no gross GI bleeding  Rec  -outpatient GI workup  - Follow up with our [facility] located at [address] [place] [address]. Phone Number: [phone]

## 2024-01-31 NOTE — PROGRESS NOTE ADULT - ASSESSMENT
acute kidney injury  nonoliguric, renal function better today  Cr 8/2022 - 0.9  low Toni+, significant non-nephrotic range proteinuria  microhematuria, pyuria and bacteriuria, UCx > 3 organisms   normal renal sono  anasarca / ascites s/p 7.5 liter para   hyperkalemia  metabolic acidosis  influenza / dyspnea  liver cirrhosis / Hep C  pancytopenia / worsening anemia  IVDA / active IV heroin abuse / opiate withdrawals   leg wounds    plan:    consider PRBC transfusion slowly with lasix iv   resume lasix 40mg iv q12h  cont midodrine 5mg po tid  cont albumin 25% 25g iv q8h x 72 hours  cont sodium bicarbonate 1300mg po tid  f/u C3, C4, ANCA, KRISHNA, Anti GBM, hep C VL  tamiflu renal dose

## 2024-01-31 NOTE — PROGRESS NOTE ADULT - SUBJECTIVE AND OBJECTIVE BOX
NEPHROLOGY FOLLOW UP NOTE    s/p paracentesis  c/o withdrawals   + void    PAST MEDICAL & SURGICAL HISTORY:  Hepatitis C      Asthma      Anxiety and depression      IV drug abuse  heroin      No significant past surgical history        Allergies:  buprenorphine (Rash)  Suboxone (Rash)    Home Medications Reviewed    SOCIAL HISTORY:  Denies ETOH,Smoking,   FAMILY HISTORY:        REVIEW OF SYSTEMS:  All other review of systems is negative unless indicated above.    PHYSICAL EXAM:  Constitutional: NAD  HEENT: anicteric sclera, oropharynx clear, MMM  Neck: No JVD  Respiratory: b/l BS, few rales  Cardiovascular: S1, S2, RRR  Gastrointestinal: BS+, soft, NT/ND  Extremities: No cyanosis or clubbing. + peripheral edema + b/l LE ace wraps  Neurological: A/O x 3, no focal deficits  Psychiatric: Normal mood, normal affect  : No CVA tenderness. No doss.   Skin: No rashes    Hospital Medications:   MEDICATIONS  (STANDING):  albumin human 25% IVPB 25 milliLiter(s) IV Intermittent every 8 hours  albuterol/ipratropium for Nebulization 3 milliLiter(s) Nebulizer every 6 hours  chlorhexidine 4% Liquid 1 Application(s) Topical <User Schedule>  methadone    Tablet   Oral   methadone    Tablet 10 milliGRAM(s) Oral two times a day  midodrine. 5 milliGRAM(s) Oral three times a day  oseltamivir 30 milliGRAM(s) Oral two times a day  pantoprazole    Tablet 40 milliGRAM(s) Oral before breakfast  sodium bicarbonate 1300 milliGRAM(s) Oral three times a day        VITALS:  T(F): 96.1 (01-31-24 @ 05:25), Max: 96.7 (01-30-24 @ 21:47)  HR: 89 (01-31-24 @ 05:25)  BP: 108/61 (01-31-24 @ 05:25)  RR: 18 (01-31-24 @ 05:25)  SpO2: --  Wt(kg): --        LABS:  01-31    135  |  105  |  37<H>  ----------------------------<  86  3.6   |  15<L>  |  2.1<H>    Ca    6.8<L>      31 Jan 2024 04:30  Phos  6.0     01-31  Mg     1.7     01-31    TPro  4.5<L>  /  Alb  2.2<L>  /  TBili  0.3  /  DBili      /  AST  34  /  ALT  21  /  AlkPhos  714<H>  01-31                          5.9    1.26  )-----------( 57       ( 31 Jan 2024 04:30 )             17.8       Urine Studies:  Urinalysis Basic - ( 31 Jan 2024 04:30 )    Color:  / Appearance:  / SG:  / pH:   Gluc: 86 mg/dL / Ketone:   / Bili:  / Urobili:    Blood:  / Protein:  / Nitrite:    Leuk Esterase:  / RBC:  / WBC    Sq Epi:  / Non Sq Epi:  / Bacteria:       Creatinine, Random Urine: 123 mg/dL (01-29 @ 19:13)  Protein/Creatinine Ratio Calculation: 1.8 Ratio (01-29 @ 19:13)  Sodium, Random Urine: <20.0 mmoL/L (01-29 @ 19:13)  Osmolality, Random Urine: 349 mos/kg (01-29 @ 19:13)      RADIOLOGY & ADDITIONAL STUDIES:

## 2024-01-31 NOTE — PROGRESS NOTE ADULT - SUBJECTIVE AND OBJECTIVE BOX
Patient is a 28y old  Female who presents with a chief complaint of shortness of breath, ascites, anemia, influenza a , anasarca , thrombocytopenia (31 Jan 2024 10:27)        MEDICATIONS  (STANDING):  albumin human 25% IVPB 25 milliLiter(s) IV Intermittent every 8 hours  albuterol/ipratropium for Nebulization 3 milliLiter(s) Nebulizer every 6 hours  chlorhexidine 4% Liquid 1 Application(s) Topical <User Schedule>  furosemide   Injectable 40 milliGRAM(s) IV Push two times a day  methadone    Tablet 10 milliGRAM(s) Oral two times a day  methadone    Tablet   Oral   midodrine. 5 milliGRAM(s) Oral three times a day  oseltamivir 30 milliGRAM(s) Oral two times a day  pantoprazole    Tablet 40 milliGRAM(s) Oral before breakfast  sodium bicarbonate 1300 milliGRAM(s) Oral three times a day    MEDICATIONS  (PRN):  acetaminophen     Tablet .. 650 milliGRAM(s) Oral every 6 hours PRN Temp greater or equal to 38C (100.4F), Mild Pain (1 - 3)  hydrOXYzine hydrochloride 50 milliGRAM(s) Oral every 6 hours PRN Anxiety  hydrOXYzine hydrochloride 100 milliGRAM(s) Oral at bedtime PRN insomnia  ondansetron Injectable 4 milliGRAM(s) IV Push every 8 hours PRN Nausea and/or Vomiting    CAPILLARY BLOOD Glucose     I&O's Summary      PHYSICAL EXAM:  Vital Signs Last 24 Hrs  T(C): 35.6 (31 Jan 2024 05:25), Max: 35.9 (30 Jan 2024 21:47)  T(F): 96.1 (31 Jan 2024 05:25), Max: 96.7 (30 Jan 2024 21:47)  HR: 89 (31 Jan 2024 05:25) (89 - 99)  BP: 108/61 (31 Jan 2024 05:25) (106/58 - 108/61)  BP(mean): --  RR: 18 (31 Jan 2024 05:25) (18 - 18)  SpO2: --        GENERAL: No acute distress ,generalized Anasarca   HEAD:  Atraumatic, Normocephalic  EYES:  conjunctiva and sclera clear  NECK: Supple, no lymphadenopathy, no JVD  CHEST/LUNG: CTAB; No wheezes, rales, or rhonchi  HEART: Regular rate and rhythm; No murmurs, rubs, or gallops  ABDOMEN: Soft, non-tender, distended with ascitics   Extremity: Bilateral  LE edema with weeping   NEUROLOGY: A&O x 3, no focal deficits  SKIN: No rashes or lesions    LABS:                        5.9    1.26  )-----------( 57       ( 31 Jan 2024 04:30 )             17.8     01-31    135  |  105  |  37<H>  ----------------------------<  86  3.6   |  15<L>  |  2.1<H>    Ca    6.8<L>      31 Jan 2024 04:30  Phos  6.0     01-31  Mg     1.7     01-31    TPro  4.5<L>  /  Alb  2.2<L>  /  TBili  0.3  /  DBili  x   /  AST  34  /  ALT  21  /  AlkPhos  714<H>  01-31      CARDIAC MARKERS ( 30 Jan 2024 04:30 )  x     / x     / 49 U/L / x     / x          Urinalysis Basic - ( 31 Jan 2024 04:30 )    Color: x / Appearance: x / SG: x / pH: x  Gluc: 86 mg/dL / Ketone: x  / Bili: x / Urobili: x   Blood: x / Protein: x / Nitrite: x   Leuk Esterase: x / RBC: x / WBC x   Sq Epi: x / Non Sq Epi: x / Bacteria: x        Culture - Urine (collected 29 Jan 2024 07:00)  Source: Clean Catch Clean Catch (Midstream)  Final Report (30 Jan 2024 19:16):    >=3 organisms. Probable collection contamination.    Culture - Blood (collected 28 Jan 2024 13:30)  Source: .Blood Blood  Preliminary Report (30 Jan 2024 23:02):    No growth at 48 Hours    Culture - Blood (collected 28 Jan 2024 13:30)  Source: .Blood Blood  Preliminary Report (30 Jan 2024 23:02):    No growth at 48 Hours

## 2024-01-31 NOTE — PROGRESS NOTE ADULT - SUBJECTIVE AND OBJECTIVE BOX
Pt interviewed, examined and EMR chart reviewed.    Follow up of Opiate Addiction. Pt is on Methadone prn. Pt is not doing well and complaining of opiate withdrawal. Pt feels like she didnt get the 10 mg of methadone yesterday. Pt still in withdrawal.               REVIEW OF SYSTEMS:    Constitutional: No fever, weight loss or fatigue  ENT:  No difficulty hearing, tinnitus, vertigo; No sinus or throat pain  Neck: No pain or stiffness  Respiratory: No cough, wheezing, chills or hemoptysis  Cardiovascular: No chest pain, palpitations, shortness of breath, dizziness or leg swelling  Gastrointestinal: No abdominal or epigastric pain. No nausea, vomiting or hematemesis; No diarrhea or constipation. No melena or hematochezia.  Neurological: No headaches, memory loss, loss of strength, numbness or tremors  Musculoskeletal: No joint pain or swelling; No muscle, back or extremity pain      MEDICATIONS  (STANDING):  albumin human 25% IVPB 25 milliLiter(s) IV Intermittent every 8 hours  albuterol/ipratropium for Nebulization 3 milliLiter(s) Nebulizer every 6 hours  chlorhexidine 4% Liquid 1 Application(s) Topical <User Schedule>  midodrine. 5 milliGRAM(s) Oral three times a day  oseltamivir 30 milliGRAM(s) Oral two times a day  pantoprazole    Tablet 40 milliGRAM(s) Oral before breakfast  sodium bicarbonate 1300 milliGRAM(s) Oral three times a day    MEDICATIONS  (PRN):  acetaminophen     Tablet .. 650 milliGRAM(s) Oral every 6 hours PRN Temp greater or equal to 38C (100.4F), Mild Pain (1 - 3)  hydrOXYzine hydrochloride 50 milliGRAM(s) Oral every 6 hours PRN Anxiety  hydrOXYzine hydrochloride 100 milliGRAM(s) Oral at bedtime PRN insomnia  ondansetron Injectable 4 milliGRAM(s) IV Push every 8 hours PRN Nausea and/or Vomiting      Vital Signs Last 24 Hrs  T(C): 35.6 (31 Jan 2024 05:25), Max: 35.9 (30 Jan 2024 21:47)  T(F): 96.1 (31 Jan 2024 05:25), Max: 96.7 (30 Jan 2024 21:47)  HR: 89 (31 Jan 2024 05:25) (89 - 99)  BP: 108/61 (31 Jan 2024 05:25) (106/58 - 108/61)  BP(mean): --  RR: 18 (31 Jan 2024 05:25) (18 - 18)  SpO2: --        PHYSICAL EXAM:    Constitutional: NAD, well-groomed, well-developed  HEENT: PERRLA, EOMI, Normal Hearing,   Neck: No LAD, No JVD  Back: Normal spine flexure, No CVA tenderness  Respiratory: CTAB/L  Cardiovascular: S1 and S2, RRR, no M/G/R  Gastrointestinal: BS+, soft, distended with drainage  Extremities: wraps over edema  Neurological: A/O x 3, no focal deficits    LABS:                        5.9    1.26  )-----------( 57       ( 31 Jan 2024 04:30 )             17.8     01-31    135  |  105  |  37<H>  ----------------------------<  86  3.6   |  15<L>  |  2.1<H>    Ca    6.8<L>      31 Jan 2024 04:30  Phos  6.0     01-31  Mg     1.7     01-31    TPro  4.5<L>  /  Alb  2.2<L>  /  TBili  0.3  /  DBili  x   /  AST  34  /  ALT  21  /  AlkPhos  714<H>  01-31      Urinalysis Basic - ( 31 Jan 2024 04:30 )    Color: x / Appearance: x / SG: x / pH: x  Gluc: 86 mg/dL / Ketone: x  / Bili: x / Urobili: x   Blood: x / Protein: x / Nitrite: x   Leuk Esterase: x / RBC: x / WBC x   Sq Epi: x / Non Sq Epi: x / Bacteria: x      Drug Screen Urine:  Alcohol Level        RADIOLOGY & ADDITIONAL STUDIES:

## 2024-01-31 NOTE — CHART NOTE - NSCHARTNOTEFT_GEN_A_CORE
Pt was a TCD. I was able to draw 21 vials of blood which was ordered from [facility]. Pt tolerated procedure well. Pt was a TCD. I was able to draw 21 vials of blood via US guidance from E. Pt tolerated procedure well.

## 2024-01-31 NOTE — CHART NOTE - NSCHARTNOTEFT_GEN_A_CORE
Patient feels that she is withdrawing, she does look uncomfortable. Will give extra dose of methadone 5mg (Avoiding catapres since she is on midodrine for BP support)

## 2024-01-31 NOTE — PROGRESS NOTE ADULT - ASSESSMENT
29 y/o female with pmhx of  current smoker, asthma, hep C , IVDA ,leukopenia,  ascites  presents with shortness of breath. Pt reports shortness of breath x 1 week, getting swollen everywhere and has blisters on her legs. Pt reports weight gain a lot of pounds. Pt reports s/p paracentesis 1 month ago at [facility] 5.5 L of fluids was removed. PT reports fever at home, not sure Tmax. PT with cough , productive x 1 week. PT states nausea/ vomining, NBNB. Pt with diarrhea, now resolved. Pt reports using IV heroin this am 1-2 bags, administered on her abdominal vein.  Pt reports itchy all the time.  Pt denies chest pain, burning with urination.Pt does not take any medication at home because she did not follow up.     Decompensated Liver Failure: Likely secondary to Hepatitis C and IVDA   Complicated by Chronic Pancytopenia, Anemia and generalized Anasarca    -S/P  paracentesis last month at [facility]  Hospital where 5.5 L were removed.  --GI consult  appreciated: f/up CLD w/up   Will need EGD for variceal screening and MR abdomen w/wo MRCP when RENETTA resolves.  -Now S/P  Paracentesis 1/30:  7.5 L of fluid removed. follow paracentesis analysis.  -Continue with Albumin 25% 25g iv q8h x 72 hours  -Heme/onc Eval appreciated: Likley from Liver disease and Hypersplenism , O/P f/up   - Addiction medicine recs appreciated: started methadone taper    RENETTA: Obstructive from abdominal  compression v.s  hepatorenal syndrome?   Complicated by Hyperkalemia with metabolic acidosis  Urine Analysis with proteinuria, microhematuria, pyuria and bacteriuria   Scr: Cr 8/2022 - 0.9, Renal sonogram: negative.   -Nephrology recs appreciated: c/w lasix 40mg iv q12h, PO bicarb Lokelma PRN   -no aldactone until renal function stabilized and K+ status improved  -follow  C3, C4, ANCA, KRISHNA, Anti GBM    Influenza A infection Per SOB   CXR: No radiographic evidence of acute cardiopulmonary disease.  Tamiflu renally dosed     Bilateral foot ulcers/ blisters.   ID recs appreciated: no need for ABX.   Wound/ podiatry consult appreciated   use Xeroform Guaze. Can wash with saline. Change them daily. Leg elevation    DVT PPX: SCD's however B/L swollen extremities weeping as well   GI PPX: PPI   Full Code   Dispo: from Home   Pending Clinical Improvement, poor prognosis

## 2024-02-01 LAB
AFP-TM SERPL-MCNC: <1.8 NG/ML — SIGNIFICANT CHANGE UP
ALBUMIN SERPL ELPH-MCNC: 2 G/DL — LOW (ref 3.5–5.2)
ALP SERPL-CCNC: 607 U/L — HIGH (ref 30–115)
ALT FLD-CCNC: 23 U/L — SIGNIFICANT CHANGE UP (ref 0–41)
ANION GAP SERPL CALC-SCNC: 13 MMOL/L — SIGNIFICANT CHANGE UP (ref 7–14)
AST SERPL-CCNC: 35 U/L — SIGNIFICANT CHANGE UP (ref 0–41)
BASOPHILS # BLD AUTO: 0 K/UL — SIGNIFICANT CHANGE UP (ref 0–0.2)
BASOPHILS NFR BLD AUTO: 0 % — SIGNIFICANT CHANGE UP (ref 0–1)
BILIRUB SERPL-MCNC: 0.3 MG/DL — SIGNIFICANT CHANGE UP (ref 0.2–1.2)
BUN SERPL-MCNC: 25 MG/DL — HIGH (ref 10–20)
CALCIUM SERPL-MCNC: 6.9 MG/DL — LOW (ref 8.4–10.5)
CANCER AG19-9 SERPL-ACNC: 40 U/ML — HIGH
CHLORIDE SERPL-SCNC: 108 MMOL/L — SIGNIFICANT CHANGE UP (ref 98–110)
CMV DNA CSF QL NAA+PROBE: SIGNIFICANT CHANGE UP IU/ML
CMV DNA SPEC NAA+PROBE-LOG#: SIGNIFICANT CHANGE UP LOG10IU/ML
CO2 SERPL-SCNC: 18 MMOL/L — SIGNIFICANT CHANGE UP (ref 17–32)
CREAT SERPL-MCNC: 2.1 MG/DL — HIGH (ref 0.7–1.5)
EGFR: 32 ML/MIN/1.73M2 — LOW
EOSINOPHIL # BLD AUTO: 0 K/UL — SIGNIFICANT CHANGE UP (ref 0–0.7)
EOSINOPHIL NFR BLD AUTO: 0 % — SIGNIFICANT CHANGE UP (ref 0–8)
FERRITIN SERPL-MCNC: 289 NG/ML — HIGH (ref 15–150)
FOLATE RBC-MCNC: 1525 NG/ML — HIGH (ref 499–1504)
GBM IGG SER-ACNC: <0.2 — SIGNIFICANT CHANGE UP (ref 0–0.9)
GLUCOSE SERPL-MCNC: 97 MG/DL — SIGNIFICANT CHANGE UP (ref 70–99)
HAV IGG SER QL IA: REACTIVE
HAV IGM SER-ACNC: SIGNIFICANT CHANGE UP
HBV CORE AB SER-ACNC: SIGNIFICANT CHANGE UP
HBV CORE IGM SER-ACNC: SIGNIFICANT CHANGE UP
HBV SURFACE AB SER-ACNC: SIGNIFICANT CHANGE UP
HBV SURFACE AG SER-ACNC: SIGNIFICANT CHANGE UP
HBV SURFACE AG SER-ACNC: SIGNIFICANT CHANGE UP
HCT VFR BLD CALC: 18.3 % — CRITICAL LOW (ref 34.5–45)
HCT VFR BLD CALC: 23.5 % — LOW (ref 37–47)
HCV AB S/CO SERPL IA: 0.64 S/CO — SIGNIFICANT CHANGE UP (ref 0–0.99)
HCV AB SERPL-IMP: SIGNIFICANT CHANGE UP
HCV RNA FLD QL NAA+PROBE: SIGNIFICANT CHANGE UP
HCV RNA SPEC NAA+PROBE-LOG IU: SIGNIFICANT CHANGE UP IU/ML
HCV RNA SPEC NAA+PROBE-LOG IU: SIGNIFICANT CHANGE UP LOGIU/ML
HCV RNA SPEC QL PROBE+SIG AMP: SIGNIFICANT CHANGE UP
HGB BLD-MCNC: 7.5 G/DL — LOW (ref 12–16)
IMM GRANULOCYTES NFR BLD AUTO: 0.5 % — HIGH (ref 0.1–0.3)
KAPPA LC SER QL IFE: 11.48 MG/DL — HIGH (ref 0.33–1.94)
KAPPA/LAMBDA FREE LIGHT CHAIN RATIO, SERUM: 0.88 RATIO — SIGNIFICANT CHANGE UP (ref 0.26–1.65)
LAMBDA LC SER QL IFE: 13.01 MG/DL — HIGH (ref 0.57–2.63)
LYMPHOCYTES # BLD AUTO: 0.66 K/UL — LOW (ref 1.2–3.4)
LYMPHOCYTES # BLD AUTO: 34.7 % — SIGNIFICANT CHANGE UP (ref 20.5–51.1)
MCHC RBC-ENTMCNC: 28.2 PG — SIGNIFICANT CHANGE UP (ref 27–31)
MCHC RBC-ENTMCNC: 31.9 G/DL — LOW (ref 32–37)
MCV RBC AUTO: 88.3 FL — SIGNIFICANT CHANGE UP (ref 81–99)
MITOCHONDRIA AB SER-ACNC: SIGNIFICANT CHANGE UP
MONOCYTES # BLD AUTO: 0.08 K/UL — LOW (ref 0.1–0.6)
MONOCYTES NFR BLD AUTO: 4.2 % — SIGNIFICANT CHANGE UP (ref 1.7–9.3)
NEUTROPHILS # BLD AUTO: 1.15 K/UL — LOW (ref 1.4–6.5)
NEUTROPHILS NFR BLD AUTO: 60.6 % — SIGNIFICANT CHANGE UP (ref 42.2–75.2)
NRBC # BLD: 0 /100 WBCS — SIGNIFICANT CHANGE UP (ref 0–0)
PLATELET # BLD AUTO: 60 K/UL — LOW (ref 130–400)
PMV BLD: 11.8 FL — HIGH (ref 7.4–10.4)
POTASSIUM SERPL-MCNC: 2.7 MMOL/L — CRITICAL LOW (ref 3.5–5)
POTASSIUM SERPL-SCNC: 2.7 MMOL/L — CRITICAL LOW (ref 3.5–5)
PROT SERPL-MCNC: 4.2 G/DL — LOW (ref 6–8)
RBC # BLD: 2.66 M/UL — LOW (ref 4.2–5.4)
RBC # FLD: 15.3 % — HIGH (ref 11.5–14.5)
SMOOTH MUSCLE AB SER-ACNC: SIGNIFICANT CHANGE UP
SODIUM SERPL-SCNC: 139 MMOL/L — SIGNIFICANT CHANGE UP (ref 135–146)
VIT B12 SERPL-MCNC: 710 PG/ML — SIGNIFICANT CHANGE UP (ref 232–1245)
WBC # BLD: 1.9 K/UL — LOW (ref 4.8–10.8)
WBC # FLD AUTO: 1.9 K/UL — LOW (ref 4.8–10.8)

## 2024-02-01 PROCEDURE — 99231 SBSQ HOSP IP/OBS SF/LOW 25: CPT

## 2024-02-01 PROCEDURE — 74176 CT ABD & PELVIS W/O CONTRAST: CPT | Mod: 26

## 2024-02-01 PROCEDURE — 99233 SBSQ HOSP IP/OBS HIGH 50: CPT

## 2024-02-01 PROCEDURE — 99232 SBSQ HOSP IP/OBS MODERATE 35: CPT

## 2024-02-01 RX ORDER — POTASSIUM CHLORIDE 20 MEQ
20 PACKET (EA) ORAL
Refills: 0 | Status: COMPLETED | OUTPATIENT
Start: 2024-02-01 | End: 2024-02-02

## 2024-02-01 RX ADMIN — PANTOPRAZOLE SODIUM 40 MILLIGRAM(S): 20 TABLET, DELAYED RELEASE ORAL at 06:51

## 2024-02-01 RX ADMIN — Medication 100 MILLIGRAM(S): at 00:07

## 2024-02-01 RX ADMIN — Medication 25 MILLILITER(S): at 06:46

## 2024-02-01 RX ADMIN — Medication 3 MILLILITER(S): at 19:55

## 2024-02-01 RX ADMIN — Medication 100 MILLIGRAM(S): at 21:38

## 2024-02-01 RX ADMIN — Medication 30 MILLIGRAM(S): at 08:39

## 2024-02-01 RX ADMIN — METHADONE HYDROCHLORIDE 10 MILLIGRAM(S): 40 TABLET ORAL at 06:50

## 2024-02-01 RX ADMIN — Medication 25 MILLILITER(S): at 22:22

## 2024-02-01 RX ADMIN — MIDODRINE HYDROCHLORIDE 5 MILLIGRAM(S): 2.5 TABLET ORAL at 12:28

## 2024-02-01 RX ADMIN — MIDODRINE HYDROCHLORIDE 5 MILLIGRAM(S): 2.5 TABLET ORAL at 06:50

## 2024-02-01 RX ADMIN — Medication 20 MILLIEQUIVALENT(S): at 22:21

## 2024-02-01 RX ADMIN — Medication 3 MILLILITER(S): at 14:40

## 2024-02-01 RX ADMIN — Medication 1300 MILLIGRAM(S): at 06:51

## 2024-02-01 RX ADMIN — Medication 1300 MILLIGRAM(S): at 21:38

## 2024-02-01 RX ADMIN — METHADONE HYDROCHLORIDE 10 MILLIGRAM(S): 40 TABLET ORAL at 17:27

## 2024-02-01 RX ADMIN — Medication 25 MILLILITER(S): at 17:58

## 2024-02-01 RX ADMIN — MIDODRINE HYDROCHLORIDE 5 MILLIGRAM(S): 2.5 TABLET ORAL at 17:28

## 2024-02-01 RX ADMIN — Medication 1300 MILLIGRAM(S): at 15:20

## 2024-02-01 RX ADMIN — METHADONE HYDROCHLORIDE 5 MILLIGRAM(S): 40 TABLET ORAL at 00:08

## 2024-02-01 RX ADMIN — Medication 40 MILLIGRAM(S): at 15:21

## 2024-02-01 RX ADMIN — Medication 3 MILLILITER(S): at 08:12

## 2024-02-01 RX ADMIN — Medication 30 MILLIGRAM(S): at 21:38

## 2024-02-01 RX ADMIN — Medication 40 MILLIGRAM(S): at 06:51

## 2024-02-01 NOTE — PROGRESS NOTE ADULT - SUBJECTIVE AND OBJECTIVE BOX
28yFemale  Being followed for liver cirrhosis, ascites   Interval history: Patient denies nausea, vomiting, hematemesis, melena, blood in stool, diarrhea, constipation, abdominal pain. Patient feeling tremendously better s/p paracentesis, site still draining some fluid.      PAST MEDICAL & SURGICAL HISTORY:  Hepatitis C      Asthma      Anxiety and depression      IV drug abuse  heroin      No significant past surgical history                Social History: No smoking. No alcohol. No illegal drug use.            MEDICATIONS  (STANDING):  albumin human 25% IVPB 25 milliLiter(s) IV Intermittent every 8 hours  albuterol/ipratropium for Nebulization 3 milliLiter(s) Nebulizer every 6 hours  chlorhexidine 4% Liquid 1 Application(s) Topical <User Schedule>  furosemide   Injectable 40 milliGRAM(s) IV Push two times a day  methadone    Tablet 10 milliGRAM(s) Oral two times a day  methadone    Tablet   Oral   midodrine. 5 milliGRAM(s) Oral three times a day  oseltamivir 30 milliGRAM(s) Oral two times a day  pantoprazole    Tablet 40 milliGRAM(s) Oral before breakfast  sodium bicarbonate 1300 milliGRAM(s) Oral three times a day    MEDICATIONS  (PRN):  acetaminophen     Tablet .. 650 milliGRAM(s) Oral every 6 hours PRN Temp greater or equal to 38C (100.4F), Mild Pain (1 - 3)  hydrOXYzine hydrochloride 100 milliGRAM(s) Oral at bedtime PRN insomnia  hydrOXYzine hydrochloride 50 milliGRAM(s) Oral every 6 hours PRN Anxiety  ondansetron Injectable 4 milliGRAM(s) IV Push every 8 hours PRN Nausea and/or Vomiting      Allergies:   buprenorphine (Rash)  Suboxone (Rash)              REVIEW OF SYSTEMS:  General:  No weight loss, fevers, or chills.  Eyes:  No reported pain or visual changes  ENT:  No sore throat or runny nose.  NECK: No stiffness   CV:  No chest pain or palpitations.  Resp:  No shortness of breath, cough  GI:  No abdominal pain, nausea, vomiting, dysphagia, diarrhea or constipation. No rectal bleeding, melena, or hematemesis.  Neuro:  No tingling, numbness       VITAL SIGNS:   T(F): 98 (02-01-24 @ 05:40), Max: 98 (02-01-24 @ 05:40)  HR: 89 (02-01-24 @ 05:40) (83 - 89)  BP: 106/69 (02-01-24 @ 05:40) (106/69 - 139/56)  RR: 18 (02-01-24 @ 05:40) (18 - 18)  SpO2: 97% (02-01-24 @ 05:40) (97% - 98%)    PHYSICAL EXAM:  GENERAL: AAOx3, no acute distress.  HEAD:  Atraumatic, Normocephalic  EYES: conjunctiva and sclera clear  NECK: Supple, no JVD or thyromegaly  CHEST/LUNG: Clear to auscultation bilaterally; No wheeze, rhonchi, or rales  HEART: Regular rate and rhythm; normal S1, S2, No murmurs.  ABDOMEN: Soft, nontender, nondistended; Bowel sounds present +paracentesis site  NEUROLOGY: No asterixis or tremor.   SKIN: Intact, no jaundice            LABS:                        6.6    1.48  )-----------( 40       ( 31 Jan 2024 11:00 )             20.6     01-31    135  |  105  |  37<H>  ----------------------------<  86  3.6   |  15<L>  |  2.1<H>    Ca    6.8<L>      31 Jan 2024 04:30  Phos  6.0     01-31  Mg     1.7     01-31    TPro  4.5<L>  /  Alb  2.2<L>  /  TBili  0.3  /  DBili  x   /  AST  34  /  ALT  21  /  AlkPhos  714<H>  01-31    LIVER FUNCTIONS - ( 31 Jan 2024 04:30 )  Alb: 2.2 g/dL / Pro: 4.5 g/dL / ALK PHOS: 714 U/L / ALT: 21 U/L / AST: 34 U/L / GGT: x               IMAGING:    < from: CT Abdomen and Pelvis No Cont (02.01.24 @ 11:09) >    ACC: [id number] EXAM:  CT ABDOMEN AND PELVIS   ORDERED BY: SINDHU EASTMAN     PROCEDURE DATE:  02/01/2024          INTERPRETATION:  CLINICAL STATEMENT: Evaluate GI bleed    TECHNIQUE: Contiguous axial CT images were obtained from the lower chest   to the pubic symphysis without intravenous contrast.  Oral contrast was   not administered.  Reformatted images in the coronal and sagittal planes   were acquired.    COMPARISON CT: June 26, 2022    OTHER STUDIES USED FOR CORRELATION: None.      FINDINGS:  Limited evaluation of solid organs and vascular structures secondary to   lack of intravenous contrast.    LOWER CHEST: Bibasilar scattered tree-in-bud nodular and groundglass   opacities. Trace pericardial effusion.    HEPATOBILIARY: Enlarged liver measures 24 cm in craniocaudal dimension.   Cholelithiasis.    SPLEEN: Enlarged spleen measures 18 cm in length.    PANCREAS: Unremarkable.    ADRENAL GLANDS: Unremarkable.    KIDNEYS: No nephroureteral calculi or hydronephrosis.    ABDOMINOPELVICNODES: Limited evaluation without contrast.    PELVIC ORGANS: Unremarkable.    PERITONEUM/MESENTERY/BOWEL: Small to moderate volume abdominopelvic   ascites. Suggestion of duodenal wall thickening/surrounding inflammation.   No bowel obstruction or pneumoperitoneum. No evidence of an   intraperitoneal or retroperitoneal hematoma.    BONES/SOFT TISSUES: Soft tissue anasarca. Stable visualized osseous   structures.      IMPRESSION:  1.  Suggestion of duodenal wall thickening/surrounding inflammation,   suboptimally assessed without contrast. Consider follow-up with a   contrast enhanced CT. Please note that for evaluation of GI bleed, a pre   and postcontrast CT abdomen pelvis should be obtained.  2.  Small to moderate volume abdominopelvic ascites.  3.  Hepatosplenomegaly.  4.  Bibasilar tree-in-bud nodular and groundglass opacities, compatible   with infectious/inflammatory small airways disease.  5.  Trace pericardial effusion.    --- End of Report ---            YOHAN HATFIELD MD; Attending Radiologist  This document has been electronically signed. Feb 1 2024  1:26PM    < end of copied text >         28yFemale  Being followed for liver cirrhosis, ascites   Interval history: Patient denies nausea, vomiting, hematemesis, melena, blood in stool, diarrhea, constipation, abdominal pain. Patient feeling tremendously better s/p paracentesis, site still draining some fluid.      PAST MEDICAL & SURGICAL HISTORY:  Hepatitis C      Asthma      Anxiety and depression      IV drug abuse  heroin      No significant past surgical history                Social History: No smoking. No alcohol. +illegal drug use.            MEDICATIONS  (STANDING):  albumin human 25% IVPB 25 milliLiter(s) IV Intermittent every 8 hours  albuterol/ipratropium for Nebulization 3 milliLiter(s) Nebulizer every 6 hours  chlorhexidine 4% Liquid 1 Application(s) Topical <User Schedule>  furosemide   Injectable 40 milliGRAM(s) IV Push two times a day  methadone    Tablet 10 milliGRAM(s) Oral two times a day  methadone    Tablet   Oral   midodrine. 5 milliGRAM(s) Oral three times a day  oseltamivir 30 milliGRAM(s) Oral two times a day  pantoprazole    Tablet 40 milliGRAM(s) Oral before breakfast  sodium bicarbonate 1300 milliGRAM(s) Oral three times a day    MEDICATIONS  (PRN):  acetaminophen     Tablet .. 650 milliGRAM(s) Oral every 6 hours PRN Temp greater or equal to 38C (100.4F), Mild Pain (1 - 3)  hydrOXYzine hydrochloride 100 milliGRAM(s) Oral at bedtime PRN insomnia  hydrOXYzine hydrochloride 50 milliGRAM(s) Oral every 6 hours PRN Anxiety  ondansetron Injectable 4 milliGRAM(s) IV Push every 8 hours PRN Nausea and/or Vomiting      Allergies:   buprenorphine (Rash)  Suboxone (Rash)              REVIEW OF SYSTEMS:  General:  No weight loss, fevers, or chills.  Eyes:  No reported pain or visual changes  ENT:  No sore throat or runny nose.  NECK: No stiffness   CV:  No chest pain or palpitations.  Resp:  No shortness of breath, cough  GI:  No abdominal pain, nausea, vomiting, dysphagia, diarrhea or constipation. No rectal bleeding, melena, or hematemesis.  Neuro:  No tingling, numbness       VITAL SIGNS:   T(F): 98 (02-01-24 @ 05:40), Max: 98 (02-01-24 @ 05:40)  HR: 89 (02-01-24 @ 05:40) (83 - 89)  BP: 106/69 (02-01-24 @ 05:40) (106/69 - 139/56)  RR: 18 (02-01-24 @ 05:40) (18 - 18)  SpO2: 97% (02-01-24 @ 05:40) (97% - 98%)    PHYSICAL EXAM:  GENERAL: AAOx3, no acute distress.  HEAD:  Atraumatic, Normocephalic  EYES: conjunctiva and sclera clear  NECK: Supple, no JVD or thyromegaly  CHEST/LUNG: Clear to auscultation bilaterally; No wheeze, rhonchi, or rales  HEART: Regular rate and rhythm; normal S1, S2, No murmurs.  ABDOMEN: Soft, nontender, nondistended; Bowel sounds present +paracentesis site  NEUROLOGY: No asterixis or tremor.   SKIN: Intact, no jaundice            LABS:                        6.6    1.48  )-----------( 40       ( 31 Jan 2024 11:00 )             20.6     01-31    135  |  105  |  37<H>  ----------------------------<  86  3.6   |  15<L>  |  2.1<H>    Ca    6.8<L>      31 Jan 2024 04:30  Phos  6.0     01-31  Mg     1.7     01-31    TPro  4.5<L>  /  Alb  2.2<L>  /  TBili  0.3  /  DBili  x   /  AST  34  /  ALT  21  /  AlkPhos  714<H>  01-31    LIVER FUNCTIONS - ( 31 Jan 2024 04:30 )  Alb: 2.2 g/dL / Pro: 4.5 g/dL / ALK PHOS: 714 U/L / ALT: 21 U/L / AST: 34 U/L / GGT: x               IMAGING:    < from: CT Abdomen and Pelvis No Cont (02.01.24 @ 11:09) >    ACC: [id number] EXAM:  CT ABDOMEN AND PELVIS   ORDERED BY: SINDHU EASTMAN     PROCEDURE DATE:  02/01/2024          INTERPRETATION:  CLINICAL STATEMENT: Evaluate GI bleed    TECHNIQUE: Contiguous axial CT images were obtained from the lower chest   to the pubic symphysis without intravenous contrast.  Oral contrast was   not administered.  Reformatted images in the coronal and sagittal planes   were acquired.    COMPARISON CT: June 26, 2022    OTHER STUDIES USED FOR CORRELATION: None.      FINDINGS:  Limited evaluation of solid organs and vascular structures secondary to   lack of intravenous contrast.    LOWER CHEST: Bibasilar scattered tree-in-bud nodular and groundglass   opacities. Trace pericardial effusion.    HEPATOBILIARY: Enlarged liver measures 24 cm in craniocaudal dimension.   Cholelithiasis.    SPLEEN: Enlarged spleen measures 18 cm in length.    PANCREAS: Unremarkable.    ADRENAL GLANDS: Unremarkable.    KIDNEYS: No nephroureteral calculi or hydronephrosis.    ABDOMINOPELVICNODES: Limited evaluation without contrast.    PELVIC ORGANS: Unremarkable.    PERITONEUM/MESENTERY/BOWEL: Small to moderate volume abdominopelvic   ascites. Suggestion of duodenal wall thickening/surrounding inflammation.   No bowel obstruction or pneumoperitoneum. No evidence of an   intraperitoneal or retroperitoneal hematoma.    BONES/SOFT TISSUES: Soft tissue anasarca. Stable visualized osseous   structures.      IMPRESSION:  1.  Suggestion of duodenal wall thickening/surrounding inflammation,   suboptimally assessed without contrast. Consider follow-up with a   contrast enhanced CT. Please note that for evaluation of GI bleed, a pre   and postcontrast CT abdomen pelvis should be obtained.  2.  Small to moderate volume abdominopelvic ascites.  3.  Hepatosplenomegaly.  4.  Bibasilar tree-in-bud nodular and groundglass opacities, compatible   with infectious/inflammatory small airways disease.  5.  Trace pericardial effusion.    --- End of Report ---            YOHAN HATFIELD MD; Attending Radiologist  This document has been electronically signed. Feb 1 2024  1:26PM    < end of copied text >

## 2024-02-01 NOTE — PROGRESS NOTE ADULT - ASSESSMENT
acute kidney injury  nonoliguric, renal function improved   Cr 8/2022 - 0.9  low Toni+, significant non-nephrotic range proteinuria, GN w/u neg so far  microhematuria, pyuria and bacteriuria, UCx > 3 organisms   normal renal sono  anasarca / ascites s/p 7.5 liter para   hyperkalemia  metabolic acidosis  influenza / dyspnea  liver cirrhosis / Hep C  pancytopenia / worsening anemia  IVDA / active IV heroin abuse / opiate withdrawals   leg wounds    plan:    cont lasix 40mg iv q12h  cont midodrine 5mg po tid  cont albumin 25% 25g iv q8h x 72 hours course  cont sodium bicarbonate 1300mg po tid  s/p prbc transfusion  give epogen 10,000U SQ x 1  obtain labs today, pt refused AM blood draw  trend renal function, lytes, monitor UOP via primafit  fluid restriction 1L  tamiflu renal dose  methadone

## 2024-02-01 NOTE — PROGRESS NOTE ADULT - PROBLEM SELECTOR PLAN 1
After evaluation at this time would start methadone taper protocol.  Pts concerns addressed  Pt will be monitored and supportive care provided.  No other changes to medical care plan for withdrawals.  Monitor labs/electrolytes as needed.    -Counseling provided   CATCH team involved for aftercare and pt will follow up with aftercare in outpatient
After evaluation at this time would still recommend to start methadone taper protocol. Pt continues to be in withdrawal.   Pts concerns addressed  Pt will be monitored and supportive care provided.  No other changes to medical care plan for withdrawals.  Monitor labs/electrolytes as needed.    -Counseling provided   CATCH team involved for aftercare and pt will follow up with aftercare in outpatient
After evaluation at this time continue current protocol. Use of PRNs for withdrawal. Pts concerns addressed  Pt will be monitored and supportive care provided.  No other changes to medical care plan for withdrawals.  Monitor labs/electrolytes as needed.    -Counseling provided   CATCH team involved for aftercare and pt will follow up with aftercare.

## 2024-02-01 NOTE — PROGRESS NOTE ADULT - ASSESSMENT
28yFemale pmh hepatitis C, IVDA, presents for increased abdominal distention, used heroin recently.    #ascites, liver cirrhosis from hepatitis C  #transaminitis   Ultrasonographic evaluation for ascites. 4 quadrants of the abdomen and   pelvis evaluated which demonstrates moderate abdominal pelvic free fluid  Rec  -nephrology follow-up  -please obtain CT scan r/o bleeding   -check acute hepatitis panel, hep C treatment unclear  -Hepatic encephalopathy-none  -Abdominal ultrasound AFP every 6 months for HCC screening  -will benefit from EGD if patient agreeable prior to DC after flu precautions cleared unless patient actively bleeds then earlier   -Follow up with our [facility] located at [address] [place] [address]. Phone Number: [phone] for possible liver transplant referral  -substance abuse cessation advised  -Ultrasound guided diagnostic paracentesis: fluid SBP negative SAAG 2.0-->uncomplicated ascites in liver cirrhosis   -Further treatment is based on diagnosis.  -2g Sodium diet  -diuretics creatinine stable   -Daily weights    #anemia no gross GI bleeding  Suggestion of duodenal wall thickening/surrounding inflammation,   suboptimally assessed without   Rec  -CT for bleeding s/p para  -will benefit from EGD if patient agreeable prior to DC after flu precautions cleared unless patient actively bleeds then earlier   - Follow up with our [facility] located at [address] [place] [address]. Phone Number: [phone]     #Bibasilar tree-in-bud nodular and groundglass opacities, compatible   with infectious/inflammatory small airways disease.  Rec  - Care as per primary team

## 2024-02-01 NOTE — PROGRESS NOTE ADULT - SUBJECTIVE AND OBJECTIVE BOX
Pt interviewed, examined and EMR chart reviewed.    Follow up of Opiate Addiction. Pt is on Methadone protocol. Pt is doing better but still in withdrawal      REVIEW OF SYSTEMS:    Constitutional: No fever, weight loss or fatigue  ENT:  No difficulty hearing, tinnitus, vertigo; No sinus or throat pain  Neck: No pain or stiffness  Respiratory: No cough, wheezing, chills or hemoptysis  Cardiovascular: No chest pain, palpitations, shortness of breath, dizziness or leg swelling  Gastrointestinal: No abdominal or epigastric pain. No nausea, vomiting or hematemesis; No diarrhea or constipation. No melena or hematochezia.  Neurological: No headaches, memory loss, loss of strength, numbness or tremors  Musculoskeletal: No joint pain or swelling; No muscle, back or extremity pain      MEDICATIONS  (STANDING):  albumin human 25% IVPB 25 milliLiter(s) IV Intermittent every 8 hours  albuterol/ipratropium for Nebulization 3 milliLiter(s) Nebulizer every 6 hours  chlorhexidine 4% Liquid 1 Application(s) Topical <User Schedule>  furosemide   Injectable 40 milliGRAM(s) IV Push two times a day  methadone    Tablet 10 milliGRAM(s) Oral two times a day  methadone    Tablet   Oral   midodrine. 5 milliGRAM(s) Oral three times a day  oseltamivir 30 milliGRAM(s) Oral two times a day  pantoprazole    Tablet 40 milliGRAM(s) Oral before breakfast  sodium bicarbonate 1300 milliGRAM(s) Oral three times a day    MEDICATIONS  (PRN):  acetaminophen     Tablet .. 650 milliGRAM(s) Oral every 6 hours PRN Temp greater or equal to 38C (100.4F), Mild Pain (1 - 3)  hydrOXYzine hydrochloride 50 milliGRAM(s) Oral every 6 hours PRN Anxiety  hydrOXYzine hydrochloride 100 milliGRAM(s) Oral at bedtime PRN insomnia  ondansetron Injectable 4 milliGRAM(s) IV Push every 8 hours PRN Nausea and/or Vomiting      Vital Signs Last 24 Hrs  T(C): 36.7 (01 Feb 2024 05:40), Max: 36.7 (01 Feb 2024 05:40)  T(F): 98 (01 Feb 2024 05:40), Max: 98 (01 Feb 2024 05:40)  HR: 89 (01 Feb 2024 05:40) (83 - 89)  BP: 106/69 (01 Feb 2024 05:40) (106/69 - 139/56)  BP(mean): --  RR: 18 (01 Feb 2024 05:40) (18 - 18)  SpO2: 97% (01 Feb 2024 05:40) (97% - 98%)    Parameters below as of 01 Feb 2024 05:40  Patient On (Oxygen Delivery Method): room air        PHYSICAL EXAM:    Constitutional: NAD, well-groomed, well-developed  HEENT: PERRLA, EOMI, Normal Hearing,   Neck: No LAD, No JVD  Back: Normal spine flexure, No CVA tenderness  Respiratory: CTAB/L  Cardiovascular: S1 and S2, RRR, no M/G/R  Gastrointestinal: BS+, soft, NT/ND  Extremities: No peripheral edema  Neurological: A/O x 3, no focal deficits    LABS:                        6.6    1.48  )-----------( 40       ( 31 Jan 2024 11:00 )             20.6     01-31    135  |  105  |  37<H>  ----------------------------<  86  3.6   |  15<L>  |  2.1<H>    Ca    6.8<L>      31 Jan 2024 04:30  Phos  6.0     01-31  Mg     1.7     01-31    TPro  4.5<L>  /  Alb  2.2<L>  /  TBili  0.3  /  DBili  x   /  AST  34  /  ALT  21  /  AlkPhos  714<H>  01-31      Urinalysis Basic - ( 31 Jan 2024 04:30 )    Color: x / Appearance: x / SG: x / pH: x  Gluc: 86 mg/dL / Ketone: x  / Bili: x / Urobili: x   Blood: x / Protein: x / Nitrite: x   Leuk Esterase: x / RBC: x / WBC x   Sq Epi: x / Non Sq Epi: x / Bacteria: x      Drug Screen Urine:  Alcohol Level        RADIOLOGY & ADDITIONAL STUDIES:

## 2024-02-01 NOTE — PROGRESS NOTE ADULT - SUBJECTIVE AND OBJECTIVE BOX
NEPHROLOGY FOLLOW UP NOTE    pt seen and examined  leg swelling better   fair amounts of UOP via primafit  s/p prbc tx    PAST MEDICAL & SURGICAL HISTORY:  Hepatitis C      Asthma      Anxiety and depression      IV drug abuse  heroin      No significant past surgical history        Allergies:  buprenorphine (Rash)  Suboxone (Rash)    Home Medications Reviewed    SOCIAL HISTORY:  Denies ETOH,Smoking,   FAMILY HISTORY:        REVIEW OF SYSTEMS:  All other review of systems is negative unless indicated above.    PHYSICAL EXAM:  Constitutional: NAD  HEENT: anicteric sclera, oropharynx clear, MMM  Neck: No JVD  Respiratory: b/l BS, few rales  Cardiovascular: S1, S2, RRR  Gastrointestinal: BS+, soft, NT/ND  Extremities: No cyanosis or clubbing. + peripheral edema + b/l LE ace wraps  Neurological: A/O x 3, no focal deficits  Psychiatric: Normal mood, normal affect  : No CVA tenderness. No doss.   Skin: No rashes    Hospital Medications:   MEDICATIONS  (STANDING):  albumin human 25% IVPB 25 milliLiter(s) IV Intermittent every 8 hours  albuterol/ipratropium for Nebulization 3 milliLiter(s) Nebulizer every 6 hours  chlorhexidine 4% Liquid 1 Application(s) Topical <User Schedule>  furosemide   Injectable 40 milliGRAM(s) IV Push two times a day  methadone    Tablet 10 milliGRAM(s) Oral two times a day  methadone    Tablet   Oral   midodrine. 5 milliGRAM(s) Oral three times a day  oseltamivir 30 milliGRAM(s) Oral two times a day  pantoprazole    Tablet 40 milliGRAM(s) Oral before breakfast  sodium bicarbonate 1300 milliGRAM(s) Oral three times a day        VITALS:  T(F): 98 (02-01-24 @ 05:40), Max: 98 (02-01-24 @ 05:40)  HR: 89 (02-01-24 @ 05:40)  BP: 106/69 (02-01-24 @ 05:40)  RR: 18 (02-01-24 @ 05:40)  SpO2: 97% (02-01-24 @ 05:40)  Wt(kg): --    01-31 @ 07:01  -  02-01 @ 07:00  --------------------------------------------------------  IN: 0 mL / OUT: 900 mL / NET: -900 mL          LABS:  01-31    135  |  105  |  37<H>  ----------------------------<  86  3.6   |  15<L>  |  2.1<H>    Ca    6.8<L>      31 Jan 2024 04:30  Phos  6.0     01-31  Mg     1.7     01-31    TPro  4.5<L>  /  Alb  2.2<L>  /  TBili  0.3  /  DBili      /  AST  34  /  ALT  21  /  AlkPhos  714<H>  01-31                          6.6    1.48  )-----------( 40       ( 31 Jan 2024 11:00 )             20.6       Urine Studies:  Urinalysis Basic - ( 31 Jan 2024 04:30 )    Color:  / Appearance:  / SG:  / pH:   Gluc: 86 mg/dL / Ketone:   / Bili:  / Urobili:    Blood:  / Protein:  / Nitrite:    Leuk Esterase:  / RBC:  / WBC    Sq Epi:  / Non Sq Epi:  / Bacteria:       Creatinine, Random Urine: 123 mg/dL (01-29 @ 19:13)  Protein/Creatinine Ratio Calculation: 1.8 Ratio (01-29 @ 19:13)  Sodium, Random Urine: <20.0 mmoL/L (01-29 @ 19:13)  Osmolality, Random Urine: 349 mos/kg (01-29 @ 19:13)      RADIOLOGY & ADDITIONAL STUDIES:

## 2024-02-01 NOTE — CHART NOTE - NSCHARTNOTEFT_GEN_A_CORE
Called for potassium level of 2.7, will order supplement Called for potassium level of 2.7, will order supplement (due to interactions, will cancel order for atarax for now)

## 2024-02-01 NOTE — PROGRESS NOTE ADULT - ASSESSMENT
This is a 27 y/o female with pmh asthma, hep C, polysubstance use disorder, leukopenia,  ascites  presents with shortness of breath.    IMPRESSION  #Influenza A Infection  #Bilateral lower extremity edema and skin tears.  #Hepatitis C, Poly substance use (IV drug user)- Reports previously treated. Hep C VL negative.   #Liver cirrhosis and ascites   #Pancytopenia secondary to liver disease and hypersplenism  #transaminitis   #CKD 3- Possible hepatorenal syndrome  #Obesity BMI (kg/m2): 29.7  HIV screen negative    RECOMMENDATIONS  -Finish 5 days of Tamiflu 30 mg daily.   -Remaining management of renal function as per the nephrology.   -Follow up with the hepatitis workup.   -Peritoneal fluid negative for SBP. Can consider ciprofloxacin PPX 500mg BID or ceftriaxone 1 gram q 24 if GI bleeding is off concern.    -Follow up with GI for EGD for esophageal varices.   -Local wound care for bilateral lower extremities.   -Monitor for withdrawal symptoms.     If any questions, please send a message or call on [employer] Teams  Please continue to update ID with any pertinent new laboratory or radiographic findings.    Geovani Yan M.D  Infectious Diseases Attending/   [employer] [facility] [employer] [facility]

## 2024-02-01 NOTE — PROGRESS NOTE ADULT - SUBJECTIVE AND OBJECTIVE BOX
Patient is a 28y old  Female who presents with a chief complaint of shortness of breath, ascites, anemia, influenza a , anasarca , thrombocytopenia (01 Feb 2024 14:12)        MEDICATIONS  (STANDING):  albumin human 25% IVPB 25 milliLiter(s) IV Intermittent every 8 hours  albuterol/ipratropium for Nebulization 3 milliLiter(s) Nebulizer every 6 hours  chlorhexidine 4% Liquid 1 Application(s) Topical <User Schedule>  furosemide   Injectable 40 milliGRAM(s) IV Push two times a day  methadone    Tablet 10 milliGRAM(s) Oral two times a day  methadone    Tablet   Oral   midodrine. 5 milliGRAM(s) Oral three times a day  oseltamivir 30 milliGRAM(s) Oral two times a day  pantoprazole    Tablet 40 milliGRAM(s) Oral before breakfast  sodium bicarbonate 1300 milliGRAM(s) Oral three times a day    MEDICATIONS  (PRN):  acetaminophen     Tablet .. 650 milliGRAM(s) Oral every 6 hours PRN Temp greater or equal to 38C (100.4F), Mild Pain (1 - 3)  hydrOXYzine hydrochloride 50 milliGRAM(s) Oral every 6 hours PRN Anxiety  hydrOXYzine hydrochloride 100 milliGRAM(s) Oral at bedtime PRN insomnia  ondansetron Injectable 4 milliGRAM(s) IV Push every 8 hours PRN Nausea and/or Vomiting      CAPILLARY BLOOD GLUCOSE    I&O's Summary    31 Jan 2024 07:01  -  01 Feb 2024 07:00  --------------------------------------------------------  IN: 0 mL / OUT: 900 mL / NET: -900 mL        PHYSICAL EXAM:  Vital Signs Last 24 Hrs  T(C): 36.7 (01 Feb 2024 14:30), Max: 36.7 (01 Feb 2024 05:40)  T(F): 98.1 (01 Feb 2024 14:30), Max: 98.1 (01 Feb 2024 14:30)  HR: 90 (01 Feb 2024 14:30) (83 - 90)  BP: 116/55 (01 Feb 2024 14:30) (106/69 - 116/55)  BP(mean): --  RR: 18 (01 Feb 2024 14:30) (18 - 18)  SpO2: 97% (01 Feb 2024 05:40) (97% - 98%)    Parameters below as of 01 Feb 2024 05:40  Patient On (Oxygen Delivery Method): room air      GENERAL: No acute distress ,generalized Anasarca   HEAD:  Atraumatic, Normocephalic  EYES:  conjunctiva and sclera clear  NECK: Supple, no lymphadenopathy, no JVD  CHEST/LUNG: CTAB; No wheezes, rales, or rhonchi  HEART: Regular rate and rhythm; No murmurs, rubs, or gallops  ABDOMEN: Soft, non-tender, distended with ascitics   Extremity: Bilateral  LE edema with weeping   NEUROLOGY: A&O x 3, no focal deficits  SKIN: No rashes or lesions        LABS:                        6.6    1.48  )-----------( 40       ( 31 Jan 2024 11:00 )             20.6     01-31    135  |  105  |  37<H>  ----------------------------<  86  3.6   |  15<L>  |  2.1<H>    Ca    6.8<L>      31 Jan 2024 04:30  Phos  6.0     01-31  Mg     1.7     01-31    TPro  4.5<L>  /  Alb  2.2<L>  /  TBili  0.3  /  DBili  x   /  AST  34  /  ALT  21  /  AlkPhos  714<H>  01-31          Urinalysis Basic - ( 31 Jan 2024 04:30 )    Color: x / Appearance: x / SG: x / pH: x  Gluc: 86 mg/dL / Ketone: x  / Bili: x / Urobili: x   Blood: x / Protein: x / Nitrite: x   Leuk Esterase: x / RBC: x / WBC x   Sq Epi: x / Non Sq Epi: x / Bacteria: x        Culture - Fungal, Body Fluid (collected 30 Jan 2024 18:58)  Source: Peritoneal Peritoneal Fluid  Preliminary Report (01 Feb 2024 08:56):    Testing in progress    Culture - Body Fluid with Gram Stain (collected 30 Jan 2024 18:58)  Source: Ascites Fl Ascites Fluid  Gram Stain (31 Jan 2024 23:40):    No polymorphonuclear cells seen    No organisms seen    by cytocentrifuge  Preliminary Report (01 Feb 2024 16:00):    No growth

## 2024-02-01 NOTE — PROGRESS NOTE ADULT - NS ATTEND AMEND GEN_ALL_CORE FT
28 y o  F with IVDA chronic hepatitis C untreated with cirrhosis decompensated with ascites admitted with dyspnea due to viral infection.  Still using IV heroin till time of admission. Reports 2 prior paracentesis at [facility].  Alert  looks chronically ill  No icterus  Abdomen distended tense fluids dull  No asterixis  CT abdomen - hepatosplenomegaly with ascites  Decompensated cirrhosis with hx of hepatitis C  Acute on chronic cholestatic hepatitis   Thrombocytopenia   RENETTA - ? pre renal  ? SBP  Influenza    Please check HCV RNA HBsAg HBsAb HBcAg Ig M and total Hepatitis A Ig G and M, HEV Ig M and total  KRISHNA ASMA AMA Ig G and M  Serum light chains  ACE level  CMV EBV HSV PCR  AFP Ca 19 - 9  Paracentesis with fluid studies  If fluid studies suggest SBP should have IV albumin 1.5 g/kg of D1 and 1 g/kg on D3 if OK with nephrology  If not responding to albumin will need to start HRS treatment tomorrow   Will need EGD for variceal screening  MR abdomen w/wo MRCP when RENETTA resolves
Patient seen and examined on GI rounds.  All pertinent imaging and labs were reviewed by me personally.  Plan modified above where I found needed.  Will follow.
I edited the note

## 2024-02-01 NOTE — PROGRESS NOTE ADULT - ASSESSMENT
27 y/o female with pmhx of  current smoker, asthma, hep C , IVDA ,leukopenia,  ascites  presents with shortness of breath. Pt reports shortness of breath x 1 week, getting swollen everywhere and has blisters on her legs. Pt reports weight gain a lot of pounds. Pt reports s/p paracentesis 1 month ago at [facility] 5.5 L of fluids was removed. PT reports fever at home, not sure Tmax. PT with cough , productive x 1 week. PT states nausea/ vomining, NBNB. Pt with diarrhea, now resolved. Pt reports using IV heroin this am 1-2 bags, administered on her abdominal vein.  Pt reports itchy all the time.  Pt denies chest pain, burning with urination.Pt does not take any medication at home because she did not follow up.     Pending labs for today, pt refuses and chooses when she wants tests/imaging ETC    Decompensated Liver Failure: Likely secondary to Hepatitis C and IVDA   Complicated by Chronic Pancytopenia, Anemia and generalized Anasarca    -S/P  paracentesis last month at [facility] where 5.5 L were removed.  --GI consult  appreciated: f/up CLD w/up   Will need EGD for variceal screening and MR abdomen w/wo MRCP when RENETTA resolves.  -Now S/P  Paracentesis 1/30:  7.5 L of fluid removed. follow paracentesis analysis.  -Continue with Albumin 25% 25g iv q8h x 72 hours  -Heme/onc Eval appreciated: Likely from Liver disease and Hypersplenism , O/P f/up   - Addiction medicine recs appreciated: started methadone taper    RENETTA: Obstructive from abdominal  compression v.s  hepatorenal syndrome?   Complicated by Hyperkalemia with metabolic acidosis  Urine Analysis with proteinuria, microhematuria, pyuria and bacteriuria   Scr: Cr 8/2022 - 0.9, Renal sonogram: negative.   -Nephrology recs appreciated: c/w lasix 40mg iv q12h, PO bicarb Lokelma PRN   -no aldactone until renal function stabilized and K+ status improved  -follow  C3, C4, ANCA, KRISHNA, Anti GBM    Influenza A infection Per SOB   CXR: No radiographic evidence of acute cardiopulmonary disease.  Tamiflu renally dosed     Bilateral foot ulcers/ blisters.   ID recs appreciated: no need for ABX.   Wound/ podiatry consult appreciated   use Xeroform Guaze. Can wash with saline. Change them daily. Leg elevation    DVT PPX: SCD's however B/L swollen extremities weeping as well   GI PPX: PPI   Full Code   Dispo: from Home   Pending Clinical Improvement, poor prognosis

## 2024-02-01 NOTE — PROGRESS NOTE ADULT - SUBJECTIVE AND OBJECTIVE BOX
CINDYPOOJA JOE  28y, Female    LOS  4d    INTERVAL EVENTS/HPI  - No acute events overnight  - T(F): , Max: 98 (02-01-24 @ 05:40)  - Tolerating medication  - WBC Count: 1.48 (01-31-24 @ 11:00)  WBC Count: 1.26 (01-31-24 @ 04:30)  - Creatinine: 2.1 (01-31-24 @ 04:30)     REVIEW OF SYSTEMS:  CONSTITUTIONAL: No fever or chills  HEENT: No sore throat  RESPIRATORY: No cough, no shortness of breath  CARDIOVASCULAR: No chest pain or palpitations  GASTROINTESTINAL: No abdominal or epigastric pain  GENITOURINARY: No dysuria  NEUROLOGICAL: No headache/dizziness  MSK: No joint pain, erythema, or swelling; no back pain  SKIN: No itching, rashes  All other ROS negative except noted above    Prior hospital charts reviewed [Yes]  Primary team notes reviewed [Yes]  Other consultant notes reviewed [Yes]    ANTIMICROBIALS:   oseltamivir 30 two times a day      OTHER MEDS: MEDICATIONS  (STANDING):  acetaminophen     Tablet .. 650 every 6 hours PRN  albuterol/ipratropium for Nebulization 3 every 6 hours  furosemide   Injectable 40 two times a day  hydrOXYzine hydrochloride 50 every 6 hours PRN  hydrOXYzine hydrochloride 100 at bedtime PRN  methadone    Tablet 10 two times a day  methadone    Tablet    midodrine. 5 three times a day  ondansetron Injectable 4 every 8 hours PRN  pantoprazole    Tablet 40 before breakfast      Vital Signs Last 24 Hrs  T(F): 98 (02-01-24 @ 05:40), Max: 98.8 (01-28-24 @ 20:35)    Vital Signs Last 24 Hrs  HR: 89 (02-01-24 @ 05:40) (83 - 89)  BP: 106/69 (02-01-24 @ 05:40) (106/69 - 139/56)  RR: 18 (02-01-24 @ 05:40)  SpO2: 97% (02-01-24 @ 05:40) (97% - 98%)  Wt(kg): --    EXAM:  GENERAL: NAD, lying in bed  HEAD: No head lesions  NECK: Supple  CHEST/LUNG: Shallow breath sounds bilaterally.   HEART: S1 S2  ABDOMEN: Soft, Distended. Mildly tender.   EXTREMITIES: Bilateral diffuse edema on lower extremities, wrapped in dressings.   NERVOUS SYSTEM: Alert and oriented to person  MSK: No joint erythema, swelling or pain  SKIN: No rashes or lesions, no superficial thrombophlebitis  Labs:                        6.6    1.48  )-----------( 40       ( 31 Jan 2024 11:00 )             20.6     01-31    135  |  105  |  37<H>  ----------------------------<  86  3.6   |  15<L>  |  2.1<H>    Ca    6.8<L>      31 Jan 2024 04:30  Phos  6.0     01-31  Mg     1.7     01-31    TPro  4.5<L>  /  Alb  2.2<L>  /  TBili  0.3  /  DBili  x   /  AST  34  /  ALT  21  /  AlkPhos  714<H>  01-31      WBC Trend:  WBC Count: 1.48 (01-31-24 @ 11:00)  WBC Count: 1.26 (01-31-24 @ 04:30)  WBC Count: 2.79 (01-28-24 @ 13:30)      Creatine Trend:  Creatinine: 2.1 (01-31)  Creatinine: 2.5 (01-30)  Creatinine: 2.1 (01-28)      Liver Biochemical Testing Trend:  Alanine Aminotransferase (ALT/SGPT): 21 (01-31)  Alanine Aminotransferase (ALT/SGPT): 30 (01-30)  Alanine Aminotransferase (ALT/SGPT): 32 (01-28)  Aspartate Aminotransferase (AST/SGOT): 34 (01-31-24 @ 04:30)  Aspartate Aminotransferase (AST/SGOT): 54 (01-30-24 @ 04:30)  Aspartate Aminotransferase (AST/SGOT): 43 (01-28-24 @ 13:30)  Bilirubin Total: 0.3 (01-31)  Bilirubin Total: <0.2 (01-30)  Bilirubin Total: 0.2 (01-28)      Trend LDH  07-14-22 @ 12:50  271<H>  06-27-22 @ 06:47  173      Urinalysis Basic - ( 31 Jan 2024 04:30 )    Color: x / Appearance: x / SG: x / pH: x  Gluc: 86 mg/dL / Ketone: x  / Bili: x / Urobili: x   Blood: x / Protein: x / Nitrite: x   Leuk Esterase: x / RBC: x / WBC x   Sq Epi: x / Non Sq Epi: x / Bacteria: x      MICROBIOLOGY:    Female    Culture - Fungal, Body Fluid (collected 30 Jan 2024 18:58)  Source: Peritoneal Peritoneal Fluid  Preliminary Report:    Testing in progress    Culture - Body Fluid with Gram Stain (collected 30 Jan 2024 18:58)  Source: Ascites Fl Ascites Fluid    Culture - Urine (collected 29 Jan 2024 07:00)  Source: Clean Catch Clean Catch (Midstream)  Final Report:    >=3 organisms. Probable collection contamination.    Culture - Blood (collected 28 Jan 2024 13:30)  Source: .Blood Blood  Preliminary Report:    No growth at 72 Hours    Culture - Blood (collected 28 Jan 2024 13:30)  Source: .Blood Blood  Preliminary Report:    No growth at 72 Hours    Culture - Blood (collected 25 Jul 2022 15:34)  Source: .Blood Blood  Final Report:    No Growth Final    Culture - Sputum (collected 15 Jul 2022 10:00)  Source: Trach Asp Tracheal Aspirate  Final Report:    Normal Respiratory Kelly present    Babesia microti PCR, Blood. (collected 10 Jul 2022 15:43)  Source: .Blood None  Final Report:    Babesia microti PCR    Results: NOT detected    ***************Result Note*************    The detection of Babesia microti by PCR has only been    validated for whole blood; this test has not been approved    by the US Food and Drug Administration (FDA). Performance    characteristics of this assay have been determined by    [employer] [facility]. The clinical significance    of results should be considered in conjunction with the    overall clinical presentation of the patient. Result is not    intended to be used as the sole means for clinical diagnosis    or patient management decisions.    One negative sample does not necessarily rule    out the presence of a parasitic infection.    Culture - Blood (collected 08 Jul 2022 05:26)  Source: .Blood None  Final Report:    No Growth Final    Culture - Blood (collected 06 Jul 2022 07:20)  Source: .Blood None  Final Report:    No Growth Final      HIV-1/2 Combo Result: Nonreact (01-28-24 @ 13:30)  HIV-1/2 Combo Result: Nonreact (06-29-22 @ 10:29)        Cytomegalovirus By PCR: NotDetec IU/mL (01-31-24 @ 04:30)      Ferritin: 289 (01-31)      RADIOLOGY & ADDITIONAL TESTS:  I have personally reviewed the relevant images.   CXR      CT        WEIGHT  Weight (kg): 70.3 (01-30-24 @ 09:27)      All available historical records have been reviewed

## 2024-02-02 LAB
ACE SERPL-CCNC: 112 U/L — HIGH (ref 14–82)
ALBUMIN SERPL ELPH-MCNC: 2.1 G/DL — LOW (ref 3.5–5.2)
ALP SERPL-CCNC: 588 U/L — HIGH (ref 30–115)
ALT FLD-CCNC: 23 U/L — SIGNIFICANT CHANGE UP (ref 0–41)
ANA TITR SER: NEGATIVE — SIGNIFICANT CHANGE UP
ANION GAP SERPL CALC-SCNC: 11 MMOL/L — SIGNIFICANT CHANGE UP (ref 7–14)
AST SERPL-CCNC: 29 U/L — SIGNIFICANT CHANGE UP (ref 0–41)
BASOPHILS # BLD AUTO: 0 K/UL — SIGNIFICANT CHANGE UP (ref 0–0.2)
BASOPHILS NFR BLD AUTO: 0 % — SIGNIFICANT CHANGE UP (ref 0–1)
BILIRUB SERPL-MCNC: 0.5 MG/DL — SIGNIFICANT CHANGE UP (ref 0.2–1.2)
BUN SERPL-MCNC: 23 MG/DL — HIGH (ref 10–20)
CALCIUM SERPL-MCNC: 6.9 MG/DL — LOW (ref 8.4–10.5)
CHLORIDE SERPL-SCNC: 107 MMOL/L — SIGNIFICANT CHANGE UP (ref 98–110)
CMV IGG FLD QL: >10 U/ML — HIGH
CMV IGG SERPL-IMP: POSITIVE
CO2 SERPL-SCNC: 21 MMOL/L — SIGNIFICANT CHANGE UP (ref 17–32)
CREAT SERPL-MCNC: 2 MG/DL — HIGH (ref 0.7–1.5)
CULTURE RESULTS: SIGNIFICANT CHANGE UP
CULTURE RESULTS: SIGNIFICANT CHANGE UP
EBV DNA SERPL NAA+PROBE-ACNC: ABNORMAL IU/ML
EBV EA AB SER IA-ACNC: <5 U/ML — SIGNIFICANT CHANGE UP
EBV EA AB TITR SER IF: NEGATIVE — SIGNIFICANT CHANGE UP
EBV EA IGG SER-ACNC: NEGATIVE — SIGNIFICANT CHANGE UP
EBV NA IGG SER IA-ACNC: <3 U/ML — SIGNIFICANT CHANGE UP
EBV PATRN SPEC IB-IMP: SIGNIFICANT CHANGE UP
EBV VCA IGG AVIDITY SER QL IA: POSITIVE
EBV VCA IGM SER IA-ACNC: 16.7 U/ML — SIGNIFICANT CHANGE UP
EBV VCA IGM SER IA-ACNC: 436 U/ML — HIGH
EBV VCA IGM TITR FLD: NEGATIVE — SIGNIFICANT CHANGE UP
EBVPCR LOG: ABNORMAL LOG10IU/ML
EGFR: 34 ML/MIN/1.73M2 — LOW
EOSINOPHIL # BLD AUTO: 0 K/UL — SIGNIFICANT CHANGE UP (ref 0–0.7)
EOSINOPHIL NFR BLD AUTO: 0 % — SIGNIFICANT CHANGE UP (ref 0–8)
GLUCOSE SERPL-MCNC: 82 MG/DL — SIGNIFICANT CHANGE UP (ref 70–99)
HCT VFR BLD CALC: 23.9 % — LOW (ref 37–47)
HCV AB S/CO SERPL IA: 68.2 COI — HIGH
HCV AB SERPL-IMP: REACTIVE
HGB BLD-MCNC: 7.6 G/DL — LOW (ref 12–16)
IMM GRANULOCYTES NFR BLD AUTO: 0.6 % — HIGH (ref 0.1–0.3)
LYMPHOCYTES # BLD AUTO: 0.56 K/UL — LOW (ref 1.2–3.4)
LYMPHOCYTES # BLD AUTO: 30.9 % — SIGNIFICANT CHANGE UP (ref 20.5–51.1)
MAGNESIUM SERPL-MCNC: 1.4 MG/DL — LOW (ref 1.8–2.4)
MCHC RBC-ENTMCNC: 28.3 PG — SIGNIFICANT CHANGE UP (ref 27–31)
MCHC RBC-ENTMCNC: 31.8 G/DL — LOW (ref 32–37)
MCV RBC AUTO: 88.8 FL — SIGNIFICANT CHANGE UP (ref 81–99)
MONOCYTES # BLD AUTO: 0.08 K/UL — LOW (ref 0.1–0.6)
MONOCYTES NFR BLD AUTO: 4.4 % — SIGNIFICANT CHANGE UP (ref 1.7–9.3)
NEUTROPHILS # BLD AUTO: 1.16 K/UL — LOW (ref 1.4–6.5)
NEUTROPHILS NFR BLD AUTO: 64.1 % — SIGNIFICANT CHANGE UP (ref 42.2–75.2)
NON-GYNECOLOGICAL CYTOLOGY STUDY: SIGNIFICANT CHANGE UP
NRBC # BLD: 0 /100 WBCS — SIGNIFICANT CHANGE UP (ref 0–0)
PLATELET # BLD AUTO: 55 K/UL — LOW (ref 130–400)
PMV BLD: 10.9 FL — HIGH (ref 7.4–10.4)
POTASSIUM SERPL-MCNC: 3.1 MMOL/L — LOW (ref 3.5–5)
POTASSIUM SERPL-SCNC: 3.1 MMOL/L — LOW (ref 3.5–5)
PROT SERPL-MCNC: 4.3 G/DL — LOW (ref 6–8)
RBC # BLD: 2.69 M/UL — LOW (ref 4.2–5.4)
RBC # FLD: 15 % — HIGH (ref 11.5–14.5)
SODIUM SERPL-SCNC: 139 MMOL/L — SIGNIFICANT CHANGE UP (ref 135–146)
SPECIMEN SOURCE: SIGNIFICANT CHANGE UP
SPECIMEN SOURCE: SIGNIFICANT CHANGE UP
T GONDII IGG SER QL: <3 IU/ML — SIGNIFICANT CHANGE UP
T GONDII IGG SER QL: NEGATIVE — SIGNIFICANT CHANGE UP
WBC # BLD: 1.81 K/UL — LOW (ref 4.8–10.8)
WBC # FLD AUTO: 1.81 K/UL — LOW (ref 4.8–10.8)

## 2024-02-02 PROCEDURE — 99233 SBSQ HOSP IP/OBS HIGH 50: CPT

## 2024-02-02 RX ORDER — SODIUM BICARBONATE 1 MEQ/ML
650 SYRINGE (ML) INTRAVENOUS
Refills: 0 | Status: DISCONTINUED | OUTPATIENT
Start: 2024-02-02 | End: 2024-02-03

## 2024-02-02 RX ORDER — FUROSEMIDE 40 MG
40 TABLET ORAL ONCE
Refills: 0 | Status: COMPLETED | OUTPATIENT
Start: 2024-02-02 | End: 2024-02-02

## 2024-02-02 RX ORDER — FUROSEMIDE 40 MG
40 TABLET ORAL DAILY
Refills: 0 | Status: DISCONTINUED | OUTPATIENT
Start: 2024-02-02 | End: 2024-02-03

## 2024-02-02 RX ORDER — MAGNESIUM SULFATE 500 MG/ML
2 VIAL (ML) INJECTION EVERY 6 HOURS
Refills: 0 | Status: COMPLETED | OUTPATIENT
Start: 2024-02-02 | End: 2024-02-02

## 2024-02-02 RX ORDER — HYDROXYZINE HCL 10 MG
50 TABLET ORAL EVERY 6 HOURS
Refills: 0 | Status: DISCONTINUED | OUTPATIENT
Start: 2024-02-02 | End: 2024-02-03

## 2024-02-02 RX ORDER — HYDROXYZINE HCL 10 MG
100 TABLET ORAL AT BEDTIME
Refills: 0 | Status: DISCONTINUED | OUTPATIENT
Start: 2024-02-02 | End: 2024-02-03

## 2024-02-02 RX ORDER — POTASSIUM CHLORIDE 20 MEQ
40 PACKET (EA) ORAL EVERY 4 HOURS
Refills: 0 | Status: COMPLETED | OUTPATIENT
Start: 2024-02-02 | End: 2024-02-02

## 2024-02-02 RX ORDER — METHADONE HYDROCHLORIDE 40 MG/1
10 TABLET ORAL
Refills: 0 | Status: DISCONTINUED | OUTPATIENT
Start: 2024-02-02 | End: 2024-02-03

## 2024-02-02 RX ADMIN — Medication 20 MILLIEQUIVALENT(S): at 00:24

## 2024-02-02 RX ADMIN — Medication 40 MILLIGRAM(S): at 15:45

## 2024-02-02 RX ADMIN — METHADONE HYDROCHLORIDE 5 MILLIGRAM(S): 40 TABLET ORAL at 17:32

## 2024-02-02 RX ADMIN — MIDODRINE HYDROCHLORIDE 5 MILLIGRAM(S): 2.5 TABLET ORAL at 05:35

## 2024-02-02 RX ADMIN — MIDODRINE HYDROCHLORIDE 5 MILLIGRAM(S): 2.5 TABLET ORAL at 12:32

## 2024-02-02 RX ADMIN — Medication 25 MILLILITER(S): at 05:36

## 2024-02-02 RX ADMIN — PANTOPRAZOLE SODIUM 40 MILLIGRAM(S): 20 TABLET, DELAYED RELEASE ORAL at 08:38

## 2024-02-02 RX ADMIN — Medication 3 MILLILITER(S): at 08:52

## 2024-02-02 RX ADMIN — Medication 12.5 GRAM(S): at 15:44

## 2024-02-02 RX ADMIN — MIDODRINE HYDROCHLORIDE 5 MILLIGRAM(S): 2.5 TABLET ORAL at 17:32

## 2024-02-02 RX ADMIN — Medication 1300 MILLIGRAM(S): at 05:36

## 2024-02-02 RX ADMIN — Medication 40 MILLIEQUIVALENT(S): at 17:35

## 2024-02-02 RX ADMIN — Medication 30 MILLIGRAM(S): at 08:38

## 2024-02-02 RX ADMIN — METHADONE HYDROCHLORIDE 5 MILLIGRAM(S): 40 TABLET ORAL at 05:36

## 2024-02-02 RX ADMIN — Medication 40 MILLIGRAM(S): at 05:37

## 2024-02-02 RX ADMIN — Medication 1300 MILLIGRAM(S): at 13:20

## 2024-02-02 RX ADMIN — Medication 12.5 GRAM(S): at 23:23

## 2024-02-02 RX ADMIN — Medication 3 MILLILITER(S): at 15:12

## 2024-02-02 RX ADMIN — Medication 650 MILLIGRAM(S): at 17:33

## 2024-02-02 RX ADMIN — Medication 3 MILLILITER(S): at 19:10

## 2024-02-02 RX ADMIN — Medication 40 MILLIEQUIVALENT(S): at 13:33

## 2024-02-02 RX ADMIN — Medication 40 MILLIGRAM(S): at 13:20

## 2024-02-02 NOTE — PROGRESS NOTE ADULT - SUBJECTIVE AND OBJECTIVE BOX
NEPHROLOGY FOLLOW UP NOTE    pt seen and examined  improved leg swelling  wants to go home     PAST MEDICAL & SURGICAL HISTORY:  Hepatitis C      Asthma      Anxiety and depression      IV drug abuse  heroin      No significant past surgical history        Allergies:  buprenorphine (Rash)  Suboxone (Rash)    Home Medications Reviewed    SOCIAL HISTORY:  Denies ETOH,Smoking,   FAMILY HISTORY:        REVIEW OF SYSTEMS:  All other review of systems is negative unless indicated above.    PHYSICAL EXAM:  Constitutional: NAD  HEENT: anicteric sclera, oropharynx clear, MMM  Neck: No JVD  Respiratory: b/l BS, few rales  Cardiovascular: S1, S2, RRR  Gastrointestinal: BS+, soft, NT/ND  Extremities: No cyanosis or clubbing. + peripheral edema + b/l LE ace wraps  Neurological: A/O x 3, no focal deficits  Psychiatric: Normal mood, normal affect  : No CVA tenderness. No doss.   Skin: No rashes    Hospital Medications:   MEDICATIONS  (STANDING):  albuterol/ipratropium for Nebulization 3 milliLiter(s) Nebulizer every 6 hours  chlorhexidine 4% Liquid 1 Application(s) Topical <User Schedule>  furosemide    Tablet 40 milliGRAM(s) Oral daily  furosemide   Injectable 40 milliGRAM(s) IV Push once  methadone    Tablet   Oral   methadone    Tablet 5 milliGRAM(s) Oral two times a day  midodrine. 5 milliGRAM(s) Oral three times a day  pantoprazole    Tablet 40 milliGRAM(s) Oral before breakfast  potassium chloride   Powder 40 milliEquivalent(s) Oral every 4 hours  sodium bicarbonate 650 milliGRAM(s) Oral two times a day        VITALS:  T(F): 96.7 (02-02-24 @ 05:14), Max: 98.1 (02-01-24 @ 14:30)  HR: 88 (02-02-24 @ 05:14)  BP: 109/58 (02-02-24 @ 05:14)  RR: 18 (02-02-24 @ 05:14)  SpO2: 96% (02-02-24 @ 05:14)  Wt(kg): --    01-31 @ 07:01  -  02-01 @ 07:00  --------------------------------------------------------  IN: 0 mL / OUT: 900 mL / NET: -900 mL    02-01 @ 07:01  -  02-02 @ 07:00  --------------------------------------------------------  IN: 0 mL / OUT: 1500 mL / NET: -1500 mL          LABS:  02-02    139  |  107  |  23<H>  ----------------------------<  82  3.1<L>   |  21  |  2.0<H>    Ca    6.9<L>      02 Feb 2024 06:35  Mg     1.4     02-02    TPro  4.3<L>  /  Alb  2.1<L>  /  TBili  0.5  /  DBili      /  AST  29  /  ALT  23  /  AlkPhos  588<H>  02-02                          7.6    1.81  )-----------( 55       ( 02 Feb 2024 06:35 )             23.9       Urine Studies:  Urinalysis Basic - ( 02 Feb 2024 06:35 )    Color:  / Appearance:  / SG:  / pH:   Gluc: 82 mg/dL / Ketone:   / Bili:  / Urobili:    Blood:  / Protein:  / Nitrite:    Leuk Esterase:  / RBC:  / WBC    Sq Epi:  / Non Sq Epi:  / Bacteria:       Creatinine, Random Urine: 123 mg/dL (01-29 @ 19:13)  Protein/Creatinine Ratio Calculation: 1.8 Ratio (01-29 @ 19:13)  Sodium, Random Urine: <20.0 mmoL/L (01-29 @ 19:13)  Osmolality, Random Urine: 349 mos/kg (01-29 @ 19:13)      RADIOLOGY & ADDITIONAL STUDIES:

## 2024-02-02 NOTE — PROGRESS NOTE ADULT - SUBJECTIVE AND OBJECTIVE BOX
28yFemale  Being followed for decompensated liver cirrhosis   Interval history: Patient denies nausea, vomiting, hematemesis, melena, blood in stool, diarrhea, constipation, abdominal pain. Patient feeling great after lvp.      PAST MEDICAL & SURGICAL HISTORY:   Hepatitis C      Asthma      Anxiety and depression      IV drug abuse  heroin      No significant past surgical history              Social History: No smoking. No alcohol. + illegal drug use.            MEDICATIONS  (STANDING):  albuterol/ipratropium for Nebulization 3 milliLiter(s) Nebulizer every 6 hours  chlorhexidine 4% Liquid 1 Application(s) Topical <User Schedule>  furosemide   Injectable 40 milliGRAM(s) IV Push two times a day  methadone    Tablet   Oral   methadone    Tablet 5 milliGRAM(s) Oral two times a day  midodrine. 5 milliGRAM(s) Oral three times a day  pantoprazole    Tablet 40 milliGRAM(s) Oral before breakfast  sodium bicarbonate 1300 milliGRAM(s) Oral three times a day    MEDICATIONS  (PRN):  acetaminophen     Tablet .. 650 milliGRAM(s) Oral every 6 hours PRN Temp greater or equal to 38C (100.4F), Mild Pain (1 - 3)  hydrOXYzine hydrochloride 50 milliGRAM(s) Oral every 6 hours PRN Anxiety  hydrOXYzine hydrochloride 100 milliGRAM(s) Oral at bedtime PRN insomnia  ondansetron Injectable 4 milliGRAM(s) IV Push every 8 hours PRN Nausea and/or Vomiting      Allergies:   buprenorphine (Rash)  Suboxone (Rash)  Intolerances          REVIEW OF SYSTEMS:  General:  No weight loss, fevers, or chills.  Eyes:  No reported pain or visual changes  ENT:  No sore throat or runny nose.  NECK: No stiffness   CV:  No chest pain or palpitations.  Resp:  No shortness of breath, cough  GI:  No abdominal pain, nausea, vomiting, dysphagia, diarrhea or constipation. No rectal bleeding, melena, or hematemesis.  Muscle:  No aches or weakness  Neuro:  No tingling, numbness         VITAL SIGNS:   T(F): 96.7 (02-02-24 @ 05:14), Max: 98.1 (02-01-24 @ 14:30)  HR: 88 (02-02-24 @ 05:14) (88 - 90)  BP: 109/58 (02-02-24 @ 05:14) (109/58 - 116/55)  RR: 18 (02-02-24 @ 05:14) (18 - 18)  SpO2: 96% (02-02-24 @ 05:14) (96% - 100%)    PHYSICAL EXAM:  GENERAL: AAOx3, no acute distress.  HEAD:  Atraumatic, Normocephalic  EYES: conjunctiva and sclera clear  NECK: Supple, no JVD or thyromegaly  CHEST/LUNG: Clear to auscultation bilaterally; No wheeze, rhonchi, or rales  HEART: Regular rate and rhythm; normal S1, S2, No murmurs.  ABDOMEN: Soft, nontender, nondistended; Bowel sounds present  NEUROLOGY: No asterixis or tremor.   SKIN: Intact, no jaundice            LABS:                        7.6    1.81  )-----------( 55       ( 02 Feb 2024 06:35 )             23.9     02-02    139  |  107  |  23<H>  ----------------------------<  82  3.1<L>   |  21  |  2.0<H>    Ca    6.9<L>      02 Feb 2024 06:35  Mg     1.4     02-02    TPro  4.3<L>  /  Alb  2.1<L>  /  TBili  0.5  /  DBili  x   /  AST  29  /  ALT  23  /  AlkPhos  588<H>  02-02    LIVER FUNCTIONS - ( 02 Feb 2024 06:35 )  Alb: 2.1 g/dL / Pro: 4.3 g/dL / ALK PHOS: 588 U/L / ALT: 23 U/L / AST: 29 U/L / GGT: x               IMAGING:    < from: CT Abdomen and Pelvis No Cont (02.01.24 @ 11:09) >    ACC: [id number] EXAM:  CT ABDOMEN AND PELVIS   ORDERED BY: SINDHU EASTMAN     PROCEDURE DATE:  02/01/2024          INTERPRETATION:  CLINICAL STATEMENT: Evaluate GI bleed    TECHNIQUE: Contiguous axial CT images were obtained from the lower chest   to the pubic symphysis without intravenous contrast.  Oral contrast was   not administered.  Reformatted images in the coronal and sagittal planes   were acquired.    COMPARISON CT: June 26, 2022    OTHER STUDIES USED FOR CORRELATION: None.      FINDINGS:  Limited evaluation of solid organs and vascular structures secondary to   lack of intravenous contrast.    LOWER CHEST: Bibasilar scattered tree-in-bud nodular and groundglass   opacities. Trace pericardial effusion.    HEPATOBILIARY: Enlarged liver measures 24 cm in craniocaudal dimension.   Cholelithiasis.    SPLEEN: Enlarged spleen measures 18 cm in length.    PANCREAS: Unremarkable.    ADRENAL GLANDS: Unremarkable.    KIDNEYS: No nephroureteral calculi or hydronephrosis.    ABDOMINOPELVICNODES: Limited evaluation without contrast.    PELVIC ORGANS: Unremarkable.    PERITONEUM/MESENTERY/BOWEL: Small to moderate volume abdominopelvic   ascites. Suggestion of duodenal wall thickening/surrounding inflammation.   No bowel obstruction or pneumoperitoneum. No evidence of an   intraperitoneal or retroperitoneal hematoma.    BONES/SOFT TISSUES: Soft tissue anasarca. Stable visualized osseous   structures.      IMPRESSION:  1.  Suggestion of duodenal wall thickening/surrounding inflammation,   suboptimally assessed without contrast. Consider follow-up with a   contrast enhanced CT. Please note that for evaluation of GI bleed, a pre   and postcontrast CT abdomen pelvis should be obtained.  2.  Small to moderate volume abdominopelvic ascites.  3.  Hepatosplenomegaly.  4.  Bibasilar tree-in-bud nodular and groundglass opacities, compatible   with infectious/inflammatory small airways disease.  5.  Trace pericardial effusion.    --- End of Report ---            YOHAN HATFIELD MD; Attending Radiologist  This document has been electronically signed. Feb 1 2024  1:26PM    < end of copied text >

## 2024-02-02 NOTE — PROGRESS NOTE ADULT - SUBJECTIVE AND OBJECTIVE BOX
Patient is a 28y old  Female who presents with a chief complaint of shortness of breath, ascites, anemia, influenza a , anasarca , thrombocytopenia (02 Feb 2024 11:11)          MEDICATIONS  (STANDING):  albuterol/ipratropium for Nebulization 3 milliLiter(s) Nebulizer every 6 hours  chlorhexidine 4% Liquid 1 Application(s) Topical <User Schedule>  furosemide   Injectable 40 milliGRAM(s) IV Push two times a day  methadone    Tablet 5 milliGRAM(s) Oral two times a day  methadone    Tablet   Oral   midodrine. 5 milliGRAM(s) Oral three times a day  pantoprazole    Tablet 40 milliGRAM(s) Oral before breakfast  potassium chloride   Powder 40 milliEquivalent(s) Oral every 4 hours  sodium bicarbonate 1300 milliGRAM(s) Oral three times a day    MEDICATIONS  (PRN):  acetaminophen     Tablet .. 650 milliGRAM(s) Oral every 6 hours PRN Temp greater or equal to 38C (100.4F), Mild Pain (1 - 3)  hydrOXYzine hydrochloride 50 milliGRAM(s) Oral every 6 hours PRN Anxiety  hydrOXYzine hydrochloride 100 milliGRAM(s) Oral at bedtime PRN insomnia  ondansetron Injectable 4 milliGRAM(s) IV Push every 8 hours PRN Nausea and/or Vomiting      CAPILLARY BLOD GLUCOSE    I&O's Summary    01 Feb 2024 07:01  -  02 Feb 2024 07:00  --------------------------------------------------------  IN: 0 mL / OUT: 1500 mL / NET: -1500 mL        PHYSICAL EXAM:  Vital Signs Last 24 Hrs  T(C): 35.9 (02 Feb 2024 05:14), Max: 36.7 (01 Feb 2024 14:30)  T(F): 96.7 (02 Feb 2024 05:14), Max: 98.1 (01 Feb 2024 14:30)  HR: 88 (02 Feb 2024 05:14) (88 - 90)  BP: 109/58 (02 Feb 2024 05:14) (109/58 - 116/55)  BP(mean): --  RR: 18 (02 Feb 2024 05:14) (18 - 18)  SpO2: 96% (02 Feb 2024 05:14) (96% - 100%)    Parameters below as of 02 Feb 2024 05:14  Patient On (Oxygen Delivery Method): room air      GENERAL: No acute distress ,generalized Anasarca   HEAD:  Atraumatic, Normocephalic  EYES:  conjunctiva and sclera clear  NECK: Supple, no lymphadenopathy, no JVD  CHEST/LUNG: CTAB; No wheezes, rales, or rhonchi  HEART: Regular rate and rhythm; No murmurs, rubs, or gallops  ABDOMEN: Soft, non-tender, distended with ascitics   Extremity: Bilateral  LE edema with weeping   NEUROLOGY: A&O x 3, no focal deficits  SKIN: No rashes or lesions      LABS:                        7.6    1.81  )-----------( 55       ( 02 Feb 2024 06:35 )             23.9     02-02    139  |  107  |  23<H>  ----------------------------<  82  3.1<L>   |  21  |  2.0<H>    Ca    6.9<L>      02 Feb 2024 06:35  Mg     1.4     02-02    TPro  4.3<L>  /  Alb  2.1<L>  /  TBili  0.5  /  DBili  x   /  AST  29  /  ALT  23  /  AlkPhos  588<H>  02-02          Urinalysis Basic - ( 02 Feb 2024 06:35 )    Color: x / Appearance: x / SG: x / pH: x  Gluc: 82 mg/dL / Ketone: x  / Bili: x / Urobili: x   Blood: x / Protein: x / Nitrite: x   Leuk Esterase: x / RBC: x / WBC x   Sq Epi: x / Non Sq Epi: x / Bacteria: x    Culture - Fungal, Body Fluid (collected 30 Jan 2024 18:58)  Source: Peritoneal Peritoneal Fluid  Preliminary Report (01 Feb 2024 08:56):    Testing in progress    Culture - Body Fluid with Gram Stain (collected 30 Jan 2024 18:58)  Source: Ascites Fl Ascites Fluid  Gram Stain (31 Jan 2024 23:40):    No polymorphonuclear cells seen    No organisms seen    by cytocentrifuge  Preliminary Report (01 Feb 2024 16:00):    No growth

## 2024-02-02 NOTE — PROGRESS NOTE ADULT - ASSESSMENT
28yFemale pmh hepatitis C, IVDA, presents for increased abdominal distention, used heroin recently.    #ascites, liver cirrhosis from hepatitis C  #transaminitis   Ultrasonographic evaluation for ascites. 4 quadrants of the abdomen and   pelvis evaluated which demonstrates moderate abdominal pelvic free fluid  Rec  -nephrology follow-up  -please obtain CT scan r/o bleeding   -check acute hepatitis panel, hep C treatment unclear  -Hepatic encephalopathy-none  -Abdominal ultrasound AFP every 6 months for HCC screening  -will benefit from EGD if patient agreeable prior to DC after flu precautions cleared unless patient actively bleeds then earlier   -Follow up with our [facility] located at [address] [place] [address]. Phone Number: [phone] for possible liver transplant referral  -substance abuse cessation advised  -Ultrasound guided diagnostic paracentesis: fluid SBP negative SAAG 2.0-->uncomplicated ascites in liver cirrhosis   patient to decide if she is willing to stay until next week  -Further treatment is based on diagnosis.  -2g Sodium diet  -diuretics creatinine stable   -Daily weights    #anemia no gross GI bleeding  Suggestion of duodenal wall thickening/surrounding inflammation,   suboptimally assessed without   Rec  -CT for bleeding s/p para appreciated   -will benefit from EGD if patient agreeable prior to DC after flu precautions cleared unless patient actively bleeds then earlier   -patient to decide if she is willing to stay until next week  - Follow up with our [facility] located at [address] [place] [address]. Phone Number: [phone]     #Bibasilar tree-in-bud nodular and groundglass opacities, compatible   with infectious/inflammatory small airways disease.  Rec  - Care as per primary team

## 2024-02-02 NOTE — PROGRESS NOTE ADULT - TIME BILLING
I have personally seen and examined this patient.    I have reviewed all pertinent clinical information and reviewed all relevant imaging and diagnostic studies personally.   I discussed recommendations with the primary team.
I have personally seen and examined this patient.    I have reviewed all pertinent clinical information and reviewed all relevant imaging and diagnostic studies personally.   I discussed recommendations with the primary team.
Coordination of care

## 2024-02-02 NOTE — PROGRESS NOTE ADULT - SUBJECTIVE AND OBJECTIVE BOX
CINDYPOOJA JOE  28y, Female    LOS  5d    INTERVAL EVENTS/HPI  - No acute events overnight  - T(F): , Max: 98.1 (02-01-24 @ 14:30)  - Tolerating medication  - WBC Count: 1.90 (02-01-24 @ 20:20)  WBC Count: 1.48 (01-31-24 @ 11:00)  - Creatinine: 2.1 (02-01-24 @ 20:20)    REVIEW OF SYSTEMS:  CONSTITUTIONAL: No fever or chills  HEENT: No sore throat  RESPIRATORY: No cough, no shortness of breath  CARDIOVASCULAR: No chest pain or palpitations  GASTROINTESTINAL: No abdominal or epigastric pain  GENITOURINARY: No dysuria  NEUROLOGICAL: No headache/dizziness  MSK: No joint pain, erythema, or swelling; no back pain  SKIN: No itching, rashes  All other ROS negative except noted above    Prior hospital charts reviewed [Yes]  Primary team notes reviewed [Yes]  Other consultant notes reviewed [Yes]    ANTIMICROBIALS:       OTHER MEDS: MEDICATIONS  (STANDING):  acetaminophen     Tablet .. 650 every 6 hours PRN  albuterol/ipratropium for Nebulization 3 every 6 hours  furosemide   Injectable 40 two times a day  hydrOXYzine hydrochloride 50 every 6 hours PRN  hydrOXYzine hydrochloride 100 at bedtime PRN  methadone    Tablet 5 two times a day  methadone    Tablet    midodrine. 5 three times a day  ondansetron Injectable 4 every 8 hours PRN  pantoprazole    Tablet 40 before breakfast      Vital Signs Last 24 Hrs  T(F): 96.7 (02-02-24 @ 05:14), Max: 98.8 (01-28-24 @ 20:35)    Vital Signs Last 24 Hrs  HR: 88 (02-02-24 @ 05:14) (88 - 90)  BP: 109/58 (02-02-24 @ 05:14) (109/58 - 116/55)  RR: 18 (02-02-24 @ 05:14)  SpO2: 96% (02-02-24 @ 05:14) (96% - 100%)  Wt(kg): --    EXAM:  GENERAL: NAD, lying in bed  HEAD: No head lesions  NECK: Supple  CHEST/LUNG: Shallow breath sounds bilaterally.   HEART: S1 S2  ABDOMEN: Soft, Distended. Mildly tender.   EXTREMITIES: Bilateral diffuse edema on lower extremities, wrapped in dressings.   NERVOUS SYSTEM: Alert and oriented to person  MSK: No joint erythema, swelling or pain  SKIN: No rashes or lesions, no superficial thrombophlebitis    Labs:                        7.5    1.90  )-----------( 60       ( 01 Feb 2024 20:20 )             23.5     02-01    139  |  108  |  25<H>  ----------------------------<  97  2.7<LL>   |  18  |  2.1<H>    Ca    6.9<L>      01 Feb 2024 20:20    TPro  4.2<L>  /  Alb  2.0<L>  /  TBili  0.3  /  DBili  x   /  AST  35  /  ALT  23  /  AlkPhos  607<H>  02-01      WBC Trend:  WBC Count: 1.90 (02-01-24 @ 20:20)  WBC Count: 1.48 (01-31-24 @ 11:00)  WBC Count: 1.26 (01-31-24 @ 04:30)  WBC Count: 2.79 (01-28-24 @ 13:30)      Creatine Trend:  Creatinine: 2.1 (02-01)  Creatinine: 2.1 (01-31)  Creatinine: 2.5 (01-30)  Creatinine: 2.1 (01-28)      Liver Biochemical Testing Trend:  Alanine Aminotransferase (ALT/SGPT): 23 (02-01)  Alanine Aminotransferase (ALT/SGPT): 21 (01-31)  Alanine Aminotransferase (ALT/SGPT): 30 (01-30)  Alanine Aminotransferase (ALT/SGPT): 32 (01-28)  Aspartate Aminotransferase (AST/SGOT): 35 (02-01-24 @ 20:20)  Aspartate Aminotransferase (AST/SGOT): 34 (01-31-24 @ 04:30)  Aspartate Aminotransferase (AST/SGOT): 54 (01-30-24 @ 04:30)  Aspartate Aminotransferase (AST/SGOT): 43 (01-28-24 @ 13:30)  Bilirubin Total: 0.3 (02-01)  Bilirubin Total: 0.3 (01-31)  Bilirubin Total: <0.2 (01-30)  Bilirubin Total: 0.2 (01-28)      Trend LDH  07-14-22 @ 12:50  271<H>  06-27-22 @ 06:47  173      Urinalysis Basic - ( 01 Feb 2024 20:20 )    Color: x / Appearance: x / SG: x / pH: x  Gluc: 97 mg/dL / Ketone: x  / Bili: x / Urobili: x   Blood: x / Protein: x / Nitrite: x   Leuk Esterase: x / RBC: x / WBC x   Sq Epi: x / Non Sq Epi: x / Bacteria: x        MICROBIOLOGY:    Female    Culture - Fungal, Body Fluid (collected 30 Jan 2024 18:58)  Source: Peritoneal Peritoneal Fluid  Preliminary Report:    Testing in progress    Culture - Body Fluid with Gram Stain (collected 30 Jan 2024 18:58)  Source: Ascites Fl Ascites Fluid  Preliminary Report:    No growth    Culture - Urine (collected 29 Jan 2024 07:00)  Source: Clean Catch Clean Catch (Midstream)  Final Report:    >=3 organisms. Probable collection contamination.    Culture - Blood (collected 28 Jan 2024 13:30)  Source: .Blood Blood  Preliminary Report:    No growth at 4 days    Culture - Blood (collected 28 Jan 2024 13:30)  Source: .Blood Blood  Preliminary Report:    No growth at 4 days    Culture - Blood (collected 25 Jul 2022 15:34)  Source: .Blood Blood  Final Report:    No Growth Final    Culture - Sputum (collected 15 Jul 2022 10:00)  Source: Trach Asp Tracheal Aspirate  Final Report:    Normal Respiratory Kelly present    Babesia microti PCR, Blood. (collected 10 Jul 2022 15:43)  Source: .Blood None  Final Report:    Babesia microti PCR    Results: NOT detected    ***************Result Note*************    The detection of Babesia microti by PCR has only been    validated for whole blood; this test has not been approved    by the US Food and Drug Administration (FDA). Performance    characteristics of this assay have been determined by    [employer]. The clinical significance    of results should be considered in conjunction with the    overall clinical presentation of the patient. Result is not    intended to be used as the sole means for clinical diagnosis    or patient management decisions.    One negative sample does not necessarily rule    out the presence of a parasitic infection.    Culture - Blood (collected 08 Jul 2022 05:26)  Source: .Blood None  Final Report:    No Growth Final    Culture - Blood (collected 06 Jul 2022 07:20)  Source: .Blood None  Final Report:    No Growth Final      HIV-1/2 Combo Result: Nonreact (01-28-24 @ 13:30)  HIV-1/2 Combo Result: Nonreact (06-29-22 @ 10:29)        Cytomegalovirus By PCR: NotDetec IU/mL (01-31-24 @ 04:30)      Ferritin: 289 (01-31)    RADIOLOGY & ADDITIONAL TESTS:  I have personally reviewed the relevant images.   CXR      CT  CT Abdomen and Pelvis No Cont:   ACC: [id number] EXAM:  CT ABDOMEN AND PELVIS   ORDERED BY: SINDHU EASTMAN     PROCEDURE DATE:  02/01/2024          INTERPRETATION:  CLINICAL STATEMENT: Evaluate GI bleed    TECHNIQUE: Contiguous axial CT images were obtained from the lower chest   to the pubic symphysis without intravenous contrast.  Oral contrast was   not administered.  Reformatted images in the coronal and sagittal planes   were acquired.    COMPARISON CT: June 26, 2022    OTHER STUDIES USED FOR CORRELATION: None.      FINDINGS:  Limited evaluation of solid organs and vascular structures secondary to   lack of intravenous contrast.    LOWER CHEST: Bibasilar scattered tree-in-bud nodular and groundglass   opacities. Trace pericardial effusion.    HEPATOBILIARY: Enlarged liver measures 24 cm in craniocaudal dimension.   Cholelithiasis.    SPLEEN: Enlarged spleen measures 18 cm in length.    PANCREAS: Unremarkable.    ADRENAL GLANDS: Unremarkable.    KIDNEYS: No nephroureteral calculi or hydronephrosis.    ABDOMINOPELVICNODES: Limited evaluation without contrast.    PELVIC ORGANS: Unremarkable.    PERITONEUM/MESENTERY/BOWEL: Small to moderate volume abdominopelvic   ascites. Suggestion of duodenal wall thickening/surrounding inflammation.   No bowel obstruction or pneumoperitoneum. No evidence of an   intraperitoneal or retroperitoneal hematoma.    BONES/SOFT TISSUES: Soft tissue anasarca. Stable visualized osseous   structures.      IMPRESSION:  1.  Suggestion of duodenal wall thickening/surrounding inflammation,   suboptimally assessed without contrast. Consider follow-up with a   contrast enhanced CT. Please note that for evaluation of GI bleed, a pre   and postcontrast CT abdomen pelvis should be obtained.  2.  Small to moderate volume abdominopelvic ascites.  3.  Hepatosplenomegaly.  4.  Bibasilar tree-in-bud nodular and groundglass opacities, compatible   with infectious/inflammatory small airways disease.  5.  Trace pericardial effusion.    --- End of Report ---    YOHAN HATFIELD MD; Attending Radiologist  This document has been electronically signed. Feb  1 2024  1:26PM (02-01-24 @ 11:09)        WEIGHT  Weight (kg): 70.3 (01-30-24 @ 09:27)  Creatinine: 2.1 mg/dL (02-01-24 @ 20:20)      All available historical records have been reviewed       POOJA DIANE  28y, Female    LOS  5d    INTERVAL EVENTS/HPI  - No acute events overnight  - T(F): , Max: 98.1 (02-01-24 @ 14:30)  - Tolerating medication  - WBC Count: 1.90 (02-01-24 @ 20:20)  WBC Count: 1.48 (01-31-24 @ 11:00)  - Creatinine: 2.1 (02-01-24 @ 20:20)    REVIEW OF SYSTEMS:  CONSTITUTIONAL: No fever or chills  HEENT: No sore throat  RESPIRATORY: No cough, no shortness of breath  CARDIOVASCULAR: No chest pain or palpitations  GASTROINTESTINAL: No abdominal or epigastric pain  GENITOURINARY: No dysuria  NEUROLOGICAL: No headache/dizziness  MSK: No joint pain, erythema, or swelling; no back pain  SKIN: No itching, rashes  All other ROS negative except noted above    Prior hospital charts reviewed [Yes]  Primary team notes reviewed [Yes]  Other consultant notes reviewed [Yes]    ANTIMICROBIALS:     MEDICATIONS  (STANDING):  oseltamivir   30 milliGRAM(s) Oral (02-02-24 @ 08:38)   30 milliGRAM(s) Oral (02-01-24 @ 21:38)   30 milliGRAM(s) Oral (02-01-24 @ 08:39)   30 milliGRAM(s) Oral (01-31-24 @ 20:04)   30 milliGRAM(s) Oral (01-31-24 @ 08:26)   30 milliGRAM(s) Oral (01-30-24 @ 20:09)   30 milliGRAM(s) Oral (01-30-24 @ 07:49)   30 milliGRAM(s) Oral (01-29-24 @ 21:15)   30 milliGRAM(s) Oral (01-29-24 @ 08:02)   30 milliGRAM(s) Oral (01-28-24 @ 20:26)      OTHER MEDS: MEDICATIONS  (STANDING):  acetaminophen     Tablet .. 650 every 6 hours PRN  albuterol/ipratropium for Nebulization 3 every 6 hours  furosemide   Injectable 40 two times a day  hydrOXYzine hydrochloride 50 every 6 hours PRN  hydrOXYzine hydrochloride 100 at bedtime PRN  methadone    Tablet 5 two times a day  methadone    Tablet    midodrine. 5 three times a day  ondansetron Injectable 4 every 8 hours PRN  pantoprazole    Tablet 40 before breakfast      Vital Signs Last 24 Hrs  T(F): 96.7 (02-02-24 @ 05:14), Max: 98.8 (01-28-24 @ 20:35)    Vital Signs Last 24 Hrs  HR: 88 (02-02-24 @ 05:14) (88 - 90)  BP: 109/58 (02-02-24 @ 05:14) (109/58 - 116/55)  RR: 18 (02-02-24 @ 05:14)  SpO2: 96% (02-02-24 @ 05:14) (96% - 100%)  Wt(kg): --    EXAM:  GENERAL: NAD, lying in bed  HEAD: No head lesions  NECK: Supple  CHEST/LUNG: Shallow breath sounds bilaterally.   HEART: S1 S2  ABDOMEN: Soft, Distended. Mildly tender.   EXTREMITIES: Bilateral diffuse edema on lower extremities, wrapped in dressings.   NERVOUS SYSTEM: Alert and oriented to person  MSK: No joint erythema, swelling or pain  SKIN: No rashes or lesions, no superficial thrombophlebitis    Labs:                        7.5    1.90  )-----------( 60       ( 01 Feb 2024 20:20 )             23.5     02-01    139  |  108  |  25<H>  ----------------------------<  97  2.7<LL>   |  18  |  2.1<H>    Ca    6.9<L>      01 Feb 2024 20:20    TPro  4.2<L>  /  Alb  2.0<L>  /  TBili  0.3  /  DBili  x   /  AST  35  /  ALT  23  /  AlkPhos  607<H>  02-01      WBC Trend:  WBC Count: 1.90 (02-01-24 @ 20:20)  WBC Count: 1.48 (01-31-24 @ 11:00)  WBC Count: 1.26 (01-31-24 @ 04:30)  WBC Count: 2.79 (01-28-24 @ 13:30)      Creatine Trend:  Creatinine: 2.1 (02-01)  Creatinine: 2.1 (01-31)  Creatinine: 2.5 (01-30)  Creatinine: 2.1 (01-28)      Liver Biochemical Testing Trend:  Alanine Aminotransferase (ALT/SGPT): 23 (02-01)  Alanine Aminotransferase (ALT/SGPT): 21 (01-31)  Alanine Aminotransferase (ALT/SGPT): 30 (01-30)  Alanine Aminotransferase (ALT/SGPT): 32 (01-28)  Aspartate Aminotransferase (AST/SGOT): 35 (02-01-24 @ 20:20)  Aspartate Aminotransferase (AST/SGOT): 34 (01-31-24 @ 04:30)  Aspartate Aminotransferase (AST/SGOT): 54 (01-30-24 @ 04:30)  Aspartate Aminotransferase (AST/SGOT): 43 (01-28-24 @ 13:30)  Bilirubin Total: 0.3 (02-01)  Bilirubin Total: 0.3 (01-31)  Bilirubin Total: <0.2 (01-30)  Bilirubin Total: 0.2 (01-28)      Trend LDH  07-14-22 @ 12:50  271<H>  06-27-22 @ 06:47  173      Urinalysis Basic - ( 01 Feb 2024 20:20 )    Color: x / Appearance: x / SG: x / pH: x  Gluc: 97 mg/dL / Ketone: x  / Bili: x / Urobili: x   Blood: x / Protein: x / Nitrite: x   Leuk Esterase: x / RBC: x / WBC x   Sq Epi: x / Non Sq Epi: x / Bacteria: x        MICROBIOLOGY:    Female    Culture - Fungal, Body Fluid (collected 30 Jan 2024 18:58)  Source: Peritoneal Peritoneal Fluid  Preliminary Report:    Testing in progress    Culture - Body Fluid with Gram Stain (collected 30 Jan 2024 18:58)  Source: Ascites Fl Ascites Fluid  Preliminary Report:    No growth    Culture - Urine (collected 29 Jan 2024 07:00)  Source: Clean Catch Clean Catch (Midstream)  Final Report:    >=3 organisms. Probable collection contamination.    Culture - Blood (collected 28 Jan 2024 13:30)  Source: .Blood Blood  Preliminary Report:    No growth at 4 days    Culture - Blood (collected 28 Jan 2024 13:30)  Source: .Blood Blood  Preliminary Report:    No growth at 4 days    Culture - Blood (collected 25 Jul 2022 15:34)  Source: .Blood Blood  Final Report:    No Growth Final    Culture - Sputum (collected 15 Jul 2022 10:00)  Source: Trach Asp Tracheal Aspirate  Final Report:    Normal Respiratory Kelly present    Babesia microti PCR, Blood. (collected 10 Jul 2022 15:43)  Source: .Blood None  Final Report:    Babesia microti PCR    Results: NOT detected    ***************Result Note*************    The detection of Babesia microti by PCR has only been    validated for whole blood; this test has not been approved    by the US Food and Drug Administration (FDA). Performance    characteristics of this assay have been determined by    [employer]. The clinical significance    of results should be considered in conjunction with the    overall clinical presentation of the patient. Result is not    intended to be used as the sole means for clinical diagnosis    or patient management decisions.    One negative sample does not necessarily rule    out the presence of a parasitic infection.    Culture - Blood (collected 08 Jul 2022 05:26)  Source: .Blood None  Final Report:    No Growth Final    Culture - Blood (collected 06 Jul 2022 07:20)  Source: .Blood None  Final Report:    No Growth Final      HIV-1/2 Combo Result: Nonreact (01-28-24 @ 13:30)  HIV-1/2 Combo Result: Nonreact (06-29-22 @ 10:29)        Cytomegalovirus By PCR: NotDetec IU/mL (01-31-24 @ 04:30)      Ferritin: 289 (01-31)    RADIOLOGY & ADDITIONAL TESTS:  I have personally reviewed the relevant images.   CXR      CT  CT Abdomen and Pelvis No Cont:   ACC: [id number] EXAM:  CT ABDOMEN AND PELVIS   ORDERED BY: SINDHU EASTMAN     PROCEDURE DATE:  02/01/2024          INTERPRETATION:  CLINICAL STATEMENT: Evaluate GI bleed    TECHNIQUE: Contiguous axial CT images were obtained from the lower chest   to the pubic symphysis without intravenous contrast.  Oral contrast was   not administered.  Reformatted images in the coronal and sagittal planes   were acquired.    COMPARISON CT: June 26, 2022    OTHER STUDIES USED FOR CORRELATION: None.      FINDINGS:  Limited evaluation of solid organs and vascular structures secondary to   lack of intravenous contrast.    LOWER CHEST: Bibasilar scattered tree-in-bud nodular and groundglass   opacities. Trace pericardial effusion.    HEPATOBILIARY: Enlarged liver measures 24 cm in craniocaudal dimension.   Cholelithiasis.    SPLEEN: Enlarged spleen measures 18 cm in length.    PANCREAS: Unremarkable.    ADRENAL GLANDS: Unremarkable.    KIDNEYS: No nephroureteral calculi or hydronephrosis.    ABDOMINOPELVICNODES: Limited evaluation without contrast.    PELVIC ORGANS: Unremarkable.    PERITONEUM/MESENTERY/BOWEL: Small to moderate volume abdominopelvic   ascites. Suggestion of duodenal wall thickening/surrounding inflammation.   No bowel obstruction or pneumoperitoneum. No evidence of an   intraperitoneal or retroperitoneal hematoma.    BONES/SOFT TISSUES: Soft tissue anasarca. Stable visualized osseous   structures.      IMPRESSION:  1.  Suggestion of duodenal wall thickening/surrounding inflammation,   suboptimally assessed without contrast. Consider follow-up with a   contrast enhanced CT. Please note that for evaluation of GI bleed, a pre   and postcontrast CT abdomen pelvis should be obtained.  2.  Small to moderate volume abdominopelvic ascites.  3.  Hepatosplenomegaly.  4.  Bibasilar tree-in-bud nodular and groundglass opacities, compatible   with infectious/inflammatory small airways disease.  5.  Trace pericardial effusion.    --- End of Report ---    YOHAN HATFIELD MD; Attending Radiologist  This document has been electronically signed. Feb 1 2024  1:26PM (02-01-24 @ 11:09)        WEIGHT  Weight (kg): 70.3 (01-30-24 @ 09:27)  Creatinine: 2.1 mg/dL (02-01-24 @ 20:20)      All available historical records have been reviewed

## 2024-02-02 NOTE — PROGRESS NOTE ADULT - ASSESSMENT
acute kidney injury, better  Cr 8/2022 - 0.9  low Toni+, significant non-nephrotic range proteinuria, GN w/u neg so far  microhematuria, pyuria and bacteriuria, UCx > 3 organisms   normal renal sono  anasarca / ascites s/p 7.5 liter para   hyperkalemia, now hypokalemic   metabolic acidosis  influenza    liver cirrhosis / Hep C  pancytopenia / worsening anemia  IVDA / active IV heroin abuse / opiate withdrawals   leg wounds    plan:    change to lasix 40mg po qd  KCl repletion   lower sodium bicarbonate 650mg po bid  cont midodrine 5mg po tid  complete albumin   s/p prbc transfusion  give epogen 10,000U SQ x 1  outpt renal follow up  stable for dc home from renal standpoint once K+ normalized  outpt renal follow up, will consider aldactone as outpt if pt follows up and renal function stable  d/w hospitalist

## 2024-02-02 NOTE — PROGRESS NOTE ADULT - ASSESSMENT
29 y/o female with pmhx of  current smoker, asthma, hep C , IVDA ,leukopenia,  ascites  presents with shortness of breath. Pt reports shortness of breath x 1 week, getting swollen everywhere and has blisters on her legs. Pt reports weight gain a lot of pounds. Pt reports s/p paracentesis 1 month ago at [facility] 5.5 L of fluids was removed. PT reports fever at home, not sure Tmax. PT with cough , productive x 1 week. PT states nausea/ vomining, NBNB. Pt with diarrhea, now resolved. Pt reports using IV heroin this am 1-2 bags, administered on her abdominal vein.  Pt reports itchy all the time.  Pt denies chest pain, burning with urination.Pt does not take any medication at home because she did not follow up.     Pending labs for today, pt refuses and chooses when she wants tests/imaging ETC    Decompensated Liver Failure: Likely secondary to Hepatitis C and IVDA   Complicated by Chronic Pancytopenia, Anemia and generalized Anasarca    -S/P  paracentesis last month at [facility] where 5.5 L were removed.  --GI consult  appreciated: f/up CLD w/up   Will need EGD for variceal screening and MR abdomen w/wo MRCP when RENETTA resolves.  -Now S/P  Paracentesis 1/30:  7.5 L of fluid removed. follow paracentesis analysis without SPB   -S/P Albumin 25% 25g iv q8h x 72 hours, F/up Peritoneal fluid testing   -Heme/onc Eval appreciated: Likely from Liver disease and Hypersplenism , O/P f/up   - Addiction medicine recs appreciated: started methadone taper    RENETTA: Obstructive from abdominal  compression v.s  hepatorenal syndrome: slowly improving   Complicated by Hyperkalemia with metabolic acidosis  Urine Analysis with proteinuria, microhematuria, pyuria and bacteriuria   Scr: Cr 8/2022 - 0.9, Renal sonogram: negative.   -Nephrology recs appreciated: c/w lasix 40mg iv q12h, PO bicarb Lokelma PRN   -no aldactone until renal function stabilized and K+ status improved  -follow  C3, C4, ANCA, KRISHNA, Anti GBM    Influenza A infection Per SOB   CXR: No radiographic evidence of acute cardiopulmonary disease.  Tamiflu dose completed (renally dosed)      Bilateral foot ulcers/ blisters.   ID recs appreciated: no need for ABX.   Wound/ podiatry consult appreciated   use Xeroform Guaze. Can wash with saline.   Change them daily. Leg elevation    DVT PPX: SCD's however B/L swollen extremities weeping as well   GI PPX: PPI   Full Code   Dispo: from Home   Pending Clinical Improvement, poor prognosis  Pending EGD possibly Next week Wednesday  However pt is hesitant to leaves, possible D/C pending improvement in labs

## 2024-02-02 NOTE — PROGRESS NOTE ADULT - ASSESSMENT
This is a 27 y/o female with pmh asthma, hep C, polysubstance use disorder, leukopenia,  ascites  presents with shortness of breath.    IMPRESSION  #Influenza A Infection, Tree in bud opacities.   #Bilateral lower extremity edema and skin tears.  #Hepatitis C, Poly substance use (IV drug user)- Reports previously treated. Hep C VL negative.   #Liver cirrhosis and ascites   #Pancytopenia secondary to liver disease and hypersplenism  #transaminitis   #CKD 3- Possible hepatorenal syndrome  #Obesity BMI (kg/m2): 29.7  HIV screen negative    RECOMMENDATIONS  -Finish 5 days of Tamiflu 30 mg daily.   -Remaining management of renal function as per the nephrology.   -Follow up with the hepatitis workup.   -Peritoneal fluid negative for SBP.  -Follow up with GI for EGD for esophageal varices.   -Local wound care for bilateral lower extremities.   -Monitor for withdrawal symptoms.     If any questions, please send a message or call on [email or web address] Teams  Please continue to update ID with any pertinent new laboratory or radiographic findings.    Geovain Yan M.D  Infectious Diseases Attending/   [employer] at [facility]   This is a 27 y/o female with pmh asthma, hep C, polysubstance use disorder, leukopenia,  ascites  presents with shortness of breath.    IMPRESSION  #Influenza A Infection, Tree in bud opacities.   #Bilateral lower extremity edema and skin tears.  #Hepatitis C, Poly substance use (IV drug user)- Reports previously treated. Hep C VL negative.   #Liver cirrhosis and ascites   #Pancytopenia secondary to liver disease and hypersplenism  #transaminitis   #CKD 3- Possible hepatorenal syndrome  #Obesity BMI (kg/m2): 29.7  HIV screen negative    RECOMMENDATIONS  -Finished 5 days of Tamiflu course.   -Remaining management of renal function as per the nephrology.   -Follow up with the hepatitis workup.   -Peritoneal fluid negative for SBP.  -Follow up with GI for EGD for esophageal varices.   -Local wound care for bilateral lower extremities.   -Monitor for withdrawal symptoms.     If any questions, please send a message or call on [email or web address] Teams  Please continue to update ID with any pertinent new laboratory or radiographic findings.    Geovani Yan M.D  Infectious Diseases Attending/   [employer] at [facility]   This is a 29 y/o female with pmh asthma, hep C, polysubstance use disorder, leukopenia,  ascites  presents with shortness of breath.    IMPRESSION  #Influenza A Infection, Tree in bud opacities.   #Bilateral lower extremity edema and skin tears.  #Hepatitis C, Poly substance use (IV drug user)- Reports previously treated. Hep C VL negative.   #Liver cirrhosis and ascites   #Pancytopenia secondary to liver disease and hypersplenism  #transaminitis   #CKD 3- Possible hepatorenal syndrome  #Obesity BMI (kg/m2): 29.7  HIV screen negative    RECOMMENDATIONS  -Finished 5 days of Tamiflu course.   -Remaining management of renal function as per the nephrology.   -Follow up with the hepatitis workup.   -Peritoneal fluid negative for SBP.  -Follow up with GI for EGD for esophageal varices.   -Local wound care for bilateral lower extremities.   -Monitor for withdrawal symptoms.   -Recall ID as needed.     If any questions, please send a message or call on [employer] Teams  Please continue to update ID with any pertinent new laboratory or radiographic findings.    Geovani Yan M.D  Infectious Diseases Attending/   [employer] at [facility]

## 2024-02-03 ENCOUNTER — TRANSCRIPTION ENCOUNTER (OUTPATIENT)
Age: 29
End: 2024-02-03

## 2024-02-03 VITALS
HEART RATE: 85 BPM | RESPIRATION RATE: 18 BRPM | TEMPERATURE: 96 F | DIASTOLIC BLOOD PRESSURE: 66 MMHG | OXYGEN SATURATION: 95 % | SYSTOLIC BLOOD PRESSURE: 107 MMHG

## 2024-02-03 LAB
ANA TITR SER: NEGATIVE — SIGNIFICANT CHANGE UP
CRYPTOC AG FLD QL: NEGATIVE — SIGNIFICANT CHANGE UP
HCV GENTYP BLD NAA+PROBE: SIGNIFICANT CHANGE UP

## 2024-02-03 PROCEDURE — 99239 HOSP IP/OBS DSCHRG MGMT >30: CPT

## 2024-02-03 RX ORDER — SODIUM BICARBONATE 1 MEQ/ML
1 SYRINGE (ML) INTRAVENOUS
Qty: 60 | Refills: 0
Start: 2024-02-03 | End: 2024-03-03

## 2024-02-03 RX ORDER — PANTOPRAZOLE SODIUM 20 MG/1
1 TABLET, DELAYED RELEASE ORAL
Qty: 30 | Refills: 0
Start: 2024-02-03 | End: 2024-03-03

## 2024-02-03 RX ORDER — MIDODRINE HYDROCHLORIDE 2.5 MG/1
1 TABLET ORAL
Qty: 90 | Refills: 0
Start: 2024-02-03 | End: 2024-03-03

## 2024-02-03 RX ORDER — FUROSEMIDE 40 MG
1 TABLET ORAL
Qty: 30 | Refills: 0
Start: 2024-02-03 | End: 2024-03-03

## 2024-02-03 RX ADMIN — Medication 40 MILLIGRAM(S): at 05:41

## 2024-02-03 RX ADMIN — Medication 650 MILLIGRAM(S): at 11:35

## 2024-02-03 RX ADMIN — PANTOPRAZOLE SODIUM 40 MILLIGRAM(S): 20 TABLET, DELAYED RELEASE ORAL at 05:43

## 2024-02-03 RX ADMIN — MIDODRINE HYDROCHLORIDE 5 MILLIGRAM(S): 2.5 TABLET ORAL at 11:35

## 2024-02-03 RX ADMIN — Medication 650 MILLIGRAM(S): at 05:41

## 2024-02-03 RX ADMIN — MIDODRINE HYDROCHLORIDE 5 MILLIGRAM(S): 2.5 TABLET ORAL at 05:42

## 2024-02-03 RX ADMIN — Medication 3 MILLILITER(S): at 07:31

## 2024-02-03 RX ADMIN — METHADONE HYDROCHLORIDE 5 MILLIGRAM(S): 40 TABLET ORAL at 05:41

## 2024-02-03 NOTE — DISCHARGE NOTE PROVIDER - CARE PROVIDER_API CALL
Yordan Goldberg  Nephrology  [address] [place] [address]  Phone: [phone]  Fax: [phone]  Follow Up Time: 1 week    Bonita Vazquez  Gastroenterology  [address] [place] [address]  Phone: [phone]  Fax: [phone]  Follow Up Time:     Juan Carlos Altman  Internal Medicine  [address] [place] [address]  Phone: [phone]  Fax: [phone]  Follow Up Time:

## 2024-02-03 NOTE — DISCHARGE NOTE PROVIDER - NSDCMRMEDTOKEN_GEN_ALL_CORE_FT
furosemide 40 mg oral tablet: 1 tab(s) orally once a day  midodrine 5 mg oral tablet: 1 tab(s) orally 3 times a day  pantoprazole 40 mg oral delayed release tablet: 1 tab(s) orally once a day (before a meal)  sodium bicarbonate 650 mg oral tablet: 1 tab(s) orally 2 times a day

## 2024-02-03 NOTE — PROGRESS NOTE ADULT - SUBJECTIVE AND OBJECTIVE BOX
Patient is a 28y old  Female who presents with a chief complaint of shortness of breath, ascites, anemia, influenza a , anasarca , thrombocytopenia (02 Feb 2024 13:36)      MEDICATIONS  (STANDING):  albuterol/ipratropium for Nebulization 3 milliLiter(s) Nebulizer every 6 hours  chlorhexidine 4% Liquid 1 Application(s) Topical <User Schedule>  furosemide    Tablet 40 milliGRAM(s) Oral daily  methadone    Tablet 5 milliGRAM(s) Oral two times a day  methadone    Tablet   Oral   midodrine. 5 milliGRAM(s) Oral three times a day  pantoprazole    Tablet 40 milliGRAM(s) Oral before breakfast  sodium bicarbonate 650 milliGRAM(s) Oral two times a day    MEDICATIONS  (PRN):  acetaminophen     Tablet .. 650 milliGRAM(s) Oral every 6 hours PRN Temp greater or equal to 38C (100.4F), Mild Pain (1 - 3)  hydrOXYzine hydrochloride 50 milliGRAM(s) Oral every 6 hours PRN Anxiety  hydrOXYzine hydrochloride 100 milliGRAM(s) Oral at bedtime PRN insomnia  methadone    Tablet 10 milliGRAM(s) Oral two times a day PRN withdrawal  ondansetron Injectable 4 milliGRAM(s) IV Push every 8 hours PRN Nausea and/or Vomiting      CAPILLARY BLOOD GLUCOSE  I&O's Summary    02 Feb 2024 07:01  -  03 Feb 2024 07:00  --------------------------------------------------------  IN: 400 mL / OUT: 1100 mL / NET: -700 mL    PHYSICAL EXAM:  Vital Signs Last 24 Hrs  T(C): 35.7 (03 Feb 2024 05:19), Max: 36.4 (02 Feb 2024 22:25)  T(F): 96.2 (03 Feb 2024 05:19), Max: 97.5 (02 Feb 2024 22:25)  HR: 85 (03 Feb 2024 05:19) (84 - 86)  BP: 107/66 (03 Feb 2024 05:19) (101/55 - 107/66)  BP(mean): --  RR: 18 (03 Feb 2024 05:19) (18 - 18)  SpO2: 95% (03 Feb 2024 05:19) (94% - 95%)    Parameters below as of 03 Feb 2024 05:19  Patient On (Oxygen Delivery Method): room air      GENERAL: No acute distress ,generalized Anasarca improving   HEAD:  Atraumatic, Normocephalic  EYES:  conjunctiva and sclera clear  NECK: Supple, no lymphadenopathy, no JVD  CHEST/LUNG: CTAB; No wheezes, rales, or rhonchi  HEART: Regular rate and rhythm; No murmurs, rubs, or gallops  ABDOMEN: Soft, non-tender, distended with ascitics : improved   Extremity: Bilateral  LE edema with weeping: improved   NEUROLOGY: A&O x 3, no focal deficits  SKIN: No rashes or lesions    LABS:                        7.6    1.81  )-----------( 55       ( 02 Feb 2024 06:35 )             23.9     02-02    139  |  107  |  23<H>  ----------------------------<  82  3.1<L>   |  21  |  2.0<H>    Ca    6.9<L>      02 Feb 2024 06:35  Mg     1.4     02-02    TPro  4.3<L>  /  Alb  2.1<L>  /  TBili  0.5  /  DBili  x   /  AST  29  /  ALT  23  /  AlkPhos  588<H>  02-02    Urinalysis Basic - ( 02 Feb 2024 06:35 )    Color: x / Appearance: x / SG: x / pH: x  Gluc: 82 mg/dL / Ketone: x  / Bili: x / Urobili: x   Blood: x / Protein: x / Nitrite: x   Leuk Esterase: x / RBC: x / WBC x   Sq Epi: x / Non Sq Epi: x / Bacteria: x

## 2024-02-03 NOTE — DISCHARGE NOTE PROVIDER - HOSPITAL COURSE
Decompensated Liver Failure: Likely secondary to Hepatitis C and IVDA   Complicated by Chronic Pancytopenia, Anemia and generalized Anasarca    -S/P  paracentesis last month at [facility] where 5.5 L were removed.  --GI consult  appreciated: f/up CLD w/up   Will need EGD for variceal screening and MR abdomen w/wo MRCP when RENETTA resolves.  -Now S/P  Paracentesis 1/30:  7.5 L of fluid removed. paracentesis analysis without SPB   -S/P Albumin 25% 25g iv q8h x 72 hours, F/up Peritoneal fluid testing   -Heme/onc Eval appreciated: Likely from Liver disease and Hypersplenism , O/P f/up   - Addiction medicine recs appreciated: started methadone taper    RENETTA: Obstructive from abdominal  compression v.s  hepatorenal syndrome: slowly improving   Complicated by Hyperkalemia with metabolic acidosis: improving   Urine Analysis with proteinuria, microhematuria, pyuria and bacteriuria   Scr: Cr 8/2022 - 0.9, Renal sonogram: negative.   -Nephrology recs appreciated: now transitioned to  PO Lasix 40mg, c/w  PO bicarb  -no aldactone until renal function stabilized and K+ status improved (O/P f/up with renal)   -follow  C3, C4, ANCA, KRISHNA, Anti GBM    Hypokalemia + Hypomagnesemia: monitor and Replete     Influenza A infection Per SOB   CXR: No radiographic evidence of acute cardiopulmonary disease.  Tamiflu dose completed (renally dosed)      Bilateral foot ulcers/ blisters.   ID recs appreciated: no need for ABX.   Wound/ podiatry consult appreciated   use Xeroform Guaze. Can wash with saline.   Change them daily. Leg elevation

## 2024-02-03 NOTE — PROGRESS NOTE ADULT - ASSESSMENT
27 y/o female with pmhx of  current smoker, asthma, hep C , IVDA ,leukopenia,  ascites  presents with shortness of breath. Pt reports shortness of breath x 1 week, getting swollen everywhere and has blisters on her legs. Pt reports weight gain a lot of pounds. Pt reports s/p paracentesis 1 month ago at [facility] 5.5 L of fluids was removed. PT reports fever at home, not sure Tmax. PT with cough , productive x 1 week. PT states nausea/ vomining, NBNB. Pt with diarrhea, now resolved. Pt reports using IV heroin this am 1-2 bags, administered on her abdominal vein.  Pt reports itchy all the time.  Pt denies chest pain, burning with urination.Pt does not take any medication at home because she did not follow up.     Pending labs for today, pt refuses and chooses when she wants tests/imaging ETC    Decompensated Liver Failure: Likely secondary to Hepatitis C and IVDA   Complicated by Chronic Pancytopenia, Anemia and generalized Anasarca    -S/P  paracentesis last month at [facility] where 5.5 L were removed.  --GI consult  appreciated: f/up CLD w/up   Will need EGD for variceal screening and MR abdomen w/wo MRCP when RENETTA resolves.  -Now S/P  Paracentesis 1/30:  7.5 L of fluid removed. paracentesis analysis without SPB   -S/P Albumin 25% 25g iv q8h x 72 hours, F/up Peritoneal fluid testing   -Heme/onc Eval appreciated: Likely from Liver disease and Hypersplenism , O/P f/up   - Addiction medicine recs appreciated: started methadone taper    RENETTA: Obstructive from abdominal  compression v.s  hepatorenal syndrome: slowly improving   Complicated by Hyperkalemia with metabolic acidosis: improving   Urine Analysis with proteinuria, microhematuria, pyuria and bacteriuria   Scr: Cr 8/2022 - 0.9, Renal sonogram: negative.   -Nephrology recs appreciated: now transitioned to  PO Lasix 40mg, c/w  PO bicarb  -no aldactone until renal function stabilized and K+ status improved (O/P f/up with renal)   -follow  C3, C4, ANCA, KRISHNA, Anti GBM    Hypokalemia + Hypomagnesemia: monitor and Replete     Influenza A infection Per SOB   CXR: No radiographic evidence of acute cardiopulmonary disease.  Tamiflu dose completed (renally dosed)      Bilateral foot ulcers/ blisters.   ID recs appreciated: no need for ABX.   Wound/ podiatry consult appreciated   use Xeroform Guaze. Can wash with saline.   Change them daily. Leg elevation    DVT PPX: SCD's however B/L swollen extremities weeping as well   GI PPX: PPI   Full Code   Dispo: from Home   Pending Clinical Improvement, poor prognosis  Pending EGD possibly Next week Wednesday  However pt is hesitant to leaves, possible D/C pending improvement in labs   Will Anticipate for AM

## 2024-02-03 NOTE — PROGRESS NOTE ADULT - PROVIDER SPECIALTY LIST ADULT
Gastroenterology
Hospitalist
Infectious Disease
Intervent Radiology
Nephrology
Nephrology
Podiatry
Addiction Medicine
Hepatology
Hospitalist
Gastroenterology
Hospitalist
Hospitalist
Infectious Disease
Nephrology
Gastroenterology
Hospitalist
Hospitalist
Nephrology
Addiction Medicine
Addiction Medicine

## 2024-02-03 NOTE — PROGRESS NOTE ADULT - REASON FOR ADMISSION
shortness of breath, ascites, anemia, influenza a , anasarca , thrombocytopenia

## 2024-02-03 NOTE — DISCHARGE NOTE PROVIDER - NSDCCPCAREPLAN_GEN_ALL_CORE_FT
PRINCIPAL DISCHARGE DIAGNOSIS  Diagnosis: Decompensated hepatic cirrhosis  Assessment and Plan of Treatment: Fluid was removed form your Peritoneum about 7.5Liters, your Ascites have improved with Diuretics. we will be sending you home per your request and are leaving against Medical Advice. We have sent  you oral diuretics to the pharmacy, continue to take it and follow-up with Gastroenterologist and Nephrologist      SECONDARY DISCHARGE DIAGNOSES  Diagnosis: Anemia  Assessment and Plan of Treatment: Multiple Causes, You received one Unit of blood transfusion with improved response .follow-up with Nephrologist, hematologist and Gastroenetrologist in 1-2 weeks    Diagnosis: Thrombocytopenia  Assessment and Plan of Treatment: From liver Failure and Hypersplenism   Follow-up with the hematologist  1-2 weeks    Diagnosis: Opiate dependence  Assessment and Plan of Treatment: Follow-up with The methadone clinic on monday for continued maintainace    Diagnosis: Influenza  Assessment and Plan of Treatment: Complted 5 day course of Tamiflu     PRINCIPAL DISCHARGE DIAGNOSIS  Diagnosis: Decompensated hepatic cirrhosis  Assessment and Plan of Treatment: Fluid was removed form your Peritoneum about 7.5Liters, your Ascites have improved with Diuretics. we will be sending you home per your request and are leaving against Medical Advice. We have sent  you oral diuretics to the pharmacy, continue to take it and follow-up with Gastroenterologist and Nephrologist  You need to have an Endoscopy to evaluate for Varieces, this can cause fatal bleeding if you have it, so follow -up to get evaluated      SECONDARY DISCHARGE DIAGNOSES  Diagnosis: Anemia  Assessment and Plan of Treatment: Multiple Causes, You received one Unit of blood transfusion with improved response .follow-up with Nephrologist, hematologist and Gastroenetrologist in 1-2 weeks    Diagnosis: Thrombocytopenia  Assessment and Plan of Treatment: From liver Failure and Hypersplenism   Follow-up with the hematologist  1-2 weeks    Diagnosis: Opiate dependence  Assessment and Plan of Treatment: Follow-up with The methadone clinic on monday for continued maintainace    Diagnosis: Influenza  Assessment and Plan of Treatment: Complted 5 day course of Tamiflu    Diagnosis: Hypotension  Assessment and Plan of Treatment: Continue with Midodrine three times/day

## 2024-02-03 NOTE — DISCHARGE NOTE PROVIDER - PROVIDER TOKENS
PROVIDER:[TOKEN:[[id number]],FOLLOWUP:[1 week]],PROVIDER:[TOKEN:[[id number]]],PROVIDER:[TOKEN:[[id number]]]

## 2024-02-03 NOTE — DISCHARGE NOTE PROVIDER - CARE PROVIDERS DIRECT ADDRESSES
,DirectAddress_Unknown,[email or web address]

## 2024-02-03 NOTE — DISCHARGE NOTE PROVIDER - ATTENDING DISCHARGE PHYSICAL EXAMINATION:
GENERAL: No acute distress ,generalized Anasarca improving   HEAD:  Atraumatic, Normocephalic  EYES:  conjunctiva and sclera clear  NECK: Supple, no lymphadenopathy, no JVD  CHEST/LUNG: CTAB; No wheezes, rales, or rhonchi  HEART: Regular rate and rhythm; No murmurs, rubs, or gallops  ABDOMEN: Soft, non-tender, distended with ascitics : improved   Extremity: Bilateral  LE edema with weeping: improved   NEUROLOGY: A&O x 3, no focal deficits  SKIN: No rashes or lesions

## 2024-02-04 LAB
GAMMA INTERFERON BACKGROUND BLD IA-ACNC: 0.05 IU/ML — SIGNIFICANT CHANGE UP
M TB IFN-G BLD-IMP: ABNORMAL
M TB IFN-G CD4+ BCKGRND COR BLD-ACNC: 0.02 IU/ML — SIGNIFICANT CHANGE UP
M TB IFN-G CD4+CD8+ BCKGRND COR BLD-ACNC: 0.01 IU/ML — SIGNIFICANT CHANGE UP
QUANT TB PLUS MITOGEN MINUS NIL: 0.09 IU/ML — SIGNIFICANT CHANGE UP

## 2024-02-05 LAB
CULTURE RESULTS: SIGNIFICANT CHANGE UP
SPECIMEN SOURCE: SIGNIFICANT CHANGE UP

## 2024-02-06 LAB
A2 MACROGLOB SERPL-MCNC: 127 MG/DL — SIGNIFICANT CHANGE UP (ref 110–276)
ALT SERPL W P-5'-P-CCNC: 21 IU/L — SIGNIFICANT CHANGE UP (ref 0–40)
APO A-I SERPL-MCNC: 48 MG/DL — LOW (ref 116–209)
BILIRUB SERPL-MCNC: 0.2 MG/DL — SIGNIFICANT CHANGE UP (ref 0–1.2)
COMMENT 2:: SIGNIFICANT CHANGE UP
FIBROSIS SCORE: 0.19 — SIGNIFICANT CHANGE UP (ref 0–0.21)
FIBROSIS SCORING:: SIGNIFICANT CHANGE UP
FIBROSIS STAGE: SIGNIFICANT CHANGE UP
GGT SERPL-CCNC: 128 IU/L — HIGH (ref 0–60)
HAPTOGLOB SERPL-MCNC: 62 MG/DL — SIGNIFICANT CHANGE UP (ref 33–278)
INTERPRETATIONS:: SIGNIFICANT CHANGE UP
LIMITATIONS:: SIGNIFICANT CHANGE UP
NECROINFLAMM ACTIVITY SCORING:: SIGNIFICANT CHANGE UP
NECROINFLAMMAT ACTIVITY GRADE: SIGNIFICANT CHANGE UP
NECROINFLAMMAT ACTIVITY SCORE: 0.07 — SIGNIFICANT CHANGE UP (ref 0–0.17)

## 2024-03-02 LAB
CULTURE RESULTS: SIGNIFICANT CHANGE UP
SPECIMEN SOURCE: SIGNIFICANT CHANGE UP

## 2024-03-03 ENCOUNTER — INPATIENT (INPATIENT)
Facility: HOSPITAL | Age: 29
LOS: 1 days | Discharge: LEFT BEFORE DISCHARGE | DRG: 248 | End: 2024-03-05
Attending: STUDENT IN AN ORGANIZED HEALTH CARE EDUCATION/TRAINING PROGRAM | Admitting: INTERNAL MEDICINE
Payer: MEDICAID

## 2024-03-03 VITALS
OXYGEN SATURATION: 98 % | TEMPERATURE: 98 F | HEART RATE: 110 BPM | RESPIRATION RATE: 21 BRPM | SYSTOLIC BLOOD PRESSURE: 146 MMHG | HEIGHT: 60.6 IN | DIASTOLIC BLOOD PRESSURE: 91 MMHG

## 2024-03-03 LAB
APTT BLD: 32.2 SEC — SIGNIFICANT CHANGE UP (ref 27–39.2)
BASOPHILS # BLD AUTO: 0.01 K/UL — SIGNIFICANT CHANGE UP (ref 0–0.2)
BASOPHILS NFR BLD AUTO: 0.2 % — SIGNIFICANT CHANGE UP (ref 0–1)
EOSINOPHIL # BLD AUTO: 0 K/UL — SIGNIFICANT CHANGE UP (ref 0–0.7)
EOSINOPHIL NFR BLD AUTO: 0 % — SIGNIFICANT CHANGE UP (ref 0–8)
HCT VFR BLD CALC: 33 % — LOW (ref 37–47)
HGB BLD-MCNC: 10.8 G/DL — LOW (ref 12–16)
IMM GRANULOCYTES NFR BLD AUTO: 0.4 % — HIGH (ref 0.1–0.3)
INR BLD: 0.99 RATIO — SIGNIFICANT CHANGE UP (ref 0.65–1.3)
LYMPHOCYTES # BLD AUTO: 1.04 K/UL — LOW (ref 1.2–3.4)
LYMPHOCYTES # BLD AUTO: 22.4 % — SIGNIFICANT CHANGE UP (ref 20.5–51.1)
MCHC RBC-ENTMCNC: 27.9 PG — SIGNIFICANT CHANGE UP (ref 27–31)
MCHC RBC-ENTMCNC: 32.7 G/DL — SIGNIFICANT CHANGE UP (ref 32–37)
MCV RBC AUTO: 85.3 FL — SIGNIFICANT CHANGE UP (ref 81–99)
MONOCYTES # BLD AUTO: 0.25 K/UL — SIGNIFICANT CHANGE UP (ref 0.1–0.6)
MONOCYTES NFR BLD AUTO: 5.4 % — SIGNIFICANT CHANGE UP (ref 1.7–9.3)
NEUTROPHILS # BLD AUTO: 3.32 K/UL — SIGNIFICANT CHANGE UP (ref 1.4–6.5)
NEUTROPHILS NFR BLD AUTO: 71.6 % — SIGNIFICANT CHANGE UP (ref 42.2–75.2)
NRBC # BLD: 0 /100 WBCS — SIGNIFICANT CHANGE UP (ref 0–0)
PLATELET # BLD AUTO: 117 K/UL — LOW (ref 130–400)
PMV BLD: 10.8 FL — HIGH (ref 7.4–10.4)
PROTHROM AB SERPL-ACNC: 11.3 SEC — SIGNIFICANT CHANGE UP (ref 9.95–12.87)
RBC # BLD: 3.87 M/UL — LOW (ref 4.2–5.4)
RBC # FLD: 14.5 % — SIGNIFICANT CHANGE UP (ref 11.5–14.5)
WBC # BLD: 4.64 K/UL — LOW (ref 4.8–10.8)
WBC # FLD AUTO: 4.64 K/UL — LOW (ref 4.8–10.8)

## 2024-03-03 PROCEDURE — 99285 EMERGENCY DEPT VISIT HI MDM: CPT

## 2024-03-03 PROCEDURE — 71045 X-RAY EXAM CHEST 1 VIEW: CPT | Mod: 26

## 2024-03-03 RX ORDER — MORPHINE SULFATE 50 MG/1
4 CAPSULE, EXTENDED RELEASE ORAL ONCE
Refills: 0 | Status: DISCONTINUED | OUTPATIENT
Start: 2024-03-03 | End: 2024-03-03

## 2024-03-03 RX ADMIN — MORPHINE SULFATE 4 MILLIGRAM(S): 50 CAPSULE, EXTENDED RELEASE ORAL at 23:48

## 2024-03-03 NOTE — ED PROVIDER NOTE - PROGRESS NOTE DETAILS
FF: bedside ultrasound used to guide paracentesis performed at bedside. about 3220cc of fluid was drained from pt and sent to lab for further evaluated. pt still dry heaving and complaining of abdominal pain, will admit.

## 2024-03-03 NOTE — ED ADULT NURSE NOTE - NSFALLRISKINTERV_ED_ALL_ED

## 2024-03-03 NOTE — ED PROVIDER NOTE - CLINICAL SUMMARY MEDICAL DECISION MAKING FREE TEXT BOX
28-year-old female, history of asthma, hep C, IVDA, ascites, presents to the ED with abdominal pain for 1 day.  Exam shows tense ascites with diffuse tenderness.  Labs noted for WBC 4.6, hemoglobin 10.8, BUN 32, creatinine 1.9, albumin 1.6, AST 59, ALT 33, bili 0.6.  CT abdomen shows large ascites increased since prior exam.  Bedside paracentesis done, around 4 L of clear yellowish ascitic fluid drained.  Given morphine, Zofran and Zosyn.  Will admit. 28-year-old female, history of asthma, hep C, IVDA, ascites, presents to the ED with abdominal pain for 1 day.  Exam shows tense ascites with diffuse tenderness.  Labs noted for WBC 4.6, hemoglobin 10.8, BUN 32, creatinine 1.9, albumin 1.6, AST 59, ALT 33, bili 0.6.  CT abdomen shows large ascites increased since prior exam.  Bedside paracentesis done, around 3.2 L of clear yellowish ascitic fluid drained.  Given morphine, Zofran and Zosyn.  Will admit.

## 2024-03-03 NOTE — ED ADULT NURSE NOTE - NSICDXPASTMEDICALHX_GEN_ALL_CORE_FT
PAST MEDICAL HISTORY:  Anxiety and depression     Asthma     H/O cirrhosis     Hepatitis C     IV drug abuse heroin

## 2024-03-03 NOTE — ED PROVIDER NOTE - PHYSICAL EXAMINATION
Physical Exam    Vital Signs: I have reviewed the initial vital signs.  Constitutional: no acute distress  Eyes: Conjunctiva pink, Sclera clear  Cardiovascular: S1 and S2, regular rate, regular rhythm, well-perfused extremities, radial pulses equal and 2+ b/l.   Respiratory: unlabored respiratory effort, (+) rhonchi b/l. pt is speaking full sentences. no accessory muscle use.   Gastrointestinal: soft, (+) diffusely tender, and significanly distended abdomen with ascites, (+) fluid wave shift, no pulsatile mass, no rebound, no guarding  Musculoskeletal: no lower extremity edema, no calf tenderness  Integumentary: warm, dry, no rash  Neurologic: awake, alert  Psychiatric: appropriate mood, appropriate affect

## 2024-03-03 NOTE — ED PROVIDER NOTE - OBJECTIVE STATEMENT
28-year-old female with a past medical history of liver cirrhosis likely secondary to hepatitis C, and history of IV drug use presents to the ED for evaluation of abdominal pain and dry heaving that began today associated with nonbloody diarrhea x 1 month.  Patient was recently admitted to the hospital on from January 28, 2024 to February 3, 2024 for decompensated liver failure, anemia, generalized anasarca, pancytopenia, RENETTA, hypokalemia and hypomagnesemia, and influenza.  Patient reports she does not take any medication daily.  Patient reports shortness of breath.  Patient denies fever, chills, chest pain, vomiting, urinary symptoms, recent trauma, history of abdominal surgeries, use of Tylenol, use of alcohol, headaches, or dizziness.

## 2024-03-03 NOTE — ED PROVIDER NOTE - ATTENDING APP SHARED VISIT CONTRIBUTION OF CARE
28-year-old female, history of asthma, hep C, IVDA, ascites, presents to ED with abdominal pain for 1 day.  No fever.  Exam shows alert patient in no distress, HEENT NCAT PERRL, neck supple, lungs clear, RR S1S2, abdomen soft tense ascites with diffuse tenderness +BS, 4+ leg edema.

## 2024-03-03 NOTE — ED ADULT NURSE NOTE - CAS TRG GEN SKIN CONDITION
Warm/Dry Alar Island Pedicle Flap Text: The defect edges were debeveled with a #15 scalpel blade.  Given the location of the defect, shape of the defect and the proximity to the alar rim an island pedicle advancement flap was deemed most appropriate.  Using a sterile surgical marker, an appropriate advancement flap was drawn incorporating the defect, outlining the appropriate donor tissue and placing the expected incisions within the nasal ala running parallel to the alar rim. The area thus outlined was incised with a #15 scalpel blade.  The skin margins were undermined minimally to an appropriate distance in all directions around the primary defect and laterally outward around the island pedicle utilizing iris scissors.  There was minimal undermining beneath the pedicle flap.

## 2024-03-04 DIAGNOSIS — R10.9 UNSPECIFIED ABDOMINAL PAIN: ICD-10-CM

## 2024-03-04 DIAGNOSIS — F11.20 OPIOID DEPENDENCE, UNCOMPLICATED: ICD-10-CM

## 2024-03-04 LAB
ALBUMIN SERPL ELPH-MCNC: 1.6 G/DL — LOW (ref 3.5–5.2)
ALP SERPL-CCNC: 981 U/L — HIGH (ref 30–115)
ALT FLD-CCNC: 33 U/L — SIGNIFICANT CHANGE UP (ref 0–41)
AMMONIA BLD-MCNC: 30 UMOL/L — SIGNIFICANT CHANGE UP (ref 11–55)
ANION GAP SERPL CALC-SCNC: 11 MMOL/L — SIGNIFICANT CHANGE UP (ref 7–14)
APPEARANCE UR: ABNORMAL
AST SERPL-CCNC: 59 U/L — HIGH (ref 0–41)
B PERT IGG+IGM PNL SER: ABNORMAL
BACTERIA # UR AUTO: ABNORMAL /HPF
BILIRUB DIRECT SERPL-MCNC: 0.4 MG/DL — HIGH (ref 0–0.3)
BILIRUB INDIRECT FLD-MCNC: 0.2 MG/DL — SIGNIFICANT CHANGE UP (ref 0.2–1.2)
BILIRUB SERPL-MCNC: 0.6 MG/DL — SIGNIFICANT CHANGE UP (ref 0.2–1.2)
BILIRUB UR-MCNC: ABNORMAL
BUN SERPL-MCNC: 32 MG/DL — HIGH (ref 10–20)
CALCIUM SERPL-MCNC: 7.1 MG/DL — LOW (ref 8.4–10.5)
CHLORIDE SERPL-SCNC: 100 MMOL/L — SIGNIFICANT CHANGE UP (ref 98–110)
CO2 SERPL-SCNC: 21 MMOL/L — SIGNIFICANT CHANGE UP (ref 17–32)
COLOR FLD: SIGNIFICANT CHANGE UP
COLOR SPEC: ABNORMAL
CREAT ?TM UR-MCNC: 192 MG/DL — SIGNIFICANT CHANGE UP
CREAT SERPL-MCNC: 1.9 MG/DL — HIGH (ref 0.7–1.5)
DIFF PNL FLD: ABNORMAL
DRUG SCREEN 1, URINE RESULT: SIGNIFICANT CHANGE UP
EGFR: 36 ML/MIN/1.73M2 — LOW
EPI CELLS # UR: PRESENT
FENTANYL UR QL: POSITIVE
FLUID INTAKE SUBSTANCE CLASS: SIGNIFICANT CHANGE UP
GLUCOSE SERPL-MCNC: 113 MG/DL — HIGH (ref 70–99)
GLUCOSE UR QL: NEGATIVE MG/DL — SIGNIFICANT CHANGE UP
GRAM STN FLD: SIGNIFICANT CHANGE UP
HAV IGM SER-ACNC: SIGNIFICANT CHANGE UP
HBV CORE IGM SER-ACNC: SIGNIFICANT CHANGE UP
HBV SURFACE AG SER-ACNC: SIGNIFICANT CHANGE UP
HCG SERPL QL: NEGATIVE — SIGNIFICANT CHANGE UP
HCV AB S/CO SERPL IA: 0.86 S/CO — SIGNIFICANT CHANGE UP (ref 0–0.99)
HCV AB SERPL-IMP: ABNORMAL
KETONES UR-MCNC: NEGATIVE MG/DL — SIGNIFICANT CHANGE UP
LEUKOCYTE ESTERASE UR-ACNC: ABNORMAL
LIDOCAIN IGE QN: 26 U/L — SIGNIFICANT CHANGE UP (ref 7–60)
LYMPHOCYTES # FLD: 2 % — SIGNIFICANT CHANGE UP
MESOTHL CELL # FLD: 2 % — SIGNIFICANT CHANGE UP
MONOS+MACROS # FLD: 13 % — SIGNIFICANT CHANGE UP
NEUTROPHILS-BODY FLUID: 83 % — SIGNIFICANT CHANGE UP
NITRITE UR-MCNC: POSITIVE
OSMOLALITY UR: 391 MOS/KG — SIGNIFICANT CHANGE UP (ref 50–1200)
PH UR: 5.5 — SIGNIFICANT CHANGE UP (ref 5–8)
POTASSIUM SERPL-MCNC: 3.8 MMOL/L — SIGNIFICANT CHANGE UP (ref 3.5–5)
POTASSIUM SERPL-SCNC: 3.8 MMOL/L — SIGNIFICANT CHANGE UP (ref 3.5–5)
POTASSIUM UR-SCNC: 54 MMOL/L — SIGNIFICANT CHANGE UP
PROT ?TM UR-MCNC: >564 MG/DLG/24H — SIGNIFICANT CHANGE UP
PROT SERPL-MCNC: 5.1 G/DL — LOW (ref 6–8)
PROT UR-MCNC: 300 MG/DL
PROT/CREAT UR-RTO: >2.9 RATIO — HIGH (ref 0–0.2)
RBC CASTS # UR COMP ASSIST: ABNORMAL /HPF
RCV VOL RI: 0 /UL — SIGNIFICANT CHANGE UP (ref 0–0)
SODIUM SERPL-SCNC: 132 MMOL/L — LOW (ref 135–146)
SODIUM UR-SCNC: <20 MMOL/L — SIGNIFICANT CHANGE UP
SP GR SPEC: 1.03 — SIGNIFICANT CHANGE UP (ref 1–1.03)
SPECIMEN SOURCE: SIGNIFICANT CHANGE UP
TOTAL NUCLEATED CELL COUNT, BODY FLUID: 3640 /UL — SIGNIFICANT CHANGE UP
TRICHOMONAS #/AREA UR COMP ASSIST: PRESENT
TUBE TYPE: SIGNIFICANT CHANGE UP
UROBILINOGEN FLD QL: 0.2 MG/DL — SIGNIFICANT CHANGE UP (ref 0.2–1)
UUN UR-MCNC: 512 MG/DL — SIGNIFICANT CHANGE UP
WBC UR QL: 30 /HPF — HIGH (ref 0–5)

## 2024-03-04 PROCEDURE — 87521 HEPATITIS C PROBE&RVRS TRNSC: CPT

## 2024-03-04 PROCEDURE — 84157 ASSAY OF PROTEIN OTHER: CPT

## 2024-03-04 PROCEDURE — 84300 ASSAY OF URINE SODIUM: CPT

## 2024-03-04 PROCEDURE — 87102 FUNGUS ISOLATION CULTURE: CPT

## 2024-03-04 PROCEDURE — 84540 ASSAY OF URINE/UREA-N: CPT

## 2024-03-04 PROCEDURE — 87205 SMEAR GRAM STAIN: CPT

## 2024-03-04 PROCEDURE — 81001 URINALYSIS AUTO W/SCOPE: CPT

## 2024-03-04 PROCEDURE — 80307 DRUG TEST PRSMV CHEM ANLYZR: CPT

## 2024-03-04 PROCEDURE — 89051 BODY FLUID CELL COUNT: CPT

## 2024-03-04 PROCEDURE — 82570 ASSAY OF URINE CREATININE: CPT

## 2024-03-04 PROCEDURE — 86706 HEP B SURFACE ANTIBODY: CPT

## 2024-03-04 PROCEDURE — 80074 ACUTE HEPATITIS PANEL: CPT

## 2024-03-04 PROCEDURE — 99221 1ST HOSP IP/OBS SF/LOW 40: CPT

## 2024-03-04 PROCEDURE — 87641 MR-STAPH DNA AMP PROBE: CPT

## 2024-03-04 PROCEDURE — 93010 ELECTROCARDIOGRAM REPORT: CPT

## 2024-03-04 PROCEDURE — 74176 CT ABD & PELVIS W/O CONTRAST: CPT | Mod: 26,MC

## 2024-03-04 PROCEDURE — 83935 ASSAY OF URINE OSMOLALITY: CPT

## 2024-03-04 PROCEDURE — 83615 LACTATE (LD) (LDH) ENZYME: CPT

## 2024-03-04 PROCEDURE — 86480 TB TEST CELL IMMUN MEASURE: CPT

## 2024-03-04 PROCEDURE — 87640 STAPH A DNA AMP PROBE: CPT

## 2024-03-04 PROCEDURE — 99222 1ST HOSP IP/OBS MODERATE 55: CPT

## 2024-03-04 PROCEDURE — 80354 DRUG SCREENING FENTANYL: CPT

## 2024-03-04 PROCEDURE — 87070 CULTURE OTHR SPECIMN AEROBIC: CPT

## 2024-03-04 PROCEDURE — 84156 ASSAY OF PROTEIN URINE: CPT

## 2024-03-04 PROCEDURE — 87075 CULTR BACTERIA EXCEPT BLOOD: CPT

## 2024-03-04 PROCEDURE — 82945 GLUCOSE OTHER FLUID: CPT

## 2024-03-04 PROCEDURE — 80358 DRUG SCREENING METHADONE: CPT

## 2024-03-04 PROCEDURE — 93005 ELECTROCARDIOGRAM TRACING: CPT

## 2024-03-04 PROCEDURE — 82042 OTHER SOURCE ALBUMIN QUAN EA: CPT

## 2024-03-04 PROCEDURE — 36415 COLL VENOUS BLD VENIPUNCTURE: CPT

## 2024-03-04 PROCEDURE — 80048 BASIC METABOLIC PNL TOTAL CA: CPT

## 2024-03-04 PROCEDURE — 80361 OPIATES 1 OR MORE: CPT

## 2024-03-04 PROCEDURE — 76705 ECHO EXAM OF ABDOMEN: CPT | Mod: 26

## 2024-03-04 PROCEDURE — 76705 ECHO EXAM OF ABDOMEN: CPT

## 2024-03-04 PROCEDURE — 84100 ASSAY OF PHOSPHORUS: CPT

## 2024-03-04 PROCEDURE — 99223 1ST HOSP IP/OBS HIGH 75: CPT

## 2024-03-04 PROCEDURE — 84133 ASSAY OF URINE POTASSIUM: CPT

## 2024-03-04 PROCEDURE — 87389 HIV-1 AG W/HIV-1&-2 AB AG IA: CPT

## 2024-03-04 PROCEDURE — 85027 COMPLETE CBC AUTOMATED: CPT

## 2024-03-04 PROCEDURE — 83735 ASSAY OF MAGNESIUM: CPT

## 2024-03-04 RX ORDER — ONDANSETRON 8 MG/1
4 TABLET, FILM COATED ORAL THREE TIMES A DAY
Refills: 0 | Status: DISCONTINUED | OUTPATIENT
Start: 2024-03-04 | End: 2024-03-05

## 2024-03-04 RX ORDER — HYDROXYZINE HCL 10 MG
50 TABLET ORAL AT BEDTIME
Refills: 0 | Status: DISCONTINUED | OUTPATIENT
Start: 2024-03-04 | End: 2024-03-05

## 2024-03-04 RX ORDER — FUROSEMIDE 40 MG
40 TABLET ORAL EVERY 12 HOURS
Refills: 0 | Status: DISCONTINUED | OUTPATIENT
Start: 2024-03-04 | End: 2024-03-05

## 2024-03-04 RX ORDER — HYDROMORPHONE HYDROCHLORIDE 2 MG/ML
0.5 INJECTION INTRAMUSCULAR; INTRAVENOUS; SUBCUTANEOUS EVERY 6 HOURS
Refills: 0 | Status: DISCONTINUED | OUTPATIENT
Start: 2024-03-04 | End: 2024-03-05

## 2024-03-04 RX ORDER — IPRATROPIUM/ALBUTEROL SULFATE 18-103MCG
3 AEROSOL WITH ADAPTER (GRAM) INHALATION EVERY 8 HOURS
Refills: 0 | Status: DISCONTINUED | OUTPATIENT
Start: 2024-03-04 | End: 2024-03-05

## 2024-03-04 RX ORDER — MORPHINE SULFATE 50 MG/1
4 CAPSULE, EXTENDED RELEASE ORAL ONCE
Refills: 0 | Status: DISCONTINUED | OUTPATIENT
Start: 2024-03-04 | End: 2024-03-04

## 2024-03-04 RX ORDER — CHLORHEXIDINE GLUCONATE 213 G/1000ML
1 SOLUTION TOPICAL
Refills: 0 | Status: DISCONTINUED | OUTPATIENT
Start: 2024-03-04 | End: 2024-03-05

## 2024-03-04 RX ORDER — PIPERACILLIN AND TAZOBACTAM 4; .5 G/20ML; G/20ML
3.38 INJECTION, POWDER, LYOPHILIZED, FOR SOLUTION INTRAVENOUS ONCE
Refills: 0 | Status: COMPLETED | OUTPATIENT
Start: 2024-03-04 | End: 2024-03-04

## 2024-03-04 RX ORDER — PIPERACILLIN AND TAZOBACTAM 4; .5 G/20ML; G/20ML
3.38 INJECTION, POWDER, LYOPHILIZED, FOR SOLUTION INTRAVENOUS EVERY 8 HOURS
Refills: 0 | Status: DISCONTINUED | OUTPATIENT
Start: 2024-03-04 | End: 2024-03-04

## 2024-03-04 RX ORDER — HYDROMORPHONE HYDROCHLORIDE 2 MG/ML
0.5 INJECTION INTRAMUSCULAR; INTRAVENOUS; SUBCUTANEOUS ONCE
Refills: 0 | Status: DISCONTINUED | OUTPATIENT
Start: 2024-03-04 | End: 2024-03-04

## 2024-03-04 RX ORDER — PANTOPRAZOLE SODIUM 20 MG/1
40 TABLET, DELAYED RELEASE ORAL
Refills: 0 | Status: DISCONTINUED | OUTPATIENT
Start: 2024-03-04 | End: 2024-03-05

## 2024-03-04 RX ORDER — ONDANSETRON 8 MG/1
4 TABLET, FILM COATED ORAL ONCE
Refills: 0 | Status: COMPLETED | OUTPATIENT
Start: 2024-03-04 | End: 2024-03-04

## 2024-03-04 RX ORDER — SPIRONOLACTONE 25 MG/1
25 TABLET, FILM COATED ORAL DAILY
Refills: 0 | Status: DISCONTINUED | OUTPATIENT
Start: 2024-03-04 | End: 2024-03-05

## 2024-03-04 RX ORDER — PIPERACILLIN AND TAZOBACTAM 4; .5 G/20ML; G/20ML
4.5 INJECTION, POWDER, LYOPHILIZED, FOR SOLUTION INTRAVENOUS EVERY 8 HOURS
Refills: 0 | Status: DISCONTINUED | OUTPATIENT
Start: 2024-03-04 | End: 2024-03-04

## 2024-03-04 RX ORDER — PIPERACILLIN AND TAZOBACTAM 4; .5 G/20ML; G/20ML
3.38 INJECTION, POWDER, LYOPHILIZED, FOR SOLUTION INTRAVENOUS EVERY 8 HOURS
Refills: 0 | Status: DISCONTINUED | OUTPATIENT
Start: 2024-03-04 | End: 2024-03-05

## 2024-03-04 RX ORDER — FUROSEMIDE 40 MG
40 TABLET ORAL DAILY
Refills: 0 | Status: DISCONTINUED | OUTPATIENT
Start: 2024-03-04 | End: 2024-03-04

## 2024-03-04 RX ORDER — INFLUENZA VIRUS VACCINE 15; 15; 15; 15 UG/.5ML; UG/.5ML; UG/.5ML; UG/.5ML
0.5 SUSPENSION INTRAMUSCULAR ONCE
Refills: 0 | Status: DISCONTINUED | OUTPATIENT
Start: 2024-03-04 | End: 2024-03-05

## 2024-03-04 RX ADMIN — CHLORHEXIDINE GLUCONATE 1 APPLICATION(S): 213 SOLUTION TOPICAL at 06:58

## 2024-03-04 RX ADMIN — PIPERACILLIN AND TAZOBACTAM 200 GRAM(S): 4; .5 INJECTION, POWDER, LYOPHILIZED, FOR SOLUTION INTRAVENOUS at 10:32

## 2024-03-04 RX ADMIN — ONDANSETRON 4 MILLIGRAM(S): 8 TABLET, FILM COATED ORAL at 02:04

## 2024-03-04 RX ADMIN — Medication 3 MILLILITER(S): at 14:23

## 2024-03-04 RX ADMIN — PIPERACILLIN AND TAZOBACTAM 200 GRAM(S): 4; .5 INJECTION, POWDER, LYOPHILIZED, FOR SOLUTION INTRAVENOUS at 05:11

## 2024-03-04 RX ADMIN — MORPHINE SULFATE 4 MILLIGRAM(S): 50 CAPSULE, EXTENDED RELEASE ORAL at 02:04

## 2024-03-04 RX ADMIN — HYDROMORPHONE HYDROCHLORIDE 0.5 MILLIGRAM(S): 2 INJECTION INTRAMUSCULAR; INTRAVENOUS; SUBCUTANEOUS at 17:15

## 2024-03-04 RX ADMIN — Medication 40 MILLIGRAM(S): at 17:15

## 2024-03-04 RX ADMIN — Medication 40 MILLIGRAM(S): at 10:32

## 2024-03-04 RX ADMIN — HYDROMORPHONE HYDROCHLORIDE 0.5 MILLIGRAM(S): 2 INJECTION INTRAMUSCULAR; INTRAVENOUS; SUBCUTANEOUS at 12:30

## 2024-03-04 RX ADMIN — HYDROMORPHONE HYDROCHLORIDE 0.5 MILLIGRAM(S): 2 INJECTION INTRAMUSCULAR; INTRAVENOUS; SUBCUTANEOUS at 23:58

## 2024-03-04 RX ADMIN — HYDROMORPHONE HYDROCHLORIDE 0.5 MILLIGRAM(S): 2 INJECTION INTRAMUSCULAR; INTRAVENOUS; SUBCUTANEOUS at 11:47

## 2024-03-04 RX ADMIN — Medication 50 MILLIGRAM(S): at 22:36

## 2024-03-04 RX ADMIN — PANTOPRAZOLE SODIUM 40 MILLIGRAM(S): 20 TABLET, DELAYED RELEASE ORAL at 06:58

## 2024-03-04 RX ADMIN — ONDANSETRON 4 MILLIGRAM(S): 8 TABLET, FILM COATED ORAL at 14:30

## 2024-03-04 RX ADMIN — PIPERACILLIN AND TAZOBACTAM 25 GRAM(S): 4; .5 INJECTION, POWDER, LYOPHILIZED, FOR SOLUTION INTRAVENOUS at 21:45

## 2024-03-04 RX ADMIN — ONDANSETRON 4 MILLIGRAM(S): 8 TABLET, FILM COATED ORAL at 06:48

## 2024-03-04 RX ADMIN — ONDANSETRON 4 MILLIGRAM(S): 8 TABLET, FILM COATED ORAL at 18:51

## 2024-03-04 NOTE — PHARMACOTHERAPY INTERVENTION NOTE - COMMENTS
pt weight is 46.9 kg per nurse, crcl calculated to be 32, spoke to dr montenegro to decrease zosyn dose to 2.25 g.  he states that pt need that strenght for just one dose.

## 2024-03-04 NOTE — CONSULT NOTE ADULT - ASSESSMENT
CKD stage 3-4, Cr stable from recent discharge  recent RENETTA  no hydro on CT imaging  anasarca / ascites s/p paracentesis 3L in ED  liver cirrhosis / Hep C  thrombocytopenia / anemia  IVDA / active IV heroin abuse / opiate withdrawal  leg wounds    plan:    lasix 40mg iv q12h  aldactone 25mg po qd  needs another therapeutic large volume paracentesis with iv albumin  fluid restriction  monitor UOP via primafit  trend renal function and lytes   f/u urine and blood cx, r/o UTI, cont renal dose abx  will follow

## 2024-03-04 NOTE — H&P ADULT - ASSESSMENT
Assessment:      Plan:    #Anasarca  # Ascites, liver cirrhosis from hepatitis C  # transaminitis  S/p paracentesis by ER team with removal of 3200mL of fluid  Follow up fluid cultures  GI consult placed- follow up recommendations.  Follow up abdominal ultrasound  Acute hepatitis panel  Monitor daily weights  Continue with diuretics post paracentesis  2g sodium diet    #RENETTA  Cr 8/2022- 0.9  Currently Cr 1.9  Follow up urine studies  Renal sonogram  Monitor urine output  Continue with lasix 40mg IV q12hrs  Follow up urine culture and UA    #IVDU  Addiction medicine consult placed.   Assessment:  Patient is a 27 y/o F with a pmhx of  asthma current smoker, hep C , IVDA ,leukopenia,  ascites who presents with diffuse abdominal pain associated with nausea, no vomiting, and multiple episodes of NB diarrhea x 1 day.    Plan:    Admit to inpatient level of care-med/surg  Monitor labs and vital signs.  CHG ordered.  Diet: DASH  GI ppx: Protonix  VTE ppx: Heparin      # Ascites r/o SBP  #liver cirrhosis from hepatitis C  # transaminitis  #Anasarca  S/p paracentesis by ER team with removal of ~4L of fluid  Follow up fluid cultures  GI consult placed- follow up recommendations.  Follow up abdominal ultrasound  Acute hepatitis panel  Monitor daily weights  Continue with diuretics post paracentesis  2g sodium diet  Zofran PRN for nausea  Continue with Zosyn  ID consult p[aced- follow up recommendations    #RENETTA  Cr 8/2022- 0.9  Currently Cr 1.9  Follow up urine studies  Renal sonogram  Monitor urine output  Continue with lasix 40mg IV q12hrs  Follow up urine culture and UA  Nephrology consult    #IVDU  Addiction medicine consult placed.  Monitor for withdrawals         Above discussed with Dr. De La Fuente

## 2024-03-04 NOTE — CONSULT NOTE ADULT - SUBJECTIVE AND OBJECTIVE BOX
Patient is a 29 y/o F with a pmhx of  asthma current smoker, hep C , IVDA ,leukopenia,  ascites who presents with diffuse abdominal pain associated with nausea, no vomiting, and multiple episodes of NB diarrhea x 1 day. Denies similar episodes in the past. States she has a chronic cough with associated difficulty breathing and chest pressure during cough fits. Otherwise denies fever, chills, chest pain, recent travel. (+) leg swelling. States she last use heroin 1 day ago and used "a couple of bags."     Pt interviewed, examined and EMR chart reviewed.  Pt admits to using opiates few bags per day since last admission.  Last use day prior to admission   Hx of withdrawal   variable periods of sobriety in the past.  Has been in detox before __X___yes,   _____No        REVIEW OF SYSTEMS:    Constitutional: No fever, weight loss or fatigue  ENT:  No difficulty hearing, tinnitus, vertigo; No sinus or throat pain  Neck: No pain or stiffness  Respiratory: No cough, wheezing, chills or hemoptysis  Cardiovascular: No chest pain, palpitations, shortness of breath, dizziness or leg swelling  Gastrointestinal:SEE HPI  Neurological: No headaches, memory loss, loss of strength, numbness or tremors  Musculoskeletal: No joint pain or swelling; No muscle, back or extremity pain  Psychiatric: No depression, anxiety, mood swings or difficulty sleeping    MEDICATIONS  (STANDING):  chlorhexidine 2% Cloths 1 Application(s) Topical <User Schedule>  furosemide   Injectable 40 milliGRAM(s) IV Push every 12 hours  influenza   Vaccine 0.5 milliLiter(s) IntraMuscular once  pantoprazole    Tablet 40 milliGRAM(s) Oral before breakfast  piperacillin/tazobactam IVPB. 3.375 Gram(s) IV Intermittent once  piperacillin/tazobactam IVPB.- 3.375 Gram(s) IV Intermittent once  piperacillin/tazobactam IVPB.. 3.375 Gram(s) IV Intermittent every 8 hours    MEDICATIONS  (PRN):  ondansetron Injectable 4 milliGRAM(s) IV Push three times a day PRN Nausea and/or Vomiting      Vital Signs Last 24 Hrs  T(C): 35.6 (04 Mar 2024 06:10), Max: 37.2 (04 Mar 2024 04:07)  T(F): 96 (04 Mar 2024 06:10), Max: 99 (04 Mar 2024 04:07)  HR: 112 (04 Mar 2024 09:36) (110 - 118)  BP: 136/92 (04 Mar 2024 09:36) (136/92 - 155/103)  BP(mean): --  RR: 18 (04 Mar 2024 06:10) (18 - 21)  SpO2: 92% (04 Mar 2024 09:36) (92% - 98%)    Parameters below as of 04 Mar 2024 09:36  Patient On (Oxygen Delivery Method): room air        PHYSICAL EXAM:    Constitutional: NAD, well-groomed, well-developed  HEENT: PERRLA, EOMI, Normal Hearing,   Neck: No LAD, No JVD  Back: Normal spine flexure, No CVA tenderness  Respiratory: CTAB/L  Cardiovascular: S1 and S2, RRR, no M/G/R  Gastrointestinal: very distended with fluid  Extremities: No peripheral edema  Neurological: A/O x 3, no focal deficits    LABS:                        10.8   4.64  )-----------( 117      ( 03 Mar 2024 23:28 )             33.0     03-03    132<L>  |  100  |  32<H>  ----------------------------<  113<H>  3.8   |  21  |  1.9<H>    Ca    7.1<L>      03 Mar 2024 23:28    TPro  5.1<L>  /  Alb  1.6<L>  /  TBili  0.6  /  DBili  0.4<H>  /  AST  59<H>  /  ALT  33  /  AlkPhos  981<H>  03-03    PT/INR - ( 03 Mar 2024 23:28 )   PT: 11.30 sec;   INR: 0.99 ratio         PTT - ( 03 Mar 2024 23:28 )  PTT:32.2 sec  Urinalysis Basic - ( 03 Mar 2024 23:28 )    Color: x / Appearance: x / SG: x / pH: x  Gluc: 113 mg/dL / Ketone: x  / Bili: x / Urobili: x   Blood: x / Protein: x / Nitrite: x   Leuk Esterase: x / RBC: x / WBC x   Sq Epi: x / Non Sq Epi: x / Bacteria: x      Drug Screen Urine:  Alcohol Level        RADIOLOGY & ADDITIONAL STUDIES:

## 2024-03-04 NOTE — ED PROCEDURE NOTE - NS ED ATTENDING STATEMENT MOD
I have seen and examined this patient and fully participated in the care of this patient as the teaching attending.  The service was shared with the [facility].  I reviewed and verified the documentation.

## 2024-03-04 NOTE — H&P ADULT - NSHPPHYSICALEXAM_GEN_ALL_CORE
Vital Signs Last 24 Hrs  T(C): 36.7 (03 Mar 2024 21:46), Max: 36.7 (03 Mar 2024 21:46)  T(F): 98.1 (03 Mar 2024 21:46), Max: 98.1 (03 Mar 2024 21:46)  HR: 116 (04 Mar 2024 04:07) (110 - 118)  BP: 147/97 (04 Mar 2024 04:07) (146/91 - 152/103)  RR: 20 (04 Mar 2024 04:07) (20 - 21)  SpO2: 94% (04 Mar 2024 04:07) (94% - 98%)    Parameters below as of 04 Mar 2024 04:07  Patient On (Oxygen Delivery Method): room air    VITALS:     GENERAL: NAD, lying in bed comfortably  HEAD:  Atraumatic, Normocephalic  EYES: EOMI, PERRLA, conjunctiva and sclera clear  ENT: Moist mucous membranes  NECK: Supple, No JVD  CHEST/LUNG: Clear to auscultation bilaterally; No rales, rhonchi, wheezing, or rubs. Unlabored respirations  HEART: Regular rate and rhythm; No murmurs, rubs, or gallops  ABDOMEN: Bowel sounds present; Soft, Nontender, Nondistended. No hepatomegally  EXTREMITIES:  2+ Peripheral Pulses, brisk capillary refill. No clubbing, cyanosis, or edema  NERVOUS SYSTEM:  Alert & Oriented X3, speech clear. No deficits   MSK: FROM all 4 extremities, full and equal strength  SKIN: No rashes or lesions

## 2024-03-04 NOTE — PROGRESS NOTE ADULT - ASSESSMENT
Patient is a 29 y/o F with a pmhx of  asthma current smoker, hep C , IVDA ,leukopenia,  ascites who presents with diffuse abdominal pain associated with nausea, no vomiting, and multiple episodes of NB diarrhea x 1 day.    Plan:    Admit to inpatient level of care-med/surg  Monitor labs and vital signs.  CHG ordered.  Diet: DASH  GI ppx: Protonix  VTE ppx: Heparin      # SBP  # Ascites   #liver cirrhosis from hepatitis C  # transaminitis  #Anasarca  ID, GI recs appreciated   S/p paracentesis by ER team with removal of ~4L of fluid  Possible tap with ID in the AM   Follow up fluid cultures  Follow up abdominal ultrasound  Acute hepatitis panel  Monitor daily weights  Continue with diuretics post paracentesis  2g sodium diet  Zofran PRN for nausea  Continue with Zosyn  f/u sputum, MRSA nares       #RENETTA  Cr 8/2022- 0.9  Currently Cr 1.9  Nephrology recs appreciated   Follow up urine studies  Renal sonogram  Monitor urine output  Continue with lasix 40mg IV q12hrs  Follow up urine culture and UA    #IVDU  Addiction medicine recs appreciated   Monitor for withdrawals

## 2024-03-04 NOTE — H&P ADULT - NSHPSOCIALHISTORY_GEN_ALL_CORE
Substance Use (street drugs): ( x ) never used  (  ) other:  Tobacco Usage:  ( x  ) never smoked   (   ) former smoker   (   ) current smoker  (     ) pack year  Alcohol Usage: None Substance Use (street drugs): Heroin user  Tobacco Usage:  1 ppd  Alcohol Usage: None

## 2024-03-04 NOTE — CONSULT NOTE ADULT - NS ATTEND AMEND GEN_ALL_CORE FT
Agree with above.  28 y o  F with IVDA and untreated chronic hepatitis C leading to decompensated cirrhosis w/ ascites, medical noncompliance, being evaluated for large volume ascites.    She continues to use drugs and use heroin prior to admission.  Paracentesis performed in the ER revealed findings consistent with SBP.    On exam, she is AAOx3.  No asterixis, no icterus.  Abdomen distended, soft and tender to palpation.    Poor prognosis due to medical noncompliance.  She would ideally benefit from transplant evaluation but she is not a candidate given her continuous IVDA.  RUQ US w/ doppler.  Follow-up cultures.  IV abx per ID recs.  If patient fails to improve clinically, she will need repeat paracentesis in 72 hours with studies sent for albumin, total protein, cell count, differential, cytology, Gram stain and culture.  She will need long-term SBP prophylaxis after discharge due to low total protein in the fluid from January 2024.  she will need outpatient EGD for eval of varices.  Rest of recs per the note above. We will follow closely. Agree with above.  28 y o  F with IVDA and untreated chronic hepatitis C leading to decompensated cirrhosis w/ ascites, medical noncompliance, being evaluated for large volume ascites.    She continues to use drugs and use heroin prior to admission.  Paracentesis performed in the ER revealed findings consistent with SBP.    On exam, she is AAOx3.  No asterixis, no icterus.  Abdomen distended, soft and tender to palpation.    Poor prognosis due to medical noncompliance.  She would ideally benefit from transplant evaluation but she is not a candidate given her continuous IVDA.  RUQ US w/ doppler.  Follow-up cultures.  IV abx per ID recs.  If patient fails to improve clinically, she will need repeat paracentesis in 72 hours with studies sent for albumin, total protein, cell count, differential, cytology, Gram stain and culture.  She will need long-term SBP prophylaxis after discharge due to low total protein in the fluid from January 2024.  she will need outpatient EGD for eval of varices.  need immunization for HAV and HBV as OP.  She will need outpt f/u at the liver clinic after d/c.  Rest of recs per the note above.   We will follow closely.

## 2024-03-04 NOTE — H&P ADULT - HISTORY OF PRESENT ILLNESS
Patient is a 29 y/o F with a pmhx of  asthma current smoker, hep C , IVDA ,leukopenia,  ascites who presents with diffuse abdominal pain associated with nausea, no vomiting, and multiple episodes of NB diarrhea x 1 day. Denies similar episodes in the past. States she has a chronic cough with associated difficulty breathing and chest pressure during cough fits. Otherwise denies fever, chills, chest pain, recent travel. (+) leg swelling. States she last use heroin 1 day ago and used "a couple of bags."

## 2024-03-04 NOTE — H&P ADULT - NSHPLABSRESULTS_GEN_ALL_CORE
LABS:                        10.8   4.64  )-----------( 117      ( 03 Mar 2024 23:28 )             33.0     03-03    132<L>  |  100  |  32<H>  ----------------------------<  113<H>  3.8   |  21  |  1.9<H>    Ca    7.1<L>      03 Mar 2024 23:28    TPro  5.1<L>  /  Alb  1.6<L>  /  TBili  0.6  /  DBili  0.4<H>  /  AST  59<H>  /  ALT  33  /  AlkPhos  981<H>  03-03    LIVER FUNCTIONS - ( 03 Mar 2024 23:28 )  Alb: 1.6 g/dL / Pro: 5.1 g/dL / ALK PHOS: 981 U/L / ALT: 33 U/L / AST: 59 U/L / GGT: x           PT/INR - ( 03 Mar 2024 23:28 )   PT: 11.30 sec;   INR: 0.99 ratio         PTT - ( 03 Mar 2024 23:28 )  PTT:32.2 sec  Urinalysis Basic - ( 03 Mar 2024 23:28 )    Color: x / Appearance: x / SG: x / pH: x  Gluc: 113 mg/dL / Ketone: x  / Bili: x / Urobili: x   Blood: x / Protein: x / Nitrite: x   Leuk Esterase: x / RBC: x / WBC x   Sq Epi: x / Non Sq Epi: x / Bacteria: x        < from: CT Abdomen and Pelvis No Cont (03.04.24 @ 02:57) >    IMPRESSION:    Large ascites significantly increased since the prior exam. Anasarca.   Right pleural effusion.    Evaluation of the organs is very limited based on technique and extensive   ascites.    Hypodense liver not further evaluated. Splenomegaly. Correlate with   history of underlying liver disease.    --- End of Report ---    KATIE ENNIS MD; Attending Radiologist  This document has been electronically signed. Mar  4 2024  3:07AM    < end of copied text >

## 2024-03-04 NOTE — ED PROCEDURE NOTE - CPROC ED SITE PREP1
Spoke with patient, as well as his primary care provider, Bactrim has been E scribed for him.  Patient knows to  new prescription and to stop taking the 1 that was previously prescribed to him.  
chlorhexidine

## 2024-03-04 NOTE — CONSULT NOTE ADULT - PROBLEM SELECTOR RECOMMENDATION 9
After evaluation at this time due to patients medical condition the need for pain control is primary. Continue to treat pain. Pt once stable can have conversation regarding opiate dependency.     Counseling provided   CATCH team involved for aftercare and pt will follow up with aftercare once patient stable.

## 2024-03-04 NOTE — CONSULT NOTE ADULT - SUBJECTIVE AND OBJECTIVE BOX
CINDYPOOJA JOE  28y, Female  Allergies    Suboxone (Rash)  buprenorphine (Rash)    Intolerances        LOS      HPI  HPI:  Patient is a 27 y/o F with a pmhx of  asthma current smoker, hep C , IVDA ,leukopenia,  ascites who presents with diffuse abdominal pain associated with nausea, no vomiting, and multiple episodes of NB diarrhea x 1 day. Denies similar episodes in the past. States she has a chronic cough with associated difficulty breathing and chest pressure during cough fits. Otherwise denies fever, chills, chest pain, recent travel. (+) leg swelling. States she last use heroin 1 day ago and used "a couple of bags."  (04 Mar 2024 04:36)      INFECTIOUS DISEASE HISTORY:  Hospital course-  ID consulted for antimicrobial recommendations.     Prior hospital charts reviewed [Yes]  Primary team notes reviewed [Yes]  Other consultant notes reviewed [Yes]    REVIEW OF SYSTEMS:  CONSTITUTIONAL: No fever or chills  HEENT: No sore throat  RESPIRATORY: No cough, no shortness of breath  CARDIOVASCULAR: No chest pain or palpitations  GASTROINTESTINAL: No abdominal or epigastric pain  GENITOURINARY: No dysuria  NEUROLOGICAL: No headache/dizziness  MSK: No joint pain, erythema, or swelling; no back pain  SKIN: No itching, rashes  All other ROS negative except noted above    PAST MEDICAL & SURGICAL HISTORY:  Hepatitis C      Asthma      Anxiety and depression      IV drug abuse  heroin      H/O cirrhosis      No significant past surgical history          SOCIAL HISTORY:  - No recent travel    FAMILY HISTORY: No pertinent PMH for first degree relatives.       ANTIMICROBIALS:  piperacillin/tazobactam IVPB. 3.375 once  piperacillin/tazobactam IVPB.- 3.375 once  piperacillin/tazobactam IVPB.. 3.375 every 8 hours      ANTIMICROBIALS (past 90 days):  MEDICATIONS  (STANDING):    piperacillin/tazobactam IVPB...   200 mL/Hr IV Intermittent (03-04-24 @ 05:11)        OTHER MEDS:   MEDICATIONS  (STANDING):  furosemide   Injectable 40 every 12 hours  influenza   Vaccine 0.5 once  ondansetron Injectable 4 three times a day PRN  pantoprazole    Tablet 40 before breakfast      VITALS:  Vital Signs Last 24 Hrs  T(F): 96 (03-04-24 @ 06:10), Max: 99 (03-04-24 @ 04:07)    Vital Signs Last 24 Hrs  HR: 110 (03-04-24 @ 06:10) (110 - 118)  BP: 155/103 (03-04-24 @ 06:10) (146/91 - 155/103)  RR: 18 (03-04-24 @ 06:10)  SpO2: 98% (03-04-24 @ 06:10) (95% - 98%)  Wt(kg): --    EXAM:  GENERAL: NAD, lying in bed  HEAD: No head lesions  NECK: Supple, nontender to palpation; no JVD  CHEST/LUNG: Clear to auscultation bilaterally  HEART: S1 S2  ABDOMEN: Soft, nontender, nondistended; normoactive bowel sounds  EXTREMITIES: No clubbing, cyanosis, or petal edema  NERVOUS SYSTEM: Alert and oriented to person, time, place and situation, speech clear. No focal deficits   MSK: No joint erythema, swelling or pain  SKIN: No rashes or lesions, no superficial thrombophlebitis    Labs:                        10.8   4.64  )-----------( 117      ( 03 Mar 2024 23:28 )             33.0     03-03    132<L>  |  100  |  32<H>  ----------------------------<  113<H>  3.8   |  21  |  1.9<H>    Ca    7.1<L>      03 Mar 2024 23:28    TPro  5.1<L>  /  Alb  1.6<L>  /  TBili  0.6  /  DBili  0.4<H>  /  AST  59<H>  /  ALT  33  /  AlkPhos  981<H>  03-03      WBC Trend:  WBC Count: 4.64 (03-03-24 @ 23:28)      Auto Neutrophil #: 3.32 K/uL (03-03-24 @ 23:28)  Auto Neutrophil #: 1.16 K/uL (02-02-24 @ 06:35)  Auto Neutrophil #: 1.15 K/uL (02-01-24 @ 20:20)  Auto Neutrophil #: 0.82 K/uL (01-31-24 @ 11:00)  Auto Neutrophil #: 1.96 K/uL (01-28-24 @ 13:30)      Creatine Trend:  Creatinine: 1.9 (03-03)      Liver Biochemical Testing Trend:  Alanine Aminotransferase (ALT/SGPT): 33 (03-03)  Alanine Aminotransferase (ALT/SGPT): 23 (02-02)  Alanine Aminotransferase (ALT/SGPT): 23 (02-01)  Alanine Aminotransferase (ALT/SGPT): 21 (01-31)  Alanine Aminotransferase (ALT/SGPT): 30 (01-30)  Aspartate Aminotransferase (AST/SGOT): 59 (03-03-24 @ 23:28)  Aspartate Aminotransferase (AST/SGOT): 29 (02-02-24 @ 06:35)  Aspartate Aminotransferase (AST/SGOT): 35 (02-01-24 @ 20:20)  Aspartate Aminotransferase (AST/SGOT): 34 (01-31-24 @ 04:30)  Aspartate Aminotransferase (AST/SGOT): 54 (01-30-24 @ 04:30)  Bilirubin Direct: 0.4 (03-03)  Bilirubin Total: 0.6 (03-03)  Bilirubin Total: 0.5 (02-02)  Bilirubin Total: 0.3 (02-01)  Bilirubin Total: 0.3 (01-31)      Trend LDH  07-14-22 @ 12:50  271<H>  06-27-22 @ 06:47  173      Auto Eosinophil %: 0.0 % (03-03-24 @ 23:28)      Urinalysis Basic - ( 03 Mar 2024 23:28 )    Color: x / Appearance: x / SG: x / pH: x  Gluc: 113 mg/dL / Ketone: x  / Bili: x / Urobili: x   Blood: x / Protein: x / Nitrite: x   Leuk Esterase: x / RBC: x / WBC x   Sq Epi: x / Non Sq Epi: x / Bacteria: x        MICROBIOLOGY:    Female    HIV-1/2 Combo Result: Nonreact (01-28-24 @ 13:30)  HIV-1/2 Combo Result: Nonreact (06-29-22 @ 10:29)      INFLAMMATORY MARKERS      RADIOLOGY & ADDITIONAL TESTS:  I have personally reviewed the imagings.  CXR  Xray Chest 1 View- PORTABLE-Urgent:   ACC: [id number] EXAM:  XR CHEST PORTABLE URGENT 1V   ORDERED BY: REZA VALDOVINOS     PROCEDURE DATE:  03/03/2024          INTERPRETATION:  Clinical History / Reason for exam: Shortness of breath    Comparison : Chest radiograph January 28, 2024.    Technique/Positioning: AP chest.    Findings:    Support devices: None.    Cardiac/mediastinum/hilum: Pulmonary artery appears prominent.    Lung parenchyma/Pleura: Bibasilar opacities with small right pleural   effusion. Elevation right hemidiaphragm.    Skeleton/soft tissues: Degenerative changes    Impression:    Bibasilar opacities small right pleural effusion. Elevation right   hemidiaphragm.        --- End of Report ---            CALEB COLEMAN MD; Attending Radiologist  This document has been electronically signed. Mar  4 2024  8:53AM (03-03-24 @ 22:08)      CT  CT Abdomen and Pelvis No Cont:   ACC: [id number] EXAM:  CT ABDOMEN AND PELVIS   ORDERED BY: REZA VALDOVINOS     PROCEDURE DATE:  03/04/2024          INTERPRETATION:  CLINICAL STATEMENT: abdominal pain distension    TECHNIQUE: Contiguous axial CT images were obtained from the lower chest   to the pubic symphysis without intravenous contrast.  Oral contrast was   not administered.  Reformatted images in the coronal and sagittal planes   were acquired.    COMPARISON CT: 2/1/2024    Note that the evaluation of solid organs and bowel loops is limited   secondary to lack of oral and IV contrast.    FINDINGS:    LOWER CHEST: Small right pleural effusion with adjacent   consolidation/atelectasis. Trace left pleural effusion.    HEPATOBILIARY: Diffusely hypodense. Very limited evaluation otherwise    SPLEEN: Splenomegaly.    PANCREAS: Not well evaluated.    ADRENAL GLANDS: Unremarkable.    KIDNEYS: No hydronephrosis.    ABDOMINOPELVIC NODES: Limited evaluation. No bulky lymphadenopathy.    PELVIC ORGANS: Not well evaluated.    PERITONEUM/MESENTERY/BOWEL: Large ascites. No bowel obstruction. No   pneumoperitoneum.    BONES/SOFT TISSUES: No acute osseous abnormality.  Anasarca.    OTHER: Normal caliber aorta.      IMPRESSION:    Large ascites significantly increased since the prior exam. Anasarca.   Right pleural effusion.    Evaluation of the organs is very limited based on technique and extensive   ascites.    Hypodense liver not further evaluated. Splenomegaly. Correlate with   history of underlying liver disease.    --- End of Report ---            KATIE ENNIS MD; Attending Radiologist  This document has been electronically signed. Mar  4 2024  3:07AM (03-04-24 @ 02:57)      CARDIOLOGY TESTING  12 Lead ECG:   Ventricular Rate 106 BPM    Atrial Rate 106 BPM    P-R Interval 230 ms    QRS Duration 72 ms    Q-T Interval 330 ms    QTC Calculation(Bazett) 438 ms    P Axis 89 degrees    R Axis 120 degrees    T Axis 212 degrees    Diagnosis Line Sinus tachycardia  Nonspecific ST-T changes  ST & T wave abnormality, consider anterior ischemia  Abnormal ECG    Confirmed by BRANDON BELL MD ([id number]) on 3/4/2024 7:18:13 AM (03-03-24 @ 22:14)             CINDYPOOJA JOE  28y, Female  Allergies    Suboxone (Rash)  buprenorphine (Rash)    Intolerances    LOS      HPI  HPI:  Patient is a 27 y/o F with a pmhx of  asthma current smoker, hep C , IVDA ,leukopenia,  ascites who presents with diffuse abdominal pain associated with nausea, no vomiting, and multiple episodes of NB diarrhea x 1 day. Denies similar episodes in the past. States she has a chronic cough with associated difficulty breathing and chest pressure during cough fits. Otherwise denies fever, chills, chest pain, recent travel. (+) leg swelling. States she last use heroin 1 day ago and used "a couple of bags."  (04 Mar 2024 04:36)      INFECTIOUS DISEASE HISTORY:  Hospital course-  ID consulted for antimicrobial recommendations.     Prior hospital charts reviewed [Yes]  Primary team notes reviewed [Yes]  Other consultant notes reviewed [Yes]    REVIEW OF SYSTEMS:  CONSTITUTIONAL: No fever or chills  HEENT: No sore throat  RESPIRATORY: No cough, no shortness of breath  CARDIOVASCULAR: No chest pain or palpitations  GASTROINTESTINAL: No abdominal or epigastric pain  GENITOURINARY: No dysuria  NEUROLOGICAL: No headache/dizziness  MSK: No joint pain, erythema, or swelling; no back pain  SKIN: No itching, rashes  All other ROS negative except noted above    PAST MEDICAL & SURGICAL HISTORY:  Hepatitis C      Asthma      Anxiety and depression      IV drug abuse  heroin      H/O cirrhosis      No significant past surgical history          SOCIAL HISTORY:  - No recent travel  - Active polysubstance use disorder.     FAMILY HISTORY: No pertinent PMH for first degree relatives.       ANTIMICROBIALS:  piperacillin/tazobactam IVPB. 3.375 once  piperacillin/tazobactam IVPB.- 3.375 once  piperacillin/tazobactam IVPB.. 3.375 every 8 hours      ANTIMICROBIALS (past 90 days):  MEDICATIONS  (STANDING):    piperacillin/tazobactam IVPB...   200 mL/Hr IV Intermittent (03-04-24 @ 05:11)        OTHER MEDS:   MEDICATIONS  (STANDING):  furosemide   Injectable 40 every 12 hours  influenza   Vaccine 0.5 once  ondansetron Injectable 4 three times a day PRN  pantoprazole    Tablet 40 before breakfast      VITALS:  Vital Signs Last 24 Hrs  T(F): 96 (03-04-24 @ 06:10), Max: 99 (03-04-24 @ 04:07)    Vital Signs Last 24 Hrs  HR: 110 (03-04-24 @ 06:10) (110 - 118)  BP: 155/103 (03-04-24 @ 06:10) (146/91 - 155/103)  RR: 18 (03-04-24 @ 06:10)  SpO2: 98% (03-04-24 @ 06:10) (95% - 98%)  Wt(kg): --    EXAM:  GENERAL: NAD, lying in bed  HEAD: No head lesions, Catheter noted in the left neck.   NECK: Supple, nontender to palpation  CHEST/LUNG: Shallow breath sounds bilaterally. Mild crackles.   HEART: S1 S2  ABDOMEN: Soft, distended.   EXTREMITIES: +2 edema bilaterally. Chronic skin changes.   NERVOUS SYSTEM: Alert and oriented to person, tired looking.   MSK: No joint erythema, swelling or pain  SKIN: No rashes or lesions, no superficial thrombophlebitis    Labs:                        10.8   4.64  )-----------( 117      ( 03 Mar 2024 23:28 )             33.0     03-03    132<L>  |  100  |  32<H>  ----------------------------<  113<H>  3.8   |  21  |  1.9<H>    Ca    7.1<L>      03 Mar 2024 23:28    TPro  5.1<L>  /  Alb  1.6<L>  /  TBili  0.6  /  DBili  0.4<H>  /  AST  59<H>  /  ALT  33  /  AlkPhos  981<H>  03-03      WBC Trend:  WBC Count: 4.64 (03-03-24 @ 23:28)      Auto Neutrophil #: 3.32 K/uL (03-03-24 @ 23:28)  Auto Neutrophil #: 1.16 K/uL (02-02-24 @ 06:35)  Auto Neutrophil #: 1.15 K/uL (02-01-24 @ 20:20)  Auto Neutrophil #: 0.82 K/uL (01-31-24 @ 11:00)  Auto Neutrophil #: 1.96 K/uL (01-28-24 @ 13:30)      Creatine Trend:  Creatinine: 1.9 (03-03)      Liver Biochemical Testing Trend:  Alanine Aminotransferase (ALT/SGPT): 33 (03-03)  Alanine Aminotransferase (ALT/SGPT): 23 (02-02)  Alanine Aminotransferase (ALT/SGPT): 23 (02-01)  Alanine Aminotransferase (ALT/SGPT): 21 (01-31)  Alanine Aminotransferase (ALT/SGPT): 30 (01-30)  Aspartate Aminotransferase (AST/SGOT): 59 (03-03-24 @ 23:28)  Aspartate Aminotransferase (AST/SGOT): 29 (02-02-24 @ 06:35)  Aspartate Aminotransferase (AST/SGOT): 35 (02-01-24 @ 20:20)  Aspartate Aminotransferase (AST/SGOT): 34 (01-31-24 @ 04:30)  Aspartate Aminotransferase (AST/SGOT): 54 (01-30-24 @ 04:30)  Bilirubin Direct: 0.4 (03-03)  Bilirubin Total: 0.6 (03-03)  Bilirubin Total: 0.5 (02-02)  Bilirubin Total: 0.3 (02-01)  Bilirubin Total: 0.3 (01-31)      Trend LDH  07-14-22 @ 12:50  271<H>  06-27-22 @ 06:47  173      Auto Eosinophil %: 0.0 % (03-03-24 @ 23:28)      Urinalysis Basic - ( 03 Mar 2024 23:28 )    Color: x / Appearance: x / SG: x / pH: x  Gluc: 113 mg/dL / Ketone: x  / Bili: x / Urobili: x   Blood: x / Protein: x / Nitrite: x   Leuk Esterase: x / RBC: x / WBC x   Sq Epi: x / Non Sq Epi: x / Bacteria: x        MICROBIOLOGY:    Female    HIV-1/2 Combo Result: Nonreact (01-28-24 @ 13:30)  HIV-1/2 Combo Result: Nonreact (06-29-22 @ 10:29)      INFLAMMATORY MARKERS      RADIOLOGY & ADDITIONAL TESTS:  I have personally reviewed the imagings.  CXR  Xray Chest 1 View- PORTABLE-Urgent:   ACC: [id number] EXAM:  XR CHEST PORTABLE URGENT 1V   ORDERED BY: REZA VALDOVINOS     PROCEDURE DATE:  03/03/2024          INTERPRETATION:  Clinical History / Reason for exam: Shortness of breath    Comparison : Chest radiograph January 28, 2024.    Technique/Positioning: AP chest.    Findings:    Support devices: None.    Cardiac/mediastinum/hilum: Pulmonary artery appears prominent.    Lung parenchyma/Pleura: Bibasilar opacities with small right pleural   effusion. Elevation right hemidiaphragm.    Skeleton/soft tissues: Degenerative changes    Impression:    Bibasilar opacities small right pleural effusion. Elevation right   hemidiaphragm.        --- End of Report ---            CALEB COLEMAN MD; Attending Radiologist  This document has been electronically signed. Mar  4 2024  8:53AM (03-03-24 @ 22:08)      CT  CT Abdomen and Pelvis No Cont:   ACC: [id number] EXAM:  CT ABDOMEN AND PELVIS   ORDERED BY: REZA VALDOVINOS     PROCEDURE DATE:  03/04/2024          INTERPRETATION:  CLINICAL STATEMENT: abdominal pain distension    TECHNIQUE: Contiguous axial CT images were obtained from the lower chest   to the pubic symphysis without intravenous contrast.  Oral contrast was   not administered.  Reformatted images in the coronal and sagittal planes   were acquired.    COMPARISON CT: 2/1/2024    Note that the evaluation of solid organs and bowel loops is limited   secondary to lack of oral and IV contrast.    FINDINGS:    LOWER CHEST: Small right pleural effusion with adjacent   consolidation/atelectasis. Trace left pleural effusion.    HEPATOBILIARY: Diffusely hypodense. Very limited evaluation otherwise    SPLEEN: Splenomegaly.    PANCREAS: Not well evaluated.    ADRENAL GLANDS: Unremarkable.    KIDNEYS: No hydronephrosis.    ABDOMINOPELVIC NODES: Limited evaluation. No bulky lymphadenopathy.    PELVIC ORGANS: Not well evaluated.    PERITONEUM/MESENTERY/BOWEL: Large ascites. No bowel obstruction. No   pneumoperitoneum.    BONES/SOFT TISSUES: No acute osseous abnormality.  Anasarca.    OTHER: Normal caliber aorta.      IMPRESSION:    Large ascites significantly increased since the prior exam. Anasarca.   Right pleural effusion.    Evaluation of the organs is very limited based on technique and extensive   ascites.    Hypodense liver not further evaluated. Splenomegaly. Correlate with   history of underlying liver disease.    --- End of Report ---            KATIE ENNIS MD; Attending Radiologist  This document has been electronically signed. Mar  4 2024  3:07AM (03-04-24 @ 02:57)      CARDIOLOGY TESTING  12 Lead ECG:   Ventricular Rate 106 BPM    Atrial Rate 106 BPM    P-R Interval 230 ms    QRS Duration 72 ms    Q-T Interval 330 ms    QTC Calculation(Bazett) 438 ms    P Axis 89 degrees    R Axis 120 degrees    T Axis 212 degrees    Diagnosis Line Sinus tachycardia  Nonspecific ST-T changes  ST & T wave abnormality, consider anterior ischemia  Abnormal ECG    Confirmed by BRANDON BELL MD ([id number]) on 3/4/2024 7:18:13 AM (03-03-24 @ 22:14)

## 2024-03-04 NOTE — CONSULT NOTE ADULT - SUBJECTIVE AND OBJECTIVE BOX
Chief complaint/Reason for consult: ascites, liver cirrhosis     HPI:  Patient is a 29 y/o F with a pmhx of  asthma current smoker, hep C , IVDA ,leukopenia,  ascites who presents with diffuse abdominal pain associated with nausea, no vomiting, and multiple episodes of NB diarrhea x 1 day. Denies similar episodes in the past. States she has a chronic cough with associated difficulty breathing and chest pressure during cough fits. Otherwise denies fever, chills, chest pain, recent travel. (+) leg swelling. States she last use heroin 1 day ago and used "a couple of bags."  (04 Mar 2024 04:36)    GI updates: 28yFemale pmh asthma, liver cirrhosis, current smoker, IVDA presents for increased abdominal girth. Patient using IV drugs at current still. Patient denies nausea, vomiting, hematemesis, melena, blood in stool, diarrhea, constipation. +diffuse abdominal pain.      PAST MEDICAL & SURGICAL HISTORY:  Hepatitis C      Asthma      Anxiety and depression      IV drug abuse  heroin      H/O cirrhosis      No significant past surgical history            Family history:  FAMILY HISTORY:  No pertinent family history in first degree relatives      No GI cancers in first or second degree relatives    Social History: +cigarette smoking. no alcohol. + illegal drug use.    Allergies:   Suboxone (Rash)  buprenorphine (Rash)  Intolerances      MEDICATIONS  (STANDING):  chlorhexidine 2% Cloths 1 Application(s) Topical <User Schedule>  furosemide   Injectable 40 milliGRAM(s) IV Push every 12 hours  HYDROmorphone  Injectable 0.5 milliGRAM(s) IV Push once  influenza   Vaccine 0.5 milliLiter(s) IntraMuscular once  pantoprazole    Tablet 40 milliGRAM(s) Oral before breakfast  piperacillin/tazobactam IVPB.. 3.375 Gram(s) IV Intermittent every 8 hours    MEDICATIONS  (PRN):  ondansetron Injectable 4 milliGRAM(s) IV Push three times a day PRN Nausea and/or Vomiting        REVIEW OF SYSTEMS  General:  No weight loss, fevers, or chills.  Eyes:  No reported pain or visual changes  ENT:  No sore throat or runny nose.  NECK: No stiffness or lymphadenopathy  CV:  No chest pain or palpitations.  Resp:  No shortness of breath, cough, wheezing or hemoptysis  GI:  No abdominal pain, nausea, vomiting, dysphagia, diarrhea or constipation. No rectal bleeding, melena, or hematemesis.  Neuro:  No tingling, numbness       VITALS:   T(F): 96 (03-04-24 @ 06:10), Max: 99 (03-04-24 @ 04:07)  HR: 112 (03-04-24 @ 09:36) (110 - 118)  BP: 136/92 (03-04-24 @ 09:36) (136/92 - 155/103)  RR: 18 (03-04-24 @ 06:10) (18 - 21)  SpO2: 92% (03-04-24 @ 09:36) (92% - 98%)    PHYSICAL EXAM:  GENERAL: AAOx3, no acute distress.  HEAD:  Atraumatic, Normocephalic  EYES: conjunctiva and sclera clear  NECK: Supple, No thyromegaly   CHEST/LUNG: Clear to auscultation bilaterally; No wheeze, rhonchi, or rales  HEART: Regular rate and rhythm; normal S1, S2, No murmurs.  ABDOMEN: Soft, nontender, +distended; Bowel sounds present  NEUROLOGY: No asterixis or tremor  SKIN: Intact, no jaundice          LABS:  03-03    132<L>  |  100  |  32<H>  ----------------------------<  113<H>  3.8   |  21  |  1.9<H>    Ca    7.1<L>      03 Mar 2024 23:28    TPro  5.1<L>  /  Alb  1.6<L>  /  TBili  0.6  /  DBili  0.4<H>  /  AST  59<H>  /  ALT  33  /  AlkPhos  981<H>  03-03                          10.8   4.64  )-----------( 117      ( 03 Mar 2024 23:28 )             33.0     LIVER FUNCTIONS - ( 03 Mar 2024 23:28 )  Alb: 1.6 g/dL / Pro: 5.1 g/dL / ALK PHOS: 981 U/L / ALT: 33 U/L / AST: 59 U/L / GGT: x           PT/INR - ( 03 Mar 2024 23:28 )   PT: 11.30 sec;   INR: 0.99 ratio         PTT - ( 03 Mar 2024 23:28 )  PTT:32.2 sec    IMAGING:    < from: CT Abdomen and Pelvis No Cont (03.04.24 @ 02:57) >    ACC: [id number] EXAM:  CT ABDOMEN AND PELVIS   ORDERED BY: REZA CAMPBELLARGENIS     PROCEDURE DATE:  03/04/2024          INTERPRETATION:  CLINICAL STATEMENT: abdominal pain distension    TECHNIQUE: Contiguous axial CT images were obtained from the lower chest   to the pubic symphysis without intravenous contrast.  Oral contrast was   not administered.  Reformatted images in the coronal and sagittal planes   were acquired.    COMPARISON CT: 2/1/2024    Note that the evaluation of solid organs and bowel loops is limited   secondary to lack of oral and IV contrast.    FINDINGS:    LOWER CHEST: Small right pleural effusion with adjacent   consolidation/atelectasis. Trace left pleural effusion.    HEPATOBILIARY: Diffusely hypodense. Very limited evaluation otherwise    SPLEEN: Splenomegaly.    PANCREAS: Not well evaluated.    ADRENAL GLANDS: Unremarkable.    KIDNEYS: No hydronephrosis.    ABDOMINOPELVIC NODES: Limited evaluation. No bulky lymphadenopathy.    PELVIC ORGANS: Not well evaluated.    PERITONEUM/MESENTERY/BOWEL: Large ascites. No bowel obstruction. No   pneumoperitoneum.    BONES/SOFT TISSUES: No acute osseous abnormality.  Anasarca.    OTHER: Normal caliber aorta.      IMPRESSION:    Large ascites significantly increased since the prior exam. Anasarca.   Right pleural effusion.    Evaluation of the organs is very limited based on technique and extensive   ascites.    Hypodense liver not further evaluated. Splenomegaly. Correlate with   history of underlying liver disease.    --- End of Report ---            KATIE ENNIS MD; Attending Radiologist  This document has been electronically signed. Mar  4 2024  3:07AM    < end of copied text >

## 2024-03-04 NOTE — CONSULT NOTE ADULT - ASSESSMENT
ASSESSMENT  28y F admitted with Abdominal pain      Hepatitis C    Asthma    Anxiety and depression    IV drug abuse    H/O cirrhosis        IMPRESSION  #  #Severe Sepsis on admission  #Lactic acidosis  #Hyponatremia   #Obesity BMI (kg/m2): 33.9  #DM     RECOMMENDATIONS  This is an incomplete consult note. All final recommendations to follow after interview and examination of the patient. Please follow recommendations noted below.    If any questions, please send a message or call on [facility] Teams  Please continue to update ID with any pertinent new laboratory or radiographic findings.    Geovani Yan M.D  Infectious Diseases Attending/   [employer] [facility]   ASSESSMENT  This is a 27 y/o F with a pmhx of  asthma current smoker, chronic hep C , polysubstance use disorder, leukopenia,  ascites who presents with diffuse abdominal pain associated with nausea, no vomiting, and multiple episodes of NB diarrhea x 1 day.     IMPRESSION  #Recurrent Ascites. S/p Paracentesis in the ED 3/3- 3.2 Liters taken out.   #Concern for SBP. TNC 3640 with 83% Neutrophils   #Bilateral lower extremity edema and healed up wounds.  #Chronic Hepatitis C, Poly substance use (IV drug user)- Reports previously treated. Hep C VL negative.   #Liver cirrhosis and ascites   #Pancytopenia secondary to liver disease and hypersplenism  #transaminitis, elevated Alk Phosphatase.   #CKD 3- Possible hepatorenal syndrome  #Pyuria/Bacteuria- With squamous epithelial cells- Poor sample. Doubt UTI  #Obesity BMI (kg/m2): 33.9  Recent HIV screen negative. acute hepatitis Panel negative, Hep A immune. Quantiferon Indeterminate     RECOMMENDATIONS  -Check Sputum cultures. Check MRSA nares.  -Follow up with blood cultures. Cultures from peritoneal fluid.   -Follow up with GI and Nephrology eval.  -Monitor for withdrawal signs.   -For now continue with IV zosyn 3.375 q 8 hours.  -Poor prognosis.   -Can be offered Preexposure prophylaxis for HIV in the outpatient setting due to active drug use.     If any questions, please send a message or call on [employer] Teams  Please continue to update ID with any pertinent new laboratory or radiographic findings.    Geovani Yan M.D  Infectious Diseases Attending/   [employer] at [facility]

## 2024-03-04 NOTE — CONSULT NOTE ADULT - ASSESSMENT
28yFemale pmh asthma, liver cirrhosis, current smoker, IVDA presents for increased abdominal girth. Patient using IV drugs at current still    #Large ascites significantly increased since the prior exam. Anasarca.   Right pleural effusion.  #Evaluation of the organs is very limited based on technique and extensive   ascites.  #Hypodense liver not further evaluated. Splenomegaly. Correlate with   history of underlying liver disease.  Rec  patient with SBP continue abx as per ID  -paracentesis done by ED Team  -2g Sodium diet  -Daily weights  -Hepatic encephalopathy-none  -Nutrition: As cirrhosis is a severe hypercatabolic state, she needs to maintain a protein intake of 1.2-1.6 g/kg while restricting her intake of calories and sugars; there is no need to restrict protein intake from the point of liver disease. Patient will start getting a late night snack (yogurt/fruit) to avoid long periods of fasting.  -Risk of NSAIDs: Given the risk of i) renal toxicity, ii) gastrointestinal bleeding, and iii) fluid retention, NSAIDs should be completely avoided. In case she needs an analgesic, the safest option would be Tylenol (not to exceed a daily cumulative dose of 2 grams)  -Abdominal ultrasound AFP every 6 months for HCC screening  -EGD outpatient for esophageal varices screening   -Follow up with our [facility] located at [address] [place] [address]. Phone Number: [phone] for possible liver transplant referral   28yFemale pmh asthma, liver cirrhosis, current smoker, IVDA presents for increased abdominal girth. Patient using IV drugs at current still    #Large ascites significantly increased since the prior exam. Anasarca.   Right pleural effusion.  #Evaluation of the organs is very limited based on technique and extensive   ascites.  #Hypodense liver not further evaluated. Splenomegaly. Correlate with   history of underlying liver disease.  #hep C liver cirrhosis   Rec  patient with SBP continue abx as per ID  -paracentesis done by ED Team  -2g Sodium diet  -Daily weights  -Hepatic encephalopathy-none  -Nutrition: As cirrhosis is a severe hypercatabolic state, she needs to maintain a protein intake of 1.2-1.6 g/kg while restricting her intake of calories and sugars; there is no need to restrict protein intake from the point of liver disease. Patient will start getting a late night snack (yogurt/fruit) to avoid long periods of fasting.  -Risk of NSAIDs: Given the risk of i) renal toxicity, ii) gastrointestinal bleeding, and iii) fluid retention, NSAIDs should be completely avoided. In case she needs an analgesic, the safest option would be Tylenol (not to exceed a daily cumulative dose of 2 grams)  -Abdominal ultrasound AFP every 6 months for HCC screening  -EGD outpatient for esophageal varices screening   -Follow up with our [facility] located at [address] [place] [address]. Phone Number: [phone] for possible liver transplant referral

## 2024-03-04 NOTE — PROCEDURAL SAFETY CHECKLIST WITH OR WITHOUT SEDATION - NSPOSTCOMMENTFT_GEN_ALL_CORE
Pt drained 2000L from 0352 to 0428, catheter  removed at 0428 by RUSSELL Naidu.    Pt AOx3 during procedure. Pt tolerated procedure well, no signs of distress noted. Specimens sent to lab.  3220 L in total drained from pt.

## 2024-03-04 NOTE — CONSULT NOTE ADULT - SUBJECTIVE AND OBJECTIVE BOX
NEPHROLOGY CONSULTATION NOTE    29 yo female with PMH as below, most notable for active heroin abuse, cirrhosis with ascites, ckd, recently at [facility] for anasarca and zaida.  Now rehospitlaized for abd pain and noted to have worsening anasarca.     PAST MEDICAL & SURGICAL HISTORY:  Hepatitis C      Asthma      Anxiety and depression      IV drug abuse  heroin      H/O cirrhosis      No significant past surgical history        Allergies:  Suboxone (Rash)  buprenorphine (Rash)    Home Medications Reviewed    SOCIAL HISTORY:  Denies ETOH,Smoking,   FAMILY HISTORY:  No pertinent family history in first degree relatives          REVIEW OF SYSTEMS:  All other review of systems is negative unless indicated above.    PHYSICAL EXAM:  Constitutional: chronically ill  HEENT: anicteric sclera, oropharynx clear, MMM  Neck: No JVD  Respiratory: b/l BS  Cardiovascular: S1, S2, RRR  Gastrointestinal: BS+, soft, + distention  Extremities: No cyanosis or clubbing. ++ edema  Neurological: A/O x 3, no focal deficits  Psychiatric: Normal mood, normal affect  : No CVA tenderness. No doss.   Skin: No rashes     Hospital Medications:   MEDICATIONS  (STANDING):  albuterol/ipratropium for Nebulization 3 milliLiter(s) Nebulizer every 8 hours  chlorhexidine 2% Cloths 1 Application(s) Topical <User Schedule>  furosemide   Injectable 40 milliGRAM(s) IV Push every 12 hours  influenza   Vaccine 0.5 milliLiter(s) IntraMuscular once  pantoprazole    Tablet 40 milliGRAM(s) Oral before breakfast  piperacillin/tazobactam IVPB.. 3.375 Gram(s) IV Intermittent every 8 hours  spironolactone 25 milliGRAM(s) Oral daily        VITALS:  T(F): 96 (24 @ 06:10), Max: 99 (24 @ 04:07)  HR: 112 (24 @ 09:36)  BP: 136/92 (24 @ 09:36)  RR: 18 (24 @ 06:10)  SpO2: 92% (24 @ 09:36)  Wt(kg): --    Height (cm): 160 ( @ 06:10)  Weight (kg): 86.7 ( @ 06:10)  BMI (kg/m2): 33.9 ( @ 06:10)  BSA (m2): 1.9 ( @ 06:10)    LABS:      132<L>  |  100  |  32<H>  ----------------------------<  113<H>  3.8   |  21  |  1.9<H>    Ca    7.1<L>      03 Mar 2024 23:28    TPro  5.1<L>  /  Alb  1.6<L>  /  TBili  0.6  /  DBili  0.4<H>  /  AST  59<H>  /  ALT  33  /  AlkPhos  981<H>                            10.8   4.64  )-----------( 117      ( 03 Mar 2024 23:28 )             33.0       Urine Studies:  Urinalysis Basic - ( 04 Mar 2024 09:56 )    Color: Red / Appearance: Turbid / S.027 / pH:   Gluc:  / Ketone: Negative mg/dL  / Bili: Moderate / Urobili: 0.2 mg/dL   Blood:  / Protein: 300 mg/dL / Nitrite: Positive   Leuk Esterase: Moderate / RBC: Too Numerous to count /HPF / WBC 30 /HPF   Sq Epi:  / Non Sq Epi:  / Bacteria: Many /HPF      Sodium, Random Urine: <20.0 mmoL/L ( @ 09:56)  Creatinine, Random Urine: 192 mg/dL ( @ 09:56)  Protein/Creatinine Ratio Calculation: >2.9 Ratio ( @ 09:56)  Potassium, Random Urine: 54 mmol/L ( @ 09:56)      RADIOLOGY & ADDITIONAL STUDIES:

## 2024-03-05 ENCOUNTER — TRANSCRIPTION ENCOUNTER (OUTPATIENT)
Age: 29
End: 2024-03-05

## 2024-03-05 VITALS
SYSTOLIC BLOOD PRESSURE: 157 MMHG | HEART RATE: 112 BPM | RESPIRATION RATE: 18 BRPM | TEMPERATURE: 96 F | OXYGEN SATURATION: 98 % | DIASTOLIC BLOOD PRESSURE: 100 MMHG

## 2024-03-05 LAB
ALBUMIN FLD-MCNC: 0.2 G/DL — SIGNIFICANT CHANGE UP
ANION GAP SERPL CALC-SCNC: 14 MMOL/L — SIGNIFICANT CHANGE UP (ref 7–14)
B PERT IGG+IGM PNL SER: ABNORMAL
BILIRUB SERPL-MCNC: 0.6 MG/DL — SIGNIFICANT CHANGE UP (ref 0.2–1.2)
BUN SERPL-MCNC: 39 MG/DL — HIGH (ref 10–20)
CALCIUM SERPL-MCNC: 7 MG/DL — LOW (ref 8.4–10.5)
CHLORIDE SERPL-SCNC: 99 MMOL/L — SIGNIFICANT CHANGE UP (ref 98–110)
CO2 SERPL-SCNC: 22 MMOL/L — SIGNIFICANT CHANGE UP (ref 17–32)
COLOR FLD: SIGNIFICANT CHANGE UP
CREAT SERPL-MCNC: 2.2 MG/DL — HIGH (ref 0.7–1.5)
EGFR: 30 ML/MIN/1.73M2 — LOW
FLUID INTAKE SUBSTANCE CLASS: SIGNIFICANT CHANGE UP
GLUCOSE FLD-MCNC: 86 MG/DL — SIGNIFICANT CHANGE UP
GLUCOSE SERPL-MCNC: 105 MG/DL — HIGH (ref 70–99)
HCT VFR BLD CALC: 29.3 % — LOW (ref 37–47)
HCV RNA FLD QL NAA+PROBE: SIGNIFICANT CHANGE UP
HCV RNA SPEC QL PROBE+SIG AMP: SIGNIFICANT CHANGE UP
HGB BLD-MCNC: 9.5 G/DL — LOW (ref 12–16)
INR BLD: 1.03 RATIO — SIGNIFICANT CHANGE UP (ref 0.65–1.3)
LDH SERPL L TO P-CCNC: 53 U/L — SIGNIFICANT CHANGE UP
LYMPHOCYTES # FLD: 3 % — SIGNIFICANT CHANGE UP
MAGNESIUM SERPL-MCNC: 2.1 MG/DL — SIGNIFICANT CHANGE UP (ref 1.8–2.4)
MCHC RBC-ENTMCNC: 27.7 PG — SIGNIFICANT CHANGE UP (ref 27–31)
MCHC RBC-ENTMCNC: 32.4 G/DL — SIGNIFICANT CHANGE UP (ref 32–37)
MCV RBC AUTO: 85.4 FL — SIGNIFICANT CHANGE UP (ref 81–99)
MELD SCORE WITH DIALYSIS: 21 POINTS — SIGNIFICANT CHANGE UP
MELD SCORE WITHOUT DIALYSIS: 16 POINTS — SIGNIFICANT CHANGE UP
MESOTHL CELL # FLD: 1 % — SIGNIFICANT CHANGE UP
MONOS+MACROS # FLD: 16 % — SIGNIFICANT CHANGE UP
MRSA PCR RESULT.: POSITIVE
NEUTROPHILS-BODY FLUID: 80 % — SIGNIFICANT CHANGE UP
NRBC # BLD: 0 /100 WBCS — SIGNIFICANT CHANGE UP (ref 0–0)
PHOSPHATE SERPL-MCNC: 6.6 MG/DL — HIGH (ref 2.1–4.9)
PLATELET # BLD AUTO: 88 K/UL — LOW (ref 130–400)
PMV BLD: 10.7 FL — HIGH (ref 7.4–10.4)
POTASSIUM SERPL-MCNC: 3.6 MMOL/L — SIGNIFICANT CHANGE UP (ref 3.5–5)
POTASSIUM SERPL-SCNC: 3.6 MMOL/L — SIGNIFICANT CHANGE UP (ref 3.5–5)
PROT FLD-MCNC: <1 G/DL — SIGNIFICANT CHANGE UP
PROTHROM AB SERPL-ACNC: 11.7 SEC — SIGNIFICANT CHANGE UP (ref 9.95–12.87)
RBC # BLD: 3.43 M/UL — LOW (ref 4.2–5.4)
RBC # FLD: 14.6 % — HIGH (ref 11.5–14.5)
RCV VOL RI: 0 /UL — SIGNIFICANT CHANGE UP (ref 0–0)
SODIUM SERPL-SCNC: 135 MMOL/L — SIGNIFICANT CHANGE UP (ref 135–146)
SPECIMEN SOURCE FLD: SIGNIFICANT CHANGE UP
TOTAL NUCLEATED CELL COUNT, BODY FLUID: 1952 /UL — SIGNIFICANT CHANGE UP
TUBE TYPE: SIGNIFICANT CHANGE UP
WBC # BLD: 3.97 K/UL — LOW (ref 4.8–10.8)
WBC # FLD AUTO: 3.97 K/UL — LOW (ref 4.8–10.8)

## 2024-03-05 PROCEDURE — 49083 ABD PARACENTESIS W/IMAGING: CPT

## 2024-03-05 PROCEDURE — 93010 ELECTROCARDIOGRAM REPORT: CPT

## 2024-03-05 PROCEDURE — 99233 SBSQ HOSP IP/OBS HIGH 50: CPT

## 2024-03-05 PROCEDURE — 99231 SBSQ HOSP IP/OBS SF/LOW 25: CPT

## 2024-03-05 PROCEDURE — 99233 SBSQ HOSP IP/OBS HIGH 50: CPT | Mod: 25

## 2024-03-05 RX ORDER — LINEZOLID 600 MG/300ML
600 INJECTION, SOLUTION INTRAVENOUS ONCE
Refills: 0 | Status: DISCONTINUED | OUTPATIENT
Start: 2024-03-05 | End: 2024-03-05

## 2024-03-05 RX ORDER — METHADONE HYDROCHLORIDE 40 MG/1
5 TABLET ORAL EVERY 12 HOURS
Refills: 0 | Status: CANCELLED | OUTPATIENT
Start: 2024-03-09 | End: 2024-03-05

## 2024-03-05 RX ORDER — METHADONE HYDROCHLORIDE 40 MG/1
20 TABLET ORAL EVERY 12 HOURS
Refills: 0 | Status: DISCONTINUED | OUTPATIENT
Start: 2024-03-05 | End: 2024-03-05

## 2024-03-05 RX ORDER — LINEZOLID 600 MG/300ML
600 INJECTION, SOLUTION INTRAVENOUS EVERY 12 HOURS
Refills: 0 | Status: DISCONTINUED | OUTPATIENT
Start: 2024-03-05 | End: 2024-03-05

## 2024-03-05 RX ORDER — MUPIROCIN 20 MG/G
1 OINTMENT TOPICAL
Refills: 0 | Status: DISCONTINUED | OUTPATIENT
Start: 2024-03-05 | End: 2024-03-05

## 2024-03-05 RX ORDER — LINEZOLID 600 MG/300ML
INJECTION, SOLUTION INTRAVENOUS
Refills: 0 | Status: DISCONTINUED | OUTPATIENT
Start: 2024-03-05 | End: 2024-03-05

## 2024-03-05 RX ORDER — METHADONE HYDROCHLORIDE 40 MG/1
TABLET ORAL
Refills: 0 | Status: DISCONTINUED | OUTPATIENT
Start: 2024-03-05 | End: 2024-03-05

## 2024-03-05 RX ORDER — ALBUMIN HUMAN 25 %
131 VIAL (ML) INTRAVENOUS ONCE
Refills: 0 | Status: DISCONTINUED | OUTPATIENT
Start: 2024-03-05 | End: 2024-03-05

## 2024-03-05 RX ORDER — ALBUMIN HUMAN 25 %
500 VIAL (ML) INTRAVENOUS ONCE
Refills: 0 | Status: DISCONTINUED | OUTPATIENT
Start: 2024-03-05 | End: 2024-03-05

## 2024-03-05 RX ORDER — METHADONE HYDROCHLORIDE 40 MG/1
10 TABLET ORAL EVERY 12 HOURS
Refills: 0 | Status: CANCELLED | OUTPATIENT
Start: 2024-03-07 | End: 2024-03-05

## 2024-03-05 RX ORDER — METHADONE HYDROCHLORIDE 40 MG/1
15 TABLET ORAL EVERY 12 HOURS
Refills: 0 | Status: DISCONTINUED | OUTPATIENT
Start: 2024-03-06 | End: 2024-03-05

## 2024-03-05 RX ADMIN — ONDANSETRON 4 MILLIGRAM(S): 8 TABLET, FILM COATED ORAL at 08:30

## 2024-03-05 RX ADMIN — MUPIROCIN 1 APPLICATION(S): 20 OINTMENT TOPICAL at 18:06

## 2024-03-05 RX ADMIN — HYDROMORPHONE HYDROCHLORIDE 0.5 MILLIGRAM(S): 2 INJECTION INTRAMUSCULAR; INTRAVENOUS; SUBCUTANEOUS at 00:31

## 2024-03-05 RX ADMIN — HYDROMORPHONE HYDROCHLORIDE 0.5 MILLIGRAM(S): 2 INJECTION INTRAMUSCULAR; INTRAVENOUS; SUBCUTANEOUS at 05:42

## 2024-03-05 RX ADMIN — METHADONE HYDROCHLORIDE 20 MILLIGRAM(S): 40 TABLET ORAL at 12:37

## 2024-03-05 RX ADMIN — Medication 40 MILLIGRAM(S): at 18:14

## 2024-03-05 RX ADMIN — ONDANSETRON 4 MILLIGRAM(S): 8 TABLET, FILM COATED ORAL at 16:32

## 2024-03-05 RX ADMIN — PIPERACILLIN AND TAZOBACTAM 25 GRAM(S): 4; .5 INJECTION, POWDER, LYOPHILIZED, FOR SOLUTION INTRAVENOUS at 05:42

## 2024-03-05 RX ADMIN — Medication 40 MILLIGRAM(S): at 05:42

## 2024-03-05 RX ADMIN — HYDROMORPHONE HYDROCHLORIDE 0.5 MILLIGRAM(S): 2 INJECTION INTRAMUSCULAR; INTRAVENOUS; SUBCUTANEOUS at 06:13

## 2024-03-05 RX ADMIN — PIPERACILLIN AND TAZOBACTAM 25 GRAM(S): 4; .5 INJECTION, POWDER, LYOPHILIZED, FOR SOLUTION INTRAVENOUS at 13:12

## 2024-03-05 NOTE — DISCHARGE NOTE PROVIDER - ATTENDING DISCHARGE PHYSICAL EXAMINATION:
Vital Signs Last 24 Hrs  T(C): 35.7 (05 Mar 2024 14:06), Max: 35.7 (05 Mar 2024 14:06)  T(F): 96.3 (05 Mar 2024 14:06), Max: 96.3 (05 Mar 2024 14:06)  HR: 112 (05 Mar 2024 14:06) (107 - 112)  BP: 157/100 (05 Mar 2024 14:06) (135/94 - 157/100)  BP(mean): --  RR: 18 (05 Mar 2024 14:06) (18 - 18)  SpO2: 98% (05 Mar 2024 14:06) (98% - 99%)    Parameters below as of 05 Mar 2024 14:06  Patient On (Oxygen Delivery Method): room air    GENERAL: AAOx3, no acute distress.  HEAD:  Atraumatic, Normocephalic  EYES: conjunctiva and sclera clear  NECK: Supple, No thyromegaly   CHEST/LUNG: Clear to auscultation bilaterally; No wheeze, rhonchi, or rales  HEART: Regular rate and rhythm; normal S1, S2, No murmurs.  ABDOMEN: Soft, nontender, +distended; Bowel sounds present  NEUROLOGY: No asterixis or tremor  SKIN: Intact, no jaundice

## 2024-03-05 NOTE — PROGRESS NOTE ADULT - ASSESSMENT
CKD stage 3-4, Cr stable from recent discharge  recent RENETTA  no hydro on CT imaging  anasarca / ascites s/p paracentesis 3L in ED  liver cirrhosis / Hep C  thrombocytopenia / anemia  IVDA / active IV heroin abuse / opiate withdrawal  leg wounds    plan:    cont lasix 40mg iv q12h  cont aldactone 25mg po qd  consider repeat therapeutic paracentesis with iv albumin  fluid restriction  monitor UOP via primafit  trend renal function and lytes   f/u urine and blood cx, r/o UTI, cont renal dose abx  full code

## 2024-03-05 NOTE — PROGRESS NOTE ADULT - ASSESSMENT
Patient is a 27 y/o F with a pmhx of  asthma current smoker, hep C , IVDA ,leukopenia,  ascites who presents with diffuse abdominal pain associated with nausea, no vomiting, and multiple episodes of NB diarrhea x 1 day.    Plan:    Admit to inpatient level of care-med/surg  Monitor labs and vital signs.  CHG ordered.  Diet: DASH  GI ppx: Protonix  VTE ppx: Heparin      # SBP  # Ascites   #liver cirrhosis from hepatitis C  # transaminitis  #Anasarca  ID, GI recs appreciated   S/p paracentesis by ER team with removal of ~4L of fluid  Possible tap with ID in the AM   Follow up fluid cultures  Follow up abdominal ultrasound  Acute hepatitis panel  Monitor daily weights  Continue with diuretics post paracentesis  2g sodium diet  Zofran PRN for nausea  Continue with Zosyn  f/u sputum, MRSA nares       #RENETTA  Cr 8/2022- 0.9  Currently Cr 1.9  Nephrology recs appreciated   Follow up urine studies  Renal sonogram  Monitor urine output  Continue with lasix 40mg IV q12hrs  Follow up urine culture and UA    #IVDU  Addiction medicine recs appreciated   Monitor for withdrawals

## 2024-03-05 NOTE — DISCHARGE NOTE PROVIDER - HOSPITAL COURSE
This is a 29 y/o F with a pmhx of  asthma current smoker, chronic hep C , polysubstance use disorder, leukopenia,  ascites who presents with diffuse abdominal pain associated with nausea, no vomiting, and multiple episodes of NB diarrhea x 1 day. Pt had a paracentesis in the ED with fluid samples sent. ID, GI, and addiction medicine were consulted. Pt started on empiric antibiotics that for SBP and fluid analysis was positive for SBP. Pt was agreeable to completed treatment and attaining an MRI/MRCP for further work-up. Patient found attempting to use heroin in her room and security was informed and she was prevented from continuing. Spoke with addiction medicine and methadone taper was initiated. GI performed another paracentesis for symptoms relief. Albumin 25% was ordered. Pt firmly voiced the desire to leave AMA. She was informed of the risks of leaving including septic shock, syncope, aspiration, fall leading to trauma, worsening liver and kidney failure, and death. She voice understanding of the risk and left AMA after signing the paperwork.     plan prior to AMA     HCV RNA, HBV DNA, HEV Ig M and total Serum light chains, Ig levels ACE level  Urine drug screen  IV albumin 1.5 g/kg of D1 and 1 g/kg on D3   Will need EGD for variceal screening  MR abdomen w/wo MRCP  sputum cultures.   Follow up with blood cultures. Cultures from peritoneal fluid.   Keep on mupirocin nasal decolonization  Monitor for withdrawal signs.   For now continue with IV zosyn 3.375 q 8 hours.  Follow up abdominal ultrasound  Acute hepatitis panel  Monitor daily weights  Continue with diuretics post paracentesis  2g sodium diet  Zofran PRN for nausea

## 2024-03-05 NOTE — PROGRESS NOTE ADULT - ASSESSMENT
This is a 29 y/o F with a pmhx of  asthma current smoker, chronic hep C , polysubstance use disorder, leukopenia,  ascites who presents with diffuse abdominal pain associated with nausea, no vomiting, and multiple episodes of NB diarrhea x 1 day.     IMPRESSION  #Recurrent Ascites. S/p Paracentesis in the ED 3/3- 3.2 Liters taken out.   #Concern for SBP. TNC 3640 with 83% Neutrophils   #Bilateral lower extremity edema and healed up wounds.  #Chronic Hepatitis C, Poly substance use (IV drug user)- Reports previously treated. Hep C VL negative.   #Liver cirrhosis and ascites   #Pancytopenia secondary to liver disease and hypersplenism  #transaminitis, elevated Alk Phosphatase.   #CKD 3- Possible hepatorenal syndrome  #Pyuria/Bacteuria- With squamous epithelial cells- Poor sample. Doubt UTI  #Obesity BMI (kg/m2): 33.9  Recent HIV screen negative. acute hepatitis Panel negative, Hep A immune. Quantiferon Indeterminate   MRSA nares positive    RECOMMENDATIONS  -Check Sputum cultures.   -Follow up with blood cultures. Cultures from peritoneal fluid.   -D/C Linezolid. Keep on mupirocin nasal decolonization  -Monitor for withdrawal signs.   -For now continue with IV zosyn 3.375 q 8 hours.  -Poor prognosis.   -Can be offered Preexposure prophylaxis for HIV in the outpatient setting due to active drug use.   -Long term SBP ppx can be considered however drug of choice is going to be difficult to ascertain due to comorbidities (elevated Qtcs, renal impairment and active drug use causing potential creation of MDR organisms)    If any questions, please send a message or call on [email or web address] Teams  Please continue to update ID with any pertinent new laboratory or radiographic findings.    Geovani Yan M.D  Infectious Diseases Attending/   [employer] at [facility]     This is a 29 y/o F with a pmhx of  asthma current smoker, chronic hep C , polysubstance use disorder, leukopenia,  ascites who presents with diffuse abdominal pain associated with nausea, no vomiting, and multiple episodes of NB diarrhea x 1 day.     IMPRESSION  #Recurrent Ascites. S/p Paracentesis in the ED 3/3- 3.2 Liters taken out.   #Concern for SBP. TNC 3640 with 83% Neutrophils   #Bilateral lower extremity edema and healed up wounds.  #Chronic Hepatitis C, Poly substance use (IV drug user)- Reports previously treated. Hep C VL negative.   #Liver cirrhosis and ascites   #Pancytopenia secondary to liver disease and hypersplenism  #transaminitis, elevated Alk Phosphatase.   #CKD 3- Possible hepatorenal syndrome  #Pyuria/Bacteuria- With squamous epithelial cells- Poor sample. Doubt UTI  #Obesity BMI (kg/m2): 33.9  Recent HIV screen negative. acute hepatitis Panel negative, Hep A immune. Quantiferon Indeterminate   MRSA nares positive    RECOMMENDATIONS  -Check Sputum cultures.   -Follow up with blood cultures. Cultures from peritoneal fluid.   -D/C Linezolid. Keep on mupirocin nasal decolonization  -Monitor for withdrawal signs.   -For now continue with IV zosyn 3.375 q 8 hours.  -Poor prognosis. Repeat paracentesis as per the GI recommendations.     -Can be offered Preexposure prophylaxis for HIV in the outpatient setting due to active drug use.   -Long term SBP ppx can be considered however drug of choice is going to be difficult to ascertain due to comorbidities (elevated Qtcs, renal impairment and active drug use causing potential creation of MDR organisms)    If any questions, please send a message or call on [employer] Teams  Please continue to update ID with any pertinent new laboratory or radiographic findings.    Geovani Yan M.D  Infectious Diseases Attending/   [employer] at [facility]

## 2024-03-05 NOTE — PROGRESS NOTE ADULT - ASSESSMENT
28yFemale pmh asthma, liver cirrhosis, current smoker, IVDA presents for increased abdominal girth. Patient using IV drugs at current still    #Large ascites significantly increased since the prior exam. Anasarca.   Right pleural effusion.  #Evaluation of the organs is very limited based on technique and extensive   ascites.  #Hypodense liver not further evaluated. Splenomegaly. Correlate with   history of underlying liver disease.  #hep C liver cirrhosis   Rec  patient with SBP continue abx as per ID  -plan for repeat Paracenteses today  -2g Sodium diet  -Daily weights  -Hepatic encephalopathy-none  -Nutrition: As cirrhosis is a severe hypercatabolic state, she needs to maintain a protein intake of 1.2-1.6 g/kg while restricting her intake of calories and sugars; there is no need to restrict protein intake from the point of liver disease. Patient will start getting a late night snack (yogurt/fruit) to avoid long periods of fasting.  -Risk of NSAIDs: Given the risk of i) renal toxicity, ii) gastrointestinal bleeding, and iii) fluid retention, NSAIDs should be completely avoided. In case she needs an analgesic, the safest option would be Tylenol (not to exceed a daily cumulative dose of 2 grams)  -Abdominal ultrasound AFP every 6 months for HCC screening  -EGD outpatient for esophageal varices screening   -Follow up with our [facility] located at [address] [place] [address]. Phone Number: [phone] for possible liver transplant referral    #Cholelithiasis with nonspecific gallbladder wall thickening. Correlate   clinically.  #CBD poorly visualized, approximated at 12 mm, dilated. Correlate with   LFTs  Rec  -consider MRI with and without contrast MRCP if patient agreeable inpatient  28yFemale pmh asthma, liver cirrhosis, current smoker, IVDA presents for increased abdominal girth. Patient using IV drugs at current still    #Large ascites significantly increased since the prior exam. Anasarca.   Right pleural effusion.  #Evaluation of the organs is very limited based on technique and extensive   ascites.  #Hypodense liver not further evaluated. Splenomegaly. Correlate with   history of underlying liver disease.  #hep C liver cirrhosis   Rec  patient with SBP continue abx as per ID  -need immunization for HAV and HBV as OP.  -plan for repeat Paracenteses today  -2g Sodium diet  -Daily weights  -Hepatic encephalopathy-none  -Nutrition: As cirrhosis is a severe hypercatabolic state, she needs to maintain a protein intake of 1.2-1.6 g/kg while restricting her intake of calories and sugars; there is no need to restrict protein intake from the point of liver disease. Patient will start getting a late night snack (yogurt/fruit) to avoid long periods of fasting.  -Risk of NSAIDs: Given the risk of i) renal toxicity, ii) gastrointestinal bleeding, and iii) fluid retention, NSAIDs should be completely avoided. In case she needs an analgesic, the safest option would be Tylenol (not to exceed a daily cumulative dose of 2 grams)  -Abdominal ultrasound AFP every 6 months for HCC screening  -EGD outpatient for esophageal varices screening   -Follow up with our [facility] located at [address] [place] [address]. Phone Number: [phone] for possible liver transplant referral    #Cholelithiasis with nonspecific gallbladder wall thickening. Correlate   clinically.  #CBD poorly visualized, approximated at 12 mm, dilated. Correlate with   LFTs  Rec  -consider MRI with and without contrast MRCP if patient agreeable inpatient

## 2024-03-05 NOTE — PROGRESS NOTE ADULT - PROBLEM SELECTOR PLAN 1
After evaluation at this time would start on methadone protocol. Pts concerns addressed  Pt will be monitored and supportive care provided.  No other changes to medical care plan for withdrawals.  Monitor labs/electrolytes as needed.    -Counseling provided   CATCH team involved for aftercare and pt will follow up with aftercare if patient decides she wants aftercare. Pt refuses at this time     Case discussed with Dr. Drummond

## 2024-03-05 NOTE — PROGRESS NOTE ADULT - NS ATTEND AMEND GEN_ALL_CORE FT
28 y o  F with IVDA prior hepatitis C spontaneous resolution HCV cirrhosis decompensated with ascites admitted with abdominal pain and distension. Fluid studies at admission showing SBP.  Still using IV heroin till time of admission. Had 2 prior paracentesis at [facility] and on last admission here.     Alert  looks chronically ill  No icterus  Abdomen distended tense fluids dull  No asterixis    CT abdomen - hepatosplenomegaly with ascites  Decompensated cirrhosis with ascites SBP  hx of hepatitis C   Acute on chronic cholestatic hepatitis with elvin dil  Thrombocytopenia   NAKI ?    Prior admission HCV RNA HBsAg HBsAb HBcAg Ig M and total negative KRISHNA ASMA AMA negative   CMV not detected EBV detected  HSV PCR  AFP normal Ca 19 - 9 mild elevation  Rpt HBsAg negative  Urine Na below 20    Paracentesis with repeat cell count and cultures sent.   Please check HCV RNA, HBV DNA, HEV Ig M and total Serum light chains, Ig levels ACE level  Urine drug screen  Please give IV albumin 1.5 g/kg of D1 and 1 g/kg on D3   Will need EGD for variceal screening  MR abdomen w/wo MRCP  Note ID input and on treatment for SBP.   SBP prophylaxis with ciprofloxacin at end of treatment 28 y o  F with IVDA prior hepatitis C spontaneous resolution HCV cirrhosis decompensated with ascites admitted with abdominal pain and distension. Fluid studies at admission showing SBP.  Still using IV heroin till time of admission. Had 2 prior paracentesis at [facility] and on last admission here.     Alert  looks chronically ill  No icterus  Abdomen multiple scars over abdomen distended tense flankls dull dull  No asterixis    CT abdomen - hepatosplenomegaly with ascites  Decompensated cirrhosis with ascites SBP  hx of hepatitis C   Acute on chronic cholestatic hepatitis with elvin dil  Thrombocytopenia   NAKI ?    Prior admission HCV RNA HBsAg HBsAb HBcAg Ig M and total negative KRISHNA ASMA AMA negative   CMV not detected EBV detected  HSV PCR  AFP normal Ca 19 - 9 mild elevation  Rpt HBsAg negative  Urine Na below 20    Paracentesis with repeat cell count and cultures sent.   Please check HCV RNA, HBV DNA, HEV Ig M and total Serum light chains, Ig levels ACE level  Urine drug screen  Please give IV albumin 1.5 g/kg of D1 and 1 g/kg on D3   Will need EGD for variceal screening  MR abdomen w/wo MRCP  Note ID input and on treatment for SBP.   SBP prophylaxis with ciprofloxacin at end of treatment  If there is leakage from drain site please seal with dermabond

## 2024-03-05 NOTE — PROGRESS NOTE ADULT - SUBJECTIVE AND OBJECTIVE BOX
28yFemale  Being followed for liver cirrhosis,   Interval history: Patient denies nausea, vomiting, hematemesis, melena, blood in stool, diarrhea, constipation. +diffuse abdominal pain.       PAST MEDICAL & SURGICAL HISTORY:   Hepatitis C      Asthma      Anxiety and depression      IV drug abuse  heroin      H/O cirrhosis      No significant past surgical history                Social History: +cigarette smoking. No alcohol. + illegal drug use.            MEDICATIONS  (STANDING):  albuterol/ipratropium for Nebulization 3 milliLiter(s) Nebulizer every 8 hours  chlorhexidine 2% Cloths 1 Application(s) Topical <User Schedule>  furosemide   Injectable 40 milliGRAM(s) IV Push every 12 hours  influenza   Vaccine 0.5 milliLiter(s) IntraMuscular once  mupirocin 2% Ointment 1 Application(s) Both Nostrils two times a day  pantoprazole    Tablet 40 milliGRAM(s) Oral before breakfast  piperacillin/tazobactam IVPB.. 3.375 Gram(s) IV Intermittent every 8 hours  spironolactone 25 milliGRAM(s) Oral daily    MEDICATIONS  (PRN):  HYDROmorphone  Injectable 0.5 milliGRAM(s) IV Push every 6 hours PRN Severe Pain (7 - 10)  hydrOXYzine hydrochloride 50 milliGRAM(s) Oral at bedtime PRN insomnia  ondansetron Injectable 4 milliGRAM(s) IV Push three times a day PRN Nausea and/or Vomiting      Allergies:   Suboxone (Rash)  buprenorphine (Rash)  Intolerances          REVIEW OF SYSTEMS:  General:  No weight loss, fevers, or chills.  Eyes:  No reported pain or visual changes  ENT:  No sore throat or runny nose.  NECK: No stiffness   CV:  No chest pain or palpitations.  Resp:  No shortness of breath, cough  GI:  +diffuse abdominal pain, no nausea, vomiting, dysphagia, diarrhea or constipation. No rectal bleeding, melena, or hematemesis.  Neuro:  No tingling, numbness           VITAL SIGNS:   T(F): 96.9 (03-04-24 @ 20:05), Max: 96.9 (03-04-24 @ 20:05)  HR: 107 (03-05-24 @ 05:10) (107 - 110)  BP: 135/94 (03-05-24 @ 05:10) (125/105 - 135/94)  RR: 18 (03-05-24 @ 05:10) (18 - 18)  SpO2: 98% (03-05-24 @ 08:24) (96% - 99%)    PHYSICAL EXAM:  GENERAL: AAOx3, no acute distress.  HEAD:  Atraumatic, Normocephalic  EYES: conjunctiva and sclera clear  NECK: Supple, no JVD or thyromegaly  CHEST/LUNG: Clear to auscultation bilaterally; No wheeze, rhonchi, or rales  HEART: Regular rate and rhythm; normal S1, S2, No murmurs.  ABDOMEN:  +abdominal tenderness, +distended; Bowel sounds present  NEUROLOGY: No asterixis or tremor.   SKIN: Intact, no jaundice            LABS:                        10.8   4.64  )-----------( 117      ( 03 Mar 2024 23:28 )             33.0     03-03    132<L>  |  100  |  32<H>  ----------------------------<  113<H>  3.8   |  21  |  1.9<H>    Ca    7.1<L>      03 Mar 2024 23:28    TPro  5.1<L>  /  Alb  1.6<L>  /  TBili  0.6  /  DBili  0.4<H>  /  AST  59<H>  /  ALT  33  /  AlkPhos  981<H>  03-03    LIVER FUNCTIONS - ( 03 Mar 2024 23:28 )  Alb: 1.6 g/dL / Pro: 5.1 g/dL / ALK PHOS: 981 U/L / ALT: 33 U/L / AST: 59 U/L / GGT: x           PT/INR - ( 03 Mar 2024 23:28 )   PT: 11.30 sec;   INR: 0.99 ratio         PTT - ( 03 Mar 2024 23:28 )  PTT:32.2 sec    IMAGING:    < from: US Abdomen Upper Quadrant Right (03.04.24 @ 18:22) >    ACC: [id number] EXAM:  US ABDOMEN RT UPR QUADRANT   ORDERED BY: CHARLY MURRAY     PROCEDURE DATE:  03/04/2024          INTERPRETATION:  CLINICAL INFORMATION: Liver cirrhosis, ascites.    COMPARISON: Same-day CT abdomen and pelvis, right upper quadrant   ultrasound 1/30/2021.    TECHNIQUE: Sonography of the right upper quadrant. Examination is mildly   limited by patient's pain/limited mobility.    FINDINGS:  Liver: Cirrhotic liver.  Bile ducts: CBD poorly visualized, approximated at 12 mm, dilated.  Gallbladder: Cholelithiasis. Gallbladder wall thickening.  Pancreas: Not visualized.  Right kidney: 10.3 cm. No hydronephrosis.  Ascites: Large volume ascites better demonstrated on same-day CT. Right   pleural effusion.    IMPRESSION:    Large volume ascites better demonstrated on same-day CT.    Cholelithiasis with nonspecific gallbladder wall thickening. Correlate   clinically.    CBD poorly visualized, approximated at 12 mm, dilated. Correlate with   LFTs.    Cirrhotic liver.    Rightpleural effusion.    --- End of Report ---          ARIS CUMMINGS MD; Resident Radiologist  This document has been electronically signed.  BELKIS NOE MD; Attending Radiologist  This document has been electronically signed. Mar  5 2024  9:20AM    < end of copied text >        
POOJA DIANE  28y, Female    LOS  1d    INTERVAL EVENTS/HPI  - No acute events overnight  - T(F): , Max: 96.9 (24 @ 20:05)  - Denies any worsening symptoms  - Tolerating medication  - WBC Count: 4.64 (24 @ 23:28)  - Creatinine: 1.9 (24 @ 23:28)    REVIEW OF SYSTEMS:  CONSTITUTIONAL: No fever or chills  HEENT: No sore throat  RESPIRATORY: No cough, no shortness of breath  CARDIOVASCULAR: No chest pain or palpitations  GASTROINTESTINAL: No abdominal or epigastric pain  GENITOURINARY: No dysuria  NEUROLOGICAL: No headache/dizziness  MSK: No joint pain, erythema, or swelling; no back pain  SKIN: No itching, rashes  All other ROS negative except noted above    Prior hospital charts reviewed [Yes]  Primary team notes reviewed [Yes]  Other consultant notes reviewed [Yes]    ANTIMICROBIALS:   piperacillin/tazobactam IVPB.. 3.375 every 8 hours      OTHER MEDS: MEDICATIONS  (STANDING):  albuterol/ipratropium for Nebulization 3 every 8 hours  furosemide   Injectable 40 every 12 hours  HYDROmorphone  Injectable 0.5 every 6 hours PRN  hydrOXYzine hydrochloride 50 at bedtime PRN  influenza   Vaccine 0.5 once  ondansetron Injectable 4 three times a day PRN  pantoprazole    Tablet 40 before breakfast  spironolactone 25 daily      Vital Signs Last 24 Hrs  T(F): 96.9 (24 @ 20:05), Max: 99 (24 @ 04:07)    Vital Signs Last 24 Hrs  HR: 107 (24 @ 05:10) (107 - 112)  BP: 135/94 (24 @ 05:10) (125/105 - 136/92)  RR: 18 (24 @ 05:10)  SpO2: 98% (24 @ 08:24) (92% - 99%)  Wt(kg): --    EXAM:  GENERAL: NAD, lying in bed  HEAD: No head lesions  NECK: Supple, nontender to palpation  CHEST/LUNG: Shallow breath sounds bilaterally. Mild crackles.   HEART: S1 S2  ABDOMEN: Soft, distended.   EXTREMITIES: +2 edema bilaterally. Chronic skin changes.   NERVOUS SYSTEM: Alert and oriented to person, tired looking.   MSK: No joint erythema, swelling or pain  SKIN: No rashes or lesions, no superficial thrombophlebitis    Labs:                        10.8   4.64  )-----------( 117      ( 03 Mar 2024 23:28 )             33.0         132<L>  |  100  |  32<H>  ----------------------------<  113<H>  3.8   |  21  |  1.9<H>    Ca    7.1<L>      03 Mar 2024 23:28    TPro  5.1<L>  /  Alb  1.6<L>  /  TBili  0.6  /  DBili  0.4<H>  /  AST  59<H>  /  ALT  33  /  AlkPhos  981<H>        WBC Trend:  WBC Count: 4.64 (24 @ 23:28)      Creatine Trend:  Creatinine: 1.9 ()      Liver Biochemical Testing Trend:  Alanine Aminotransferase (ALT/SGPT): 33 ()  Alanine Aminotransferase (ALT/SGPT): 23 ()  Alanine Aminotransferase (ALT/SGPT): 23 ()  Alanine Aminotransferase (ALT/SGPT): 21 ()  Alanine Aminotransferase (ALT/SGPT): 30 ()  Aspartate Aminotransferase (AST/SGOT): 59 (24 @ 23:28)  Aspartate Aminotransferase (AST/SGOT): 29 (24 @ 06:35)  Aspartate Aminotransferase (AST/SGOT): 35 (24 @ 20:20)  Aspartate Aminotransferase (AST/SGOT): 34 (24 @ 04:30)  Aspartate Aminotransferase (AST/SGOT): 54 (24 @ 04:30)  Bilirubin Direct: 0.4 ()  Bilirubin Total: 0.6 ()  Bilirubin Total: 0.5 ()  Bilirubin Total: 0.3 ()  Bilirubin Total: 0.3 ()      Trend LDH  22 @ 12:50  271<H>  22 @ 06:47  173      Urinalysis Basic - ( 04 Mar 2024 09:56 )    Color: Red / Appearance: Turbid / S.027 / pH: x  Gluc: x / Ketone: Negative mg/dL  / Bili: Moderate / Urobili: 0.2 mg/dL   Blood: x / Protein: 300 mg/dL / Nitrite: Positive   Leuk Esterase: Moderate / RBC: Too Numerous to count /HPF / WBC 30 /HPF   Sq Epi: x / Non Sq Epi: x / Bacteria: Many /HPF        MICROBIOLOGY:    Female    Culture - Body Fluid with Gram Stain (collected 04 Mar 2024 04:00)  Source: Abdominal Fl Abdominal Fluid    Culture - Fungal, Body Fluid (collected 2024 18:58)  Source: Peritoneal Peritoneal Fluid  Final Report:    No fungus isolated at 4 weeks.    Culture - Body Fluid with Gram Stain (collected 2024 18:58)  Source: Ascites Fl Ascites Fluid  Final Report:    No growth at 5 days    Culture - Urine (collected 2024 07:00)  Source: Clean Catch Clean Catch (Midstream)  Final Report:    >=3 organisms. Probable collection contamination.    Culture - Blood (collected 2024 13:30)  Source: .Blood Blood  Final Report:    No growth at 5 days    Culture - Blood (collected 2024 13:30)  Source: .Blood Blood  Final Report:    No growth at 5 days    Culture - Blood (collected 2022 15:34)  Source: .Blood Blood  Final Report:    No Growth Final    Culture - Sputum (collected 15 Jul 2022 10:00)  Source: Trach Asp Tracheal Aspirate  Final Report:    Normal Respiratory Kelly present    Babesia microti PCR, Blood. (collected 10 Jul 2022 15:43)  Source: .Blood None  Final Report:    Babesia microti PCR    Results: NOT detected    ***************Result Note*************    The detection of Babesia microti by PCR has only been    validated for whole blood; this test has not been approved    by the [place] Food and Drug Administration (FDA). Performance    characteristics of this assay have been determined by    [facility] [employer]. The clinical significance    of results should be considered in conjunction with the    overall clinical presentation of the patient. Result is not    intended to be used as the sole means for clinical diagnosis    or patient management decisions.    One negative sample does not necessarily rule    out the presence of a parasitic infection.    Culture - Blood (collected 2022 05:26)  Source: .Blood None  Final Report:    No Growth Final      HIV-1/2 Combo Result: Nonreact (24 @ 13:30)  HIV-1/2 Combo Result: Nonreact (22 @ 10:29)    RADIOLOGY & ADDITIONAL TESTS:  I have personally reviewed the relevant images.   CXR  Xray Chest 1 View- PORTABLE-Urgent:   ACC: [id number] EXAM:  XR CHEST PORTABLE URGENT 1V   ORDERED BY: REZA VALDOVINOS     PROCEDURE DATE:  2024          INTERPRETATION:  Clinical History / Reason for exam: Shortness of breath    Comparison : Chest radiograph 2024.    Technique/Positioning: AP chest.    Findings:    Support devices: None.    Cardiac/mediastinum/hilum: Pulmonary artery appears prominent.    Lung parenchyma/Pleura: Bibasilar opacities with small right pleural   effusion. Elevation right hemidiaphragm.    Skeleton/soft tissues: Degenerative changes    Impression:    Bibasilar opacities small right pleural effusion. Elevation right   hemidiaphragm.        --- End of Report ---            CALEB COLEMAN MD; Attending Radiologist  This document has been electronically signed. Mar  4 2024  8:53AM (24 @ 22:08)      CT  CT Abdomen and Pelvis No Cont:   ACC: [id number] EXAM:  CT ABDOMEN AND PELVIS   ORDERED BY: REZA CAMPBELLSCALDAIR     PROCEDURE DATE:  2024          INTERPRETATION:  CLINICAL STATEMENT: abdominal pain distension    TECHNIQUE: Contiguous axial CT images were obtained from the lower chest   to the pubic symphysis without intravenous contrast.  Oral contrast was   not administered.  Reformatted images in the coronal and sagittal planes   were acquired.    COMPARISON CT: 2024    Note that the evaluation of solid organs and bowel loops is limited   secondary to lack of oral and IV contrast.    FINDINGS:    LOWER CHEST: Small right pleural effusion with adjacent   consolidation/atelectasis. Trace left pleural effusion.    HEPATOBILIARY: Diffusely hypodense. Very limited evaluation otherwise    SPLEEN: Splenomegaly.    PANCREAS: Not well evaluated.    ADRENAL GLANDS: Unremarkable.    KIDNEYS: No hydronephrosis.    ABDOMINOPELVIC NODES: Limited evaluation. No bulky lymphadenopathy.    PELVIC ORGANS: Not well evaluated.    PERITONEUM/MESENTERY/BOWEL: Large ascites. No bowel obstruction. No   pneumoperitoneum.    BONES/SOFT TISSUES: No acute osseous abnormality.  Anasarca.    OTHER: Normal caliber aorta.      IMPRESSION:    Large ascites significantly increased since the prior exam. Anasarca.   Right pleural effusion.    Evaluation of the organs is very limited based on technique and extensive   ascites.    Hypodense liver not further evaluated. Splenomegaly. Correlate with   history of underlying liver disease.    --- End of Report ---            KATIE ENNIS MD; Attending Radiologist  This document has been electronically signed. Mar  4 2024  3:07AM (24 @ 02:57)        WEIGHT  Weight (kg): 86.7 (24 @ 06:10)      All available historical records have been reviewed      
    Pt interviewed, examined and EMR chart reviewed.    Follow up of Opiate Addiction. Pt now states using 1 gram of heroin per day. Pt has been using IV and states now in withdrawal. Pt is agreeable to methadone taper.        MEDICATIONS  (STANDING):  albuterol/ipratropium for Nebulization 3 milliLiter(s) Nebulizer every 8 hours  chlorhexidine 2% Cloths 1 Application(s) Topical <User Schedule>  furosemide   Injectable 40 milliGRAM(s) IV Push every 12 hours  influenza   Vaccine 0.5 milliLiter(s) IntraMuscular once  methadone    Tablet   Oral   mupirocin 2% Ointment 1 Application(s) Both Nostrils two times a day  pantoprazole    Tablet 40 milliGRAM(s) Oral before breakfast  piperacillin/tazobactam IVPB.. 3.375 Gram(s) IV Intermittent every 8 hours  spironolactone 25 milliGRAM(s) Oral daily    MEDICATIONS  (PRN):  hydrOXYzine hydrochloride 50 milliGRAM(s) Oral at bedtime PRN insomnia  ondansetron Injectable 4 milliGRAM(s) IV Push three times a day PRN Nausea and/or Vomiting      Vital Signs Last 24 Hrs  T(C): 36.1 (04 Mar 2024 20:05), Max: 36.1 (04 Mar 2024 20:05)  T(F): 96.9 (04 Mar 2024 20:05), Max: 96.9 (04 Mar 2024 20:05)  HR: 107 (05 Mar 2024 05:10) (107 - 110)  BP: 135/94 (05 Mar 2024 05:10) (125/105 - 135/94)  BP(mean): --  RR: 18 (05 Mar 2024 05:10) (18 - 18)  SpO2: 98% (05 Mar 2024 08:24) (96% - 99%)    Parameters below as of 05 Mar 2024 08:24  Patient On (Oxygen Delivery Method): room air        PHYSICAL EXAM:    Constitutional: NAD, well-groomed, well-developed  HEENT: PERRLA, EOMI, Normal Hearing,   Neck: No LAD, No JVD  Back: Normal spine flexure, No CVA tenderness  Respiratory: CTAB/L  Cardiovascular: S1 and S2, RRR, no M/G/R  Gastrointestinal: distended, fluid  Extremities: No peripheral edema  Neurological: A/O x 3, no focal deficits    LABS:                        9.5    3.97  )-----------( 88       ( 05 Mar 2024 12:00 )             29.3     03-05    135  |  99  |  39<H>  ----------------------------<  105<H>  3.6   |  22  |  2.2<H>    Ca    7.0<L>      05 Mar 2024 12:00  Phos  6.6     03-05  Mg     2.1     03-05    TPro  x   /  Alb  x   /  TBili  0.6  /  DBili  x   /  AST  x   /  ALT  x   /  AlkPhos  x   03-05    PT/INR - ( 05 Mar 2024 12:00 )   PT: 11.70 sec;   INR: 1.03 ratio         PTT - ( 03 Mar 2024 23:28 )  PTT:32.2 sec  Urinalysis Basic - ( 05 Mar 2024 12:00 )    Color: x / Appearance: x / SG: x / pH: x  Gluc: 105 mg/dL / Ketone: x  / Bili: x / Urobili: x   Blood: x / Protein: x / Nitrite: x   Leuk Esterase: x / RBC: x / WBC x   Sq Epi: x / Non Sq Epi: x / Bacteria: x      Drug Screen Urine:  Alcohol Level        RADIOLOGY & ADDITIONAL STUDIES:  
NEPHROLOGY FOLLOW UP NOTE    on iv lasix    PAST MEDICAL & SURGICAL HISTORY:  Hepatitis C      Asthma      Anxiety and depression      IV drug abuse  heroin      H/O cirrhosis      No significant past surgical history        Allergies:  Suboxone (Rash)  buprenorphine (Rash)    Home Medications Reviewed    SOCIAL HISTORY:  Denies ETOH,Smoking,   FAMILY HISTORY:  No pertinent family history in first degree relatives          REVIEW OF SYSTEMS:  All other review of systems is negative unless indicated above.    PHYSICAL EXAM:  Constitutional: chronically ill  HEENT: anicteric sclera, oropharynx clear, MMM  Neck: No JVD  Respiratory: b/l BS  Cardiovascular: S1, S2, RRR  Gastrointestinal: BS+, soft, + distention  Extremities: No cyanosis or clubbing. ++ edema  Neurological: A/O x 3, no focal deficits  Psychiatric: Normal mood, normal affect  : No CVA tenderness. No doss.   Skin: No rashes     Hospital Medications:   MEDICATIONS  (STANDING):  albuterol/ipratropium for Nebulization 3 milliLiter(s) Nebulizer every 8 hours  chlorhexidine 2% Cloths 1 Application(s) Topical <User Schedule>  furosemide   Injectable 40 milliGRAM(s) IV Push every 12 hours  influenza   Vaccine 0.5 milliLiter(s) IntraMuscular once  methadone    Tablet   Oral   mupirocin 2% Ointment 1 Application(s) Both Nostrils two times a day  pantoprazole    Tablet 40 milliGRAM(s) Oral before breakfast  piperacillin/tazobactam IVPB.. 3.375 Gram(s) IV Intermittent every 8 hours  spironolactone 25 milliGRAM(s) Oral daily        VITALS:  T(F): 96.3 (03-05-24 @ 14:06), Max: 96.9 (03-04-24 @ 20:05)  HR: 112 (03-05-24 @ 14:06)  BP: 157/100 (03-05-24 @ 14:06)  RR: 18 (03-05-24 @ 14:06)  SpO2: 98% (03-05-24 @ 14:06)  Wt(kg): --    03-05 @ 07:01  -  03-05 @ 17:48  --------------------------------------------------------  IN: 0 mL / OUT: 600 mL / NET: -600 mL          LABS:  03-05    135  |  99  |  39<H>  ----------------------------<  105<H>  3.6   |  22  |  2.2<H>    Ca    7.0<L>      05 Mar 2024 12:00  Phos  6.6     03-05  Mg     2.1     03-05    TPro      /  Alb      /  TBili  0.6  /  DBili      /  AST      /  ALT      /  AlkPhos      03-05                          9.5    3.97  )-----------( 88       ( 05 Mar 2024 12:00 )             29.3       Urine Studies:  Urinalysis Basic - ( 05 Mar 2024 12:00 )    Color:  / Appearance:  / SG:  / pH:   Gluc: 105 mg/dL / Ketone:   / Bili:  / Urobili:    Blood:  / Protein:  / Nitrite:    Leuk Esterase:  / RBC:  / WBC    Sq Epi:  / Non Sq Epi:  / Bacteria:       Sodium, Random Urine: <20.0 mmoL/L (03-04 @ 09:56)  Creatinine, Random Urine: 192 mg/dL (03-04 @ 09:56)  Protein/Creatinine Ratio Calculation: >2.9 Ratio (03-04 @ 09:56)  Osmolality, Random Urine: 391 mos/kg (03-04 @ 09:56)  Potassium, Random Urine: 54 mmol/L (03-04 @ 09:56)      RADIOLOGY & ADDITIONAL STUDIES:  
SUBJECTIVE:    Patient is a 28y old Female who presents with a chief complaint of   Currently admitted to medicine with the primary diagnosis of Abdominal pain       This morning she is resting in bed. Pt states she is very uncomfortable and nausea. Informed of diagnosis of SBP. Assessed by GI, infectious disease, and addition medicine.     ROS:   CONSTITUTIONAL: No chills   EYES/ENT: No visual changes; No vertigo or throat pain   NECK: No pain or stiffness   RESPIRATORY: No cough, wheezing, hemoptysis; No shortness of breath   CARDIOVASCULAR: No chest pain or palpitations   GASTROINTESTINAL: hematemesis. No melena or hematochezia.  GENITOURINARY: No dysuria, frequency or hematuria  NEUROLOGICAL: No numbness  SKIN: No itching, rashes      PAST MEDICAL & SURGICAL HISTORY  Hepatitis C    Asthma    Anxiety and depression    IV drug abuse  heroin    H/O cirrhosis    No significant past surgical history        SOCIAL HISTORY:  + for poly substance use     ALLERGIES:  Suboxone (Rash)  buprenorphine (Rash)      MEDICATIONS:  STANDING MEDICATIONS  albuterol/ipratropium for Nebulization 3 milliLiter(s) Nebulizer every 8 hours  chlorhexidine 2% Cloths 1 Application(s) Topical <User Schedule>  furosemide   Injectable 40 milliGRAM(s) IV Push every 12 hours  influenza   Vaccine 0.5 milliLiter(s) IntraMuscular once  pantoprazole    Tablet 40 milliGRAM(s) Oral before breakfast  piperacillin/tazobactam IVPB.. 3.375 Gram(s) IV Intermittent every 8 hours  spironolactone 25 milliGRAM(s) Oral daily    PRN MEDICATIONS  HYDROmorphone  Injectable 0.5 milliGRAM(s) IV Push every 6 hours PRN  hydrOXYzine hydrochloride 50 milliGRAM(s) Oral at bedtime PRN  ondansetron Injectable 4 milliGRAM(s) IV Push three times a day PRN    VITALS:   T(F): 96.9  HR: 108  BP: 125/105  RR: 18  SpO2: 96%    LABS:                          10.8   4.64  )-----------( 117      ( 03 Mar 2024 23:28 )             33.0     03-03    132<L>  |  100  |  32<H>  ----------------------------<  113<H>  3.8   |  21  |  1.9<H>    Ca    7.1<L>      03 Mar 2024 23:28    TPro  5.1<L>  /  Alb  1.6<L>  /  TBili  0.6  /  DBili  0.4<H>  /  AST  59<H>  /  ALT  33  /  AlkPhos  981<H>  03    PT/INR - ( 03 Mar 2024 23:28 )   PT: 11.30 sec;   INR: 0.99 ratio         PTT - ( 03 Mar 2024 23:28 )  PTT:32.2 sec  Urinalysis Basic - ( 04 Mar 2024 09:56 )    Color: Red / Appearance: Turbid / S.027 / pH: x  Gluc: x / Ketone: Negative mg/dL  / Bili: Moderate / Urobili: 0.2 mg/dL   Blood: x / Protein: 300 mg/dL / Nitrite: Positive   Leuk Esterase: Moderate / RBC: Too Numerous to count /HPF / WBC 30 /HPF   Sq Epi: x / Non Sq Epi: x / Bacteria: Many /HPF            Culture - Body Fluid with Gram Stain (collected 04 Mar 2024 04:00)  Source: Abdominal Fl Abdominal Fluid  Gram Stain (04 Mar 2024 20:31):    Few polymorphonuclear leukocytes per low power field    No organisms seen per oil power field          RADIOLOGY:    < from: CT Abdomen and Pelvis No Cont (24 @ 02:57) >      IMPRESSION:    Large ascites significantly increased since the prior exam. Anasarca.   Right pleural effusion.    Evaluation of the organs is very limited based on technique and extensive   ascites.    Hypodense liver not further evaluated. Splenomegaly. Correlate with   history of underlying liver disease.    --- End of Report ---    < end of copied text >    < from: Xray Chest 1 View- PORTABLE-Urgent (24 @ 22:08) >    Impression:    Bibasilar opacities small right pleural effusion. Elevation right   hemidiaphragm.        --- End of Report ---    < end of copied text >        PHYSICAL EXAM:  GENERAL: AAOx3, no acute distress.  HEAD:  Atraumatic, Normocephalic  EYES: conjunctiva and sclera clear  NECK: Supple, No thyromegaly   CHEST/LUNG: Clear to auscultation bilaterally; No wheeze, rhonchi, or rales  HEART: Regular rate and rhythm; normal S1, S2, No murmurs.  ABDOMEN: Soft, nontender, +distended; Bowel sounds present  NEUROLOGY: No asterixis or tremor  SKIN: Intact, no jaundice      ASSESSMENT AND PLAN:
SUBJECTIVE:    Patient is a 28y old Female who presents with a chief complaint of   Currently admitted to medicine with the primary diagnosis of Abdominal pain       Today is hospital day 1d. This morning she is resting in bed. Pt states she is very uncomfortable and nausea. Informed of diagnosis of SBP. Assessed by GI, infectious disease, and addition medicine.     ROS:   CONSTITUTIONAL: No chills   EYES/ENT: No visual changes; No vertigo or throat pain   NECK: No pain or stiffness   RESPIRATORY: No cough, wheezing, hemoptysis; No shortness of breath   CARDIOVASCULAR: No chest pain or palpitations   GASTROINTESTINAL: hematemesis. No melena or hematochezia.  GENITOURINARY: No dysuria, frequency or hematuria  NEUROLOGICAL: No numbness  SKIN: No itching, rashes      PAST MEDICAL & SURGICAL HISTORY  Hepatitis C    Asthma    Anxiety and depression    IV drug abuse  heroin    H/O cirrhosis    No significant past surgical history        SOCIAL HISTORY:  + for poly substance use     ALLERGIES:  Suboxone (Rash)  buprenorphine (Rash)      MEDICATIONS:  STANDING MEDICATIONS  albuterol/ipratropium for Nebulization 3 milliLiter(s) Nebulizer every 8 hours  chlorhexidine 2% Cloths 1 Application(s) Topical <User Schedule>  furosemide   Injectable 40 milliGRAM(s) IV Push every 12 hours  influenza   Vaccine 0.5 milliLiter(s) IntraMuscular once  pantoprazole    Tablet 40 milliGRAM(s) Oral before breakfast  piperacillin/tazobactam IVPB.. 3.375 Gram(s) IV Intermittent every 8 hours  spironolactone 25 milliGRAM(s) Oral daily    PRN MEDICATIONS  HYDROmorphone  Injectable 0.5 milliGRAM(s) IV Push every 6 hours PRN  hydrOXYzine hydrochloride 50 milliGRAM(s) Oral at bedtime PRN  ondansetron Injectable 4 milliGRAM(s) IV Push three times a day PRN    VITALS:   T(F): 96.9  HR: 108  BP: 125/105  RR: 18  SpO2: 96%    LABS:                          10.8   4.64  )-----------( 117      ( 03 Mar 2024 23:28 )             33.0     03-03    132<L>  |  100  |  32<H>  ----------------------------<  113<H>  3.8   |  21  |  1.9<H>    Ca    7.1<L>      03 Mar 2024 23:28    TPro  5.1<L>  /  Alb  1.6<L>  /  TBili  0.6  /  DBili  0.4<H>  /  AST  59<H>  /  ALT  33  /  AlkPhos  981<H>      PT/INR - ( 03 Mar 2024 23:28 )   PT: 11.30 sec;   INR: 0.99 ratio         PTT - ( 03 Mar 2024 23:28 )  PTT:32.2 sec  Urinalysis Basic - ( 04 Mar 2024 09:56 )    Color: Red / Appearance: Turbid / S.027 / pH: x  Gluc: x / Ketone: Negative mg/dL  / Bili: Moderate / Urobili: 0.2 mg/dL   Blood: x / Protein: 300 mg/dL / Nitrite: Positive   Leuk Esterase: Moderate / RBC: Too Numerous to count /HPF / WBC 30 /HPF   Sq Epi: x / Non Sq Epi: x / Bacteria: Many /HPF            Culture - Body Fluid with Gram Stain (collected 04 Mar 2024 04:00)  Source: Abdominal Fl Abdominal Fluid  Gram Stain (04 Mar 2024 20:31):    Few polymorphonuclear leukocytes per low power field    No organisms seen per oil power field          RADIOLOGY:    < from: CT Abdomen and Pelvis No Cont (24 @ 02:57) >      IMPRESSION:    Large ascites significantly increased since the prior exam. Anasarca.   Right pleural effusion.    Evaluation of the organs is very limited based on technique and extensive   ascites.    Hypodense liver not further evaluated. Splenomegaly. Correlate with   history of underlying liver disease.    --- End of Report ---    < end of copied text >    < from: Xray Chest 1 View- PORTABLE-Urgent (24 @ 22:08) >    Impression:    Bibasilar opacities small right pleural effusion. Elevation right   hemidiaphragm.        --- End of Report ---    < end of copied text >        PHYSICAL EXAM:  GENERAL: AAOx3, no acute distress.  HEAD:  Atraumatic, Normocephalic  EYES: conjunctiva and sclera clear  NECK: Supple, No thyromegaly   CHEST/LUNG: Clear to auscultation bilaterally; No wheeze, rhonchi, or rales  HEART: Regular rate and rhythm; normal S1, S2, No murmurs.  ABDOMEN: Soft, nontender, +distended; Bowel sounds present  NEUROLOGY: No asterixis or tremor  SKIN: Intact, no jaundice      ASSESSMENT AND PLAN:

## 2024-03-06 LAB
ALBUMIN FLD-MCNC: 0.3 G/DL — SIGNIFICANT CHANGE UP
GLUCOSE FLD-MCNC: 97 MG/DL — SIGNIFICANT CHANGE UP
GRAM STN FLD: SIGNIFICANT CHANGE UP
HIV 1+2 AB+HIV1 P24 AG SERPL QL IA: SIGNIFICANT CHANGE UP
LDH SERPL L TO P-CCNC: 91 U/L — SIGNIFICANT CHANGE UP
PROT FLD-MCNC: <1 G/DL — SIGNIFICANT CHANGE UP
SPECIMEN SOURCE: SIGNIFICANT CHANGE UP

## 2024-03-07 LAB — HBV SURFACE AB SER-ACNC: SIGNIFICANT CHANGE UP

## 2024-03-08 LAB — GRAM STN FLD: ABNORMAL

## 2024-03-09 LAB
GAMMA INTERFERON BACKGROUND BLD IA-ACNC: 0.06 IU/ML — SIGNIFICANT CHANGE UP
M TB IFN-G BLD-IMP: ABNORMAL
M TB IFN-G CD4+ BCKGRND COR BLD-ACNC: 0 IU/ML — SIGNIFICANT CHANGE UP
M TB IFN-G CD4+CD8+ BCKGRND COR BLD-ACNC: 0 IU/ML — SIGNIFICANT CHANGE UP
QUANT TB PLUS MITOGEN MINUS NIL: 0 IU/ML — SIGNIFICANT CHANGE UP

## 2024-03-10 LAB
-  CEFTRIAXONE: SIGNIFICANT CHANGE UP
-  PENICILLIN: SIGNIFICANT CHANGE UP
-  VANCOMYCIN: SIGNIFICANT CHANGE UP
CULTURE RESULTS: ABNORMAL
METHOD TYPE: SIGNIFICANT CHANGE UP
ORGANISM # SPEC MICROSCOPIC CNT: ABNORMAL
ORGANISM # SPEC MICROSCOPIC CNT: SIGNIFICANT CHANGE UP
SPECIMEN SOURCE: SIGNIFICANT CHANGE UP

## 2024-03-10 NOTE — CHART NOTE - NSCHARTNOTEFT_GEN_A_CORE
Since less then 5 liter removed via paracentesis, will not order albumin (or midodrine) at this time
Was called by  labs with critical result- strep gallolyticus growing in body fluid Cx.  Attempted to call patient at the phone number listed-[phone], voicemail full.
Culture results gram stain positive for gram positive cocci.  Patient left AMA refused treatment for SBP.

## 2024-03-11 DIAGNOSIS — K65.2 SPONTANEOUS BACTERIAL PERITONITIS: ICD-10-CM

## 2024-03-11 DIAGNOSIS — N18.30 CHRONIC KIDNEY DISEASE, STAGE 3 UNSPECIFIED: ICD-10-CM

## 2024-03-11 DIAGNOSIS — J45.909 UNSPECIFIED ASTHMA, UNCOMPLICATED: ICD-10-CM

## 2024-03-11 DIAGNOSIS — Z86.19 PERSONAL HISTORY OF OTHER INFECTIOUS AND PARASITIC DISEASES: ICD-10-CM

## 2024-03-11 DIAGNOSIS — K76.7 HEPATORENAL SYNDROME: ICD-10-CM

## 2024-03-11 DIAGNOSIS — F32.A DEPRESSION, UNSPECIFIED: ICD-10-CM

## 2024-03-11 DIAGNOSIS — K80.20 CALCULUS OF GALLBLADDER WITHOUT CHOLECYSTITIS WITHOUT OBSTRUCTION: ICD-10-CM

## 2024-03-11 DIAGNOSIS — R16.2 HEPATOMEGALY WITH SPLENOMEGALY, NOT ELSEWHERE CLASSIFIED: ICD-10-CM

## 2024-03-11 DIAGNOSIS — F41.9 ANXIETY DISORDER, UNSPECIFIED: ICD-10-CM

## 2024-03-11 DIAGNOSIS — D61.818 OTHER PANCYTOPENIA: ICD-10-CM

## 2024-03-11 DIAGNOSIS — F11.23 OPIOID DEPENDENCE WITH WITHDRAWAL: ICD-10-CM

## 2024-03-11 DIAGNOSIS — Z53.29 PROCEDURE AND TREATMENT NOT CARRIED OUT BECAUSE OF PATIENT'S DECISION FOR OTHER REASONS: ICD-10-CM

## 2024-03-11 DIAGNOSIS — D73.1 HYPERSPLENISM: ICD-10-CM

## 2024-03-11 DIAGNOSIS — Z22.322 CARRIER OR SUSPECTED CARRIER OF METHICILLIN RESISTANT STAPHYLOCOCCUS AUREUS: ICD-10-CM

## 2024-03-11 DIAGNOSIS — K74.69 OTHER CIRRHOSIS OF LIVER: ICD-10-CM

## 2024-03-11 DIAGNOSIS — R18.8 OTHER ASCITES: ICD-10-CM

## 2024-03-11 DIAGNOSIS — Z91.198 PATIENT'S NONCOMPLIANCE WITH OTHER MEDICAL TREATMENT AND REGIMEN FOR OTHER REASON: ICD-10-CM

## 2024-03-11 DIAGNOSIS — E66.9 OBESITY, UNSPECIFIED: ICD-10-CM

## 2024-03-11 DIAGNOSIS — F17.210 NICOTINE DEPENDENCE, CIGARETTES, UNCOMPLICATED: ICD-10-CM

## 2024-03-11 DIAGNOSIS — J90 PLEURAL EFFUSION, NOT ELSEWHERE CLASSIFIED: ICD-10-CM

## 2024-03-11 DIAGNOSIS — N17.9 ACUTE KIDNEY FAILURE, UNSPECIFIED: ICD-10-CM

## 2024-03-11 DIAGNOSIS — Z88.5 ALLERGY STATUS TO NARCOTIC AGENT: ICD-10-CM

## 2024-03-11 LAB
CULTURE RESULTS: SIGNIFICANT CHANGE UP
SPECIMEN SOURCE: SIGNIFICANT CHANGE UP

## 2024-04-03 LAB
CULTURE RESULTS: SIGNIFICANT CHANGE UP
SPECIMEN SOURCE: SIGNIFICANT CHANGE UP

## 2024-04-18 NOTE — PROCEDURAL SAFETY CHECKLIST WITH OR WITHOUT SEDATION - NSCNFIRMBLDLOSS_GEN_ALL_CORE
n/a Detail Level: Detailed Quality 130: Documentation Of Current Medications In The Medical Record: Current Medications Documented

## 2024-05-21 NOTE — BRIEF OPERATIVE NOTE - NSICDXBRIEFPOSTOP_GEN_ALL_CORE_FT
POST-OP DIAGNOSIS:  Acute respiratory failure with hypoxia and hypercapnia 20-Jul-2022 18:30:08  Suman Chu   - - -

## 2024-09-30 NOTE — PROVIDER CONTACT NOTE (CHANGE IN STATUS NOTIFICATION) - SITUATION
Date of service: 9/30/2024  Referring provider: No ref. provider found    Subjective:      Chief complaint: Headache       Patient ID: Kaci Handley is a 30 y.o. female with anxiety, chronic fatigue, hemangioma of liver, insomnia, pituitary adenoma, depression who presents for follow up of headache     History of Present Illness    INTERVAL HISTORY 9/30/24  The patient location is: work  The chief complaint leading to consultation is: follow up  Visit type: audiovisual  20 minutes of total time spent on the encounter, which includes face to face time and non-face to face time preparing to see the patient (eg, review of tests), Obtaining and/or reviewing separately obtained history, Documenting clinical information in the electronic or other health record, Independently interpreting results (not separately reported) and communicating results to the patient/family/caregiver, or Care coordination (not separately reported).   Each patient to whom he or she provides medical services by telemedicine is:  (1) informed of the relationship between the physician and patient and the respective role of any other health care provider with respect to management of the patient; and (2) notified that he or she may decline to receive medical services by telemedicine and may withdraw from such care at any time.    Notes:     Last visit was four months ago and at that time she was better.    Today she reports she is doing well. She reports maybe 1-2 headaches every two weeks. Current pain 0 with range 0-7. She takes Ubrelvy which is effective. If needed, the second dose will fully abort migraines. Otherwise information below is reviewed and verified with no changes made    INTERVAL HISTORY 5/29/24    Last visit was about three months ago and at that time I referred for PT and sleep study. We started Avjoy and Ubrelvy.    Today she reports she is better. Current pain 0 with range 0-8. She has 1-2 headache days per week. 
bleeding noticed around trach site, small amount suctioned from trach. dressing saturated
She takes Ubrelvy 1-2 days per week. She stopped all OTC medications. No side effects to Ajovy. Otherwise information below is reviewed and verified with no changes made     ORIGINAL HEADACHE HISTORY - 2/6/24  Age at onset and course over time: 2009 began with near daily afternoon headaches. She would have to lay down and sleep for them to resolve. Headaches went away for a couple years. Then came back 3-4 years ago.  Family history of headaches - none  Last eye exam - 1 year ago  Location: forehead, neck, temples   Quality:  [] pressure [] tight [x] throbbing [] sharp [] stabbing   Severity: current 1 with range 1-8  Duration:  hours  Frequency: 3 days per week  Headaches awaken at night?: yes    Worst time of day:  mid-day, evening   Associated with: [x] photophobia [x]  phonophobia [x] osmophobia [] blurred vision  [] double vision [x] loss of appetite [x] nausea [x] vomiting [x] dizziness [] vertigo  [x] tinnitus [x] irritability [x] sinus pressure [] problems with concentration   [x] neck tightness   Alleviated by:  [] sleep [x] darkness [x] massage [] heat [] ice [] medication  Exacerbated by:  [x] fatigue [] light [] noise [x] smells [] coughing [] sneezing  [] bending over [] ovulation [x] menses [] alcohol [x] change in weather [x]  stress  Ipsilateral autonomic: [] nasal congestion [] lacrimation [] ptosis [] injection [] edema [] foreign body sensation [] ear fullness   ICP:  [] transient visual obscurations  [x] tinnitus low pitched, bilateral   [] positional headache  [x] non-positional   Sleep habits: trouble staying asleep, unrefreshing sleep, snoring   Caffeine intake:  mg coffee in AM  Gyn status (if female): having periods, IUD  HIT 6: 65    Current acute treatment:  Zofran  Ubrelvy    Current prevention:  Cymbalta  Ajovy - first February 204  Wellbutrin    Previously tried/failed acute treatment:  Tramadol  Naproxen  Flexeril  Sumatriptan  Nurtec - samples, 
dressing around trach site saturating. Lab value changes reported
sedating  Tylenol  ASA  Fioricet  Excedrin  Tylenol XS - daily   Ibuprofen - near daily for about 10 months   Rizatriptan - 3-4 days per week    Previously tried/failed preventative treatment:  Zyprexa  Fluoxetine  Trazodone  Topamax - 6-8 months with initial improvement then worsened   Amitriptyline - 6-8 months with initial improvement then worsened       Review of patient's allergies indicates:   Allergen Reactions    Covid-19 vac, bv (moderna)(pf) Other (See Comments)     Nausea, fever , myalgia     Current Outpatient Medications   Medication Sig Dispense Refill    buPROPion (WELLBUTRIN XL) 300 MG 24 hr tablet Take 1 tablet (300 mg total) by mouth once daily. 90 tablet 1    DULoxetine (CYMBALTA) 60 MG capsule Take 1 capsule (60 mg total) by mouth once daily. 90 capsule 1    dupilumab (DUPIXENT PEN) 300 mg/2 mL PnIj Inject 300 mg into the skin every 14 (fourteen) days.      fexofenadine (ALLEGRA) 180 MG tablet Take 180 mg by mouth once daily.      fremanezumab-vfrm (AJOVY AUTOINJECTOR) 225 mg/1.5 mL autoinjector Inject 1.5 mLs (225 mg total) into the skin every 28 days. 1 each 11    MAGNESIUM GLYCINATE ORAL Take by mouth.      montelukast (SINGULAIR) 10 mg tablet TAKE 1 TABLET BY MOUTH ONCE DAILY IN THE EVENING 90 tablet 3    ondansetron (ZOFRAN-ODT) 8 MG TbDL Take 1 tablet (8 mg total) by mouth 3 (three) times daily as needed (nauea or vomiting). 30 tablet 2    spironolactone (ALDACTONE) 100 MG tablet Take 100 mg by mouth once daily.      ubrogepant (UBRELVY) 100 mg tablet Take 1 tablet by mouth as needed for migraine. May repeat in 2 hours if needed. Max 2 tabs per day 16 tablet 11     No current facility-administered medications for this visit.       Past Medical History  Past Medical History:   Diagnosis Date    Abdominal pain, RLQ (right lower quadrant) 03/04/2013    Abnormal Pap smear of vagina     Allergy     Anemia     Headache     History of 2019 novel coronavirus disease (COVID-19) 12/08/2020    
Hyperglycemia     Pituitary adenoma     Thyroid disease     thyroid level fluctuate    Well female exam with routine gynecological exam 11/21/2012       Past Surgical History  Past Surgical History:   Procedure Laterality Date    COLONOSCOPY  06/30/2017    F Rabito    CYST REMOVAL  2013    left wrist-benign    ENDOSCOPIC NASAL SEPTOPLASTY Bilateral 12/28/2018    Procedure: SEPTOPLASTY, NOSE, ENDOSCOPIC;  Surgeon: Sam Robin MD;  Location: [facility];  Service: ENT;  Laterality: Bilateral;    ENDOSCOPIC NASAL SEPTOPLASTY  08/05/2020    FUNCTIONAL ENDOSCOPIC SINUS SURGERY (FESS) USING COMPUTER-ASSISTED NAVIGATION Bilateral 12/28/2018    Procedure: FESS, USING COMPUTER-ASSISTED NAVIGATION;  Surgeon: Sam Robin MD;  Location: [facility];  Service: ENT;  Laterality: Bilateral;    NASAL RECONSTRUCTION  08/05/2020    with graft    NASAL TURBINATE REDUCTION Bilateral 12/28/2018    Procedure: REDUCTION, NASAL TURBINATE;  Surgeon: Sam Robin MD;  Location: [facility];  Service: ENT;  Laterality: Bilateral;    NASAL TURBINATE REDUCTION  08/05/2020    POLYPECTOMY Left 2/28/2023    Procedure: POLYPECTOMY/ Nasal;  Surgeon: Eduardo Rivas MD;  Location: [facility];  Service: ENT;  Laterality: Left;    TONSILLECTOMY  1998    WISDOM TOOTH EXTRACTION  2009       Family History  Family History   Problem Relation Name Age of Onset    ADD / ADHD Mother      Bipolar disorder Mother      Allergies Mother      Drug abuse Mother      Retinal detachment Mother      Crohn's disease Father      Drug abuse Father      Alcohol abuse Father      No Known Problems Sister      Breast cancer Maternal Grandmother      Cancer Maternal Grandmother      Diabetes Maternal Grandfather      Diabetes Paternal Grandmother      COPD Paternal Grandfather      Ovarian cancer Neg Hx      Glaucoma Neg Hx      Macular degeneration Neg Hx         Social History  Social History     Socioeconomic History    Marital status:      Spouse name: 
Dallas Handley    Number of children: 0   Occupational History    Occupation: medical assistant     Employer: OTHER     Comment: [employer]   Tobacco Use    Smoking status: Former     Current packs/day: 0.00     Average packs/day: 0.3 packs/day for 3.0 years (0.8 ttl pk-yrs)     Types: Cigarettes     Start date: 10/21/2015     Quit date: 10/21/2018     Years since quittin.9    Smokeless tobacco: Never   Substance and Sexual Activity    Alcohol use: No    Drug use: Never    Sexual activity: Yes     Partners: Male     Birth control/protection: Condom, I.U.D.     Social Drivers of Health     Financial Resource Strain: Low Risk  (2024)    Overall Financial Resource Strain (CARDIA)     Difficulty of Paying Living Expenses: Not very hard   Food Insecurity: No Food Insecurity (2024)    Hunger Vital Sign     Worried About Running Out of Food in the Last Year: Never true     Ran Out of Food in the Last Year: Never true   Transportation Needs: No Transportation Needs (2024)    PRAPARE - Transportation     Lack of Transportation (Medical): No     Lack of Transportation (Non-Medical): No   Physical Activity: Sufficiently Active (2024)    Exercise Vital Sign     Days of Exercise per Week: 5 days     Minutes of Exercise per Session: 60 min   Stress: Stress Concern Present (2024)    [employer] - Occupational Stress Questionnaire     Feeling of Stress : To some extent   Housing Stability: Low Risk  (2024)    Housing Stability Vital Sign     Unable to Pay for Housing in the Last Year: No     Number of Places Lived in the Last Year: 1     Unstable Housing in the Last Year: No        Objective:        There were no vitals filed for this visit.      There is no height or weight on file to calculate BMI.    24  Constitutional:   She appears well-developed and well-nourished. She is well groomed     Neurological Exam:  General: well-developed, well-nourished, no 
distress  Mental status: Awake and alert  Speech language: No dysarthria or aphasia on conversation  Cranial nerves: Face symmetric  Motor: Moves all extremities well  Coordination: No ataxia. No tremor.    2/6/24  Constitutional: appears in no acute distress, well-developed, well-nourished     Eyes: normal conjunctiva, PERRLA    Ears, nose, mouth, throat: external appearance of ears and nose normal, hearing intact. Tongue scalloping      Cardiovascular: regular rate and rhythm, no murmurs appreciated    Respiratory: unlabored respirations, breath sounds normal bilaterally    Gastrointestinal: no visible abdominal masses, no guarding, no visible hernia    Musculoskeletal: normal tone in all four extremities. No abnormal movements. No pronator drift. No orbit. Symmetric finger tapping. Normal station. Normal regular gait. Normal tandem gait.      Spine:   CERVICAL SPINE:  ROM: normal   MUSCLE SPASM: mild, in all planes    FACET LOADING: no   SPURLING: no  RENE / LAURI tender: no     Psychiatric: normal judgment and insight. Oriented to person, place, and time.     Neurologic:   Cortical functions: recent and remote memory intact, normal attention span and concentration, speech fluent, adequate fund of knowledge   Cranial nerves: visual fields full, PERRLA, EOMI, symmetric facial strength, hearing intact, palate elevates symmetrically, shoulder shrug 5/5, tongue protrudes midline   Reflexes: 2+ in the upper and lower extremities, no Mccormack  Sensation: intact to temperature throughout   Coordination: normal finger to nose, heel to shin, tandem gait     Data Review:     I have personally reviewed the referring provider's notes, labs, & imaging made available to me today.      RADIOLOGY STUDIES:  I have personally reviewed the pertinent images performed.       Results for orders placed or performed during the hospital encounter of 01/05/24   CT Head Without Contrast    Narrative    EXAMINATION:  CT HEAD WITHOUT 
CONTRAST    CLINICAL HISTORY:  Head trauma, moderate-severe;    TECHNIQUE:  Low dose axial CT images obtained throughout the head without intravenous contrast. Sagittal and coronal reconstructions were performed.    COMPARISON:  MRI 01/30/2021; CT 01/25/2021    FINDINGS:  Intracranial compartment:    Ventricles and sulci are normal in size for age without evidence of hydrocephalus. No extra-axial blood or fluid collections.    No acute parenchymal hemorrhage or evolving major vascular distribution infarct.  No intracranial mass effect or midline shift.    Skull/extracranial contents (limited evaluation): No fracture.  Partially visualized mucosal thickening in the right maxillary antrum.  Mastoid air cells and remaining paranasal sinuses are essentially clear.      Impression    No acute intracranial abnormality.      Electronically signed by: Domo Belcher  Date:    01/05/2024  Time:    20:35       Lab Results   Component Value Date     06/28/2024    K 4.4 06/28/2024     06/28/2024    CO2 27 06/28/2024    BUN 18 06/28/2024    CREATININE 0.78 06/28/2024    GLU 76 06/28/2024    HGBA1C 4.9 03/07/2016    AST 20 06/28/2024    AST 16 03/07/2016    ALT 18 06/28/2024    ALBUMIN 4.2 06/28/2024    PROT 7.3 06/28/2024    BILITOT 0.3 06/28/2024    CHOL 170 12/15/2023    HDL 56 12/15/2023    LDLCALC 106.8 12/15/2023    TRIG 36 12/15/2023       Lab Results   Component Value Date    WBC 8.80 06/28/2024    HGB 12.7 06/28/2024    HCT 40.3 06/28/2024    MCV 91 06/28/2024     06/28/2024       Lab Results   Component Value Date    TSH 1.400 12/15/2023           Assessment & Plan:       Problem List Items Addressed This Visit          Neuro    Intractable migraine without aura and without status migrainosus - Primary    Overview     Migraine headaches since 2009. Headaches are typically unilateral, moderate to severe in intensity, worsen with activity, pounding in quality and associated with sensitivity to light, 
smell and sound. Gradual progression pattern, lack of red flag features on history, and normal neurological exam are reassuring for primary as opposed to secondary etiology of headaches thus imaging will not be pursued for this history and this exam at this time.    We discussed options for prevention. Previously failed Topamax and amitriptyline. Add CGRP. Avoid Aimovig and Qulipta due to baseline IBS with constipation. Start with Ajovy.    For acute attacks trial Ubrelvy. Discussed need to stop OTC.           Current Assessment & Plan     She has been on Ajovy for 6 months and gone from 3 headache days per week to less than 4 per month. Overall severity has improved by at least 50%. Ubrelvy is effective. Continue current plan.                     Please call our clinic at [phone] or send a message on the [facility] portal if there are any changes to the plan described below, for example,if you are not contacted for the requested tests, referral(s) within one week, if you are unable to receive the medications prescribed, or if you feel you need to change the treatment course for any reason.     TESTING:  -- none     REFERRALS:  -- none     PREVENTION (use daily regardless of headache):  -- continue Ajovy once monthly  -- continue magnesium in ONE of the following preparations -               1. Magnesium oxide 800mg daily (the most common over the counter kind, may causes loose stools)              2. Magnesium citrate 400-500mg daily (harder to find, but more neutral on the bowels)              3. Magnesium glycinate 400mg daily (hardest to find, look online, but most bowel-neutral, best absorbed)     AS-NEEDED TREATMENT (use total no more than 10 days per month unless otherwise stated):  -- continue Ubrelvy with next migraine. You can repeat two hours later if needed. With this medication do not drink grapefruit juice or eat grapefruit or some medications like ketoconazole, itraconazole, or antibiotics 
clarithromycin  -- continue tizanidine at night. This is a muscle relaxer and it will also make you potentially sleepy. Start with half a tablet to see how you respond but can take up to a whole tablet if needed      Follow up in about 6 months (around 3/30/2025).    Maribel Wick NP

## 2024-10-21 NOTE — PROVIDER CONTACT NOTE (OTHER) - ACTION/TREATMENT ORDERED:
Hold feeds. Zofran x1 IVP.
Versed 2mg and fentanyl 50mcg IV push ordered and administered.
cooling blanket and ibuprofen for fever, ketamine dose lowered, cool bath monitored
Interval History:  10/20/24: No new seizures.  There has been difficulty obtaining information regarding her baseline.    MEDICATIONS  (STANDING):  busPIRone 10 milliGRAM(s) Oral two times a day  cefepime  Injectable. 1000 milliGRAM(s) IV Push every 24 hours  chlorhexidine 2% Cloths 1 Application(s) Topical <User Schedule>  collagenase Ointment 1 Application(s) Topical daily  DAPTOmycin IVPB 550 milliGRAM(s) IV Intermittent every 48 hours  escitalopram 20 milliGRAM(s) Oral daily  heparin  Infusion. 900 Unit(s)/Hr (9 mL/Hr) IV Continuous <Continuous>  lacosamide IVPB 150 milliGRAM(s) IV Intermittent every 12 hours  pantoprazole  Injectable 40 milliGRAM(s) IV Push every 12 hours    MEDICATIONS  (PRN):  haloperidol    Injectable 5 milliGRAM(s) IV Push every 6 hours PRN agitation  heparin   Injectable 2500 Unit(s) IV Push every 6 hours PRN For aPTT between 40 - 57  heparin   Injectable 5500 Unit(s) IV Push every 6 hours PRN For aPTT less than 40  LORazepam   Injectable 1 milliGRAM(s) IV Push every 6 hours PRN Agitation      Allergies    No Known Allergies    Intolerances        PHYSICAL EXAM:  Vital Signs Last 24 Hrs  T(F): 98.9 (10-21-24 @ 05:56)  HR: 105 (10-21-24 @ 06:00)  BP: 108/56 (10-21-24 @ 06:00)  RR: 18 (10-21-24 @ 06:00)      Gen exam:  Normocephalic, agitated  HEENT: PERRLA  Neck: Supple.  Respiratory: Breath sounds are clear bilaterally  Cardiovascular: S1 and S2, regular   Extremities:  no edema  Musculoskeletal: LE spastic  Skin: No rashes      Neurological exam:  HF: Awake and alert, says "no" to all questions that I asked her, biting pillow, Agitated, intermittently screams.   CN: GAY, Gaze palsy to left, tracks eyes, no gross NLFD, tongue midline,   Motor: not cooperative, moves all extremities  Sensory:  withdraws all 4 extremities to noxious stimuli  Coordination: unable to assess  Reflexes: upper extremities 2+, lower extremities unable to assess  Gait: unable to assess        LABS:                        7.0    9.04  )-----------( 157      ( 21 Oct 2024 08:23 )             22.9     10-21    141  |  106  |  34[H]  ----------------------------<  97  3.6   |  25  |  5.75[H]    Ca    11.2[H]      21 Oct 2024 08:23  Phos  3.1     10-21  Mg     1.8     10-21    TPro  7.1  /  Alb  2.1[L]  /  TBili  0.6  /  DBili  x   /  AST  29  /  ALT  27  /  AlkPhos  90  10-21    PTT - ( 21 Oct 2024 08:23 )  PTT:58.4 sec  Urinalysis Basic - ( 21 Oct 2024 08:23 )    Color: x / Appearance: x / SG: x / pH: x  Gluc: 97 mg/dL / Ketone: x  / Bili: x / Urobili: x   Blood: x / Protein: x / Nitrite: x   Leuk Esterase: x / RBC: x / WBC x   Sq Epi: x / Non Sq Epi: x / Bacteria: x        RADIOLOGY & ADDITIONAL STUDIES:    CT head 10/18/24:  No acute intracranial  hemorrhage or midline shift.  Right frontal temporal encephalomalacia/gliosis.  Extensive white matter small vessel ischemic disease/chronic infarct.  Chronic left thalamic infarct.  Generalized volume loss with ventricular dilatation.              
MD and APCUM at bedside with RN
tylenol ogt x1, cool bath done
Cooling blanket and NS 1000 bolus ordered

## 2024-11-25 NOTE — PROGRESS NOTE ADULT - ASSESSMENT
acute kidney injury  Cr 8/2022 - 0.9  worsening renal function, low Toni+, significant non-nephrotic range proteinuria  microhematuria, pyuria and bacteriuria   normal renal sono  anasarca / ascites s/p 7.5 liter para   hyperkalemia  metabolic acidosis  influenza / dyspnea  liver cirrhosis / Hep C  pancytopenia  IVDA / active IV heroin abuse  leg wounds    plan:    hold lasix with worsening renal function  add midodrine 5mg po tid  contt albumin 25% 25g iv q8h x 72 hours  increase sodium bicarbonate 1300mg po tid  may need dialysis if renal parameters continue to worsen   f/u C3, C4, ANCA, KRISHNA, Anti GBM, HIV, hep C VL  need accurate urine output monitoring  tamiflu renal dose  will follow     Blas

## 2024-12-25 NOTE — BH CONSULTATION LIAISON PROGRESS NOTE - NSBHCONSULTRECOMMENDOTHER_PSY_A_CORE FT
35 Addendum: Consult was called in as a new consult, however, only follow up note template was available (if new consult template is picked, then it autopopulates since it is the same admission as when it was last done.)

## 2025-02-28 NOTE — ED ADULT NURSE NOTE - NS ED PATIENT SAFETY CONCERN
No

## 2025-07-21 NOTE — PRE-ANESTHESIA EVALUATION ADULT - NSANTHAPLANRD_GEN_ALL_CORE
[FreeTextEntry1] : Ms. ESCOBAR is a 62-year-old F past medical history remarkable for MALT lymphoma currently undergoing treatment.    kidney stones--Status post left ureteroscopy stone basketing and stent placement 7/16/2025.  Stent removed 7/21/2025. Kidney stone prevention reviewed.  Follow-up imaging January 2026.  Because of the patient's history of urolithiasis, today we extensively discussed dietary modifications that can be made to reduce stone frequency, growth and recurrence. Patient was given the following recommendations to prevent stone formation in the future:   - HYDRATION: Increase water intake by drinking at least 2.5 liters of water daily (or 8-to-12 8-oz glasses) of water daily   - CITRATE: Add fresh squeezed lemon juice to water to increase citrate in diet which would help to prevent making new kidney stones. Other sources of citrate include diet lemon-lime soft drinks and sugar-free lemonade like Crystal Light.   - SALT: Limit salt intake to less than 2300 miligrams of sodium daily. Monitor intake of foods high in salt content such as salt seasonings, MSG, soy sauce, salted crackers and snack foods, pickled vegetables and fruits, cured and processed meats, canned soups, frozen meals. Sodium, often from salt, causes the kidneys to excrete more calcium into the urine. High concentrations of calcium in the urine combine with oxalate and phosphorus to form stones. Reducing sodium intake is preferred to reducing calcium intake.   - OXALATE: Avoid foods very high in oxalate content >25mg: chocolate, green leafy vegetables, dried fruit, drinks such as chocolate drink mixes, Ovaltine, instant iced tea, many nuts and fats such as peanuts, almonds, cashews.   - CALCIUM: Calcium from food does not increase the risk of calcium oxalate stones. Calcium in the digestive tract binds to oxalate from food and keeps it from entering the blood, and then the urinary tract, where it can form stones. Eat the recommended amount of calcium (3-4 servings or 5396-5019 mg/day). Identify calcium-rich foods or beverages like milk, calcium-fortified non-dairy milks, yogurt, cheese or broccoli to eat with meals.   - URIC ACID: Limit meats and other animal protein-such as eggs and fish-contain purines, which breakdown into uric acid in the urine. Foods especially rich in purines include organ meats, such as liver. People who form uric acid stones should limit their meat consumption to 6 ounces each day.  Animal protein may also raise the risk of calcium stones by increasing the excretion of calcium and reducing the excretion of citrate into the urine.  
general